# Patient Record
Sex: MALE | Race: WHITE | NOT HISPANIC OR LATINO
[De-identification: names, ages, dates, MRNs, and addresses within clinical notes are randomized per-mention and may not be internally consistent; named-entity substitution may affect disease eponyms.]

---

## 2021-01-01 ENCOUNTER — RESULT REVIEW (OUTPATIENT)
Age: 81
End: 2021-01-01

## 2021-01-01 ENCOUNTER — APPOINTMENT (OUTPATIENT)
Dept: SURGERY | Facility: HOSPITAL | Age: 81
End: 2021-01-01

## 2021-01-01 ENCOUNTER — INPATIENT (INPATIENT)
Facility: HOSPITAL | Age: 81
LOS: 71 days | Discharge: NURSING, REHAB OR LONG-TERM CARE | DRG: 329 | End: 2021-09-23
Attending: STUDENT IN AN ORGANIZED HEALTH CARE EDUCATION/TRAINING PROGRAM | Admitting: STUDENT IN AN ORGANIZED HEALTH CARE EDUCATION/TRAINING PROGRAM
Payer: MEDICARE

## 2021-01-01 ENCOUNTER — APPOINTMENT (OUTPATIENT)
Dept: COLORECTAL SURGERY | Facility: HOSPITAL | Age: 81
End: 2021-01-01

## 2021-01-01 ENCOUNTER — APPOINTMENT (OUTPATIENT)
Dept: SURGERY | Facility: HOSPITAL | Age: 81
End: 2021-01-01
Payer: MEDICARE

## 2021-01-01 ENCOUNTER — TRANSCRIPTION ENCOUNTER (OUTPATIENT)
Age: 81
End: 2021-01-01

## 2021-01-01 ENCOUNTER — APPOINTMENT (OUTPATIENT)
Dept: SURGERY | Facility: CLINIC | Age: 81
End: 2021-01-01
Payer: MEDICARE

## 2021-01-01 VITALS
DIASTOLIC BLOOD PRESSURE: 59 MMHG | TEMPERATURE: 98 F | SYSTOLIC BLOOD PRESSURE: 102 MMHG | WEIGHT: 130.07 LBS | HEART RATE: 70 BPM | HEIGHT: 67 IN | OXYGEN SATURATION: 98 % | RESPIRATION RATE: 16 BRPM

## 2021-01-01 VITALS — DIASTOLIC BLOOD PRESSURE: 57 MMHG | HEART RATE: 73 BPM | SYSTOLIC BLOOD PRESSURE: 86 MMHG

## 2021-01-01 VITALS
OXYGEN SATURATION: 95 % | HEART RATE: 88 BPM | DIASTOLIC BLOOD PRESSURE: 60 MMHG | RESPIRATION RATE: 18 BRPM | TEMPERATURE: 99 F | SYSTOLIC BLOOD PRESSURE: 97 MMHG

## 2021-01-01 VITALS — TEMPERATURE: 97 F

## 2021-01-01 DIAGNOSIS — K50.90 CROHN'S DISEASE, UNSPECIFIED, WITHOUT COMPLICATIONS: ICD-10-CM

## 2021-01-01 DIAGNOSIS — R49.8 OTHER VOICE AND RESONANCE DISORDERS: ICD-10-CM

## 2021-01-01 DIAGNOSIS — K50.10 CROHN'S DISEASE OF LARGE INTESTINE W/OUT COMPLICATIONS: ICD-10-CM

## 2021-01-01 DIAGNOSIS — K52.9 NONINFECTIVE GASTROENTERITIS AND COLITIS, UNSPECIFIED: ICD-10-CM

## 2021-01-01 DIAGNOSIS — R53.81 OTHER MALAISE: ICD-10-CM

## 2021-01-01 DIAGNOSIS — R19.7 DIARRHEA, UNSPECIFIED: ICD-10-CM

## 2021-01-01 DIAGNOSIS — Z51.5 ENCOUNTER FOR PALLIATIVE CARE: ICD-10-CM

## 2021-01-01 DIAGNOSIS — I25.10 ATHEROSCLEROTIC HEART DISEASE OF NATIVE CORONARY ARTERY WITHOUT ANGINA PECTORIS: ICD-10-CM

## 2021-01-01 DIAGNOSIS — K92.2 GASTROINTESTINAL HEMORRHAGE, UNSPECIFIED: ICD-10-CM

## 2021-01-01 LAB
24R-OH-CALCIDIOL SERPL-MCNC: 25.4 NG/ML — LOW (ref 30–80)
24R-OH-CALCIDIOL SERPL-MCNC: 25.8 NG/ML — LOW (ref 30–80)
A1C WITH ESTIMATED AVERAGE GLUCOSE RESULT: 5.2 % — SIGNIFICANT CHANGE UP (ref 4–5.6)
ALBUMIN SERPL ELPH-MCNC: 1.6 G/DL — LOW (ref 3.3–5)
ALBUMIN SERPL ELPH-MCNC: 1.7 G/DL — LOW (ref 3.3–5)
ALBUMIN SERPL ELPH-MCNC: 1.8 G/DL — LOW (ref 3.3–5)
ALBUMIN SERPL ELPH-MCNC: 1.9 G/DL — LOW (ref 3.3–5)
ALBUMIN SERPL ELPH-MCNC: 2 G/DL — LOW (ref 3.3–5)
ALBUMIN SERPL ELPH-MCNC: 2.1 G/DL — LOW (ref 3.3–5)
ALBUMIN SERPL ELPH-MCNC: 2.1 G/DL — LOW (ref 3.3–5)
ALBUMIN SERPL ELPH-MCNC: 2.2 G/DL — LOW (ref 3.3–5)
ALBUMIN SERPL ELPH-MCNC: 2.2 G/DL — LOW (ref 3.3–5)
ALBUMIN SERPL ELPH-MCNC: 2.6 G/DL — LOW (ref 3.3–5)
ALBUMIN SERPL ELPH-MCNC: 2.7 G/DL — LOW (ref 3.3–5)
ALP SERPL-CCNC: 101 U/L — SIGNIFICANT CHANGE UP (ref 40–120)
ALP SERPL-CCNC: 105 U/L — SIGNIFICANT CHANGE UP (ref 40–120)
ALP SERPL-CCNC: 105 U/L — SIGNIFICANT CHANGE UP (ref 40–120)
ALP SERPL-CCNC: 120 U/L — SIGNIFICANT CHANGE UP (ref 40–120)
ALP SERPL-CCNC: 131 U/L — HIGH (ref 40–120)
ALP SERPL-CCNC: 135 U/L — HIGH (ref 40–120)
ALP SERPL-CCNC: 159 U/L — HIGH (ref 40–120)
ALP SERPL-CCNC: 179 U/L — HIGH (ref 40–120)
ALP SERPL-CCNC: 191 U/L — HIGH (ref 40–120)
ALP SERPL-CCNC: 237 U/L — HIGH (ref 40–120)
ALP SERPL-CCNC: 271 U/L — HIGH (ref 40–120)
ALP SERPL-CCNC: 382 U/L — HIGH (ref 40–120)
ALP SERPL-CCNC: 49 U/L — SIGNIFICANT CHANGE UP (ref 40–120)
ALP SERPL-CCNC: 51 U/L — SIGNIFICANT CHANGE UP (ref 40–120)
ALP SERPL-CCNC: 70 U/L — SIGNIFICANT CHANGE UP (ref 40–120)
ALP SERPL-CCNC: 71 U/L — SIGNIFICANT CHANGE UP (ref 40–120)
ALP SERPL-CCNC: 71 U/L — SIGNIFICANT CHANGE UP (ref 40–120)
ALP SERPL-CCNC: 73 U/L — SIGNIFICANT CHANGE UP (ref 40–120)
ALP SERPL-CCNC: 77 U/L — SIGNIFICANT CHANGE UP (ref 40–120)
ALP SERPL-CCNC: 77 U/L — SIGNIFICANT CHANGE UP (ref 40–120)
ALP SERPL-CCNC: 80 U/L — SIGNIFICANT CHANGE UP (ref 40–120)
ALP SERPL-CCNC: 82 U/L — SIGNIFICANT CHANGE UP (ref 40–120)
ALP SERPL-CCNC: 84 U/L — SIGNIFICANT CHANGE UP (ref 40–120)
ALP SERPL-CCNC: 85 U/L — SIGNIFICANT CHANGE UP (ref 40–120)
ALP SERPL-CCNC: 86 U/L — SIGNIFICANT CHANGE UP (ref 40–120)
ALP SERPL-CCNC: 87 U/L — SIGNIFICANT CHANGE UP (ref 40–120)
ALP SERPL-CCNC: 88 U/L — SIGNIFICANT CHANGE UP (ref 40–120)
ALP SERPL-CCNC: 89 U/L — SIGNIFICANT CHANGE UP (ref 40–120)
ALP SERPL-CCNC: 91 U/L — SIGNIFICANT CHANGE UP (ref 40–120)
ALP SERPL-CCNC: 92 U/L — SIGNIFICANT CHANGE UP (ref 40–120)
ALP SERPL-CCNC: 94 U/L — SIGNIFICANT CHANGE UP (ref 40–120)
ALP SERPL-CCNC: 94 U/L — SIGNIFICANT CHANGE UP (ref 40–120)
ALP SERPL-CCNC: 96 U/L — SIGNIFICANT CHANGE UP (ref 40–120)
ALP SERPL-CCNC: 97 U/L — SIGNIFICANT CHANGE UP (ref 40–120)
ALT FLD-CCNC: 12 U/L — SIGNIFICANT CHANGE UP (ref 10–45)
ALT FLD-CCNC: 14 U/L — SIGNIFICANT CHANGE UP (ref 10–45)
ALT FLD-CCNC: 142 U/L — HIGH (ref 10–45)
ALT FLD-CCNC: 16 U/L — SIGNIFICANT CHANGE UP (ref 10–45)
ALT FLD-CCNC: 16 U/L — SIGNIFICANT CHANGE UP (ref 10–45)
ALT FLD-CCNC: 188 U/L — HIGH (ref 10–45)
ALT FLD-CCNC: 19 U/L — SIGNIFICANT CHANGE UP (ref 10–45)
ALT FLD-CCNC: 20 U/L — SIGNIFICANT CHANGE UP (ref 10–45)
ALT FLD-CCNC: 20 U/L — SIGNIFICANT CHANGE UP (ref 10–45)
ALT FLD-CCNC: 220 U/L — HIGH (ref 10–45)
ALT FLD-CCNC: 24 U/L — SIGNIFICANT CHANGE UP (ref 10–45)
ALT FLD-CCNC: 26 U/L — SIGNIFICANT CHANGE UP (ref 10–45)
ALT FLD-CCNC: 28 U/L — SIGNIFICANT CHANGE UP (ref 10–45)
ALT FLD-CCNC: 28 U/L — SIGNIFICANT CHANGE UP (ref 10–45)
ALT FLD-CCNC: 30 U/L — SIGNIFICANT CHANGE UP (ref 10–45)
ALT FLD-CCNC: 30 U/L — SIGNIFICANT CHANGE UP (ref 10–45)
ALT FLD-CCNC: 31 U/L — SIGNIFICANT CHANGE UP (ref 10–45)
ALT FLD-CCNC: 32 U/L — SIGNIFICANT CHANGE UP (ref 10–45)
ALT FLD-CCNC: 33 U/L — SIGNIFICANT CHANGE UP (ref 10–45)
ALT FLD-CCNC: 33 U/L — SIGNIFICANT CHANGE UP (ref 10–45)
ALT FLD-CCNC: 34 U/L — SIGNIFICANT CHANGE UP (ref 10–45)
ALT FLD-CCNC: 34 U/L — SIGNIFICANT CHANGE UP (ref 10–45)
ALT FLD-CCNC: 35 U/L — SIGNIFICANT CHANGE UP (ref 10–45)
ALT FLD-CCNC: 35 U/L — SIGNIFICANT CHANGE UP (ref 10–45)
ALT FLD-CCNC: 36 U/L — SIGNIFICANT CHANGE UP (ref 10–45)
ALT FLD-CCNC: 36 U/L — SIGNIFICANT CHANGE UP (ref 10–45)
ALT FLD-CCNC: 367 U/L — HIGH (ref 10–45)
ALT FLD-CCNC: 37 U/L — SIGNIFICANT CHANGE UP (ref 10–45)
ALT FLD-CCNC: 46 U/L — HIGH (ref 10–45)
ALT FLD-CCNC: 48 U/L — HIGH (ref 10–45)
ALT FLD-CCNC: 54 U/L — HIGH (ref 10–45)
ALT FLD-CCNC: 54 U/L — HIGH (ref 10–45)
ALT FLD-CCNC: 60 U/L — HIGH (ref 10–45)
ALT FLD-CCNC: 63 U/L — HIGH (ref 10–45)
ALT FLD-CCNC: 77 U/L — HIGH (ref 10–45)
ALT FLD-CCNC: 9 U/L — LOW (ref 10–45)
ALT FLD-CCNC: <5 U/L — LOW (ref 10–45)
ANION GAP SERPL CALC-SCNC: 10 MMOL/L — SIGNIFICANT CHANGE UP (ref 5–17)
ANION GAP SERPL CALC-SCNC: 11 MMOL/L — SIGNIFICANT CHANGE UP (ref 5–17)
ANION GAP SERPL CALC-SCNC: 12 MMOL/L — SIGNIFICANT CHANGE UP (ref 5–17)
ANION GAP SERPL CALC-SCNC: 13 MMOL/L — SIGNIFICANT CHANGE UP (ref 5–17)
ANION GAP SERPL CALC-SCNC: 14 MMOL/L — SIGNIFICANT CHANGE UP (ref 5–17)
ANION GAP SERPL CALC-SCNC: 6 MMOL/L — SIGNIFICANT CHANGE UP (ref 5–17)
ANION GAP SERPL CALC-SCNC: 6 MMOL/L — SIGNIFICANT CHANGE UP (ref 5–17)
ANION GAP SERPL CALC-SCNC: 7 MMOL/L — SIGNIFICANT CHANGE UP (ref 5–17)
ANION GAP SERPL CALC-SCNC: 8 MMOL/L — SIGNIFICANT CHANGE UP (ref 5–17)
ANION GAP SERPL CALC-SCNC: 9 MMOL/L — SIGNIFICANT CHANGE UP (ref 5–17)
ANISOCYTOSIS BLD QL: SIGNIFICANT CHANGE UP
APPEARANCE UR: CLEAR — SIGNIFICANT CHANGE UP
APTT BLD: 26.5 SEC — LOW (ref 27.5–35.5)
APTT BLD: 28.6 SEC — SIGNIFICANT CHANGE UP (ref 27.5–35.5)
APTT BLD: 28.6 SEC — SIGNIFICANT CHANGE UP (ref 27.5–35.5)
APTT BLD: 29.3 SEC — SIGNIFICANT CHANGE UP (ref 27.5–35.5)
APTT BLD: 30.3 SEC — SIGNIFICANT CHANGE UP (ref 27.5–35.5)
APTT BLD: 30.6 SEC — SIGNIFICANT CHANGE UP (ref 27.5–35.5)
APTT BLD: 30.8 SEC — SIGNIFICANT CHANGE UP (ref 27.5–35.5)
APTT BLD: 32 SEC — SIGNIFICANT CHANGE UP (ref 27.5–35.5)
APTT BLD: 32.4 SEC — SIGNIFICANT CHANGE UP (ref 27.5–35.5)
APTT BLD: 32.5 SEC — SIGNIFICANT CHANGE UP (ref 27.5–35.5)
APTT BLD: 32.5 SEC — SIGNIFICANT CHANGE UP (ref 27.5–35.5)
AST SERPL-CCNC: 11 U/L — SIGNIFICANT CHANGE UP (ref 10–40)
AST SERPL-CCNC: 12 U/L — SIGNIFICANT CHANGE UP (ref 10–40)
AST SERPL-CCNC: 124 U/L — HIGH (ref 10–40)
AST SERPL-CCNC: 13 U/L — SIGNIFICANT CHANGE UP (ref 10–40)
AST SERPL-CCNC: 14 U/L — SIGNIFICANT CHANGE UP (ref 10–40)
AST SERPL-CCNC: 14 U/L — SIGNIFICANT CHANGE UP (ref 10–40)
AST SERPL-CCNC: 15 U/L — SIGNIFICANT CHANGE UP (ref 10–40)
AST SERPL-CCNC: 16 U/L — SIGNIFICANT CHANGE UP (ref 10–40)
AST SERPL-CCNC: 17 U/L — SIGNIFICANT CHANGE UP (ref 10–40)
AST SERPL-CCNC: 19 U/L — SIGNIFICANT CHANGE UP (ref 10–40)
AST SERPL-CCNC: 21 U/L — SIGNIFICANT CHANGE UP (ref 10–40)
AST SERPL-CCNC: 22 U/L — SIGNIFICANT CHANGE UP (ref 10–40)
AST SERPL-CCNC: 24 U/L — SIGNIFICANT CHANGE UP (ref 10–40)
AST SERPL-CCNC: 26 U/L — SIGNIFICANT CHANGE UP (ref 10–40)
AST SERPL-CCNC: 27 U/L — SIGNIFICANT CHANGE UP (ref 10–40)
AST SERPL-CCNC: 28 U/L — SIGNIFICANT CHANGE UP (ref 10–40)
AST SERPL-CCNC: 28 U/L — SIGNIFICANT CHANGE UP (ref 10–40)
AST SERPL-CCNC: 31 U/L — SIGNIFICANT CHANGE UP (ref 10–40)
AST SERPL-CCNC: 32 U/L — SIGNIFICANT CHANGE UP (ref 10–40)
AST SERPL-CCNC: 34 U/L — SIGNIFICANT CHANGE UP (ref 10–40)
AST SERPL-CCNC: 36 U/L — SIGNIFICANT CHANGE UP (ref 10–40)
AST SERPL-CCNC: 40 U/L — SIGNIFICANT CHANGE UP (ref 10–40)
AST SERPL-CCNC: 60 U/L — HIGH (ref 10–40)
AST SERPL-CCNC: 7 U/L — LOW (ref 10–40)
AST SERPL-CCNC: 97 U/L — HIGH (ref 10–40)
AST SERPL-CCNC: <5 U/L — LOW (ref 10–40)
BACTERIA # UR AUTO: NEGATIVE — SIGNIFICANT CHANGE UP
BASE EXCESS BLDV CALC-SCNC: 1.9 MMOL/L — SIGNIFICANT CHANGE UP (ref -2–2)
BASE EXCESS BLDV CALC-SCNC: 2.9 MMOL/L — HIGH (ref -2–2)
BASE EXCESS BLDV CALC-SCNC: 4.1 MMOL/L — HIGH (ref -2–2)
BASE EXCESS BLDV CALC-SCNC: 6 MMOL/L — HIGH (ref -2–2)
BASOPHILS # BLD AUTO: 0 K/UL — SIGNIFICANT CHANGE UP (ref 0–0.2)
BASOPHILS # BLD AUTO: 0.01 K/UL — SIGNIFICANT CHANGE UP (ref 0–0.2)
BASOPHILS # BLD AUTO: 0.02 K/UL — SIGNIFICANT CHANGE UP (ref 0–0.2)
BASOPHILS # BLD AUTO: 0.02 K/UL — SIGNIFICANT CHANGE UP (ref 0–0.2)
BASOPHILS # BLD AUTO: 0.03 K/UL — SIGNIFICANT CHANGE UP (ref 0–0.2)
BASOPHILS NFR BLD AUTO: 0 % — SIGNIFICANT CHANGE UP (ref 0–2)
BASOPHILS NFR BLD AUTO: 0.1 % — SIGNIFICANT CHANGE UP (ref 0–2)
BASOPHILS NFR BLD AUTO: 0.2 % — SIGNIFICANT CHANGE UP (ref 0–2)
BASOPHILS NFR BLD AUTO: 0.2 % — SIGNIFICANT CHANGE UP (ref 0–2)
BILIRUB DIRECT SERPL-MCNC: 0.2 MG/DL — SIGNIFICANT CHANGE UP (ref 0–0.2)
BILIRUB INDIRECT FLD-MCNC: 0.4 MG/DL — SIGNIFICANT CHANGE UP (ref 0.2–1)
BILIRUB SERPL-MCNC: 0.2 MG/DL — SIGNIFICANT CHANGE UP (ref 0.2–1.2)
BILIRUB SERPL-MCNC: 0.3 MG/DL — SIGNIFICANT CHANGE UP (ref 0.2–1.2)
BILIRUB SERPL-MCNC: 0.4 MG/DL — SIGNIFICANT CHANGE UP (ref 0.2–1.2)
BILIRUB SERPL-MCNC: 0.5 MG/DL — SIGNIFICANT CHANGE UP (ref 0.2–1.2)
BILIRUB SERPL-MCNC: 0.5 MG/DL — SIGNIFICANT CHANGE UP (ref 0.2–1.2)
BILIRUB SERPL-MCNC: 0.6 MG/DL — SIGNIFICANT CHANGE UP (ref 0.2–1.2)
BILIRUB SERPL-MCNC: 0.7 MG/DL — SIGNIFICANT CHANGE UP (ref 0.2–1.2)
BILIRUB SERPL-MCNC: 0.7 MG/DL — SIGNIFICANT CHANGE UP (ref 0.2–1.2)
BILIRUB SERPL-MCNC: 0.8 MG/DL — SIGNIFICANT CHANGE UP (ref 0.2–1.2)
BILIRUB UR-MCNC: NEGATIVE — SIGNIFICANT CHANGE UP
BLD GP AB SCN SERPL QL: NEGATIVE — SIGNIFICANT CHANGE UP
BUN SERPL-MCNC: 10 MG/DL — SIGNIFICANT CHANGE UP (ref 7–23)
BUN SERPL-MCNC: 11 MG/DL — SIGNIFICANT CHANGE UP (ref 7–23)
BUN SERPL-MCNC: 11 MG/DL — SIGNIFICANT CHANGE UP (ref 7–23)
BUN SERPL-MCNC: 12 MG/DL — SIGNIFICANT CHANGE UP (ref 7–23)
BUN SERPL-MCNC: 13 MG/DL — SIGNIFICANT CHANGE UP (ref 7–23)
BUN SERPL-MCNC: 14 MG/DL — SIGNIFICANT CHANGE UP (ref 7–23)
BUN SERPL-MCNC: 15 MG/DL — SIGNIFICANT CHANGE UP (ref 7–23)
BUN SERPL-MCNC: 15 MG/DL — SIGNIFICANT CHANGE UP (ref 7–23)
BUN SERPL-MCNC: 16 MG/DL — SIGNIFICANT CHANGE UP (ref 7–23)
BUN SERPL-MCNC: 16 MG/DL — SIGNIFICANT CHANGE UP (ref 7–23)
BUN SERPL-MCNC: 17 MG/DL — SIGNIFICANT CHANGE UP (ref 7–23)
BUN SERPL-MCNC: 18 MG/DL — SIGNIFICANT CHANGE UP (ref 7–23)
BUN SERPL-MCNC: 19 MG/DL — SIGNIFICANT CHANGE UP (ref 7–23)
BUN SERPL-MCNC: 21 MG/DL — SIGNIFICANT CHANGE UP (ref 7–23)
BUN SERPL-MCNC: 22 MG/DL — SIGNIFICANT CHANGE UP (ref 7–23)
BUN SERPL-MCNC: 23 MG/DL — SIGNIFICANT CHANGE UP (ref 7–23)
BUN SERPL-MCNC: 24 MG/DL — HIGH (ref 7–23)
BUN SERPL-MCNC: 24 MG/DL — HIGH (ref 7–23)
BUN SERPL-MCNC: 25 MG/DL — HIGH (ref 7–23)
BUN SERPL-MCNC: 29 MG/DL — HIGH (ref 7–23)
BUN SERPL-MCNC: 30 MG/DL — HIGH (ref 7–23)
BUN SERPL-MCNC: 31 MG/DL — HIGH (ref 7–23)
BUN SERPL-MCNC: 32 MG/DL — HIGH (ref 7–23)
BUN SERPL-MCNC: 33 MG/DL — HIGH (ref 7–23)
BUN SERPL-MCNC: 34 MG/DL — HIGH (ref 7–23)
BUN SERPL-MCNC: 35 MG/DL — HIGH (ref 7–23)
BUN SERPL-MCNC: 36 MG/DL — HIGH (ref 7–23)
BUN SERPL-MCNC: 36 MG/DL — HIGH (ref 7–23)
BUN SERPL-MCNC: 38 MG/DL — HIGH (ref 7–23)
BUN SERPL-MCNC: 38 MG/DL — HIGH (ref 7–23)
BUN SERPL-MCNC: 39 MG/DL — HIGH (ref 7–23)
BUN SERPL-MCNC: 4 MG/DL — LOW (ref 7–23)
BUN SERPL-MCNC: 40 MG/DL — HIGH (ref 7–23)
BUN SERPL-MCNC: 44 MG/DL — HIGH (ref 7–23)
BUN SERPL-MCNC: 44 MG/DL — HIGH (ref 7–23)
BUN SERPL-MCNC: 45 MG/DL — HIGH (ref 7–23)
BUN SERPL-MCNC: 47 MG/DL — HIGH (ref 7–23)
BUN SERPL-MCNC: 5 MG/DL — LOW (ref 7–23)
BUN SERPL-MCNC: 6 MG/DL — LOW (ref 7–23)
BUN SERPL-MCNC: 7 MG/DL — SIGNIFICANT CHANGE UP (ref 7–23)
BUN SERPL-MCNC: 8 MG/DL — SIGNIFICANT CHANGE UP (ref 7–23)
BUN SERPL-MCNC: 9 MG/DL — SIGNIFICANT CHANGE UP (ref 7–23)
BUN SERPL-MCNC: 9 MG/DL — SIGNIFICANT CHANGE UP (ref 7–23)
C DIFF BY PCR RESULT: DETECTED
C DIFF GDH STL QL: SIGNIFICANT CHANGE UP
C DIFF GDH STL QL: SIGNIFICANT CHANGE UP
C DIFF TOX GENS STL QL NAA+PROBE: SIGNIFICANT CHANGE UP
CA-I BLD-SCNC: 1.05 MMOL/L — LOW (ref 1.15–1.33)
CA-I BLD-SCNC: 1.07 MMOL/L — LOW (ref 1.15–1.33)
CA-I BLD-SCNC: 1.07 MMOL/L — LOW (ref 1.15–1.33)
CA-I BLD-SCNC: 1.08 MMOL/L — LOW (ref 1.15–1.33)
CA-I BLD-SCNC: 1.09 MMOL/L — LOW (ref 1.15–1.33)
CA-I BLD-SCNC: 1.1 MMOL/L — LOW (ref 1.15–1.33)
CA-I BLD-SCNC: 1.12 MMOL/L — LOW (ref 1.15–1.33)
CA-I BLD-SCNC: 1.13 MMOL/L — LOW (ref 1.15–1.33)
CA-I BLD-SCNC: 1.14 MMOL/L — LOW (ref 1.15–1.33)
CA-I BLD-SCNC: 1.15 MMOL/L — SIGNIFICANT CHANGE UP (ref 1.15–1.33)
CA-I BLD-SCNC: 1.16 MMOL/L — SIGNIFICANT CHANGE UP (ref 1.15–1.33)
CA-I BLD-SCNC: 1.17 MMOL/L — SIGNIFICANT CHANGE UP (ref 1.15–1.33)
CA-I BLD-SCNC: 1.17 MMOL/L — SIGNIFICANT CHANGE UP (ref 1.15–1.33)
CA-I BLD-SCNC: 1.18 MMOL/L — SIGNIFICANT CHANGE UP (ref 1.15–1.33)
CA-I SERPL-SCNC: 1.05 MMOL/L — LOW (ref 1.12–1.3)
CA-I SERPL-SCNC: 1.21 MMOL/L — SIGNIFICANT CHANGE UP (ref 1.15–1.33)
CA-I SERPL-SCNC: 1.22 MMOL/L — SIGNIFICANT CHANGE UP (ref 1.15–1.33)
CA-I SERPL-SCNC: 1.24 MMOL/L — SIGNIFICANT CHANGE UP (ref 1.15–1.33)
CALCIUM SERPL-MCNC: 7.3 MG/DL — LOW (ref 8.4–10.5)
CALCIUM SERPL-MCNC: 7.4 MG/DL — LOW (ref 8.4–10.5)
CALCIUM SERPL-MCNC: 7.5 MG/DL — LOW (ref 8.4–10.5)
CALCIUM SERPL-MCNC: 7.6 MG/DL — LOW (ref 8.4–10.5)
CALCIUM SERPL-MCNC: 7.7 MG/DL — LOW (ref 8.4–10.5)
CALCIUM SERPL-MCNC: 7.8 MG/DL — LOW (ref 8.4–10.5)
CALCIUM SERPL-MCNC: 7.9 MG/DL — LOW (ref 8.4–10.5)
CALCIUM SERPL-MCNC: 8 MG/DL — LOW (ref 8.4–10.5)
CALCIUM SERPL-MCNC: 8.1 MG/DL — LOW (ref 8.4–10.5)
CALCIUM SERPL-MCNC: 8.2 MG/DL — LOW (ref 8.4–10.5)
CALCIUM SERPL-MCNC: 8.2 MG/DL — LOW (ref 8.4–10.5)
CALCIUM SERPL-MCNC: 8.3 MG/DL — LOW (ref 8.4–10.5)
CALCIUM SERPL-MCNC: 8.4 MG/DL — SIGNIFICANT CHANGE UP (ref 8.4–10.5)
CALCIUM SERPL-MCNC: 8.4 MG/DL — SIGNIFICANT CHANGE UP (ref 8.4–10.5)
CALCIUM SERPL-MCNC: 8.5 MG/DL — SIGNIFICANT CHANGE UP (ref 8.4–10.5)
CALCIUM SERPL-MCNC: 8.6 MG/DL — SIGNIFICANT CHANGE UP (ref 8.4–10.5)
CALCIUM SERPL-MCNC: 8.8 MG/DL — SIGNIFICANT CHANGE UP (ref 8.4–10.5)
CHLORIDE BLDV-SCNC: 102 MMOL/L — SIGNIFICANT CHANGE UP (ref 96–108)
CHLORIDE BLDV-SCNC: 106 MMOL/L — SIGNIFICANT CHANGE UP (ref 96–108)
CHLORIDE BLDV-SCNC: 106 MMOL/L — SIGNIFICANT CHANGE UP (ref 96–108)
CHLORIDE BLDV-SCNC: 107 MMOL/L — SIGNIFICANT CHANGE UP (ref 96–108)
CHLORIDE SERPL-SCNC: 100 MMOL/L — SIGNIFICANT CHANGE UP (ref 96–108)
CHLORIDE SERPL-SCNC: 101 MMOL/L — SIGNIFICANT CHANGE UP (ref 96–108)
CHLORIDE SERPL-SCNC: 102 MMOL/L — SIGNIFICANT CHANGE UP (ref 96–108)
CHLORIDE SERPL-SCNC: 103 MMOL/L — SIGNIFICANT CHANGE UP (ref 96–108)
CHLORIDE SERPL-SCNC: 104 MMOL/L — SIGNIFICANT CHANGE UP (ref 96–108)
CHLORIDE SERPL-SCNC: 105 MMOL/L — SIGNIFICANT CHANGE UP (ref 96–108)
CHLORIDE SERPL-SCNC: 106 MMOL/L — SIGNIFICANT CHANGE UP (ref 96–108)
CHLORIDE SERPL-SCNC: 107 MMOL/L — SIGNIFICANT CHANGE UP (ref 96–108)
CHLORIDE SERPL-SCNC: 108 MMOL/L — SIGNIFICANT CHANGE UP (ref 96–108)
CHLORIDE SERPL-SCNC: 110 MMOL/L — HIGH (ref 96–108)
CHLORIDE SERPL-SCNC: 111 MMOL/L — HIGH (ref 96–108)
CHLORIDE SERPL-SCNC: 112 MMOL/L — HIGH (ref 96–108)
CHLORIDE SERPL-SCNC: 114 MMOL/L — HIGH (ref 96–108)
CHLORIDE SERPL-SCNC: 115 MMOL/L — HIGH (ref 96–108)
CHLORIDE SERPL-SCNC: 116 MMOL/L — HIGH (ref 96–108)
CHLORIDE SERPL-SCNC: 119 MMOL/L — HIGH (ref 96–108)
CHLORIDE SERPL-SCNC: 94 MMOL/L — LOW (ref 96–108)
CHLORIDE SERPL-SCNC: 96 MMOL/L — SIGNIFICANT CHANGE UP (ref 96–108)
CHLORIDE SERPL-SCNC: 97 MMOL/L — SIGNIFICANT CHANGE UP (ref 96–108)
CHLORIDE SERPL-SCNC: 98 MMOL/L — SIGNIFICANT CHANGE UP (ref 96–108)
CHLORIDE SERPL-SCNC: 99 MMOL/L — SIGNIFICANT CHANGE UP (ref 96–108)
CK MB CFR SERPL CALC: 1.1 NG/ML — SIGNIFICANT CHANGE UP (ref 0–6.7)
CK SERPL-CCNC: 16 U/L — LOW (ref 30–200)
CO2 BLDV-SCNC: 29 MMOL/L — HIGH (ref 22–26)
CO2 BLDV-SCNC: 29 MMOL/L — HIGH (ref 22–26)
CO2 BLDV-SCNC: 30 MMOL/L — SIGNIFICANT CHANGE UP (ref 22–30)
CO2 BLDV-SCNC: 31 MMOL/L — HIGH (ref 22–26)
CO2 SERPL-SCNC: 20 MMOL/L — LOW (ref 22–31)
CO2 SERPL-SCNC: 21 MMOL/L — LOW (ref 22–31)
CO2 SERPL-SCNC: 22 MMOL/L — SIGNIFICANT CHANGE UP (ref 22–31)
CO2 SERPL-SCNC: 23 MMOL/L — SIGNIFICANT CHANGE UP (ref 22–31)
CO2 SERPL-SCNC: 24 MMOL/L — SIGNIFICANT CHANGE UP (ref 22–31)
CO2 SERPL-SCNC: 25 MMOL/L — SIGNIFICANT CHANGE UP (ref 22–31)
CO2 SERPL-SCNC: 26 MMOL/L — SIGNIFICANT CHANGE UP (ref 22–31)
CO2 SERPL-SCNC: 27 MMOL/L — SIGNIFICANT CHANGE UP (ref 22–31)
CO2 SERPL-SCNC: 27 MMOL/L — SIGNIFICANT CHANGE UP (ref 22–31)
COLOR SPEC: COLORLESS — SIGNIFICANT CHANGE UP
COLOR SPEC: SIGNIFICANT CHANGE UP
COLOR SPEC: SIGNIFICANT CHANGE UP
COLOR SPEC: YELLOW — SIGNIFICANT CHANGE UP
COVID-19 SPIKE DOMAIN AB INTERP: POSITIVE
COVID-19 SPIKE DOMAIN ANTIBODY RESULT: 15.2 U/ML — HIGH
CREAT SERPL-MCNC: 0.39 MG/DL — LOW (ref 0.5–1.3)
CREAT SERPL-MCNC: 0.41 MG/DL — LOW (ref 0.5–1.3)
CREAT SERPL-MCNC: 0.42 MG/DL — LOW (ref 0.5–1.3)
CREAT SERPL-MCNC: 0.43 MG/DL — LOW (ref 0.5–1.3)
CREAT SERPL-MCNC: 0.44 MG/DL — LOW (ref 0.5–1.3)
CREAT SERPL-MCNC: 0.44 MG/DL — LOW (ref 0.5–1.3)
CREAT SERPL-MCNC: 0.45 MG/DL — LOW (ref 0.5–1.3)
CREAT SERPL-MCNC: 0.45 MG/DL — LOW (ref 0.5–1.3)
CREAT SERPL-MCNC: 0.46 MG/DL — LOW (ref 0.5–1.3)
CREAT SERPL-MCNC: 0.47 MG/DL — LOW (ref 0.5–1.3)
CREAT SERPL-MCNC: 0.48 MG/DL — LOW (ref 0.5–1.3)
CREAT SERPL-MCNC: 0.49 MG/DL — LOW (ref 0.5–1.3)
CREAT SERPL-MCNC: 0.51 MG/DL — SIGNIFICANT CHANGE UP (ref 0.5–1.3)
CREAT SERPL-MCNC: 0.51 MG/DL — SIGNIFICANT CHANGE UP (ref 0.5–1.3)
CREAT SERPL-MCNC: 0.52 MG/DL — SIGNIFICANT CHANGE UP (ref 0.5–1.3)
CREAT SERPL-MCNC: 0.52 MG/DL — SIGNIFICANT CHANGE UP (ref 0.5–1.3)
CREAT SERPL-MCNC: 0.53 MG/DL — SIGNIFICANT CHANGE UP (ref 0.5–1.3)
CREAT SERPL-MCNC: 0.54 MG/DL — SIGNIFICANT CHANGE UP (ref 0.5–1.3)
CREAT SERPL-MCNC: 0.55 MG/DL — SIGNIFICANT CHANGE UP (ref 0.5–1.3)
CREAT SERPL-MCNC: 0.56 MG/DL — SIGNIFICANT CHANGE UP (ref 0.5–1.3)
CREAT SERPL-MCNC: 0.57 MG/DL — SIGNIFICANT CHANGE UP (ref 0.5–1.3)
CREAT SERPL-MCNC: 0.57 MG/DL — SIGNIFICANT CHANGE UP (ref 0.5–1.3)
CREAT SERPL-MCNC: 0.58 MG/DL — SIGNIFICANT CHANGE UP (ref 0.5–1.3)
CREAT SERPL-MCNC: 0.58 MG/DL — SIGNIFICANT CHANGE UP (ref 0.5–1.3)
CREAT SERPL-MCNC: 0.59 MG/DL — SIGNIFICANT CHANGE UP (ref 0.5–1.3)
CREAT SERPL-MCNC: 0.59 MG/DL — SIGNIFICANT CHANGE UP (ref 0.5–1.3)
CREAT SERPL-MCNC: 0.61 MG/DL — SIGNIFICANT CHANGE UP (ref 0.5–1.3)
CREAT SERPL-MCNC: 0.62 MG/DL — SIGNIFICANT CHANGE UP (ref 0.5–1.3)
CREAT SERPL-MCNC: 0.63 MG/DL — SIGNIFICANT CHANGE UP (ref 0.5–1.3)
CREAT SERPL-MCNC: 0.64 MG/DL — SIGNIFICANT CHANGE UP (ref 0.5–1.3)
CREAT SERPL-MCNC: 0.72 MG/DL — SIGNIFICANT CHANGE UP (ref 0.5–1.3)
CREAT SERPL-MCNC: 0.75 MG/DL — SIGNIFICANT CHANGE UP (ref 0.5–1.3)
CREAT SERPL-MCNC: 0.77 MG/DL — SIGNIFICANT CHANGE UP (ref 0.5–1.3)
CREAT SERPL-MCNC: 0.79 MG/DL — SIGNIFICANT CHANGE UP (ref 0.5–1.3)
CREAT SERPL-MCNC: 0.8 MG/DL — SIGNIFICANT CHANGE UP (ref 0.5–1.3)
CREAT SERPL-MCNC: 0.81 MG/DL — SIGNIFICANT CHANGE UP (ref 0.5–1.3)
CREAT SERPL-MCNC: 0.81 MG/DL — SIGNIFICANT CHANGE UP (ref 0.5–1.3)
CREAT SERPL-MCNC: 0.82 MG/DL — SIGNIFICANT CHANGE UP (ref 0.5–1.3)
CREAT SERPL-MCNC: 0.82 MG/DL — SIGNIFICANT CHANGE UP (ref 0.5–1.3)
CREAT SERPL-MCNC: 0.88 MG/DL — SIGNIFICANT CHANGE UP (ref 0.5–1.3)
CREAT SERPL-MCNC: 0.89 MG/DL — SIGNIFICANT CHANGE UP (ref 0.5–1.3)
CREAT SERPL-MCNC: 0.9 MG/DL — SIGNIFICANT CHANGE UP (ref 0.5–1.3)
CRP SERPL-MCNC: 130 MG/L — HIGH (ref 0–4)
CRP SERPL-MCNC: 141 MG/L — HIGH (ref 0–4)
CULTURE RESULTS: SIGNIFICANT CHANGE UP
DACRYOCYTES BLD QL SMEAR: SLIGHT — SIGNIFICANT CHANGE UP
DIFF PNL FLD: NEGATIVE — SIGNIFICANT CHANGE UP
ELLIPTOCYTES BLD QL SMEAR: SLIGHT — SIGNIFICANT CHANGE UP
EOSINOPHIL # BLD AUTO: 0 K/UL — SIGNIFICANT CHANGE UP (ref 0–0.5)
EOSINOPHIL # BLD AUTO: 0.02 K/UL — SIGNIFICANT CHANGE UP (ref 0–0.5)
EOSINOPHIL # BLD AUTO: 0.03 K/UL — SIGNIFICANT CHANGE UP (ref 0–0.5)
EOSINOPHIL # BLD AUTO: 0.03 K/UL — SIGNIFICANT CHANGE UP (ref 0–0.5)
EOSINOPHIL # BLD AUTO: 0.04 K/UL — SIGNIFICANT CHANGE UP (ref 0–0.5)
EOSINOPHIL # BLD AUTO: 0.05 K/UL — SIGNIFICANT CHANGE UP (ref 0–0.5)
EOSINOPHIL # BLD AUTO: 0.05 K/UL — SIGNIFICANT CHANGE UP (ref 0–0.5)
EOSINOPHIL # BLD AUTO: 0.09 K/UL — SIGNIFICANT CHANGE UP (ref 0–0.5)
EOSINOPHIL # BLD AUTO: 0.11 K/UL — SIGNIFICANT CHANGE UP (ref 0–0.5)
EOSINOPHIL # BLD AUTO: 0.12 K/UL — SIGNIFICANT CHANGE UP (ref 0–0.5)
EOSINOPHIL NFR BLD AUTO: 0 % — SIGNIFICANT CHANGE UP (ref 0–6)
EOSINOPHIL NFR BLD AUTO: 0.1 % — SIGNIFICANT CHANGE UP (ref 0–6)
EOSINOPHIL NFR BLD AUTO: 0.2 % — SIGNIFICANT CHANGE UP (ref 0–6)
EOSINOPHIL NFR BLD AUTO: 0.2 % — SIGNIFICANT CHANGE UP (ref 0–6)
EOSINOPHIL NFR BLD AUTO: 0.3 % — SIGNIFICANT CHANGE UP (ref 0–6)
EOSINOPHIL NFR BLD AUTO: 0.4 % — SIGNIFICANT CHANGE UP (ref 0–6)
EOSINOPHIL NFR BLD AUTO: 0.5 % — SIGNIFICANT CHANGE UP (ref 0–6)
EOSINOPHIL NFR BLD AUTO: 0.8 % — SIGNIFICANT CHANGE UP (ref 0–6)
EOSINOPHIL NFR BLD AUTO: 0.9 % — SIGNIFICANT CHANGE UP (ref 0–6)
EPI CELLS # UR: 2 /HPF — SIGNIFICANT CHANGE UP
ERYTHROCYTE [SEDIMENTATION RATE] IN BLOOD: 73 MM/HR — HIGH (ref 0–20)
ERYTHROCYTE [SEDIMENTATION RATE] IN BLOOD: 79 MM/HR — HIGH (ref 0–20)
ESTIMATED AVERAGE GLUCOSE: 103 MG/DL — SIGNIFICANT CHANGE UP (ref 68–114)
FERRITIN SERPL-MCNC: 419 NG/ML — HIGH (ref 30–400)
FOLATE SERPL-MCNC: 19.9 NG/ML — SIGNIFICANT CHANGE UP
GAMMA INTERFERON BACKGROUND BLD IA-ACNC: 0.14 IU/ML — SIGNIFICANT CHANGE UP
GAS PNL BLDA: SIGNIFICANT CHANGE UP
GAS PNL BLDV: 126 MMOL/L — LOW (ref 135–145)
GAS PNL BLDV: 135 MMOL/L — LOW (ref 136–145)
GAS PNL BLDV: 137 MMOL/L — SIGNIFICANT CHANGE UP (ref 136–145)
GAS PNL BLDV: 137 MMOL/L — SIGNIFICANT CHANGE UP (ref 136–145)
GAS PNL BLDV: SIGNIFICANT CHANGE UP
GLUCOSE BLDC GLUCOMTR-MCNC: 100 MG/DL — HIGH (ref 70–99)
GLUCOSE BLDC GLUCOMTR-MCNC: 100 MG/DL — HIGH (ref 70–99)
GLUCOSE BLDC GLUCOMTR-MCNC: 107 MG/DL — HIGH (ref 70–99)
GLUCOSE BLDC GLUCOMTR-MCNC: 108 MG/DL — HIGH (ref 70–99)
GLUCOSE BLDC GLUCOMTR-MCNC: 109 MG/DL — HIGH (ref 70–99)
GLUCOSE BLDC GLUCOMTR-MCNC: 109 MG/DL — HIGH (ref 70–99)
GLUCOSE BLDC GLUCOMTR-MCNC: 111 MG/DL — HIGH (ref 70–99)
GLUCOSE BLDC GLUCOMTR-MCNC: 111 MG/DL — HIGH (ref 70–99)
GLUCOSE BLDC GLUCOMTR-MCNC: 114 MG/DL — HIGH (ref 70–99)
GLUCOSE BLDC GLUCOMTR-MCNC: 114 MG/DL — HIGH (ref 70–99)
GLUCOSE BLDC GLUCOMTR-MCNC: 115 MG/DL — HIGH (ref 70–99)
GLUCOSE BLDC GLUCOMTR-MCNC: 115 MG/DL — HIGH (ref 70–99)
GLUCOSE BLDC GLUCOMTR-MCNC: 118 MG/DL — HIGH (ref 70–99)
GLUCOSE BLDC GLUCOMTR-MCNC: 118 MG/DL — HIGH (ref 70–99)
GLUCOSE BLDC GLUCOMTR-MCNC: 119 MG/DL — HIGH (ref 70–99)
GLUCOSE BLDC GLUCOMTR-MCNC: 120 MG/DL — HIGH (ref 70–99)
GLUCOSE BLDC GLUCOMTR-MCNC: 120 MG/DL — HIGH (ref 70–99)
GLUCOSE BLDC GLUCOMTR-MCNC: 121 MG/DL — HIGH (ref 70–99)
GLUCOSE BLDC GLUCOMTR-MCNC: 122 MG/DL — HIGH (ref 70–99)
GLUCOSE BLDC GLUCOMTR-MCNC: 123 MG/DL — HIGH (ref 70–99)
GLUCOSE BLDC GLUCOMTR-MCNC: 124 MG/DL — HIGH (ref 70–99)
GLUCOSE BLDC GLUCOMTR-MCNC: 125 MG/DL — HIGH (ref 70–99)
GLUCOSE BLDC GLUCOMTR-MCNC: 126 MG/DL — HIGH (ref 70–99)
GLUCOSE BLDC GLUCOMTR-MCNC: 128 MG/DL — HIGH (ref 70–99)
GLUCOSE BLDC GLUCOMTR-MCNC: 128 MG/DL — HIGH (ref 70–99)
GLUCOSE BLDC GLUCOMTR-MCNC: 129 MG/DL — HIGH (ref 70–99)
GLUCOSE BLDC GLUCOMTR-MCNC: 129 MG/DL — HIGH (ref 70–99)
GLUCOSE BLDC GLUCOMTR-MCNC: 130 MG/DL — HIGH (ref 70–99)
GLUCOSE BLDC GLUCOMTR-MCNC: 131 MG/DL — HIGH (ref 70–99)
GLUCOSE BLDC GLUCOMTR-MCNC: 132 MG/DL — HIGH (ref 70–99)
GLUCOSE BLDC GLUCOMTR-MCNC: 133 MG/DL — HIGH (ref 70–99)
GLUCOSE BLDC GLUCOMTR-MCNC: 134 MG/DL — HIGH (ref 70–99)
GLUCOSE BLDC GLUCOMTR-MCNC: 138 MG/DL — HIGH (ref 70–99)
GLUCOSE BLDC GLUCOMTR-MCNC: 138 MG/DL — HIGH (ref 70–99)
GLUCOSE BLDC GLUCOMTR-MCNC: 140 MG/DL — HIGH (ref 70–99)
GLUCOSE BLDC GLUCOMTR-MCNC: 140 MG/DL — HIGH (ref 70–99)
GLUCOSE BLDC GLUCOMTR-MCNC: 141 MG/DL — HIGH (ref 70–99)
GLUCOSE BLDC GLUCOMTR-MCNC: 148 MG/DL — HIGH (ref 70–99)
GLUCOSE BLDC GLUCOMTR-MCNC: 150 MG/DL — HIGH (ref 70–99)
GLUCOSE BLDC GLUCOMTR-MCNC: 153 MG/DL — HIGH (ref 70–99)
GLUCOSE BLDC GLUCOMTR-MCNC: 155 MG/DL — HIGH (ref 70–99)
GLUCOSE BLDC GLUCOMTR-MCNC: 156 MG/DL — HIGH (ref 70–99)
GLUCOSE BLDC GLUCOMTR-MCNC: 158 MG/DL — HIGH (ref 70–99)
GLUCOSE BLDC GLUCOMTR-MCNC: 175 MG/DL — HIGH (ref 70–99)
GLUCOSE BLDC GLUCOMTR-MCNC: 175 MG/DL — HIGH (ref 70–99)
GLUCOSE BLDC GLUCOMTR-MCNC: 178 MG/DL — HIGH (ref 70–99)
GLUCOSE BLDC GLUCOMTR-MCNC: 181 MG/DL — HIGH (ref 70–99)
GLUCOSE BLDC GLUCOMTR-MCNC: 181 MG/DL — HIGH (ref 70–99)
GLUCOSE BLDC GLUCOMTR-MCNC: 182 MG/DL — HIGH (ref 70–99)
GLUCOSE BLDC GLUCOMTR-MCNC: 191 MG/DL — HIGH (ref 70–99)
GLUCOSE BLDC GLUCOMTR-MCNC: 192 MG/DL — HIGH (ref 70–99)
GLUCOSE BLDC GLUCOMTR-MCNC: 195 MG/DL — HIGH (ref 70–99)
GLUCOSE BLDC GLUCOMTR-MCNC: 199 MG/DL — HIGH (ref 70–99)
GLUCOSE BLDC GLUCOMTR-MCNC: 200 MG/DL — HIGH (ref 70–99)
GLUCOSE BLDC GLUCOMTR-MCNC: 201 MG/DL — HIGH (ref 70–99)
GLUCOSE BLDC GLUCOMTR-MCNC: 203 MG/DL — HIGH (ref 70–99)
GLUCOSE BLDC GLUCOMTR-MCNC: 214 MG/DL — HIGH (ref 70–99)
GLUCOSE BLDC GLUCOMTR-MCNC: 224 MG/DL — HIGH (ref 70–99)
GLUCOSE BLDC GLUCOMTR-MCNC: 370 MG/DL — HIGH (ref 70–99)
GLUCOSE BLDC GLUCOMTR-MCNC: 573 MG/DL — CRITICAL HIGH (ref 70–99)
GLUCOSE BLDC GLUCOMTR-MCNC: 98 MG/DL — SIGNIFICANT CHANGE UP (ref 70–99)
GLUCOSE BLDV-MCNC: 140 MG/DL — HIGH (ref 70–99)
GLUCOSE BLDV-MCNC: 149 MG/DL — HIGH (ref 70–99)
GLUCOSE BLDV-MCNC: 88 MG/DL — SIGNIFICANT CHANGE UP (ref 70–99)
GLUCOSE BLDV-MCNC: 89 MG/DL — SIGNIFICANT CHANGE UP (ref 70–99)
GLUCOSE SERPL-MCNC: 100 MG/DL — HIGH (ref 70–99)
GLUCOSE SERPL-MCNC: 100 MG/DL — HIGH (ref 70–99)
GLUCOSE SERPL-MCNC: 101 MG/DL — HIGH (ref 70–99)
GLUCOSE SERPL-MCNC: 101 MG/DL — HIGH (ref 70–99)
GLUCOSE SERPL-MCNC: 102 MG/DL — HIGH (ref 70–99)
GLUCOSE SERPL-MCNC: 102 MG/DL — HIGH (ref 70–99)
GLUCOSE SERPL-MCNC: 103 MG/DL — HIGH (ref 70–99)
GLUCOSE SERPL-MCNC: 105 MG/DL — HIGH (ref 70–99)
GLUCOSE SERPL-MCNC: 108 MG/DL — HIGH (ref 70–99)
GLUCOSE SERPL-MCNC: 109 MG/DL — HIGH (ref 70–99)
GLUCOSE SERPL-MCNC: 110 MG/DL — HIGH (ref 70–99)
GLUCOSE SERPL-MCNC: 111 MG/DL — HIGH (ref 70–99)
GLUCOSE SERPL-MCNC: 114 MG/DL — HIGH (ref 70–99)
GLUCOSE SERPL-MCNC: 115 MG/DL — HIGH (ref 70–99)
GLUCOSE SERPL-MCNC: 116 MG/DL — HIGH (ref 70–99)
GLUCOSE SERPL-MCNC: 117 MG/DL — HIGH (ref 70–99)
GLUCOSE SERPL-MCNC: 117 MG/DL — HIGH (ref 70–99)
GLUCOSE SERPL-MCNC: 118 MG/DL — HIGH (ref 70–99)
GLUCOSE SERPL-MCNC: 119 MG/DL — HIGH (ref 70–99)
GLUCOSE SERPL-MCNC: 120 MG/DL — HIGH (ref 70–99)
GLUCOSE SERPL-MCNC: 122 MG/DL — HIGH (ref 70–99)
GLUCOSE SERPL-MCNC: 124 MG/DL — HIGH (ref 70–99)
GLUCOSE SERPL-MCNC: 126 MG/DL — HIGH (ref 70–99)
GLUCOSE SERPL-MCNC: 129 MG/DL — HIGH (ref 70–99)
GLUCOSE SERPL-MCNC: 134 MG/DL — HIGH (ref 70–99)
GLUCOSE SERPL-MCNC: 136 MG/DL — HIGH (ref 70–99)
GLUCOSE SERPL-MCNC: 137 MG/DL — HIGH (ref 70–99)
GLUCOSE SERPL-MCNC: 140 MG/DL — HIGH (ref 70–99)
GLUCOSE SERPL-MCNC: 145 MG/DL — HIGH (ref 70–99)
GLUCOSE SERPL-MCNC: 145 MG/DL — HIGH (ref 70–99)
GLUCOSE SERPL-MCNC: 146 MG/DL — HIGH (ref 70–99)
GLUCOSE SERPL-MCNC: 157 MG/DL — HIGH (ref 70–99)
GLUCOSE SERPL-MCNC: 168 MG/DL — HIGH (ref 70–99)
GLUCOSE SERPL-MCNC: 168 MG/DL — HIGH (ref 70–99)
GLUCOSE SERPL-MCNC: 213 MG/DL — HIGH (ref 70–99)
GLUCOSE SERPL-MCNC: 457 MG/DL — CRITICAL HIGH (ref 70–99)
GLUCOSE SERPL-MCNC: 69 MG/DL — LOW (ref 70–99)
GLUCOSE SERPL-MCNC: 71 MG/DL — SIGNIFICANT CHANGE UP (ref 70–99)
GLUCOSE SERPL-MCNC: 79 MG/DL — SIGNIFICANT CHANGE UP (ref 70–99)
GLUCOSE SERPL-MCNC: 84 MG/DL — SIGNIFICANT CHANGE UP (ref 70–99)
GLUCOSE SERPL-MCNC: 85 MG/DL — SIGNIFICANT CHANGE UP (ref 70–99)
GLUCOSE SERPL-MCNC: 86 MG/DL — SIGNIFICANT CHANGE UP (ref 70–99)
GLUCOSE SERPL-MCNC: 86 MG/DL — SIGNIFICANT CHANGE UP (ref 70–99)
GLUCOSE SERPL-MCNC: 87 MG/DL — SIGNIFICANT CHANGE UP (ref 70–99)
GLUCOSE SERPL-MCNC: 87 MG/DL — SIGNIFICANT CHANGE UP (ref 70–99)
GLUCOSE SERPL-MCNC: 88 MG/DL — SIGNIFICANT CHANGE UP (ref 70–99)
GLUCOSE SERPL-MCNC: 89 MG/DL — SIGNIFICANT CHANGE UP (ref 70–99)
GLUCOSE SERPL-MCNC: 90 MG/DL — SIGNIFICANT CHANGE UP (ref 70–99)
GLUCOSE SERPL-MCNC: 90 MG/DL — SIGNIFICANT CHANGE UP (ref 70–99)
GLUCOSE SERPL-MCNC: 91 MG/DL — SIGNIFICANT CHANGE UP (ref 70–99)
GLUCOSE SERPL-MCNC: 93 MG/DL — SIGNIFICANT CHANGE UP (ref 70–99)
GLUCOSE SERPL-MCNC: 94 MG/DL — SIGNIFICANT CHANGE UP (ref 70–99)
GLUCOSE SERPL-MCNC: 95 MG/DL — SIGNIFICANT CHANGE UP (ref 70–99)
GLUCOSE SERPL-MCNC: 96 MG/DL — SIGNIFICANT CHANGE UP (ref 70–99)
GLUCOSE SERPL-MCNC: 96 MG/DL — SIGNIFICANT CHANGE UP (ref 70–99)
GLUCOSE SERPL-MCNC: 97 MG/DL — SIGNIFICANT CHANGE UP (ref 70–99)
GLUCOSE SERPL-MCNC: 98 MG/DL — SIGNIFICANT CHANGE UP (ref 70–99)
GLUCOSE SERPL-MCNC: 99 MG/DL — SIGNIFICANT CHANGE UP (ref 70–99)
GLUCOSE UR QL: NEGATIVE — SIGNIFICANT CHANGE UP
HBV CORE AB SER-ACNC: SIGNIFICANT CHANGE UP
HBV SURFACE AB SER-ACNC: <3 MIU/ML — LOW
HBV SURFACE AG SER-ACNC: SIGNIFICANT CHANGE UP
HCO3 BLDV-SCNC: 27 MMOL/L — SIGNIFICANT CHANGE UP (ref 22–29)
HCO3 BLDV-SCNC: 28 MMOL/L — SIGNIFICANT CHANGE UP (ref 22–29)
HCO3 BLDV-SCNC: 29 MMOL/L — SIGNIFICANT CHANGE UP (ref 21–29)
HCO3 BLDV-SCNC: 29 MMOL/L — SIGNIFICANT CHANGE UP (ref 22–29)
HCT VFR BLD CALC: 20.6 % — CRITICAL LOW (ref 39–50)
HCT VFR BLD CALC: 21.9 % — LOW (ref 39–50)
HCT VFR BLD CALC: 22.2 % — LOW (ref 39–50)
HCT VFR BLD CALC: 22.5 % — LOW (ref 39–50)
HCT VFR BLD CALC: 23.1 % — LOW (ref 39–50)
HCT VFR BLD CALC: 23.2 % — LOW (ref 39–50)
HCT VFR BLD CALC: 23.2 % — LOW (ref 39–50)
HCT VFR BLD CALC: 23.3 % — LOW (ref 39–50)
HCT VFR BLD CALC: 23.3 % — LOW (ref 39–50)
HCT VFR BLD CALC: 23.4 % — LOW (ref 39–50)
HCT VFR BLD CALC: 23.5 % — LOW (ref 39–50)
HCT VFR BLD CALC: 23.6 % — LOW (ref 39–50)
HCT VFR BLD CALC: 23.7 % — LOW (ref 39–50)
HCT VFR BLD CALC: 23.7 % — LOW (ref 39–50)
HCT VFR BLD CALC: 23.8 % — LOW (ref 39–50)
HCT VFR BLD CALC: 24.1 % — LOW (ref 39–50)
HCT VFR BLD CALC: 24.2 % — LOW (ref 39–50)
HCT VFR BLD CALC: 24.3 % — LOW (ref 39–50)
HCT VFR BLD CALC: 24.4 % — LOW (ref 39–50)
HCT VFR BLD CALC: 24.4 % — LOW (ref 39–50)
HCT VFR BLD CALC: 24.5 % — LOW (ref 39–50)
HCT VFR BLD CALC: 24.6 % — LOW (ref 39–50)
HCT VFR BLD CALC: 24.6 % — LOW (ref 39–50)
HCT VFR BLD CALC: 24.7 % — LOW (ref 39–50)
HCT VFR BLD CALC: 24.7 % — LOW (ref 39–50)
HCT VFR BLD CALC: 24.8 % — LOW (ref 39–50)
HCT VFR BLD CALC: 25 % — LOW (ref 39–50)
HCT VFR BLD CALC: 25.1 % — LOW (ref 39–50)
HCT VFR BLD CALC: 25.2 % — LOW (ref 39–50)
HCT VFR BLD CALC: 25.3 % — LOW (ref 39–50)
HCT VFR BLD CALC: 25.4 % — LOW (ref 39–50)
HCT VFR BLD CALC: 25.4 % — LOW (ref 39–50)
HCT VFR BLD CALC: 25.5 % — LOW (ref 39–50)
HCT VFR BLD CALC: 25.6 % — LOW (ref 39–50)
HCT VFR BLD CALC: 25.7 % — LOW (ref 39–50)
HCT VFR BLD CALC: 25.7 % — LOW (ref 39–50)
HCT VFR BLD CALC: 25.8 % — LOW (ref 39–50)
HCT VFR BLD CALC: 25.9 % — LOW (ref 39–50)
HCT VFR BLD CALC: 26 % — LOW (ref 39–50)
HCT VFR BLD CALC: 26.1 % — LOW (ref 39–50)
HCT VFR BLD CALC: 26.1 % — LOW (ref 39–50)
HCT VFR BLD CALC: 26.2 % — LOW (ref 39–50)
HCT VFR BLD CALC: 26.3 % — LOW (ref 39–50)
HCT VFR BLD CALC: 26.3 % — LOW (ref 39–50)
HCT VFR BLD CALC: 26.4 % — LOW (ref 39–50)
HCT VFR BLD CALC: 26.5 % — LOW (ref 39–50)
HCT VFR BLD CALC: 26.5 % — LOW (ref 39–50)
HCT VFR BLD CALC: 26.6 % — LOW (ref 39–50)
HCT VFR BLD CALC: 26.6 % — LOW (ref 39–50)
HCT VFR BLD CALC: 26.7 % — LOW (ref 39–50)
HCT VFR BLD CALC: 26.7 % — LOW (ref 39–50)
HCT VFR BLD CALC: 26.8 % — LOW (ref 39–50)
HCT VFR BLD CALC: 26.9 % — LOW (ref 39–50)
HCT VFR BLD CALC: 27 % — LOW (ref 39–50)
HCT VFR BLD CALC: 27.2 % — LOW (ref 39–50)
HCT VFR BLD CALC: 27.3 % — LOW (ref 39–50)
HCT VFR BLD CALC: 27.3 % — LOW (ref 39–50)
HCT VFR BLD CALC: 27.5 % — LOW (ref 39–50)
HCT VFR BLD CALC: 27.5 % — LOW (ref 39–50)
HCT VFR BLD CALC: 27.6 % — LOW (ref 39–50)
HCT VFR BLD CALC: 27.7 % — LOW (ref 39–50)
HCT VFR BLD CALC: 27.7 % — LOW (ref 39–50)
HCT VFR BLD CALC: 27.8 % — LOW (ref 39–50)
HCT VFR BLD CALC: 28.1 % — LOW (ref 39–50)
HCT VFR BLD CALC: 28.2 % — LOW (ref 39–50)
HCT VFR BLD CALC: 28.2 % — LOW (ref 39–50)
HCT VFR BLD CALC: 28.4 % — LOW (ref 39–50)
HCT VFR BLD CALC: 28.7 % — LOW (ref 39–50)
HCT VFR BLD CALC: 28.8 % — LOW (ref 39–50)
HCT VFR BLD CALC: 28.9 % — LOW (ref 39–50)
HCT VFR BLD CALC: 29.7 % — LOW (ref 39–50)
HCT VFR BLD CALC: 31.6 % — LOW (ref 39–50)
HCT VFR BLDA CALC: 24 % — LOW (ref 39–51)
HCT VFR BLDA CALC: 25 % — LOW (ref 39–51)
HCT VFR BLDA CALC: 26 % — LOW (ref 39–51)
HCT VFR BLDA CALC: 28 % — LOW (ref 39–50)
HGB BLD CALC-MCNC: 7.9 G/DL — LOW (ref 12.6–17.4)
HGB BLD CALC-MCNC: 8.3 G/DL — LOW (ref 12.6–17.4)
HGB BLD CALC-MCNC: 8.5 G/DL — LOW (ref 12.6–17.4)
HGB BLD CALC-MCNC: 9 G/DL — LOW (ref 13–17)
HGB BLD-MCNC: 6.5 G/DL — CRITICAL LOW (ref 13–17)
HGB BLD-MCNC: 6.7 G/DL — CRITICAL LOW (ref 13–17)
HGB BLD-MCNC: 6.8 G/DL — CRITICAL LOW (ref 13–17)
HGB BLD-MCNC: 6.8 G/DL — CRITICAL LOW (ref 13–17)
HGB BLD-MCNC: 7 G/DL — CRITICAL LOW (ref 13–17)
HGB BLD-MCNC: 7 G/DL — CRITICAL LOW (ref 13–17)
HGB BLD-MCNC: 7.1 G/DL — LOW (ref 13–17)
HGB BLD-MCNC: 7.2 G/DL — LOW (ref 13–17)
HGB BLD-MCNC: 7.3 G/DL — LOW (ref 13–17)
HGB BLD-MCNC: 7.4 G/DL — LOW (ref 13–17)
HGB BLD-MCNC: 7.5 G/DL — LOW (ref 13–17)
HGB BLD-MCNC: 7.6 G/DL — LOW (ref 13–17)
HGB BLD-MCNC: 7.7 G/DL — LOW (ref 13–17)
HGB BLD-MCNC: 7.8 G/DL — LOW (ref 13–17)
HGB BLD-MCNC: 7.9 G/DL — LOW (ref 13–17)
HGB BLD-MCNC: 8 G/DL — LOW (ref 13–17)
HGB BLD-MCNC: 8.1 G/DL — LOW (ref 13–17)
HGB BLD-MCNC: 8.2 G/DL — LOW (ref 13–17)
HGB BLD-MCNC: 8.3 G/DL — LOW (ref 13–17)
HGB BLD-MCNC: 8.4 G/DL — LOW (ref 13–17)
HGB BLD-MCNC: 8.4 G/DL — LOW (ref 13–17)
HGB BLD-MCNC: 8.5 G/DL — LOW (ref 13–17)
HGB BLD-MCNC: 8.6 G/DL — LOW (ref 13–17)
HGB BLD-MCNC: 8.7 G/DL — LOW (ref 13–17)
HGB BLD-MCNC: 8.8 G/DL — LOW (ref 13–17)
HGB BLD-MCNC: 8.8 G/DL — LOW (ref 13–17)
HGB BLD-MCNC: 8.9 G/DL — LOW (ref 13–17)
HGB BLD-MCNC: 9 G/DL — LOW (ref 13–17)
HGB BLD-MCNC: 9 G/DL — LOW (ref 13–17)
HGB BLD-MCNC: 9.2 G/DL — LOW (ref 13–17)
HGB BLD-MCNC: 9.2 G/DL — LOW (ref 13–17)
HGB BLD-MCNC: 9.9 G/DL — LOW (ref 13–17)
HIV 1+2 AB+HIV1 P24 AG SERPL QL IA: SIGNIFICANT CHANGE UP
HOROWITZ INDEX BLDV+IHG-RTO: 21 — SIGNIFICANT CHANGE UP
HPIV3 RNA SPEC QL NAA+PROBE: DETECTED
HYALINE CASTS # UR AUTO: 14 /LPF — HIGH (ref 0–2)
HYPOCHROMIA BLD QL: SLIGHT — SIGNIFICANT CHANGE UP
IMM GRANULOCYTES NFR BLD AUTO: 0.6 % — SIGNIFICANT CHANGE UP (ref 0–1.5)
IMM GRANULOCYTES NFR BLD AUTO: 0.6 % — SIGNIFICANT CHANGE UP (ref 0–1.5)
IMM GRANULOCYTES NFR BLD AUTO: 0.7 % — SIGNIFICANT CHANGE UP (ref 0–1.5)
IMM GRANULOCYTES NFR BLD AUTO: 0.8 % — SIGNIFICANT CHANGE UP (ref 0–1.5)
IMM GRANULOCYTES NFR BLD AUTO: 1 % — SIGNIFICANT CHANGE UP (ref 0–1.5)
IMM GRANULOCYTES NFR BLD AUTO: 1.1 % — SIGNIFICANT CHANGE UP (ref 0–1.5)
IMM GRANULOCYTES NFR BLD AUTO: 1.2 % — SIGNIFICANT CHANGE UP (ref 0–1.5)
IMM GRANULOCYTES NFR BLD AUTO: 1.3 % — SIGNIFICANT CHANGE UP (ref 0–1.5)
IMM GRANULOCYTES NFR BLD AUTO: 1.3 % — SIGNIFICANT CHANGE UP (ref 0–1.5)
IMM GRANULOCYTES NFR BLD AUTO: 1.4 % — SIGNIFICANT CHANGE UP (ref 0–1.5)
INFLIXIMAB AB SERPL-MCNC: 266 NG/ML — SIGNIFICANT CHANGE UP
INFLIXIMAB SERPL-MCNC: <0.4 UG/ML — SIGNIFICANT CHANGE UP
INR BLD: 1.04 RATIO — SIGNIFICANT CHANGE UP (ref 0.88–1.16)
INR BLD: 1.12 RATIO — SIGNIFICANT CHANGE UP (ref 0.88–1.16)
INR BLD: 1.14 RATIO — SIGNIFICANT CHANGE UP (ref 0.88–1.16)
INR BLD: 1.14 RATIO — SIGNIFICANT CHANGE UP (ref 0.88–1.16)
INR BLD: 1.16 RATIO — SIGNIFICANT CHANGE UP (ref 0.88–1.16)
INR BLD: 1.16 RATIO — SIGNIFICANT CHANGE UP (ref 0.88–1.16)
INR BLD: 1.17 RATIO — HIGH (ref 0.88–1.16)
INR BLD: 1.18 RATIO — HIGH (ref 0.88–1.16)
INR BLD: 1.19 RATIO — HIGH (ref 0.88–1.16)
INR BLD: 1.19 RATIO — HIGH (ref 0.88–1.16)
INR BLD: 1.2 RATIO — HIGH (ref 0.88–1.16)
IRON SATN MFR SERPL: 11 % — LOW (ref 16–55)
IRON SATN MFR SERPL: 16 UG/DL — LOW (ref 45–165)
KETONES UR-MCNC: NEGATIVE — SIGNIFICANT CHANGE UP
LACTATE BLDV-MCNC: 0.9 MMOL/L — SIGNIFICANT CHANGE UP (ref 0.7–2)
LACTATE BLDV-MCNC: 1.1 MMOL/L — SIGNIFICANT CHANGE UP (ref 0.7–2)
LACTATE BLDV-MCNC: 1.2 MMOL/L — SIGNIFICANT CHANGE UP (ref 0.7–2)
LACTATE BLDV-MCNC: 1.2 MMOL/L — SIGNIFICANT CHANGE UP (ref 0.7–2)
LACTATE BLDV-MCNC: 1.7 MMOL/L — SIGNIFICANT CHANGE UP (ref 0.7–2)
LACTATE BLDV-MCNC: 1.7 MMOL/L — SIGNIFICANT CHANGE UP (ref 0.7–2)
LACTATE SERPL-SCNC: 1.1 MMOL/L — SIGNIFICANT CHANGE UP (ref 0.7–2)
LDH SERPL L TO P-CCNC: 190 U/L — SIGNIFICANT CHANGE UP (ref 50–242)
LEUKOCYTE ESTERASE UR-ACNC: NEGATIVE — SIGNIFICANT CHANGE UP
LYMPHOCYTES # BLD AUTO: 0.11 K/UL — LOW (ref 1–3.3)
LYMPHOCYTES # BLD AUTO: 0.27 K/UL — LOW (ref 1–3.3)
LYMPHOCYTES # BLD AUTO: 0.29 K/UL — LOW (ref 1–3.3)
LYMPHOCYTES # BLD AUTO: 0.37 K/UL — LOW (ref 1–3.3)
LYMPHOCYTES # BLD AUTO: 0.39 K/UL — LOW (ref 1–3.3)
LYMPHOCYTES # BLD AUTO: 0.43 K/UL — LOW (ref 1–3.3)
LYMPHOCYTES # BLD AUTO: 0.45 K/UL — LOW (ref 1–3.3)
LYMPHOCYTES # BLD AUTO: 0.45 K/UL — LOW (ref 1–3.3)
LYMPHOCYTES # BLD AUTO: 0.46 K/UL — LOW (ref 1–3.3)
LYMPHOCYTES # BLD AUTO: 0.9 % — LOW (ref 13–44)
LYMPHOCYTES # BLD AUTO: 0.92 K/UL — LOW (ref 1–3.3)
LYMPHOCYTES # BLD AUTO: 1.01 K/UL — SIGNIFICANT CHANGE UP (ref 1–3.3)
LYMPHOCYTES # BLD AUTO: 1.11 K/UL — SIGNIFICANT CHANGE UP (ref 1–3.3)
LYMPHOCYTES # BLD AUTO: 1.7 % — LOW (ref 13–44)
LYMPHOCYTES # BLD AUTO: 10.5 % — LOW (ref 13–44)
LYMPHOCYTES # BLD AUTO: 2.5 % — LOW (ref 13–44)
LYMPHOCYTES # BLD AUTO: 2.6 % — LOW (ref 13–44)
LYMPHOCYTES # BLD AUTO: 2.8 % — LOW (ref 13–44)
LYMPHOCYTES # BLD AUTO: 3.1 % — LOW (ref 13–44)
LYMPHOCYTES # BLD AUTO: 3.2 % — LOW (ref 13–44)
LYMPHOCYTES # BLD AUTO: 3.5 % — LOW (ref 13–44)
LYMPHOCYTES # BLD AUTO: 4 % — LOW (ref 13–44)
LYMPHOCYTES # BLD AUTO: 6.6 % — LOW (ref 13–44)
LYMPHOCYTES # BLD AUTO: 7.3 % — LOW (ref 13–44)
M TB IFN-G BLD-IMP: ABNORMAL
M TB IFN-G CD4+ BCKGRND COR BLD-ACNC: 0.01 IU/ML — SIGNIFICANT CHANGE UP
M TB IFN-G CD4+CD8+ BCKGRND COR BLD-ACNC: 0 IU/ML — SIGNIFICANT CHANGE UP
MACROCYTES BLD QL: SLIGHT — SIGNIFICANT CHANGE UP
MAGNESIUM SERPL-MCNC: 1.5 MG/DL — LOW (ref 1.6–2.6)
MAGNESIUM SERPL-MCNC: 1.5 MG/DL — LOW (ref 1.6–2.6)
MAGNESIUM SERPL-MCNC: 1.6 MG/DL — SIGNIFICANT CHANGE UP (ref 1.6–2.6)
MAGNESIUM SERPL-MCNC: 1.6 MG/DL — SIGNIFICANT CHANGE UP (ref 1.6–2.6)
MAGNESIUM SERPL-MCNC: 1.7 MG/DL — SIGNIFICANT CHANGE UP (ref 1.6–2.6)
MAGNESIUM SERPL-MCNC: 1.7 MG/DL — SIGNIFICANT CHANGE UP (ref 1.6–2.6)
MAGNESIUM SERPL-MCNC: 1.8 MG/DL — SIGNIFICANT CHANGE UP (ref 1.6–2.6)
MAGNESIUM SERPL-MCNC: 1.9 MG/DL — SIGNIFICANT CHANGE UP (ref 1.6–2.6)
MAGNESIUM SERPL-MCNC: 2 MG/DL — SIGNIFICANT CHANGE UP (ref 1.6–2.6)
MAGNESIUM SERPL-MCNC: 2.1 MG/DL — SIGNIFICANT CHANGE UP (ref 1.6–2.6)
MAGNESIUM SERPL-MCNC: 2.2 MG/DL — SIGNIFICANT CHANGE UP (ref 1.6–2.6)
MAGNESIUM SERPL-MCNC: 2.3 MG/DL — SIGNIFICANT CHANGE UP (ref 1.6–2.6)
MAGNESIUM SERPL-MCNC: 2.4 MG/DL — SIGNIFICANT CHANGE UP (ref 1.6–2.6)
MANUAL SMEAR VERIFICATION: SIGNIFICANT CHANGE UP
MANUAL SMEAR VERIFICATION: SIGNIFICANT CHANGE UP
MCHC RBC-ENTMCNC: 23.1 PG — LOW (ref 27–34)
MCHC RBC-ENTMCNC: 23.4 PG — LOW (ref 27–34)
MCHC RBC-ENTMCNC: 23.5 PG — LOW (ref 27–34)
MCHC RBC-ENTMCNC: 23.6 PG — LOW (ref 27–34)
MCHC RBC-ENTMCNC: 23.7 PG — LOW (ref 27–34)
MCHC RBC-ENTMCNC: 23.8 PG — LOW (ref 27–34)
MCHC RBC-ENTMCNC: 23.9 PG — LOW (ref 27–34)
MCHC RBC-ENTMCNC: 24.1 PG — LOW (ref 27–34)
MCHC RBC-ENTMCNC: 24.2 PG — LOW (ref 27–34)
MCHC RBC-ENTMCNC: 24.2 PG — LOW (ref 27–34)
MCHC RBC-ENTMCNC: 24.3 PG — LOW (ref 27–34)
MCHC RBC-ENTMCNC: 24.4 PG — LOW (ref 27–34)
MCHC RBC-ENTMCNC: 24.4 PG — LOW (ref 27–34)
MCHC RBC-ENTMCNC: 24.5 PG — LOW (ref 27–34)
MCHC RBC-ENTMCNC: 24.6 PG — LOW (ref 27–34)
MCHC RBC-ENTMCNC: 24.8 PG — LOW (ref 27–34)
MCHC RBC-ENTMCNC: 25.3 PG — LOW (ref 27–34)
MCHC RBC-ENTMCNC: 25.3 PG — LOW (ref 27–34)
MCHC RBC-ENTMCNC: 25.6 PG — LOW (ref 27–34)
MCHC RBC-ENTMCNC: 25.7 PG — LOW (ref 27–34)
MCHC RBC-ENTMCNC: 26 PG — LOW (ref 27–34)
MCHC RBC-ENTMCNC: 26.1 PG — LOW (ref 27–34)
MCHC RBC-ENTMCNC: 26.4 PG — LOW (ref 27–34)
MCHC RBC-ENTMCNC: 26.5 PG — LOW (ref 27–34)
MCHC RBC-ENTMCNC: 26.5 PG — LOW (ref 27–34)
MCHC RBC-ENTMCNC: 26.6 PG — LOW (ref 27–34)
MCHC RBC-ENTMCNC: 26.7 PG — LOW (ref 27–34)
MCHC RBC-ENTMCNC: 26.9 PG — LOW (ref 27–34)
MCHC RBC-ENTMCNC: 27 PG — SIGNIFICANT CHANGE UP (ref 27–34)
MCHC RBC-ENTMCNC: 27 PG — SIGNIFICANT CHANGE UP (ref 27–34)
MCHC RBC-ENTMCNC: 27.1 PG — SIGNIFICANT CHANGE UP (ref 27–34)
MCHC RBC-ENTMCNC: 27.5 PG — SIGNIFICANT CHANGE UP (ref 27–34)
MCHC RBC-ENTMCNC: 27.6 PG — SIGNIFICANT CHANGE UP (ref 27–34)
MCHC RBC-ENTMCNC: 27.8 PG — SIGNIFICANT CHANGE UP (ref 27–34)
MCHC RBC-ENTMCNC: 28.1 PG — SIGNIFICANT CHANGE UP (ref 27–34)
MCHC RBC-ENTMCNC: 28.1 PG — SIGNIFICANT CHANGE UP (ref 27–34)
MCHC RBC-ENTMCNC: 28.2 PG — SIGNIFICANT CHANGE UP (ref 27–34)
MCHC RBC-ENTMCNC: 28.3 PG — SIGNIFICANT CHANGE UP (ref 27–34)
MCHC RBC-ENTMCNC: 28.3 PG — SIGNIFICANT CHANGE UP (ref 27–34)
MCHC RBC-ENTMCNC: 28.4 PG — SIGNIFICANT CHANGE UP (ref 27–34)
MCHC RBC-ENTMCNC: 28.5 PG — SIGNIFICANT CHANGE UP (ref 27–34)
MCHC RBC-ENTMCNC: 28.5 PG — SIGNIFICANT CHANGE UP (ref 27–34)
MCHC RBC-ENTMCNC: 28.6 PG — SIGNIFICANT CHANGE UP (ref 27–34)
MCHC RBC-ENTMCNC: 28.7 PG — SIGNIFICANT CHANGE UP (ref 27–34)
MCHC RBC-ENTMCNC: 28.8 PG — SIGNIFICANT CHANGE UP (ref 27–34)
MCHC RBC-ENTMCNC: 28.9 PG — SIGNIFICANT CHANGE UP (ref 27–34)
MCHC RBC-ENTMCNC: 29 PG — SIGNIFICANT CHANGE UP (ref 27–34)
MCHC RBC-ENTMCNC: 29.1 PG — SIGNIFICANT CHANGE UP (ref 27–34)
MCHC RBC-ENTMCNC: 29.2 PG — SIGNIFICANT CHANGE UP (ref 27–34)
MCHC RBC-ENTMCNC: 29.2 PG — SIGNIFICANT CHANGE UP (ref 27–34)
MCHC RBC-ENTMCNC: 29.3 GM/DL — LOW (ref 32–36)
MCHC RBC-ENTMCNC: 29.5 PG — SIGNIFICANT CHANGE UP (ref 27–34)
MCHC RBC-ENTMCNC: 29.6 GM/DL — LOW (ref 32–36)
MCHC RBC-ENTMCNC: 29.6 GM/DL — LOW (ref 32–36)
MCHC RBC-ENTMCNC: 29.7 GM/DL — LOW (ref 32–36)
MCHC RBC-ENTMCNC: 29.9 GM/DL — LOW (ref 32–36)
MCHC RBC-ENTMCNC: 29.9 GM/DL — LOW (ref 32–36)
MCHC RBC-ENTMCNC: 30 GM/DL — LOW (ref 32–36)
MCHC RBC-ENTMCNC: 30 GM/DL — LOW (ref 32–36)
MCHC RBC-ENTMCNC: 30.1 GM/DL — LOW (ref 32–36)
MCHC RBC-ENTMCNC: 30.2 GM/DL — LOW (ref 32–36)
MCHC RBC-ENTMCNC: 30.3 GM/DL — LOW (ref 32–36)
MCHC RBC-ENTMCNC: 30.4 GM/DL — LOW (ref 32–36)
MCHC RBC-ENTMCNC: 30.4 GM/DL — LOW (ref 32–36)
MCHC RBC-ENTMCNC: 30.5 GM/DL — LOW (ref 32–36)
MCHC RBC-ENTMCNC: 30.6 GM/DL — LOW (ref 32–36)
MCHC RBC-ENTMCNC: 30.7 GM/DL — LOW (ref 32–36)
MCHC RBC-ENTMCNC: 30.7 PG — SIGNIFICANT CHANGE UP (ref 27–34)
MCHC RBC-ENTMCNC: 30.8 GM/DL — LOW (ref 32–36)
MCHC RBC-ENTMCNC: 30.9 GM/DL — LOW (ref 32–36)
MCHC RBC-ENTMCNC: 30.9 PG — SIGNIFICANT CHANGE UP (ref 27–34)
MCHC RBC-ENTMCNC: 31 GM/DL — LOW (ref 32–36)
MCHC RBC-ENTMCNC: 31.1 GM/DL — LOW (ref 32–36)
MCHC RBC-ENTMCNC: 31.2 GM/DL — LOW (ref 32–36)
MCHC RBC-ENTMCNC: 31.3 GM/DL — LOW (ref 32–36)
MCHC RBC-ENTMCNC: 31.4 GM/DL — LOW (ref 32–36)
MCHC RBC-ENTMCNC: 31.5 GM/DL — LOW (ref 32–36)
MCHC RBC-ENTMCNC: 31.6 GM/DL — LOW (ref 32–36)
MCHC RBC-ENTMCNC: 31.7 GM/DL — LOW (ref 32–36)
MCHC RBC-ENTMCNC: 31.8 GM/DL — LOW (ref 32–36)
MCHC RBC-ENTMCNC: 31.9 GM/DL — LOW (ref 32–36)
MCHC RBC-ENTMCNC: 31.9 GM/DL — LOW (ref 32–36)
MCHC RBC-ENTMCNC: 32 GM/DL — SIGNIFICANT CHANGE UP (ref 32–36)
MCHC RBC-ENTMCNC: 32.1 GM/DL — SIGNIFICANT CHANGE UP (ref 32–36)
MCHC RBC-ENTMCNC: 32.1 GM/DL — SIGNIFICANT CHANGE UP (ref 32–36)
MCHC RBC-ENTMCNC: 32.5 GM/DL — SIGNIFICANT CHANGE UP (ref 32–36)
MCHC RBC-ENTMCNC: 32.6 GM/DL — SIGNIFICANT CHANGE UP (ref 32–36)
MCHC RBC-ENTMCNC: 32.6 GM/DL — SIGNIFICANT CHANGE UP (ref 32–36)
MCHC RBC-ENTMCNC: 33 GM/DL — SIGNIFICANT CHANGE UP (ref 32–36)
MCHC RBC-ENTMCNC: 33.2 GM/DL — SIGNIFICANT CHANGE UP (ref 32–36)
MCHC RBC-ENTMCNC: 33.3 GM/DL — SIGNIFICANT CHANGE UP (ref 32–36)
MCV RBC AUTO: 75.4 FL — LOW (ref 80–100)
MCV RBC AUTO: 75.4 FL — LOW (ref 80–100)
MCV RBC AUTO: 76 FL — LOW (ref 80–100)
MCV RBC AUTO: 76.2 FL — LOW (ref 80–100)
MCV RBC AUTO: 76.3 FL — LOW (ref 80–100)
MCV RBC AUTO: 76.3 FL — LOW (ref 80–100)
MCV RBC AUTO: 76.4 FL — LOW (ref 80–100)
MCV RBC AUTO: 76.6 FL — LOW (ref 80–100)
MCV RBC AUTO: 76.8 FL — LOW (ref 80–100)
MCV RBC AUTO: 77 FL — LOW (ref 80–100)
MCV RBC AUTO: 77.2 FL — LOW (ref 80–100)
MCV RBC AUTO: 77.3 FL — LOW (ref 80–100)
MCV RBC AUTO: 77.3 FL — LOW (ref 80–100)
MCV RBC AUTO: 77.5 FL — LOW (ref 80–100)
MCV RBC AUTO: 77.7 FL — LOW (ref 80–100)
MCV RBC AUTO: 77.7 FL — LOW (ref 80–100)
MCV RBC AUTO: 78.2 FL — LOW (ref 80–100)
MCV RBC AUTO: 78.3 FL — LOW (ref 80–100)
MCV RBC AUTO: 78.7 FL — LOW (ref 80–100)
MCV RBC AUTO: 79 FL — LOW (ref 80–100)
MCV RBC AUTO: 79 FL — LOW (ref 80–100)
MCV RBC AUTO: 79.2 FL — LOW (ref 80–100)
MCV RBC AUTO: 79.3 FL — LOW (ref 80–100)
MCV RBC AUTO: 79.4 FL — LOW (ref 80–100)
MCV RBC AUTO: 79.5 FL — LOW (ref 80–100)
MCV RBC AUTO: 79.5 FL — LOW (ref 80–100)
MCV RBC AUTO: 79.6 FL — LOW (ref 80–100)
MCV RBC AUTO: 79.7 FL — LOW (ref 80–100)
MCV RBC AUTO: 79.9 FL — LOW (ref 80–100)
MCV RBC AUTO: 80.1 FL — SIGNIFICANT CHANGE UP (ref 80–100)
MCV RBC AUTO: 80.1 FL — SIGNIFICANT CHANGE UP (ref 80–100)
MCV RBC AUTO: 80.3 FL — SIGNIFICANT CHANGE UP (ref 80–100)
MCV RBC AUTO: 80.7 FL — SIGNIFICANT CHANGE UP (ref 80–100)
MCV RBC AUTO: 80.8 FL — SIGNIFICANT CHANGE UP (ref 80–100)
MCV RBC AUTO: 80.8 FL — SIGNIFICANT CHANGE UP (ref 80–100)
MCV RBC AUTO: 81.1 FL — SIGNIFICANT CHANGE UP (ref 80–100)
MCV RBC AUTO: 81.1 FL — SIGNIFICANT CHANGE UP (ref 80–100)
MCV RBC AUTO: 81.2 FL — SIGNIFICANT CHANGE UP (ref 80–100)
MCV RBC AUTO: 81.3 FL — SIGNIFICANT CHANGE UP (ref 80–100)
MCV RBC AUTO: 81.4 FL — SIGNIFICANT CHANGE UP (ref 80–100)
MCV RBC AUTO: 81.7 FL — SIGNIFICANT CHANGE UP (ref 80–100)
MCV RBC AUTO: 81.9 FL — SIGNIFICANT CHANGE UP (ref 80–100)
MCV RBC AUTO: 82 FL — SIGNIFICANT CHANGE UP (ref 80–100)
MCV RBC AUTO: 82.6 FL — SIGNIFICANT CHANGE UP (ref 80–100)
MCV RBC AUTO: 82.9 FL — SIGNIFICANT CHANGE UP (ref 80–100)
MCV RBC AUTO: 85.7 FL — SIGNIFICANT CHANGE UP (ref 80–100)
MCV RBC AUTO: 86.7 FL — SIGNIFICANT CHANGE UP (ref 80–100)
MCV RBC AUTO: 88.5 FL — SIGNIFICANT CHANGE UP (ref 80–100)
MCV RBC AUTO: 88.7 FL — SIGNIFICANT CHANGE UP (ref 80–100)
MCV RBC AUTO: 89.1 FL — SIGNIFICANT CHANGE UP (ref 80–100)
MCV RBC AUTO: 89.1 FL — SIGNIFICANT CHANGE UP (ref 80–100)
MCV RBC AUTO: 89.5 FL — SIGNIFICANT CHANGE UP (ref 80–100)
MCV RBC AUTO: 89.7 FL — SIGNIFICANT CHANGE UP (ref 80–100)
MCV RBC AUTO: 90 FL — SIGNIFICANT CHANGE UP (ref 80–100)
MCV RBC AUTO: 90 FL — SIGNIFICANT CHANGE UP (ref 80–100)
MCV RBC AUTO: 90.1 FL — SIGNIFICANT CHANGE UP (ref 80–100)
MCV RBC AUTO: 90.3 FL — SIGNIFICANT CHANGE UP (ref 80–100)
MCV RBC AUTO: 90.5 FL — SIGNIFICANT CHANGE UP (ref 80–100)
MCV RBC AUTO: 90.7 FL — SIGNIFICANT CHANGE UP (ref 80–100)
MCV RBC AUTO: 90.9 FL — SIGNIFICANT CHANGE UP (ref 80–100)
MCV RBC AUTO: 91 FL — SIGNIFICANT CHANGE UP (ref 80–100)
MCV RBC AUTO: 91 FL — SIGNIFICANT CHANGE UP (ref 80–100)
MCV RBC AUTO: 91.1 FL — SIGNIFICANT CHANGE UP (ref 80–100)
MCV RBC AUTO: 91.3 FL — SIGNIFICANT CHANGE UP (ref 80–100)
MCV RBC AUTO: 91.4 FL — SIGNIFICANT CHANGE UP (ref 80–100)
MCV RBC AUTO: 91.6 FL — SIGNIFICANT CHANGE UP (ref 80–100)
MCV RBC AUTO: 92 FL — SIGNIFICANT CHANGE UP (ref 80–100)
MCV RBC AUTO: 92.2 FL — SIGNIFICANT CHANGE UP (ref 80–100)
MCV RBC AUTO: 92.3 FL — SIGNIFICANT CHANGE UP (ref 80–100)
MCV RBC AUTO: 92.5 FL — SIGNIFICANT CHANGE UP (ref 80–100)
MCV RBC AUTO: 92.5 FL — SIGNIFICANT CHANGE UP (ref 80–100)
MCV RBC AUTO: 92.6 FL — SIGNIFICANT CHANGE UP (ref 80–100)
MCV RBC AUTO: 92.7 FL — SIGNIFICANT CHANGE UP (ref 80–100)
MCV RBC AUTO: 93 FL — SIGNIFICANT CHANGE UP (ref 80–100)
MCV RBC AUTO: 93.1 FL — SIGNIFICANT CHANGE UP (ref 80–100)
MCV RBC AUTO: 93.5 FL — SIGNIFICANT CHANGE UP (ref 80–100)
MCV RBC AUTO: 93.7 FL — SIGNIFICANT CHANGE UP (ref 80–100)
MCV RBC AUTO: 93.7 FL — SIGNIFICANT CHANGE UP (ref 80–100)
MCV RBC AUTO: 93.9 FL — SIGNIFICANT CHANGE UP (ref 80–100)
MCV RBC AUTO: 94 FL — SIGNIFICANT CHANGE UP (ref 80–100)
MCV RBC AUTO: 94.4 FL — SIGNIFICANT CHANGE UP (ref 80–100)
MCV RBC AUTO: 94.6 FL — SIGNIFICANT CHANGE UP (ref 80–100)
MCV RBC AUTO: 94.7 FL — SIGNIFICANT CHANGE UP (ref 80–100)
MCV RBC AUTO: 94.8 FL — SIGNIFICANT CHANGE UP (ref 80–100)
MCV RBC AUTO: 94.8 FL — SIGNIFICANT CHANGE UP (ref 80–100)
MCV RBC AUTO: 95 FL — SIGNIFICANT CHANGE UP (ref 80–100)
MCV RBC AUTO: 95.1 FL — SIGNIFICANT CHANGE UP (ref 80–100)
MICROCYTES BLD QL: SLIGHT — SIGNIFICANT CHANGE UP
MONOCYTES # BLD AUTO: 0.3 K/UL — SIGNIFICANT CHANGE UP (ref 0–0.9)
MONOCYTES # BLD AUTO: 0.31 K/UL — SIGNIFICANT CHANGE UP (ref 0–0.9)
MONOCYTES # BLD AUTO: 0.33 K/UL — SIGNIFICANT CHANGE UP (ref 0–0.9)
MONOCYTES # BLD AUTO: 0.35 K/UL — SIGNIFICANT CHANGE UP (ref 0–0.9)
MONOCYTES # BLD AUTO: 0.35 K/UL — SIGNIFICANT CHANGE UP (ref 0–0.9)
MONOCYTES # BLD AUTO: 0.36 K/UL — SIGNIFICANT CHANGE UP (ref 0–0.9)
MONOCYTES # BLD AUTO: 0.42 K/UL — SIGNIFICANT CHANGE UP (ref 0–0.9)
MONOCYTES # BLD AUTO: 0.43 K/UL — SIGNIFICANT CHANGE UP (ref 0–0.9)
MONOCYTES # BLD AUTO: 0.81 K/UL — SIGNIFICANT CHANGE UP (ref 0–0.9)
MONOCYTES # BLD AUTO: 0.97 K/UL — HIGH (ref 0–0.9)
MONOCYTES # BLD AUTO: 1.22 K/UL — HIGH (ref 0–0.9)
MONOCYTES # BLD AUTO: 1.24 K/UL — HIGH (ref 0–0.9)
MONOCYTES NFR BLD AUTO: 2.2 % — SIGNIFICANT CHANGE UP (ref 2–14)
MONOCYTES NFR BLD AUTO: 2.4 % — SIGNIFICANT CHANGE UP (ref 2–14)
MONOCYTES NFR BLD AUTO: 2.6 % — SIGNIFICANT CHANGE UP (ref 2–14)
MONOCYTES NFR BLD AUTO: 2.8 % — SIGNIFICANT CHANGE UP (ref 2–14)
MONOCYTES NFR BLD AUTO: 2.8 % — SIGNIFICANT CHANGE UP (ref 2–14)
MONOCYTES NFR BLD AUTO: 2.9 % — SIGNIFICANT CHANGE UP (ref 2–14)
MONOCYTES NFR BLD AUTO: 3 % — SIGNIFICANT CHANGE UP (ref 2–14)
MONOCYTES NFR BLD AUTO: 3.2 % — SIGNIFICANT CHANGE UP (ref 2–14)
MONOCYTES NFR BLD AUTO: 7 % — SIGNIFICANT CHANGE UP (ref 2–14)
MONOCYTES NFR BLD AUTO: 7.8 % — SIGNIFICANT CHANGE UP (ref 2–14)
MONOCYTES NFR BLD AUTO: 8.1 % — SIGNIFICANT CHANGE UP (ref 2–14)
MONOCYTES NFR BLD AUTO: 8.4 % — SIGNIFICANT CHANGE UP (ref 2–14)
MRSA PCR RESULT.: SIGNIFICANT CHANGE UP
NEUTROPHILS # BLD AUTO: 10.33 K/UL — HIGH (ref 1.8–7.4)
NEUTROPHILS # BLD AUTO: 10.64 K/UL — HIGH (ref 1.8–7.4)
NEUTROPHILS # BLD AUTO: 11.46 K/UL — HIGH (ref 1.8–7.4)
NEUTROPHILS # BLD AUTO: 11.68 K/UL — HIGH (ref 1.8–7.4)
NEUTROPHILS # BLD AUTO: 11.86 K/UL — HIGH (ref 1.8–7.4)
NEUTROPHILS # BLD AUTO: 12.45 K/UL — HIGH (ref 1.8–7.4)
NEUTROPHILS # BLD AUTO: 12.68 K/UL — HIGH (ref 1.8–7.4)
NEUTROPHILS # BLD AUTO: 12.73 K/UL — HIGH (ref 1.8–7.4)
NEUTROPHILS # BLD AUTO: 13.35 K/UL — HIGH (ref 1.8–7.4)
NEUTROPHILS # BLD AUTO: 14.11 K/UL — HIGH (ref 1.8–7.4)
NEUTROPHILS # BLD AUTO: 14.4 K/UL — HIGH (ref 1.8–7.4)
NEUTROPHILS # BLD AUTO: 7.64 K/UL — HIGH (ref 1.8–7.4)
NEUTROPHILS NFR BLD AUTO: 79.8 % — HIGH (ref 43–77)
NEUTROPHILS NFR BLD AUTO: 83.8 % — HIGH (ref 43–77)
NEUTROPHILS NFR BLD AUTO: 85.3 % — HIGH (ref 43–77)
NEUTROPHILS NFR BLD AUTO: 86.1 % — HIGH (ref 43–77)
NEUTROPHILS NFR BLD AUTO: 91 % — HIGH (ref 43–77)
NEUTROPHILS NFR BLD AUTO: 92 % — HIGH (ref 43–77)
NEUTROPHILS NFR BLD AUTO: 92.4 % — HIGH (ref 43–77)
NEUTROPHILS NFR BLD AUTO: 92.6 % — HIGH (ref 43–77)
NEUTROPHILS NFR BLD AUTO: 92.7 % — HIGH (ref 43–77)
NEUTROPHILS NFR BLD AUTO: 92.7 % — HIGH (ref 43–77)
NEUTROPHILS NFR BLD AUTO: 92.8 % — HIGH (ref 43–77)
NEUTROPHILS NFR BLD AUTO: 95.6 % — HIGH (ref 43–77)
NEUTS BAND # BLD: 4.4 % — SIGNIFICANT CHANGE UP (ref 0–8)
NITRITE UR-MCNC: NEGATIVE — SIGNIFICANT CHANGE UP
NRBC # BLD: 0 /100 WBCS — SIGNIFICANT CHANGE UP (ref 0–0)
OB PNL STL: POSITIVE
OTHER CELLS CSF MANUAL: 11 ML/DL — LOW (ref 18–22)
OVALOCYTES BLD QL SMEAR: SLIGHT — SIGNIFICANT CHANGE UP
PCO2 BLDV: 34 MMHG — LOW (ref 35–50)
PCO2 BLDV: 44 MMHG — SIGNIFICANT CHANGE UP (ref 42–55)
PCO2 BLDV: 45 MMHG — SIGNIFICANT CHANGE UP (ref 42–55)
PCO2 BLDV: 46 MMHG — SIGNIFICANT CHANGE UP (ref 42–55)
PH BLDV: 7.39 — SIGNIFICANT CHANGE UP (ref 7.32–7.43)
PH BLDV: 7.41 — SIGNIFICANT CHANGE UP (ref 7.32–7.43)
PH BLDV: 7.41 — SIGNIFICANT CHANGE UP (ref 7.32–7.43)
PH BLDV: 7.53 — HIGH (ref 7.35–7.45)
PH UR: 6 — SIGNIFICANT CHANGE UP (ref 5–8)
PH UR: 6.5 — SIGNIFICANT CHANGE UP (ref 5–8)
PH UR: 7 — SIGNIFICANT CHANGE UP (ref 5–8)
PH UR: 7 — SIGNIFICANT CHANGE UP (ref 5–8)
PHOSPHATE SERPL-MCNC: 1.4 MG/DL — LOW (ref 2.5–4.5)
PHOSPHATE SERPL-MCNC: 1.7 MG/DL — LOW (ref 2.5–4.5)
PHOSPHATE SERPL-MCNC: 1.8 MG/DL — LOW (ref 2.5–4.5)
PHOSPHATE SERPL-MCNC: 1.8 MG/DL — LOW (ref 2.5–4.5)
PHOSPHATE SERPL-MCNC: 1.9 MG/DL — LOW (ref 2.5–4.5)
PHOSPHATE SERPL-MCNC: 2 MG/DL — LOW (ref 2.5–4.5)
PHOSPHATE SERPL-MCNC: 2.1 MG/DL — LOW (ref 2.5–4.5)
PHOSPHATE SERPL-MCNC: 2.2 MG/DL — LOW (ref 2.5–4.5)
PHOSPHATE SERPL-MCNC: 2.3 MG/DL — LOW (ref 2.5–4.5)
PHOSPHATE SERPL-MCNC: 2.4 MG/DL — LOW (ref 2.5–4.5)
PHOSPHATE SERPL-MCNC: 2.4 MG/DL — LOW (ref 2.5–4.5)
PHOSPHATE SERPL-MCNC: 2.5 MG/DL — SIGNIFICANT CHANGE UP (ref 2.5–4.5)
PHOSPHATE SERPL-MCNC: 2.6 MG/DL — SIGNIFICANT CHANGE UP (ref 2.5–4.5)
PHOSPHATE SERPL-MCNC: 2.7 MG/DL — SIGNIFICANT CHANGE UP (ref 2.5–4.5)
PHOSPHATE SERPL-MCNC: 2.7 MG/DL — SIGNIFICANT CHANGE UP (ref 2.5–4.5)
PHOSPHATE SERPL-MCNC: 2.8 MG/DL — SIGNIFICANT CHANGE UP (ref 2.5–4.5)
PHOSPHATE SERPL-MCNC: 2.9 MG/DL — SIGNIFICANT CHANGE UP (ref 2.5–4.5)
PHOSPHATE SERPL-MCNC: 3 MG/DL — SIGNIFICANT CHANGE UP (ref 2.5–4.5)
PHOSPHATE SERPL-MCNC: 3.1 MG/DL — SIGNIFICANT CHANGE UP (ref 2.5–4.5)
PHOSPHATE SERPL-MCNC: 3.2 MG/DL — SIGNIFICANT CHANGE UP (ref 2.5–4.5)
PHOSPHATE SERPL-MCNC: 3.3 MG/DL — SIGNIFICANT CHANGE UP (ref 2.5–4.5)
PHOSPHATE SERPL-MCNC: 3.4 MG/DL — SIGNIFICANT CHANGE UP (ref 2.5–4.5)
PHOSPHATE SERPL-MCNC: 3.5 MG/DL — SIGNIFICANT CHANGE UP (ref 2.5–4.5)
PHOSPHATE SERPL-MCNC: 3.6 MG/DL — SIGNIFICANT CHANGE UP (ref 2.5–4.5)
PHOSPHATE SERPL-MCNC: 3.6 MG/DL — SIGNIFICANT CHANGE UP (ref 2.5–4.5)
PHOSPHATE SERPL-MCNC: 3.7 MG/DL — SIGNIFICANT CHANGE UP (ref 2.5–4.5)
PHOSPHATE SERPL-MCNC: 3.8 MG/DL — SIGNIFICANT CHANGE UP (ref 2.5–4.5)
PHOSPHATE SERPL-MCNC: 3.8 MG/DL — SIGNIFICANT CHANGE UP (ref 2.5–4.5)
PHOSPHATE SERPL-MCNC: 3.9 MG/DL — SIGNIFICANT CHANGE UP (ref 2.5–4.5)
PHOSPHATE SERPL-MCNC: 4 MG/DL — SIGNIFICANT CHANGE UP (ref 2.5–4.5)
PHOSPHATE SERPL-MCNC: 4.2 MG/DL — SIGNIFICANT CHANGE UP (ref 2.5–4.5)
PHOSPHATE SERPL-MCNC: 4.2 MG/DL — SIGNIFICANT CHANGE UP (ref 2.5–4.5)
PHOSPHATE SERPL-MCNC: 4.3 MG/DL — SIGNIFICANT CHANGE UP (ref 2.5–4.5)
PHOSPHATE SERPL-MCNC: 4.5 MG/DL — SIGNIFICANT CHANGE UP (ref 2.5–4.5)
PHOSPHATE SERPL-MCNC: 5.4 MG/DL — HIGH (ref 2.5–4.5)
PLAT MORPH BLD: NORMAL — SIGNIFICANT CHANGE UP
PLAT MORPH BLD: NORMAL — SIGNIFICANT CHANGE UP
PLATELET # BLD AUTO: 103 K/UL — LOW (ref 150–400)
PLATELET # BLD AUTO: 103 K/UL — LOW (ref 150–400)
PLATELET # BLD AUTO: 114 K/UL — LOW (ref 150–400)
PLATELET # BLD AUTO: 123 K/UL — LOW (ref 150–400)
PLATELET # BLD AUTO: 129 K/UL — LOW (ref 150–400)
PLATELET # BLD AUTO: 130 K/UL — LOW (ref 150–400)
PLATELET # BLD AUTO: 131 K/UL — LOW (ref 150–400)
PLATELET # BLD AUTO: 138 K/UL — LOW (ref 150–400)
PLATELET # BLD AUTO: 140 K/UL — LOW (ref 150–400)
PLATELET # BLD AUTO: 140 K/UL — LOW (ref 150–400)
PLATELET # BLD AUTO: 144 K/UL — LOW (ref 150–400)
PLATELET # BLD AUTO: 150 K/UL — SIGNIFICANT CHANGE UP (ref 150–400)
PLATELET # BLD AUTO: 153 K/UL — SIGNIFICANT CHANGE UP (ref 150–400)
PLATELET # BLD AUTO: 154 K/UL — SIGNIFICANT CHANGE UP (ref 150–400)
PLATELET # BLD AUTO: 158 K/UL — SIGNIFICANT CHANGE UP (ref 150–400)
PLATELET # BLD AUTO: 161 K/UL — SIGNIFICANT CHANGE UP (ref 150–400)
PLATELET # BLD AUTO: 165 K/UL — SIGNIFICANT CHANGE UP (ref 150–400)
PLATELET # BLD AUTO: 170 K/UL — SIGNIFICANT CHANGE UP (ref 150–400)
PLATELET # BLD AUTO: 175 K/UL — SIGNIFICANT CHANGE UP (ref 150–400)
PLATELET # BLD AUTO: 178 K/UL — SIGNIFICANT CHANGE UP (ref 150–400)
PLATELET # BLD AUTO: 180 K/UL — SIGNIFICANT CHANGE UP (ref 150–400)
PLATELET # BLD AUTO: 186 K/UL — SIGNIFICANT CHANGE UP (ref 150–400)
PLATELET # BLD AUTO: 193 K/UL — SIGNIFICANT CHANGE UP (ref 150–400)
PLATELET # BLD AUTO: 200 K/UL — SIGNIFICANT CHANGE UP (ref 150–400)
PLATELET # BLD AUTO: 214 K/UL — SIGNIFICANT CHANGE UP (ref 150–400)
PLATELET # BLD AUTO: 220 K/UL — SIGNIFICANT CHANGE UP (ref 150–400)
PLATELET # BLD AUTO: 233 K/UL — SIGNIFICANT CHANGE UP (ref 150–400)
PLATELET # BLD AUTO: 233 K/UL — SIGNIFICANT CHANGE UP (ref 150–400)
PLATELET # BLD AUTO: 234 K/UL — SIGNIFICANT CHANGE UP (ref 150–400)
PLATELET # BLD AUTO: 234 K/UL — SIGNIFICANT CHANGE UP (ref 150–400)
PLATELET # BLD AUTO: 235 K/UL — SIGNIFICANT CHANGE UP (ref 150–400)
PLATELET # BLD AUTO: 236 K/UL — SIGNIFICANT CHANGE UP (ref 150–400)
PLATELET # BLD AUTO: 237 K/UL — SIGNIFICANT CHANGE UP (ref 150–400)
PLATELET # BLD AUTO: 239 K/UL — SIGNIFICANT CHANGE UP (ref 150–400)
PLATELET # BLD AUTO: 239 K/UL — SIGNIFICANT CHANGE UP (ref 150–400)
PLATELET # BLD AUTO: 241 K/UL — SIGNIFICANT CHANGE UP (ref 150–400)
PLATELET # BLD AUTO: 242 K/UL — SIGNIFICANT CHANGE UP (ref 150–400)
PLATELET # BLD AUTO: 242 K/UL — SIGNIFICANT CHANGE UP (ref 150–400)
PLATELET # BLD AUTO: 243 K/UL — SIGNIFICANT CHANGE UP (ref 150–400)
PLATELET # BLD AUTO: 248 K/UL — SIGNIFICANT CHANGE UP (ref 150–400)
PLATELET # BLD AUTO: 250 K/UL — SIGNIFICANT CHANGE UP (ref 150–400)
PLATELET # BLD AUTO: 251 K/UL — SIGNIFICANT CHANGE UP (ref 150–400)
PLATELET # BLD AUTO: 251 K/UL — SIGNIFICANT CHANGE UP (ref 150–400)
PLATELET # BLD AUTO: 252 K/UL — SIGNIFICANT CHANGE UP (ref 150–400)
PLATELET # BLD AUTO: 252 K/UL — SIGNIFICANT CHANGE UP (ref 150–400)
PLATELET # BLD AUTO: 257 K/UL — SIGNIFICANT CHANGE UP (ref 150–400)
PLATELET # BLD AUTO: 259 K/UL — SIGNIFICANT CHANGE UP (ref 150–400)
PLATELET # BLD AUTO: 260 K/UL — SIGNIFICANT CHANGE UP (ref 150–400)
PLATELET # BLD AUTO: 262 K/UL — SIGNIFICANT CHANGE UP (ref 150–400)
PLATELET # BLD AUTO: 264 K/UL — SIGNIFICANT CHANGE UP (ref 150–400)
PLATELET # BLD AUTO: 266 K/UL — SIGNIFICANT CHANGE UP (ref 150–400)
PLATELET # BLD AUTO: 266 K/UL — SIGNIFICANT CHANGE UP (ref 150–400)
PLATELET # BLD AUTO: 268 K/UL — SIGNIFICANT CHANGE UP (ref 150–400)
PLATELET # BLD AUTO: 268 K/UL — SIGNIFICANT CHANGE UP (ref 150–400)
PLATELET # BLD AUTO: 269 K/UL — SIGNIFICANT CHANGE UP (ref 150–400)
PLATELET # BLD AUTO: 272 K/UL — SIGNIFICANT CHANGE UP (ref 150–400)
PLATELET # BLD AUTO: 275 K/UL — SIGNIFICANT CHANGE UP (ref 150–400)
PLATELET # BLD AUTO: 278 K/UL — SIGNIFICANT CHANGE UP (ref 150–400)
PLATELET # BLD AUTO: 284 K/UL — SIGNIFICANT CHANGE UP (ref 150–400)
PLATELET # BLD AUTO: 286 K/UL — SIGNIFICANT CHANGE UP (ref 150–400)
PLATELET # BLD AUTO: 309 K/UL — SIGNIFICANT CHANGE UP (ref 150–400)
PLATELET # BLD AUTO: 342 K/UL — SIGNIFICANT CHANGE UP (ref 150–400)
PLATELET # BLD AUTO: 347 K/UL — SIGNIFICANT CHANGE UP (ref 150–400)
PLATELET # BLD AUTO: 355 K/UL — SIGNIFICANT CHANGE UP (ref 150–400)
PLATELET # BLD AUTO: 358 K/UL — SIGNIFICANT CHANGE UP (ref 150–400)
PLATELET # BLD AUTO: 360 K/UL — SIGNIFICANT CHANGE UP (ref 150–400)
PLATELET # BLD AUTO: 366 K/UL — SIGNIFICANT CHANGE UP (ref 150–400)
PLATELET # BLD AUTO: 367 K/UL — SIGNIFICANT CHANGE UP (ref 150–400)
PLATELET # BLD AUTO: 372 K/UL — SIGNIFICANT CHANGE UP (ref 150–400)
PLATELET # BLD AUTO: 382 K/UL — SIGNIFICANT CHANGE UP (ref 150–400)
PLATELET # BLD AUTO: 382 K/UL — SIGNIFICANT CHANGE UP (ref 150–400)
PLATELET # BLD AUTO: 384 K/UL — SIGNIFICANT CHANGE UP (ref 150–400)
PLATELET # BLD AUTO: 386 K/UL — SIGNIFICANT CHANGE UP (ref 150–400)
PLATELET # BLD AUTO: 389 K/UL — SIGNIFICANT CHANGE UP (ref 150–400)
PLATELET # BLD AUTO: 390 K/UL — SIGNIFICANT CHANGE UP (ref 150–400)
PLATELET # BLD AUTO: 394 K/UL — SIGNIFICANT CHANGE UP (ref 150–400)
PLATELET # BLD AUTO: 394 K/UL — SIGNIFICANT CHANGE UP (ref 150–400)
PLATELET # BLD AUTO: 404 K/UL — HIGH (ref 150–400)
PLATELET # BLD AUTO: 407 K/UL — HIGH (ref 150–400)
PLATELET # BLD AUTO: 407 K/UL — HIGH (ref 150–400)
PLATELET # BLD AUTO: 413 K/UL — HIGH (ref 150–400)
PLATELET # BLD AUTO: 422 K/UL — HIGH (ref 150–400)
PLATELET # BLD AUTO: 422 K/UL — HIGH (ref 150–400)
PLATELET # BLD AUTO: 431 K/UL — HIGH (ref 150–400)
PLATELET # BLD AUTO: 432 K/UL — HIGH (ref 150–400)
PLATELET # BLD AUTO: 435 K/UL — HIGH (ref 150–400)
PLATELET # BLD AUTO: 440 K/UL — HIGH (ref 150–400)
PLATELET # BLD AUTO: 444 K/UL — HIGH (ref 150–400)
PLATELET # BLD AUTO: 450 K/UL — HIGH (ref 150–400)
PLATELET # BLD AUTO: 464 K/UL — HIGH (ref 150–400)
PLATELET # BLD AUTO: 477 K/UL — HIGH (ref 150–400)
PLATELET # BLD AUTO: 483 K/UL — HIGH (ref 150–400)
PLATELET # BLD AUTO: 488 K/UL — HIGH (ref 150–400)
PLATELET # BLD AUTO: 513 K/UL — HIGH (ref 150–400)
PLATELET # BLD AUTO: 522 K/UL — HIGH (ref 150–400)
PLATELET # BLD AUTO: 97 K/UL — LOW (ref 150–400)
PLATELET # BLD AUTO: 97 K/UL — LOW (ref 150–400)
PLATELET # BLD AUTO: SIGNIFICANT CHANGE UP (ref 150–400)
PO2 BLDV: 34 MMHG — SIGNIFICANT CHANGE UP (ref 25–45)
PO2 BLDV: 36 MMHG — SIGNIFICANT CHANGE UP (ref 25–45)
PO2 BLDV: 38 MMHG — SIGNIFICANT CHANGE UP (ref 25–45)
PO2 BLDV: 53 MMHG — HIGH (ref 25–45)
POLYCHROMASIA BLD QL SMEAR: SLIGHT — SIGNIFICANT CHANGE UP
POTASSIUM BLDV-SCNC: 3.2 MMOL/L — LOW (ref 3.5–5.3)
POTASSIUM BLDV-SCNC: 3.5 MMOL/L — SIGNIFICANT CHANGE UP (ref 3.5–5.1)
POTASSIUM BLDV-SCNC: 3.6 MMOL/L — SIGNIFICANT CHANGE UP (ref 3.5–5.1)
POTASSIUM BLDV-SCNC: 4.4 MMOL/L — SIGNIFICANT CHANGE UP (ref 3.5–5.1)
POTASSIUM SERPL-MCNC: 2.8 MMOL/L — CRITICAL LOW (ref 3.5–5.3)
POTASSIUM SERPL-MCNC: 3 MMOL/L — LOW (ref 3.5–5.3)
POTASSIUM SERPL-MCNC: 3.1 MMOL/L — LOW (ref 3.5–5.3)
POTASSIUM SERPL-MCNC: 3.2 MMOL/L — LOW (ref 3.5–5.3)
POTASSIUM SERPL-MCNC: 3.3 MMOL/L — LOW (ref 3.5–5.3)
POTASSIUM SERPL-MCNC: 3.4 MMOL/L — LOW (ref 3.5–5.3)
POTASSIUM SERPL-MCNC: 3.5 MMOL/L — SIGNIFICANT CHANGE UP (ref 3.5–5.3)
POTASSIUM SERPL-MCNC: 3.6 MMOL/L — SIGNIFICANT CHANGE UP (ref 3.5–5.3)
POTASSIUM SERPL-MCNC: 3.7 MMOL/L — SIGNIFICANT CHANGE UP (ref 3.5–5.3)
POTASSIUM SERPL-MCNC: 3.8 MMOL/L — SIGNIFICANT CHANGE UP (ref 3.5–5.3)
POTASSIUM SERPL-MCNC: 3.9 MMOL/L — SIGNIFICANT CHANGE UP (ref 3.5–5.3)
POTASSIUM SERPL-MCNC: 4 MMOL/L — SIGNIFICANT CHANGE UP (ref 3.5–5.3)
POTASSIUM SERPL-MCNC: 4.1 MMOL/L — SIGNIFICANT CHANGE UP (ref 3.5–5.3)
POTASSIUM SERPL-MCNC: 4.2 MMOL/L — SIGNIFICANT CHANGE UP (ref 3.5–5.3)
POTASSIUM SERPL-MCNC: 4.3 MMOL/L — SIGNIFICANT CHANGE UP (ref 3.5–5.3)
POTASSIUM SERPL-MCNC: 4.4 MMOL/L — SIGNIFICANT CHANGE UP (ref 3.5–5.3)
POTASSIUM SERPL-MCNC: 4.5 MMOL/L — SIGNIFICANT CHANGE UP (ref 3.5–5.3)
POTASSIUM SERPL-MCNC: 6.1 MMOL/L — HIGH (ref 3.5–5.3)
POTASSIUM SERPL-SCNC: 2.8 MMOL/L — CRITICAL LOW (ref 3.5–5.3)
POTASSIUM SERPL-SCNC: 3 MMOL/L — LOW (ref 3.5–5.3)
POTASSIUM SERPL-SCNC: 3.1 MMOL/L — LOW (ref 3.5–5.3)
POTASSIUM SERPL-SCNC: 3.2 MMOL/L — LOW (ref 3.5–5.3)
POTASSIUM SERPL-SCNC: 3.3 MMOL/L — LOW (ref 3.5–5.3)
POTASSIUM SERPL-SCNC: 3.4 MMOL/L — LOW (ref 3.5–5.3)
POTASSIUM SERPL-SCNC: 3.5 MMOL/L — SIGNIFICANT CHANGE UP (ref 3.5–5.3)
POTASSIUM SERPL-SCNC: 3.6 MMOL/L — SIGNIFICANT CHANGE UP (ref 3.5–5.3)
POTASSIUM SERPL-SCNC: 3.7 MMOL/L — SIGNIFICANT CHANGE UP (ref 3.5–5.3)
POTASSIUM SERPL-SCNC: 3.8 MMOL/L — SIGNIFICANT CHANGE UP (ref 3.5–5.3)
POTASSIUM SERPL-SCNC: 3.9 MMOL/L — SIGNIFICANT CHANGE UP (ref 3.5–5.3)
POTASSIUM SERPL-SCNC: 4 MMOL/L — SIGNIFICANT CHANGE UP (ref 3.5–5.3)
POTASSIUM SERPL-SCNC: 4.1 MMOL/L — SIGNIFICANT CHANGE UP (ref 3.5–5.3)
POTASSIUM SERPL-SCNC: 4.2 MMOL/L — SIGNIFICANT CHANGE UP (ref 3.5–5.3)
POTASSIUM SERPL-SCNC: 4.3 MMOL/L — SIGNIFICANT CHANGE UP (ref 3.5–5.3)
POTASSIUM SERPL-SCNC: 4.4 MMOL/L — SIGNIFICANT CHANGE UP (ref 3.5–5.3)
POTASSIUM SERPL-SCNC: 4.5 MMOL/L — SIGNIFICANT CHANGE UP (ref 3.5–5.3)
POTASSIUM SERPL-SCNC: 6.1 MMOL/L — HIGH (ref 3.5–5.3)
PREALB SERPL-MCNC: 15 MG/DL — LOW (ref 20–40)
PREALB SERPL-MCNC: 16 MG/DL — LOW (ref 20–40)
PREALB SERPL-MCNC: 22 MG/DL — SIGNIFICANT CHANGE UP (ref 20–40)
PREALB SERPL-MCNC: 8 MG/DL — LOW (ref 20–40)
PROCALCITONIN SERPL-MCNC: 0.09 NG/ML — SIGNIFICANT CHANGE UP (ref 0.02–0.1)
PROCALCITONIN SERPL-MCNC: 0.1 NG/ML — SIGNIFICANT CHANGE UP (ref 0.02–0.1)
PROCALCITONIN SERPL-MCNC: 0.11 NG/ML — HIGH (ref 0.02–0.1)
PROCALCITONIN SERPL-MCNC: 0.13 NG/ML — HIGH (ref 0.02–0.1)
PROCALCITONIN SERPL-MCNC: 0.14 NG/ML — HIGH (ref 0.02–0.1)
PROCALCITONIN SERPL-MCNC: 0.15 NG/ML — HIGH (ref 0.02–0.1)
PROCALCITONIN SERPL-MCNC: 0.79 NG/ML — HIGH (ref 0.02–0.1)
PROT SERPL-MCNC: 4.4 G/DL — LOW (ref 6–8.3)
PROT SERPL-MCNC: 4.4 G/DL — LOW (ref 6–8.3)
PROT SERPL-MCNC: 4.5 G/DL — LOW (ref 6–8.3)
PROT SERPL-MCNC: 4.5 G/DL — LOW (ref 6–8.3)
PROT SERPL-MCNC: 4.6 G/DL — LOW (ref 6–8.3)
PROT SERPL-MCNC: 4.7 G/DL — LOW (ref 6–8.3)
PROT SERPL-MCNC: 4.8 G/DL — LOW (ref 6–8.3)
PROT SERPL-MCNC: 4.9 G/DL — LOW (ref 6–8.3)
PROT SERPL-MCNC: 5 G/DL — LOW (ref 6–8.3)
PROT SERPL-MCNC: 5 G/DL — LOW (ref 6–8.3)
PROT SERPL-MCNC: 5.1 G/DL — LOW (ref 6–8.3)
PROT SERPL-MCNC: 5.1 G/DL — LOW (ref 6–8.3)
PROT SERPL-MCNC: 5.2 G/DL — LOW (ref 6–8.3)
PROT SERPL-MCNC: 5.3 G/DL — LOW (ref 6–8.3)
PROT SERPL-MCNC: 5.4 G/DL — LOW (ref 6–8.3)
PROT SERPL-MCNC: 5.4 G/DL — LOW (ref 6–8.3)
PROT SERPL-MCNC: 5.5 G/DL — LOW (ref 6–8.3)
PROT SERPL-MCNC: 5.5 G/DL — LOW (ref 6–8.3)
PROT SERPL-MCNC: 5.6 G/DL — LOW (ref 6–8.3)
PROT SERPL-MCNC: 5.6 G/DL — LOW (ref 6–8.3)
PROT SERPL-MCNC: 5.8 G/DL — LOW (ref 6–8.3)
PROT SERPL-MCNC: 5.9 G/DL — LOW (ref 6–8.3)
PROT SERPL-MCNC: 6.9 G/DL — SIGNIFICANT CHANGE UP (ref 6–8.3)
PROT SERPL-MCNC: 7.2 G/DL — SIGNIFICANT CHANGE UP (ref 6–8.3)
PROT UR-MCNC: ABNORMAL
PROT UR-MCNC: NEGATIVE — SIGNIFICANT CHANGE UP
PROT UR-MCNC: NEGATIVE — SIGNIFICANT CHANGE UP
PROT UR-MCNC: SIGNIFICANT CHANGE UP
PROTHROM AB SERPL-ACNC: 12.4 SEC — SIGNIFICANT CHANGE UP (ref 10.6–13.6)
PROTHROM AB SERPL-ACNC: 13.4 SEC — SIGNIFICANT CHANGE UP (ref 10.6–13.6)
PROTHROM AB SERPL-ACNC: 13.6 SEC — SIGNIFICANT CHANGE UP (ref 10.6–13.6)
PROTHROM AB SERPL-ACNC: 13.6 SEC — SIGNIFICANT CHANGE UP (ref 10.6–13.6)
PROTHROM AB SERPL-ACNC: 13.8 SEC — HIGH (ref 10.6–13.6)
PROTHROM AB SERPL-ACNC: 13.8 SEC — HIGH (ref 10.6–13.6)
PROTHROM AB SERPL-ACNC: 13.9 SEC — HIGH (ref 10.6–13.6)
PROTHROM AB SERPL-ACNC: 14.1 SEC — HIGH (ref 10.6–13.6)
PROTHROM AB SERPL-ACNC: 14.2 SEC — HIGH (ref 10.6–13.6)
PROTHROM AB SERPL-ACNC: 14.2 SEC — HIGH (ref 10.6–13.6)
PROTHROM AB SERPL-ACNC: 14.3 SEC — HIGH (ref 10.6–13.6)
PTH-INTACT FLD-MCNC: 68 PG/ML — HIGH (ref 15–65)
QUANT TB PLUS MITOGEN MINUS NIL: 0.17 IU/ML — SIGNIFICANT CHANGE UP
RAPID RVP RESULT: DETECTED
RBC # BLD: 2.33 M/UL — LOW (ref 4.2–5.8)
RBC # BLD: 2.46 M/UL — LOW (ref 4.2–5.8)
RBC # BLD: 2.47 M/UL — LOW (ref 4.2–5.8)
RBC # BLD: 2.5 M/UL — LOW (ref 4.2–5.8)
RBC # BLD: 2.5 M/UL — LOW (ref 4.2–5.8)
RBC # BLD: 2.51 M/UL — LOW (ref 4.2–5.8)
RBC # BLD: 2.53 M/UL — LOW (ref 4.2–5.8)
RBC # BLD: 2.54 M/UL — LOW (ref 4.2–5.8)
RBC # BLD: 2.58 M/UL — LOW (ref 4.2–5.8)
RBC # BLD: 2.58 M/UL — LOW (ref 4.2–5.8)
RBC # BLD: 2.61 M/UL — LOW (ref 4.2–5.8)
RBC # BLD: 2.61 M/UL — LOW (ref 4.2–5.8)
RBC # BLD: 2.62 M/UL — LOW (ref 4.2–5.8)
RBC # BLD: 2.62 M/UL — LOW (ref 4.2–5.8)
RBC # BLD: 2.63 M/UL — LOW (ref 4.2–5.8)
RBC # BLD: 2.66 M/UL — LOW (ref 4.2–5.8)
RBC # BLD: 2.66 M/UL — LOW (ref 4.2–5.8)
RBC # BLD: 2.68 M/UL — LOW (ref 4.2–5.8)
RBC # BLD: 2.69 M/UL — LOW (ref 4.2–5.8)
RBC # BLD: 2.71 M/UL — LOW (ref 4.2–5.8)
RBC # BLD: 2.73 M/UL — LOW (ref 4.2–5.8)
RBC # BLD: 2.73 M/UL — LOW (ref 4.2–5.8)
RBC # BLD: 2.74 M/UL — LOW (ref 4.2–5.8)
RBC # BLD: 2.75 M/UL — LOW (ref 4.2–5.8)
RBC # BLD: 2.77 M/UL — LOW (ref 4.2–5.8)
RBC # BLD: 2.78 M/UL — LOW (ref 4.2–5.8)
RBC # BLD: 2.79 M/UL — LOW (ref 4.2–5.8)
RBC # BLD: 2.8 M/UL — LOW (ref 4.2–5.8)
RBC # BLD: 2.8 M/UL — LOW (ref 4.2–5.8)
RBC # BLD: 2.82 M/UL — LOW (ref 4.2–5.8)
RBC # BLD: 2.84 M/UL — LOW (ref 4.2–5.8)
RBC # BLD: 2.85 M/UL — LOW (ref 4.2–5.8)
RBC # BLD: 2.87 M/UL — LOW (ref 4.2–5.8)
RBC # BLD: 2.88 M/UL — LOW (ref 4.2–5.8)
RBC # BLD: 2.9 M/UL — LOW (ref 4.2–5.8)
RBC # BLD: 2.91 M/UL — LOW (ref 4.2–5.8)
RBC # BLD: 2.93 M/UL — LOW (ref 4.2–5.8)
RBC # BLD: 2.94 M/UL — LOW (ref 4.2–5.8)
RBC # BLD: 2.95 M/UL — LOW (ref 4.2–5.8)
RBC # BLD: 2.96 M/UL — LOW (ref 4.2–5.8)
RBC # BLD: 3 M/UL — LOW (ref 4.2–5.8)
RBC # BLD: 3 M/UL — LOW (ref 4.2–5.8)
RBC # BLD: 3.03 M/UL — LOW (ref 4.2–5.8)
RBC # BLD: 3.03 M/UL — LOW (ref 4.2–5.8)
RBC # BLD: 3.04 M/UL — LOW (ref 4.2–5.8)
RBC # BLD: 3.05 M/UL — LOW (ref 4.2–5.8)
RBC # BLD: 3.05 M/UL — LOW (ref 4.2–5.8)
RBC # BLD: 3.1 M/UL — LOW (ref 4.2–5.8)
RBC # BLD: 3.12 M/UL — LOW (ref 4.2–5.8)
RBC # BLD: 3.19 M/UL — LOW (ref 4.2–5.8)
RBC # BLD: 3.2 M/UL — LOW (ref 4.2–5.8)
RBC # BLD: 3.21 M/UL — LOW (ref 4.2–5.8)
RBC # BLD: 3.21 M/UL — LOW (ref 4.2–5.8)
RBC # BLD: 3.26 M/UL — LOW (ref 4.2–5.8)
RBC # BLD: 3.27 M/UL — LOW (ref 4.2–5.8)
RBC # BLD: 3.27 M/UL — LOW (ref 4.2–5.8)
RBC # BLD: 3.34 M/UL — LOW (ref 4.2–5.8)
RBC # BLD: 3.37 M/UL — LOW (ref 4.2–5.8)
RBC # BLD: 3.37 M/UL — LOW (ref 4.2–5.8)
RBC # BLD: 3.38 M/UL — LOW (ref 4.2–5.8)
RBC # BLD: 3.39 M/UL — LOW (ref 4.2–5.8)
RBC # BLD: 3.43 M/UL — LOW (ref 4.2–5.8)
RBC # BLD: 3.45 M/UL — LOW (ref 4.2–5.8)
RBC # BLD: 3.48 M/UL — LOW (ref 4.2–5.8)
RBC # BLD: 3.49 M/UL — LOW (ref 4.2–5.8)
RBC # BLD: 3.49 M/UL — LOW (ref 4.2–5.8)
RBC # BLD: 3.5 M/UL — LOW (ref 4.2–5.8)
RBC # BLD: 3.51 M/UL — LOW (ref 4.2–5.8)
RBC # BLD: 3.51 M/UL — LOW (ref 4.2–5.8)
RBC # BLD: 3.54 M/UL — LOW (ref 4.2–5.8)
RBC # BLD: 3.54 M/UL — LOW (ref 4.2–5.8)
RBC # BLD: 3.56 M/UL — LOW (ref 4.2–5.8)
RBC # BLD: 3.57 M/UL — LOW (ref 4.2–5.8)
RBC # BLD: 3.58 M/UL — LOW (ref 4.2–5.8)
RBC # BLD: 3.62 M/UL — LOW (ref 4.2–5.8)
RBC # BLD: 3.62 M/UL — LOW (ref 4.2–5.8)
RBC # BLD: 3.63 M/UL — LOW (ref 4.2–5.8)
RBC # BLD: 3.63 M/UL — LOW (ref 4.2–5.8)
RBC # BLD: 4 M/UL — LOW (ref 4.2–5.8)
RBC # FLD: 16.7 % — HIGH (ref 10.3–14.5)
RBC # FLD: 16.8 % — HIGH (ref 10.3–14.5)
RBC # FLD: 16.9 % — HIGH (ref 10.3–14.5)
RBC # FLD: 16.9 % — HIGH (ref 10.3–14.5)
RBC # FLD: 17 % — HIGH (ref 10.3–14.5)
RBC # FLD: 17.2 % — HIGH (ref 10.3–14.5)
RBC # FLD: 17.3 % — HIGH (ref 10.3–14.5)
RBC # FLD: 17.4 % — HIGH (ref 10.3–14.5)
RBC # FLD: 17.4 % — HIGH (ref 10.3–14.5)
RBC # FLD: 17.5 % — HIGH (ref 10.3–14.5)
RBC # FLD: 17.5 % — HIGH (ref 10.3–14.5)
RBC # FLD: 17.7 % — HIGH (ref 10.3–14.5)
RBC # FLD: 17.9 % — HIGH (ref 10.3–14.5)
RBC # FLD: 18.1 % — HIGH (ref 10.3–14.5)
RBC # FLD: 18.1 % — HIGH (ref 10.3–14.5)
RBC # FLD: 18.2 % — HIGH (ref 10.3–14.5)
RBC # FLD: 18.2 % — HIGH (ref 10.3–14.5)
RBC # FLD: 18.3 % — HIGH (ref 10.3–14.5)
RBC # FLD: 18.4 % — HIGH (ref 10.3–14.5)
RBC # FLD: 18.4 % — HIGH (ref 10.3–14.5)
RBC # FLD: 18.5 % — HIGH (ref 10.3–14.5)
RBC # FLD: 18.6 % — HIGH (ref 10.3–14.5)
RBC # FLD: 18.6 % — HIGH (ref 10.3–14.5)
RBC # FLD: 18.7 % — HIGH (ref 10.3–14.5)
RBC # FLD: 18.7 % — HIGH (ref 10.3–14.5)
RBC # FLD: 18.8 % — HIGH (ref 10.3–14.5)
RBC # FLD: 18.9 % — HIGH (ref 10.3–14.5)
RBC # FLD: 19 % — HIGH (ref 10.3–14.5)
RBC # FLD: 19.1 % — HIGH (ref 10.3–14.5)
RBC # FLD: 19.1 % — HIGH (ref 10.3–14.5)
RBC # FLD: 19.2 % — HIGH (ref 10.3–14.5)
RBC # FLD: 19.3 % — HIGH (ref 10.3–14.5)
RBC # FLD: 19.4 % — HIGH (ref 10.3–14.5)
RBC # FLD: 19.5 % — HIGH (ref 10.3–14.5)
RBC # FLD: 19.6 % — HIGH (ref 10.3–14.5)
RBC # FLD: 19.7 % — HIGH (ref 10.3–14.5)
RBC # FLD: 19.8 % — HIGH (ref 10.3–14.5)
RBC # FLD: 19.8 % — HIGH (ref 10.3–14.5)
RBC # FLD: 19.9 % — HIGH (ref 10.3–14.5)
RBC # FLD: 20 % — HIGH (ref 10.3–14.5)
RBC # FLD: 20.1 % — HIGH (ref 10.3–14.5)
RBC # FLD: 20.1 % — HIGH (ref 10.3–14.5)
RBC # FLD: 20.2 % — HIGH (ref 10.3–14.5)
RBC # FLD: 20.2 % — HIGH (ref 10.3–14.5)
RBC # FLD: 20.3 % — HIGH (ref 10.3–14.5)
RBC # FLD: 20.5 % — HIGH (ref 10.3–14.5)
RBC # FLD: 20.6 % — HIGH (ref 10.3–14.5)
RBC # FLD: 20.7 % — HIGH (ref 10.3–14.5)
RBC # FLD: 20.7 % — HIGH (ref 10.3–14.5)
RBC # FLD: 20.8 % — HIGH (ref 10.3–14.5)
RBC # FLD: 21.1 % — HIGH (ref 10.3–14.5)
RBC # FLD: 21.2 % — HIGH (ref 10.3–14.5)
RBC # FLD: 21.5 % — HIGH (ref 10.3–14.5)
RBC # FLD: 21.5 % — HIGH (ref 10.3–14.5)
RBC # FLD: 21.6 % — HIGH (ref 10.3–14.5)
RBC # FLD: 21.7 % — HIGH (ref 10.3–14.5)
RBC # FLD: 21.8 % — HIGH (ref 10.3–14.5)
RBC BLD AUTO: ABNORMAL
RBC BLD AUTO: SIGNIFICANT CHANGE UP
RBC CASTS # UR COMP ASSIST: 2 /HPF — SIGNIFICANT CHANGE UP (ref 0–4)
RH IG SCN BLD-IMP: POSITIVE — SIGNIFICANT CHANGE UP
S AUREUS DNA NOSE QL NAA+PROBE: SIGNIFICANT CHANGE UP
SAO2 % BLDV: 56 % — LOW (ref 67–88)
SAO2 % BLDV: 66 % — LOW (ref 67–88)
SAO2 % BLDV: 66.6 % — LOW (ref 67–88)
SAO2 % BLDV: 87 % — SIGNIFICANT CHANGE UP (ref 67–88)
SARS-COV-2 IGG+IGM SERPL QL IA: 15.2 U/ML — HIGH
SARS-COV-2 IGG+IGM SERPL QL IA: POSITIVE
SARS-COV-2 RNA SPEC QL NAA+PROBE: SIGNIFICANT CHANGE UP
SODIUM SERPL-SCNC: 128 MMOL/L — LOW (ref 135–145)
SODIUM SERPL-SCNC: 129 MMOL/L — LOW (ref 135–145)
SODIUM SERPL-SCNC: 129 MMOL/L — LOW (ref 135–145)
SODIUM SERPL-SCNC: 130 MMOL/L — LOW (ref 135–145)
SODIUM SERPL-SCNC: 131 MMOL/L — LOW (ref 135–145)
SODIUM SERPL-SCNC: 132 MMOL/L — LOW (ref 135–145)
SODIUM SERPL-SCNC: 133 MMOL/L — LOW (ref 135–145)
SODIUM SERPL-SCNC: 134 MMOL/L — LOW (ref 135–145)
SODIUM SERPL-SCNC: 135 MMOL/L — SIGNIFICANT CHANGE UP (ref 135–145)
SODIUM SERPL-SCNC: 136 MMOL/L — SIGNIFICANT CHANGE UP (ref 135–145)
SODIUM SERPL-SCNC: 137 MMOL/L — SIGNIFICANT CHANGE UP (ref 135–145)
SODIUM SERPL-SCNC: 138 MMOL/L — SIGNIFICANT CHANGE UP (ref 135–145)
SODIUM SERPL-SCNC: 139 MMOL/L — SIGNIFICANT CHANGE UP (ref 135–145)
SODIUM SERPL-SCNC: 140 MMOL/L — SIGNIFICANT CHANGE UP (ref 135–145)
SODIUM SERPL-SCNC: 140 MMOL/L — SIGNIFICANT CHANGE UP (ref 135–145)
SODIUM SERPL-SCNC: 141 MMOL/L — SIGNIFICANT CHANGE UP (ref 135–145)
SODIUM SERPL-SCNC: 142 MMOL/L — SIGNIFICANT CHANGE UP (ref 135–145)
SODIUM SERPL-SCNC: 144 MMOL/L — SIGNIFICANT CHANGE UP (ref 135–145)
SODIUM SERPL-SCNC: 145 MMOL/L — SIGNIFICANT CHANGE UP (ref 135–145)
SODIUM SERPL-SCNC: 146 MMOL/L — HIGH (ref 135–145)
SODIUM SERPL-SCNC: 148 MMOL/L — HIGH (ref 135–145)
SODIUM SERPL-SCNC: 148 MMOL/L — HIGH (ref 135–145)
SODIUM SERPL-SCNC: 150 MMOL/L — HIGH (ref 135–145)
SODIUM SERPL-SCNC: 150 MMOL/L — HIGH (ref 135–145)
SP GR SPEC: 1 — LOW (ref 1.01–1.02)
SP GR SPEC: 1.01 — LOW (ref 1.01–1.02)
SP GR SPEC: 1.01 — SIGNIFICANT CHANGE UP (ref 1.01–1.02)
SP GR SPEC: 1.02 — SIGNIFICANT CHANGE UP (ref 1.01–1.02)
SPECIMEN SOURCE: SIGNIFICANT CHANGE UP
SURGICAL PATHOLOGY STUDY: SIGNIFICANT CHANGE UP
SURGICAL PATHOLOGY STUDY: SIGNIFICANT CHANGE UP
THIOPURINE METHYLTRANSFERASE (TPMT) ENZY: 21.3 — SIGNIFICANT CHANGE UP
TIBC SERPL-MCNC: 146 UG/DL — LOW (ref 220–430)
TPMT ENZYME METHODOLOGY: SIGNIFICANT CHANGE UP
TPMT GENE PROD MET ACT IMP BLD/T-IMP: SIGNIFICANT CHANGE UP
TRIGL SERPL-MCNC: 106 MG/DL — SIGNIFICANT CHANGE UP
TRIGL SERPL-MCNC: 107 MG/DL — SIGNIFICANT CHANGE UP
TRIGL SERPL-MCNC: 116 MG/DL — SIGNIFICANT CHANGE UP
TRIGL SERPL-MCNC: 125 MG/DL — SIGNIFICANT CHANGE UP
TRIGL SERPL-MCNC: 140 MG/DL — SIGNIFICANT CHANGE UP
TRIGL SERPL-MCNC: 61 MG/DL — SIGNIFICANT CHANGE UP
TRIGL SERPL-MCNC: 649 MG/DL — HIGH
TRIGL SERPL-MCNC: 89 MG/DL — SIGNIFICANT CHANGE UP
TRIGL SERPL-MCNC: 99 MG/DL — SIGNIFICANT CHANGE UP
TROPONIN T, HIGH SENSITIVITY RESULT: 36 NG/L — SIGNIFICANT CHANGE UP (ref 0–51)
TROPONIN T, HIGH SENSITIVITY RESULT: 37 NG/L — SIGNIFICANT CHANGE UP (ref 0–51)
TROPONIN T, HIGH SENSITIVITY RESULT: 40 NG/L — SIGNIFICANT CHANGE UP (ref 0–51)
TROPONIN T, HIGH SENSITIVITY RESULT: 72 NG/L — HIGH (ref 0–51)
TROPONIN T, HIGH SENSITIVITY RESULT: 83 NG/L — HIGH (ref 0–51)
UIBC SERPL-MCNC: 130 UG/DL — SIGNIFICANT CHANGE UP (ref 110–370)
UROBILINOGEN FLD QL: NEGATIVE — SIGNIFICANT CHANGE UP
VIT A SERPL-MCNC: 11.6 UG/DL — LOW (ref 22–69.5)
VIT B1 SERPL-MCNC: 100.8 NMOL/L — SIGNIFICANT CHANGE UP (ref 66.5–200)
VIT B12 SERPL-MCNC: 1043 PG/ML — SIGNIFICANT CHANGE UP (ref 232–1245)
WBC # BLD: 10.02 K/UL — SIGNIFICANT CHANGE UP (ref 3.8–10.5)
WBC # BLD: 10.08 K/UL — SIGNIFICANT CHANGE UP (ref 3.8–10.5)
WBC # BLD: 10.19 K/UL — SIGNIFICANT CHANGE UP (ref 3.8–10.5)
WBC # BLD: 10.36 K/UL — SIGNIFICANT CHANGE UP (ref 3.8–10.5)
WBC # BLD: 10.41 K/UL — SIGNIFICANT CHANGE UP (ref 3.8–10.5)
WBC # BLD: 10.59 K/UL — HIGH (ref 3.8–10.5)
WBC # BLD: 10.68 K/UL — HIGH (ref 3.8–10.5)
WBC # BLD: 10.69 K/UL — HIGH (ref 3.8–10.5)
WBC # BLD: 10.8 K/UL — HIGH (ref 3.8–10.5)
WBC # BLD: 10.98 K/UL — HIGH (ref 3.8–10.5)
WBC # BLD: 11.08 K/UL — HIGH (ref 3.8–10.5)
WBC # BLD: 11.1 K/UL — HIGH (ref 3.8–10.5)
WBC # BLD: 11.15 K/UL — HIGH (ref 3.8–10.5)
WBC # BLD: 11.26 K/UL — HIGH (ref 3.8–10.5)
WBC # BLD: 11.33 K/UL — HIGH (ref 3.8–10.5)
WBC # BLD: 11.34 K/UL — HIGH (ref 3.8–10.5)
WBC # BLD: 11.41 K/UL — HIGH (ref 3.8–10.5)
WBC # BLD: 11.49 K/UL — HIGH (ref 3.8–10.5)
WBC # BLD: 11.61 K/UL — HIGH (ref 3.8–10.5)
WBC # BLD: 11.74 K/UL — HIGH (ref 3.8–10.5)
WBC # BLD: 11.82 K/UL — HIGH (ref 3.8–10.5)
WBC # BLD: 11.99 K/UL — HIGH (ref 3.8–10.5)
WBC # BLD: 12.5 K/UL — HIGH (ref 3.8–10.5)
WBC # BLD: 12.7 K/UL — HIGH (ref 3.8–10.5)
WBC # BLD: 13.26 K/UL — HIGH (ref 3.8–10.5)
WBC # BLD: 13.41 K/UL — HIGH (ref 3.8–10.5)
WBC # BLD: 13.53 K/UL — HIGH (ref 3.8–10.5)
WBC # BLD: 13.56 K/UL — HIGH (ref 3.8–10.5)
WBC # BLD: 13.59 K/UL — HIGH (ref 3.8–10.5)
WBC # BLD: 13.64 K/UL — HIGH (ref 3.8–10.5)
WBC # BLD: 13.71 K/UL — HIGH (ref 3.8–10.5)
WBC # BLD: 13.77 K/UL — HIGH (ref 3.8–10.5)
WBC # BLD: 13.91 K/UL — HIGH (ref 3.8–10.5)
WBC # BLD: 14.12 K/UL — HIGH (ref 3.8–10.5)
WBC # BLD: 14.4 K/UL — HIGH (ref 3.8–10.5)
WBC # BLD: 14.6 K/UL — HIGH (ref 3.8–10.5)
WBC # BLD: 14.71 K/UL — HIGH (ref 3.8–10.5)
WBC # BLD: 15.14 K/UL — HIGH (ref 3.8–10.5)
WBC # BLD: 15.23 K/UL — HIGH (ref 3.8–10.5)
WBC # BLD: 15.68 K/UL — HIGH (ref 3.8–10.5)
WBC # BLD: 15.91 K/UL — HIGH (ref 3.8–10.5)
WBC # BLD: 15.91 K/UL — HIGH (ref 3.8–10.5)
WBC # BLD: 15.97 K/UL — HIGH (ref 3.8–10.5)
WBC # BLD: 16.03 K/UL — HIGH (ref 3.8–10.5)
WBC # BLD: 17.14 K/UL — HIGH (ref 3.8–10.5)
WBC # BLD: 18.97 K/UL — HIGH (ref 3.8–10.5)
WBC # BLD: 20 K/UL — HIGH (ref 3.8–10.5)
WBC # BLD: 20.99 K/UL — HIGH (ref 3.8–10.5)
WBC # BLD: 22.07 K/UL — HIGH (ref 3.8–10.5)
WBC # BLD: 22.21 K/UL — HIGH (ref 3.8–10.5)
WBC # BLD: 23.12 K/UL — HIGH (ref 3.8–10.5)
WBC # BLD: 23.31 K/UL — HIGH (ref 3.8–10.5)
WBC # BLD: 23.38 K/UL — HIGH (ref 3.8–10.5)
WBC # BLD: 26.33 K/UL — HIGH (ref 3.8–10.5)
WBC # BLD: 28.81 K/UL — HIGH (ref 3.8–10.5)
WBC # BLD: 28.83 K/UL — HIGH (ref 3.8–10.5)
WBC # BLD: 37.99 K/UL — HIGH (ref 3.8–10.5)
WBC # BLD: 5.56 K/UL — SIGNIFICANT CHANGE UP (ref 3.8–10.5)
WBC # BLD: 5.59 K/UL — SIGNIFICANT CHANGE UP (ref 3.8–10.5)
WBC # BLD: 5.72 K/UL — SIGNIFICANT CHANGE UP (ref 3.8–10.5)
WBC # BLD: 5.86 K/UL — SIGNIFICANT CHANGE UP (ref 3.8–10.5)
WBC # BLD: 5.88 K/UL — SIGNIFICANT CHANGE UP (ref 3.8–10.5)
WBC # BLD: 6.01 K/UL — SIGNIFICANT CHANGE UP (ref 3.8–10.5)
WBC # BLD: 6.1 K/UL — SIGNIFICANT CHANGE UP (ref 3.8–10.5)
WBC # BLD: 6.14 K/UL — SIGNIFICANT CHANGE UP (ref 3.8–10.5)
WBC # BLD: 6.15 K/UL — SIGNIFICANT CHANGE UP (ref 3.8–10.5)
WBC # BLD: 6.29 K/UL — SIGNIFICANT CHANGE UP (ref 3.8–10.5)
WBC # BLD: 6.46 K/UL — SIGNIFICANT CHANGE UP (ref 3.8–10.5)
WBC # BLD: 6.61 K/UL — SIGNIFICANT CHANGE UP (ref 3.8–10.5)
WBC # BLD: 6.71 K/UL — SIGNIFICANT CHANGE UP (ref 3.8–10.5)
WBC # BLD: 6.8 K/UL — SIGNIFICANT CHANGE UP (ref 3.8–10.5)
WBC # BLD: 6.9 K/UL — SIGNIFICANT CHANGE UP (ref 3.8–10.5)
WBC # BLD: 7.15 K/UL — SIGNIFICANT CHANGE UP (ref 3.8–10.5)
WBC # BLD: 7.21 K/UL — SIGNIFICANT CHANGE UP (ref 3.8–10.5)
WBC # BLD: 7.31 K/UL — SIGNIFICANT CHANGE UP (ref 3.8–10.5)
WBC # BLD: 7.31 K/UL — SIGNIFICANT CHANGE UP (ref 3.8–10.5)
WBC # BLD: 7.34 K/UL — SIGNIFICANT CHANGE UP (ref 3.8–10.5)
WBC # BLD: 7.41 K/UL — SIGNIFICANT CHANGE UP (ref 3.8–10.5)
WBC # BLD: 7.42 K/UL — SIGNIFICANT CHANGE UP (ref 3.8–10.5)
WBC # BLD: 7.47 K/UL — SIGNIFICANT CHANGE UP (ref 3.8–10.5)
WBC # BLD: 7.82 K/UL — SIGNIFICANT CHANGE UP (ref 3.8–10.5)
WBC # BLD: 8.01 K/UL — SIGNIFICANT CHANGE UP (ref 3.8–10.5)
WBC # BLD: 8.11 K/UL — SIGNIFICANT CHANGE UP (ref 3.8–10.5)
WBC # BLD: 8.12 K/UL — SIGNIFICANT CHANGE UP (ref 3.8–10.5)
WBC # BLD: 8.32 K/UL — SIGNIFICANT CHANGE UP (ref 3.8–10.5)
WBC # BLD: 8.4 K/UL — SIGNIFICANT CHANGE UP (ref 3.8–10.5)
WBC # BLD: 8.41 K/UL — SIGNIFICANT CHANGE UP (ref 3.8–10.5)
WBC # BLD: 8.41 K/UL — SIGNIFICANT CHANGE UP (ref 3.8–10.5)
WBC # BLD: 8.42 K/UL — SIGNIFICANT CHANGE UP (ref 3.8–10.5)
WBC # BLD: 8.42 K/UL — SIGNIFICANT CHANGE UP (ref 3.8–10.5)
WBC # BLD: 8.51 K/UL — SIGNIFICANT CHANGE UP (ref 3.8–10.5)
WBC # BLD: 8.8 K/UL — SIGNIFICANT CHANGE UP (ref 3.8–10.5)
WBC # BLD: 9.02 K/UL — SIGNIFICANT CHANGE UP (ref 3.8–10.5)
WBC # BLD: 9.05 K/UL — SIGNIFICANT CHANGE UP (ref 3.8–10.5)
WBC # BLD: 9.15 K/UL — SIGNIFICANT CHANGE UP (ref 3.8–10.5)
WBC # BLD: 9.19 K/UL — SIGNIFICANT CHANGE UP (ref 3.8–10.5)
WBC # BLD: 9.26 K/UL — SIGNIFICANT CHANGE UP (ref 3.8–10.5)
WBC # BLD: 9.3 K/UL — SIGNIFICANT CHANGE UP (ref 3.8–10.5)
WBC # BLD: 9.45 K/UL — SIGNIFICANT CHANGE UP (ref 3.8–10.5)
WBC # BLD: 9.59 K/UL — SIGNIFICANT CHANGE UP (ref 3.8–10.5)
WBC # BLD: 9.67 K/UL — SIGNIFICANT CHANGE UP (ref 3.8–10.5)
WBC # BLD: 9.83 K/UL — SIGNIFICANT CHANGE UP (ref 3.8–10.5)
WBC # BLD: 9.93 K/UL — SIGNIFICANT CHANGE UP (ref 3.8–10.5)
WBC # BLD: 9.98 K/UL — SIGNIFICANT CHANGE UP (ref 3.8–10.5)
WBC # FLD AUTO: 10.02 K/UL — SIGNIFICANT CHANGE UP (ref 3.8–10.5)
WBC # FLD AUTO: 10.08 K/UL — SIGNIFICANT CHANGE UP (ref 3.8–10.5)
WBC # FLD AUTO: 10.19 K/UL — SIGNIFICANT CHANGE UP (ref 3.8–10.5)
WBC # FLD AUTO: 10.36 K/UL — SIGNIFICANT CHANGE UP (ref 3.8–10.5)
WBC # FLD AUTO: 10.41 K/UL — SIGNIFICANT CHANGE UP (ref 3.8–10.5)
WBC # FLD AUTO: 10.59 K/UL — HIGH (ref 3.8–10.5)
WBC # FLD AUTO: 10.68 K/UL — HIGH (ref 3.8–10.5)
WBC # FLD AUTO: 10.69 K/UL — HIGH (ref 3.8–10.5)
WBC # FLD AUTO: 10.8 K/UL — HIGH (ref 3.8–10.5)
WBC # FLD AUTO: 10.98 K/UL — HIGH (ref 3.8–10.5)
WBC # FLD AUTO: 11.08 K/UL — HIGH (ref 3.8–10.5)
WBC # FLD AUTO: 11.1 K/UL — HIGH (ref 3.8–10.5)
WBC # FLD AUTO: 11.15 K/UL — HIGH (ref 3.8–10.5)
WBC # FLD AUTO: 11.26 K/UL — HIGH (ref 3.8–10.5)
WBC # FLD AUTO: 11.33 K/UL — HIGH (ref 3.8–10.5)
WBC # FLD AUTO: 11.34 K/UL — HIGH (ref 3.8–10.5)
WBC # FLD AUTO: 11.41 K/UL — HIGH (ref 3.8–10.5)
WBC # FLD AUTO: 11.49 K/UL — HIGH (ref 3.8–10.5)
WBC # FLD AUTO: 11.61 K/UL — HIGH (ref 3.8–10.5)
WBC # FLD AUTO: 11.74 K/UL — HIGH (ref 3.8–10.5)
WBC # FLD AUTO: 11.82 K/UL — HIGH (ref 3.8–10.5)
WBC # FLD AUTO: 11.99 K/UL — HIGH (ref 3.8–10.5)
WBC # FLD AUTO: 12.5 K/UL — HIGH (ref 3.8–10.5)
WBC # FLD AUTO: 12.7 K/UL — HIGH (ref 3.8–10.5)
WBC # FLD AUTO: 13.26 K/UL — HIGH (ref 3.8–10.5)
WBC # FLD AUTO: 13.41 K/UL — HIGH (ref 3.8–10.5)
WBC # FLD AUTO: 13.53 K/UL — HIGH (ref 3.8–10.5)
WBC # FLD AUTO: 13.56 K/UL — HIGH (ref 3.8–10.5)
WBC # FLD AUTO: 13.59 K/UL — HIGH (ref 3.8–10.5)
WBC # FLD AUTO: 13.64 K/UL — HIGH (ref 3.8–10.5)
WBC # FLD AUTO: 13.71 K/UL — HIGH (ref 3.8–10.5)
WBC # FLD AUTO: 13.77 K/UL — HIGH (ref 3.8–10.5)
WBC # FLD AUTO: 13.91 K/UL — HIGH (ref 3.8–10.5)
WBC # FLD AUTO: 14.12 K/UL — HIGH (ref 3.8–10.5)
WBC # FLD AUTO: 14.4 K/UL — HIGH (ref 3.8–10.5)
WBC # FLD AUTO: 14.6 K/UL — HIGH (ref 3.8–10.5)
WBC # FLD AUTO: 14.71 K/UL — HIGH (ref 3.8–10.5)
WBC # FLD AUTO: 15.14 K/UL — HIGH (ref 3.8–10.5)
WBC # FLD AUTO: 15.23 K/UL — HIGH (ref 3.8–10.5)
WBC # FLD AUTO: 15.68 K/UL — HIGH (ref 3.8–10.5)
WBC # FLD AUTO: 15.91 K/UL — HIGH (ref 3.8–10.5)
WBC # FLD AUTO: 15.91 K/UL — HIGH (ref 3.8–10.5)
WBC # FLD AUTO: 15.97 K/UL — HIGH (ref 3.8–10.5)
WBC # FLD AUTO: 16.03 K/UL — HIGH (ref 3.8–10.5)
WBC # FLD AUTO: 17.14 K/UL — HIGH (ref 3.8–10.5)
WBC # FLD AUTO: 18.97 K/UL — HIGH (ref 3.8–10.5)
WBC # FLD AUTO: 20 K/UL — HIGH (ref 3.8–10.5)
WBC # FLD AUTO: 20.99 K/UL — HIGH (ref 3.8–10.5)
WBC # FLD AUTO: 22.07 K/UL — HIGH (ref 3.8–10.5)
WBC # FLD AUTO: 22.21 K/UL — HIGH (ref 3.8–10.5)
WBC # FLD AUTO: 23.12 K/UL — HIGH (ref 3.8–10.5)
WBC # FLD AUTO: 23.31 K/UL — HIGH (ref 3.8–10.5)
WBC # FLD AUTO: 23.38 K/UL — HIGH (ref 3.8–10.5)
WBC # FLD AUTO: 26.33 K/UL — HIGH (ref 3.8–10.5)
WBC # FLD AUTO: 28.81 K/UL — HIGH (ref 3.8–10.5)
WBC # FLD AUTO: 28.83 K/UL — HIGH (ref 3.8–10.5)
WBC # FLD AUTO: 37.99 K/UL — HIGH (ref 3.8–10.5)
WBC # FLD AUTO: 5.56 K/UL — SIGNIFICANT CHANGE UP (ref 3.8–10.5)
WBC # FLD AUTO: 5.59 K/UL — SIGNIFICANT CHANGE UP (ref 3.8–10.5)
WBC # FLD AUTO: 5.72 K/UL — SIGNIFICANT CHANGE UP (ref 3.8–10.5)
WBC # FLD AUTO: 5.86 K/UL — SIGNIFICANT CHANGE UP (ref 3.8–10.5)
WBC # FLD AUTO: 5.88 K/UL — SIGNIFICANT CHANGE UP (ref 3.8–10.5)
WBC # FLD AUTO: 6.01 K/UL — SIGNIFICANT CHANGE UP (ref 3.8–10.5)
WBC # FLD AUTO: 6.1 K/UL — SIGNIFICANT CHANGE UP (ref 3.8–10.5)
WBC # FLD AUTO: 6.14 K/UL — SIGNIFICANT CHANGE UP (ref 3.8–10.5)
WBC # FLD AUTO: 6.15 K/UL — SIGNIFICANT CHANGE UP (ref 3.8–10.5)
WBC # FLD AUTO: 6.29 K/UL — SIGNIFICANT CHANGE UP (ref 3.8–10.5)
WBC # FLD AUTO: 6.46 K/UL — SIGNIFICANT CHANGE UP (ref 3.8–10.5)
WBC # FLD AUTO: 6.61 K/UL — SIGNIFICANT CHANGE UP (ref 3.8–10.5)
WBC # FLD AUTO: 6.71 K/UL — SIGNIFICANT CHANGE UP (ref 3.8–10.5)
WBC # FLD AUTO: 6.8 K/UL — SIGNIFICANT CHANGE UP (ref 3.8–10.5)
WBC # FLD AUTO: 6.9 K/UL — SIGNIFICANT CHANGE UP (ref 3.8–10.5)
WBC # FLD AUTO: 7.15 K/UL — SIGNIFICANT CHANGE UP (ref 3.8–10.5)
WBC # FLD AUTO: 7.21 K/UL — SIGNIFICANT CHANGE UP (ref 3.8–10.5)
WBC # FLD AUTO: 7.31 K/UL — SIGNIFICANT CHANGE UP (ref 3.8–10.5)
WBC # FLD AUTO: 7.31 K/UL — SIGNIFICANT CHANGE UP (ref 3.8–10.5)
WBC # FLD AUTO: 7.34 K/UL — SIGNIFICANT CHANGE UP (ref 3.8–10.5)
WBC # FLD AUTO: 7.41 K/UL — SIGNIFICANT CHANGE UP (ref 3.8–10.5)
WBC # FLD AUTO: 7.42 K/UL — SIGNIFICANT CHANGE UP (ref 3.8–10.5)
WBC # FLD AUTO: 7.47 K/UL — SIGNIFICANT CHANGE UP (ref 3.8–10.5)
WBC # FLD AUTO: 7.82 K/UL — SIGNIFICANT CHANGE UP (ref 3.8–10.5)
WBC # FLD AUTO: 8.01 K/UL — SIGNIFICANT CHANGE UP (ref 3.8–10.5)
WBC # FLD AUTO: 8.11 K/UL — SIGNIFICANT CHANGE UP (ref 3.8–10.5)
WBC # FLD AUTO: 8.12 K/UL — SIGNIFICANT CHANGE UP (ref 3.8–10.5)
WBC # FLD AUTO: 8.32 K/UL — SIGNIFICANT CHANGE UP (ref 3.8–10.5)
WBC # FLD AUTO: 8.4 K/UL — SIGNIFICANT CHANGE UP (ref 3.8–10.5)
WBC # FLD AUTO: 8.41 K/UL — SIGNIFICANT CHANGE UP (ref 3.8–10.5)
WBC # FLD AUTO: 8.41 K/UL — SIGNIFICANT CHANGE UP (ref 3.8–10.5)
WBC # FLD AUTO: 8.42 K/UL — SIGNIFICANT CHANGE UP (ref 3.8–10.5)
WBC # FLD AUTO: 8.42 K/UL — SIGNIFICANT CHANGE UP (ref 3.8–10.5)
WBC # FLD AUTO: 8.51 K/UL — SIGNIFICANT CHANGE UP (ref 3.8–10.5)
WBC # FLD AUTO: 8.8 K/UL — SIGNIFICANT CHANGE UP (ref 3.8–10.5)
WBC # FLD AUTO: 9.02 K/UL — SIGNIFICANT CHANGE UP (ref 3.8–10.5)
WBC # FLD AUTO: 9.05 K/UL — SIGNIFICANT CHANGE UP (ref 3.8–10.5)
WBC # FLD AUTO: 9.15 K/UL — SIGNIFICANT CHANGE UP (ref 3.8–10.5)
WBC # FLD AUTO: 9.19 K/UL — SIGNIFICANT CHANGE UP (ref 3.8–10.5)
WBC # FLD AUTO: 9.26 K/UL — SIGNIFICANT CHANGE UP (ref 3.8–10.5)
WBC # FLD AUTO: 9.3 K/UL — SIGNIFICANT CHANGE UP (ref 3.8–10.5)
WBC # FLD AUTO: 9.45 K/UL — SIGNIFICANT CHANGE UP (ref 3.8–10.5)
WBC # FLD AUTO: 9.59 K/UL — SIGNIFICANT CHANGE UP (ref 3.8–10.5)
WBC # FLD AUTO: 9.67 K/UL — SIGNIFICANT CHANGE UP (ref 3.8–10.5)
WBC # FLD AUTO: 9.83 K/UL — SIGNIFICANT CHANGE UP (ref 3.8–10.5)
WBC # FLD AUTO: 9.93 K/UL — SIGNIFICANT CHANGE UP (ref 3.8–10.5)
WBC # FLD AUTO: 9.98 K/UL — SIGNIFICANT CHANGE UP (ref 3.8–10.5)
WBC UR QL: 3 /HPF — SIGNIFICANT CHANGE UP (ref 0–5)
ZINC SERPL-MCNC: 46 UG/DL — SIGNIFICANT CHANGE UP (ref 44–115)

## 2021-01-01 PROCEDURE — 86900 BLOOD TYPING SEROLOGIC ABO: CPT

## 2021-01-01 PROCEDURE — 87324 CLOSTRIDIUM AG IA: CPT

## 2021-01-01 PROCEDURE — 99233 SBSQ HOSP IP/OBS HIGH 50: CPT | Mod: GC

## 2021-01-01 PROCEDURE — 82553 CREATINE MB FRACTION: CPT

## 2021-01-01 PROCEDURE — 99232 SBSQ HOSP IP/OBS MODERATE 35: CPT | Mod: GC

## 2021-01-01 PROCEDURE — C1769: CPT

## 2021-01-01 PROCEDURE — 99232 SBSQ HOSP IP/OBS MODERATE 35: CPT

## 2021-01-01 PROCEDURE — 84134 ASSAY OF PREALBUMIN: CPT

## 2021-01-01 PROCEDURE — 96374 THER/PROPH/DIAG INJ IV PUSH: CPT | Mod: XU

## 2021-01-01 PROCEDURE — 84433 ASY THIOPURIN S-MTHYLTRNSFRS: CPT

## 2021-01-01 PROCEDURE — 80076 HEPATIC FUNCTION PANEL: CPT

## 2021-01-01 PROCEDURE — U0005: CPT

## 2021-01-01 PROCEDURE — 85730 THROMBOPLASTIN TIME PARTIAL: CPT

## 2021-01-01 PROCEDURE — 84484 ASSAY OF TROPONIN QUANT: CPT

## 2021-01-01 PROCEDURE — 82310 ASSAY OF CALCIUM: CPT

## 2021-01-01 PROCEDURE — 87507 IADNA-DNA/RNA PROBE TQ 12-25: CPT

## 2021-01-01 PROCEDURE — 45020 DRAINAGE OF RECTAL ABSCESS: CPT

## 2021-01-01 PROCEDURE — 88309 TISSUE EXAM BY PATHOLOGIST: CPT

## 2021-01-01 PROCEDURE — 88309 TISSUE EXAM BY PATHOLOGIST: CPT | Mod: 26

## 2021-01-01 PROCEDURE — 99231 SBSQ HOSP IP/OBS SF/LOW 25: CPT

## 2021-01-01 PROCEDURE — 99291 CRITICAL CARE FIRST HOUR: CPT

## 2021-01-01 PROCEDURE — 99285 EMERGENCY DEPT VISIT HI MDM: CPT | Mod: GC

## 2021-01-01 PROCEDURE — 84478 ASSAY OF TRIGLYCERIDES: CPT

## 2021-01-01 PROCEDURE — 99233 SBSQ HOSP IP/OBS HIGH 50: CPT

## 2021-01-01 PROCEDURE — 99156 MOD SED OTH PHYS/QHP 5/>YRS: CPT | Mod: GC

## 2021-01-01 PROCEDURE — 88341 IMHCHEM/IMCYTCHM EA ADD ANTB: CPT

## 2021-01-01 PROCEDURE — 87641 MR-STAPH DNA AMP PROBE: CPT

## 2021-01-01 PROCEDURE — 85025 COMPLETE CBC W/AUTO DIFF WBC: CPT

## 2021-01-01 PROCEDURE — 74018 RADEX ABDOMEN 1 VIEW: CPT | Mod: 26

## 2021-01-01 PROCEDURE — 71045 X-RAY EXAM CHEST 1 VIEW: CPT | Mod: 26

## 2021-01-01 PROCEDURE — 87046 STOOL CULTR AEROBIC BACT EA: CPT

## 2021-01-01 PROCEDURE — 87493 C DIFF AMPLIFIED PROBE: CPT

## 2021-01-01 PROCEDURE — 44210 LAPARO TOTAL PROCTOCOLECTOMY: CPT

## 2021-01-01 PROCEDURE — 93010 ELECTROCARDIOGRAM REPORT: CPT

## 2021-01-01 PROCEDURE — 93306 TTE W/DOPPLER COMPLETE: CPT

## 2021-01-01 PROCEDURE — 97166 OT EVAL MOD COMPLEX 45 MIN: CPT

## 2021-01-01 PROCEDURE — 93970 EXTREMITY STUDY: CPT

## 2021-01-01 PROCEDURE — 88305 TISSUE EXAM BY PATHOLOGIST: CPT | Mod: 26

## 2021-01-01 PROCEDURE — C1751: CPT

## 2021-01-01 PROCEDURE — 84100 ASSAY OF PHOSPHORUS: CPT

## 2021-01-01 PROCEDURE — 82565 ASSAY OF CREATININE: CPT

## 2021-01-01 PROCEDURE — 99223 1ST HOSP IP/OBS HIGH 75: CPT

## 2021-01-01 PROCEDURE — 44380 SMALL BOWEL ENDOSCOPY BR/WA: CPT

## 2021-01-01 PROCEDURE — 80053 COMPREHEN METABOLIC PANEL: CPT

## 2021-01-01 PROCEDURE — 45331 SIGMOIDOSCOPY AND BIOPSY: CPT | Mod: GC

## 2021-01-01 PROCEDURE — 99285 EMERGENCY DEPT VISIT HI MDM: CPT | Mod: 25

## 2021-01-01 PROCEDURE — 84295 ASSAY OF SERUM SODIUM: CPT

## 2021-01-01 PROCEDURE — 99221 1ST HOSP IP/OBS SF/LOW 40: CPT | Mod: GC

## 2021-01-01 PROCEDURE — C1889: CPT

## 2021-01-01 PROCEDURE — 11043 DBRDMT MUSC&/FSCA 1ST 20/<: CPT

## 2021-01-01 PROCEDURE — 86901 BLOOD TYPING SEROLOGIC RH(D): CPT

## 2021-01-01 PROCEDURE — 88342 IMHCHEM/IMCYTCHM 1ST ANTB: CPT | Mod: 26

## 2021-01-01 PROCEDURE — 45378 DIAGNOSTIC COLONOSCOPY: CPT | Mod: GC

## 2021-01-01 PROCEDURE — 43235 EGD DIAGNOSTIC BRUSH WASH: CPT

## 2021-01-01 PROCEDURE — P9016: CPT

## 2021-01-01 PROCEDURE — 82728 ASSAY OF FERRITIN: CPT

## 2021-01-01 PROCEDURE — 74177 CT ABD & PELVIS W/CONTRAST: CPT | Mod: 26

## 2021-01-01 PROCEDURE — 87040 BLOOD CULTURE FOR BACTERIA: CPT

## 2021-01-01 PROCEDURE — 82330 ASSAY OF CALCIUM: CPT

## 2021-01-01 PROCEDURE — 84132 ASSAY OF SERUM POTASSIUM: CPT

## 2021-01-01 PROCEDURE — 82435 ASSAY OF BLOOD CHLORIDE: CPT

## 2021-01-01 PROCEDURE — 84425 ASSAY OF VITAMIN B-1: CPT

## 2021-01-01 PROCEDURE — 86480 TB TEST CELL IMMUN MEASURE: CPT

## 2021-01-01 PROCEDURE — U0003: CPT

## 2021-01-01 PROCEDURE — 86706 HEP B SURFACE ANTIBODY: CPT

## 2021-01-01 PROCEDURE — 87070 CULTURE OTHR SPECIMN AEROBIC: CPT

## 2021-01-01 PROCEDURE — 97116 GAIT TRAINING THERAPY: CPT

## 2021-01-01 PROCEDURE — 87045 FECES CULTURE AEROBIC BACT: CPT

## 2021-01-01 PROCEDURE — 86850 RBC ANTIBODY SCREEN: CPT

## 2021-01-01 PROCEDURE — 86922 COMPATIBILITY TEST ANTIGLOB: CPT

## 2021-01-01 PROCEDURE — 71260 CT THORAX DX C+: CPT | Mod: 26

## 2021-01-01 PROCEDURE — 82607 VITAMIN B-12: CPT

## 2021-01-01 PROCEDURE — 93005 ELECTROCARDIOGRAM TRACING: CPT

## 2021-01-01 PROCEDURE — 97535 SELF CARE MNGMENT TRAINING: CPT

## 2021-01-01 PROCEDURE — 81001 URINALYSIS AUTO W/SCOPE: CPT

## 2021-01-01 PROCEDURE — 97530 THERAPEUTIC ACTIVITIES: CPT

## 2021-01-01 PROCEDURE — 86769 SARS-COV-2 COVID-19 ANTIBODY: CPT

## 2021-01-01 PROCEDURE — 83615 LACTATE (LD) (LDH) ENZYME: CPT

## 2021-01-01 PROCEDURE — 84590 ASSAY OF VITAMIN A: CPT

## 2021-01-01 PROCEDURE — 93970 EXTREMITY STUDY: CPT | Mod: 26

## 2021-01-01 PROCEDURE — 83540 ASSAY OF IRON: CPT

## 2021-01-01 PROCEDURE — 99222 1ST HOSP IP/OBS MODERATE 55: CPT

## 2021-01-01 PROCEDURE — 99233 SBSQ HOSP IP/OBS HIGH 50: CPT | Mod: 25

## 2021-01-01 PROCEDURE — 99024 POSTOP FOLLOW-UP VISIT: CPT

## 2021-01-01 PROCEDURE — 85018 HEMOGLOBIN: CPT

## 2021-01-01 PROCEDURE — 85014 HEMATOCRIT: CPT

## 2021-01-01 PROCEDURE — 87177 OVA AND PARASITES SMEARS: CPT

## 2021-01-01 PROCEDURE — 97112 NEUROMUSCULAR REEDUCATION: CPT

## 2021-01-01 PROCEDURE — 74177 CT ABD & PELVIS W/CONTRAST: CPT | Mod: 26,MA

## 2021-01-01 PROCEDURE — 71045 X-RAY EXAM CHEST 1 VIEW: CPT | Mod: 26,77

## 2021-01-01 PROCEDURE — 71045 X-RAY EXAM CHEST 1 VIEW: CPT | Mod: 26,76

## 2021-01-01 PROCEDURE — 82962 GLUCOSE BLOOD TEST: CPT

## 2021-01-01 PROCEDURE — 83735 ASSAY OF MAGNESIUM: CPT

## 2021-01-01 PROCEDURE — 82306 VITAMIN D 25 HYDROXY: CPT

## 2021-01-01 PROCEDURE — 36569 INSJ PICC 5 YR+ W/O IMAGING: CPT

## 2021-01-01 PROCEDURE — 87640 STAPH A DNA AMP PROBE: CPT

## 2021-01-01 PROCEDURE — 99221 1ST HOSP IP/OBS SF/LOW 40: CPT

## 2021-01-01 PROCEDURE — 86906 BLD TYPING SEROLOGIC RH PHNT: CPT

## 2021-01-01 PROCEDURE — 87077 CULTURE AEROBIC IDENTIFY: CPT

## 2021-01-01 PROCEDURE — 71260 CT THORAX DX C+: CPT

## 2021-01-01 PROCEDURE — 97161 PT EVAL LOW COMPLEX 20 MIN: CPT

## 2021-01-01 PROCEDURE — P9045: CPT

## 2021-01-01 PROCEDURE — 84145 PROCALCITONIN (PCT): CPT

## 2021-01-01 PROCEDURE — 97110 THERAPEUTIC EXERCISES: CPT

## 2021-01-01 PROCEDURE — 82803 BLOOD GASES ANY COMBINATION: CPT

## 2021-01-01 PROCEDURE — 99223 1ST HOSP IP/OBS HIGH 75: CPT | Mod: GC

## 2021-01-01 PROCEDURE — 85610 PROTHROMBIN TIME: CPT

## 2021-01-01 PROCEDURE — 80230 DRUG ASSAY INFLIXIMAB: CPT

## 2021-01-01 PROCEDURE — 0225U NFCT DS DNA&RNA 21 SARSCOV2: CPT

## 2021-01-01 PROCEDURE — 94002 VENT MGMT INPAT INIT DAY: CPT

## 2021-01-01 PROCEDURE — 83970 ASSAY OF PARATHORMONE: CPT

## 2021-01-01 PROCEDURE — 86140 C-REACTIVE PROTEIN: CPT

## 2021-01-01 PROCEDURE — 85652 RBC SED RATE AUTOMATED: CPT

## 2021-01-01 PROCEDURE — 94003 VENT MGMT INPAT SUBQ DAY: CPT

## 2021-01-01 PROCEDURE — 87340 HEPATITIS B SURFACE AG IA: CPT

## 2021-01-01 PROCEDURE — 36430 TRANSFUSION BLD/BLD COMPNT: CPT

## 2021-01-01 PROCEDURE — 92610 EVALUATE SWALLOWING FUNCTION: CPT

## 2021-01-01 PROCEDURE — 82746 ASSAY OF FOLIC ACID SERUM: CPT

## 2021-01-01 PROCEDURE — 70450 CT HEAD/BRAIN W/O DYE: CPT

## 2021-01-01 PROCEDURE — 31575 DIAGNOSTIC LARYNGOSCOPY: CPT

## 2021-01-01 PROCEDURE — 85027 COMPLETE CBC AUTOMATED: CPT

## 2021-01-01 PROCEDURE — 82397 CHEMILUMINESCENT ASSAY: CPT

## 2021-01-01 PROCEDURE — C9399: CPT

## 2021-01-01 PROCEDURE — 83605 ASSAY OF LACTIC ACID: CPT

## 2021-01-01 PROCEDURE — 99222 1ST HOSP IP/OBS MODERATE 55: CPT | Mod: 25

## 2021-01-01 PROCEDURE — 82272 OCCULT BLD FECES 1-3 TESTS: CPT

## 2021-01-01 PROCEDURE — 87389 HIV-1 AG W/HIV-1&-2 AB AG IA: CPT

## 2021-01-01 PROCEDURE — 93306 TTE W/DOPPLER COMPLETE: CPT | Mod: 26

## 2021-01-01 PROCEDURE — 80048 BASIC METABOLIC PNL TOTAL CA: CPT

## 2021-01-01 PROCEDURE — 86920 COMPATIBILITY TEST SPIN: CPT

## 2021-01-01 PROCEDURE — 92526 ORAL FUNCTION THERAPY: CPT

## 2021-01-01 PROCEDURE — 88305 TISSUE EXAM BY PATHOLOGIST: CPT

## 2021-01-01 PROCEDURE — 74018 RADEX ABDOMEN 1 VIEW: CPT

## 2021-01-01 PROCEDURE — 86704 HEP B CORE ANTIBODY TOTAL: CPT

## 2021-01-01 PROCEDURE — 87449 NOS EACH ORGANISM AG IA: CPT

## 2021-01-01 PROCEDURE — 70450 CT HEAD/BRAIN W/O DYE: CPT | Mod: 26

## 2021-01-01 PROCEDURE — 81003 URINALYSIS AUTO W/O SCOPE: CPT

## 2021-01-01 PROCEDURE — 87086 URINE CULTURE/COLONY COUNT: CPT

## 2021-01-01 PROCEDURE — 74177 CT ABD & PELVIS W/CONTRAST: CPT

## 2021-01-01 PROCEDURE — 82550 ASSAY OF CK (CPK): CPT

## 2021-01-01 PROCEDURE — 45380 COLONOSCOPY AND BIOPSY: CPT | Mod: GC

## 2021-01-01 PROCEDURE — 84630 ASSAY OF ZINC: CPT

## 2021-01-01 PROCEDURE — 96376 TX/PRO/DX INJ SAME DRUG ADON: CPT

## 2021-01-01 PROCEDURE — 83550 IRON BINDING TEST: CPT

## 2021-01-01 PROCEDURE — 99232 SBSQ HOSP IP/OBS MODERATE 35: CPT | Mod: 25

## 2021-01-01 PROCEDURE — 82947 ASSAY GLUCOSE BLOOD QUANT: CPT

## 2021-01-01 PROCEDURE — 83036 HEMOGLOBIN GLYCOSYLATED A1C: CPT

## 2021-01-01 PROCEDURE — 71045 X-RAY EXAM CHEST 1 VIEW: CPT

## 2021-01-01 RX ORDER — PANTOPRAZOLE SODIUM 20 MG/1
40 TABLET, DELAYED RELEASE ORAL EVERY 24 HOURS
Refills: 0 | Status: DISCONTINUED | OUTPATIENT
Start: 2021-01-01 | End: 2021-01-01

## 2021-01-01 RX ORDER — POTASSIUM CHLORIDE 20 MEQ
20 PACKET (EA) ORAL
Refills: 0 | Status: COMPLETED | OUTPATIENT
Start: 2021-01-01 | End: 2021-01-01

## 2021-01-01 RX ORDER — FUROSEMIDE 40 MG
20 TABLET ORAL ONCE
Refills: 0 | Status: COMPLETED | OUTPATIENT
Start: 2021-01-01 | End: 2021-01-01

## 2021-01-01 RX ORDER — METOPROLOL TARTRATE 50 MG
5 TABLET ORAL EVERY 6 HOURS
Refills: 0 | Status: DISCONTINUED | OUTPATIENT
Start: 2021-01-01 | End: 2021-01-01

## 2021-01-01 RX ORDER — HYDROMORPHONE HYDROCHLORIDE 2 MG/ML
0.25 INJECTION INTRAMUSCULAR; INTRAVENOUS; SUBCUTANEOUS EVERY 6 HOURS
Refills: 0 | Status: DISCONTINUED | OUTPATIENT
Start: 2021-01-01 | End: 2021-01-01

## 2021-01-01 RX ORDER — MAGNESIUM SULFATE 500 MG/ML
2 VIAL (ML) INJECTION ONCE
Refills: 0 | Status: COMPLETED | OUTPATIENT
Start: 2021-01-01 | End: 2021-01-01

## 2021-01-01 RX ORDER — SOD SULF/SODIUM/NAHCO3/KCL/PEG
1000 SOLUTION, RECONSTITUTED, ORAL ORAL EVERY 4 HOURS
Refills: 0 | Status: COMPLETED | OUTPATIENT
Start: 2021-01-01 | End: 2021-01-01

## 2021-01-01 RX ORDER — POTASSIUM CHLORIDE 20 MEQ
40 PACKET (EA) ORAL ONCE
Refills: 0 | Status: COMPLETED | OUTPATIENT
Start: 2021-01-01 | End: 2021-01-01

## 2021-01-01 RX ORDER — VANCOMYCIN HCL 1 G
125 VIAL (EA) INTRAVENOUS EVERY 6 HOURS
Refills: 0 | Status: DISCONTINUED | OUTPATIENT
Start: 2021-01-01 | End: 2021-01-01

## 2021-01-01 RX ORDER — ELECTROLYTE SOLUTION,INJ
1 VIAL (ML) INTRAVENOUS
Refills: 0 | Status: DISCONTINUED | OUTPATIENT
Start: 2021-01-01 | End: 2021-01-01

## 2021-01-01 RX ORDER — CALCIUM GLUCONATE 100 MG/ML
2 VIAL (ML) INTRAVENOUS ONCE
Refills: 0 | Status: COMPLETED | OUTPATIENT
Start: 2021-01-01 | End: 2021-01-01

## 2021-01-01 RX ORDER — ENOXAPARIN SODIUM 100 MG/ML
40 INJECTION SUBCUTANEOUS EVERY 24 HOURS
Refills: 0 | Status: DISCONTINUED | OUTPATIENT
Start: 2021-01-01 | End: 2021-01-01

## 2021-01-01 RX ORDER — ACETAMINOPHEN 500 MG
1000 TABLET ORAL EVERY 6 HOURS
Refills: 0 | Status: COMPLETED | OUTPATIENT
Start: 2021-01-01 | End: 2021-01-01

## 2021-01-01 RX ORDER — I.V. FAT EMULSION 20 G/100ML
25 EMULSION INTRAVENOUS
Qty: 60 | Refills: 0 | Status: DISCONTINUED | OUTPATIENT
Start: 2021-01-01 | End: 2021-01-01

## 2021-01-01 RX ORDER — DILTIAZEM HCL 120 MG
5 CAPSULE, EXT RELEASE 24 HR ORAL ONCE
Refills: 0 | Status: COMPLETED | OUTPATIENT
Start: 2021-01-01 | End: 2021-01-01

## 2021-01-01 RX ORDER — VANCOMYCIN HCL 1 G
125 VIAL (EA) INTRAVENOUS EVERY 12 HOURS
Refills: 0 | Status: DISCONTINUED | OUTPATIENT
Start: 2021-01-01 | End: 2021-01-01

## 2021-01-01 RX ORDER — PANTOPRAZOLE SODIUM 20 MG/1
8 TABLET, DELAYED RELEASE ORAL
Qty: 80 | Refills: 0 | Status: DISCONTINUED | OUTPATIENT
Start: 2021-01-01 | End: 2021-01-01

## 2021-01-01 RX ORDER — SODIUM CHLORIDE 9 MG/ML
500 INJECTION, SOLUTION INTRAVENOUS ONCE
Refills: 0 | Status: COMPLETED | OUTPATIENT
Start: 2021-01-01 | End: 2021-01-01

## 2021-01-01 RX ORDER — METOPROLOL TARTRATE 50 MG
12.5 TABLET ORAL EVERY 6 HOURS
Refills: 0 | Status: DISCONTINUED | OUTPATIENT
Start: 2021-01-01 | End: 2021-01-01

## 2021-01-01 RX ORDER — ACETAMINOPHEN 500 MG
500 TABLET ORAL EVERY 6 HOURS
Refills: 0 | Status: DISCONTINUED | OUTPATIENT
Start: 2021-01-01 | End: 2021-01-01

## 2021-01-01 RX ORDER — TRAMADOL HYDROCHLORIDE 50 MG/1
25 TABLET ORAL ONCE
Refills: 0 | Status: DISCONTINUED | OUTPATIENT
Start: 2021-01-01 | End: 2021-01-01

## 2021-01-01 RX ORDER — MORPHINE SULFATE 50 MG/1
2 CAPSULE, EXTENDED RELEASE ORAL ONCE
Refills: 0 | Status: DISCONTINUED | OUTPATIENT
Start: 2021-01-01 | End: 2021-01-01

## 2021-01-01 RX ORDER — CHLORHEXIDINE GLUCONATE 213 G/1000ML
1 SOLUTION TOPICAL
Refills: 0 | Status: DISCONTINUED | OUTPATIENT
Start: 2021-01-01 | End: 2021-01-01

## 2021-01-01 RX ORDER — METOPROLOL TARTRATE 50 MG
12.5 TABLET ORAL EVERY 12 HOURS
Refills: 0 | Status: DISCONTINUED | OUTPATIENT
Start: 2021-01-01 | End: 2021-01-01

## 2021-01-01 RX ORDER — POTASSIUM CHLORIDE 20 MEQ
40 PACKET (EA) ORAL EVERY 4 HOURS
Refills: 0 | Status: COMPLETED | OUTPATIENT
Start: 2021-01-01 | End: 2021-01-01

## 2021-01-01 RX ORDER — POTASSIUM CHLORIDE 20 MEQ
10 PACKET (EA) ORAL
Refills: 0 | Status: DISCONTINUED | OUTPATIENT
Start: 2021-01-01 | End: 2021-01-01

## 2021-01-01 RX ORDER — OXYCODONE HYDROCHLORIDE 5 MG/1
2.5 TABLET ORAL ONCE
Refills: 0 | Status: DISCONTINUED | OUTPATIENT
Start: 2021-01-01 | End: 2021-01-01

## 2021-01-01 RX ORDER — LOPERAMIDE HCL 2 MG
1 TABLET ORAL THREE TIMES A DAY
Refills: 0 | Status: DISCONTINUED | OUTPATIENT
Start: 2021-01-01 | End: 2021-01-01

## 2021-01-01 RX ORDER — ACETAMINOPHEN 500 MG
650 TABLET ORAL EVERY 6 HOURS
Refills: 0 | Status: DISCONTINUED | OUTPATIENT
Start: 2021-01-01 | End: 2021-01-01

## 2021-01-01 RX ORDER — METHOTREXATE 2.5 MG/1
10 TABLET ORAL
Qty: 0 | Refills: 0 | DISCHARGE

## 2021-01-01 RX ORDER — PANTOPRAZOLE SODIUM 20 MG/1
40 TABLET, DELAYED RELEASE ORAL EVERY 12 HOURS
Refills: 0 | Status: DISCONTINUED | OUTPATIENT
Start: 2021-01-01 | End: 2021-01-01

## 2021-01-01 RX ORDER — MESALAMINE 400 MG
4 TABLET, DELAYED RELEASE (ENTERIC COATED) ORAL
Qty: 0 | Refills: 0 | DISCHARGE

## 2021-01-01 RX ORDER — DEXMEDETOMIDINE HYDROCHLORIDE IN 0.9% SODIUM CHLORIDE 4 UG/ML
0.2 INJECTION INTRAVENOUS
Qty: 200 | Refills: 0 | Status: DISCONTINUED | OUTPATIENT
Start: 2021-01-01 | End: 2021-01-01

## 2021-01-01 RX ORDER — METRONIDAZOLE 500 MG
500 TABLET ORAL ONCE
Refills: 0 | Status: COMPLETED | OUTPATIENT
Start: 2021-01-01 | End: 2021-01-01

## 2021-01-01 RX ORDER — DIPHENHYDRAMINE HYDROCHLORIDE AND LIDOCAINE HYDROCHLORIDE AND ALUMINUM HYDROXIDE AND MAGNESIUM HYDRO
10 KIT EVERY 8 HOURS
Refills: 0 | Status: COMPLETED | OUTPATIENT
Start: 2021-01-01 | End: 2021-01-01

## 2021-01-01 RX ORDER — CIPROFLOXACIN LACTATE 400MG/40ML
400 VIAL (ML) INTRAVENOUS EVERY 12 HOURS
Refills: 0 | Status: DISCONTINUED | OUTPATIENT
Start: 2021-01-01 | End: 2021-01-01

## 2021-01-01 RX ORDER — METOPROLOL TARTRATE 50 MG
5 TABLET ORAL ONCE
Refills: 0 | Status: DISCONTINUED | OUTPATIENT
Start: 2021-01-01 | End: 2021-01-01

## 2021-01-01 RX ORDER — LIDOCAINE HCL 20 MG/ML
4 VIAL (ML) INJECTION ONCE
Refills: 0 | Status: DISCONTINUED | OUTPATIENT
Start: 2021-01-01 | End: 2021-01-01

## 2021-01-01 RX ORDER — METRONIDAZOLE 500 MG
500 TABLET ORAL EVERY 8 HOURS
Refills: 0 | Status: DISCONTINUED | OUTPATIENT
Start: 2021-01-01 | End: 2021-01-01

## 2021-01-01 RX ORDER — ACETAMINOPHEN 500 MG
650 TABLET ORAL ONCE
Refills: 0 | Status: COMPLETED | OUTPATIENT
Start: 2021-01-01 | End: 2021-01-01

## 2021-01-01 RX ORDER — SODIUM CHLORIDE 9 MG/ML
1000 INJECTION INTRAMUSCULAR; INTRAVENOUS; SUBCUTANEOUS ONCE
Refills: 0 | Status: COMPLETED | OUTPATIENT
Start: 2021-01-01 | End: 2021-01-01

## 2021-01-01 RX ORDER — METOPROLOL TARTRATE 50 MG
25 TABLET ORAL EVERY 8 HOURS
Refills: 0 | Status: DISCONTINUED | OUTPATIENT
Start: 2021-01-01 | End: 2021-01-01

## 2021-01-01 RX ORDER — POLYETHYLENE GLYCOL 3350 17 G/17G
17 POWDER, FOR SOLUTION ORAL
Refills: 0 | Status: COMPLETED | OUTPATIENT
Start: 2021-01-01 | End: 2021-01-01

## 2021-01-01 RX ORDER — DILTIAZEM HCL 120 MG
10 CAPSULE, EXT RELEASE 24 HR ORAL ONCE
Refills: 0 | Status: COMPLETED | OUTPATIENT
Start: 2021-01-01 | End: 2021-01-01

## 2021-01-01 RX ORDER — INSULIN LISPRO 100/ML
VIAL (ML) SUBCUTANEOUS EVERY 6 HOURS
Refills: 0 | Status: DISCONTINUED | OUTPATIENT
Start: 2021-01-01 | End: 2021-01-01

## 2021-01-01 RX ORDER — PIPERACILLIN AND TAZOBACTAM 4; .5 G/20ML; G/20ML
3.38 INJECTION, POWDER, LYOPHILIZED, FOR SOLUTION INTRAVENOUS EVERY 8 HOURS
Refills: 0 | Status: COMPLETED | OUTPATIENT
Start: 2021-01-01 | End: 2021-01-01

## 2021-01-01 RX ORDER — PANTOPRAZOLE SODIUM 20 MG/1
40 TABLET, DELAYED RELEASE ORAL
Refills: 0 | Status: DISCONTINUED | OUTPATIENT
Start: 2021-01-01 | End: 2021-01-01

## 2021-01-01 RX ORDER — DILTIAZEM HCL 120 MG
5 CAPSULE, EXT RELEASE 24 HR ORAL ONCE
Refills: 0 | Status: DISCONTINUED | OUTPATIENT
Start: 2021-01-01 | End: 2021-01-01

## 2021-01-01 RX ORDER — PANTOPRAZOLE SODIUM 20 MG/1
40 TABLET, DELAYED RELEASE ORAL ONCE
Refills: 0 | Status: COMPLETED | OUTPATIENT
Start: 2021-01-01 | End: 2021-01-01

## 2021-01-01 RX ORDER — HALOPERIDOL DECANOATE 100 MG/ML
1 INJECTION INTRAMUSCULAR EVERY 12 HOURS
Refills: 0 | Status: DISCONTINUED | OUTPATIENT
Start: 2021-01-01 | End: 2021-01-01

## 2021-01-01 RX ORDER — CHLORHEXIDINE GLUCONATE 213 G/1000ML
15 SOLUTION TOPICAL EVERY 12 HOURS
Refills: 0 | Status: DISCONTINUED | OUTPATIENT
Start: 2021-01-01 | End: 2021-01-01

## 2021-01-01 RX ORDER — MORPHINE SULFATE 50 MG/1
4 CAPSULE, EXTENDED RELEASE ORAL ONCE
Refills: 0 | Status: DISCONTINUED | OUTPATIENT
Start: 2021-01-01 | End: 2021-01-01

## 2021-01-01 RX ORDER — FIDAXOMICIN 200 MG/5ML
200 GRANULE, FOR SUSPENSION ORAL
Refills: 0 | Status: COMPLETED | OUTPATIENT
Start: 2021-01-01 | End: 2021-01-01

## 2021-01-01 RX ORDER — PIPERACILLIN AND TAZOBACTAM 4; .5 G/20ML; G/20ML
3.38 INJECTION, POWDER, LYOPHILIZED, FOR SOLUTION INTRAVENOUS EVERY 8 HOURS
Refills: 0 | Status: DISCONTINUED | OUTPATIENT
Start: 2021-01-01 | End: 2021-01-01

## 2021-01-01 RX ORDER — TRAMADOL HYDROCHLORIDE 50 MG/1
100 TABLET ORAL EVERY 6 HOURS
Refills: 0 | Status: DISCONTINUED | OUTPATIENT
Start: 2021-01-01 | End: 2021-01-01

## 2021-01-01 RX ORDER — CIPROFLOXACIN LACTATE 400MG/40ML
400 VIAL (ML) INTRAVENOUS ONCE
Refills: 0 | Status: COMPLETED | OUTPATIENT
Start: 2021-01-01 | End: 2021-01-01

## 2021-01-01 RX ORDER — SODIUM HYPOCHLORITE 0.125 %
1 SOLUTION, NON-ORAL MISCELLANEOUS EVERY 12 HOURS
Refills: 0 | Status: DISCONTINUED | OUTPATIENT
Start: 2021-01-01 | End: 2021-01-01

## 2021-01-01 RX ORDER — PANTOPRAZOLE SODIUM 20 MG/1
40 TABLET, DELAYED RELEASE ORAL DAILY
Refills: 0 | Status: DISCONTINUED | OUTPATIENT
Start: 2021-01-01 | End: 2021-01-01

## 2021-01-01 RX ORDER — POTASSIUM CHLORIDE 20 MEQ
20 PACKET (EA) ORAL
Refills: 0 | Status: DISCONTINUED | OUTPATIENT
Start: 2021-01-01 | End: 2021-01-01

## 2021-01-01 RX ORDER — IRON SUCROSE 20 MG/ML
100 INJECTION, SOLUTION INTRAVENOUS EVERY 24 HOURS
Refills: 0 | Status: COMPLETED | OUTPATIENT
Start: 2021-01-01 | End: 2021-01-01

## 2021-01-01 RX ORDER — CLONAZEPAM 1 MG
0.5 TABLET ORAL AT BEDTIME
Refills: 0 | Status: DISCONTINUED | OUTPATIENT
Start: 2021-01-01 | End: 2021-01-01

## 2021-01-01 RX ORDER — ONDANSETRON 8 MG/1
4 TABLET, FILM COATED ORAL EVERY 6 HOURS
Refills: 0 | Status: DISCONTINUED | OUTPATIENT
Start: 2021-01-01 | End: 2021-01-01

## 2021-01-01 RX ORDER — CLONAZEPAM 1 MG
0.25 TABLET ORAL AT BEDTIME
Refills: 0 | Status: DISCONTINUED | OUTPATIENT
Start: 2021-01-01 | End: 2021-01-01

## 2021-01-01 RX ORDER — GLUCAGON INJECTION, SOLUTION 0.5 MG/.1ML
1 INJECTION, SOLUTION SUBCUTANEOUS ONCE
Refills: 0 | Status: DISCONTINUED | OUTPATIENT
Start: 2021-01-01 | End: 2021-01-01

## 2021-01-01 RX ORDER — CHLORHEXIDINE GLUCONATE 213 G/1000ML
1 SOLUTION TOPICAL DAILY
Refills: 0 | Status: DISCONTINUED | OUTPATIENT
Start: 2021-01-01 | End: 2021-01-01

## 2021-01-01 RX ORDER — METOPROLOL TARTRATE 50 MG
5 TABLET ORAL ONCE
Refills: 0 | Status: COMPLETED | OUTPATIENT
Start: 2021-01-01 | End: 2021-01-01

## 2021-01-01 RX ORDER — FIDAXOMICIN 200 MG/5ML
200 GRANULE, FOR SUSPENSION ORAL
Refills: 0 | Status: DISCONTINUED | OUTPATIENT
Start: 2021-01-01 | End: 2021-01-01

## 2021-01-01 RX ORDER — DEXTROSE 50 % IN WATER 50 %
25 SYRINGE (ML) INTRAVENOUS ONCE
Refills: 0 | Status: DISCONTINUED | OUTPATIENT
Start: 2021-01-01 | End: 2021-01-01

## 2021-01-01 RX ORDER — SOTALOL HCL 120 MG
1 TABLET ORAL
Qty: 0 | Refills: 0 | DISCHARGE

## 2021-01-01 RX ORDER — SODIUM CHLORIDE 9 MG/ML
1000 INJECTION, SOLUTION INTRAVENOUS
Refills: 0 | Status: DISCONTINUED | OUTPATIENT
Start: 2021-01-01 | End: 2021-01-01

## 2021-01-01 RX ORDER — TRAMADOL HYDROCHLORIDE 50 MG/1
25 TABLET ORAL EVERY 8 HOURS
Refills: 0 | Status: DISCONTINUED | OUTPATIENT
Start: 2021-01-01 | End: 2021-01-01

## 2021-01-01 RX ORDER — SODIUM CHLORIDE 9 MG/ML
500 INJECTION INTRAMUSCULAR; INTRAVENOUS; SUBCUTANEOUS
Refills: 0 | Status: DISCONTINUED | OUTPATIENT
Start: 2021-01-01 | End: 2021-01-01

## 2021-01-01 RX ORDER — MULTIVIT-MIN/FERROUS GLUCONATE 9 MG/15 ML
30 LIQUID (ML) ORAL DAILY
Refills: 0 | Status: DISCONTINUED | OUTPATIENT
Start: 2021-01-01 | End: 2021-01-01

## 2021-01-01 RX ORDER — SODIUM,POTASSIUM PHOSPHATES 278-250MG
2 POWDER IN PACKET (EA) ORAL EVERY 4 HOURS
Refills: 0 | Status: COMPLETED | OUTPATIENT
Start: 2021-01-01 | End: 2021-01-01

## 2021-01-01 RX ORDER — SALIVA SUBSTITUTE COMB NO.11 351 MG
5 POWDER IN PACKET (EA) MUCOUS MEMBRANE
Refills: 0 | Status: DISCONTINUED | OUTPATIENT
Start: 2021-01-01 | End: 2021-01-01

## 2021-01-01 RX ORDER — PHENYLEPHRINE HYDROCHLORIDE 10 MG/ML
1 INJECTION INTRAVENOUS
Qty: 40 | Refills: 0 | Status: DISCONTINUED | OUTPATIENT
Start: 2021-01-01 | End: 2021-01-01

## 2021-01-01 RX ORDER — BUDESONIDE, MICRONIZED 100 %
3 POWDER (GRAM) MISCELLANEOUS
Qty: 0 | Refills: 0 | DISCHARGE

## 2021-01-01 RX ORDER — CIPROFLOXACIN LACTATE 400MG/40ML
VIAL (ML) INTRAVENOUS
Refills: 0 | Status: DISCONTINUED | OUTPATIENT
Start: 2021-01-01 | End: 2021-01-01

## 2021-01-01 RX ORDER — LIDOCAINE 4 G/100G
1 CREAM TOPICAL
Refills: 0 | Status: DISCONTINUED | OUTPATIENT
Start: 2021-01-01 | End: 2021-01-01

## 2021-01-01 RX ORDER — POTASSIUM CHLORIDE 20 MEQ
40 PACKET (EA) ORAL EVERY 4 HOURS
Refills: 0 | Status: DISCONTINUED | OUTPATIENT
Start: 2021-01-01 | End: 2021-01-01

## 2021-01-01 RX ORDER — AMIODARONE HYDROCHLORIDE 400 MG/1
150 TABLET ORAL ONCE
Refills: 0 | Status: COMPLETED | OUTPATIENT
Start: 2021-01-01 | End: 2021-01-01

## 2021-01-01 RX ORDER — METOPROLOL TARTRATE 50 MG
37.5 TABLET ORAL EVERY 12 HOURS
Refills: 0 | Status: DISCONTINUED | OUTPATIENT
Start: 2021-01-01 | End: 2021-01-01

## 2021-01-01 RX ORDER — QUETIAPINE FUMARATE 200 MG/1
25 TABLET, FILM COATED ORAL ONCE
Refills: 0 | Status: COMPLETED | OUTPATIENT
Start: 2021-01-01 | End: 2021-01-01

## 2021-01-01 RX ORDER — DEXTROSE MONOHYDRATE, SODIUM CHLORIDE, AND POTASSIUM CHLORIDE 50; .745; 4.5 G/1000ML; G/1000ML; G/1000ML
1000 INJECTION, SOLUTION INTRAVENOUS
Refills: 0 | Status: DISCONTINUED | OUTPATIENT
Start: 2021-01-01 | End: 2021-01-01

## 2021-01-01 RX ORDER — AMIODARONE HYDROCHLORIDE 400 MG/1
1 TABLET ORAL
Qty: 900 | Refills: 0 | Status: DISCONTINUED | OUTPATIENT
Start: 2021-01-01 | End: 2021-01-01

## 2021-01-01 RX ORDER — POTASSIUM CHLORIDE 20 MEQ
10 PACKET (EA) ORAL
Refills: 0 | Status: COMPLETED | OUTPATIENT
Start: 2021-01-01 | End: 2021-01-01

## 2021-01-01 RX ORDER — KETOROLAC TROMETHAMINE 30 MG/ML
15 SYRINGE (ML) INJECTION ONCE
Refills: 0 | Status: DISCONTINUED | OUTPATIENT
Start: 2021-01-01 | End: 2021-01-01

## 2021-01-01 RX ORDER — I.V. FAT EMULSION 20 G/100ML
12.5 EMULSION INTRAVENOUS
Qty: 30 | Refills: 0 | Status: DISCONTINUED | OUTPATIENT
Start: 2021-01-01 | End: 2021-01-01

## 2021-01-01 RX ORDER — MORPHINE SULFATE 50 MG/1
2 CAPSULE, EXTENDED RELEASE ORAL EVERY 8 HOURS
Refills: 0 | Status: DISCONTINUED | OUTPATIENT
Start: 2021-01-01 | End: 2021-01-01

## 2021-01-01 RX ORDER — ALBUMIN HUMAN 25 %
250 VIAL (ML) INTRAVENOUS ONCE
Refills: 0 | Status: COMPLETED | OUTPATIENT
Start: 2021-01-01 | End: 2021-01-01

## 2021-01-01 RX ORDER — METOPROLOL TARTRATE 50 MG
25 TABLET ORAL EVERY 6 HOURS
Refills: 0 | Status: DISCONTINUED | OUTPATIENT
Start: 2021-01-01 | End: 2021-01-01

## 2021-01-01 RX ORDER — OXYCODONE HYDROCHLORIDE 5 MG/1
2.5 TABLET ORAL EVERY 4 HOURS
Refills: 0 | Status: DISCONTINUED | OUTPATIENT
Start: 2021-01-01 | End: 2021-01-01

## 2021-01-01 RX ORDER — OMEPRAZOLE 10 MG/1
1 CAPSULE, DELAYED RELEASE ORAL
Qty: 0 | Refills: 0 | DISCHARGE

## 2021-01-01 RX ORDER — HALOPERIDOL DECANOATE 100 MG/ML
1 INJECTION INTRAMUSCULAR ONCE
Refills: 0 | Status: COMPLETED | OUTPATIENT
Start: 2021-01-01 | End: 2021-01-01

## 2021-01-01 RX ORDER — LIDOCAINE 4 G/100G
1 CREAM TOPICAL ONCE
Refills: 0 | Status: COMPLETED | OUTPATIENT
Start: 2021-01-01 | End: 2021-01-01

## 2021-01-01 RX ORDER — POTASSIUM PHOSPHATE, MONOBASIC POTASSIUM PHOSPHATE, DIBASIC 236; 224 MG/ML; MG/ML
30 INJECTION, SOLUTION INTRAVENOUS ONCE
Refills: 0 | Status: COMPLETED | OUTPATIENT
Start: 2021-01-01 | End: 2021-01-01

## 2021-01-01 RX ORDER — CHOLECALCIFEROL (VITAMIN D3) 125 MCG
1000 CAPSULE ORAL DAILY
Refills: 0 | Status: DISCONTINUED | OUTPATIENT
Start: 2021-01-01 | End: 2021-01-01

## 2021-01-01 RX ORDER — PIPERACILLIN AND TAZOBACTAM 4; .5 G/20ML; G/20ML
3.38 INJECTION, POWDER, LYOPHILIZED, FOR SOLUTION INTRAVENOUS ONCE
Refills: 0 | Status: COMPLETED | OUTPATIENT
Start: 2021-01-01 | End: 2021-01-01

## 2021-01-01 RX ORDER — CALCIUM GLUCONATE 100 MG/ML
1 VIAL (ML) INTRAVENOUS ONCE
Refills: 0 | Status: COMPLETED | OUTPATIENT
Start: 2021-01-01 | End: 2021-01-01

## 2021-01-01 RX ORDER — MORPHINE SULFATE 50 MG/1
1 CAPSULE, EXTENDED RELEASE ORAL ONCE
Refills: 0 | Status: DISCONTINUED | OUTPATIENT
Start: 2021-01-01 | End: 2021-01-01

## 2021-01-01 RX ORDER — METOPROLOL TARTRATE 50 MG
25 TABLET ORAL EVERY 12 HOURS
Refills: 0 | Status: DISCONTINUED | OUTPATIENT
Start: 2021-01-01 | End: 2021-01-01

## 2021-01-01 RX ORDER — POTASSIUM CHLORIDE 20 MEQ
40 PACKET (EA) ORAL
Refills: 0 | Status: COMPLETED | OUTPATIENT
Start: 2021-01-01 | End: 2021-01-01

## 2021-01-01 RX ORDER — POTASSIUM PHOSPHATE, MONOBASIC POTASSIUM PHOSPHATE, DIBASIC 236; 224 MG/ML; MG/ML
15 INJECTION, SOLUTION INTRAVENOUS ONCE
Refills: 0 | Status: COMPLETED | OUTPATIENT
Start: 2021-01-01 | End: 2021-01-01

## 2021-01-01 RX ORDER — POTASSIUM CHLORIDE 20 MEQ
10 PACKET (EA) ORAL ONCE
Refills: 0 | Status: COMPLETED | OUTPATIENT
Start: 2021-01-01 | End: 2021-01-01

## 2021-01-01 RX ORDER — BENAZEPRIL HYDROCHLORIDE 40 MG/1
1 TABLET ORAL
Qty: 0 | Refills: 0 | DISCHARGE

## 2021-01-01 RX ORDER — MULTIVIT-MIN/FERROUS GLUCONATE 9 MG/15 ML
15 LIQUID (ML) ORAL DAILY
Refills: 0 | Status: DISCONTINUED | OUTPATIENT
Start: 2021-01-01 | End: 2021-01-01

## 2021-01-01 RX ORDER — FOLIC ACID 0.8 MG
1 TABLET ORAL
Qty: 0 | Refills: 0 | DISCHARGE

## 2021-01-01 RX ORDER — SODIUM CHLORIDE 9 MG/ML
1000 INJECTION INTRAMUSCULAR; INTRAVENOUS; SUBCUTANEOUS
Refills: 0 | Status: DISCONTINUED | OUTPATIENT
Start: 2021-01-01 | End: 2021-01-01

## 2021-01-01 RX ORDER — ACETAMINOPHEN 500 MG
1000 TABLET ORAL ONCE
Refills: 0 | Status: COMPLETED | OUTPATIENT
Start: 2021-01-01 | End: 2021-01-01

## 2021-01-01 RX ORDER — SUCRALFATE 1 G
1 TABLET ORAL
Qty: 0 | Refills: 0 | DISCHARGE
Start: 2021-01-01

## 2021-01-01 RX ORDER — VANCOMYCIN HCL 1 G
500 VIAL (EA) INTRAVENOUS EVERY 6 HOURS
Refills: 0 | Status: DISCONTINUED | OUTPATIENT
Start: 2021-01-01 | End: 2021-01-01

## 2021-01-01 RX ORDER — DIGOXIN 250 MCG
500 TABLET ORAL ONCE
Refills: 0 | Status: COMPLETED | OUTPATIENT
Start: 2021-01-01 | End: 2021-01-01

## 2021-01-01 RX ORDER — METOPROLOL TARTRATE 50 MG
5 TABLET ORAL EVERY 4 HOURS
Refills: 0 | Status: DISCONTINUED | OUTPATIENT
Start: 2021-01-01 | End: 2021-01-01

## 2021-01-01 RX ORDER — GUAIFENESIN/DEXTROMETHORPHAN 600MG-30MG
10 TABLET, EXTENDED RELEASE 12 HR ORAL EVERY 8 HOURS
Refills: 0 | Status: DISCONTINUED | OUTPATIENT
Start: 2021-01-01 | End: 2021-01-01

## 2021-01-01 RX ORDER — SOD SULF/SODIUM/NAHCO3/KCL/PEG
1000 SOLUTION, RECONSTITUTED, ORAL ORAL ONCE
Refills: 0 | Status: COMPLETED | OUTPATIENT
Start: 2021-01-01 | End: 2021-01-01

## 2021-01-01 RX ORDER — POTASSIUM CHLORIDE 20 MEQ
20 PACKET (EA) ORAL ONCE
Refills: 0 | Status: COMPLETED | OUTPATIENT
Start: 2021-01-01 | End: 2021-01-01

## 2021-01-01 RX ORDER — FOLIC ACID 0.8 MG
1 TABLET ORAL DAILY
Refills: 0 | Status: DISCONTINUED | OUTPATIENT
Start: 2021-01-01 | End: 2021-01-01

## 2021-01-01 RX ORDER — SODIUM CHLORIDE 9 MG/ML
250 INJECTION INTRAMUSCULAR; INTRAVENOUS; SUBCUTANEOUS ONCE
Refills: 0 | Status: COMPLETED | OUTPATIENT
Start: 2021-01-01 | End: 2021-01-01

## 2021-01-01 RX ORDER — MESALAMINE 400 MG
1250 TABLET, DELAYED RELEASE (ENTERIC COATED) ORAL DAILY
Refills: 0 | Status: DISCONTINUED | OUTPATIENT
Start: 2021-01-01 | End: 2021-01-01

## 2021-01-01 RX ORDER — SUCRALFATE 1 G
1 TABLET ORAL EVERY 12 HOURS
Refills: 0 | Status: DISCONTINUED | OUTPATIENT
Start: 2021-01-01 | End: 2021-01-01

## 2021-01-01 RX ORDER — HYDROMORPHONE HYDROCHLORIDE 2 MG/ML
0.25 INJECTION INTRAMUSCULAR; INTRAVENOUS; SUBCUTANEOUS ONCE
Refills: 0 | Status: DISCONTINUED | OUTPATIENT
Start: 2021-01-01 | End: 2021-01-01

## 2021-01-01 RX ORDER — DILTIAZEM HCL 120 MG
10 CAPSULE, EXT RELEASE 24 HR ORAL ONCE
Refills: 0 | Status: DISCONTINUED | OUTPATIENT
Start: 2021-01-01 | End: 2021-01-01

## 2021-01-01 RX ORDER — DIPHENHYDRAMINE HYDROCHLORIDE AND LIDOCAINE HYDROCHLORIDE AND ALUMINUM HYDROXIDE AND MAGNESIUM HYDRO
10 KIT EVERY 8 HOURS
Refills: 0 | Status: DISCONTINUED | OUTPATIENT
Start: 2021-01-01 | End: 2021-01-01

## 2021-01-01 RX ORDER — OLANZAPINE 15 MG/1
10 TABLET, FILM COATED ORAL ONCE
Refills: 0 | Status: COMPLETED | OUTPATIENT
Start: 2021-01-01 | End: 2021-01-01

## 2021-01-01 RX ORDER — OXYCODONE HYDROCHLORIDE 5 MG/1
2.5 TABLET ORAL EVERY 6 HOURS
Refills: 0 | Status: DISCONTINUED | OUTPATIENT
Start: 2021-01-01 | End: 2021-01-01

## 2021-01-01 RX ORDER — CLONAZEPAM 1 MG
0.5 TABLET ORAL
Qty: 0 | Refills: 0 | DISCHARGE

## 2021-01-01 RX ORDER — SOTALOL HCL 120 MG
120 TABLET ORAL DAILY
Refills: 0 | Status: DISCONTINUED | OUTPATIENT
Start: 2021-01-01 | End: 2021-01-01

## 2021-01-01 RX ORDER — AMLODIPINE BESYLATE 2.5 MG/1
1 TABLET ORAL
Qty: 0 | Refills: 0 | DISCHARGE

## 2021-01-01 RX ORDER — SOD SULF/SODIUM/NAHCO3/KCL/PEG
SOLUTION, RECONSTITUTED, ORAL ORAL
Refills: 0 | Status: COMPLETED | OUTPATIENT
Start: 2021-01-01 | End: 2021-01-01

## 2021-01-01 RX ORDER — TRAMADOL HYDROCHLORIDE 50 MG/1
25 TABLET ORAL EVERY 4 HOURS
Refills: 0 | Status: DISCONTINUED | OUTPATIENT
Start: 2021-01-01 | End: 2021-01-01

## 2021-01-01 RX ORDER — DEXTROSE 50 % IN WATER 50 %
12.5 SYRINGE (ML) INTRAVENOUS ONCE
Refills: 0 | Status: DISCONTINUED | OUTPATIENT
Start: 2021-01-01 | End: 2021-01-01

## 2021-01-01 RX ORDER — ASCORBIC ACID 60 MG
500 TABLET,CHEWABLE ORAL DAILY
Refills: 0 | Status: DISCONTINUED | OUTPATIENT
Start: 2021-01-01 | End: 2021-01-01

## 2021-01-01 RX ORDER — PANTOPRAZOLE SODIUM 20 MG/1
1 TABLET, DELAYED RELEASE ORAL
Qty: 0 | Refills: 0 | DISCHARGE
Start: 2021-01-01

## 2021-01-01 RX ORDER — CHOLECALCIFEROL (VITAMIN D3) 125 MCG
0 CAPSULE ORAL
Qty: 0 | Refills: 0 | DISCHARGE

## 2021-01-01 RX ORDER — METRONIDAZOLE 500 MG
TABLET ORAL
Refills: 0 | Status: DISCONTINUED | OUTPATIENT
Start: 2021-01-01 | End: 2021-01-01

## 2021-01-01 RX ORDER — FUROSEMIDE 40 MG
40 TABLET ORAL ONCE
Refills: 0 | Status: COMPLETED | OUTPATIENT
Start: 2021-01-01 | End: 2021-01-01

## 2021-01-01 RX ORDER — THIAMINE MONONITRATE (VIT B1) 100 MG
100 TABLET ORAL DAILY
Refills: 0 | Status: DISCONTINUED | OUTPATIENT
Start: 2021-01-01 | End: 2021-01-01

## 2021-01-01 RX ORDER — LIDOCAINE 4 G/100G
1 CREAM TOPICAL DAILY
Refills: 0 | Status: DISCONTINUED | OUTPATIENT
Start: 2021-01-01 | End: 2021-01-01

## 2021-01-01 RX ORDER — METOPROLOL TARTRATE 50 MG
1 TABLET ORAL
Qty: 0 | Refills: 0 | DISCHARGE
Start: 2021-01-01

## 2021-01-01 RX ORDER — METOPROLOL TARTRATE 50 MG
25 TABLET ORAL ONCE
Refills: 0 | Status: COMPLETED | OUTPATIENT
Start: 2021-01-01 | End: 2021-01-01

## 2021-01-01 RX ORDER — SODIUM,POTASSIUM PHOSPHATES 278-250MG
1 POWDER IN PACKET (EA) ORAL ONCE
Refills: 0 | Status: COMPLETED | OUTPATIENT
Start: 2021-01-01 | End: 2021-01-01

## 2021-01-01 RX ORDER — PANTOPRAZOLE SODIUM 20 MG/1
40 TABLET, DELAYED RELEASE ORAL EVERY 12 HOURS
Refills: 0 | Status: COMPLETED | OUTPATIENT
Start: 2021-01-01 | End: 2021-01-01

## 2021-01-01 RX ORDER — SODIUM CHLORIDE 9 MG/ML
500 INJECTION INTRAMUSCULAR; INTRAVENOUS; SUBCUTANEOUS ONCE
Refills: 0 | Status: COMPLETED | OUTPATIENT
Start: 2021-01-01 | End: 2021-01-01

## 2021-01-01 RX ORDER — HYDROMORPHONE HYDROCHLORIDE 2 MG/ML
0.5 INJECTION INTRAMUSCULAR; INTRAVENOUS; SUBCUTANEOUS EVERY 4 HOURS
Refills: 0 | Status: DISCONTINUED | OUTPATIENT
Start: 2021-01-01 | End: 2021-01-01

## 2021-01-01 RX ORDER — CHOLECALCIFEROL (VITAMIN D3) 125 MCG
1000 CAPSULE ORAL
Qty: 0 | Refills: 0 | DISCHARGE
Start: 2021-01-01

## 2021-01-01 RX ORDER — AMIODARONE HYDROCHLORIDE 400 MG/1
0.5 TABLET ORAL
Qty: 900 | Refills: 0 | Status: DISCONTINUED | OUTPATIENT
Start: 2021-01-01 | End: 2021-01-01

## 2021-01-01 RX ORDER — BUDESONIDE, MICRONIZED 100 %
9 POWDER (GRAM) MISCELLANEOUS DAILY
Refills: 0 | Status: DISCONTINUED | OUTPATIENT
Start: 2021-01-01 | End: 2021-01-01

## 2021-01-01 RX ORDER — DEXTROSE 50 % IN WATER 50 %
15 SYRINGE (ML) INTRAVENOUS ONCE
Refills: 0 | Status: DISCONTINUED | OUTPATIENT
Start: 2021-01-01 | End: 2021-01-01

## 2021-01-01 RX ORDER — PANTOPRAZOLE SODIUM 20 MG/1
1 TABLET, DELAYED RELEASE ORAL
Qty: 60 | Refills: 0
Start: 2021-01-01 | End: 2021-10-22

## 2021-01-01 RX ORDER — DIGOXIN 250 MCG
250 TABLET ORAL EVERY 6 HOURS
Refills: 0 | Status: COMPLETED | OUTPATIENT
Start: 2021-01-01 | End: 2021-01-01

## 2021-01-01 RX ORDER — METHOTREXATE 2.5 MG/1
6 TABLET ORAL
Qty: 0 | Refills: 0 | DISCHARGE

## 2021-01-01 RX ADMIN — Medication 5 MILLIGRAM(S): at 02:28

## 2021-01-01 RX ADMIN — Medication 500 MILLIGRAM(S): at 11:11

## 2021-01-01 RX ADMIN — CHLORHEXIDINE GLUCONATE 1 APPLICATION(S): 213 SOLUTION TOPICAL at 05:03

## 2021-01-01 RX ADMIN — CHLORHEXIDINE GLUCONATE 1 APPLICATION(S): 213 SOLUTION TOPICAL at 05:18

## 2021-01-01 RX ADMIN — FIDAXOMICIN 200 MILLIGRAM(S): 200 GRANULE, FOR SUSPENSION ORAL at 17:25

## 2021-01-01 RX ADMIN — SODIUM CHLORIDE 1000 MILLILITER(S): 9 INJECTION, SOLUTION INTRAVENOUS at 07:18

## 2021-01-01 RX ADMIN — Medication 0.25 MILLIGRAM(S): at 22:27

## 2021-01-01 RX ADMIN — Medication 50 GRAM(S): at 06:03

## 2021-01-01 RX ADMIN — Medication 1000 UNIT(S): at 11:04

## 2021-01-01 RX ADMIN — OXYCODONE HYDROCHLORIDE 2.5 MILLIGRAM(S): 5 TABLET ORAL at 21:36

## 2021-01-01 RX ADMIN — HALOPERIDOL DECANOATE 1 MILLIGRAM(S): 100 INJECTION INTRAMUSCULAR at 17:36

## 2021-01-01 RX ADMIN — MORPHINE SULFATE 1 MILLIGRAM(S): 50 CAPSULE, EXTENDED RELEASE ORAL at 01:05

## 2021-01-01 RX ADMIN — DIPHENHYDRAMINE HYDROCHLORIDE AND LIDOCAINE HYDROCHLORIDE AND ALUMINUM HYDROXIDE AND MAGNESIUM HYDRO 10 MILLILITER(S): KIT at 06:44

## 2021-01-01 RX ADMIN — Medication 25 MILLIGRAM(S): at 23:11

## 2021-01-01 RX ADMIN — Medication 1 EACH: at 19:18

## 2021-01-01 RX ADMIN — Medication 12.5 MILLIGRAM(S): at 11:23

## 2021-01-01 RX ADMIN — Medication 400 MILLIGRAM(S): at 20:30

## 2021-01-01 RX ADMIN — SODIUM CHLORIDE 1000 MILLILITER(S): 9 INJECTION INTRAMUSCULAR; INTRAVENOUS; SUBCUTANEOUS at 11:41

## 2021-01-01 RX ADMIN — Medication 1000 UNIT(S): at 12:24

## 2021-01-01 RX ADMIN — Medication 120 MILLIGRAM(S): at 05:39

## 2021-01-01 RX ADMIN — Medication 650 MILLIGRAM(S): at 08:47

## 2021-01-01 RX ADMIN — Medication 0.25 MILLIGRAM(S): at 22:50

## 2021-01-01 RX ADMIN — Medication 5 MILLILITER(S): at 06:12

## 2021-01-01 RX ADMIN — Medication 1 TABLET(S): at 12:29

## 2021-01-01 RX ADMIN — Medication 1 APPLICATION(S): at 22:28

## 2021-01-01 RX ADMIN — Medication 20 MILLIGRAM(S): at 05:11

## 2021-01-01 RX ADMIN — DIPHENHYDRAMINE HYDROCHLORIDE AND LIDOCAINE HYDROCHLORIDE AND ALUMINUM HYDROXIDE AND MAGNESIUM HYDRO 10 MILLILITER(S): KIT at 06:28

## 2021-01-01 RX ADMIN — Medication 650 MILLIGRAM(S): at 09:17

## 2021-01-01 RX ADMIN — PIPERACILLIN AND TAZOBACTAM 25 GRAM(S): 4; .5 INJECTION, POWDER, LYOPHILIZED, FOR SOLUTION INTRAVENOUS at 18:06

## 2021-01-01 RX ADMIN — Medication 0.25 MILLIGRAM(S): at 21:44

## 2021-01-01 RX ADMIN — Medication 100 MILLIGRAM(S): at 12:13

## 2021-01-01 RX ADMIN — Medication 650 MILLIGRAM(S): at 22:50

## 2021-01-01 RX ADMIN — Medication 5 MILLIGRAM(S): at 17:38

## 2021-01-01 RX ADMIN — Medication 1 MILLIGRAM(S): at 11:56

## 2021-01-01 RX ADMIN — Medication 1 GRAM(S): at 18:52

## 2021-01-01 RX ADMIN — CHLORHEXIDINE GLUCONATE 1 APPLICATION(S): 213 SOLUTION TOPICAL at 05:27

## 2021-01-01 RX ADMIN — OXYCODONE HYDROCHLORIDE 2.5 MILLIGRAM(S): 5 TABLET ORAL at 21:21

## 2021-01-01 RX ADMIN — Medication 1000 UNIT(S): at 11:11

## 2021-01-01 RX ADMIN — Medication 100 MILLIGRAM(S): at 06:13

## 2021-01-01 RX ADMIN — Medication 5 MILLILITER(S): at 17:40

## 2021-01-01 RX ADMIN — PANTOPRAZOLE SODIUM 40 MILLIGRAM(S): 20 TABLET, DELAYED RELEASE ORAL at 05:43

## 2021-01-01 RX ADMIN — SODIUM CHLORIDE 100 MILLILITER(S): 9 INJECTION INTRAMUSCULAR; INTRAVENOUS; SUBCUTANEOUS at 21:56

## 2021-01-01 RX ADMIN — Medication 5 MILLILITER(S): at 17:23

## 2021-01-01 RX ADMIN — Medication 100 MILLIGRAM(S): at 12:59

## 2021-01-01 RX ADMIN — Medication 25 MILLIGRAM(S): at 16:48

## 2021-01-01 RX ADMIN — Medication 1 MILLIGRAM(S): at 12:32

## 2021-01-01 RX ADMIN — Medication 25 MILLIGRAM(S): at 05:35

## 2021-01-01 RX ADMIN — Medication 5 MILLIGRAM(S): at 15:16

## 2021-01-01 RX ADMIN — Medication 100 MILLIGRAM(S): at 06:49

## 2021-01-01 RX ADMIN — Medication 25 MILLIGRAM(S): at 21:53

## 2021-01-01 RX ADMIN — Medication 650 MILLIGRAM(S): at 15:50

## 2021-01-01 RX ADMIN — Medication 1 MILLIGRAM(S): at 11:15

## 2021-01-01 RX ADMIN — Medication 200 MILLIGRAM(S): at 06:05

## 2021-01-01 RX ADMIN — SODIUM CHLORIDE 1000 MILLILITER(S): 9 INJECTION INTRAMUSCULAR; INTRAVENOUS; SUBCUTANEOUS at 13:54

## 2021-01-01 RX ADMIN — Medication 1 EACH: at 09:11

## 2021-01-01 RX ADMIN — DEXTROSE MONOHYDRATE, SODIUM CHLORIDE, AND POTASSIUM CHLORIDE 60 MILLILITER(S): 50; .745; 4.5 INJECTION, SOLUTION INTRAVENOUS at 20:58

## 2021-01-01 RX ADMIN — HYDROMORPHONE HYDROCHLORIDE 0.25 MILLIGRAM(S): 2 INJECTION INTRAMUSCULAR; INTRAVENOUS; SUBCUTANEOUS at 06:30

## 2021-01-01 RX ADMIN — MORPHINE SULFATE 1 MILLIGRAM(S): 50 CAPSULE, EXTENDED RELEASE ORAL at 00:35

## 2021-01-01 RX ADMIN — Medication 25 MILLIGRAM(S): at 22:11

## 2021-01-01 RX ADMIN — Medication 1 GRAM(S): at 17:26

## 2021-01-01 RX ADMIN — Medication 100 MILLIGRAM(S): at 13:06

## 2021-01-01 RX ADMIN — Medication 120 MILLIGRAM(S): at 05:29

## 2021-01-01 RX ADMIN — Medication 0.25 MILLIGRAM(S): at 21:06

## 2021-01-01 RX ADMIN — Medication 650 MILLIGRAM(S): at 13:50

## 2021-01-01 RX ADMIN — Medication 5 MILLILITER(S): at 17:10

## 2021-01-01 RX ADMIN — Medication 0.25 MILLIGRAM(S): at 21:10

## 2021-01-01 RX ADMIN — Medication 1 EACH: at 17:53

## 2021-01-01 RX ADMIN — Medication 5 MILLILITER(S): at 06:26

## 2021-01-01 RX ADMIN — Medication 200 GRAM(S): at 03:28

## 2021-01-01 RX ADMIN — Medication 25 MILLIGRAM(S): at 05:11

## 2021-01-01 RX ADMIN — LIDOCAINE 1 PATCH: 4 CREAM TOPICAL at 19:00

## 2021-01-01 RX ADMIN — Medication 1 TABLET(S): at 11:55

## 2021-01-01 RX ADMIN — Medication 1 MILLIGRAM(S): at 14:16

## 2021-01-01 RX ADMIN — CHLORHEXIDINE GLUCONATE 1 APPLICATION(S): 213 SOLUTION TOPICAL at 05:20

## 2021-01-01 RX ADMIN — PANTOPRAZOLE SODIUM 10 MG/HR: 20 TABLET, DELAYED RELEASE ORAL at 12:17

## 2021-01-01 RX ADMIN — Medication 50 GRAM(S): at 13:11

## 2021-01-01 RX ADMIN — DIPHENHYDRAMINE HYDROCHLORIDE AND LIDOCAINE HYDROCHLORIDE AND ALUMINUM HYDROXIDE AND MAGNESIUM HYDRO 10 MILLILITER(S): KIT at 12:14

## 2021-01-01 RX ADMIN — Medication 1 MILLIGRAM(S): at 13:12

## 2021-01-01 RX ADMIN — PIPERACILLIN AND TAZOBACTAM 25 GRAM(S): 4; .5 INJECTION, POWDER, LYOPHILIZED, FOR SOLUTION INTRAVENOUS at 18:07

## 2021-01-01 RX ADMIN — MORPHINE SULFATE 1 MILLIGRAM(S): 50 CAPSULE, EXTENDED RELEASE ORAL at 01:27

## 2021-01-01 RX ADMIN — Medication 5 MILLIGRAM(S): at 21:11

## 2021-01-01 RX ADMIN — Medication 125 MILLIGRAM(S): at 12:12

## 2021-01-01 RX ADMIN — Medication 125 MILLIGRAM(S): at 05:40

## 2021-01-01 RX ADMIN — Medication 5 MILLILITER(S): at 05:45

## 2021-01-01 RX ADMIN — Medication 20 MILLIGRAM(S): at 05:09

## 2021-01-01 RX ADMIN — Medication 120 MILLIGRAM(S): at 05:42

## 2021-01-01 RX ADMIN — Medication 1 EACH: at 07:08

## 2021-01-01 RX ADMIN — Medication 650 MILLIGRAM(S): at 19:44

## 2021-01-01 RX ADMIN — PANTOPRAZOLE SODIUM 10 MG/HR: 20 TABLET, DELAYED RELEASE ORAL at 19:14

## 2021-01-01 RX ADMIN — Medication 250 MILLIMOLE(S): at 11:37

## 2021-01-01 RX ADMIN — MORPHINE SULFATE 1 MILLIGRAM(S): 50 CAPSULE, EXTENDED RELEASE ORAL at 00:40

## 2021-01-01 RX ADMIN — SODIUM CHLORIDE 500 MILLILITER(S): 9 INJECTION, SOLUTION INTRAVENOUS at 18:42

## 2021-01-01 RX ADMIN — Medication 400 MILLIGRAM(S): at 20:11

## 2021-01-01 RX ADMIN — DIPHENHYDRAMINE HYDROCHLORIDE AND LIDOCAINE HYDROCHLORIDE AND ALUMINUM HYDROXIDE AND MAGNESIUM HYDRO 10 MILLILITER(S): KIT at 21:01

## 2021-01-01 RX ADMIN — Medication 25 MILLIGRAM(S): at 05:36

## 2021-01-01 RX ADMIN — Medication 120 MILLIGRAM(S): at 05:05

## 2021-01-01 RX ADMIN — Medication 30 MILLILITER(S): at 20:46

## 2021-01-01 RX ADMIN — Medication 40 MILLIEQUIVALENT(S): at 17:00

## 2021-01-01 RX ADMIN — Medication 500 MILLIGRAM(S): at 12:20

## 2021-01-01 RX ADMIN — Medication 1 TABLET(S): at 12:54

## 2021-01-01 RX ADMIN — Medication 125 MILLIGRAM(S): at 12:50

## 2021-01-01 RX ADMIN — MORPHINE SULFATE 2 MILLIGRAM(S): 50 CAPSULE, EXTENDED RELEASE ORAL at 12:29

## 2021-01-01 RX ADMIN — Medication 125 MILLIGRAM(S): at 23:26

## 2021-01-01 RX ADMIN — I.V. FAT EMULSION 25 ML/HR: 20 EMULSION INTRAVENOUS at 17:47

## 2021-01-01 RX ADMIN — Medication 5 MILLIGRAM(S): at 05:22

## 2021-01-01 RX ADMIN — CHLORHEXIDINE GLUCONATE 1 APPLICATION(S): 213 SOLUTION TOPICAL at 13:22

## 2021-01-01 RX ADMIN — Medication 100 MILLIGRAM(S): at 05:16

## 2021-01-01 RX ADMIN — PANTOPRAZOLE SODIUM 40 MILLIGRAM(S): 20 TABLET, DELAYED RELEASE ORAL at 17:10

## 2021-01-01 RX ADMIN — Medication 2 PACKET(S): at 22:04

## 2021-01-01 RX ADMIN — Medication 1 TABLET(S): at 14:20

## 2021-01-01 RX ADMIN — DIPHENHYDRAMINE HYDROCHLORIDE AND LIDOCAINE HYDROCHLORIDE AND ALUMINUM HYDROXIDE AND MAGNESIUM HYDRO 10 MILLILITER(S): KIT at 21:30

## 2021-01-01 RX ADMIN — HYDROMORPHONE HYDROCHLORIDE 0.25 MILLIGRAM(S): 2 INJECTION INTRAMUSCULAR; INTRAVENOUS; SUBCUTANEOUS at 02:16

## 2021-01-01 RX ADMIN — Medication 1 MILLIGRAM(S): at 12:54

## 2021-01-01 RX ADMIN — Medication 20 MILLIEQUIVALENT(S): at 19:47

## 2021-01-01 RX ADMIN — Medication 100 MILLIGRAM(S): at 16:28

## 2021-01-01 RX ADMIN — Medication 1 TABLET(S): at 13:22

## 2021-01-01 RX ADMIN — Medication 400 MILLIGRAM(S): at 19:49

## 2021-01-01 RX ADMIN — SODIUM CHLORIDE 250 MILLILITER(S): 9 INJECTION INTRAMUSCULAR; INTRAVENOUS; SUBCUTANEOUS at 10:22

## 2021-01-01 RX ADMIN — Medication 10 MILLILITER(S): at 14:56

## 2021-01-01 RX ADMIN — IRON SUCROSE 100 MILLIGRAM(S): 20 INJECTION, SOLUTION INTRAVENOUS at 16:55

## 2021-01-01 RX ADMIN — Medication 20 MILLIGRAM(S): at 05:12

## 2021-01-01 RX ADMIN — Medication 125 MILLIGRAM(S): at 13:31

## 2021-01-01 RX ADMIN — Medication 20 MILLIGRAM(S): at 17:38

## 2021-01-01 RX ADMIN — ONDANSETRON 4 MILLIGRAM(S): 8 TABLET, FILM COATED ORAL at 08:24

## 2021-01-01 RX ADMIN — CHLORHEXIDINE GLUCONATE 1 APPLICATION(S): 213 SOLUTION TOPICAL at 05:04

## 2021-01-01 RX ADMIN — LIDOCAINE 1 PATCH: 4 CREAM TOPICAL at 13:39

## 2021-01-01 RX ADMIN — Medication 25 MILLIGRAM(S): at 13:40

## 2021-01-01 RX ADMIN — ENOXAPARIN SODIUM 40 MILLIGRAM(S): 100 INJECTION SUBCUTANEOUS at 11:38

## 2021-01-01 RX ADMIN — Medication 200 GRAM(S): at 01:14

## 2021-01-01 RX ADMIN — MORPHINE SULFATE 2 MILLIGRAM(S): 50 CAPSULE, EXTENDED RELEASE ORAL at 16:40

## 2021-01-01 RX ADMIN — Medication 650 MILLIGRAM(S): at 16:28

## 2021-01-01 RX ADMIN — Medication 650 MILLIGRAM(S): at 06:45

## 2021-01-01 RX ADMIN — Medication 500 MILLIGRAM(S): at 23:08

## 2021-01-01 RX ADMIN — OXYCODONE HYDROCHLORIDE 2.5 MILLIGRAM(S): 5 TABLET ORAL at 05:23

## 2021-01-01 RX ADMIN — MORPHINE SULFATE 1 MILLIGRAM(S): 50 CAPSULE, EXTENDED RELEASE ORAL at 23:30

## 2021-01-01 RX ADMIN — Medication 5 MILLILITER(S): at 05:20

## 2021-01-01 RX ADMIN — Medication 50 GRAM(S): at 02:49

## 2021-01-01 RX ADMIN — DIPHENHYDRAMINE HYDROCHLORIDE AND LIDOCAINE HYDROCHLORIDE AND ALUMINUM HYDROXIDE AND MAGNESIUM HYDRO 10 MILLILITER(S): KIT at 22:12

## 2021-01-01 RX ADMIN — DIPHENHYDRAMINE HYDROCHLORIDE AND LIDOCAINE HYDROCHLORIDE AND ALUMINUM HYDROXIDE AND MAGNESIUM HYDRO 10 MILLILITER(S): KIT at 12:45

## 2021-01-01 RX ADMIN — ENOXAPARIN SODIUM 40 MILLIGRAM(S): 100 INJECTION SUBCUTANEOUS at 05:20

## 2021-01-01 RX ADMIN — Medication 1 TABLET(S): at 13:14

## 2021-01-01 RX ADMIN — Medication 125 MILLIGRAM(S): at 22:58

## 2021-01-01 RX ADMIN — I.V. FAT EMULSION 12.5 ML/HR: 20 EMULSION INTRAVENOUS at 17:01

## 2021-01-01 RX ADMIN — Medication 30 MILLILITER(S): at 00:26

## 2021-01-01 RX ADMIN — SODIUM CHLORIDE 50 MILLILITER(S): 9 INJECTION, SOLUTION INTRAVENOUS at 23:14

## 2021-01-01 RX ADMIN — POTASSIUM PHOSPHATE, MONOBASIC POTASSIUM PHOSPHATE, DIBASIC 83.33 MILLIMOLE(S): 236; 224 INJECTION, SOLUTION INTRAVENOUS at 05:11

## 2021-01-01 RX ADMIN — Medication 100 MILLIEQUIVALENT(S): at 06:59

## 2021-01-01 RX ADMIN — Medication 62.5 MILLIMOLE(S): at 11:32

## 2021-01-01 RX ADMIN — OXYCODONE HYDROCHLORIDE 2.5 MILLIGRAM(S): 5 TABLET ORAL at 01:50

## 2021-01-01 RX ADMIN — MORPHINE SULFATE 2 MILLIGRAM(S): 50 CAPSULE, EXTENDED RELEASE ORAL at 00:57

## 2021-01-01 RX ADMIN — SODIUM CHLORIDE 3000 MILLILITER(S): 9 INJECTION, SOLUTION INTRAVENOUS at 10:46

## 2021-01-01 RX ADMIN — Medication 30 MILLILITER(S): at 11:07

## 2021-01-01 RX ADMIN — Medication 120 MILLIGRAM(S): at 05:08

## 2021-01-01 RX ADMIN — Medication 1000 UNIT(S): at 12:33

## 2021-01-01 RX ADMIN — Medication 40 MILLIEQUIVALENT(S): at 12:22

## 2021-01-01 RX ADMIN — Medication 25 MILLIGRAM(S): at 06:26

## 2021-01-01 RX ADMIN — Medication 100 MILLIGRAM(S): at 05:39

## 2021-01-01 RX ADMIN — Medication 5 MILLIGRAM(S): at 17:40

## 2021-01-01 RX ADMIN — Medication 100 MILLIEQUIVALENT(S): at 16:34

## 2021-01-01 RX ADMIN — Medication 500 MILLIGRAM(S): at 06:14

## 2021-01-01 RX ADMIN — Medication 0.25 MILLIGRAM(S): at 21:29

## 2021-01-01 RX ADMIN — Medication 20 MILLIGRAM(S): at 05:43

## 2021-01-01 RX ADMIN — SODIUM CHLORIDE 75 MILLILITER(S): 9 INJECTION INTRAMUSCULAR; INTRAVENOUS; SUBCUTANEOUS at 05:06

## 2021-01-01 RX ADMIN — Medication 5 MILLIGRAM(S): at 15:10

## 2021-01-01 RX ADMIN — Medication 120 MILLIGRAM(S): at 06:43

## 2021-01-01 RX ADMIN — Medication 100 MILLIGRAM(S): at 13:11

## 2021-01-01 RX ADMIN — Medication 125 MILLIGRAM(S): at 17:39

## 2021-01-01 RX ADMIN — SODIUM CHLORIDE 100 MILLILITER(S): 9 INJECTION INTRAMUSCULAR; INTRAVENOUS; SUBCUTANEOUS at 06:38

## 2021-01-01 RX ADMIN — Medication 1 EACH: at 18:01

## 2021-01-01 RX ADMIN — Medication 30 MILLILITER(S): at 22:27

## 2021-01-01 RX ADMIN — DIPHENHYDRAMINE HYDROCHLORIDE AND LIDOCAINE HYDROCHLORIDE AND ALUMINUM HYDROXIDE AND MAGNESIUM HYDRO 10 MILLILITER(S): KIT at 05:02

## 2021-01-01 RX ADMIN — Medication 125 MILLIMOLE(S): at 02:49

## 2021-01-01 RX ADMIN — Medication 0.25 MILLIGRAM(S): at 21:47

## 2021-01-01 RX ADMIN — Medication 12.5 MILLIGRAM(S): at 17:22

## 2021-01-01 RX ADMIN — Medication 40 MILLIEQUIVALENT(S): at 09:28

## 2021-01-01 RX ADMIN — Medication 30 MILLILITER(S): at 11:04

## 2021-01-01 RX ADMIN — DIPHENHYDRAMINE HYDROCHLORIDE AND LIDOCAINE HYDROCHLORIDE AND ALUMINUM HYDROXIDE AND MAGNESIUM HYDRO 10 MILLILITER(S): KIT at 22:23

## 2021-01-01 RX ADMIN — DIPHENHYDRAMINE HYDROCHLORIDE AND LIDOCAINE HYDROCHLORIDE AND ALUMINUM HYDROXIDE AND MAGNESIUM HYDRO 10 MILLILITER(S): KIT at 13:32

## 2021-01-01 RX ADMIN — Medication 650 MILLIGRAM(S): at 18:39

## 2021-01-01 RX ADMIN — PIPERACILLIN AND TAZOBACTAM 25 GRAM(S): 4; .5 INJECTION, POWDER, LYOPHILIZED, FOR SOLUTION INTRAVENOUS at 17:36

## 2021-01-01 RX ADMIN — Medication 100 MILLIGRAM(S): at 12:49

## 2021-01-01 RX ADMIN — DIPHENHYDRAMINE HYDROCHLORIDE AND LIDOCAINE HYDROCHLORIDE AND ALUMINUM HYDROXIDE AND MAGNESIUM HYDRO 10 MILLILITER(S): KIT at 07:04

## 2021-01-01 RX ADMIN — PANTOPRAZOLE SODIUM 40 MILLIGRAM(S): 20 TABLET, DELAYED RELEASE ORAL at 17:48

## 2021-01-01 RX ADMIN — Medication 400 MILLIGRAM(S): at 13:20

## 2021-01-01 RX ADMIN — OXYCODONE HYDROCHLORIDE 2.5 MILLIGRAM(S): 5 TABLET ORAL at 00:14

## 2021-01-01 RX ADMIN — Medication 125 MILLIGRAM(S): at 11:49

## 2021-01-01 RX ADMIN — Medication 5 MILLIGRAM(S): at 10:57

## 2021-01-01 RX ADMIN — Medication 0.25 MILLIGRAM(S): at 21:59

## 2021-01-01 RX ADMIN — Medication 100 MILLIGRAM(S): at 06:50

## 2021-01-01 RX ADMIN — Medication 1 EACH: at 19:59

## 2021-01-01 RX ADMIN — Medication 500 MILLIGRAM(S): at 06:10

## 2021-01-01 RX ADMIN — Medication 10 MILLIGRAM(S): at 06:11

## 2021-01-01 RX ADMIN — Medication 200 GRAM(S): at 02:30

## 2021-01-01 RX ADMIN — PANTOPRAZOLE SODIUM 40 MILLIGRAM(S): 20 TABLET, DELAYED RELEASE ORAL at 05:07

## 2021-01-01 RX ADMIN — Medication 650 MILLIGRAM(S): at 18:12

## 2021-01-01 RX ADMIN — Medication 5 MILLILITER(S): at 17:12

## 2021-01-01 RX ADMIN — Medication 500 MILLIGRAM(S): at 23:20

## 2021-01-01 RX ADMIN — Medication 1 APPLICATION(S): at 22:12

## 2021-01-01 RX ADMIN — MORPHINE SULFATE 2 MILLIGRAM(S): 50 CAPSULE, EXTENDED RELEASE ORAL at 21:42

## 2021-01-01 RX ADMIN — SODIUM CHLORIDE 50 MILLILITER(S): 9 INJECTION, SOLUTION INTRAVENOUS at 11:05

## 2021-01-01 RX ADMIN — OXYCODONE HYDROCHLORIDE 2.5 MILLIGRAM(S): 5 TABLET ORAL at 07:02

## 2021-01-01 RX ADMIN — FIDAXOMICIN 200 MILLIGRAM(S): 200 GRANULE, FOR SUSPENSION ORAL at 18:18

## 2021-01-01 RX ADMIN — Medication 25 MILLIGRAM(S): at 22:28

## 2021-01-01 RX ADMIN — Medication 650 MILLIGRAM(S): at 17:42

## 2021-01-01 RX ADMIN — DIPHENHYDRAMINE HYDROCHLORIDE AND LIDOCAINE HYDROCHLORIDE AND ALUMINUM HYDROXIDE AND MAGNESIUM HYDRO 10 MILLILITER(S): KIT at 21:50

## 2021-01-01 RX ADMIN — Medication 50 GRAM(S): at 16:52

## 2021-01-01 RX ADMIN — LIDOCAINE 1 PATCH: 4 CREAM TOPICAL at 22:24

## 2021-01-01 RX ADMIN — Medication 1 MILLIGRAM(S): at 11:52

## 2021-01-01 RX ADMIN — Medication 1 TABLET(S): at 11:52

## 2021-01-01 RX ADMIN — Medication 5 MILLILITER(S): at 23:06

## 2021-01-01 RX ADMIN — Medication 1 TABLET(S): at 11:26

## 2021-01-01 RX ADMIN — Medication 1 MILLIGRAM(S): at 12:19

## 2021-01-01 RX ADMIN — Medication 125 MILLIGRAM(S): at 17:02

## 2021-01-01 RX ADMIN — Medication 5 MILLILITER(S): at 05:03

## 2021-01-01 RX ADMIN — Medication 5 MILLILITER(S): at 12:07

## 2021-01-01 RX ADMIN — Medication 5 MILLILITER(S): at 17:51

## 2021-01-01 RX ADMIN — SODIUM CHLORIDE 30 MILLILITER(S): 9 INJECTION, SOLUTION INTRAVENOUS at 20:17

## 2021-01-01 RX ADMIN — OXYCODONE HYDROCHLORIDE 2.5 MILLIGRAM(S): 5 TABLET ORAL at 00:44

## 2021-01-01 RX ADMIN — Medication 500 MILLIGRAM(S): at 14:16

## 2021-01-01 RX ADMIN — Medication 1 EACH: at 17:22

## 2021-01-01 RX ADMIN — Medication 1 MILLIGRAM(S): at 11:29

## 2021-01-01 RX ADMIN — Medication 100 MILLIEQUIVALENT(S): at 18:30

## 2021-01-01 RX ADMIN — Medication 100 MILLIEQUIVALENT(S): at 05:04

## 2021-01-01 RX ADMIN — Medication 20 MILLIGRAM(S): at 17:01

## 2021-01-01 RX ADMIN — Medication 650 MILLIGRAM(S): at 22:45

## 2021-01-01 RX ADMIN — Medication 1 MILLIGRAM(S): at 13:31

## 2021-01-01 RX ADMIN — Medication 1 TABLET(S): at 12:27

## 2021-01-01 RX ADMIN — LIDOCAINE 1 PATCH: 4 CREAM TOPICAL at 23:20

## 2021-01-01 RX ADMIN — HYDROMORPHONE HYDROCHLORIDE 0.5 MILLIGRAM(S): 2 INJECTION INTRAMUSCULAR; INTRAVENOUS; SUBCUTANEOUS at 00:05

## 2021-01-01 RX ADMIN — SODIUM CHLORIDE 125 MILLILITER(S): 9 INJECTION, SOLUTION INTRAVENOUS at 14:17

## 2021-01-01 RX ADMIN — Medication 2: at 05:37

## 2021-01-01 RX ADMIN — DEXTROSE MONOHYDRATE, SODIUM CHLORIDE, AND POTASSIUM CHLORIDE 60 MILLILITER(S): 50; .745; 4.5 INJECTION, SOLUTION INTRAVENOUS at 08:24

## 2021-01-01 RX ADMIN — Medication 100 MILLIEQUIVALENT(S): at 23:05

## 2021-01-01 RX ADMIN — Medication 5 MILLILITER(S): at 05:11

## 2021-01-01 RX ADMIN — Medication 50 MILLIEQUIVALENT(S): at 04:39

## 2021-01-01 RX ADMIN — SODIUM CHLORIDE 75 MILLILITER(S): 9 INJECTION INTRAMUSCULAR; INTRAVENOUS; SUBCUTANEOUS at 09:06

## 2021-01-01 RX ADMIN — Medication 40 MILLIEQUIVALENT(S): at 11:37

## 2021-01-01 RX ADMIN — Medication 1 MILLIGRAM(S): at 11:12

## 2021-01-01 RX ADMIN — Medication 650 MILLIGRAM(S): at 07:02

## 2021-01-01 RX ADMIN — Medication 25 MILLIGRAM(S): at 05:06

## 2021-01-01 RX ADMIN — Medication 1000 MILLIGRAM(S): at 13:53

## 2021-01-01 RX ADMIN — Medication 0.25 MILLIGRAM(S): at 21:28

## 2021-01-01 RX ADMIN — Medication 650 MILLIGRAM(S): at 12:33

## 2021-01-01 RX ADMIN — Medication 9 MILLIGRAM(S): at 06:43

## 2021-01-01 RX ADMIN — Medication 30 MILLILITER(S): at 11:35

## 2021-01-01 RX ADMIN — Medication 62.5 MILLIMOLE(S): at 08:42

## 2021-01-01 RX ADMIN — LIDOCAINE 1 PATCH: 4 CREAM TOPICAL at 01:00

## 2021-01-01 RX ADMIN — OXYCODONE HYDROCHLORIDE 2.5 MILLIGRAM(S): 5 TABLET ORAL at 23:00

## 2021-01-01 RX ADMIN — Medication 50 GRAM(S): at 15:17

## 2021-01-01 RX ADMIN — Medication 120 MILLIGRAM(S): at 05:22

## 2021-01-01 RX ADMIN — Medication 40 MILLIEQUIVALENT(S): at 08:59

## 2021-01-01 RX ADMIN — MORPHINE SULFATE 2 MILLIGRAM(S): 50 CAPSULE, EXTENDED RELEASE ORAL at 06:27

## 2021-01-01 RX ADMIN — Medication 650 MILLIGRAM(S): at 19:27

## 2021-01-01 RX ADMIN — Medication 2: at 13:31

## 2021-01-01 RX ADMIN — ENOXAPARIN SODIUM 40 MILLIGRAM(S): 100 INJECTION SUBCUTANEOUS at 11:21

## 2021-01-01 RX ADMIN — Medication 20 MILLIEQUIVALENT(S): at 23:15

## 2021-01-01 RX ADMIN — PANTOPRAZOLE SODIUM 40 MILLIGRAM(S): 20 TABLET, DELAYED RELEASE ORAL at 05:39

## 2021-01-01 RX ADMIN — Medication 125 MILLIGRAM(S): at 23:03

## 2021-01-01 RX ADMIN — SODIUM CHLORIDE 100 MILLILITER(S): 9 INJECTION INTRAMUSCULAR; INTRAVENOUS; SUBCUTANEOUS at 14:38

## 2021-01-01 RX ADMIN — DIPHENHYDRAMINE HYDROCHLORIDE AND LIDOCAINE HYDROCHLORIDE AND ALUMINUM HYDROXIDE AND MAGNESIUM HYDRO 10 MILLILITER(S): KIT at 22:28

## 2021-01-01 RX ADMIN — Medication 25 MILLIGRAM(S): at 06:06

## 2021-01-01 RX ADMIN — Medication 650 MILLIGRAM(S): at 12:22

## 2021-01-01 RX ADMIN — Medication 0.25 MILLIGRAM(S): at 21:48

## 2021-01-01 RX ADMIN — Medication 5 MILLILITER(S): at 17:30

## 2021-01-01 RX ADMIN — Medication 1000 MILLIGRAM(S): at 11:50

## 2021-01-01 RX ADMIN — Medication 37.5 MILLIGRAM(S): at 05:44

## 2021-01-01 RX ADMIN — Medication 500 MILLIGRAM(S): at 05:32

## 2021-01-01 RX ADMIN — I.V. FAT EMULSION 25 ML/HR: 20 EMULSION INTRAVENOUS at 17:14

## 2021-01-01 RX ADMIN — Medication 200 GRAM(S): at 00:42

## 2021-01-01 RX ADMIN — Medication 250 MICROGRAM(S): at 23:04

## 2021-01-01 RX ADMIN — Medication 125 MILLIGRAM(S): at 23:21

## 2021-01-01 RX ADMIN — MORPHINE SULFATE 2 MILLIGRAM(S): 50 CAPSULE, EXTENDED RELEASE ORAL at 02:01

## 2021-01-01 RX ADMIN — I.V. FAT EMULSION 25 ML/HR: 20 EMULSION INTRAVENOUS at 09:11

## 2021-01-01 RX ADMIN — Medication 9 MILLIGRAM(S): at 05:27

## 2021-01-01 RX ADMIN — AMIODARONE HYDROCHLORIDE 600 MILLIGRAM(S): 400 TABLET ORAL at 06:33

## 2021-01-01 RX ADMIN — HALOPERIDOL DECANOATE 1 MILLIGRAM(S): 100 INJECTION INTRAMUSCULAR at 05:11

## 2021-01-01 RX ADMIN — Medication 1 EACH: at 19:24

## 2021-01-01 RX ADMIN — CHLORHEXIDINE GLUCONATE 1 APPLICATION(S): 213 SOLUTION TOPICAL at 05:37

## 2021-01-01 RX ADMIN — Medication 1 TABLET(S): at 11:11

## 2021-01-01 RX ADMIN — FIDAXOMICIN 200 MILLIGRAM(S): 200 GRANULE, FOR SUSPENSION ORAL at 05:15

## 2021-01-01 RX ADMIN — Medication 120 MILLIGRAM(S): at 07:02

## 2021-01-01 RX ADMIN — Medication 1 EACH: at 19:10

## 2021-01-01 RX ADMIN — Medication 120 MILLIGRAM(S): at 05:01

## 2021-01-01 RX ADMIN — Medication 20 MILLIGRAM(S): at 22:20

## 2021-01-01 RX ADMIN — Medication 5 MILLILITER(S): at 17:05

## 2021-01-01 RX ADMIN — Medication 500 MILLIGRAM(S): at 15:02

## 2021-01-01 RX ADMIN — Medication 650 MILLIGRAM(S): at 05:15

## 2021-01-01 RX ADMIN — Medication 125 MILLIGRAM(S): at 05:33

## 2021-01-01 RX ADMIN — Medication 25 MILLIGRAM(S): at 05:23

## 2021-01-01 RX ADMIN — Medication 100 MILLIGRAM(S): at 21:32

## 2021-01-01 RX ADMIN — Medication 25 MILLIGRAM(S): at 05:05

## 2021-01-01 RX ADMIN — HALOPERIDOL DECANOATE 1 MILLIGRAM(S): 100 INJECTION INTRAMUSCULAR at 05:27

## 2021-01-01 RX ADMIN — Medication 25 MILLIGRAM(S): at 00:00

## 2021-01-01 RX ADMIN — Medication 5 MILLILITER(S): at 18:25

## 2021-01-01 RX ADMIN — Medication 500 MILLIGRAM(S): at 23:24

## 2021-01-01 RX ADMIN — LIDOCAINE 1 PATCH: 4 CREAM TOPICAL at 22:25

## 2021-01-01 RX ADMIN — Medication 1 MILLIGRAM(S): at 12:12

## 2021-01-01 RX ADMIN — Medication 1 TABLET(S): at 11:50

## 2021-01-01 RX ADMIN — Medication 20 MILLIGRAM(S): at 13:01

## 2021-01-01 RX ADMIN — Medication 5 MILLIGRAM(S): at 13:25

## 2021-01-01 RX ADMIN — Medication 25 MILLIGRAM(S): at 17:10

## 2021-01-01 RX ADMIN — Medication 1 APPLICATION(S): at 15:03

## 2021-01-01 RX ADMIN — HYDROMORPHONE HYDROCHLORIDE 0.25 MILLIGRAM(S): 2 INJECTION INTRAMUSCULAR; INTRAVENOUS; SUBCUTANEOUS at 06:42

## 2021-01-01 RX ADMIN — Medication 400 MILLIGRAM(S): at 13:51

## 2021-01-01 RX ADMIN — Medication 1 TABLET(S): at 12:12

## 2021-01-01 RX ADMIN — Medication 250 MILLIMOLE(S): at 03:11

## 2021-01-01 RX ADMIN — ENOXAPARIN SODIUM 40 MILLIGRAM(S): 100 INJECTION SUBCUTANEOUS at 05:02

## 2021-01-01 RX ADMIN — Medication 650 MILLIGRAM(S): at 16:00

## 2021-01-01 RX ADMIN — Medication 37.5 MILLIGRAM(S): at 05:02

## 2021-01-01 RX ADMIN — LIDOCAINE 1 PATCH: 4 CREAM TOPICAL at 11:59

## 2021-01-01 RX ADMIN — OXYCODONE HYDROCHLORIDE 2.5 MILLIGRAM(S): 5 TABLET ORAL at 05:45

## 2021-01-01 RX ADMIN — Medication 125 MILLIGRAM(S): at 05:54

## 2021-01-01 RX ADMIN — PANTOPRAZOLE SODIUM 40 MILLIGRAM(S): 20 TABLET, DELAYED RELEASE ORAL at 11:14

## 2021-01-01 RX ADMIN — Medication 20 MILLIGRAM(S): at 18:09

## 2021-01-01 RX ADMIN — PANTOPRAZOLE SODIUM 40 MILLIGRAM(S): 20 TABLET, DELAYED RELEASE ORAL at 17:04

## 2021-01-01 RX ADMIN — Medication 120 MILLIGRAM(S): at 05:16

## 2021-01-01 RX ADMIN — Medication 400 MILLIGRAM(S): at 03:29

## 2021-01-01 RX ADMIN — Medication 1 EACH: at 17:52

## 2021-01-01 RX ADMIN — Medication 100 MILLIGRAM(S): at 23:11

## 2021-01-01 RX ADMIN — Medication 250 MILLIMOLE(S): at 06:42

## 2021-01-01 RX ADMIN — Medication 5 MILLILITER(S): at 05:09

## 2021-01-01 RX ADMIN — Medication 5 MILLIGRAM(S): at 15:20

## 2021-01-01 RX ADMIN — Medication 20 MILLIGRAM(S): at 05:02

## 2021-01-01 RX ADMIN — DIPHENHYDRAMINE HYDROCHLORIDE AND LIDOCAINE HYDROCHLORIDE AND ALUMINUM HYDROXIDE AND MAGNESIUM HYDRO 10 MILLILITER(S): KIT at 05:11

## 2021-01-01 RX ADMIN — Medication 1 MILLIGRAM(S): at 12:09

## 2021-01-01 RX ADMIN — Medication 20 MILLIGRAM(S): at 21:10

## 2021-01-01 RX ADMIN — DIPHENHYDRAMINE HYDROCHLORIDE AND LIDOCAINE HYDROCHLORIDE AND ALUMINUM HYDROXIDE AND MAGNESIUM HYDRO 10 MILLILITER(S): KIT at 13:41

## 2021-01-01 RX ADMIN — OXYCODONE HYDROCHLORIDE 2.5 MILLIGRAM(S): 5 TABLET ORAL at 02:30

## 2021-01-01 RX ADMIN — Medication 1 APPLICATION(S): at 17:58

## 2021-01-01 RX ADMIN — PANTOPRAZOLE SODIUM 40 MILLIGRAM(S): 20 TABLET, DELAYED RELEASE ORAL at 17:38

## 2021-01-01 RX ADMIN — PIPERACILLIN AND TAZOBACTAM 25 GRAM(S): 4; .5 INJECTION, POWDER, LYOPHILIZED, FOR SOLUTION INTRAVENOUS at 17:00

## 2021-01-01 RX ADMIN — PANTOPRAZOLE SODIUM 40 MILLIGRAM(S): 20 TABLET, DELAYED RELEASE ORAL at 05:34

## 2021-01-01 RX ADMIN — Medication 100 MILLIGRAM(S): at 21:53

## 2021-01-01 RX ADMIN — Medication 120 MILLIGRAM(S): at 05:15

## 2021-01-01 RX ADMIN — Medication 650 MILLIGRAM(S): at 15:30

## 2021-01-01 RX ADMIN — MORPHINE SULFATE 2 MILLIGRAM(S): 50 CAPSULE, EXTENDED RELEASE ORAL at 00:49

## 2021-01-01 RX ADMIN — Medication 25 MILLIGRAM(S): at 21:37

## 2021-01-01 RX ADMIN — DIPHENHYDRAMINE HYDROCHLORIDE AND LIDOCAINE HYDROCHLORIDE AND ALUMINUM HYDROXIDE AND MAGNESIUM HYDRO 10 MILLILITER(S): KIT at 16:37

## 2021-01-01 RX ADMIN — POTASSIUM PHOSPHATE, MONOBASIC POTASSIUM PHOSPHATE, DIBASIC 62.5 MILLIMOLE(S): 236; 224 INJECTION, SOLUTION INTRAVENOUS at 02:28

## 2021-01-01 RX ADMIN — Medication 1000 UNIT(S): at 11:15

## 2021-01-01 RX ADMIN — Medication 5 MILLIGRAM(S): at 10:42

## 2021-01-01 RX ADMIN — Medication 1 MILLIGRAM(S): at 12:28

## 2021-01-01 RX ADMIN — ENOXAPARIN SODIUM 40 MILLIGRAM(S): 100 INJECTION SUBCUTANEOUS at 11:16

## 2021-01-01 RX ADMIN — SODIUM CHLORIDE 50 MILLILITER(S): 9 INJECTION, SOLUTION INTRAVENOUS at 01:34

## 2021-01-01 RX ADMIN — Medication 125 MILLIGRAM(S): at 12:10

## 2021-01-01 RX ADMIN — CHLORHEXIDINE GLUCONATE 1 APPLICATION(S): 213 SOLUTION TOPICAL at 05:35

## 2021-01-01 RX ADMIN — DIPHENHYDRAMINE HYDROCHLORIDE AND LIDOCAINE HYDROCHLORIDE AND ALUMINUM HYDROXIDE AND MAGNESIUM HYDRO 10 MILLILITER(S): KIT at 22:08

## 2021-01-01 RX ADMIN — Medication 650 MILLIGRAM(S): at 20:00

## 2021-01-01 RX ADMIN — SODIUM CHLORIDE 100 MILLILITER(S): 9 INJECTION INTRAMUSCULAR; INTRAVENOUS; SUBCUTANEOUS at 07:05

## 2021-01-01 RX ADMIN — CHLORHEXIDINE GLUCONATE 1 APPLICATION(S): 213 SOLUTION TOPICAL at 05:02

## 2021-01-01 RX ADMIN — MORPHINE SULFATE 2 MILLIGRAM(S): 50 CAPSULE, EXTENDED RELEASE ORAL at 06:20

## 2021-01-01 RX ADMIN — Medication 100 MILLIEQUIVALENT(S): at 05:22

## 2021-01-01 RX ADMIN — PANTOPRAZOLE SODIUM 40 MILLIGRAM(S): 20 TABLET, DELAYED RELEASE ORAL at 05:04

## 2021-01-01 RX ADMIN — LIDOCAINE 1 PATCH: 4 CREAM TOPICAL at 04:20

## 2021-01-01 RX ADMIN — MORPHINE SULFATE 2 MILLIGRAM(S): 50 CAPSULE, EXTENDED RELEASE ORAL at 22:29

## 2021-01-01 RX ADMIN — Medication 120 MILLIGRAM(S): at 05:10

## 2021-01-01 RX ADMIN — Medication 1 GRAM(S): at 18:40

## 2021-01-01 RX ADMIN — PANTOPRAZOLE SODIUM 40 MILLIGRAM(S): 20 TABLET, DELAYED RELEASE ORAL at 06:37

## 2021-01-01 RX ADMIN — Medication 250 MILLIMOLE(S): at 04:53

## 2021-01-01 RX ADMIN — Medication 100 MILLIGRAM(S): at 21:25

## 2021-01-01 RX ADMIN — SODIUM CHLORIDE 100 MILLILITER(S): 9 INJECTION INTRAMUSCULAR; INTRAVENOUS; SUBCUTANEOUS at 05:31

## 2021-01-01 RX ADMIN — Medication 25 MILLIGRAM(S): at 05:32

## 2021-01-01 RX ADMIN — Medication 5 MILLIGRAM(S): at 17:39

## 2021-01-01 RX ADMIN — PANTOPRAZOLE SODIUM 40 MILLIGRAM(S): 20 TABLET, DELAYED RELEASE ORAL at 08:12

## 2021-01-01 RX ADMIN — LIDOCAINE 1 APPLICATION(S): 4 CREAM TOPICAL at 20:18

## 2021-01-01 RX ADMIN — Medication 500 MILLIGRAM(S): at 00:30

## 2021-01-01 RX ADMIN — Medication 15 MILLIGRAM(S): at 03:35

## 2021-01-01 RX ADMIN — DIPHENHYDRAMINE HYDROCHLORIDE AND LIDOCAINE HYDROCHLORIDE AND ALUMINUM HYDROXIDE AND MAGNESIUM HYDRO 10 MILLILITER(S): KIT at 05:16

## 2021-01-01 RX ADMIN — Medication 5 MILLIGRAM(S): at 15:11

## 2021-01-01 RX ADMIN — Medication 1 MILLIGRAM(S): at 11:55

## 2021-01-01 RX ADMIN — Medication 25 MILLIGRAM(S): at 14:14

## 2021-01-01 RX ADMIN — MORPHINE SULFATE 2 MILLIGRAM(S): 50 CAPSULE, EXTENDED RELEASE ORAL at 17:05

## 2021-01-01 RX ADMIN — DIPHENHYDRAMINE HYDROCHLORIDE AND LIDOCAINE HYDROCHLORIDE AND ALUMINUM HYDROXIDE AND MAGNESIUM HYDRO 10 MILLILITER(S): KIT at 23:44

## 2021-01-01 RX ADMIN — Medication 1 TABLET(S): at 12:58

## 2021-01-01 RX ADMIN — Medication 1 MILLIGRAM(S): at 12:35

## 2021-01-01 RX ADMIN — Medication 250 MILLIMOLE(S): at 09:27

## 2021-01-01 RX ADMIN — Medication 5 MILLILITER(S): at 05:07

## 2021-01-01 RX ADMIN — Medication 40 MILLIEQUIVALENT(S): at 12:09

## 2021-01-01 RX ADMIN — OXYCODONE HYDROCHLORIDE 2.5 MILLIGRAM(S): 5 TABLET ORAL at 13:28

## 2021-01-01 RX ADMIN — Medication 10 MILLIGRAM(S): at 15:42

## 2021-01-01 RX ADMIN — Medication 500 MICROGRAM(S): at 17:56

## 2021-01-01 RX ADMIN — CHLORHEXIDINE GLUCONATE 1 APPLICATION(S): 213 SOLUTION TOPICAL at 13:27

## 2021-01-01 RX ADMIN — Medication 10 MILLIGRAM(S): at 17:14

## 2021-01-01 RX ADMIN — Medication 650 MILLIGRAM(S): at 06:16

## 2021-01-01 RX ADMIN — Medication 120 MILLIGRAM(S): at 05:11

## 2021-01-01 RX ADMIN — Medication 125 MILLIGRAM(S): at 21:32

## 2021-01-01 RX ADMIN — ENOXAPARIN SODIUM 40 MILLIGRAM(S): 100 INJECTION SUBCUTANEOUS at 11:22

## 2021-01-01 RX ADMIN — DIPHENHYDRAMINE HYDROCHLORIDE AND LIDOCAINE HYDROCHLORIDE AND ALUMINUM HYDROXIDE AND MAGNESIUM HYDRO 10 MILLILITER(S): KIT at 05:10

## 2021-01-01 RX ADMIN — Medication 1 MILLIGRAM(S): at 12:13

## 2021-01-01 RX ADMIN — SODIUM CHLORIDE 50 MILLILITER(S): 9 INJECTION, SOLUTION INTRAVENOUS at 22:30

## 2021-01-01 RX ADMIN — CHLORHEXIDINE GLUCONATE 1 APPLICATION(S): 213 SOLUTION TOPICAL at 06:12

## 2021-01-01 RX ADMIN — Medication 650 MILLIGRAM(S): at 06:05

## 2021-01-01 RX ADMIN — Medication 1 GRAM(S): at 06:26

## 2021-01-01 RX ADMIN — PANTOPRAZOLE SODIUM 40 MILLIGRAM(S): 20 TABLET, DELAYED RELEASE ORAL at 06:04

## 2021-01-01 RX ADMIN — TRAMADOL HYDROCHLORIDE 25 MILLIGRAM(S): 50 TABLET ORAL at 12:36

## 2021-01-01 RX ADMIN — OXYCODONE HYDROCHLORIDE 2.5 MILLIGRAM(S): 5 TABLET ORAL at 16:00

## 2021-01-01 RX ADMIN — Medication 120 MILLIGRAM(S): at 05:57

## 2021-01-01 RX ADMIN — MORPHINE SULFATE 2 MILLIGRAM(S): 50 CAPSULE, EXTENDED RELEASE ORAL at 11:48

## 2021-01-01 RX ADMIN — CHLORHEXIDINE GLUCONATE 1 APPLICATION(S): 213 SOLUTION TOPICAL at 05:45

## 2021-01-01 RX ADMIN — IRON SUCROSE 100 MILLIGRAM(S): 20 INJECTION, SOLUTION INTRAVENOUS at 19:22

## 2021-01-01 RX ADMIN — Medication 1: at 13:03

## 2021-01-01 RX ADMIN — Medication 500 MILLIGRAM(S): at 13:38

## 2021-01-01 RX ADMIN — Medication 5 MILLILITER(S): at 01:05

## 2021-01-01 RX ADMIN — Medication 25 MILLIGRAM(S): at 15:24

## 2021-01-01 RX ADMIN — CHLORHEXIDINE GLUCONATE 1 APPLICATION(S): 213 SOLUTION TOPICAL at 05:15

## 2021-01-01 RX ADMIN — Medication 25 MILLIGRAM(S): at 21:17

## 2021-01-01 RX ADMIN — Medication 5 MILLILITER(S): at 05:05

## 2021-01-01 RX ADMIN — MORPHINE SULFATE 2 MILLIGRAM(S): 50 CAPSULE, EXTENDED RELEASE ORAL at 09:44

## 2021-01-01 RX ADMIN — LIDOCAINE 1 PATCH: 4 CREAM TOPICAL at 11:36

## 2021-01-01 RX ADMIN — Medication 25 MILLIGRAM(S): at 05:12

## 2021-01-01 RX ADMIN — Medication 30 MILLILITER(S): at 11:15

## 2021-01-01 RX ADMIN — Medication 20 MILLIGRAM(S): at 06:04

## 2021-01-01 RX ADMIN — DIPHENHYDRAMINE HYDROCHLORIDE AND LIDOCAINE HYDROCHLORIDE AND ALUMINUM HYDROXIDE AND MAGNESIUM HYDRO 10 MILLILITER(S): KIT at 22:32

## 2021-01-01 RX ADMIN — FIDAXOMICIN 200 MILLIGRAM(S): 200 GRANULE, FOR SUSPENSION ORAL at 05:57

## 2021-01-01 RX ADMIN — Medication 5 MILLIGRAM(S): at 09:28

## 2021-01-01 RX ADMIN — DIPHENHYDRAMINE HYDROCHLORIDE AND LIDOCAINE HYDROCHLORIDE AND ALUMINUM HYDROXIDE AND MAGNESIUM HYDRO 10 MILLILITER(S): KIT at 05:32

## 2021-01-01 RX ADMIN — Medication 1 GRAM(S): at 05:13

## 2021-01-01 RX ADMIN — Medication 1000 UNIT(S): at 11:37

## 2021-01-01 RX ADMIN — Medication 20 MILLIGRAM(S): at 13:37

## 2021-01-01 RX ADMIN — Medication 5 MILLILITER(S): at 17:36

## 2021-01-01 RX ADMIN — MORPHINE SULFATE 2 MILLIGRAM(S): 50 CAPSULE, EXTENDED RELEASE ORAL at 20:48

## 2021-01-01 RX ADMIN — POTASSIUM PHOSPHATE, MONOBASIC POTASSIUM PHOSPHATE, DIBASIC 62.5 MILLIMOLE(S): 236; 224 INJECTION, SOLUTION INTRAVENOUS at 07:40

## 2021-01-01 RX ADMIN — Medication 500 MILLIGRAM(S): at 06:51

## 2021-01-01 RX ADMIN — Medication 1 MILLIGRAM(S): at 13:00

## 2021-01-01 RX ADMIN — DIPHENHYDRAMINE HYDROCHLORIDE AND LIDOCAINE HYDROCHLORIDE AND ALUMINUM HYDROXIDE AND MAGNESIUM HYDRO 10 MILLILITER(S): KIT at 05:08

## 2021-01-01 RX ADMIN — MORPHINE SULFATE 2 MILLIGRAM(S): 50 CAPSULE, EXTENDED RELEASE ORAL at 01:32

## 2021-01-01 RX ADMIN — FIDAXOMICIN 200 MILLIGRAM(S): 200 GRANULE, FOR SUSPENSION ORAL at 17:37

## 2021-01-01 RX ADMIN — PANTOPRAZOLE SODIUM 10 MG/HR: 20 TABLET, DELAYED RELEASE ORAL at 23:55

## 2021-01-01 RX ADMIN — Medication 2: at 17:22

## 2021-01-01 RX ADMIN — Medication 500 MILLIGRAM(S): at 22:38

## 2021-01-01 RX ADMIN — MORPHINE SULFATE 2 MILLIGRAM(S): 50 CAPSULE, EXTENDED RELEASE ORAL at 05:58

## 2021-01-01 RX ADMIN — Medication 20 MILLIGRAM(S): at 13:28

## 2021-01-01 RX ADMIN — PANTOPRAZOLE SODIUM 40 MILLIGRAM(S): 20 TABLET, DELAYED RELEASE ORAL at 05:20

## 2021-01-01 RX ADMIN — OXYCODONE HYDROCHLORIDE 2.5 MILLIGRAM(S): 5 TABLET ORAL at 10:44

## 2021-01-01 RX ADMIN — CHLORHEXIDINE GLUCONATE 1 APPLICATION(S): 213 SOLUTION TOPICAL at 08:15

## 2021-01-01 RX ADMIN — DIPHENHYDRAMINE HYDROCHLORIDE AND LIDOCAINE HYDROCHLORIDE AND ALUMINUM HYDROXIDE AND MAGNESIUM HYDRO 10 MILLILITER(S): KIT at 16:05

## 2021-01-01 RX ADMIN — Medication 20 MILLIGRAM(S): at 13:03

## 2021-01-01 RX ADMIN — SODIUM CHLORIDE 100 MILLILITER(S): 9 INJECTION INTRAMUSCULAR; INTRAVENOUS; SUBCUTANEOUS at 18:32

## 2021-01-01 RX ADMIN — PANTOPRAZOLE SODIUM 40 MILLIGRAM(S): 20 TABLET, DELAYED RELEASE ORAL at 05:54

## 2021-01-01 RX ADMIN — PANTOPRAZOLE SODIUM 40 MILLIGRAM(S): 20 TABLET, DELAYED RELEASE ORAL at 11:08

## 2021-01-01 RX ADMIN — Medication 500 MILLIGRAM(S): at 02:53

## 2021-01-01 RX ADMIN — Medication 1 TABLET(S): at 13:31

## 2021-01-01 RX ADMIN — SODIUM CHLORIDE 100 MILLILITER(S): 9 INJECTION INTRAMUSCULAR; INTRAVENOUS; SUBCUTANEOUS at 12:22

## 2021-01-01 RX ADMIN — Medication 400 MILLIGRAM(S): at 13:25

## 2021-01-01 RX ADMIN — Medication 1000 MILLILITER(S): at 21:48

## 2021-01-01 RX ADMIN — Medication 1000 UNIT(S): at 11:39

## 2021-01-01 RX ADMIN — SODIUM CHLORIDE 3000 MILLILITER(S): 9 INJECTION, SOLUTION INTRAVENOUS at 18:32

## 2021-01-01 RX ADMIN — Medication 500 MILLIGRAM(S): at 17:34

## 2021-01-01 RX ADMIN — DIPHENHYDRAMINE HYDROCHLORIDE AND LIDOCAINE HYDROCHLORIDE AND ALUMINUM HYDROXIDE AND MAGNESIUM HYDRO 10 MILLILITER(S): KIT at 13:31

## 2021-01-01 RX ADMIN — PANTOPRAZOLE SODIUM 40 MILLIGRAM(S): 20 TABLET, DELAYED RELEASE ORAL at 17:23

## 2021-01-01 RX ADMIN — Medication 650 MILLIGRAM(S): at 13:15

## 2021-01-01 RX ADMIN — Medication 25 MILLIGRAM(S): at 22:13

## 2021-01-01 RX ADMIN — Medication 400 MILLIGRAM(S): at 10:11

## 2021-01-01 RX ADMIN — OXYCODONE HYDROCHLORIDE 2.5 MILLIGRAM(S): 5 TABLET ORAL at 04:11

## 2021-01-01 RX ADMIN — Medication 1000 MILLIGRAM(S): at 10:41

## 2021-01-01 RX ADMIN — Medication 125 MILLIGRAM(S): at 13:13

## 2021-01-01 RX ADMIN — Medication 40 MILLIEQUIVALENT(S): at 18:27

## 2021-01-01 RX ADMIN — Medication 100 MILLIGRAM(S): at 13:32

## 2021-01-01 RX ADMIN — SODIUM CHLORIDE 50 MILLILITER(S): 9 INJECTION, SOLUTION INTRAVENOUS at 20:00

## 2021-01-01 RX ADMIN — I.V. FAT EMULSION 25 ML/HR: 20 EMULSION INTRAVENOUS at 19:24

## 2021-01-01 RX ADMIN — Medication 125 MILLIGRAM(S): at 05:28

## 2021-01-01 RX ADMIN — OXYCODONE HYDROCHLORIDE 2.5 MILLIGRAM(S): 5 TABLET ORAL at 15:31

## 2021-01-01 RX ADMIN — PANTOPRAZOLE SODIUM 40 MILLIGRAM(S): 20 TABLET, DELAYED RELEASE ORAL at 05:11

## 2021-01-01 RX ADMIN — Medication 650 MILLIGRAM(S): at 14:25

## 2021-01-01 RX ADMIN — OXYCODONE HYDROCHLORIDE 2.5 MILLIGRAM(S): 5 TABLET ORAL at 16:59

## 2021-01-01 RX ADMIN — Medication 500 MILLIGRAM(S): at 13:21

## 2021-01-01 RX ADMIN — Medication 5 MILLIGRAM(S): at 17:14

## 2021-01-01 RX ADMIN — PANTOPRAZOLE SODIUM 40 MILLIGRAM(S): 20 TABLET, DELAYED RELEASE ORAL at 17:22

## 2021-01-01 RX ADMIN — Medication 100 MILLIEQUIVALENT(S): at 02:59

## 2021-01-01 RX ADMIN — MORPHINE SULFATE 4 MILLIGRAM(S): 50 CAPSULE, EXTENDED RELEASE ORAL at 12:11

## 2021-01-01 RX ADMIN — DIPHENHYDRAMINE HYDROCHLORIDE AND LIDOCAINE HYDROCHLORIDE AND ALUMINUM HYDROXIDE AND MAGNESIUM HYDRO 10 MILLILITER(S): KIT at 22:05

## 2021-01-01 RX ADMIN — Medication 1500 MILLILITER(S): at 04:59

## 2021-01-01 RX ADMIN — Medication 20 MILLIGRAM(S): at 18:32

## 2021-01-01 RX ADMIN — Medication 650 MILLIGRAM(S): at 01:34

## 2021-01-01 RX ADMIN — Medication 20 MILLIGRAM(S): at 18:37

## 2021-01-01 RX ADMIN — Medication 100 MILLIEQUIVALENT(S): at 14:06

## 2021-01-01 RX ADMIN — PIPERACILLIN AND TAZOBACTAM 25 GRAM(S): 4; .5 INJECTION, POWDER, LYOPHILIZED, FOR SOLUTION INTRAVENOUS at 03:56

## 2021-01-01 RX ADMIN — Medication 5 MILLILITER(S): at 05:36

## 2021-01-01 RX ADMIN — Medication 20 MILLIGRAM(S): at 07:04

## 2021-01-01 RX ADMIN — OXYCODONE HYDROCHLORIDE 2.5 MILLIGRAM(S): 5 TABLET ORAL at 05:53

## 2021-01-01 RX ADMIN — PIPERACILLIN AND TAZOBACTAM 25 GRAM(S): 4; .5 INJECTION, POWDER, LYOPHILIZED, FOR SOLUTION INTRAVENOUS at 03:07

## 2021-01-01 RX ADMIN — Medication 100 MILLIGRAM(S): at 13:53

## 2021-01-01 RX ADMIN — OXYCODONE HYDROCHLORIDE 2.5 MILLIGRAM(S): 5 TABLET ORAL at 03:02

## 2021-01-01 RX ADMIN — Medication 37.5 MILLIGRAM(S): at 17:35

## 2021-01-01 RX ADMIN — ENOXAPARIN SODIUM 40 MILLIGRAM(S): 100 INJECTION SUBCUTANEOUS at 12:40

## 2021-01-01 RX ADMIN — Medication 12.5 MILLIGRAM(S): at 17:11

## 2021-01-01 RX ADMIN — Medication 1000 UNIT(S): at 11:36

## 2021-01-01 RX ADMIN — Medication 1000 MILLILITER(S): at 18:41

## 2021-01-01 RX ADMIN — FIDAXOMICIN 200 MILLIGRAM(S): 200 GRANULE, FOR SUSPENSION ORAL at 18:06

## 2021-01-01 RX ADMIN — Medication 5 MILLIGRAM(S): at 17:22

## 2021-01-01 RX ADMIN — OXYCODONE HYDROCHLORIDE 2.5 MILLIGRAM(S): 5 TABLET ORAL at 10:14

## 2021-01-01 RX ADMIN — Medication 5 MILLIGRAM(S): at 22:26

## 2021-01-01 RX ADMIN — Medication 20 MILLIGRAM(S): at 17:02

## 2021-01-01 RX ADMIN — Medication 1 TABLET(S): at 12:19

## 2021-01-01 RX ADMIN — Medication 100 MILLIGRAM(S): at 05:31

## 2021-01-01 RX ADMIN — MORPHINE SULFATE 2 MILLIGRAM(S): 50 CAPSULE, EXTENDED RELEASE ORAL at 02:18

## 2021-01-01 RX ADMIN — HALOPERIDOL DECANOATE 1 MILLIGRAM(S): 100 INJECTION INTRAMUSCULAR at 17:10

## 2021-01-01 RX ADMIN — OXYCODONE HYDROCHLORIDE 2.5 MILLIGRAM(S): 5 TABLET ORAL at 11:06

## 2021-01-01 RX ADMIN — Medication 1 EACH: at 17:20

## 2021-01-01 RX ADMIN — Medication 500 MILLIGRAM(S): at 11:37

## 2021-01-01 RX ADMIN — PANTOPRAZOLE SODIUM 40 MILLIGRAM(S): 20 TABLET, DELAYED RELEASE ORAL at 05:16

## 2021-01-01 RX ADMIN — SODIUM CHLORIDE 100 MILLILITER(S): 9 INJECTION INTRAMUSCULAR; INTRAVENOUS; SUBCUTANEOUS at 21:10

## 2021-01-01 RX ADMIN — Medication 500 MILLIGRAM(S): at 11:52

## 2021-01-01 RX ADMIN — Medication 500 MILLIGRAM(S): at 18:00

## 2021-01-01 RX ADMIN — LIDOCAINE 1 PATCH: 4 CREAM TOPICAL at 18:59

## 2021-01-01 RX ADMIN — Medication 40 MILLIEQUIVALENT(S): at 15:35

## 2021-01-01 RX ADMIN — I.V. FAT EMULSION 25 ML/HR: 20 EMULSION INTRAVENOUS at 19:59

## 2021-01-01 RX ADMIN — Medication 1: at 12:12

## 2021-01-01 RX ADMIN — Medication 1 EACH: at 17:01

## 2021-01-01 RX ADMIN — MORPHINE SULFATE 4 MILLIGRAM(S): 50 CAPSULE, EXTENDED RELEASE ORAL at 16:39

## 2021-01-01 RX ADMIN — Medication 125 MILLIGRAM(S): at 18:04

## 2021-01-01 RX ADMIN — PIPERACILLIN AND TAZOBACTAM 25 GRAM(S): 4; .5 INJECTION, POWDER, LYOPHILIZED, FOR SOLUTION INTRAVENOUS at 11:33

## 2021-01-01 RX ADMIN — Medication 30 MILLILITER(S): at 11:39

## 2021-01-01 RX ADMIN — OXYCODONE HYDROCHLORIDE 2.5 MILLIGRAM(S): 5 TABLET ORAL at 15:25

## 2021-01-01 RX ADMIN — DIPHENHYDRAMINE HYDROCHLORIDE AND LIDOCAINE HYDROCHLORIDE AND ALUMINUM HYDROXIDE AND MAGNESIUM HYDRO 10 MILLILITER(S): KIT at 06:56

## 2021-01-01 RX ADMIN — Medication 1 EACH: at 17:02

## 2021-01-01 RX ADMIN — Medication 650 MILLIGRAM(S): at 13:22

## 2021-01-01 RX ADMIN — Medication 125 MILLIGRAM(S): at 06:28

## 2021-01-01 RX ADMIN — DIPHENHYDRAMINE HYDROCHLORIDE AND LIDOCAINE HYDROCHLORIDE AND ALUMINUM HYDROXIDE AND MAGNESIUM HYDRO 10 MILLILITER(S): KIT at 13:03

## 2021-01-01 RX ADMIN — DIPHENHYDRAMINE HYDROCHLORIDE AND LIDOCAINE HYDROCHLORIDE AND ALUMINUM HYDROXIDE AND MAGNESIUM HYDRO 10 MILLILITER(S): KIT at 06:14

## 2021-01-01 RX ADMIN — Medication 0.25 MILLIGRAM(S): at 20:57

## 2021-01-01 RX ADMIN — DIPHENHYDRAMINE HYDROCHLORIDE AND LIDOCAINE HYDROCHLORIDE AND ALUMINUM HYDROXIDE AND MAGNESIUM HYDRO 10 MILLILITER(S): KIT at 06:05

## 2021-01-01 RX ADMIN — POTASSIUM PHOSPHATE, MONOBASIC POTASSIUM PHOSPHATE, DIBASIC 83.33 MILLIMOLE(S): 236; 224 INJECTION, SOLUTION INTRAVENOUS at 02:16

## 2021-01-01 RX ADMIN — Medication 400 MILLIGRAM(S): at 23:18

## 2021-01-01 RX ADMIN — Medication 40 MILLIEQUIVALENT(S): at 17:36

## 2021-01-01 RX ADMIN — Medication 20 MILLIGRAM(S): at 14:55

## 2021-01-01 RX ADMIN — Medication 0.25 MILLIGRAM(S): at 21:50

## 2021-01-01 RX ADMIN — Medication 50 GRAM(S): at 05:12

## 2021-01-01 RX ADMIN — Medication 650 MILLIGRAM(S): at 09:31

## 2021-01-01 RX ADMIN — Medication 9 MILLIGRAM(S): at 06:00

## 2021-01-01 RX ADMIN — Medication 120 MILLIGRAM(S): at 05:59

## 2021-01-01 RX ADMIN — Medication 0.25 MILLIGRAM(S): at 21:04

## 2021-01-01 RX ADMIN — MORPHINE SULFATE 2 MILLIGRAM(S): 50 CAPSULE, EXTENDED RELEASE ORAL at 05:00

## 2021-01-01 RX ADMIN — Medication 5 MILLIGRAM(S): at 13:22

## 2021-01-01 RX ADMIN — Medication 650 MILLIGRAM(S): at 22:38

## 2021-01-01 RX ADMIN — PIPERACILLIN AND TAZOBACTAM 25 GRAM(S): 4; .5 INJECTION, POWDER, LYOPHILIZED, FOR SOLUTION INTRAVENOUS at 03:22

## 2021-01-01 RX ADMIN — Medication 30 MILLILITER(S): at 11:22

## 2021-01-01 RX ADMIN — Medication 650 MILLIGRAM(S): at 15:40

## 2021-01-01 RX ADMIN — FIDAXOMICIN 200 MILLIGRAM(S): 200 GRANULE, FOR SUSPENSION ORAL at 17:03

## 2021-01-01 RX ADMIN — MORPHINE SULFATE 2 MILLIGRAM(S): 50 CAPSULE, EXTENDED RELEASE ORAL at 22:59

## 2021-01-01 RX ADMIN — CHLORHEXIDINE GLUCONATE 1 APPLICATION(S): 213 SOLUTION TOPICAL at 05:11

## 2021-01-01 RX ADMIN — DIPHENHYDRAMINE HYDROCHLORIDE AND LIDOCAINE HYDROCHLORIDE AND ALUMINUM HYDROXIDE AND MAGNESIUM HYDRO 10 MILLILITER(S): KIT at 13:38

## 2021-01-01 RX ADMIN — Medication 2: at 11:40

## 2021-01-01 RX ADMIN — CHLORHEXIDINE GLUCONATE 1 APPLICATION(S): 213 SOLUTION TOPICAL at 12:09

## 2021-01-01 RX ADMIN — DIPHENHYDRAMINE HYDROCHLORIDE AND LIDOCAINE HYDROCHLORIDE AND ALUMINUM HYDROXIDE AND MAGNESIUM HYDRO 10 MILLILITER(S): KIT at 05:30

## 2021-01-01 RX ADMIN — PANTOPRAZOLE SODIUM 40 MILLIGRAM(S): 20 TABLET, DELAYED RELEASE ORAL at 06:43

## 2021-01-01 RX ADMIN — CHLORHEXIDINE GLUCONATE 1 APPLICATION(S): 213 SOLUTION TOPICAL at 05:10

## 2021-01-01 RX ADMIN — Medication 500 MILLIGRAM(S): at 06:40

## 2021-01-01 RX ADMIN — Medication 1 APPLICATION(S): at 05:29

## 2021-01-01 RX ADMIN — LIDOCAINE 1 PATCH: 4 CREAM TOPICAL at 13:23

## 2021-01-01 RX ADMIN — Medication 400 MILLIGRAM(S): at 05:01

## 2021-01-01 RX ADMIN — FIDAXOMICIN 200 MILLIGRAM(S): 200 GRANULE, FOR SUSPENSION ORAL at 17:16

## 2021-01-01 RX ADMIN — Medication 650 MILLIGRAM(S): at 07:30

## 2021-01-01 RX ADMIN — CHLORHEXIDINE GLUCONATE 1 APPLICATION(S): 213 SOLUTION TOPICAL at 05:09

## 2021-01-01 RX ADMIN — Medication 650 MILLIGRAM(S): at 17:01

## 2021-01-01 RX ADMIN — SODIUM CHLORIDE 75 MILLILITER(S): 9 INJECTION INTRAMUSCULAR; INTRAVENOUS; SUBCUTANEOUS at 04:24

## 2021-01-01 RX ADMIN — CHLORHEXIDINE GLUCONATE 1 APPLICATION(S): 213 SOLUTION TOPICAL at 05:56

## 2021-01-01 RX ADMIN — Medication 500 MILLIGRAM(S): at 03:23

## 2021-01-01 RX ADMIN — PIPERACILLIN AND TAZOBACTAM 25 GRAM(S): 4; .5 INJECTION, POWDER, LYOPHILIZED, FOR SOLUTION INTRAVENOUS at 02:28

## 2021-01-01 RX ADMIN — Medication 5 MILLIGRAM(S): at 23:40

## 2021-01-01 RX ADMIN — I.V. FAT EMULSION 25 ML/HR: 20 EMULSION INTRAVENOUS at 16:54

## 2021-01-01 RX ADMIN — Medication 9 MILLIGRAM(S): at 17:44

## 2021-01-01 RX ADMIN — PIPERACILLIN AND TAZOBACTAM 25 GRAM(S): 4; .5 INJECTION, POWDER, LYOPHILIZED, FOR SOLUTION INTRAVENOUS at 10:57

## 2021-01-01 RX ADMIN — Medication 1 MILLIGRAM(S): at 14:19

## 2021-01-01 RX ADMIN — Medication 100 GRAM(S): at 07:36

## 2021-01-01 RX ADMIN — LIDOCAINE 1 PATCH: 4 CREAM TOPICAL at 19:26

## 2021-01-01 RX ADMIN — PANTOPRAZOLE SODIUM 40 MILLIGRAM(S): 20 TABLET, DELAYED RELEASE ORAL at 05:29

## 2021-01-01 RX ADMIN — Medication 1 APPLICATION(S): at 08:46

## 2021-01-01 RX ADMIN — Medication 5 MILLIGRAM(S): at 17:03

## 2021-01-01 RX ADMIN — Medication 0.25 MILLIGRAM(S): at 21:53

## 2021-01-01 RX ADMIN — Medication 120 MILLIGRAM(S): at 05:49

## 2021-01-01 RX ADMIN — Medication 1 MILLIGRAM(S): at 14:55

## 2021-01-01 RX ADMIN — Medication 5 MILLIGRAM(S): at 02:29

## 2021-01-01 RX ADMIN — Medication 1000 MILLIGRAM(S): at 14:15

## 2021-01-01 RX ADMIN — HYDROMORPHONE HYDROCHLORIDE 0.25 MILLIGRAM(S): 2 INJECTION INTRAMUSCULAR; INTRAVENOUS; SUBCUTANEOUS at 10:20

## 2021-01-01 RX ADMIN — ONDANSETRON 4 MILLIGRAM(S): 8 TABLET, FILM COATED ORAL at 16:00

## 2021-01-01 RX ADMIN — Medication 100 MILLIEQUIVALENT(S): at 17:55

## 2021-01-01 RX ADMIN — DIPHENHYDRAMINE HYDROCHLORIDE AND LIDOCAINE HYDROCHLORIDE AND ALUMINUM HYDROXIDE AND MAGNESIUM HYDRO 10 MILLILITER(S): KIT at 21:45

## 2021-01-01 RX ADMIN — DIPHENHYDRAMINE HYDROCHLORIDE AND LIDOCAINE HYDROCHLORIDE AND ALUMINUM HYDROXIDE AND MAGNESIUM HYDRO 10 MILLILITER(S): KIT at 14:50

## 2021-01-01 RX ADMIN — Medication 5 MILLILITER(S): at 17:25

## 2021-01-01 RX ADMIN — Medication 1 TABLET(S): at 11:29

## 2021-01-01 RX ADMIN — I.V. FAT EMULSION 25 ML/HR: 20 EMULSION INTRAVENOUS at 17:38

## 2021-01-01 RX ADMIN — Medication 1 TABLET(S): at 13:13

## 2021-01-01 RX ADMIN — Medication 40 MILLIEQUIVALENT(S): at 06:42

## 2021-01-01 RX ADMIN — Medication 250 MILLIMOLE(S): at 03:58

## 2021-01-01 RX ADMIN — Medication 0.25 MILLIGRAM(S): at 22:21

## 2021-01-01 RX ADMIN — Medication 500 MILLIGRAM(S): at 13:00

## 2021-01-01 RX ADMIN — Medication 5 MILLIGRAM(S): at 13:32

## 2021-01-01 RX ADMIN — Medication 1 EACH: at 16:49

## 2021-01-01 RX ADMIN — Medication 2: at 18:41

## 2021-01-01 RX ADMIN — Medication 25 MILLIGRAM(S): at 13:01

## 2021-01-01 RX ADMIN — Medication 5 MILLIGRAM(S): at 01:59

## 2021-01-01 RX ADMIN — OXYCODONE HYDROCHLORIDE 2.5 MILLIGRAM(S): 5 TABLET ORAL at 22:26

## 2021-01-01 RX ADMIN — Medication 125 MILLIGRAM(S): at 21:04

## 2021-01-01 RX ADMIN — Medication 250 MILLIMOLE(S): at 02:51

## 2021-01-01 RX ADMIN — Medication 15 MILLIGRAM(S): at 02:35

## 2021-01-01 RX ADMIN — SODIUM CHLORIDE 100 MILLILITER(S): 9 INJECTION INTRAMUSCULAR; INTRAVENOUS; SUBCUTANEOUS at 06:29

## 2021-01-01 RX ADMIN — Medication 2 PACKET(S): at 17:28

## 2021-01-01 RX ADMIN — Medication 2 PACKET(S): at 22:05

## 2021-01-01 RX ADMIN — Medication 1000 UNIT(S): at 12:40

## 2021-01-01 RX ADMIN — Medication 250 MILLIMOLE(S): at 03:31

## 2021-01-01 RX ADMIN — Medication 650 MILLIGRAM(S): at 09:59

## 2021-01-01 RX ADMIN — Medication 5 MILLIGRAM(S): at 02:48

## 2021-01-01 RX ADMIN — Medication 100 MILLIEQUIVALENT(S): at 16:59

## 2021-01-01 RX ADMIN — Medication 5 MILLILITER(S): at 06:11

## 2021-01-01 RX ADMIN — Medication 0.25 MILLIGRAM(S): at 22:26

## 2021-01-01 RX ADMIN — ENOXAPARIN SODIUM 40 MILLIGRAM(S): 100 INJECTION SUBCUTANEOUS at 05:18

## 2021-01-01 RX ADMIN — PANTOPRAZOLE SODIUM 40 MILLIGRAM(S): 20 TABLET, DELAYED RELEASE ORAL at 05:05

## 2021-01-01 RX ADMIN — Medication 1 MILLIGRAM(S): at 13:21

## 2021-01-01 RX ADMIN — LIDOCAINE 1 PATCH: 4 CREAM TOPICAL at 23:28

## 2021-01-01 RX ADMIN — Medication 0.25 MILLIGRAM(S): at 22:23

## 2021-01-01 RX ADMIN — AMIODARONE HYDROCHLORIDE 16.7 MG/MIN: 400 TABLET ORAL at 01:00

## 2021-01-01 RX ADMIN — Medication 25 MILLIGRAM(S): at 05:28

## 2021-01-01 RX ADMIN — ONDANSETRON 4 MILLIGRAM(S): 8 TABLET, FILM COATED ORAL at 13:33

## 2021-01-01 RX ADMIN — Medication 500 MILLIGRAM(S): at 11:38

## 2021-01-01 RX ADMIN — Medication 120 MILLIGRAM(S): at 06:29

## 2021-01-01 RX ADMIN — DIPHENHYDRAMINE HYDROCHLORIDE AND LIDOCAINE HYDROCHLORIDE AND ALUMINUM HYDROXIDE AND MAGNESIUM HYDRO 10 MILLILITER(S): KIT at 12:20

## 2021-01-01 RX ADMIN — Medication 1 EACH: at 16:55

## 2021-01-01 RX ADMIN — Medication 500 MILLIGRAM(S): at 05:35

## 2021-01-01 RX ADMIN — Medication 650 MILLIGRAM(S): at 05:16

## 2021-01-01 RX ADMIN — Medication 200 GRAM(S): at 01:05

## 2021-01-01 RX ADMIN — PANTOPRAZOLE SODIUM 40 MILLIGRAM(S): 20 TABLET, DELAYED RELEASE ORAL at 17:55

## 2021-01-01 RX ADMIN — Medication 5 MILLIGRAM(S): at 05:17

## 2021-01-01 RX ADMIN — PANTOPRAZOLE SODIUM 40 MILLIGRAM(S): 20 TABLET, DELAYED RELEASE ORAL at 05:14

## 2021-01-01 RX ADMIN — Medication 120 MILLIGRAM(S): at 05:35

## 2021-01-01 RX ADMIN — Medication 1 EACH: at 18:03

## 2021-01-01 RX ADMIN — Medication 650 MILLIGRAM(S): at 15:57

## 2021-01-01 RX ADMIN — Medication 5 MILLIGRAM(S): at 15:53

## 2021-01-01 RX ADMIN — Medication 1 TABLET(S): at 11:38

## 2021-01-01 RX ADMIN — MORPHINE SULFATE 2 MILLIGRAM(S): 50 CAPSULE, EXTENDED RELEASE ORAL at 02:48

## 2021-01-01 RX ADMIN — Medication 1 TABLET(S): at 13:32

## 2021-01-01 RX ADMIN — PIPERACILLIN AND TAZOBACTAM 25 GRAM(S): 4; .5 INJECTION, POWDER, LYOPHILIZED, FOR SOLUTION INTRAVENOUS at 18:52

## 2021-01-01 RX ADMIN — Medication 0.25 MILLIGRAM(S): at 22:58

## 2021-01-01 RX ADMIN — OXYCODONE HYDROCHLORIDE 2.5 MILLIGRAM(S): 5 TABLET ORAL at 20:25

## 2021-01-01 RX ADMIN — MORPHINE SULFATE 2 MILLIGRAM(S): 50 CAPSULE, EXTENDED RELEASE ORAL at 11:05

## 2021-01-01 RX ADMIN — PANTOPRAZOLE SODIUM 40 MILLIGRAM(S): 20 TABLET, DELAYED RELEASE ORAL at 18:20

## 2021-01-01 RX ADMIN — Medication 5 MILLIGRAM(S): at 23:18

## 2021-01-01 RX ADMIN — Medication 37.5 MILLIGRAM(S): at 05:04

## 2021-01-01 RX ADMIN — PANTOPRAZOLE SODIUM 10 MG/HR: 20 TABLET, DELAYED RELEASE ORAL at 03:27

## 2021-01-01 RX ADMIN — Medication 25 MILLIGRAM(S): at 13:07

## 2021-01-01 RX ADMIN — OXYCODONE HYDROCHLORIDE 2.5 MILLIGRAM(S): 5 TABLET ORAL at 09:16

## 2021-01-01 RX ADMIN — Medication 100 MILLIGRAM(S): at 12:15

## 2021-01-01 RX ADMIN — Medication 50 GRAM(S): at 03:45

## 2021-01-01 RX ADMIN — Medication 25 MILLIGRAM(S): at 21:47

## 2021-01-01 RX ADMIN — Medication 1000 MILLIGRAM(S): at 21:00

## 2021-01-01 RX ADMIN — Medication 1000 UNIT(S): at 13:23

## 2021-01-01 RX ADMIN — MORPHINE SULFATE 1 MILLIGRAM(S): 50 CAPSULE, EXTENDED RELEASE ORAL at 22:34

## 2021-01-01 RX ADMIN — Medication 1000 UNIT(S): at 12:58

## 2021-01-01 RX ADMIN — Medication 40 MILLIEQUIVALENT(S): at 08:51

## 2021-01-01 RX ADMIN — Medication 5 MILLILITER(S): at 05:18

## 2021-01-01 RX ADMIN — I.V. FAT EMULSION 25 ML/HR: 20 EMULSION INTRAVENOUS at 20:39

## 2021-01-01 RX ADMIN — Medication 5 MILLIGRAM(S): at 10:10

## 2021-01-01 RX ADMIN — PIPERACILLIN AND TAZOBACTAM 25 GRAM(S): 4; .5 INJECTION, POWDER, LYOPHILIZED, FOR SOLUTION INTRAVENOUS at 11:05

## 2021-01-01 RX ADMIN — Medication 5 MILLILITER(S): at 05:35

## 2021-01-01 RX ADMIN — Medication 20 MILLIGRAM(S): at 23:13

## 2021-01-01 RX ADMIN — Medication 650 MILLIGRAM(S): at 02:20

## 2021-01-01 RX ADMIN — CHLORHEXIDINE GLUCONATE 1 APPLICATION(S): 213 SOLUTION TOPICAL at 08:47

## 2021-01-01 RX ADMIN — Medication 37.5 MILLIGRAM(S): at 05:14

## 2021-01-01 RX ADMIN — Medication 5 MILLILITER(S): at 17:22

## 2021-01-01 RX ADMIN — Medication 650 MILLIGRAM(S): at 09:16

## 2021-01-01 RX ADMIN — Medication 100 MILLIGRAM(S): at 13:12

## 2021-01-01 RX ADMIN — SODIUM CHLORIDE 75 MILLILITER(S): 9 INJECTION INTRAMUSCULAR; INTRAVENOUS; SUBCUTANEOUS at 18:04

## 2021-01-01 RX ADMIN — Medication 100 MILLIGRAM(S): at 05:07

## 2021-01-01 RX ADMIN — MORPHINE SULFATE 2 MILLIGRAM(S): 50 CAPSULE, EXTENDED RELEASE ORAL at 02:25

## 2021-01-01 RX ADMIN — MORPHINE SULFATE 2 MILLIGRAM(S): 50 CAPSULE, EXTENDED RELEASE ORAL at 11:13

## 2021-01-01 RX ADMIN — Medication 20 MILLIGRAM(S): at 16:05

## 2021-01-01 RX ADMIN — PANTOPRAZOLE SODIUM 40 MILLIGRAM(S): 20 TABLET, DELAYED RELEASE ORAL at 05:18

## 2021-01-01 RX ADMIN — Medication 100 MILLIGRAM(S): at 20:47

## 2021-01-01 RX ADMIN — Medication 2: at 13:26

## 2021-01-01 RX ADMIN — MORPHINE SULFATE 2 MILLIGRAM(S): 50 CAPSULE, EXTENDED RELEASE ORAL at 07:40

## 2021-01-01 RX ADMIN — Medication 500 MILLIGRAM(S): at 06:00

## 2021-01-01 RX ADMIN — PANTOPRAZOLE SODIUM 40 MILLIGRAM(S): 20 TABLET, DELAYED RELEASE ORAL at 17:43

## 2021-01-01 RX ADMIN — Medication 20 MILLIGRAM(S): at 10:41

## 2021-01-01 RX ADMIN — Medication 650 MILLIGRAM(S): at 05:52

## 2021-01-01 RX ADMIN — Medication 1000 MILLIGRAM(S): at 08:35

## 2021-01-01 RX ADMIN — HYDROMORPHONE HYDROCHLORIDE 0.5 MILLIGRAM(S): 2 INJECTION INTRAMUSCULAR; INTRAVENOUS; SUBCUTANEOUS at 23:50

## 2021-01-01 RX ADMIN — I.V. FAT EMULSION 12.5 ML/HR: 20 EMULSION INTRAVENOUS at 05:41

## 2021-01-01 RX ADMIN — MORPHINE SULFATE 2 MILLIGRAM(S): 50 CAPSULE, EXTENDED RELEASE ORAL at 23:00

## 2021-01-01 RX ADMIN — Medication 125 MILLIGRAM(S): at 23:44

## 2021-01-01 RX ADMIN — Medication 40 MILLIEQUIVALENT(S): at 17:29

## 2021-01-01 RX ADMIN — SODIUM CHLORIDE 75 MILLILITER(S): 9 INJECTION INTRAMUSCULAR; INTRAVENOUS; SUBCUTANEOUS at 18:30

## 2021-01-01 RX ADMIN — Medication 20 MILLIGRAM(S): at 14:51

## 2021-01-01 RX ADMIN — Medication 25 MILLIGRAM(S): at 17:04

## 2021-01-01 RX ADMIN — MORPHINE SULFATE 2 MILLIGRAM(S): 50 CAPSULE, EXTENDED RELEASE ORAL at 23:12

## 2021-01-01 RX ADMIN — Medication 1: at 13:22

## 2021-01-01 RX ADMIN — Medication 25 MILLIGRAM(S): at 21:00

## 2021-01-01 RX ADMIN — Medication 1000 MILLIGRAM(S): at 12:32

## 2021-01-01 RX ADMIN — Medication 1 MILLIGRAM(S): at 12:23

## 2021-01-01 RX ADMIN — DIPHENHYDRAMINE HYDROCHLORIDE AND LIDOCAINE HYDROCHLORIDE AND ALUMINUM HYDROXIDE AND MAGNESIUM HYDRO 10 MILLILITER(S): KIT at 14:18

## 2021-01-01 RX ADMIN — Medication 100 MILLIEQUIVALENT(S): at 14:14

## 2021-01-01 RX ADMIN — Medication 5 MILLIGRAM(S): at 05:07

## 2021-01-01 RX ADMIN — Medication 125 MILLIGRAM(S): at 12:54

## 2021-01-01 RX ADMIN — Medication 20 MILLIGRAM(S): at 21:47

## 2021-01-01 RX ADMIN — PANTOPRAZOLE SODIUM 40 MILLIGRAM(S): 20 TABLET, DELAYED RELEASE ORAL at 05:44

## 2021-01-01 RX ADMIN — Medication 500 MILLIGRAM(S): at 00:20

## 2021-01-01 RX ADMIN — Medication 40 MILLIEQUIVALENT(S): at 05:35

## 2021-01-01 RX ADMIN — IRON SUCROSE 100 MILLIGRAM(S): 20 INJECTION, SOLUTION INTRAVENOUS at 18:25

## 2021-01-01 RX ADMIN — OXYCODONE HYDROCHLORIDE 2.5 MILLIGRAM(S): 5 TABLET ORAL at 23:30

## 2021-01-01 RX ADMIN — Medication 5 MILLILITER(S): at 05:42

## 2021-01-01 RX ADMIN — Medication 5 MILLILITER(S): at 17:28

## 2021-01-01 RX ADMIN — Medication 125 MILLIGRAM(S): at 23:04

## 2021-01-01 RX ADMIN — Medication 100 MILLIEQUIVALENT(S): at 11:33

## 2021-01-01 RX ADMIN — CHLORHEXIDINE GLUCONATE 1 APPLICATION(S): 213 SOLUTION TOPICAL at 15:03

## 2021-01-01 RX ADMIN — Medication 120 MILLIGRAM(S): at 05:34

## 2021-01-01 RX ADMIN — CHLORHEXIDINE GLUCONATE 1 APPLICATION(S): 213 SOLUTION TOPICAL at 05:22

## 2021-01-01 RX ADMIN — Medication 1 EACH: at 07:14

## 2021-01-01 RX ADMIN — Medication 650 MILLIGRAM(S): at 05:32

## 2021-01-01 RX ADMIN — MORPHINE SULFATE 2 MILLIGRAM(S): 50 CAPSULE, EXTENDED RELEASE ORAL at 11:15

## 2021-01-01 RX ADMIN — PANTOPRAZOLE SODIUM 40 MILLIGRAM(S): 20 TABLET, DELAYED RELEASE ORAL at 06:56

## 2021-01-01 RX ADMIN — PIPERACILLIN AND TAZOBACTAM 25 GRAM(S): 4; .5 INJECTION, POWDER, LYOPHILIZED, FOR SOLUTION INTRAVENOUS at 02:29

## 2021-01-01 RX ADMIN — Medication 125 MILLIGRAM(S): at 12:19

## 2021-01-01 RX ADMIN — DIPHENHYDRAMINE HYDROCHLORIDE AND LIDOCAINE HYDROCHLORIDE AND ALUMINUM HYDROXIDE AND MAGNESIUM HYDRO 10 MILLILITER(S): KIT at 05:36

## 2021-01-01 RX ADMIN — Medication 1 MILLIGRAM(S): at 21:27

## 2021-01-01 RX ADMIN — Medication 5 MILLILITER(S): at 17:42

## 2021-01-01 RX ADMIN — LIDOCAINE 1 PATCH: 4 CREAM TOPICAL at 01:22

## 2021-01-01 RX ADMIN — DEXTROSE MONOHYDRATE, SODIUM CHLORIDE, AND POTASSIUM CHLORIDE 60 MILLILITER(S): 50; .745; 4.5 INJECTION, SOLUTION INTRAVENOUS at 04:50

## 2021-01-01 RX ADMIN — CHLORHEXIDINE GLUCONATE 1 APPLICATION(S): 213 SOLUTION TOPICAL at 10:57

## 2021-01-01 RX ADMIN — AMIODARONE HYDROCHLORIDE 33.3 MG/MIN: 400 TABLET ORAL at 18:38

## 2021-01-01 RX ADMIN — I.V. FAT EMULSION 25 ML/HR: 20 EMULSION INTRAVENOUS at 17:03

## 2021-01-01 RX ADMIN — Medication 1000 UNIT(S): at 11:38

## 2021-01-01 RX ADMIN — DEXTROSE MONOHYDRATE, SODIUM CHLORIDE, AND POTASSIUM CHLORIDE 60 MILLILITER(S): 50; .745; 4.5 INJECTION, SOLUTION INTRAVENOUS at 13:34

## 2021-01-01 RX ADMIN — POTASSIUM PHOSPHATE, MONOBASIC POTASSIUM PHOSPHATE, DIBASIC 62.5 MILLIMOLE(S): 236; 224 INJECTION, SOLUTION INTRAVENOUS at 04:18

## 2021-01-01 RX ADMIN — Medication 200 GRAM(S): at 23:40

## 2021-01-01 RX ADMIN — Medication 20 MILLIGRAM(S): at 14:16

## 2021-01-01 RX ADMIN — Medication 25 MILLIGRAM(S): at 05:18

## 2021-01-01 RX ADMIN — CHLORHEXIDINE GLUCONATE 15 MILLILITER(S): 213 SOLUTION TOPICAL at 17:01

## 2021-01-01 RX ADMIN — PIPERACILLIN AND TAZOBACTAM 25 GRAM(S): 4; .5 INJECTION, POWDER, LYOPHILIZED, FOR SOLUTION INTRAVENOUS at 02:11

## 2021-01-01 RX ADMIN — ONDANSETRON 4 MILLIGRAM(S): 8 TABLET, FILM COATED ORAL at 20:45

## 2021-01-01 RX ADMIN — Medication 5 MILLIGRAM(S): at 11:18

## 2021-01-01 RX ADMIN — Medication 125 MILLIGRAM(S): at 17:31

## 2021-01-01 RX ADMIN — PANTOPRAZOLE SODIUM 40 MILLIGRAM(S): 20 TABLET, DELAYED RELEASE ORAL at 17:14

## 2021-01-01 RX ADMIN — DIPHENHYDRAMINE HYDROCHLORIDE AND LIDOCAINE HYDROCHLORIDE AND ALUMINUM HYDROXIDE AND MAGNESIUM HYDRO 10 MILLILITER(S): KIT at 16:07

## 2021-01-01 RX ADMIN — Medication 200 GRAM(S): at 03:38

## 2021-01-01 RX ADMIN — FIDAXOMICIN 200 MILLIGRAM(S): 200 GRANULE, FOR SUSPENSION ORAL at 17:54

## 2021-01-01 RX ADMIN — Medication 650 MILLIGRAM(S): at 05:46

## 2021-01-01 RX ADMIN — Medication 1 TABLET(S): at 14:56

## 2021-01-01 RX ADMIN — MORPHINE SULFATE 2 MILLIGRAM(S): 50 CAPSULE, EXTENDED RELEASE ORAL at 07:25

## 2021-01-01 RX ADMIN — Medication 1 EACH: at 20:39

## 2021-01-01 RX ADMIN — Medication 400 MILLIGRAM(S): at 11:20

## 2021-01-01 RX ADMIN — Medication 5 MILLIGRAM(S): at 22:13

## 2021-01-01 RX ADMIN — PANTOPRAZOLE SODIUM 40 MILLIGRAM(S): 20 TABLET, DELAYED RELEASE ORAL at 17:33

## 2021-01-01 RX ADMIN — PANTOPRAZOLE SODIUM 40 MILLIGRAM(S): 20 TABLET, DELAYED RELEASE ORAL at 10:20

## 2021-01-01 RX ADMIN — PANTOPRAZOLE SODIUM 40 MILLIGRAM(S): 20 TABLET, DELAYED RELEASE ORAL at 11:18

## 2021-01-01 RX ADMIN — SODIUM CHLORIDE 100 MILLILITER(S): 9 INJECTION INTRAMUSCULAR; INTRAVENOUS; SUBCUTANEOUS at 06:13

## 2021-01-01 RX ADMIN — Medication 5 MILLIGRAM(S): at 17:10

## 2021-01-01 RX ADMIN — Medication 1 TABLET(S): at 11:56

## 2021-01-01 RX ADMIN — OXYCODONE HYDROCHLORIDE 2.5 MILLIGRAM(S): 5 TABLET ORAL at 19:42

## 2021-01-01 RX ADMIN — Medication 25 MILLIGRAM(S): at 05:13

## 2021-01-01 RX ADMIN — Medication 5 MILLIGRAM(S): at 17:13

## 2021-01-01 RX ADMIN — SODIUM CHLORIDE 100 MILLILITER(S): 9 INJECTION INTRAMUSCULAR; INTRAVENOUS; SUBCUTANEOUS at 17:39

## 2021-01-01 RX ADMIN — Medication 125 MILLIGRAM(S): at 12:29

## 2021-01-01 RX ADMIN — ENOXAPARIN SODIUM 40 MILLIGRAM(S): 100 INJECTION SUBCUTANEOUS at 11:59

## 2021-01-01 RX ADMIN — Medication 500 MILLIGRAM(S): at 13:41

## 2021-01-01 RX ADMIN — Medication 650 MILLIGRAM(S): at 19:14

## 2021-01-01 RX ADMIN — Medication 100 MILLIEQUIVALENT(S): at 00:08

## 2021-01-01 RX ADMIN — Medication 10 MILLIGRAM(S): at 05:02

## 2021-01-01 RX ADMIN — Medication 1 MILLIGRAM(S): at 11:04

## 2021-01-01 RX ADMIN — PANTOPRAZOLE SODIUM 40 MILLIGRAM(S): 20 TABLET, DELAYED RELEASE ORAL at 18:39

## 2021-01-01 RX ADMIN — PIPERACILLIN AND TAZOBACTAM 25 GRAM(S): 4; .5 INJECTION, POWDER, LYOPHILIZED, FOR SOLUTION INTRAVENOUS at 18:00

## 2021-01-01 RX ADMIN — Medication 650 MILLIGRAM(S): at 13:16

## 2021-01-01 RX ADMIN — SODIUM CHLORIDE 50 MILLILITER(S): 9 INJECTION, SOLUTION INTRAVENOUS at 06:06

## 2021-01-01 RX ADMIN — Medication 400 MILLIGRAM(S): at 15:58

## 2021-01-01 RX ADMIN — Medication 500 MILLIGRAM(S): at 00:00

## 2021-01-01 RX ADMIN — DIPHENHYDRAMINE HYDROCHLORIDE AND LIDOCAINE HYDROCHLORIDE AND ALUMINUM HYDROXIDE AND MAGNESIUM HYDRO 10 MILLILITER(S): KIT at 13:02

## 2021-01-01 RX ADMIN — Medication 20 MILLIGRAM(S): at 06:07

## 2021-01-01 RX ADMIN — Medication 0.25 MILLIGRAM(S): at 20:47

## 2021-01-01 RX ADMIN — Medication 20 MILLIGRAM(S): at 10:04

## 2021-01-01 RX ADMIN — DEXTROSE MONOHYDRATE, SODIUM CHLORIDE, AND POTASSIUM CHLORIDE 75 MILLILITER(S): 50; .745; 4.5 INJECTION, SOLUTION INTRAVENOUS at 23:08

## 2021-01-01 RX ADMIN — MORPHINE SULFATE 2 MILLIGRAM(S): 50 CAPSULE, EXTENDED RELEASE ORAL at 11:36

## 2021-01-01 RX ADMIN — Medication 10 MILLILITER(S): at 13:50

## 2021-01-01 RX ADMIN — Medication 125 MILLIGRAM(S): at 23:56

## 2021-01-01 RX ADMIN — DIPHENHYDRAMINE HYDROCHLORIDE AND LIDOCAINE HYDROCHLORIDE AND ALUMINUM HYDROXIDE AND MAGNESIUM HYDRO 10 MILLILITER(S): KIT at 06:07

## 2021-01-01 RX ADMIN — Medication 100 MILLIGRAM(S): at 21:10

## 2021-01-01 RX ADMIN — Medication 250 MILLIMOLE(S): at 01:54

## 2021-01-01 RX ADMIN — SODIUM CHLORIDE 75 MILLILITER(S): 9 INJECTION INTRAMUSCULAR; INTRAVENOUS; SUBCUTANEOUS at 13:40

## 2021-01-01 RX ADMIN — POTASSIUM PHOSPHATE, MONOBASIC POTASSIUM PHOSPHATE, DIBASIC 83.33 MILLIMOLE(S): 236; 224 INJECTION, SOLUTION INTRAVENOUS at 05:24

## 2021-01-01 RX ADMIN — PANTOPRAZOLE SODIUM 40 MILLIGRAM(S): 20 TABLET, DELAYED RELEASE ORAL at 11:19

## 2021-01-01 RX ADMIN — Medication 1 GRAM(S): at 18:20

## 2021-01-01 RX ADMIN — Medication 120 MILLIGRAM(S): at 06:45

## 2021-01-01 RX ADMIN — Medication 0.25 MILLIGRAM(S): at 23:03

## 2021-01-01 RX ADMIN — Medication 5 MILLILITER(S): at 17:54

## 2021-01-01 RX ADMIN — Medication 0.25 MILLIGRAM(S): at 22:04

## 2021-01-01 RX ADMIN — Medication 20 MILLIGRAM(S): at 21:29

## 2021-01-01 RX ADMIN — Medication 30 MILLILITER(S): at 22:25

## 2021-01-01 RX ADMIN — HALOPERIDOL DECANOATE 1 MILLIGRAM(S): 100 INJECTION INTRAMUSCULAR at 17:22

## 2021-01-01 RX ADMIN — MORPHINE SULFATE 1 MILLIGRAM(S): 50 CAPSULE, EXTENDED RELEASE ORAL at 23:44

## 2021-01-01 RX ADMIN — Medication 5 MILLIGRAM(S): at 11:38

## 2021-01-01 RX ADMIN — Medication 5 MILLILITER(S): at 17:18

## 2021-01-01 RX ADMIN — DIPHENHYDRAMINE HYDROCHLORIDE AND LIDOCAINE HYDROCHLORIDE AND ALUMINUM HYDROXIDE AND MAGNESIUM HYDRO 10 MILLILITER(S): KIT at 20:47

## 2021-01-01 RX ADMIN — Medication 25 MILLIGRAM(S): at 13:52

## 2021-01-01 RX ADMIN — Medication 1000 MILLIGRAM(S): at 00:10

## 2021-01-01 RX ADMIN — DIPHENHYDRAMINE HYDROCHLORIDE AND LIDOCAINE HYDROCHLORIDE AND ALUMINUM HYDROXIDE AND MAGNESIUM HYDRO 5 MILLILITER(S): KIT at 05:41

## 2021-01-01 RX ADMIN — Medication 4: at 06:09

## 2021-01-01 RX ADMIN — MORPHINE SULFATE 2 MILLIGRAM(S): 50 CAPSULE, EXTENDED RELEASE ORAL at 10:21

## 2021-01-01 RX ADMIN — Medication 1500 MILLILITER(S): at 23:40

## 2021-01-01 RX ADMIN — Medication 100 MILLIGRAM(S): at 22:32

## 2021-01-01 RX ADMIN — MORPHINE SULFATE 2 MILLIGRAM(S): 50 CAPSULE, EXTENDED RELEASE ORAL at 23:42

## 2021-01-01 RX ADMIN — PIPERACILLIN AND TAZOBACTAM 25 GRAM(S): 4; .5 INJECTION, POWDER, LYOPHILIZED, FOR SOLUTION INTRAVENOUS at 10:22

## 2021-01-01 RX ADMIN — Medication 1 MILLIGRAM(S): at 13:24

## 2021-01-01 RX ADMIN — Medication 50 GRAM(S): at 11:52

## 2021-01-01 RX ADMIN — Medication 5 MILLILITER(S): at 05:26

## 2021-01-01 RX ADMIN — Medication 1 MILLIGRAM(S): at 12:45

## 2021-01-01 RX ADMIN — Medication 5 MILLILITER(S): at 05:19

## 2021-01-01 RX ADMIN — OXYCODONE HYDROCHLORIDE 2.5 MILLIGRAM(S): 5 TABLET ORAL at 03:40

## 2021-01-01 RX ADMIN — SODIUM CHLORIDE 50 MILLILITER(S): 9 INJECTION, SOLUTION INTRAVENOUS at 12:56

## 2021-01-01 RX ADMIN — MORPHINE SULFATE 2 MILLIGRAM(S): 50 CAPSULE, EXTENDED RELEASE ORAL at 01:46

## 2021-01-01 RX ADMIN — CHLORHEXIDINE GLUCONATE 1 APPLICATION(S): 213 SOLUTION TOPICAL at 05:16

## 2021-01-01 RX ADMIN — Medication 1 TABLET(S): at 11:12

## 2021-01-01 RX ADMIN — DIPHENHYDRAMINE HYDROCHLORIDE AND LIDOCAINE HYDROCHLORIDE AND ALUMINUM HYDROXIDE AND MAGNESIUM HYDRO 10 MILLILITER(S): KIT at 14:57

## 2021-01-01 RX ADMIN — Medication 125 MILLIGRAM(S): at 18:41

## 2021-01-01 RX ADMIN — CHLORHEXIDINE GLUCONATE 1 APPLICATION(S): 213 SOLUTION TOPICAL at 05:07

## 2021-01-01 RX ADMIN — Medication 1 MILLIGRAM(S): at 13:14

## 2021-01-01 RX ADMIN — Medication 5 MILLILITER(S): at 05:23

## 2021-01-01 RX ADMIN — Medication 1 MILLIGRAM(S): at 12:51

## 2021-01-01 RX ADMIN — Medication 1 MILLIGRAM(S): at 05:37

## 2021-01-01 RX ADMIN — ENOXAPARIN SODIUM 40 MILLIGRAM(S): 100 INJECTION SUBCUTANEOUS at 06:11

## 2021-01-01 RX ADMIN — LIDOCAINE 1 PATCH: 4 CREAM TOPICAL at 16:22

## 2021-01-01 RX ADMIN — MORPHINE SULFATE 2 MILLIGRAM(S): 50 CAPSULE, EXTENDED RELEASE ORAL at 17:00

## 2021-01-01 RX ADMIN — I.V. FAT EMULSION 25 ML/HR: 20 EMULSION INTRAVENOUS at 19:10

## 2021-01-01 RX ADMIN — Medication 1 TABLET(S): at 13:00

## 2021-01-01 RX ADMIN — Medication 37.5 MILLIGRAM(S): at 17:56

## 2021-01-01 RX ADMIN — Medication 50 GRAM(S): at 14:41

## 2021-01-01 RX ADMIN — ENOXAPARIN SODIUM 40 MILLIGRAM(S): 100 INJECTION SUBCUTANEOUS at 11:05

## 2021-01-01 RX ADMIN — Medication 20 MILLIGRAM(S): at 05:00

## 2021-01-01 RX ADMIN — Medication 100 MILLIGRAM(S): at 11:52

## 2021-01-01 RX ADMIN — DEXTROSE MONOHYDRATE, SODIUM CHLORIDE, AND POTASSIUM CHLORIDE 75 MILLILITER(S): 50; .745; 4.5 INJECTION, SOLUTION INTRAVENOUS at 05:09

## 2021-01-01 RX ADMIN — PANTOPRAZOLE SODIUM 40 MILLIGRAM(S): 20 TABLET, DELAYED RELEASE ORAL at 05:35

## 2021-01-01 RX ADMIN — Medication 20 MILLIGRAM(S): at 22:28

## 2021-01-01 RX ADMIN — LIDOCAINE 1 PATCH: 4 CREAM TOPICAL at 03:00

## 2021-01-01 RX ADMIN — Medication 5 MILLILITER(S): at 17:58

## 2021-01-01 RX ADMIN — PANTOPRAZOLE SODIUM 40 MILLIGRAM(S): 20 TABLET, DELAYED RELEASE ORAL at 05:33

## 2021-01-01 RX ADMIN — Medication 650 MILLIGRAM(S): at 05:05

## 2021-01-01 RX ADMIN — LIDOCAINE 1 PATCH: 4 CREAM TOPICAL at 19:20

## 2021-01-01 RX ADMIN — DIPHENHYDRAMINE HYDROCHLORIDE AND LIDOCAINE HYDROCHLORIDE AND ALUMINUM HYDROXIDE AND MAGNESIUM HYDRO 10 MILLILITER(S): KIT at 23:04

## 2021-01-01 RX ADMIN — Medication 5 MILLILITER(S): at 18:45

## 2021-01-01 RX ADMIN — LIDOCAINE 1 PATCH: 4 CREAM TOPICAL at 23:14

## 2021-01-01 RX ADMIN — Medication 20 MILLIEQUIVALENT(S): at 19:52

## 2021-01-01 RX ADMIN — Medication 100 MILLIGRAM(S): at 05:05

## 2021-01-01 RX ADMIN — Medication 25 MILLIGRAM(S): at 11:04

## 2021-01-01 RX ADMIN — Medication 1 APPLICATION(S): at 05:11

## 2021-01-01 RX ADMIN — Medication 5 MILLILITER(S): at 12:36

## 2021-01-01 RX ADMIN — Medication 1000 UNIT(S): at 14:20

## 2021-01-01 RX ADMIN — Medication 5 MILLIGRAM(S): at 01:37

## 2021-01-01 RX ADMIN — OLANZAPINE 10 MILLIGRAM(S): 15 TABLET, FILM COATED ORAL at 00:12

## 2021-01-01 RX ADMIN — HALOPERIDOL DECANOATE 1 MILLIGRAM(S): 100 INJECTION INTRAMUSCULAR at 12:50

## 2021-01-01 RX ADMIN — Medication 5 MILLIGRAM(S): at 22:25

## 2021-01-01 RX ADMIN — DIPHENHYDRAMINE HYDROCHLORIDE AND LIDOCAINE HYDROCHLORIDE AND ALUMINUM HYDROXIDE AND MAGNESIUM HYDRO 10 MILLILITER(S): KIT at 17:59

## 2021-01-01 RX ADMIN — Medication 2: at 01:30

## 2021-01-01 RX ADMIN — DIPHENHYDRAMINE HYDROCHLORIDE AND LIDOCAINE HYDROCHLORIDE AND ALUMINUM HYDROXIDE AND MAGNESIUM HYDRO 10 MILLILITER(S): KIT at 13:01

## 2021-01-01 RX ADMIN — OXYCODONE HYDROCHLORIDE 2.5 MILLIGRAM(S): 5 TABLET ORAL at 06:04

## 2021-01-01 RX ADMIN — Medication 0.25 MILLIGRAM(S): at 23:12

## 2021-01-01 RX ADMIN — Medication 125 MILLIGRAM(S): at 18:30

## 2021-01-01 RX ADMIN — PIPERACILLIN AND TAZOBACTAM 25 GRAM(S): 4; .5 INJECTION, POWDER, LYOPHILIZED, FOR SOLUTION INTRAVENOUS at 10:41

## 2021-01-01 RX ADMIN — Medication 250 MILLIMOLE(S): at 09:00

## 2021-01-01 RX ADMIN — Medication 650 MILLIGRAM(S): at 13:23

## 2021-01-01 RX ADMIN — SODIUM CHLORIDE 50 MILLILITER(S): 9 INJECTION, SOLUTION INTRAVENOUS at 19:14

## 2021-01-01 RX ADMIN — Medication 1: at 17:52

## 2021-01-01 RX ADMIN — PHENYLEPHRINE HYDROCHLORIDE 22.1 MICROGRAM(S)/KG/MIN: 10 INJECTION INTRAVENOUS at 05:03

## 2021-01-01 RX ADMIN — OXYCODONE HYDROCHLORIDE 2.5 MILLIGRAM(S): 5 TABLET ORAL at 17:29

## 2021-01-01 RX ADMIN — Medication 650 MILLIGRAM(S): at 16:05

## 2021-01-01 RX ADMIN — Medication 200 GRAM(S): at 01:18

## 2021-01-01 RX ADMIN — Medication 20 MILLIGRAM(S): at 18:08

## 2021-01-01 RX ADMIN — Medication 1: at 00:14

## 2021-01-01 RX ADMIN — HALOPERIDOL DECANOATE 1 MILLIGRAM(S): 100 INJECTION INTRAMUSCULAR at 17:51

## 2021-01-01 RX ADMIN — Medication 120 MILLIGRAM(S): at 07:04

## 2021-01-01 RX ADMIN — Medication 40 MILLIEQUIVALENT(S): at 09:06

## 2021-01-01 RX ADMIN — QUETIAPINE FUMARATE 25 MILLIGRAM(S): 200 TABLET, FILM COATED ORAL at 21:17

## 2021-01-01 RX ADMIN — OXYCODONE HYDROCHLORIDE 2.5 MILLIGRAM(S): 5 TABLET ORAL at 07:30

## 2021-01-01 RX ADMIN — Medication 5 MILLIGRAM(S): at 05:05

## 2021-01-01 RX ADMIN — Medication 50 MILLIEQUIVALENT(S): at 01:07

## 2021-01-01 RX ADMIN — Medication 20 MILLIGRAM(S): at 10:22

## 2021-01-01 RX ADMIN — Medication 40 MILLIEQUIVALENT(S): at 22:20

## 2021-01-01 RX ADMIN — SODIUM CHLORIDE 75 MILLILITER(S): 9 INJECTION INTRAMUSCULAR; INTRAVENOUS; SUBCUTANEOUS at 15:54

## 2021-01-01 RX ADMIN — OXYCODONE HYDROCHLORIDE 2.5 MILLIGRAM(S): 5 TABLET ORAL at 22:56

## 2021-01-01 RX ADMIN — Medication 500 MILLIGRAM(S): at 12:17

## 2021-01-01 RX ADMIN — Medication 650 MILLIGRAM(S): at 16:35

## 2021-01-01 RX ADMIN — I.V. FAT EMULSION 25 ML/HR: 20 EMULSION INTRAVENOUS at 17:20

## 2021-01-01 RX ADMIN — Medication 1 MILLIGRAM(S): at 13:42

## 2021-01-01 RX ADMIN — Medication 40 MILLIEQUIVALENT(S): at 02:51

## 2021-01-01 RX ADMIN — Medication 1 EACH: at 07:55

## 2021-01-01 RX ADMIN — LIDOCAINE 1 PATCH: 4 CREAM TOPICAL at 15:24

## 2021-01-01 RX ADMIN — ENOXAPARIN SODIUM 40 MILLIGRAM(S): 100 INJECTION SUBCUTANEOUS at 11:12

## 2021-01-01 RX ADMIN — LIDOCAINE 1 PATCH: 4 CREAM TOPICAL at 18:37

## 2021-01-01 RX ADMIN — Medication 100 MILLIGRAM(S): at 20:00

## 2021-01-01 RX ADMIN — Medication 1 EACH: at 17:14

## 2021-01-01 RX ADMIN — CHLORHEXIDINE GLUCONATE 1 APPLICATION(S): 213 SOLUTION TOPICAL at 05:23

## 2021-01-01 RX ADMIN — Medication 25 MILLIGRAM(S): at 22:27

## 2021-01-01 RX ADMIN — PIPERACILLIN AND TAZOBACTAM 200 GRAM(S): 4; .5 INJECTION, POWDER, LYOPHILIZED, FOR SOLUTION INTRAVENOUS at 13:32

## 2021-01-01 RX ADMIN — Medication 50 MILLIEQUIVALENT(S): at 03:22

## 2021-01-01 RX ADMIN — Medication 100 GRAM(S): at 02:11

## 2021-01-01 RX ADMIN — PANTOPRAZOLE SODIUM 10 MG/HR: 20 TABLET, DELAYED RELEASE ORAL at 23:11

## 2021-01-01 RX ADMIN — Medication 0.25 MILLIGRAM(S): at 22:31

## 2021-01-01 RX ADMIN — Medication 650 MILLIGRAM(S): at 12:44

## 2021-01-01 RX ADMIN — Medication 50 GRAM(S): at 10:04

## 2021-01-01 RX ADMIN — Medication 0.25 MILLIGRAM(S): at 22:12

## 2021-01-01 RX ADMIN — Medication 5 MILLIGRAM(S): at 02:22

## 2021-01-01 RX ADMIN — I.V. FAT EMULSION 25 ML/HR: 20 EMULSION INTRAVENOUS at 16:48

## 2021-01-01 RX ADMIN — Medication 25 MILLIGRAM(S): at 13:23

## 2021-01-01 RX ADMIN — PANTOPRAZOLE SODIUM 40 MILLIGRAM(S): 20 TABLET, DELAYED RELEASE ORAL at 06:14

## 2021-01-01 RX ADMIN — PANTOPRAZOLE SODIUM 40 MILLIGRAM(S): 20 TABLET, DELAYED RELEASE ORAL at 17:29

## 2021-01-01 RX ADMIN — Medication 10 MILLILITER(S): at 06:52

## 2021-01-01 RX ADMIN — Medication 1 EACH: at 17:03

## 2021-01-01 RX ADMIN — SODIUM CHLORIDE 50 MILLILITER(S): 9 INJECTION, SOLUTION INTRAVENOUS at 10:05

## 2021-01-01 RX ADMIN — Medication 40 MILLIEQUIVALENT(S): at 20:43

## 2021-01-01 RX ADMIN — HALOPERIDOL DECANOATE 1 MILLIGRAM(S): 100 INJECTION INTRAMUSCULAR at 05:19

## 2021-01-01 RX ADMIN — CHLORHEXIDINE GLUCONATE 15 MILLILITER(S): 213 SOLUTION TOPICAL at 05:01

## 2021-01-01 RX ADMIN — Medication 5 MILLIGRAM(S): at 05:18

## 2021-01-01 RX ADMIN — PIPERACILLIN AND TAZOBACTAM 25 GRAM(S): 4; .5 INJECTION, POWDER, LYOPHILIZED, FOR SOLUTION INTRAVENOUS at 10:49

## 2021-01-01 RX ADMIN — PANTOPRAZOLE SODIUM 40 MILLIGRAM(S): 20 TABLET, DELAYED RELEASE ORAL at 05:03

## 2021-01-01 RX ADMIN — Medication 25 MILLIGRAM(S): at 06:04

## 2021-01-01 RX ADMIN — Medication 40 MILLIGRAM(S): at 11:37

## 2021-01-01 RX ADMIN — Medication 1 MILLIGRAM(S): at 11:07

## 2021-01-01 RX ADMIN — Medication 5 MILLIGRAM(S): at 10:04

## 2021-01-01 RX ADMIN — FIDAXOMICIN 200 MILLIGRAM(S): 200 GRANULE, FOR SUSPENSION ORAL at 18:38

## 2021-01-01 RX ADMIN — DIPHENHYDRAMINE HYDROCHLORIDE AND LIDOCAINE HYDROCHLORIDE AND ALUMINUM HYDROXIDE AND MAGNESIUM HYDRO 10 MILLILITER(S): KIT at 23:01

## 2021-01-01 RX ADMIN — Medication 1 TABLET(S): at 11:37

## 2021-01-01 RX ADMIN — DIPHENHYDRAMINE HYDROCHLORIDE AND LIDOCAINE HYDROCHLORIDE AND ALUMINUM HYDROXIDE AND MAGNESIUM HYDRO 10 MILLILITER(S): KIT at 05:54

## 2021-01-01 RX ADMIN — Medication 25 MILLIGRAM(S): at 22:39

## 2021-01-01 RX ADMIN — PANTOPRAZOLE SODIUM 40 MILLIGRAM(S): 20 TABLET, DELAYED RELEASE ORAL at 18:00

## 2021-01-01 RX ADMIN — Medication 5 MILLIGRAM(S): at 05:01

## 2021-01-01 RX ADMIN — Medication 1 MILLIGRAM(S): at 13:38

## 2021-01-01 RX ADMIN — Medication 1000 UNIT(S): at 11:55

## 2021-01-01 RX ADMIN — PANTOPRAZOLE SODIUM 10 MG/HR: 20 TABLET, DELAYED RELEASE ORAL at 08:42

## 2021-01-01 RX ADMIN — PIPERACILLIN AND TAZOBACTAM 25 GRAM(S): 4; .5 INJECTION, POWDER, LYOPHILIZED, FOR SOLUTION INTRAVENOUS at 02:06

## 2021-01-01 RX ADMIN — SODIUM CHLORIDE 125 MILLILITER(S): 9 INJECTION INTRAMUSCULAR; INTRAVENOUS; SUBCUTANEOUS at 19:18

## 2021-01-01 RX ADMIN — Medication 650 MILLIGRAM(S): at 00:17

## 2021-01-01 RX ADMIN — Medication 1 TABLET(S): at 11:17

## 2021-01-01 RX ADMIN — Medication 120 MILLIGRAM(S): at 06:05

## 2021-01-01 RX ADMIN — ENOXAPARIN SODIUM 40 MILLIGRAM(S): 100 INJECTION SUBCUTANEOUS at 11:24

## 2021-01-01 RX ADMIN — I.V. FAT EMULSION 25 ML/HR: 20 EMULSION INTRAVENOUS at 17:53

## 2021-01-01 RX ADMIN — SODIUM CHLORIDE 100 MILLILITER(S): 9 INJECTION INTRAMUSCULAR; INTRAVENOUS; SUBCUTANEOUS at 05:27

## 2021-01-01 RX ADMIN — MORPHINE SULFATE 1 MILLIGRAM(S): 50 CAPSULE, EXTENDED RELEASE ORAL at 23:04

## 2021-01-01 RX ADMIN — Medication 0.25 MILLIGRAM(S): at 21:36

## 2021-01-01 RX ADMIN — Medication 125 MILLIGRAM(S): at 19:26

## 2021-01-01 RX ADMIN — Medication 120 MILLIGRAM(S): at 06:07

## 2021-01-01 RX ADMIN — Medication 0.25 MILLIGRAM(S): at 22:20

## 2021-01-01 RX ADMIN — PIPERACILLIN AND TAZOBACTAM 25 GRAM(S): 4; .5 INJECTION, POWDER, LYOPHILIZED, FOR SOLUTION INTRAVENOUS at 17:37

## 2021-01-01 RX ADMIN — Medication 125 MILLIGRAM(S): at 05:08

## 2021-01-01 RX ADMIN — LIDOCAINE 1 PATCH: 4 CREAM TOPICAL at 10:11

## 2021-01-01 RX ADMIN — DIPHENHYDRAMINE HYDROCHLORIDE AND LIDOCAINE HYDROCHLORIDE AND ALUMINUM HYDROXIDE AND MAGNESIUM HYDRO 10 MILLILITER(S): KIT at 06:38

## 2021-01-01 RX ADMIN — Medication 5 MILLILITER(S): at 18:19

## 2021-01-01 RX ADMIN — MORPHINE SULFATE 1 MILLIGRAM(S): 50 CAPSULE, EXTENDED RELEASE ORAL at 22:04

## 2021-01-01 RX ADMIN — Medication 10 MILLIGRAM(S): at 05:21

## 2021-01-01 RX ADMIN — PIPERACILLIN AND TAZOBACTAM 200 GRAM(S): 4; .5 INJECTION, POWDER, LYOPHILIZED, FOR SOLUTION INTRAVENOUS at 22:38

## 2021-01-01 RX ADMIN — Medication 40 MILLIEQUIVALENT(S): at 17:22

## 2021-01-01 RX ADMIN — PANTOPRAZOLE SODIUM 40 MILLIGRAM(S): 20 TABLET, DELAYED RELEASE ORAL at 11:06

## 2021-01-01 RX ADMIN — Medication 120 MILLIGRAM(S): at 05:32

## 2021-01-01 RX ADMIN — OXYCODONE HYDROCHLORIDE 2.5 MILLIGRAM(S): 5 TABLET ORAL at 06:45

## 2021-01-01 RX ADMIN — I.V. FAT EMULSION 25 ML/HR: 20 EMULSION INTRAVENOUS at 17:02

## 2021-01-01 RX ADMIN — SODIUM CHLORIDE 75 MILLILITER(S): 9 INJECTION INTRAMUSCULAR; INTRAVENOUS; SUBCUTANEOUS at 06:53

## 2021-01-01 RX ADMIN — Medication 100 MILLIGRAM(S): at 13:31

## 2021-01-01 RX ADMIN — OXYCODONE HYDROCHLORIDE 2.5 MILLIGRAM(S): 5 TABLET ORAL at 05:12

## 2021-01-01 RX ADMIN — Medication 5 MILLIGRAM(S): at 10:41

## 2021-01-01 RX ADMIN — Medication 100 MILLIGRAM(S): at 05:57

## 2021-01-01 RX ADMIN — Medication 5 MILLILITER(S): at 11:23

## 2021-01-01 RX ADMIN — Medication 100 MILLIGRAM(S): at 04:50

## 2021-01-01 RX ADMIN — Medication 1 MILLIGRAM(S): at 11:26

## 2021-01-01 RX ADMIN — Medication 650 MILLIGRAM(S): at 01:22

## 2021-01-01 RX ADMIN — Medication 100 MILLIGRAM(S): at 18:01

## 2021-01-01 RX ADMIN — Medication 1000 MILLIGRAM(S): at 13:40

## 2021-01-01 RX ADMIN — DIPHENHYDRAMINE HYDROCHLORIDE AND LIDOCAINE HYDROCHLORIDE AND ALUMINUM HYDROXIDE AND MAGNESIUM HYDRO 10 MILLILITER(S): KIT at 22:03

## 2021-01-01 RX ADMIN — Medication 120 MILLIGRAM(S): at 06:04

## 2021-01-01 RX ADMIN — Medication 1000 MILLIGRAM(S): at 16:15

## 2021-01-01 RX ADMIN — Medication 100 MILLIGRAM(S): at 05:01

## 2021-01-01 RX ADMIN — Medication 5 MILLIGRAM(S): at 01:04

## 2021-01-01 RX ADMIN — Medication 40 MILLIEQUIVALENT(S): at 09:15

## 2021-01-01 RX ADMIN — I.V. FAT EMULSION 25 ML/HR: 20 EMULSION INTRAVENOUS at 19:51

## 2021-01-01 RX ADMIN — OLANZAPINE 10 MILLIGRAM(S): 15 TABLET, FILM COATED ORAL at 23:08

## 2021-01-01 RX ADMIN — Medication 1000 UNIT(S): at 11:29

## 2021-01-01 RX ADMIN — MORPHINE SULFATE 2 MILLIGRAM(S): 50 CAPSULE, EXTENDED RELEASE ORAL at 20:18

## 2021-01-01 RX ADMIN — I.V. FAT EMULSION 25 ML/HR: 20 EMULSION INTRAVENOUS at 18:02

## 2021-01-01 RX ADMIN — POTASSIUM PHOSPHATE, MONOBASIC POTASSIUM PHOSPHATE, DIBASIC 62.5 MILLIMOLE(S): 236; 224 INJECTION, SOLUTION INTRAVENOUS at 11:35

## 2021-01-01 RX ADMIN — LIDOCAINE 1 PATCH: 4 CREAM TOPICAL at 19:07

## 2021-01-01 RX ADMIN — I.V. FAT EMULSION 25 ML/HR: 20 EMULSION INTRAVENOUS at 17:52

## 2021-01-01 RX ADMIN — PANTOPRAZOLE SODIUM 40 MILLIGRAM(S): 20 TABLET, DELAYED RELEASE ORAL at 17:02

## 2021-01-01 RX ADMIN — LIDOCAINE 1 PATCH: 4 CREAM TOPICAL at 12:59

## 2021-01-01 RX ADMIN — Medication 200 GRAM(S): at 01:59

## 2021-01-01 RX ADMIN — Medication 1: at 00:41

## 2021-01-01 RX ADMIN — Medication 5 MILLILITER(S): at 05:01

## 2021-01-01 RX ADMIN — Medication 1 TABLET(S): at 13:42

## 2021-01-01 RX ADMIN — Medication 1 TABLET(S): at 14:16

## 2021-01-01 RX ADMIN — Medication 5 MILLILITER(S): at 05:06

## 2021-01-01 RX ADMIN — Medication 5 MILLIGRAM(S): at 05:19

## 2021-01-01 RX ADMIN — Medication 200 MILLIGRAM(S): at 05:19

## 2021-01-01 RX ADMIN — Medication 125 MILLIGRAM(S): at 12:34

## 2021-01-01 RX ADMIN — Medication 5 MILLIGRAM(S): at 13:18

## 2021-01-01 RX ADMIN — Medication 20 MILLIGRAM(S): at 21:49

## 2021-01-01 RX ADMIN — Medication 100 MILLIGRAM(S): at 06:46

## 2021-01-01 RX ADMIN — Medication 100 MILLIEQUIVALENT(S): at 01:45

## 2021-01-01 RX ADMIN — Medication 10 MILLIGRAM(S): at 06:13

## 2021-01-01 RX ADMIN — DIPHENHYDRAMINE HYDROCHLORIDE AND LIDOCAINE HYDROCHLORIDE AND ALUMINUM HYDROXIDE AND MAGNESIUM HYDRO 10 MILLILITER(S): KIT at 13:13

## 2021-01-01 RX ADMIN — Medication 5 MILLILITER(S): at 05:29

## 2021-01-01 RX ADMIN — I.V. FAT EMULSION 25 ML/HR: 20 EMULSION INTRAVENOUS at 05:48

## 2021-01-01 RX ADMIN — DIPHENHYDRAMINE HYDROCHLORIDE AND LIDOCAINE HYDROCHLORIDE AND ALUMINUM HYDROXIDE AND MAGNESIUM HYDRO 10 MILLILITER(S): KIT at 21:49

## 2021-01-01 RX ADMIN — Medication 20 MILLIGRAM(S): at 05:19

## 2021-01-01 RX ADMIN — CHLORHEXIDINE GLUCONATE 1 APPLICATION(S): 213 SOLUTION TOPICAL at 05:05

## 2021-01-01 RX ADMIN — PANTOPRAZOLE SODIUM 40 MILLIGRAM(S): 20 TABLET, DELAYED RELEASE ORAL at 22:47

## 2021-01-01 RX ADMIN — PIPERACILLIN AND TAZOBACTAM 25 GRAM(S): 4; .5 INJECTION, POWDER, LYOPHILIZED, FOR SOLUTION INTRAVENOUS at 19:22

## 2021-01-01 RX ADMIN — Medication 100 MILLIEQUIVALENT(S): at 13:05

## 2021-01-01 RX ADMIN — PANTOPRAZOLE SODIUM 40 MILLIGRAM(S): 20 TABLET, DELAYED RELEASE ORAL at 06:26

## 2021-01-01 RX ADMIN — SODIUM CHLORIDE 75 MILLILITER(S): 9 INJECTION INTRAMUSCULAR; INTRAVENOUS; SUBCUTANEOUS at 15:53

## 2021-01-01 RX ADMIN — Medication 1 EACH: at 17:31

## 2021-01-01 RX ADMIN — Medication 1 APPLICATION(S): at 17:12

## 2021-01-01 RX ADMIN — Medication 1 TABLET(S): at 13:38

## 2021-01-01 RX ADMIN — DEXTROSE MONOHYDRATE, SODIUM CHLORIDE, AND POTASSIUM CHLORIDE 60 MILLILITER(S): 50; .745; 4.5 INJECTION, SOLUTION INTRAVENOUS at 11:26

## 2021-01-01 RX ADMIN — Medication 5 MILLIGRAM(S): at 13:43

## 2021-01-01 RX ADMIN — Medication 5 MILLIGRAM(S): at 02:25

## 2021-01-01 RX ADMIN — DEXTROSE MONOHYDRATE, SODIUM CHLORIDE, AND POTASSIUM CHLORIDE 75 MILLILITER(S): 50; .745; 4.5 INJECTION, SOLUTION INTRAVENOUS at 12:16

## 2021-01-01 RX ADMIN — HYDROMORPHONE HYDROCHLORIDE 0.25 MILLIGRAM(S): 2 INJECTION INTRAMUSCULAR; INTRAVENOUS; SUBCUTANEOUS at 02:31

## 2021-01-01 RX ADMIN — MORPHINE SULFATE 2 MILLIGRAM(S): 50 CAPSULE, EXTENDED RELEASE ORAL at 19:46

## 2021-01-01 RX ADMIN — Medication 100 MILLIEQUIVALENT(S): at 04:20

## 2021-01-01 RX ADMIN — TRAMADOL HYDROCHLORIDE 25 MILLIGRAM(S): 50 TABLET ORAL at 20:29

## 2021-01-01 RX ADMIN — Medication 650 MILLIGRAM(S): at 15:25

## 2021-01-01 RX ADMIN — Medication 100 MILLIEQUIVALENT(S): at 14:47

## 2021-01-01 RX ADMIN — DIPHENHYDRAMINE HYDROCHLORIDE AND LIDOCAINE HYDROCHLORIDE AND ALUMINUM HYDROXIDE AND MAGNESIUM HYDRO 10 MILLILITER(S): KIT at 05:58

## 2021-01-01 RX ADMIN — Medication 1 MILLIGRAM(S): at 11:37

## 2021-01-01 RX ADMIN — Medication 40 MILLIEQUIVALENT(S): at 12:13

## 2021-01-01 RX ADMIN — HYDROMORPHONE HYDROCHLORIDE 0.25 MILLIGRAM(S): 2 INJECTION INTRAMUSCULAR; INTRAVENOUS; SUBCUTANEOUS at 06:12

## 2021-01-01 RX ADMIN — DIPHENHYDRAMINE HYDROCHLORIDE AND LIDOCAINE HYDROCHLORIDE AND ALUMINUM HYDROXIDE AND MAGNESIUM HYDRO 10 MILLILITER(S): KIT at 06:04

## 2021-01-01 RX ADMIN — Medication 25 MILLIGRAM(S): at 11:06

## 2021-01-01 RX ADMIN — Medication 5 MILLILITER(S): at 12:39

## 2021-01-01 RX ADMIN — PANTOPRAZOLE SODIUM 40 MILLIGRAM(S): 20 TABLET, DELAYED RELEASE ORAL at 11:33

## 2021-01-01 RX ADMIN — DIPHENHYDRAMINE HYDROCHLORIDE AND LIDOCAINE HYDROCHLORIDE AND ALUMINUM HYDROXIDE AND MAGNESIUM HYDRO 10 MILLILITER(S): KIT at 20:56

## 2021-01-01 RX ADMIN — Medication 25 MILLIGRAM(S): at 13:45

## 2021-01-01 RX ADMIN — ENOXAPARIN SODIUM 40 MILLIGRAM(S): 100 INJECTION SUBCUTANEOUS at 12:15

## 2021-01-01 RX ADMIN — Medication 0.25 MILLIGRAM(S): at 22:08

## 2021-01-01 RX ADMIN — Medication 1 TABLET(S): at 12:10

## 2021-01-01 RX ADMIN — Medication 650 MILLIGRAM(S): at 06:49

## 2021-01-01 RX ADMIN — Medication 50 GRAM(S): at 23:15

## 2021-01-01 RX ADMIN — Medication 125 MILLIGRAM(S): at 06:29

## 2021-01-01 RX ADMIN — POTASSIUM PHOSPHATE, MONOBASIC POTASSIUM PHOSPHATE, DIBASIC 83.33 MILLIMOLE(S): 236; 224 INJECTION, SOLUTION INTRAVENOUS at 19:30

## 2021-01-01 RX ADMIN — Medication 25 MILLIGRAM(S): at 17:36

## 2021-01-01 RX ADMIN — Medication 25 MILLIGRAM(S): at 22:30

## 2021-01-01 RX ADMIN — DIPHENHYDRAMINE HYDROCHLORIDE AND LIDOCAINE HYDROCHLORIDE AND ALUMINUM HYDROXIDE AND MAGNESIUM HYDRO 10 MILLILITER(S): KIT at 21:29

## 2021-01-01 RX ADMIN — Medication 1 PACKET(S): at 18:38

## 2021-01-01 RX ADMIN — Medication 1 APPLICATION(S): at 06:12

## 2021-01-01 RX ADMIN — Medication 1 TABLET(S): at 12:13

## 2021-01-01 RX ADMIN — DIPHENHYDRAMINE HYDROCHLORIDE AND LIDOCAINE HYDROCHLORIDE AND ALUMINUM HYDROXIDE AND MAGNESIUM HYDRO 10 MILLILITER(S): KIT at 21:54

## 2021-01-01 RX ADMIN — Medication 650 MILLIGRAM(S): at 05:49

## 2021-01-01 RX ADMIN — OXYCODONE HYDROCHLORIDE 2.5 MILLIGRAM(S): 5 TABLET ORAL at 20:47

## 2021-01-01 RX ADMIN — OXYCODONE HYDROCHLORIDE 2.5 MILLIGRAM(S): 5 TABLET ORAL at 12:00

## 2021-01-01 RX ADMIN — MORPHINE SULFATE 2 MILLIGRAM(S): 50 CAPSULE, EXTENDED RELEASE ORAL at 04:29

## 2021-01-01 RX ADMIN — Medication 650 MILLIGRAM(S): at 14:26

## 2021-01-01 RX ADMIN — Medication 5 MILLIGRAM(S): at 01:14

## 2021-01-01 RX ADMIN — HYDROMORPHONE HYDROCHLORIDE 0.25 MILLIGRAM(S): 2 INJECTION INTRAMUSCULAR; INTRAVENOUS; SUBCUTANEOUS at 06:27

## 2021-01-01 RX ADMIN — Medication 1 MILLIGRAM(S): at 11:38

## 2021-01-01 RX ADMIN — MORPHINE SULFATE 2 MILLIGRAM(S): 50 CAPSULE, EXTENDED RELEASE ORAL at 21:00

## 2021-01-01 RX ADMIN — Medication 1 MILLIGRAM(S): at 11:35

## 2021-01-01 RX ADMIN — Medication 25 MILLIGRAM(S): at 21:21

## 2021-01-01 RX ADMIN — FIDAXOMICIN 200 MILLIGRAM(S): 200 GRANULE, FOR SUSPENSION ORAL at 17:05

## 2021-01-01 RX ADMIN — Medication 650 MILLIGRAM(S): at 17:10

## 2021-01-01 RX ADMIN — SODIUM CHLORIDE 75 MILLILITER(S): 9 INJECTION, SOLUTION INTRAVENOUS at 23:10

## 2021-01-01 RX ADMIN — PIPERACILLIN AND TAZOBACTAM 25 GRAM(S): 4; .5 INJECTION, POWDER, LYOPHILIZED, FOR SOLUTION INTRAVENOUS at 03:48

## 2021-01-01 RX ADMIN — Medication 0.25 MILLIGRAM(S): at 21:01

## 2021-01-01 RX ADMIN — Medication 5 MILLILITER(S): at 06:08

## 2021-01-01 RX ADMIN — DIPHENHYDRAMINE HYDROCHLORIDE AND LIDOCAINE HYDROCHLORIDE AND ALUMINUM HYDROXIDE AND MAGNESIUM HYDRO 10 MILLILITER(S): KIT at 06:45

## 2021-01-01 RX ADMIN — POTASSIUM PHOSPHATE, MONOBASIC POTASSIUM PHOSPHATE, DIBASIC 62.5 MILLIMOLE(S): 236; 224 INJECTION, SOLUTION INTRAVENOUS at 08:06

## 2021-01-01 RX ADMIN — Medication 10 MILLILITER(S): at 05:22

## 2021-01-01 RX ADMIN — LIDOCAINE 1 PATCH: 4 CREAM TOPICAL at 19:18

## 2021-01-01 RX ADMIN — Medication 1 TABLET(S): at 15:23

## 2021-01-01 RX ADMIN — PHENYLEPHRINE HYDROCHLORIDE 22.1 MICROGRAM(S)/KG/MIN: 10 INJECTION INTRAVENOUS at 04:43

## 2021-01-01 RX ADMIN — PIPERACILLIN AND TAZOBACTAM 25 GRAM(S): 4; .5 INJECTION, POWDER, LYOPHILIZED, FOR SOLUTION INTRAVENOUS at 12:24

## 2021-01-01 RX ADMIN — Medication 20 MILLIGRAM(S): at 05:40

## 2021-01-01 RX ADMIN — Medication 200 GRAM(S): at 01:51

## 2021-01-01 RX ADMIN — ENOXAPARIN SODIUM 40 MILLIGRAM(S): 100 INJECTION SUBCUTANEOUS at 12:33

## 2021-01-01 RX ADMIN — Medication 1 TABLET(S): at 14:53

## 2021-01-01 RX ADMIN — Medication 9 MILLIGRAM(S): at 06:29

## 2021-01-01 RX ADMIN — Medication 120 MILLIGRAM(S): at 05:28

## 2021-01-01 RX ADMIN — Medication 20 MILLIGRAM(S): at 05:29

## 2021-01-01 RX ADMIN — ENOXAPARIN SODIUM 40 MILLIGRAM(S): 100 INJECTION SUBCUTANEOUS at 05:19

## 2021-01-01 RX ADMIN — Medication 200 GRAM(S): at 01:27

## 2021-01-01 RX ADMIN — Medication 120 MILLIGRAM(S): at 06:56

## 2021-01-01 RX ADMIN — AMIODARONE HYDROCHLORIDE 600 MILLIGRAM(S): 400 TABLET ORAL at 18:23

## 2021-01-01 RX ADMIN — Medication 1: at 06:02

## 2021-01-01 RX ADMIN — Medication 5 MILLILITER(S): at 17:13

## 2021-01-01 RX ADMIN — PANTOPRAZOLE SODIUM 40 MILLIGRAM(S): 20 TABLET, DELAYED RELEASE ORAL at 05:12

## 2021-01-01 RX ADMIN — Medication 500 MILLIGRAM(S): at 14:32

## 2021-01-01 RX ADMIN — Medication 1: at 00:11

## 2021-01-01 RX ADMIN — Medication 0.25 MILLIGRAM(S): at 21:46

## 2021-01-01 RX ADMIN — Medication 25 MILLIGRAM(S): at 14:41

## 2021-01-01 RX ADMIN — Medication 1000 MILLIGRAM(S): at 17:31

## 2021-01-01 RX ADMIN — Medication 200 GRAM(S): at 22:27

## 2021-01-01 RX ADMIN — TRAMADOL HYDROCHLORIDE 25 MILLIGRAM(S): 50 TABLET ORAL at 13:00

## 2021-01-01 RX ADMIN — I.V. FAT EMULSION 25 ML/HR: 20 EMULSION INTRAVENOUS at 19:18

## 2021-01-01 RX ADMIN — MORPHINE SULFATE 1 MILLIGRAM(S): 50 CAPSULE, EXTENDED RELEASE ORAL at 02:08

## 2021-01-01 RX ADMIN — DIPHENHYDRAMINE HYDROCHLORIDE AND LIDOCAINE HYDROCHLORIDE AND ALUMINUM HYDROXIDE AND MAGNESIUM HYDRO 10 MILLILITER(S): KIT at 05:41

## 2021-01-01 RX ADMIN — PANTOPRAZOLE SODIUM 40 MILLIGRAM(S): 20 TABLET, DELAYED RELEASE ORAL at 11:20

## 2021-01-01 RX ADMIN — TRAMADOL HYDROCHLORIDE 100 MILLIGRAM(S): 50 TABLET ORAL at 17:01

## 2021-01-01 RX ADMIN — Medication 1 EACH: at 16:54

## 2021-01-01 RX ADMIN — Medication 120 MILLIGRAM(S): at 05:17

## 2021-01-01 RX ADMIN — PANTOPRAZOLE SODIUM 40 MILLIGRAM(S): 20 TABLET, DELAYED RELEASE ORAL at 06:29

## 2021-01-01 RX ADMIN — Medication 400 MILLIGRAM(S): at 12:02

## 2021-01-01 RX ADMIN — Medication 200 GRAM(S): at 01:35

## 2021-01-01 RX ADMIN — Medication 1000 UNIT(S): at 14:03

## 2021-01-01 RX ADMIN — Medication 125 MILLIGRAM(S): at 23:06

## 2021-01-01 RX ADMIN — Medication 400 MILLIGRAM(S): at 23:40

## 2021-01-01 RX ADMIN — Medication 5 MILLIGRAM(S): at 23:14

## 2021-01-01 RX ADMIN — OXYCODONE HYDROCHLORIDE 2.5 MILLIGRAM(S): 5 TABLET ORAL at 20:17

## 2021-01-01 RX ADMIN — Medication 50 GRAM(S): at 00:52

## 2021-01-01 RX ADMIN — Medication 5 MILLIGRAM(S): at 21:35

## 2021-01-01 RX ADMIN — Medication 25 MILLIGRAM(S): at 05:27

## 2021-01-01 RX ADMIN — LIDOCAINE 1 PATCH: 4 CREAM TOPICAL at 11:38

## 2021-01-01 RX ADMIN — Medication 125 MILLIGRAM(S): at 21:45

## 2021-01-01 RX ADMIN — DEXTROSE MONOHYDRATE, SODIUM CHLORIDE, AND POTASSIUM CHLORIDE 75 MILLILITER(S): 50; .745; 4.5 INJECTION, SOLUTION INTRAVENOUS at 12:12

## 2021-01-01 RX ADMIN — Medication 20 MILLIEQUIVALENT(S): at 17:18

## 2021-01-01 RX ADMIN — Medication 1 MILLIGRAM(S): at 12:58

## 2021-01-01 RX ADMIN — Medication 9 MILLIGRAM(S): at 06:37

## 2021-01-01 RX ADMIN — HALOPERIDOL DECANOATE 1 MILLIGRAM(S): 100 INJECTION INTRAMUSCULAR at 05:06

## 2021-01-01 RX ADMIN — Medication 1 GRAM(S): at 05:28

## 2021-01-01 RX ADMIN — DEXTROSE MONOHYDRATE, SODIUM CHLORIDE, AND POTASSIUM CHLORIDE 60 MILLILITER(S): 50; .745; 4.5 INJECTION, SOLUTION INTRAVENOUS at 05:49

## 2021-01-01 RX ADMIN — PANTOPRAZOLE SODIUM 40 MILLIGRAM(S): 20 TABLET, DELAYED RELEASE ORAL at 17:34

## 2021-01-01 RX ADMIN — Medication 10 MILLIGRAM(S): at 17:17

## 2021-01-01 RX ADMIN — Medication 100 MILLIGRAM(S): at 12:47

## 2021-01-01 RX ADMIN — Medication 5 MILLIGRAM(S): at 17:15

## 2021-01-01 RX ADMIN — Medication 1 MILLIGRAM(S): at 11:50

## 2021-01-01 RX ADMIN — Medication 1000 MILLIGRAM(S): at 03:59

## 2021-01-01 RX ADMIN — Medication 250 MILLIMOLE(S): at 02:20

## 2021-01-01 RX ADMIN — DIPHENHYDRAMINE HYDROCHLORIDE AND LIDOCAINE HYDROCHLORIDE AND ALUMINUM HYDROXIDE AND MAGNESIUM HYDRO 10 MILLILITER(S): KIT at 21:46

## 2021-01-01 RX ADMIN — Medication 650 MILLIGRAM(S): at 14:04

## 2021-01-01 RX ADMIN — DIPHENHYDRAMINE HYDROCHLORIDE AND LIDOCAINE HYDROCHLORIDE AND ALUMINUM HYDROXIDE AND MAGNESIUM HYDRO 10 MILLILITER(S): KIT at 21:11

## 2021-01-01 RX ADMIN — FIDAXOMICIN 200 MILLIGRAM(S): 200 GRANULE, FOR SUSPENSION ORAL at 05:50

## 2021-01-01 RX ADMIN — DEXTROSE MONOHYDRATE, SODIUM CHLORIDE, AND POTASSIUM CHLORIDE 75 MILLILITER(S): 50; .745; 4.5 INJECTION, SOLUTION INTRAVENOUS at 02:11

## 2021-01-01 RX ADMIN — Medication 1 TABLET(S): at 12:09

## 2021-01-01 RX ADMIN — LIDOCAINE 1 PATCH: 4 CREAM TOPICAL at 18:22

## 2021-01-01 RX ADMIN — Medication 5 MILLIGRAM(S): at 09:40

## 2021-01-01 RX ADMIN — Medication 100 MILLIGRAM(S): at 13:21

## 2021-01-01 RX ADMIN — SODIUM CHLORIDE 100 MILLILITER(S): 9 INJECTION INTRAMUSCULAR; INTRAVENOUS; SUBCUTANEOUS at 17:22

## 2021-01-01 RX ADMIN — Medication 1 EACH: at 16:41

## 2021-01-01 RX ADMIN — OXYCODONE HYDROCHLORIDE 2.5 MILLIGRAM(S): 5 TABLET ORAL at 05:10

## 2021-01-01 RX ADMIN — MORPHINE SULFATE 2 MILLIGRAM(S): 50 CAPSULE, EXTENDED RELEASE ORAL at 16:34

## 2021-01-01 RX ADMIN — Medication 1 MILLIGRAM(S): at 11:22

## 2021-01-01 RX ADMIN — Medication 1 MILLIGRAM(S): at 11:25

## 2021-01-01 RX ADMIN — Medication 125 MILLIGRAM(S): at 05:05

## 2021-01-01 RX ADMIN — Medication 25 MILLIGRAM(S): at 11:34

## 2021-01-01 RX ADMIN — Medication 100 MILLIGRAM(S): at 20:57

## 2021-01-01 RX ADMIN — Medication 5 MILLIGRAM(S): at 10:22

## 2021-01-01 RX ADMIN — PANTOPRAZOLE SODIUM 40 MILLIGRAM(S): 20 TABLET, DELAYED RELEASE ORAL at 05:28

## 2021-01-01 RX ADMIN — PIPERACILLIN AND TAZOBACTAM 25 GRAM(S): 4; .5 INJECTION, POWDER, LYOPHILIZED, FOR SOLUTION INTRAVENOUS at 09:11

## 2021-01-01 RX ADMIN — Medication 5 MILLILITER(S): at 17:57

## 2021-01-01 RX ADMIN — LIDOCAINE 1 PATCH: 4 CREAM TOPICAL at 11:12

## 2021-01-01 RX ADMIN — Medication 50 GRAM(S): at 18:13

## 2021-01-01 RX ADMIN — Medication 120 MILLIGRAM(S): at 07:23

## 2021-01-01 RX ADMIN — Medication 50 MILLIEQUIVALENT(S): at 01:16

## 2021-01-01 RX ADMIN — Medication 5 MILLIGRAM(S): at 13:35

## 2021-01-01 RX ADMIN — CHLORHEXIDINE GLUCONATE 1 APPLICATION(S): 213 SOLUTION TOPICAL at 06:08

## 2021-01-01 RX ADMIN — Medication 0.25 MILLIGRAM(S): at 21:25

## 2021-01-01 RX ADMIN — Medication 100 MILLIGRAM(S): at 13:02

## 2021-01-01 RX ADMIN — Medication 20 MILLIGRAM(S): at 09:46

## 2021-01-01 RX ADMIN — Medication 500 MILLIGRAM(S): at 17:26

## 2021-01-01 RX ADMIN — Medication 650 MILLIGRAM(S): at 15:19

## 2021-01-01 RX ADMIN — DIPHENHYDRAMINE HYDROCHLORIDE AND LIDOCAINE HYDROCHLORIDE AND ALUMINUM HYDROXIDE AND MAGNESIUM HYDRO 10 MILLILITER(S): KIT at 13:12

## 2021-01-01 RX ADMIN — Medication 37.5 MILLIGRAM(S): at 17:49

## 2021-01-01 RX ADMIN — PIPERACILLIN AND TAZOBACTAM 25 GRAM(S): 4; .5 INJECTION, POWDER, LYOPHILIZED, FOR SOLUTION INTRAVENOUS at 18:14

## 2021-01-01 RX ADMIN — PANTOPRAZOLE SODIUM 10 MG/HR: 20 TABLET, DELAYED RELEASE ORAL at 08:36

## 2021-01-01 RX ADMIN — Medication 500 MILLIGRAM(S): at 17:22

## 2021-01-01 RX ADMIN — Medication 1 TABLET(S): at 13:21

## 2021-01-01 RX ADMIN — DEXTROSE MONOHYDRATE, SODIUM CHLORIDE, AND POTASSIUM CHLORIDE 75 MILLILITER(S): 50; .745; 4.5 INJECTION, SOLUTION INTRAVENOUS at 08:35

## 2021-01-01 RX ADMIN — Medication 120 MILLIGRAM(S): at 05:54

## 2021-01-01 RX ADMIN — Medication 5 MILLIGRAM(S): at 17:17

## 2021-01-01 RX ADMIN — LIDOCAINE 1 PATCH: 4 CREAM TOPICAL at 19:45

## 2021-01-01 RX ADMIN — Medication 500 MILLIGRAM(S): at 05:05

## 2021-01-01 RX ADMIN — LIDOCAINE 1 PATCH: 4 CREAM TOPICAL at 11:16

## 2021-01-01 RX ADMIN — Medication 120 MILLIGRAM(S): at 06:28

## 2021-01-01 RX ADMIN — Medication 400 MILLIGRAM(S): at 17:01

## 2021-01-01 RX ADMIN — Medication 200 MILLIGRAM(S): at 17:36

## 2021-01-01 RX ADMIN — TRAMADOL HYDROCHLORIDE 25 MILLIGRAM(S): 50 TABLET ORAL at 19:59

## 2021-01-01 RX ADMIN — MORPHINE SULFATE 2 MILLIGRAM(S): 50 CAPSULE, EXTENDED RELEASE ORAL at 06:50

## 2021-01-01 RX ADMIN — Medication 100 MILLIEQUIVALENT(S): at 03:56

## 2021-01-01 RX ADMIN — Medication 1000 UNIT(S): at 11:21

## 2021-01-01 RX ADMIN — Medication 650 MILLIGRAM(S): at 12:57

## 2021-01-01 RX ADMIN — Medication 1 EACH: at 17:39

## 2021-01-01 RX ADMIN — Medication 500 MILLIGRAM(S): at 23:11

## 2021-01-01 RX ADMIN — Medication 20 MILLIGRAM(S): at 04:34

## 2021-01-01 RX ADMIN — Medication 5 MILLILITER(S): at 17:33

## 2021-01-01 RX ADMIN — Medication 20 MILLIGRAM(S): at 05:39

## 2021-01-01 RX ADMIN — Medication 650 MILLIGRAM(S): at 12:50

## 2021-01-01 RX ADMIN — Medication 1 APPLICATION(S): at 05:19

## 2021-01-01 RX ADMIN — LIDOCAINE 1 PATCH: 4 CREAM TOPICAL at 19:57

## 2021-01-01 RX ADMIN — Medication 125 MILLILITER(S): at 20:59

## 2021-01-01 RX ADMIN — Medication 120 MILLIGRAM(S): at 05:40

## 2021-01-01 RX ADMIN — Medication 250 MILLIMOLE(S): at 01:47

## 2021-01-01 RX ADMIN — Medication 650 MILLIGRAM(S): at 06:35

## 2021-01-01 RX ADMIN — MORPHINE SULFATE 2 MILLIGRAM(S): 50 CAPSULE, EXTENDED RELEASE ORAL at 12:59

## 2021-01-01 RX ADMIN — Medication 1 EACH: at 18:10

## 2021-01-01 RX ADMIN — POTASSIUM PHOSPHATE, MONOBASIC POTASSIUM PHOSPHATE, DIBASIC 83.33 MILLIMOLE(S): 236; 224 INJECTION, SOLUTION INTRAVENOUS at 00:42

## 2021-01-01 RX ADMIN — I.V. FAT EMULSION 25 ML/HR: 20 EMULSION INTRAVENOUS at 17:31

## 2021-01-01 RX ADMIN — Medication 650 MILLIGRAM(S): at 05:19

## 2021-01-01 RX ADMIN — DEXTROSE MONOHYDRATE, SODIUM CHLORIDE, AND POTASSIUM CHLORIDE 60 MILLILITER(S): 50; .745; 4.5 INJECTION, SOLUTION INTRAVENOUS at 01:45

## 2021-01-01 RX ADMIN — Medication 2 PACKET(S): at 13:12

## 2021-01-01 RX ADMIN — Medication 50 GRAM(S): at 16:47

## 2021-01-01 RX ADMIN — Medication 650 MILLIGRAM(S): at 01:20

## 2021-01-01 RX ADMIN — CHLORHEXIDINE GLUCONATE 1 APPLICATION(S): 213 SOLUTION TOPICAL at 05:43

## 2021-01-01 RX ADMIN — Medication 650 MILLIGRAM(S): at 02:44

## 2021-01-01 RX ADMIN — PANTOPRAZOLE SODIUM 40 MILLIGRAM(S): 20 TABLET, DELAYED RELEASE ORAL at 05:32

## 2021-01-01 RX ADMIN — Medication 5 MILLIGRAM(S): at 06:11

## 2021-01-01 RX ADMIN — Medication 1 MILLIGRAM(S): at 12:57

## 2021-01-01 RX ADMIN — LIDOCAINE 1 PATCH: 4 CREAM TOPICAL at 00:00

## 2021-01-01 RX ADMIN — MORPHINE SULFATE 2 MILLIGRAM(S): 50 CAPSULE, EXTENDED RELEASE ORAL at 20:42

## 2021-01-01 RX ADMIN — PANTOPRAZOLE SODIUM 40 MILLIGRAM(S): 20 TABLET, DELAYED RELEASE ORAL at 05:24

## 2021-01-01 RX ADMIN — DIPHENHYDRAMINE HYDROCHLORIDE AND LIDOCAINE HYDROCHLORIDE AND ALUMINUM HYDROXIDE AND MAGNESIUM HYDRO 10 MILLILITER(S): KIT at 07:02

## 2021-01-01 RX ADMIN — Medication 100 MILLIGRAM(S): at 21:45

## 2021-01-01 RX ADMIN — PANTOPRAZOLE SODIUM 40 MILLIGRAM(S): 20 TABLET, DELAYED RELEASE ORAL at 00:26

## 2021-01-01 RX ADMIN — Medication 9 MILLIGRAM(S): at 06:56

## 2021-01-01 RX ADMIN — Medication 5 MILLIGRAM(S): at 09:06

## 2021-01-01 RX ADMIN — I.V. FAT EMULSION 25 ML/HR: 20 EMULSION INTRAVENOUS at 19:22

## 2021-01-01 RX ADMIN — I.V. FAT EMULSION 25 ML/HR: 20 EMULSION INTRAVENOUS at 17:00

## 2021-01-01 RX ADMIN — Medication 1 TABLET(S): at 12:50

## 2021-01-01 RX ADMIN — PANTOPRAZOLE SODIUM 40 MILLIGRAM(S): 20 TABLET, DELAYED RELEASE ORAL at 17:12

## 2021-01-01 RX ADMIN — Medication 120 MILLIGRAM(S): at 06:49

## 2021-01-01 RX ADMIN — MORPHINE SULFATE 2 MILLIGRAM(S): 50 CAPSULE, EXTENDED RELEASE ORAL at 11:18

## 2021-01-01 RX ADMIN — FIDAXOMICIN 200 MILLIGRAM(S): 200 GRANULE, FOR SUSPENSION ORAL at 06:49

## 2021-01-01 RX ADMIN — Medication 1000 MILLIGRAM(S): at 01:11

## 2021-01-01 RX ADMIN — Medication 0.25 MILLIGRAM(S): at 22:59

## 2021-01-01 RX ADMIN — HALOPERIDOL DECANOATE 1 MILLIGRAM(S): 100 INJECTION INTRAMUSCULAR at 05:35

## 2021-01-01 RX ADMIN — Medication 125 MILLIGRAM(S): at 07:23

## 2021-01-01 RX ADMIN — PIPERACILLIN AND TAZOBACTAM 25 GRAM(S): 4; .5 INJECTION, POWDER, LYOPHILIZED, FOR SOLUTION INTRAVENOUS at 17:14

## 2021-01-01 RX ADMIN — Medication 0.25 MILLIGRAM(S): at 22:54

## 2021-01-01 RX ADMIN — I.V. FAT EMULSION 25 ML/HR: 20 EMULSION INTRAVENOUS at 16:55

## 2021-01-01 RX ADMIN — Medication 650 MILLIGRAM(S): at 00:52

## 2021-01-01 RX ADMIN — ENOXAPARIN SODIUM 40 MILLIGRAM(S): 100 INJECTION SUBCUTANEOUS at 05:00

## 2021-01-01 RX ADMIN — Medication 5 MILLIGRAM(S): at 14:42

## 2021-01-01 RX ADMIN — FIDAXOMICIN 200 MILLIGRAM(S): 200 GRANULE, FOR SUSPENSION ORAL at 17:35

## 2021-01-01 RX ADMIN — PANTOPRAZOLE SODIUM 40 MILLIGRAM(S): 20 TABLET, DELAYED RELEASE ORAL at 12:40

## 2021-01-01 RX ADMIN — SODIUM CHLORIDE 100 MILLILITER(S): 9 INJECTION INTRAMUSCULAR; INTRAVENOUS; SUBCUTANEOUS at 11:21

## 2021-01-01 RX ADMIN — PIPERACILLIN AND TAZOBACTAM 25 GRAM(S): 4; .5 INJECTION, POWDER, LYOPHILIZED, FOR SOLUTION INTRAVENOUS at 02:13

## 2021-01-01 RX ADMIN — FIDAXOMICIN 200 MILLIGRAM(S): 200 GRANULE, FOR SUSPENSION ORAL at 05:07

## 2021-01-01 RX ADMIN — PIPERACILLIN AND TAZOBACTAM 25 GRAM(S): 4; .5 INJECTION, POWDER, LYOPHILIZED, FOR SOLUTION INTRAVENOUS at 10:59

## 2021-01-01 RX ADMIN — Medication 250 MILLIMOLE(S): at 02:23

## 2021-01-01 RX ADMIN — Medication 1 TABLET(S): at 12:35

## 2021-01-01 RX ADMIN — Medication 1000 MILLIGRAM(S): at 20:15

## 2021-01-01 RX ADMIN — Medication 1 EACH: at 07:32

## 2021-01-01 RX ADMIN — Medication 0.25 MILLIGRAM(S): at 21:32

## 2021-01-01 RX ADMIN — Medication 20 MILLIGRAM(S): at 21:51

## 2021-01-01 RX ADMIN — Medication 20 MILLIEQUIVALENT(S): at 18:32

## 2021-01-01 RX ADMIN — Medication 125 MILLIGRAM(S): at 10:23

## 2021-01-01 RX ADMIN — DIPHENHYDRAMINE HYDROCHLORIDE AND LIDOCAINE HYDROCHLORIDE AND ALUMINUM HYDROXIDE AND MAGNESIUM HYDRO 10 MILLILITER(S): KIT at 05:33

## 2021-01-01 RX ADMIN — DIPHENHYDRAMINE HYDROCHLORIDE AND LIDOCAINE HYDROCHLORIDE AND ALUMINUM HYDROXIDE AND MAGNESIUM HYDRO 10 MILLILITER(S): KIT at 13:07

## 2021-01-01 RX ADMIN — ENOXAPARIN SODIUM 40 MILLIGRAM(S): 100 INJECTION SUBCUTANEOUS at 11:34

## 2021-01-01 RX ADMIN — Medication 125 MILLIGRAM(S): at 06:56

## 2021-01-01 RX ADMIN — SODIUM CHLORIDE 100 MILLILITER(S): 9 INJECTION INTRAMUSCULAR; INTRAVENOUS; SUBCUTANEOUS at 22:10

## 2021-01-01 RX ADMIN — Medication 20 MILLIGRAM(S): at 22:09

## 2021-01-01 RX ADMIN — TRAMADOL HYDROCHLORIDE 100 MILLIGRAM(S): 50 TABLET ORAL at 17:30

## 2021-01-01 RX ADMIN — Medication 200 GRAM(S): at 00:10

## 2021-01-01 RX ADMIN — Medication 1 MILLIGRAM(S): at 12:10

## 2021-01-01 RX ADMIN — Medication 400 MILLIGRAM(S): at 00:50

## 2021-01-01 RX ADMIN — CHLORHEXIDINE GLUCONATE 15 MILLILITER(S): 213 SOLUTION TOPICAL at 05:03

## 2021-01-01 RX ADMIN — Medication 1 APPLICATION(S): at 08:14

## 2021-01-01 RX ADMIN — HALOPERIDOL DECANOATE 1 MILLIGRAM(S): 100 INJECTION INTRAMUSCULAR at 10:35

## 2021-01-01 RX ADMIN — PANTOPRAZOLE SODIUM 40 MILLIGRAM(S): 20 TABLET, DELAYED RELEASE ORAL at 06:50

## 2021-01-01 RX ADMIN — Medication 40 MILLIGRAM(S): at 10:37

## 2021-01-01 RX ADMIN — Medication 500 MILLIGRAM(S): at 05:02

## 2021-01-01 RX ADMIN — Medication 40 MILLIEQUIVALENT(S): at 22:04

## 2021-01-01 RX ADMIN — Medication 120 MILLIGRAM(S): at 06:14

## 2021-01-01 RX ADMIN — Medication 1 TABLET(S): at 11:51

## 2021-01-01 RX ADMIN — Medication 1 TABLET(S): at 12:46

## 2021-01-01 RX ADMIN — Medication 25 MILLIGRAM(S): at 00:12

## 2021-01-01 RX ADMIN — Medication 650 MILLIGRAM(S): at 05:22

## 2021-01-01 RX ADMIN — Medication 37.5 MILLIGRAM(S): at 05:43

## 2021-01-01 RX ADMIN — DIPHENHYDRAMINE HYDROCHLORIDE AND LIDOCAINE HYDROCHLORIDE AND ALUMINUM HYDROXIDE AND MAGNESIUM HYDRO 10 MILLILITER(S): KIT at 06:49

## 2021-01-01 RX ADMIN — Medication 1000 UNIT(S): at 15:24

## 2021-01-01 RX ADMIN — SODIUM CHLORIDE 3000 MILLILITER(S): 9 INJECTION, SOLUTION INTRAVENOUS at 01:00

## 2021-01-01 RX ADMIN — Medication 20 MILLIEQUIVALENT(S): at 14:37

## 2021-01-01 RX ADMIN — LIDOCAINE 1 PATCH: 4 CREAM TOPICAL at 11:28

## 2021-01-01 RX ADMIN — Medication 50 GRAM(S): at 23:40

## 2021-01-01 RX ADMIN — Medication 1 EACH: at 19:52

## 2021-01-01 RX ADMIN — Medication 5 MILLILITER(S): at 17:16

## 2021-01-01 RX ADMIN — AMIODARONE HYDROCHLORIDE 16.7 MG/MIN: 400 TABLET ORAL at 07:45

## 2021-01-01 RX ADMIN — Medication 5 MILLILITER(S): at 17:17

## 2021-01-01 RX ADMIN — Medication 1 MILLIGRAM(S): at 13:23

## 2021-01-01 RX ADMIN — Medication 200 GRAM(S): at 01:13

## 2021-01-01 RX ADMIN — Medication 20 MILLIEQUIVALENT(S): at 16:34

## 2021-01-01 RX ADMIN — Medication 5 MILLILITER(S): at 11:05

## 2021-01-01 RX ADMIN — CHLORHEXIDINE GLUCONATE 1 APPLICATION(S): 213 SOLUTION TOPICAL at 05:32

## 2021-01-01 RX ADMIN — Medication 1 MILLIGRAM(S): at 15:23

## 2021-01-01 RX ADMIN — DEXMEDETOMIDINE HYDROCHLORIDE IN 0.9% SODIUM CHLORIDE 2.95 MICROGRAM(S)/KG/HR: 4 INJECTION INTRAVENOUS at 23:00

## 2021-01-01 RX ADMIN — Medication 650 MILLIGRAM(S): at 14:55

## 2021-01-01 RX ADMIN — Medication 250 MICROGRAM(S): at 05:31

## 2021-01-01 RX ADMIN — DIPHENHYDRAMINE HYDROCHLORIDE AND LIDOCAINE HYDROCHLORIDE AND ALUMINUM HYDROXIDE AND MAGNESIUM HYDRO 10 MILLILITER(S): KIT at 21:37

## 2021-01-01 RX ADMIN — Medication 2: at 00:29

## 2021-01-01 RX ADMIN — Medication 1 MILLIGRAM(S): at 11:11

## 2021-01-01 RX ADMIN — Medication 125 MILLIGRAM(S): at 18:27

## 2021-01-01 RX ADMIN — Medication 1 TABLET(S): at 12:24

## 2021-01-01 RX ADMIN — Medication 2 PACKET(S): at 17:18

## 2021-01-01 RX ADMIN — Medication 100 MILLIGRAM(S): at 18:26

## 2021-01-01 RX ADMIN — FIDAXOMICIN 200 MILLIGRAM(S): 200 GRANULE, FOR SUSPENSION ORAL at 17:06

## 2021-01-01 RX ADMIN — DEXTROSE MONOHYDRATE, SODIUM CHLORIDE, AND POTASSIUM CHLORIDE 60 MILLILITER(S): 50; .745; 4.5 INJECTION, SOLUTION INTRAVENOUS at 06:50

## 2021-01-01 RX ADMIN — Medication 5 MILLIGRAM(S): at 05:02

## 2021-01-01 RX ADMIN — Medication 500 MILLIGRAM(S): at 13:28

## 2021-01-01 RX ADMIN — Medication 1500 MILLILITER(S): at 01:23

## 2021-01-01 RX ADMIN — Medication 5 MILLILITER(S): at 05:13

## 2021-01-01 RX ADMIN — SODIUM CHLORIDE 1000 MILLILITER(S): 9 INJECTION INTRAMUSCULAR; INTRAVENOUS; SUBCUTANEOUS at 18:44

## 2021-01-01 RX ADMIN — Medication 1 GRAM(S): at 05:18

## 2021-01-01 RX ADMIN — PANTOPRAZOLE SODIUM 10 MG/HR: 20 TABLET, DELAYED RELEASE ORAL at 22:29

## 2021-01-01 RX ADMIN — Medication 25 MILLIGRAM(S): at 11:21

## 2021-01-01 RX ADMIN — Medication 500 MILLIGRAM(S): at 05:34

## 2021-01-01 RX ADMIN — Medication 20 MILLIGRAM(S): at 14:52

## 2021-01-01 RX ADMIN — OXYCODONE HYDROCHLORIDE 2.5 MILLIGRAM(S): 5 TABLET ORAL at 03:41

## 2021-01-01 RX ADMIN — Medication 250 MILLIMOLE(S): at 23:41

## 2021-01-01 RX ADMIN — DIPHENHYDRAMINE HYDROCHLORIDE AND LIDOCAINE HYDROCHLORIDE AND ALUMINUM HYDROXIDE AND MAGNESIUM HYDRO 10 MILLILITER(S): KIT at 21:24

## 2021-01-01 RX ADMIN — Medication 650 MILLIGRAM(S): at 15:41

## 2021-01-01 RX ADMIN — DIPHENHYDRAMINE HYDROCHLORIDE AND LIDOCAINE HYDROCHLORIDE AND ALUMINUM HYDROXIDE AND MAGNESIUM HYDRO 10 MILLILITER(S): KIT at 05:12

## 2021-01-01 RX ADMIN — Medication 100 MILLIGRAM(S): at 06:04

## 2021-01-01 RX ADMIN — MORPHINE SULFATE 2 MILLIGRAM(S): 50 CAPSULE, EXTENDED RELEASE ORAL at 11:30

## 2021-01-01 RX ADMIN — PANTOPRAZOLE SODIUM 40 MILLIGRAM(S): 20 TABLET, DELAYED RELEASE ORAL at 05:59

## 2021-01-01 RX ADMIN — TRAMADOL HYDROCHLORIDE 25 MILLIGRAM(S): 50 TABLET ORAL at 08:50

## 2021-01-01 RX ADMIN — HYDROMORPHONE HYDROCHLORIDE 0.25 MILLIGRAM(S): 2 INJECTION INTRAMUSCULAR; INTRAVENOUS; SUBCUTANEOUS at 10:05

## 2021-01-01 RX ADMIN — PANTOPRAZOLE SODIUM 40 MILLIGRAM(S): 20 TABLET, DELAYED RELEASE ORAL at 17:25

## 2021-01-01 RX ADMIN — Medication 20 MILLIGRAM(S): at 05:03

## 2021-01-01 RX ADMIN — Medication 400 MILLIGRAM(S): at 08:05

## 2021-01-01 RX ADMIN — Medication 5 MILLIGRAM(S): at 21:27

## 2021-01-01 RX ADMIN — Medication 1000 UNIT(S): at 11:07

## 2021-01-01 RX ADMIN — ENOXAPARIN SODIUM 40 MILLIGRAM(S): 100 INJECTION SUBCUTANEOUS at 05:01

## 2021-01-01 RX ADMIN — I.V. FAT EMULSION 12.5 ML/HR: 20 EMULSION INTRAVENOUS at 16:41

## 2021-01-01 RX ADMIN — Medication 40 MILLIGRAM(S): at 13:26

## 2021-01-01 RX ADMIN — Medication 650 MILLIGRAM(S): at 23:45

## 2021-01-01 RX ADMIN — Medication 100 MILLIGRAM(S): at 22:23

## 2021-01-01 RX ADMIN — CHLORHEXIDINE GLUCONATE 1 APPLICATION(S): 213 SOLUTION TOPICAL at 11:53

## 2021-01-01 RX ADMIN — Medication 1 EACH: at 17:56

## 2021-01-01 RX ADMIN — LIDOCAINE 1 PATCH: 4 CREAM TOPICAL at 09:17

## 2021-01-01 RX ADMIN — Medication 5 MILLIGRAM(S): at 17:35

## 2021-01-01 RX ADMIN — TRAMADOL HYDROCHLORIDE 25 MILLIGRAM(S): 50 TABLET ORAL at 09:20

## 2021-01-01 RX ADMIN — Medication 125 MILLIGRAM(S): at 12:45

## 2021-01-01 RX ADMIN — Medication 1 MILLIGRAM(S): at 13:28

## 2021-01-01 RX ADMIN — MORPHINE SULFATE 2 MILLIGRAM(S): 50 CAPSULE, EXTENDED RELEASE ORAL at 10:14

## 2021-01-01 RX ADMIN — Medication 125 MILLIGRAM(S): at 17:29

## 2021-01-01 RX ADMIN — Medication 50 GRAM(S): at 03:03

## 2021-01-01 RX ADMIN — PANTOPRAZOLE SODIUM 40 MILLIGRAM(S): 20 TABLET, DELAYED RELEASE ORAL at 05:13

## 2021-01-01 RX ADMIN — Medication 100 MILLIGRAM(S): at 13:45

## 2021-01-01 RX ADMIN — I.V. FAT EMULSION 25 ML/HR: 20 EMULSION INTRAVENOUS at 18:10

## 2021-01-01 RX ADMIN — Medication 500 MILLIGRAM(S): at 23:21

## 2021-01-01 RX ADMIN — DIPHENHYDRAMINE HYDROCHLORIDE AND LIDOCAINE HYDROCHLORIDE AND ALUMINUM HYDROXIDE AND MAGNESIUM HYDRO 10 MILLILITER(S): KIT at 14:55

## 2021-07-13 NOTE — H&P ADULT - NSICDXPASTMEDICALHX_GEN_ALL_CORE_FT
PAST MEDICAL HISTORY:  No pertinent past medical history      PAST MEDICAL HISTORY:  CAD (coronary artery disease) ?stents? Off ASA/Plavix due to intolerance - unknown    Crohn disease     GI bleed     Paroxysmal atrial fibrillation      PAST MEDICAL HISTORY:  CAD (coronary artery disease) not on ASA or plavix, no stents as per daughter    Crohn disease     GI bleed     Paroxysmal atrial fibrillation

## 2021-07-13 NOTE — ED ADULT NURSE NOTE - OBJECTIVE STATEMENT
81 y.o M A&Ox3 with PMH of HTN and Crohn's disease presents to the ED c.o abd discomfort, diarrhea, and intermittent fevers. Pt. reports he has been having diarrhea for the last 8 months and was diagnosed with Crohn's disease. As per family, pt. was being treated at [facility]. Started on methotrexate three weeks ago, and had total of 3 doses - as per family, pt. now has mouth sores due to methotrexate. Pt. is supposed to start Humira this week however had blood work done on Friday and GI advised him to come to ED for further evaluation due to elevated WBC and intermittent fevers. Pt. had temp of 102.7 last night, relieved after tylenol. Afebrile at this time. Tenderness noted to LLQ. Also endorses decrease PO intake due to mouth sores and persistent bloody diarrhea after PO intake. IV access obtained. Safety and comfort provided. Family at bedside.

## 2021-07-13 NOTE — H&P ADULT - NSHPLABSRESULTS_GEN_ALL_CORE
8.4    11.99 )-----------( 422      ( 13 Jul 2021 12:15 )             26.9     07-13    128<L>  |  94<L>  |  9   ----------------------------<  105<H>  3.5   |  23  |  0.88    Ca    8.3<L>      13 Jul 2021 12:15    TPro  7.2  /  Alb  2.7<L>  /  TBili  0.8  /  DBili  x   /  AST  14  /  ALT  16  /  AlkPhos  49  07-13

## 2021-07-13 NOTE — ED ADULT NURSE REASSESSMENT NOTE - NS ED NURSE REASSESS COMMENT FT1
Report received from AYO Ovalle. Upon assessment, pt is AO x 4, breathing spontaneous and unlabored. Pt awaiting inpatient bed. Fall and safety precautions in place, call bell within reach.

## 2021-07-13 NOTE — H&P ADULT - HISTORY OF PRESENT ILLNESS
80 yo M with Crohn disease, over the last 9 mo, he was started on several medication and was recently told that he may need to go on IV infusion for chrons, he has several hospitalization at [facility] , and follows with GI near [facility]. but his diarrhea is not improving , having abd cramps , denies any N/V . , discharged 1 mo ago from [facility], now having trouble walking, on going diarrhea. + subjective fever but denies cough or nasal congestion or uri or urinary symptoms.  80 yo M with Crohn disease, afib on sotalol, over the last 9 mo, he was started on several medication and was recently told that he may need to go on IV infusion for chrons, he has several hospitalization at [facility] , and follows with GI near [facility]. but his diarrhea is not improving , having abd cramps , denies any N/V discharged 1 mo ago from [facility], now having trouble walking, on going diarrhea. + subjective fever but denies cough or nasal congestion or uri or urinary symptoms.  82 yo M with Crohn disease, afib on sotalol (not on AC), over the last 9 mo, he was started on several medication and was recently told that he may need to go on IV infusion for chrons, he has several hospitalization at [facility] , and follows with GI near [facility]. but his diarrhea is not improving , having abd cramps , denies any N/V discharged 1 mo ago from [facility], now having trouble walking, on going diarrhea. + subjective fever but denies cough or nasal congestion or uri or urinary symptoms.

## 2021-07-13 NOTE — ED PROVIDER NOTE - PROGRESS NOTE DETAILS
Kori Villaseñor M.D. Resident  Pt and family aware of findings, agreeable to staying. Dr. Oliver accepted patient. RAVEN Luna, Attending: sign out from Morris. Admitted for Crohns. No surgical path on CT. Stable. Spoke to daughter about plan.

## 2021-07-13 NOTE — ED ADULT TRIAGE NOTE - CHIEF COMPLAINT QUOTE
bloody diarrhea x 8 months, 60 lb weight loss, cant eat due to sores in mouth, intermittent fevers, ,last night 102.7   elevated WBC

## 2021-07-13 NOTE — ED PROVIDER NOTE - ATTENDING CONTRIBUTION TO CARE
I have personally seen and examined this patient.  I have fully participated in the care of this patient. I have reviewed all pertinent clinical information, including history, physical exam, plan and the Resident’s note and agree except as noted. - MD Morris.    82 yo M, recently diagnosed Crohn, over the last 9 mo, no improvement after lemikade, mesalamin, discharged recently with weakness, admitting subjective fever, can have Crohn flare    lab, ct abd, stool sample, likely admission and GI consult
sudden onset

## 2021-07-13 NOTE — H&P ADULT - NSHPPHYSICALEXAM_GEN_ALL_CORE
pt. seen and examined, NAD    Vital Signs Last 24 Hrs  T(C): 36.5 (13 Jul 2021 19:57), Max: 36.8 (13 Jul 2021 10:32)  T(F): 97.7 (13 Jul 2021 19:57), Max: 98.3 (13 Jul 2021 10:32)  HR: 76 (13 Jul 2021 19:57) (61 - 76)  BP: 151/69 (13 Jul 2021 19:57) (102/59 - 151/69)  BP(mean): 79 (13 Jul 2021 11:23) (79 - 79)  RR: 19 (13 Jul 2021 19:57) (16 - 20)  SpO2: 96% (13 Jul 2021 19:57) (96% - 100%)  heent: nc/at, no pallor   neck: supple, no JVD  lungs: B/L clear, no w/r/r  heart: s1s2 nml  abd: soft, mild diffuse tenderness , no rebound   ext: no e/c/c, pulses 2+  neuro: aaox3 , no focal deficit

## 2021-07-13 NOTE — H&P ADULT - NSHPADDITIONALINFOADULT_GEN_ALL_CORE
adv care planning : d/w patient regarding advance directive , d/w him regarding intubation , CPR, chest compression . he agrees for everything if the need arise. remain full code . time spend 15 min

## 2021-07-13 NOTE — ED ADULT NURSE REASSESSMENT NOTE - NS ED NURSE REASSESS COMMENT FT1
Report received from AYO Etienne. Report being called on inpatient bed. Fall and safety precautions in place, call bell within reach.

## 2021-07-13 NOTE — ED PROVIDER NOTE - OBJECTIVE STATEMENT
Attending/MD Morris. 82 yo M with Crohn disease, over the last 9 mo, none of the drugs, mtrx, mesalamine, lemikade, did not improve his diarrhea, discharged 1 mo ago from [facility], now having trouble walking, on going diarrhea. + subjective fever but denies cough or nasal congestion or uri or urinary symptoms.     PCP: Jose Philippe  GI: [facility] JuanL uis Kinney

## 2021-07-14 NOTE — CHART NOTE - NSCHARTNOTEFT_GEN_A_CORE
C-diff GDH +, PCR pending so results indeterminate, D/W GI Dr Zhong, plan to  d/c messalamine and mtx for now, change iv solumedrol back to po budesonide/ Additionally d/c cipro and flagyl and hold off on any c-diff treatment for now pending PCR results which will likely not return till tomorrow C-diff GDH +, PCR pending so results indeterminate, D/W GI Dr Zhong, plan to  d/c messalamine and mtx for now, change iv solumedrol back to home po dose of budesonide/ Additionally d/c cipro and flagyl and hold off on any c-diff treatment for now pending PCR results which will likely not return till tomorrow

## 2021-07-14 NOTE — PROGRESS NOTE ADULT - SUBJECTIVE AND OBJECTIVE BOX
Patient is a 81y old  Male who presents with a chief complaint of diarrhea with blood in it , abd pain and weakness (2021 12:14)      INTERVAL HPI/OVERNIGHT EVENTS: seen and examined, daughter bedside   abd pain little better , still diarrhea ++  T(C): 37.6 (21 @ 19:57), Max: 37.6 (21 @ 19:57)  HR: 82 (21 @ 19:57) (82 - 89)  BP: 157/76 (21 @ 19:57) (110/61 - 157/76)  RR: 18 (21 @ 19:57) (18 - 19)  SpO2: 98% (21 @ 19:57) (98% - 99%)  Wt(kg): --  I&O's Summary    2021 07:  -  2021 07:00  --------------------------------------------------------  IN: 920 mL / OUT: 350 mL / NET: 570 mL    2021 07:  -  2021 20:49  --------------------------------------------------------  IN: 2040 mL / OUT: 200 mL / NET: 1840 mL        PAST MEDICAL & SURGICAL HISTORY:  No pertinent past medical history    No significant past surgical history        SOCIAL HISTORY  Alcohol:  Tobacco:  Illicit substance use:    FAMILY HISTORY:    REVIEW OF SYSTEMS:  CONSTITUTIONAL: No fever, weight loss, or fatigue  EYES: No eye pain, visual disturbances, or discharge  ENMT:  No difficulty hearing, tinnitus, vertigo; No sinus or throat pain  NECK: No pain or stiffness  RESPIRATORY: No cough, wheezing, chills or hemoptysis; No shortness of breath  CARDIOVASCULAR: No chest pain, palpitations, dizziness, or leg swelling  GASTROINTESTINAL: No abdominal or epigastric pain. No nausea, vomiting, or hematemesis; No diarrhea or constipation. No melena or hematochezia.  GENITOURINARY: No dysuria, frequency, hematuria, or incontinence  NEUROLOGICAL: No headaches, memory loss, loss of strength, numbness, or tremors  SKIN: No itching, burning, rashes, or lesions   LYMPH NODES: No enlarged glands  ENDOCRINE: No heat or cold intolerance; No hair loss  MUSCULOSKELETAL: No joint pain or swelling; No muscle, back, or extremity pain  PSYCHIATRIC: No depression, anxiety, mood swings, or difficulty sleeping  HEME/LYMPH: No easy bruising, or bleeding gums  ALLERY AND IMMUNOLOGIC: No hives or eczema    RADIOLOGY & ADDITIONAL TESTS:    Imaging Personally Reviewed:  [ ] YES  [ ] NO    Consultant(s) Notes Reviewed:  [ ] YES  [ ] NO    PHYSICAL EXAM:  GENERAL: NAD, well-groomed, well-developed  HEAD:  Atraumatic, Normocephalic  EYES: EOMI, PERRLA, conjunctiva and sclera clear  ENMT: No tonsillar erythema, exudates, or enlargement; Moist mucous membranes, Good dentition, No lesions  NECK: Supple, No JVD, Normal thyroid  NERVOUS SYSTEM:  Alert & Oriented X3, Good concentration; Motor Strength 5/5 B/L upper and lower extremities; DTRs 2+ intact and symmetric  CHEST/LUNG: Clear to percussion bilaterally; No rales, rhonchi, wheezing, or rubs  HEART: Regular rate and rhythm; No murmurs, rubs, or gallops  ABDOMEN: Soft, Nontender, Nondistended; Bowel sounds present  EXTREMITIES:  2+ Peripheral Pulses, No clubbing, cyanosis, or edema  LYMPH: No lymphadenopathy noted  SKIN: No rashes or lesions    LABS:                        8.3    10.69 )-----------( 432      ( 2021 07:04 )             26.8     07-14    134<L>  |  100  |  10  ----------------------------<  93  3.6   |  20<L>  |  0.80    Ca    8.4      2021 07:03    TPro  6.9  /  Alb  2.6<L>  /  TBili  0.6  /  DBili  x   /  AST  14  /  ALT  12  /  AlkPhos  51  07-14      Urinalysis Basic - ( 2021 15:58 )    Color: Colorless / Appearance: Clear / S.004 / pH: x  Gluc: x / Ketone: Negative  / Bili: Negative / Urobili: Negative   Blood: x / Protein: Negative / Nitrite: Negative   Leuk Esterase: Negative / RBC: x / WBC x   Sq Epi: x / Non Sq Epi: x / Bacteria: x      CAPILLARY BLOOD GLUCOSE            Urinalysis Basic - ( 2021 15:58 )    Color: Colorless / Appearance: Clear / S.004 / pH: x  Gluc: x / Ketone: Negative  / Bili: Negative / Urobili: Negative   Blood: x / Protein: Negative / Nitrite: Negative   Leuk Esterase: Negative / RBC: x / WBC x   Sq Epi: x / Non Sq Epi: x / Bacteria: x        MEDICATIONS  (STANDING):  buDESOnide    EC Capsule 9 milliGRAM(s) Oral daily  clonazePAM  Tablet 0.5 milliGRAM(s) Oral at bedtime  folic acid 1 milliGRAM(s) Oral daily  Nephro-matt 1 Tablet(s) Oral daily  pantoprazole    Tablet 40 milliGRAM(s) Oral before breakfast  sodium chloride 0.9%. 1000 milliLiter(s) (100 mL/Hr) IV Continuous <Continuous>  sotalol 120 milliGRAM(s) Oral daily    MEDICATIONS  (PRN):      Care Discussed with Consultants/Other Providers [ ] YES  [ ] NO

## 2021-07-14 NOTE — CONSULT NOTE ADULT - ASSESSMENT
GIDEON HAYES is a 81y Male with GERD on omeprazole daily and Crohn's disease diagnosed about 9 mos prior to admission at outside hospital and presents today as a new consult to GI for diarrhea/BRBPR.    #. Diarrhea/BRBPR - likely 2/2 CD flare with leukocytosis with cdiff pending although it is appears as though his CD has not been well controlled since diagnosis.  He has been managed with budesonide/mesalamine, immunomodulator (MTX; has never tried AZA/6MP), and reportedly failed 6 mos of Remicade (biologic) c/b development of antibodies with plans to initiate Humira with outside GI at the end of this month now that insurance has approved it, which would be an appropriate course of action considering he was refractory to Remicade/developed antibodies. Mr. Hayes's clinical presentation supported by elevated inflammatory markers is consistent with an active flare and he will benefit from IV steroids for remission induction while on mesalamine and MTX. The budesonide can be held while on the IV steroids. We will discuss whether initiating Humira inpatient will be possible as he will likely require it for maintenance. If his sx progress while in-house, consider abdominal CT to evaluate for complications.  #. Weight loss - likely 2/2 CD c/b oral aphthous ulcers as he has not been able to tolerate po and will need to ensure adequate hydration  #. GERD     Recommendations:  - Continue Mesalamine 1.2 mg, 4 tabs daily po  - Continue methotrexate 2.5 mg, 6 tabs, po weekly  - Hold budesonide 3 mg po daily  - Start Solumedrol 20 mg IV q8hrs  - dc cipro/flagyl  - f/u cdiff, if positive start vancomycin 125 mg 4x daily, for 10 days  - f/u stool studies  - Trend Hgb, transfuse if Hgb < 7  - Consider repeat imaging if sx progress or do not improve  - Adequate pain control  - Ensure adequate hydration  - Continue omeprazole 20 mg po daily    Plan to be discussed with Attending, Dr. Concepcion.  GIDEON HAYES is a 81y Male with GERD on omeprazole daily and Crohn's disease diagnosed about 9 mos prior to admission at outside hospital c/b h/o abscess and left posterior distal anal intersphincteric fistula and presents today as a new consult to GI for diarrhea/BRBPR.    #. Diarrhea/BRBPR - likely 2/2 CD flare with leukocytosis vs infectious etiology, cdiff pending, although it is appears as though his CD has not been well controlled since diagnosis.  He has been managed with budesonide/mesalamine, immunomodulator (MTX; has never tried AZA/6MP), and reportedly failed 6 mos of Remicade (biologic) c/b development of antibodies with plans to initiate Humira with outside GI at the end of this month now that insurance has approved it, which would be an appropriate course of action considering he was refractory to Remicade/developed antibodies. Mr. Hayes's clinical presentation supported by elevated inflammatory markers is consistent with an active flare and he will benefit from IV steroids for remission induction while on mesalamine and MTX. The budesonide can be held while on the IV steroids. We will discuss whether initiating Humira inpatient will be possible as he will likely require it for maintenance. If his sx progress while in-house, consider abdominal CT to evaluate for complications.  #. Weight loss - likely 2/2 CD c/b oral aphthous ulcers as he has not been able to tolerate po and will need to ensure adequate hydration  #. GERD     Recommendations:  - Continue Mesalamine 1.2 mg, 4 tabs daily po  - Continue methotrexate 2.5 mg, 6 tabs, po weekly  - Hold budesonide 3 mg po daily  - Start Solumedrol 20 mg IV q8hrs  - dc cipro/flagyl  - f/u cdiff, if positive start vancomycin 125 mg 4x daily, for 10 days  - f/u stool studies  - Trend Hgb, transfuse if Hgb < 7  - Consider repeat imaging if sx progress or do not improve  - Adequate pain control  - Ensure adequate hydration  - Continue omeprazole 20 mg po daily    Plan to be discussed with Attending, Dr. Concepcion.  GIDEON HAYES is a 81y Male with GERD on omeprazole daily and Crohn's disease diagnosed about 9 mos prior to admission at outside hospital c/b h/o abscess and left posterior distal anal intersphincteric fistula and presents today as a new consult to GI for diarrhea/BRBPR.    #. Diarrhea/BRBPR - likely 2/2 CD flare with leukocytosis vs infectious etiology with cdiff vs CMV colitis, although it is appears as though his CD has not been well controlled over the past couple of months. He has been managed with budesonide/mesalamine,MTX (unclear why and if it was started as dual therapy with Remicade, but is still on it) and has never tried AZA/6MP, unclear why not as well, and failed 6 mos of Remicade (biologic) c/b development of antibodies with plans to initiate Humira with outside GI at the end of this month now that insurance has approved it, which would be an appropriate course of action considering he was refractory to Remicade/developed antibodies. Mr. Hayes's clinical presentation supported by elevated inflammatory markers is consistent with an active flare and he will benefit from IV steroids for remission induction. However, will need to first r/o cdiff colitis. If it's negative, anticipate flex sig with bx to evaluate for CMV and start IV steroids. The mesalamine/MTX have not been working for him so they are no longer needed. If his sx progress/persist while in-house, consider abdominal CT to evaluate for complications.    #. Weight loss - likely 2/2 CD c/b oral aphthous ulcers and diarrhea as he has not been able to tolerate po and will need to ensure adequate hydration  #. GERD     Recommendations:  - dc Mesalamine 1.2 mg, 4 tabs daily po  - dc methotrexate 2.5 mg, 6 tabs, po weekly  - Continue budesonide 3 mg po daily  - dc cipro/flagyl  - Check QuantiFeron and HBV surface Ag for possible Biologic use  - f/u cdiff, if positive start vancomycin 125 mg 4x daily, for 10 days  - f/u diff, if negative, plan for flex sig with bx for CMV and colon evaluation, then start IV solumedrol 20 mg q8h  - f/u stool studies  - Trend Hgb, transfuse if Hgb < 7  - Consider repeat imaging if sx progress or do not improve  - Adequate pain control  - Ensure adequate hydration  - Continue omeprazole 20 mg po daily    Thank you for involving us in this patient's care.    Patient seen and discussed with Attending, Dr. Concepcion.    Aminah Zhong MD  Gastroenterology/Hepatology Fellow, PGY-V  Available via [employer] Teams    NON-URGENT CONSULTS:  Please email [email or web address]  [email or web address]  AT NIGHT AND ON WEEKENDS:  Contact on-call GI fellow via answering service ([phone]) from 5pm-8am and on weekends/holidays  MONDAY-FRIDAY 8AM-5PM:  Pager# [phone] ([facility])  GI Phone# [phone] ([facility]) GIDEON HAYES is a 81y Male with GERD on omeprazole daily and Crohn's disease diagnosed about 9 mos prior to admission at outside hospital c/b h/o abscess and left posterior distal anal intersphincteric fistula and presents today as a new consult to GI for diarrhea/BRBPR.    #. Diarrhea/BRBPR - likely 2/2 CD flare with leukocytosis vs infectious etiology with cdiff vs CMV colitis, although it is appears as though his CD has not been well controlled over the past couple of months. He has been managed with budesonide/mesalamine,MTX (unclear why and if it was started as dual therapy with Remicade, but is still on it) and has never tried AZA/6MP, unclear why not as well, and failed 6 mos of Remicade (biologic) c/b development of antibodies with plans to initiate Humira with outside GI at the end of this month now that insurance has approved it, which would be an appropriate course of action considering he was refractory to Remicade/developed antibodies. Mr. Hayes's clinical presentation supported by elevated inflammatory markers is consistent with an active flare and he will benefit from IV steroids for remission induction. However, will need to first r/o cdiff colitis. If it's negative, anticipate flex sig with bx to evaluate for CMV and start IV steroids. The mesalamine/MTX have not been working for him so they are no longer needed. If his sx progress/persist while in-house, consider abdominal CT to evaluate for complications.    #. Weight loss - likely 2/2 CD c/b oral aphthous ulcers and diarrhea as he has not been able to tolerate po and will need to ensure adequate hydration  #. GERD     Recommendations:  - Hold mesalamine and methotrexate for now  - Continue budesonide 3 mg po daily  - dc cipro/flagyl  - Check QuantiFeron and HBV surface Ag for possible Biologic use  - f/u cdiff, if positive start vancomycin 125 mg 4x daily, for 10 days  - f/u diff, if negative, plan for flex sig with bx for CMV and colon evaluation, and start IV solumedrol 20 mg q8h  - f/u stool studies  - Trend Hgb, transfuse if Hgb < 7  - Consider repeat imaging if sx progress or do not improve  - Adequate pain control  - Ensure adequate hydration  - Continue omeprazole 20 mg po daily    Thank you for involving us in this patient's care.    Patient seen and discussed with Attending, Dr. Concepcion.    Aminah Zhong MD  Gastroenterology/Hepatology Fellow, PGY-V  Available via [employer] Teams    NON-URGENT CONSULTS:  Please email [email or web address]  [email or web address]  AT NIGHT AND ON WEEKENDS:  Contact on-call GI fellow via answering service ([phone]) from 5pm-8am and on weekends/holidays  MONDAY-FRIDAY 8AM-5PM:  Pager# [phone] ([facility])  GI Phone# [phone] ([facility])

## 2021-07-14 NOTE — CONSULT NOTE ADULT - SUBJECTIVE AND OBJECTIVE BOX
DATE OF SERVICE: 07-14-21    CHIEF COMPLAINT:Patient is a 81y old  Male who presents with a chief complaint of diarrhea with blood in it , abd pain and weakness (14 Jul 2021 20:49)      HISTORY OF PRESENT ILLNESS:HPI:  82 yo M with Crohn disease, over the last 9 mo, he was started on several medication and was recently told that he may need to go on IV infusion for chrons, he has several hospitalization at [facility] , and follows with GI near [facility]. but his diarrhea is not improving , having abd cramps , denies any N/V . , discharged 1 mo ago from [facility], now having trouble walking, on going diarrhea. + subjective fever but denies cough or nasal congestion or uri or urinary symptoms.  (13 Jul 2021 20:48)      PAST MEDICAL & SURGICAL HISTORY:  No pertinent past medical history    No significant past surgical history            MEDICATIONS:  sotalol 120 milliGRAM(s) Oral daily        clonazePAM  Tablet 0.25 milliGRAM(s) Oral at bedtime    pantoprazole    Tablet 40 milliGRAM(s) Oral before breakfast    buDESOnide    EC Capsule 9 milliGRAM(s) Oral daily    folic acid 1 milliGRAM(s) Oral daily  Nephro-matt 1 Tablet(s) Oral daily  sodium chloride 0.9%. 1000 milliLiter(s) IV Continuous <Continuous>      FAMILY HISTORY:      Non-contributory    SOCIAL HISTORY:    not a smoker    Allergies    No Known Allergies    Intolerances    	    REVIEW OF SYSTEMS:  CONSTITUTIONAL: No fever  EYES: No eye pain, visual disturbances, or discharge  ENMT:  No difficulty hearing, tinnitus  NECK: No pain or stiffness  RESPIRATORY: No cough, wheezing,  CARDIOVASCULAR: No chest pain, palpitations, passing out, dizziness, or leg swelling  GASTROINTESTINAL:  No nausea, vomiting, diarrhea or constipation. No melena.  GENITOURINARY: No dysuria, hematuria  NEUROLOGICAL: No stroke like symptoms  SKIN: No burning or lesions   ENDOCRINE: No heat or cold intolerance  MUSCULOSKELETAL: No joint pain or swelling  PSYCHIATRIC: No  anxiety, mood swings  HEME/LYMPH: No bleeding gums  ALLERGY AND IMMUNOLOGIC: No hives or eczema	    All other ROS negative    PHYSICAL EXAM:  T(C): 37.6 (07-14-21 @ 19:57), Max: 37.6 (07-14-21 @ 19:57)  HR: 82 (07-14-21 @ 19:57) (82 - 89)  BP: 157/76 (07-14-21 @ 19:57) (110/61 - 157/76)  RR: 18 (07-14-21 @ 19:57) (18 - 19)  SpO2: 98% (07-14-21 @ 19:57) (98% - 99%)  Wt(kg): --  I&O's Summary    13 Jul 2021 07:01  -  14 Jul 2021 07:00  --------------------------------------------------------  IN: 920 mL / OUT: 350 mL / NET: 570 mL    14 Jul 2021 07:01  -  14 Jul 2021 23:46  --------------------------------------------------------  IN: 2040 mL / OUT: 200 mL / NET: 1840 mL        Appearance: Normal	  HEENT:   Normal oral mucosa, EOMI	  Cardiovascular:  S1 S2, No JVD,    Respiratory: Lungs clear to auscultation	  Psychiatry: Alert  Gastrointestinal:  Soft, Non-tender, + BS	  Skin: No rashes   Neurologic: Non-focal  Extremities:  No edema  Vascular: Peripheral pulses palpable    	    	  	  CARDIAC MARKERS:  Labs personally reviewed by me                                  8.3    10.69 )-----------( 432      ( 14 Jul 2021 07:04 )             26.8     07-14    134<L>  |  100  |  10  ----------------------------<  93  3.6   |  20<L>  |  0.80    Ca    8.4      14 Jul 2021 07:03    TPro  6.9  /  Alb  2.6<L>  /  TBili  0.6  /  DBili  x   /  AST  14  /  ALT  12  /  AlkPhos  51  07-14            Assessment /Plan:   Assessment and Plan:   Problem/Plan - 1:  ·  Problem: CAD (coronary artery disease).  Plan: c/w home meds  Pt states he has been off ASA/Plavix as he can not tolerate either  We discussed the importance of SAPT with low dose ASA 81mg given CAD/stent    Problem/Plan - 2:  ·  Problem: Diarrhea.  Plan: check stools for O&P  cdiff.     Problem/Plan - 3:  ·  Problem: Colitis.  Plan: Ct shows chron's dis exacerbation   GI consult   as per GI : d/c abx   started on budesonamide.     Problem/Plan - 4:  ·  Problem: GIB (gastrointestinal bleeding).  Plan: bloody diarrhea 2/2 chron's dis exacerbation   IV fluids   awaiting cdiff PCR   seen by GI : recommend check c diff and if neg will give trial of IV steroids   d/c abx   will also schedule for flex sig if c diff neg   if cdiff pos then : start vanco  mg q 6 x 10 days.         Differential diagnosis and plan of care discussed with patient after the evaluation. Counseling on diet, nutritional counseling, weight management, exercise and medication compliance was done.   Advanced care planning/advanced directives discussed with patient/family. DNR status including forceful chest compressions to attempt to restart the heart, ventilator support/artificial breathing, electric shock, artificial nutrition, health care proxy, Molst form all discussed with pt. Pt wishes to consider.          Rufus Onofre DO [facility]  Cardiovascular Medicine  [address], [address]  Office [phone]  Cell [phone]

## 2021-07-14 NOTE — CONSULT NOTE ADULT - SUBJECTIVE AND OBJECTIVE BOX
Chief Complaint:  Diarrhea/BRBPR      HPI:GIDEON HAYES is a 81y Male with GERD on omeprazole daily and Crohn's disease diagnosed about 9 mos prior to admission at outside hospital and presents today as a new consult to GI for diarrhea/BRBPR.    Patient accompanied at bedside by his daughter.    Patient states he has been having 6+ BMs per day; they were initially bloody (bright red), but denies bloody BMs for about 1 week. However continues to have nausea and crampy diffuse abdominal pain that is more tender on the lower/left-lower abdomen, but also mildly tender on the right. His sx are c/b oral ulcers which make it difficulty for him to eat because of the oral pain (no pain upon swallowing in the esophagus). States he has lost about 60 pounds in a month due to the inability to eat properly and finds it difficult to stay hydrated. Denies chills, but reports intermittent subjective fever, no joint pain or skin lesions. He is currently on mesalamine 1.2 mg - 4 tabs daily, budesonide 3 mg daily, and methotrexate 2.5 mg- 6 tabs every week. 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 They were just approved for humira and are supposed to receive it by the end of the month.     Patient states he had a flex sig in May with his Gastroenterologist, Dr. Juan Luis Tran ([phone]) at [facility], and recalls being told that he has inflammation. Says his last colonoscopy/EGD was done with Dr. Tran at the time of diagnosis. Denies h/o cdiff.    Of note, Mr. Hayes says he has had all of these sx ongoing since being diagnosed about 9 mos ago: nausea, abdominal pain, bloody diarrhea, and oral ulcers and that is how he presented. Says he was initially started on mesalamine and budesonide without relief, so then methotrexate was added which did not provide any relief either so he was started on Remicade. Patient denies any improvement of his sx on Remicade for 6 mos, prior to developing antibodies mentioned above with plans to start Humira at the end of this month. However, he came to [facility] for care because they feel his current management of the CD is not working and wanted to see if someone else would be able to help manage it.    On admission to [facility] he was HDS with elevated white count and Hgb 8.4. CTAP revealed pericolonic inflammation, wall thickening concerning for inflammation of the rectosigmoid, descending colon, proximal ascending colon/cecum; the TI was underdistended. Stool studies including cdiff were sent and he was started on IV cipro/flagyl and GI was consult.     PMHX/PSHX:  No pertinent past medical history    No significant past surgical history      Allergies:  No Known Allergies      Home Medications: reviewed  Hospital Medications:  ciprofloxacin   IVPB      ciprofloxacin   IVPB 400 milliGRAM(s) IV Intermittent every 12 hours  clonazePAM  Tablet 0.5 milliGRAM(s) Oral at bedtime  folic acid 1 milliGRAM(s) Oral daily  mesalamine ER Capsule 1250 milliGRAM(s) Oral daily  metroNIDAZOLE  IVPB 500 milliGRAM(s) IV Intermittent every 8 hours  Nephro-matt 1 Tablet(s) Oral daily  pantoprazole  Injectable 40 milliGRAM(s) IV Push every 12 hours  sodium chloride 0.9%. 1000 milliLiter(s) IV Continuous <Continuous>  sotalol 120 milliGRAM(s) Oral daily      Social History:   Tob: quit smoking cigarettes about 50 years ago  EtOH: used to drink occasionally nearly 20 years ago  Illicit Drugs: Denies    Family history:    Denies family history of colon cancer/polyps, stomach cancer/polyps, pancreatic cancer/masses, liver cancer/disease, ovarian cancer and endometrial cancer.    ROS: Complete and normal except as mentioned above.    PHYSICAL EXAM:   Vital Signs:  Vital Signs Last 24 Hrs  T(C): 37.1 (14 Jul 2021 04:40), Max: 37.1 (14 Jul 2021 04:40)  T(F): 98.7 (14 Jul 2021 04:40), Max: 98.7 (14 Jul 2021 04:40)  HR: 82 (14 Jul 2021 04:40) (70 - 82)  BP: 114/67 (14 Jul 2021 04:40) (114/67 - 151/69)  BP(mean): --  RR: 19 (14 Jul 2021 04:40) (19 - 20)  SpO2: 99% (14 Jul 2021 04:40) (96% - 99%)  Daily     Daily     GENERAL: no acute distress  NEURO: alert  HEENT: anicteric sclera, no conjunctival pallor appreciated, +oral aphthous ulceration of the lateral tongue and buccal mucosa  CHEST: no respiratory distress, no accessory muscle use  CARDIAC: regular rate, rhythm  ABDOMEN: soft, non-distended, no rebound or guarding; mild RUQ tenderness, moderated LLQ and lower abdominal tenderness  EXTREMITIES: warm, well perfused, no edema  SKIN: no lesions noted    LABS: reviewed                        8.3    10.69 )-----------( 432      ( 14 Jul 2021 07:04 )             26.8     07-14    134<L>  |  100  |  10  ----------------------------<  93  3.6   |  20<L>  |  0.80    Ca    8.4      14 Jul 2021 07:03    TPro  6.9  /  Alb  2.6<L>  /  TBili  0.6  /  DBili  x   /  AST  14  /  ALT  12  /  AlkPhos  51  07-14    LIVER FUNCTIONS - ( 14 Jul 2021 07:03 )  Alb: 2.6 g/dL / Pro: 6.9 g/dL / ALK PHOS: 51 U/L / ALT: 12 U/L / AST: 14 U/L / GGT: x               Diagnostic Studies: see sunrise for full report         Chief Complaint:  Diarrhea/BRBPR      HPI:GIDEON HAYES is a 81y Male with GERD on omeprazole daily and Crohn's disease diagnosed about 9 mos prior to admission at outside hospital c/b h/o abscess and left posterior distal anal intersphincteric fistula and presents today as a new consult to GI for diarrhea/BRBPR.    Patient accompanied at bedside by his daughter.    Patient states he has been having 6+ BMs per day; they were initially bloody (bright red), but denies bloody BMs for about 1 week. However continues to have nausea and crampy diffuse abdominal pain that is more tender on the lower/left-lower abdomen, but also mildly tender on the right. His sx are c/b oral ulcers which make it difficulty for him to eat because of the oral pain (no pain upon swallowing in the esophagus). States he has lost about 60 pounds in a month due to the inability to eat properly and finds it difficult to stay hydrated. Denies chills, but reports intermittent subjective fever, no joint pain or skin lesions. He is currently on mesalamine 1.2 mg - 4 tabs daily, budesonide 3 mg daily, and methotrexate 2.5 mg- 6 tabs every week. 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 They were just approved for humira and are supposed to receive it by the end of the month.     Patient states he had a flex sig in May with his Gastroenterologist, Dr. Juan Luis Tran ([phone]) at [facility], and recalls being told that he has inflammation. Says his last colonoscopy/EGD was done with Dr. Tran at the time of diagnosis. However, I spoke with Dr. Nixon's PA who mentioned that his last colonoscopy was 12/2020 and they will be faxing over that report as well as a prior EGD 2019 and capsule study 11/2020 for anemia. Patient denies h/o cdiff.    Of note, Mr. Hayes says he has had all of these sx ongoing since being diagnosed about 9 mos ago: nausea, abdominal pain, bloody diarrhea, and oral ulcers and that is how he presented. Says he was initially started on mesalamine and budesonide without relief, so then methotrexate was added which did not provide any relief either so he was started on Remicade. Patient denies any improvement of his sx on Remicade for 6 mos (his outside GI PA states otherwise), prior to developing antibodies mentioned above with plans to start Humira at the end of this month. However, he came to [facility] for care because they feel his current management of the CD is not working and wanted to see if someone else would be able to help manage it.    On admission to [facility] he was HDS with elevated white count and Hgb 8.4. CTAP revealed pericolonic inflammation, wall thickening concerning for inflammation of the rectosigmoid, descending colon, proximal ascending colon/cecum; the TI was underdistended. Stool studies including cdiff were sent and he was started on IV cipro/flagyl and GI was consult.     PMHX/PSHX:  No pertinent past medical history    No significant past surgical history      Allergies:  No Known Allergies      Home Medications: reviewed  Hospital Medications:  ciprofloxacin   IVPB      ciprofloxacin   IVPB 400 milliGRAM(s) IV Intermittent every 12 hours  clonazePAM  Tablet 0.5 milliGRAM(s) Oral at bedtime  folic acid 1 milliGRAM(s) Oral daily  mesalamine ER Capsule 1250 milliGRAM(s) Oral daily  metroNIDAZOLE  IVPB 500 milliGRAM(s) IV Intermittent every 8 hours  Nephro-matt 1 Tablet(s) Oral daily  pantoprazole  Injectable 40 milliGRAM(s) IV Push every 12 hours  sodium chloride 0.9%. 1000 milliLiter(s) IV Continuous <Continuous>  sotalol 120 milliGRAM(s) Oral daily      Social History:   Tob: quit smoking cigarettes about 50 years ago  EtOH: used to drink occasionally nearly 20 years ago  Illicit Drugs: Denies    Family history:    Denies family history of colon cancer/polyps, stomach cancer/polyps, pancreatic cancer/masses, liver cancer/disease, ovarian cancer and endometrial cancer.    ROS: Complete and normal except as mentioned above.    PHYSICAL EXAM:   Vital Signs:  Vital Signs Last 24 Hrs  T(C): 37.1 (14 Jul 2021 04:40), Max: 37.1 (14 Jul 2021 04:40)  T(F): 98.7 (14 Jul 2021 04:40), Max: 98.7 (14 Jul 2021 04:40)  HR: 82 (14 Jul 2021 04:40) (70 - 82)  BP: 114/67 (14 Jul 2021 04:40) (114/67 - 151/69)  BP(mean): --  RR: 19 (14 Jul 2021 04:40) (19 - 20)  SpO2: 99% (14 Jul 2021 04:40) (96% - 99%)  Daily     Daily     GENERAL: no acute distress  NEURO: alert  HEENT: anicteric sclera, no conjunctival pallor appreciated, +oral aphthous ulceration of the lateral tongue and buccal mucosa  CHEST: no respiratory distress, no accessory muscle use  CARDIAC: regular rate, rhythm  ABDOMEN: soft, non-distended, no rebound or guarding; mild RUQ tenderness, moderated LLQ and lower abdominal tenderness  EXTREMITIES: warm, well perfused, no edema  SKIN: no lesions noted    LABS: reviewed                        8.3    10.69 )-----------( 432      ( 14 Jul 2021 07:04 )             26.8     07-14    134<L>  |  100  |  10  ----------------------------<  93  3.6   |  20<L>  |  0.80    Ca    8.4      14 Jul 2021 07:03    TPro  6.9  /  Alb  2.6<L>  /  TBili  0.6  /  DBili  x   /  AST  14  /  ALT  12  /  AlkPhos  51  07-14    LIVER FUNCTIONS - ( 14 Jul 2021 07:03 )  Alb: 2.6 g/dL / Pro: 6.9 g/dL / ALK PHOS: 51 U/L / ALT: 12 U/L / AST: 14 U/L / GGT: x               Diagnostic Studies: see sunrise for full report         Chief Complaint:  Diarrhea/BRBPR      HPI:GIDEON HAYES is a 81y Male with GERD on omeprazole daily and Crohn's disease diagnosed about 9 mos prior to admission at outside hospital c/b h/o abscess and left posterior distal anal intersphincteric fistula and presents today as a new consult to GI for diarrhea/BRBPR.    Patient accompanied at bedside by his daughter.    Patient states he has been having 6+ BMs per day; they were initially bloody (bright red), but denies bloody BMs for about 1 week. However continues to have nausea and crampy diffuse abdominal pain that is more tender on the lower/left-lower abdomen, but also mildly tender on the right. His sx are c/b oral ulcers which make it difficulty for him to eat because of the oral pain (no pain upon swallowing in the esophagus). States he has lost about 60 pounds in a month due to the inability to eat properly and finds it difficult to stay hydrated. Denies chills, but reports intermittent subjective fever, no joint pain or skin lesions. He is currently on mesalamine 1.2 mg - 4 tabs daily, budesonide 3 mg daily, and methotrexate 2.5 mg- 6 tabs every week. 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 They were just approved for humira and are supposed to receive it by the end of the month.     Patient states he had a flex sig in May with his Gastroenterologist, Dr. Juan Luis Tran ([phone]) at [facility], and recalls being told that he has inflammation. Says his last colonoscopy/EGD was done with Dr. Tran at the time of diagnosis. However, I spoke with Dr. Nixon's PA who mentioned that his last colonoscopy was 12/2020 and they will be faxing over that report as well as a prior EGD 2019 and capsule study 11/2020 for anemia. Patient denies h/o cdiff.    Of note, Mr. Hayes says he has had all of these sx ongoing since being diagnosed about 9 mos ago: nausea, abdominal pain, bloody diarrhea, and oral ulcers and that is how he presented. Says he was initially started on mesalamine and budesonide without relief, so then methotrexate was added which did not provide any relief either so he was started on Remicade. Patient reporting mild improvement of his sx on Remicade only initially after which he says it did not help. His outside GI PA states it helped with healing his fistula/abscess. He was on it for 6 mos, prior to developing antibodies mentioned above and currently has plans to start Humira at the end of this month. However, he came to [facility] for care because they feel his current management of the CD is not working and wanted to see if someone else would be able to help manage it.    On admission to [facility] he was HDS with elevated white count and Hgb 8.4. CTAP revealed pericolonic inflammation, wall thickening concerning for inflammation of the rectosigmoid, descending colon, proximal ascending colon/cecum; the TI was underdistended. Stool studies including cdiff were sent and he was started on IV cipro/flagyl and GI was consult.     PMHX/PSHX:  No pertinent past medical history    No significant past surgical history      Allergies:  No Known Allergies      Home Medications: reviewed  Hospital Medications:  ciprofloxacin   IVPB      ciprofloxacin   IVPB 400 milliGRAM(s) IV Intermittent every 12 hours  clonazePAM  Tablet 0.5 milliGRAM(s) Oral at bedtime  folic acid 1 milliGRAM(s) Oral daily  mesalamine ER Capsule 1250 milliGRAM(s) Oral daily  metroNIDAZOLE  IVPB 500 milliGRAM(s) IV Intermittent every 8 hours  Nephro-matt 1 Tablet(s) Oral daily  pantoprazole  Injectable 40 milliGRAM(s) IV Push every 12 hours  sodium chloride 0.9%. 1000 milliLiter(s) IV Continuous <Continuous>  sotalol 120 milliGRAM(s) Oral daily      Social History:   Tob: quit smoking cigarettes about 50 years ago  EtOH: used to drink occasionally nearly 20 years ago  Illicit Drugs: Denies    Family history:    Denies family history of colon cancer/polyps, stomach cancer/polyps, pancreatic cancer/masses, liver cancer/disease, ovarian cancer and endometrial cancer.    ROS: Complete and normal except as mentioned above.    PHYSICAL EXAM:   Vital Signs:  Vital Signs Last 24 Hrs  T(C): 37.1 (14 Jul 2021 04:40), Max: 37.1 (14 Jul 2021 04:40)  T(F): 98.7 (14 Jul 2021 04:40), Max: 98.7 (14 Jul 2021 04:40)  HR: 82 (14 Jul 2021 04:40) (70 - 82)  BP: 114/67 (14 Jul 2021 04:40) (114/67 - 151/69)  BP(mean): --  RR: 19 (14 Jul 2021 04:40) (19 - 20)  SpO2: 99% (14 Jul 2021 04:40) (96% - 99%)  Daily     Daily     GENERAL: no acute distress  NEURO: alert  HEENT: anicteric sclera, no conjunctival pallor appreciated, +oral aphthous ulceration of the lateral tongue and buccal mucosa  CHEST: no respiratory distress, no accessory muscle use  CARDIAC: regular rate, rhythm  ABDOMEN: soft, non-distended, no rebound or guarding; mild RUQ tenderness, moderated LLQ and lower abdominal tenderness  EXTREMITIES: warm, well perfused, no edema  SKIN: no lesions noted    LABS: reviewed                        8.3    10.69 )-----------( 432      ( 14 Jul 2021 07:04 )             26.8     07-14    134<L>  |  100  |  10  ----------------------------<  93  3.6   |  20<L>  |  0.80    Ca    8.4      14 Jul 2021 07:03    TPro  6.9  /  Alb  2.6<L>  /  TBili  0.6  /  DBili  x   /  AST  14  /  ALT  12  /  AlkPhos  51  07-14    LIVER FUNCTIONS - ( 14 Jul 2021 07:03 )  Alb: 2.6 g/dL / Pro: 6.9 g/dL / ALK PHOS: 51 U/L / ALT: 12 U/L / AST: 14 U/L / GGT: x               Diagnostic Studies: see sunrise for full report         Chief Complaint:  Diarrhea/BRBPR      HPI:GIDEON HAYES is a 81y Male with GERD on omeprazole daily and Crohn's disease diagnosed about 9 mos prior to admission at outside hospital c/b h/o abscess and left posterior distal anal intersphincteric fistula and presents today as a new consult to GI for diarrhea/BRBPR.    Patient accompanied at bedside by his daughter.    Patient states he has been having 6+ BMs per day; they were initially bloody (bright red), but denies bloody BMs for about 1 week. However continues to have nausea and crampy diffuse abdominal pain that is more tender on the lower/left-lower abdomen, but also mildly tender on the right. His sx are c/b oral ulcers which make it difficulty for him to eat because of the oral pain (no pain upon swallowing in the esophagus). States he has lost about 60 pounds in a month due to the inability to eat properly and finds it difficult to stay hydrated. Denies chills, but reports intermittent subjective fever, no joint pain or skin lesions. He is currently on mesalamine 1.2 mg - 4 tabs daily, budesonide 3 mg daily, and methotrexate 2.5 mg- 6 tabs every week. 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 They were just approved for humira and are supposed to receive it by the end of the month.     Patient states he had a flex sig in May with his Gastroenterologist, Dr. Juan Luis Tran ([phone]) at [facility], and recalls being told that he has inflammation. Says his last colonoscopy/EGD was done with Dr. Tran at the time of diagnosis. However, I spoke with Dr. Nixon's PA who mentioned that his last colonoscopy was 12/2020 and they will be faxing over that report as well as a prior EGD 2019 and capsule study 11/2020 for anemia. Patient denies h/o cdiff.    Of note, Mr. Hayes says he has had all of these sx ongoing since being diagnosed about 9 mos ago: nausea, abdominal pain, bloody diarrhea, and oral ulcers and that is how he presented. Says he was initially started on mesalamine and budesonide without relief, so then methotrexate was added which did not provide any relief either so he was started on Remicade. Patient reporting mild improvement of his sx on Remicade only initially after which he says it did not help. His outside GI PA states it helped with healing his fistula/abscess. He was on it for 6 mos, prior to developing antibodies mentioned above and currently has plans to start Humira at the end of this month. However, he came to [facility] for care because they feel his current management of the CD is not working and wanted to see if someone else would be able to help manage it.    On admission to [facility] he was HDS with elevated white count and Hgb 8.4. CTAP revealed pericolonic inflammation, wall thickening concerning for inflammation of the rectosigmoid, descending colon, proximal ascending colon/cecum; the TI was underdistended. Stool studies including cdiff were sent and he was started on IV cipro/flagyl and GI was consult.     PMHX/PSHX:  No pertinent past medical history    No significant past surgical history      Allergies:  No Known Allergies      Home Medications: reviewed  Hospital Medications:  ciprofloxacin   IVPB      ciprofloxacin   IVPB 400 milliGRAM(s) IV Intermittent every 12 hours  clonazePAM  Tablet 0.5 milliGRAM(s) Oral at bedtime  folic acid 1 milliGRAM(s) Oral daily  mesalamine ER Capsule 1250 milliGRAM(s) Oral daily  metroNIDAZOLE  IVPB 500 milliGRAM(s) IV Intermittent every 8 hours  Nephro-matt 1 Tablet(s) Oral daily  pantoprazole  Injectable 40 milliGRAM(s) IV Push every 12 hours  sodium chloride 0.9%. 1000 milliLiter(s) IV Continuous <Continuous>  sotalol 120 milliGRAM(s) Oral daily      Social History:   Tob: quit smoking cigarettes about 50 years ago  EtOH: used to drink occasionally nearly 20 years ago  Illicit Drugs: Denies    Family history:    Denies family history of colon cancer/polyps, stomach cancer/polyps, pancreatic cancer/masses, liver cancer/disease, ovarian cancer and endometrial cancer.    ROS: Complete and normal except as mentioned above.    PHYSICAL EXAM:   Vital Signs:  Vital Signs Last 24 Hrs  T(C): 37.1 (14 Jul 2021 04:40), Max: 37.1 (14 Jul 2021 04:40)  T(F): 98.7 (14 Jul 2021 04:40), Max: 98.7 (14 Jul 2021 04:40)  HR: 82 (14 Jul 2021 04:40) (70 - 82)  BP: 114/67 (14 Jul 2021 04:40) (114/67 - 151/69)  BP(mean): --  RR: 19 (14 Jul 2021 04:40) (19 - 20)  SpO2: 99% (14 Jul 2021 04:40) (96% - 99%)  Daily     Daily     GENERAL: no acute distress  NEURO: alert  HEENT: anicteric sclera, no conjunctival pallor appreciated, +oral aphthous ulceration of the lateral tongue and buccal mucosa  CHEST: no respiratory distress, no accessory muscle use  CARDIAC: regular rate, rhythm  ABDOMEN: soft, non-distended, no rebound or guarding; mild RUQ tenderness, moderated LLQ and lower abdominal tenderness  EXTREMITIES: warm, well perfused, no edema  SKIN: no lesions noted  PSYCH: Normal affect    LABS: reviewed                        8.3    10.69 )-----------( 432      ( 14 Jul 2021 07:04 )             26.8     07-14    134<L>  |  100  |  10  ----------------------------<  93  3.6   |  20<L>  |  0.80    Ca    8.4      14 Jul 2021 07:03    TPro  6.9  /  Alb  2.6<L>  /  TBili  0.6  /  DBili  x   /  AST  14  /  ALT  12  /  AlkPhos  51  07-14    LIVER FUNCTIONS - ( 14 Jul 2021 07:03 )  Alb: 2.6 g/dL / Pro: 6.9 g/dL / ALK PHOS: 51 U/L / ALT: 12 U/L / AST: 14 U/L / GGT: x               Diagnostic Studies: see sunrise for full report

## 2021-07-15 NOTE — PROGRESS NOTE ADULT - ATTENDING COMMENTS
Patient seen and examined, agree with above. C diff PCR is positive, start PO vancomycin. Will observe for improvement on vancomycin - discussed with patient that if his symptoms do not improve with antibiotics over the next few days, we will likely start IV steroids for flare of his Crohn's colitis on top of C diff. Would advance diet to low residual as tolerated, obtain dietician evaluation given hypoalbuminemia.

## 2021-07-15 NOTE — PHYSICAL THERAPY INITIAL EVALUATION ADULT - GAIT DEVIATIONS NOTED, PT EVAL
decreased trixie/increased time in double stance/decreased step length/decreased stride length/decreased weight-shifting ability

## 2021-07-15 NOTE — PHYSICAL THERAPY INITIAL EVALUATION ADULT - ADDITIONAL COMMENTS
as per pt: PTA pt was living in a PH + Stairs (Bedroom on 2nd floor) and was independent in all functional mobility and ADL's. no AD for gait. pt states has RW at home and uses it intermittently.

## 2021-07-15 NOTE — DIETITIAN INITIAL EVALUATION ADULT. - OTHER INFO
... However, reports that on the week days when she is not around, Pt "barely eats anything at all." Daughter reports Pt had not been taking any vitamins or minerals PTA. States Pt with mouth sores at this time, however no difficulty chewing or swallowing. Confirms NKFA.    Pt currently ordered for clear liquids; states that he has not been consuming >50% of trays provided. RD provided education on nutritional oral supplementation (Ensure Clear), Pt is amenable to receive while in-house. RD discussed with Pt and Pt's daughter providing Ensure Clear and advancing to Ensure Enlive as tolerated.  Pt has no current complaints of nausea, vomiting, diarrhea, or constipation.    Current dosing weight: 59 Kg (7/13)  Pt reports UBW 86.4 Kg x6 months ago  Per Pt's daughter report, Pt with 27.4 Kg (32%) unintentional weight loss x6 months; "clinically significant" weight loss per ASPEN guidelines.

## 2021-07-15 NOTE — DIETITIAN INITIAL EVALUATION ADULT. - CHIEF COMPLAINT
Per chart, Pt is "81y Male with GERD on omeprazole daily and Crohn's disease diagnosed about 9 mos prior to admission at outside hospital c/b h/o abscess and left posterior distal anal intersphincteric fistula and presents as a new consult to GI for diarrhea/BRBPR likely 2/2 to CD flare c/b c diff colitis."

## 2021-07-15 NOTE — DIETITIAN INITIAL EVALUATION ADULT. - REASON INDICATOR FOR ASSESSMENT
Consult received for Pt with significant weight loss PTA  Source: electronic medical record, Pt, Pt's daughter at bedside

## 2021-07-15 NOTE — PROGRESS NOTE ADULT - SUBJECTIVE AND OBJECTIVE BOX
Chief Complaint:  Patient is a 81y old  Male who presents with a chief complaint of diarrhea with blood in it , abd pain and weakness (2021 20:49)      Reason for consult: Diarrhea/BRBPR    Interval Events:   No acute events overnight. AM labs pending. Per chart, cardiology recommending ASA for h/o CAD/stent, however, patient stating unable to tolerate.    Hospital Medications:  buDESOnide    EC Capsule 9 milliGRAM(s) Oral daily  clonazePAM  Tablet 0.25 milliGRAM(s) Oral at bedtime  folic acid 1 milliGRAM(s) Oral daily  Nephro-matt 1 Tablet(s) Oral daily  pantoprazole    Tablet 40 milliGRAM(s) Oral before breakfast  sodium chloride 0.9%. 1000 milliLiter(s) IV Continuous <Continuous>  sotalol 120 milliGRAM(s) Oral daily      ROS: complete and normal except as mentioned above    PHYSICAL EXAM:   Vital Signs:  Vital Signs Last 24 Hrs  T(C): 37.7 (15 Jul 2021 06:00), Max: 37.7 (15 Jul 2021 06:00)  T(F): 99.8 (15 Jul 2021 06:00), Max: 99.8 (15 Jul 2021 06:00)  HR: 67 (15 Jul 2021 06:00) (67 - 89)  BP: 146/80 (15 Jul 2021 06:00) (110/61 - 157/76)  BP(mean): --  RR: 16 (15 Jul 2021 06:00) (16 - 18)  SpO2: 95% (15 Jul 2021 06:00) (95% - 98%)  Daily     Daily Weight in k.7 (2021 09:31)    GENERAL: no acute distress  NEURO: alert  HEENT: anicteric sclera, no conjunctival pallor appreciated, +oral aphthous ulceration of the lateral tongue and buccal mucosa  CHEST: no respiratory distress, no accessory muscle use  CARDIAC: regular rate, rhythm  ABDOMEN: soft, non-distended, no rebound or guarding; mild RUQ tenderness, moderated LLQ and lower abdominal tenderness  EXTREMITIES: warm, well perfused, no edema  SKIN: no lesions noted  PSYCH: Normal affect    LABS: reviewed                        7.4    9.45  )-----------( 342      ( 15 Jul 2021 07:00 )             23.7     07-14    134<L>  |  100  |  10  ----------------------------<  93  3.6   |  20<L>  |  0.80    Ca    8.4      2021 07:03    TPro  6.9  /  Alb  2.6<L>  /  TBili  0.6  /  DBili  x   /  AST  14  /  ALT  12  /  AlkPhos  51  07-14    LIVER FUNCTIONS - ( 2021 07:03 )  Alb: 2.6 g/dL / Pro: 6.9 g/dL / ALK PHOS: 51 U/L / ALT: 12 U/L / AST: 14 U/L / GGT: x             Interval Diagnostic Studies: see sunrise for full report   Chief Complaint:  Patient is a 81y old  Male who presents with a chief complaint of diarrhea with blood in it , abd pain and weakness (2021 20:49)      Reason for consult: Diarrhea/BRBPR    Interval Events:   Reporting about 6 mildly bloody BMs since yesterday afternoon, Hgb 7.3 from 8.3 this morning, and continues to have abdominal pain. +cdiff. Per chart, cardiology recommending ASA for h/o CAD/stent, however, patient stating unable to tolerate.    Hospital Medications:  buDESOnide    EC Capsule 9 milliGRAM(s) Oral daily  clonazePAM  Tablet 0.25 milliGRAM(s) Oral at bedtime  folic acid 1 milliGRAM(s) Oral daily  Nephro-matt 1 Tablet(s) Oral daily  pantoprazole    Tablet 40 milliGRAM(s) Oral before breakfast  sodium chloride 0.9%. 1000 milliLiter(s) IV Continuous <Continuous>  sotalol 120 milliGRAM(s) Oral daily      ROS: complete and normal except as mentioned above    PHYSICAL EXAM:   Vital Signs:  Vital Signs Last 24 Hrs  T(C): 37.7 (15 Jul 2021 06:00), Max: 37.7 (15 Jul 2021 06:00)  T(F): 99.8 (15 Jul 2021 06:00), Max: 99.8 (15 Jul 2021 06:00)  HR: 67 (15 Jul 2021 06:00) (67 - 89)  BP: 146/80 (15 Jul 2021 06:00) (110/61 - 157/76)  BP(mean): --  RR: 16 (15 Jul 2021 06:00) (16 - 18)  SpO2: 95% (15 Jul 2021 06:00) (95% - 98%)  Daily     Daily Weight in k.7 (2021 09:31)    GENERAL: no acute distress  NEURO: alert  HEENT: anicteric sclera, no conjunctival pallor appreciated, +oral aphthous ulceration of the lateral tongue and buccal mucosa  CHEST: no respiratory distress, no accessory muscle use  CARDIAC: regular rate, rhythm  ABDOMEN: soft, non-distended, no rebound or guarding; mild RUQ tenderness, moderated LLQ and lower abdominal tenderness  EXTREMITIES: warm, well perfused, no edema  SKIN: no lesions noted  PSYCH: Normal affect    LABS: reviewed                        7.    9.45  )-----------( 342      ( 15 Jul 2021 07:00 )             23.7     07-14    134<L>  |  100  |  10  ----------------------------<  93  3.6   |  20<L>  |  0.80    Ca    8.4      2021 07:03    TPro  6.9  /  Alb  2.6<L>  /  TBili  0.6  /  DBili  x   /  AST  14  /  ALT  12  /  AlkPhos  51  07-14    LIVER FUNCTIONS - ( 2021 07:03 )  Alb: 2.6 g/dL / Pro: 6.9 g/dL / ALK PHOS: 51 U/L / ALT: 12 U/L / AST: 14 U/L / GGT: x             Interval Diagnostic Studies: see sunrise for full report

## 2021-07-15 NOTE — PHYSICAL THERAPY INITIAL EVALUATION ADULT - RANGE OF MOTION EXAMINATION, REHAB EVAL
Addended by: JOSE MANUEL RODRIGEZ on: 12/4/2019 08:55 AM     Modules accepted: Orders    
bilateral upper extremity ROM was WFL (within functional limits)/bilateral lower extremity ROM was WFL (within functional limits)

## 2021-07-15 NOTE — DIETITIAN INITIAL EVALUATION ADULT. - ADD RECOMMEND
1. Continue diet as ordered; advance to Low Fat Diet as medically feasible 2. Recommend Ensure Clear x3/day (provides 240kcal, 8g protein per 8 oz. serving) 3. Continue micronutrient supplementation/repletion per team 4. Consider adding multivitamin if no contraindications 5. Continue to provide nutritionally relevant education as appropriate 6. Obtain food preferences, honor as able 7. New malnutrition alert placed; will follow-up according to protocol 8. Monitor weight trends, PO intake, GI function, electrolytes, and skin integrity 1. Continue diet as ordered; advance to Low Fiber Diet as medically feasible 2. Recommend Ensure Clear x3/day (provides 240kcal, 8g protein per 8 oz. serving) 3. Continue micronutrient supplementation/repletion per team 4. Consider adding multivitamin if no contraindications 5. Continue to provide nutritionally relevant education as appropriate 6. Obtain food preferences, honor as able 7. New malnutrition alert placed; will follow-up according to protocol 8. Monitor weight trends, PO intake, GI function, electrolytes, and skin integrity

## 2021-07-15 NOTE — PROGRESS NOTE ADULT - ASSESSMENT
GIDEON HAYES is a 81y Male with GERD on omeprazole daily and Crohn's disease diagnosed about 9 mos prior to admission at outside hospital c/b h/o abscess and left posterior distal anal intersphincteric fistula and presents as a new consult to GI for diarrhea/BRBPR likely 2/2 to CD flare c/b c diff colitis.    #. Diarrhea/BRBPR - likely 2/2 CD flare with leukocytosis c/b cdiff colitis, although it is appears as though his CD has not been well controlled over the past couple of months. He has been managed with budesonide/mesalamine,MTX (unclear why and if it was started as dual therapy with Remicade, but is still on it) and has never tried AZA/6MP, unclear why not as well, and failed 6 mos of Remicade (biologic) c/b development of antibodies with plans to initiate Humira with outside GI at the end of this month now that insurance has approved it, which would be an appropriate course of action considering he was refractory to Remicade/developed antibodies. Mr. Hayes's clinical presentation supported by elevated inflammatory markers is consistent with an active flare and he will benefit from IV steroids for remission induction. The mesalamine/MTX have not been working for him so they are no longer needed. -- c diff returned positive and is likely source of CD flare vs complicating uncontrolled CD. Cdiff will need to be treated prior to starting IV steroids. If his sx progress/persist while in-house undergoing tx, consider abdominal CT to evaluate for complications and possible flex sig to r/o CMV colitis.     #. Weight loss - likely 2/2 CD c/b oral aphthous ulcers and diarrhea as he has not been able to tolerate po and will need to ensure adequate hydration  #. GERD     Recommendations:  - Hold mesalamine and methotrexate for now  - Continue budesonide 3 mg po daily  - dc cipro/flagyl  - Check QuantiFeron and HBV surface Ag for possible Biologic use  - Cdiff positive:  start vancomycin 125 mg 4x daily, for 10 days  - Hold off on IV steroids until completes cdiff tx  - GI PCR negative  - O&P pending collection  - Trend Hgb, transfuse if Hgb < 7  - Consider repeat imaging if sx progress or do not improve  - Adequate pain control  - Ensure adequate hydration  - Continue omeprazole 20 mg po daily    Thank you for involving us in this patient's care.    Pending discussion with Attending.    Aminah Zhong MD  Gastroenterology/Hepatology Fellow, PGY-V  Available via [employer] Teams    NON-URGENT CONSULTS:  Please email [email or web address]  AT NIGHT AND ON WEEKENDS:  Contact on-call GI fellow via answering service ([phone]) from 5pm-8am and on weekends/holidays  MONDAY-FRIDAY 8AM-5PM:  Pager# [phone] ([facility])  GI Phone# [phone] ([facility]) GIDEON HAYES is a 81y Male with GERD on omeprazole daily and Crohn's disease diagnosed about 9 mos prior to admission at outside hospital c/b h/o abscess and left posterior distal anal intersphincteric fistula and presents as a new consult to GI for diarrhea/BRBPR likely 2/2 to CD flare c/b c diff colitis.    #. Diarrhea/BRBPR - likely 2/2 CD flare with leukocytosis c/b cdiff colitis, although it is appears as though his CD has not been well controlled over the past couple of months. He has been managed with budesonide/mesalamine,MTX (unclear why and if it was started as dual therapy with Remicade, but is still on it) and has never tried AZA/6MP, unclear why not as well, and failed 6 mos of Remicade (biologic) c/b development of antibodies with plans to initiate Humira with outside GI at the end of this month now that insurance has approved it, which would be an appropriate course of action considering he was refractory to Remicade/developed antibodies. Mr. Hayes's clinical presentation supported by elevated inflammatory markers is consistent with an active flare and he will benefit from IV steroids for remission induction. The mesalamine/MTX have not been working for him so they are no longer needed. -- c diff returned positive and is likely source of CD flare vs complicating uncontrolled CD. Cdiff will need to be treated prior to starting IV steroids. If his sx progress/persist while in-house undergoing tx, consider abdominal CT to evaluate for complications and possible flex sig to r/o CMV colitis. Cardiology recommending ASA for h/o CAD/stents but per chart patient, unable to tolerate ASA (or plavix); will f/u with patient.    #. Weight loss - likely 2/2 CD c/b oral aphthous ulcers and diarrhea as he has not been able to tolerate po and will need to ensure adequate hydration  #. GERD     Recommendations:  - Hold mesalamine and methotrexate for now  - Continue budesonide 3 mg po daily  - dc cipro/flagyl  - Check QuantiFeron and HBV surface Ag for possible Biologic use  - Cdiff positive:  start vancomycin 125 mg 4x daily, for 10 days  - Hold off on IV steroids until completes cdiff tx  - GI PCR negative  - O&P pending collection  - Trend Hgb, transfuse if Hgb < 7  - Consider repeat imaging if sx progress or do not improve  - Adequate pain control  - Ensure adequate hydration  - Continue omeprazole 20 mg po daily    Thank you for involving us in this patient's care.    Pending discussion with Attending.    Aminah Zhong MD  Gastroenterology/Hepatology Fellow, PGY-V  Available via Microsoft Teams    NON-URGENT CONSULTS:  Please email [email or web address]  [email or web address]  AT NIGHT AND ON WEEKENDS:  Contact on-call GI fellow via answering service ([phone]) from 5pm-8am and on weekends/holidays  MONDAY-FRIDAY 8AM-5PM:  Pager# [phone] ([facility])  GI Phone# [phone] ([facility]) GIDEON HAYES is a 81y Male with GERD on omeprazole daily and Crohn's disease diagnosed about 9 mos prior to admission at outside hospital c/b h/o abscess and left posterior distal anal intersphincteric fistula and presents as a new consult to GI for diarrhea/BRBPR likely 2/2 to CD flare c/b c diff colitis.    #. Diarrhea/BRBPR - likely 2/2 CD flare with leukocytosis c/b cdiff colitis, although it is appears as though his CD has not been well controlled over the past couple of months. He has been managed with budesonide/mesalamine,MTX (unclear why and if it was started as dual therapy with Remicade, but is still on it) and has never tried AZA/6MP, unclear why not as well, and failed 6 mos of Remicade (biologic) c/b development of antibodies with plans to initiate Humira with outside GI at the end of this month now that insurance has approved it, which would be an appropriate course of action considering he was refractory to Remicade/developed antibodies. Mr. Hayes's clinical presentation supported by elevated inflammatory markers is consistent with an active flare and he will benefit from IV steroids for remission induction. The mesalamine/MTX have not been working for him so they are no longer needed. -- c diff returned positive and is likely source of CD flare vs complicating uncontrolled CD. Cdiff will need to be treated prior to starting IV steroids. If his sx progress/persist while in-house undergoing tx, consider abdominal CT to evaluate for complications and possible flex sig to r/o CMV colitis. Cardiology recommending ASA for h/o CAD/stents but per chart patient, unable to tolerate ASA (or plavix); will f/u with patient.    #. Weight loss - likely 2/2 CD c/b oral aphthous ulcers and diarrhea as he has not been able to tolerate po and will need to ensure adequate hydration  #. GERD     Recommendations:  - f/u repeat Hgb  - Hold mesalamine and methotrexate for now  - Continue budesonide 3 mg po daily  - dc cipro/flagyl  - Check QuantiFeron and HBV surface Ag for possible Biologic use  - Cdiff positive:  start vancomycin 125 mg 4x daily, for 10 days  - Hold off on IV steroids until completes cdiff tx  - GI PCR negative  - O&P pending collection  - Trend Hgb, transfuse if Hgb < 7  - Consider repeat imaging if sx progress or do not improve  - Adequate pain control  - Ensure adequate hydration  - Continue omeprazole 20 mg po daily    Thank you for involving us in this patient's care.    Pending discussion with Attending.    Aminah Zhong MD  Gastroenterology/Hepatology Fellow, PGY-V  Available via Microsoft Teams    NON-URGENT CONSULTS:  Please email [email or web address]  [email or web address]  AT NIGHT AND ON WEEKENDS:  Contact on-call GI fellow via answering service ([phone]) from 5pm-8am and on weekends/holidays  MONDAY-FRIDAY 8AM-5PM:  Pager# [phone] ([facility])  GI Phone# [phone] ([facility]) GIDEON HAYES is a 81y Male with GERD on omeprazole daily and Crohn's disease diagnosed about 9 mos prior to admission at outside hospital c/b h/o abscess and left posterior distal anal intersphincteric fistula and presents as a new consult to GI for diarrhea/BRBPR likely 2/2 to CD flare c/b c diff colitis.    #. Diarrhea/BRBPR - likely 2/2 CD flare with leukocytosis c/b cdiff colitis, although it is appears as though his CD has not been well controlled over the past couple of months. He has been managed with budesonide/mesalamine,MTX (unclear why and if it was started as dual therapy with Remicade, but is still on it) and has never tried AZA/6MP, unclear why not as well, and failed 6 mos of Remicade (biologic) c/b development of antibodies with plans to initiate Humira with outside GI at the end of this month now that insurance has approved it, which would be an appropriate course of action considering he was refractory to Remicade/developed antibodies. Mr. Hayes's clinical presentation supported by elevated inflammatory markers is consistent with an active flare and he will benefit from IV steroids for remission induction. The mesalamine/MTX have not been working for him so they are no longer needed. -- c diff returned positive and is likely source of CD flare vs complicating uncontrolled CD. Cdiff will need to be treated prior to starting IV steroids. If his sx progress/persist while in-house undergoing tx, consider abdominal CT to evaluate for complications and possible flex sig to r/o CMV colitis. Cardiology recommending ASA for h/o CAD/stents but per chart patient, unable to tolerate ASA (or plavix); ASA not started.    #. Weight loss - likely 2/2 CD c/b oral aphthous ulcers and diarrhea as he has not been able to tolerate po and will need to ensure adequate hydration  #. GERD     Recommendations:  - Hold mesalamine and methotrexate for now  - Continue budesonide 3 mg po daily  - dc cipro/flagyl  - Check QuantiFeron and HBV surface Ag for possible Biologic use  - Cdiff positive:  start vancomycin 125 mg 4x daily, for 10 days  - Hold off on IV steroids until completes cdiff tx  - GI PCR negative  - O&P pending collection  - Trend Hgb, transfuse if Hgb < 7  - Consider repeat imaging if sx progress or do not improve  - Adequate pain control  - Ensure adequate hydration  - Continue omeprazole 20 mg po daily    Thank you for involving us in this patient's care.    Pending discussion with Attending.    Aminah Zhong MD  Gastroenterology/Hepatology Fellow, PGY-V  Available via [employer] Teams    NON-URGENT CONSULTS:  Please email [email or web address]  AT NIGHT AND ON WEEKENDS:  Contact on-call GI fellow via answering service ([phone]) from 5pm-8am and on weekends/holidays  MONDAY-FRIDAY 8AM-5PM:  Pager# [phone] ([facility])  GI Phone# [phone] ([facility]) GIDEON HAYES is a 81y Male with GERD on omeprazole daily and Crohn's disease diagnosed about 9 mos prior to admission at outside hospital c/b h/o abscess and left posterior distal anal intersphincteric fistula and presents as a new consult to GI for diarrhea/BRBPR likely 2/2 to CD flare c/b c diff colitis.    #. Diarrhea/BRBPR - likely 2/2 CD flare with leukocytosis most likely 2/2 cdiff colitis, less likely 2/2 CMV colitis. Patient's cdiff was positive and is the likely cause of his CD flare, but if his sx persist/progress over the next couple of days while on Abx w/o IV steroids, then his CD flare may be too active. At that time we will likely recommend starting IV steroids to help control the C diff flare, but will also consider whether he will need a flex sig with bx for further evaluation and to r/o CMV colitis.    #. Weight loss - likely 2/2 CD c/b oral aphthous ulcers and diarrhea as he has not been able to tolerate po and will need to ensure adequate hydration  #. GERD     Recommendations:  - Continue budesonide 3 mg po daily  - f/u QuantiFeron and HBV surface Ag for possible Biologic use  - Cdiff positive:  start vancomycin 125 mg 4x daily, for 10 days  - Hold off on IV steroids for now  - O&P pending collection  - Trend Hgb, transfuse if Hgb < 7 (denies h/o blood transfusion)  - Continue omeprazole 20 mg po daily    Thank you for involving us in this patient's care.    Patient seen and discussed with Attending, Dr. Conecpcion.    Aminah Zhong MD  Gastroenterology/Hepatology Fellow, PGY-V  Available via [employer] Teams    NON-URGENT CONSULTS:  Please email [email or web address]  AT NIGHT AND ON WEEKENDS:  Contact on-call GI fellow via answering service ([phone]) from 5pm-8am and on weekends/holidays  MONDAY-FRIDAY 8AM-5PM:  Pager# [phone] ([facility])  GI Phone# [phone] ([facility]) GIDEON HAYES is a 81y Male with GERD on omeprazole daily and Crohn's disease diagnosed about 9 mos prior to admission at outside hospital c/b h/o abscess and left posterior distal anal intersphincteric fistula and presents as a new consult to GI for diarrhea/BRBPR likely 2/2 to CD flare c/b c diff colitis.    #. Diarrhea/BRBPR - likely 2/2 CD flare with leukocytosis most likely 2/2 cdiff colitis, less likely 2/2 CMV colitis. Patient's cdiff was positive and is the likely cause of his CD flare, but if his sx persist/progress over the next couple of days while on Abx w/o IV steroids, then his CD flare may be too active. At that time we will likely recommend starting IV steroids to help control the C diff flare, but will also consider whether he will need a flex sig with bx for further evaluation and to r/o CMV colitis.    #. Weight loss - likely 2/2 CD c/b oral aphthous ulcers and diarrhea as he has not been able to tolerate po and will need to ensure adequate hydration  #. GERD     Recommendations:  - Advance to low residual diet as tolerated  - Add protein supplements, obtain dietician evaluation  - Continue budesonide 3 mg po daily  - f/u QuantiFeron and HBV surface Ag for possible Biologic use  - Cdiff positive:  start vancomycin 125 mg 4x daily, for 10 days  - Hold off on IV steroids for now  - O&P pending collection  - Trend Hgb, transfuse if Hgb < 7 (denies h/o blood transfusion)  - Continue omeprazole 20 mg po daily    Thank you for involving us in this patient's care.    Patient seen and discussed with Attending, Dr. Concepcion.    Aminah Zhong MD  Gastroenterology/Hepatology Fellow, PGY-V  Available via [employer] Teams    NON-URGENT CONSULTS:  Please email [email or web address]  [email or web address]  AT NIGHT AND ON WEEKENDS:  Contact on-call GI fellow via answering service ([phone]) from 5pm-8am and on weekends/holidays  MONDAY-FRIDAY 8AM-5PM:  Pager# [phone] ([facility])  GI Phone# [phone] ([facility])

## 2021-07-15 NOTE — DIETITIAN INITIAL EVALUATION ADULT. - PERTINENT MEDS FT
MEDICATIONS  (STANDING):  buDESOnide    EC Capsule 9 milliGRAM(s) Oral daily  clonazePAM  Tablet 0.25 milliGRAM(s) Oral at bedtime  folic acid 1 milliGRAM(s) Oral daily  Nephro-matt 1 Tablet(s) Oral daily  pantoprazole    Tablet 40 milliGRAM(s) Oral before breakfast  potassium chloride    Tablet ER 40 milliEquivalent(s) Oral every 4 hours  sodium chloride 0.9%. 1000 milliLiter(s) (100 mL/Hr) IV Continuous <Continuous>  sotalol 120 milliGRAM(s) Oral daily  vancomycin    Solution 125 milliGRAM(s) Oral every 6 hours
DISPLAY PLAN FREE TEXT

## 2021-07-15 NOTE — DIETITIAN INITIAL EVALUATION ADULT. - OTHER CALCULATIONS
estimated nutrient needs based on admission/dosing weight of 59 Kg with considerations for malnutrition and age

## 2021-07-15 NOTE — PROGRESS NOTE ADULT - SUBJECTIVE AND OBJECTIVE BOX
Patient is a 81y old  Male who presents with a chief complaint of diarrhea with blood in it , abd pain and weakness (15 Jul 2021 11:15)      INTERVAL HISTORY: pt reports feeling ok       REVIEW OF SYSTEMS:   CONSTITUTIONAL: No weakness  EYES/ENT: No visual changes; No throat pain  Neck: No pain or stiffness  Respiratory: No cough, wheezing, No shortness of breath  CARDIOVASCULAR: no chest pain or palpitations  GASTROINTESTINAL: frequent loose stools  GENITOURINARY: No dysuria, frequency or hematuria  NEUROLOGICAL: No stroke like symptoms  SKIN: No rashes    	  MEDICATIONS:  sotalol 120 milliGRAM(s) Oral daily        PHYSICAL EXAM:  T(C): 36.4 (07-15-21 @ 12:25), Max: 37.9 (07-15-21 @ 08:40)  HR: 61 (07-15-21 @ 12:25) (61 - 88)  BP: 116/71 (07-15-21 @ 12:25) (116/71 - 157/76)  RR: 18 (07-15-21 @ 12:25) (16 - 18)  SpO2: 98% (07-15-21 @ 12:25) (95% - 98%)  Wt(kg): --  I&O's Summary    14 Jul 2021 07:01  -  15 Jul 2021 07:00  --------------------------------------------------------  IN: 3740 mL / OUT: 200 mL / NET: 3540 mL    15 Jul 2021 07:01  -  15 Jul 2021 15:30  --------------------------------------------------------  IN: 200 mL / OUT: 250 mL / NET: -50 mL          Appearance: In no distress	  HEENT:    PERRL, EOMI	  Cardiovascular:  S1 S2, No JVD  Respiratory: Lungs clear to auscultation	  Gastrointestinal:  Soft, Non-tender, + BS	  Vascularature:  No edema of LE  Psychiatric: Appropriate affect   Neuro: no acute focal deficits                               7.4    9.45  )-----------( 342      ( 15 Jul 2021 07:00 )             23.7     07-15    131<L>  |  99  |  5<L>  ----------------------------<  87  2.8<LL>   |  20<L>  |  0.75    Ca    7.6<L>      15 Jul 2021 07:04    TPro  6.9  /  Alb  2.6<L>  /  TBili  0.6  /  DBili  x   /  AST  14  /  ALT  12  /  AlkPhos  51  07-14        Labs personally reviewed      ASSESSMENT/PLAN: 	    Problem/Plan - 1:  ·  Problem: CAD (coronary artery disease).  Plan: c/w home meds  We discussed the importance of SAPT with low dose ASA 81mg given CAD  pt denies any current chest pain, SOB or chest pressure.         Problem/Plan - 2:  ·  Problem: Diarrhea.  Plan: cdiff. +   Started on Vanco     Problem/Plan - 3:  ·  Problem: Colitis.  Plan: Ct shows chron's dis exacerbation   GI consult   as per GI : d/c abx   started on budesonamide.     Problem/Plan - 4:  ·  Problem: GIB (gastrointestinal bleeding).  Plan: bloody diarrhea 2/2 chron's dis exacerbation   IV fluids   -c-diff positive: started vanco  mg q 6 x 10 days.     Problem/Plan - 5:  ·	Problem: Arrythmia plan continue home med Sotalol       Kar Powell [facility]   Rufus Onofre DO [facility]  Cardiovascular Medicine  [address] [place] [address], [address]  Office: [phone]  Cell: [phone] Patient is a 81y old  Male who presents with a chief complaint of diarrhea with blood in it , abd pain and weakness (15 Jul 2021 11:15)      INTERVAL HISTORY: pt reports feeling ok       REVIEW OF SYSTEMS:   CONSTITUTIONAL: No weakness  EYES/ENT: No visual changes; No throat pain  Neck: No pain or stiffness  Respiratory: No cough, wheezing, No shortness of breath  CARDIOVASCULAR: no chest pain or palpitations  GASTROINTESTINAL: frequent loose stools  GENITOURINARY: No dysuria, frequency or hematuria  NEUROLOGICAL: No stroke like symptoms  SKIN: No rashes    	  MEDICATIONS:  sotalol 120 milliGRAM(s) Oral daily        PHYSICAL EXAM:  T(C): 36.4 (07-15-21 @ 12:25), Max: 37.9 (07-15-21 @ 08:40)  HR: 61 (07-15-21 @ 12:25) (61 - 88)  BP: 116/71 (07-15-21 @ 12:25) (116/71 - 157/76)  RR: 18 (07-15-21 @ 12:25) (16 - 18)  SpO2: 98% (07-15-21 @ 12:25) (95% - 98%)  Wt(kg): --  I&O's Summary    14 Jul 2021 07:01  -  15 Jul 2021 07:00  --------------------------------------------------------  IN: 3740 mL / OUT: 200 mL / NET: 3540 mL    15 Jul 2021 07:01  -  15 Jul 2021 15:30  --------------------------------------------------------  IN: 200 mL / OUT: 250 mL / NET: -50 mL          Appearance: In no distress	  HEENT:    PERRL, EOMI	  Cardiovascular:  S1 S2, No JVD  Respiratory: Lungs clear to auscultation	  Gastrointestinal:  Soft, Non-tender, + BS	  Vascularature:  No edema of LE  Psychiatric: Appropriate affect   Neuro: no acute focal deficits                               7.4    9.45  )-----------( 342      ( 15 Jul 2021 07:00 )             23.7     07-15    131<L>  |  99  |  5<L>  ----------------------------<  87  2.8<LL>   |  20<L>  |  0.75    Ca    7.6<L>      15 Jul 2021 07:04    TPro  6.9  /  Alb  2.6<L>  /  TBili  0.6  /  DBili  x   /  AST  14  /  ALT  12  /  AlkPhos  51  07-14        Labs personally reviewed      ASSESSMENT/PLAN: 	    Problem/Plan - 1:  ·  Problem: CAD (coronary artery disease).  Plan: c/w home meds  We discussed the importance of SAPT with low dose ASA 81mg given CAD  pt denies any current chest pain, SOB or chest pressure.         Problem/Plan - 2:  ·  Problem: Diarrhea.  Plan: cdiff. +   Started on Vanco     Problem/Plan - 3:  ·  Problem: Colitis.  Plan: Ct shows chron's dis exacerbation   GI consult   as per GI : d/c abx   started on budesonamide.     Problem/Plan - 4:  ·  Problem: GIB (gastrointestinal bleeding).  Plan: bloody diarrhea 2/2 chron's dis exacerbation   IV fluids   -c-diff positive: started vanco  mg q 6 x 10 days.       Kar Powell [facility]   Rufus Onofre DO [facility]  Cardiovascular Medicine  [address] [place] [address], [address]  Office: [phone]  Cell: [phone]

## 2021-07-15 NOTE — PROGRESS NOTE ADULT - SUBJECTIVE AND OBJECTIVE BOX
Patient is a 81y old  Male who presents with a chief complaint of diarrhea with blood in it , abd pain and weakness (15 Jul 2021 15:30)      INTERVAL HPI/OVERNIGHT EVENTS: still having diarrhea , cdiff pos   T(C): 37.5 (07-15-21 @ 21:06), Max: 37.9 (07-15-21 @ 08:40)  HR: 81 (07-15-21 @ 21:06) (61 - 88)  BP: 126/71 (07-15-21 @ 21:06) (116/71 - 149/71)  RR: 18 (07-15-21 @ 21:06) (16 - 18)  SpO2: 98% (07-15-21 @ 21:06) (95% - 98%)  Wt(kg): --  I&O's Summary    14 Jul 2021 07:01  -  15 Jul 2021 07:00  --------------------------------------------------------  IN: 3740 mL / OUT: 200 mL / NET: 3540 mL    15 Jul 2021 07:01  -  15 Jul 2021 21:17  --------------------------------------------------------  IN: 200 mL / OUT: 250 mL / NET: -50 mL        PAST MEDICAL & SURGICAL HISTORY:  No pertinent past medical history    No significant past surgical history        SOCIAL HISTORY  Alcohol:  Tobacco:  Illicit substance use:    FAMILY HISTORY:    REVIEW OF SYSTEMS:  CONSTITUTIONAL: No fever, weight loss, or fatigue  EYES: No eye pain, visual disturbances, or discharge  ENMT:  No difficulty hearing, tinnitus, vertigo; No sinus or throat pain  NECK: No pain or stiffness  RESPIRATORY: No cough, wheezing, chills or hemoptysis; No shortness of breath  CARDIOVASCULAR: No chest pain, palpitations, dizziness, or leg swelling  GASTROINTESTINAL: No abdominal or epigastric pain. No nausea, vomiting, or hematemesis; No diarrhea or constipation. No melena or hematochezia.  GENITOURINARY: No dysuria, frequency, hematuria, or incontinence  NEUROLOGICAL: No headaches, memory loss, loss of strength, numbness, or tremors  SKIN: No itching, burning, rashes, or lesions   LYMPH NODES: No enlarged glands  ENDOCRINE: No heat or cold intolerance; No hair loss  MUSCULOSKELETAL: No joint pain or swelling; No muscle, back, or extremity pain  PSYCHIATRIC: No depression, anxiety, mood swings, or difficulty sleeping  HEME/LYMPH: No easy bruising, or bleeding gums  ALLERY AND IMMUNOLOGIC: No hives or eczema    RADIOLOGY & ADDITIONAL TESTS:    Imaging Personally Reviewed:  [ ] YES  [ ] NO    Consultant(s) Notes Reviewed:  [ ] YES  [ ] NO    PHYSICAL EXAM:  GENERAL: NAD, well-groomed, well-developed  HEAD:  Atraumatic, Normocephalic  EYES: EOMI, PERRLA, conjunctiva and sclera clear  ENMT: No tonsillar erythema, exudates, or enlargement; Moist mucous membranes, Good dentition, No lesions  NECK: Supple, No JVD, Normal thyroid  NERVOUS SYSTEM:  Alert & Oriented X3, Good concentration; Motor Strength 5/5 B/L upper and lower extremities; DTRs 2+ intact and symmetric  CHEST/LUNG: Clear to percussion bilaterally; No rales, rhonchi, wheezing, or rubs  HEART: Regular rate and rhythm; No murmurs, rubs, or gallops  ABDOMEN: Soft, Nontender, Nondistended; Bowel sounds present  EXTREMITIES:  2+ Peripheral Pulses, No clubbing, cyanosis, or edema  LYMPH: No lymphadenopathy noted  SKIN: No rashes or lesions    LABS:                        7.4    9.45  )-----------( 342      ( 15 Jul 2021 07:00 )             23.7     07-15    131<L>  |  99  |  5<L>  ----------------------------<  87  2.8<LL>   |  20<L>  |  0.75    Ca    7.6<L>      15 Jul 2021 07:04    TPro  6.9  /  Alb  2.6<L>  /  TBili  0.6  /  DBili  x   /  AST  14  /  ALT  12  /  AlkPhos  51  07-14        CAPILLARY BLOOD GLUCOSE                MEDICATIONS  (STANDING):  buDESOnide    EC Capsule 9 milliGRAM(s) Oral daily  clonazePAM  Tablet 0.25 milliGRAM(s) Oral at bedtime  folic acid 1 milliGRAM(s) Oral daily  multivitamin 1 Tablet(s) Oral daily  Nephro-matt 1 Tablet(s) Oral daily  pantoprazole    Tablet 40 milliGRAM(s) Oral before breakfast  sodium chloride 0.9%. 1000 milliLiter(s) (100 mL/Hr) IV Continuous <Continuous>  sotalol 120 milliGRAM(s) Oral daily  vancomycin    Solution 125 milliGRAM(s) Oral every 6 hours    MEDICATIONS  (PRN):      Care Discussed with Consultants/Other Providers [ ] YES  [ ] NO

## 2021-07-16 NOTE — PROGRESS NOTE ADULT - ATTENDING COMMENTS
Patient seen and examined, agree with above. on day 2 of PO vancomycin now, still with multiple diarrhea episodes last night. We will continue to follow - if no improvement in next 24-48 hours with antibiotics, will add IV steroids for likely concurrent IBD +/- flex sig.    Would give magic mouthwash for apthous ulcers on tongue.

## 2021-07-16 NOTE — PROGRESS NOTE ADULT - SUBJECTIVE AND OBJECTIVE BOX
Patient is a 81y old  Male who presents with a chief complaint of diarrhea with blood in it , abd pain and weakness (16 Jul 2021 07:24)      INTERVAL HISTORY: pt reports feeling ok      REVIEW OF SYSTEMS:   CONSTITUTIONAL: No weakness  EYES/ENT: No visual changes; No throat pain  Neck: No pain or stiffness  Respiratory: No cough, wheezing, No shortness of breath  CARDIOVASCULAR: no chest pain or palpitations  GASTROINTESTINAL: No abdominal pain, no nausea, vomiting or hematemesis  GENITOURINARY: No dysuria, frequency or hematuria  NEUROLOGICAL: No stroke like symptoms  SKIN: No rashes    	  MEDICATIONS:  sotalol 120 milliGRAM(s) Oral daily        PHYSICAL EXAM:  T(C): 36.5 (07-16-21 @ 12:44), Max: 37.5 (07-15-21 @ 21:06)  HR: 67 (07-16-21 @ 12:44) (65 - 81)  BP: 120/72 (07-16-21 @ 12:44) (120/72 - 153/78)  RR: 18 (07-16-21 @ 12:44) (17 - 19)  SpO2: 98% (07-16-21 @ 12:44) (94% - 99%)  Wt(kg): --  I&O's Summary    15 Jul 2021 07:01  -  16 Jul 2021 07:00  --------------------------------------------------------  IN: 1640 mL / OUT: 850 mL / NET: 790 mL    16 Jul 2021 07:01  -  16 Jul 2021 15:21  --------------------------------------------------------  IN: 480 mL / OUT: 0 mL / NET: 480 mL          Appearance: In no distress	  HEENT:    PERRL, EOMI	  Cardiovascular:  S1 S2, No JVD  Respiratory: Lungs clear to auscultation	  Gastrointestinal:  Soft, Non-tender, + BS	  Vascularature:  No edema of LE  Psychiatric: Appropriate affect   Neuro: no acute focal deficits                               8.5    10.98 )-----------( 390      ( 16 Jul 2021 07:09 )             26.8     07-16    134<L>  |  102  |  6<L>  ----------------------------<  89  3.2<L>   |  21<L>  |  0.82    Ca    8.1<L>      16 Jul 2021 07:07          Labs personally reviewed      ASSESSMENT/PLAN: 	    Problem/Plan - 1:  ·  Problem: CAD (coronary artery disease).  Plan: c/w home meds  We discussed the importance of SAPT with low dose ASA 81mg given CAD  pt denies any current chest pain, SOB or chest pressure.   -EKG     Problem/Plan - 2:  ·  Problem: Diarrhea.  Plan: cdiff. +   Started on Vanco     Problem/Plan - 3:  ·  Problem: Colitis.  Plan: Ct shows chron's  exacerbation   GI recs appreciated      Problem/Plan - 4:  ·  Problem: GIB (gastrointestinal bleeding).  Plan: bloody diarrhea 2/2 chron's dis exacerbation   IV fluids   -c-diff positive: started vanco  mg q 6 x 10 days.         Kar Powell [facility]   Rufus Onofre DO [facility]  Cardiovascular Medicine  [address] [facility] [address] [address] [facility], [address]  Office: [phone]  Cell: [phone]

## 2021-07-16 NOTE — CHART NOTE - NSCHARTNOTEFT_GEN_A_CORE
Nutrition Follow Up Note  Patient seen for:    Chart reviewed, events noted.  Chief Complaint:  Patient is a 81y old  Male who presents with a chief complaint of diarrhea with blood in it , abd pain and weakness (15 Jul 2021 21:17)    Source: [ X] Patient       [x] EMR          [X ] Family at bedside    daughter         Diet Order:   Diet, Regular:   Fiber/Residue Restricted (LOWFIBER) (07-15-21)    - Is current order appropriate/adequate? [X ] Yes  [ ]  No:     - PO intake :       [ X] >50  Fair       patient stated he tried to eat well. pt c/o not sleeping well due to diarrhea overnight (+c.diff)  - Nutrition-Related Concerns:  Patient expressed dislike for Ensure clears supplement. Patient was offered Promote for its low osmolality low residue formula however patient declined and chooses to receive ensure. Verified by his daughter (at bedside), requesting x3 daily  Patient c/o "acid stomach" , RN made aware by pt daughter   - Micronutrient Supplementation: folic acid 1 milliGRAM(s) Oral daily  multivitamin 1 Tablet(s) Oral daily  Nephro-matt 1 Tablet(s) Oral daily  sodium chloride 0.9%. 1000 milliLiter(s) IV Continuous <Continuous>    GI:  Last BM _7/16  x3 (loose stools related to +c.diff ( and Crohn's flare as stated by patient)    Bowel Regimen? [ ] Yes   [ X] No    Weights:   Daily Weight in k.7 (-14)  MEDICATIONS  (STANDING):  buDESOnide    EC Capsule  clonazePAM  Tablet  folic acid  multivitamin  Nephro-matt  pantoprazole    Tablet  sodium chloride 0.9%.  sotalol  vancomycin    Solution    Pertinent Labs:  @ 07:07: Na 134<L>, BUN 6<L>, Cr 0.82, BG 89, K+ 3.2<L>, Phos --, Mg --, Alk Phos --, ALT/SGPT --, AST/SGOT --, HbA1c --      Skin per nursing documentation: no pressure breakdown  Edema: none    Estimated Needs:   [X ] no change since previous assessment  [ ] recalculated:     Previous Nutrition Diagnosis: Malnutrition Severe , related to weight loss  Nutrition Diagnosis is: [X ] ongoing      New Nutrition Diagnosis: [ X] Not applicable    Nutrition Care Plan:  [X ] In Progress      Nutrition Interventions / Recommendations:    1. Add ensure enlive x3 daily  2. discontinue ensure clears  3. discontinue neprovit vitamin (patient receiving MVI)  4. Education Provided:     [X ] Yes:       5) assist with menus                                                 6) Continue Current Diet: [X ] Yes  Low Fiber  Discussed with team                                   Monitoring and Evaluation:   Continue to monitor Nutritional intake, Tolerance to diet prescription, weights, labs, skin integrity  LATA Whitaker, RD, CDN #[id number]   RD remains available upon request and will follow up per protocol

## 2021-07-16 NOTE — PROGRESS NOTE ADULT - SUBJECTIVE AND OBJECTIVE BOX
Chief Complaint:  Patient is a 81y old  Male who presents with a chief complaint of diarrhea with blood in it , abd pain and weakness (15 Jul 2021 21:17)      Reason for consult: Diarrhea/BRBPR    Interval Events:   Remains HDS, afebrile with Tmax 100.2, AM labs pending ---    Hospital Medications:  buDESOnide    EC Capsule 9 milliGRAM(s) Oral daily  clonazePAM  Tablet 0.25 milliGRAM(s) Oral at bedtime  folic acid 1 milliGRAM(s) Oral daily  multivitamin 1 Tablet(s) Oral daily  Nephro-matt 1 Tablet(s) Oral daily  pantoprazole    Tablet 40 milliGRAM(s) Oral before breakfast  sodium chloride 0.9%. 1000 milliLiter(s) IV Continuous <Continuous>  sotalol 120 milliGRAM(s) Oral daily  vancomycin    Solution 125 milliGRAM(s) Oral every 6 hours      ROS: Complete and normal except as mentioned above    PHYSICAL EXAM:   Vital Signs:  Vital Signs Last 24 Hrs  T(C): 37 (16 Jul 2021 04:39), Max: 37.9 (15 Jul 2021 08:40)  T(F): 98.6 (16 Jul 2021 04:39), Max: 100.2 (15 Jul 2021 08:40)  HR: 79 (16 Jul 2021 04:39) (61 - 81)  BP: 153/78 (16 Jul 2021 04:39) (116/71 - 153/78)  BP(mean): --  RR: 18 (16 Jul 2021 04:39) (17 - 18)  SpO2: 99% (16 Jul 2021 04:39) (96% - 99%)  Daily     Daily     GENERAL: no acute distress  NEURO: alert  HEENT: anicteric sclera, no conjunctival pallor appreciated, +oral aphthous ulceration of the lateral tongue and buccal mucosa  CHEST: no respiratory distress, no accessory muscle use  CARDIAC: regular rate, rhythm  ABDOMEN: soft, non-distended, no rebound or guarding; mild RUQ tenderness, moderated LLQ and lower abdominal tenderness  EXTREMITIES: warm, well perfused, no edema  SKIN: no lesions noted  PSYCH: Normal affect    LABS: reviewed                        7.4    9.45  )-----------( 342      ( 15 Jul 2021 07:00 )             23.7     07-15    131<L>  |  99  |  5<L>  ----------------------------<  87  2.8<LL>   |  20<L>  |  0.75    Ca    7.6<L>      15 Jul 2021 07:04          Interval Diagnostic Studies: see sunrise for full report   Chief Complaint:  Patient is a 81y old  Male who presents with a chief complaint of diarrhea with blood in it , abd pain and weakness (15 Jul 2021 21:17)      Reason for consult: Diarrhea/BRBPR    Interval Events:   Remains HDS, afebrile with Tmax 100.2, Hgb 8.5 from 7.4, no blood given overnight. Patient reporting 7 BMs overnight, says minimal-no blood, but continues to have some mild abdominal pain. Difficulty eating due to pain in mouth from ulcers.    Hospital Medications:  buDESOnide    EC Capsule 9 milliGRAM(s) Oral daily  clonazePAM  Tablet 0.25 milliGRAM(s) Oral at bedtime  folic acid 1 milliGRAM(s) Oral daily  multivitamin 1 Tablet(s) Oral daily  Nephro-matt 1 Tablet(s) Oral daily  pantoprazole    Tablet 40 milliGRAM(s) Oral before breakfast  sodium chloride 0.9%. 1000 milliLiter(s) IV Continuous <Continuous>  sotalol 120 milliGRAM(s) Oral daily  vancomycin    Solution 125 milliGRAM(s) Oral every 6 hours      ROS: Complete and normal except as mentioned above    PHYSICAL EXAM:   Vital Signs:  Vital Signs Last 24 Hrs  T(C): 37 (16 Jul 2021 04:39), Max: 37.9 (15 Jul 2021 08:40)  T(F): 98.6 (16 Jul 2021 04:39), Max: 100.2 (15 Jul 2021 08:40)  HR: 79 (16 Jul 2021 04:39) (61 - 81)  BP: 153/78 (16 Jul 2021 04:39) (116/71 - 153/78)  BP(mean): --  RR: 18 (16 Jul 2021 04:39) (17 - 18)  SpO2: 99% (16 Jul 2021 04:39) (96% - 99%)  Daily     Daily     GENERAL: no acute distress  NEURO: alert  HEENT: anicteric sclera, no conjunctival pallor appreciated, +oral aphthous ulceration of the lateral tongue and buccal mucosa  CHEST: no respiratory distress, no accessory muscle use  CARDIAC: regular rate, rhythm  ABDOMEN: soft, non-distended, no rebound or guarding; mild RUQ tenderness, mild LLQ and lower abdominal tenderness  EXTREMITIES: warm, well perfused, no edema  SKIN: no lesions noted  PSYCH: Normal affect    LABS: reviewed                        7.4    9.45  )-----------( 342      ( 15 Jul 2021 07:00 )             23.7     07-15    131<L>  |  99  |  5<L>  ----------------------------<  87  2.8<LL>   |  20<L>  |  0.75    Ca    7.6<L>      15 Jul 2021 07:04          Interval Diagnostic Studies: see sunrise for full report   Chief Complaint:  Patient is a 81y old  Male who presents with a chief complaint of diarrhea with blood in it , abd pain and weakness (15 Jul 2021 21:17)      Reason for consult: Diarrhea/BRBPR    Interval Events:   Remains HDS, afebrile with Tmax 100.2, Hgb 8.5 from 7.4, no blood given overnight. Patient reporting 7 BMs overnight, says minimal-no blood, but continues to have some mild abdominal pain. Difficulty eating due to pain in mouth from ulcers.    Hospital Medications:  buDESOnide    EC Capsule 9 milliGRAM(s) Oral daily  clonazePAM  Tablet 0.25 milliGRAM(s) Oral at bedtime  folic acid 1 milliGRAM(s) Oral daily  multivitamin 1 Tablet(s) Oral daily  Nephro-matt 1 Tablet(s) Oral daily  pantoprazole    Tablet 40 milliGRAM(s) Oral before breakfast  sodium chloride 0.9%. 1000 milliLiter(s) IV Continuous <Continuous>  sotalol 120 milliGRAM(s) Oral daily  vancomycin    Solution 125 milliGRAM(s) Oral every 6 hours      ROS: Complete and normal except as mentioned above    PHYSICAL EXAM:   Vital Signs:  Vital Signs Last 24 Hrs  T(C): 37 (16 Jul 2021 04:39), Max: 37.9 (15 Jul 2021 08:40)  T(F): 98.6 (16 Jul 2021 04:39), Max: 100.2 (15 Jul 2021 08:40)  HR: 79 (16 Jul 2021 04:39) (61 - 81)  BP: 153/78 (16 Jul 2021 04:39) (116/71 - 153/78)  BP(mean): --  RR: 18 (16 Jul 2021 04:39) (17 - 18)  SpO2: 99% (16 Jul 2021 04:39) (96% - 99%)  Daily     Daily     GENERAL: no acute distress  NEURO: alert  HEENT: anicteric sclera, no conjunctival pallor appreciated, +oral aphthous ulceration of the lateral tongue and buccal mucosa, small  CHEST: no respiratory distress, no accessory muscle use  CARDIAC: regular rate, rhythm  ABDOMEN: soft, non-distended, no rebound or guarding; mild RUQ tenderness, mild LLQ and lower abdominal tenderness  EXTREMITIES: warm, well perfused, no edema  SKIN: no lesions noted  PSYCH: Normal affect    LABS: reviewed                        7.4    9.45  )-----------( 342      ( 15 Jul 2021 07:00 )             23.7     07-15    131<L>  |  99  |  5<L>  ----------------------------<  87  2.8<LL>   |  20<L>  |  0.75    Ca    7.6<L>      15 Jul 2021 07:04          Interval Diagnostic Studies: see sunrise for full report

## 2021-07-16 NOTE — PROGRESS NOTE ADULT - ASSESSMENT
GIDEON HAYES is a 81y Male with GERD on omeprazole daily and Crohn's disease diagnosed about 9 mos prior to admission at outside hospital c/b h/o abscess and left posterior distal anal intersphincteric fistula and presents as a new consult to GI for diarrhea/BRBPR likely 2/2 to CD flare c/b c diff colitis.    #. Diarrhea/BRBPR - likely 2/2 CD flare with leukocytosis most likely 2/2 cdiff colitis, less likely 2/2 CMV colitis. Patient's cdiff was positive and is the likely cause of his CD flare, but if his sx persist/progress over the next couple of days while on Abx w/o IV steroids, then his CD flare may be too active. At that time we will likely recommend starting IV steroids to help control the C diff flare, but will also consider whether he will need a flex sig with bx for further evaluation and to r/o CMV colitis.    #. Weight loss - likely 2/2 CD c/b oral aphthous ulcers and diarrhea as he has not been able to tolerate po and will need to ensure adequate hydration  #. GERD     Recommendations:  - Advance to low residual diet as tolerated  - Add protein supplements, obtain dietician evaluation  - Continue budesonide 3 mg po daily  - Non reactive HBVsAg: no latent HBV infection  - f/u HBVsAg antibody to asses for immunity  - f/u QuantiFeron for possible Biologic use  - Continue vancomycin 125 mg 4x daily, for 10 days  - Continue to hold off IV steroids for now  - f/u ova and parasites studies  - Trend Hgb, transfuse if Hgb < 7 (denies h/o blood transfusion)  - Continue omeprazole 20 mg po daily    Thank you for involving us in this patient's care.    Patient discussed with Attending, ----    Aminah Zhong MD  Gastroenterology/Hepatology Fellow, PGY-V  Available via [employer] Teams    NON-URGENT CONSULTS:  Please email [email or web address]  AT NIGHT AND ON WEEKENDS:  Contact on-call GI fellow via answering service ([phone]) from 5pm-8am and on weekends/holidays  MONDAY-FRIDAY 8AM-5PM:  Pager# [phone] ([facility])  GI Phone# [phone] ([facility]) GIDEON HAYES is a 81y Male with GERD on omeprazole daily and Crohn's disease diagnosed about 9 mos prior to admission at outside hospital c/b h/o abscess and left posterior distal anal intersphincteric fistula and presents as a new consult to GI for diarrhea/BRBPR likely 2/2 to CD flare c/b c diff colitis.    #. Diarrhea/BRBPR - likely 2/2 CD flare with leukocytosis most likely 2/2 cdiff colitis, less likely 2/2 CMV colitis. Patient's cdiff was positive and is the likely cause of his CD flare. Still with significant sx but has only had a little over a day of Abx. Should his sx not improve over the next couple of days he will benefit from IV steroids to help control the underlying CD and will warrant a flex sig with bx for further evaluation and to r/o CMV colitis.    #. Weight loss - likely 2/2 CD c/b oral aphthous ulcers and diarrhea as he has not been able to tolerate po and will need to ensure adequate hydration.  #. GERD     Recommendations:  - Add magic mouth wash  - Continue to low residual diet as tolerated  - Add protein supplements, obtain dietician evaluation  - Continue budesonide 3 mg po daily  - Non reactive HBVsAg: no latent HBV infection  - f/u QuantiFeron for possible Biologic use  - Continue vancomycin 125 mg 4x daily, for 10 days  - Continue to hold off IV steroids for now  - f/u ova and parasites studies  - Trend Hgb, transfuse if Hgb < 7 (denies h/o blood transfusion)  - Continue omeprazole 20 mg po daily    Thank you for involving us in this patient's care.    Patient seen and discussed with Attending, Dr. Concepcion.    Aminah Zhong MD  Gastroenterology/Hepatology Fellow, PGY-V  Available via [employer] Teams    NON-URGENT CONSULTS:  Please email [email or web address]  [email or web address]  AT NIGHT AND ON WEEKENDS:  Contact on-call GI fellow via answering service ([phone]) from 5pm-8am and on weekends/holidays  MONDAY-FRIDAY 8AM-5PM:  Pager# [phone] ([facility])  GI Phone# [phone] ([facility]) GIDEON HAYES is a 81y Male with GERD on omeprazole daily and Crohn's disease diagnosed about 9 mos prior to admission at outside hospital c/b h/o abscess and left posterior distal anal intersphincteric fistula and presents as a new consult to GI for diarrhea/BRBPR likely 2/2 to CD flare c/b c diff colitis.    #. Diarrhea/BRBPR - likely 2/2 CD flare with leukocytosis most likely 2/2 cdiff colitis, less likely 2/2 CMV colitis. Patient's cdiff was positive and is the likely cause of his CD flare. Still with significant sx but has only had a little over a day of Abx. Should his sx not improve over the next couple of days he will benefit from IV steroids to help control the underlying CD and will warrant a flex sig with bx for further evaluation and to r/o CMV colitis.    #. Weight loss - likely 2/2 CD c/b oral aphthous ulcers and diarrhea as he has not been able to tolerate po and will need to ensure adequate hydration.  #. GERD     Recommendations:  - Add magic mouth wash q6 hours PRN  - Continue to low residual diet as tolerated  - Add protein supplements, obtain dietician evaluation  - Continue budesonide 3 mg po daily  - Non reactive HBVsAg: no latent HBV infection  - f/u QuantiFeron for possible Biologic use  - Continue vancomycin 125 mg 4x daily, for 10 days  - If no improvement with vancomycin, will consider adding IV steroids for concurrent IBD flare    Thank you for involving us in this patient's care.    Patient seen and discussed with Attending, Dr. Concepcion.    Aminah Zhong MD  Gastroenterology/Hepatology Fellow, PGY-V  Available via [employer] Teams    NON-URGENT CONSULTS:  Please email [email or web address]  [email or web address]  AT NIGHT AND ON WEEKENDS:  Contact on-call GI fellow via answering service ([phone]) from 5pm-8am and on weekends/holidays  MONDAY-FRIDAY 8AM-5PM:  Pager# [phone] ([facility])  GI Phone# [phone] ([facility])

## 2021-07-16 NOTE — PROGRESS NOTE ADULT - SUBJECTIVE AND OBJECTIVE BOX
Patient is a 81y old  Male who presents with a chief complaint of diarrhea with blood in it , abd pain and weakness (16 Jul 2021 15:20)      INTERVAL HPI/OVERNIGHT EVENTS: still c/o diarrhea and oral ulcers , unable to eat much   T(C): 36.8 (07-16-21 @ 23:50), Max: 37 (07-16-21 @ 04:39)  HR: 71 (07-16-21 @ 23:50) (67 - 79)  BP: 148/74 (07-16-21 @ 23:50) (120/72 - 153/79)  RR: 18 (07-16-21 @ 23:50) (18 - 19)  SpO2: 95% (07-16-21 @ 23:50) (94% - 100%)  Wt(kg): --  I&O's Summary    15 Jul 2021 07:01  -  16 Jul 2021 07:00  --------------------------------------------------------  IN: 1640 mL / OUT: 850 mL / NET: 790 mL    16 Jul 2021 07:01  -  17 Jul 2021 01:42  --------------------------------------------------------  IN: 1700 mL / OUT: 0 mL / NET: 1700 mL        PAST MEDICAL & SURGICAL HISTORY:  No pertinent past medical history    No significant past surgical history        SOCIAL HISTORY  Alcohol:  Tobacco:  Illicit substance use:    FAMILY HISTORY:    REVIEW OF SYSTEMS:  CONSTITUTIONAL: No fever, weight loss, or fatigue  EYES: No eye pain, visual disturbances, or discharge  ENMT:  No difficulty hearing, tinnitus, vertigo; No sinus or throat pain  NECK: No pain or stiffness  RESPIRATORY: No cough, wheezing, chills or hemoptysis; No shortness of breath  CARDIOVASCULAR: No chest pain, palpitations, dizziness, or leg swelling  GASTROINTESTINAL: No abdominal or epigastric pain. No nausea, vomiting, or hematemesis; No diarrhea or constipation. No melena or hematochezia.  GENITOURINARY: No dysuria, frequency, hematuria, or incontinence  NEUROLOGICAL: No headaches, memory loss, loss of strength, numbness, or tremors  SKIN: No itching, burning, rashes, or lesions   LYMPH NODES: No enlarged glands  ENDOCRINE: No heat or cold intolerance; No hair loss  MUSCULOSKELETAL: No joint pain or swelling; No muscle, back, or extremity pain  PSYCHIATRIC: No depression, anxiety, mood swings, or difficulty sleeping  HEME/LYMPH: No easy bruising, or bleeding gums  ALLERY AND IMMUNOLOGIC: No hives or eczema    RADIOLOGY & ADDITIONAL TESTS:    Imaging Personally Reviewed:  [ ] YES  [ ] NO    Consultant(s) Notes Reviewed:  [ ] YES  [ ] NO    PHYSICAL EXAM:  GENERAL: NAD, well-groomed, well-developed  HEAD:  Atraumatic, Normocephalic  EYES: EOMI, PERRLA, conjunctiva and sclera clear  ENMT: No tonsillar erythema, exudates, or enlargement; Moist mucous membranes, Good dentition, No lesions  NECK: Supple, No JVD, Normal thyroid  NERVOUS SYSTEM:  Alert & Oriented X3, Good concentration; Motor Strength 5/5 B/L upper and lower extremities; DTRs 2+ intact and symmetric  CHEST/LUNG: Clear to percussion bilaterally; No rales, rhonchi, wheezing, or rubs  HEART: Regular rate and rhythm; No murmurs, rubs, or gallops  ABDOMEN: Soft, Nontender, Nondistended; Bowel sounds present  EXTREMITIES:  2+ Peripheral Pulses, No clubbing, cyanosis, or edema  LYMPH: No lymphadenopathy noted  SKIN: No rashes or lesions    LABS:                        8.5    10.98 )-----------( 390      ( 16 Jul 2021 07:09 )             26.8     07-16    134<L>  |  102  |  6<L>  ----------------------------<  89  3.2<L>   |  21<L>  |  0.82    Ca    8.1<L>      16 Jul 2021 07:07          CAPILLARY BLOOD GLUCOSE                MEDICATIONS  (STANDING):  buDESOnide    EC Capsule 9 milliGRAM(s) Oral daily  clonazePAM  Tablet 0.25 milliGRAM(s) Oral at bedtime  folic acid 1 milliGRAM(s) Oral daily  multivitamin 1 Tablet(s) Oral daily  pantoprazole    Tablet 40 milliGRAM(s) Oral before breakfast  sodium chloride 0.9%. 1000 milliLiter(s) (100 mL/Hr) IV Continuous <Continuous>  sotalol 120 milliGRAM(s) Oral daily  vancomycin    Solution 125 milliGRAM(s) Oral every 6 hours    MEDICATIONS  (PRN):      Care Discussed with Consultants/Other Providers [ ] YES  [ ] NO

## 2021-07-17 NOTE — PROGRESS NOTE ADULT - ASSESSMENT
GIDEON HAYES is a 81y Male with GERD on omeprazole daily and Crohn's disease diagnosed about 9 mos prior to admission at outside hospital c/b h/o abscess and left posterior distal anal intersphincteric fistula and presents as a new consult to GI for diarrhea/BRBPR likely 2/2 to CD flare c/b c diff colitis.    #. Diarrhea/BRBPR - likely 2/2 CD flare with leukocytosis most likely 2/2 cdiff colitis, less likely 2/2 CMV colitis. Patient's cdiff was positive and is the likely cause of his CD flare. Still with significant sx but has only had a little over a day of Abx. Should his sx not improve over the next couple of days he will benefit from IV steroids to help control the underlying CD and will warrant a flex sig with bx for further evaluation and to r/o CMV colitis. - Non reactive HBVsAg: no latent HBV infection    #. Weight loss - likely 2/2 CD c/b oral aphthous ulcers and diarrhea as he has not been able to tolerate po and will need to ensure adequate hydration.  #. GERD     Recommendations:  - will plan for flex sig Monday with bx to assess Crohns, c diff and r/o CMV  - NPO at midnight tomorrow  - will need 2 tap water enemas Monday AM prior to procedure  - Add magic mouth wash q6 hours PRN  - Continue to low residual diet as tolerated  - Add protein supplements, obtain dietician evaluation  - Continue budesonide 3 mg po daily  - f/u QuantiFeron for possible Biologic use  - Continue vancomycin 125 mg 4x daily, for 10 days  - plan above discussed with patient's daughter, Tiffanie Stephens Luciana PGY-5  Gastroenterology Fellow  Pager #[phone] ([facility]) or [phone] ([facility])  Available on [employer] Teams.  Please contact on-call GI fellow via answering service ([phone]) after 5pm and before 8am, and on weekends.

## 2021-07-17 NOTE — PROGRESS NOTE ADULT - SUBJECTIVE AND OBJECTIVE BOX
Chief Complaint:  Patient is a 81y old  Male who presents with a chief complaint of diarrhea with blood in it , abd pain and weakness (16 Jul 2021 20:42)      Interval Events: continues to have abd pains in lower ab  - 6 BM overnight, does not feel improved      Hospital Medications:  buDESOnide    EC Capsule 9 milliGRAM(s) Oral daily  clonazePAM  Tablet 0.25 milliGRAM(s) Oral at bedtime  folic acid 1 milliGRAM(s) Oral daily  multivitamin 1 Tablet(s) Oral daily  pantoprazole    Tablet 40 milliGRAM(s) Oral before breakfast  sodium chloride 0.9%. 1000 milliLiter(s) IV Continuous <Continuous>  sotalol 120 milliGRAM(s) Oral daily  vancomycin    Solution 125 milliGRAM(s) Oral every 6 hours      PMHX/PSHX:  No pertinent past medical history    No significant past surgical history            ROS:     General:  No weight loss, fevers, chills, night sweats, fatigue   Eyes:  No vision changes  ENT:  No sore throat, pain, runny nose  CV:  No chest pain, palpitations, dizziness   Resp:  No SOB, cough, wheezing  GI:  See HPI  :  No burning with urination, hematuria  Muscle:  No pain, weakness  Neuro:  No weakness/tingling, memory problems  Psych:  No fatigue, insomnia, mood problems, depression  Heme:  No easy bruisability  Skin:  No rash, edema      PHYSICAL EXAM:     GENERAL:  Well developed, no distress  HEENT:  NC/AT,  conjunctivae clear, sclera anicteric  CHEST:  Full & symmetric excursion, no increased effort w/ respirations  HEART:  Regular rhythm & rate  ABDOMEN:  Soft, tender to palpation in b/l lower quadrants, non-distended  EXTREMITIES:  no LE  edema  SKIN:  No rash/erythema/ecchymoses/petechiae/wounds/jaundice  NEURO:  Alert, oriented    Vital Signs:  Vital Signs Last 24 Hrs  T(C): 36.6 (17 Jul 2021 06:31), Max: 36.8 (16 Jul 2021 18:39)  T(F): 97.9 (17 Jul 2021 06:31), Max: 98.3 (16 Jul 2021 18:39)  HR: 83 (17 Jul 2021 06:31) (67 - 83)  BP: 151/82 (17 Jul 2021 06:31) (120/72 - 153/79)  BP(mean): --  RR: 18 (17 Jul 2021 06:31) (18 - 18)  SpO2: 100% (17 Jul 2021 06:31) (95% - 100%)  Daily     Daily     LABS:                        8.1    12.50 )-----------( 407      ( 17 Jul 2021 06:04 )             26.2     07-17    137  |  104  |  6<L>  ----------------------------<  89  3.6   |  21<L>  |  0.80    Ca    8.2<L>      17 Jul 2021 06:04  Phos  1.4     07-17  Mg     1.5     07-17                Imaging:

## 2021-07-17 NOTE — PROGRESS NOTE ADULT - SUBJECTIVE AND OBJECTIVE BOX
Patient is a 81y old  Male who presents with a chief complaint of diarrhea with blood in it , abd pain and weakness (17 Jul 2021 10:28)      INTERVAL HPI/OVERNIGHT EVENTS: still having diarrhea , decrease appetite   T(C): 37.1 (07-17-21 @ 20:01), Max: 37.1 (07-17-21 @ 20:01)  HR: 82 (07-17-21 @ 20:01) (71 - 83)  BP: 145/74 (07-17-21 @ 20:01) (126/67 - 156/79)  RR: 18 (07-17-21 @ 20:01) (18 - 18)  SpO2: 97% (07-17-21 @ 20:01) (95% - 100%)  Wt(kg): --  I&O's Summary    16 Jul 2021 07:01  -  17 Jul 2021 07:00  --------------------------------------------------------  IN: 3380 mL / OUT: 0 mL / NET: 3380 mL    17 Jul 2021 07:01  -  17 Jul 2021 21:46  --------------------------------------------------------  IN: 640 mL / OUT: 100 mL / NET: 540 mL        PAST MEDICAL & SURGICAL HISTORY:  No pertinent past medical history    No significant past surgical history        SOCIAL HISTORY  Alcohol:  Tobacco:  Illicit substance use:    FAMILY HISTORY:    REVIEW OF SYSTEMS:  CONSTITUTIONAL: No fever, weight loss, or fatigue  EYES: No eye pain, visual disturbances, or discharge  ENMT:  No difficulty hearing, tinnitus, vertigo; No sinus or throat pain  NECK: No pain or stiffness  RESPIRATORY: No cough, wheezing, chills or hemoptysis; No shortness of breath  CARDIOVASCULAR: No chest pain, palpitations, dizziness, or leg swelling  GASTROINTESTINAL: No abdominal or epigastric pain. No nausea, vomiting, or hematemesis; No diarrhea or constipation. No melena or hematochezia.  GENITOURINARY: No dysuria, frequency, hematuria, or incontinence  NEUROLOGICAL: No headaches, memory loss, loss of strength, numbness, or tremors  SKIN: No itching, burning, rashes, or lesions   LYMPH NODES: No enlarged glands  ENDOCRINE: No heat or cold intolerance; No hair loss  MUSCULOSKELETAL: No joint pain or swelling; No muscle, back, or extremity pain  PSYCHIATRIC: No depression, anxiety, mood swings, or difficulty sleeping  HEME/LYMPH: No easy bruising, or bleeding gums  ALLERY AND IMMUNOLOGIC: No hives or eczema    RADIOLOGY & ADDITIONAL TESTS:    Imaging Personally Reviewed:  [ ] YES  [ ] NO    Consultant(s) Notes Reviewed:  [ ] YES  [ ] NO    PHYSICAL EXAM:  GENERAL: NAD, well-groomed, well-developed  HEAD:  Atraumatic, Normocephalic  EYES: EOMI, PERRLA, conjunctiva and sclera clear  ENMT: No tonsillar erythema, exudates, or enlargement; Moist mucous membranes, Good dentition, No lesions  NECK: Supple, No JVD, Normal thyroid  NERVOUS SYSTEM:  Alert & Oriented X3, Good concentration; Motor Strength 5/5 B/L upper and lower extremities; DTRs 2+ intact and symmetric  CHEST/LUNG: Clear to percussion bilaterally; No rales, rhonchi, wheezing, or rubs  HEART: Regular rate and rhythm; No murmurs, rubs, or gallops  ABDOMEN: Soft, Nontender, Nondistended; Bowel sounds present  EXTREMITIES:  2+ Peripheral Pulses, No clubbing, cyanosis, or edema  LYMPH: No lymphadenopathy noted  SKIN: No rashes or lesions    LABS:                        8.1    12.50 )-----------( 407      ( 17 Jul 2021 06:04 )             26.2     07-17    137  |  104  |  6<L>  ----------------------------<  89  3.6   |  21<L>  |  0.80    Ca    8.2<L>      17 Jul 2021 06:04  Phos  1.4     07-17  Mg     1.5     07-17          CAPILLARY BLOOD GLUCOSE                MEDICATIONS  (STANDING):  buDESOnide    EC Capsule 9 milliGRAM(s) Oral daily  clonazePAM  Tablet 0.25 milliGRAM(s) Oral at bedtime  FIRST- Mouthwash  BLM 10 milliLiter(s) Swish and Spit every 8 hours  folic acid 1 milliGRAM(s) Oral daily  multivitamin 1 Tablet(s) Oral daily  pantoprazole    Tablet 40 milliGRAM(s) Oral before breakfast  potassium phosphate / sodium phosphate Powder (PHOS-NaK) 2 Packet(s) Oral every 4 hours  sodium chloride 0.9%. 1000 milliLiter(s) (100 mL/Hr) IV Continuous <Continuous>  sotalol 120 milliGRAM(s) Oral daily  vancomycin    Solution 125 milliGRAM(s) Oral every 6 hours    MEDICATIONS  (PRN):  morphine  - Injectable 1 milliGRAM(s) IV Push once PRN Severe Pain (7 - 10)      Care Discussed with Consultants/Other Providers [ ] YES  [ ] NO

## 2021-07-17 NOTE — PROGRESS NOTE ADULT - ATTENDING COMMENTS
Agree with above. 80 yo w Crohns disease and C difficile colitis on po Vanco. Agree with flex sig if no significant improvement in the next 48 hours.

## 2021-07-18 NOTE — PROGRESS NOTE ADULT - SUBJECTIVE AND OBJECTIVE BOX
Patient is a 81y old  Male who presents with a chief complaint of diarrhea with blood in it , abd pain and weakness (17 Jul 2021 21:46)      INTERVAL HPI/OVERNIGHT EVENTS: scheduled for flex sig in am   T(C): 36.7 (07-18-21 @ 20:27), Max: 36.9 (07-18-21 @ 06:36)  HR: 81 (07-18-21 @ 20:27) (81 - 99)  BP: 130/76 (07-18-21 @ 20:27) (106/63 - 155/79)  RR: 18 (07-18-21 @ 20:27) (16 - 19)  SpO2: 98% (07-18-21 @ 20:27) (95% - 98%)  Wt(kg): --  I&O's Summary    17 Jul 2021 07:01  -  18 Jul 2021 07:00  --------------------------------------------------------  IN: 2200 mL / OUT: 100 mL / NET: 2100 mL    18 Jul 2021 07:01  -  18 Jul 2021 22:42  --------------------------------------------------------  IN: 420 mL / OUT: 250 mL / NET: 170 mL        PAST MEDICAL & SURGICAL HISTORY:  No pertinent past medical history    No significant past surgical history        SOCIAL HISTORY  Alcohol:  Tobacco:  Illicit substance use:    FAMILY HISTORY:    REVIEW OF SYSTEMS:  CONSTITUTIONAL: No fever, weight loss, or fatigue  EYES: No eye pain, visual disturbances, or discharge  ENMT:  No difficulty hearing, tinnitus, vertigo; No sinus or throat pain  NECK: No pain or stiffness  RESPIRATORY: No cough, wheezing, chills or hemoptysis; No shortness of breath  CARDIOVASCULAR: No chest pain, palpitations, dizziness, or leg swelling  GASTROINTESTINAL: No abdominal or epigastric pain. No nausea, vomiting, or hematemesis; No diarrhea or constipation. No melena or hematochezia.  GENITOURINARY: No dysuria, frequency, hematuria, or incontinence  NEUROLOGICAL: No headaches, memory loss, loss of strength, numbness, or tremors  SKIN: No itching, burning, rashes, or lesions   LYMPH NODES: No enlarged glands  ENDOCRINE: No heat or cold intolerance; No hair loss  MUSCULOSKELETAL: No joint pain or swelling; No muscle, back, or extremity pain  PSYCHIATRIC: No depression, anxiety, mood swings, or difficulty sleeping  HEME/LYMPH: No easy bruising, or bleeding gums  ALLERY AND IMMUNOLOGIC: No hives or eczema    RADIOLOGY & ADDITIONAL TESTS:    Imaging Personally Reviewed:  [ ] YES  [ ] NO    Consultant(s) Notes Reviewed:  [ ] YES  [ ] NO    PHYSICAL EXAM:  GENERAL: NAD, well-groomed, well-developed  HEAD:  Atraumatic, Normocephalic  EYES: EOMI, PERRLA, conjunctiva and sclera clear  ENMT: No tonsillar erythema, exudates, or enlargement; Moist mucous membranes, Good dentition, No lesions  NECK: Supple, No JVD, Normal thyroid  NERVOUS SYSTEM:  Alert & Oriented X3, Good concentration; Motor Strength 5/5 B/L upper and lower extremities; DTRs 2+ intact and symmetric  CHEST/LUNG: Clear to percussion bilaterally; No rales, rhonchi, wheezing, or rubs  HEART: Regular rate and rhythm; No murmurs, rubs, or gallops  ABDOMEN: Soft, Nontender, Nondistended; Bowel sounds present  EXTREMITIES:  2+ Peripheral Pulses, No clubbing, cyanosis, or edema  LYMPH: No lymphadenopathy noted  SKIN: No rashes or lesions    LABS:                        8.4    13.71 )-----------( 389      ( 18 Jul 2021 06:50 )             27.5     07-18    134<L>  |  101  |  4<L>  ----------------------------<  88  3.3<L>   |  22  |  0.82    Ca    8.1<L>      18 Jul 2021 06:50  Phos  1.8     07-18  Mg     1.8     07-18          CAPILLARY BLOOD GLUCOSE                MEDICATIONS  (STANDING):  buDESOnide    EC Capsule 9 milliGRAM(s) Oral daily  clonazePAM  Tablet 0.25 milliGRAM(s) Oral at bedtime  FIRST- Mouthwash  BLM 10 milliLiter(s) Swish and Spit every 8 hours  folic acid 1 milliGRAM(s) Oral daily  multivitamin 1 Tablet(s) Oral daily  pantoprazole    Tablet 40 milliGRAM(s) Oral before breakfast  sodium chloride 0.9%. 1000 milliLiter(s) (100 mL/Hr) IV Continuous <Continuous>  sotalol 120 milliGRAM(s) Oral daily  vancomycin    Solution 125 milliGRAM(s) Oral every 6 hours    MEDICATIONS  (PRN):  morphine  - Injectable 1 milliGRAM(s) IV Push once PRN Severe Pain (7 - 10)      Care Discussed with Consultants/Other Providers [ ] YES  [ ] NO

## 2021-07-18 NOTE — CHART NOTE - NSCHARTNOTEFT_GEN_A_CORE
Discussed with patient and daughter, Tiffanie - plan for flex sig tomorrow with biopsies.  - NPO after midnight  - 2 tap water enemas in AM prior to procedure  - please ensure early AM labs drawn to replete any electrolytes IV prior to procedure      Kat Chowdhury PGY-5  Gastroenterology Fellow  Pager #[phone] ([facility]) or [phone] ([facility])  Available on [employer] Teams.  Please contact on-call GI fellow via answering service ([phone]) after 5pm and before 8am, and on weekends.

## 2021-07-19 NOTE — PRE PROCEDURE NOTE - PRE PROCEDURE EVALUATION
Attending Physician:  Dr. Lemus                          Procedure:   Flexible Sigmoidoscopy    Indication for Procedure:   Diarrhea  ________________________________________________________  PAST MEDICAL & SURGICAL HISTORY:    Irritable Bowel Disease    No significant past surgical history      ALLERGIES:  No Known Allergies    HOME MEDICATIONS:  budesonide 3 mg oral delayed release capsule: 3 cap(s) orally once a day (in the morning)  folic acid 1 mg oral tablet: 1 tab(s) orally once a day  KlonoPIN 0.5 mg oral tablet: 0.5 tab(s) orally once a day (at bedtime)  note: last dispensed Nov 2020  mesalamine 1.2 g oral delayed release tablet: 4 tab(s) orally once a day  methotrexate 2.5 mg oral tablet: 6 tab(s) orally once a week on sundays  multivitamin:   omeprazole 20 mg oral delayed release capsule: 1 cap(s) orally once a day  sotalol  mg oral tablet: 1 tab(s) orally 2 times a day  Vitamin D3:     AICD/PPM: [ ] yes   [X ] no    PERTINENT LAB DATA:                        8.3    13.91 )-----------( 450      ( 19 Jul 2021 06:35 )             27.2     07-19    134<L>  |  101  |  6<L>  ----------------------------<  95  3.9   |  24  |  0.81  Ca    8.6      19 Jul 2021 06:35  Phos  2.3     07-19  Mg     1.8     07-18    PHYSICAL EXAMINATION:    T(C): 36.9  HR: 85  BP: 140/80  RR: 16  SpO2: 99%    Constitutional: NAD    Neck:  No JVD  Respiratory: CTAB/L  Cardiovascular: S1 and S2  Gastrointestinal: BS+, soft, NT/ND  Extremities: No peripheral edema  Neurological: A/O x 4      COMMENTS:    The patient is a suitable candidate for the planned procedure unless box checked [ ]  No, explain:

## 2021-07-19 NOTE — PROGRESS NOTE ADULT - SUBJECTIVE AND OBJECTIVE BOX
Patient is a 81y old  Male who presents with a chief complaint of diarrhea with blood in it , abd pain and weakness (18 Jul 2021 22:42)      INTERVAL HISTORY:     TELEMETRY Personally reviewed:    REVIEW OF SYSTEMS:   CONSTITUTIONAL: No weakness  EYES/ENT: No visual changes; No throat pain  Neck: No pain or stiffness  Respiratory: No cough, wheezing, No shortness of breath  CARDIOVASCULAR: no chest pain or palpitations  GASTROINTESTINAL: No abdominal pain, no nausea, vomiting or hematemesis  GENITOURINARY: No dysuria, frequency or hematuria  NEUROLOGICAL: No stroke like symptoms  SKIN: No rashes    	  MEDICATIONS:  sotalol 120 milliGRAM(s) Oral daily        PHYSICAL EXAM:  T(C): 36.5 (07-19-21 @ 14:15), Max: 36.9 (07-19-21 @ 06:30)  HR: 79 (07-19-21 @ 14:15) (79 - 85)  BP: 135/67 (07-19-21 @ 14:15) (130/76 - 140/80)  RR: 19 (07-19-21 @ 14:15) (16 - 19)  SpO2: 98% (07-19-21 @ 14:15) (98% - 99%)  Wt(kg): --  I&O's Summary    18 Jul 2021 07:01  -  19 Jul 2021 07:00  --------------------------------------------------------  IN: 1860 mL / OUT: 250 mL / NET: 1610 mL    19 Jul 2021 07:01  -  19 Jul 2021 15:07  --------------------------------------------------------  IN: 0 mL / OUT: 200 mL / NET: -200 mL      Height (cm): 170.2 (07-19 @ 14:03)  Weight (kg): 59 (07-19 @ 14:03)  BMI (kg/m2): 20.4 (07-19 @ 14:03)  BSA (m2): 1.68 (07-19 @ 14:03)    Appearance: In no distress	  HEENT:    PERRL, EOMI	  Cardiovascular:  S1 S2, No JVD  Respiratory: Lungs clear to auscultation	  Gastrointestinal:  Soft, Non-tender, + BS	  Vascularature:  No edema of LE  Psychiatric: Appropriate affect   Neuro: no acute focal deficits                               8.3    13.91 )-----------( 450      ( 19 Jul 2021 06:35 )             27.2     07-19    134<L>  |  101  |  6<L>  ----------------------------<  95  3.9   |  24  |  0.81    Ca    8.6      19 Jul 2021 06:35  Phos  2.3     07-19  Mg     1.8     07-18          Labs personally reviewed      ASSESSMENT/PLAN: 	  Problem/Plan - 1:  ·  Problem: CAD (coronary artery disease).  Plan: c/w home meds  We discussed the importance of SAPT with low dose ASA 81mg given CAD  pt denies any current chest pain, SOB or chest pressure.   -EKG     Problem/Plan - 2:  ·  Problem: Diarrhea.  Plan: cdiff. +   - Vanco     Problem/Plan - 3:  ·  Problem: Colitis.  Plan: Ct shows chron's  exacerbation   GI recs appreciated  due for flex sig with biopsy today       Problem/Plan - 4:  ·  Problem: GIB (gastrointestinal bleeding).  Plan: bloody diarrhea 2/2 chron's dis exacerbation   IV fluids   -c-diff positive: started vanco  mg q 6 x 10 days.               Kar Powell [facility]   Rufus Onofre DO [facility]  Cardiovascular Medicine  [address] [place] [address], [address]  Office: [phone]  Cell: [phone]

## 2021-07-19 NOTE — PROGRESS NOTE ADULT - SUBJECTIVE AND OBJECTIVE BOX
Patient is a 81y old  Male who presents with a chief complaint of diarrhea with blood in it , abd pain and weakness (19 Jul 2021 15:06)      INTERVAL HPI/OVERNIGHT EVENTS:  T(C): 36.6 (07-19-21 @ 16:51), Max: 36.9 (07-19-21 @ 06:30)  HR: 67 (07-19-21 @ 16:51) (66 - 85)  BP: 160/81 (07-19-21 @ 16:51) (117/55 - 160/81)  RR: 19 (07-19-21 @ 16:51) (16 - 22)  SpO2: 97% (07-19-21 @ 16:51) (97% - 100%)  Wt(kg): --  I&O's Summary    18 Jul 2021 07:01  -  19 Jul 2021 07:00  --------------------------------------------------------  IN: 1860 mL / OUT: 250 mL / NET: 1610 mL    19 Jul 2021 07:01  -  19 Jul 2021 20:53  --------------------------------------------------------  IN: 680 mL / OUT: 900 mL / NET: -220 mL        PAST MEDICAL & SURGICAL HISTORY:  No pertinent past medical history    No significant past surgical history        SOCIAL HISTORY  Alcohol:  Tobacco:  Illicit substance use:    FAMILY HISTORY:    REVIEW OF SYSTEMS:  CONSTITUTIONAL: No fever, weight loss, or fatigue  EYES: No eye pain, visual disturbances, or discharge  ENMT:  No difficulty hearing, tinnitus, vertigo; No sinus or throat pain  NECK: No pain or stiffness  RESPIRATORY: No cough, wheezing, chills or hemoptysis; No shortness of breath  CARDIOVASCULAR: No chest pain, palpitations, dizziness, or leg swelling  GASTROINTESTINAL: No abdominal or epigastric pain. No nausea, vomiting, or hematemesis; No diarrhea or constipation. No melena or hematochezia.  GENITOURINARY: No dysuria, frequency, hematuria, or incontinence  NEUROLOGICAL: No headaches, memory loss, loss of strength, numbness, or tremors  SKIN: No itching, burning, rashes, or lesions   LYMPH NODES: No enlarged glands  ENDOCRINE: No heat or cold intolerance; No hair loss  MUSCULOSKELETAL: No joint pain or swelling; No muscle, back, or extremity pain  PSYCHIATRIC: No depression, anxiety, mood swings, or difficulty sleeping  HEME/LYMPH: No easy bruising, or bleeding gums  ALLERY AND IMMUNOLOGIC: No hives or eczema    RADIOLOGY & ADDITIONAL TESTS:    Imaging Personally Reviewed:  [ ] YES  [ ] NO    Consultant(s) Notes Reviewed:  [ ] YES  [ ] NO    PHYSICAL EXAM:  GENERAL: NAD, well-groomed, well-developed  HEAD:  Atraumatic, Normocephalic  EYES: EOMI, PERRLA, conjunctiva and sclera clear  ENMT: No tonsillar erythema, exudates, or enlargement; Moist mucous membranes, Good dentition, No lesions  NECK: Supple, No JVD, Normal thyroid  NERVOUS SYSTEM:  Alert & Oriented X3, Good concentration; Motor Strength 5/5 B/L upper and lower extremities; DTRs 2+ intact and symmetric  CHEST/LUNG: Clear to percussion bilaterally; No rales, rhonchi, wheezing, or rubs  HEART: Regular rate and rhythm; No murmurs, rubs, or gallops  ABDOMEN: Soft, Nontender, Nondistended; Bowel sounds present  EXTREMITIES:  2+ Peripheral Pulses, No clubbing, cyanosis, or edema  LYMPH: No lymphadenopathy noted  SKIN: No rashes or lesions    LABS:                        8.3    13.91 )-----------( 450      ( 19 Jul 2021 06:35 )             27.2     07-19    134<L>  |  101  |  6<L>  ----------------------------<  95  3.9   |  24  |  0.81    Ca    8.6      19 Jul 2021 06:35  Phos  2.3     07-19  Mg     1.8     07-18          CAPILLARY BLOOD GLUCOSE                MEDICATIONS  (STANDING):  buDESOnide    EC Capsule 9 milliGRAM(s) Oral daily  clonazePAM  Tablet 0.25 milliGRAM(s) Oral at bedtime  FIRST- Mouthwash  BLM 10 milliLiter(s) Swish and Spit every 8 hours  folic acid 1 milliGRAM(s) Oral daily  multivitamin 1 Tablet(s) Oral daily  pantoprazole    Tablet 40 milliGRAM(s) Oral before breakfast  sodium chloride 0.9%. 1000 milliLiter(s) (100 mL/Hr) IV Continuous <Continuous>  sotalol 120 milliGRAM(s) Oral daily  vancomycin    Solution 125 milliGRAM(s) Oral every 6 hours    MEDICATIONS  (PRN):      Care Discussed with Consultants/Other Providers [ ] YES  [ ] NO Patient is a 81y old  Male who presents with a chief complaint of diarrhea with blood in it , abd pain and weakness (19 Jul 2021 15:06)      INTERVAL HPI/OVERNIGHT EVENTS: seen and examined, daughter bedside , still diarrhea , scheduled for flex sig today   T(C): 36.6 (07-19-21 @ 16:51), Max: 36.9 (07-19-21 @ 06:30)  HR: 67 (07-19-21 @ 16:51) (66 - 85)  BP: 160/81 (07-19-21 @ 16:51) (117/55 - 160/81)  RR: 19 (07-19-21 @ 16:51) (16 - 22)  SpO2: 97% (07-19-21 @ 16:51) (97% - 100%)  Wt(kg): --  I&O's Summary    18 Jul 2021 07:01  -  19 Jul 2021 07:00  --------------------------------------------------------  IN: 1860 mL / OUT: 250 mL / NET: 1610 mL    19 Jul 2021 07:01  -  19 Jul 2021 20:53  --------------------------------------------------------  IN: 680 mL / OUT: 900 mL / NET: -220 mL        PAST MEDICAL & SURGICAL HISTORY:  No pertinent past medical history    No significant past surgical history        SOCIAL HISTORY  Alcohol:  Tobacco:  Illicit substance use:    FAMILY HISTORY:    REVIEW OF SYSTEMS:  CONSTITUTIONAL: No fever, weight loss, or fatigue  EYES: No eye pain, visual disturbances, or discharge  ENMT:  No difficulty hearing, tinnitus, vertigo; No sinus or throat pain  NECK: No pain or stiffness  RESPIRATORY: No cough, wheezing, chills or hemoptysis; No shortness of breath  CARDIOVASCULAR: No chest pain, palpitations, dizziness, or leg swelling  GASTROINTESTINAL: No abdominal or epigastric pain. No nausea, vomiting, or hematemesis; No diarrhea or constipation. No melena or hematochezia.  GENITOURINARY: No dysuria, frequency, hematuria, or incontinence  NEUROLOGICAL: No headaches, memory loss, loss of strength, numbness, or tremors  SKIN: No itching, burning, rashes, or lesions   LYMPH NODES: No enlarged glands  ENDOCRINE: No heat or cold intolerance; No hair loss  MUSCULOSKELETAL: No joint pain or swelling; No muscle, back, or extremity pain  PSYCHIATRIC: No depression, anxiety, mood swings, or difficulty sleeping  HEME/LYMPH: No easy bruising, or bleeding gums  ALLERY AND IMMUNOLOGIC: No hives or eczema    RADIOLOGY & ADDITIONAL TESTS:    Imaging Personally Reviewed:  [ ] YES  [ ] NO    Consultant(s) Notes Reviewed:  [ ] YES  [ ] NO    PHYSICAL EXAM:  GENERAL: NAD, well-groomed, well-developed  HEAD:  Atraumatic, Normocephalic  EYES: EOMI, PERRLA, conjunctiva and sclera clear  ENMT: No tonsillar erythema, exudates, or enlargement; Moist mucous membranes, Good dentition, No lesions  NECK: Supple, No JVD, Normal thyroid  NERVOUS SYSTEM:  Alert & Oriented X3, Good concentration; Motor Strength 5/5 B/L upper and lower extremities; DTRs 2+ intact and symmetric  CHEST/LUNG: Clear to percussion bilaterally; No rales, rhonchi, wheezing, or rubs  HEART: Regular rate and rhythm; No murmurs, rubs, or gallops  ABDOMEN: Soft, Nontender, Nondistended; Bowel sounds present  EXTREMITIES:  2+ Peripheral Pulses, No clubbing, cyanosis, or edema  LYMPH: No lymphadenopathy noted  SKIN: No rashes or lesions    LABS:                        8.3    13.91 )-----------( 450      ( 19 Jul 2021 06:35 )             27.2     07-19    134<L>  |  101  |  6<L>  ----------------------------<  95  3.9   |  24  |  0.81    Ca    8.6      19 Jul 2021 06:35  Phos  2.3     07-19  Mg     1.8     07-18          CAPILLARY BLOOD GLUCOSE                MEDICATIONS  (STANDING):  buDESOnide    EC Capsule 9 milliGRAM(s) Oral daily  clonazePAM  Tablet 0.25 milliGRAM(s) Oral at bedtime  FIRST- Mouthwash  BLM 10 milliLiter(s) Swish and Spit every 8 hours  folic acid 1 milliGRAM(s) Oral daily  multivitamin 1 Tablet(s) Oral daily  pantoprazole    Tablet 40 milliGRAM(s) Oral before breakfast  sodium chloride 0.9%. 1000 milliLiter(s) (100 mL/Hr) IV Continuous <Continuous>  sotalol 120 milliGRAM(s) Oral daily  vancomycin    Solution 125 milliGRAM(s) Oral every 6 hours    MEDICATIONS  (PRN):      Care Discussed with Consultants/Other Providers [ ] YES  [ ] NO

## 2021-07-20 PROBLEM — Z00.00 ENCOUNTER FOR PREVENTIVE HEALTH EXAMINATION: Status: ACTIVE | Noted: 2021-01-01

## 2021-07-20 NOTE — PROGRESS NOTE ADULT - ASSESSMENT
GIDEON HAYES is a 81y Male with GERD on omeprazole daily and Crohn's disease diagnosed about 9 mos prior to admission at outside hospital c/b h/o abscess and left posterior distal anal intersphincteric fistula and presents as a new consult to GI for diarrhea/BRBPR likely 2/2 to CD flare c/b c diff colitis.    #. Diarrhea/BRBPR - likely 2/2 CD flare with leukocytosis most likely 2/2 cdiff colitis, less likely 2/2 CMV colitis. Patient's cdiff was positive and is the likely cause of his CD flare. Still with significant sx but has only had a little over a day of Abx. Should his sx not improve over the next couple of days he will benefit from IV steroids to help control the underlying CD and will warrant a flex sig with bx for further evaluation and to r/o CMV colitis. - Non reactive HBVsAg: no latent HBV infection    #. Weight loss - likely 2/2 CD c/b oral aphthous ulcers and diarrhea as he has not been able to tolerate po and will need to ensure adequate hydration.  #. GERD     Recommendations:  - f/u biopsies from flex sig to assess Crohns, c diff and r/o CMV  - Add magic mouth wash q6 hours PRN  - Continue to low residual diet as tolerated  - Add protein supplements, obtain dietician evaluation  - Continue budesonide 9 mg po daily  - f/u QuantiFeron for possible Biologic use  - Continue vancomycin 125 mg 4x daily for 10 days, could transition to vanc enemas however patient and family do not want enemas for now  - plan above discussed with patient's daughter, Tiffanie Blackwood Annalisa, PGY-4  GI/Hepatology Fellow    MONDAY-FRIDAY 8AM-5PM:  Available via [employer] Teams  Pager# [phone] ([facility]) or [phone] ([facility])  GI Phone# [phone] ([facility])    NON-URGENT CONSULTS:  Please email [email or web address]  AT NIGHT AND ON WEEKENDS:  Contact on-call GI fellow via answering service ([phone]) from 5pm-8am and on weekends/holidays       GIDEON HAYES is a 81y Male with GERD on omeprazole daily and Crohn's disease diagnosed about 9 mos prior to admission at outside hospital c/b h/o abscess and left posterior distal anal intersphincteric fistula and presents as a new consult to GI for diarrhea/BRBPR likely 2/2 to CD flare c/b c diff colitis.    #. Diarrhea/BRBPR - likely 2/2 CD flare with leukocytosis most likely 2/2 cdiff colitis, less likely 2/2 CMV colitis. Patient's cdiff was positive and is the likely cause of his CD flare. Still with significant sx despite antibiotics.  Had flex sig with biopsies on 7/19/21.   Non reactive HBVsAg: no latent HBV infection    #. Weight loss - likely 2/2 CD c/b oral aphthous ulcers and diarrhea as he has not been able to tolerate po and will need to ensure adequate hydration.  #. GERD     Recommendations:  - f/u biopsies from flex sig to assess Crohns, c diff and r/o CMV  - Add magic mouth wash q6 hours PRN  - Continue to low residual diet as tolerated  - Add protein supplements, obtain dietician evaluation  - Continue budesonide 9 mg po daily  - f/u QuantiFeron for possible Biologic use  - Continue vancomycin 125 mg 4x daily for 10 days, could transition to vanc enemas however patient and family do not want enemas for now  - plan above discussed with patient's daughter, Tifafnie Fang, PGY-4  GI/Hepatology Fellow    MONDAY-FRIDAY 8AM-5PM:  Available via [employer] Teams  Pager# [phone] ([facility]) or [phone] ([facility])  GI Phone# [phone] ([facility])    NON-URGENT CONSULTS:  Please email [email or web address]  AT NIGHT AND ON WEEKENDS:  Contact on-call GI fellow via answering service ([phone]) from 5pm-8am and on weekends/holidays

## 2021-07-20 NOTE — CHART NOTE - NSCHARTNOTEFT_GEN_A_CORE
Nutrition Follow Up Note  Patient seen for: malnutrtion follow up    Chart reviewed, events noted.  Patient is a 81y old  Male who presents with a chief complaint of diarrhea with blood in it , abd pain and weakness (2021 20:53)  Noted by GI: " Diarrhea/BRBPR - likely 2/2 CD flare with leukocytosis most likely 2/2 cdiff colitis, less likely 2/2 CMV colitis. Patient's cdiff was positive and is the likely cause of his CD flare."  Source: [X ] Patient       [x] EMR        [ ] RN        [ X] Family at bedside, daughter       [ X] Other: spoke to GI MD on phone         patient visited at bedside, c/o diarrhea, worsening weakness. Currently drinking ensure, patient offered lower osmolality, low residue Promote which he dislikes. Patient offered Ronnie Active probiotic drink which he accepts. Plan to increase protein intake, patient does formula  Also Greek yogurt tolerated well. Patient eating small amounts of chicken to day at lunch. Plan to add protein supplement (LPS x3 daily) see recommendations below    Diet Order:   Diet, Low Fiber:   Supplement Feeding Modality:  Oral  Ensure Enlive Cans or Servings Per Day:  3       Frequency:  Daily (21)        - PO intake :          [X ] <50%  Poor  - Nutrition-Related Concerns: patient growing weaker, more frustrated with diarrhea    - Micronutrient Supplementation: folic acid 1 milliGRAM(s) Oral daily  multivitamin 1 Tablet(s) Oral daily  sodium chloride 0.9%. 1000 milliLiter(s) IV Continuous <Continuous>    GI:  Last BM _diarrhea ongoing    Weights:   Daily Weight in k.7 (-)  MEDICATIONS  (STANDING):  buDESOnide    EC Capsule  clonazePAM  Tablet  FIRST- Mouthwash  BLM  folic acid  multivitamin  pantoprazole    Tablet  sodium chloride 0.9%.  sotalol  vancomycin    Solution    Pertinent Labs:  @ 07:17: Na 137, BUN 5<L>, Cr 0.79, BG 95, K+ 3.6, Phos 2.9, Mg 1.8, Alk Phos --, ALT/SGPT --, AST/SGOT --, HbA1c --            Skin per nursing documentation: no pressure breakdown  Edema: none    Estimated Needs:   [X ] no change since previous assessment  [ ] recalculated:     Previous Nutrition Diagnosis: malnutrition  Nutrition Diagnosis is: [ ] ongoing  [ ] resolved [ ] not applicable     [ X] In Progress        Nutrition Interventions / Recommendations:    1) add Ronnie Active probiotic drink x2 daily  2) add LPS x3 daily  3)provide Greek yogurt on Low Fiber diet  4) discontinue ensure for now  5)Education Provided:     [X ] Yes:    with patient and his daughter                                           6) Continue Current Diet: [X ] Yes Low fiber                                                    7)Micronutrient Supplementation: continue MVI, folic acid    Discussed with team    Monitoring and Evaluation:   Continue to monitor Nutritional intake, Tolerance to diet prescription, weights, labs, skin integrity    LATA Whitaker, RD, CDN #[id number]   RD remains available upon request and will follow up per protocol

## 2021-07-20 NOTE — PROGRESS NOTE ADULT - SUBJECTIVE AND OBJECTIVE BOX
Patient is a 81y old  Male who presents with a chief complaint of diarrhea with blood in it , abd pain and weakness (20 Jul 2021 11:58)      INTERVAL HPI/OVERNIGHT EVENTS: still not feeling well , continue to have diarrhea     T(C): 36.4 (07-20-21 @ 13:17), Max: 36.8 (07-19-21 @ 21:27)  HR: 72 (07-20-21 @ 13:17) (70 - 74)  BP: 145/54 (07-20-21 @ 13:17) (128/73 - 145/54)  RR: 18 (07-20-21 @ 13:17) (18 - 18)  SpO2: 100% (07-20-21 @ 13:17) (98% - 100%)  Wt(kg): --  I&O's Summary    19 Jul 2021 07:01  -  20 Jul 2021 07:00  --------------------------------------------------------  IN: 2360 mL / OUT: 1300 mL / NET: 1060 mL    20 Jul 2021 07:01  -  20 Jul 2021 18:59  --------------------------------------------------------  IN: 440 mL / OUT: 0 mL / NET: 440 mL        PAST MEDICAL & SURGICAL HISTORY:  No pertinent past medical history    No significant past surgical history        SOCIAL HISTORY  Alcohol:  Tobacco:  Illicit substance use:    FAMILY HISTORY:    REVIEW OF SYSTEMS:  CONSTITUTIONAL: No fever, weight loss, or fatigue  EYES: No eye pain, visual disturbances, or discharge  ENMT:  No difficulty hearing, tinnitus, vertigo; No sinus or throat pain  NECK: No pain or stiffness  RESPIRATORY: No cough, wheezing, chills or hemoptysis; No shortness of breath  CARDIOVASCULAR: No chest pain, palpitations, dizziness, or leg swelling  GASTROINTESTINAL: No abdominal or epigastric pain. No nausea, vomiting, or hematemesis; No diarrhea or constipation. No melena or hematochezia.  GENITOURINARY: No dysuria, frequency, hematuria, or incontinence  NEUROLOGICAL: No headaches, memory loss, loss of strength, numbness, or tremors  SKIN: No itching, burning, rashes, or lesions   LYMPH NODES: No enlarged glands  ENDOCRINE: No heat or cold intolerance; No hair loss  MUSCULOSKELETAL: No joint pain or swelling; No muscle, back, or extremity pain  PSYCHIATRIC: No depression, anxiety, mood swings, or difficulty sleeping  HEME/LYMPH: No easy bruising, or bleeding gums  ALLERY AND IMMUNOLOGIC: No hives or eczema    RADIOLOGY & ADDITIONAL TESTS:    Imaging Personally Reviewed:  [ ] YES  [ ] NO    Consultant(s) Notes Reviewed:  [ ] YES  [ ] NO    PHYSICAL EXAM:  GENERAL: NAD, well-groomed, well-developed  HEAD:  Atraumatic, Normocephalic  EYES: EOMI, PERRLA, conjunctiva and sclera clear  ENMT: No tonsillar erythema, exudates, or enlargement; Moist mucous membranes, Good dentition, No lesions  NECK: Supple, No JVD, Normal thyroid  NERVOUS SYSTEM:  Alert & Oriented X3, Good concentration; Motor Strength 5/5 B/L upper and lower extremities; DTRs 2+ intact and symmetric  CHEST/LUNG: Clear to percussion bilaterally; No rales, rhonchi, wheezing, or rubs  HEART: Regular rate and rhythm; No murmurs, rubs, or gallops  ABDOMEN: Soft, Nontender, Nondistended; Bowel sounds present  EXTREMITIES:  2+ Peripheral Pulses, No clubbing, cyanosis, or edema  LYMPH: No lymphadenopathy noted  SKIN: No rashes or lesions    LABS:                        8.5    9.59  )-----------( 483      ( 20 Jul 2021 07:22 )             27.8     07-20    137  |  104  |  5<L>  ----------------------------<  95  3.6   |  24  |  0.79    Ca    8.6      20 Jul 2021 07:17  Phos  2.9     07-20  Mg     1.8     07-20          CAPILLARY BLOOD GLUCOSE                MEDICATIONS  (STANDING):  clonazePAM  Tablet 0.25 milliGRAM(s) Oral at bedtime  FIRST- Mouthwash  BLM 10 milliLiter(s) Swish and Spit every 8 hours  folic acid 1 milliGRAM(s) Oral daily  multivitamin 1 Tablet(s) Oral daily  pantoprazole    Tablet 40 milliGRAM(s) Oral before breakfast  sodium chloride 0.9%. 1000 milliLiter(s) (100 mL/Hr) IV Continuous <Continuous>  sotalol 120 milliGRAM(s) Oral daily  vancomycin    Solution 125 milliGRAM(s) Oral every 6 hours    MEDICATIONS  (PRN):      Care Discussed with Consultants/Other Providers [ ] YES  [ ] NO

## 2021-07-20 NOTE — PROGRESS NOTE ADULT - SUBJECTIVE AND OBJECTIVE BOX
Patient is a 81y old  Male who presents with a chief complaint of diarrhea with blood in it , abd pain and weakness (19 Jul 2021 20:53)      Interval Events:   No acute events overnight, however states he feels "lousy".  Daughter at bedside.  Continues to have the diarrhea.    Hospital Medications:  buDESOnide    EC Capsule 9 milliGRAM(s) Oral daily  clonazePAM  Tablet 0.25 milliGRAM(s) Oral at bedtime  FIRST- Mouthwash  BLM 10 milliLiter(s) Swish and Spit every 8 hours  folic acid 1 milliGRAM(s) Oral daily  multivitamin 1 Tablet(s) Oral daily  pantoprazole    Tablet 40 milliGRAM(s) Oral before breakfast  sodium chloride 0.9%. 1000 milliLiter(s) IV Continuous <Continuous>  sotalol 120 milliGRAM(s) Oral daily  vancomycin    Solution 125 milliGRAM(s) Oral every 6 hours    ROS:   A 12-point ROS was performed and negative except as noted in HPI.    PHYSICAL EXAM:   Vital Signs:  Vital Signs Last 24 Hrs  T(C): 36.7 (20 Jul 2021 04:42), Max: 36.8 (19 Jul 2021 21:27)  T(F): 98 (20 Jul 2021 04:42), Max: 98.2 (19 Jul 2021 21:27)  HR: 74 (20 Jul 2021 04:42) (66 - 79)  BP: 128/73 (20 Jul 2021 04:42) (117/55 - 160/81)  BP(mean): --  RR: 18 (20 Jul 2021 04:42) (18 - 22)  SpO2: 98% (20 Jul 2021 04:42) (97% - 100%)  Daily Height in cm: 170.18 (19 Jul 2021 14:03)    Daily     GENERAL: no acute distress  NEURO: alert  HEENT: anicteric sclera, no conjunctival pallor appreciated  CHEST: no respiratory distress, no accessory muscle use  CARDIAC: regular rate, +S1/S2  ABDOMEN: soft, nondistended, nontender, no rebound or guarding  EXTREMITIES: warm, well perfused, no edema  SKIN: no lesions noted    LABS: reviewed                        8.5    9.59  )-----------( 483      ( 20 Jul 2021 07:22 )             27.8     07-20    137  |  104  |  5<L>  ----------------------------<  95  3.6   |  24  |  0.79    Ca    8.6      20 Jul 2021 07:17  Phos  2.9     07-20  Mg     1.8     07-20          Interval Diagnostic Studies: see sunrise for full report

## 2021-07-20 NOTE — PROGRESS NOTE ADULT - ATTENDING COMMENTS
As above.    Impression:    #1. Diarrhea due to combination of Crohn's disease in C. difficile colitis, status post colonoscopy on 7/19/21 with left-sided pseudomembranes consistent with C. difficile.  Biopsies obtained.    Recommendations:    #1. Low-residue diet as tolerated    #2. Await biopsy results.    #3. Continue oral vancomycin. Patient refused vancomycin enemas.    #4. Will taper budesonide to 6 mg as there was not signficant inflammatory changes on colonoscopy As above.    Impression:    #1. Diarrhea due to combination of Crohn's disease in C. difficile colitis, status post colonoscopy on 7/19/21 with left-sided pseudomembranes consistent with C. difficile.  Biopsies obtained.    Recommendations:    #1. Low-residue diet as tolerated    #2. Await biopsy results.    #3. Continue oral vancomycin. Patient refused vancomycin enemas.    #4. Continue budesonide 9 mg As above.    Impression:    #1. Diarrhea due to combination of Crohn's disease in C. difficile colitis, status post colonoscopy on 7/19/21 with left-sided pseudomembranes consistent with C. difficile.  Biopsies obtained.    Recommendations:    #1. Low-residue diet as tolerated    #2. Await biopsy results.    #3. Continue oral vancomycin. Patient refused vancomycin enemas.    #4. Continue budesonide 9 mg (Please reorder, I discontinued and cannot reorder this home med).

## 2021-07-21 NOTE — CONSULT NOTE ADULT - GENERAL
PSYCHIATRIC: Normal mood. Diagnostics   Labs:  No results found for this visit on 10/06/18. Radiographs:  No results found. Procedures/EKG: I and D of abscess  Patient's wound was sterilely prepped and draped anesthetized with 3 mL lidocaine without epinephrine. Using #11 scalpel placed a longitudinal incision and the wound approximately a centimeter and half express a moderate amount of purulent discharge. She tolerated procedure well    ED Course and MDM   In brief, Rika Mcclain is a 34 y.o. male who presented to the emergency department with arm pain has an obvious axillary abscess. I be placed on Keflex as an outpatient. As a sign of fasciitis or any malignant process. ED Medication Orders     None          Final Impression      1.  Hidradenitis axillaris      DISPOSITION Decision To Discharge 10/06/2018 11:23:17 AM     (Please note that portions of this note may have been completed with a voice recognition program. Efforts were made to edit the dictations but occasionally words are mis-transcribed.)    Ernestine Acosta MD  [id number] MD  10/06/18 3131
details…

## 2021-07-21 NOTE — PROGRESS NOTE ADULT - ASSESSMENT
GIDEON HAYES is a 81y Male with GERD on omeprazole daily and Crohn's disease diagnosed about 9 mos prior to admission at outside hospital c/b h/o abscess and left posterior distal anal intersphincteric fistula and presents as a new consult to GI for diarrhea/BRBPR likely 2/2 to CD flare c/b c diff colitis.    # Diarrhea/BRBPR - likely 2/2 CD flare with leukocytosis most likely 2/2 cdiff colitis, less likely 2/2 CMV colitis. Patient's cdiff was positive and is the likely cause of his CD flare. Still with significant sx despite antibiotics.  Had flex sig with biopsies on 7/19/21.   Non reactive HBVsAg: no latent HBV infection    # Weight loss - likely 2/2 CD c/b oral aphthous ulcers and diarrhea as he has not been able to tolerate po and will need to ensure adequate hydration.  # GERD     Recommendations:  - f/u biopsies from flex sig to assess Crohns, c diff and r/o CMV  - Add magic mouth wash q6 hours PRN  - Continue to low residual diet as tolerated  - Add protein supplements, obtain dietician evaluation  - f/u quantiFeron for possible biologic use  - Continue budesonide 9 mg po daily  - Continue vancomycin 125 mg 4x daily for 10 days, could transition to vanc enemas however patient/family still do not want enemas for now        Jaison Fang, PGY-4  GI/Hepatology Fellow    MONDAY-FRIDAY 8AM-5PM:  Available via Microsoft Teams  Pager# [phone] ([facility]) or [phone] ([facility])  GI Phone# [phone] ([facility])    NON-URGENT CONSULTS:  Please email [email or web address]  AT NIGHT AND ON WEEKENDS:  Contact on-call GI fellow via answering service ([phone]) from 5pm-8am and on weekends/holidays GIDEON HAYES is a 81y Male with GERD on omeprazole daily and Crohn's disease diagnosed about 9 mos prior to admission at outside hospital c/b h/o abscess and left posterior distal anal intersphincteric fistula and presents as a new consult to GI for diarrhea/BRBPR likely 2/2 to CD flare c/b c diff colitis.    # Diarrhea/BRBPR - likely 2/2 CD flare with leukocytosis most likely 2/2 cdiff colitis, less likely 2/2 CMV colitis. Patient's cdiff was positive and is the likely cause of his CD flare. Still with significant sx despite antibiotics.  Had flex sig with biopsies on 7/19/21.   Non reactive HBVsAg: no latent HBV infection    # Weight loss - likely 2/2 CD c/b oral aphthous ulcers and diarrhea as he has not been able to tolerate po and will need to ensure adequate hydration.  # GERD     Recommendations:  - f/u biopsies from flex sig to assess Crohns, c diff and r/o CMV  - Add magic mouth wash q6 hours PRN  - Continue to low residual diet as tolerated  - Add protein supplements, obtain dietician evaluation  - f/u quantiFeron for possible biologic use  - DC budesonide  - Will  increase vancomycin to 500mg PO Q6, and add flagyl 500mg IV Q8  - ID consult      Jaison Fang, PGY-4  GI/Hepatology Fellow    MONDAY-FRIDAY 8AM-5PM:  Available via Microsoft Teams  Pager# [phone] ([facility]) or [phone] ([facility])  GI Phone# [phone] ([facility])    NON-URGENT CONSULTS:  Please email [email or web address]  AT NIGHT AND ON WEEKENDS:  Contact on-call GI fellow via answering service ([phone]) from 5pm-8am and on weekends/holidays GIDEON HAYES is a 81y Male with GERD on omeprazole daily and Crohn's disease diagnosed about 9 mos prior to admission at outside hospital c/b h/o abscess and left posterior distal anal intersphincteric fistula and presents as a new consult to GI for diarrhea/BRBPR likely 2/2 to CD flare c/b c diff colitis.    # Diarrhea/BRBPR - likely 2/2 CD flare with leukocytosis most likely 2/2 cdiff colitis, less likely 2/2 CMV colitis. Patient's cdiff was positive and is the likely cause of his CD flare. Still with significant sx despite antibiotics.  Had flex sig with biopsies on 7/19/21.   Non reactive HBVsAg: no latent HBV infection    # Weight loss - likely 2/2 CD c/b oral aphthous ulcers and diarrhea as he has not been able to tolerate po and will need to ensure adequate hydration.  # GERD     Recommendations:  - f/u biopsies from flex sig to assess Crohns, c diff and r/o CMV  - Add magic mouth wash q6 hours PRN  - Continue to low residual diet as tolerated  - Add protein supplements, obtain dietician evaluation  - f/u quantiFeron for possible biologic use  - DC budesonide  - Will  increase vancomycin to 500mg PO Q6, and add flagyl 500mg IV Q8  - recommend ID consult      Jaison Fang, PGY-4  GI/Hepatology Fellow    MONDAY-FRIDAY 8AM-5PM:  Available via Microsoft Teams  Pager# [phone] ([facility]) or [phone] ([facility])  GI Phone# [phone] ([facility])    NON-URGENT CONSULTS:  Please email [email or web address]  AT NIGHT AND ON WEEKENDS:  Contact on-call GI fellow via answering service ([phone]) from 5pm-8am and on weekends/holidays

## 2021-07-21 NOTE — PROGRESS NOTE ADULT - SUBJECTIVE AND OBJECTIVE BOX
Patient is a 81y old  Male who presents with a chief complaint of diarrhea with blood in it , abd pain and weakness (20 Jul 2021 18:59)      Interval Events:   Patient states he has had continued diarrhea overnight.  4 BMs charted this morning.      Hospital Medications:  clonazePAM  Tablet 0.25 milliGRAM(s) Oral at bedtime  FIRST- Mouthwash  BLM 10 milliLiter(s) Swish and Spit every 8 hours  folic acid 1 milliGRAM(s) Oral daily  multivitamin 1 Tablet(s) Oral daily  pantoprazole    Tablet 40 milliGRAM(s) Oral before breakfast  sodium chloride 0.9%. 1000 milliLiter(s) IV Continuous <Continuous>  sotalol 120 milliGRAM(s) Oral daily  vancomycin    Solution 125 milliGRAM(s) Oral every 6 hours    ROS:   A 12-point ROS was performed and negative except as noted in HPI.    PHYSICAL EXAM:   Vital Signs:  Vital Signs Last 24 Hrs  T(C): 37.6 (21 Jul 2021 05:38), Max: 37.7 (20 Jul 2021 20:30)  T(F): 99.6 (21 Jul 2021 05:38), Max: 99.8 (20 Jul 2021 20:30)  HR: 76 (21 Jul 2021 05:38) (72 - 86)  BP: 117/70 (21 Jul 2021 05:38) (117/70 - 145/54)  BP(mean): --  RR: 18 (21 Jul 2021 05:38) (18 - 18)  SpO2: 96% (21 Jul 2021 05:38) (96% - 100%)  Daily     Daily     GENERAL: no acute distress  NEURO: alert  HEENT: anicteric sclera, no conjunctival pallor appreciated  CHEST: no respiratory distress, no accessory muscle use  CARDIAC: regular rate, +S1/S2  ABDOMEN: soft, nondistended, nontender, no rebound or guarding  EXTREMITIES: warm, well perfused, no edema  SKIN: no lesions noted    LABS: reviewed                        8.5    15.14 )-----------( 488      ( 21 Jul 2021 07:06 )             27.7     07-21    129<L>  |  96  |  6<L>  ----------------------------<  87  3.2<L>   |  22  |  0.80    Ca    8.6      21 Jul 2021 07:06  Phos  2.0     07-21  Mg     1.6     07-21          Interval Diagnostic Studies: see sunrise for full report

## 2021-07-21 NOTE — PROGRESS NOTE ADULT - SUBJECTIVE AND OBJECTIVE BOX
Patient is a 81y old  Male who presents with a chief complaint of diarrhea with blood in it , abd pain and weakness (21 Jul 2021 07:56)      INTERVAL HPI/OVERNIGHT EVENTS: yesterday all day diarrhea , since am 2 episodes of diarrhea     T(C): 37.6 (07-21-21 @ 05:38), Max: 37.7 (07-20-21 @ 20:30)  HR: 76 (07-21-21 @ 05:38) (76 - 86)  BP: 117/70 (07-21-21 @ 05:38) (117/70 - 138/70)  RR: 18 (07-21-21 @ 05:38) (18 - 18)  SpO2: 96% (07-21-21 @ 05:38) (96% - 100%)  Wt(kg): --  I&O's Summary    20 Jul 2021 07:01  -  21 Jul 2021 07:00  --------------------------------------------------------  IN: 1880 mL / OUT: 0 mL / NET: 1880 mL    21 Jul 2021 07:01  -  21 Jul 2021 14:51  --------------------------------------------------------  IN: 440 mL / OUT: 0 mL / NET: 440 mL        PAST MEDICAL & SURGICAL HISTORY:  No pertinent past medical history    No significant past surgical history        SOCIAL HISTORY  Alcohol:  Tobacco:  Illicit substance use:    FAMILY HISTORY:    REVIEW OF SYSTEMS:  CONSTITUTIONAL: No fever, weight loss, or fatigue  EYES: No eye pain, visual disturbances, or discharge  ENMT:  No difficulty hearing, tinnitus, vertigo; No sinus or throat pain  NECK: No pain or stiffness  RESPIRATORY: No cough, wheezing, chills or hemoptysis; No shortness of breath  CARDIOVASCULAR: No chest pain, palpitations, dizziness, or leg swelling  GASTROINTESTINAL: No abdominal or epigastric pain. No nausea, vomiting, or hematemesis; No diarrhea or constipation. No melena or hematochezia.  GENITOURINARY: No dysuria, frequency, hematuria, or incontinence  NEUROLOGICAL: No headaches, memory loss, loss of strength, numbness, or tremors  SKIN: No itching, burning, rashes, or lesions   LYMPH NODES: No enlarged glands  ENDOCRINE: No heat or cold intolerance; No hair loss  MUSCULOSKELETAL: No joint pain or swelling; No muscle, back, or extremity pain  PSYCHIATRIC: No depression, anxiety, mood swings, or difficulty sleeping  HEME/LYMPH: No easy bruising, or bleeding gums  ALLERY AND IMMUNOLOGIC: No hives or eczema    RADIOLOGY & ADDITIONAL TESTS:    Imaging Personally Reviewed:  [ ] YES  [ ] NO    Consultant(s) Notes Reviewed:  [ ] YES  [ ] NO    PHYSICAL EXAM:  GENERAL: NAD, well-groomed, well-developed  HEAD:  Atraumatic, Normocephalic  EYES: EOMI, PERRLA, conjunctiva and sclera clear  ENMT: No tonsillar erythema, exudates, or enlargement; Moist mucous membranes, Good dentition, No lesions  NECK: Supple, No JVD, Normal thyroid  NERVOUS SYSTEM:  Alert & Oriented X3, Good concentration; Motor Strength 5/5 B/L upper and lower extremities; DTRs 2+ intact and symmetric  CHEST/LUNG: Clear to percussion bilaterally; No rales, rhonchi, wheezing, or rubs  HEART: Regular rate and rhythm; No murmurs, rubs, or gallops  ABDOMEN: Soft, Nontender, Nondistended; Bowel sounds present  EXTREMITIES:  2+ Peripheral Pulses, No clubbing, cyanosis, or edema  LYMPH: No lymphadenopathy noted  SKIN: No rashes or lesions    LABS:                        8.5    15.14 )-----------( 488      ( 21 Jul 2021 07:06 )             27.7     07-21    129<L>  |  96  |  6<L>  ----------------------------<  87  3.2<L>   |  22  |  0.80    Ca    8.6      21 Jul 2021 07:06  Phos  2.0     07-21  Mg     1.6     07-21          CAPILLARY BLOOD GLUCOSE                MEDICATIONS  (STANDING):  clonazePAM  Tablet 0.25 milliGRAM(s) Oral at bedtime  FIRST- Mouthwash  BLM 10 milliLiter(s) Swish and Spit every 8 hours  folic acid 1 milliGRAM(s) Oral daily  metroNIDAZOLE  IVPB      metroNIDAZOLE  IVPB 500 milliGRAM(s) IV Intermittent every 8 hours  multivitamin 1 Tablet(s) Oral daily  pantoprazole    Tablet 40 milliGRAM(s) Oral before breakfast  sodium chloride 0.9%. 1000 milliLiter(s) (100 mL/Hr) IV Continuous <Continuous>  sotalol 120 milliGRAM(s) Oral daily  vancomycin    Solution 500 milliGRAM(s) Oral every 6 hours    MEDICATIONS  (PRN):      Care Discussed with Consultants/Other Providers [ ] YES  [ ] NO

## 2021-07-21 NOTE — CONSULT NOTE ADULT - ASSESSMENT
82 yo M with Crohn's on treatment, recently cared for at [facility], now presenting with ongoing diarrhea  Low grade temp elevation, leukocytosis  Diarrhea  No abd distension, no evidence megacolon  CT with colonic inflammation  Colonoscopy with visualized pseudomembranes  On PO Vanco starting 7/15 with limited improvement  Overall,  1) C diff colitis  - Severe, persistent, agree with dose elevation  - Vanco 500mg q 6  - Flagyl 500mg q 8  - Vanco by enema if patient agreeable  - If not improved on above regimen, consider stool transplant (would wait to see effect of regimen change first)  - Monitor stool count, monitor abd for any signs distension  2) Leukocytosis  - Trend to normal  3) Crohn's  - Further care per GI    Phoenix Lee MD  Pager [phone]  After 5pm and on weekends call [phone]

## 2021-07-21 NOTE — CONSULT NOTE ADULT - SUBJECTIVE AND OBJECTIVE BOX
"HPI:  80 yo M with Crohn disease, over the last 9 mo, he was started on several medication and was recently told that he may need to go on IV infusion for chrons, he has several hospitalization at [facility] , and follows with GI near [facility]. but his diarrhea is not improving , having abd cramps , denies any N/V . , discharged 1 mo ago from [facility], now having trouble walking, on going diarrhea. + subjective fever but denies cough or nasal congestion or uri or urinary symptoms.  (13 Jul 2021 20:48)"    Above reviewed. 80 yo M with Crohn's on treatment, recently cared for at [facility], now presenting with ongoing diarrhea. Per patient and daughter, has had recent Zosyn and other antibiotics for colonic inflammation. Now here with ongoing C diff. Today, no abd pain, but does have multiple episodes diarrhea over past 24 hours. No fevers, no chills. No cough/sob. No abd pain, no other new complaints. ID called for further eval.    PAST MEDICAL & SURGICAL HISTORY:  No pertinent past medical history    No significant past surgical history    Allergies    No Known Allergies    Intolerances    ANTIMICROBIALS:  metroNIDAZOLE  IVPB    metroNIDAZOLE  IVPB 500 every 8 hours  vancomycin    Solution 500 every 6 hours    OTHER MEDS:  clonazePAM  Tablet 0.25 milliGRAM(s) Oral at bedtime  FIRST- Mouthwash  BLM 10 milliLiter(s) Swish and Spit every 8 hours  folic acid 1 milliGRAM(s) Oral daily  multivitamin 1 Tablet(s) Oral daily  pantoprazole    Tablet 40 milliGRAM(s) Oral before breakfast  sodium chloride 0.9%. 1000 milliLiter(s) IV Continuous <Continuous>  sotalol 120 milliGRAM(s) Oral daily    SOCIAL HISTORY: No tobacco, no alcohol, no illicit drugs    FAMILY HISTORY: No pertinent family history relating to chief complaint    Drug Dosing Weight  Height (cm): 170.2 (19 Jul 2021 14:03)  Weight (kg): 59 (19 Jul 2021 14:03)  BMI (kg/m2): 20.4 (19 Jul 2021 14:03)  BSA (m2): 1.68 (19 Jul 2021 14:03)    PE:    Vital Signs Last 24 Hrs  T(C): 37.6 (21 Jul 2021 05:38), Max: 37.7 (20 Jul 2021 20:30)  T(F): 99.6 (21 Jul 2021 05:38), Max: 99.8 (20 Jul 2021 20:30)  HR: 76 (21 Jul 2021 05:38) (76 - 86)  BP: 117/70 (21 Jul 2021 05:38) (117/70 - 138/70)  RR: 18 (21 Jul 2021 05:38) (18 - 18)  SpO2: 96% (21 Jul 2021 05:38) (96% - 100%)    Gen: AOx3, NAD, non-toxic  CV: S1+S2 normal, nontachycardic  Resp: Clear bilat, no resp distress, no crackles/wheezes  Abd: Soft, nontender, +BS  Ext: No LE edema, no wounds  : No Roberson  IV/Skin: No thrombophlebitis  Msk: No low back pain, no arthralgias, no joint swelling  Neuro: No sensory deficits, no motor deficits    LABS:                        8.5    15.14 )-----------( 488      ( 21 Jul 2021 07:06 )             27.7     07-21    129<L>  |  96  |  6<L>  ----------------------------<  87  3.2<L>   |  22  |  0.80    Ca    8.6      21 Jul 2021 07:06  Phos  2.0     07-21  Mg     1.6     07-21    MICROBIOLOGY:    C diff+    .Stool  07-15-21   No Protozoa seen by trichrome stain  No Helminths or Protozoa seen in formalin concentrate  performed by iodine stain  (routine O+P not evaluated for Microsporidia,  Cryptosporidia, Cyclospora, or Isospora.)  One negative sample does not necessarily rule  out the presence of a parasitic infection.  Numerous White blood cells  --  --    .Stool Feces, namrata abdon  07-14-21   No enteric pathogens isolated.  (Stool culture examined for Salmonella,  Shigella, Campylobacter, Aeromonas, Plesiomonas,  Vibrio, E.coli O157 and Yersinia)      .Urine Clean Catch (Midstream)  07-13-21   <10,000 CFU/mL Normal Urogenital Margo      .Blood Blood-Peripheral  07-13-21   No Growth Final  --  --    (otherwise reviewed)    RADIOLOGY:    7/13 CT:    IMPRESSION:  Findings compatible with active inflammatory bowel disease involving the rectosigmoid and descending colon and proximal ascending colon/cecum.    No intra-abdominal abscess.    7/19 EGD:    Findings:       Skin tags were found on perianal exam.       A diffuse area of granular mucosa was found in the entire colon.       A diffuse area of white exudate, consistent with pseudomembrane formation was found at the        anus, in the rectum, in the recto-sigmoid colon and in the distal sigmoid colon. Biopsies of        the cecum/ascending colon, transverse colon, sigmoid and rectosigmoid colon were taken with a        cold forceps for histology.       A diffuse area of mildly erythematous mucosa was found in the rectum.                                                                                                        Impression:          - Perianal skin tags found on perianal exam.                       - Granularity in the entire examined colon.                       - Pseudomembranous enterocolitis consistent with C.diff infection,                        predominantly in the left colon.                       - Erythematous mucosa in the rectum.  Recommendation:      - Await pathology results.                       - Low residue diet today.

## 2021-07-22 NOTE — PROGRESS NOTE ADULT - ASSESSMENT
GIDEON HAYES is a 81y Male with GERD on omeprazole daily and Crohn's disease diagnosed about 9 mos prior to admission at outside hospital c/b h/o abscess and left posterior distal anal intersphincteric fistula and presents as a new consult to GI for diarrhea/BRBPR likely 2/2 to CD flare c/b c diff colitis.    # Diarrhea/BRBPR - likely 2/2 CD flare with leukocytosis most likely 2/2 cdiff colitis, less likely 2/2 CMV colitis. Patient's cdiff was positive and is the likely cause of his CD flare. Still with significant sx despite antibiotics.  Had flex sig with biopsies on 7/19/21.   Non reactive HBVsAg: no latent HBV infection    # Weight loss - likely 2/2 CD c/b oral aphthous ulcers and diarrhea as he has not been able to tolerate po and will need to ensure adequate hydration.  # GERD     Recommendations:    - Magic mouth wash q6 hours PRN  - Continue to low residual diet as tolerated  - Add protein supplements, obtain dietician evaluation  - f/u quantiFeron for possible biologic use  - pathology from biopsies reveals active chronic colitis, CMV pending  - DC budesonide  - Continue increased vancomycin 500mg PO Q6 and flagyl 500mg IV Q8  - appreciate further input from Infectious Disease      Jaison Fang, PGY-4  GI/Hepatology Fellow    MONDAY-FRIDAY 8AM-5PM:  Available via [employer] Teams  Pager# [phone] ([facility]) or [phone] ([facility])  GI Phone# [phone] ([facility])    NON-URGENT CONSULTS:  Please email [email or web address]  AT NIGHT AND ON WEEKENDS:  Contact on-call GI fellow via answering service ([phone]) from 5pm-8am and on weekends/holidays

## 2021-07-22 NOTE — PROGRESS NOTE ADULT - SUBJECTIVE AND OBJECTIVE BOX
Patient is a 81y old  Male who presents with a chief complaint of diarrhea with blood in it , abd pain and weakness (21 Jul 2021 15:23)      Interval Events:   Continued diarrhea with over 8 BMs charted over the past 24 hours.  Patient also had recorded fever of T 101.3 overnight.  Patient endorses diarrhea and overall "feeling worse" than yesterday.    Hospital Medications:  clonazePAM  Tablet 0.25 milliGRAM(s) Oral at bedtime  FIRST- Mouthwash  BLM 10 milliLiter(s) Swish and Spit every 8 hours  folic acid 1 milliGRAM(s) Oral daily  metroNIDAZOLE  IVPB      metroNIDAZOLE  IVPB 500 milliGRAM(s) IV Intermittent every 8 hours  multivitamin 1 Tablet(s) Oral daily  pantoprazole    Tablet 40 milliGRAM(s) Oral before breakfast  sodium chloride 0.9%. 1000 milliLiter(s) IV Continuous <Continuous>  sotalol 120 milliGRAM(s) Oral daily  vancomycin    Solution 500 milliGRAM(s) Oral every 6 hours    ROS:   A 12-point ROS was performed and negative except as noted in HPI.    PHYSICAL EXAM:   Vital Signs:  Vital Signs Last 24 Hrs  T(C): 37.3 (22 Jul 2021 04:35), Max: 38.5 (21 Jul 2021 21:52)  T(F): 99.1 (22 Jul 2021 04:35), Max: 101.3 (21 Jul 2021 21:52)  HR: 79 (22 Jul 2021 04:35) (74 - 79)  BP: 131/74 (22 Jul 2021 04:35) (131/74 - 150/76)  BP(mean): --  RR: 18 (22 Jul 2021 04:35) (18 - 18)  SpO2: 98% (22 Jul 2021 04:35) (93% - 98%)  Daily     Daily     GENERAL: no acute distress  NEURO: alert  HEENT: anicteric sclera, no conjunctival pallor appreciated  CHEST: no respiratory distress, no accessory muscle use  CARDIAC: regular rate, +S1/S2  ABDOMEN: soft, nondistended, nontender, no rebound or guarding  EXTREMITIES: warm, well perfused, no edema  SKIN: no lesions noted    LABS: reviewed                        8.5    15.14 )-----------( 488      ( 21 Jul 2021 07:06 )             27.7     07-22    132<L>  |  98  |  7   ----------------------------<  85  3.4<L>   |  21<L>  |  0.77    Ca    8.3<L>      22 Jul 2021 06:52  Phos  2.0     07-21  Mg     1.6     07-21          Interval Diagnostic Studies: see sunrise for full report

## 2021-07-22 NOTE — PROGRESS NOTE ADULT - SUBJECTIVE AND OBJECTIVE BOX
Patient is a 81y old  Male who presents with a chief complaint of diarrhea with blood in it , abd pain and weakness (2021 12:39)      INTERVAL HPI/OVERNIGHT EVENTS:  T(C): 37.2 (21 @ 20:56), Max: 37.3 (21 @ 00:24)  HR: 74 (21 @ 20:56) (64 - 87)  BP: 157/74 (21 @ 20:56) (111/62 - 157/74)  RR: 20 (21 @ 20:56) (18 - 20)  SpO2: 98% (21 @ 20:56) (93% - 98%)  Wt(kg): --  I&O's Summary    2021 07:  -  2021 07:00  --------------------------------------------------------  IN: 2980 mL / OUT: 600 mL / NET: 2380 mL    2021 07:01  -  2021 23:52  --------------------------------------------------------  IN: 900 mL / OUT: 200 mL / NET: 700 mL        PAST MEDICAL & SURGICAL HISTORY:  No pertinent past medical history    No significant past surgical history        SOCIAL HISTORY  Alcohol:  Tobacco:  Illicit substance use:    FAMILY HISTORY:    REVIEW OF SYSTEMS:  CONSTITUTIONAL: No fever, weight loss, or fatigue  EYES: No eye pain, visual disturbances, or discharge  ENMT:  No difficulty hearing, tinnitus, vertigo; No sinus or throat pain  NECK: No pain or stiffness  RESPIRATORY: No cough, wheezing, chills or hemoptysis; No shortness of breath  CARDIOVASCULAR: No chest pain, palpitations, dizziness, or leg swelling  GASTROINTESTINAL: No abdominal or epigastric pain. No nausea, vomiting, or hematemesis; No diarrhea or constipation. No melena or hematochezia.  GENITOURINARY: No dysuria, frequency, hematuria, or incontinence  NEUROLOGICAL: No headaches, memory loss, loss of strength, numbness, or tremors  SKIN: No itching, burning, rashes, or lesions   LYMPH NODES: No enlarged glands  ENDOCRINE: No heat or cold intolerance; No hair loss  MUSCULOSKELETAL: No joint pain or swelling; No muscle, back, or extremity pain  PSYCHIATRIC: No depression, anxiety, mood swings, or difficulty sleeping  HEME/LYMPH: No easy bruising, or bleeding gums  ALLERY AND IMMUNOLOGIC: No hives or eczema    RADIOLOGY & ADDITIONAL TESTS:    Imaging Personally Reviewed:  [ ] YES  [ ] NO    Consultant(s) Notes Reviewed:  [ ] YES  [ ] NO    PHYSICAL EXAM:  GENERAL: NAD, well-groomed, well-developed  HEAD:  Atraumatic, Normocephalic  EYES: EOMI, PERRLA, conjunctiva and sclera clear  ENMT: No tonsillar erythema, exudates, or enlargement; Moist mucous membranes, Good dentition, No lesions  NECK: Supple, No JVD, Normal thyroid  NERVOUS SYSTEM:  Alert & Oriented X3, Good concentration; Motor Strength 5/5 B/L upper and lower extremities; DTRs 2+ intact and symmetric  CHEST/LUNG: Clear to percussion bilaterally; No rales, rhonchi, wheezing, or rubs  HEART: Regular rate and rhythm; No murmurs, rubs, or gallops  ABDOMEN: Soft, Nontender, Nondistended; Bowel sounds present  EXTREMITIES:  2+ Peripheral Pulses, No clubbing, cyanosis, or edema  LYMPH: No lymphadenopathy noted  SKIN: No rashes or lesions    LABS:                        8.5    15.14 )-----------( 488      ( 2021 07:06 )             27.7     07-    132<L>  |  98  |  7   ----------------------------<  85  3.4<L>   |  21<L>  |  0.77    Ca    8.3<L>      2021 06:52  Phos  2.0     07-  Mg     1.6     07-        Urinalysis Basic - ( 2021 23:10 )    Color: Light Yellow / Appearance: Clear / S.012 / pH: x  Gluc: x / Ketone: Negative  / Bili: Negative / Urobili: Negative   Blood: x / Protein: Trace / Nitrite: Negative   Leuk Esterase: Negative / RBC: x / WBC x   Sq Epi: x / Non Sq Epi: x / Bacteria: x      CAPILLARY BLOOD GLUCOSE            Urinalysis Basic - ( 2021 23:10 )    Color: Light Yellow / Appearance: Clear / S.012 / pH: x  Gluc: x / Ketone: Negative  / Bili: Negative / Urobili: Negative   Blood: x / Protein: Trace / Nitrite: Negative   Leuk Esterase: Negative / RBC: x / WBC x   Sq Epi: x / Non Sq Epi: x / Bacteria: x        MEDICATIONS  (STANDING):  clonazePAM  Tablet 0.25 milliGRAM(s) Oral at bedtime  FIRST- Mouthwash  BLM 10 milliLiter(s) Swish and Spit every 8 hours  folic acid 1 milliGRAM(s) Oral daily  metroNIDAZOLE  IVPB      metroNIDAZOLE  IVPB 500 milliGRAM(s) IV Intermittent every 8 hours  multivitamin 1 Tablet(s) Oral daily  pantoprazole    Tablet 40 milliGRAM(s) Oral before breakfast  sodium chloride 0.9%. 1000 milliLiter(s) (100 mL/Hr) IV Continuous <Continuous>  sotalol 120 milliGRAM(s) Oral daily  vancomycin    Solution 500 milliGRAM(s) Oral every 6 hours    MEDICATIONS  (PRN):      Care Discussed with Consultants/Other Providers [ ] YES  [ ] NO Patient is a 81y old  Male who presents with a chief complaint of diarrhea with blood in it , abd pain and weakness (2021 12:39)      INTERVAL HPI/OVERNIGHT EVENTS: still having diarrhea   T(C): 37.2 (21 @ 20:56), Max: 37.3 (21 @ 00:24)  HR: 74 (21 @ 20:56) (64 - 87)  BP: 157/74 (21 @ 20:56) (111/62 - 157/74)  RR: 20 (21 @ 20:56) (18 - 20)  SpO2: 98% (21 @ 20:56) (93% - 98%)  Wt(kg): --  I&O's Summary    2021 07:01  -  2021 07:00  --------------------------------------------------------  IN: 2980 mL / OUT: 600 mL / NET: 2380 mL    2021 07:01  -  2021 23:52  --------------------------------------------------------  IN: 900 mL / OUT: 200 mL / NET: 700 mL        PAST MEDICAL & SURGICAL HISTORY:  No pertinent past medical history    No significant past surgical history        SOCIAL HISTORY  Alcohol:  Tobacco:  Illicit substance use:    FAMILY HISTORY:    REVIEW OF SYSTEMS:  CONSTITUTIONAL: No fever, weight loss, or fatigue  EYES: No eye pain, visual disturbances, or discharge  ENMT:  No difficulty hearing, tinnitus, vertigo; No sinus or throat pain  NECK: No pain or stiffness  RESPIRATORY: No cough, wheezing, chills or hemoptysis; No shortness of breath  CARDIOVASCULAR: No chest pain, palpitations, dizziness, or leg swelling  GASTROINTESTINAL: No abdominal or epigastric pain. No nausea, vomiting, or hematemesis; No diarrhea or constipation. No melena or hematochezia.  GENITOURINARY: No dysuria, frequency, hematuria, or incontinence  NEUROLOGICAL: No headaches, memory loss, loss of strength, numbness, or tremors  SKIN: No itching, burning, rashes, or lesions   LYMPH NODES: No enlarged glands  ENDOCRINE: No heat or cold intolerance; No hair loss  MUSCULOSKELETAL: No joint pain or swelling; No muscle, back, or extremity pain  PSYCHIATRIC: No depression, anxiety, mood swings, or difficulty sleeping  HEME/LYMPH: No easy bruising, or bleeding gums  ALLERY AND IMMUNOLOGIC: No hives or eczema    RADIOLOGY & ADDITIONAL TESTS:    Imaging Personally Reviewed:  [ ] YES  [ ] NO    Consultant(s) Notes Reviewed:  [ ] YES  [ ] NO    PHYSICAL EXAM:  GENERAL: NAD, well-groomed, well-developed  HEAD:  Atraumatic, Normocephalic  EYES: EOMI, PERRLA, conjunctiva and sclera clear  ENMT: No tonsillar erythema, exudates, or enlargement; Moist mucous membranes, Good dentition, No lesions  NECK: Supple, No JVD, Normal thyroid  NERVOUS SYSTEM:  Alert & Oriented X3, Good concentration; Motor Strength 5/5 B/L upper and lower extremities; DTRs 2+ intact and symmetric  CHEST/LUNG: Clear to percussion bilaterally; No rales, rhonchi, wheezing, or rubs  HEART: Regular rate and rhythm; No murmurs, rubs, or gallops  ABDOMEN: Soft, Nontender, Nondistended; Bowel sounds present  EXTREMITIES:  2+ Peripheral Pulses, No clubbing, cyanosis, or edema  LYMPH: No lymphadenopathy noted  SKIN: No rashes or lesions    LABS:                        8.5    15.14 )-----------( 488      ( 2021 07:06 )             27.7     07-22    132<L>  |  98  |  7   ----------------------------<  85  3.4<L>   |  21<L>  |  0.77    Ca    8.3<L>      2021 06:52  Phos  2.0     07-  Mg     1.6     07-        Urinalysis Basic - ( 2021 23:10 )    Color: Light Yellow / Appearance: Clear / S.012 / pH: x  Gluc: x / Ketone: Negative  / Bili: Negative / Urobili: Negative   Blood: x / Protein: Trace / Nitrite: Negative   Leuk Esterase: Negative / RBC: x / WBC x   Sq Epi: x / Non Sq Epi: x / Bacteria: x      CAPILLARY BLOOD GLUCOSE            Urinalysis Basic - ( 2021 23:10 )    Color: Light Yellow / Appearance: Clear / S.012 / pH: x  Gluc: x / Ketone: Negative  / Bili: Negative / Urobili: Negative   Blood: x / Protein: Trace / Nitrite: Negative   Leuk Esterase: Negative / RBC: x / WBC x   Sq Epi: x / Non Sq Epi: x / Bacteria: x        MEDICATIONS  (STANDING):  clonazePAM  Tablet 0.25 milliGRAM(s) Oral at bedtime  FIRST- Mouthwash  BLM 10 milliLiter(s) Swish and Spit every 8 hours  folic acid 1 milliGRAM(s) Oral daily  metroNIDAZOLE  IVPB      metroNIDAZOLE  IVPB 500 milliGRAM(s) IV Intermittent every 8 hours  multivitamin 1 Tablet(s) Oral daily  pantoprazole    Tablet 40 milliGRAM(s) Oral before breakfast  sodium chloride 0.9%. 1000 milliLiter(s) (100 mL/Hr) IV Continuous <Continuous>  sotalol 120 milliGRAM(s) Oral daily  vancomycin    Solution 500 milliGRAM(s) Oral every 6 hours    MEDICATIONS  (PRN):      Care Discussed with Consultants/Other Providers [ ] YES  [ ] NO

## 2021-07-22 NOTE — PROGRESS NOTE ADULT - SUBJECTIVE AND OBJECTIVE BOX
Patient is a 81y old  Male who presents with a chief complaint of diarrhea with blood in it , abd pain and weakness (22 Jul 2021 09:35)      INTERVAL HISTORY:  + diarrhea     REVIEW OF SYSTEMS:   CONSTITUTIONAL: No weakness  EYES/ENT: No visual changes; No throat pain  Neck: No pain or stiffness  Respiratory: No cough, wheezing, No shortness of breath  CARDIOVASCULAR: no chest pain or palpitations  GASTROINTESTINAL: No abdominal pain, no nausea, vomiting or hematemesis + diarrhea   GENITOURINARY: No dysuria, frequency or hematuria  NEUROLOGICAL: No stroke like symptoms  SKIN: No rashes    	  MEDICATIONS:  sotalol 120 milliGRAM(s) Oral daily        PHYSICAL EXAM:  T(C): 37.3 (07-22-21 @ 04:35), Max: 38.5 (07-21-21 @ 21:52)  HR: 79 (07-22-21 @ 04:35) (74 - 79)  BP: 131/74 (07-22-21 @ 04:35) (131/74 - 150/76)  RR: 18 (07-22-21 @ 04:35) (18 - 18)  SpO2: 98% (07-22-21 @ 04:35) (93% - 98%)  Wt(kg): --  I&O's Summary    21 Jul 2021 07:01  -  22 Jul 2021 07:00  --------------------------------------------------------  IN: 2980 mL / OUT: 600 mL / NET: 2380 mL    22 Jul 2021 07:01  -  22 Jul 2021 12:39  --------------------------------------------------------  IN: 240 mL / OUT: 0 mL / NET: 240 mL          Appearance: In no distress	  HEENT:    PERRL, EOMI	  Cardiovascular:  S1 S2, No JVD  Respiratory: Lungs clear to auscultation	  Gastrointestinal:  Soft, Non-tender, + BS	  Vascularature:  No edema of LE  Psychiatric: Appropriate affect   Neuro: no acute focal deficits                           8.5    15.14 )-----------( 488      ( 21 Jul 2021 07:06 )             27.7     07-22    132<L>  |  98  |  7   ----------------------------<  85  3.4<L>   |  21<L>  |  0.77    Ca    8.3<L>      22 Jul 2021 06:52  Phos  2.0     07-21  Mg     1.6     07-21  Labs personally reviewed    ASSESSMENT/PLAN: 	    Problem/Plan - 1:  ·  Problem: CAD (coronary artery disease).  Plan: c/w home meds  We discussed the importance of SAPT with low dose ASA 81mg given CAD  pt denies any current chest pain, SOB or chest pressure.     Problem/Plan -2:  ·  Problem: Colitis.  Plan: Ct shows chron's  exacerbation   f/u flex sig with biopsies   on Vanco and flagyl   GI and ID following    if no improvement per ID may recc fecal tranplant     Problem/Plan - 3:  ·  Problem: GIB (gastrointestinal bleeding).  Plan: bloody diarrhea 2/2 chron's exacerbation   IV fluids   -c-diff positive: started vanco  mg q 6 x 10 days.       Problem/Plan - 4:  ·  Problem: SCDs         Sanam Onofre DO [facility]  Cardiovascular Medicine  [address] [place] [address], [address]  Office: [phone]  Cell: [phone]

## 2021-07-22 NOTE — PROGRESS NOTE ADULT - SUBJECTIVE AND OBJECTIVE BOX
CC: F/U for C diff    Saw/spoke to patient. Still fevers. Having diarrhea. Not improved overnight. Multiple episodes watery diarrhea through the night.    Allergies  No Known Allergies    ANTIMICROBIALS:  metroNIDAZOLE  IVPB    metroNIDAZOLE  IVPB 500 every 8 hours  vancomycin    Solution 500 every 6 hours    PE:    Vital Signs Last 24 Hrs  T(C): 36.2 (2021 13:24), Max: 38.5 (2021 21:52)  T(F): 97.2 (2021 13:24), Max: 101.3 (2021 21:52)  HR: 66 (2021 13:24) (66 - 79)  BP: 111/62 (2021 13:24) (111/62 - 150/76)  RR: 18 (2021 13:24) (18 - 18)  SpO2: 97% (2021 13:24) (93% - 98%)    Gen: AOx3, NAD, non-toxic  CV: S1+S2 normal, nontachycardic  Resp: Clear bilat, no resp distress, no crackles/wheezes  Abd: Soft, nontender, +BS  Ext: No LE edema, no wounds    LABS:                        8.5    15.14 )-----------( 488      ( 2021 07:06 )             27.7     07-22    132<L>  |  98  |  7   ----------------------------<  85  3.4<L>   |  21<L>  |  0.77    Ca    8.3<L>      2021 06:52  Phos  2.0       Mg     1.6         Urinalysis Basic - ( 2021 23:10 )    Color: Light Yellow / Appearance: Clear / S.012 / pH: x  Gluc: x / Ketone: Negative  / Bili: Negative / Urobili: Negative   Blood: x / Protein: Trace / Nitrite: Negative   Leuk Esterase: Negative / RBC: x / WBC x   Sq Epi: x / Non Sq Epi: x / Bacteria: x    MICROBIOLOGY:    .Stool  07-15-21   No Protozoa seen by trichrome stain  No Helminths or Protozoa seen in formalin concentrate  performed by iodine stain  (routine O+P not evaluated for Microsporidia,  Cryptosporidia, Cyclospora, or Isospora.)  One negative sample does not necessarily rule  out the presence of a parasitic infection.  Numerous White blood cells  --  --    .Stool Feces, namrata abdon  21   No enteric pathogens isolated.  (Stool culture examined for Salmonella,  Shigella, Campylobacter, Aeromonas, Plesiomonas,  Vibrio, E.coli O157 and Yersinia)  --  --    .Urine Clean Catch (Midstream)  21   <10,000 CFU/mL Normal Urogenital Margo  --  --    .Blood Blood-Peripheral  21   No Growth Final  --  --    (otherwise reviewed)    RADIOLOGY:     CT:    IMPRESSION:  Findings compatible with active inflammatory bowel disease involving the rectosigmoid and descending colon and proximal ascending colon/cecum.    No intra-abdominal abscess.

## 2021-07-22 NOTE — PROGRESS NOTE ADULT - ASSESSMENT
82 yo M with Crohn's on treatment, recently cared for at [facility], now presenting with ongoing diarrhea  Low grade temp elevation, leukocytosis  Diarrhea  No abd distension, no evidence megacolon  CT with colonic inflammation  Colonoscopy with visualized pseudomembranes  On PO Vanco starting 7/15 with limited improvement  Still ongoing diarrhea  Overall,  1) C diff colitis  - Severe, persistent  - Vanco 500mg q 6  - Flagyl 500mg q 8  - Vanco by enema if patient agreeable  - If not improved on above regimen, consider stool transplant (would wait to see effect of regimen change first)  - Monitor stool count, monitor abd for any signs distension  2) Leukocytosis  - Trend to normal  3) Crohn's  - Further care per GI  - Uncertain if any active Crohn's (or if current symptoms all related to C diff)    Phoenix Lee MD  Pager [phone]  After 5pm and on weekends call [phone]

## 2021-07-22 NOTE — PROGRESS NOTE ADULT - ATTENDING COMMENTS
As above.    Impression:    #1. Diarrhea due to combination of Crohn's disease in C. difficile colitis, status post colonoscopy on 7/19/21 with left-sided pseudomembranes consistent with C. difficile.  Biopsies obtained.    Recommendations:    #1. Low-residue diet as tolerated    #2. Await biopsy results.    #3. Continue oral vancomycin, 500mg PO Q6 increase dose, and add metronidazole 500 IV Q8h. Patient refused vancomycin enemas.    #4. ID consult As above.    Impression:    #1. Diarrhea due to combination of Crohn's disease in C. difficile colitis, status post colonoscopy on 7/19/21 with left-sided pseudomembranes consistent with C. difficile.  Biopsies obtained, chronic active colitis, CMV pending.    Recommendations:    #1. Low-residue diet as tolerated    #2. Await biopsy results.    #3. Continue oral vancomycin, 500mg PO Q6 and metronidazole 500 IV Q8h. Patient refused vancomycin enemas.    #4. Appreciate ID consult    #5.  Budesonide discontinued.    #6.  Abdominal X ray, rule out megacolon As above.    Impression:    #1. Diarrhea due to combination of Crohn's disease and C. difficile colitis, status post colonoscopy on 7/19/21 with left-sided pseudomembranes consistent with C. difficile.  Biopsies obtained, chronic active colitis, CMV pending.    Recommendations:    #1. Low-residue diet as tolerated    #2. Await biopsy results.    #3. Continue oral vancomycin, 500mg PO Q6 and metronidazole 500 IV Q8h. Patient refused vancomycin enemas.    #4. Appreciate ID consult    #5.  Budesonide discontinued.    #6.  Abdominal X ray, rule out megacolon

## 2021-07-23 NOTE — PROGRESS NOTE ADULT - SUBJECTIVE AND OBJECTIVE BOX
Patient is a 81y old  Male who presents with a chief complaint of diarrhea with blood in it , abd pain and weakness (23 Jul 2021 08:47)      INTERVAL HISTORY: c/p diarrhea all night     REVIEW OF SYSTEMS:   CONSTITUTIONAL: No weakness  EYES/ENT: No visual changes; No throat pain  Neck: No pain or stiffness  Respiratory: No cough, wheezing, No shortness of breath  CARDIOVASCULAR: no chest pain or palpitations  GASTROINTESTINAL: No abdominal pain, no nausea, vomiting or hematemesis + Diarrhea   GENITOURINARY: No dysuria, frequency or hematuria  NEUROLOGICAL: No stroke like symptoms  SKIN: No rashes  	  MEDICATIONS:  sotalol 120 milliGRAM(s) Oral daily    PHYSICAL EXAM:  T(C): 37.1 (07-23-21 @ 08:35), Max: 37.2 (07-22-21 @ 20:56)  HR: 68 (07-23-21 @ 08:35) (64 - 87)  BP: 113/68 (07-23-21 @ 08:35) (111/62 - 157/74)  RR: 18 (07-23-21 @ 08:35) (18 - 20)  SpO2: 96% (07-23-21 @ 08:35) (93% - 98%)  Wt(kg): --  I&O's Summary    22 Jul 2021 07:01  -  23 Jul 2021 07:00  --------------------------------------------------------  IN: 2220 mL / OUT: 200 mL / NET: 2020 mL    23 Jul 2021 07:01  -  23 Jul 2021 11:19  --------------------------------------------------------  IN: 20 mL / OUT: 480 mL / NET: -460 mL    Appearance: I no distress	  HEENT:    PERRL, EOMI	  Cardiovascular:  S1 S2, No JVD  Respiratory: Lungs clear to auscultation	  Gastrointestinal:  Soft, Non-tender, + BS	  Vascularature:  No edema of LE  Psychiatric: Appropriate affect   Neuro: no acute focal deficits       07-22    132<L>  |  98  |  7   ----------------------------<  85  3.4<L>   |  21<L>  |  0.77    Ca    8.3<L>      22 Jul 2021 06:52    Labs personally reviewed      ASSESSMENT/PLAN: 	  Problem/Plan - 1:  ·  Problem: CAD (coronary artery disease).  Plan: c/w home meds  We discussed the importance of SAPT with low dose ASA 81mg given CAD  pt denies any current chest pain, SOB or chest pressure.     Problem/Plan -2:  ·  Problem: Colitis.  Plan: Ct shows chron's  exacerbation   f/u flex sig with biopsies   on Vanco and flagyl   GI and ID following    if no improvement per ID may recc fecal tranplant     Problem/Plan - 3:  ·  Problem: GIB (gastrointestinal bleeding).  Plan: bloody diarrhea 2/2 chron's exacerbation   IV fluids   -c-diff positive: started vanco  mg q 6 x 10 days.       Problem/Plan - 4:  ·  Problem: SCDs         Sanam Heath ANP-C  Rufus Onofre DO [facility]  Cardiovascular Medicine  [address] [place] [address], [address]  Office: [phone]  Cell: [phone]

## 2021-07-23 NOTE — PROGRESS NOTE ADULT - SUBJECTIVE AND OBJECTIVE BOX
Patient is a 81y old  Male who presents with a chief complaint of diarrhea with blood in it , abd pain and weakness (23 Jul 2021 11:19)      INTERVAL HPI/OVERNIGHT EVENTS:  T(C): 38.1 (07-24-21 @ 00:38), Max: 38.1 (07-24-21 @ 00:38)  HR: 85 (07-24-21 @ 00:38) (68 - 85)  BP: 137/76 (07-24-21 @ 00:38) (113/68 - 149/74)  RR: 20 (07-24-21 @ 00:38) (17 - 20)  SpO2: 95% (07-24-21 @ 00:38) (95% - 98%)  Wt(kg): --  I&O's Summary    22 Jul 2021 07:01  -  23 Jul 2021 07:00  --------------------------------------------------------  IN: 2220 mL / OUT: 200 mL / NET: 2020 mL    23 Jul 2021 07:01  -  24 Jul 2021 02:37  --------------------------------------------------------  IN: 620 mL / OUT: 680 mL / NET: -60 mL        PAST MEDICAL & SURGICAL HISTORY:  No pertinent past medical history    No significant past surgical history        SOCIAL HISTORY  Alcohol:  Tobacco:  Illicit substance use:    FAMILY HISTORY:    REVIEW OF SYSTEMS:  CONSTITUTIONAL: No fever, weight loss, or fatigue  EYES: No eye pain, visual disturbances, or discharge  ENMT:  No difficulty hearing, tinnitus, vertigo; No sinus or throat pain  NECK: No pain or stiffness  RESPIRATORY: No cough, wheezing, chills or hemoptysis; No shortness of breath  CARDIOVASCULAR: No chest pain, palpitations, dizziness, or leg swelling  GASTROINTESTINAL: No abdominal or epigastric pain. No nausea, vomiting, or hematemesis; No diarrhea or constipation. No melena or hematochezia.  GENITOURINARY: No dysuria, frequency, hematuria, or incontinence  NEUROLOGICAL: No headaches, memory loss, loss of strength, numbness, or tremors  SKIN: No itching, burning, rashes, or lesions   LYMPH NODES: No enlarged glands  ENDOCRINE: No heat or cold intolerance; No hair loss  MUSCULOSKELETAL: No joint pain or swelling; No muscle, back, or extremity pain  PSYCHIATRIC: No depression, anxiety, mood swings, or difficulty sleeping  HEME/LYMPH: No easy bruising, or bleeding gums  ALLERY AND IMMUNOLOGIC: No hives or eczema    RADIOLOGY & ADDITIONAL TESTS:    Imaging Personally Reviewed:  [ ] YES  [ ] NO    Consultant(s) Notes Reviewed:  [ ] YES  [ ] NO    PHYSICAL EXAM:  GENERAL: NAD, well-groomed, well-developed  HEAD:  Atraumatic, Normocephalic  EYES: EOMI, PERRLA, conjunctiva and sclera clear  ENMT: No tonsillar erythema, exudates, or enlargement; Moist mucous membranes, Good dentition, No lesions  NECK: Supple, No JVD, Normal thyroid  NERVOUS SYSTEM:  Alert & Oriented X3, Good concentration; Motor Strength 5/5 B/L upper and lower extremities; DTRs 2+ intact and symmetric  CHEST/LUNG: Clear to percussion bilaterally; No rales, rhonchi, wheezing, or rubs  HEART: Regular rate and rhythm; No murmurs, rubs, or gallops  ABDOMEN: Soft, Nontender, Nondistended; Bowel sounds present  EXTREMITIES:  2+ Peripheral Pulses, No clubbing, cyanosis, or edema  LYMPH: No lymphadenopathy noted  SKIN: No rashes or lesions    LABS:                        7.4    10.59 )-----------( 444      ( 24 Jul 2021 01:48 )             24.2     07-22    132<L>  |  98  |  7   ----------------------------<  85  3.4<L>   |  21<L>  |  0.77    Ca    8.3<L>      22 Jul 2021 06:52          CAPILLARY BLOOD GLUCOSE                MEDICATIONS  (STANDING):  clonazePAM  Tablet 0.25 milliGRAM(s) Oral at bedtime  FIRST- Mouthwash  BLM 10 milliLiter(s) Swish and Spit every 8 hours  folic acid 1 milliGRAM(s) Oral daily  metroNIDAZOLE  IVPB      metroNIDAZOLE  IVPB 500 milliGRAM(s) IV Intermittent every 8 hours  multivitamin 1 Tablet(s) Oral daily  pantoprazole    Tablet 40 milliGRAM(s) Oral before breakfast  sotalol 120 milliGRAM(s) Oral daily  vancomycin    Solution 500 milliGRAM(s) Oral every 6 hours    MEDICATIONS  (PRN):      Care Discussed with Consultants/Other Providers [ ] YES  [ ] NO

## 2021-07-23 NOTE — PROGRESS NOTE ADULT - SUBJECTIVE AND OBJECTIVE BOX
CC: F/U for C diff    Saw/spoke to patient. Still with ongoing diarrhea, slightly improved this AM.    Allergies  No Known Allergies    ANTIMICROBIALS:  metroNIDAZOLE  IVPB    metroNIDAZOLE  IVPB 500 every 8 hours  vancomycin    Solution 500 every 6 hours    PE:    Vital Signs Last 24 Hrs  T(C): 36.6 (2021 13:16), Max: 37.2 (2021 20:56)  T(F): 97.8 (2021 13:16), Max: 98.9 (2021 20:56)  HR: 73 (2021 13:16) (64 - 87)  BP: 131/73 (2021 13:16) (113/68 - 157/74)  RR: 17 (2021 13:16) (17 - 20)  SpO2: 96% (2021 13:16) (93% - 98%)    Gen: AOx3, NAD, non-toxic, pleasant  CV: S1+S2 normal, nontachycardic  Resp: Clear bilat, no resp distress, no crackles/wheezes  Abd: Soft, nontender, +BS  Ext: No LE edema, no wounds    LABS:        132<L>  |  98  |  7   ----------------------------<  85  3.4<L>   |  21<L>  |  0.77    Ca    8.3<L>      2021 06:52    Urinalysis Basic - ( 2021 23:10 )    Color: Light Yellow / Appearance: Clear / S.012 / pH: x  Gluc: x / Ketone: Negative  / Bili: Negative / Urobili: Negative   Blood: x / Protein: Trace / Nitrite: Negative   Leuk Esterase: Negative / RBC: x / WBC x   Sq Epi: x / Non Sq Epi: x / Bacteria: x    MICROBIOLOGY:    .Blood Blood  21   No growth to date.    .Stool  07-15-21   No Protozoa seen by trichrome stain  No Helminths or Protozoa seen in formalin concentrate  performed by iodine stain  (routine O+P not evaluated for Microsporidia,  Cryptosporidia, Cyclospora, or Isospora.)  One negative sample does not necessarily rule  out the presence of a parasitic infection.  Numerous White blood cells  --  --    .Stool Feces, namrata coppola  21   No enteric pathogens isolated.  (Stool culture examined for Salmonella,  Shigella, Campylobacter, Aeromonas, Plesiomonas,  Vibrio, E.coli O157 and Yersinia)  --  --    (otherwise reviewed)    RADIOLOGY:     CT:    IMPRESSION:  Findings compatible with active inflammatory bowel disease involving the rectosigmoid and descending colon and proximal ascending colon/cecum.    No intra-abdominal abscess.

## 2021-07-23 NOTE — PROGRESS NOTE ADULT - ASSESSMENT
82 yo M with Crohn's on treatment, recently cared for at [facility], now presenting with ongoing diarrhea  Low grade temp elevation, leukocytosis  Diarrhea  No abd distension, no evidence megacolon  CT with colonic inflammation  Colonoscopy with visualized pseudomembranes  On PO Vanco starting 7/15 with limited improvement  Still ongoing diarrhea  Overall,  1) C diff colitis  - Severe, persistent  - Vanco 500mg q 6  - Flagyl 500mg q 8  - Vanco by enema if patient agreeable  - If not improved on above regimen, consider stool transplant (would wait to see effect of regimen change first); advised family of plan to stay on this regimen through weekend and reassess effect on Monday (if not improved at that time, consider colorectal surgery eval, stool transplant)  - Monitor stool count, monitor abd for any signs distension  2) Leukocytosis  - Trend to normal  3) Crohn's  - Further care per GI  - Uncertain if any active Crohn's (or if current symptoms all related to C diff)    Phoenix Lee MD  Pager [phone]  After 5pm and on weekends call [phone]

## 2021-07-23 NOTE — PROGRESS NOTE ADULT - ASSESSMENT
GIDEON HAYES is a 81y Male with GERD on omeprazole daily and Crohn's disease diagnosed about 9 mos prior to admission at outside hospital c/b h/o abscess and left posterior distal anal intersphincteric fistula and presents as a new consult to GI for diarrhea/BRBPR likely 2/2 to CD flare c/b c diff colitis.    # Diarrhea/BRBPR - likely 2/2 CD flare with leukocytosis most likely 2/2 cdiff colitis, less likely 2/2 CMV colitis. Patient's cdiff was positive and is the likely cause of his CD flare. Still with significant sx despite antibiotics.  Had flex sig with biopsies on 7/19/21.   Non reactive HBVsAg: no latent HBV infection    # Weight loss - likely 2/2 CD c/b oral aphthous ulcers and diarrhea as he has not been able to tolerate po and will need to ensure adequate hydration.  # GERD     Recommendations:  - Magic mouth wash q6 hours PRN  - Continue to low residual diet as tolerated  - Add protein supplements, obtain dietician evaluation  - pathology from biopsies reveals active chronic colitis, CMV pending  - budesonide discontinued  - Continue increased vancomycin 500mg PO Q6 and flagyl 500mg IV Q8  - please obtain abdominal xray in order to assess for toxic megacolon  - appreciate further input from Infectious Disease.  Patient may be heading towards FMT if no clinical improvement      Jaison Fang, PGY-4  GI/Hepatology Fellow    MONDAY-FRIDAY 8AM-5PM:  Available via [employer] Teams  Pager# [phone] ([facility]) or [phone] ([facility])  GI Phone# [phone] ([facility])    NON-URGENT CONSULTS:  Please email [email or web address]  AT NIGHT AND ON WEEKENDS:  Contact on-call GI fellow via answering service ([phone]) from 5pm-8am and on weekends/holidays

## 2021-07-23 NOTE — PROGRESS NOTE ADULT - ATTENDING COMMENTS
As above.    Impression:    #1. Diarrhea due to combination of Crohn's disease and C. difficile colitis, status post colonoscopy on 7/19/21 with left-sided pseudomembranes consistent with C. difficile.  Biopsies obtained, chronic active colitis, CMV pending.    Recommendations:    #1. Low-residue diet as tolerated    #2. Await biopsy results.    #3. Continue oral vancomycin, 500mg PO Q6 and metronidazole 500 IV Q8h. Patient refused vancomycin enemas.    #4. Appreciate ID consult    #5.  Budesonide discontinued.    #6.  Abdominal X ray, rule out megacolon As above.    Impression:    #1. Diarrhea due to combination of Crohn's disease and C. difficile colitis, status post colonoscopy on 7/19/21 with left-sided pseudomembranes consistent with C. difficile.  Biopsies obtained, chronic active colitis, CMV IHC negative    Recommendations:    #1. Low-residue diet as tolerated    #2. Continue oral vancomycin, 500mg PO Q6 and metronidazole 500 IV Q8h. Patient refused vancomycin enemas.    #3. Budesonide discontinued.    #4.  Follow CBC and abd exams and abdominal X ray    #5.  If not significantly improved by next week, will consider fecal microbiota transplant.

## 2021-07-23 NOTE — PROGRESS NOTE ADULT - SUBJECTIVE AND OBJECTIVE BOX
Patient is a 81y old  Male who presents with a chief complaint of diarrhea with blood in it , abd pain and weakness (22 Jul 2021 19:51)      Interval Events:   Continued diarrhea overnight. 10 BMs charted.  Patient still states he "feels lousy."    Hospital Medications:  clonazePAM  Tablet 0.25 milliGRAM(s) Oral at bedtime  FIRST- Mouthwash  BLM 10 milliLiter(s) Swish and Spit every 8 hours  folic acid 1 milliGRAM(s) Oral daily  metroNIDAZOLE  IVPB      metroNIDAZOLE  IVPB 500 milliGRAM(s) IV Intermittent every 8 hours  multivitamin 1 Tablet(s) Oral daily  pantoprazole    Tablet 40 milliGRAM(s) Oral before breakfast  potassium chloride    Tablet ER 40 milliEquivalent(s) Oral once  sodium chloride 0.9%. 1000 milliLiter(s) IV Continuous <Continuous>  sotalol 120 milliGRAM(s) Oral daily  vancomycin    Solution 500 milliGRAM(s) Oral every 6 hours    ROS:   A 12-point ROS was performed and negative except as noted in HPI.    PHYSICAL EXAM:   Vital Signs:  Vital Signs Last 24 Hrs  T(C): 37 (23 Jul 2021 06:15), Max: 37.2 (22 Jul 2021 20:56)  T(F): 98.6 (23 Jul 2021 06:15), Max: 98.9 (22 Jul 2021 20:56)  HR: 82 (23 Jul 2021 06:15) (64 - 87)  BP: 146/78 (23 Jul 2021 06:15) (111/62 - 157/74)  BP(mean): --  RR: 20 (23 Jul 2021 06:15) (18 - 20)  SpO2: 98% (23 Jul 2021 06:15) (93% - 98%)  Daily     Daily     GENERAL: no acute distress  NEURO: alert  HEENT: anicteric sclera, no conjunctival pallor appreciated  CHEST: no respiratory distress, no accessory muscle use  CARDIAC: regular rate, +S1/S2  ABDOMEN: soft, nondistended, nontender, no rebound or guarding  EXTREMITIES: warm, well perfused, no edema  SKIN: no lesions noted    LABS: reviewed    07-22    132<L>  |  98  |  7   ----------------------------<  85  3.4<L>   |  21<L>  |  0.77    Ca    8.3<L>      22 Jul 2021 06:52          Interval Diagnostic Studies: see sunrise for full report

## 2021-07-24 NOTE — PROGRESS NOTE ADULT - SUBJECTIVE AND OBJECTIVE BOX
Patient is a 81y old  Male who presents with a chief complaint of diarrhea with blood in it , abd pain and weakness (2021 12:43)      INTERVAL HPI/OVERNIGHT EVENTS:  T(C): 37.2 (21 @ 16:30), Max: 38.1 (21 @ 00:38)  HR: 75 (21 @ 16:30) (69 - 85)  BP: 127/72 (21 @ 16:30) (116/69 - 137/76)  RR: 18 (21 @ 16:30) (16 - 20)  SpO2: 98% (21 @ 16:30) (95% - 98%)  Wt(kg): --  I&O's Summary    2021 07:  -  2021 07:00  --------------------------------------------------------  IN: 620 mL / OUT: 680 mL / NET: -60 mL    2021 07:01  -  2021 20:42  --------------------------------------------------------  IN: 660 mL / OUT: 400 mL / NET: 260 mL        PAST MEDICAL & SURGICAL HISTORY:  No pertinent past medical history    No significant past surgical history        SOCIAL HISTORY  Alcohol:  Tobacco:  Illicit substance use:    FAMILY HISTORY:    REVIEW OF SYSTEMS:  CONSTITUTIONAL: No fever, weight loss, or fatigue  EYES: No eye pain, visual disturbances, or discharge  ENMT:  No difficulty hearing, tinnitus, vertigo; No sinus or throat pain  NECK: No pain or stiffness  RESPIRATORY: No cough, wheezing, chills or hemoptysis; No shortness of breath  CARDIOVASCULAR: No chest pain, palpitations, dizziness, or leg swelling  GASTROINTESTINAL: No abdominal or epigastric pain. No nausea, vomiting, or hematemesis; No diarrhea or constipation. No melena or hematochezia.  GENITOURINARY: No dysuria, frequency, hematuria, or incontinence  NEUROLOGICAL: No headaches, memory loss, loss of strength, numbness, or tremors  SKIN: No itching, burning, rashes, or lesions   LYMPH NODES: No enlarged glands  ENDOCRINE: No heat or cold intolerance; No hair loss  MUSCULOSKELETAL: No joint pain or swelling; No muscle, back, or extremity pain  PSYCHIATRIC: No depression, anxiety, mood swings, or difficulty sleeping  HEME/LYMPH: No easy bruising, or bleeding gums  ALLERY AND IMMUNOLOGIC: No hives or eczema    RADIOLOGY & ADDITIONAL TESTS:    Imaging Personally Reviewed:  [ ] YES  [ ] NO    Consultant(s) Notes Reviewed:  [ ] YES  [ ] NO    PHYSICAL EXAM:  GENERAL: NAD, well-groomed, well-developed  HEAD:  Atraumatic, Normocephalic  EYES: EOMI, PERRLA, conjunctiva and sclera clear  ENMT: No tonsillar erythema, exudates, or enlargement; Moist mucous membranes, Good dentition, No lesions  NECK: Supple, No JVD, Normal thyroid  NERVOUS SYSTEM:  Alert & Oriented X3, Good concentration; Motor Strength 5/5 B/L upper and lower extremities; DTRs 2+ intact and symmetric  CHEST/LUNG: Clear to percussion bilaterally; No rales, rhonchi, wheezing, or rubs  HEART: Regular rate and rhythm; No murmurs, rubs, or gallops  ABDOMEN: Soft, Nontender, Nondistended; Bowel sounds present  EXTREMITIES:  2+ Peripheral Pulses, No clubbing, cyanosis, or edema  LYMPH: No lymphadenopathy noted  SKIN: No rashes or lesions    LABS:                        7.4    10.59 )-----------( 444      ( 2021 01:48 )             24.2             Urinalysis Basic - ( 2021 08:07 )    Color: Light Yellow / Appearance: Clear / S.006 / pH: x  Gluc: x / Ketone: Negative  / Bili: Negative / Urobili: Negative   Blood: x / Protein: Negative / Nitrite: Negative   Leuk Esterase: Negative / RBC: x / WBC x   Sq Epi: x / Non Sq Epi: x / Bacteria: x      CAPILLARY BLOOD GLUCOSE            Urinalysis Basic - ( 2021 08:07 )    Color: Light Yellow / Appearance: Clear / S.006 / pH: x  Gluc: x / Ketone: Negative  / Bili: Negative / Urobili: Negative   Blood: x / Protein: Negative / Nitrite: Negative   Leuk Esterase: Negative / RBC: x / WBC x   Sq Epi: x / Non Sq Epi: x / Bacteria: x        MEDICATIONS  (STANDING):  clonazePAM  Tablet 0.25 milliGRAM(s) Oral at bedtime  FIRST- Mouthwash  BLM 10 milliLiter(s) Swish and Spit every 8 hours  folic acid 1 milliGRAM(s) Oral daily  metroNIDAZOLE  IVPB      metroNIDAZOLE  IVPB 500 milliGRAM(s) IV Intermittent every 8 hours  multivitamin 1 Tablet(s) Oral daily  pantoprazole    Tablet 40 milliGRAM(s) Oral before breakfast  sotalol 120 milliGRAM(s) Oral daily  vancomycin    Solution 500 milliGRAM(s) Oral every 6 hours    MEDICATIONS  (PRN):      Care Discussed with Consultants/Other Providers [ ] YES  [ ] NO Patient is a 81y old  Male who presents with a chief complaint of diarrhea with blood in it , abd pain and weakness (2021 12:43)      INTERVAL HPI/OVERNIGHT EVENTS: seen and examined , still no better, having diarrhea and getting weaker   poor appetite     T(C): 37.2 (21 @ 16:30), Max: 38.1 (21 @ 00:38)  HR: 75 (21 @ 16:30) (69 - 85)  BP: 127/72 (21 @ 16:30) (116/69 - 137/76)  RR: 18 (21 @ 16:30) (16 - 20)  SpO2: 98% (21 @ 16:30) (95% - 98%)  Wt(kg): --  I&O's Summary    2021 07:  -  2021 07:00  --------------------------------------------------------  IN: 620 mL / OUT: 680 mL / NET: -60 mL    2021 07:  -  2021 20:42  --------------------------------------------------------  IN: 660 mL / OUT: 400 mL / NET: 260 mL        PAST MEDICAL & SURGICAL HISTORY:  No pertinent past medical history    No significant past surgical history        SOCIAL HISTORY  Alcohol:  Tobacco:  Illicit substance use:    FAMILY HISTORY:    REVIEW OF SYSTEMS:  CONSTITUTIONAL: No fever, weight loss, or fatigue  EYES: No eye pain, visual disturbances, or discharge  ENMT:  No difficulty hearing, tinnitus, vertigo; No sinus or throat pain  NECK: No pain or stiffness  RESPIRATORY: No cough, wheezing, chills or hemoptysis; No shortness of breath  CARDIOVASCULAR: No chest pain, palpitations, dizziness, or leg swelling  GASTROINTESTINAL: No abdominal or epigastric pain. No nausea, vomiting, or hematemesis; No diarrhea or constipation. No melena or hematochezia.  GENITOURINARY: No dysuria, frequency, hematuria, or incontinence  NEUROLOGICAL: No headaches, memory loss, loss of strength, numbness, or tremors  SKIN: No itching, burning, rashes, or lesions   LYMPH NODES: No enlarged glands  ENDOCRINE: No heat or cold intolerance; No hair loss  MUSCULOSKELETAL: No joint pain or swelling; No muscle, back, or extremity pain  PSYCHIATRIC: No depression, anxiety, mood swings, or difficulty sleeping  HEME/LYMPH: No easy bruising, or bleeding gums  ALLERY AND IMMUNOLOGIC: No hives or eczema    RADIOLOGY & ADDITIONAL TESTS:    Imaging Personally Reviewed:  [ ] YES  [ ] NO    Consultant(s) Notes Reviewed:  [ ] YES  [ ] NO    PHYSICAL EXAM:  GENERAL: NAD, well-groomed, well-developed  HEAD:  Atraumatic, Normocephalic  EYES: EOMI, PERRLA, conjunctiva and sclera clear  ENMT: No tonsillar erythema, exudates, or enlargement; Moist mucous membranes, Good dentition, No lesions  NECK: Supple, No JVD, Normal thyroid  NERVOUS SYSTEM:  Alert & Oriented X3, Good concentration; Motor Strength 5/5 B/L upper and lower extremities; DTRs 2+ intact and symmetric  CHEST/LUNG: Clear to percussion bilaterally; No rales, rhonchi, wheezing, or rubs  HEART: Regular rate and rhythm; No murmurs, rubs, or gallops  ABDOMEN: Soft, Nontender, Nondistended; Bowel sounds present  EXTREMITIES:  2+ Peripheral Pulses, No clubbing, cyanosis, or edema  LYMPH: No lymphadenopathy noted  SKIN: No rashes or lesions    LABS:                        7.4    10.59 )-----------( 444      ( 2021 01:48 )             24.2             Urinalysis Basic - ( 2021 08:07 )    Color: Light Yellow / Appearance: Clear / S.006 / pH: x  Gluc: x / Ketone: Negative  / Bili: Negative / Urobili: Negative   Blood: x / Protein: Negative / Nitrite: Negative   Leuk Esterase: Negative / RBC: x / WBC x   Sq Epi: x / Non Sq Epi: x / Bacteria: x      CAPILLARY BLOOD GLUCOSE            Urinalysis Basic - ( 2021 08:07 )    Color: Light Yellow / Appearance: Clear / S.006 / pH: x  Gluc: x / Ketone: Negative  / Bili: Negative / Urobili: Negative   Blood: x / Protein: Negative / Nitrite: Negative   Leuk Esterase: Negative / RBC: x / WBC x   Sq Epi: x / Non Sq Epi: x / Bacteria: x        MEDICATIONS  (STANDING):  clonazePAM  Tablet 0.25 milliGRAM(s) Oral at bedtime  FIRST- Mouthwash  BLM 10 milliLiter(s) Swish and Spit every 8 hours  folic acid 1 milliGRAM(s) Oral daily  metroNIDAZOLE  IVPB      metroNIDAZOLE  IVPB 500 milliGRAM(s) IV Intermittent every 8 hours  multivitamin 1 Tablet(s) Oral daily  pantoprazole    Tablet 40 milliGRAM(s) Oral before breakfast  sotalol 120 milliGRAM(s) Oral daily  vancomycin    Solution 500 milliGRAM(s) Oral every 6 hours    MEDICATIONS  (PRN):      Care Discussed with Consultants/Other Providers [ ] YES  [ ] NO

## 2021-07-24 NOTE — PROGRESS NOTE ADULT - SUBJECTIVE AND OBJECTIVE BOX
Patient is a 81y old  Male who presents with a chief complaint of diarrhea with blood in it , abd pain and weakness (23 Jul 2021 20:37)      INTERVAL HISTORY:  pt and daughter concerned about progression of disease     TELEMETRY Personally reviewed:    REVIEW OF SYSTEMS:   CONSTITUTIONAL: generalized weakness  EYES/ENT: No visual changes; No throat pain  Neck: No pain or stiffness  Respiratory: No cough, wheezing, No shortness of breath  CARDIOVASCULAR: no chest pain or palpitations  GASTROINTESTINAL: No abdominal pain, no nausea, vomiting or hematemesis  GENITOURINARY: No dysuria, frequency or hematuria  NEUROLOGICAL: No stroke like symptoms  SKIN: No rashes    	  MEDICATIONS:  sotalol 120 milliGRAM(s) Oral daily        PHYSICAL EXAM:  T(C): 37 (07-24-21 @ 07:50), Max: 38.1 (07-24-21 @ 00:38)  HR: 69 (07-24-21 @ 07:50) (69 - 85)  BP: 116/69 (07-24-21 @ 07:50) (116/69 - 149/74)  RR: 17 (07-24-21 @ 07:50) (16 - 20)  SpO2: 98% (07-24-21 @ 07:50) (95% - 98%)  Wt(kg): --  I&O's Summary    23 Jul 2021 07:01  -  24 Jul 2021 07:00  --------------------------------------------------------  IN: 620 mL / OUT: 680 mL / NET: -60 mL    24 Jul 2021 07:01  -  24 Jul 2021 12:44  --------------------------------------------------------  IN: 240 mL / OUT: 400 mL / NET: -160 mL          Appearance: In no distress	  HEENT:    PERRL, EOMI	  Cardiovascular:  S1 S2, No JVD  Respiratory: Lungs clear to auscultation	  Gastrointestinal:  Soft, Non-tender, + BS	  Vascularature:  No edema of LE  Psychiatric: Appropriate affect   Neuro: no acute focal deficits                               7.4    10.59 )-----------( 444      ( 24 Jul 2021 01:48 )             24.2               Labs personally reviewed	        ASSESSMENT/PLAN: 	      Problem/Plan - 1:  ·  Problem: CAD (coronary artery disease).  Plan: c/w home meds  We discussed the importance of SAPT with low dose ASA 81mg given CAD  pt denies any current chest pain, SOB or chest pressure.     Problem/Plan -2:  ·  Problem: Colitis.  Plan: Ct shows chron's  exacerbation   f/u flex sig with biopsies   on Vanco and flagyl   GI and ID following    if no improvement per ID may recc fecal transplant     Problem/Plan - 3:  ·  Problem: Afib pt pt he was placed on sotalol for afib   -c/w sotalol   -EKG noted. SR     Problem/Plan - 4:   ·  Problem: GIB (gastrointestinal bleeding).  Plan: bloody diarrhea 2/2 chron's exacerbation   IV fluids   -c-diff positive:   -abx per ID    Problem/Plan - 5:  ·  Problem: SCDs  pt ambulatory         Kar Powell FN-BC   Rufus Onofre DO [facility]  Cardiovascular Medicine  [address] [place] [address], [address]  Office: [phone]  Cell: [phone] Patient is a 81y old  Male who presents with a chief complaint of diarrhea with blood in it , abd pain and weakness (23 Jul 2021 20:37)      INTERVAL HISTORY:  pt and daughter concerned about progression of disease     TELEMETRY Personally reviewed:    REVIEW OF SYSTEMS:   CONSTITUTIONAL: generalized weakness  EYES/ENT: No visual changes; No throat pain  Neck: No pain or stiffness  Respiratory: No cough, wheezing, No shortness of breath  CARDIOVASCULAR: no chest pain or palpitations + edema   GASTROINTESTINAL: No abdominal pain, no nausea, vomiting or hematemesis  GENITOURINARY: No dysuria, frequency or hematuria  NEUROLOGICAL: No stroke like symptoms  SKIN: No rashes    	  MEDICATIONS:  sotalol 120 milliGRAM(s) Oral daily        PHYSICAL EXAM:  T(C): 37 (07-24-21 @ 07:50), Max: 38.1 (07-24-21 @ 00:38)  HR: 69 (07-24-21 @ 07:50) (69 - 85)  BP: 116/69 (07-24-21 @ 07:50) (116/69 - 149/74)  RR: 17 (07-24-21 @ 07:50) (16 - 20)  SpO2: 98% (07-24-21 @ 07:50) (95% - 98%)  Wt(kg): --  I&O's Summary    23 Jul 2021 07:01  -  24 Jul 2021 07:00  --------------------------------------------------------  IN: 620 mL / OUT: 680 mL / NET: -60 mL    24 Jul 2021 07:01  -  24 Jul 2021 12:44  --------------------------------------------------------  IN: 240 mL / OUT: 400 mL / NET: -160 mL          Appearance: In no distress	  HEENT:    PERRL, EOMI	  Cardiovascular:  S1 S2, No JVD  Respiratory: Lungs clear to auscultation	  Gastrointestinal:  Soft, Non-tender, + BS	  Vascularature:  + edema of LE  Psychiatric: Appropriate affect   Neuro: no acute focal deficits                               7.4    10.59 )-----------( 444      ( 24 Jul 2021 01:48 )             24.2               Labs personally reviewed	        ASSESSMENT/PLAN: 	      Problem/Plan - 1:  ·  Problem: CAD (coronary artery disease).  Plan: c/w home meds  We discussed the importance of SAPT with low dose ASA 81mg given CAD  pt denies any current chest pain, SOB or chest pressure.     Problem/Plan -2:  ·  Problem: Colitis.  Plan: Ct shows chron's  exacerbation   f/u flex sig with biopsies   on Vanco and flagyl   GI and ID following    if no improvement per ID may recc fecal transplant     Problem/Plan - 3:  ·  Problem: Afib pt said he was placed on sotalol for afib   -c/w sotalol   -EKG noted. SR     Problem/Plan - 4:   ·  Problem: GIB (gastrointestinal bleeding).  Plan: bloody diarrhea 2/2 chron's exacerbation   IV fluids   -c-diff positive:   -abx per ID    Problem/Plan - 5:  ·  Problem: SCDs  pt ambulatory         Kar Powell FN-BC   Rufus Onofre DO [facility]  Cardiovascular Medicine  [address] [place] [address], [address]  Office: [phone]  Cell: [phone]

## 2021-07-24 NOTE — CHART NOTE - NSCHARTNOTEFT_GEN_A_CORE
7/24/21 @ 0123a  Informed by RN, T38.1C  Admitted 7/19/21 with intermittent fever, diarrhoea.  H/O Crohns disease, currently on Vancomycin PO & Flagyl IV.  7/22/21 Blood c/s -ve. WBC 15.14 <- 9.59  No acute distress  A/P  Fever, unclear source  Continue current ABX  Repeat Blood c/s x 2  Urinalysis  CXR  ICU consult if condition deteriorates.  F/U primary team/attending  Tona Maria, ANP-C  [id number]

## 2021-07-25 NOTE — PROGRESS NOTE ADULT - SUBJECTIVE AND OBJECTIVE BOX
Patient is a 81y old  Male who presents with a chief complaint of diarrhea with blood in it , abd pain and weakness (25 Jul 2021 10:19)      INTERVAL HISTORY:     TELEMETRY Personally reviewed:    REVIEW OF SYSTEMS:   CONSTITUTIONAL: No weakness  EYES/ENT: No visual changes; No throat pain  Neck: No pain or stiffness  Respiratory: No cough, wheezing, No shortness of breath  CARDIOVASCULAR: no chest pain or palpitations  GASTROINTESTINAL: No abdominal pain, no nausea, vomiting or hematemesis  GENITOURINARY: No dysuria, frequency or hematuria  NEUROLOGICAL: No stroke like symptoms  SKIN: No rashes    	  MEDICATIONS:  sotalol 120 milliGRAM(s) Oral daily        PHYSICAL EXAM:  T(C): 36.9 (07-25-21 @ 09:00), Max: 37.2 (07-24-21 @ 16:30)  HR: 82 (07-25-21 @ 09:00) (72 - 88)  BP: 128/77 (07-25-21 @ 09:00) (120/72 - 155/76)  RR: 17 (07-25-21 @ 09:00) (17 - 18)  SpO2: 96% (07-25-21 @ 09:00) (96% - 98%)  Wt(kg): --  I&O's Summary    24 Jul 2021 07:01  -  25 Jul 2021 07:00  --------------------------------------------------------  IN: 1060 mL / OUT: 400 mL / NET: 660 mL    25 Jul 2021 07:01  -  25 Jul 2021 10:26  --------------------------------------------------------  IN: 180 mL / OUT: 0 mL / NET: 180 mL          Appearance: In no distress	  HEENT:    PERRL, EOMI	  Cardiovascular:  S1 S2, No JVD  Respiratory: Lungs clear to auscultation	  Gastrointestinal:  Soft, Non-tender, + BS	  Vascularature:  No edema of LE  Psychiatric: Appropriate affect   Neuro: no acute focal deficits                               7.4    10.59 )-----------( 444      ( 24 Jul 2021 01:48 )             24.2               Labs personally reviewed      ASSESSMENT/PLAN: 	    Problem/Plan - 1:  ·  Problem: CAD (coronary artery disease).  Plan: c/w home meds  We discussed the importance of SAPT with low dose ASA 81mg given CAD  pt denies any current chest pain, SOB or chest pressure.     Problem/Plan -2:  ·  Problem: Colitis.  Plan: Ct shows chron's  exacerbation   f/u flex sig with biopsies   on Vanco and flagyl   GI and ID following    if no improvement per ID may recc fecal transplant     Problem/Plan - 3:  ·  Problem: Afib pt said he was placed on sotalol for afib   -c/w sotalol   -EKG noted. SR     Problem/Plan - 4:   ·  Problem: GIB (gastrointestinal bleeding).  Plan: bloody diarrhea 2/2 chron's exacerbation   IV fluids   -c-diff positive:   -abx per ID    Problem/Plan - 5:  ·  Problem: SCDs  pt ambulatory     Problem/Plan - 6:  ·  Problem: B/L LE edema   - CXR shows mildly enlarged heart   -TTE ordered         Kar SAUCEDA-ANTHONY Onofre DO [facility]  Cardiovascular Medicine  [address] [facility] [address], [address]  Office: [phone]  Cell: [phone] Patient is a 81y old  Male who presents with a chief complaint of diarrhea with blood in it , abd pain and weakness (25 Jul 2021 10:19)      INTERVAL HISTORY: pt reports feeling tired from frequent bathroom trips last night    REVIEW OF SYSTEMS:   CONSTITUTIONAL: No weakness  EYES/ENT: No visual changes; No throat pain  Neck: No pain or stiffness  Respiratory: No cough, wheezing, No shortness of breath  CARDIOVASCULAR: no chest pain or palpitations  GASTROINTESTINAL: diarrhea   GENITOURINARY: No dysuria, frequency or hematuria  NEUROLOGICAL: No stroke like symptoms  SKIN: No rashes    	  MEDICATIONS:  sotalol 120 milliGRAM(s) Oral daily        PHYSICAL EXAM:  T(C): 36.9 (07-25-21 @ 09:00), Max: 37.2 (07-24-21 @ 16:30)  HR: 82 (07-25-21 @ 09:00) (72 - 88)  BP: 128/77 (07-25-21 @ 09:00) (120/72 - 155/76)  RR: 17 (07-25-21 @ 09:00) (17 - 18)  SpO2: 96% (07-25-21 @ 09:00) (96% - 98%)  Wt(kg): --  I&O's Summary    24 Jul 2021 07:01  -  25 Jul 2021 07:00  --------------------------------------------------------  IN: 1060 mL / OUT: 400 mL / NET: 660 mL    25 Jul 2021 07:01  -  25 Jul 2021 10:26  --------------------------------------------------------  IN: 180 mL / OUT: 0 mL / NET: 180 mL          Appearance: In no distress	  HEENT:    PERRL, EOMI	  Cardiovascular:  S1 S2, No JVD  Respiratory: Lungs clear to auscultation	  Gastrointestinal:  Soft, Non-tender, + BS	  Vascularature:  + trace edema of LE  Psychiatric: Appropriate affect   Neuro: no acute focal deficits                               7.4    10.59 )-----------( 444      ( 24 Jul 2021 01:48 )             24.2               Labs personally reviewed      ASSESSMENT/PLAN: 	    Problem/Plan - 1:  ·  Problem: CAD (coronary artery disease).  Plan: c/w home meds  We discussed the importance of SAPT with low dose ASA 81mg given CAD  pt denies any current chest pain, SOB or chest pressure.     Problem/Plan -2:  ·  Problem: Colitis.  Plan: Ct shows chron's  exacerbation   f/u flex sig with biopsies   on Vanco and flagyl   GI and ID following    if no improvement per ID may recc fecal transplant     Problem/Plan - 3:  ·  Problem: Afib pt said he was placed on sotalol for afib   -c/w sotalol   -EKG noted. SR     Problem/Plan - 4:   ·  Problem: GIB (gastrointestinal bleeding).  Plan: bloody diarrhea 2/2 chron's exacerbation   IV fluids   -c-diff positive:   -abx per ID    Problem/Plan - 5:  ·  Problem: SCDs  pt ambulatory     Problem/Plan - 6:  ·  Problem: B/L LE edema   - CXR shows mildly enlarged heart   -TTE ordered         Kar SAUCEDA-ANTHONY Onofre DO [facility]  Cardiovascular Medicine  [address] [facility], [address]  Office: [phone]  Cell: [phone]

## 2021-07-25 NOTE — PROGRESS NOTE ADULT - SUBJECTIVE AND OBJECTIVE BOX
ID Follow Up For CDI    Patient is a 81y old  Male who presents with a chief complaint of diarrhea with blood in it , abd pain and weakness (2021 20:42)    Interval History/ROS: pt refused PO vanc last night and this morning.   Reports no change in stool frequency or volume over the past few days, still with watery diarrhea.   Was febrile on midnight of 24th to100.6. BC neg so far.         Allergies    No Known Allergies    Intolerances        ANTIMICROBIALS:  metroNIDAZOLE  IVPB    metroNIDAZOLE  IVPB 500 every 8 hours  vancomycin    Solution 500 every 6 hours      OTHER MEDS:  clonazePAM  Tablet 0.25 milliGRAM(s) Oral at bedtime  FIRST- Mouthwash  BLM 10 milliLiter(s) Swish and Spit every 8 hours  folic acid 1 milliGRAM(s) Oral daily  multivitamin 1 Tablet(s) Oral daily  pantoprazole    Tablet 40 milliGRAM(s) Oral before breakfast  sodium chloride 0.9%. 1000 milliLiter(s) IV Continuous <Continuous>  sotalol 120 milliGRAM(s) Oral daily      Vital Signs Last 24 Hrs  T(C): 36.9 (2021 09:00), Max: 37.2 (2021 16:30)  T(F): 98.4 (2021 09:00), Max: 98.9 (2021 16:30)  HR: 82 (2021 09:00) (72 - 88)  BP: 128/77 (2021 09:00) (120/72 - 155/76)  BP(mean): --  RR: 17 (2021 09:00) (17 - 18)  SpO2: 96% (2021 09:00) (96% - 98%)    EXAM:  Constitutional: Not in acute distress  Eyes: No icterus. Pupils b/l reactive to light.  Oral cavity: Clear, no lesions  Neck: No neck vein distension noted  RS: Chest clear to auscultation bilaterally. No wheeze/rhonchi/crepitations.  CVS: S1, S2 heard. Regular rate and rhythm. No murmurs/rubs/gallops.  Abdomen: Soft. No guarding/rigidity/tenderness.  : No acute abnormalities  Extremities: Warm. No pedal edema  Skin: No lesions noted  Vascular: No evidence of phlebitis  Neuro: Alert, oriented to time/place/person    Labs:                        7.4    10.59 )-----------( 444      ( 2021 01:48 )             24.2       Urinalysis Basic - ( 2021 08:07 )    Color: Light Yellow / Appearance: Clear / S.006 / pH: x  Gluc: x / Ketone: Negative  / Bili: Negative / Urobili: Negative   Blood: x / Protein: Negative / Nitrite: Negative   Leuk Esterase: Negative / RBC: x / WBC x   Sq Epi: x / Non Sq Epi: x / Bacteria: x        MICROBIOLOGY:Culture Results:   No growth to date. ( @ 03:33)  Culture Results:   No growth to date. ( @ 08:31)      RADIOLOGY:  < from: Xray Chest 1 View- PORTABLE-Urgent (Xray Chest 1 View- PORTABLE-Urgent .) (21 @ 01:18) >  IMPRESSION:  The heart is slightly enlarged. The lungs appear to be clear. No pleural effusion. No pneumothorax. Calcified aortic knob. Degenerative changes of the thoracic spine.      OTHER:  < from: Colonoscopy (21 @ 13:56) >  Impression:          - Perianal skin tags found on perianal exam.                       - Granularity in the entire examined colon.                       - Pseudomembranous enterocolitis consistent with C.diff infection,                        predominantly in the left colon.                       - Erythematous mucosa in the rectum.    < end of copied text >

## 2021-07-25 NOTE — PROGRESS NOTE ADULT - ATTENDING COMMENTS
discussed with patient and daughter  Daughter is tearful about her father's debility  continued diarrhea  -- on po vanco since 7/14  has received cipro prior to +Cdiff  no leukocytosis/toxic megacolon    prolonged (though not achieving criteria for "severe" Cdiff)  complicated by crohns, previous antibiotics, malnourished state    Prolonged po Vanco - dose should be 125 q 6h   daughter asked to provide Kefir (1 cup 3 times daily) for probiotic benefit (Peter Mack, Staggered and Tapered Antibiotic Withdrawal With Administration of Kefir for Recurrent Clostridium difficile Infection, Clinical Infectious Diseases, Volume 59, Issue 6, 15 September 2014, Pages 858–861, https://[email or web address]/robb/[email or web address])  can consider Fecal Microbial Transplant by NGT instillation    Select format

## 2021-07-25 NOTE — PROGRESS NOTE ADULT - ASSESSMENT
82 yo M with Crohn's on treatment, recently cared for at [facility], now presenting with ongoing diarrhea  Low grade temp elevation, leukocytosis  Diarrhea  No abd distension, no evidence megacolon  CT with colonic inflammation  Colonoscopy with visualized pseudomembranes  On PO Vanco starting 7/15 with limited improvement  Still ongoing diarrhea      1) C diff colitis  - Severe, persistent- pt refused doses overnight and this morning  - discussed at length with daughter, course of cdiff might be prolonged and strongly urged to continue PO vanc  - pt is afebrile, BCx neg  - cont Vanco 500mg q 6, increased on 7/21  - cont Flagyl 500mg q 8, added on 7/21  - If not improved on above regimen, consider stool transplant  - Monitor stool count, monitor abd for any signs distension  2) Leukocytosis  - Trend to normal  3) Crohn's  - Further care per GI        Delonte Juarez MD  Fellow, Infectious Diseases, PGY-4  Pager: [phone]  Before 9am or after 5pm/Weekends: Call [phone]

## 2021-07-25 NOTE — PROGRESS NOTE ADULT - SUBJECTIVE AND OBJECTIVE BOX
Patient is a 81y old  Male who presents with a chief complaint of diarrhea with blood in it , abd pain and weakness (2021 10:25)      INTERVAL HPI/OVERNIGHT EVENTS: seen and examined, poor appetite   still diarrhea     T(C): 37.4 (21 @ 21:35), Max: 37.4 (21 @ 21:35)  HR: 80 (21 @ 21:35) (70 - 82)  BP: 146/81 (21 @ 21:35) (122/76 - 155/76)  RR: 18 (21 @ 21:35) (17 - 18)  SpO2: 96% (21 @ 21:35) (96% - 98%)  Wt(kg): --  I&O's Summary    2021 07:01  -  2021 07:00  --------------------------------------------------------  IN: 1060 mL / OUT: 400 mL / NET: 660 mL    2021 07:01  -  2021 02:23  --------------------------------------------------------  IN: 1665 mL / OUT: 0 mL / NET: 1665 mL        PAST MEDICAL & SURGICAL HISTORY:  No pertinent past medical history    No significant past surgical history        SOCIAL HISTORY  Alcohol:  Tobacco:  Illicit substance use:    FAMILY HISTORY:    REVIEW OF SYSTEMS:  CONSTITUTIONAL: No fever, weight loss, or fatigue  EYES: No eye pain, visual disturbances, or discharge  ENMT:  No difficulty hearing, tinnitus, vertigo; No sinus or throat pain  NECK: No pain or stiffness  RESPIRATORY: No cough, wheezing, chills or hemoptysis; No shortness of breath  CARDIOVASCULAR: No chest pain, palpitations, dizziness, or leg swelling  GASTROINTESTINAL: No abdominal or epigastric pain. No nausea, vomiting, or hematemesis; No diarrhea or constipation. No melena or hematochezia.  GENITOURINARY: No dysuria, frequency, hematuria, or incontinence  NEUROLOGICAL: No headaches, memory loss, loss of strength, numbness, or tremors  SKIN: No itching, burning, rashes, or lesions   LYMPH NODES: No enlarged glands  ENDOCRINE: No heat or cold intolerance; No hair loss  MUSCULOSKELETAL: No joint pain or swelling; No muscle, back, or extremity pain  PSYCHIATRIC: No depression, anxiety, mood swings, or difficulty sleeping  HEME/LYMPH: No easy bruising, or bleeding gums  ALLERY AND IMMUNOLOGIC: No hives or eczema    RADIOLOGY & ADDITIONAL TESTS:    Imaging Personally Reviewed:  [ ] YES  [ ] NO    Consultant(s) Notes Reviewed:  [ ] YES  [ ] NO    PHYSICAL EXAM:  GENERAL: NAD, well-groomed, well-developed  HEAD:  Atraumatic, Normocephalic  EYES: EOMI, PERRLA, conjunctiva and sclera clear  ENMT: No tonsillar erythema, exudates, or enlargement; Moist mucous membranes, Good dentition, No lesions  NECK: Supple, No JVD, Normal thyroid  NERVOUS SYSTEM:  Alert & Oriented X3, Good concentration; Motor Strength 5/5 B/L upper and lower extremities; DTRs 2+ intact and symmetric  CHEST/LUNG: Clear to percussion bilaterally; No rales, rhonchi, wheezing, or rubs  HEART: Regular rate and rhythm; No murmurs, rubs, or gallops  ABDOMEN: Soft, Nontender, Nondistended; Bowel sounds present  EXTREMITIES:  2+ Peripheral Pulses, No clubbing, cyanosis, or edema  LYMPH: No lymphadenopathy noted  SKIN: No rashes or lesions    LABS:                        8.0    9.93  )-----------( 431      ( 2021 10:51 )             25.9     07-25    132<L>  |  97  |  5<L>  ----------------------------<  91  3.0<L>   |  25  |  0.90    Ca    7.8<L>      2021 10:36        Urinalysis Basic - ( 2021 08:07 )    Color: Light Yellow / Appearance: Clear / S.006 / pH: x  Gluc: x / Ketone: Negative  / Bili: Negative / Urobili: Negative   Blood: x / Protein: Negative / Nitrite: Negative   Leuk Esterase: Negative / RBC: x / WBC x   Sq Epi: x / Non Sq Epi: x / Bacteria: x      CAPILLARY BLOOD GLUCOSE            Urinalysis Basic - ( 2021 08:07 )    Color: Light Yellow / Appearance: Clear / S.006 / pH: x  Gluc: x / Ketone: Negative  / Bili: Negative / Urobili: Negative   Blood: x / Protein: Negative / Nitrite: Negative   Leuk Esterase: Negative / RBC: x / WBC x   Sq Epi: x / Non Sq Epi: x / Bacteria: x        MEDICATIONS  (STANDING):  clonazePAM  Tablet 0.25 milliGRAM(s) Oral at bedtime  FIRST- Mouthwash  BLM 10 milliLiter(s) Swish and Spit every 8 hours  folic acid 1 milliGRAM(s) Oral daily  multivitamin 1 Tablet(s) Oral daily  pantoprazole    Tablet 40 milliGRAM(s) Oral before breakfast  sodium chloride 0.9%. 1000 milliLiter(s) (75 mL/Hr) IV Continuous <Continuous>  sotalol 120 milliGRAM(s) Oral daily  vancomycin    Solution 125 milliGRAM(s) Oral every 6 hours    MEDICATIONS  (PRN):  benzonatate 100 milliGRAM(s) Oral every 8 hours PRN Cough      Care Discussed with Consultants/Other Providers [ ] YES  [ ] NO

## 2021-07-26 NOTE — CHART NOTE - NSCHARTNOTEFT_GEN_A_CORE
Nutrition Follow Up Note  Patient seen for: Malnutrition follow up    Chart reviewed, events noted.    Source: [ X] Patient       [x] EMR        [ ] RN        [X ] Family at bedside       [ X] Other: ID MD at bedside discussing     Patient is a 81y old  Male who presents with a chief complaint of diarrhea with blood in it , abd pain and weakness.  PMH  Crohn's disease diagnosed about 9 mos prior to admission c/b h/o abscess and left posterior distal anal intersphincteric fistula. Followed by GI and ID for diarrhea/BRBPR likely 2/2 to CD flare c/b c diff colitis.  Noted FMT planned    Diet Order:   Diet, Low Fiber:   Liquid Protein Supplement     Qty per Day:  3  Supplement Feeding Modality:  Oral  Probiotic Yogurt/Smoothie bottles , Dan Active probiotic drink,  Per Day:  2       Frequency:  Daily (07-20-21)    - Is current order appropriate/adequate? [X ] Yes  [ ]  No:     - PO intake :  [X ] <50%  Poor  - Nutrition-Related Concerns: patient has severe diarrhea with limited intake, team aware. Patient disliked ensure clears, refused Promote supplement, and refused to try Vital AF. Patient family at bedside (son and daughter) assisting with encouraging PO intake. RD will continue to encourage low residue high protein supplements. RD will continue to follow course    - Micronutrient Supplementation: folic acid 1 milliGRAM(s) Oral daily  multivitamin 1 Tablet(s) Oral daily  sodium chloride 0.9%. 1000 milliLiter(s) IV Continuous <Continuous>    GI:  Last BM _x17 in past 24 hrs as noted__.     Weights:  last weight 7/19  59kg, patient with noticeable weight loss related to perfuse diarrhea with limited PO intake.       Daily   MEDICATIONS  (STANDING):  clonazePAM  Tablet  FIRST- Mouthwash  BLM  folic acid  multivitamin  pantoprazole    Tablet  sodium chloride 0.9%.  sotalol  vancomycin    Solution    Pertinent Labs: 07-26 @ 11:50: Na 131<L>, BUN 6<L>, Cr 0.89, BG 99, K+ 3.5, Phos 2.1<L>, Mg 1.5<L>, Alk Phos --, ALT/SGPT --, AST/SGOT --, HbA1c --        Skin per nursing documentation: Sacrum st II, IAD  Edema:     Estimated Needs:   [X ] no change since previous assessment  [ ] recalculated:     Previous Nutrition Diagnosis: Malnutrition, severe  Nutrition Diagnosis is: [ X] ongoing      Nutrition Care Plan:  [X ] In Progress      Nutrition Interventions / Recommendations:    1) Oral Nutrition Supplementation:  continue to encourage Ronnie Active intake, LPS liquid protein supplements (x3) added to Gatorade which he likes                                                         2) Continue Current Diet Low Residue: per GI                                                     3) request current weight  4) continue MVI, folic acid  5) add healthshakes with meals  Discussed with team   Monitoring and Evaluation:   Continue to monitor Nutritional intake, Tolerance to diet prescription, weights, labs, skin integrity      RD remains available upon request and will follow up per protocol Nutrition Follow Up Note  Patient seen for: Malnutrition follow up    Chart reviewed, events noted.    Source: [ X] Patient       [x] EMR        [ ] RN        [X ] Family at bedside       [ X] Other: ID MD at bedside discussing     Patient is a 81y old  Male who presents with a chief complaint of diarrhea with blood in it , abd pain and weakness.  PMH  Crohn's disease diagnosed about 9 mos prior to admission c/b h/o abscess and left posterior distal anal intersphincteric fistula. Followed by GI and ID for diarrhea/BRBPR likely 2/2 to CD flare c/b c diff colitis.  Noted FMT planned    Diet Order:   Diet, Low Fiber:   Liquid Protein Supplement     Qty per Day:  3  Supplement Feeding Modality:  Oral  Probiotic Yogurt/Smoothie bottles , Dan Active probiotic drink,  Per Day:  2       Frequency:  Daily (07-20-21)    - Is current order appropriate/adequate? [X ] Yes  [ ]  No:     - PO intake :  [X ] <50%  Poor  - Nutrition-Related Concerns: patient has severe diarrhea with limited intake, team aware. Patient disliked ensure clears, refused Promote supplement, and refused to try Vital AF. Patient family at bedside (son and daughter) assisting with encouraging PO intake. RD will continue to encourage low residue high protein supplements. RD will continue to follow course    - Micronutrient Supplementation: folic acid 1 milliGRAM(s) Oral daily  multivitamin 1 Tablet(s) Oral daily  sodium chloride 0.9%. 1000 milliLiter(s) IV Continuous <Continuous>    GI:  Last BM _x17 in past 24 hrs as noted__.     Weights:  last weight 7/19  59kg, patient with noticeable weight loss related to perfuse diarrhea with limited PO intake.       Daily   MEDICATIONS  (STANDING):  clonazePAM  Tablet  FIRST- Mouthwash  BLM  folic acid  multivitamin  pantoprazole    Tablet  sodium chloride 0.9%.  sotalol  vancomycin    Solution    Pertinent Labs: 07-26 @ 11:50: Na 131<L>, BUN 6<L>, Cr 0.89, BG 99, K+ 3.5, Phos 2.1<L>, Mg 1.5<L>  electrolyte imbalances noted, discussed with NP      Skin per nursing documentation: Sacrum st II, IAD  Edema: none    Estimated Needs:   [X ] no change since previous assessment  [ ] recalculated:     Previous Nutrition Diagnosis: Malnutrition, severe  Nutrition Diagnosis is: [ X] ongoing      Nutrition Care Plan:  [X ] In Progress      Nutrition Interventions / Recommendations:    1) Oral Nutrition Supplementation:  continue to encourage Ronnie Active intake, LPS liquid protein supplements (x3) added to Gatorade which he likes         2) monitor electrolytes daily, consider lactated ringers discussed with NP                                                3) Continue Current Diet Low Residue: per GI                                                     4) request current weight  5) continue MVI, folic acid    Discussed with team   Monitoring and Evaluation:   Continue to monitor Nutritional intake, Tolerance to diet prescription, weights, labs, skin integrity      RD remains available upon request and will follow up per protocol Nutrition Follow Up Note  Patient seen for: Malnutrition follow up    Chart reviewed, events noted.    Source: [ X] Patient       [x] EMR        [ ] RN        [X ] Family at bedside       [ X] Other: ID MD at bedside discussing     Patient is a 81y old  Male who presents with a chief complaint of diarrhea with blood in it , abd pain and weakness.  PMH  Crohn's disease diagnosed about 9 mos prior to admission c/b h/o abscess and left posterior distal anal intersphincteric fistula. Followed by GI and ID for diarrhea/BRBPR likely 2/2 to CD flare c/b c diff colitis.  Noted FMT planned    Diet Order:   Diet, Low Fiber:   Liquid Protein Supplement     Qty per Day:  3  Supplement Feeding Modality:  Oral  Probiotic Yogurt/Smoothie bottles , Dan Active probiotic drink,  Per Day:  2       Frequency:  Daily (07-20-21)    - Is current order appropriate/adequate? [X ] Yes  [ ]  No:     - PO intake :  [X ] <50%  Poor  - Nutrition-Related Concerns: patient has severe diarrhea with limited intake, team aware. Patient disliked ensure clears, refused Promote supplement, and refused to try Vital AF. Patient family at bedside (son and daughter) assisting with encouraging PO intake. RD will continue to encourage low residue high protein supplements. RD will continue to follow course    - Micronutrient Supplementation: folic acid 1 milliGRAM(s) Oral daily  multivitamin 1 Tablet(s) Oral daily  sodium chloride 0.9%. 1000 milliLiter(s) IV Continuous <Continuous>    GI:  Last BM _x17 in past 24 hrs as noted__.     Weights:  last weight 7/19  59kg, patient with noticeable weight loss related to perfuse diarrhea with limited PO intake.       Daily   MEDICATIONS  (STANDING):  clonazePAM  Tablet  FIRST- Mouthwash  BLM  folic acid  multivitamin  pantoprazole    Tablet  sodium chloride 0.9%.  sotalol  vancomycin    Solution    Pertinent Labs: 07-26 @ 11:50: Na 131<L>, BUN 6<L>, Cr 0.89, BG 99, K+ 3.5, Phos 2.1<L>, Mg 1.5<L>  electrolyte imbalances noted, discussed with NP      Skin per nursing documentation: Sacrum st II, IAD  Edema: none    Estimated Needs:   [X ] no change since previous assessment  [ ] recalculated:     Previous Nutrition Diagnosis: Malnutrition, severe  Nutrition Diagnosis is: [ X] ongoing      Nutrition Care Plan:  [X ] In Progress      Nutrition Interventions / Recommendations:    1) Oral Nutrition Supplementation:  continue to encourage Ronnie Active intake, LPS liquid protein supplements (x3) added to Gatorade which he likes         2) discuss with GI MD adding Banatrol banana flakes x3 daily: ?after fecal implant?      3)monitor electrolytes daily, consider lactated ringers discussed with NP                                                4) Continue Current Diet Low Residue: per GI                                                  5) request current weight  6) continue MVI, folic acid    Discussed with team   Monitoring and Evaluation:   Continue to monitor Nutritional intake, Tolerance to diet prescription, weights, labs, skin integrity      RD remains available upon request and will follow up per protocol

## 2021-07-26 NOTE — PROGRESS NOTE ADULT - SUBJECTIVE AND OBJECTIVE BOX
CC: F/u for C diff    Saw/spoke to patient. Over weekend team de-escalated abx. Patient still with high volume diarrhea.    Allergies  No Known Allergies    ANTIMICROBIALS:  vancomycin    Solution 125 every 6 hours    PE:    Vital Signs Last 24 Hrs  T(C): 36.7 (26 Jul 2021 08:53), Max: 37.4 (25 Jul 2021 21:35)  T(F): 98 (26 Jul 2021 08:53), Max: 99.4 (25 Jul 2021 21:35)  HR: 74 (26 Jul 2021 08:53) (74 - 89)  BP: 128/78 (26 Jul 2021 08:53) (128/78 - 146/81)  RR: 18 (26 Jul 2021 08:53) (18 - 18)  SpO2: 97% (26 Jul 2021 08:53) (96% - 97%)    Gen: AOx3, NAD  CV: S1+S2 normal, nontachycardic  Resp: Clear bilat, no resp distress, no crackles/wheezes  Abd: Soft, nontender, +BS  Ext: No LE edema, no wounds    LABS:                        7.7    9.26  )-----------( 404      ( 26 Jul 2021 11:50 )             25.1     07-26    131<L>  |  97  |  6<L>  ----------------------------<  99  3.5   |  25  |  0.89    Ca    7.9<L>      26 Jul 2021 11:50  Phos  2.1     07-26  Mg     1.5     07-26    MICROBIOLOGY:    .Blood Blood-Venous  07-24-21   No growth to date.    .Blood Blood  07-22-21   No growth to date.     .Stool  07-15-21   No Protozoa seen by trichrome stain  No Helminths or Protozoa seen in formalin concentrate  performed by iodine stain  (routine O+P not evaluated for Microsporidia,  Cryptosporidia, Cyclospora, or Isospora.)  One negative sample does not necessarily rule  out the presence of a parasitic infection.  Numerous White blood cells  --  --    (otherwise reviewed)    RADIOLOGY:    7/24 XR:    IMPRESSION:    The heart is slightly enlarged. The lungs appear to be clear. No pleural effusion. No pneumothorax. Calcified aortic knob. Degenerative changes of the thoracic spine.

## 2021-07-26 NOTE — PROGRESS NOTE ADULT - ASSESSMENT
GIDEON HAYES is a 81y Male with GERD on omeprazole daily and Crohn's disease diagnosed about 9 mos prior to admission at outside hospital c/b h/o abscess and left posterior distal anal intersphincteric fistula and presents as a new consult to GI for diarrhea/BRBPR likely 2/2 to CD flare c/b c diff colitis.    # Diarrhea/BRBPR - likely 2/2 CD flare with leukocytosis most likely 2/2 cdiff colitis, less likely 2/2 CMV colitis. Patient's cdiff was positive and is the likely cause of his CD flare. Still with significant sx despite antibiotics.  Had flex sig with biopsies on 7/19/21.   Non reactive HBVsAg: no latent HBV infection  # Weight loss - likely 2/2 CD c/b oral aphthous ulcers and diarrhea as he has not been able to tolerate po and will need to ensure adequate hydration.  # GERD     Recommendations:  - Magic mouth wash q6h prn  - Continue to low residual diet as tolerated  - Add protein supplements, obtain dietician evaluation  - pathology from biopsies reveals active chronic colitis  - budesonide discontinued  - Continue vancomycin 500mg PO q6h and flagyl 500mg IV q8h [however decreased back to vanco 125mg q6h monotherapy over the weekend]  - appreciate further input from Infectious Disease.  Will likely need FMT      Jaison Fang, PGY-4  GI/Hepatology Fellow    MONDAY-FRIDAY 8AM-5PM:  Available via [employer] Teams  Pager# [phone] ([facility]) or [phone] ([facility])  GI Phone# [phone] ([facility])    NON-URGENT CONSULTS:  Please email [email or web address]  AT NIGHT AND ON WEEKENDS:  Contact on-call GI fellow via answering service ([phone]) from 5pm-8am and on weekends/holidays GIDEON HAYES is a 81y Male with GERD on omeprazole daily and Crohn's disease diagnosed about 9 mos prior to admission at outside hospital c/b h/o abscess and left posterior distal anal intersphincteric fistula and presents as a new consult to GI for diarrhea/BRBPR likely 2/2 to CD flare c/b c diff colitis.    # Diarrhea/BRBPR - likely 2/2 CD flare with leukocytosis most likely 2/2 cdiff colitis, less likely 2/2 CMV colitis. Patient's cdiff was positive and is the likely cause of his CD flare. Still with significant sx despite antibiotics.  Had flex sig with biopsies on 7/19/21.   Non reactive HBVsAg: no latent HBV infection  # Weight loss - likely 2/2 CD c/b oral aphthous ulcers and diarrhea as he has not been able to tolerate po and will need to ensure adequate hydration.  # GERD   # Mucositis on tongue    Recommendations:  - Magic mouth wash q6h prn  - Continue to low residual diet as tolerated  - Add protein supplements, obtain dietician evaluation  - Kefir as per infectious disease  - pathology from biopsies reveals active chronic colitis  - budesonide discontinued  - Continue vancomycin 500mg PO q6h and flagyl 500mg IV q8h [however decreased back to vanco 125mg q6h monotherapy over the weekend]  - appreciate further input from Infectious Disease.  Will likely need FMT      Jaison Fang, PGY-4  GI/Hepatology Fellow    MONDAY-FRIDAY 8AM-5PM:  Available via [employer] Teams  Pager# [phone] ([facility]) or [phone] ([facility])  GI Phone# [phone] ([facility])    NON-URGENT CONSULTS:  Please email [email or web address]  AT NIGHT AND ON WEEKENDS:  Contact on-call GI fellow via answering service ([phone]) from 5pm-8am and on weekends/holidays GIDEON HAYES is a 81y Male with GERD on omeprazole daily and Crohn's disease diagnosed about 9 mos prior to admission at outside hospital c/b h/o abscess and left posterior distal anal intersphincteric fistula and presents as a new consult to GI for diarrhea/BRBPR likely 2/2 to CD flare c/b c diff colitis.    # Diarrhea/BRBPR - likely 2/2 CD flare with leukocytosis most likely 2/2 cdiff colitis, less likely 2/2 CMV colitis. Patient's cdiff was positive and is the likely cause of his CD flare. Still with significant sx despite antibiotics.  Had flex sig with biopsies on 7/19/21.   Non reactive HBVsAg: no latent HBV infection  # Weight loss - likely 2/2 CD c/b oral aphthous ulcers and diarrhea as he has not been able to tolerate po and will need to ensure adequate hydration.  # GERD     Recommendations:  - Continue to low residual diet as tolerated  - Add protein supplements, obtain dietician evaluation  - Kefir as per infectious disease  - pathology from biopsies reveals active chronic colitis  - budesonide discontinued  - Continue vancomycin 500mg PO q6h and flagyl 500mg IV q8h [however decreased back to vanco 125mg q6h monotherapy over the weekend]  - appreciate further input from Infectious Disease.  Will likely need FMT      Jaison Fang, PGY-4  GI/Hepatology Fellow    MONDAY-FRIDAY 8AM-5PM:  Available via [employer] Teams  Pager# [phone] ([facility]) or [phone] ([facility])  GI Phone# [phone] ([facility])    NON-URGENT CONSULTS:  Please email [email or web address]  AT NIGHT AND ON WEEKENDS:  Contact on-call GI fellow via answering service ([phone]) from 5pm-8am and on weekends/holidays

## 2021-07-26 NOTE — PROGRESS NOTE ADULT - SUBJECTIVE AND OBJECTIVE BOX
Patient is a 81y old  Male who presents with a chief complaint of diarrhea with blood in it , abd pain and weakness (25 Jul 2021 21:22)      Interval Events:   17 BMs charted over the past 24 hours.  Continues to endorse his diarrhea is worsening.    Hospital Medications:  benzonatate 100 milliGRAM(s) Oral every 8 hours PRN  clonazePAM  Tablet 0.25 milliGRAM(s) Oral at bedtime  FIRST- Mouthwash  BLM 10 milliLiter(s) Swish and Spit every 8 hours  folic acid 1 milliGRAM(s) Oral daily  multivitamin 1 Tablet(s) Oral daily  pantoprazole    Tablet 40 milliGRAM(s) Oral before breakfast  sodium chloride 0.9%. 1000 milliLiter(s) IV Continuous <Continuous>  sotalol 120 milliGRAM(s) Oral daily  vancomycin    Solution 125 milliGRAM(s) Oral every 6 hours    ROS:   A 12-point ROS was performed and negative except as noted in HPI.    PHYSICAL EXAM:   Vital Signs:  Vital Signs Last 24 Hrs  T(C): 37.1 (26 Jul 2021 05:10), Max: 37.4 (25 Jul 2021 21:35)  T(F): 98.8 (26 Jul 2021 05:10), Max: 99.4 (25 Jul 2021 21:35)  HR: 89 (26 Jul 2021 05:10) (70 - 89)  BP: 141/79 (26 Jul 2021 05:10) (122/76 - 146/81)  BP(mean): --  RR: 18 (26 Jul 2021 05:10) (17 - 18)  SpO2: 97% (26 Jul 2021 05:10) (96% - 97%)  Daily     Daily     GENERAL: ill appearing  NEURO: alert  HEENT: anicteric sclera, no conjunctival pallor appreciated  CHEST: no respiratory distress, no accessory muscle use  CARDIAC: regular rate, +S1/S2  ABDOMEN: soft, nondistended, nontender, no rebound or guarding  EXTREMITIES: warm, well perfused, no edema  SKIN: no lesions noted    LABS: reviewed                        8.0    9.93  )-----------( 431      ( 25 Jul 2021 10:51 )             25.9     07-25    132<L>  |  97  |  5<L>  ----------------------------<  91  3.0<L>   |  25  |  0.90    Ca    7.8<L>      25 Jul 2021 10:36          Interval Diagnostic Studies: see sunrise for full report

## 2021-07-26 NOTE — PROGRESS NOTE ADULT - ASSESSMENT
80 yo M with Crohn's on treatment, recently cared for at [facility], now presenting with ongoing diarrhea  Low grade temp elevation, leukocytosis  Diarrhea  No abd distension, no evidence megacolon  CT with colonic inflammation  Colonoscopy with visualized pseudomembranes  On PO Vanco starting 7/15 with limited improvement  Still ongoing diarrhea--team deescalated abx over weekend  Overall,  1) C diff colitis  - Severe, persistent  - Vanco 125mg po q 6  - Given lack of improvement, plan for FMT. I discussed case with GI, they will attempt an FMT via scope if family in agreement on 7/28. Outcomes from scope as opposed to NGT are generally preferable. Conversely, if family were to refuse procedure, then ID would proceed with plan for NGT FMT. Discussed with GI and ID pharmacist.  - Frequent abd exam/stool count  2) Leukocytosis  - Trend to normal  3) Crohn's  - Further care per GI  - Uncertain if any active Crohn's (or if current symptoms all related to C diff)    Phoenix Lee MD  Pager [phone]  After 5pm and on weekends call [phone]

## 2021-07-26 NOTE — PROGRESS NOTE ADULT - SUBJECTIVE AND OBJECTIVE BOX
Patient is a 81y old  Male who presents with a chief complaint of diarrhea with blood in it , abd pain and weakness (26 Jul 2021 08:01)      INTERVAL HISTORY: pt feeling ok     TELEMETRY Personally reviewed:    REVIEW OF SYSTEMS:   CONSTITUTIONAL: No weakness  EYES/ENT: No visual changes; No throat pain  Neck: No pain or stiffness  Respiratory: No cough, wheezing, No shortness of breath  CARDIOVASCULAR: no chest pain or palpitations  GASTROINTESTINAL: +diarrhea cdiff   GENITOURINARY: No dysuria, frequency or hematuria  NEUROLOGICAL: No stroke like symptoms  SKIN: No rashes    	  MEDICATIONS:  sotalol 120 milliGRAM(s) Oral daily        PHYSICAL EXAM:  T(C): 36.7 (07-26-21 @ 08:53), Max: 37.4 (07-25-21 @ 21:35)  HR: 74 (07-26-21 @ 08:53) (74 - 89)  BP: 128/78 (07-26-21 @ 08:53) (128/78 - 146/81)  RR: 18 (07-26-21 @ 08:53) (18 - 18)  SpO2: 97% (07-26-21 @ 08:53) (96% - 97%)  Wt(kg): --  I&O's Summary    25 Jul 2021 07:01  -  26 Jul 2021 07:00  --------------------------------------------------------  IN: 1865 mL / OUT: 300 mL / NET: 1565 mL    26 Jul 2021 07:01  -  26 Jul 2021 15:00  --------------------------------------------------------  IN: 300 mL / OUT: 0 mL / NET: 300 mL          Appearance: In no distress	  HEENT:    PERRL, EOMI	  Cardiovascular:  S1 S2, No JVD  Respiratory: Lungs clear to auscultation	  Gastrointestinal:  Soft, Non-tender, + BS	  Vascularature: + edema of LE  Psychiatric: Appropriate affect   Neuro: no acute focal deficits                               7.7    9.26  )-----------( 404      ( 26 Jul 2021 11:50 )             25.1     07-26    131<L>  |  97  |  6<L>  ----------------------------<  99  3.5   |  25  |  0.89    Ca    7.9<L>      26 Jul 2021 11:50  Phos  2.1     07-26  Mg     1.5     07-26          Labs personally reviewed      ASSESSMENT/PLAN: 	      Problem/Plan - 1:  ·  Problem: CAD (coronary artery disease).  Plan: c/w home meds  We discussed the importance of SAPT with low dose ASA 81mg given CAD  pt denies any current chest pain, SOB or chest pressure.     Problem/Plan -2:  ·  Problem: Colitis.  Plan: Ct shows chron's  exacerbation   f/u flex sig with biopsies   on Vanco and flagyl   GI and ID following    if no improvement per ID may recc fecal transplant     Problem/Plan - 3:  ·  Problem: Afib pt said he was placed on sotalol for afib   -c/w sotalol   -EKG noted. SR     Problem/Plan - 4:   ·  Problem: GIB (gastrointestinal bleeding).  Plan: bloody diarrhea 2/2 chron's exacerbation   IV fluids   -c-diff positive:   -abx per ID    Problem/Plan - 5:  ·  Problem: SCDs  pt ambulatory     Problem/Plan - 6:  ·  Problem: B/L LE edema   - CXR shows mildly enlarged heart   -TTE ordered   -monitor IVF with I&O      Kar Powell [facility]-BC   Rufus Onofre DO [facility]  Cardiovascular Medicine  [address] [place] [address], [address]  Office: [phone]  Cell: [phone]

## 2021-07-26 NOTE — PROGRESS NOTE ADULT - SUBJECTIVE AND OBJECTIVE BOX
Patient is a 81y old  Male who presents with a chief complaint of diarrhea with blood in it , abd pain and weakness (26 Jul 2021 15:00)      INTERVAL HPI/OVERNIGHT EVENTS:  T(C): 37.3 (07-26-21 @ 19:50), Max: 37.3 (07-26-21 @ 19:50)  HR: 76 (07-26-21 @ 19:50) (74 - 89)  BP: 111/65 (07-26-21 @ 19:50) (111/65 - 141/79)  RR: 18 (07-26-21 @ 19:50) (18 - 18)  SpO2: 93% (07-26-21 @ 19:50) (93% - 97%)  Wt(kg): --  I&O's Summary    25 Jul 2021 07:01  -  26 Jul 2021 07:00  --------------------------------------------------------  IN: 1865 mL / OUT: 300 mL / NET: 1565 mL    26 Jul 2021 07:01  -  27 Jul 2021 01:25  --------------------------------------------------------  IN: 1365 mL / OUT: 650 mL / NET: 715 mL        PAST MEDICAL & SURGICAL HISTORY:  No pertinent past medical history    No significant past surgical history        SOCIAL HISTORY  Alcohol:  Tobacco:  Illicit substance use:    FAMILY HISTORY:    REVIEW OF SYSTEMS:  CONSTITUTIONAL: No fever, weight loss, or fatigue  EYES: No eye pain, visual disturbances, or discharge  ENMT:  No difficulty hearing, tinnitus, vertigo; No sinus or throat pain  NECK: No pain or stiffness  RESPIRATORY: No cough, wheezing, chills or hemoptysis; No shortness of breath  CARDIOVASCULAR: No chest pain, palpitations, dizziness, or leg swelling  GASTROINTESTINAL: No abdominal or epigastric pain. No nausea, vomiting, or hematemesis; No diarrhea or constipation. No melena or hematochezia.  GENITOURINARY: No dysuria, frequency, hematuria, or incontinence  NEUROLOGICAL: No headaches, memory loss, loss of strength, numbness, or tremors  SKIN: No itching, burning, rashes, or lesions   LYMPH NODES: No enlarged glands  ENDOCRINE: No heat or cold intolerance; No hair loss  MUSCULOSKELETAL: No joint pain or swelling; No muscle, back, or extremity pain  PSYCHIATRIC: No depression, anxiety, mood swings, or difficulty sleeping  HEME/LYMPH: No easy bruising, or bleeding gums  ALLERY AND IMMUNOLOGIC: No hives or eczema    RADIOLOGY & ADDITIONAL TESTS:    Imaging Personally Reviewed:  [ ] YES  [ ] NO    Consultant(s) Notes Reviewed:  [ ] YES  [ ] NO    PHYSICAL EXAM:  GENERAL: NAD, well-groomed, well-developed  HEAD:  Atraumatic, Normocephalic  EYES: EOMI, PERRLA, conjunctiva and sclera clear  ENMT: No tonsillar erythema, exudates, or enlargement; Moist mucous membranes, Good dentition, No lesions  NECK: Supple, No JVD, Normal thyroid  NERVOUS SYSTEM:  Alert & Oriented X3, Good concentration; Motor Strength 5/5 B/L upper and lower extremities; DTRs 2+ intact and symmetric  CHEST/LUNG: Clear to percussion bilaterally; No rales, rhonchi, wheezing, or rubs  HEART: Regular rate and rhythm; No murmurs, rubs, or gallops  ABDOMEN: Soft, Nontender, Nondistended; Bowel sounds present  EXTREMITIES:  2+ Peripheral Pulses, No clubbing, cyanosis, or edema  LYMPH: No lymphadenopathy noted  SKIN: No rashes or lesions    LABS:                        7.7    9.26  )-----------( 404      ( 26 Jul 2021 11:50 )             25.1     07-26    131<L>  |  97  |  6<L>  ----------------------------<  99  3.5   |  25  |  0.89    Ca    7.9<L>      26 Jul 2021 11:50  Phos  2.1     07-26  Mg     1.5     07-26          CAPILLARY BLOOD GLUCOSE                MEDICATIONS  (STANDING):  clonazePAM  Tablet 0.25 milliGRAM(s) Oral at bedtime  FIRST- Mouthwash  BLM 10 milliLiter(s) Swish and Spit every 8 hours  folic acid 1 milliGRAM(s) Oral daily  multivitamin 1 Tablet(s) Oral daily  pantoprazole    Tablet 40 milliGRAM(s) Oral before breakfast  sodium chloride 0.9%. 1000 milliLiter(s) (75 mL/Hr) IV Continuous <Continuous>  sotalol 120 milliGRAM(s) Oral daily  vancomycin    Solution 125 milliGRAM(s) Oral every 6 hours    MEDICATIONS  (PRN):  benzonatate 100 milliGRAM(s) Oral every 8 hours PRN Cough      Care Discussed with Consultants/Other Providers [ ] YES  [ ] NO Patient is a 81y old  Male who presents with a chief complaint of diarrhea with blood in it , abd pain and weakness (26 Jul 2021 15:00)      INTERVAL HPI/OVERNIGHT EVENTS: not doing well , still large vol diarrhea , very depressed and frustrated , not improving diarrhea, poor appetite     T(C): 37.3 (07-26-21 @ 19:50), Max: 37.3 (07-26-21 @ 19:50)  HR: 76 (07-26-21 @ 19:50) (74 - 89)  BP: 111/65 (07-26-21 @ 19:50) (111/65 - 141/79)  RR: 18 (07-26-21 @ 19:50) (18 - 18)  SpO2: 93% (07-26-21 @ 19:50) (93% - 97%)  Wt(kg): --  I&O's Summary    25 Jul 2021 07:01  -  26 Jul 2021 07:00  --------------------------------------------------------  IN: 1865 mL / OUT: 300 mL / NET: 1565 mL    26 Jul 2021 07:01  -  27 Jul 2021 01:25  --------------------------------------------------------  IN: 1365 mL / OUT: 650 mL / NET: 715 mL        PAST MEDICAL & SURGICAL HISTORY:  No pertinent past medical history    No significant past surgical history        SOCIAL HISTORY  Alcohol:  Tobacco:  Illicit substance use:    FAMILY HISTORY:    REVIEW OF SYSTEMS:  CONSTITUTIONAL: No fever, weight loss, or fatigue  EYES: No eye pain, visual disturbances, or discharge  ENMT:  No difficulty hearing, tinnitus, vertigo; No sinus or throat pain  NECK: No pain or stiffness  RESPIRATORY: No cough, wheezing, chills or hemoptysis; No shortness of breath  CARDIOVASCULAR: No chest pain, palpitations, dizziness, or leg swelling  GASTROINTESTINAL: No abdominal or epigastric pain. No nausea, vomiting, or hematemesis; No diarrhea or constipation. No melena or hematochezia.  GENITOURINARY: No dysuria, frequency, hematuria, or incontinence  NEUROLOGICAL: No headaches, memory loss, loss of strength, numbness, or tremors  SKIN: No itching, burning, rashes, or lesions   LYMPH NODES: No enlarged glands  ENDOCRINE: No heat or cold intolerance; No hair loss  MUSCULOSKELETAL: No joint pain or swelling; No muscle, back, or extremity pain  PSYCHIATRIC: No depression, anxiety, mood swings, or difficulty sleeping  HEME/LYMPH: No easy bruising, or bleeding gums  ALLERY AND IMMUNOLOGIC: No hives or eczema    RADIOLOGY & ADDITIONAL TESTS:    Imaging Personally Reviewed:  [ ] YES  [ ] NO    Consultant(s) Notes Reviewed:  [ ] YES  [ ] NO    PHYSICAL EXAM:  GENERAL: NAD, well-groomed, well-developed  HEAD:  Atraumatic, Normocephalic  EYES: EOMI, PERRLA, conjunctiva and sclera clear  ENMT: No tonsillar erythema, exudates, or enlargement; Moist mucous membranes, Good dentition, No lesions  NECK: Supple, No JVD, Normal thyroid  NERVOUS SYSTEM:  Alert & Oriented X3, Good concentration; Motor Strength 5/5 B/L upper and lower extremities; DTRs 2+ intact and symmetric  CHEST/LUNG: Clear to percussion bilaterally; No rales, rhonchi, wheezing, or rubs  HEART: Regular rate and rhythm; No murmurs, rubs, or gallops  ABDOMEN: Soft, Nontender, Nondistended; Bowel sounds present  EXTREMITIES:  2+ Peripheral Pulses, No clubbing, cyanosis, or edema  LYMPH: No lymphadenopathy noted  SKIN: No rashes or lesions    LABS:                        7.7    9.26  )-----------( 404      ( 26 Jul 2021 11:50 )             25.1     07-26    131<L>  |  97  |  6<L>  ----------------------------<  99  3.5   |  25  |  0.89    Ca    7.9<L>      26 Jul 2021 11:50  Phos  2.1     07-26  Mg     1.5     07-26          CAPILLARY BLOOD GLUCOSE                MEDICATIONS  (STANDING):  clonazePAM  Tablet 0.25 milliGRAM(s) Oral at bedtime  FIRST- Mouthwash  BLM 10 milliLiter(s) Swish and Spit every 8 hours  folic acid 1 milliGRAM(s) Oral daily  multivitamin 1 Tablet(s) Oral daily  pantoprazole    Tablet 40 milliGRAM(s) Oral before breakfast  sodium chloride 0.9%. 1000 milliLiter(s) (75 mL/Hr) IV Continuous <Continuous>  sotalol 120 milliGRAM(s) Oral daily  vancomycin    Solution 125 milliGRAM(s) Oral every 6 hours    MEDICATIONS  (PRN):  benzonatate 100 milliGRAM(s) Oral every 8 hours PRN Cough      Care Discussed with Consultants/Other Providers [ ] YES  [ ] NO

## 2021-07-26 NOTE — PROGRESS NOTE ADULT - ATTENDING COMMENTS
Patient seen and examined, agree with above. Diarrhea continues despite PO vancomycin, endoscopically he has evidence of C diff colitis rather than active IBD - will discuss with ID regarding fecal transplantation.

## 2021-07-27 NOTE — PROGRESS NOTE ADULT - SUBJECTIVE AND OBJECTIVE BOX
Patient is a 81y old  Male who presents with a chief complaint of diarrhea with blood in it , abd pain and weakness (26 Jul 2021 20:25)      Interval Events:   Continued BM x9 overnight.  Patient has several questions regarding tentative colonoscopy and FMT.  Spoke with patient's daughter, Tiffanie as well.    Hospital Medications:  benzonatate 100 milliGRAM(s) Oral every 8 hours PRN  clonazePAM  Tablet 0.25 milliGRAM(s) Oral at bedtime  folic acid 1 milliGRAM(s) Oral daily  multivitamin 1 Tablet(s) Oral daily  pantoprazole    Tablet 40 milliGRAM(s) Oral before breakfast  sodium chloride 0.9%. 1000 milliLiter(s) IV Continuous <Continuous>  sotalol 120 milliGRAM(s) Oral daily  vancomycin    Solution 125 milliGRAM(s) Oral every 6 hours    ROS:   A 12-point ROS was performed and negative except as noted in HPI.    PHYSICAL EXAM:   Vital Signs:  Vital Signs Last 24 Hrs  T(C): 36.8 (27 Jul 2021 07:35), Max: 38.3 (27 Jul 2021 05:46)  T(F): 98.2 (27 Jul 2021 07:35), Max: 101 (27 Jul 2021 05:46)  HR: 78 (27 Jul 2021 07:35) (76 - 95)  BP: 108/69 (27 Jul 2021 07:35) (108/69 - 136/70)  BP(mean): --  RR: 17 (27 Jul 2021 07:35) (17 - 18)  SpO2: 97% (27 Jul 2021 07:35) (93% - 97%)  Daily     Daily     GENERAL: ill appearing  NEURO: alert  HEENT: anicteric sclera, no conjunctival pallor appreciated  CHEST: no respiratory distress, no accessory muscle use  CARDIAC: regular rate, +S1/S2  ABDOMEN: soft, nondistended, mild diffuse tenderness to palpation, no rebound or guarding  EXTREMITIES: warm, well perfused, no edema  SKIN: no lesions noted    LABS: reviewed                        8.2    8.41  )-----------( 407      ( 27 Jul 2021 11:12 )             26.5     07-27    131<L>  |  97  |  7   ----------------------------<  90  3.0<L>   |  25  |  0.81    Ca    7.9<L>      27 Jul 2021 11:12  Phos  2.1     07-26  Mg     1.5     07-26          Interval Diagnostic Studies: see sunrise for full report

## 2021-07-27 NOTE — PROGRESS NOTE ADULT - SUBJECTIVE AND OBJECTIVE BOX
CC: F/U for C diff    Saw/spoke to patient. Episode fever. Having cough. Still diarrhea.    Allergies  No Known Allergies    ANTIMICROBIALS:  vancomycin    Solution 125 every 6 hours    PE:    Vital Signs Last 24 Hrs  T(C): 36.4 (27 Jul 2021 12:39), Max: 38.3 (27 Jul 2021 05:46)  T(F): 97.5 (27 Jul 2021 12:39), Max: 101 (27 Jul 2021 05:46)  HR: 59 (27 Jul 2021 12:39) (59 - 95)  BP: 105/63 (27 Jul 2021 12:39) (105/63 - 136/70)  RR: 18 (27 Jul 2021 12:39) (17 - 18)  SpO2: 94% (27 Jul 2021 12:39) (93% - 97%)    Gen: AOx3, ill appearing  CV: S1+S2 normal, nontachycardic  Resp: Clear bilat, no resp distress, no crackles/wheezes  Abd: Soft, nontender, +BS  Ext: No LE edema, no wounds    LABS:                        8.2    8.41  )-----------( 407      ( 27 Jul 2021 11:12 )             26.5     07-27    131<L>  |  97  |  7   ----------------------------<  90  3.0<L>   |  25  |  0.81    Ca    7.9<L>      27 Jul 2021 11:12  Phos  2.1     07-26  Mg     1.5     07-26    MICROBIOLOGY:    .Blood Blood-Venous  07-24-21   No growth to date.    .Blood Blood  07-22-21   No Growth Final     .Stool  07-15-21   No Protozoa seen by trichrome stain  No Helminths or Protozoa seen in formalin concentrate  performed by iodine stain  (routine O+P not evaluated for Microsporidia,  Cryptosporidia, Cyclospora, or Isospora.)  One negative sample does not necessarily rule  out the presence of a parasitic infection.  Numerous White blood cells  --  --    .Stool Feces, namrata coppola  07-14-21   No enteric pathogens isolated.  (Stool culture examined for Salmonella,  Shigella, Campylobacter, Aeromonas, Plesiomonas,  Vibrio, E.coli O157 and Yersinia)  --  --    (otherwise reviewed)    RADIOLOGY:    7/24 XR:    IMPRESSION:    The heart is slightly enlarged. The lungs appear to be clear. No pleural effusion. No pneumothorax. Calcified aortic knob. Degenerative changes of the thoracic spine.

## 2021-07-27 NOTE — PROGRESS NOTE ADULT - SUBJECTIVE AND OBJECTIVE BOX
Patient is a 81y old  Male who presents with a chief complaint of diarrhea with blood in it , abd pain and weakness (27 Jul 2021 12:57)      INTERVAL HPI/OVERNIGHT EVENTS:  T(C): 37.6 (07-27-21 @ 20:17), Max: 38.3 (07-27-21 @ 05:46)  HR: 80 (07-27-21 @ 20:17) (59 - 95)  BP: 126/75 (07-27-21 @ 20:17) (105/63 - 136/70)  RR: 18 (07-27-21 @ 20:17) (17 - 18)  SpO2: 94% (07-27-21 @ 20:17) (92% - 97%)  Wt(kg): --  I&O's Summary    26 Jul 2021 07:01  -  27 Jul 2021 07:00  --------------------------------------------------------  IN: 1365 mL / OUT: 1050 mL / NET: 315 mL    27 Jul 2021 07:01  -  27 Jul 2021 23:53  --------------------------------------------------------  IN: 1265 mL / OUT: 0 mL / NET: 1265 mL        PAST MEDICAL & SURGICAL HISTORY:  No pertinent past medical history    No significant past surgical history        SOCIAL HISTORY  Alcohol:  Tobacco:  Illicit substance use:    FAMILY HISTORY:    REVIEW OF SYSTEMS:  CONSTITUTIONAL: No fever, weight loss, or fatigue  EYES: No eye pain, visual disturbances, or discharge  ENMT:  No difficulty hearing, tinnitus, vertigo; No sinus or throat pain  NECK: No pain or stiffness  RESPIRATORY: No cough, wheezing, chills or hemoptysis; No shortness of breath  CARDIOVASCULAR: No chest pain, palpitations, dizziness, or leg swelling  GASTROINTESTINAL: No abdominal or epigastric pain. No nausea, vomiting, or hematemesis; No diarrhea or constipation. No melena or hematochezia.  GENITOURINARY: No dysuria, frequency, hematuria, or incontinence  NEUROLOGICAL: No headaches, memory loss, loss of strength, numbness, or tremors  SKIN: No itching, burning, rashes, or lesions   LYMPH NODES: No enlarged glands  ENDOCRINE: No heat or cold intolerance; No hair loss  MUSCULOSKELETAL: No joint pain or swelling; No muscle, back, or extremity pain  PSYCHIATRIC: No depression, anxiety, mood swings, or difficulty sleeping  HEME/LYMPH: No easy bruising, or bleeding gums  ALLERY AND IMMUNOLOGIC: No hives or eczema    RADIOLOGY & ADDITIONAL TESTS:    Imaging Personally Reviewed:  [ ] YES  [ ] NO    Consultant(s) Notes Reviewed:  [ ] YES  [ ] NO    PHYSICAL EXAM:  GENERAL: NAD, well-groomed, well-developed  HEAD:  Atraumatic, Normocephalic  EYES: EOMI, PERRLA, conjunctiva and sclera clear  ENMT: No tonsillar erythema, exudates, or enlargement; Moist mucous membranes, Good dentition, No lesions  NECK: Supple, No JVD, Normal thyroid  NERVOUS SYSTEM:  Alert & Oriented X3, Good concentration; Motor Strength 5/5 B/L upper and lower extremities; DTRs 2+ intact and symmetric  CHEST/LUNG: Clear to percussion bilaterally; No rales, rhonchi, wheezing, or rubs  HEART: Regular rate and rhythm; No murmurs, rubs, or gallops  ABDOMEN: Soft, Nontender, Nondistended; Bowel sounds present  EXTREMITIES:  2+ Peripheral Pulses, No clubbing, cyanosis, or edema  LYMPH: No lymphadenopathy noted  SKIN: No rashes or lesions    LABS:                        8.2    8.41  )-----------( 407      ( 27 Jul 2021 11:12 )             26.5     07-27    131<L>  |  97  |  7   ----------------------------<  90  3.0<L>   |  25  |  0.81    Ca    7.9<L>      27 Jul 2021 11:12  Phos  2.1     07-26  Mg     1.5     07-26          CAPILLARY BLOOD GLUCOSE                MEDICATIONS  (STANDING):  clonazePAM  Tablet 0.25 milliGRAM(s) Oral at bedtime  folic acid 1 milliGRAM(s) Oral daily  multivitamin 1 Tablet(s) Oral daily  pantoprazole    Tablet 40 milliGRAM(s) Oral before breakfast  sodium chloride 0.9%. 1000 milliLiter(s) (75 mL/Hr) IV Continuous <Continuous>  sotalol 120 milliGRAM(s) Oral daily  vancomycin    Solution 125 milliGRAM(s) Oral every 6 hours    MEDICATIONS  (PRN):  benzonatate 100 milliGRAM(s) Oral every 8 hours PRN Cough      Care Discussed with Consultants/Other Providers [ ] YES  [ ] NO Patient is a 81y old  Male who presents with a chief complaint of diarrhea with blood in it , abd pain and weakness (27 Jul 2021 12:57)      INTERVAL HPI/OVERNIGHT EVENTS: seen and examined, daughter bedside , not doing well , lethargic and feels sick   still having large vol diarrhea    scheduled for FMT via colonoscopy in am     T(C): 37.6 (07-27-21 @ 20:17), Max: 38.3 (07-27-21 @ 05:46)  HR: 80 (07-27-21 @ 20:17) (59 - 95)  BP: 126/75 (07-27-21 @ 20:17) (105/63 - 136/70)  RR: 18 (07-27-21 @ 20:17) (17 - 18)  SpO2: 94% (07-27-21 @ 20:17) (92% - 97%)  Wt(kg): --  I&O's Summary    26 Jul 2021 07:01  -  27 Jul 2021 07:00  --------------------------------------------------------  IN: 1365 mL / OUT: 1050 mL / NET: 315 mL    27 Jul 2021 07:01  -  27 Jul 2021 23:53  --------------------------------------------------------  IN: 1265 mL / OUT: 0 mL / NET: 1265 mL        PAST MEDICAL & SURGICAL HISTORY:  No pertinent past medical history    No significant past surgical history        SOCIAL HISTORY  Alcohol:  Tobacco:  Illicit substance use:    FAMILY HISTORY:    REVIEW OF SYSTEMS:  CONSTITUTIONAL: No fever, weight loss, or fatigue  EYES: No eye pain, visual disturbances, or discharge  ENMT:  No difficulty hearing, tinnitus, vertigo; No sinus or throat pain  NECK: No pain or stiffness  RESPIRATORY: No cough, wheezing, chills or hemoptysis; No shortness of breath  CARDIOVASCULAR: No chest pain, palpitations, dizziness, or leg swelling  GASTROINTESTINAL: No abdominal or epigastric pain. No nausea, vomiting, or hematemesis; No diarrhea or constipation. No melena or hematochezia.  GENITOURINARY: No dysuria, frequency, hematuria, or incontinence  NEUROLOGICAL: No headaches, memory loss, loss of strength, numbness, or tremors  SKIN: No itching, burning, rashes, or lesions   LYMPH NODES: No enlarged glands  ENDOCRINE: No heat or cold intolerance; No hair loss  MUSCULOSKELETAL: No joint pain or swelling; No muscle, back, or extremity pain  PSYCHIATRIC: No depression, anxiety, mood swings, or difficulty sleeping  HEME/LYMPH: No easy bruising, or bleeding gums  ALLERY AND IMMUNOLOGIC: No hives or eczema    RADIOLOGY & ADDITIONAL TESTS:    Imaging Personally Reviewed:  [ ] YES  [ ] NO    Consultant(s) Notes Reviewed:  [ ] YES  [ ] NO    PHYSICAL EXAM:  GENERAL: NAD, well-groomed, well-developed  HEAD:  Atraumatic, Normocephalic  EYES: EOMI, PERRLA, conjunctiva and sclera clear  ENMT: No tonsillar erythema, exudates, or enlargement; Moist mucous membranes, Good dentition, No lesions  NECK: Supple, No JVD, Normal thyroid  NERVOUS SYSTEM:  Alert & Oriented X3, Good concentration; Motor Strength 5/5 B/L upper and lower extremities; DTRs 2+ intact and symmetric  CHEST/LUNG: Clear to percussion bilaterally; No rales, rhonchi, wheezing, or rubs  HEART: Regular rate and rhythm; No murmurs, rubs, or gallops  ABDOMEN: Soft, Nontender, Nondistended; Bowel sounds present  EXTREMITIES:  2+ Peripheral Pulses, No clubbing, cyanosis, or edema  LYMPH: No lymphadenopathy noted  SKIN: No rashes or lesions    LABS:                        8.2    8.41  )-----------( 407      ( 27 Jul 2021 11:12 )             26.5     07-27    131<L>  |  97  |  7   ----------------------------<  90  3.0<L>   |  25  |  0.81    Ca    7.9<L>      27 Jul 2021 11:12  Phos  2.1     07-26  Mg     1.5     07-26          CAPILLARY BLOOD GLUCOSE                MEDICATIONS  (STANDING):  clonazePAM  Tablet 0.25 milliGRAM(s) Oral at bedtime  folic acid 1 milliGRAM(s) Oral daily  multivitamin 1 Tablet(s) Oral daily  pantoprazole    Tablet 40 milliGRAM(s) Oral before breakfast  sodium chloride 0.9%. 1000 milliLiter(s) (75 mL/Hr) IV Continuous <Continuous>  sotalol 120 milliGRAM(s) Oral daily  vancomycin    Solution 125 milliGRAM(s) Oral every 6 hours    MEDICATIONS  (PRN):  benzonatate 100 milliGRAM(s) Oral every 8 hours PRN Cough      Care Discussed with Consultants/Other Providers [ ] YES  [ ] NO

## 2021-07-27 NOTE — PROGRESS NOTE ADULT - ATTENDING COMMENTS
Agree with above. Diarrhea continuing despite more than a week of vancomycin - discussed with Dr. Lee from ID, plan for colonoscopy tomorrow with fecal transplantation. Keep NPO after midnight.

## 2021-07-27 NOTE — PROGRESS NOTE ADULT - ASSESSMENT
82 yo M with Crohn's on treatment, recently cared for at [facility], now presenting with ongoing diarrhea  Low grade temp elevation, leukocytosis  Diarrhea  No abd distension, no evidence megacolon  CT with colonic inflammation  Colonoscopy with visualized pseudomembranes  On PO Vanco starting 7/15 with limited improvement  Still ongoing diarrhea, episode fever  Patient having cough  Overall,  1) C diff colitis  - Severe, persistent  - Vanco 125mg po q 6  - Plan for lower FMT tomorrow with GI--appreciate input  - Frequent abd exam/stool count  2) Leukocytosis  - Trend to normal  3) Crohn's  - Further care per GI  - Uncertain if any active Crohn's (or if current symptoms all related to C diff)  4) Fevers  - F/U CXR  - Monitor for alternate sources infection, hold on abx unless signs sepsis/focal source infection  - F/U BCXs    Phoenix Lee MD  Pager [phone]  After 5pm and on weekends call [phone]

## 2021-07-27 NOTE — PROGRESS NOTE ADULT - SUBJECTIVE AND OBJECTIVE BOX
Patient is a 81y old  Male who presents with a chief complaint of diarrhea with blood in it , abd pain and weakness (27 Jul 2021 12:22)      INTERVAL HISTORY: feels ok + still with diarrhea     REVIEW OF SYSTEMS:   CONSTITUTIONAL: No weakness  EYES/ENT: No visual changes; No throat pain  Neck: No pain or stiffness  Respiratory: No cough, wheezing, No shortness of breath  CARDIOVASCULAR: no chest pain or palpitations  GASTROINTESTINAL: No abdominal pain, no nausea, vomiting or hematemesis  GENITOURINARY: No dysuria, frequency or hematuria  NEUROLOGICAL: No stroke like symptoms  SKIN: No rashes      MEDICATIONS:  sotalol 120 milliGRAM(s) Oral daily    PHYSICAL EXAM:  T(C): 36.4 (07-27-21 @ 12:39), Max: 38.3 (07-27-21 @ 05:46)  HR: 59 (07-27-21 @ 12:39) (59 - 95)  BP: 105/63 (07-27-21 @ 12:39) (105/63 - 136/70)  RR: 18 (07-27-21 @ 12:39) (17 - 18)  SpO2: 94% (07-27-21 @ 12:39) (93% - 97%)  Wt(kg): --  I&O's Summary    26 Jul 2021 07:01  -  27 Jul 2021 07:00  --------------------------------------------------------  IN: 1365 mL / OUT: 1050 mL / NET: 315 mL      Appearance: In no distress	  HEENT:    PERRL, EOMI	  Cardiovascular:  S1 S2, No JVD  Respiratory: Lungs clear to auscultation	  Gastrointestinal:  Soft, Non-tender, + BS	  Vascularature:  No edema of LE  Psychiatric: Appropriate affect   Neuro: no acute focal deficits                          8.2    8.41  )-----------( 407      ( 27 Jul 2021 11:12 )             26.5     07-27    131<L>  |  97  |  7   ----------------------------<  90  3.0<L>   |  25  |  0.81    Ca    7.9<L>      27 Jul 2021 11:12  Phos  2.1     07-26  Mg     1.5     07-26    Labs personally reviewed    ASSESSMENT/PLAN: 	  Problem/Plan - 1:  ·  Problem: CAD (coronary artery disease).  Plan: c/w home meds  We discussed the importance of SAPT with low dose ASA 81mg given CAD  pt denies any current chest pain, SOB or chest pressure.     Problem/Plan -2:  ·  Problem: Colitis.  Plan: Ct shows chron's  exacerbation   f/u flex sig with biopsies   on Vanco and flagyl   GI and ID following    Gi/ ID recc fecal matter transplant via scope vs NGT  tentatively scheduled for tomorrow at 12pm    Problem/Plan - 3:  ·  Problem: Afib pt said he was placed on sotalol for afib   -c/w sotalol   -EKG noted. SR     Problem/Plan - 4:   ·  Problem: GIB (gastrointestinal bleeding).  Plan: bloody diarrhea 2/2 chron's exacerbation   IV fluids   -c-diff positive:   -abx per ID    Problem/Plan - 5:  ·  Problem: SCDs  pt ambulatory     Problem/Plan - 6:  ·  Problem: B/L LE edema   - CXR shows mildly enlarged heart   -TTE ordered   -monitor IVF with I&O      Sanam Heath ANP-C  Rufus Onofre DO [facility]  Cardiovascular Medicine  [address] [place] [address], [address]  Office: [phone]  Cell: [phone]

## 2021-07-27 NOTE — PROGRESS NOTE ADULT - ASSESSMENT
GIDEON HAYES is a 81y Male with GERD on omeprazole daily and Crohn's disease diagnosed about 9 mos prior to admission at outside hospital c/b h/o abscess and left posterior distal anal intersphincteric fistula and presents as a new consult to GI for diarrhea/BRBPR likely 2/2 to CD flare c/b c diff colitis.    # Diarrhea/BRBPR - likely 2/2 CD flare with leukocytosis most likely 2/2 cdiff colitis, less likely 2/2 CMV colitis. Patient's cdiff was positive and is the likely cause of his CD flare. Still with significant sx despite antibiotics.  Had flex sig with biopsies on 7/19/21.   Non reactive HBVsAg: no latent HBV infection  # Weight loss - likely 2/2 CD c/b oral aphthous ulcers and diarrhea as he has not been able to tolerate po and will need to ensure adequate hydration.  # GERD     Recommendations:  - Continue to low residual diet as tolerated  - Add protein supplements, obtain dietician evaluation  - pathology from biopsies reveals active chronic colitis  - budesonide discontinued  - Continue vancomycin 500mg PO q6h and flagyl 500mg IV q8h [however decreased back to vanco 125mg q6h monotherapy over the weekend]  - appreciate further input from Infectious Disease  - coordinating colonoscopy with FMT with Infectious Disease and Pharmacy, tentatively scheduled for tomorrow at 12pm      Jaison Fang, PGY-4  GI/Hepatology Fellow    MONDAY-FRIDAY 8AM-5PM:  Available via [employer] Teams  Pager# [phone] ([facility]) or [phone] ([facility])  GI Phone# [phone] ([facility])    NON-URGENT CONSULTS:  Please email [email or web address]  AT NIGHT AND ON WEEKENDS:  Contact on-call GI fellow via answering service ([phone]) from 5pm-8am and on weekends/holidays

## 2021-07-28 NOTE — PROGRESS NOTE ADULT - SUBJECTIVE AND OBJECTIVE BOX
DATE OF SERVICE: 07-28-21 @ 21:28    Patient is a 81y old  Male who presents with a chief complaint of diarrhea with blood in it , abd pain and weakness (27 Jul 2021 19:53)      INTERVAL HISTORY:     TELEMETRY Personally reviewed:  	  MEDICATIONS:  sotalol 120 milliGRAM(s) Oral daily        PHYSICAL EXAM:  T(C): 36.9 (07-28-21 @ 20:24), Max: 38.3 (07-28-21 @ 04:48)  HR: 70 (07-28-21 @ 20:24) (61 - 94)  BP: 146/73 (07-28-21 @ 20:24) (115/60 - 158/71)  RR: 18 (07-28-21 @ 20:24) (13 - 24)  SpO2: 94% (07-28-21 @ 20:24) (93% - 100%)  Wt(kg): --  I&O's Summary    27 Jul 2021 07:01  -  28 Jul 2021 07:00  --------------------------------------------------------  IN: 2565 mL / OUT: 0 mL / NET: 2565 mL    28 Jul 2021 07:01  -  28 Jul 2021 21:28  --------------------------------------------------------  IN: 960 mL / OUT: 0 mL / NET: 960 mL      Height (cm): 170.2 (07-28 @ 15:32)  Weight (kg): 59 (07-28 @ 15:32)  BMI (kg/m2): 20.4 (07-28 @ 15:32)  BSA (m2): 1.68 (07-28 @ 15:32)    Appearance: In no distress	  HEENT:    PERRL, EOMI	  Cardiovascular:  S1 S2, No JVD  Respiratory: Lungs clear to auscultation	  Gastrointestinal:  Soft, Non-tender, + BS	  Vascularature:  No edema of LE  Psychiatric: Appropriate affect   Neuro: no acute focal deficits                               8.3    9.30  )-----------( 394      ( 28 Jul 2021 05:44 )             26.6     07-28    131<L>  |  98  |  7   ----------------------------<  91  3.1<L>   |  21<L>  |  0.72    Ca    7.8<L>      28 Jul 2021 05:44          Labs personally reviewed      ASSESSMENT/PLAN: 	  Problem/Plan - 1:  ·  Problem: CAD (coronary artery disease).  Plan: c/w home meds  We discussed the importance of SAPT with low dose ASA 81mg given CAD  pt denies any current chest pain, SOB or chest pressure.     Problem/Plan -2:  ·  Problem: Colitis.  Plan: Ct shows chron's  exacerbation   f/u flex sig with biopsies   on Vanco and flagyl   GI and ID following    Gi/ ID recc fecal matter transplant via scope vs NGT  tentatively scheduled for tomorrow at 12pm    Problem/Plan - 3:  ·  Problem: Afib pt said he was placed on sotalol for afib   -c/w sotalol   -EKG noted. SR     Problem/Plan - 4:   ·  Problem: GIB (gastrointestinal bleeding).  Plan: bloody diarrhea 2/2 chron's exacerbation   IV fluids   -c-diff positive:   -abx per ID    Problem/Plan - 5:  ·  Problem: SCDs  pt ambulatory     Problem/Plan - 6:  ·  Problem: B/L LE edema   - CXR shows mildly enlarged heart   -TTE ordered   -monitor IVF with I&O          Rufus Onofre DO [facility]  Cardiovascular Medicine  [address] [place] [address], [address]  Office: [phone]  Cell: [phone]

## 2021-07-28 NOTE — PROGRESS NOTE ADULT - ASSESSMENT
80 yo M with Crohn's on treatment, recently cared for at [facility], now presenting with ongoing diarrhea  Low grade temp elevation, leukocytosis  Diarrhea  No abd distension, no evidence megacolon  CT with colonic inflammation  Colonoscopy with visualized pseudomembranes  On PO Vanco starting 7/15 with limited improvement  Still ongoing diarrhea, episode fever  Patient having cough, CXR clear  Overall,  1) C diff colitis  - Severe, persistent  - Vanco 125mg po q 6, intermittently refusing doses  - Plan for lower FMT today--appreciate GI  - Frequent abd exam/stool count  2) Leukocytosis  - Trend to normal  3) Crohn's  - Further care per GI  - Uncertain if any active Crohn's (or if current symptoms all related to C diff)  4) Fevers  - Monitor for alternate sources infection, hold on abx unless signs sepsis/focal source infection  - F/U BCXs    Phoenix Lee MD  Pager [phone]  After 5pm and on weekends call [phone]

## 2021-07-28 NOTE — PRE PROCEDURE NOTE - PRE PROCEDURE EVALUATION
Attending Physician:   Dr. Concepcion                         Procedure:   Colonoscopy    Indication for Procedure:   Recurrent diarrhea  ________________________________________________________  PAST MEDICAL & SURGICAL HISTORY:    Colitis  CDiff  GIB    No significant past surgical history      ALLERGIES:  No Known Allergies    HOME MEDICATIONS:  budesonide 3 mg oral delayed release capsule: 3 cap(s) orally once a day (in the morning)  folic acid 1 mg oral tablet: 1 tab(s) orally once a day  KlonoPIN 0.5 mg oral tablet: 0.5 tab(s) orally once a day (at bedtime)  note: last dispensed Nov 2020  mesalamine 1.2 g oral delayed release tablet: 4 tab(s) orally once a day  methotrexate 2.5 mg oral tablet: 6 tab(s) orally once a week on sundays  multivitamin:   omeprazole 20 mg oral delayed release capsule: 1 cap(s) orally once a day  sotalol  mg oral tablet: 1 tab(s) orally 2 times a day  Vitamin D3:     AICD/PPM: [ ] yes   [X ] no    PERTINENT LAB DATA:                        8.3    9.30  )-----------( 394      ( 28 Jul 2021 05:44 )             26.6     07-28    131<L>  |  98  |  7   ----------------------------<  91  3.1<L>   |  21<L>  |  0.72    Ca    7.8<L>      28 Jul 2021 05:44    PHYSICAL EXAMINATION:    T(C): 36.8  HR: 83  BP: 115/60  RR: 18  SpO2: 93%    Constitutional: NAD    Neck:  No JVD  Respiratory: CTAB/L  Cardiovascular: S1 and S2  Gastrointestinal: BS+, soft, NT/ND  Extremities: No peripheral edema  Neurological: A/O x 4        COMMENTS:    The patient is a suitable candidate for the planned procedure unless box checked [ ]  No, explain:     Attending Physician:   Dr. Concepcion                         Procedure:   Colonoscopy and fecal microbiota transplant [LOT #[id number]], expiration date 1/14/2022    Indication for Procedure:   Recurrent diarrhea  ________________________________________________________  PAST MEDICAL & SURGICAL HISTORY:    Colitis  CDiff  GIB    No significant past surgical history      ALLERGIES:  No Known Allergies    HOME MEDICATIONS:  budesonide 3 mg oral delayed release capsule: 3 cap(s) orally once a day (in the morning)  folic acid 1 mg oral tablet: 1 tab(s) orally once a day  KlonoPIN 0.5 mg oral tablet: 0.5 tab(s) orally once a day (at bedtime)  note: last dispensed Nov 2020  mesalamine 1.2 g oral delayed release tablet: 4 tab(s) orally once a day  methotrexate 2.5 mg oral tablet: 6 tab(s) orally once a week on sundays  multivitamin:   omeprazole 20 mg oral delayed release capsule: 1 cap(s) orally once a day  sotalol  mg oral tablet: 1 tab(s) orally 2 times a day  Vitamin D3:     AICD/PPM: [ ] yes   [X ] no    PERTINENT LAB DATA:                        8.3    9.30  )-----------( 394      ( 28 Jul 2021 05:44 )             26.6     07-28    131<L>  |  98  |  7   ----------------------------<  91  3.1<L>   |  21<L>  |  0.72    Ca    7.8<L>      28 Jul 2021 05:44    PHYSICAL EXAMINATION:    T(C): 36.8  HR: 83  BP: 115/60  RR: 18  SpO2: 93%    Constitutional: NAD    Neck:  No JVD  Respiratory: CTAB/L  Cardiovascular: S1 and S2  Gastrointestinal: BS+, soft, NT/ND  Extremities: No peripheral edema  Neurological: A/O x 4        COMMENTS:    The patient is a suitable candidate for the planned procedure unless box checked [ ]  No, explain:

## 2021-07-28 NOTE — PROGRESS NOTE ADULT - SUBJECTIVE AND OBJECTIVE BOX
CC: F/U for C diff    Saw/spoke to patient. No fevers, no chills. No new complaints.    Allergies  No Known Allergies    ANTIMICROBIALS:  vancomycin    Solution 125 every 6 hours    PE:    Vital Signs Last 24 Hrs  T(C): 36.8 (28 Jul 2021 13:23), Max: 38.3 (28 Jul 2021 04:48)  T(F): 98.3 (28 Jul 2021 13:23), Max: 100.9 (28 Jul 2021 04:48)  HR: 83 (28 Jul 2021 13:23) (64 - 95)  BP: 115/60 (28 Jul 2021 13:23) (115/60 - 158/71)  RR: 18 (28 Jul 2021 13:23) (18 - 18)  SpO2: 93% (28 Jul 2021 13:23) (92% - 96%)    Gen: AOx3, fatigued  CV: S1+S2 normal, nontachycardic  Resp: Clear bilat, no resp distress, no crackles/wheezes  Abd: Soft, nontender, +BS  Ext: No LE edema, no wounds    LABS:                        8.3    9.30  )-----------( 394      ( 28 Jul 2021 05:44 )             26.6     07-28    131<L>  |  98  |  7   ----------------------------<  91  3.1<L>   |  21<L>  |  0.72    Ca    7.8<L>      28 Jul 2021 05:44    MICROBIOLOGY:    .Blood Blood-Venous  07-24-21   No growth to date.     .Blood Blood  07-22-21   No Growth Final     .Stool  07-15-21   No Protozoa seen by trichrome stain  No Helminths or Protozoa seen in formalin concentrate  performed by iodine stain  (routine O+P not evaluated for Microsporidia,  Cryptosporidia, Cyclospora, or Isospora.)  One negative sample does not necessarily rule  out the presence of a parasitic infection.  Numerous White blood cells  --  --    .Stool Feces, namrata coppola  07-14-21   No enteric pathogens isolated.  (Stool culture examined for Salmonella,  Shigella, Campylobacter, Aeromonas, Plesiomonas,  Vibrio, E.coli O157 and Yersinia)  --  --    .Urine Clean Catch (Midstream)  07-13-21   <10,000 CFU/mL Normal Urogenital Margo  --  --    .Blood Blood-Peripheral  07-13-21   No Growth Final  --  --    (otherwise reviewed)    RADIOLOGY:    7/27 XR:    IMPRESSION:    The heart is normal in size. The lungs appear to be clear. No pleural effusion. No pneumothorax. Degenerative changes of the thoracic spine.

## 2021-07-28 NOTE — PROGRESS NOTE ADULT - SUBJECTIVE AND OBJECTIVE BOX
Patient is a 81y old  Male who presents with a chief complaint of diarrhea with blood in it , abd pain and weakness (28 Jul 2021 21:27)      INTERVAL HPI/OVERNIGHT EVENTS:  T(C): 36.9 (07-28-21 @ 20:24), Max: 38.3 (07-28-21 @ 04:48)  HR: 70 (07-28-21 @ 20:24) (61 - 94)  BP: 146/73 (07-28-21 @ 20:24) (115/60 - 158/71)  RR: 18 (07-28-21 @ 20:24) (13 - 24)  SpO2: 94% (07-28-21 @ 20:24) (93% - 100%)  Wt(kg): --  I&O's Summary    27 Jul 2021 07:01  -  28 Jul 2021 07:00  --------------------------------------------------------  IN: 2565 mL / OUT: 0 mL / NET: 2565 mL    28 Jul 2021 07:01  -  28 Jul 2021 22:14  --------------------------------------------------------  IN: 960 mL / OUT: 0 mL / NET: 960 mL        PAST MEDICAL & SURGICAL HISTORY:  No pertinent past medical history    No significant past surgical history        SOCIAL HISTORY  Alcohol:  Tobacco:  Illicit substance use:    FAMILY HISTORY:    REVIEW OF SYSTEMS:  CONSTITUTIONAL: No fever, weight loss, or fatigue  EYES: No eye pain, visual disturbances, or discharge  ENMT:  No difficulty hearing, tinnitus, vertigo; No sinus or throat pain  NECK: No pain or stiffness  RESPIRATORY: No cough, wheezing, chills or hemoptysis; No shortness of breath  CARDIOVASCULAR: No chest pain, palpitations, dizziness, or leg swelling  GASTROINTESTINAL: No abdominal or epigastric pain. No nausea, vomiting, or hematemesis; No diarrhea or constipation. No melena or hematochezia.  GENITOURINARY: No dysuria, frequency, hematuria, or incontinence  NEUROLOGICAL: No headaches, memory loss, loss of strength, numbness, or tremors  SKIN: No itching, burning, rashes, or lesions   LYMPH NODES: No enlarged glands  ENDOCRINE: No heat or cold intolerance; No hair loss  MUSCULOSKELETAL: No joint pain or swelling; No muscle, back, or extremity pain  PSYCHIATRIC: No depression, anxiety, mood swings, or difficulty sleeping  HEME/LYMPH: No easy bruising, or bleeding gums  ALLERY AND IMMUNOLOGIC: No hives or eczema    RADIOLOGY & ADDITIONAL TESTS:    Imaging Personally Reviewed:  [ ] YES  [ ] NO    Consultant(s) Notes Reviewed:  [ ] YES  [ ] NO    PHYSICAL EXAM:  GENERAL: NAD, well-groomed, well-developed  HEAD:  Atraumatic, Normocephalic  EYES: EOMI, PERRLA, conjunctiva and sclera clear  ENMT: No tonsillar erythema, exudates, or enlargement; Moist mucous membranes, Good dentition, No lesions  NECK: Supple, No JVD, Normal thyroid  NERVOUS SYSTEM:  Alert & Oriented X3, Good concentration; Motor Strength 5/5 B/L upper and lower extremities; DTRs 2+ intact and symmetric  CHEST/LUNG: Clear to percussion bilaterally; No rales, rhonchi, wheezing, or rubs  HEART: Regular rate and rhythm; No murmurs, rubs, or gallops  ABDOMEN: Soft, Nontender, Nondistended; Bowel sounds present  EXTREMITIES:  2+ Peripheral Pulses, No clubbing, cyanosis, or edema  LYMPH: No lymphadenopathy noted  SKIN: No rashes or lesions    LABS:                        8.3    9.30  )-----------( 394      ( 28 Jul 2021 05:44 )             26.6     07-28    131<L>  |  98  |  7   ----------------------------<  91  3.1<L>   |  21<L>  |  0.72    Ca    7.8<L>      28 Jul 2021 05:44          CAPILLARY BLOOD GLUCOSE                MEDICATIONS  (STANDING):  clonazePAM  Tablet 0.25 milliGRAM(s) Oral at bedtime  Fecal microbiota transplant ([id number]) 1 Unit(s) 1 Unit(s) Rectal once  folic acid 1 milliGRAM(s) Oral daily  multivitamin 1 Tablet(s) Oral daily  pantoprazole    Tablet 40 milliGRAM(s) Oral before breakfast  sodium chloride 0.9%. 1000 milliLiter(s) (75 mL/Hr) IV Continuous <Continuous>  sotalol 120 milliGRAM(s) Oral daily  vancomycin    Solution 125 milliGRAM(s) Oral every 6 hours    MEDICATIONS  (PRN):  benzonatate 100 milliGRAM(s) Oral every 8 hours PRN Cough      Care Discussed with Consultants/Other Providers [ ] YES  [ ] NO Patient is a 81y old  Male who presents with a chief complaint of diarrhea with blood in it , abd pain and weakness (28 Jul 2021 21:27)      INTERVAL HPI/OVERNIGHT EVENTS: s/p FMT via colonoscopy  T(C): 36.9 (07-28-21 @ 20:24), Max: 38.3 (07-28-21 @ 04:48)  HR: 70 (07-28-21 @ 20:24) (61 - 94)  BP: 146/73 (07-28-21 @ 20:24) (115/60 - 158/71)  RR: 18 (07-28-21 @ 20:24) (13 - 24)  SpO2: 94% (07-28-21 @ 20:24) (93% - 100%)  Wt(kg): --  I&O's Summary    27 Jul 2021 07:01  -  28 Jul 2021 07:00  --------------------------------------------------------  IN: 2565 mL / OUT: 0 mL / NET: 2565 mL    28 Jul 2021 07:01  -  28 Jul 2021 22:14  --------------------------------------------------------  IN: 960 mL / OUT: 0 mL / NET: 960 mL        PAST MEDICAL & SURGICAL HISTORY:  No pertinent past medical history    No significant past surgical history        SOCIAL HISTORY  Alcohol:  Tobacco:  Illicit substance use:    FAMILY HISTORY:    REVIEW OF SYSTEMS:  CONSTITUTIONAL: No fever, weight loss, or fatigue  EYES: No eye pain, visual disturbances, or discharge  ENMT:  No difficulty hearing, tinnitus, vertigo; No sinus or throat pain  NECK: No pain or stiffness  RESPIRATORY: No cough, wheezing, chills or hemoptysis; No shortness of breath  CARDIOVASCULAR: No chest pain, palpitations, dizziness, or leg swelling  GASTROINTESTINAL: No abdominal or epigastric pain. No nausea, vomiting, or hematemesis; No diarrhea or constipation. No melena or hematochezia.  GENITOURINARY: No dysuria, frequency, hematuria, or incontinence  NEUROLOGICAL: No headaches, memory loss, loss of strength, numbness, or tremors  SKIN: No itching, burning, rashes, or lesions   LYMPH NODES: No enlarged glands  ENDOCRINE: No heat or cold intolerance; No hair loss  MUSCULOSKELETAL: No joint pain or swelling; No muscle, back, or extremity pain  PSYCHIATRIC: No depression, anxiety, mood swings, or difficulty sleeping  HEME/LYMPH: No easy bruising, or bleeding gums  ALLERY AND IMMUNOLOGIC: No hives or eczema    RADIOLOGY & ADDITIONAL TESTS:    Imaging Personally Reviewed:  [ ] YES  [ ] NO    Consultant(s) Notes Reviewed:  [ ] YES  [ ] NO    PHYSICAL EXAM:  GENERAL: NAD, well-groomed, well-developed  HEAD:  Atraumatic, Normocephalic  EYES: EOMI, PERRLA, conjunctiva and sclera clear  ENMT: No tonsillar erythema, exudates, or enlargement; Moist mucous membranes, Good dentition, No lesions  NECK: Supple, No JVD, Normal thyroid  NERVOUS SYSTEM:  Alert & Oriented X3, Good concentration; Motor Strength 5/5 B/L upper and lower extremities; DTRs 2+ intact and symmetric  CHEST/LUNG: Clear to percussion bilaterally; No rales, rhonchi, wheezing, or rubs  HEART: Regular rate and rhythm; No murmurs, rubs, or gallops  ABDOMEN: Soft, Nontender, Nondistended; Bowel sounds present  EXTREMITIES:  2+ Peripheral Pulses, No clubbing, cyanosis, or edema  LYMPH: No lymphadenopathy noted  SKIN: No rashes or lesions    LABS:                        8.3    9.30  )-----------( 394      ( 28 Jul 2021 05:44 )             26.6     07-28    131<L>  |  98  |  7   ----------------------------<  91  3.1<L>   |  21<L>  |  0.72    Ca    7.8<L>      28 Jul 2021 05:44          CAPILLARY BLOOD GLUCOSE                MEDICATIONS  (STANDING):  clonazePAM  Tablet 0.25 milliGRAM(s) Oral at bedtime  Fecal microbiota transplant ([id number]) 1 Unit(s) 1 Unit(s) Rectal once  folic acid 1 milliGRAM(s) Oral daily  multivitamin 1 Tablet(s) Oral daily  pantoprazole    Tablet 40 milliGRAM(s) Oral before breakfast  sodium chloride 0.9%. 1000 milliLiter(s) (75 mL/Hr) IV Continuous <Continuous>  sotalol 120 milliGRAM(s) Oral daily  vancomycin    Solution 125 milliGRAM(s) Oral every 6 hours    MEDICATIONS  (PRN):  benzonatate 100 milliGRAM(s) Oral every 8 hours PRN Cough      Care Discussed with Consultants/Other Providers [ ] YES  [ ] NO

## 2021-07-29 NOTE — PROGRESS NOTE ADULT - ATTENDING COMMENTS
Patient seen and examined, agree with above. Number of diarrhea is decreasing - continue monitor s/p fecal transplantation. Eventually will need to be back on biologics, likely as outpatient. Discussed with daughter at bedside.

## 2021-07-29 NOTE — PROGRESS NOTE ADULT - ASSESSMENT
80 yo M with Crohn's on treatment, recently cared for at [facility], now presenting with ongoing diarrhea  Low grade temp elevation, leukocytosis  Diarrhea  No abd distension, no evidence megacolon  CT with colonic inflammation  Colonoscopy with visualized pseudomembranes  On PO Vanco starting 7/15 with limited improvement  Still ongoing diarrhea, episode fever  Patient having cough, CXR clear  Overall,  1) C diff colitis  - Severe, persistent  - Vanco 125mg po q 6  - S/p colonoscopy FMT 7/28  - Frequent abd exam/stool count  - Check RVP  2) Leukocytosis  - Trend to normal  3) Crohn's  - Further care per GI  - Uncertain if any active Crohn's (or if current symptoms all related to C diff)  4) Fevers  - Monitor for alternate sources infection, hold on abx unless signs sepsis/focal source infection  - F/U BCXs    Phoenix Lee MD  Pager [phone]  After 5pm and on weekends call [phone]

## 2021-07-29 NOTE — CHART NOTE - NSCHARTNOTEFT_GEN_A_CORE
Hold PO Vanco for now. Thought is that PO vanco may invalidate/"kill good bacteria" from FMT. Patient does not have fever, leukocytosis, and is hemodynamically stable presently. Plan to resume PO vanco in 48 hours.    If signs sepsis, then would resume PO vanco acutely.    Phoenix Lee MD  Pager [phone]  From 5pm-9am, and on weekends call [phone]

## 2021-07-29 NOTE — PROGRESS NOTE ADULT - SUBJECTIVE AND OBJECTIVE BOX
CC: F/U C diff    Saw/spoke to patient. S/p colonoscopy. Still cough. Still diarrhea.    Allergies  No Known Allergies    ANTIMICROBIALS:  vancomycin    Solution 125 every 6 hours    PE:    Vital Signs Last 24 Hrs  T(C): 36.3 (29 Jul 2021 09:14), Max: 36.9 (28 Jul 2021 20:24)  T(F): 97.4 (29 Jul 2021 09:14), Max: 98.4 (28 Jul 2021 20:24)  HR: 71 (29 Jul 2021 09:14) (61 - 83)  BP: 127/63 (29 Jul 2021 09:14) (115/60 - 152/80)  RR: 18 (29 Jul 2021 09:14) (13 - 20)  SpO2: 94% (29 Jul 2021 09:14) (92% - 100%)    Gen: AOx3  CV: S1+S2 normal, nontachycardic  Resp: Clear bilat, no resp distress, no crackles/wheezes  Abd: Soft, nontender, +BS  Ext: No LE edema, no wounds    LABS:                        8.2    9.19  )-----------( 382      ( 29 Jul 2021 06:56 )             26.8     07-29    133<L>  |  101  |  6<L>  ----------------------------<  69<L>  3.3<L>   |  23  |  0.58    Ca    7.9<L>      29 Jul 2021 06:52    MICROBIOLOGY:    .Blood Blood-Peripheral  07-27-21   No growth to date.      .Blood Blood-Venous  07-24-21   No Growth Final     .Blood Blood  07-22-21   No Growth Final     .Stool  07-15-21   No Protozoa seen by trichrome stain  No Helminths or Protozoa seen in formalin concentrate  performed by iodine stain  (routine O+P not evaluated for Microsporidia,  Cryptosporidia, Cyclospora, or Isospora.)  One negative sample does not necessarily rule  out the presence of a parasitic infection.  Numerous White blood cells  --  --    .Stool Feces, namrata abdon  07-14-21   No enteric pathogens isolated.  (Stool culture examined for Salmonella,  Shigella, Campylobacter, Aeromonas, Plesiomonas,  Vibrio, E.coli O157 and Yersinia)  --  --    .Urine Clean Catch (Midstream)  07-13-21   <10,000 CFU/mL Normal Urogenital Margo  --  --    .Blood Blood-Peripheral  07-13-21   No Growth Final  --  --    (otherwise reviewed)    RADIOLOGY:    6/27 XR:    IMPRESSION:    The heart is normal in size. The lungs appear to be clear. No pleural effusion. No pneumothorax. Degenerative changes of the thoracic spine.

## 2021-07-29 NOTE — PROGRESS NOTE ADULT - ASSESSMENT
GIDEON HAYES is a 81y Male with GERD on omeprazole daily and Crohn's disease diagnosed about 9 mos prior to admission at outside hospital c/b h/o abscess and left posterior distal anal intersphincteric fistula and presents as a new consult to GI for diarrhea/BRBPR likely 2/2 to CD flare c/b c diff colitis.    # Diarrhea/BRBPR - likely 2/2 CD flare with leukocytosis most likely 2/2 cdiff colitis, less likely 2/2 CMV colitis. Patient's cdiff was positive and is the likely cause of his CD flare. Still with significant sx despite antibiotics.  Had flex sig with biopsies on 7/19/21.   Non reactive HBVsAg: no latent HBV infection  # Weight loss - likely 2/2 CD c/b oral aphthous ulcers and diarrhea as he has not been able to tolerate po and will need to ensure adequate hydration.  # GERD     Recommendations:  - Continue to low residual diet as tolerated  - Add protein supplements, obtain dietician evaluation  - pathology from biopsies reveals active chronic colitis  - budesonide discontinued  - s/p PO vanc and ultimatley performed FMT on 7/28/2021 in coordination with Infectious Disease and Pharmacy  - trend clinical response to FMT      Jaison Fang, PGY-4  GI/Hepatology Fellow    MONDAY-FRIDAY 8AM-5PM:  Available via Microsoft Teams  Pager# [phone] ([facility]) or [phone] ([facility])  GI Phone# [phone] ([facility])    NON-URGENT CONSULTS:  Please email [email or web address]  AT NIGHT AND ON WEEKENDS:  Contact on-call GI fellow via answering service ([phone]) from 5pm-8am and on weekends/holidays GIDEON HAYES is a 81y Male with GERD on omeprazole daily and Crohn's disease diagnosed about 9 mos prior to admission at outside hospital c/b h/o abscess and left posterior distal anal intersphincteric fistula and presents as a new consult to GI for diarrhea/BRBPR likely 2/2 to CD flare c/b c diff colitis.    # Diarrhea/BRBPR - likely 2/2 CD flare with leukocytosis most likely 2/2 cdiff colitis, less likely 2/2 CMV colitis. Patient's cdiff was positive and is the likely cause of his CD flare. Still with significant sx despite antibiotics.  Had flex sig with biopsies on 7/19/21.   Non reactive HBVsAg: no latent HBV infection  # Weight loss - likely 2/2 CD c/b oral aphthous ulcers and diarrhea as he has not been able to tolerate po and will need to ensure adequate hydration.  # GERD     Recommendations:  - Continue to low residual diet as tolerated  - Add protein supplements, obtain dietician evaluation  - s/p PO oliver and rico performed FMT on 7/28/2021 in coordination with Infectious Disease and Pharmacy  - trend clinical response to FMT      Jaison Fang, PGY-4  GI/Hepatology Fellow    MONDAY-FRIDAY 8AM-5PM:  Available via Microsoft Teams  Pager# [phone] ([facility]) or [phone] ([facility])  GI Phone# [phone] ([facility])    NON-URGENT CONSULTS:  Please email [email or web address]  AT NIGHT AND ON WEEKENDS:  Contact on-call GI fellow via answering service ([phone]) from 5pm-8am and on weekends/holidays

## 2021-07-29 NOTE — PROGRESS NOTE ADULT - SUBJECTIVE AND OBJECTIVE BOX
Patient is a 81y old  Male who presents with a chief complaint of diarrhea with blood in it , abd pain and weakness (29 Jul 2021 15:25)      INTERVAL HPI/OVERNIGHT EVENTS:  T(C): 36.9 (07-29-21 @ 20:12), Max: 36.9 (07-29-21 @ 20:12)  HR: 77 (07-29-21 @ 20:12) (71 - 78)  BP: 139/82 (07-29-21 @ 20:12) (108/64 - 150/70)  RR: 18 (07-29-21 @ 20:12) (18 - 18)  SpO2: 94% (07-29-21 @ 20:12) (92% - 95%)  Wt(kg): --  I&O's Summary    28 Jul 2021 07:01  -  29 Jul 2021 07:00  --------------------------------------------------------  IN: 2265 mL / OUT: 0 mL / NET: 2265 mL    29 Jul 2021 07:01  -  29 Jul 2021 23:46  --------------------------------------------------------  IN: 1380 mL / OUT: 0 mL / NET: 1380 mL        PAST MEDICAL & SURGICAL HISTORY:  No pertinent past medical history    No significant past surgical history        SOCIAL HISTORY  Alcohol:  Tobacco:  Illicit substance use:    FAMILY HISTORY:    REVIEW OF SYSTEMS:  CONSTITUTIONAL: No fever, weight loss, or fatigue  EYES: No eye pain, visual disturbances, or discharge  ENMT:  No difficulty hearing, tinnitus, vertigo; No sinus or throat pain  NECK: No pain or stiffness  RESPIRATORY: No cough, wheezing, chills or hemoptysis; No shortness of breath  CARDIOVASCULAR: No chest pain, palpitations, dizziness, or leg swelling  GASTROINTESTINAL: No abdominal or epigastric pain. No nausea, vomiting, or hematemesis; No diarrhea or constipation. No melena or hematochezia.  GENITOURINARY: No dysuria, frequency, hematuria, or incontinence  NEUROLOGICAL: No headaches, memory loss, loss of strength, numbness, or tremors  SKIN: No itching, burning, rashes, or lesions   LYMPH NODES: No enlarged glands  ENDOCRINE: No heat or cold intolerance; No hair loss  MUSCULOSKELETAL: No joint pain or swelling; No muscle, back, or extremity pain  PSYCHIATRIC: No depression, anxiety, mood swings, or difficulty sleeping  HEME/LYMPH: No easy bruising, or bleeding gums  ALLERY AND IMMUNOLOGIC: No hives or eczema    RADIOLOGY & ADDITIONAL TESTS:    Imaging Personally Reviewed:  [ ] YES  [ ] NO    Consultant(s) Notes Reviewed:  [ ] YES  [ ] NO    PHYSICAL EXAM:  GENERAL: NAD, well-groomed, well-developed  HEAD:  Atraumatic, Normocephalic  EYES: EOMI, PERRLA, conjunctiva and sclera clear  ENMT: No tonsillar erythema, exudates, or enlargement; Moist mucous membranes, Good dentition, No lesions  NECK: Supple, No JVD, Normal thyroid  NERVOUS SYSTEM:  Alert & Oriented X3, Good concentration; Motor Strength 5/5 B/L upper and lower extremities; DTRs 2+ intact and symmetric  CHEST/LUNG: Clear to percussion bilaterally; No rales, rhonchi, wheezing, or rubs  HEART: Regular rate and rhythm; No murmurs, rubs, or gallops  ABDOMEN: Soft, Nontender, Nondistended; Bowel sounds present  EXTREMITIES:  2+ Peripheral Pulses, No clubbing, cyanosis, or edema  LYMPH: No lymphadenopathy noted  SKIN: No rashes or lesions    LABS:                        8.2    9.19  )-----------( 382      ( 29 Jul 2021 06:56 )             26.8     07-29    133<L>  |  101  |  6<L>  ----------------------------<  69<L>  3.3<L>   |  23  |  0.58    Ca    7.9<L>      29 Jul 2021 06:52          CAPILLARY BLOOD GLUCOSE                MEDICATIONS  (STANDING):  clonazePAM  Tablet 0.25 milliGRAM(s) Oral at bedtime  folic acid 1 milliGRAM(s) Oral daily  multivitamin 1 Tablet(s) Oral daily  pantoprazole    Tablet 40 milliGRAM(s) Oral before breakfast  sodium chloride 0.9%. 1000 milliLiter(s) (75 mL/Hr) IV Continuous <Continuous>  sotalol 120 milliGRAM(s) Oral daily    MEDICATIONS  (PRN):  benzonatate 100 milliGRAM(s) Oral every 8 hours PRN Cough      Care Discussed with Consultants/Other Providers [ ] YES  [ ] NO Patient is a 81y old  Male who presents with a chief complaint of diarrhea with blood in it , abd pain and weakness (2021 15:25)      INTERVAL HPI/OVERNIGHT EVENTS: s/p FMT yesterday , today still diarrhea , but  in frequency and stools more formed then liquid   T(C): 36.9 (21 @ 20:12), Max: 36.9 (21 @ 20:12)  HR: 77 (21 @ 20:12) (71 - 78)  BP: 139/82 (21 @ 20:12) (108/64 - 150/70)  RR: 18 (21 @ 20:12) (18 - 18)  SpO2: 94% (21 @ 20:12) (92% - 95%)  Wt(kg): --  I&O's Summary    2021 07:  -  2021 07:00  --------------------------------------------------------  IN: 2265 mL / OUT: 0 mL / NET: 2265 mL    2021 07:  -  2021 23:46  --------------------------------------------------------  IN: 1380 mL / OUT: 0 mL / NET: 1380 mL        PAST MEDICAL & SURGICAL HISTORY:  No pertinent past medical history    No significant past surgical history        SOCIAL HISTORY  Alcohol:  Tobacco:  Illicit substance use:    FAMILY HISTORY:    REVIEW OF SYSTEMS:  CONSTITUTIONAL: No fever, weight loss, or fatigue  EYES: No eye pain, visual disturbances, or discharge  ENMT:  No difficulty hearing, tinnitus, vertigo; No sinus or throat pain  NECK: No pain or stiffness  RESPIRATORY: No cough, wheezing, chills or hemoptysis; No shortness of breath  CARDIOVASCULAR: No chest pain, palpitations, dizziness, or leg swelling  GASTROINTESTINAL: No abdominal or epigastric pain. No nausea, vomiting, or hematemesis; No diarrhea or constipation. No melena or hematochezia.  GENITOURINARY: No dysuria, frequency, hematuria, or incontinence  NEUROLOGICAL: No headaches, memory loss, loss of strength, numbness, or tremors  SKIN: No itching, burning, rashes, or lesions   LYMPH NODES: No enlarged glands  ENDOCRINE: No heat or cold intolerance; No hair loss  MUSCULOSKELETAL: No joint pain or swelling; No muscle, back, or extremity pain  PSYCHIATRIC: No depression, anxiety, mood swings, or difficulty sleeping  HEME/LYMPH: No easy bruising, or bleeding gums  ALLERY AND IMMUNOLOGIC: No hives or eczema    RADIOLOGY & ADDITIONAL TESTS:    Imaging Personally Reviewed:  [ ] YES  [ ] NO    Consultant(s) Notes Reviewed:  [ ] YES  [ ] NO    PHYSICAL EXAM:  GENERAL: NAD, well-groomed, well-developed  HEAD:  Atraumatic, Normocephalic  EYES: EOMI, PERRLA, conjunctiva and sclera clear  ENMT: No tonsillar erythema, exudates, or enlargement; Moist mucous membranes, Good dentition, No lesions  NECK: Supple, No JVD, Normal thyroid  NERVOUS SYSTEM:  Alert & Oriented X3, Good concentration; Motor Strength 5/5 B/L upper and lower extremities; DTRs 2+ intact and symmetric  CHEST/LUNG: Clear to percussion bilaterally; No rales, rhonchi, wheezing, or rubs  HEART: Regular rate and rhythm; No murmurs, rubs, or gallops  ABDOMEN: Soft, Nontender, Nondistended; Bowel sounds present  EXTREMITIES:  2+ Peripheral Pulses, No clubbing, cyanosis, or edema  LYMPH: No lymphadenopathy noted  SKIN: No rashes or lesions    LABS:                        8.2    9.19  )-----------( 382      ( 2021 06:56 )             26.8     07-    133<L>  |  101  |  6<L>  ----------------------------<  69<L>  3.3<L>   |  23  |  0.58    Ca    7.9<L>      2021 06:52          CAPILLARY BLOOD GLUCOSE                MEDICATIONS  (STANDING):  clonazePAM  Tablet 0.25 milliGRAM(s) Oral at bedtime  folic acid 1 milliGRAM(s) Oral daily  multivitamin 1 Tablet(s) Oral daily  pantoprazole    Tablet 40 milliGRAM(s) Oral before breakfast  sodium chloride 0.9%. 1000 milliLiter(s) (75 mL/Hr) IV Continuous <Continuous>  sotalol 120 milliGRAM(s) Oral daily    MEDICATIONS  (PRN):  benzonatate 100 milliGRAM(s) Oral every 8 hours PRN Cough      Care Discussed with Consultants/Other Providers [ ] YES  [ ] NO

## 2021-07-29 NOTE — PROGRESS NOTE ADULT - SUBJECTIVE AND OBJECTIVE BOX
Patient is a 81y old  Male who presents with a chief complaint of diarrhea with blood in it , abd pain and weakness (29 Jul 2021 13:58)      INTERVAL HISTORY:     TELEMETRY Personally reviewed:    REVIEW OF SYSTEMS:   CONSTITUTIONAL: No weakness  EYES/ENT: No visual changes; No throat pain  Neck: No pain or stiffness  Respiratory: No cough, wheezing, No shortness of breath  CARDIOVASCULAR: no chest pain or palpitations  GASTROINTESTINAL: No abdominal pain, no nausea, vomiting or hematemesis  GENITOURINARY: No dysuria, frequency or hematuria  NEUROLOGICAL: No stroke like symptoms  SKIN: No rashes    	  MEDICATIONS:  sotalol 120 milliGRAM(s) Oral daily        PHYSICAL EXAM:  T(C): 36.6 (07-29-21 @ 13:23), Max: 36.9 (07-28-21 @ 20:24)  HR: 72 (07-29-21 @ 13:23) (61 - 83)  BP: 108/64 (07-29-21 @ 13:23) (108/64 - 152/80)  RR: 18 (07-29-21 @ 13:23) (13 - 20)  SpO2: 95% (07-29-21 @ 13:23) (92% - 100%)  Wt(kg): --  I&O's Summary    28 Jul 2021 07:01  -  29 Jul 2021 07:00  --------------------------------------------------------  IN: 2265 mL / OUT: 0 mL / NET: 2265 mL    29 Jul 2021 07:01  -  29 Jul 2021 15:26  --------------------------------------------------------  IN: 420 mL / OUT: 0 mL / NET: 420 mL      Height (cm): 170.2 (07-28 @ 15:32)  Weight (kg): 59 (07-28 @ 15:32)  BMI (kg/m2): 20.4 (07-28 @ 15:32)  BSA (m2): 1.68 (07-28 @ 15:32)    Appearance: In no distress	  HEENT:    PERRL, EOMI	  Cardiovascular:  S1 S2, No JVD  Respiratory: Lungs clear to auscultation	  Gastrointestinal:  Soft, Non-tender, + BS	  Vascularature:  No edema of LE  Psychiatric: Appropriate affect   Neuro: no acute focal deficits                               8.2    9.19  )-----------( 382      ( 29 Jul 2021 06:56 )             26.8     07-29    133<L>  |  101  |  6<L>  ----------------------------<  69<L>  3.3<L>   |  23  |  0.58    Ca    7.9<L>      29 Jul 2021 06:52          Labs personally reviewed      ASSESSMENT/PLAN:   	  Problem/Plan - 1:  ·  Problem: CAD (coronary artery disease).  Plan: c/w home meds  We discussed the importance of SAPT with low dose ASA 81mg given CAD  pt denies any current chest pain, SOB or chest pressure.     Problem/Plan -2:  ·  Problem: Colitis.  Plan: Ct shows chron's  exacerbation   f/u flex sig with biopsies   on Vanco and flagyl   GI and ID following    Gi/ ID recc fecal matter transplant via scope vs NGT    s/p FMT 7/28  diarrhea improving     Problem/Plan - 3:  ·  Problem: Afib pt said he was placed on sotalol for afib   -c/w sotalol   -EKG noted. SR     Problem/Plan - 4:   ·  Problem: GIB (gastrointestinal bleeding).  Plan: bloody diarrhea 2/2 chron's exacerbation   IV fluids   -c-diff positive:   -abx per ID    Problem/Plan - 5:  ·  Problem: SCDs  pt ambulatory     Problem/Plan - 6:  ·  Problem: B/L LE edema   - CXR shows mildly enlarged heart   -monitor IVF with I&O  -B/L L E edema improved   -TTE when pt more stable to assess heart function         Kar oPwell [facility]   Rufus Onofre DO [facility]  Cardiovascular Medicine  [address], [address]  Office: [phone]  Cell: [phone]

## 2021-07-30 NOTE — PROGRESS NOTE ADULT - SUBJECTIVE AND OBJECTIVE BOX
CC: C diff    Saw/spoke to patient. Low grade fever. Still diarrhea.    Allergies  No Known Allergies    ANTIMICROBIALS:  Off    PE:    Vital Signs Last 24 Hrs  T(C): 36.7 (30 Jul 2021 12:10), Max: 38.2 (30 Jul 2021 04:25)  T(F): 98 (30 Jul 2021 12:10), Max: 100.8 (30 Jul 2021 04:25)  HR: 73 (30 Jul 2021 12:10) (71 - 98)  BP: 104/68 (30 Jul 2021 12:10) (104/68 - 149/76)  RR: 18 (30 Jul 2021 12:10) (18 - 18)  SpO2: 94% (30 Jul 2021 12:10) (92% - 94%)    Gen: AOx3, NAD, fatigued  CV: S1+S2 normal, nontachycardic  Resp: Clear bilat, no resp distress, no crackles/wheezes  Abd: Soft, nontender, +BS  Ext: No LE edema, no wounds    LABS:                        8.0    11.74 )-----------( 394      ( 30 Jul 2021 05:51 )             25.8     07-30    134<L>  |  103  |  5<L>  ----------------------------<  95  3.5   |  23  |  0.64    Ca    7.6<L>      30 Jul 2021 05:51    MICROBIOLOGY:    .Blood Blood-Peripheral  07-27-21   No growth to date.     .Blood Blood-Venous  07-24-21   No Growth Final      .Blood Blood  07-22-21   No Growth Final      .Stool  07-15-21   No Protozoa seen by trichrome stain  No Helminths or Protozoa seen in formalin concentrate  performed by iodine stain  (routine O+P not evaluated for Microsporidia,  Cryptosporidia, Cyclospora, or Isospora.)  One negative sample does not necessarily rule  out the presence of a parasitic infection.  Numerous White blood cells  --  --    .Stool Feces, namrata abdon  07-14-21   No enteric pathogens isolated.  (Stool culture examined for Salmonella,  Shigella, Campylobacter, Aeromonas, Plesiomonas,  Vibrio, E.coli O157 and Yersinia)  --  --    Rapid RVP Result: Detected (07-29 @ 13:49) Parainfluenza    RADIOLOGY:    7/30 XR:    FINDINGS:    The heart is not well assessed on an AP film.  The lungs are clear.  There are no pleural effusions.  There is no pneumothorax.    IMPRESSION:    No acute pulmonary disease

## 2021-07-30 NOTE — PROGRESS NOTE ADULT - ASSESSMENT
GIDEON HAYES is a 81y Male with GERD on omeprazole daily and Crohn's disease diagnosed about 9 mos prior to admission at outside hospital c/b h/o abscess and left posterior distal anal intersphincteric fistula and presents as a new consult to GI for diarrhea/BRBPR likely 2/2 to CD flare c/b c diff colitis.    # Diarrhea/BRBPR - likely 2/2 CD flare with leukocytosis most likely 2/2 cdiff colitis, less likely 2/2 CMV colitis. Patient's cdiff was positive and is the likely cause of his CD flare. Still with significant sx despite antibiotics.  Had flex sig with biopsies on 7/19/21.   Non reactive HBVsAg: no latent HBV infection  # Weight loss - likely 2/2 CD c/b oral aphthous ulcers and diarrhea as he has not been able to tolerate po and will need to ensure adequate hydration.  # GERD     Recommendations:  - Continue to low residual diet as tolerated  - Add protein supplements, obtain dietician evaluation  - s/p PO oliver and rico performed FMT on 7/28/2021 in coordination with Infectious Disease and Pharmacy  - trend clinical response to FMT      Jaison Fang, PGY-4  GI/Hepatology Fellow    MONDAY-FRIDAY 8AM-5PM:  Available via [employer] Teams  Pager# [phone] ([facility]) or [phone] ([facility])  GI Phone# [phone] ([facility])    NON-URGENT CONSULTS:  Please email [email or web address] OR [email or web address]  AT NIGHT AND ON WEEKENDS:  Contact on-call GI fellow via answering service ([phone]) from 5pm-8am and on weekends/holidays GIDEON HAYES is a 81y Male with GERD on omeprazole daily and Crohn's disease diagnosed about 9 mos prior to admission at outside hospital c/b h/o abscess and left posterior distal anal intersphincteric fistula and presents as a new consult to GI for diarrhea/BRBPR likely 2/2 to CD flare c/b c diff colitis.    # Diarrhea/BRBPR - likely 2/2 CD flare with leukocytosis most likely 2/2 cdiff colitis, less likely 2/2 CMV colitis. Patient's cdiff was positive and is the likely cause of his CD flare. Still with significant sx despite antibiotics.  Had flex sig with biopsies on 7/19/21.   Non reactive HBVsAg: no latent HBV infection  # Weight loss - likely 2/2 CD c/b oral aphthous ulcers and diarrhea as he has not been able to tolerate po and will need to ensure adequate hydration.  # GERD   # Parainfluzena infection    Recommendations:  - Continue to low residual diet as tolerated  - Add protein supplements, obtain dietician evaluation  - s/p PO oliver and rico performed FMT on 7/28/2021 in coordination with Infectious Disease and Pharmacy  - trend clinical response to FMT  - Supportive care for parainfluenza infection      Jiason Fang, PGY-4  GI/Hepatology Fellow    MONDAY-FRIDAY 8AM-5PM:  Available via [employer] Teams  Pager# [phone] ([facility]) or [phone] ([facility])  GI Phone# [phone] ([facility])    NON-URGENT CONSULTS:  Please email [email or web address]  AT NIGHT AND ON WEEKENDS:  Contact on-call GI fellow via answering service ([phone]) from 5pm-8am and on weekends/holidays

## 2021-07-30 NOTE — PROGRESS NOTE ADULT - ATTENDING COMMENTS
Patient seen and examined, agree with above. Down to 3 BM last night, from 15 before FMT. Discussed with ID attending - there were still pseudomembranes in most of left and transverse colon on last colonoscopy, but there were also some ulcerations that are more likely due to Crohn's. As stools are better after FMT, would continue to treat C diff first. If no improvement next week, then we will need to re-evaluate if we need restart immunosuppresion.

## 2021-07-30 NOTE — PROGRESS NOTE ADULT - SUBJECTIVE AND OBJECTIVE BOX
Patient is a 81y old  Male who presents with a chief complaint of diarrhea with blood in it , abd pain and weakness (2021 16:36)      INTERVAL HPI/OVERNIGHT EVENTS: low grade fever 100.8 , still diarrhea   little better then before   T(C): 36.5 (21 @ 20:18), Max: 38.2 (21 @ 04:25)  HR: 77 (21 @ 20:18) (71 - 98)  BP: 127/68 (21 @ 20:18) (104/68 - 149/76)  RR: 18 (21 @ 20:18) (18 - 18)  SpO2: 97% (21 @ 20:18) (92% - 97%)  Wt(kg): --  I&O's Summary    2021 07:01  -  2021 07:00  --------------------------------------------------------  IN: 1380 mL / OUT: 0 mL / NET: 1380 mL    2021 07:01  -  2021 02:13  --------------------------------------------------------  IN: 1500 mL / OUT: 125 mL / NET: 1375 mL        PAST MEDICAL & SURGICAL HISTORY:  No pertinent past medical history    No significant past surgical history        SOCIAL HISTORY  Alcohol:  Tobacco:  Illicit substance use:    FAMILY HISTORY:    REVIEW OF SYSTEMS:  CONSTITUTIONAL: No fever, weight loss, or fatigue  EYES: No eye pain, visual disturbances, or discharge  ENMT:  No difficulty hearing, tinnitus, vertigo; No sinus or throat pain  NECK: No pain or stiffness  RESPIRATORY: No cough, wheezing, chills or hemoptysis; No shortness of breath  CARDIOVASCULAR: No chest pain, palpitations, dizziness, or leg swelling  GASTROINTESTINAL: No abdominal or epigastric pain. No nausea, vomiting, or hematemesis; No diarrhea or constipation. No melena or hematochezia.  GENITOURINARY: No dysuria, frequency, hematuria, or incontinence  NEUROLOGICAL: No headaches, memory loss, loss of strength, numbness, or tremors  SKIN: No itching, burning, rashes, or lesions   LYMPH NODES: No enlarged glands  ENDOCRINE: No heat or cold intolerance; No hair loss  MUSCULOSKELETAL: No joint pain or swelling; No muscle, back, or extremity pain  PSYCHIATRIC: No depression, anxiety, mood swings, or difficulty sleeping  HEME/LYMPH: No easy bruising, or bleeding gums  ALLERY AND IMMUNOLOGIC: No hives or eczema    RADIOLOGY & ADDITIONAL TESTS:    Imaging Personally Reviewed:  [ ] YES  [ ] NO    Consultant(s) Notes Reviewed:  [ ] YES  [ ] NO    PHYSICAL EXAM:  GENERAL: NAD, well-groomed, well-developed  HEAD:  Atraumatic, Normocephalic  EYES: EOMI, PERRLA, conjunctiva and sclera clear  ENMT: No tonsillar erythema, exudates, or enlargement; Moist mucous membranes, Good dentition, No lesions  NECK: Supple, No JVD, Normal thyroid  NERVOUS SYSTEM:  Alert & Oriented X3, Good concentration; Motor Strength 5/5 B/L upper and lower extremities; DTRs 2+ intact and symmetric  CHEST/LUNG: Clear to percussion bilaterally; No rales, rhonchi, wheezing, or rubs  HEART: Regular rate and rhythm; No murmurs, rubs, or gallops  ABDOMEN: Soft, Nontender, Nondistended; Bowel sounds present  EXTREMITIES:  2+ Peripheral Pulses, No clubbing, cyanosis, or edema  LYMPH: No lymphadenopathy noted  SKIN: No rashes or lesions    LABS:                        8.0    11.74 )-----------( 394      ( 2021 05:51 )             25.8     0730    134<L>  |  103  |  5<L>  ----------------------------<  95  3.5   |  23  |  0.64    Ca    7.6<L>      2021 05:51        Urinalysis Basic - ( 2021 19:16 )    Color: Yellow / Appearance: Clear / S.020 / pH: x  Gluc: x / Ketone: Negative  / Bili: Negative / Urobili: Negative   Blood: x / Protein: 30 mg/dL / Nitrite: Negative   Leuk Esterase: Negative / RBC: 2 /hpf / WBC 3 /HPF   Sq Epi: x / Non Sq Epi: 2 /hpf / Bacteria: Negative      CAPILLARY BLOOD GLUCOSE            Urinalysis Basic - ( 2021 19:16 )    Color: Yellow / Appearance: Clear / S.020 / pH: x  Gluc: x / Ketone: Negative  / Bili: Negative / Urobili: Negative   Blood: x / Protein: 30 mg/dL / Nitrite: Negative   Leuk Esterase: Negative / RBC: 2 /hpf / WBC 3 /HPF   Sq Epi: x / Non Sq Epi: 2 /hpf / Bacteria: Negative        MEDICATIONS  (STANDING):  clonazePAM  Tablet 0.25 milliGRAM(s) Oral at bedtime  folic acid 1 milliGRAM(s) Oral daily  multivitamin 1 Tablet(s) Oral daily  sodium chloride 0.9%. 1000 milliLiter(s) (75 mL/Hr) IV Continuous <Continuous>  sotalol 120 milliGRAM(s) Oral daily    MEDICATIONS  (PRN):  acetaminophen   Tablet .. 650 milliGRAM(s) Oral every 6 hours PRN Mild Pain (1 - 3), Moderate Pain (4 - 6)  benzonatate 100 milliGRAM(s) Oral every 8 hours PRN Cough      Care Discussed with Consultants/Other Providers [ ] YES  [ ] NO

## 2021-07-30 NOTE — CHART NOTE - NSCHARTNOTEFT_GEN_A_CORE
Nutrition Follow Up Note  Patient seen for: Malnutrition, severe    Chart reviewed, events noted.    Patient is a 81y old  Male who presents with a chief complaint of diarrhea with blood in it , abd pain and weakness.  PMH  Crohn's disease diagnosed about 9 mos prior to admission c/b h/o abscess and left posterior distal anal intersphincteric fistula. Followed by GI and ID for diarrhea noted likely 2/2 to CD flare c/b c diff colitis.  S/P   FMT     Source: [ X] Patient       [x] EMR        [ X] RN        [ X] Family at bedside          -  Diet Order:   Diet, Low Fiber:   Banatrol TF     Qty per Day:  1 pkt po q8h  Liquid Protein Supplement     Qty per Day:  3  Supplement Feeding Modality:  Oral  Probiotic Yogurt/Smoothie Cans or Servings Per Day:  2       Frequency:  Daily (21)    - Is current order appropriate/adequate? [X ] Yes      - PO intake :   [X ] <50%  Remains poor, patient has weakness. Protein supplements ,banana flakes and probiotic yogurt drinks continued daily.  Stool output noted decreased  GI:  Last BM   = 5x    7/30= x3 today at 12:30pm       Bowel regimen: Banatrol banana flakes Q8hrs daily    - Micronutrient Supplementation: folic acid 1 milliGRAM(s) Oral daily  multivitamin 1 Tablet(s) Oral daily  sodium chloride 0.9%. 1000 milliLiter(s) IV Continuous <Continuous>      Weights: Dosing 59kg  weight loss noted  Daily Weight in k.6 ()  MEDICATIONS  (STANDING):  clonazePAM  Tablet  folic acid  multivitamin  pantoprazole    Tablet  sodium chloride 0.9%.  sotalol    Pertinent Labs:  @ 05:51: Na 134<L>, BUN 5<L>, Cr 0.64, BG 95, K+ 3.5, Phos --, Mg --, Alk Phos --, ALT/SGPT --, AST/SGOT --, HbA1c --            Skin per nursing documentation: sacral decubiti st II  Edema:     Estimated Needs: high protein,   [X ] no change since previous assessment  [ ] recalculated:     Previous Nutrition Diagnosis:   Severe Malnutrition  Nutrition Diagnosis is: [ X] ongoing, addressed     New Nutrition Diagnosis: [ ] Not applicable    Nutrition Care Plan:  [X ] In Progress  [ ] Achieved  [ ] Not applicable          Nutrition Interventions / Recommendations:  1) continue daily intake of  Ronnie Active probiotic yogurt drink x2 daily  2) continue  LPS liquid protein supplements (x3) daily added to Gatorade which he likes      3) Banatrol banana flakes x3 daily: Q8hrs   4)  Continue Current Diet Low Residue: per GI  5) add Ensure enlive x2 daily                                       6) continue MVI, folic acid  Discussed with team   Monitoring and Evaluation:   Continue to monitor Nutritional intake, Tolerance to diet prescription, weights, labs, skin integrity    LATA Whitaker, RD, CDN #[phone]   RD remains available upon request and will follow up per protocol

## 2021-07-30 NOTE — CHART NOTE - NSCHARTNOTEFT_GEN_A_CORE
MEDICINE PA    Notified by RN patient with temperature 100.8 . Seen and examined patient at bedside. Patient is alert, continues to cough. Denies HA, CP, SOB, N/V, or abd pain.    VITAL SIGNS:  T(C): 38.2 (07-30-21 @ 04:25), Max: 38.2 (07-30-21 @ 04:25)  HR: 98 (07-30-21 @ 04:25) (71 - 98)  BP: 149/76 (07-30-21 @ 04:25) (108/64 - 150/70)  RR: 18 (07-30-21 @ 04:25) (18 - 18)  SpO2: 92% (07-30-21 @ 04:25) (92% - 95%)        LABORATORY:                          8.2    9.19  )-----------( 382      ( 29 Jul 2021 06:56 )             26.8       07-29    133<L>  |  101  |  6<L>  ----------------------------<  69<L>  3.3<L>   |  23  |  0.58    Ca    7.9<L>      29 Jul 2021 06:52            MICROBIOLOGY:   Culture - Blood (07.27.21 @ 13:12)    Specimen Source: .Blood Blood-Peripheral    Culture Results:   No growth to date.      Culture - Blood (07.27.21 @ 13:12)    Specimen Source: .Blood Blood-Peripheral    Culture Results:   No growth to date.          RADIOLOGY:  < from: Xray Chest 1 View- PORTABLE-Routine (Xray Chest 1 View- PORTABLE-Routine .) (07.27.21 @ 10:44) >  IMPRESSION:  The heart is normal in size. The lungs appear to be clear. No pleural effusion. No pneumothorax. Degenerative changes of the thoracic spine.  --- End of Report ---      PHYSICAL EXAM:  Constitutional: AOx3. NAD.  Respiratory: clear lungs bilaterally.   Cardiovascular: S1 S2.   Gastrointestinal: BS X4 active. soft. nontender.  Extremities/Vascular: +2 pulses bilaterally. No BLE edema.      ASSESSMENT/PLAN:   HPI:  80 yo M with Crohn disease, over the last 9 mo, he was started on several medication and was recently told that he may need to go on IV infusion for chrons, he has several hospitalization at [facility] , and follows with GI near [facility]. but his diarrhea is not improving , having abd cramps , denies any N/V . , discharged 1 mo ago from [facility], now having trouble walking, on going diarrhea. + subjective fever but denies cough or nasal congestion or uri or urinary symptoms.  (13 Jul 2021 20:48)        1) Fever  -tylenol and cooling measures prn for pyrexia  -BC x2, UA ordered   -CXR ordered for worsening cough - Robitussin ordered x1   -c/w IVF   - Will endorse to AM team      Shai Azul  Dept of Medicine   #[id number] MEDICINE [facility]    Notified by RN patient with temperature 100.8 . Seen and examined patient at bedside. Patient is alert, continues to cough. Denies HA, CP, SOB, N/V, or abd pain.    VITAL SIGNS:  T(C): 38.2 (07-30-21 @ 04:25), Max: 38.2 (07-30-21 @ 04:25)  HR: 98 (07-30-21 @ 04:25) (71 - 98)  BP: 149/76 (07-30-21 @ 04:25) (108/64 - 150/70)  RR: 18 (07-30-21 @ 04:25) (18 - 18)  SpO2: 92% (07-30-21 @ 04:25) (92% - 95%)        LABORATORY:                          8.2    9.19  )-----------( 382      ( 29 Jul 2021 06:56 )             26.8       07-29    133<L>  |  101  |  6<L>  ----------------------------<  69<L>  3.3<L>   |  23  |  0.58    Ca    7.9<L>      29 Jul 2021 06:52            MICROBIOLOGY:   Culture - Blood (07.27.21 @ 13:12)    Specimen Source: .Blood Blood-Peripheral    Culture Results:   No growth to date.      Culture - Blood (07.27.21 @ 13:12)    Specimen Source: .Blood Blood-Peripheral    Culture Results:   No growth to date.          RADIOLOGY:  < from: Xray Chest 1 View- PORTABLE-Routine (Xray Chest 1 View- PORTABLE-Routine .) (07.27.21 @ 10:44) >  IMPRESSION:  The heart is normal in size. The lungs appear to be clear. No pleural effusion. No pneumothorax. Degenerative changes of the thoracic spine.  --- End of Report ---      PHYSICAL EXAM:  Constitutional: AOx3. NAD.  Respiratory: clear lungs bilaterally.   Cardiovascular: S1 S2.   Gastrointestinal: BS X4 active. soft. nontender.  Extremities/Vascular: +2 pulses bilaterally. No BLE edema.      ASSESSMENT/PLAN:   HPI:  80 yo M with Crohn disease, over the last 9 mo, he was started on several medication and was recently told that he may need to go on IV infusion for chrons, he has several hospitalization at [facility] , and follows with GI near [facility]. but his diarrhea is not improving , having abd cramps , denies any N/V . , discharged 1 mo ago from [facility], now having trouble walking, on going diarrhea. + subjective fever but denies cough or nasal congestion or uri or urinary symptoms.  (13 Jul 2021 20:48)        1) Fever  -tylenol and cooling measures prn for pyrexia  -BC x2, UA ordered   - CBC STAT  -CXR ordered for worsening cough - Robitussin ordered x1   -c/w IVF   - Will endorse to AM team      Shai Azul  Dept of Medicine   #[id number] MEDICINE [facility]    Notified by RN patient with temperature 100.8 . Seen and examined patient at bedside. Patient is alert, continues to cough. Denies HA, CP, SOB, N/V, or abd pain.    VITAL SIGNS:  T(C): 38.2 (07-30-21 @ 04:25), Max: 38.2 (07-30-21 @ 04:25)  HR: 98 (07-30-21 @ 04:25) (71 - 98)  BP: 149/76 (07-30-21 @ 04:25) (108/64 - 150/70)  RR: 18 (07-30-21 @ 04:25) (18 - 18)  SpO2: 92% (07-30-21 @ 04:25) (92% - 95%)        LABORATORY:                          8.2    9.19  )-----------( 382      ( 29 Jul 2021 06:56 )             26.8       07-29    133<L>  |  101  |  6<L>  ----------------------------<  69<L>  3.3<L>   |  23  |  0.58    Ca    7.9<L>      29 Jul 2021 06:52            MICROBIOLOGY:   Culture - Blood (07.27.21 @ 13:12)    Specimen Source: .Blood Blood-Peripheral    Culture Results:   No growth to date.      Culture - Blood (07.27.21 @ 13:12)    Specimen Source: .Blood Blood-Peripheral    Culture Results:   No growth to date.          RADIOLOGY:  < from: Xray Chest 1 View- PORTABLE-Routine (Xray Chest 1 View- PORTABLE-Routine .) (07.27.21 @ 10:44) >  IMPRESSION:  The heart is normal in size. The lungs appear to be clear. No pleural effusion. No pneumothorax. Degenerative changes of the thoracic spine.  --- End of Report ---      PHYSICAL EXAM:  Constitutional: AOx3. NAD.  Respiratory: clear lungs bilaterally.   Cardiovascular: S1 S2.   Gastrointestinal: BS X4 active. soft. nontender.  Extremities/Vascular: +2 pulses bilaterally. No BLE edema.      ASSESSMENT/PLAN:   HPI:  82 yo M with Crohn disease, over the last 9 mo, he was started on several medication and was recently told that he may need to go on IV infusion for chrons, he has several hospitalization at [facility] , and follows with GI near [facility]. but his diarrhea is not improving , having abd cramps , denies any N/V . , discharged 1 mo ago from [facility], now having trouble walking, on going diarrhea. + subjective fever but denies cough or nasal congestion or uri or urinary symptoms.  (13 Jul 2021 20:48)        1) Fever  -tylenol and cooling measures prn for pyrexia  -BC x2, UA ordered   - CBC STAT (wbc 11.74 increased from baseline)  -CXR ordered for worsening cough - Robitussin ordered x1   -c/w IVF   - ID is following   - Will endorse to AM team      Shai Azul  Dept of Medicine   #[id number] MEDICINE [facility]    Notified by RN patient with temperature 100.8 . Seen and examined patient at bedside. Patient is alert, continues to cough. Denies HA, CP, SOB, N/V, or abd pain.    VITAL SIGNS:  T(C): 38.2 (07-30-21 @ 04:25), Max: 38.2 (07-30-21 @ 04:25)  HR: 98 (07-30-21 @ 04:25) (71 - 98)  BP: 149/76 (07-30-21 @ 04:25) (108/64 - 150/70)  RR: 18 (07-30-21 @ 04:25) (18 - 18)  SpO2: 92% (07-30-21 @ 04:25) (92% - 95%)        LABORATORY:                          8.2    9.19  )-----------( 382      ( 29 Jul 2021 06:56 )             26.8       07-29    133<L>  |  101  |  6<L>  ----------------------------<  69<L>  3.3<L>   |  23  |  0.58    Ca    7.9<L>      29 Jul 2021 06:52            MICROBIOLOGY:   Culture - Blood (07.27.21 @ 13:12)    Specimen Source: .Blood Blood-Peripheral    Culture Results:   No growth to date.      Culture - Blood (07.27.21 @ 13:12)    Specimen Source: .Blood Blood-Peripheral    Culture Results:   No growth to date.          RADIOLOGY:  < from: Xray Chest 1 View- PORTABLE-Routine (Xray Chest 1 View- PORTABLE-Routine .) (07.27.21 @ 10:44) >  IMPRESSION:  The heart is normal in size. The lungs appear to be clear. No pleural effusion. No pneumothorax. Degenerative changes of the thoracic spine.  --- End of Report ---      PHYSICAL EXAM:  Constitutional: AOx3. NAD.  Respiratory: clear lungs bilaterally.   Cardiovascular: S1 S2.   Gastrointestinal: BS X4 active. soft. nontender.  Extremities/Vascular: +2 pulses bilaterally. No BLE edema.      ASSESSMENT/PLAN:   HPI:  82 yo M with Crohn disease, over the last 9 mo, he was started on several medication and was recently told that he may need to go on IV infusion for chrons, he has several hospitalization at [facility] , and follows with GI near [facility]. but his diarrhea is not improving , having abd cramps , denies any N/V . , discharged 1 mo ago from [facility], now having trouble walking, on going diarrhea. + subjective fever but denies cough or nasal congestion or uri or urinary symptoms.  (13 Jul 2021 20:48)        1) Fever  -Tylenol and cooling measures prn for pyrexia  -BC x2, UA ordered   - CBC STAT (wbc 11.74 increased from baseline)  -CXR ordered for worsening cough - Robitussin ordered x1   -c/w IVF   - ID is following   - Spoke with  in [place] to [facility] and requested for ID to follow whether to resume vanco or not   - Will endorse to AM team      Shai Azul  Dept of Medicine   #[id number] MEDICINE PA    Notified by RN patient with temperature 100.8 . Seen and examined patient at bedside. Patient is alert, continues to cough. Denies HA, CP, SOB, N/V, or abd pain.    VITAL SIGNS:  T(C): 38.2 (07-30-21 @ 04:25), Max: 38.2 (07-30-21 @ 04:25)  HR: 98 (07-30-21 @ 04:25) (71 - 98)  BP: 149/76 (07-30-21 @ 04:25) (108/64 - 150/70)  RR: 18 (07-30-21 @ 04:25) (18 - 18)  SpO2: 92% (07-30-21 @ 04:25) (92% - 95%)        LABORATORY:                          8.2    9.19  )-----------( 382      ( 29 Jul 2021 06:56 )             26.8       07-29    133<L>  |  101  |  6<L>  ----------------------------<  69<L>  3.3<L>   |  23  |  0.58    Ca    7.9<L>      29 Jul 2021 06:52            MICROBIOLOGY:   Culture - Blood (07.27.21 @ 13:12)    Specimen Source: .Blood Blood-Peripheral    Culture Results:   No growth to date.      Culture - Blood (07.27.21 @ 13:12)    Specimen Source: .Blood Blood-Peripheral    Culture Results:   No growth to date.          RADIOLOGY:  < from: Xray Chest 1 View- PORTABLE-Routine (Xray Chest 1 View- PORTABLE-Routine .) (07.27.21 @ 10:44) >  IMPRESSION:  The heart is normal in size. The lungs appear to be clear. No pleural effusion. No pneumothorax. Degenerative changes of the thoracic spine.  --- End of Report ---      PHYSICAL EXAM:  Constitutional: AOx3. NAD.  Respiratory: clear lungs bilaterally.   Cardiovascular: S1 S2.   Gastrointestinal: BS X4 active. soft. nontender.  Extremities/Vascular: +2 pulses bilaterally. No BLE edema.      ASSESSMENT/PLAN:   HPI:  80 yo M with Crohn disease, over the last 9 mo, he was started on several medication and was recently told that he may need to go on IV infusion for chrons, he has several hospitalization at [facility] , and follows with GI near [facility]. but his diarrhea is not improving , having abd cramps , denies any N/V . , discharged 1 mo ago from [facility], now having trouble walking, on going diarrhea. + subjective fever but denies cough or nasal congestion or uri or urinary symptoms.  (13 Jul 2021 20:48)        1) Fever  -Tylenol and cooling measures prn for pyrexia  -BC x2, UA ordered   - CBC STAT (wbc 11.74 increased from baseline)  -CXR ordered for worsening cough - Robitussin ordered x1   -c/w IVF   - ID is following   - Spoke with  in regards to vanco and requested for ID to follow whether to resume vanco or not   - Will endorse to AM team      Shai Azul  Dept of Medicine   #[id number]

## 2021-07-30 NOTE — PROGRESS NOTE ADULT - SUBJECTIVE AND OBJECTIVE BOX
Patient is a 81y old  Male who presents with a chief complaint of diarrhea with blood in it , abd pain and weakness (29 Jul 2021 19:46)      Interval Events:   3 BM overnight.  Febrile to 100.8 overnight, parainfluenza positive.    ROS:   A 12-point ROS was performed and negative except as noted in HPI.    Hospital Medications:  benzonatate 100 milliGRAM(s) Oral every 8 hours PRN  clonazePAM  Tablet 0.25 milliGRAM(s) Oral at bedtime  folic acid 1 milliGRAM(s) Oral daily  multivitamin 1 Tablet(s) Oral daily  pantoprazole    Tablet 40 milliGRAM(s) Oral before breakfast  sodium chloride 0.9%. 1000 milliLiter(s) IV Continuous <Continuous>  sotalol 120 milliGRAM(s) Oral daily      PHYSICAL EXAM:   Vital Signs:  Vital Signs Last 24 Hrs  T(C): 36.7 (30 Jul 2021 09:18), Max: 38.2 (30 Jul 2021 04:25)  T(F): 98 (30 Jul 2021 09:18), Max: 100.8 (30 Jul 2021 04:25)  HR: 71 (30 Jul 2021 09:18) (71 - 98)  BP: 127/82 (30 Jul 2021 09:18) (108/64 - 149/76)  BP(mean): --  RR: 18 (30 Jul 2021 09:18) (18 - 18)  SpO2: 94% (30 Jul 2021 09:18) (92% - 95%)  Daily     Daily     GENERAL: no acute distress  NEURO: alert  HEENT: anicteric sclera, no conjunctival pallor appreciated  CHEST: no respiratory distress, no accessory muscle use  CARDIAC: regular rate, +S1/S2  ABDOMEN: soft, nondistended, nontender, no rebound or guarding  EXTREMITIES: warm, well perfused, no edema  SKIN: no lesions noted    LABS: reviewed                        8.0    11.74 )-----------( 394      ( 30 Jul 2021 05:51 )             25.8     07-30    134<L>  |  103  |  5<L>  ----------------------------<  95  3.5   |  23  |  0.64    Ca    7.6<L>      30 Jul 2021 05:51          Interval Diagnostic Studies: see sunrise for full report

## 2021-07-30 NOTE — PROGRESS NOTE ADULT - ASSESSMENT
80 yo M with Crohn's on treatment, recently cared for at [facility], now presenting with ongoing diarrhea  Low grade temp elevation, leukocytosis  Diarrhea  No abd distension, no evidence megacolon  CT with colonic inflammation  Colonoscopy with visualized pseudomembranes  On PO Vanco starting 7/15 (x2 weeks) with limited improvement  Still ongoing diarrhea, episode fever  Patient having cough, CXR clear  RVP with parainfluenzae--explains URI symptoms and fevers  Overall,  1) C diff colitis  - Persistent  - Holding Vanco for now, plan to restart as tapering dose on 8/1/21--attempting to hold in order to not invalidate FMT  - If signs acute sepsis attributable to C diff, would restart Vanco at full dose  - S/p colonoscopy FMT 7/28  - Frequent abd exam/stool count  2) Leukocytosis  - Trend to normal  3) Crohn's  - Further care per GI  - Uncertain if any active Crohn's (or if current symptoms all related to C diff)  4) Fevers  - Monitor for alternate sources infection, hold on abx unless signs sepsis/focal source infection  - F/U BCXs    Discussed case with GI  My colleagues will be covering this patient starting on 7/31/21.  Please call [phone] or on call fellow with any questions or change in status.     Phoenix Lee MD  Pager [phone]  After 5pm and on weekends call [phone]

## 2021-07-30 NOTE — PROGRESS NOTE ADULT - SUBJECTIVE AND OBJECTIVE BOX
Patient is a 81y old  Male who presents with a chief complaint of diarrhea with blood in it , abd pain and weakness (30 Jul 2021 09:27)      INTERVAL HISTORY: pt feeling tired         REVIEW OF SYSTEMS: fever   CONSTITUTIONAL: No weakness  EYES/ENT: No visual changes; No throat pain  Neck: No pain or stiffness  Respiratory: No cough, wheezing, No shortness of breath  CARDIOVASCULAR: no chest pain or palpitations  GASTROINTESTINAL: No abdominal pain, no nausea, vomiting or hematemesis  GENITOURINARY: No dysuria, frequency or hematuria  NEUROLOGICAL: No stroke like symptoms  SKIN: No rashes    	  MEDICATIONS:  sotalol 120 milliGRAM(s) Oral daily        PHYSICAL EXAM:  T(C): 37.1 (07-30-21 @ 16:19), Max: 38.2 (07-30-21 @ 04:25)  HR: 78 (07-30-21 @ 16:19) (71 - 98)  BP: 118/69 (07-30-21 @ 16:19) (104/68 - 149/76)  RR: 18 (07-30-21 @ 16:19) (18 - 18)  SpO2: 96% (07-30-21 @ 16:19) (92% - 96%)  Wt(kg): --  I&O's Summary    29 Jul 2021 07:01  -  30 Jul 2021 07:00  --------------------------------------------------------  IN: 1380 mL / OUT: 0 mL / NET: 1380 mL    30 Jul 2021 07:01  -  30 Jul 2021 16:37  --------------------------------------------------------  IN: 360 mL / OUT: 0 mL / NET: 360 mL          Appearance: In no distress	  HEENT:    PERRL, EOMI	  Cardiovascular:  S1 S2, No JVD  Respiratory: Lungs clear to auscultation	  Gastrointestinal:  Soft, Non-tender, + BS	  Vascularature:  No edema of LE  Psychiatric: Appropriate affect   Neuro: no acute focal deficits                               8.0    11.74 )-----------( 394      ( 30 Jul 2021 05:51 )             25.8     07-30    134<L>  |  103  |  5<L>  ----------------------------<  95  3.5   |  23  |  0.64    Ca    7.6<L>      30 Jul 2021 05:51          Labs personally reviewed      ASSESSMENT/PLAN: 	      Problem/Plan - 1:  ·  Problem: CAD (coronary artery disease).  Plan: c/w home meds  We discussed the importance of SAPT with low dose ASA 81mg given CAD  pt denies any current chest pain, SOB or chest pressure.     Problem/Plan -2:  ·  Problem: Colitis.  Plan: Ct shows chron's  exacerbation   f/u flex sig with biopsies   s/p FMT 7/28  diarrhea improving   vanco d/c'd but still having fevers     GI and ID following    Problem/Plan - 3:  ·  Problem: Afib pt said he was placed on sotalol for afib   -c/w sotalol   -EKG noted. SR     Problem/Plan - 4:   ·  Problem: GIB (gastrointestinal bleeding).  Plan: bloody diarrhea 2/2 chron's exacerbation   IV fluids   -c-diff positive:   -off abx per ID    Problem/Plan - 5:  ·  Problem: SCDs  pt ambulatory     Problem/Plan - 6:  ·  Problem: B/L LE edema   - CXR showed mildly enlarged heart   -monitor IVF with I&O  -B/L L E edema improved   -TTE when pt more stable to assess heart function     [facility]   Rufus Onofre DO [facility]  Cardiovascular Medicine  [address] [place] [address], [address]  Office: [phone]  Cell: [phone]

## 2021-07-31 NOTE — PROGRESS NOTE ADULT - SUBJECTIVE AND OBJECTIVE BOX
Patient is a 81y old  Male who presents with a chief complaint of diarrhea with blood in it , abd pain and weakness (2021 21:13)      INTERVAL HPI/OVERNIGHT EVENTS: still having diarrhea   T(C): 37 (21 @ 19:48), Max: 37.2 (21 @ 08:58)  HR: 74 (21 @ 19:48) (70 - 89)  BP: 151/71 (21 @ 19:48) (117/79 - 151/71)  RR: 20 (21 @ 19:48) (19 - 20)  SpO2: 95% (21 @ 19:48) (95% - 98%)  Wt(kg): --  I&O's Summary    2021 07:  -  2021 07:00  --------------------------------------------------------  IN: 2640 mL / OUT: 395 mL / NET: 2245 mL    2021 07:  -  2021 21:41  --------------------------------------------------------  IN: 1242 mL / OUT: 150 mL / NET: 1092 mL        PAST MEDICAL & SURGICAL HISTORY:  No pertinent past medical history    No significant past surgical history        SOCIAL HISTORY  Alcohol:  Tobacco:  Illicit substance use:    FAMILY HISTORY:    REVIEW OF SYSTEMS:  CONSTITUTIONAL: No fever, weight loss, or fatigue  EYES: No eye pain, visual disturbances, or discharge  ENMT:  No difficulty hearing, tinnitus, vertigo; No sinus or throat pain  NECK: No pain or stiffness  RESPIRATORY: No cough, wheezing, chills or hemoptysis; No shortness of breath  CARDIOVASCULAR: No chest pain, palpitations, dizziness, or leg swelling  GASTROINTESTINAL: No abdominal or epigastric pain. No nausea, vomiting, or hematemesis; No diarrhea or constipation. No melena or hematochezia.  GENITOURINARY: No dysuria, frequency, hematuria, or incontinence  NEUROLOGICAL: No headaches, memory loss, loss of strength, numbness, or tremors  SKIN: No itching, burning, rashes, or lesions   LYMPH NODES: No enlarged glands  ENDOCRINE: No heat or cold intolerance; No hair loss  MUSCULOSKELETAL: No joint pain or swelling; No muscle, back, or extremity pain  PSYCHIATRIC: No depression, anxiety, mood swings, or difficulty sleeping  HEME/LYMPH: No easy bruising, or bleeding gums  ALLERY AND IMMUNOLOGIC: No hives or eczema    RADIOLOGY & ADDITIONAL TESTS:    Imaging Personally Reviewed:  [ ] YES  [ ] NO    Consultant(s) Notes Reviewed:  [ ] YES  [ ] NO    PHYSICAL EXAM:  GENERAL: NAD, well-groomed, well-developed  HEAD:  Atraumatic, Normocephalic  EYES: EOMI, PERRLA, conjunctiva and sclera clear  ENMT: No tonsillar erythema, exudates, or enlargement; Moist mucous membranes, Good dentition, No lesions  NECK: Supple, No JVD, Normal thyroid  NERVOUS SYSTEM:  Alert & Oriented X3, Good concentration; Motor Strength 5/5 B/L upper and lower extremities; DTRs 2+ intact and symmetric  CHEST/LUNG: Clear to percussion bilaterally; No rales, rhonchi, wheezing, or rubs  HEART: Regular rate and rhythm; No murmurs, rubs, or gallops  ABDOMEN: Soft, Nontender, Nondistended; Bowel sounds present  EXTREMITIES:  2+ Peripheral Pulses, No clubbing, cyanosis, or edema  LYMPH: No lymphadenopathy noted  SKIN: No rashes or lesions    LABS:                        7.9    11.10 )-----------( 382      ( 2021 07:18 )             25.6         131<L>  |  100  |  8   ----------------------------<  100<H>  3.6   |  23  |  0.63    Ca    7.7<L>      2021 20:03        Urinalysis Basic - ( 2021 19:16 )    Color: Yellow / Appearance: Clear / S.020 / pH: x  Gluc: x / Ketone: Negative  / Bili: Negative / Urobili: Negative   Blood: x / Protein: 30 mg/dL / Nitrite: Negative   Leuk Esterase: Negative / RBC: 2 /hpf / WBC 3 /HPF   Sq Epi: x / Non Sq Epi: 2 /hpf / Bacteria: Negative      CAPILLARY BLOOD GLUCOSE            Urinalysis Basic - ( 2021 19:16 )    Color: Yellow / Appearance: Clear / S.020 / pH: x  Gluc: x / Ketone: Negative  / Bili: Negative / Urobili: Negative   Blood: x / Protein: 30 mg/dL / Nitrite: Negative   Leuk Esterase: Negative / RBC: 2 /hpf / WBC 3 /HPF   Sq Epi: x / Non Sq Epi: 2 /hpf / Bacteria: Negative        MEDICATIONS  (STANDING):  clonazePAM  Tablet 0.25 milliGRAM(s) Oral at bedtime  folic acid 1 milliGRAM(s) Oral daily  multivitamin 1 Tablet(s) Oral daily  sodium chloride 0.9% with potassium chloride 20 mEq/L 1000 milliLiter(s) (75 mL/Hr) IV Continuous <Continuous>  sotalol 120 milliGRAM(s) Oral daily    MEDICATIONS  (PRN):  acetaminophen   Tablet .. 650 milliGRAM(s) Oral every 6 hours PRN Mild Pain (1 - 3), Moderate Pain (4 - 6)  benzonatate 100 milliGRAM(s) Oral every 8 hours PRN Cough      Care Discussed with Consultants/Other Providers [ ] YES  [ ] NO

## 2021-08-01 NOTE — PROGRESS NOTE ADULT - PROBLEM SELECTOR PLAN 1
bloody diarrhea 2/2 chron's dis exacerbation   c diff pos   started on vanco PO
bloody diarrhea 2/2 chron's dis exacerbation   c diff pos   started on vanco PO  s/p flex sig with biopsy  today flagyl IV added
bloody diarrhea 2/2 chron's dis exacerbation   IV fluids   awaiting cdiff PCR   seen by GI : recommend check c diff and if neg will give trial of IV steroids   d/c abx   will also schedule for flex sig if c diff neg   if cdiff pos then : start vanco  mg q 6 x 10 days
bloody diarrhea 2/2 chron's dis exacerbation   c diff pos   started on vanco PO  scheduled for flex sig today
resolved   H/h stable
resolved   H/h stable
bloody diarrhea 2/2 chron's dis exacerbation   c diff pos   started on vanco PO  seen by Gi : if diarrhea not improving will give trial of Iv steroids 2/2 concurrent flare of IBDplan for flex sig with biopsy on Monday
resolved   H/h stable
bloody diarrhea 2/2 chron's dis exacerbation   c diff pos   started on vanco PO  seen by Gi : if diarrhea not improving will give trial of Iv steroids 2/2 concurrent flare of IBDplan for flex sig with biopsy on Monday
bloody diarrhea 2/2 chron's dis exacerbation   c diff pos    on vanco PO  s/p flex sig with biopsy   flagyl IV added
bloody diarrhea 2/2 chron's dis exacerbation   c diff pos   started on vanco PO  seen by Gi : if diarrhea not improving will give trial of Iv steroids /2 concurrent flare of IBD
bloody diarrhea 2/2 chron's dis exacerbation   c diff pos    on vanco PO  s/p flex sig with biopsy   flagyl IV added
bloody diarrhea 2/2 chron's dis exacerbation   c diff pos   started on vanco PO  s/p flex sig with biopsy
resolved   H/h stable
bloody diarrhea 2/2 chron's dis exacerbation   c diff pos    on vanco PO  s/p flex sig with biopsy   flagyl IV added
resolved   H/h stable
resolved   H/h stable
bloody diarrhea 2/2 chron's dis exacerbation   c diff pos   started on vanco PO  s/p flex sig with biopsy  today flagyl IV added
bloody diarrhea 2/2 chron's dis exacerbation   c diff pos   started on vanco PO  s/p flex sig with biopsy  today flagyl IV added

## 2021-08-01 NOTE — PROGRESS NOTE ADULT - SUBJECTIVE AND OBJECTIVE BOX
Patient is a 81y old  Male who presents with a chief complaint of diarrhea with blood in it , abd pain and weakness (31 Jul 2021 21:40)      INTERVAL HPI/OVERNIGHT EVENTS: daughter bedside , diarrhea no better , had 8 watery BM last night   T(C): 37 (08-01-21 @ 19:52), Max: 37.1 (08-01-21 @ 12:10)  HR: 73 (08-01-21 @ 19:52) (70 - 85)  BP: 152/72 (08-01-21 @ 19:52) (134/77 - 163/76)  RR: 18 (08-01-21 @ 19:52) (18 - 19)  SpO2: 97% (08-01-21 @ 19:52) (95% - 97%)  Wt(kg): --  I&O's Summary    31 Jul 2021 07:01  -  01 Aug 2021 07:00  --------------------------------------------------------  IN: 2342 mL / OUT: 600 mL / NET: 1742 mL    01 Aug 2021 07:01  -  01 Aug 2021 21:03  --------------------------------------------------------  IN: 1180 mL / OUT: 300 mL / NET: 880 mL        PAST MEDICAL & SURGICAL HISTORY:  No pertinent past medical history    No significant past surgical history        SOCIAL HISTORY  Alcohol:  Tobacco:  Illicit substance use:    FAMILY HISTORY:    REVIEW OF SYSTEMS:  CONSTITUTIONAL: No fever, weight loss, or fatigue  EYES: No eye pain, visual disturbances, or discharge  ENMT:  No difficulty hearing, tinnitus, vertigo; No sinus or throat pain  NECK: No pain or stiffness  RESPIRATORY: No cough, wheezing, chills or hemoptysis; No shortness of breath  CARDIOVASCULAR: No chest pain, palpitations, dizziness, or leg swelling  GASTROINTESTINAL: No abdominal or epigastric pain. No nausea, vomiting, or hematemesis; No diarrhea or constipation. No melena or hematochezia.  GENITOURINARY: No dysuria, frequency, hematuria, or incontinence  NEUROLOGICAL: No headaches, memory loss, loss of strength, numbness, or tremors  SKIN: No itching, burning, rashes, or lesions   LYMPH NODES: No enlarged glands  ENDOCRINE: No heat or cold intolerance; No hair loss  MUSCULOSKELETAL: No joint pain or swelling; No muscle, back, or extremity pain  PSYCHIATRIC: No depression, anxiety, mood swings, or difficulty sleeping  HEME/LYMPH: No easy bruising, or bleeding gums  ALLERY AND IMMUNOLOGIC: No hives or eczema    RADIOLOGY & ADDITIONAL TESTS:    Imaging Personally Reviewed:  [ ] YES  [ ] NO    Consultant(s) Notes Reviewed:  [ ] YES  [ ] NO    PHYSICAL EXAM:  GENERAL: NAD, well-groomed, well-developed  HEAD:  Atraumatic, Normocephalic  EYES: EOMI, PERRLA, conjunctiva and sclera clear  ENMT: No tonsillar erythema, exudates, or enlargement; Moist mucous membranes, Good dentition, No lesions  NECK: Supple, No JVD, Normal thyroid  NERVOUS SYSTEM:  Alert & Oriented X3, Good concentration; Motor Strength 5/5 B/L upper and lower extremities; DTRs 2+ intact and symmetric  CHEST/LUNG: Clear to percussion bilaterally; No rales, rhonchi, wheezing, or rubs  HEART: Regular rate and rhythm; No murmurs, rubs, or gallops  ABDOMEN: Soft, Nontender, Nondistended; Bowel sounds present  EXTREMITIES:  2+ Peripheral Pulses, No clubbing, cyanosis, or edema  LYMPH: No lymphadenopathy noted  SKIN: No rashes or lesions    LABS:                        7.9    11.10 )-----------( 382      ( 31 Jul 2021 07:18 )             25.6     07-31    131<L>  |  100  |  8   ----------------------------<  100<H>  3.6   |  23  |  0.63    Ca    7.7<L>      31 Jul 2021 20:03          CAPILLARY BLOOD GLUCOSE                MEDICATIONS  (STANDING):  clonazePAM  Tablet 0.25 milliGRAM(s) Oral at bedtime  folic acid 1 milliGRAM(s) Oral daily  multivitamin 1 Tablet(s) Oral daily  sodium chloride 0.9% with potassium chloride 20 mEq/L 1000 milliLiter(s) (75 mL/Hr) IV Continuous <Continuous>  sotalol 120 milliGRAM(s) Oral daily  vancomycin    Solution 125 milliGRAM(s) Oral every 12 hours    MEDICATIONS  (PRN):  acetaminophen   Tablet .. 650 milliGRAM(s) Oral every 6 hours PRN Mild Pain (1 - 3), Moderate Pain (4 - 6)  benzonatate 100 milliGRAM(s) Oral every 8 hours PRN Cough      Care Discussed with Consultants/Other Providers [ ] YES  [ ] NO

## 2021-08-01 NOTE — PROGRESS NOTE ADULT - PROBLEM SELECTOR PROBLEM 1
GIB (gastrointestinal bleeding)

## 2021-08-01 NOTE — PROGRESS NOTE ADULT - PROBLEM SELECTOR PLAN 2
check stools for O&P  cdiff pos
check stools for O&P  cdiff pos  vanco oral
check stools for O&P  cdiff pos  vanco oral  flagyl IV added
s/p flex sig with biopsy   as per GI : 2/2 c diff colitis   less likely IBD exacerbation   c/w vanco/ flagyl
s/p flex sig with biopsy   as per GI : 2/2 c diff colitis   less likely IBD exacerbation   c/w vanco/ flagyl
check stools for O&P  cdiff pos  vanco oral
s/p flex sig with biopsy   as per GI : 2/2 c diff colitis   less likely IBD exacerbation   c/w vanco/ flagyl
s/p flex sig with biopsy   as per GI : 2/2 c diff colitis   less likely IBD exacerbation   c/w vanco/ flagyl
s/p flex sig with biopsy   as per GI : 2/2 c diff colitis   less likely IBD exacerbation   completed vanco and falgyl  s/p FMT  restarted on vanco oral bid by ID
check stools for O&P  cdiff pos  vanco oral
s/p flex sig with biopsy   as per GI : 2/2 c diff colitis   less likely IBD exacerbation   c/w vanco/ flagyl
s/p flex sig with biopsy   as per GI : 2/2 c diff colitis   less likely IBD exacerbation   completed vanco and falgyl  s/p FMT
check stools for O&P  cdiff
check stools for O&P  cdiff pos  vanco oral
check stools for O&P  cdiff pos  vanco oral
s/p flex sig with biopsy   as per GI : 2/2 c diff colitis   less likely IBD exacerbation   c/w vanco/ flagyl
s/p flex sig with biopsy   as per GI : 2/2 c diff colitis   less likely IBD exacerbation   completed vanco and falgyl  s/p FMT
s/p flex sig with biopsy   as per GI : 2/2 c diff colitis   less likely IBD exacerbation   completed vanco and juan francisco  s/p FMT yesterday
s/p flex sig with biopsy   as per GI : 2/2 c diff colitis   less likely IBD exacerbation   c/w vanco/ flagyl

## 2021-08-01 NOTE — PROGRESS NOTE ADULT - PROBLEM SELECTOR PLAN 3
2/2 c diff : not improving   he is gradually declining 2/2 poor appetite and large vol diarrhea every day despite all the treatment for cdiff   -s/p  FMT via colonoscopy
Ct shows chron's dis exacerbation   GI consult   as per GI : d/c abx   started on budesonamide
Ct shows chron's dis exacerbation   GI consult   as per GI : d/c abx   started on budesonamide
seen by GI   s/p flex sig   seen By ID for intermediate quanteferon
2/2 c diff : not improving   he is gradually declining 2/2 poor appetite and large vol diarrhea every day despite all the treatment for cdiff   -s/p  FMT via colonoscopy
2/2 c diff : not improving   he is gradually declining 2/2 poor appetite and large vol diarrhea every day despite all the treatment for cdiff   -s/p  FMT via colonoscopy
Ct shows chron's dis exacerbation   GI consult   as per GI : d/c abx   started on budesonamide
seen by GI   s/p flex sig   seen By ID for intermediate quanteferon
2/2 c diff : not improving   he is gradually declining 2/2 poor appetite and large vol diarrhea every day despite all the treatment for cdiff   -scheduled for FMT in am via colonoscopy
2/2 c diff : not improving   he is gradually declining 2/2 poor appetite and large vol diarrhea every day despite all the treatment for cdiff   -s/p  FMT via colonoscopy
Ct shows chron's dis exacerbation   GI consult   as per GI : d/c abx   started on budesonamide
Ct shows chron's dis exacerbation   GI consult   as per GI : d/c abx   started on budesonamide
Ct shows chron's dis exacerbation   GI consult   as per GI : d/c abx   d/c budesonamide  quanteferon test : intermediate   plan onm starting biologic agent for inflammatory bowel dis   ID ( house ) consulted  as per GI : no plans to add steroids
Ct shows chron's dis exacerbation   GI consult   as per GI : d/c abx   started on budesonamide
likely 2/2 cdiff   hx of chron's dis also   as per GI : if not improving then probably will need FMT by next week   -abd xray to r/o toxic megacolon
likely 2/2 cdiff   hx of chron's dis also   as per GI : if not improving then probably will need FMT by next week   -abd xray to r/o toxic megacolon
2/2 c diff : not improving   he is gradually declining 2/2 poor appetite and large vol diarrhea every day despite all the treatment for cdiff   -s/p  FMT via colonoscopy
Ct shows chron's dis exacerbation   GI consult   as per GI : d/c abx   started on budesonamide
likely 2/2 cdiff   hx of chron's dis also   as per GI : if not improving then probably will need FMT by next week   -abd xray to r/o toxic megacolon

## 2021-08-01 NOTE — PROGRESS NOTE ADULT - PROBLEM SELECTOR PROBLEM 4
CAD (coronary artery disease)

## 2021-08-01 NOTE — PROGRESS NOTE ADULT - PROBLEM SELECTOR PLAN 4
c/w home meds  on sotolol
c/w home meds  seen by cardio     oral ulcer : magic mouth wash
c/w home meds  on sotolol
c/w home meds  seen by cardio     oral ulcer : magic mouth wash

## 2021-08-01 NOTE — PROGRESS NOTE ADULT - PROBLEM SELECTOR PROBLEM 3
Colitis

## 2021-08-02 NOTE — PROGRESS NOTE ADULT - ASSESSMENT
80 yo M with Crohn's on treatment, recently cared for at [facility], now presenting with ongoing diarrhea  Low grade temp elevation, leukocytosis  Diarrhea  No abd distension, no evidence megacolon  CT with colonic inflammation  Colonoscopy with visualized pseudomembranes  On PO Vanco starting 7/15 (x2 weeks) with limited improvement  Still ongoing diarrhea, episode fever  Patient having cough, CXR clear  RVP with parainfluenzae--explains URI symptoms and fevers  Overall,  1) C diff colitis  - Persistent  discontinue Vanco for now, plan to restart as tapering dose on 8/1/21--attempting to hold in order to not invalidate FMT  Fidaxomcin 200 mg twice daily x 5 days, then every other day for additional 20 days  po intake encouraged  - S/p colonoscopy FMT 7/28  - Frequent abd exam/stool count  2) Leukocytosis  - Trend to normal  3) Crohn's  - Further care per GI  - Uncertain if any active Crohn's (or if current symptoms all related to C diff)  4) Fevers  - Monitor for alternate sources infection, hold on abx unless signs sepsis/focal source infection  - F/U BCXs    Discussed case with GI attending

## 2021-08-02 NOTE — PROGRESS NOTE ADULT - ASSESSMENT
GIDEON HAYES is a 81y Male with GERD on omeprazole daily and Crohn's disease diagnosed about 9 mos prior to admission at outside hospital c/b h/o abscess and left posterior distal anal intersphincteric fistula and presents as a new consult to GI for diarrhea/BRBPR likely 2/2 to CD flare c/b c diff colitis.    # Diarrhea/BRBPR - likely 2/2 CD flare with leukocytosis most likely 2/2 cdiff colitis, less likely 2/2 CMV colitis. Patient's cdiff was positive and is the likely cause of his CD flare. Still with significant sx despite antibiotics.  Had flex sig with biopsies on 7/19/21.   Non reactive HBVsAg: no latent HBV infection  # Weight loss - likely 2/2 CD c/b oral aphthous ulcers and diarrhea as he has not been able to tolerate po and will need to ensure adequate hydration.  # GERD   # Parainfluzena infection    Recommendations:  - Continue to low residual diet as tolerated  - Add protein supplements, obtain dietician evaluation  -  GIDEON HAYES is a 81y Male with GERD on omeprazole daily and Crohn's disease diagnosed about 9 mos prior to admission at outside hospital c/b h/o abscess and left posterior distal anal intersphincteric fistula and presents as a new consult to GI for diarrhea/BRBPR likely 2/2 to CD flare c/b c diff colitis.    # Diarrhea/BRBPR - likely 2/2 CD flare with leukocytosis most likely 2/2 cdiff colitis, less likely 2/2 CMV colitis. Patient's cdiff was positive and is the likely cause of his CD flare. Still with significant sx despite antibiotics.  Had flex sig with biopsies on 7/19/21.   Non reactive HBVsAg: no latent HBV infection  # Weight loss - likely 2/2 CD c/b oral aphthous ulcers and diarrhea as he has not been able to tolerate po and will need to ensure adequate hydration.  # GERD   # Parainfluzena infection    Recommendations:  - Continue to low residual diet as tolerated  -   -  GIDEON HAYES is a 81y Male with GERD on omeprazole daily and Crohn's disease diagnosed about 9 mos prior to admission at outside hospital c/b h/o abscess and left posterior distal anal intersphincteric fistula and presents as a new consult to GI for diarrhea/BRBPR likely 2/2 to CD flare c/b c diff colitis.    # Diarrhea  #C diff colitis  - In setting of C diff colitis. Given patient sx persisted despite antibiotics patient underwent FMT on 7/28/21. Patient had initial respond to it, with decrease in frequency of bowl movement (had 5, compared to 10 or more) but now reports having 10 again. Patient and family attribute this to restarting PO Vancomycin again. It is unclear if ongoing/worsening of symptoms is from untreated Crohn vs failure of FMT.    # Weight loss - likely 2/2 CD c/b oral aphthous ulcers and diarrhea as he has not been able to tolerate po and will need to ensure adequate hydration.  # GERD   # Parainfluzena infection    #Crohn disease:  -Diagnosed 9 months ago, patient managed by Dr. Juan Luis Tran ([phone]) at [facility]. Since diagnosis, patient initially on Mesalamine and budesonide w/o relief. Patient also failed Methotrexate and ultimately was started on Remicade Sxs improved and helped heal his fistula/abscess but developed antibodies within 6 months.  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    Recommendations:  - Continue to low residual diet as tolerated.   - Continue to monitor and hold steroids for now due to concern for C diff colitis. Will need to reassess and talk to ID role of Fidaxomicin vs repeat FMT if sxs does not improve given possible recurrence of C diff.     Reji Carreno MD  Gastroenterology/Hepatology Fellow  1st option: [phone] (text or call), ONLY available from 7:00 am to 5:00 pm.   **Contact on-call GI fellow via answering service ([phone]) from 5pm-7am AND on weekends/holidays**  2nd option: Available via [employer] Teams  3rd option: Pager: [phone]    NON-URGENT CONSULTS:  Please email [email or web address]  AT NIGHT AND ON WEEKENDS:  Contact on-call GI fellow via answering service ([phone]) from 5pm-8am AND on weekends/holidays

## 2021-08-02 NOTE — PROGRESS NOTE ADULT - SUBJECTIVE AND OBJECTIVE BOX
Patient is a 81y old  Male who presents with a chief complaint of diarrhea with blood in it , abd pain and weakness (02 Aug 2021 08:17)      INTERVAL HISTORY: still with diarrhea     REVIEW OF SYSTEMS:   CONSTITUTIONAL: No weakness  EYES/ENT: No visual changes; No throat pain  Neck: No pain or stiffness  Respiratory: No cough, wheezing, No shortness of breath  CARDIOVASCULAR: no chest pain or palpitations  GASTROINTESTINAL: No abdominal pain, no nausea, vomiting or hematemesis + diarhea   GENITOURINARY: No dysuria, frequency or hematuria  NEUROLOGICAL: No stroke like symptoms  SKIN: No rashes    	  MEDICATIONS:  sotalol 120 milliGRAM(s) Oral gosia    PHYSICAL EXAM:  T(C): 36.9 (08-02-21 @ 13:02), Max: 37.6 (08-02-21 @ 05:02)  HR: 74 (08-02-21 @ 13:02) (72 - 83)  BP: 111/69 (08-02-21 @ 13:02) (111/69 - 154/80)  RR: 18 (08-02-21 @ 13:02) (18 - 18)  SpO2: 97% (08-02-21 @ 13:02) (94% - 97%)  Wt(kg): --  I&O's Summary    01 Aug 2021 07:01  -  02 Aug 2021 07:00  --------------------------------------------------------  IN: 1280 mL / OUT: 1300 mL / NET: -20 mL    02 Aug 2021 07:01  -  02 Aug 2021 13:26  --------------------------------------------------------  IN: 120 mL / OUT: 0 mL / NET: 120 mL    Appearance: In no distress	  HEENT:    PERRL, EOMI	  Cardiovascular:  S1 S2, No JVD  Respiratory: Lungs clear to auscultation	  Gastrointestinal:  Soft, Non-tender, + BS	  Vascularature:  No edema of LE  Psychiatric: Appropriate affect   Neuro: no acute focal deficits                         9.2    9.02  )-----------( 440      ( 02 Aug 2021 05:50 )             28.7     08-02    131<L>  |  98  |  6<L>  ----------------------------<  84  3.8   |  25  |  0.61    Ca    8.3<L>      02 Aug 2021 05:50  Phos  2.1     08-02  Mg     1.7     08-02    Labs personally reviewed    ASSESSMENT/PLAN: 	  Problem/Plan - 1:  ·  Problem: CAD (coronary artery disease).  Plan: c/w home meds  We discussed the importance of SAPT with low dose ASA 81mg given CAD  pt denies any current chest pain, SOB or chest pressure.     Problem/Plan -2:  ·  Problem: Colitis.  Plan: Ct shows chron's  exacerbation   f/u flex sig with biopsies   s/p FMT 7/28  c/w PO vanco   steroids on hold for for now due to concern for C diff colitis.  plan for repeat FMT vs Fidoxomaicin if sx dont improve    GI and ID following      Problem/Plan - 3:  ·  Problem: Afib pt said he was placed on sotalol for afib   -c/w sotalol   -EKG noted. SR     Problem/Plan - 4:   ·  Problem: GIB (gastrointestinal bleeding).  Plan: bloody diarrhea 2/2 chron's exacerbation   IV fluids   -c-diff positive:   -off abx per ID    Problem/Plan - 5:  ·  Problem: SCDs  pt ambulatory     Problem/Plan - 6:  ·  Problem: B/L LE edema   - CXR showed mildly enlarged heart   -monitor IVF with I&O  -B/L L E edema improved   -TTE when pt more stable to assess heart function         Sanam Heath ANP-C  Rufus Onofre DO [facility]  Cardiovascular Medicine  [address], [address]  Office: [phone]  Cell: [phone]

## 2021-08-02 NOTE — PROGRESS NOTE ADULT - SUBJECTIVE AND OBJECTIVE BOX
Patient is a 81y old  Male who presents with a chief complaint of diarrhea with blood in it , abd pain and weakness (02 Aug 2021 20:29)      INTERVAL HPI/OVERNIGHT EVENTS: no improvment , still having 10 watery BM lat night   patient and family very frustrated   T(C): 36.8 (08-03-21 @ 00:05), Max: 37.6 (08-02-21 @ 05:02)  HR: 78 (08-03-21 @ 00:05) (73 - 83)  BP: 138/73 (08-03-21 @ 00:05) (111/69 - 154/80)  RR: 18 (08-03-21 @ 00:05) (18 - 18)  SpO2: 95% (08-03-21 @ 00:05) (95% - 97%)  Wt(kg): --  I&O's Summary    01 Aug 2021 07:01  -  02 Aug 2021 07:00  --------------------------------------------------------  IN: 1280 mL / OUT: 1300 mL / NET: -20 mL    02 Aug 2021 07:01  -  03 Aug 2021 02:49  --------------------------------------------------------  IN: 1020 mL / OUT: 0 mL / NET: 1020 mL        PAST MEDICAL & SURGICAL HISTORY:  No pertinent past medical history    No significant past surgical history        SOCIAL HISTORY  Alcohol:  Tobacco:  Illicit substance use:    FAMILY HISTORY:    REVIEW OF SYSTEMS:  CONSTITUTIONAL: No fever, weight loss, or fatigue  EYES: No eye pain, visual disturbances, or discharge  ENMT:  No difficulty hearing, tinnitus, vertigo; No sinus or throat pain  NECK: No pain or stiffness  RESPIRATORY: No cough, wheezing, chills or hemoptysis; No shortness of breath  CARDIOVASCULAR: No chest pain, palpitations, dizziness, or leg swelling  GASTROINTESTINAL: No abdominal or epigastric pain. No nausea, vomiting, or hematemesis; No diarrhea or constipation. No melena or hematochezia.  GENITOURINARY: No dysuria, frequency, hematuria, or incontinence  NEUROLOGICAL: No headaches, memory loss, loss of strength, numbness, or tremors  SKIN: No itching, burning, rashes, or lesions   LYMPH NODES: No enlarged glands  ENDOCRINE: No heat or cold intolerance; No hair loss  MUSCULOSKELETAL: No joint pain or swelling; No muscle, back, or extremity pain  PSYCHIATRIC: No depression, anxiety, mood swings, or difficulty sleeping  HEME/LYMPH: No easy bruising, or bleeding gums  ALLERY AND IMMUNOLOGIC: No hives or eczema    RADIOLOGY & ADDITIONAL TESTS:    Imaging Personally Reviewed:  [ ] YES  [ ] NO    Consultant(s) Notes Reviewed:  [ ] YES  [ ] NO    PHYSICAL EXAM:  GENERAL: NAD, well-groomed, well-developed  HEAD:  Atraumatic, Normocephalic  EYES: EOMI, PERRLA, conjunctiva and sclera clear  ENMT: No tonsillar erythema, exudates, or enlargement; Moist mucous membranes, Good dentition, No lesions  NECK: Supple, No JVD, Normal thyroid  NERVOUS SYSTEM:  Alert & Oriented X3, Good concentration; Motor Strength 5/5 B/L upper and lower extremities; DTRs 2+ intact and symmetric  CHEST/LUNG: Clear to percussion bilaterally; No rales, rhonchi, wheezing, or rubs  HEART: Regular rate and rhythm; No murmurs, rubs, or gallops  ABDOMEN: Soft, Nontender, Nondistended; Bowel sounds present  EXTREMITIES:  2+ Peripheral Pulses, No clubbing, cyanosis, or edema  LYMPH: No lymphadenopathy noted  SKIN: No rashes or lesions    LABS:                        9.2    9.02  )-----------( 440      ( 02 Aug 2021 05:50 )             28.7     08-02    131<L>  |  98  |  6<L>  ----------------------------<  84  3.8   |  25  |  0.61    Ca    8.3<L>      02 Aug 2021 05:50  Phos  2.1     08-02  Mg     1.7     08-02          CAPILLARY BLOOD GLUCOSE                MEDICATIONS  (STANDING):  clonazePAM  Tablet 0.25 milliGRAM(s) Oral at bedtime  fidaxomicin 200 milliGRAM(s) Oral two times a day  folic acid 1 milliGRAM(s) Oral daily  multivitamin 1 Tablet(s) Oral daily  sodium chloride 0.9% with potassium chloride 20 mEq/L 1000 milliLiter(s) (60 mL/Hr) IV Continuous <Continuous>  sotalol 120 milliGRAM(s) Oral daily    MEDICATIONS  (PRN):  acetaminophen   Tablet .. 650 milliGRAM(s) Oral every 6 hours PRN Mild Pain (1 - 3), Moderate Pain (4 - 6)  benzonatate 100 milliGRAM(s) Oral every 8 hours PRN Cough  lidocaine 2% Gel 1 Application(s) Topical four times a day PRN pain due to skin excoriation  morphine  - Injectable 2 milliGRAM(s) IV Push every 8 hours PRN Severe Pain (7 - 10)      Care Discussed with Consultants/Other Providers [ ] YES  [ ] NO

## 2021-08-02 NOTE — PROGRESS NOTE ADULT - SUBJECTIVE AND OBJECTIVE BOX
Interval Events:   Patient continues having diarrhea, reported 2 this morning. Clarified with nurse who reported 9 in the last 24 hours. Patient currently on Vancomycin 1265 mg q12H.    Hospital Medications:  acetaminophen   Tablet .. 650 milliGRAM(s) Oral every 6 hours PRN  benzonatate 100 milliGRAM(s) Oral every 8 hours PRN  clonazePAM  Tablet 0.25 milliGRAM(s) Oral at bedtime  folic acid 1 milliGRAM(s) Oral daily  morphine  - Injectable 2 milliGRAM(s) IV Push once  multivitamin 1 Tablet(s) Oral daily  sodium chloride 0.9% with potassium chloride 20 mEq/L 1000 milliLiter(s) IV Continuous <Continuous>  sotalol 120 milliGRAM(s) Oral daily  vancomycin    Solution 125 milliGRAM(s) Oral every 12 hours      PHYSICAL EXAM:   Vital Signs:  Vital Signs Last 24 Hrs  T(C): 37.6 (02 Aug 2021 05:02), Max: 37.6 (02 Aug 2021 05:02)  T(F): 99.7 (02 Aug 2021 05:02), Max: 99.7 (02 Aug 2021 05:02)  HR: 83 (02 Aug 2021 05:02) (70 - 83)  BP: 154/80 (02 Aug 2021 05:02) (134/77 - 155/76)  BP(mean): --  RR: 18 (02 Aug 2021 05:02) (18 - 19)  SpO2: 95% (02 Aug 2021 05:02) (94% - 97%)  Daily     Daily     GENERAL:  NAD, Appears stated age  HEENT:  NC/AT,  conjunctivae clear and pink, sclera -anicteric  CHEST:  Normal Effort, Breath sounds clear  HEART:  RRR, S1 + S2, no murmurs  ABDOMEN:  Soft, non-distended, normoactive bowel sounds,  no masses, tender to palpation diffuse  EXTREMITIES:  no cyanosis or edema  SKIN:  Warm & Dry. No rash or erythema  NEURO:  Alert, oriented, no focal deficit    LABS:                        9.2    9.02  )-----------( 440      ( 02 Aug 2021 05:50 )             28.7     Mean Cell Volume: 81.1 fl (08-02-21 @ 05:50)    08-02    131<L>  |  98  |  6<L>  ----------------------------<  84  3.8   |  25  |  0.61    Ca    8.3<L>      02 Aug 2021 05:50  Phos  2.1     08-02  Mg     1.7     08-02                                    9.2    9.02  )-----------( 440      ( 02 Aug 2021 05:50 )             28.7                         7.9    11.10 )-----------( 382      ( 31 Jul 2021 07:18 )             25.6       Imaging: Images reviewed no new images appreciated.         Interval Events:   Patient continues having diarrhea, reported 2 this morning. Clarified with nurse who reported 9 in the last 24 hours. Patient currently on Vancomycin 1265 mg q12H.    Hospital Medications:  acetaminophen   Tablet .. 650 milliGRAM(s) Oral every 6 hours PRN  benzonatate 100 milliGRAM(s) Oral every 8 hours PRN  clonazePAM  Tablet 0.25 milliGRAM(s) Oral at bedtime  folic acid 1 milliGRAM(s) Oral daily  morphine  - Injectable 2 milliGRAM(s) IV Push once  multivitamin 1 Tablet(s) Oral daily  sodium chloride 0.9% with potassium chloride 20 mEq/L 1000 milliLiter(s) IV Continuous <Continuous>  sotalol 120 milliGRAM(s) Oral daily  vancomycin    Solution 125 milliGRAM(s) Oral every 12 hours      PHYSICAL EXAM:   Vital Signs:  Vital Signs Last 24 Hrs  T(C): 37.6 (02 Aug 2021 05:02), Max: 37.6 (02 Aug 2021 05:02)  T(F): 99.7 (02 Aug 2021 05:02), Max: 99.7 (02 Aug 2021 05:02)  HR: 83 (02 Aug 2021 05:02) (70 - 83)  BP: 154/80 (02 Aug 2021 05:02) (134/77 - 155/76)  BP(mean): --  RR: 18 (02 Aug 2021 05:02) (18 - 19)  SpO2: 95% (02 Aug 2021 05:02) (94% - 97%)  Daily     Daily     GENERAL:  NAD, Appears stated age, cachectic  HEENT:  NC/AT,  conjunctivae clear and pink, sclera -anicteric  CHEST:  Normal Effort, Breath sounds clear  HEART:  RRR, S1 + S2, no murmurs  ABDOMEN:  Soft, non-distended, normoactive bowel sounds,  no masses, tender to palpation diffuse  EXTREMITIES:  no cyanosis or edema  SKIN:  Warm & Dry. No rash or erythema  NEURO:  Alert, oriented, no focal deficit    LABS:                        9.2    9.02  )-----------( 440      ( 02 Aug 2021 05:50 )             28.7     Mean Cell Volume: 81.1 fl (08-02-21 @ 05:50)    08-02    131<L>  |  98  |  6<L>  ----------------------------<  84  3.8   |  25  |  0.61    Ca    8.3<L>      02 Aug 2021 05:50  Phos  2.1     08-02  Mg     1.7     08-02                                    9.2    9.02  )-----------( 440      ( 02 Aug 2021 05:50 )             28.7                         7.9    11.10 )-----------( 382      ( 31 Jul 2021 07:18 )             25.6       Imaging: Images reviewed no new images appreciated.

## 2021-08-02 NOTE — PROGRESS NOTE ADULT - SUBJECTIVE AND OBJECTIVE BOX
Follow Up:  cdiff    Interval History/ROS: patient has 9 loose stools - he and his daughter and son express anger and frustration     Allergies  No Known Allergies    ANTIMICROBIALS:  fidaxomicin 200 two times a day      OTHER MEDS:  MEDICATIONS  (STANDING):  acetaminophen   Tablet .. 650 every 6 hours PRN  benzonatate 100 every 8 hours PRN  clonazePAM  Tablet 0.25 at bedtime  morphine  - Injectable 2 every 8 hours PRN  sotalol 120 daily      Vital Signs Last 24 Hrs  T(C): 37.2 (02 Aug 2021 20:17), Max: 37.6 (02 Aug 2021 05:02)  T(F): 98.9 (02 Aug 2021 20:17), Max: 99.7 (02 Aug 2021 05:02)  HR: 81 (02 Aug 2021 20:17) (73 - 83)  BP: 139/70 (02 Aug 2021 20:17) (111/69 - 154/80)  BP(mean): --  RR: 18 (02 Aug 2021 20:17) (18 - 18)  SpO2: 96% (02 Aug 2021 20:17) (94% - 97%)    PHYSICAL EXAM:  General: thin NAD, Non-toxic  Neurology: A&Ox3, nonfocal  Respiratory: Clear to auscultation bilaterally  CV: RRR, S1S2, no murmurs, rubs or gallops  Abdominal: Soft, Non-tender, non-distended,  Extremities: No edema,   Line Sites: Clear  Skin: No rash                          9.2    9.02  )-----------( 440      ( 02 Aug 2021 05:50 )             28.7       08-02    131<L>  |  98  |  6<L>  ----------------------------<  84  3.8   |  25  |  0.61    Ca    8.3<L>      02 Aug 2021 05:50  Phos  2.1     08-02  Mg     1.7     08-02            MICROBIOLOGY:  .Blood Blood-Peripheral  07-30-21   No growth to date.  --  --      .Blood Blood-Peripheral  07-27-21   No Growth Final  --  --      .Blood Blood-Venous  07-24-21   No Growth Final  --  --      .Blood Blood  07-22-21   No Growth Final  --  --      .Stool  07-15-21   No Protozoa seen by trichrome stain  No Helminths or Protozoa seen in formalin concentrate  performed by iodine stain  (routine O+P not evaluated for Microsporidia,  Cryptosporidia, Cyclospora, or Isospora.)  One negative sample does not necessarily rule  out the presence of a parasitic infection.  Numerous White blood cells  --  --      .Stool Feces, [facility]  07-14-21   No enteric pathogens isolated.  (Stool culture examined for Salmonella,  Shigella, Campylobacter, Aeromonas, Plesiomonas,  Vibrio, E.coli O157 and Yersinia)  --  --      .Urine Clean Catch (Midstream)  07-13-21   <10,000 CFU/mL Normal Urogenital Margo  --  --      .Blood Blood-Peripheral  07-13-21   No Growth Final  --  --    Rapid RVP Result: Detected (07-29 @ 13:49)  Paraflu 3        RADIOLOGY:    Benjamin Phillips MD; Division of Infectious Disease; Pager: [phone]; nights and weekends: [phone]

## 2021-08-02 NOTE — PROGRESS NOTE ADULT - ASSESSMENT
A/p    sever C diff colitis :  -completed vanco/ flagyl   s/p FMT : 5 days post FMT   -no improvement   -started on fidaxomicin 200 mg bid today : as per ID and GI   -supportive care     Hx of Chron's dis :  -likely colitis is all 2/2 c diff   hold off on steroids for now     GI/Id following   explained family : very complicated and we are doing possibly everything we can

## 2021-08-02 NOTE — PROGRESS NOTE ADULT - ATTENDING COMMENTS
Extensive conversation with patient, dtr, son.  Patient now 5 days post FMT, restarted on vanco BID yesterday  Reports getting worse over past 24 hours  now with 10+ BM/day - loose, mushy stools.  No blood in stool  Sig pain at anus  Benign abdominal exam.  Afebrile x 24+ hours now with WBC normalizing    Impression:  1. Severe C Diff now s/p FMT  2) IBD    Plan:  - Will discuss with ID if role for Fidoximicin or alternative treatments  - Reaffirmed with family that typically takes up to 2 weeks to see full response from FMT  - Would continue with dietary and nutrition support  - Hold on steroids, but pending response to FMT and any changes above - will continually reasses    Answered numerous question for patient and family  GI to follow

## 2021-08-03 NOTE — PROGRESS NOTE ADULT - ASSESSMENT
GIDEON HAYES is a 81y Male with GERD on omeprazole daily and Crohn's disease diagnosed about 9 mos prior to admission at outside hospital c/b h/o abscess and left posterior distal anal intersphincteric fistula and presents as a new consult to GI for diarrhea/BRBPR likely 2/2 to CD flare c/b c diff colitis.    # Diarrhea  #C diff colitis  - In setting of C diff colitis. Given patient sx persisted despite antibiotics patient underwent FMT on 7/28/21. Patient had initial respond to it, with decrease in frequency of bowl movement (had 5, compared to 10 or more) but now reports having 10 again. Patient and family attribute this to restarting PO Vancomycin again. It is unclear if ongoing/worsening of symptoms is from untreated Crohn vs failure of FMT. Given worsening of sxs, Vancomycin was stopped in favor of Fidaxomicin  (8/2/21).    # Weight loss - likely 2/2 CD c/b oral aphthous ulcers and diarrhea as he has not been able to tolerate po and will need to ensure adequate hydration.  # GERD   # Parainfluzena infection    #Crohn disease:  -Diagnosed 9 months ago, patient managed by Dr. Juan Luis Tran ([phone]) at [facility]. Since diagnosis, patient initially on Mesalamine and budesonide w/o relief. Patient also failed Methotrexate and ultimately was started on Remicade Sxs improved and helped heal his fistula/abscess but developed antibodies within 6 months.  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    Recommendations:  - Continue to monitor and hold steroids; pending reposne to Fidaxomicin  - if sxs does not improve could discuss with ID role of repeat FMT    Reji Carreno MD  Gastroenterology/Hepatology Fellow  1st option: [phone] (text or call), ONLY available from 7:00 am to 5:00 pm.   **Contact on-call GI fellow via answering service ([phone]) from 5pm-7am AND on weekends/holidays**  2nd option: Available via [employer] Teams  3rd option: Pager: [phone]    NON-URGENT CONSULTS:  Please email [email or web address]  [email or web address]  AT NIGHT AND ON WEEKENDS:  Contact on-call GI fellow via answering service ([phone]) from 5pm-8am AND on weekends/holidays   GIDEON HAYES is a 81y Male with GERD on omeprazole daily and Crohn's disease diagnosed about 9 mos prior to admission at outside hospital c/b h/o abscess and left posterior distal anal intersphincteric fistula and presents as a new consult to GI for diarrhea/BRBPR likely 2/2 to CD flare c/b c diff colitis.    # Diarrhea  #C diff colitis  - In setting of C diff colitis. Given patient sx persisted despite antibiotics patient underwent FMT on 7/28/21. Patient had initial respond to it, with decrease in frequency of bowl movement (had 5, compared to 10 or more) but now reports having 10 again. Patient and family attribute this to restarting PO Vancomycin again. It is unclear if ongoing/worsening of symptoms is from untreated Crohn vs failure of FMT. Given worsening of sxs, Vancomycin was stopped in favor of Fidaxomicin  (8/2/21).    # Weight loss - likely 2/2 CD c/b oral aphthous ulcers and diarrhea as he has not been able to tolerate po and will need to ensure adequate hydration.  # GERD   # Parainfluzena infection    #Crohn disease:  -Diagnosed 9 months ago, patient managed by Dr. Juan Luis Tran ([phone]) at [facility]. Since diagnosis, patient initially on Mesalamine and budesonide w/o relief. Patient also failed Methotrexate and ultimately was started on Remicade Sxs improved and helped heal his fistula/abscess but developed antibodies within 6 months.  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    Recommendations:  - Continue to monitor and hold steroids; seems to be responding to Fidaxomicin.  - if sxs does not improve or worsen could discuss with ID role of repeat FMT vs repeating colonoscopy    Reji Carreno MD  Gastroenterology/Hepatology Fellow  1st option: [phone] (text or call), ONLY available from 7:00 am to 5:00 pm.   **Contact on-call GI fellow via answering service ([phone]) from 5pm-7am AND on weekends/holidays**  2nd option: Available via Microsoft Teams  3rd option: Pager: [phone]    NON-URGENT CONSULTS:  Please email [email or web address]  [email or web address]  AT NIGHT AND ON WEEKENDS:  Contact on-call GI fellow via answering service ([phone]) from 5pm-8am AND on weekends/holidays

## 2021-08-03 NOTE — PROGRESS NOTE ADULT - SUBJECTIVE AND OBJECTIVE BOX
Patient is a 81y old  Male who presents with a chief complaint of diarrhea with blood in it , abd pain and weakness (03 Aug 2021 18:32)      INTERVAL HPI/OVERNIGHT EVENTS: only 2 loose BM last night and one time during day   T(C): 37.2 (08-03-21 @ 19:42), Max: 37.2 (08-03-21 @ 19:42)  HR: 79 (08-03-21 @ 19:42) (62 - 84)  BP: 119/68 (08-03-21 @ 19:42) (119/68 - 155/79)  RR: 20 (08-03-21 @ 19:42) (17 - 20)  SpO2: 95% (08-03-21 @ 19:42) (93% - 97%)  Wt(kg): --  I&O's Summary    02 Aug 2021 07:01  -  03 Aug 2021 07:00  --------------------------------------------------------  IN: 1140 mL / OUT: 0 mL / NET: 1140 mL    03 Aug 2021 07:01  -  04 Aug 2021 01:57  --------------------------------------------------------  IN: 720 mL / OUT: 200 mL / NET: 520 mL        PAST MEDICAL & SURGICAL HISTORY:  No pertinent past medical history    No significant past surgical history        SOCIAL HISTORY  Alcohol:  Tobacco:  Illicit substance use:    FAMILY HISTORY:    REVIEW OF SYSTEMS:  CONSTITUTIONAL: No fever, weight loss, or fatigue  EYES: No eye pain, visual disturbances, or discharge  ENMT:  No difficulty hearing, tinnitus, vertigo; No sinus or throat pain  NECK: No pain or stiffness  RESPIRATORY: No cough, wheezing, chills or hemoptysis; No shortness of breath  CARDIOVASCULAR: No chest pain, palpitations, dizziness, or leg swelling  GASTROINTESTINAL: No abdominal or epigastric pain. No nausea, vomiting, or hematemesis; No diarrhea or constipation. No melena or hematochezia.  GENITOURINARY: No dysuria, frequency, hematuria, or incontinence  NEUROLOGICAL: No headaches, memory loss, loss of strength, numbness, or tremors  SKIN: No itching, burning, rashes, or lesions   LYMPH NODES: No enlarged glands  ENDOCRINE: No heat or cold intolerance; No hair loss  MUSCULOSKELETAL: No joint pain or swelling; No muscle, back, or extremity pain  PSYCHIATRIC: No depression, anxiety, mood swings, or difficulty sleeping  HEME/LYMPH: No easy bruising, or bleeding gums  ALLERY AND IMMUNOLOGIC: No hives or eczema    RADIOLOGY & ADDITIONAL TESTS:    Imaging Personally Reviewed:  [ ] YES  [ ] NO    Consultant(s) Notes Reviewed:  [ ] YES  [ ] NO    PHYSICAL EXAM:  GENERAL: NAD, well-groomed, well-developed  HEAD:  Atraumatic, Normocephalic  EYES: EOMI, PERRLA, conjunctiva and sclera clear  ENMT: No tonsillar erythema, exudates, or enlargement; Moist mucous membranes, Good dentition, No lesions  NECK: Supple, No JVD, Normal thyroid  NERVOUS SYSTEM:  Alert & Oriented X3, Good concentration; Motor Strength 5/5 B/L upper and lower extremities; DTRs 2+ intact and symmetric  CHEST/LUNG: Clear to percussion bilaterally; No rales, rhonchi, wheezing, or rubs  HEART: Regular rate and rhythm; No murmurs, rubs, or gallops  ABDOMEN: Soft, Nontender, Nondistended; Bowel sounds present  EXTREMITIES:  2+ Peripheral Pulses, No clubbing, cyanosis, or edema  LYMPH: No lymphadenopathy noted  SKIN: No rashes or lesions    LABS:                        7.7    8.32  )-----------( 366      ( 03 Aug 2021 07:07 )             24.1     08-03    136  |  104  |  6<L>  ----------------------------<  79  3.6   |  24  |  0.59    Ca    8.2<L>      03 Aug 2021 07:07  Phos  2.1     08-02  Mg     1.7     08-02          CAPILLARY BLOOD GLUCOSE                MEDICATIONS  (STANDING):  clonazePAM  Tablet 0.25 milliGRAM(s) Oral at bedtime  fidaxomicin 200 milliGRAM(s) Oral two times a day  FIRST- Mouthwash  BLM 10 milliLiter(s) Swish and Spit every 8 hours  folic acid 1 milliGRAM(s) Oral daily  multivitamin 1 Tablet(s) Oral daily  sodium chloride 0.9% with potassium chloride 20 mEq/L 1000 milliLiter(s) (60 mL/Hr) IV Continuous <Continuous>  sotalol 120 milliGRAM(s) Oral daily    MEDICATIONS  (PRN):  acetaminophen   Tablet .. 650 milliGRAM(s) Oral every 6 hours PRN Mild Pain (1 - 3), Moderate Pain (4 - 6)  benzonatate 100 milliGRAM(s) Oral every 8 hours PRN Cough  lidocaine 2% Gel 1 Application(s) Topical four times a day PRN pain due to skin excoriation  morphine  - Injectable 2 milliGRAM(s) IV Push every 8 hours PRN Severe Pain (7 - 10)      Care Discussed with Consultants/Other Providers [ ] YES  [ ] NO

## 2021-08-03 NOTE — PROGRESS NOTE ADULT - SUBJECTIVE AND OBJECTIVE BOX
Follow Up:  cdiff    Interval History/ROS: decreased diarrhea, continued malaise, anorexia    Allergies  No Known Allergies    ANTIMICROBIALS:  fidaxomicin 200 two times a day      OTHER MEDS:  MEDICATIONS  (STANDING):  acetaminophen   Tablet .. 650 every 6 hours PRN  benzonatate 100 every 8 hours PRN  clonazePAM  Tablet 0.25 at bedtime  morphine  - Injectable 2 every 8 hours PRN  sotalol 120 daily      Vital Signs Last 24 Hrs  T(C): 36.9 (03 Aug 2021 16:53), Max: 37.2 (02 Aug 2021 20:17)  T(F): 98.4 (03 Aug 2021 16:53), Max: 98.9 (02 Aug 2021 20:17)  HR: 84 (03 Aug 2021 16:53) (62 - 84)  BP: 127/70 (03 Aug 2021 16:53) (127/70 - 155/79)  BP(mean): --  RR: 20 (03 Aug 2021 16:53) (17 - 20)  SpO2: 95% (03 Aug 2021 16:53) (93% - 97%)    PHYSICAL EXAM:  General: thin, NAD, Non-toxic  Neurology: A&Ox3, nonfocal  Respiratory: Clear to auscultation bilaterally  CV: RRR, S1S2, no murmurs, rubs or gallops  Abdominal: Soft, Non-tender, non-distended, normal bowel sounds  Extremities: No edema,   Line Sites: Clear  Skin: No rash                        7.7    8.32  )-----------( 366      ( 03 Aug 2021 07:07 )             24.1       08-03    136  |  104  |  6<L>  ----------------------------<  79  3.6   |  24  |  0.59    Ca    8.2<L>      03 Aug 2021 07:07  Phos  2.1     08-02  Mg     1.7     08-02      MICROBIOLOGY:  .Blood Blood-Peripheral  07-30-21   No growth to date.  --  --      .Blood Blood-Peripheral  07-27-21   No Growth Final  --  --      .Blood Blood-Venous  07-24-21   No Growth Final  --  --      .Blood Blood  07-22-21   No Growth Final  --  --      .Stool  07-15-21   No Protozoa seen by trichrome stain  No Helminths or Protozoa seen in formalin concentrate  performed by iodine stain  (routine O+P not evaluated for Microsporidia,  Cryptosporidia, Cyclospora, or Isospora.)  One negative sample does not necessarily rule  out the presence of a parasitic infection.  Numerous White blood cells  --  --      .Stool Feces, [facility]  07-14-21   No enteric pathogens isolated.  (Stool culture examined for Salmonella,  Shigella, Campylobacter, Aeromonas, Plesiomonas,  Vibrio, E.coli O157 and Yersinia)  --  --      .Urine Clean Catch (Midstream)  07-13-21   <10,000 CFU/mL Normal Urogenital Margo  --  --      .Blood Blood-Peripheral  07-13-21   No Growth Final  --  --    Rapid RVP Result: Detected (07-29 @ 13:49)        RADIOLOGY:    Benjamin Phillips MD; Division of Infectious Disease; Pager: [phone]; nights and weekends: [phone]

## 2021-08-03 NOTE — PROGRESS NOTE ADULT - ASSESSMENT
A/p    sever C diff colitis :  -completed vanco/ flagyl   s/p FMT : 5 days post FMT   -improvement today  -started on fidaxomicin 200 mg bid today : as per ID and GI   -supportive care     Hx of Chron's dis :  -likely colitis is all 2/2 c diff   hold off on steroids for now     GI/Id following

## 2021-08-03 NOTE — PROGRESS NOTE ADULT - SUBJECTIVE AND OBJECTIVE BOX
Interval Events:       Hospital Medications:  acetaminophen   Tablet .. 650 milliGRAM(s) Oral every 6 hours PRN  benzonatate 100 milliGRAM(s) Oral every 8 hours PRN  clonazePAM  Tablet 0.25 milliGRAM(s) Oral at bedtime  fidaxomicin 200 milliGRAM(s) Oral two times a day  folic acid 1 milliGRAM(s) Oral daily  lidocaine 2% Gel 1 Application(s) Topical four times a day PRN  morphine  - Injectable 2 milliGRAM(s) IV Push every 8 hours PRN  multivitamin 1 Tablet(s) Oral daily  sodium chloride 0.9% with potassium chloride 20 mEq/L 1000 milliLiter(s) IV Continuous <Continuous>  sotalol 120 milliGRAM(s) Oral daily        ROS:   General:  No fevers, chills or night sweats  ENT:  No sore throat or dysphagia  CV:  No pain or palpitations  Resp:  No dyspnea, cough or  wheezing  GI:  as above  Skin:  No rash or edema  Neuro: no weakness   Hematologic: no bleeding  Musculoskeletal: no muscle pain or join pain  Psych: no agitation      PHYSICAL EXAM:   Vital Signs:  Vital Signs Last 24 Hrs  T(C): 36.7 (03 Aug 2021 05:44), Max: 37.2 (02 Aug 2021 20:17)  T(F): 98 (03 Aug 2021 05:44), Max: 98.9 (02 Aug 2021 20:17)  HR: 82 (03 Aug 2021 05:44) (73 - 82)  BP: 135/71 (03 Aug 2021 05:44) (111/69 - 139/70)  BP(mean): --  RR: 17 (03 Aug 2021 05:44) (17 - 18)  SpO2: 93% (03 Aug 2021 05:44) (93% - 97%)  Daily     Daily     GENERAL:  NAD, Appears stated age, cachectic  HEENT:  NC/AT,  conjunctivae clear and pink, sclera -anicteric  CHEST:  Normal Effort, Breath sounds clear  HEART:  RRR, S1 + S2, no murmurs  ABDOMEN:  Soft, non-distended, normoactive bowel sounds,  no masses, tender to palpation diffuse  EXTREMITIES:  no cyanosis or edema  SKIN:  Warm & Dry. No rash or erythema  NEURO:  Alert, oriented, no focal deficit    LABS:                        7.7    8.32  )-----------( 366      ( 03 Aug 2021 07:07 )             24.1     Mean Cell Volume: 81.7 fl (08-03-21 @ 07:07)    08-03    136  |  104  |  6<L>  ----------------------------<  79  3.6   |  24  |  0.59    Ca    8.2<L>      03 Aug 2021 07:07  Phos  2.1     08-02  Mg     1.7     08-02                                    7.7    8.32  )-----------( 366      ( 03 Aug 2021 07:07 )             24.1                         9.2    9.02  )-----------( 440      ( 02 Aug 2021 05:50 )             28.7       Imaging: Images reviewed no new images appreciated.         Interval Events:   Patient sleeping at bedside, daughter reported 2 BM overnight and only 1 this morning.     Hospital Medications:  acetaminophen   Tablet .. 650 milliGRAM(s) Oral every 6 hours PRN  benzonatate 100 milliGRAM(s) Oral every 8 hours PRN  clonazePAM  Tablet 0.25 milliGRAM(s) Oral at bedtime  fidaxomicin 200 milliGRAM(s) Oral two times a day  folic acid 1 milliGRAM(s) Oral daily  lidocaine 2% Gel 1 Application(s) Topical four times a day PRN  morphine  - Injectable 2 milliGRAM(s) IV Push every 8 hours PRN  multivitamin 1 Tablet(s) Oral daily  sodium chloride 0.9% with potassium chloride 20 mEq/L 1000 milliLiter(s) IV Continuous <Continuous>  sotalol 120 milliGRAM(s) Oral daily        PHYSICAL EXAM:   Vital Signs:  Vital Signs Last 24 Hrs  T(C): 36.7 (03 Aug 2021 05:44), Max: 37.2 (02 Aug 2021 20:17)  T(F): 98 (03 Aug 2021 05:44), Max: 98.9 (02 Aug 2021 20:17)  HR: 82 (03 Aug 2021 05:44) (73 - 82)  BP: 135/71 (03 Aug 2021 05:44) (111/69 - 139/70)  BP(mean): --  RR: 17 (03 Aug 2021 05:44) (17 - 18)  SpO2: 93% (03 Aug 2021 05:44) (93% - 97%)  Daily     Daily     GENERAL:  NAD, Appears stated age, cachectic  HEENT:  NC/AT,  conjunctivae clear and pink, sclera -anicteric  CHEST:  Normal Effort, Breath sounds clear  HEART:  RRR, S1 + S2, no murmurs  ABDOMEN:  Soft, non-distended, normoactive bowel sounds,  no masses, tender to palpation diffuse  EXTREMITIES:  no cyanosis or edema  SKIN:  Warm & Dry. No rash or erythema  NEURO:  Alert, oriented, no focal deficit    LABS:                        7.7    8.32  )-----------( 366      ( 03 Aug 2021 07:07 )             24.1     Mean Cell Volume: 81.7 fl (08-03-21 @ 07:07)    08-03    136  |  104  |  6<L>  ----------------------------<  79  3.6   |  24  |  0.59    Ca    8.2<L>      03 Aug 2021 07:07  Phos  2.1     08-02  Mg     1.7     08-02                                    7.7    8.32  )-----------( 366      ( 03 Aug 2021 07:07 )             24.1                         9.2    9.02  )-----------( 440      ( 02 Aug 2021 05:50 )             28.7       Imaging: Images reviewed no new images appreciated.

## 2021-08-03 NOTE — PROGRESS NOTE ADULT - ASSESSMENT
82 yo M with Crohn's on treatment, recently cared for at [facility], now presenting with ongoing diarrhea  Low grade temp elevation, leukocytosis  Diarrhea  No abd distension, no evidence megacolon  CT with colonic inflammation  Colonoscopy with visualized pseudomembranes  On PO Vanco starting 7/15 (x2 weeks) with limited improvement  Still ongoing diarrhea, episode fever  Patient having cough, CXR clear  RVP with parainfluenzae--explains URI symptoms and fevers  Overall,  1) C diff colitis  - decreased number of stools  discontinue Vanco   Fidaxomcin 200 mg twice daily x 5 days, then every other day for additional 20 days  po intake encouraged  - S/p colonoscopy FMT 7/28  - Frequent abd exam/stool count  2) Leukocytosis  - Trend to normal  3) Crohn's  - Further care per GI  - Uncertain if any active Crohn's (or if current symptoms all related to C diff)  4) Fevers  - Monitor for alternate sources infection, hold on abx unless signs sepsis/focal source infection  - F/U BCXs

## 2021-08-03 NOTE — CHART NOTE - NSCHARTNOTEFT_GEN_A_CORE
Nutrition Follow Up Note  Patient seen for:  malnutrition, severe    Severe weight loss noted secondary to profuse diarrhea, now improving. S/P Fecal trans[plant, patient accepting banatrol x2 daily, yogurt, Ronnie Active probiotic drink    Chart reviewed, events noted.    Source:       [x] EMR        [X ] RN        [X ] Family at bedside   daughter       -  Diet Order:   Diet, Low Fiber:   Banatrol TF     Qty per Day:  1 pkt po q8h  Liquid Protein Supplement     Qty per Day:  3  Supplement Feeding Modality:  Oral  Ensure Enlive Cans or Servings Per Day:  2       Frequency:  Daily  Probiotic Yogurt/Smoothie Cans or Servings Per Day:  2       Frequency:  Daily (21)    - Is current order appropriate/adequate? [X ] Yes  [ ]  No:     - PO intake :       [X ]25- 50%  - Nutrition-Related Concerns: poor appetite, patient hesistant to eat following severe prolonged diarrhea. Encouragement provided daily, his daughter at bedside in support    Micronutrient Supplementation: folic acid 1 milliGRAM(s) Oral daily  multivitamin 1 Tablet(s) Oral daily  sodium chloride 0.9% with potassium chloride 20 mEq/L 1000 milliLiter(s) IV Continuous <Continuous>    GI:  Last BM ___.   Bowel Regimen? [ ] Yes   [ ] No  patient diarrhea improving,  8/2 x5 yesterday, some semi formed per RN, x2 today      Weights:  weight loss 38  lbs  Daily Weight in k.6 ()  MEDICATIONS  (STANDING):  clonazePAM  Tablet  fidaxomicin  folic acid  multivitamin  sodium chloride 0.9% with potassium chloride 20 mEq/L  sotalol    Pertinent Labs:  @ 07:07: Na 136, BUN 6<L>, Cr 0.59, BG 79, K+ 3.6, Phos --, Mg --, Alk Phos --, ALT/SGPT --, AST/SGOT --, HbA1c --      Finger Sticks:      Skin per nursing documentation: sacrum st II  Edema: L R foor 1+    Estimated Needs:   [X ] no change since previous assessment  [ ] recalculated:     Previous Nutrition Diagnosis:   Nutrition Diagnosis is: [ ] ongoing  [ ] resolved [ ] not applicable     New Nutrition Diagnosis: [ ] Not applicable    Nutrition Care Plan:  [ ] In Progress  [ ] Achieved  [ ] Not applicable    Nutrition Interventions / Recommendations:    1) Education Provided:     [ ] Yes:                                                      [ ] Not applicable    2) Continue Current Diet: [ ] Yes                                                  [ ] No, change to:     3) Oral Nutrition Supplementation:     4) Micronutrient Supplementation:    5) Other Considerations:    Monitoring and Evaluation:   Continue to monitor Nutritional intake, Tolerance to diet prescription, weights, labs, skin integrity    Minerva Lee RD, CDN  Pager [phone]  RD remains available upon request and will follow up per protocol Nutrition Follow Up Note  Patient seen for:  malnutrition, severe      Chart reviewed, events noted.     Patient is a 81y old  Male who presents with a chief complaint of diarrhea with blood in it , abd pain and weakness.  PMH  Crohn's disease diagnosed about 9 mos prior to admission c/b h/o abscess and left posterior distal anal intersphincteric fistula. Followed by GI and ID for diarrhea noted likely 2/2 to CD flare c/b c diff colitis.  S/P   FMT     Severe weight loss, (38 lbs) noted secondary to profuse diarrhea, now improving. S/P Fecal trans[plant, patient accepting banatrol x2 daily, yogurt, Ronnie Active probiotic drink      Source:       [x] EMR        [X ] RN        [X ] Family at bedside   daughter       -  Diet Order:   Diet, Low Fiber:   Banatrol TF     Qty per Day:  1 pkt po q8h  Liquid Protein Supplement     Qty per Day:  3  Supplement Feeding Modality:  Oral  Ensure Enlive Cans or Servings Per Day:  2       Frequency:  Daily  Probiotic Yogurt/Smoothie Cans or Servings Per Day:  2       Frequency:  Daily (21)    - Is current order appropriate/adequate? [X ] Yes  [ ]  No:     - PO intake :       [X ]25- 50% recommend calorie count for 3 days  - Nutrition-Related Concerns: poor appetite, patient hesistant to eat following severe prolonged diarrhea. Encouragement provided daily, his daughter at bedside in support. Per daughter patient is sleeping more due to morphine    Micronutrient Supplementation: folic acid 1 milliGRAM(s) Oral daily  multivitamin 1 Tablet(s) Oral daily  sodium chloride 0.9% with potassium chloride 20 mEq/L 1000 milliLiter(s) IV Continuous <Continuous>    GI:  Last BM ___.   patient diarrhea improving,  8/2 x5 yesterday, some semi formed per RN, x2 today      Weights:  weight loss 38  lbs  Daily Weight in k.6 ()  MEDICATIONS  (STANDING):  clonazePAM  Tablet  fidaxomicin  folic acid  multivitamin  sodium chloride 0.9% with potassium chloride 20 mEq/L  sotalol    Pertinent Labs:  @ 07:07: Na 136, BUN 6<L>, Cr 0.59, BG 79, K+ 3.6, Phos --, Mg --, Alk Phos --, ALT/SGPT --, AST/SGOT --, HbA1c --        Skin per nursing documentation: sacrum st II  Edema: L R foot 1+    Estimated Needs:   [X ] no change since previous assessment  [ ] recalculated:     Previous Nutrition Diagnosis:   Nutrition Diagnosis is: [X ] ongoing    New Nutrition Diagnosis: [ ] Not applicable    Nutrition Care Plan:  [ X] In Progress    Nutrition Interventions / Recommendations:    1)initiate  calorie count for 3 days    2) continue ensure x3 daily  3) continue  LPS x3 daily added to gatorade  4) banatrol x3 daily    5)  continue MVI, folic acid                                        6) obtain daily weight  Discuss with team      Monitoring and Evaluation:   Continue to monitor Nutritional intake, Tolerance to diet prescription, weights, labs, skin integrity      RD remains available upon request and will follow up per protocol

## 2021-08-04 NOTE — PROGRESS NOTE ADULT - SUBJECTIVE AND OBJECTIVE BOX
Patient is a 81y old  Male who presents with a chief complaint of diarrhea with blood in it , abd pain and weakness (04 Aug 2021 19:16)      INTERVAL HPI/OVERNIGHT EVENTS: still having diarrhea , spiked fever     T(C): 36.7 (08-04-21 @ 20:06), Max: 38.3 (08-04-21 @ 16:21)  HR: 86 (08-04-21 @ 20:06) (56 - 92)  BP: 115/67 (08-04-21 @ 20:06) (115/67 - 145/79)  RR: 20 (08-04-21 @ 20:06) (18 - 20)  SpO2: 95% (08-04-21 @ 20:06) (93% - 99%)  Wt(kg): --  I&O's Summary    03 Aug 2021 07:01  -  04 Aug 2021 07:00  --------------------------------------------------------  IN: 1798 mL / OUT: 400 mL / NET: 1398 mL    04 Aug 2021 07:01  -  04 Aug 2021 23:52  --------------------------------------------------------  IN: 1200 mL / OUT: 250 mL / NET: 950 mL        PAST MEDICAL & SURGICAL HISTORY:  No pertinent past medical history    No significant past surgical history        SOCIAL HISTORY  Alcohol:  Tobacco:  Illicit substance use:    FAMILY HISTORY:    REVIEW OF SYSTEMS:  CONSTITUTIONAL: No fever, weight loss, or fatigue  EYES: No eye pain, visual disturbances, or discharge  ENMT:  No difficulty hearing, tinnitus, vertigo; No sinus or throat pain  NECK: No pain or stiffness  RESPIRATORY: No cough, wheezing, chills or hemoptysis; No shortness of breath  CARDIOVASCULAR: No chest pain, palpitations, dizziness, or leg swelling  GASTROINTESTINAL: No abdominal or epigastric pain. No nausea, vomiting, or hematemesis; No diarrhea or constipation. No melena or hematochezia.  GENITOURINARY: No dysuria, frequency, hematuria, or incontinence  NEUROLOGICAL: No headaches, memory loss, loss of strength, numbness, or tremors  SKIN: No itching, burning, rashes, or lesions   LYMPH NODES: No enlarged glands  ENDOCRINE: No heat or cold intolerance; No hair loss  MUSCULOSKELETAL: No joint pain or swelling; No muscle, back, or extremity pain  PSYCHIATRIC: No depression, anxiety, mood swings, or difficulty sleeping  HEME/LYMPH: No easy bruising, or bleeding gums  ALLERY AND IMMUNOLOGIC: No hives or eczema    RADIOLOGY & ADDITIONAL TESTS:    Imaging Personally Reviewed:  [ ] YES  [ ] NO    Consultant(s) Notes Reviewed:  [ ] YES  [ ] NO    PHYSICAL EXAM:  GENERAL: NAD, well-groomed, well-developed  HEAD:  Atraumatic, Normocephalic  EYES: EOMI, PERRLA, conjunctiva and sclera clear  ENMT: No tonsillar erythema, exudates, or enlargement; Moist mucous membranes, Good dentition, No lesions  NECK: Supple, No JVD, Normal thyroid  NERVOUS SYSTEM:  Alert & Oriented X3, Good concentration; Motor Strength 5/5 B/L upper and lower extremities; DTRs 2+ intact and symmetric  CHEST/LUNG: Clear to percussion bilaterally; No rales, rhonchi, wheezing, or rubs  HEART: Regular rate and rhythm; No murmurs, rubs, or gallops  ABDOMEN: Soft, Nontender, Nondistended; Bowel sounds present  EXTREMITIES:  2+ Peripheral Pulses, No clubbing, cyanosis, or edema  LYMPH: No lymphadenopathy noted  SKIN: No rashes or lesions    LABS:                        7.7    9.98  )-----------( 355      ( 04 Aug 2021 07:05 )             24.1     08-04    131<L>  |  100  |  8   ----------------------------<  97  3.4<L>   |  24  |  0.55    Ca    7.8<L>      04 Aug 2021 07:05          CAPILLARY BLOOD GLUCOSE                MEDICATIONS  (STANDING):  clonazePAM  Tablet 0.25 milliGRAM(s) Oral at bedtime  fidaxomicin 200 milliGRAM(s) Oral two times a day  FIRST- Mouthwash  BLM 10 milliLiter(s) Swish and Spit every 8 hours  folic acid 1 milliGRAM(s) Oral daily  guaiFENesin Oral Liquid (Sugar-Free) 100 milliGRAM(s) Oral once  multivitamin 1 Tablet(s) Oral daily  sodium chloride 0.9% with potassium chloride 20 mEq/L 1000 milliLiter(s) (60 mL/Hr) IV Continuous <Continuous>  sotalol 120 milliGRAM(s) Oral daily    MEDICATIONS  (PRN):  acetaminophen   Tablet .. 650 milliGRAM(s) Oral every 6 hours PRN Mild Pain (1 - 3), Moderate Pain (4 - 6)  benzonatate 100 milliGRAM(s) Oral every 8 hours PRN Cough  lidocaine 2% Gel 1 Application(s) Topical four times a day PRN pain due to skin excoriation  morphine  - Injectable 2 milliGRAM(s) IV Push every 8 hours PRN Severe Pain (7 - 10)      Care Discussed with Consultants/Other Providers [ ] YES  [ ] NO

## 2021-08-04 NOTE — PROVIDER CONTACT NOTE (MEDICATION) - ACTION/TREATMENT ORDERED:
Provider to check chart and orders to follow
Tylenol 650mg was given, and will continue to  monitor.

## 2021-08-04 NOTE — PROGRESS NOTE ADULT - ASSESSMENT
A/p    spiked fever :  -diarrhea no better   repeat c/s and precalcitonin   ID following   hold off on IV abx for now     sever C diff colitis :  -completed vanco/ flagyl   s/p FMT : 5 days post FMT   -improvement today  -started on fidaxomicin 200 mg bid today : as per ID and GI   -supportive care     Hx of Chron's dis :  -likely colitis is all 2/2 c diff   hold off on steroids for now     GI/Id following

## 2021-08-04 NOTE — PROGRESS NOTE ADULT - ASSESSMENT
GIDEON HAYES is a 81y Male with GERD on omeprazole daily and Crohn's disease diagnosed about 9 mos prior to admission at outside hospital c/b h/o abscess and left posterior distal anal intersphincteric fistula and presents as a new consult to GI for diarrhea/BRBPR likely 2/2 to CD flare c/b c diff colitis.    # Diarrhea  #C diff colitis  - In setting of C diff colitis. Given patient sx persisted despite antibiotics patient underwent FMT on 7/28/21. Patient had initial respond to it, with decrease in frequency of bowl movement (had 5, compared to 10 or more) but now reports having 10 again. Patient and family attribute this to restarting PO Vancomycin again. It is unclear if ongoing/worsening of symptoms is from untreated Crohn vs failure of FMT. Given worsening of sxs, Vancomycin was stopped in favor of Fidaxomicin  (8/2/21).    If sxs does not continue to improve, will need to consider repeating EGD and starting Crohn tx.     # Weight loss - likely 2/2 CD c/b oral aphthous ulcers and diarrhea as he has not been able to tolerate po and will need to ensure adequate hydration.  # GERD   # Parainfluzena infection    #Crohn disease:  -Diagnosed 9 months ago, patient managed by Dr. Juan Luis Tran ([phone]) at [facility]. Since diagnosis, patient initially on Mesalamine and budesonide w/o relief. Patient also failed Methotrexate and ultimately was started on Remicade Sxs improved and helped heal his fistula/abscess but developed antibodies within 6 months.  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    Recommendations:  - Continue to monitor and hold steroids while wating Fidaxomicin treatment response.    Reji Carreno MD  Gastroenterology/Hepatology Fellow  1st option: [phone] (text or call), ONLY available from 7:00 am to 5:00 pm.   **Contact on-call GI fellow via answering service ([phone]) from 5pm-7am AND on weekends/holidays**  2nd option: Available via [employer] Teams  3rd option: Pager: [phone]    NON-URGENT CONSULTS:  Please email [email or web address]  [email or web address]  AT NIGHT AND ON WEEKENDS:  Contact on-call GI fellow via answering service ([phone]) from 5pm-8am AND on weekends/holidays

## 2021-08-04 NOTE — PROGRESS NOTE ADULT - SUBJECTIVE AND OBJECTIVE BOX
DATE OF SERVICE: 08-04-21   Patient is a 81y old  Male who presents with a chief complaint of diarrhea with blood in it , abd pain and weakness (04 Aug 2021 19:16)      INTERVAL HISTORY: feels ok        	  MEDICATIONS:  sotalol 120 milliGRAM(s) Oral daily        PHYSICAL EXAM:  T(C): 36.7 (08-04-21 @ 20:06), Max: 38.3 (08-04-21 @ 16:21)  HR: 86 (08-04-21 @ 20:06) (56 - 92)  BP: 115/67 (08-04-21 @ 20:06) (115/67 - 145/79)  RR: 20 (08-04-21 @ 20:06) (18 - 20)  SpO2: 95% (08-04-21 @ 20:06) (93% - 99%)  Wt(kg): --  I&O's Summary    03 Aug 2021 07:01  -  04 Aug 2021 07:00  --------------------------------------------------------  IN: 1798 mL / OUT: 400 mL / NET: 1398 mL    04 Aug 2021 07:01  -  04 Aug 2021 21:07  --------------------------------------------------------  IN: 1200 mL / OUT: 250 mL / NET: 950 mL          Appearance: In no distress	  HEENT:    PERRL, EOMI	  Cardiovascular:  S1 S2, No JVD  Respiratory: Lungs clear to auscultation	  Gastrointestinal:  Soft, Non-tender, + BS	  Vascularature:  No edema of LE  Psychiatric: Appropriate affect   Neuro: no acute focal deficits                               7.7    9.98  )-----------( 355      ( 04 Aug 2021 07:05 )             24.1     08-04    131<L>  |  100  |  8   ----------------------------<  97  3.4<L>   |  24  |  0.55    Ca    7.8<L>      04 Aug 2021 07:05          Labs personally reviewed    < from: Transthoracic Echocardiogram (08.02.21 @ 05:30) >  Conclusions:  Technically difficult study.  1. Mild mitral annular calcification, otherwise normal  mitral valve. Mild mitral regurgitation.  2. Aortic valve not well visualized; appears to be a  calcified trileaflet valve with mildly decreased opening.  Peak transaortic valve gradient equals 25 mm Hg, mean  transaortic valve gradient equals 13 mm Hg, estimated  aortic valve area equals 1.7 sqcm (by continuity equation),  aortic valve velocity time integral equals 48 cm,  consistent with mild aortic stenosis. Moderate-severe  aortic regurgitation.  3. Endocardium not well visualized; grossly preserved left  ventricular systolic function. Unable to exclude segmental  wall motion abnormalities. Recommend limited repeat imaging  with intravenous ultrasonic enhancing agent to better  visualize the LV if clinically indicated.  4. Normal right ventricular size and function.  *** No previous Echo exam.       ASSESSMENT/PLAN: 	  Problem/Plan - 1:  ·  Problem: CAD (coronary artery disease).  Plan: c/w home meds  We discussed the importance of SAPT with low dose ASA 81mg given CAD  pt denies any current chest pain, SOB or chest pressure.     Problem/Plan -2:  ·  Problem: Colitis.  Plan: Ct shows chron's  exacerbation   f/u flex sig with biopsies   s/p FMT 7/28  c/w PO vanco   steroids on hold for for now due to concern for C diff colitis.  plan for repeat FMT vs Fidoxomaicin if sx dont improve  GI and ID following      Problem/Plan - 3:  ·  Problem: Afib pt said he was placed on sotalol for afib   -c/w sotalol   -EKG noted. SR     Problem/Plan - 4:   ·  Problem: GIB (gastrointestinal bleeding).  Plan: bloody diarrhea 2/2 chron's exacerbation   IV fluids   -c-diff positive:   -off abx per ID    Problem/Plan - 5:  ·  Problem: SCDs  pt ambulatory     Problem/Plan - 6:  ·  Problem: B/L LE edema   - CXR showed mildly enlarged heart   -monitor IVF with I&O  -B/L L E edema improved   -TTE with mod-severe AI, mild AS, preserved EF        Rufus Onofre DO [facility]  Cardiovascular Medicine  [address] [place] [address], [address]  Office: [phone]  Cell: [phone]

## 2021-08-04 NOTE — PROGRESS NOTE ADULT - SUBJECTIVE AND OBJECTIVE BOX
Interval Events:   continues having abdominal pain, thinks diarrhea is getting worst.    Hospital Medications:  acetaminophen   Tablet .. 650 milliGRAM(s) Oral every 6 hours PRN  benzonatate 100 milliGRAM(s) Oral every 8 hours PRN  clonazePAM  Tablet 0.25 milliGRAM(s) Oral at bedtime  fidaxomicin 200 milliGRAM(s) Oral two times a day  FIRST- Mouthwash  BLM 10 milliLiter(s) Swish and Spit every 8 hours  folic acid 1 milliGRAM(s) Oral daily  lidocaine 2% Gel 1 Application(s) Topical four times a day PRN  morphine  - Injectable 2 milliGRAM(s) IV Push every 8 hours PRN  multivitamin 1 Tablet(s) Oral daily  sodium chloride 0.9% with potassium chloride 20 mEq/L 1000 milliLiter(s) IV Continuous <Continuous>  sotalol 120 milliGRAM(s) Oral daily        ROS:   General:  No fevers, chills or night sweats  ENT:  No sore throat or dysphagia  CV:  No pain or palpitations  Resp:  No dyspnea, cough or  wheezing  GI:  as above  Skin:  No rash or edema  Neuro: no weakness   Hematologic: no bleeding  Musculoskeletal: no muscle pain or join pain  Psych: no agitation      PHYSICAL EXAM:   Vital Signs:  Vital Signs Last 24 Hrs  T(C): 36.9 (04 Aug 2021 09:22), Max: 37.2 (03 Aug 2021 19:42)  T(F): 98.4 (04 Aug 2021 09:22), Max: 98.9 (03 Aug 2021 19:42)  HR: 72 (04 Aug 2021 09:22) (62 - 87)  BP: 124/68 (04 Aug 2021 09:22) (119/68 - 155/79)  BP(mean): --  RR: 18 (04 Aug 2021 09:22) (18 - 20)  SpO2: 93% (04 Aug 2021 09:22) (93% - 99%)  Daily     Daily Weight in k.5 (04 Aug 2021 09:22)    GENERAL:  NAD, Appears stated age, cachectic  HEENT:  NC/AT,  conjunctivae clear and pink, sclera -anicteric  CHEST:  Normal Effort, Breath sounds clear  HEART:  RRR, S1 + S2, no murmurs  ABDOMEN:  Soft, non-distended, normoactive bowel sounds,  no masses, tender to palpation diffusely  EXTREMITIES:  no cyanosis or edema  SKIN:  Warm & Dry. No rash or erythema  NEURO:  Alert, oriented, no focal deficit    LABS:                        7.7    9.98  )-----------( 355      ( 04 Aug 2021 07:05 )             24.1     Mean Cell Volume: 80.3 fl (08-21 @ 07:05)    08    131<L>  |  100  |  8   ----------------------------<  97  3.4<L>   |  24  |  0.55    Ca    7.8<L>      04 Aug 2021 07:05                                    7.7    9.98  )-----------( 355      ( 04 Aug 2021 07:05 )             24.1                         7.7    8.32  )-----------( 366      ( 03 Aug 2021 07:07 )             24.1                         9.2    9.02  )-----------( 440      ( 02 Aug 2021 05:50 )             28.7       Imaging: Images reviewed no new images appreciated.

## 2021-08-04 NOTE — PROGRESS NOTE ADULT - ASSESSMENT
82 yo M with Crohn's on treatment, recently cared for at [facility], now presenting with ongoing diarrhea  Low grade temp elevation, leukocytosis  Diarrhea  No abd distension, no evidence megacolon  CT with colonic inflammation  Colonoscopy with visualized pseudomembranes  On PO Vanco starting 7/15 (x2 weeks) with limited improvement  Still ongoing diarrhea, episode fever  Patient having cough, CXR clear  RVP with parainfluenzae--explains URI symptoms and fevers  Overall,  1) C diff colitis  - decreased number of stools  discontinue Vanco   Fidaxomcin 200 mg twice daily x 5 days, then every other day for additional 20 days  po intake encouraged  - S/p colonoscopy FMT 7/28  - Frequent abd exam/stool count  2) Leukocytosis  - Trend to normal  3) Crohn's  - Further care per GI  - Uncertain if any active Crohn's (or if current symptoms all related to C diff)  4) Fevers  - Monitor for alternate sources infection, hold on abx unless signs sepsis/focal source infection  - F/U BCXs  5) Para flu 3 viral uri with cough and fevers    Son at bedside asks about discharge  -son had covid and has not had vaccine; daughter has not had covid vaccine and will be inconvenienced by testing requirement to visit in the hospital to be instituted next week.  Patient and wife have had COVID vaccinations according to son  New fever - discussed with NP: repeat cultures, check ProCalcitonin, repeat RVP

## 2021-08-04 NOTE — CHART NOTE - NSCHARTNOTEFT_GEN_A_CORE
Chief complaint: fever   Called by RN for fever of 101 orally. Pt seen laying in bed, endorsing that he doesn't feel good, and still has a cough ( productive at times - with white to yellowish sputum)  as well as continues episodes of diarrhea. Pt denies any sob, chest pain , and  n/v.   Patient is a 81y old  Male who presents with a chief complaint of diarrhea with blood in it , abd pain and weakness (04 Aug 2021 12:51)      Vital Signs Last 24 Hrs  T(C): 38.3 (04 Aug 2021 16:21), Max: 38.3 (04 Aug 2021 16:21)  T(F): 101 (04 Aug 2021 16:21), Max: 101 (04 Aug 2021 16:21)  HR: 92 (04 Aug 2021 16:21) (56 - 92)  BP: 124/73 (04 Aug 2021 16:21) (119/68 - 145/79)  BP(mean): --  RR: 20 (04 Aug 2021 16:21) (18 - 20)  SpO2: 93% (04 Aug 2021 16:21) (93% - 99%)      Labs:                          7.7    9.98  )-----------( 355      ( 04 Aug 2021 07:05 )             24.1     08-04    131<L>  |  100  |  8   ----------------------------<  97  3.4<L>   |  24  |  0.55    Ca    7.8<L>      04 Aug 2021 07:05              Radiology:  < from: Xray Chest 1 View- PORTABLE-Urgent (Xray Chest 1 View- PORTABLE-Urgent .) (07.30.21 @ 04:57) >    IMPRESSION:    No acute pulmonary disease    --- End of Report ---    < end of copied text >        Physical Exam:  General: WN/WD NAD  Neurology: A&Ox3, nonfocal, MYLES x 4  Head:  Normocephalic, atraumatic  Respiratory: CTA B/L  CV: RRR, S1S2   Abdominal: Soft, NT, ND no palpable mass  MSK: No edema, + peripheral pulses, FROM all 4 extremity    Assessment & Plan:  HPI:  82 yo M with Crohn disease, over the last 9 mo, he was started on several medication and was recently told that he may need to go on IV infusion for chron's, he has several hospitalization at [facility] , and follows with GI near [facility]. but his diarrhea is not improving , having abd cramps , denies any N/V . , discharged 1 mo ago from [facility], now having trouble walking, on going diarrhea. + subjective fever but denies cough or nasal congestion or uri or urinary symptoms.    Pt now with fever.     Fever   >Tylenol for fever   > Discuss with Dr. Phillips, ID, will repeat blood cx, order chest xray  and, covid pcr , as well as a procalcitonin. Will continue with Dificid; no additional abx for now.   >Discussed above with Dr. Oliver and agrees with plan as well as updated pt daughter Tiffanie of clinical course as requested by patient.   Janice TOVAR   [id number]

## 2021-08-04 NOTE — PROGRESS NOTE ADULT - SUBJECTIVE AND OBJECTIVE BOX
Follow Up:  cdiff    Interval History/ROS:  diarrhea "too numerous to count"    Allergies  No Known Allergies    ANTIMICROBIALS:  fidaxomicin 200 two times a day      OTHER MEDS:  MEDICATIONS  (STANDING):  acetaminophen   Tablet .. 650 every 6 hours PRN  benzonatate 100 every 8 hours PRN  clonazePAM  Tablet 0.25 at bedtime  guaiFENesin Oral Liquid (Sugar-Free) 100 once  morphine  - Injectable 2 every 8 hours PRN  sotalol 120 daily      Vital Signs Last 24 Hrs  T(C): 38.3 (04 Aug 2021 16:21), Max: 38.3 (04 Aug 2021 16:21)  T(F): 101 (04 Aug 2021 16:21), Max: 101 (04 Aug 2021 16:21)  HR: 92 (04 Aug 2021 16:21) (56 - 92)  BP: 124/73 (04 Aug 2021 16:21) (119/68 - 145/79)  BP(mean): --  RR: 20 (04 Aug 2021 16:21) (18 - 20)  SpO2: 93% (04 Aug 2021 16:21) (93% - 99%)    PHYSICAL EXAM:  General: thin NAD, Non-toxic  Neurology: dysphoric  Respiratory: Clear to auscultation bilaterally  CV: RRR, S1S2, no murmurs, rubs or gallops  Abdominal: Soft, Non-tender, non-distended, normal bowel sounds  Extremities: No edema,  Line Sites: Clear  Skin: No rash                        7.7    9.98  )-----------( 355      ( 04 Aug 2021 07:05 )             24.1     08-04    131<L>  |  100  |  8   ----------------------------<  97  3.4<L>   |  24  |  0.55    Ca    7.8<L>      04 Aug 2021 07:05      MICROBIOLOGY:  .Blood Blood-Peripheral  07-30-21   No Growth Final  --  --      .Blood Blood-Peripheral  07-27-21   No Growth Final  --  --      .Blood Blood-Venous  07-24-21   No Growth Final  --  --      .Blood Blood  07-22-21   No Growth Final  --  --      .Stool  07-15-21   No Protozoa seen by trichrome stain  No Helminths or Protozoa seen in formalin concentrate  performed by iodine stain  (routine O+P not evaluated for Microsporidia,  Cryptosporidia, Cyclospora, or Isospora.)  One negative sample does not necessarily rule  out the presence of a parasitic infection.  Numerous White blood cells  --  --      .Stool Feces, namrata coppola  07-14-21   No enteric pathogens isolated.  (Stool culture examined for Salmonella,  Shigella, Campylobacter, Aeromonas, Plesiomonas,  Vibrio, E.coli O157 and Yersinia)  --  --      .Urine Clean Catch (Midstream)  07-13-21   <10,000 CFU/mL Normal Urogenital Margo  --  --      .Blood Blood-Peripheral  07-13-21   No Growth Final  --  --      Rapid RVP Result: Detected (07-29 @ 13:49)    07.14.21 @ 09:08)  COVID-19 Adam Domain Antibody Result: 15.20    RADIOLOGY:  < from: Xray Chest 1 View- PORTABLE-Urgent (Xray Chest 1 View- PORTABLE-Urgent .) (07.30.21 @ 04:57) >  IMPRESSION:    No acute pulmonary disease    < end of copied text >      Benjamin Phillips MD; Division of Infectious Disease; Pager: [phone]; nights and weekends: [phone]

## 2021-08-05 NOTE — PROGRESS NOTE ADULT - NSPROGADDITIONALINFOA_GEN_ALL_CORE
no better , still continue to have diarrhea , restarted on vanco oral bid, GI and ID f/u in am
clinically better then yesterday
dispo: d/w son and patient in length regarding plan of care. son wishes to take his father home on Monday even his diarrhea is not improved, as he want his father to be comfortable at home
dispo: will ask GI regarding adding steroid IV ? in am
dispo : GI and ID after discussion decided on FMT tomorrow
d/w daughter in length regarding tomorrow plan and supportive care

## 2021-08-05 NOTE — PROGRESS NOTE ADULT - SUBJECTIVE AND OBJECTIVE BOX
Follow Up:  Cdiff    Interval History/ROS:  malaise, noted decreased number of stools    Allergies  No Known Allergies    ANTIMICROBIALS:  fidaxomicin 200 two times a day    OTHER MEDS:  MEDICATIONS  (STANDING):  acetaminophen   Tablet .. 650 every 6 hours PRN  benzonatate 100 every 8 hours  clonazePAM  Tablet 0.25 at bedtime  guaifenesin/dextromethorphan Oral Liquid 10 every 8 hours PRN  morphine  - Injectable 2 every 8 hours PRN  sotalol 120 daily      Vital Signs Last 24 Hrs  T(C): 37.2 (05 Aug 2021 13:49), Max: 37.2 (05 Aug 2021 13:49)  T(F): 98.9 (05 Aug 2021 13:49), Max: 98.9 (05 Aug 2021 13:49)  HR: 81 (05 Aug 2021 13:49) (81 - 86)  BP: 127/84 (05 Aug 2021 13:49) (115/67 - 130/80)  BP(mean): --  RR: 18 (05 Aug 2021 13:49) (18 - 20)  SpO2: 93% (05 Aug 2021 13:49) (93% - 96%)    PHYSICAL EXAM:  General: thin, , Non-toxic  Neurology: A&Ox3, nonfocal, dysphoric  Respiratory: Clear to auscultation bilaterally  CV: RRR, S1S2, no murmurs, rubs or gallops  Abdominal: Soft, Non-tender, non-distended,   Extremities: No edema,  Line Sites: Clear  Skin: No rash                          7.7    11.15 )-----------( 372      ( 05 Aug 2021 07:16 )             24.3   WBC Count: 11.15 (08-05 @ 07:16)  WBC Count: 9.98 (08-04 @ 07:05)  WBC Count: 8.32 (08-03 @ 07:07)  WBC Count: 9.02 (08-02 @ 05:50)    08-05    130<L>  |  98  |  10  ----------------------------<  89  3.8   |  22  |  0.55    Ca    8.0<L>      05 Aug 2021 07:14        MICROBIOLOGY:  .Blood Blood-Peripheral  07-30-21   No Growth Final  --  --      .Blood Blood-Peripheral  07-27-21   No Growth Final  --  --      .Blood Blood-Venous  07-24-21   No Growth Final  --  --      .Blood Blood  07-22-21   No Growth Final  --  --      .Stool  07-15-21   No Protozoa seen by trichrome stain  No Helminths or Protozoa seen in formalin concentrate  performed by iodine stain    .Stool Feces, [facility]  07-14-21   No enteric pathogens isolated.  (Stool culture examined for Salmonella,  Shigella, Campylobacter, Aeromonas, Plesiomonas,  Vibrio, E.coli O157 and Yersinia)  --  --      .Urine Clean Catch (Midstream)  07-13-21   <10,000 CFU/mL Normal Urogenital Margo  --  --      .Blood Blood-Peripheral  07-13-21   No Growth Final  --  --      RADIOLOGY:    Benjamin Phillips MD; Division of Infectious Disease; Pager: [phone]; nights and weekends: [phone]

## 2021-08-05 NOTE — PROGRESS NOTE ADULT - SUBJECTIVE AND OBJECTIVE BOX
Interval Events:   Patient continues having diarrhea, but reports improvement with decrease to 3-4 in the last 24 hrs. Did have fever yesterday.    Hospital Medications:  acetaminophen   Tablet .. 650 milliGRAM(s) Oral every 6 hours PRN  benzonatate 100 milliGRAM(s) Oral every 8 hours  clonazePAM  Tablet 0.25 milliGRAM(s) Oral at bedtime  fidaxomicin 200 milliGRAM(s) Oral two times a day  FIRST- Mouthwash  BLM 10 milliLiter(s) Swish and Spit every 8 hours  folic acid 1 milliGRAM(s) Oral daily  guaifenesin/dextromethorphan Oral Liquid 10 milliLiter(s) Oral every 8 hours PRN  lidocaine 2% Gel 1 Application(s) Topical four times a day PRN  morphine  - Injectable 2 milliGRAM(s) IV Push every 8 hours PRN  multivitamin 1 Tablet(s) Oral daily  sodium chloride 0.9% with potassium chloride 20 mEq/L 1000 milliLiter(s) IV Continuous <Continuous>  sotalol 120 milliGRAM(s) Oral daily        ROS:   General:  No fevers, chills or night sweats  ENT:  No sore throat or dysphagia  CV:  No pain or palpitations  Resp:  No dyspnea, cough or  wheezing  GI:  as above  Skin:  No rash or edema  Neuro: no weakness   Hematologic: no bleeding  Musculoskeletal: no muscle pain or join pain  Psych: no agitation      PHYSICAL EXAM:   Vital Signs:  Vital Signs Last 24 Hrs  T(C): 36.8 (05 Aug 2021 05:50), Max: 38.3 (04 Aug 2021 16:21)  T(F): 98.3 (05 Aug 2021 05:50), Max: 101 (04 Aug 2021 16:21)  HR: 84 (05 Aug 2021 05:50) (56 - 92)  BP: 130/80 (05 Aug 2021 05:50) (115/67 - 139/71)  BP(mean): --  RR: 18 (05 Aug 2021 05:50) (18 - 20)  SpO2: 96% (05 Aug 2021 05:50) (93% - 97%)  Daily     Daily     GENERAL:  NAD, Appears stated age, cachectic  HEENT:  NC/AT,  conjunctivae clear and pink, sclera -anicteric  CHEST:  Normal Effort, Breath sounds clear  HEART:  RRR, S1 + S2, no murmurs  ABDOMEN:  Soft, non-distended, normoactive bowel sounds,  no masses, tender to palpation diffusely  EXTREMITIES:  no cyanosis or edema  SKIN:  Warm & Dry. No rash or erythema  NEURO:  Alert, oriented, no focal deficit    LABS:                        7.7    11.15 )-----------( 372      ( 05 Aug 2021 07:16 )             24.3     Mean Cell Volume: 79.9 fl (08-05-21 @ 07:16)    08-05    130<L>  |  98  |  10  ----------------------------<  89  3.8   |  22  |  0.55    Ca    8.0<L>      05 Aug 2021 07:14                                    7.7    11.15 )-----------( 372      ( 05 Aug 2021 07:16 )             24.3                         7.7    9.98  )-----------( 355      ( 04 Aug 2021 07:05 )             24.1                         7.7    8.32  )-----------( 366      ( 03 Aug 2021 07:07 )             24.1       Imaging: Images reviewed no new images appreciated.

## 2021-08-05 NOTE — PROGRESS NOTE ADULT - ASSESSMENT
GIDEON HAYES is a 81y Male with GERD on omeprazole daily and Crohn's disease diagnosed about 9 mos prior to admission at outside hospital c/b h/o abscess and left posterior distal anal intersphincteric fistula and presents as a new consult to GI for diarrhea/BRBPR likely 2/2 to CD flare c/b c diff colitis.    # Diarrhea  #C diff colitis  - In setting of C diff colitis. Given patient sx persisted despite antibiotics patient underwent FMT on 7/28/21. Patient had initial respond to it, with decrease in frequency of bowl movement (had 5, compared to 10 or more) but now reports having 10 again. Patient and family attribute this to restarting PO Vancomycin again. It is unclear if ongoing/worsening of symptoms is from untreated Crohn vs failure of FMT. Given worsening of sxs, Vancomycin was stopped in favor of Fidaxomicin  (8/2/21).    If sxs does not continue to improve, will need to consider repeating EGD and starting Crohn tx.     # Weight loss - likely 2/2 CD c/b oral aphthous ulcers and diarrhea as he has not been able to tolerate po and will need to ensure adequate hydration.  # GERD   # Parainfluzena infection    #Crohn disease:  -Diagnosed 9 months ago, patient managed by Dr. Juan Luis Tran ([phone]) at [facility]. Since diagnosis, patient initially on Mesalamine and budesonide w/o relief. Patient also failed Methotrexate and ultimately was started on Remicade Sxs improved and helped heal his fistula/abscess but developed antibodies within 6 months.  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    Recommendations:  - GIDEON HAYES is a 81y Male with GERD on omeprazole daily and Crohn's disease diagnosed about 9 mos prior to admission at outside hospital c/b h/o abscess and left posterior distal anal intersphincteric fistula and presents as a new consult to GI for diarrhea/BRBPR likely 2/2 to CD flare c/b c diff colitis.    # Diarrhea  #C diff colitis  - In setting of C diff colitis. Given patient sx persisted despite antibiotics patient underwent FMT on 7/28/21. Patient had initial respond to it, with decrease in frequency of bowl movement (had 5, compared to 10 or more) but now reports having 10 again. Patient and family attribute this to restarting PO Vancomycin again. It is unclear if ongoing/worsening of symptoms is from untreated Crohn vs failure of FMT. Given worsening of sxs, Vancomycin was stopped in favor of Fidaxomicin  (8/2/21).    If sxs does not continue to improve, will need to consider repeating EGD and starting Crohn tx.     # Weight loss - likely 2/2 CD c/b oral aphthous ulcers and diarrhea as he has not been able to tolerate po and will need to ensure adequate hydration.  # GERD   # Parainfluzena infection    #Crohn disease:  -Diagnosed 9 months ago, patient managed by Dr. Juan Luis Tran ([phone]) at [facility]. Since diagnosis, patient initially on Mesalamine and budesonide w/o relief. Patient also failed Methotrexate and ultimately was started on Remicade Sxs improved and helped heal his fistula/abscess but developed antibodies within 6 months.  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    Recommendations:  -In setting of fever and increase in WBC while in appropriate therapy, would recommend obtaining CT of the abdomen/pelvis with IV contrast.   -Continue to monitor BM.    Reji Carreno MD  Gastroenterology/Hepatology Fellow  1st option: [phone] (text or call), ONLY available from 7:00 am to 5:00 pm.   **Contact on-call GI fellow via answering service ([phone]) from 5pm-7am AND on weekends/holidays**  2nd option: Available via Microsoft Teams  3rd option: Pager: [phone]    NON-URGENT CONSULTS:  Please email [email or web address]  AT NIGHT AND ON WEEKENDS:  Contact on-call GI fellow via answering service ([phone]) from 5pm-8am AND on weekends/holidays

## 2021-08-05 NOTE — PHARMACOTHERAPY INTERVENTION NOTE - COMMENTS
GIDEON HAYES, 81y Male with C. diff infection, s/p FMT via colonoscopy on 7/28. Has been on pantoprazole since admission.    Recommendation(s):  1) Discussed with GI, would consider D/C pantoprazole at this time given the patient has active C. diff infection that is slow to resolve and PPIs have been shown to cause/worsen C. diff infections.    With kind regards,  Vj Oliver, PaulaD  Infectious Diseases Clinical Pharmacist  .
pt is on klonopin 0.25 mg qhs
pt is on klonopin 0.25mg qhs
recommend to renew klonopin - order will auto 
recommend to renew klonopin - order will auto  today

## 2021-08-05 NOTE — PROGRESS NOTE ADULT - SUBJECTIVE AND OBJECTIVE BOX
Patient is a 81y old  Male who presents with a chief complaint of diarrhea with blood in it , abd pain and weakness (05 Aug 2021 16:51)      INTERVAL HPI/OVERNIGHT EVENTS:  T(C): 39.1 (08-05-21 @ 20:00), Max: 39.1 (08-05-21 @ 20:00)  HR: 92 (08-05-21 @ 20:00) (81 - 92)  BP: 126/68 (08-05-21 @ 20:00) (126/68 - 130/80)  RR: 18 (08-05-21 @ 20:00) (18 - 18)  SpO2: 93% (08-05-21 @ 20:00) (93% - 96%)  Wt(kg): --  I&O's Summary    04 Aug 2021 07:01  -  05 Aug 2021 07:00  --------------------------------------------------------  IN: 2160 mL / OUT: 300 mL / NET: 1860 mL    05 Aug 2021 07:01  -  05 Aug 2021 21:35  --------------------------------------------------------  IN: 840 mL / OUT: 0 mL / NET: 840 mL        PAST MEDICAL & SURGICAL HISTORY:  No pertinent past medical history    No significant past surgical history        SOCIAL HISTORY  Alcohol:  Tobacco:  Illicit substance use:    FAMILY HISTORY:    REVIEW OF SYSTEMS:  CONSTITUTIONAL: No fever, weight loss, or fatigue  EYES: No eye pain, visual disturbances, or discharge  ENMT:  No difficulty hearing, tinnitus, vertigo; No sinus or throat pain  NECK: No pain or stiffness  RESPIRATORY: No cough, wheezing, chills or hemoptysis; No shortness of breath  CARDIOVASCULAR: No chest pain, palpitations, dizziness, or leg swelling  GASTROINTESTINAL: No abdominal or epigastric pain. No nausea, vomiting, or hematemesis; No diarrhea or constipation. No melena or hematochezia.  GENITOURINARY: No dysuria, frequency, hematuria, or incontinence  NEUROLOGICAL: No headaches, memory loss, loss of strength, numbness, or tremors  SKIN: No itching, burning, rashes, or lesions   LYMPH NODES: No enlarged glands  ENDOCRINE: No heat or cold intolerance; No hair loss  MUSCULOSKELETAL: No joint pain or swelling; No muscle, back, or extremity pain  PSYCHIATRIC: No depression, anxiety, mood swings, or difficulty sleeping  HEME/LYMPH: No easy bruising, or bleeding gums  ALLERY AND IMMUNOLOGIC: No hives or eczema    RADIOLOGY & ADDITIONAL TESTS:    Imaging Personally Reviewed:  [ ] YES  [ ] NO    Consultant(s) Notes Reviewed:  [ ] YES  [ ] NO    PHYSICAL EXAM:  GENERAL: NAD, well-groomed, well-developed  HEAD:  Atraumatic, Normocephalic  EYES: EOMI, PERRLA, conjunctiva and sclera clear  ENMT: No tonsillar erythema, exudates, or enlargement; Moist mucous membranes, Good dentition, No lesions  NECK: Supple, No JVD, Normal thyroid  NERVOUS SYSTEM:  Alert & Oriented X3, Good concentration; Motor Strength 5/5 B/L upper and lower extremities; DTRs 2+ intact and symmetric  CHEST/LUNG: Clear to percussion bilaterally; No rales, rhonchi, wheezing, or rubs  HEART: Regular rate and rhythm; No murmurs, rubs, or gallops  ABDOMEN: Soft, Nontender, Nondistended; Bowel sounds present  EXTREMITIES:  2+ Peripheral Pulses, No clubbing, cyanosis, or edema  LYMPH: No lymphadenopathy noted  SKIN: No rashes or lesions    LABS:                        7.7    11.15 )-----------( 372      ( 05 Aug 2021 07:16 )             24.3     08-05    130<L>  |  98  |  10  ----------------------------<  89  3.8   |  22  |  0.55    Ca    8.0<L>      05 Aug 2021 07:14          CAPILLARY BLOOD GLUCOSE                MEDICATIONS  (STANDING):  benzonatate 100 milliGRAM(s) Oral every 8 hours  fidaxomicin 200 milliGRAM(s) Oral two times a day  FIRST- Mouthwash  BLM 10 milliLiter(s) Swish and Spit every 8 hours  folic acid 1 milliGRAM(s) Oral daily  multivitamin 1 Tablet(s) Oral daily  sodium chloride 0.9% with potassium chloride 20 mEq/L 1000 milliLiter(s) (60 mL/Hr) IV Continuous <Continuous>  sotalol 120 milliGRAM(s) Oral daily    MEDICATIONS  (PRN):  acetaminophen   Tablet .. 650 milliGRAM(s) Oral every 6 hours PRN Mild Pain (1 - 3), Moderate Pain (4 - 6)  aluminum hydroxide/magnesium hydroxide/simethicone Suspension 30 milliLiter(s) Oral every 4 hours PRN Dyspepsia  guaifenesin/dextromethorphan Oral Liquid 10 milliLiter(s) Oral every 8 hours PRN Cough  lidocaine 2% Gel 1 Application(s) Topical four times a day PRN pain due to skin excoriation  morphine  - Injectable 2 milliGRAM(s) IV Push every 8 hours PRN Severe Pain (7 - 10)      Care Discussed with Consultants/Other Providers [ ] YES  [ ] NO Patient is a 81y old  Male who presents with a chief complaint of diarrhea with blood in it , abd pain and weakness (05 Aug 2021 16:51)      INTERVAL HPI/OVERNIGHT EVENTS: no better , still multiple episodes of diarrhea, son bedside   T(C): 39.1 (08-05-21 @ 20:00), Max: 39.1 (08-05-21 @ 20:00)  HR: 92 (08-05-21 @ 20:00) (81 - 92)  BP: 126/68 (08-05-21 @ 20:00) (126/68 - 130/80)  RR: 18 (08-05-21 @ 20:00) (18 - 18)  SpO2: 93% (08-05-21 @ 20:00) (93% - 96%)  Wt(kg): --  I&O's Summary    04 Aug 2021 07:01  -  05 Aug 2021 07:00  --------------------------------------------------------  IN: 2160 mL / OUT: 300 mL / NET: 1860 mL    05 Aug 2021 07:01  -  05 Aug 2021 21:35  --------------------------------------------------------  IN: 840 mL / OUT: 0 mL / NET: 840 mL        PAST MEDICAL & SURGICAL HISTORY:  No pertinent past medical history    No significant past surgical history        SOCIAL HISTORY  Alcohol:  Tobacco:  Illicit substance use:    FAMILY HISTORY:    REVIEW OF SYSTEMS:  CONSTITUTIONAL: No fever, weight loss, or fatigue  EYES: No eye pain, visual disturbances, or discharge  ENMT:  No difficulty hearing, tinnitus, vertigo; No sinus or throat pain  NECK: No pain or stiffness  RESPIRATORY: No cough, wheezing, chills or hemoptysis; No shortness of breath  CARDIOVASCULAR: No chest pain, palpitations, dizziness, or leg swelling  GASTROINTESTINAL: No abdominal or epigastric pain. No nausea, vomiting, or hematemesis; No diarrhea or constipation. No melena or hematochezia.  GENITOURINARY: No dysuria, frequency, hematuria, or incontinence  NEUROLOGICAL: No headaches, memory loss, loss of strength, numbness, or tremors  SKIN: No itching, burning, rashes, or lesions   LYMPH NODES: No enlarged glands  ENDOCRINE: No heat or cold intolerance; No hair loss  MUSCULOSKELETAL: No joint pain or swelling; No muscle, back, or extremity pain  PSYCHIATRIC: No depression, anxiety, mood swings, or difficulty sleeping  HEME/LYMPH: No easy bruising, or bleeding gums  ALLERY AND IMMUNOLOGIC: No hives or eczema    RADIOLOGY & ADDITIONAL TESTS:    Imaging Personally Reviewed:  [ ] YES  [ ] NO    Consultant(s) Notes Reviewed:  [ ] YES  [ ] NO    PHYSICAL EXAM:  GENERAL: NAD, well-groomed, well-developed  HEAD:  Atraumatic, Normocephalic  EYES: EOMI, PERRLA, conjunctiva and sclera clear  ENMT: No tonsillar erythema, exudates, or enlargement; Moist mucous membranes, Good dentition, No lesions  NECK: Supple, No JVD, Normal thyroid  NERVOUS SYSTEM:  Alert & Oriented X3, Good concentration; Motor Strength 5/5 B/L upper and lower extremities; DTRs 2+ intact and symmetric  CHEST/LUNG: Clear to percussion bilaterally; No rales, rhonchi, wheezing, or rubs  HEART: Regular rate and rhythm; No murmurs, rubs, or gallops  ABDOMEN: Soft, Nontender, Nondistended; Bowel sounds present  EXTREMITIES:  2+ Peripheral Pulses, No clubbing, cyanosis, or edema  LYMPH: No lymphadenopathy noted  SKIN: No rashes or lesions    LABS:                        7.7    11.15 )-----------( 372      ( 05 Aug 2021 07:16 )             24.3     08-05    130<L>  |  98  |  10  ----------------------------<  89  3.8   |  22  |  0.55    Ca    8.0<L>      05 Aug 2021 07:14          CAPILLARY BLOOD GLUCOSE                MEDICATIONS  (STANDING):  benzonatate 100 milliGRAM(s) Oral every 8 hours  fidaxomicin 200 milliGRAM(s) Oral two times a day  FIRST- Mouthwash  BLM 10 milliLiter(s) Swish and Spit every 8 hours  folic acid 1 milliGRAM(s) Oral daily  multivitamin 1 Tablet(s) Oral daily  sodium chloride 0.9% with potassium chloride 20 mEq/L 1000 milliLiter(s) (60 mL/Hr) IV Continuous <Continuous>  sotalol 120 milliGRAM(s) Oral daily    MEDICATIONS  (PRN):  acetaminophen   Tablet .. 650 milliGRAM(s) Oral every 6 hours PRN Mild Pain (1 - 3), Moderate Pain (4 - 6)  aluminum hydroxide/magnesium hydroxide/simethicone Suspension 30 milliLiter(s) Oral every 4 hours PRN Dyspepsia  guaifenesin/dextromethorphan Oral Liquid 10 milliLiter(s) Oral every 8 hours PRN Cough  lidocaine 2% Gel 1 Application(s) Topical four times a day PRN pain due to skin excoriation  morphine  - Injectable 2 milliGRAM(s) IV Push every 8 hours PRN Severe Pain (7 - 10)      Care Discussed with Consultants/Other Providers [ ] YES  [ ] NO

## 2021-08-05 NOTE — PROGRESS NOTE ADULT - ATTENDING COMMENTS
Had fever today with increase in wbc  Appears more lethargic  Some abdominal discomfort, but it is soft with +BS  Decreasing BM, but still with diarrhea    Given abdominal discomfort in patient with severe cdiff -> low threshhold to get abdominal CT scan if symptoms worsen or don't improve  Agree with other infectious workup as per ID team

## 2021-08-05 NOTE — PROGRESS NOTE ADULT - ASSESSMENT
80 yo M with Crohn's on treatment, recently cared for at [facility], now presenting with ongoing diarrhea  Low grade temp elevation, leukocytosis  Diarrhea  No abd distension, no evidence megacolon  CT with colonic inflammation  Colonoscopy with visualized pseudomembranes  On PO Vanco starting 7/15 (x2 weeks) with limited improvement  Still ongoing diarrhea, episode fever  Patient having cough, CXR clear  RVP with parainfluenzae--explains URI symptoms and fevers      Antibiotics  Flagyl 7/13 7/21-->25  Cipro 7/13-->14  po Vanco 7/14 -->-->7/29; 8/1-->8/2  (7/28: colonoscopic FMT)  Fidaxomicin 8/2-->      1) C diff colitis  - decreased number of stools  Fidaxomcin 200 mg twice daily x 5 days, then every other day for additional 20 days  po intake encouraged  - S/p colonoscopy FMT 7/28  - Frequent abd exam/stool count  2) Leukocytosis  - Trend to normal  3) Crohn's  - Further care per GI  - Uncertain if any active Crohn's (or if current symptoms all related to C diff)  4) Fevers  - Monitor for alternate sources infection, hold on abx unless signs sepsis/focal source infection  -  fever 8/4: repeat cultures pending, check ProCalcitonin =0.09, repeat RVP: neg for skip cov2  5) Para flu 3 viral uri with cough and fevers      -son had covid and has not had vaccine; daughter has not had covid vaccine and will be inconvenienced by testing requirement to visit in the hospital to be instituted next week.  Patient and wife have had COVID vaccinations according to son    fever likely related to Cdiff - hopefully he will continue to improve  I will be out until 8/10: ID service will follow

## 2021-08-06 NOTE — PROGRESS NOTE ADULT - ASSESSMENT
82 yo M with Crohn's on treatment, recently cared for at [facility], now presenting with ongoing diarrhea  Low grade temp elevation, leukocytosis  Diarrhea  No abd distension, no evidence megacolon  CT with colonic inflammation  Colonoscopy with visualized pseudomembranes  On PO Vanco starting 7/15 (x2 weeks) with limited improvement  Still ongoing diarrhea, episode fever  Patient having cough, CXR clear  RVP with parainfluenzae--explains URI symptoms and fevers    Antibiotics  Flagyl 7/13 7/21-->25  Cipro 7/13-->14  po Vanco 7/14 -->-->7/29; 8/1-->8/2  (7/28: colonoscopic FMT)  Fidaxomicin 8/2-->    1) C diff colitis (persistent diarrhea)  - Fidaxomcin 200 mg twice daily x 5 days, then every other day for additional 20 days  - po intake encouraged  - S/p colonoscopy FMT 7/28  - Frequent abd exam/stool count  - Follow up on CT A/P  2) Leukocytosis  - Trend to normal  3) Crohn's  - Further care per GI  - Uncertain if any active Crohn's (or if current symptoms all related to C diff)  4) Fevers (persistent)  - Add CT Chest to CT A/P with IV contrast (which is already ordered)  - Check Procalcitonin with AM labs (ordered)  5) Para flu 3 viral uri with cough and fevers    I will continue to follow. Please feel free to contact me with any further questions.    Tomás Mora M.D.  [facility] Division of Infectious Disease  8AM-5PM: Pager Number [phone]  After Hours (or if no response): Please contact the Infectious Diseases Office at [phone]     The above assessment and plan were discussed with medicine NP

## 2021-08-06 NOTE — PROGRESS NOTE ADULT - ASSESSMENT
GIDEON HAYES is a 81y Male with GERD on omeprazole daily and Crohn's disease diagnosed about 9 mos prior to admission at outside hospital c/b h/o abscess and left posterior distal anal intersphincteric fistula and presents as a new consult to GI for diarrhea/BRBPR likely 2/2 to CD flare c/b c diff colitis.    # Diarrhea  #C diff colitis  - In setting of C diff colitis. Given patient sx persisted despite antibiotics patient underwent FMT on 7/28/21. Patient had initial respond to it, with decrease in frequency of bowl movement (had 5, compared to 10 or more) but now reports having 10 again. Patient and family attribute this to restarting PO Vancomycin again. It is unclear if ongoing/worsening of symptoms is from untreated Crohn vs failure of FMT. Given worsening of sxs, Vancomycin was stopped in favor of Fidaxomicin  (8/2/21).    If sxs does not continue to improve, will need to consider repeating EGD and starting Crohn tx.     # Weight loss - likely 2/2 CD c/b oral aphthous ulcers and diarrhea as he has not been able to tolerate po and will need to ensure adequate hydration.  # GERD   # Parainfluzena infection    #Crohn disease:  -Diagnosed 9 months ago, patient managed by Dr. Juan Luis Tran ([phone]) at [facility]. Since diagnosis, patient initially on Mesalamine and budesonide w/o relief. Patient also failed Methotrexate and ultimately was started on Remicade Sxs improved and helped heal his fistula/abscess but developed antibodies within 6 months.  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    Recommendations:  -Patient still having fever, pedning CT of the abdomen/pelvis with IV contrast. Pending results to determine next step in management   -Continue to monitor BM.    Reji Carreno MD  Gastroenterology/Hepatology Fellow  1st option: [phone] (text or call), ONLY available from 7:00 am to 5:00 pm.   **Contact on-call GI fellow via answering service ([phone]) from 5pm-7am AND on weekends/holidays**  2nd option: Available via Microsoft Teams  3rd option: Pager: [phone]    NON-URGENT CONSULTS:  Please email [email or web address]  [email or web address]  AT NIGHT AND ON WEEKENDS:  Contact on-call GI fellow via answering service ([phone]) from 5pm-8am AND on weekends/holidays     GIDEON HAYES is a 81y Male with GERD on omeprazole daily and Crohn's disease diagnosed about 9 mos prior to admission at outside hospital c/b h/o abscess and left posterior distal anal intersphincteric fistula and presents as a new consult to GI for diarrhea/BRBPR likely 2/2 to CD flare c/b c diff colitis.    # Diarrhea  #C diff colitis  - In setting of C diff colitis. Given patient sx persisted despite antibiotics patient underwent FMT on 7/28/21. Patient had initial respond to it, with decrease in frequency of bowl movement (had 5, compared to 10 or more) but now reports having 10 again. Patient and family attribute this to restarting PO Vancomycin again. It is unclear if ongoing/worsening of symptoms is from untreated Crohn vs failure of FMT. Given worsening of sxs, Vancomycin was stopped in favor of Fidaxomicin  (8/2/21).    If sxs does not continue to improve, will need to consider repeating EGD and starting Crohn tx.     # Weight loss - likely 2/2 CD c/b oral aphthous ulcers and diarrhea as he has not been able to tolerate po and will need to ensure adequate hydration.  # GERD   # Parainfluzena infection    #Crohn disease:  -Diagnosed 9 months ago, patient managed by Dr. Juan Luis Tran ([phone]) at [facility]. Since diagnosis, patient initially on Mesalamine and budesonide w/o relief. Patient also failed Methotrexate and ultimately was started on Remicade Sxs improved and helped heal his fistula/abscess but developed antibodies within 6 months.  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    Recommendations:  -Patient still having fever, pending CT of the abdomen/pelvis with IV contrast. Pending results to determine next step in management   -Continue to monitor BM.  -Continue antibiotics as per ID  -If still no improvement, will need to consider active concurrent Crohn's colitis as cause of symptoms and starting steroids    Reji Carreno MD  Gastroenterology/Hepatology Fellow  1st option: [phone] (text or call), ONLY available from 7:00 am to 5:00 pm.   **Contact on-call GI fellow via answering service ([phone]) from 5pm-7am AND on weekends/holidays**  2nd option: Available via Microsoft Teams  3rd option: Pager: [phone]    NON-URGENT CONSULTS:  Please email [email or web address]  [email or web address]  AT NIGHT AND ON WEEKENDS:  Contact on-call GI fellow via answering service ([phone]) from 5pm-8am AND on weekends/holidays

## 2021-08-06 NOTE — PROGRESS NOTE ADULT - ASSESSMENT
A/p    spiked fever :  -diarrhea no better   repeat c/s and precalcitonin   ID following   hold off on IV abx for now   CT scan Chest/ abd/pelvis : done result pending     sever C diff colitis :  -completed vanco/ flagyl   s/p FMT : 5 days post FMT   -improvement today  -started on fidaxomicin 200 mg bid today : as per ID and GI   -supportive care     Hx of Chron's dis :  -likely colitis is all 2/2 c diff   hold off on steroids for now     GI/Id following

## 2021-08-06 NOTE — CHART NOTE - NSCHARTNOTEFT_GEN_A_CORE
Nutrition Follow Up Note  Patient seen for: calorie count f/u    Chart reviewed, events noted.    Source: [ X] Patient       [x] EMR        [X ] NP, RN        [X ] Family at bedside     daughter        Diet Order:   Diet, Low Fiber:   Banatrol TF     Qty per Day:  1 pkt 3xdaily  Liquid Protein Supplement     Qty per Day:  3  Supplement Feeding Modality:  Oral  Ensure Enlive Cans or Servings Per Day:  2       Frequency:  Daily  Probiotic Yogurt/Smoothie Cans or Servings Per Day:  2       Frequency:  Daily (21)    - Is current order appropriate/adequate? [X] Yes  [ ]  No:              - Nutrition-Related Concerns: PO intake consistently inadequate  [X ] <50%  Poor  Calorie count   Day #1 protein 35gm              calories 490  Day #2 protein 23gm             calories 470  Day #3 protein 9 gm             calories 115    - Micronutrient Supplementation: folic acid 1 milliGRAM(s) Oral daily  multivitamin 1 Tablet(s) Oral daily  sodium chloride 0.9% with potassium chloride 20 mEq/L 1000 milliLiter(s) IV Continuous <Continuous>    GI:  Last BM diarrhea 7-8x daily    Weights:   Daily Weight in k.8 (), Weight in k.5 ()  MEDICATIONS  (STANDING):  benzonatate  clonazePAM  Tablet  fidaxomicin  FIRST- Mouthwash  BLM  folic acid  multivitamin  sodium chloride 0.9% with potassium chloride 20 mEq/L  sotalol    Pertinent Labs:  @ 06:59: Na 131<L>, BUN 10, Cr 0.53, <H>, K+ 3.4<L>, Phos --, Mg --, Alk Phos --, ALT/SGPT --, AST/SGOT --, HbA1c --            Skin per nursing documentation:   Edema:     Estimated Needs:   [ ] no change since previous assessment  [ ] recalculated:     Previous Nutrition Diagnosis:   Nutrition Diagnosis is: [ ] ongoing  [ ] resolved [ ] not applicable     New Nutrition Diagnosis: [ ] Not applicable    Nutrition Care Plan:  [ ] In Progress  [ ] Achieved  [ ] Not applicable    Nutrition Interventions / Recommendations:    1) Education Provided:     [ ] Yes:                                                      [ ] Not applicable    2) Continue Current Diet: [ ] Yes                                                  [ ] No, change to:     3) Oral Nutrition Supplementation:     4) Micronutrient Supplementation:    5) Other Considerations:    Monitoring and Evaluation:   Continue to monitor Nutritional intake, Tolerance to diet prescription, weights, labs, skin integrity    Minerva Lee RD, CDN  Pager [phone]  RD remains available upon request and will follow up per protocol Nutrition Follow Up Note  Patient seen for: calorie count f/u    Chart reviewed, events noted.    Source: [ X] Patient       [x] EMR        [X ] NP, RN        [X ] Family at bedside     daughter        Diet Order:   Diet, Low Fiber:   Banatrol TF     Qty per Day:  1 pkt 3xdaily  Liquid Protein Supplement     Qty per Day:  3  Supplement Feeding Modality:  Oral  Ensure Enlive Cans or Servings Per Day:  2       Frequency:  Daily  Probiotic Yogurt/Smoothie Cans or Servings Per Day:  2       Frequency:  Daily (21)    - Is current order appropriate/adequate? [X] Yes  [ ]  No:               Nutrition-Related Concerns: PO intake consistently inadequate  <50%  Poor; Diarrhea continue 7-8 stools daily. Per patient daughter morphine causing patient nausea, detering PO intake.    Estimated Daily Needs based on dosing weight  59kcalotries/kg   calories 1770  protein 76.7gm      Calorie count   Day #1 protein 35gm    49% needs met              calories 490     27% needs met  Day #2 protein 23gm    38% needs met             calories 470       26% of needs met  Day #3 protein 9 gm     12% of needs met             calories 115       6% of needs met    Patient is unable to meet nutrient needs orally- Results discussed with NP, patient,  and his daughter.   Recommend NGT/ PEG feed for provision of adequate protein energy. patient may continue pleasure feeds.   Patient daughter to consider feeding tube if within patient GOC. NP present for discussion.    Micronutrient Supplementation: folic acid 1 milliGRAM(s) Oral daily  multivitamin 1 Tablet(s) Oral daily  sodium chloride 0.9% with potassium chloride 20 mEq/L 1000 milliLiter(s) IV Continuous <Continuous>    GI:  Last BM diarrhea 7-8x daily    Weights:   Daily Weight in k.8 (), Weight in k.5 ()  MEDICATIONS  (STANDING):  benzonatate  clonazePAM  Tablet  fidaxomicin  FIRST- Mouthwash  BLM  folic acid  multivitamin  sodium chloride 0.9% with potassium chloride 20 mEq/L  sotalol    Pertinent Labs:  @ 06:59: Na 131<L>, BUN 10, Cr 0.53, <H>, K+ 3.4<L>, Phos --, Mg --, Alk Phos --, ALT/SGPT --, AST/SGOT --, HbA1c --        Skin per nursing documentation: sacrum st II  Edema:     Estimated Needs:   [X ] no change since previous assessment  [ ] recalculated:     Previous Nutrition Diagnosis: malnutrition, severe  Nutrition Diagnosis is: [X ] ongoing      Nutrition Care Plan:  [X ] In Progress      Nutrition Interventions / Recommendations:    1) recommend patient and family to consider feeding tube with pleasure feeds if within GOC    2)Continue Low Fiber Diet:     3)Banatrol banana flakesTF     Qty per Day:  1 pkt 3xdaily     4)Liquid Protein Supplement     Qty per Day:  3    5)Ensure Enlive Cans or Servings Per Day:  2       Frequency:  Daily    6)Probiotic Yogurt/Smoothie Cans or Servings Per Day:  2       Frequency:  Daily (21)    Discussed with team     Monitoring and Evaluation:   Continue to monitor Nutritional intake, Tolerance to diet prescription, weights, labs, skin integrity      RD remains available upon request and will follow up per protocol

## 2021-08-06 NOTE — PROGRESS NOTE ADULT - SUBJECTIVE AND OBJECTIVE BOX
Patient is a 81y old  Male who presents with a chief complaint of diarrhea with blood in it , abd pain and weakness (06 Aug 2021 10:42)      INTERVAL HPI/OVERNIGHT EVENTS:  T(C): 36.6 (08-06-21 @ 16:43), Max: 39.1 (08-05-21 @ 20:00)  HR: 89 (08-06-21 @ 15:40) (82 - 92)  BP: 123/70 (08-06-21 @ 15:40) (123/70 - 135/80)  RR: 18 (08-06-21 @ 15:40) (16 - 18)  SpO2: 96% (08-06-21 @ 15:40) (93% - 96%)  Wt(kg): --  I&O's Summary    05 Aug 2021 07:01  -  06 Aug 2021 07:00  --------------------------------------------------------  IN: 2040 mL / OUT: 0 mL / NET: 2040 mL    06 Aug 2021 07:01  -  06 Aug 2021 19:25  --------------------------------------------------------  IN: 1020 mL / OUT: 0 mL / NET: 1020 mL        PAST MEDICAL & SURGICAL HISTORY:  No pertinent past medical history    No significant past surgical history        SOCIAL HISTORY  Alcohol:  Tobacco:  Illicit substance use:    FAMILY HISTORY:    REVIEW OF SYSTEMS:  CONSTITUTIONAL: No fever, weight loss, or fatigue  EYES: No eye pain, visual disturbances, or discharge  ENMT:  No difficulty hearing, tinnitus, vertigo; No sinus or throat pain  NECK: No pain or stiffness  RESPIRATORY: No cough, wheezing, chills or hemoptysis; No shortness of breath  CARDIOVASCULAR: No chest pain, palpitations, dizziness, or leg swelling  GASTROINTESTINAL: No abdominal or epigastric pain. No nausea, vomiting, or hematemesis; No diarrhea or constipation. No melena or hematochezia.  GENITOURINARY: No dysuria, frequency, hematuria, or incontinence  NEUROLOGICAL: No headaches, memory loss, loss of strength, numbness, or tremors  SKIN: No itching, burning, rashes, or lesions   LYMPH NODES: No enlarged glands  ENDOCRINE: No heat or cold intolerance; No hair loss  MUSCULOSKELETAL: No joint pain or swelling; No muscle, back, or extremity pain  PSYCHIATRIC: No depression, anxiety, mood swings, or difficulty sleeping  HEME/LYMPH: No easy bruising, or bleeding gums  ALLERY AND IMMUNOLOGIC: No hives or eczema    RADIOLOGY & ADDITIONAL TESTS:    Imaging Personally Reviewed:  [ ] YES  [ ] NO    Consultant(s) Notes Reviewed:  [ ] YES  [ ] NO    PHYSICAL EXAM:  GENERAL: NAD, well-groomed, well-developed  HEAD:  Atraumatic, Normocephalic  EYES: EOMI, PERRLA, conjunctiva and sclera clear  ENMT: No tonsillar erythema, exudates, or enlargement; Moist mucous membranes, Good dentition, No lesions  NECK: Supple, No JVD, Normal thyroid  NERVOUS SYSTEM:  Alert & Oriented X3, Good concentration; Motor Strength 5/5 B/L upper and lower extremities; DTRs 2+ intact and symmetric  CHEST/LUNG: Clear to percussion bilaterally; No rales, rhonchi, wheezing, or rubs  HEART: Regular rate and rhythm; No murmurs, rubs, or gallops  ABDOMEN: Soft, Nontender, Nondistended; Bowel sounds present  EXTREMITIES:  2+ Peripheral Pulses, No clubbing, cyanosis, or edema  LYMPH: No lymphadenopathy noted  SKIN: No rashes or lesions    LABS:                        8.8    10.02 )-----------( 422      ( 06 Aug 2021 06:59 )             29.7     08-06    131<L>  |  99  |  10  ----------------------------<  101<H>  3.4<L>   |  23  |  0.53    Ca    8.4      06 Aug 2021 06:59          CAPILLARY BLOOD GLUCOSE                MEDICATIONS  (STANDING):  benzonatate 100 milliGRAM(s) Oral every 8 hours  clonazePAM  Tablet 0.25 milliGRAM(s) Oral at bedtime  fidaxomicin 200 milliGRAM(s) Oral two times a day  FIRST- Mouthwash  BLM 10 milliLiter(s) Swish and Spit every 8 hours  folic acid 1 milliGRAM(s) Oral daily  multivitamin 1 Tablet(s) Oral daily  sodium chloride 0.9% with potassium chloride 20 mEq/L 1000 milliLiter(s) (60 mL/Hr) IV Continuous <Continuous>  sotalol 120 milliGRAM(s) Oral daily    MEDICATIONS  (PRN):  acetaminophen   Tablet .. 650 milliGRAM(s) Oral every 6 hours PRN Temp greater or equal to 38.5C (101.3F), Mild Pain (1 - 3), Moderate Pain (4 - 6)  aluminum hydroxide/magnesium hydroxide/simethicone Suspension 30 milliLiter(s) Oral every 4 hours PRN Dyspepsia  guaifenesin/dextromethorphan Oral Liquid 10 milliLiter(s) Oral every 8 hours PRN Cough  lidocaine 2% Gel 1 Application(s) Topical four times a day PRN pain due to skin excoriation  morphine  - Injectable 2 milliGRAM(s) IV Push every 8 hours PRN Severe Pain (7 - 10)      Care Discussed with Consultants/Other Providers [ ] YES  [ ] NO Patient is a 81y old  Male who presents with a chief complaint of diarrhea with blood in it , abd pain and weakness (06 Aug 2021 10:42)      INTERVAL HPI/OVERNIGHT EVENTS: still having watery diarrhea   again spiked fever   CT chest / abd /pelvis pending     T(C): 36.6 (08-06-21 @ 16:43), Max: 39.1 (08-05-21 @ 20:00)  HR: 89 (08-06-21 @ 15:40) (82 - 92)  BP: 123/70 (08-06-21 @ 15:40) (123/70 - 135/80)  RR: 18 (08-06-21 @ 15:40) (16 - 18)  SpO2: 96% (08-06-21 @ 15:40) (93% - 96%)  Wt(kg): --  I&O's Summary    05 Aug 2021 07:01  -  06 Aug 2021 07:00  --------------------------------------------------------  IN: 2040 mL / OUT: 0 mL / NET: 2040 mL    06 Aug 2021 07:01  -  06 Aug 2021 19:25  --------------------------------------------------------  IN: 1020 mL / OUT: 0 mL / NET: 1020 mL        PAST MEDICAL & SURGICAL HISTORY:  No pertinent past medical history    No significant past surgical history        SOCIAL HISTORY  Alcohol:  Tobacco:  Illicit substance use:    FAMILY HISTORY:    REVIEW OF SYSTEMS:  CONSTITUTIONAL: No fever, weight loss, or fatigue  EYES: No eye pain, visual disturbances, or discharge  ENMT:  No difficulty hearing, tinnitus, vertigo; No sinus or throat pain  NECK: No pain or stiffness  RESPIRATORY: No cough, wheezing, chills or hemoptysis; No shortness of breath  CARDIOVASCULAR: No chest pain, palpitations, dizziness, or leg swelling  GASTROINTESTINAL: No abdominal or epigastric pain. No nausea, vomiting, or hematemesis; No diarrhea or constipation. No melena or hematochezia.  GENITOURINARY: No dysuria, frequency, hematuria, or incontinence  NEUROLOGICAL: No headaches, memory loss, loss of strength, numbness, or tremors  SKIN: No itching, burning, rashes, or lesions   LYMPH NODES: No enlarged glands  ENDOCRINE: No heat or cold intolerance; No hair loss  MUSCULOSKELETAL: No joint pain or swelling; No muscle, back, or extremity pain  PSYCHIATRIC: No depression, anxiety, mood swings, or difficulty sleeping  HEME/LYMPH: No easy bruising, or bleeding gums  ALLERY AND IMMUNOLOGIC: No hives or eczema    RADIOLOGY & ADDITIONAL TESTS:    Imaging Personally Reviewed:  [ ] YES  [ ] NO    Consultant(s) Notes Reviewed:  [ ] YES  [ ] NO    PHYSICAL EXAM:  GENERAL: NAD, well-groomed, well-developed  HEAD:  Atraumatic, Normocephalic  EYES: EOMI, PERRLA, conjunctiva and sclera clear  ENMT: No tonsillar erythema, exudates, or enlargement; Moist mucous membranes, Good dentition, No lesions  NECK: Supple, No JVD, Normal thyroid  NERVOUS SYSTEM:  Alert & Oriented X3, Good concentration; Motor Strength 5/5 B/L upper and lower extremities; DTRs 2+ intact and symmetric  CHEST/LUNG: Clear to percussion bilaterally; No rales, rhonchi, wheezing, or rubs  HEART: Regular rate and rhythm; No murmurs, rubs, or gallops  ABDOMEN: Soft, Nontender, Nondistended; Bowel sounds present  EXTREMITIES:  2+ Peripheral Pulses, No clubbing, cyanosis, or edema  LYMPH: No lymphadenopathy noted  SKIN: No rashes or lesions    LABS:                        8.8    10.02 )-----------( 422      ( 06 Aug 2021 06:59 )             29.7     08-06    131<L>  |  99  |  10  ----------------------------<  101<H>  3.4<L>   |  23  |  0.53    Ca    8.4      06 Aug 2021 06:59          CAPILLARY BLOOD GLUCOSE                MEDICATIONS  (STANDING):  benzonatate 100 milliGRAM(s) Oral every 8 hours  clonazePAM  Tablet 0.25 milliGRAM(s) Oral at bedtime  fidaxomicin 200 milliGRAM(s) Oral two times a day  FIRST- Mouthwash  BLM 10 milliLiter(s) Swish and Spit every 8 hours  folic acid 1 milliGRAM(s) Oral daily  multivitamin 1 Tablet(s) Oral daily  sodium chloride 0.9% with potassium chloride 20 mEq/L 1000 milliLiter(s) (60 mL/Hr) IV Continuous <Continuous>  sotalol 120 milliGRAM(s) Oral daily    MEDICATIONS  (PRN):  acetaminophen   Tablet .. 650 milliGRAM(s) Oral every 6 hours PRN Temp greater or equal to 38.5C (101.3F), Mild Pain (1 - 3), Moderate Pain (4 - 6)  aluminum hydroxide/magnesium hydroxide/simethicone Suspension 30 milliLiter(s) Oral every 4 hours PRN Dyspepsia  guaifenesin/dextromethorphan Oral Liquid 10 milliLiter(s) Oral every 8 hours PRN Cough  lidocaine 2% Gel 1 Application(s) Topical four times a day PRN pain due to skin excoriation  morphine  - Injectable 2 milliGRAM(s) IV Push every 8 hours PRN Severe Pain (7 - 10)      Care Discussed with Consultants/Other Providers [ ] YES  [ ] NO

## 2021-08-06 NOTE — CHART NOTE - NSCHARTNOTEFT_GEN_A_CORE
Informed by RN of temp 102.4F    Pt admitted with UC flare and found to have C-dff; Oral vancomycin & IV flagyl ineffective; s/p FMT on 7/28 with minimal decrease in BM's; recently started on Fidaxomicin and being followed by ID & GI.  Pt had fever 2 days ago; prelim bld cx's negative so far; RVP neg covid, positive parainfluenza.    A/P:  Fever  C-diff  UC   Parainfluenza  CT C/A/P pending  Tylenol prn    GUY Sheppard [id number]

## 2021-08-06 NOTE — PROGRESS NOTE ADULT - SUBJECTIVE AND OBJECTIVE BOX
Follow Up:  C Diff    Interval History: febrile overnight. still with diarrhea.     REVIEW OF SYSTEMS  [  ] ROS unobtainable because:    [  x] All other systems negative except as noted below    Constitutional:  [ x] fever [ ] chills  [ ] weight loss  [ ] weakness  Skin:  [ ] rash [ ] phlebitis	  Eyes: [ ] icterus [ ] pain  [ ] discharge	  ENMT: [ ] sore throat  [ ] thrush [ ] ulcers [ ] exudates  Respiratory: [ ] dyspnea [ ] hemoptysis [ ] cough [ ] sputum	  Cardiovascular:  [ ] chest pain [ ] palpitations [ ] edema	  Gastrointestinal:  [ ] nausea [ ] vomiting [x ] diarrhea [ ] constipation [ ] pain	  Genitourinary:  [ ] dysuria [ ] frequency [ ] hematuria [ ] discharge [ ] flank pain  [ ] incontinence  Musculoskeletal:  [ ] myalgias [ ] arthralgias [ ] arthritis  [ ] back pain  Neurological:  [ ] headache [ ] seizures  [ ] confusion/altered mental status    Allergies  No Known Allergies        ANTIMICROBIALS:  fidaxomicin 200 two times a day      OTHER MEDS:  MEDICATIONS  (STANDING):  acetaminophen   Tablet .. 650 every 6 hours PRN  aluminum hydroxide/magnesium hydroxide/simethicone Suspension 30 every 4 hours PRN  benzonatate 100 every 8 hours  clonazePAM  Tablet 0.25 at bedtime  guaifenesin/dextromethorphan Oral Liquid 10 every 8 hours PRN  morphine  - Injectable 2 every 8 hours PRN  sotalol 120 daily      Vital Signs Last 24 Hrs  T(C): 37 (06 Aug 2021 05:43), Max: 39.1 (05 Aug 2021 20:00)  T(F): 98.6 (06 Aug 2021 05:43), Max: 102.4 (05 Aug 2021 20:00)  HR: 85 (06 Aug 2021 05:43) (81 - 92)  BP: 135/80 (06 Aug 2021 05:43) (126/68 - 135/80)  BP(mean): --  RR: 16 (06 Aug 2021 05:43) (16 - 18)  SpO2: 95% (06 Aug 2021 05:43) (93% - 95%)    PHYSICAL EXAMINATION:  General: Alert and Awake, NAD  HEENT: PERRL, EOMI  Neck: Supple  Cardiac: RRR, No M/R/G  Resp: CTAB, No Wh/Rh/Ra  Abdomen: NBS, NT/ND, No HSM, No rigidity or guarding  MSK: No LE edema. No Calf tenderness  : No sánchez  Skin: No rashes or lesions. Skin is warm and dry to the touch.   Neuro: Alert and Awake. CN 2-12 Grossly intact. Moves all four extremities spontaneously.  Psych: Calm, Pleasant, Cooperative                          8.8    10.02 )-----------( 422      ( 06 Aug 2021 06:59 )             29.7       08-06    131<L>  |  99  |  10  ----------------------------<  101<H>  3.4<L>   |  23  |  0.53    Ca    8.4      06 Aug 2021 06:59            MICROBIOLOGY:  v  .Blood Blood-Peripheral  08-05-21   No growth to date.  --  --      .Blood Blood-Peripheral  08-05-21   No growth to date.  --  --      .Blood Blood-Peripheral  07-30-21   No Growth Final  --  --      .Blood Blood-Peripheral  07-27-21   No Growth Final  --  --      .Blood Blood-Venous  07-24-21   No Growth Final  --  --      .Blood Blood  07-22-21   No Growth Final  --  --      .Stool  07-15-21   No Protozoa seen by trichrome stain  No Helminths or Protozoa seen in formalin concentrate  performed by iodine stain  (routine O+P not evaluated for Microsporidia,  Cryptosporidia, Cyclospora, or Isospora.)  One negative sample does not necessarily rule  out the presence of a parasitic infection.  Numerous White blood cells  --  --      .Stool Feces, [facility]  07-14-21   No enteric pathogens isolated.  (Stool culture examined for Salmonella,  Shigella, Campylobacter, Aeromonas, Plesiomonas,  Vibrio, E.coli O157 and Yersinia)  --  --      .Urine Clean Catch (Midstream)  07-13-21   <10,000 CFU/mL Normal Urogenital Margo  --  --      .Blood Blood-Peripheral  07-13-21   No Growth Final  --  --    RADIOLOGY:    <The imaging below has been reviewed and visualized by me independently. Findings as detailed in report below>    EXAM:  XR CHEST PORTABLE URGENT 1V                        PROCEDURE DATE:  08/04/2021    Visualized lungs are clear. Follow Up:  C Diff    Interval History: febrile overnight. still with multiple bouts of liquid diarrhea.     REVIEW OF SYSTEMS  [  ] ROS unobtainable because:    [  x] All other systems negative except as noted below    Constitutional:  [ x] fever [ ] chills  [ ] weight loss  [ ] weakness  Skin:  [ ] rash [ ] phlebitis	  Eyes: [ ] icterus [ ] pain  [ ] discharge	  ENMT: [ ] sore throat  [ ] thrush [ ] ulcers [ ] exudates  Respiratory: [ ] dyspnea [ ] hemoptysis [ ] cough [ ] sputum	  Cardiovascular:  [ ] chest pain [ ] palpitations [ ] edema	  Gastrointestinal:  [ ] nausea [ ] vomiting [x ] diarrhea [ ] constipation [ ] pain	  Genitourinary:  [ ] dysuria [ ] frequency [ ] hematuria [ ] discharge [ ] flank pain  [ ] incontinence  Musculoskeletal:  [ ] myalgias [ ] arthralgias [ ] arthritis  [ ] back pain  Neurological:  [ ] headache [ ] seizures  [ ] confusion/altered mental status    Allergies  No Known Allergies        ANTIMICROBIALS:  fidaxomicin 200 two times a day      OTHER MEDS:  MEDICATIONS  (STANDING):  acetaminophen   Tablet .. 650 every 6 hours PRN  aluminum hydroxide/magnesium hydroxide/simethicone Suspension 30 every 4 hours PRN  benzonatate 100 every 8 hours  clonazePAM  Tablet 0.25 at bedtime  guaifenesin/dextromethorphan Oral Liquid 10 every 8 hours PRN  morphine  - Injectable 2 every 8 hours PRN  sotalol 120 daily      Vital Signs Last 24 Hrs  T(C): 37 (06 Aug 2021 05:43), Max: 39.1 (05 Aug 2021 20:00)  T(F): 98.6 (06 Aug 2021 05:43), Max: 102.4 (05 Aug 2021 20:00)  HR: 85 (06 Aug 2021 05:43) (81 - 92)  BP: 135/80 (06 Aug 2021 05:43) (126/68 - 135/80)  BP(mean): --  RR: 16 (06 Aug 2021 05:43) (16 - 18)  SpO2: 95% (06 Aug 2021 05:43) (93% - 95%)    PHYSICAL EXAMINATION:  General: Alert and Awake, NAD  Cardiac: RRR, No M/R/G  Resp: CTAB, No Wh/Rh/Ra  Abdomen: NBS, diffuse abdominal tenderness to palpation, No HSM, No rigidity or guarding  MSK: No LE edema. No Calf tenderness  : No sánchez  Skin: No rashes or lesions. Skin is warm and dry to the touch.   Neuro: Alert and Awake. CN 2-12 Grossly intact. Moves all four extremities spontaneously.  Psych: Calm, Pleasant, Cooperative                          8.8    10.02 )-----------( 422      ( 06 Aug 2021 06:59 )             29.7       08-06    131<L>  |  99  |  10  ----------------------------<  101<H>  3.4<L>   |  23  |  0.53    Ca    8.4      06 Aug 2021 06:59            MICROBIOLOGY:  v  .Blood Blood-Peripheral  08-05-21   No growth to date.  --  --      .Blood Blood-Peripheral  08-05-21   No growth to date.  --  --      .Blood Blood-Peripheral  07-30-21   No Growth Final  --  --      .Blood Blood-Peripheral  07-27-21   No Growth Final  --  --      .Blood Blood-Venous  07-24-21   No Growth Final  --  --      .Blood Blood  07-22-21   No Growth Final  --  --      .Stool  07-15-21   No Protozoa seen by trichrome stain  No Helminths or Protozoa seen in formalin concentrate  performed by iodine stain  (routine O+P not evaluated for Microsporidia,  Cryptosporidia, Cyclospora, or Isospora.)  One negative sample does not necessarily rule  out the presence of a parasitic infection.  Numerous White blood cells  --  --      .[facility]  07[facility]   No enteric pathogens isolated.  (Stool culture examined for Salmonella,  Shigella, Campylobacter, Aeromonas, Plesiomonas,  Vibrio, E.coli O157 and Yersinia)  --  --      .Urine Clean Catch (Midstream)  07-13-21   <10,000 CFU/mL Normal Urogenital Margo  --  --      .Blood Blood-Peripheral  07-13-21   No Growth Final  --  --    RADIOLOGY:    <The imaging below has been reviewed and visualized by me independently. Findings as detailed in report below>    EXAM:  XR CHEST PORTABLE URGENT 1V                        PROCEDURE DATE:  08/04/2021    Visualized lungs are clear.

## 2021-08-06 NOTE — PROGRESS NOTE ADULT - ATTENDING COMMENTS
Patient seen and examined, agree with above. Still with multiple BMs and diarrhea last night with fevers - most likely due to C diff. 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

## 2021-08-06 NOTE — PROGRESS NOTE ADULT - SUBJECTIVE AND OBJECTIVE BOX
Interval Events:   Patient continues to have abdominal discomfort, still having diarrhea but decreased. Had fever yesterday    Hospital Medications:  acetaminophen   Tablet .. 650 milliGRAM(s) Oral every 6 hours PRN  aluminum hydroxide/magnesium hydroxide/simethicone Suspension 30 milliLiter(s) Oral every 4 hours PRN  benzonatate 100 milliGRAM(s) Oral every 8 hours  clonazePAM  Tablet 0.25 milliGRAM(s) Oral at bedtime  fidaxomicin 200 milliGRAM(s) Oral two times a day  FIRST- Mouthwash  BLM 10 milliLiter(s) Swish and Spit every 8 hours  folic acid 1 milliGRAM(s) Oral daily  guaifenesin/dextromethorphan Oral Liquid 10 milliLiter(s) Oral every 8 hours PRN  lidocaine 2% Gel 1 Application(s) Topical four times a day PRN  morphine  - Injectable 2 milliGRAM(s) IV Push every 8 hours PRN  multivitamin 1 Tablet(s) Oral daily  potassium chloride    Tablet ER 40 milliEquivalent(s) Oral once  sodium chloride 0.9% with potassium chloride 20 mEq/L 1000 milliLiter(s) IV Continuous <Continuous>  sotalol 120 milliGRAM(s) Oral daily        ROS:   General:  No fevers, chills or night sweats  ENT:  No sore throat or dysphagia  CV:  No pain or palpitations  Resp:  No dyspnea, cough or  wheezing  GI:  as above  Skin:  No rash or edema  Neuro: no weakness   Hematologic: no bleeding  Musculoskeletal: no muscle pain or join pain  Psych: no agitation      PHYSICAL EXAM:   Vital Signs:  Vital Signs Last 24 Hrs  T(C): 37 (06 Aug 2021 05:43), Max: 39.1 (05 Aug 2021 20:00)  T(F): 98.6 (06 Aug 2021 05:43), Max: 102.4 (05 Aug 2021 20:00)  HR: 85 (06 Aug 2021 05:43) (81 - 92)  BP: 135/80 (06 Aug 2021 05:43) (126/68 - 135/80)  BP(mean): --  RR: 16 (06 Aug 2021 05:43) (16 - 18)  SpO2: 95% (06 Aug 2021 05:43) (93% - 95%)  Daily     Daily     GENERAL:  NAD, frail, cachectic  HEENT:  NC/AT,  conjunctivae clear and pink, sclera -anicteric  CHEST:  Normal Effort, Breath sounds clear  HEART:  RRR, S1 + S2, no murmurs  ABDOMEN:  Soft, tender to palpation diffuse, non-distended, normoactive bowel sounds,  no masses,  EXTREMITIES:  no cyanosis or edema  SKIN:  Warm & Dry. No rash or erythema  NEURO:  Alert, oriented, no focal deficit    LABS:                        8.8    10.02 )-----------( 422      ( 06 Aug 2021 06:59 )             29.7     Mean Cell Volume: 82.0 fl (08-06-21 @ 06:59)    08-06    131<L>  |  99  |  10  ----------------------------<  101<H>  3.4<L>   |  23  |  0.53    Ca    8.4      06 Aug 2021 06:59                                    8.8    10.02 )-----------( 422      ( 06 Aug 2021 06:59 )             29.7                         7.7    11.15 )-----------( 372      ( 05 Aug 2021 07:16 )             24.3                         7.7    9.98  )-----------( 355      ( 04 Aug 2021 07:05 )             24.1       Imaging: Images reviewed no new images appreciated.         Interval Events:   Patient continues to have abdominal discomfort, still having diarrhea but decreased. Had fever yesterday    Hospital Medications:  acetaminophen   Tablet .. 650 milliGRAM(s) Oral every 6 hours PRN  aluminum hydroxide/magnesium hydroxide/simethicone Suspension 30 milliLiter(s) Oral every 4 hours PRN  benzonatate 100 milliGRAM(s) Oral every 8 hours  clonazePAM  Tablet 0.25 milliGRAM(s) Oral at bedtime  fidaxomicin 200 milliGRAM(s) Oral two times a day  FIRST- Mouthwash  BLM 10 milliLiter(s) Swish and Spit every 8 hours  folic acid 1 milliGRAM(s) Oral daily  guaifenesin/dextromethorphan Oral Liquid 10 milliLiter(s) Oral every 8 hours PRN  lidocaine 2% Gel 1 Application(s) Topical four times a day PRN  morphine  - Injectable 2 milliGRAM(s) IV Push every 8 hours PRN  multivitamin 1 Tablet(s) Oral daily  potassium chloride    Tablet ER 40 milliEquivalent(s) Oral once  sodium chloride 0.9% with potassium chloride 20 mEq/L 1000 milliLiter(s) IV Continuous <Continuous>  sotalol 120 milliGRAM(s) Oral daily        ROS:   General:  No fevers, chills or night sweats  ENT:  No sore throat or dysphagia  CV:  No pain or palpitations  Resp:  No dyspnea, cough or  wheezing  GI:  as above  Skin:  No rash or edema  Neuro: no weakness   Hematologic: no bleeding  Musculoskeletal: no muscle pain or join pain  Psych: no agitation      PHYSICAL EXAM:   Vital Signs:  Vital Signs Last 24 Hrs  T(C): 37 (06 Aug 2021 05:43), Max: 39.1 (05 Aug 2021 20:00)  T(F): 98.6 (06 Aug 2021 05:43), Max: 102.4 (05 Aug 2021 20:00)  HR: 85 (06 Aug 2021 05:43) (81 - 92)  BP: 135/80 (06 Aug 2021 05:43) (126/68 - 135/80)  BP(mean): --  RR: 16 (06 Aug 2021 05:43) (16 - 18)  SpO2: 95% (06 Aug 2021 05:43) (93% - 95%)  Daily     Daily     GENERAL:  NAD, frail, cachectic  HEENT:  NC/AT,  conjunctivae clear and pink, sclera -anicteric  CHEST:  Normal Effort, Breath sounds clear  HEART:  RRR, S1 + S2, no murmurs  ABDOMEN:  Soft, mildly tender to palpation diffuse, non-distended, normoactive bowel sounds,  no masses,  EXTREMITIES:  no cyanosis or edema  SKIN:  Warm & Dry. No rash or erythema  NEURO:  Alert, oriented, no focal deficit    LABS:                        8.8    10.02 )-----------( 422      ( 06 Aug 2021 06:59 )             29.7     Mean Cell Volume: 82.0 fl (08-06-21 @ 06:59)    08-06    131<L>  |  99  |  10  ----------------------------<  101<H>  3.4<L>   |  23  |  0.53    Ca    8.4      06 Aug 2021 06:59                                    8.8    10.02 )-----------( 422      ( 06 Aug 2021 06:59 )             29.7                         7.7    11.15 )-----------( 372      ( 05 Aug 2021 07:16 )             24.3                         7.7    9.98  )-----------( 355      ( 04 Aug 2021 07:05 )             24.1       Imaging: Images reviewed no new images appreciated.

## 2021-08-07 NOTE — PROGRESS NOTE ADULT - ASSESSMENT
A/p    spiked fever :  -diarrhea no better   CT scan Chest/ abd/pelvis : CT chest shows opacities ( tree in bud apperance )   calcitonin marinally high   seen by ID : hold off on IV abx as risk of worsening c diff   repeat calcitonin in am   likely fing may be 2/2 parainfluenza virus inf       sever C diff colitis :  -completed vanco/ flagyl   s/p FMT : 5 days post FMT   -improvement today  -started on fidaxomicin 200 mg bid today : as per ID and GI   -supportive care     Hx of Chron's dis :  -likely colitis is all 2/2 c diff   hold off on steroids for now     GI/Id following

## 2021-08-07 NOTE — PROGRESS NOTE ADULT - ASSESSMENT
82 yo M with Crohn's on treatment, recently cared for at [facility], now presenting with ongoing diarrhea  Low grade temp elevation, leukocytosis  Diarrhea  No abd distension, no evidence megacolon  CT with colonic inflammation  Colonoscopy with visualized pseudomembranes  On PO Vanco starting 7/15 (x2 weeks) with limited improvement  Still ongoing diarrhea, episode fever  Patient having cough, CXR clear  RVP with parainfluenzae--explains URI symptoms and fevers    Antibiotics  Flagyl 7/13 7/21-->25  Cipro 7/13-->14  po Vanco 7/14 -->-->7/29; 8/1-->8/2  (7/28: colonoscopic FMT)  Fidaxomicin 8/2-->    CT Chest findings noted; tree in bud opacities may be secondary to Parainfluenza URI as opposed to superimposed bacterial infection. Procalcitonin is only minimally elevated. I would favor repeating another procalcitonin and checking a sputum culture. Risk of treating for bacterial pneumonia would be exacerbating C diff    GI note appreciated - questioning if IBD is contributing to diarrhea at present as opposed to C diff. Will discuss best approach with GI service tomorrow. Unable to clinically differentiate IBD flare versus C diff.     1) C diff colitis (persistent diarrhea)  - Fidaxomcin 200 mg twice daily x 5 days, then every other day for additional 20 days  - po intake encouraged  - S/p colonoscopy FMT 7/28  - Frequent abd exam/stool count  2) Abnormal CT Chest  - Recommend repeat procalcitonin tomorrow AM  - Recommend sputum culture  3) Leukocytosis  - Trend to normal  4) Crohn's  - Further care per GI  - Uncertain if any active Crohn's (or if current symptoms all related to C diff)  5) Fevers (persistent)  - Add CT Chest to CT A/P with IV contrast (which is already ordered)  - Check Procalcitonin with AM labs (ordered)  6) Para flu 3 viral uri with cough and fevers    I will continue to follow. Please feel free to contact me with any further questions.    Tomás Mora M.D.  [facility] Division of Infectious Disease  8AM-5PM: Pager Number [phone]  After Hours (or if no response): Please contact the Infectious Diseases Office at [phone]     The above assessment and plan were discussed with medicine NPYvonne

## 2021-08-07 NOTE — PROGRESS NOTE ADULT - SUBJECTIVE AND OBJECTIVE BOX
Follow Up:  C Diff    Interval History:    REVIEW OF SYSTEMS  [  ] ROS unobtainable because:    [  ] All other systems negative except as noted below    Constitutional:  [ ] fever [ ] chills  [ ] weight loss  [ ] weakness  Skin:  [ ] rash [ ] phlebitis	  Eyes: [ ] icterus [ ] pain  [ ] discharge	  ENMT: [ ] sore throat  [ ] thrush [ ] ulcers [ ] exudates  Respiratory: [ ] dyspnea [ ] hemoptysis [ ] cough [ ] sputum	  Cardiovascular:  [ ] chest pain [ ] palpitations [ ] edema	  Gastrointestinal:  [ ] nausea [ ] vomiting [ ] diarrhea [ ] constipation [ ] pain	  Genitourinary:  [ ] dysuria [ ] frequency [ ] hematuria [ ] discharge [ ] flank pain  [ ] incontinence  Musculoskeletal:  [ ] myalgias [ ] arthralgias [ ] arthritis  [ ] back pain  Neurological:  [ ] headache [ ] seizures  [ ] confusion/altered mental status    Allergies  No Known Allergies        ANTIMICROBIALS:      OTHER MEDS:  MEDICATIONS  (STANDING):  acetaminophen   Tablet .. 650 every 6 hours PRN  aluminum hydroxide/magnesium hydroxide/simethicone Suspension 30 every 4 hours PRN  benzonatate 100 every 8 hours  clonazePAM  Tablet 0.25 at bedtime  guaifenesin/dextromethorphan Oral Liquid 10 every 8 hours PRN  morphine  - Injectable 2 every 8 hours PRN  ondansetron Injectable 4 every 6 hours PRN  sotalol 120 daily      Vital Signs Last 24 Hrs  T(C): 36.9 (07 Aug 2021 16:25), Max: 37.4 (07 Aug 2021 01:00)  T(F): 98.4 (07 Aug 2021 16:25), Max: 99.4 (07 Aug 2021 01:00)  HR: 70 (07 Aug 2021 16:25) (70 - 110)  BP: 144/66 (07 Aug 2021 16:25) (98/69 - 154/76)  BP(mean): --  RR: 18 (07 Aug 2021 16:25) (18 - 18)  SpO2: 97% (07 Aug 2021 16:25) (96% - 98%)    PHYSICAL EXAMINATION:  General: Alert and Awake, NAD  HEENT: PERRL, EOMI  Neck: Supple  Cardiac: RRR, No M/R/G  Resp: CTAB, No Wh/Rh/Ra  Abdomen: NBS, NT/ND, No HSM, No rigidity or guarding  MSK: No LE edema. No Calf tenderness  : No sánchez  Skin: No rashes or lesions. Skin is warm and dry to the touch.   Neuro: Alert and Awake. CN 2-12 Grossly intact. Moves all four extremities spontaneously.  Psych: Calm, Pleasant, Cooperative                          7.6    10.08 )-----------( 435      ( 07 Aug 2021 06:53 )             25.1       08-07    134<L>  |  101  |  13  ----------------------------<  90  3.4<L>   |  22  |  0.51    Ca    7.7<L>      07 Aug 2021 06:53            MICROBIOLOGY:  v  .Blood Blood-Peripheral  08-05-21   No growth to date.  --  --      .Blood Blood-Peripheral  08-05-21   No growth to date.  --  --      .Blood Blood-Peripheral  07-30-21   No Growth Final  --  --      .Blood Blood-Peripheral  07-27-21   No Growth Final  --  --      .Blood Blood-Venous  07-24-21   No Growth Final  --  --      .Blood Blood  07-22-21   No Growth Final  --  --      .Stool  07-15-21   No Protozoa seen by trichrome stain  No Helminths or Protozoa seen in formalin concentrate  performed by iodine stain  (routine O+P not evaluated for Microsporidia,  Cryptosporidia, Cyclospora, or Isospora.)  One negative sample does not necessarily rule  out the presence of a parasitic infection.  Numerous White blood cells  --  --      .Stool Feces, namrata abdon  07-14-21   No enteric pathogens isolated.  (Stool culture examined for Salmonella,  Shigella, Campylobacter, Aeromonas, Plesiomonas,  Vibrio, E.coli O157 and Yersinia)  --  --      .Urine Clean Catch (Midstream)  07-13-21   <10,000 CFU/mL Normal Urogenital Margo  --  --      .Blood Blood-Peripheral  07-13-21   No Growth Final  --  --                RADIOLOGY:    <The imaging below has been reviewed and visualized by me independently. Findings as detailed in report below>    EXAM:  CT CHEST IC                        EXAM:  CT ABDOMEN AND PELVIS IC                        PROCEDURE DATE:  08/06/2021    Diffuse colorectal wall thickening with surrounding inflammatory changes, which is progressed along the ascending colon and hepatic flexure since 7/13/2021, may reflect known C. difficile colitis or active inflammatory bowel disease.    Apparent bladder wall thickening may be on the basis of underdistention and/or reactive to the inflammatory process involving the adjacent bowel. Consider correlation with urinalysis to assess for cystitis.    Small bilateral pleural effusions.    Tree-in-bud opacities in the left lower lobe may reflect the sequela of aspiration.    Scattered pulmonary nodules measuring up to 3 mm, for which a person of low risk requires no additional follow-up. Follow-up chest CT in 12 months may performed to monitor for stability in a patient considered at high risk*.   Follow Up:  C Diff    Interval History: afebrile overnight. continued cough. continued diarrhea. minimally productive.     REVIEW OF SYSTEMS  [  ] ROS unobtainable because:    [ c ] All other systems negative except as noted below    Constitutional:  [ ] fever [ ] chills  [ ] weight loss  [ ] weakness  Skin:  [ ] rash [ ] phlebitis	  Eyes: [ ] icterus [ ] pain  [ ] discharge	  ENMT: [ ] sore throat  [ ] thrush [ ] ulcers [ ] exudates  Respiratory: [ ] dyspnea [ ] hemoptysis [ x ] cough [ ] sputum	  Cardiovascular:  [ ] chest pain [ ] palpitations [ ] edema	  Gastrointestinal:  [ ] nausea [ ] vomiting [ x ] diarrhea [ ] constipation [ ] pain	  Genitourinary:  [ ] dysuria [ ] frequency [ ] hematuria [ ] discharge [ ] flank pain  [ ] incontinence  Musculoskeletal:  [ ] myalgias [ ] arthralgias [ ] arthritis  [ ] back pain  Neurological:  [ ] headache [ ] seizures  [ ] confusion/altered mental status    Allergies  No Known Allergies        ANTIMICROBIALS:      OTHER MEDS:  MEDICATIONS  (STANDING):  acetaminophen   Tablet .. 650 every 6 hours PRN  aluminum hydroxide/magnesium hydroxide/simethicone Suspension 30 every 4 hours PRN  benzonatate 100 every 8 hours  clonazePAM  Tablet 0.25 at bedtime  guaifenesin/dextromethorphan Oral Liquid 10 every 8 hours PRN  morphine  - Injectable 2 every 8 hours PRN  ondansetron Injectable 4 every 6 hours PRN  sotalol 120 daily      Vital Signs Last 24 Hrs  T(C): 36.9 (07 Aug 2021 16:25), Max: 37.4 (07 Aug 2021 01:00)  T(F): 98.4 (07 Aug 2021 16:25), Max: 99.4 (07 Aug 2021 01:00)  HR: 70 (07 Aug 2021 16:25) (70 - 110)  BP: 144/66 (07 Aug 2021 16:25) (98/69 - 154/76)  BP(mean): --  RR: 18 (07 Aug 2021 16:25) (18 - 18)  SpO2: 97% (07 Aug 2021 16:25) (96% - 98%)    PHYSICAL EXAMINATION:  General: Alert and Awake, NAD  Cardiac: RRR, No M/R/G  Resp: CTAB, No Wh/Rh/Ra  Abdomen: NBS, diffuse abdominal tenderness to palpation, No HSM, No rigidity or guarding  MSK: No LE edema. No Calf tenderness  : No sánchez  Skin: No rashes or lesions. Skin is warm and dry to the touch.   Neuro: Alert and Awake. CN 2-12 Grossly intact. Moves all four extremities spontaneously.  Psych: Calm, Pleasant, Cooperative                            7.6    10.08 )-----------( 435      ( 07 Aug 2021 06:53 )             25.1       08-07    134<L>  |  101  |  13  ----------------------------<  90  3.4<L>   |  22  |  0.51    Ca    7.7<L>      07 Aug 2021 06:53            MICROBIOLOGY:  v  .Blood Blood-Peripheral  08-05-21   No growth to date.  --  --      .Blood Blood-Peripheral  08-05-21   No growth to date.  --  --      .Blood Blood-Peripheral  07-30-21   No Growth Final  --  --      .Blood Blood-Peripheral  07-27-21   No Growth Final  --  --      .Blood Blood-Venous  07-24-21   No Growth Final  --  --      .Blood Blood  07-22-21   No Growth Final  --  --      .Stool  07-15-21   No Protozoa seen by trichrome stain  No Helminths or Protozoa seen in formalin concentrate  performed by iodine stain  (routine O+P not evaluated for Microsporidia,  Cryptosporidia, Cyclospora, or Isospora.)  One negative sample does not necessarily rule  out the presence of a parasitic infection.  Numerous White blood cells  --  --      .Stool Feces, namrata abdon  07-14-21   No enteric pathogens isolated.  (Stool culture examined for Salmonella,  Shigella, Campylobacter, Aeromonas, Plesiomonas,  Vibrio, E.coli O157 and Yersinia)  --  --      .Urine Clean Catch (Midstream)  07-13-21   <10,000 CFU/mL Normal Urogenital Margo  --  --      .Blood Blood-Peripheral  07-13-21   No Growth Final  --  --                RADIOLOGY:    <The imaging below has been reviewed and visualized by me independently. Findings as detailed in report below>    EXAM:  CT CHEST IC                        EXAM:  CT ABDOMEN AND PELVIS IC                        PROCEDURE DATE:  08/06/2021    Diffuse colorectal wall thickening with surrounding inflammatory changes, which is progressed along the ascending colon and hepatic flexure since 7/13/2021, may reflect known C. difficile colitis or active inflammatory bowel disease.    Apparent bladder wall thickening may be on the basis of underdistention and/or reactive to the inflammatory process involving the adjacent bowel. Consider correlation with urinalysis to assess for cystitis.    Small bilateral pleural effusions.    Tree-in-bud opacities in the left lower lobe may reflect the sequela of aspiration.    Scattered pulmonary nodules measuring up to 3 mm, for which a person of low risk requires no additional follow-up. Follow-up chest CT in 12 months may performed to monitor for stability in a patient considered at high risk*.

## 2021-08-07 NOTE — PROGRESS NOTE ADULT - SUBJECTIVE AND OBJECTIVE BOX
Chief Complaint:  Patient is a 81y old  Male who presents with a chief complaint of diarrhea with blood in it , abd pain and weakness (06 Aug 2021 19:25)      Interval Events: continues to have frequent diarrhea, reports 6 overnight  - reports perianal pain/tenderness  - continued fatigue and weakness      Hospital Medications:  acetaminophen   Tablet .. 650 milliGRAM(s) Oral every 6 hours PRN  aluminum hydroxide/magnesium hydroxide/simethicone Suspension 30 milliLiter(s) Oral every 4 hours PRN  benzonatate 100 milliGRAM(s) Oral every 8 hours  clonazePAM  Tablet 0.25 milliGRAM(s) Oral at bedtime  FIRST- Mouthwash  BLM 10 milliLiter(s) Swish and Spit every 8 hours  folic acid 1 milliGRAM(s) Oral daily  guaifenesin/dextromethorphan Oral Liquid 10 milliLiter(s) Oral every 8 hours PRN  lidocaine 2% Gel 1 Application(s) Topical four times a day PRN  morphine  - Injectable 2 milliGRAM(s) IV Push every 8 hours PRN  multivitamin 1 Tablet(s) Oral daily  ondansetron Injectable 4 milliGRAM(s) IV Push every 6 hours PRN  sodium chloride 0.9% with potassium chloride 20 mEq/L 1000 milliLiter(s) IV Continuous <Continuous>  sotalol 120 milliGRAM(s) Oral daily      PMHX/PSHX:  No pertinent past medical history    No significant past surgical history            ROS:     General:  No weight loss, fevers, chills, night sweats, fatigue   Eyes:  No vision changes  ENT:  No sore throat, pain, runny nose  CV:  No chest pain, palpitations, dizziness   Resp:  No SOB, cough, wheezing  GI:  See HPI  :  No burning with urination, hematuria  Muscle:  +weakness  Neuro:  No weakness/tingling, memory problems  Psych: +fatigue  Heme:  No easy bruisability  Skin:  No rash, edema      PHYSICAL EXAM:   GENERAL:  NAD, frail, cachectic  HEENT:  NC/AT,  conjunctivae clear and pink, sclera -anicteric  CHEST:  Normal Effort, Breath sounds clear  HEART:  RRR, S1 + S2, no murmurs  ABDOMEN:  Soft, mildly tender to palpation diffuse, non-distended, normoactive bowel sounds,  no masses,  EXTREMITIES:  no cyanosis or edema  SKIN:  Warm & Dry. No rash or erythema  NEURO:  Alert, oriented, no focal deficit    Vital Signs:  Vital Signs Last 24 Hrs  T(C): 36.9 (07 Aug 2021 16:25), Max: 37.4 (07 Aug 2021 01:00)  T(F): 98.4 (07 Aug 2021 16:25), Max: 99.4 (07 Aug 2021 01:00)  HR: 70 (07 Aug 2021 16:25) (70 - 110)  BP: 144/66 (07 Aug 2021 16:25) (98/69 - 154/76)  BP(mean): --  RR: 18 (07 Aug 2021 16:25) (18 - 18)  SpO2: 97% (07 Aug 2021 16:25) (96% - 98%)  Daily     Daily Weight in k.7 (07 Aug 2021 06:47)    LABS:                        7.6    10.08 )-----------( 435      ( 07 Aug 2021 06:53 )             25.1     08-07    134<L>  |  101  |  13  ----------------------------<  90  3.4<L>   |  22  |  0.51    Ca    7.7<L>      07 Aug 2021 06:53                Imaging:  < from: CT Chest w/ IV Cont (21 @ 16:14) >  FINDINGS:  CHEST:  LUNGS AND LARGE AIRWAYS: Patent central airways. Biapical pleuroparenchymal scarring. Mild centrilobular emphysema. Scattered pulmonary nodules bilaterally measuring up to 3 mm, with example seen in the left upper lobe on series 4 image 64 and image 201, and in the right upper lobe on series 4 image 114. Tree-in-bud opacities in the left lower lobe may reflect sequela of aspiration.  PLEURA: New small left greater than right pleural effusions with adjacent compressive atelectasis. No pneumothorax.  VESSELS: Atherosclerotic changes.  HEART: Heart size is normal. No pericardial effusion. Coronary artery and aortic valve calcifications.  MEDIASTINUMAND RAJANI: No lymphadenopathy.  CHEST WALL AND LOWER NECK: Within normal limits.    ABDOMEN AND PELVIS:  LIVER: Within normal limits.  BILE DUCTS: Normal caliber.  GALLBLADDER: Within normal limits.  SPLEEN: Within normal limits.  PANCREAS: Within normal limits.  ADRENALS: Within normal limits.  KIDNEYS/URETERS: A 3 mm nonobstructing calculus in the lower pole the left kidney, and a punctate nonobstructing calculus in the upper pole of the right kidney. No hydronephrosis. Exophytic left lower polecyst measuring 2.7 cm. Subcentimeter hypodensities in left kidney are too small to characterize.    BLADDER: Apparent bladder wall thickening may be on the basis of underdistention and/or reactive to the adjacent inflammatory process involving the large bowel.  REPRODUCTIVE ORGANS: The prostate is enlarged. Symmetric seminal vesicles.    BOWEL: Colonic wall thickening diffusely involving the entire colon and rectum with associated pericolonic infiltration, most pronounced along the sigmoid colon,which appears progressed along the ascending colon and hepatic flexure in comparison to the prior exam of 2021. Liquid stool within the rectum likely reflects diarrheal state. Small hiatal hernia. No bowel obstruction. Colonic diverticulosis. Normal appendix.  PERITONEUM: No ascites.  VESSELS: Atherosclerotic changes.  RETROPERITONEUM/LYMPH NODES: No lymphadenopathy.  ABDOMINAL WALL: Within normal limits.  BONES: Degenerative changes of the spine.    IMPRESSION:  Diffuse colorectal wall thickening with surrounding inflammatory changes, which is progressed along the ascending colon and hepatic flexure since 2021, may reflect known C. difficile colitis or active inflammatory bowel disease.    Apparent bladder wall thickening may be onthe basis of underdistention and/or reactive to the inflammatory process involving the adjacent bowel. Consider correlation with urinalysis to assess for cystitis.    Small bilateral pleural effusions.    Tree-in-bud opacities in the left lower lobe may reflect the sequela of aspiration.    Scattered pulmonary nodules measuring up to 3 mm, for which a person of low risk requires no additional follow-up. Follow-up chest CT in 12 months may performed to monitor for stability in a patient consideredat high risk*.    < end of copied text >

## 2021-08-07 NOTE — PROGRESS NOTE ADULT - ASSESSMENT
GIDEON HAYES is a 81y Male with GERD on omeprazole daily and Crohn's disease diagnosed about 9 mos prior to admission at outside hospital c/b h/o abscess and left posterior distal anal intersphincteric fistula and presents as a new consult to GI for diarrhea/BRBPR likely 2/2 to CD flare c/b c diff colitis.    # Diarrhea  #C diff colitis  - In setting of C diff colitis. Given patient sx persisted despite antibiotics patient underwent FMT on 7/28/21. Patient had initial respond to it, with decrease in frequency of bowl movement (had 5, compared to 10 or more) but now reports having 10 again. Patient and family attribute this to restarting PO Vancomycin again. It is unclear if ongoing/worsening of symptoms is from untreated Crohn vs failure of FMT. Given worsening of sxs, Vancomycin was stopped in favor of Fidaxomicin  (8/2/21).    If sxs does not continue to improve, will need to consider repeating EGD and starting Crohn tx.     # Weight loss - likely 2/2 CD c/b oral aphthous ulcers and diarrhea as he has not been able to tolerate po and will need to ensure adequate hydration.  # GERD   # Parainfluzena infection    #Crohn disease:  -Diagnosed 9 months ago, patient managed by Dr. Juan Luis Tran ([phone]) at [facility]. Since diagnosis, patient initially on Mesalamine and budesonide w/o relief. Patient also failed Methotrexate and ultimately was started on Remicade Sxs improved and helped heal his fistula/abscess but developed antibodies within 6 months and symptoms relapsed. Was planning for Humira outpatient.  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    Recommendations:  -CT as above with continued colitis, ?pna, also with +parainfluenza virus - will need to consider treatment with steroids for Crohns activity given no improvement despite treatment of cdiff however concern for other infection at this time  - if unable to treat underlying Crohns and cdiff unresponsive to treatment will also need to consider colectomy as last resort  -Continue to monitor BM.  -Continue antibiotics as per ID  - rest of care per primary team    Kat Chowdhury PGY-5  Gastroenterology Fellow  Pager #[phone] (GEMINI) or [phone] (NS)  Available on [employer] Teams.  Please contact on-call GI fellow via answering service ([phone]) after 5pm and before 8am, and on weekends.

## 2021-08-07 NOTE — PROGRESS NOTE ADULT - SUBJECTIVE AND OBJECTIVE BOX
Patient is a 81y old  Male who presents with a chief complaint of diarrhea with blood in it , abd pain and weakness (07 Aug 2021 17:24)      INTERVAL HPI/OVERNIGHT EVENTS: no improvement in diarrhea , still having 5-6 watery stools at night   coughing +    T(C): 36.9 (08-07-21 @ 20:30), Max: 37.4 (08-07-21 @ 01:00)  HR: 70 (08-07-21 @ 16:25) (70 - 110)  BP: 125/79 (08-07-21 @ 20:30) (98/69 - 154/76)  RR: 18 (08-07-21 @ 20:30) (18 - 18)  SpO2: 95% (08-07-21 @ 20:30) (95% - 98%)  Wt(kg): --  I&O's Summary    06 Aug 2021 07:01  -  07 Aug 2021 07:00  --------------------------------------------------------  IN: 1980 mL / OUT: 0 mL / NET: 1980 mL    07 Aug 2021 07:01  -  07 Aug 2021 22:01  --------------------------------------------------------  IN: 1160 mL / OUT: 0 mL / NET: 1160 mL        PAST MEDICAL & SURGICAL HISTORY:  No pertinent past medical history    No significant past surgical history        SOCIAL HISTORY  Alcohol:  Tobacco:  Illicit substance use:    FAMILY HISTORY:    REVIEW OF SYSTEMS:  CONSTITUTIONAL: No fever, weight loss, or fatigue  EYES: No eye pain, visual disturbances, or discharge  ENMT:  No difficulty hearing, tinnitus, vertigo; No sinus or throat pain  NECK: No pain or stiffness  RESPIRATORY: No cough, wheezing, chills or hemoptysis; No shortness of breath  CARDIOVASCULAR: No chest pain, palpitations, dizziness, or leg swelling  GASTROINTESTINAL: No abdominal or epigastric pain. No nausea, vomiting, or hematemesis; No diarrhea or constipation. No melena or hematochezia.  GENITOURINARY: No dysuria, frequency, hematuria, or incontinence  NEUROLOGICAL: No headaches, memory loss, loss of strength, numbness, or tremors  SKIN: No itching, burning, rashes, or lesions   LYMPH NODES: No enlarged glands  ENDOCRINE: No heat or cold intolerance; No hair loss  MUSCULOSKELETAL: No joint pain or swelling; No muscle, back, or extremity pain  PSYCHIATRIC: No depression, anxiety, mood swings, or difficulty sleeping  HEME/LYMPH: No easy bruising, or bleeding gums  ALLERY AND IMMUNOLOGIC: No hives or eczema    RADIOLOGY & ADDITIONAL TESTS:    Imaging Personally Reviewed:  [ ] YES  [ ] NO    Consultant(s) Notes Reviewed:  [ ] YES  [ ] NO    PHYSICAL EXAM:  GENERAL: NAD, well-groomed, well-developed  HEAD:  Atraumatic, Normocephalic  EYES: EOMI, PERRLA, conjunctiva and sclera clear  ENMT: No tonsillar erythema, exudates, or enlargement; Moist mucous membranes, Good dentition, No lesions  NECK: Supple, No JVD, Normal thyroid  NERVOUS SYSTEM:  Alert & Oriented X3, Good concentration; Motor Strength 5/5 B/L upper and lower extremities; DTRs 2+ intact and symmetric  CHEST/LUNG: Clear to percussion bilaterally; No rales, rhonchi, wheezing, or rubs  HEART: Regular rate and rhythm; No murmurs, rubs, or gallops  ABDOMEN: Soft, Nontender, Nondistended; Bowel sounds present  EXTREMITIES:  2+ Peripheral Pulses, No clubbing, cyanosis, or edema  LYMPH: No lymphadenopathy noted  SKIN: No rashes or lesions    LABS:                        7.6    10.08 )-----------( 435      ( 07 Aug 2021 06:53 )             25.1     08-07    134<L>  |  101  |  13  ----------------------------<  90  3.4<L>   |  22  |  0.51    Ca    7.7<L>      07 Aug 2021 06:53          CAPILLARY BLOOD GLUCOSE                MEDICATIONS  (STANDING):  benzonatate 100 milliGRAM(s) Oral every 8 hours  clonazePAM  Tablet 0.25 milliGRAM(s) Oral at bedtime  FIRST- Mouthwash  BLM 10 milliLiter(s) Swish and Spit every 8 hours  folic acid 1 milliGRAM(s) Oral daily  multivitamin 1 Tablet(s) Oral daily  sodium chloride 0.9% with potassium chloride 20 mEq/L 1000 milliLiter(s) (60 mL/Hr) IV Continuous <Continuous>  sotalol 120 milliGRAM(s) Oral daily    MEDICATIONS  (PRN):  acetaminophen   Tablet .. 650 milliGRAM(s) Oral every 6 hours PRN Temp greater or equal to 38.5C (101.3F), Mild Pain (1 - 3), Moderate Pain (4 - 6)  aluminum hydroxide/magnesium hydroxide/simethicone Suspension 30 milliLiter(s) Oral every 4 hours PRN Dyspepsia  guaifenesin/dextromethorphan Oral Liquid 10 milliLiter(s) Oral every 8 hours PRN Cough  lidocaine 2% Gel 1 Application(s) Topical four times a day PRN pain due to skin excoriation  morphine  - Injectable 2 milliGRAM(s) IV Push every 8 hours PRN Severe Pain (7 - 10)  ondansetron Injectable 4 milliGRAM(s) IV Push every 6 hours PRN Nausea and/or Vomiting      Care Discussed with Consultants/Other Providers [ ] YES  [ ] NO

## 2021-08-07 NOTE — PROGRESS NOTE ADULT - ATTENDING COMMENTS
Patient seen/examined. Daughter at bedside. Patient reports doing poorly-had temperature 101.5 on 8/6. Diarrhea persists with report 6 nonbloody diarrheal evacuations throughout the night. Experiencing some abdominal discomfort. Perianal pain reported. PE/labs as noted. Chronically ill-appearing and debilitated. Complex history as noted-apparently recent onset Crohn's disease somewhat medically refractory. Per daughter-on Remicade for approximately 3 months but stopped because of antibody formation (not clear to me from her description that he ever exhibited significant treatment response). Outpatient plan was to start Humira. Now with prolonged hospitalization with diarrhea and features of colitis on colonoscopy and CT with uncertainty as to what degree Clostridium difficile colitis versus Crohn's colitis is the dominant cause of the symptoms. Colonic ulcers noted a colonoscopy more likely from Crohn's disease. As noted, significant symptoms of diarrhea and abdominal discomfort persists despite multiple therapies for Clostridium difficile disease including vancomycin, FMT and currently regimen of fidaxomycin. Raises question as to whether Crohn's disease maybe more the dominant pathologic process at this time. Await followup by ID regarding CT chest findings as to whether broad-spectrum antibiotics will be warranted. Continue current regimen of the fidaxomycin and monitor for any improvement following FMT although so far does not seem to have significantly improved. Pending discussion with the ID question as to role of corticosteroids for treatment of Crohn's disease is considered although concerns of infectious complications exist. In view of clinical deterioration suggest colorectal surgical consultation although options may be limited. Continue to monitor serial CBC, BMP, liver enzymes and CRP.

## 2021-08-08 NOTE — PROGRESS NOTE ADULT - ASSESSMENT
A/p    spiked fever :  -diarrhea no better   CT scan Chest/ abd/pelvis : CT chest shows opacities ( tree in bud apperance )   calcitonin marinally high   seen by ID : hold off on IV abx as risk of worsening c diff   repeat calcitonin in am   likely fing may be 2/2 parainfluenza virus inf       sever C diff colitis :  -completed vanco/ flagyl   s/p FMT : 5 days post FMT   -improvement today  -started on fidaxomicin 200 mg bid today : as per ID and GI   -supportive care   s/p rectal tube placement    Hx of Chron's dis :  -likely colitis is all 2/2 c diff   hold off on steroids for now     as per GI : scheduled for flex sig again in am : to differenciate bet c diff Vs chron's

## 2021-08-08 NOTE — PROGRESS NOTE ADULT - SUBJECTIVE AND OBJECTIVE BOX
Follow Up:  CDI    Interval History:    REVIEW OF SYSTEMS  [  ] ROS unobtainable because:    [  ] All other systems negative except as noted below    Constitutional:  [ ] fever [ ] chills  [ ] weight loss  [ ] weakness  Skin:  [ ] rash [ ] phlebitis	  Eyes: [ ] icterus [ ] pain  [ ] discharge	  ENMT: [ ] sore throat  [ ] thrush [ ] ulcers [ ] exudates  Respiratory: [ ] dyspnea [ ] hemoptysis [ ] cough [ ] sputum	  Cardiovascular:  [ ] chest pain [ ] palpitations [ ] edema	  Gastrointestinal:  [ ] nausea [ ] vomiting [ ] diarrhea [ ] constipation [ ] pain	  Genitourinary:  [ ] dysuria [ ] frequency [ ] hematuria [ ] discharge [ ] flank pain  [ ] incontinence  Musculoskeletal:  [ ] myalgias [ ] arthralgias [ ] arthritis  [ ] back pain  Neurological:  [ ] headache [ ] seizures  [ ] confusion/altered mental status    Allergies  No Known Allergies        ANTIMICROBIALS:      OTHER MEDS:  MEDICATIONS  (STANDING):  acetaminophen   Tablet .. 650 every 6 hours PRN  aluminum hydroxide/magnesium hydroxide/simethicone Suspension 30 every 4 hours PRN  benzonatate 100 every 8 hours  clonazePAM  Tablet 0.25 at bedtime  guaifenesin/dextromethorphan Oral Liquid 10 every 8 hours PRN  morphine  - Injectable 2 every 8 hours PRN  ondansetron Injectable 4 every 6 hours PRN  sotalol 120 daily      Vital Signs Last 24 Hrs  T(C): 37.1 (08 Aug 2021 15:39), Max: 37.1 (08 Aug 2021 15:39)  T(F): 98.8 (08 Aug 2021 15:39), Max: 98.8 (08 Aug 2021 15:39)  HR: 75 (08 Aug 2021 15:39) (70 - 80)  BP: 132/65 (08 Aug 2021 15:39) (117/68 - 132/65)  BP(mean): --  RR: 18 (08 Aug 2021 15:39) (18 - 18)  SpO2: 94% (08 Aug 2021 15:39) (93% - 96%)    PHYSICAL EXAMINATION:  General: Alert and Awake, NAD  HEENT: PERRL, EOMI  Neck: Supple  Cardiac: RRR, No M/R/G  Resp: CTAB, No Wh/Rh/Ra  Abdomen: NBS, NT/ND, No HSM, No rigidity or guarding  MSK: No LE edema. No Calf tenderness  : No sánchez  Skin: No rashes or lesions. Skin is warm and dry to the touch.   Neuro: Alert and Awake. CN 2-12 Grossly intact. Moves all four extremities spontaneously.  Psych: Calm, Pleasant, Cooperative                          7.1    7.82  )-----------( 384      ( 08 Aug 2021 09:05 )             23.5       08-08    133<L>  |  102  |  11  ----------------------------<  71  4.0   |  22  |  0.53    Ca    7.7<L>      08 Aug 2021 07:23            MICROBIOLOGY:  v  .Blood Blood-Peripheral  08-05-21   No growth to date.  --  --      .Blood Blood-Peripheral  08-05-21   No growth to date.  --  --      .Blood Blood-Peripheral  07-30-21   No Growth Final  --  --      .Blood Blood-Peripheral  07-27-21   No Growth Final  --  --      .Blood Blood-Venous  07-24-21   No Growth Final  --  --      .Blood Blood  07-22-21   No Growth Final  --  --      .Stool  07-15-21   No Protozoa seen by trichrome stain  No Helminths or Protozoa seen in formalin concentrate  performed by iodine stain  (routine O+P not evaluated for Microsporidia,  Cryptosporidia, Cyclospora, or Isospora.)  One negative sample does not necessarily rule  out the presence of a parasitic infection.  Numerous White blood cells  --  --      .Stool Feces, namrata abdon  07-14-21   No enteric pathogens isolated.  (Stool culture examined for Salmonella,  Shigella, Campylobacter, Aeromonas, Plesiomonas,  Vibrio, E.coli O157 and Yersinia)  --  --      .Urine Clean Catch (Midstream)  07-13-21   <10,000 CFU/mL Normal Urogenital Margo  --  --      .Blood Blood-Peripheral  07-13-21   No Growth Final  --  --    RADIOLOGY:    <The imaging below has been reviewed and visualized by me independently. Findings as detailed in report below>    EXAM:  CT CHEST IC                        EXAM:  CT ABDOMEN AND PELVIS IC                        PROCEDURE DATE:  08/06/2021    Diffuse colorectal wall thickening with surrounding inflammatory changes, which is progressed along the ascending colon and hepatic flexure since 7/13/2021, may reflect known C. difficile colitis or active inflammatory bowel disease.    Apparent bladder wall thickening may be on the basis of underdistention and/or reactive to the inflammatory process involving the adjacent bowel. Consider correlation with urinalysis to assess for cystitis.    Small bilateral pleural effusions.    Tree-in-bud opacities in the left lower lobe may reflect the sequela of aspiration.    Scattered pulmonary nodules measuring up to 3 mm, for which a person of low risk requires no additional follow-up. Follow-up chest CT in 12 months may performed to monitor for stability in a patient considered at high risk*.   Follow Up:  CDI    Interval History: afebrile overnight. continued diarrhea. continued cough.     REVIEW OF SYSTEMS  [  ] ROS unobtainable because:    [ x ] All other systems negative except as noted below    Constitutional:  [ ] fever [ ] chills  [ ] weight loss  [ ] weakness  Skin:  [ ] rash [ ] phlebitis	  Eyes: [ ] icterus [ ] pain  [ ] discharge	  ENMT: [ ] sore throat  [ ] thrush [ ] ulcers [ ] exudates  Respiratory: [ ] dyspnea [ ] hemoptysis [ ] cough [ ] sputum	  Cardiovascular:  [ ] chest pain [ ] palpitations [ ] edema	  Gastrointestinal:  [ ] nausea [ ] vomiting [ ] diarrhea [ ] constipation [ ] pain	  Genitourinary:  [ ] dysuria [ ] frequency [ ] hematuria [ ] discharge [ ] flank pain  [ ] incontinence  Musculoskeletal:  [ ] myalgias [ ] arthralgias [ ] arthritis  [ ] back pain  Neurological:  [ ] headache [ ] seizures  [ ] confusion/altered mental status    Allergies  No Known Allergies        ANTIMICROBIALS:      OTHER MEDS:  MEDICATIONS  (STANDING):  acetaminophen   Tablet .. 650 every 6 hours PRN  aluminum hydroxide/magnesium hydroxide/simethicone Suspension 30 every 4 hours PRN  benzonatate 100 every 8 hours  clonazePAM  Tablet 0.25 at bedtime  guaifenesin/dextromethorphan Oral Liquid 10 every 8 hours PRN  morphine  - Injectable 2 every 8 hours PRN  ondansetron Injectable 4 every 6 hours PRN  sotalol 120 daily      Vital Signs Last 24 Hrs  T(C): 37.1 (08 Aug 2021 15:39), Max: 37.1 (08 Aug 2021 15:39)  T(F): 98.8 (08 Aug 2021 15:39), Max: 98.8 (08 Aug 2021 15:39)  HR: 75 (08 Aug 2021 15:39) (70 - 80)  BP: 132/65 (08 Aug 2021 15:39) (117/68 - 132/65)  BP(mean): --  RR: 18 (08 Aug 2021 15:39) (18 - 18)  SpO2: 94% (08 Aug 2021 15:39) (93% - 96%)    PHYSICAL EXAMINATION:  General: Alert and Awake, NAD  Cardiac: RRR, No M/R/G  Resp: CTAB, No Wh/Rh/Ra  Abdomen: NBS, diffuse abdominal tenderness to palpation, No HSM, No rigidity or guarding  MSK: No LE edema. No Calf tenderness  : No sánchez  Skin: No rashes or lesions. Skin is warm and dry to the touch.   Neuro: Alert and Awake. CN 2-12 Grossly intact. Moves all four extremities spontaneously.  Psych: Calm, Pleasant, Cooperative                            7.1    7.82  )-----------( 384      ( 08 Aug 2021 09:05 )             23.5       08-08    133<L>  |  102  |  11  ----------------------------<  71  4.0   |  22  |  0.53    Ca    7.7<L>      08 Aug 2021 07:23            MICROBIOLOGY:  v  .Blood Blood-Peripheral  08-05-21   No growth to date.  --  --      .Blood Blood-Peripheral  08-05-21   No growth to date.  --  --      .Blood Blood-Peripheral  07-30-21   No Growth Final  --  --      .Blood Blood-Peripheral  07-27-21   No Growth Final  --  --      .Blood Blood-Venous  07-24-21   No Growth Final  --  --      .Blood Blood  07-22-21   No Growth Final  --  --      .Stool  07-15-21   No Protozoa seen by trichrome stain  No Helminths or Protozoa seen in formalin concentrate  performed by iodine stain  (routine O+P not evaluated for Microsporidia,  Cryptosporidia, Cyclospora, or Isospora.)  One negative sample does not necessarily rule  out the presence of a parasitic infection.  Numerous White blood cells  --  --      .Stool Feces, namrata abdon  07-14-21   No enteric pathogens isolated.  (Stool culture examined for Salmonella,  Shigella, Campylobacter, Aeromonas, Plesiomonas,  Vibrio, E.coli O157 and Yersinia)  --  --      .Urine Clean Catch (Midstream)  07-13-21   <10,000 CFU/mL Normal Urogenital Margo  --  --      .Blood Blood-Peripheral  07-13-21   No Growth Final  --  --    RADIOLOGY:    <The imaging below has been reviewed and visualized by me independently. Findings as detailed in report below>    EXAM:  CT CHEST IC                        EXAM:  CT ABDOMEN AND PELVIS IC                        PROCEDURE DATE:  08/06/2021    Diffuse colorectal wall thickening with surrounding inflammatory changes, which is progressed along the ascending colon and hepatic flexure since 7/13/2021, may reflect known C. difficile colitis or active inflammatory bowel disease.    Apparent bladder wall thickening may be on the basis of underdistention and/or reactive to the inflammatory process involving the adjacent bowel. Consider correlation with urinalysis to assess for cystitis.    Small bilateral pleural effusions.    Tree-in-bud opacities in the left lower lobe may reflect the sequela of aspiration.    Scattered pulmonary nodules measuring up to 3 mm, for which a person of low risk requires no additional follow-up. Follow-up chest CT in 12 months may performed to monitor for stability in a patient considered at high risk*.

## 2021-08-08 NOTE — CHART NOTE - NSCHARTNOTEFT_GEN_A_CORE
Notified by RN for patient with hemoglobin on 7.0.  CBC repeated with hgb 7.1.  Of note, patient has PMH of Crohn's disease with ongoing diarrhea, has a rectal tube.  Spoke with patient/daughter at bedside, risks and benefits of blood transfusion explained.  Patient undecided if he would agree to a blood transfusion if required.  FOBT ordered.  Will repeat CBC in am.  d/w Dr. Oliver.    Aliza Schilling NP  [phone]

## 2021-08-08 NOTE — PROGRESS NOTE ADULT - SUBJECTIVE AND OBJECTIVE BOX
Chief Complaint:  Patient is a 81y old  Male who presents with a chief complaint of diarrhea with blood in it , abd pain and weakness (07 Aug 2021 22:00)      Interval Events: continues to have watery diarrhea, rectal tube placed overnight given significant perianal irritation and pain  - discussed with ID, patient's daugther at bedside and patient today regarding plan for flex sig tomorrow to try to differentiate cdiff activity vs crohns      Hospital Medications:  acetaminophen   Tablet .. 650 milliGRAM(s) Oral every 6 hours PRN  aluminum hydroxide/magnesium hydroxide/simethicone Suspension 30 milliLiter(s) Oral every 4 hours PRN  benzonatate 100 milliGRAM(s) Oral every 8 hours  clonazePAM  Tablet 0.25 milliGRAM(s) Oral at bedtime  FIRST- Mouthwash  BLM 10 milliLiter(s) Swish and Spit every 8 hours  folic acid 1 milliGRAM(s) Oral daily  guaifenesin/dextromethorphan Oral Liquid 10 milliLiter(s) Oral every 8 hours PRN  lidocaine 2% Gel 1 Application(s) Topical four times a day PRN  morphine  - Injectable 2 milliGRAM(s) IV Push every 8 hours PRN  multivitamin 1 Tablet(s) Oral daily  ondansetron Injectable 4 milliGRAM(s) IV Push every 6 hours PRN  sodium chloride 0.9% with potassium chloride 20 mEq/L 1000 milliLiter(s) IV Continuous <Continuous>  sotalol 120 milliGRAM(s) Oral daily      PMHX/PSHX:  No pertinent past medical history    No significant past surgical history            ROS:     General:  No weight loss, fevers, chills, night sweats, fatigue   Eyes:  No vision changes  ENT:  No sore throat, pain, runny nose  CV:  No chest pain, palpitations, dizziness   Resp:  No SOB, cough, wheezing  GI:  See HPI  :  No burning with urination, hematuria  Muscle:  +weakness  Neuro:  No weakness/tingling, memory problems  Psych: +fatigue  Heme:  No easy bruisability  Skin:  No rash, edema      PHYSICAL EXAM:   GENERAL:  NAD, frail, cachectic  HEENT:  NC/AT,  conjunctivae clear and pink, sclera -anicteric  CHEST:  Normal Effort, Breath sounds clear  HEART:  RRR, S1 + S2, no murmurs  ABDOMEN:  Soft, mildly tender to palpation diffuse, non-distended, normoactive bowel sounds,  no masses,  EXTREMITIES:  no cyanosis or edema  SKIN:  Warm & Dry. No rash or erythema  NEURO:  Alert, oriented, no focal deficit    Vital Signs:  Vital Signs Last 24 Hrs  T(C): 36.8 (08 Aug 2021 06:46), Max: 36.9 (07 Aug 2021 13:14)  T(F): 98.3 (08 Aug 2021 06:46), Max: 98.4 (07 Aug 2021 13:14)  HR: 80 (08 Aug 2021 06:46) (70 - 80)  BP: 128/75 (08 Aug 2021 06:46) (118/74 - 144/66)  BP(mean): --  RR: 18 (08 Aug 2021 06:46) (18 - 18)  SpO2: 94% (08 Aug 2021 06:46) (94% - 97%)  Daily     Daily Weight in k.8 (08 Aug 2021 06:00)    LABS:                        7.1    7.82  )-----------( 384      ( 08 Aug 2021 09:05 )             23.5     08-08    133<L>  |  102  |  11  ----------------------------<  71  4.0   |  22  |  0.53    Ca    7.7<L>      08 Aug 2021 07:23                Imaging:

## 2021-08-08 NOTE — PROGRESS NOTE ADULT - ASSESSMENT
GIDEON HAYES is a 81y Male with GERD on omeprazole daily and Crohn's disease diagnosed about 9 mos prior to admission at outside hospital c/b h/o abscess and left posterior distal anal intersphincteric fistula and presents as a new consult to GI for diarrhea/BRBPR likely 2/2 to CD flare c/b c diff colitis.    # Diarrhea  #C diff colitis  - In setting of C diff colitis. Given patient sx persisted despite antibiotics patient underwent FMT on 7/28/21. Patient had initial respond to it, with decrease in frequency of bowl movement (had 5, compared to 10 or more) but now reports having 10 again. Patient and family attribute this to restarting PO Vancomycin again. It is unclear if ongoing/worsening of symptoms is from untreated Crohn vs failure of FMT. Given worsening of sxs, Vancomycin was stopped in favor of Fidaxomicin  (8/2/21).    If sxs does not continue to improve, will need to consider repeating EGD and starting Crohn tx.     # Weight loss - likely 2/2 CD c/b oral aphthous ulcers and diarrhea as he has not been able to tolerate po and will need to ensure adequate hydration.  # GERD   # Parainfluzena infection    #Crohn disease:  -Diagnosed 9 months ago, patient managed by Dr. Juan Luis Tran ([phone]) at [facility]. Since diagnosis, patient initially on Mesalamine and budesonide w/o relief. Patient also failed Methotrexate and ultimately was started on Remicade Sxs improved and helped heal his fistula/abscess but developed antibodies within 6 months and symptoms relapsed. Was planning for Humira outpatient.  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    Recommendations:  -discussed with ID - given risk of steroids in uncontrolled cdiff infection will plan for flex sig tomorrow for repeat evaluation to potentially assess if more crohns contributing vs cdiff (had ulcerations on previous colonoscopy); patient and daughter at bedside agreeable  - please change to clear liquid diet for rest of the day  - NPO at midnight  - if unable to treat underlying Crohns and cdiff unresponsive to treatment will also need to consider colectomy as last resort  -Continue to monitor BM.  -Continue antibiotics as per ID  - rest of care per primary team    Kat Chowdhury PGY-5  Gastroenterology Fellow  Pager #[phone] ([facility]) or [phone] ([facility])  Available on [employer] Teams.  Please contact on-call GI fellow via answering service ([phone]) after 5pm and before 8am, and on weekends.

## 2021-08-08 NOTE — PROGRESS NOTE ADULT - ATTENDING COMMENTS
Patient seen/examined. Daughter at bedside. No improvement-now has flexiseal in place draining nonbloody watery diarrheal stool. Reports mild abdominal discomfort. At present, no fever, nausea or vomiting. Appears chronically ill appearing and debilitated. Abdomen-minimal tenderness without rebound or guarding. No distention. Labs as noted. Reviewed clinical course with ID. Complex clinical course with both issues of Clostridium difficile disease as well as Crohn's colitis noted. Remains symptomatic but at present difficult to know which is dominant pathologic process. Decision was made to pursue followup sigmoidoscopy tomorrow in effort to better discern features of colitis as to whether more consistent with Clostridium difficile versus Crohn's colitis. Patient and daughter aware that there may not necessarily be distinctive features colonoscopically to make this determination. For now, continue current antibiotic regimen as per ID in conjunction with supportive care. Pending followup flexible sigmoidoscopy and assessment of colonic mucosa potential consideration for empiric trial of corticosteroids for treatment of Crohn's colitis will be considered in conjunction with infectious disease followup.

## 2021-08-08 NOTE — PROGRESS NOTE ADULT - ASSESSMENT
80 yo M with Crohn's on treatment, recently cared for at [facility], now presenting with ongoing diarrhea  Low grade temp elevation, leukocytosis  Diarrhea  No abd distension, no evidence megacolon  CT with colonic inflammation  Colonoscopy with visualized pseudomembranes  On PO Vanco starting 7/15 (x2 weeks) with limited improvement  Still ongoing diarrhea, episode fever  Patient having cough, CXR clear  RVP with parainfluenzae--explains URI symptoms and fevers    Antibiotics  Flagyl 7/13 7/21-->25  Cipro 7/13-->14  po Vanco 7/14 -->-->7/29; 8/1-->8/2  (7/28: colonoscopic FMT)  Fidaxomicin 8/2-->    CT Chest findings noted; tree in bud opacities may be secondary to Parainfluenza URI as opposed to superimposed bacterial infection. Procalcitonin slightly elevated yesterday but normal today. sputum culture sent and pending    Discussed case with GI attending and fellow; unclear if IBD or CDI is currently is contributing to diarrhea. Treatment for IBD will entail steroids which may exacerbate CDI. After discussion to be coordinated for Flexible sigmoidoscopy to see if there are findings characteristic of CDI vs Crohns    1) C diff colitis (persistent diarrhea)  - Planned for flex sig to reevaluate colonic findings  - Fidaxomcin 200 mg QOD  - po intake encouraged  - S/p colonoscopy FMT 7/28  - Frequent abd exam/stool count  2) Abnormal CT Chest  - Follow sputum culture   3) Leukocytosis  - Trend to normal  4) Crohn's  - Further care per GI  - Uncertain if any active Crohn's (or if current symptoms all related to C diff)  5) Fevers (persistent)  - likely 2/2 parainfluenzae vs CDI vs Crohns (management as above)  6) Para flu 3 viral uri with cough and fevers    I will continue to follow. Please feel free to contact me with any further questions.    Tomás Mora M.D.  [facility] Division of Infectious Disease  8AM-5PM: Pager Number [phone]  After Hours (or if no response): Please contact the Infectious Diseases Office at [phone]     The above assessment and plan were discussed with Dr Sandoval (GI) and Medicine NP     45 minutes spent on coordinating patients care and overall on patients case today

## 2021-08-08 NOTE — PROGRESS NOTE ADULT - SUBJECTIVE AND OBJECTIVE BOX
Patient is a 81y old  Male who presents with a chief complaint of diarrhea with blood in it , abd pain and weakness (08 Aug 2021 13:00)      INTERVAL HPI/OVERNIGHT EVENTS: no better , s/p recta tube placed 2/2 significant perianal irritation   scheduled for repeat flex sig in am as per GI   T(C): 37.1 (08-08-21 @ 15:39), Max: 37.1 (08-08-21 @ 15:39)  HR: 75 (08-08-21 @ 15:39) (70 - 80)  BP: 132/65 (08-08-21 @ 15:39) (117/68 - 132/65)  RR: 18 (08-08-21 @ 15:39) (18 - 18)  SpO2: 94% (08-08-21 @ 15:39) (93% - 96%)  Wt(kg): --  I&O's Summary    07 Aug 2021 07:01  -  08 Aug 2021 07:00  --------------------------------------------------------  IN: 2240 mL / OUT: 20 mL / NET: 2220 mL    08 Aug 2021 07:01  -  08 Aug 2021 21:21  --------------------------------------------------------  IN: 1220 mL / OUT: 200 mL / NET: 1020 mL        PAST MEDICAL & SURGICAL HISTORY:  No pertinent past medical history    No significant past surgical history        SOCIAL HISTORY  Alcohol:  Tobacco:  Illicit substance use:    FAMILY HISTORY:    REVIEW OF SYSTEMS:  CONSTITUTIONAL: No fever, weight loss, or fatigue  EYES: No eye pain, visual disturbances, or discharge  ENMT:  No difficulty hearing, tinnitus, vertigo; No sinus or throat pain  NECK: No pain or stiffness  RESPIRATORY: No cough, wheezing, chills or hemoptysis; No shortness of breath  CARDIOVASCULAR: No chest pain, palpitations, dizziness, or leg swelling  GASTROINTESTINAL: No abdominal or epigastric pain. No nausea, vomiting, or hematemesis; No diarrhea or constipation. No melena or hematochezia.  GENITOURINARY: No dysuria, frequency, hematuria, or incontinence  NEUROLOGICAL: No headaches, memory loss, loss of strength, numbness, or tremors  SKIN: No itching, burning, rashes, or lesions   LYMPH NODES: No enlarged glands  ENDOCRINE: No heat or cold intolerance; No hair loss  MUSCULOSKELETAL: No joint pain or swelling; No muscle, back, or extremity pain  PSYCHIATRIC: No depression, anxiety, mood swings, or difficulty sleeping  HEME/LYMPH: No easy bruising, or bleeding gums  ALLERY AND IMMUNOLOGIC: No hives or eczema    RADIOLOGY & ADDITIONAL TESTS:    Imaging Personally Reviewed:  [ ] YES  [ ] NO    Consultant(s) Notes Reviewed:  [ ] YES  [ ] NO    PHYSICAL EXAM:  GENERAL: NAD, well-groomed, well-developed  HEAD:  Atraumatic, Normocephalic  EYES: EOMI, PERRLA, conjunctiva and sclera clear  ENMT: No tonsillar erythema, exudates, or enlargement; Moist mucous membranes, Good dentition, No lesions  NECK: Supple, No JVD, Normal thyroid  NERVOUS SYSTEM:  Alert & Oriented X3, Good concentration; Motor Strength 5/5 B/L upper and lower extremities; DTRs 2+ intact and symmetric  CHEST/LUNG: Clear to percussion bilaterally; No rales, rhonchi, wheezing, or rubs  HEART: Regular rate and rhythm; No murmurs, rubs, or gallops  ABDOMEN: Soft, Nontender, Nondistended; Bowel sounds present  EXTREMITIES:  2+ Peripheral Pulses, No clubbing, cyanosis, or edema  LYMPH: No lymphadenopathy noted  SKIN: No rashes or lesions    LABS:                        7.1    7.82  )-----------( 384      ( 08 Aug 2021 09:05 )             23.5     08-08    133<L>  |  102  |  11  ----------------------------<  71  4.0   |  22  |  0.53    Ca    7.7<L>      08 Aug 2021 07:23          CAPILLARY BLOOD GLUCOSE                MEDICATIONS  (STANDING):  benzonatate 100 milliGRAM(s) Oral every 8 hours  clonazePAM  Tablet 0.25 milliGRAM(s) Oral at bedtime  FIRST- Mouthwash  BLM 10 milliLiter(s) Swish and Spit every 8 hours  folic acid 1 milliGRAM(s) Oral daily  multivitamin 1 Tablet(s) Oral daily  sodium chloride 0.9% with potassium chloride 20 mEq/L 1000 milliLiter(s) (60 mL/Hr) IV Continuous <Continuous>  sotalol 120 milliGRAM(s) Oral daily    MEDICATIONS  (PRN):  acetaminophen   Tablet .. 650 milliGRAM(s) Oral every 6 hours PRN Temp greater or equal to 38.5C (101.3F), Mild Pain (1 - 3), Moderate Pain (4 - 6)  aluminum hydroxide/magnesium hydroxide/simethicone Suspension 30 milliLiter(s) Oral every 4 hours PRN Dyspepsia  guaifenesin/dextromethorphan Oral Liquid 10 milliLiter(s) Oral every 8 hours PRN Cough  lidocaine 2% Gel 1 Application(s) Topical four times a day PRN pain due to skin excoriation  morphine  - Injectable 2 milliGRAM(s) IV Push every 8 hours PRN Severe Pain (7 - 10)  ondansetron Injectable 4 milliGRAM(s) IV Push every 6 hours PRN Nausea and/or Vomiting      Care Discussed with Consultants/Other Providers [ ] YES  [ ] NO

## 2021-08-09 NOTE — PRE PROCEDURE NOTE - PRE PROCEDURE EVALUATION
Attending Physician:    Dr. Concepcion                        Procedure:   Flexible Sigmoidoscopy    Indication for Procedure:   Recurrent Diarrhea  ________________________________________________________  PAST MEDICAL & SURGICAL HISTORY:    Clostridium difficile  Recurrent diarrhea  Colitis    No significant past surgical history      ALLERGIES:  No Known Allergies    HOME MEDICATIONS:  budesonide 3 mg oral delayed release capsule: 3 cap(s) orally once a day (in the morning)  folic acid 1 mg oral tablet: 1 tab(s) orally once a day  KlonoPIN 0.5 mg oral tablet: 0.5 tab(s) orally once a day (at bedtime)  note: last dispensed Nov 2020  mesalamine 1.2 g oral delayed release tablet: 4 tab(s) orally once a day  methotrexate 2.5 mg oral tablet: 6 tab(s) orally once a week on sundays  multivitamin:   omeprazole 20 mg oral delayed release capsule: 1 cap(s) orally once a day  sotalol  mg oral tablet: 1 tab(s) orally 2 times a day  Vitamin D3:     AICD/PPM: [ ] yes   [X ] no    PERTINENT LAB DATA:                        8.0    10.36 )-----------( 413      ( 09 Aug 2021 09:11 )             26.8     08-09    134<L>  |  103  |  8   ----------------------------<  86  3.6   |  23  |  0.55    Ca    7.3<L>      09 Aug 2021 09:11    PHYSICAL EXAMINATION:    T(C): 36.9  HR: 88  BP: 132/70  RR: 18  SpO2: 95%    Constitutional: NAD    Neck:  No JVD  Respiratory: CTAB/L  Cardiovascular: S1 and S2  Gastrointestinal: BS+, soft, NT/ND  Extremities: No peripheral edema  Neurological: A/O x 4      COMMENTS:    The patient is a suitable candidate for the planned procedure unless box checked [ ]  No, explain:

## 2021-08-09 NOTE — PROGRESS NOTE ADULT - ASSESSMENT
A/p    spiked fever :  -diarrhea no better   CT scan Chest/ abd/pelvis : CT chest shows opacities ( tree in bud apperance )   calcitonin marinally high   seen by ID : hold off on IV abx as risk of worsening c diff   repeat calcitonin in am   likely fing may be 2/2 parainfluenza virus inf       sever C diff colitis :  -completed vanco/ flagyl   s/p FMT : 5 days post FMT   -improvement today  -started on fidaxomicin 200 mg bid today : as per ID and GI   -supportive care   s/p rectal tube placement    Hx of Chron's dis :  -now today think it is more of IBD exacerbation and his cdiff is improved on flex sig       today s/p flex sig , shows deep ulceration consistent with IBD , started on steroids .

## 2021-08-09 NOTE — PROGRESS NOTE ADULT - NSICDXPILOT_GEN_ALL_CORE
[place]

## 2021-08-09 NOTE — CHART NOTE - NSCHARTNOTEFT_GEN_A_CORE
Patient was off of the floor at time of my evaluation for sigmoidoscopy.  Discussed case with GI - findings of deep ulcerations throughout the sigmoid and descending colon more consistent with his IBD  No/limited evidence of Pseudomembranes during this scope  I will continue Dificid but believe benefits of corticosteroids would outweigh risks at the moment    Tomás Mora M.D.  [facility] Division of Infectious Disease  8AM-5PM: Pager Number [phone]  After Hours (or if no response): Please contact the Infectious Diseases Office at [phone]     The above assessment and plan were discussed with GI Team

## 2021-08-10 NOTE — PROGRESS NOTE ADULT - ASSESSMENT
82 yo M with Crohn's on treatment, recently cared for at [facility], now presenting with ongoing diarrhea  Low grade temp elevation, leukocytosis  Diarrhea  No abd distension, no evidence megacolon  CT with colonic inflammation  Colonoscopy with visualized pseudomembranes  On PO Vanco starting 7/15 (x2 weeks) with limited improvement  Still ongoing diarrhea, episode fever  Patient having cough, CXR clear  RVP with parainfluenzae--explains URI symptoms and fevers      Antibiotics  Flagyl 7/13 7/21-->25  Cipro 7/13-->14  po Vanco 7/14 -->-->7/29; 8/1-->8/2  (7/28: colonoscopic FMT)  Fidaxomicin 8/2-->      1) Crohns colitis  8/9 Flex sig without pseudomembrances, crohns colitis appears major factor for continued diarrhea,   solumedrol 20 q 8hours 8/9-->    2) C diff colitis  - decreased number of stools  Fidaxomcin 200 mg twice daily x 5 days, currently every other day for additional 20 days days 7 - 25  po intake encouraged  - S/p colonoscopy FMT 7/28  - Frequent abd exam/stool count  2) Leukocytosis  - resolved  3) Fevers  afeb since 8/6  - Monitor for alternate sources infection, hold on abx unless signs sepsis/focal source infection  -  fever 8/4: repeat cultures pending, ProCalcitonin =0.09, repeat RVP: neg for skip cov2  4) Para flu 3 viral uri with cough and fevers

## 2021-08-10 NOTE — PROGRESS NOTE ADULT - SUBJECTIVE AND OBJECTIVE BOX
Patient is a 81y old  Male who presents with a chief complaint of diarrhea with blood in it , abd pain and weakness (10 Aug 2021 13:57)      INTERVAL HPI/OVERNIGHT EVENTS:  T(C): 36.5 (08-10-21 @ 19:29), Max: 36.6 (08-10-21 @ 09:48)  HR: 66 (08-10-21 @ 19:29) (64 - 77)  BP: 146/77 (08-10-21 @ 19:29) (116/71 - 146/77)  RR: 19 (08-10-21 @ 19:29) (19 - 20)  SpO2: 95% (08-10-21 @ 19:29) (95% - 99%)  Wt(kg): --  I&O's Summary    09 Aug 2021 07:01  -  10 Aug 2021 07:00  --------------------------------------------------------  IN: 1990 mL / OUT: 1450 mL / NET: 540 mL    10 Aug 2021 07:01  -  10 Aug 2021 23:52  --------------------------------------------------------  IN: 1080 mL / OUT: 750 mL / NET: 330 mL        PAST MEDICAL & SURGICAL HISTORY:  No pertinent past medical history    No significant past surgical history        SOCIAL HISTORY  Alcohol:  Tobacco:  Illicit substance use:    FAMILY HISTORY:    REVIEW OF SYSTEMS:  CONSTITUTIONAL: No fever, weight loss, or fatigue  EYES: No eye pain, visual disturbances, or discharge  ENMT:  No difficulty hearing, tinnitus, vertigo; No sinus or throat pain  NECK: No pain or stiffness  RESPIRATORY: No cough, wheezing, chills or hemoptysis; No shortness of breath  CARDIOVASCULAR: No chest pain, palpitations, dizziness, or leg swelling  GASTROINTESTINAL: No abdominal or epigastric pain. No nausea, vomiting, or hematemesis; No diarrhea or constipation. No melena or hematochezia.  GENITOURINARY: No dysuria, frequency, hematuria, or incontinence  NEUROLOGICAL: No headaches, memory loss, loss of strength, numbness, or tremors  SKIN: No itching, burning, rashes, or lesions   LYMPH NODES: No enlarged glands  ENDOCRINE: No heat or cold intolerance; No hair loss  MUSCULOSKELETAL: No joint pain or swelling; No muscle, back, or extremity pain  PSYCHIATRIC: No depression, anxiety, mood swings, or difficulty sleeping  HEME/LYMPH: No easy bruising, or bleeding gums  ALLERY AND IMMUNOLOGIC: No hives or eczema    RADIOLOGY & ADDITIONAL TESTS:    Imaging Personally Reviewed:  [ ] YES  [ ] NO    Consultant(s) Notes Reviewed:  [ ] YES  [ ] NO    PHYSICAL EXAM:  GENERAL: NAD, well-groomed, well-developed  HEAD:  Atraumatic, Normocephalic  EYES: EOMI, PERRLA, conjunctiva and sclera clear  ENMT: No tonsillar erythema, exudates, or enlargement; Moist mucous membranes, Good dentition, No lesions  NECK: Supple, No JVD, Normal thyroid  NERVOUS SYSTEM:  Alert & Oriented X3, Good concentration; Motor Strength 5/5 B/L upper and lower extremities; DTRs 2+ intact and symmetric  CHEST/LUNG: Clear to percussion bilaterally; No rales, rhonchi, wheezing, or rubs  HEART: Regular rate and rhythm; No murmurs, rubs, or gallops  ABDOMEN: Soft, Nontender, Nondistended; Bowel sounds present  EXTREMITIES:  2+ Peripheral Pulses, No clubbing, cyanosis, or edema  LYMPH: No lymphadenopathy noted  SKIN: No rashes or lesions    LABS:                        8.6    6.90  )-----------( 477      ( 10 Aug 2021 06:20 )             28.2     08-10    133<L>  |  101  |  10  ----------------------------<  110<H>  3.5   |  21<L>  |  0.52    Ca    7.8<L>      10 Aug 2021 06:18          CAPILLARY BLOOD GLUCOSE                MEDICATIONS  (STANDING):  clonazePAM  Tablet 0.25 milliGRAM(s) Oral at bedtime  fidaxomicin 200 milliGRAM(s) Oral <User Schedule>  FIRST- Mouthwash  BLM 10 milliLiter(s) Swish and Spit every 8 hours  folic acid 1 milliGRAM(s) Oral daily  methylPREDNISolone sodium succinate Injectable 20 milliGRAM(s) IV Push every 8 hours  multivitamin 1 Tablet(s) Oral daily  sotalol 120 milliGRAM(s) Oral daily    MEDICATIONS  (PRN):  acetaminophen   Tablet .. 650 milliGRAM(s) Oral every 6 hours PRN Temp greater or equal to 38.5C (101.3F), Mild Pain (1 - 3), Moderate Pain (4 - 6)  aluminum hydroxide/magnesium hydroxide/simethicone Suspension 30 milliLiter(s) Oral every 4 hours PRN Dyspepsia  guaifenesin/dextromethorphan Oral Liquid 10 milliLiter(s) Oral every 8 hours PRN Cough  lidocaine 2% Gel 1 Application(s) Topical four times a day PRN pain due to skin excoriation  morphine  - Injectable 2 milliGRAM(s) IV Push every 8 hours PRN Severe Pain (7 - 10)  ondansetron Injectable 4 milliGRAM(s) IV Push every 6 hours PRN Nausea and/or Vomiting      Care Discussed with Consultants/Other Providers [ ] YES  [ ] NO Patient is a 81y old  Male who presents with a chief complaint of diarrhea with blood in it , abd pain and weakness (10 Aug 2021 13:57)      INTERVAL HPI/OVERNIGHT EVENTS: started on steroids last night , still ongoing diarrhea   T(C): 36.5 (08-10-21 @ 19:29), Max: 36.6 (08-10-21 @ 09:48)  HR: 66 (08-10-21 @ 19:29) (64 - 77)  BP: 146/77 (08-10-21 @ 19:29) (116/71 - 146/77)  RR: 19 (08-10-21 @ 19:29) (19 - 20)  SpO2: 95% (08-10-21 @ 19:29) (95% - 99%)  Wt(kg): --  I&O's Summary    09 Aug 2021 07:01  -  10 Aug 2021 07:00  --------------------------------------------------------  IN: 1990 mL / OUT: 1450 mL / NET: 540 mL    10 Aug 2021 07:01  -  10 Aug 2021 23:52  --------------------------------------------------------  IN: 1080 mL / OUT: 750 mL / NET: 330 mL        PAST MEDICAL & SURGICAL HISTORY:  No pertinent past medical history    No significant past surgical history        SOCIAL HISTORY  Alcohol:  Tobacco:  Illicit substance use:    FAMILY HISTORY:    REVIEW OF SYSTEMS:  CONSTITUTIONAL: No fever, weight loss, or fatigue  EYES: No eye pain, visual disturbances, or discharge  ENMT:  No difficulty hearing, tinnitus, vertigo; No sinus or throat pain  NECK: No pain or stiffness  RESPIRATORY: No cough, wheezing, chills or hemoptysis; No shortness of breath  CARDIOVASCULAR: No chest pain, palpitations, dizziness, or leg swelling  GASTROINTESTINAL: No abdominal or epigastric pain. No nausea, vomiting, or hematemesis; No diarrhea or constipation. No melena or hematochezia.  GENITOURINARY: No dysuria, frequency, hematuria, or incontinence  NEUROLOGICAL: No headaches, memory loss, loss of strength, numbness, or tremors  SKIN: No itching, burning, rashes, or lesions   LYMPH NODES: No enlarged glands  ENDOCRINE: No heat or cold intolerance; No hair loss  MUSCULOSKELETAL: No joint pain or swelling; No muscle, back, or extremity pain  PSYCHIATRIC: No depression, anxiety, mood swings, or difficulty sleeping  HEME/LYMPH: No easy bruising, or bleeding gums  ALLERY AND IMMUNOLOGIC: No hives or eczema    RADIOLOGY & ADDITIONAL TESTS:    Imaging Personally Reviewed:  [ ] YES  [ ] NO    Consultant(s) Notes Reviewed:  [ ] YES  [ ] NO    PHYSICAL EXAM:  GENERAL: NAD, well-groomed, well-developed  HEAD:  Atraumatic, Normocephalic  EYES: EOMI, PERRLA, conjunctiva and sclera clear  ENMT: No tonsillar erythema, exudates, or enlargement; Moist mucous membranes, Good dentition, No lesions  NECK: Supple, No JVD, Normal thyroid  NERVOUS SYSTEM:  Alert & Oriented X3, Good concentration; Motor Strength 5/5 B/L upper and lower extremities; DTRs 2+ intact and symmetric  CHEST/LUNG: Clear to percussion bilaterally; No rales, rhonchi, wheezing, or rubs  HEART: Regular rate and rhythm; No murmurs, rubs, or gallops  ABDOMEN: Soft, Nontender, Nondistended; Bowel sounds present  EXTREMITIES:  2+ Peripheral Pulses, No clubbing, cyanosis, or edema  LYMPH: No lymphadenopathy noted  SKIN: No rashes or lesions    LABS:                        8.6    6.90  )-----------( 477      ( 10 Aug 2021 06:20 )             28.2     08-10    133<L>  |  101  |  10  ----------------------------<  110<H>  3.5   |  21<L>  |  0.52    Ca    7.8<L>      10 Aug 2021 06:18          CAPILLARY BLOOD GLUCOSE                MEDICATIONS  (STANDING):  clonazePAM  Tablet 0.25 milliGRAM(s) Oral at bedtime  fidaxomicin 200 milliGRAM(s) Oral <User Schedule>  FIRST- Mouthwash  BLM 10 milliLiter(s) Swish and Spit every 8 hours  folic acid 1 milliGRAM(s) Oral daily  methylPREDNISolone sodium succinate Injectable 20 milliGRAM(s) IV Push every 8 hours  multivitamin 1 Tablet(s) Oral daily  sotalol 120 milliGRAM(s) Oral daily    MEDICATIONS  (PRN):  acetaminophen   Tablet .. 650 milliGRAM(s) Oral every 6 hours PRN Temp greater or equal to 38.5C (101.3F), Mild Pain (1 - 3), Moderate Pain (4 - 6)  aluminum hydroxide/magnesium hydroxide/simethicone Suspension 30 milliLiter(s) Oral every 4 hours PRN Dyspepsia  guaifenesin/dextromethorphan Oral Liquid 10 milliLiter(s) Oral every 8 hours PRN Cough  lidocaine 2% Gel 1 Application(s) Topical four times a day PRN pain due to skin excoriation  morphine  - Injectable 2 milliGRAM(s) IV Push every 8 hours PRN Severe Pain (7 - 10)  ondansetron Injectable 4 milliGRAM(s) IV Push every 6 hours PRN Nausea and/or Vomiting      Care Discussed with Consultants/Other Providers [ ] YES  [ ] NO

## 2021-08-10 NOTE — PROGRESS NOTE ADULT - SUBJECTIVE AND OBJECTIVE BOX
Interval Events:   Ongoing abdominal pain and diarrhea.     Hospital Medications:  acetaminophen   Tablet .. 650 milliGRAM(s) Oral every 6 hours PRN  aluminum hydroxide/magnesium hydroxide/simethicone Suspension 30 milliLiter(s) Oral every 4 hours PRN  clonazePAM  Tablet 0.25 milliGRAM(s) Oral at bedtime  fidaxomicin 200 milliGRAM(s) Oral <User Schedule>  FIRST- Mouthwash  BLM 10 milliLiter(s) Swish and Spit every 8 hours  folic acid 1 milliGRAM(s) Oral daily  guaifenesin/dextromethorphan Oral Liquid 10 milliLiter(s) Oral every 8 hours PRN  lidocaine 2% Gel 1 Application(s) Topical four times a day PRN  methylPREDNISolone sodium succinate Injectable 20 milliGRAM(s) IV Push every 8 hours  morphine  - Injectable 2 milliGRAM(s) IV Push every 8 hours PRN  multivitamin 1 Tablet(s) Oral daily  ondansetron Injectable 4 milliGRAM(s) IV Push every 6 hours PRN  sodium chloride 0.9% with potassium chloride 20 mEq/L 1000 milliLiter(s) IV Continuous <Continuous>  sotalol 120 milliGRAM(s) Oral daily        ROS:   General:  No fevers, chills or night sweats  ENT:  No sore throat or dysphagia  CV:  No pain or palpitations  Resp:  No dyspnea, cough or  wheezing  GI:  as above  Skin:  No rash or edema  Neuro: no weakness   Hematologic: no bleeding  Musculoskeletal: no muscle pain or join pain  Psych: no agitation      PHYSICAL EXAM:   Vital Signs:  Vital Signs Last 24 Hrs  T(C): 36.3 (10 Aug 2021 04:54), Max: 37.1 (09 Aug 2021 15:40)  T(F): 97.3 (10 Aug 2021 04:54), Max: 98.8 (09 Aug 2021 15:40)  HR: 77 (10 Aug 2021 04:54) (71 - 90)  BP: 116/72 (10 Aug 2021 04:54) (103/65 - 150/77)  BP(mean): --  RR: 20 (10 Aug 2021 04:54) (18 - 23)  SpO2: 99% (10 Aug 2021 04:54) (95% - 99%)  Daily Height in cm: 170.18 (09 Aug 2021 12:23)    Daily     GENERAL:  NAD, Appears stated age  HEENT:  NC/AT,  conjunctivae clear and pink, sclera -anicteric  CHEST:  Normal Effort, Breath sounds clear  HEART:  RRR, S1 + S2, no murmurs  ABDOMEN:  Soft, non-distended, normoactive bowel sounds,  no masses, diffuse abdominal discomfort  EXTREMITIES:  no cyanosis or edema  SKIN:  Warm & Dry. No rash or erythema  NEURO:  Alert, oriented, no focal deficit    LABS:                        8.6    6.90  )-----------( 477      ( 10 Aug 2021 06:20 )             28.2     Mean Cell Volume: 79.0 fl (08-10-21 @ 06:20)    08-10    133<L>  |  101  |  10  ----------------------------<  110<H>  3.5   |  21<L>  |  0.52    Ca    7.8<L>      10 Aug 2021 06:18                                    8.6    6.90  )-----------( 477      ( 10 Aug 2021 06:20 )             28.2                         8.0    10.36 )-----------( 413      ( 09 Aug 2021 09:11 )             26.8                         7.1    7.82  )-----------( 384      ( 08 Aug 2021 09:05 )             23.5                         7.0    8.11  )-----------( 386      ( 08 Aug 2021 07:34 )             23.6       Imaging: Images reviewed no new images appreciated.         Interval Events:   Ongoing abdominal pain and diarrhea, has not improved.     Hospital Medications:  acetaminophen   Tablet .. 650 milliGRAM(s) Oral every 6 hours PRN  aluminum hydroxide/magnesium hydroxide/simethicone Suspension 30 milliLiter(s) Oral every 4 hours PRN  clonazePAM  Tablet 0.25 milliGRAM(s) Oral at bedtime  fidaxomicin 200 milliGRAM(s) Oral <User Schedule>  FIRST- Mouthwash  BLM 10 milliLiter(s) Swish and Spit every 8 hours  folic acid 1 milliGRAM(s) Oral daily  guaifenesin/dextromethorphan Oral Liquid 10 milliLiter(s) Oral every 8 hours PRN  lidocaine 2% Gel 1 Application(s) Topical four times a day PRN  methylPREDNISolone sodium succinate Injectable 20 milliGRAM(s) IV Push every 8 hours  morphine  - Injectable 2 milliGRAM(s) IV Push every 8 hours PRN  multivitamin 1 Tablet(s) Oral daily  ondansetron Injectable 4 milliGRAM(s) IV Push every 6 hours PRN  sodium chloride 0.9% with potassium chloride 20 mEq/L 1000 milliLiter(s) IV Continuous <Continuous>  sotalol 120 milliGRAM(s) Oral daily        ROS:   General:  No fevers, chills or night sweats  ENT:  No sore throat or dysphagia  CV:  No pain or palpitations  Resp:  No dyspnea, cough or  wheezing  GI:  as above  Skin:  No rash or edema  Neuro: no weakness   Hematologic: no bleeding  Musculoskeletal: no muscle pain or join pain  Psych: no agitation      PHYSICAL EXAM:   Vital Signs:  Vital Signs Last 24 Hrs  T(C): 36.3 (10 Aug 2021 04:54), Max: 37.1 (09 Aug 2021 15:40)  T(F): 97.3 (10 Aug 2021 04:54), Max: 98.8 (09 Aug 2021 15:40)  HR: 77 (10 Aug 2021 04:54) (71 - 90)  BP: 116/72 (10 Aug 2021 04:54) (103/65 - 150/77)  BP(mean): --  RR: 20 (10 Aug 2021 04:54) (18 - 23)  SpO2: 99% (10 Aug 2021 04:54) (95% - 99%)  Daily Height in cm: 170.18 (09 Aug 2021 12:23)    Daily     GENERAL:  NAD, Appears stated age  HEENT:  NC/AT,  conjunctivae clear and pink, sclera -anicteric  CHEST:  Normal Effort, Breath sounds clear  HEART:  RRR, S1 + S2, no murmurs  ABDOMEN:  Soft, non-distended, normoactive bowel sounds,  no masses, diffuse abdominal discomfort  EXTREMITIES:  no cyanosis or edema  SKIN:  Warm & Dry. No rash or erythema  NEURO:  Alert, oriented, no focal deficit    LABS:                        8.6    6.90  )-----------( 477      ( 10 Aug 2021 06:20 )             28.2     Mean Cell Volume: 79.0 fl (08-10-21 @ 06:20)    08-10    133<L>  |  101  |  10  ----------------------------<  110<H>  3.5   |  21<L>  |  0.52    Ca    7.8<L>      10 Aug 2021 06:18                                    8.6    6.90  )-----------( 477      ( 10 Aug 2021 06:20 )             28.2                         8.0    10.36 )-----------( 413      ( 09 Aug 2021 09:11 )             26.8                         7.1    7.82  )-----------( 384      ( 08 Aug 2021 09:05 )             23.5                         7.0    8.11  )-----------( 386      ( 08 Aug 2021 07:34 )             23.6       Imaging: Images reviewed no new images appreciated.     Interval Events:   Ongoing abdominal pain and diarrhea, has not improved. Started on steroids last night.    Hospital Medications:  acetaminophen   Tablet .. 650 milliGRAM(s) Oral every 6 hours PRN  aluminum hydroxide/magnesium hydroxide/simethicone Suspension 30 milliLiter(s) Oral every 4 hours PRN  clonazePAM  Tablet 0.25 milliGRAM(s) Oral at bedtime  fidaxomicin 200 milliGRAM(s) Oral <User Schedule>  FIRST- Mouthwash  BLM 10 milliLiter(s) Swish and Spit every 8 hours  folic acid 1 milliGRAM(s) Oral daily  guaifenesin/dextromethorphan Oral Liquid 10 milliLiter(s) Oral every 8 hours PRN  lidocaine 2% Gel 1 Application(s) Topical four times a day PRN  methylPREDNISolone sodium succinate Injectable 20 milliGRAM(s) IV Push every 8 hours  morphine  - Injectable 2 milliGRAM(s) IV Push every 8 hours PRN  multivitamin 1 Tablet(s) Oral daily  ondansetron Injectable 4 milliGRAM(s) IV Push every 6 hours PRN  sodium chloride 0.9% with potassium chloride 20 mEq/L 1000 milliLiter(s) IV Continuous <Continuous>  sotalol 120 milliGRAM(s) Oral daily        ROS:   General:  No fevers, chills or night sweats  ENT:  No sore throat or dysphagia  CV:  No pain or palpitations  Resp:  No dyspnea, cough or  wheezing  GI:  as above  Skin:  No rash or edema  Neuro: no weakness   Hematologic: no bleeding  Musculoskeletal: no muscle pain or join pain  Psych: no agitation      PHYSICAL EXAM:   Vital Signs:  Vital Signs Last 24 Hrs  T(C): 36.3 (10 Aug 2021 04:54), Max: 37.1 (09 Aug 2021 15:40)  T(F): 97.3 (10 Aug 2021 04:54), Max: 98.8 (09 Aug 2021 15:40)  HR: 77 (10 Aug 2021 04:54) (71 - 90)  BP: 116/72 (10 Aug 2021 04:54) (103/65 - 150/77)  BP(mean): --  RR: 20 (10 Aug 2021 04:54) (18 - 23)  SpO2: 99% (10 Aug 2021 04:54) (95% - 99%)  Daily Height in cm: 170.18 (09 Aug 2021 12:23)    Daily     GENERAL:  NAD, Appears stated age  HEENT:  NC/AT,  conjunctivae clear and pink, sclera -anicteric  CHEST:  Normal Effort, Breath sounds clear  HEART:  RRR, S1 + S2, no murmurs  ABDOMEN:  Soft, non-distended, normoactive bowel sounds,  no masses, diffuse abdominal discomfort  EXTREMITIES:  no cyanosis or edema  SKIN:  Warm & Dry. No rash or erythema  NEURO:  Alert, oriented, no focal deficit    LABS:                        8.6    6.90  )-----------( 477      ( 10 Aug 2021 06:20 )             28.2     Mean Cell Volume: 79.0 fl (08-10-21 @ 06:20)    08-10    133<L>  |  101  |  10  ----------------------------<  110<H>  3.5   |  21<L>  |  0.52    Ca    7.8<L>      10 Aug 2021 06:18                                    8.6    6.90  )-----------( 477      ( 10 Aug 2021 06:20 )             28.2                         8.0    10.36 )-----------( 413      ( 09 Aug 2021 09:11 )             26.8                         7.1    7.82  )-----------( 384      ( 08 Aug 2021 09:05 )             23.5                         7.0    8.11  )-----------( 386      ( 08 Aug 2021 07:34 )             23.6       Imaging: Images reviewed no new images appreciated.

## 2021-08-10 NOTE — PROGRESS NOTE ADULT - SUBJECTIVE AND OBJECTIVE BOX
Follow Up:  crohns/Cdiff    Interval History/ROS: discouraged, continued diarrhea    Allergies  No Known Allergies    ANTIMICROBIALS:  fidaxomicin 200 <User Schedule>      OTHER MEDS:  MEDICATIONS  (STANDING):  acetaminophen   Tablet .. 650 every 6 hours PRN  aluminum hydroxide/magnesium hydroxide/simethicone Suspension 30 every 4 hours PRN  clonazePAM  Tablet 0.25 at bedtime  guaifenesin/dextromethorphan Oral Liquid 10 every 8 hours PRN  methylPREDNISolone sodium succinate Injectable 20 every 8 hours  morphine  - Injectable 2 every 8 hours PRN  ondansetron Injectable 4 every 6 hours PRN  sotalol 120 daily      Vital Signs Last 24 Hrs  T(C): 36.4 (10 Aug 2021 13:33), Max: 37.1 (09 Aug 2021 15:40)  T(F): 97.5 (10 Aug 2021 13:33), Max: 98.8 (09 Aug 2021 15:40)  HR: 72 (10 Aug 2021 13:33) (64 - 90)  BP: 116/71 (10 Aug 2021 13:33) (116/71 - 150/77)  BP(mean): --  RR: 20 (10 Aug 2021 13:33) (18 - 20)  SpO2: 95% (10 Aug 2021 13:33) (95% - 99%)    PHYSICAL EXAM:  General: NAD, Non-toxic  Neurology: A&Ox3, nonfocal  Respiratory: Clear to auscultation bilaterally  CV: RRR, S1S2, no murmurs, rubs or gallops  Abdominal: Soft, Non-tender, non-distended,  Extremities: No edema  Line Sites: Clear  Skin: No rash                        8.6    6.90  )-----------( 477      ( 10 Aug 2021 06:20 )             28.2       08-10    133<L>  |  101  |  10  ----------------------------<  110<H>  3.5   |  21<L>  |  0.52    Ca    7.8<L>      10 Aug 2021 06:18        MICROBIOLOGY:  .Blood Blood-Peripheral  08-05-21   No Growth Final  --  --      .Blood Blood-Peripheral  08-05-21   No Growth Final  --  --      .Blood Blood-Peripheral  07-30-21   No Growth Final  --  --      .Blood Blood-Peripheral  07-27-21   No Growth Final  --  --      .Blood Blood-Venous  07-24-21   No Growth Final  --  --      .Blood Blood  07-22-21   No Growth Final  --  --      .Stool  07-15-21   No Protozoa seen by trichrome stain  No Helminths or Protozoa seen in formalin concentrate  performed by iodine stain  (routine O+P not evaluated for Microsporidia,  Cryptosporidia, Cyclospora, or Isospora.)  One negative sample does not necessarily rule  out the presence of a parasitic infection.  Numerous White blood cells  --  --      .Stool Feces, namrata coppola  07-14-21   No enteric pathogens isolated.  (Stool culture examined for Salmonella,  Shigella, Campylobacter, Aeromonas, Plesiomonas,  Vibrio, E.coli O157 and Yersinia)  --  --      .Urine Clean Catch (Midstream)  07-13-21   <10,000 CFU/mL Normal Urogenital Margo  --  --      .Blood Blood-Peripheral  07-13-21   No Growth Final  --  --      RADIOLOGY:  < from: CT Chest w/ IV Cont (08.06.21 @ 16:14) >  IMPRESSION:  Diffuse colorectal wall thickening with surrounding inflammatory changes, which is progressed along the ascending colon and hepatic flexure since 7/13/2021, may reflect known C. difficile colitis or active inflammatory bowel disease.    Apparent bladder wall thickening may be onthe basis of underdistention and/or reactive to the inflammatory process involving the adjacent bowel. Consider correlation with urinalysis to assess for cystitis.    Small bilateral pleural effusions.    Tree-in-bud opacities in the left lower lobe may reflect the sequela of aspiration.    Scattered pulmonary nodules measuring up to 3 mm, for which a person of low risk requires no additional follow-up. Follow-up chest CT in 12 months may performed to monitor for stability in a patient consideredat high risk*.    < end of copied text >      Benjamin Phillips MD; Division of Infectious Disease; Pager: [phone]; nights and weekends: [phone]

## 2021-08-10 NOTE — PROGRESS NOTE ADULT - ASSESSMENT
A/p    spiked fever :  -diarrhea no better   CT scan Chest/ abd/pelvis : CT chest shows opacities ( tree in bud apperance )   calcitonin marinally high   seen by ID : hold off on IV abx as risk of worsening c diff   repeat calcitonin in am   likely fing may be 2/2 parainfluenza virus inf       sever C diff colitis :  -completed vanco/ flagyl   s/p FMT   -started on fidaxomicin 200 mg bid today : as per ID and GI   -supportive care     Hx of Chron's dis :  -now today think it is more of IBD exacerbation and his cdiff is improved on flex sig   -started on Iv steroids   ongoing discussion bet family and GI regarding if no improvement then may need surgical consultation

## 2021-08-10 NOTE — PROGRESS NOTE ADULT - ASSESSMENT
GIDEON HAYES is a 81y Male with GERD on omeprazole daily and Crohn's disease diagnosed about 9 mos prior to admission at outside hospital c/b h/o abscess and left posterior distal anal intersphincteric fistula and presents as a new consult to GI for diarrhea/BRBPR likely 2/2 to CD flare c/b c diff colitis.    # Diarrhea  #C diff colitis  - In setting of C diff colitis. Given patient sx persisted despite antibiotics patient underwent FMT on 7/28/21. Patient had initial respond to it, with decrease in frequency of bowl movement (had 5, compared to 10 or more) but now reports having 10 again. Patient and family attribute this to restarting PO Vancomycin again. It is unclear if ongoing/worsening of symptoms is from untreated Crohn vs failure of FMT. Given worsening of sxs, Vancomycin was stopped in favor of Fidaxomicin  (8/2/21). Given no improvement after 1 week of therapy, repeat Flex sig was done on 8/9/21 which showed deep ulceration. After further discussion with ID of benefits vs risk, decided to start steroids given endoscopic findings are mostly concerning for uncontrolled Crohn.    # Weight loss - likely 2/2 CD c/b oral aphthous ulcers and diarrhea as he has not been able to tolerate po and will need to ensure adequate hydration.  # GERD   # Parainfluzena infection    #Crohn disease:  -Diagnosed 9 months ago, patient managed by Dr. Juan Luis Tran ([phone]) at [facility]. Since diagnosis, patient initially on Mesalamine and budesonide w/o relief. Patient also failed Methotrexate and ultimately was started on Remicade Sxs improved and helped heal his fistula/abscess but developed antibodies within 6 months and symptoms relapsed. Was planning for Humira outpatient.  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    Recommendations:  -Continue Methylprednisolone 20 mg q8H  -Continue C diff abx per ID.     Reji Carreno MD  Gastroenterology/Hepatology Fellow  1st option: [phone] (text or call), ONLY available from 7:00 am to 5:00 pm.   **Contact on-call GI fellow via [employer] service ([phone]) from 5pm-7am AND on weekends/holidays**  2nd option: Available via Microsoft Teams  3rd option: Pager: [phone]    NON-URGENT CONSULTS:  Please email [email or web address]  AT NIGHT AND ON WEEKENDS:  Contact on-call GI fellow via answering service ([phone]) from 5pm-8am AND on weekends/holidays          GIDEON HAYES is a 81y Male with GERD on omeprazole daily and Crohn's disease diagnosed about 9 mos prior to admission at outside hospital c/b h/o abscess and left posterior distal anal intersphincteric fistula and presents as a new consult to GI for diarrhea/BRBPR likely 2/2 to CD flare c/b c diff colitis.    # Diarrhea  #C diff colitis  - In setting of C diff colitis. Given patient sx persisted despite antibiotics patient underwent FMT on 7/28/21. Patient had initial respond to it, with decrease in frequency of bowl movement (had 5, compared to 10 or more) but now reports having 10 again. Patient and family attribute this to restarting PO Vancomycin again. It is unclear if ongoing/worsening of symptoms is from untreated Crohn vs failure of FMT. Given worsening of sxs, Vancomycin was stopped in favor of Fidaxomicin  (8/2/21). Given no improvement after 1 week of therapy, repeat Flex sig was done on 8/9/21 which showed deep ulceration. After further discussion with ID of benefits vs risk, decided to start steroids given endoscopic findings are mostly concerning for uncontrolled Crohn.    # Weight loss - likely 2/2 CD c/b oral aphthous ulcers and diarrhea as he has not been able to tolerate po and will need to ensure adequate hydration.  # GERD   # Parainfluzena infection    #Crohn disease:  -Diagnosed 9 months ago, patient managed by Dr. Juan Luis Tran ([phone]) at [facility]. Since diagnosis, patient initially on Mesalamine and budesonide w/o relief. Patient also failed Methotrexate and ultimately was started on Remicade Sxs improved and helped heal his fistula/abscess but developed antibodies within 6 months and symptoms relapsed. Was planning for Humira outpatient.  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    Recommendations:  -Continue Methylprednisolone 20 mg q8H  -Continue C diff abx per ID.   -Please check Infliximab antibody, discuss role of rescue therapy if does not respond to steroids.     Reji Carreno MD  Gastroenterology/Hepatology Fellow  1st option: [phone] (text or call), ONLY available from 7:00 am to 5:00 pm.   **Contact on-call GI fellow via answering service ([phone]) from 5pm-7am AND on weekends/holidays**  2nd option: Available via Microsoft Teams  3rd option: Pager: [phone]    NON-URGENT CONSULTS:  Please email [email or web address]  [email or web address]  AT NIGHT AND ON WEEKENDS:  Contact on-call GI fellow via answering service ([phone]) from 5pm-8am AND on weekends/holidays          GIDEON HAYES is a 81y Male with GERD on omeprazole daily and Crohn's disease diagnosed about 9 mos prior to admission at outside hospital c/b h/o abscess and left posterior distal anal intersphincteric fistula and presents as a new consult to GI for diarrhea/BRBPR likely 2/2 to CD flare c/b c diff colitis.    # Diarrhea  #C diff colitis  - In setting of C diff colitis. Given patient sx persisted despite antibiotics patient underwent FMT on 7/28/21. Patient had initial respond to it, with decrease in frequency of bowl movement (had 5, compared to 10 or more) but now reports having 10 again. Patient and family attribute this to restarting PO Vancomycin again. It is unclear if ongoing/worsening of symptoms is from untreated Crohn vs failure of FMT. Given worsening of sxs, Vancomycin was stopped in favor of Fidaxomicin  (8/2/21). Given no improvement after 1 week of therapy, repeat Flex sig was done on 8/9/21 which showed deep ulceration. After further discussion with ID of benefits vs risk, decided to start steroids given endoscopic findings are mostly concerning for uncontrolled Crohn.    # Weight loss - likely 2/2 CD c/b oral aphthous ulcers and diarrhea as he has not been able to tolerate po and will need to ensure adequate hydration.  # GERD   # Parainfluzena infection    #Crohn disease:  -Diagnosed 9 months ago, patient managed by Dr. Juan Luis Tran ([phone]) at [facility]. Since diagnosis, patient initially on Mesalamine and budesonide w/o relief. Patient also failed Methotrexate and ultimately was started on Remicade Sxs improved and helped heal his fistula/abscess but developed antibodies within 6 months and symptoms relapsed. Was planning for Humira outpatient.  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    Recommendations:  -Continue Methylprednisolone 20 mg q8H  -Continue C diff abx per ID.   -Please check Infliximab antibody, discuss role of rescue therapy if does not respond to steroids, but he reports previous antibody formation and ineffectiveness of infliximab  -Discussed with daughter that if he fails medical therapy, then surgical evaluation would be indicated    Reji Carreno MD  Gastroenterology/Hepatology Fellow  1st option: [phone] (text or call), ONLY available from 7:00 am to 5:00 pm.   **Contact on-call GI fellow via answering service ([phone]) from 5pm-7am AND on weekends/holidays**  2nd option: Available via Microsoft Teams  3rd option: Pager: [phone]    NON-URGENT CONSULTS:  Please email [email or web address]  [email or web address]  AT NIGHT AND ON WEEKENDS:  Contact on-call GI fellow via answering service ([phone]) from 5pm-8am AND on weekends/holidays

## 2021-08-10 NOTE — PROGRESS NOTE ADULT - ATTENDING COMMENTS
Patient seen and examined, agree with above. Please see [place] for flex sig report - on exam yesterday, there were no pseudomembranes, likely his underlying Crohn's disease is driving his diarrhea and symptoms. Discussed with ID - continue IV solumedrol 20mg q8. He reports previous antibiotics to infliximab, would check levels as that is the only other rescue medication we have as inpatient. Discussed with him and his daughter at bedside that if he fails medical therapy, then we would have to consider surgical treatment (i.e. colectomy), though it is too early to tell now and we have to give the steroids more time.

## 2021-08-11 NOTE — PROGRESS NOTE ADULT - SUBJECTIVE AND OBJECTIVE BOX
Interval Events:       Hospital Medications:  acetaminophen   Tablet .. 650 milliGRAM(s) Oral every 6 hours PRN  aluminum hydroxide/magnesium hydroxide/simethicone Suspension 30 milliLiter(s) Oral every 4 hours PRN  clonazePAM  Tablet 0.25 milliGRAM(s) Oral at bedtime  fidaxomicin 200 milliGRAM(s) Oral <User Schedule>  FIRST- Mouthwash  BLM 10 milliLiter(s) Swish and Spit every 8 hours  folic acid 1 milliGRAM(s) Oral daily  guaifenesin/dextromethorphan Oral Liquid 10 milliLiter(s) Oral every 8 hours PRN  lidocaine 2% Gel 1 Application(s) Topical four times a day PRN  methylPREDNISolone sodium succinate Injectable 20 milliGRAM(s) IV Push every 8 hours  morphine  - Injectable 2 milliGRAM(s) IV Push every 8 hours PRN  multivitamin 1 Tablet(s) Oral daily  ondansetron Injectable 4 milliGRAM(s) IV Push every 6 hours PRN  sotalol 120 milliGRAM(s) Oral daily        ROS:   General:  No fevers, chills or night sweats  ENT:  No sore throat or dysphagia  CV:  No pain or palpitations  Resp:  No dyspnea, cough or  wheezing  GI:  as above  Skin:  No rash or edema  Neuro: no weakness   Hematologic: no bleeding  Musculoskeletal: no muscle pain or join pain  Psych: no agitation      PHYSICAL EXAM:   Vital Signs:  Vital Signs Last 24 Hrs  T(C): 36.4 (11 Aug 2021 05:08), Max: 36.6 (10 Aug 2021 09:48)  T(F): 97.5 (11 Aug 2021 05:08), Max: 97.9 (10 Aug 2021 09:48)  HR: 76 (11 Aug 2021 05:08) (64 - 76)  BP: 168/78 (11 Aug 2021 05:08) (116/71 - 168/78)  BP(mean): --  RR: 18 (11 Aug 2021 05:08) (18 - 20)  SpO2: 94% (11 Aug 2021 05:08) (94% - 97%)  Daily     Daily Weight in k.2 (11 Aug 2021 05:08)    GENERAL:  NAD, Appears stated age  HEENT:  NC/AT,  conjunctivae clear and pink, sclera -anicteric  CHEST:  Normal Effort, Breath sounds clear  HEART:  RRR, S1 + S2, no murmurs  ABDOMEN:  Soft, non-distended, normoactive bowel sounds,  no masses, mild discomfort in abd.  EXTREMITIES:  no cyanosis or edema  SKIN:  Warm & Dry. No rash or erythema  NEURO:  Alert, oriented, no focal deficit    LABS:                        8.7    9.15  )-----------( 522      ( 11 Aug 2021 07:13 )             28.4     Mean Cell Volume: 79.6 fl (21 @ 07:13)    08-10    133<L>  |  101  |  10  ----------------------------<  110<H>  3.5   |  21<L>  |  0.52    Ca    7.8<L>      10 Aug 2021 06:18                                    8.7    9.15  )-----------( 522      ( 11 Aug 2021 07:13 )             28.4                         8.6    6.90  )-----------( 477      ( 10 Aug 2021 06:20 )             28.2                         8.0    10.36 )-----------( 413      ( 09 Aug 2021 09:11 )             26.8                         7.1    7.82  )-----------( 384      ( 08 Aug 2021 09:05 )             23.5       Imaging: Images reviewed no new images appreciated.         Interval Events: Still feels unwell, >700cc of stool recorded in flexiseal x 24 hours.      Hospital Medications:  acetaminophen   Tablet .. 650 milliGRAM(s) Oral every 6 hours PRN  aluminum hydroxide/magnesium hydroxide/simethicone Suspension 30 milliLiter(s) Oral every 4 hours PRN  clonazePAM  Tablet 0.25 milliGRAM(s) Oral at bedtime  fidaxomicin 200 milliGRAM(s) Oral <User Schedule>  FIRST- Mouthwash  BLM 10 milliLiter(s) Swish and Spit every 8 hours  folic acid 1 milliGRAM(s) Oral daily  guaifenesin/dextromethorphan Oral Liquid 10 milliLiter(s) Oral every 8 hours PRN  lidocaine 2% Gel 1 Application(s) Topical four times a day PRN  methylPREDNISolone sodium succinate Injectable 20 milliGRAM(s) IV Push every 8 hours  morphine  - Injectable 2 milliGRAM(s) IV Push every 8 hours PRN  multivitamin 1 Tablet(s) Oral daily  ondansetron Injectable 4 milliGRAM(s) IV Push every 6 hours PRN  sotalol 120 milliGRAM(s) Oral daily        ROS:   General:  No fevers, chills or night sweats  ENT:  No sore throat or dysphagia  CV:  No pain or palpitations  Resp:  No dyspnea, cough or  wheezing  GI:  as above  Skin:  No rash or edema  Neuro: no weakness   Hematologic: no bleeding  Musculoskeletal: no muscle pain or join pain  Psych: no agitation      PHYSICAL EXAM:   Vital Signs:  Vital Signs Last 24 Hrs  T(C): 36.4 (11 Aug 2021 05:08), Max: 36.6 (10 Aug 2021 09:48)  T(F): 97.5 (11 Aug 2021 05:08), Max: 97.9 (10 Aug 2021 09:48)  HR: 76 (11 Aug 2021 05:08) (64 - 76)  BP: 168/78 (11 Aug 2021 05:08) (116/71 - 168/78)  BP(mean): --  RR: 18 (11 Aug 2021 05:08) (18 - 20)  SpO2: 94% (11 Aug 2021 05:08) (94% - 97%)  Daily     Daily Weight in k.2 (11 Aug 2021 05:08)    GENERAL:  NAD, Appears stated age  HEENT:  NC/AT,  conjunctivae clear and pink, sclera -anicteric  CHEST:  Normal Effort, Breath sounds clear  HEART:  RRR, S1 + S2, no murmurs  ABDOMEN:  Soft, non-distended, normoactive bowel sounds,  no masses, mild discomfort in abd.  EXTREMITIES:  no cyanosis or edema  SKIN:  Warm & Dry. No rash or erythema  NEURO:  Alert, oriented, no focal deficit    LABS:                        8.7    9.15  )-----------( 522      ( 11 Aug 2021 07:13 )             28.4     Mean Cell Volume: 79.6 fl (21 @ 07:13)    08-10    133<L>  |  101  |  10  ----------------------------<  110<H>  3.5   |  21<L>  |  0.52    Ca    7.8<L>      10 Aug 2021 06:18                                    8.7    9.15  )-----------( 522      ( 11 Aug 2021 07:13 )             28.4                         8.6    6.90  )-----------( 477      ( 10 Aug 2021 06:20 )             28.2                         8.0    10.36 )-----------( 413      ( 09 Aug 2021 09:11 )             26.8                         7.1    7.82  )-----------( 384      ( 08 Aug 2021 09:05 )             23.5       Imaging: Images reviewed no new images appreciated.

## 2021-08-11 NOTE — PROGRESS NOTE ADULT - ASSESSMENT
A/p    sever C diff colitis :  -completed vanco/ flagyl   s/p FMT   -started on fidaxomicin 200 mg bid today : as per ID and GI   -supportive care     Hx of Chron's dis :  -now today think it is more of IBD exacerbation and his cdiff is improved on flex sig   -started on Iv steroids   -subjective improvement  ongoing discussion bet family and GI regarding if no improvement then may need surgical consultation

## 2021-08-11 NOTE — PROGRESS NOTE ADULT - ASSESSMENT
82 yo M with Crohn's on treatment, recently cared for at [facility], now presenting with ongoing diarrhea    Antibiotics  Flagyl 7/13 7/21-->25  Cipro 7/13-->14  po Vanco 7/14 -->-->7/29; 8/1-->8/2  (7/28: colonoscopic FMT)  Fidaxomicin 8/2-->      1) Crohns colitis  8/9 Flex sig without pseudomembrances, crohns colitis appears major factor for continued diarrhea,   some decreased diarrhea - subjective improvement  rectal pain related to diarrhea - ?related to diarrhea  solumedrol 20 q 8hours 8/9-->    2) C diff colitis  Fidaxomcin 200 mg twice daily x 5 days 8/2 --> 8/7, currently every other day for additional 20 days days 7 - 25 =  8/9 --> 8/27  po intake encouraged  - S/p colonoscopic FMT 7/28    2) Leukocytosis  - resolved  3) Fevers  afeb since 8/6  4) Para flu 3 viral uri with cough and fevers

## 2021-08-11 NOTE — PROGRESS NOTE ADULT - ATTENDING COMMENTS
Agree with above. Would continue IV steroids - today is day 2 while continuing C diff treatment. If no improvement, we will need to either consider infliximab as the rescue medication (assuming no antibodies), vs surgical evaluation for refractory Crohn's disease.

## 2021-08-11 NOTE — PROGRESS NOTE ADULT - ASSESSMENT
GIDEON HAYES is a 81y Male with GERD on omeprazole daily and Crohn's disease diagnosed about 9 mos prior to admission at outside hospital c/b h/o abscess and left posterior distal anal intersphincteric fistula and presents as a new consult to GI for diarrhea/BRBPR likely 2/2 to CD flare c/b c diff colitis.    # Diarrhea  #C diff colitis  - In setting of C diff colitis. Given patient sx persisted despite antibiotics patient underwent FMT on 7/28/21. Patient had initial respond to it, with decrease in frequency of bowl movement (had 5, compared to 10 or more) but now reports having 10 again. Patient and family attribute this to restarting PO Vancomycin again. It is unclear if ongoing/worsening of symptoms is from untreated Crohn vs failure of FMT. Given worsening of sxs, Vancomycin was stopped in favor of Fidaxomicin  (8/2/21). Given no improvement after 1 week of therapy, repeat Flex sig was done on 8/9/21 which showed deep ulceration. After further discussion with ID of benefits vs risk, decided to start steroids given endoscopic findings are mostly concerning for uncontrolled Crohn.    - previous antibody formation and ineffectiveness of infliximab    # Weight loss - likely 2/2 CD c/b oral aphthous ulcers and diarrhea as he has not been able to tolerate po and will need to ensure adequate hydration.  # GERD   # Parainfluenza infection    #Crohn disease:  -Diagnosed 9 months ago, patient managed by Dr. Juan Luis Tran ([phone]) at [facility]. Since diagnosis, patient initially on Mesalamine and budesonide w/o relief. Patient also failed Methotrexate and ultimately was started on Remicade Sxs improved and helped heal his fistula/abscess but developed antibodies within 6 months and symptoms relapsed. Was planning for Humira outpatient.  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    Recommendations:  -Continue Methylprednisolone 20 mg q8H  -Continue C diff abx per ID.   -Discussed with daughter that if he fails medical therapy, then surgical evaluation would be indicated    Reji Carreno MD  Gastroenterology/Hepatology Fellow  1st option: [phone] (text or call), ONLY available from 7:00 am to 5:00 pm.   **Contact on-call GI fellow via answering service ([phone]) from 5pm-7am AND on weekends/holidays**  2nd option: Available via Microsoft Teams  3rd option: Pager: [phone]    NON-URGENT CONSULTS:  Please email [email or web address]  AT NIGHT AND ON WEEKENDS:  Contact on-call GI fellow via answering service ([phone]) from 5pm-8am AND on weekends/holidays GIDEON HAYES is a 81y Male with GERD on omeprazole daily and Crohn's disease diagnosed about 9 mos prior to admission at outside hospital c/b h/o abscess and left posterior distal anal intersphincteric fistula and presents as a new consult to GI for diarrhea/BRBPR likely 2/2 to CD flare c/b c diff colitis.    # Diarrhea  #C diff colitis  - In setting of C diff colitis. Given patient sx persisted despite antibiotics patient underwent FMT on 7/28/21. Patient had initial respond to it, with decrease in frequency of bowl movement (had 5, compared to 10 or more) but now reports having 10 again. Patient and family attribute this to restarting PO Vancomycin again. It is unclear if ongoing/worsening of symptoms is from untreated Crohn vs failure of FMT. Given worsening of sxs, Vancomycin was stopped in favor of Fidaxomicin  (8/2/21). Given no improvement after 1 week of therapy, repeat Flex sig was done on 8/9/21 which showed deep ulceration. After further discussion with ID of benefits vs risk, decided to start steroids given endoscopic findings are mostly concerning for uncontrolled Crohn.    - previous antibody formation and ineffectiveness of infliximab    # Weight loss - likely 2/2 CD c/b oral aphthous ulcers and diarrhea as he has not been able to tolerate po and will need to ensure adequate hydration.  # GERD   # Parainfluenza infection    #Crohn disease:  -Diagnosed 9 months ago, patient managed by Dr. Juan Luis Tran ([phone]) at [facility]. Since diagnosis, patient initially on Mesalamine and budesonide w/o relief. Patient also failed Methotrexate and ultimately was started on Remicade Sxs improved and helped heal his fistula/abscess but developed antibodies within 6 months and symptoms relapsed. Was planning for Humira outpatient.  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    Recommendations:  -Continue Methylprednisolone 20 mg q8H  -Continue C diff abx per ID.   -Discussed with daughter that if he fails medical therapy, then surgical evaluation would be indicated  -Follow-up infliximab antibody levels    Reji Carreno MD  Gastroenterology/Hepatology Fellow  1st option: [phone] (text or call), ONLY available from 7:00 am to 5:00 pm.   **Contact on-call GI fellow via answering service ([phone]) from 5pm-7am AND on weekends/holidays**  2nd option: Available via Microsoft Teams  3rd option: Pager: [phone]    NON-URGENT CONSULTS:  Please email [email or web address]  AT NIGHT AND ON WEEKENDS:  Contact on-call GI fellow via answering service ([phone]) from 5pm-8am AND on weekends/holidays

## 2021-08-11 NOTE — PROGRESS NOTE ADULT - SUBJECTIVE AND OBJECTIVE BOX
Follow Up:  crohns and Cdiff colitis    Interval History/ROS: better, some decrease in diarrhea - complains of rectal pain    Allergies  No Known Allergies    ANTIMICROBIALS:  fidaxomicin 200 <User Schedule>      OTHER MEDS:  MEDICATIONS  (STANDING):  acetaminophen   Tablet .. 650 every 6 hours PRN  aluminum hydroxide/magnesium hydroxide/simethicone Suspension 30 every 4 hours PRN  clonazePAM  Tablet 0.25 at bedtime  guaifenesin/dextromethorphan Oral Liquid 10 every 8 hours PRN  methylPREDNISolone sodium succinate Injectable 20 every 8 hours  morphine  - Injectable 2 every 8 hours PRN  ondansetron Injectable 4 every 6 hours PRN  sotalol 120 daily    Vital Signs Last 24 Hrs  T(C): 36.4 (11 Aug 2021 16:23), Max: 36.5 (10 Aug 2021 19:29)  T(F): 97.6 (11 Aug 2021 16:23), Max: 97.7 (10 Aug 2021 19:29)  HR: 75 (11 Aug 2021 16:23) (65 - 76)  BP: 153/75 (11 Aug 2021 16:23) (144/79 - 168/78)  BP(mean): --  RR: 18 (11 Aug 2021 16:23) (18 - 19)  SpO2: 96% (11 Aug 2021 16:23) (94% - 97%)    PHYSICAL EXAM:  General: thin, fatigued, chronically ill appearing  Neurology: A&Ox3, nonfocal  Respiratory: Clear to auscultation bilaterally  CV: RRR, S1S2, no murmurs, rubs or gallops  Abdominal: Soft, Non-tender, non-distended  Extremities: No edema  Line Sites: Clear  Skin: No rash                        8.7    9.15  )-----------( 522      ( 11 Aug 2021 07:13 )             28.4     08-11    134<L>  |  103  |  17  ----------------------------<  126<H>  4.2   |  22  |  0.56    Ca    8.0<L>      11 Aug 2021 07:17      MICROBIOLOGY:  .Blood Blood-Peripheral  08-05-21   No Growth Final  --  --      .Blood Blood-Peripheral  08-05-21   No Growth Final  --  --      .Blood Blood-Peripheral  07-30-21   No Growth Final  --  --      .Blood Blood-Peripheral  07-27-21   No Growth Final  --  --      .Blood Blood-Venous  07-24-21   No Growth Final  --  --      .Blood Blood  07-22-21   No Growth Final  --  --      .Stool  07-15-21   No Protozoa seen by trichrome stain  No Helminths or Protozoa seen in formalin concentrate      .Stool Feces, [facility]  07-14-21   No enteric pathogens isolated.  (Stool culture examined for Salmonella,  Shigella, Campylobacter, Aeromonas, Plesiomonas,  Vibrio, E.coli O157 and Yersinia)  --  --      .Urine Clean Catch (Midstream)  07-13-21   <10,000 CFU/mL Normal Urogenital Margo  --  --      .Blood Blood-Peripheral  07-13-21   No Growth Final  --  --          v          RADIOLOGY:    Benjamin Phillips MD; Division of Infectious Disease; Pager: [phone]; nights and weekends: [phone]

## 2021-08-11 NOTE — PROGRESS NOTE ADULT - SUBJECTIVE AND OBJECTIVE BOX
Patient is a 81y old  Male who presents with a chief complaint of diarrhea with blood in it , abd pain and weakness (11 Aug 2021 17:51)      INTERVAL HPI/OVERNIGHT EVENTS: mild subjective improvement , still flexy seal had watery stools   T(C): 36.4 (08-11-21 @ 16:23), Max: 36.5 (08-10-21 @ 19:29)  HR: 75 (08-11-21 @ 16:23) (65 - 76)  BP: 153/75 (08-11-21 @ 16:23) (144/79 - 168/78)  RR: 18 (08-11-21 @ 16:23) (18 - 19)  SpO2: 96% (08-11-21 @ 16:23) (94% - 97%)  Wt(kg): --  I&O's Summary    10 Aug 2021 07:01  -  11 Aug 2021 07:00  --------------------------------------------------------  IN: 1230 mL / OUT: 850 mL / NET: 380 mL        PAST MEDICAL & SURGICAL HISTORY:  No pertinent past medical history    No significant past surgical history        SOCIAL HISTORY  Alcohol:  Tobacco:  Illicit substance use:    FAMILY HISTORY:    REVIEW OF SYSTEMS:  CONSTITUTIONAL: No fever, weight loss, or fatigue  EYES: No eye pain, visual disturbances, or discharge  ENMT:  No difficulty hearing, tinnitus, vertigo; No sinus or throat pain  NECK: No pain or stiffness  RESPIRATORY: No cough, wheezing, chills or hemoptysis; No shortness of breath  CARDIOVASCULAR: No chest pain, palpitations, dizziness, or leg swelling  GASTROINTESTINAL: No abdominal or epigastric pain. No nausea, vomiting, or hematemesis; No diarrhea or constipation. No melena or hematochezia.  GENITOURINARY: No dysuria, frequency, hematuria, or incontinence  NEUROLOGICAL: No headaches, memory loss, loss of strength, numbness, or tremors  SKIN: No itching, burning, rashes, or lesions   LYMPH NODES: No enlarged glands  ENDOCRINE: No heat or cold intolerance; No hair loss  MUSCULOSKELETAL: No joint pain or swelling; No muscle, back, or extremity pain  PSYCHIATRIC: No depression, anxiety, mood swings, or difficulty sleeping  HEME/LYMPH: No easy bruising, or bleeding gums  ALLERY AND IMMUNOLOGIC: No hives or eczema    RADIOLOGY & ADDITIONAL TESTS:    Imaging Personally Reviewed:  [ ] YES  [ ] NO    Consultant(s) Notes Reviewed:  [ ] YES  [ ] NO    PHYSICAL EXAM:  GENERAL: NAD, well-groomed, well-developed  HEAD:  Atraumatic, Normocephalic  EYES: EOMI, PERRLA, conjunctiva and sclera clear  ENMT: No tonsillar erythema, exudates, or enlargement; Moist mucous membranes, Good dentition, No lesions  NECK: Supple, No JVD, Normal thyroid  NERVOUS SYSTEM:  Alert & Oriented X3, Good concentration; Motor Strength 5/5 B/L upper and lower extremities; DTRs 2+ intact and symmetric  CHEST/LUNG: Clear to percussion bilaterally; No rales, rhonchi, wheezing, or rubs  HEART: Regular rate and rhythm; No murmurs, rubs, or gallops  ABDOMEN: Soft, Nontender, Nondistended; Bowel sounds present  EXTREMITIES:  2+ Peripheral Pulses, No clubbing, cyanosis, or edema  LYMPH: No lymphadenopathy noted  SKIN: No rashes or lesions    LABS:                        8.7    9.15  )-----------( 522      ( 11 Aug 2021 07:13 )             28.4     08-11    134<L>  |  103  |  17  ----------------------------<  126<H>  4.2   |  22  |  0.56    Ca    8.0<L>      11 Aug 2021 07:17          CAPILLARY BLOOD GLUCOSE                MEDICATIONS  (STANDING):  clonazePAM  Tablet 0.25 milliGRAM(s) Oral at bedtime  fidaxomicin 200 milliGRAM(s) Oral <User Schedule>  FIRST- Mouthwash  BLM 10 milliLiter(s) Swish and Spit every 8 hours  folic acid 1 milliGRAM(s) Oral daily  methylPREDNISolone sodium succinate Injectable 20 milliGRAM(s) IV Push every 8 hours  multivitamin 1 Tablet(s) Oral daily  sotalol 120 milliGRAM(s) Oral daily    MEDICATIONS  (PRN):  acetaminophen   Tablet .. 650 milliGRAM(s) Oral every 6 hours PRN Temp greater or equal to 38.5C (101.3F), Mild Pain (1 - 3), Moderate Pain (4 - 6)  aluminum hydroxide/magnesium hydroxide/simethicone Suspension 30 milliLiter(s) Oral every 4 hours PRN Dyspepsia  guaifenesin/dextromethorphan Oral Liquid 10 milliLiter(s) Oral every 8 hours PRN Cough  lidocaine 2% Gel 1 Application(s) Topical four times a day PRN pain due to skin excoriation  morphine  - Injectable 2 milliGRAM(s) IV Push every 8 hours PRN Severe Pain (7 - 10)  ondansetron Injectable 4 milliGRAM(s) IV Push every 6 hours PRN Nausea and/or Vomiting      Care Discussed with Consultants/Other Providers [ ] YES  [ ] NO

## 2021-08-11 NOTE — PROGRESS NOTE ADULT - SUBJECTIVE AND OBJECTIVE BOX
Patient is a 81y old  Male who presents with a chief complaint of diarrhea with blood in it , abd pain and weakness (11 Aug 2021 07:55)      INTERVAL HISTORY: pt reports feeling tired     REVIEW OF SYSTEMS:   CONSTITUTIONAL: No weakness  EYES/ENT: No visual changes; No throat pain  Neck: No pain or stiffness  Respiratory: No cough, wheezing, No shortness of breath  CARDIOVASCULAR: no chest pain or palpitations  GASTROINTESTINAL: loose stools   GENITOURINARY: No dysuria, frequency or hematuria  NEUROLOGICAL: No stroke like symptoms  SKIN: No rashes    	  MEDICATIONS:  sotalol 120 milliGRAM(s) Oral daily        PHYSICAL EXAM:  T(C): 36.4 (08-11-21 @ 05:08), Max: 36.5 (08-10-21 @ 19:29)  HR: 76 (08-11-21 @ 05:08) (66 - 76)  BP: 168/78 (08-11-21 @ 05:08) (116/71 - 168/78)  RR: 18 (08-11-21 @ 05:08) (18 - 20)  SpO2: 94% (08-11-21 @ 05:08) (94% - 97%)  Wt(kg): --  I&O's Summary    10 Aug 2021 07:01  -  11 Aug 2021 07:00  --------------------------------------------------------  IN: 1230 mL / OUT: 850 mL / NET: 380 mL          Appearance: In no distress	  HEENT:    PERRL, EOMI	  Cardiovascular:  S1 S2, No JVD  Respiratory: Lungs clear to auscultation	  Gastrointestinal:  Soft, Non-tender, + BS	  Vascularature:  No edema of LE  Psychiatric: Appropriate affect   Neuro: no acute focal deficits                               8.7    9.15  )-----------( 522      ( 11 Aug 2021 07:13 )             28.4     08-11    134<L>  |  103  |  17  ----------------------------<  126<H>  4.2   |  22  |  0.56    Ca    8.0<L>      11 Aug 2021 07:17          Labs personally reviewed      ASSESSMENT/PLAN: 	      ASSESSMENT/PLAN: 	  Problem/Plan - 1:  ·  Problem: CAD (coronary artery disease).  Plan: c/w home meds  We discussed the importance of SAPT with low dose ASA 81mg given CAD  pt denies any current chest pain, SOB or chest pressure.     Problem/Plan -2:  ·  Problem: Colitis.  Plan: Ct shows crohn's  exacerbation   f/u flex sig with biopsies   s/p FMT 7/28  c/w PO vanco   steroids on hold for for now due to concern for C diff colitis.  plan for repeat FMT vs Fidoxomaicin if sx don't improve  GI and ID following    started on Fidoxomaicin and steroids   per GI if fails medical therapy, then surgical evaluation would be indicated      Problem/Plan - 3:  ·  Problem: Afib pt said he was placed on sotalol for afib   -c/w sotalol   -EKG noted. SR   - rate controlled and regular     Problem/Plan - 4:   ·  Problem: GIB (gastrointestinal bleeding).  Plan: bloody diarrhea 2/2 chron's exacerbation   IV fluids   -c-diff positive:   started on Fidoxomaicin    Problem/Plan - 5:  ·  Problem: SCDs  pt ambulatory     Problem/Plan - 6:  ·  Problem: B/L LE edema   - CXR showed mildly enlarged heart   -monitor IVF with I&O  -B/L L E edema improved   -TTE with mod-severe AI, mild AS, preserved EF        Kar WHALEY-BC   Rufus Onofre DO [facility]  Cardiovascular Medicine  [address] [place] [address], [address]  Office: [phone]  Cell: [phone] Patient is a 81y old  Male who presents with a chief complaint of diarrhea with blood in it , abd pain and weakness (11 Aug 2021 07:55)      INTERVAL HISTORY: pt reports feeling tired     REVIEW OF SYSTEMS:   CONSTITUTIONAL: No weakness  EYES/ENT: No visual changes; No throat pain  Neck: No pain or stiffness  Respiratory: No cough, wheezing, No shortness of breath  CARDIOVASCULAR: no chest pain or palpitations  GASTROINTESTINAL: loose stools   GENITOURINARY: No dysuria, frequency or hematuria  NEUROLOGICAL: No stroke like symptoms  SKIN: No rashes    	  MEDICATIONS:  sotalol 120 milliGRAM(s) Oral daily        PHYSICAL EXAM:  T(C): 36.4 (08-11-21 @ 05:08), Max: 36.5 (08-10-21 @ 19:29)  HR: 76 (08-11-21 @ 05:08) (66 - 76)  BP: 168/78 (08-11-21 @ 05:08) (116/71 - 168/78)  RR: 18 (08-11-21 @ 05:08) (18 - 20)  SpO2: 94% (08-11-21 @ 05:08) (94% - 97%)  Wt(kg): --  I&O's Summary    10 Aug 2021 07:01  -  11 Aug 2021 07:00  --------------------------------------------------------  IN: 1230 mL / OUT: 850 mL / NET: 380 mL          Appearance: In no distress	  HEENT:    PERRL, EOMI	  Cardiovascular:  S1 S2, No JVD  Respiratory: Lungs clear to auscultation	  Gastrointestinal:  Soft, Non-tender, + BS	  Vascularature:  No edema of LE  Psychiatric: Appropriate affect   Neuro: no acute focal deficits                               8.7    9.15  )-----------( 522      ( 11 Aug 2021 07:13 )             28.4     08-11    134<L>  |  103  |  17  ----------------------------<  126<H>  4.2   |  22  |  0.56    Ca    8.0<L>      11 Aug 2021 07:17          Labs personally reviewed      ASSESSMENT/PLAN: 	      ASSESSMENT/PLAN: 	  Problem/Plan - 1:  ·  Problem: CAD (coronary artery disease).  Plan: c/w home meds  We discussed the importance of SAPT with low dose ASA 81mg given CAD  pt denies any current chest pain, SOB or chest pressure.       Problem/Plan -2:  ·  Problem: Colitis.  Plan: Ct shows crohn's  exacerbation   f/u flex sig with biopsies   s/p FMT 7/28  c/w PO vanco   steroids on hold for for now due to concern for C diff colitis.  plan for repeat FMT vs Fidoxomaicin if sx don't improve  GI and ID following    started on Fidoxomaicin and steroids   monitor BP slowly increasing   per GI if fails medical therapy, then surgical evaluation would be indicated      Problem/Plan - 3:  ·  Problem: Afib pt said he was placed on sotalol for afib   -c/w sotalol   -EKG noted. SR   - rate controlled and regular     Problem/Plan - 4:   ·  Problem: GIB (gastrointestinal bleeding).  Plan: bloody diarrhea 2/2 chron's exacerbation   IV fluids   -c-diff positive:   started on Fidoxomaicin    Problem/Plan - 5:  ·  Problem: SCDs  pt ambulatory     Problem/Plan - 6:  ·  Problem: B/L LE edema   - CXR showed mildly enlarged heart   -monitor IVF with I&O  -B/L L E edema improved   -TTE with mod-severe AI, mild AS, preserved EF        Kar WHALEY-BC   Rufus Onofre DO [facility]  Cardiovascular Medicine  [address] [place] [address], [address]  Office: [phone]  Cell: [phone]

## 2021-08-12 NOTE — PROGRESS NOTE ADULT - SUBJECTIVE AND OBJECTIVE BOX
Patient is a 81y old  Male who presents with a chief complaint of diarrhea with blood in it , abd pain and weakness (12 Aug 2021 17:52)      INTERVAL HPI/OVERNIGHT EVENTS:  T(C): 36.7 (08-12-21 @ 20:33), Max: 36.7 (08-12-21 @ 04:59)  HR: 90 (08-12-21 @ 20:33) (69 - 93)  BP: 139/82 (08-12-21 @ 20:33) (132/78 - 155/80)  RR: 18 (08-12-21 @ 20:33) (18 - 20)  SpO2: 95% (08-12-21 @ 20:33) (95% - 97%)  Wt(kg): --  I&O's Summary    11 Aug 2021 07:01  -  12 Aug 2021 07:00  --------------------------------------------------------  IN: 100 mL / OUT: 1000 mL / NET: -900 mL    12 Aug 2021 07:01  -  12 Aug 2021 20:35  --------------------------------------------------------  IN: 120 mL / OUT: 725 mL / NET: -605 mL        PAST MEDICAL & SURGICAL HISTORY:  No pertinent past medical history    No significant past surgical history        SOCIAL HISTORY  Alcohol:  Tobacco:  Illicit substance use:    FAMILY HISTORY:    REVIEW OF SYSTEMS:  CONSTITUTIONAL: No fever, weight loss, or fatigue  EYES: No eye pain, visual disturbances, or discharge  ENMT:  No difficulty hearing, tinnitus, vertigo; No sinus or throat pain  NECK: No pain or stiffness  RESPIRATORY: No cough, wheezing, chills or hemoptysis; No shortness of breath  CARDIOVASCULAR: No chest pain, palpitations, dizziness, or leg swelling  GASTROINTESTINAL: No abdominal or epigastric pain. No nausea, vomiting, or hematemesis; No diarrhea or constipation. No melena or hematochezia.  GENITOURINARY: No dysuria, frequency, hematuria, or incontinence  NEUROLOGICAL: No headaches, memory loss, loss of strength, numbness, or tremors  SKIN: No itching, burning, rashes, or lesions   LYMPH NODES: No enlarged glands  ENDOCRINE: No heat or cold intolerance; No hair loss  MUSCULOSKELETAL: No joint pain or swelling; No muscle, back, or extremity pain  PSYCHIATRIC: No depression, anxiety, mood swings, or difficulty sleeping  HEME/LYMPH: No easy bruising, or bleeding gums  ALLERY AND IMMUNOLOGIC: No hives or eczema    RADIOLOGY & ADDITIONAL TESTS:    Imaging Personally Reviewed:  [ ] YES  [ ] NO    Consultant(s) Notes Reviewed:  [ ] YES  [ ] NO    PHYSICAL EXAM:  GENERAL: NAD, well-groomed, well-developed  HEAD:  Atraumatic, Normocephalic  EYES: EOMI, PERRLA, conjunctiva and sclera clear  ENMT: No tonsillar erythema, exudates, or enlargement; Moist mucous membranes, Good dentition, No lesions  NECK: Supple, No JVD, Normal thyroid  NERVOUS SYSTEM:  Alert & Oriented X3, Good concentration; Motor Strength 5/5 B/L upper and lower extremities; DTRs 2+ intact and symmetric  CHEST/LUNG: Clear to percussion bilaterally; No rales, rhonchi, wheezing, or rubs  HEART: Regular rate and rhythm; No murmurs, rubs, or gallops  ABDOMEN: Soft, Nontender, Nondistended; Bowel sounds present  EXTREMITIES:  2+ Peripheral Pulses, No clubbing, cyanosis, or edema  LYMPH: No lymphadenopathy noted  SKIN: No rashes or lesions    LABS:                        8.5    14.12 )-----------( 513      ( 12 Aug 2021 06:07 )             28.1     08-12    131<L>  |  99  |  14  ----------------------------<  124<H>  3.6   |  24  |  0.46<L>    Ca    8.1<L>      12 Aug 2021 06:06          CAPILLARY BLOOD GLUCOSE                MEDICATIONS  (STANDING):  ascorbic acid 500 milliGRAM(s) Oral daily  clonazePAM  Tablet 0.25 milliGRAM(s) Oral at bedtime  fidaxomicin 200 milliGRAM(s) Oral <User Schedule>  FIRST- Mouthwash  BLM 10 milliLiter(s) Swish and Spit every 8 hours  folic acid 1 milliGRAM(s) Oral daily  methylPREDNISolone sodium succinate Injectable 20 milliGRAM(s) IV Push every 8 hours  multivitamin 1 Tablet(s) Oral daily  multivitamin 1 Tablet(s) Oral daily  sotalol 120 milliGRAM(s) Oral daily    MEDICATIONS  (PRN):  acetaminophen   Tablet .. 650 milliGRAM(s) Oral every 6 hours PRN Temp greater or equal to 38.5C (101.3F), Mild Pain (1 - 3), Moderate Pain (4 - 6)  aluminum hydroxide/magnesium hydroxide/simethicone Suspension 30 milliLiter(s) Oral every 4 hours PRN Dyspepsia  guaifenesin/dextromethorphan Oral Liquid 10 milliLiter(s) Oral every 8 hours PRN Cough  lidocaine 2% Gel 1 Application(s) Topical four times a day PRN pain due to skin excoriation  morphine  - Injectable 2 milliGRAM(s) IV Push every 8 hours PRN Severe Pain (7 - 10)  ondansetron Injectable 4 milliGRAM(s) IV Push every 6 hours PRN Nausea and/or Vomiting      Care Discussed with Consultants/Other Providers [ ] YES  [ ] NO Patient is a 81y old  Male who presents with a chief complaint of diarrhea with blood in it , abd pain and weakness (12 Aug 2021 17:52)      INTERVAL HPI/OVERNIGHT EVENTS: still feeling "lousy ", rectal tube still have watery stools   T(C): 36.7 (08-12-21 @ 20:33), Max: 36.7 (08-12-21 @ 04:59)  HR: 90 (08-12-21 @ 20:33) (69 - 93)  BP: 139/82 (08-12-21 @ 20:33) (132/78 - 155/80)  RR: 18 (08-12-21 @ 20:33) (18 - 20)  SpO2: 95% (08-12-21 @ 20:33) (95% - 97%)  Wt(kg): --  I&O's Summary    11 Aug 2021 07:01  -  12 Aug 2021 07:00  --------------------------------------------------------  IN: 100 mL / OUT: 1000 mL / NET: -900 mL    12 Aug 2021 07:01  -  12 Aug 2021 20:35  --------------------------------------------------------  IN: 120 mL / OUT: 725 mL / NET: -605 mL        PAST MEDICAL & SURGICAL HISTORY:  No pertinent past medical history    No significant past surgical history        SOCIAL HISTORY  Alcohol:  Tobacco:  Illicit substance use:    FAMILY HISTORY:    REVIEW OF SYSTEMS:  CONSTITUTIONAL: No fever, weight loss, or fatigue  EYES: No eye pain, visual disturbances, or discharge  ENMT:  No difficulty hearing, tinnitus, vertigo; No sinus or throat pain  NECK: No pain or stiffness  RESPIRATORY: No cough, wheezing, chills or hemoptysis; No shortness of breath  CARDIOVASCULAR: No chest pain, palpitations, dizziness, or leg swelling  GASTROINTESTINAL: No abdominal or epigastric pain. No nausea, vomiting, or hematemesis; No diarrhea or constipation. No melena or hematochezia.  GENITOURINARY: No dysuria, frequency, hematuria, or incontinence  NEUROLOGICAL: No headaches, memory loss, loss of strength, numbness, or tremors  SKIN: No itching, burning, rashes, or lesions   LYMPH NODES: No enlarged glands  ENDOCRINE: No heat or cold intolerance; No hair loss  MUSCULOSKELETAL: No joint pain or swelling; No muscle, back, or extremity pain  PSYCHIATRIC: No depression, anxiety, mood swings, or difficulty sleeping  HEME/LYMPH: No easy bruising, or bleeding gums  ALLERY AND IMMUNOLOGIC: No hives or eczema    RADIOLOGY & ADDITIONAL TESTS:    Imaging Personally Reviewed:  [ ] YES  [ ] NO    Consultant(s) Notes Reviewed:  [ ] YES  [ ] NO    PHYSICAL EXAM:  GENERAL: NAD, well-groomed, well-developed  HEAD:  Atraumatic, Normocephalic  EYES: EOMI, PERRLA, conjunctiva and sclera clear  ENMT: No tonsillar erythema, exudates, or enlargement; Moist mucous membranes, Good dentition, No lesions  NECK: Supple, No JVD, Normal thyroid  NERVOUS SYSTEM:  Alert & Oriented X3, Good concentration; Motor Strength 5/5 B/L upper and lower extremities; DTRs 2+ intact and symmetric  CHEST/LUNG: Clear to percussion bilaterally; No rales, rhonchi, wheezing, or rubs  HEART: Regular rate and rhythm; No murmurs, rubs, or gallops  ABDOMEN: Soft, Nontender, Nondistended; Bowel sounds present  EXTREMITIES:  2+ Peripheral Pulses, No clubbing, cyanosis, or edema  LYMPH: No lymphadenopathy noted  SKIN: No rashes or lesions    LABS:                        8.5    14.12 )-----------( 513      ( 12 Aug 2021 06:07 )             28.1     08-12    131<L>  |  99  |  14  ----------------------------<  124<H>  3.6   |  24  |  0.46<L>    Ca    8.1<L>      12 Aug 2021 06:06          CAPILLARY BLOOD GLUCOSE                MEDICATIONS  (STANDING):  ascorbic acid 500 milliGRAM(s) Oral daily  clonazePAM  Tablet 0.25 milliGRAM(s) Oral at bedtime  fidaxomicin 200 milliGRAM(s) Oral <User Schedule>  FIRST- Mouthwash  BLM 10 milliLiter(s) Swish and Spit every 8 hours  folic acid 1 milliGRAM(s) Oral daily  methylPREDNISolone sodium succinate Injectable 20 milliGRAM(s) IV Push every 8 hours  multivitamin 1 Tablet(s) Oral daily  multivitamin 1 Tablet(s) Oral daily  sotalol 120 milliGRAM(s) Oral daily    MEDICATIONS  (PRN):  acetaminophen   Tablet .. 650 milliGRAM(s) Oral every 6 hours PRN Temp greater or equal to 38.5C (101.3F), Mild Pain (1 - 3), Moderate Pain (4 - 6)  aluminum hydroxide/magnesium hydroxide/simethicone Suspension 30 milliLiter(s) Oral every 4 hours PRN Dyspepsia  guaifenesin/dextromethorphan Oral Liquid 10 milliLiter(s) Oral every 8 hours PRN Cough  lidocaine 2% Gel 1 Application(s) Topical four times a day PRN pain due to skin excoriation  morphine  - Injectable 2 milliGRAM(s) IV Push every 8 hours PRN Severe Pain (7 - 10)  ondansetron Injectable 4 milliGRAM(s) IV Push every 6 hours PRN Nausea and/or Vomiting      Care Discussed with Consultants/Other Providers [ ] YES  [ ] NO

## 2021-08-12 NOTE — CHART NOTE - NSCHARTNOTEFT_GEN_A_CORE
Nutrition Follow Up Note  Patient seen for: malnutrition follow up on 3 [facility]    Chart reviewed, events noted.  IMM: fecal trasplant , Diarrhea ; C.diff pos; now on PO fdaxamic,  IBS On IV solumedrol     Source: [x] Patient-limited-pt lethargic       [x] EMR        [] RN        [x] Family at bedside -daughter      [] Other:    -If unable to interview patient: [] Trach/Vent/BiPAP  [] Disoriented/confused/inappropriate to interview    Diet Order:   Diet, Low Fiber:   Banatrol TF     Qty per Day:  1 pkt po q8h  Liquid Protein Supplement     Qty per Day:  3  Supplement Feeding Modality:  Oral  Ensure Enlive Cans or Servings Per Day:  2       Frequency:  Daily  Probiotic Yogurt/Smoothie Cans or Servings Per Day:  2       Frequency:  Daily (21)    - Is current order appropriate/adequate? [] Yes  [x]  No:   pt not consuming all supplements provided    - PO intake meals :   [] >75%  Adequate    [] 50-75%  Fair       [x] <50%  Poor  - PO intake of supplements if pt receiving: []>75% [x]50% []25%   as per   []flow sheet  []patient  [x]family/aide  []PCA  []Nurse  []RD observation    - Nutrition-related concerns: pt has not been able to eat solid food due to pain/ Crohn's  as per daughter at bedside. RD discussed appetite stimulant and well as feeding tube. Daughter would consider both.  Daughter is concerned due to pt and brother not being on board with tube feed recommendation.  RD discussed with PA. RD suggested that daughter speak with providers with regard to appetite stimulant/tube feeds.     pt seen by SLP []Yes [x]No    GI:  Last BM _-fecal incontinence -rectal tube __.   Bowel Regimen? [] Yes   [x] No      Weights:   Daily Weight in k.4 (), Weight in k.2 (), Weight in k.9 (), Weight in k.8 (), Weight in k.7 (), Weight in k.8 ()    Nutritionally Pertinent MEDICATIONS  (STANDING):  fidaxomicin  folic acid  methylPREDNISolone sodium succinate Injectable  multivitamin  sotalol    Pertinent Labs:  @ 06:06: Na 131<L>, BUN 14, Cr 0.46<L>, <H>, K+ 3.6, Phos --, Mg --, Alk Phos --, ALT/SGPT --, AST/SGOT --, HbA1c --            Pressure Injuries as per nursing documentation: sacrum stage 2  Edema: +1 right foot, left foot, right ankle, left ankle, right hand, left hand    Estimated Needs:   [x] no change since previous assessment  [] recalculated:     Previous Nutrition Diagnosis: severe malnutrition  Nutrition Diagnosis is: [x] ongoing  [] resolved [] not applicable     New Nutrition Diagnosis: [] Not applicable  Increased nutrient needs (calories/protein)  related to increased demand  as evidenced by stage 2 sacrum    Nutrition Care Plan:  [x] In Progress  [] Achieved  [] Not applicable    Nutrition Interventions:     Education Provided:       [x] Yes:  [] No:   daughter was educated on the importance of supplements to increase calorie and protein intake in light of RD's nutritional findings [x]Yes []N/A         Recommendations:         [x] Continue current diet order     [] Change diet to:      [] change oral nutrition supplement:  continue ensure enlive x 2 daily, discontinue banatrol since pt is not consuming, decrease Danactive to 1 x daily at lunch, decrease liquid protein supplement (watermelon) to 1 x daily. add David 2 x daily.         [x] Add micronutrient supplementation: Vit C daily     [x] Continue current micronutrient supplementation: multivitamin       [x]Discussed recommendations with provider     [x] Needed to escalate to provider     [x]Placed pending verification with NP/PA      []Placed pending verification with Team      []Placed sticker (malnutrition/BMI/underweight)     []Recommend swallow evaluation     [] monitor need for diet ed reinforcement     [] Other:     Monitoring and Evaluation:   Continue to monitor nutritional intake, tolerance to diet prescription, weights, labs, skin integrity      RD remains available upon request and will follow up per protocol  Almita Mena MA, RD, CDN #[phone]

## 2021-08-12 NOTE — PROGRESS NOTE ADULT - ASSESSMENT
80 yo M with Crohn's on treatment, recently cared for at [facility], now presenting with ongoing diarrhea    Antibiotics  Flagyl 7/13 7/21-->25  Cipro 7/13-->14  po Vanco 7/14 -->-->7/29; 8/1-->8/2  (7/28: colonoscopic FMT)  Fidaxomicin 8/2-->      1) Crohns colitis  8/9 Flex sig without pseudomembrances, crohns colitis appears major factor for continued diarrhea,   some decreased diarrhea - subjective improvement  rectal pain related to diarrhea - ?related to diarrhea  surgery being discussed if patient does not respond to steroids  solumedrol 20 q 8hours 8/9-->    2) C diff colitis  Fidaxomcin 200 mg twice daily x 5 days 8/2 --> 8/7, currently every other day for additional 20 days days 7 - 25 =  8/9 --> 8/27  po intake encouraged  - S/p colonoscopic FMT 7/28    3) Leukocytosis  ? releated to steroids    4) Para flu 3 viral uri with cough and fevers    5) malnourished state

## 2021-08-12 NOTE — PROGRESS NOTE ADULT - ATTENDING COMMENTS
DOS 8/12/21 Pt care and plan discussed and reviewed with NP. Plan as outlined above edited by me to reflect our discussion.

## 2021-08-12 NOTE — PROGRESS NOTE ADULT - ATTENDING COMMENTS
Patient seen and examined, agree with above. No change subjectively, today is day 3 of steroids. Explained again to him and his family at bedside that we are awaiting infliximab antibody levels. If no change in status by the weekend, can still consider dose of infliximab despite previous antibodies as that's the only other rescue medication we have for IBD, and otherwise, he will need to consider surgical evaluation for colectomy. All questions answered. Continue IV Solumedrol, follow-up infliximab antibody levels. Biopsies from most recent flex sig is negative for CMV.

## 2021-08-12 NOTE — PROGRESS NOTE ADULT - SUBJECTIVE AND OBJECTIVE BOX
Interval Events:   No improvement, had 850 ml of stool in last 24 hrs and 450 in the am. Feels lousy and weak.     Hospital Medications:  acetaminophen   Tablet .. 650 milliGRAM(s) Oral every 6 hours PRN  aluminum hydroxide/magnesium hydroxide/simethicone Suspension 30 milliLiter(s) Oral every 4 hours PRN  ascorbic acid 500 milliGRAM(s) Oral daily  clonazePAM  Tablet 0.25 milliGRAM(s) Oral at bedtime  fidaxomicin 200 milliGRAM(s) Oral <User Schedule>  FIRST- Mouthwash  BLM 10 milliLiter(s) Swish and Spit every 8 hours  folic acid 1 milliGRAM(s) Oral daily  guaifenesin/dextromethorphan Oral Liquid 10 milliLiter(s) Oral every 8 hours PRN  lidocaine 2% Gel 1 Application(s) Topical four times a day PRN  methylPREDNISolone sodium succinate Injectable 20 milliGRAM(s) IV Push every 8 hours  morphine  - Injectable 2 milliGRAM(s) IV Push every 8 hours PRN  multivitamin 1 Tablet(s) Oral daily  multivitamin 1 Tablet(s) Oral daily  ondansetron Injectable 4 milliGRAM(s) IV Push every 6 hours PRN  sotalol 120 milliGRAM(s) Oral daily        ROS:   as noted above.    PHYSICAL EXAM:   Vital Signs:  Vital Signs Last 24 Hrs  T(C): 36.7 (12 Aug 2021 09:32), Max: 36.7 (12 Aug 2021 04:59)  T(F): 98 (12 Aug 2021 09:32), Max: 98.1 (12 Aug 2021 04:59)  HR: 77 (12 Aug 2021 09:32) (69 - 88)  BP: 132/78 (12 Aug 2021 09:32) (132/78 - 155/80)  BP(mean): --  RR: 19 (12 Aug 2021 09:32) (18 - 20)  SpO2: 96% (12 Aug 2021 09:32) (94% - 96%)  Daily     Daily Weight in k.4 (12 Aug 2021 04:59)    GENERAL:  NAD, Appears older than stated age, chacectic  HEENT:  NC/AT,  conjunctivae clear and pink, sclera -anicteric  CHEST:  Normal Effort, Breath sounds clear  HEART:  RRR, S1 + S2, no murmurs  ABDOMEN:  mild tenderness to palpation in abd.  EXTREMITIES:  no cyanosis or edema  SKIN:  Warm & Dry. No rash or erythema  NEURO:  Alert, oriented, no focal deficit    LABS:                        8.5    14.12 )-----------( 513      ( 12 Aug 2021 06:07 )             28.1     Mean Cell Volume: 80.1 fl (-21 @ 06:07)    08    131<L>  |  99  |  14  ----------------------------<  124<H>  3.6   |  24  |  0.46<L>    Ca    8.1<L>      12 Aug 2021 06:06                                    8.5    14.12 )-----------( 513      ( 12 Aug 2021 06:07 )             28.1                         8.7    9.15  )-----------( 522      ( 11 Aug 2021 07:13 )             28.4                         8.6    6.90  )-----------( 477      ( 10 Aug 2021 06:20 )             28.2       Imaging: Images reviewed no new images appreciated.

## 2021-08-12 NOTE — PROGRESS NOTE ADULT - SUBJECTIVE AND OBJECTIVE BOX
Patient is a 81y old  Male who presents with a chief complaint of diarrhea with blood in it , abd pain and weakness (11 Aug 2021 18:45)      INTERVAL HISTORY: feels weak. daughter at bedside     REVIEW OF SYSTEMS:   CONSTITUTIONAL: No weakness  EYES/ENT: No visual changes; No throat pain  Neck: No pain or stiffness  Respiratory: No cough, wheezing, No shortness of breath  CARDIOVASCULAR: no chest pain or palpitations  GASTROINTESTINAL: No abdominal pain, no nausea, vomiting or hematemesis + diarrhea   GENITOURINARY: No dysuria, frequency or hematuria  NEUROLOGICAL: No stroke like symptoms  SKIN: No rashes    	  MEDICATIONS:  sotalol 120 milliGRAM(s) Oral daily        PHYSICAL EXAM:  T(C): 36.7 (08-12-21 @ 09:32), Max: 36.7 (08-12-21 @ 04:59)  HR: 77 (08-12-21 @ 09:32) (69 - 88)  BP: 132/78 (08-12-21 @ 09:32) (132/78 - 161/85)  RR: 19 (08-12-21 @ 09:32) (18 - 20)  SpO2: 96% (08-12-21 @ 09:32) (94% - 96%)  Wt(kg): --  I&O's Summary    11 Aug 2021 07:01  -  12 Aug 2021 07:00  --------------------------------------------------------  IN: 100 mL / OUT: 1000 mL / NET: -900 mL    12 Aug 2021 07:01  -  12 Aug 2021 12:52  --------------------------------------------------------  IN: 120 mL / OUT: 725 mL / NET: -605 mL    Appearance: In no distress	  HEENT:    PERRL, EOMI	  Cardiovascular:  S1 S2, No JVD  Respiratory: Lungs clear to auscultation	  Gastrointestinal:  Soft, Non-tender, + BS	  Vascularature:  No edema of LE  Psychiatric: Appropriate affect   Neuro: no acute focal deficits                         8.5    14.12 )-----------( 513      ( 12 Aug 2021 06:07 )             28.1     08-12    131<L>  |  99  |  14  ----------------------------<  124<H>  3.6   |  24  |  0.46<L>    Ca    8.1<L>      12 Aug 2021 06:06    Labs personally reviewed    ASSESSMENT/PLAN: 	  Problem/Plan - 1:  ·  Problem: CAD (coronary artery disease).  Plan: c/w home meds  We discussed the importance of SAPT with low dose ASA 81mg given CAD  pt denies any current chest pain, SOB or chest pressure.       Problem/Plan -2:  ·  Problem: Colitis.  Plan: Ct shows crohn's  exacerbation   f/u flex sig with biopsies   s/p FMT 7/28  plan for repeat FMT vs Fidoxomaicin if sx don't improve  started on Fidoxomaicin and steroids   per GI if fails medical therapy, then surgical evaluation would be indicated    Problem/Plan - 3:  ·  Problem: Afib pt said he was placed on sotalol for afib   -c/w sotalol   -EKG noted. SR   - rate controlled and regular     Problem/Plan - 4:   ·  Problem: GIB (gastrointestinal bleeding).  Plan: bloody diarrhea 2/2 chron's exacerbation   IV fluids   -c-diff positive:   started on Fidoxomaicin    Problem/Plan - 5:  ·  Problem: SCDs  pt ambulatory     Problem/Plan - 6:  ·  Problem: B/L LE edema   - CXR showed mildly enlarged heart   -monitor IVF with I&O  -B/L L E edema improved   -TTE with mod-severe AI, mild AS, preserved EF      Sanam Heath ANP-C  Rufus Onofre DO [facility]  Cardiovascular Medicine  [address], [address]  Office: [phone]  Cell: [phone] Patient is a 81y old  Male who presents with a chief complaint of diarrhea with blood in it , abd pain and weakness (11 Aug 2021 18:45)      INTERVAL HISTORY: feels weak. daughter at bedside     REVIEW OF SYSTEMS:   CONSTITUTIONAL: No weakness  EYES/ENT: No visual changes; No throat pain  Neck: No pain or stiffness  Respiratory: No cough, wheezing, No shortness of breath  CARDIOVASCULAR: no chest pain or palpitations  GASTROINTESTINAL: No abdominal pain, no nausea, vomiting or hematemesis + diarrhea   GENITOURINARY: No dysuria, frequency or hematuria  NEUROLOGICAL: No stroke like symptoms  SKIN: No rashes    	  MEDICATIONS:  sotalol 120 milliGRAM(s) Oral daily        PHYSICAL EXAM:  T(C): 36.7 (08-12-21 @ 09:32), Max: 36.7 (08-12-21 @ 04:59)  HR: 77 (08-12-21 @ 09:32) (69 - 88)  BP: 132/78 (08-12-21 @ 09:32) (132/78 - 161/85)  RR: 19 (08-12-21 @ 09:32) (18 - 20)  SpO2: 96% (08-12-21 @ 09:32) (94% - 96%)  Wt(kg): --  I&O's Summary    11 Aug 2021 07:01  -  12 Aug 2021 07:00  --------------------------------------------------------  IN: 100 mL / OUT: 1000 mL / NET: -900 mL    12 Aug 2021 07:01  -  12 Aug 2021 12:52  --------------------------------------------------------  IN: 120 mL / OUT: 725 mL / NET: -605 mL    Appearance: In no distress	  HEENT:    PERRL, EOMI	  Cardiovascular:  S1 S2, No JVD  Respiratory: Lungs clear to auscultation	  Gastrointestinal:  Soft, Non-tender, + BS	  Vascularature:  No edema of LE  Psychiatric: Appropriate affect   Neuro: no acute focal deficits                         8.5    14.12 )-----------( 513      ( 12 Aug 2021 06:07 )             28.1     08-12    131<L>  |  99  |  14  ----------------------------<  124<H>  3.6   |  24  |  0.46<L>    Ca    8.1<L>      12 Aug 2021 06:06    Labs personally reviewed    ASSESSMENT/PLAN: 	  Problem/Plan - 1:  ·  Problem: CAD (coronary artery disease).  Plan: c/w home meds  We discussed the importance of SAPT with low dose ASA 81mg given CAD  pt denies any current chest pain, SOB or chest pressure.       Problem/Plan -2:  ·  Problem: Colitis.  Plan: Ct shows crohn's  exacerbation   f/u flex sig with biopsies   s/p FMT 7/28  plan for repeat FMT vs Fidoxomaicin if sx don't improve  f/p flex sig on 8/9- no evidence of pseudomembranous   started on Fidoxomaicin and steroids   per GI if fails medical therapy, then surgical evaluation would be indicated  f/u ID/ GI reccs     Problem/Plan - 3:  ·  Problem: Afib pt said he was placed on sotalol for afib   -c/w sotalol   -EKG noted. SR   - rate controlled and regular     Problem/Plan - 4:   ·  Problem: GIB (gastrointestinal bleeding).  Plan: bloody diarrhea 2/2 chron's exacerbation   IV fluids   -c-diff positive:   started on Fidoxomaicin    Problem/Plan - 5:  ·  Problem: SCDs  pt ambulatory     Problem/Plan - 6:  ·  Problem: B/L LE edema   - CXR showed mildly enlarged heart   -monitor IVF with I&O  -B/L L E edema improved   -TTE with mod-severe AI, mild AS, preserved EF      Sanam Heath ANP-C  Rufus Onofre DO [facility]  Cardiovascular Medicine  [address] [place] [address], [address]  Office: [phone]  Cell: [phone]

## 2021-08-12 NOTE — PROGRESS NOTE ADULT - ASSESSMENT
A/p    sever C diff colitis :  -completed vanco/ flagyl   s/p FMT   -started on fidaxomicin 200 mg bid today : as per ID and GI   -supportive care     Hx of Chron's dis :  -now today think it is more of IBD exacerbation and his cdiff is improved on flex sig   -started on Iv steroids   ongoing discussion bet family and GI regarding if no improvement then may need surgical consultation

## 2021-08-12 NOTE — PROGRESS NOTE ADULT - ASSESSMENT
GIDEON HAYES is a 81y Male with GERD on omeprazole daily and Crohn's disease diagnosed about 9 mos prior to admission at outside hospital c/b h/o abscess and left posterior distal anal intersphincteric fistula and presents as a new consult to GI for diarrhea/BRBPR likely 2/2 to CD flare c/b c diff colitis.    # Diarrhea  #C diff colitis  - In setting of C diff colitis. Given patient sx persisted despite antibiotics patient underwent FMT on 7/28/21. Patient had initial respond to it, with decrease in frequency of bowl movement (had 5, compared to 10 or more) but now reports having 10 again. Patient and family attribute this to restarting PO Vancomycin again. It is unclear if ongoing/worsening of symptoms is from untreated Crohn vs failure of FMT. Given worsening of sxs, Vancomycin was stopped in favor of Fidaxomicin  (8/2/21). Given no improvement after 1 week of therapy, repeat Flex sig was done on 8/9/21 which showed deep ulceration. After further discussion with ID of benefits vs risk, decided to start steroids given endoscopic findings are mostly concerning for uncontrolled Crohn.    - previous antibody formation and ineffectiveness of infliximab    # Weight loss - likely 2/2 CD c/b oral aphthous ulcers and diarrhea as he has not been able to tolerate po and will need to ensure adequate hydration.  # GERD   # Parainfluenza infection    #Crohn disease:  -Diagnosed 9 months ago, patient managed by Dr. Juan Luis Tran ([phone]) at [facility]. Since diagnosis, patient initially on Mesalamine and budesonide w/o relief. Patient also failed Methotrexate and ultimately was started on Remicade Sxs improved and helped heal his fistula/abscess but developed antibodies within 6 months and symptoms relapsed. Was planning for Humira outpatient.  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    Recommendations:  -Continue to monitor  -Continue Methylprednisolone 20 mg q8H  -Continue C diff abx per ID.   -Discussed with daughter that if he fails medical therapy, then surgical evaluation would be indicated.   -Follow-up infliximab antibody levels    Reji Carreno MD  Gastroenterology/Hepatology Fellow  1st option: [phone] (text or call), ONLY available from 7:00 am to 5:00 pm.   **Contact on-call GI fellow via answering service ([phone]) from 5pm-7am AND on weekends/holidays**  2nd option: Available via Microsoft Teams  3rd option: Pager: [phone]    NON-URGENT CONSULTS:  Please email [email or web address]  AT NIGHT AND ON WEEKENDS:  Contact on-call GI fellow via answering service ([phone]) from 5pm-8am AND on weekends/holidays   GIDEON HAYES is a 81y Male with GERD on omeprazole daily and Crohn's disease diagnosed about 9 mos prior to admission at outside hospital c/b h/o abscess and left posterior distal anal intersphincteric fistula and presents as a new consult to GI for diarrhea/BRBPR likely 2/2 to CD flare c/b c diff colitis.    # Diarrhea  #C diff colitis  - In setting of C diff colitis. Given patient sx persisted despite antibiotics patient underwent FMT on 7/28/21. Patient had initial respond to it, with decrease in frequency of bowl movement (had 5, compared to 10 or more) but now reports having 10 again. Patient and family attribute this to restarting PO Vancomycin again. It is unclear if ongoing/worsening of symptoms is from untreated Crohn vs failure of FMT. Given worsening of sxs, Vancomycin was stopped in favor of Fidaxomicin  (8/2/21). Given no improvement after 1 week of therapy, repeat Flex sig was done on 8/9/21 which showed deep ulceration. After further discussion with ID of benefits vs risk, decided to start steroids given endoscopic findings are mostly concerning for uncontrolled Crohn.    - previous antibody formation and ineffectiveness of infliximab    # Weight loss - likely 2/2 CD c/b oral aphthous ulcers and diarrhea as he has not been able to tolerate po and will need to ensure adequate hydration.  # GERD   # Parainfluenza infection    #Crohn disease:  -Diagnosed 9 months ago, patient managed by Dr. Juan Luis Tran ([phone]) at [facility]. Since diagnosis, patient initially on Mesalamine and budesonide w/o relief. Patient also failed Methotrexate and ultimately was started on Remicade Sxs improved and helped heal his fistula/abscess but developed antibodies within 6 months and symptoms relapsed. Was planning for Humira outpatient.  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    Recommendations:  -Continue to monitor  -Continue Methylprednisolone 20 mg q8H  -Continue C diff abx per ID.   -Discussed with daughter that if he fails medical therapy, then surgical evaluation would be indicated.   -Follow-up infliximab antibody levels; will consider dose of infliximab at higher dose, if he fails steroids despite previous history of antibodies    Reji Carreno MD  Gastroenterology/Hepatology Fellow  1st option: [phone] (text or call), ONLY available from 7:00 am to 5:00 pm.   **Contact on-call GI fellow via answering service ([phone]) from 5pm-7am AND on weekends/holidays**  2nd option: Available via Microsoft Teams  3rd option: Pager: [phone]    NON-URGENT CONSULTS:  Please email [email or web address]  AT NIGHT AND ON WEEKENDS:  Contact on-call GI fellow via answering service ([phone]) from 5pm-8am AND on weekends/holidays

## 2021-08-12 NOTE — PROGRESS NOTE ADULT - SUBJECTIVE AND OBJECTIVE BOX
Follow Up:  diarrhea    Interval History/ROS: sleeping after morphine, son and daughter at bedside, daughter tearful about patients continued diarrhea    Allergies  No Known Allergies    ANTIMICROBIALS:  fidaxomicin 200 <User Schedule>    OTHER MEDS:  MEDICATIONS  (STANDING):  acetaminophen   Tablet .. 650 every 6 hours PRN  aluminum hydroxide/magnesium hydroxide/simethicone Suspension 30 every 4 hours PRN  clonazePAM  Tablet 0.25 at bedtime  guaifenesin/dextromethorphan Oral Liquid 10 every 8 hours PRN  methylPREDNISolone sodium succinate Injectable 20 every 8 hours  morphine  - Injectable 2 every 8 hours PRN  ondansetron Injectable 4 every 6 hours PRN  sotalol 120 daily    Vital Signs Last 24 Hrs  T(C): 36.7 (12 Aug 2021 16:32), Max: 36.7 (12 Aug 2021 04:59)  T(F): 98 (12 Aug 2021 16:32), Max: 98.1 (12 Aug 2021 04:59)  HR: 93 (12 Aug 2021 16:32) (69 - 93)  BP: 143/76 (12 Aug 2021 16:32) (132/78 - 155/80)  BP(mean): --  RR: 18 (12 Aug 2021 16:32) (18 - 20)  SpO2: 97% (12 Aug 2021 16:32) (94% - 97%)    PHYSICAL EXAM:  General: thin NAD, Non-toxic  Neurology: Asleep  Respiratory: Clear to auscultation bilaterally  Abdominal:  non-distended  Extremities: No edema,  Line Sites: Clear  Skin: No rash                        8.5    14.12 )-----------( 513      ( 12 Aug 2021 06:07 )             28.1   WBC Count: 14.12 (08-12 @ 06:07)  WBC Count: 9.15 (08-11 @ 07:13)  WBC Count: 6.90 (08-10 @ 06:20)  WBC Count: 10.36 (08-09 @ 09:11)  WBC Count: 7.82 (08-08 @ 09:05)  WBC Count: 8.11 (08-08 @ 07:34)        08-12    131<L>  |  99  |  14  ----------------------------<  124<H>  3.6   |  24  |  0.46<L>    Ca    8.1<L>      12 Aug 2021 06:06      MICROBIOLOGY:  .Blood Blood-Peripheral  08-05-21   No Growth Final  --  --      .Blood Blood-Peripheral  08-05-21   No Growth Final  --  --      .Blood Blood-Peripheral  07-30-21   No Growth Final  --  --      .Blood Blood-Peripheral  07-27-21   No Growth Final  --  --      .Blood Blood-Venous  07-24-21   No Growth Final  --  --      .Blood Blood  07-22-21   No Growth Final  --  --      .Stool  07-15-21   No Protozoa seen by trichrome stain  No Helminths or Protozoa seen in formalin concentrate  performed by iodine stain    .Stool Feces, [facility]  07-14-21   No enteric pathogens isolated.  (Stool culture examined for Salmonella,  Shigella, Campylobacter, Aeromonas, Plesiomonas,  Vibrio, E.coli O157 and Yersinia)  --  --      .Urine Clean Catch (Midstream)  07-13-21   <10,000 CFU/mL Normal Urogenital Margo  --  --      RADIOLOGY:    Benjamin Phillips MD; Division of Infectious Disease; Pager: [phone]; nights and weekends: [phone]

## 2021-08-13 NOTE — PROGRESS NOTE ADULT - SUBJECTIVE AND OBJECTIVE BOX
Patient is a 81y old  Male who presents with a chief complaint of diarrhea with blood in it , abd pain and weakness (13 Aug 2021 07:59)      INTERVAL HISTORY: tired-daughter at bedside    REVIEW OF SYSTEMS:   CONSTITUTIONAL: No weakness  EYES/ENT: No visual changes; No throat pain  Neck: No pain or stiffness  Respiratory: No cough, wheezing, No shortness of breath  CARDIOVASCULAR: no chest pain or palpitations  GASTROINTESTINAL: No abdominal pain, no nausea, vomiting or hematemesis  GENITOURINARY: No dysuria, frequency or hematuria  NEUROLOGICAL: No stroke like symptoms  SKIN: No rashes    	  MEDICATIONS:  sotalol 120 milliGRAM(s) Oral daily        PHYSICAL EXAM:  T(C): 36.8 (08-13-21 @ 04:58), Max: 36.8 (08-13-21 @ 04:58)  HR: 68 (08-13-21 @ 04:58) (62 - 93)  BP: 145/95 (08-13-21 @ 04:58) (116/64 - 145/95)  RR: 15 (08-13-21 @ 04:58) (15 - 18)  SpO2: 96% (08-13-21 @ 04:58) (95% - 97%)  Wt(kg): --  I&O's Summary    12 Aug 2021 07:01  -  13 Aug 2021 07:00  --------------------------------------------------------  IN: 670 mL / OUT: 1575 mL / NET: -905 mL    Appearance: In no distress	  HEENT:    PERRL, EOMI	  Cardiovascular:  S1 S2, No JVD  Respiratory: Lungs clear to auscultation	  Gastrointestinal:  Soft, Non-tender, + BS	  Vascularature:  No edema of LE  Psychiatric: Appropriate affect   Neuro: no acute focal deficits                         8.6    13.64 )-----------( 464      ( 13 Aug 2021 07:11 )             27.7     08-13    129<L>  |  97  |  10  ----------------------------<  114<H>  3.8   |  25  |  0.43<L>    Ca    8.1<L>      13 Aug 2021 07:10  Labs personally reviewed    ASSESSMENT/PLAN: 	  Problem/Plan - 1:  ·  Problem: CAD (coronary artery disease).  Plan: c/w home meds  We discussed the importance of SAPT with low dose ASA 81mg given CAD  pt denies any current chest pain, SOB or chest pressure.       Problem/Plan -2:  ·  Problem: Colitis.  Plan: Ct shows crohn's  exacerbation   f/u flex sig with biopsies   s/p FMT 7/28  plan for repeat FMT vs Fidoxomaicin if sx don't improve  f/p flex sig on 8/9- no evidence of pseudomembranous   started on Fidoxomaicin and steroids   per GI if fails medical therapy, then surgical evaluation would be indicated  f/u ID/ GI reccs     Problem/Plan - 3:  ·  Problem: Afib pt said he was placed on sotalol for afib   -c/w sotalol   -EKG noted. SR   - rate controlled and regular     Problem/Plan - 4:   ·  Problem: GIB (gastrointestinal bleeding).  Plan: bloody diarrhea 2/2 chron's exacerbation   IV fluids   -c-diff positive:   started on Fidoxomaicin    Problem/Plan - 5:  ·  Problem: SCDs  pt ambulatory     Problem/Plan - 6:  ·  Problem: B/L LE edema   - CXR showed mildly enlarged heart   -monitor IVF with I&O  -B/L L E edema improved   -TTE with mod-severe AI, mild AS, preserved EF          Sanam Heath ANP-C  Rufus Onofre DO [facility]  Cardiovascular Medicine  [address] [place] [address], [address]  Office: [phone]  Cell: [phone]

## 2021-08-13 NOTE — PROGRESS NOTE ADULT - SUBJECTIVE AND OBJECTIVE BOX
Follow Up:  crohns    Interval History/ROS:  asleep    Allergies  No Known Allergies    ANTIMICROBIALS:  fidaxomicin 200 <User Schedule>      OTHER MEDS:  MEDICATIONS  (STANDING):  acetaminophen   Tablet .. 650 every 6 hours PRN  aluminum hydroxide/magnesium hydroxide/simethicone Suspension 30 every 4 hours PRN  clonazePAM  Tablet 0.25 at bedtime  guaifenesin/dextromethorphan Oral Liquid 10 every 8 hours PRN  methylPREDNISolone sodium succinate Injectable 20 every 8 hours  morphine  - Injectable 2 every 8 hours PRN  ondansetron Injectable 4 every 6 hours PRN  sotalol 120 daily      Vital Signs Last 24 Hrs  T(C): 36.7 (13 Aug 2021 12:48), Max: 36.8 (13 Aug 2021 04:58)  T(F): 98 (13 Aug 2021 12:48), Max: 98.2 (13 Aug 2021 04:58)  HR: 72 (13 Aug 2021 12:48) (62 - 90)  BP: 139/87 (13 Aug 2021 12:48) (116/64 - 145/95)  BP(mean): --  RR: 18 (13 Aug 2021 12:48) (15 - 18)  SpO2: 96% (13 Aug 2021 12:48) (95% - 96%)    PHYSICAL EXAM:  General: thin NAD, Non-toxic  Neurology: sleeping  Respiratory: Clear to auscultation bilaterally  CV: RRR, S1S2, no murmurs, rubs or gallops  Abdominal: , non-distended,   Extremities: No edema  Line Sites: Clear  Skin: No rash                        8.6    13.64 )-----------( 464      ( 13 Aug 2021 07:11 )             27.7   WBC Count: 13.64 (08-13 @ 07:11)  WBC Count: 14.12 (08-12 @ 06:07)  WBC Count: 9.15 (08-11 @ 07:13)  WBC Count: 6.90 (08-10 @ 06:20)  WBC Count: 10.36 (08-09 @ 09:11)    08-13    129<L>  |  97  |  10  ----------------------------<  114<H>  3.8   |  25  |  0.43<L>    Ca    8.1<L>      13 Aug 2021 07:10    Quantiferon Plus TB (07.15.21 @ 01:42)   Quantiferon TB Plus: INDETERMINATE  MICROBIOLOGY:  .Blood Blood-Peripheral  08-05-21   No Growth Final  --  --      .Blood Blood-Peripheral  08-05-21   No Growth Final  --  --      .Blood Blood-Peripheral  07-30-21   No Growth Final  --  --      .Blood Blood-Peripheral  07-27-21   No Growth Final  --  --      .Blood Blood-Venous  07-24-21   No Growth Final  --  --      .Blood Blood  07-22-21   No Growth Final  --  --      .Stool  07-15-21   No Protozoa seen by trichrome stain  No Helminths or Protozoa seen in formalin concentrate  performed by iodine stain      RADIOLOGY:  < from: CT Chest w/ IV Cont (08.06.21 @ 16:14) >  FINDINGS:  CHEST:  LUNGS AND LARGE AIRWAYS: Patent central airways. Biapical pleuroparenchymal scarring. Mild centrilobular emphysema. Scattered pulmonary nodules bilaterally measuring up to 3 mm, with example seen in the left upper lobe on series 4 image 64 and image 201, and in the right upper lobe on series 4 image 114. Tree-in-bud opacities in the left lower lobe may reflect sequela of aspiration.  PLEURA: New small left greater than right pleural effusions with adjacent compressive atelectasis. No pneumothorax.  VESSELS: Atherosclerotic changes.  HEART: Heart size is normal. No pericardial effusion. Coronary artery and aortic valve calcifications.  MEDIASTINUMAND RAJANI: No lymphadenopathy.  CHEST WALL AND LOWER NECK: Within normal limits.    ABDOMEN AND PELVIS:  LIVER: Within normal limits.  BILE DUCTS: Normal caliber.  GALLBLADDER: Within normal limits.  SPLEEN: Within normal limits.  PANCREAS: Within normal limits.  ADRENALS: Within normal limits.  KIDNEYS/URETERS: A 3 mm nonobstructing calculus in the lower pole the left kidney, and a punctate nonobstructing calculus in the upper pole of the right kidney. No hydronephrosis. Exophytic left lower polecyst measuring 2.7 cm. Subcentimeter hypodensities in left kidney are too small to characterize.    BLADDER: Apparent bladder wall thickening may be on the basis of underdistention and/or reactive to the adjacent inflammatory process involving the large bowel.  REPRODUCTIVE ORGANS: The prostate is enlarged. Symmetric seminal vesicles.    BOWEL: Colonic wall thickening diffusely involving the entire colon and rectum with associated pericolonic infiltration, most pronounced along the sigmoid colon,which appears progressed along the ascending colon and hepatic flexure in comparison to the prior exam of 7/13/2021. Liquid stool within the rectum likely reflects diarrheal state. Small hiatal hernia. No bowel obstruction. Colonic diverticulosis. Normal appendix.  PERITONEUM: No ascites.  VESSELS: Atherosclerotic changes.  RETROPERITONEUM/LYMPH NODES: No lymphadenopathy.  ABDOMINAL WALL: Within normal limits.  BONES: Degenerative changes of the spine.    IMPRESSION:  Diffuse colorectal wall thickening with surrounding inflammatory changes, which is progressed along the ascending colon and hepatic flexure since 7/13/2021, may reflect known C. difficile colitis or active inflammatory bowel disease.    Apparent bladder wall thickening may be onthe basis of underdistention and/or reactive to the inflammatory process involving the adjacent bowel. Consider correlation with urinalysis to assess for cystitis.    Small bilateral pleural effusions.    Tree-in-bud opacities in the left lower lobe may reflect the sequela of aspiration.    Scattered pulmonary nodules measuring up to 3 mm, for which a person of low risk requires no additional follow-up. Follow-up chest CT in 12 months may performed to monitor for stability in a patient consideredat high risk*.    < end of copied text >      Benjamin Phillips MD; Division of Infectious Disease; Pager: [phone]; nights and weekends: [phone] Yes

## 2021-08-13 NOTE — PROVIDER CONTACT NOTE (CHANGE IN STATUS NOTIFICATION) - ASSESSMENT
pt a&Ox1 (person) speech clear but illogical stating he is in "residential and need to go to the office" pt a&Ox1 (person) speech clear but illogical stating he is in "longterm and need to go to the office"  pt attempting to climb out of bed.  at statrt of shift pt a&ox person place. and easily redirectable. pt a&Ox1 (person) speech clear but illogical stating he is in "[facility] and need to go to the office"  pt attempting to climb out of bed. no focal defects   at start of shift pt a&ox person place. and easily redirectable.

## 2021-08-13 NOTE — PROGRESS NOTE ADULT - ASSESSMENT
A/p    sever C diff colitis :  -completed vanco/ flagyl   s/p FMT   -started on fidaxomicin 200 mg bid today : as per ID and GI   -supportive care     Hx of Chron's dis :  - IBD exacerbation and his cdiff is improved on flex sig   -started on Iv steroids   -Gi recommend inflixamab IV for chron's , if ID clears 2/2 intermediate gold quanteferone   ID recommend to see old document of interferone testing when he was given inflixamab as out pt.     hyponatremia / sever malnutrition  -started on IV fluids

## 2021-08-13 NOTE — PROGRESS NOTE ADULT - ASSESSMENT
GIDEON HAYES is a 81y Male with GERD on omeprazole daily and Crohn's disease diagnosed about 9 mos prior to admission at outside hospital c/b h/o abscess and left posterior distal anal intersphincteric fistula and presents as a new consult to GI for diarrhea/BRBPR likely 2/2 to CD flare c/b c diff colitis.    # Diarrhea  #C diff colitis  - In setting of C diff colitis. Given patient sx persisted despite antibiotics patient underwent FMT on 7/28/21. Patient had initial respond to it, with decrease in frequency of bowl movement (had 5, compared to 10 or more) but now reports having 10 again. Patient and family attribute this to restarting PO Vancomycin again. It is unclear if ongoing/worsening of symptoms is from untreated Crohn vs failure of FMT. Given worsening of sxs, Vancomycin was stopped in favor of Fidaxomicin  (8/2/21). Given no improvement after 1 week of therapy, repeat Flex sig was done on 8/9/21 which showed deep ulceration. After further discussion with ID of benefits vs risk, decided to start steroids given endoscopic findings are mostly concerning for uncontrolled Crohn.    - previous antibody formation and ineffectiveness of infliximab    # Weight loss - likely 2/2 CD c/b oral aphthous ulcers and diarrhea as he has not been able to tolerate po and will need to ensure adequate hydration.  # GERD   # Parainfluenza infection    #Crohn disease:  -Diagnosed 9 months ago, patient managed by Dr. Juan Luis Tran ([phone]) at [facility]. Since diagnosis, patient initially on Mesalamine and budesonide w/o relief. Patient also failed Methotrexate and ultimately was started on Remicade Sxs improved and helped heal his fistula/abscess but developed antibodies within 6 months and symptoms relapsed. Was planning for Humira outpatient.  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    Recommendations:  -Continue to monitor, stool output seems to be improving while on Methylprednisolone 20 mg q8H.   -Continue C diff abx per ID.   -Discussed with daughter that if he fails medical therapy, then surgical evaluation would be indicated.   -Follow-up infliximab antibody levels; will consider dose of infliximab at higher dose, if he fails steroids despite previous history of antibodies    Reji Carreno MD  Gastroenterology/Hepatology Fellow  1st option: [phone] (text or call), ONLY available from 7:00 am to 5:00 pm.   **Contact on-call GI fellow via answering service ([phone]) from 5pm-7am AND on weekends/holidays**  2nd option: Available via Microsoft Teams  3rd option: Pager: [phone]    NON-URGENT CONSULTS:  Please email [email or web address]  AT NIGHT AND ON WEEKENDS:  Contact on-call GI fellow via answering service ([phone]) from 5pm-8am AND on weekends/holidays   GIDEON HAYES is a 81y Male with GERD on omeprazole daily and Crohn's disease diagnosed about 9 mos prior to admission at outside hospital c/b h/o abscess and left posterior distal anal intersphincteric fistula and presents as a new consult to GI for diarrhea/BRBPR likely 2/2 to CD flare c/b c diff colitis.    # Diarrhea  #C diff colitis  -Colonoscopy on 7/19/21: Pseudomembranous enterocolitis consistent with C diff infection, predominantly in left colon.   -Colonoscopy 07.28.21: few ulcers in the sigmoid and in the descending colon, no biopsy. Still with ongoing pseudomembranous enterocolitis. Underwent FMT in the cecum.  -Flex sig on 8/9/21: colonic Crohn disease, worsening ulceration all through the rectum and sigmoid, biopsy negative for CMV. No pseudomembranous enterocolitis observed.     Given patient sx persisted despite antibiotics patient underwent FMT on 7/28/21. Patient had initial respond to it, with decrease in frequency of bowl movement (had 5, compared to 10 or more) but now reports having 10 again. Patient and family attribute this to restarting PO Vancomycin again. It is unclear if ongoing/worsening of symptoms is from untreated Crohn vs failure of FMT. Given worsening of sxs, Vancomycin was stopped in favor of Fidaxomicin  (8/2/21). Given no improvement after 1 week of therapy, repeat Flex sig was done on 8/9/21 which showed deep ulceration. After further discussion with ID of benefits vs risk, decided to start steroids given endoscopic findings are mostly concerning for uncontrolled Crohn.    - previous antibody formation and ineffectiveness of infliximab    # Weight loss - likely 2/2 CD c/b oral aphthous ulcers and diarrhea as he has not been able to tolerate po and will need to ensure adequate hydration.  # GERD   # Parainfluenza infection    #Crohn disease:  -Diagnosed 9 months ago, patient managed by Dr. Juan Luis Tran ([phone]) at [facility]. Since diagnosis, patient initially on Mesalamine and budesonide w/o relief. Patient also failed Methotrexate and ultimately was started on Remicade Sxs improved and helped heal his fistula/abscess but developed antibodies within 6 months and symptoms relapsed. Was planning for Humira outpatient.  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    Recommendations:  -Continue to monitor, stool output seems to be improving while on Methylprednisolone 20 mg q8H.   -Continue C diff abx per ID.   -Discussed with daughter that if he fails medical therapy, then surgical evaluation would be indicated.   -Follow-up infliximab antibody levels; will consider dose of infliximab at higher dose, if he fails steroids despite previous history of antibodies    Reji Carreno MD  Gastroenterology/Hepatology Fellow  1st option: [phone] (text or call), ONLY available from 7:00 am to 5:00 pm.   **Contact on-call GI fellow via answering service ([phone]) from 5pm-7am AND on weekends/holidays**  2nd option: Available via Microsoft Teams  3rd option: Pager: [phone]    NON-URGENT CONSULTS:  Please email [email or web address]  [email or web address]  AT NIGHT AND ON WEEKENDS:  Contact on-call GI fellow via answering service ([phone]) from 5pm-8am AND on weekends/holidays   GIDEON HAYES is a 81y Male with GERD on omeprazole daily and Crohn's disease diagnosed about 9 mos prior to admission at outside hospital c/b h/o abscess and left posterior distal anal intersphincteric fistula and presents as a new consult to GI for diarrhea/BRBPR likely 2/2 to CD flare c/b c diff colitis.    # Diarrhea  #C diff colitis  -Colonoscopy on 7/19/21: Pseudomembranous enterocolitis consistent with C diff infection, predominantly in left colon.   -Colonoscopy 07.28.21: few ulcers in the sigmoid and in the descending colon, no biopsy. Still with ongoing pseudomembranous enterocolitis. Underwent FMT in the cecum.  -Flex sig on 8/9/21: colonic Crohn disease, worsening ulceration all through the rectum and sigmoid, biopsy negative for CMV. No pseudomembranous enterocolitis observed.     Given patient sx persisted despite antibiotics patient underwent FMT on 7/28/21. Patient had initial respond to it, with decrease in frequency of bowl movement (had 5, compared to 10 or more) but now reports having 10 again. Patient and family attribute this to restarting PO Vancomycin again. It is unclear if ongoing/worsening of symptoms is from untreated Crohn vs failure of FMT. Given worsening of sxs, Vancomycin was stopped in favor of Fidaxomicin  (8/2/21). Given no improvement after 1 week of therapy, repeat Flex sig was done on 8/9/21 which showed deep ulceration. After further discussion with ID of benefits vs risk, decided to start steroids given endoscopic findings are mostly concerning for uncontrolled Crohn.    - previous antibody formation and ineffectiveness of infliximab    # Weight loss - likely 2/2 CD c/b oral aphthous ulcers and diarrhea as he has not been able to tolerate po and will need to ensure adequate hydration.  # GERD   # Parainfluenza infection    #Crohn disease:  -Diagnosed 9 months ago, patient managed by Dr. Juan Luis Tran ([phone]) at [facility]. Since diagnosis, patient initially on Mesalamine and budesonide w/o relief. Patient also failed Methotrexate and ultimately was started on Remicade Sxs improved and helped heal his fistula/abscess but developed antibodies within 6 months and symptoms relapsed. Was planning for Humira outpatient.  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    Recommendations:  -Continue to monitor, stool output seems to be improving while on Methylprednisolone 20 mg q8H.   -Continue C diff abx per ID.   -Discussed with daughter that if he fails medical therapy, then surgical evaluation would be indicated.   -Follow-up infliximab antibody levels; will consider dose of infliximab at 10mg/kg today or tomorrow, will discuss with ID regarding indeterminate quantiferon gold and obtain outpatient quant gold results first    Reji Carreno MD  Gastroenterology/Hepatology Fellow  1st option: [phone] (text or call), ONLY available from 7:00 am to 5:00 pm.   **Contact on-call GI fellow via answering service ([phone]) from 5pm-7am AND on weekends/holidays**  2nd option: Available via Microsoft Teams  3rd option: Pager: [phone]    NON-URGENT CONSULTS:  Please email [email or web address]  [email or web address]  AT NIGHT AND ON WEEKENDS:  Contact on-call GI fellow via answering service ([phone]) from 5pm-8am AND on weekends/holidays

## 2021-08-13 NOTE — PROGRESS NOTE ADULT - ATTENDING COMMENTS
Patient seen and examined, agree with above. Obtained partial records from patient's previous gastroenterologist Dr. Tran - previous infliximab antibody levels were only intermediate (279, where 1001 or greater is high). Discussed with daughter and patient at bedside - he's not responding to steroids and thus the options are either colectomy or trial of biologics. Even though he has intermediate levels of antibodies, would try to give higher dose of infliximab to see if we can medically put him into remission. Will obtain previous qunatiferon gold and discuss with ID if ok to give infliximab at 10mg/kg. Would also consult colorectal surgery, as he would have to consider colectomy if the infliximab does not work.    Would consider d/c'ing the flexiseal, as it seems to be causing patient significant pain/distress. Patient seen and examined, agree with above. Obtained partial records from patient's previous gastroenterologist Dr. Tran - previous infliximab antibody levels were only intermediate (279, where 1001 or greater is high). Discussed with daughter and patient at bedside - he's not responding to steroids and thus the options are either colectomy or trial of biologics. Even though he has intermediate levels of antibodies, would try to give higher dose of infliximab to see if we can medically put him into remission. Will obtain previous qunatiferon gold and discuss with ID if ok to give infliximab at 10mg/kg. Explained to daughter and patient the risks of higher dose of infliximab including heart failure, along with the usual immunosuppression side effects of infection, allergic reactions, small risk of malignancies over time. Would also consult colorectal surgery, as he would have to consider colectomy if the infliximab does not work.    Would consider d/c'ing the flexiseal, as it seems to be causing patient significant pain/distress. Patient seen and examined, agree with above. Obtained partial records from patient's previous gastroenterologist Dr. Tran - previous infliximab antibody levels were only intermediate (279, where 1001 or greater is high). Discussed with daughter and patient at bedside - he's not responding to steroids and thus the options are either colectomy or trial of biologics. Even though he has intermediate levels of antibodies, would try to give higher dose of infliximab to see if we can medically put him into remission. Will obtain previous qunatiferon gold and discuss with ID if ok to give infliximab at 10mg/kg. Explained to daughter and patient the risks of higher dose of infliximab including heart failure, along with the usual immunosuppression side effects of infection, allergic reactions, small risk of malignancies over time. Would also consult colorectal surgery, as he would have to consider colectomy if the infliximab does not work. Check TPMT level, in case infliximab does work and we need dual therapy to decrease antibody formation.    Would consider d/c'ing the flexiseal, as it seems to be causing patient significant pain/distress.

## 2021-08-13 NOTE — CHART NOTE - NSCHARTNOTEFT_GEN_A_CORE
Medicine PA Episodic Note    Patient is a 81y old  Male who presents with a chief complaint of diarrhea with blood in it , abd pain and weakness (12 Aug 2021 20:35)    Notified by RN that patient c/o of midsternal chest pain, with no pain scale/radiation/dyspnea.   duration for about 10 -15 minutes. No numbness/tingling to the arm.       Vital Signs Last 24 Hrs  T(C): 36.5 (12 Aug 2021 23:27), Max: 36.7 (12 Aug 2021 04:59)  T(F): 97.7 (12 Aug 2021 23:27), Max: 98.1 (12 Aug 2021 04:59)  HR: 62 (12 Aug 2021 23:27) (62 - 93)  BP: 116/64 (12 Aug 2021 23:27) (116/64 - 155/80)  BP(mean): --  RR: 16 (12 Aug 2021 23:27) (16 - 20)  SpO2: 95% (12 Aug 2021 23:27) (95% - 97%)      Labs:                          8.5    14.12 )-----------( 513      ( 12 Aug 2021 06:07 )             28.1     08-12    131<L>  |  99  |  14  ----------------------------<  124<H>  3.6   |  24  |  0.46<L>    Ca    8.1<L>      12 Aug 2021 06:06    Radiology:  < from: CT Chest w/ IV Cont (08.06.21 @ 16:14) >    IMPRESSION:  Diffuse colorectal wall thickening with surrounding inflammatory changes, which is progressed along the ascending colon and hepatic flexure since 7/13/2021, may reflect known C. difficile colitis or active inflammatory bowel disease.    Apparent bladder wall thickening may be onthe basis of underdistention and/or reactive to the inflammatory process involving the adjacent bowel. Consider correlation with urinalysis to assess for cystitis.    Small bilateral pleural effusions.    Tree-in-bud opacities in the left lower lobe may reflect the sequela of aspiration.    Scattered pulmonary nodules measuring up to 3 mm, for which a person of low risk requires no additional follow-up. Follow-up chest CT in 12 months may performed to monitor for stability in a patient consideredat high risk*.    *Reference: Guidelines for Management of Incidental Pulmonary Nodules Detected on CT Images: From the Fleischner Society 2017    Physical Exam:  General: WN/WD NAD  Neurology: A&Ox3, nonfocal, MYLES x 4  Head:  Normocephalic, atraumatic  Respiratory: CTA B/L  CV: RRR, S1S2, no murmur  Abdominal: Soft, NT, ND no palpable mass  MSK: No edema, + peripheral pulses, FROM all 4 extremity    Assessment & Plan:  HPI:  82 yo M with Crohn disease, over the last 9 mo, he was started on several medication and was recently told that he may need to go on IV infusion for chrons, he has several hospitalization at [facility] , and follows with GI near [facility]. but his diarrhea is not improving , having abd cramps , denies any N/V . , discharged 1 mo ago from [facility], now having trouble walking, on going diarrhea. + subjective fever but denies cough or nasal congestion or uri or urinary symptoms.  (13 Jul 2021 20:48)        Plan:    #Chest pain  r/o ACS   - EKG; wnl NSR with no acute changes from previous EKG  - Patient endorses little to no pain during examination. Pt just received Morphine IVP prior to c/o of midsternal CP.   - Cardiac Enzymes  - Will follow closely for any signs of hemodynamic stability  - Cardiology following.  - Plan d/w RN    Endorse to AM team.   Follow up with Attending in AM.  Yaa GAYTAN  Dept of Medicine Medicine PA Episodic Note    Patient is a 81y old  Male who presents with a chief complaint of diarrhea with blood in it , abd pain and weakness (12 Aug 2021 20:35)    Notified by RN that patient c/o of midsternal chest pain, with no pain scale/radiation/dyspnea.   duration for about 10 -15 minutes. No numbness/tingling to the arm.       Vital Signs Last 24 Hrs  T(C): 36.5 (12 Aug 2021 23:27), Max: 36.7 (12 Aug 2021 04:59)  T(F): 97.7 (12 Aug 2021 23:27), Max: 98.1 (12 Aug 2021 04:59)  HR: 62 (12 Aug 2021 23:27) (62 - 93)  BP: 116/64 (12 Aug 2021 23:27) (116/64 - 155/80)  BP(mean): --  RR: 16 (12 Aug 2021 23:27) (16 - 20)  SpO2: 95% (12 Aug 2021 23:27) (95% - 97%)      Labs:                          8.5    14.12 )-----------( 513      ( 12 Aug 2021 06:07 )             28.1     08-12    131<L>  |  99  |  14  ----------------------------<  124<H>  3.6   |  24  |  0.46<L>    Ca    8.1<L>      12 Aug 2021 06:06    Radiology:  < from: CT Chest w/ IV Cont (08.06.21 @ 16:14) >    IMPRESSION:  Diffuse colorectal wall thickening with surrounding inflammatory changes, which is progressed along the ascending colon and hepatic flexure since 7/13/2021, may reflect known C. difficile colitis or active inflammatory bowel disease.    Apparent bladder wall thickening may be onthe basis of underdistention and/or reactive to the inflammatory process involving the adjacent bowel. Consider correlation with urinalysis to assess for cystitis.    Small bilateral pleural effusions.    Tree-in-bud opacities in the left lower lobe may reflect the sequela of aspiration.    Scattered pulmonary nodules measuring up to 3 mm, for which a person of low risk requires no additional follow-up. Follow-up chest CT in 12 months may performed to monitor for stability in a patient consideredat high risk*.    *Reference: Guidelines for Management of Incidental Pulmonary Nodules Detected on CT Images: From the Fleischner Society 2017    Physical Exam:  General: WN/WD NAD  Neurology: A&Ox3, nonfocal, MYLES x 4  Head:  Normocephalic, atraumatic  Respiratory: CTA B/L  CV: RRR, S1S2, no murmur  Abdominal: Soft, NT, ND no palpable mass  MSK: No edema, + peripheral pulses, FROM all 4 extremity    Assessment & Plan:  HPI:  82 yo M with Crohn disease, over the last 9 mo, he was started on several medication and was recently told that he may need to go on IV infusion for chrons, he has several hospitalization at [facility] , and follows with GI near [facility]. but his diarrhea is not improving , having abd cramps , denies any N/V . , discharged 1 mo ago from [facility], now having trouble walking, on going diarrhea. + subjective fever but denies cough or nasal congestion or uri or urinary symptoms.  (13 Jul 2021 20:48)        Plan:    #Chest pain  r/o ACS   - EKG; wnl NSR with no acute changes from previous EKG  - Patient endorses little to no pain during examination. Pt just received Morphine IVP prior to c/o of midsternal CP.   - Cardiac Enzymes  - Will follow closely for any signs of hemodynamic stability  - Cardiology following.  - Plan d/w RN    Endorse to AM team.   Follow up with Attending in AM.  Yaa GAYTAN  Dept of Medicine    ------    CARDIAC MARKERS ( 13 Aug 2021 00:28 )  x     / x     / 16 U/L / x     / 1.1 ng/mL    Troponin T, High Sensitivity (08.13.21 @ 00:28)    Troponin T, High Sensitivity Result: 37: Specimen not hemolyzed Medicine PA Episodic Note    Patient is a 81y old  Male who presents with a chief complaint of diarrhea with blood in it , abd pain and weakness (12 Aug 2021 20:35)    Notified by RN that patient c/o of midsternal chest pain with associated heartburn. Chest pain lasted about 10 - 15 minutes. Patient with no complaints of dyspnea/sob/palpitations. States that he has abdominal pain, which he came with but has not changed.  Denies numbness/tingling to the arm, radiation to the neck. Denies fever/chills/headaches.   Patient seen and assessed by me at bedside.       Vital Signs Last 24 Hrs  T(C): 36.5 (12 Aug 2021 23:27), Max: 36.7 (12 Aug 2021 04:59)  T(F): 97.7 (12 Aug 2021 23:27), Max: 98.1 (12 Aug 2021 04:59)  HR: 62 (12 Aug 2021 23:27) (62 - 93)  BP: 116/64 (12 Aug 2021 23:27) (116/64 - 155/80)  BP(mean): --  RR: 16 (12 Aug 2021 23:27) (16 - 20)  SpO2: 95% (12 Aug 2021 23:27) (95% - 97%)      Labs:                          8.5    14.12 )-----------( 513      ( 12 Aug 2021 06:07 )             28.1     08-12    131<L>  |  99  |  14  ----------------------------<  124<H>  3.6   |  24  |  0.46<L>    Ca    8.1<L>      12 Aug 2021 06:06    Radiology:  < from: CT Chest w/ IV Cont (08.06.21 @ 16:14) >    IMPRESSION:  Diffuse colorectal wall thickening with surrounding inflammatory changes, which is progressed along the ascending colon and hepatic flexure since 7/13/2021, may reflect known C. difficile colitis or active inflammatory bowel disease.    Apparent bladder wall thickening may be onthe basis of underdistention and/or reactive to the inflammatory process involving the adjacent bowel. Consider correlation with urinalysis to assess for cystitis.    Small bilateral pleural effusions.    Tree-in-bud opacities in the left lower lobe may reflect the sequela of aspiration.    Scattered pulmonary nodules measuring up to 3 mm, for which a person of low risk requires no additional follow-up. Follow-up chest CT in 12 months may performed to monitor for stability in a patient consideredat high risk*.    *Reference: Guidelines for Management of Incidental Pulmonary Nodules Detected on CT Images: From the Fleischner Society 2017    Physical Exam:  General: WN/WD NAD  Neurology: A&Ox3, nonfocal, MYLES x 4  Head:  Normocephalic, atraumatic  Respiratory: CTA B/L  CV: RRR, S1S2, no murmur  Abdominal: Soft, NT, ND no palpable mass  MSK: No edema, + peripheral pulses, FROM all 4 extremity    Assessment & Plan:  HPI:  82 yo M with Crohn disease, over the last 9 mo, he was started on several medication and was recently told that he may need to go on IV infusion for chrons, he has several hospitalization at [facility] , and follows with GI near [facility]. but his diarrhea is not improving , having abd cramps , denies any N/V . , discharged 1 mo ago from [facility], now having trouble walking, on going diarrhea. + subjective fever but denies cough or nasal congestion or uri or urinary symptoms.  (13 Jul 2021 20:48)    C/o of chest pain midsternal with associated heartburn.     Plan:    #Chest pain  r/o ACS   - EKG; wnl NSR with no acute changes from previous EKG  - Patient endorses little to no pain during examination. Pt just received Morphine IVP prior to c/o of midsternal CP.   - Cardiac Enzymes  - Protonix 40 mg IVP x 1 dose.   - Maalox as ordered.  - Will follow closely for any signs of hemodynamic stability  - Cardiology following.  - Plan d/w RN    Endorse to AM team.   Follow up with Attending in AM.  Yaa GAYTAN  Dept of Medicine    ------    CARDIAC MARKERS ( 13 Aug 2021 00:28 )  x     / x     / 16 U/L / x     / 1.1 ng/mL    Troponin T, High Sensitivity (08.13.21 @ 00:28)    Troponin T, High Sensitivity Result: 37: Specimen not hemolyzed      Patient with no acute complaints of chest pain/heartburn; no signs of acute distress. Cardiac Enzymes noted.   Will trend tropes. Troponin AM ordered.   Cardiology following.   Hemodynamically stable. Will continue to monitor.   Plan d/w RN. Will endorse to AM team.     Yaa GAYTAN   Dept of Medicine   [id number].

## 2021-08-13 NOTE — PROVIDER CONTACT NOTE (CHANGE IN STATUS NOTIFICATION) - SITUATION
patient with change in menta status. pt a&ox1 (person), disoriented to time and situation, illogical speech., at statrt of shift pt a&ox person place.

## 2021-08-13 NOTE — PROGRESS NOTE ADULT - SUBJECTIVE AND OBJECTIVE BOX
Interval Events:   Stool ouptut 500 in the last 24 hrs, improving. Still feels lousy.     Hospital Medications:  acetaminophen   Tablet .. 650 milliGRAM(s) Oral every 6 hours PRN  aluminum hydroxide/magnesium hydroxide/simethicone Suspension 30 milliLiter(s) Oral every 4 hours PRN  ascorbic acid 500 milliGRAM(s) Oral daily  clonazePAM  Tablet 0.25 milliGRAM(s) Oral at bedtime  fidaxomicin 200 milliGRAM(s) Oral <User Schedule>  FIRST- Mouthwash  BLM 10 milliLiter(s) Swish and Spit every 8 hours  folic acid 1 milliGRAM(s) Oral daily  guaifenesin/dextromethorphan Oral Liquid 10 milliLiter(s) Oral every 8 hours PRN  lidocaine 2% Gel 1 Application(s) Topical four times a day PRN  methylPREDNISolone sodium succinate Injectable 20 milliGRAM(s) IV Push every 8 hours  morphine  - Injectable 2 milliGRAM(s) IV Push every 8 hours PRN  multivitamin 1 Tablet(s) Oral daily  multivitamin 1 Tablet(s) Oral daily  ondansetron Injectable 4 milliGRAM(s) IV Push every 6 hours PRN  sotalol 120 milliGRAM(s) Oral daily        ROS:   As noted above.     PHYSICAL EXAM:   Vital Signs:  Vital Signs Last 24 Hrs  T(C): 36.8 (13 Aug 2021 04:58), Max: 36.8 (13 Aug 2021 04:58)  T(F): 98.2 (13 Aug 2021 04:58), Max: 98.2 (13 Aug 2021 04:58)  HR: 68 (13 Aug 2021 04:58) (62 - 93)  BP: 145/95 (13 Aug 2021 04:58) (116/64 - 145/95)  BP(mean): --  RR: 15 (13 Aug 2021 04:58) (15 - 19)  SpO2: 96% (13 Aug 2021 04:58) (95% - 97%)  Daily     Daily Weight in k.8 (13 Aug 2021 06:00)    GENERAL:  NAD, Appears stated age  HEENT:  NC/AT,  conjunctivae clear and pink, sclera -anicteric  CHEST:  Normal Effort, Breath sounds clear  HEART:  RRR, S1 + S2, no murmurs  ABDOMEN:  Soft, non-tender, non-distended, normoactive bowel sounds,  no masses  EXTREMITIES:  no cyanosis or edema  SKIN:  Warm & Dry. No rash or erythema  NEURO:  Alert, oriented, no focal deficit    LABS:                        8.6    13.64 )-----------( 464      ( 13 Aug 2021 07:11 )             27.7     Mean Cell Volume: 78.2 fl (-21 @ 07:11)        129<L>  |  97  |  10  ----------------------------<  114<H>  3.8   |  25  |  0.43<L>    Ca    8.1<L>      13 Aug 2021 07:10                                    8.6    13.64 )-----------( 464      ( 13 Aug 2021 07:11 )             27.7                         8.5    14.12 )-----------( 513      ( 12 Aug 2021 06:07 )             28.1                         8.7    9.15  )-----------( 522      ( 11 Aug 2021 07:13 )             28.4       Imaging: Images reviewed no new images appreciated.

## 2021-08-13 NOTE — PROGRESS NOTE ADULT - SUBJECTIVE AND OBJECTIVE BOX
Patient is a 81y old  Male who presents with a chief complaint of diarrhea with blood in it , abd pain and weakness (13 Aug 2021 18:21)      INTERVAL HPI/OVERNIGHT EVENTS: minimal improvement in vol of diarrhea , after starting on Iv steroid   still no feeling well , poor appetite     T(C): 36.9 (08-14-21 @ 01:08), Max: 37.1 (08-13-21 @ 20:07)  HR: 77 (08-14-21 @ 01:08) (68 - 86)  BP: 149/70 (08-14-21 @ 01:08) (124/72 - 149/70)  RR: 20 (08-14-21 @ 01:08) (15 - 20)  SpO2: 95% (08-14-21 @ 01:08) (93% - 96%)  Wt(kg): --  I&O's Summary    12 Aug 2021 07:01  -  13 Aug 2021 07:00  --------------------------------------------------------  IN: 670 mL / OUT: 1575 mL / NET: -905 mL    13 Aug 2021 07:01  -  14 Aug 2021 01:50  --------------------------------------------------------  IN: 450 mL / OUT: 1050 mL / NET: -600 mL        PAST MEDICAL & SURGICAL HISTORY:  No pertinent past medical history    No significant past surgical history        SOCIAL HISTORY  Alcohol:  Tobacco:  Illicit substance use:    FAMILY HISTORY:    REVIEW OF SYSTEMS:  CONSTITUTIONAL: No fever, weight loss, or fatigue  EYES: No eye pain, visual disturbances, or discharge  ENMT:  No difficulty hearing, tinnitus, vertigo; No sinus or throat pain  NECK: No pain or stiffness  RESPIRATORY: No cough, wheezing, chills or hemoptysis; No shortness of breath  CARDIOVASCULAR: No chest pain, palpitations, dizziness, or leg swelling  GASTROINTESTINAL: No abdominal or epigastric pain. No nausea, vomiting, or hematemesis; No diarrhea or constipation. No melena or hematochezia.  GENITOURINARY: No dysuria, frequency, hematuria, or incontinence  NEUROLOGICAL: No headaches, memory loss, loss of strength, numbness, or tremors  SKIN: No itching, burning, rashes, or lesions   LYMPH NODES: No enlarged glands  ENDOCRINE: No heat or cold intolerance; No hair loss  MUSCULOSKELETAL: No joint pain or swelling; No muscle, back, or extremity pain  PSYCHIATRIC: No depression, anxiety, mood swings, or difficulty sleeping  HEME/LYMPH: No easy bruising, or bleeding gums  ALLERY AND IMMUNOLOGIC: No hives or eczema    RADIOLOGY & ADDITIONAL TESTS:    Imaging Personally Reviewed:  [ ] YES  [ ] NO    Consultant(s) Notes Reviewed:  [ ] YES  [ ] NO    PHYSICAL EXAM:  GENERAL: NAD, well-groomed, well-developed  HEAD:  Atraumatic, Normocephalic  EYES: EOMI, PERRLA, conjunctiva and sclera clear  ENMT: No tonsillar erythema, exudates, or enlargement; Moist mucous membranes, Good dentition, No lesions  NECK: Supple, No JVD, Normal thyroid  NERVOUS SYSTEM:  Alert & Oriented X3, Good concentration; Motor Strength 5/5 B/L upper and lower extremities; DTRs 2+ intact and symmetric  CHEST/LUNG: Clear to percussion bilaterally; No rales, rhonchi, wheezing, or rubs  HEART: Regular rate and rhythm; No murmurs, rubs, or gallops  ABDOMEN: Soft, Nontender, Nondistended; Bowel sounds present  EXTREMITIES:  2+ Peripheral Pulses, No clubbing, cyanosis, or edema  LYMPH: No lymphadenopathy noted  SKIN: No rashes or lesions    LABS:                        8.6    13.64 )-----------( 464      ( 13 Aug 2021 07:11 )             27.7     08-13    129<L>  |  97  |  10  ----------------------------<  114<H>  3.8   |  25  |  0.43<L>    Ca    8.1<L>      13 Aug 2021 07:10  Phos  1.7     08-13  Mg     2.0     08-13          CAPILLARY BLOOD GLUCOSE                MEDICATIONS  (STANDING):  ascorbic acid 500 milliGRAM(s) Oral daily  clonazePAM  Tablet 0.25 milliGRAM(s) Oral at bedtime  fidaxomicin 200 milliGRAM(s) Oral <User Schedule>  FIRST- Mouthwash  BLM 10 milliLiter(s) Swish and Spit every 8 hours  folic acid 1 milliGRAM(s) Oral daily  methylPREDNISolone sodium succinate Injectable 20 milliGRAM(s) IV Push every 8 hours  multivitamin 1 Tablet(s) Oral daily  multivitamin 1 Tablet(s) Oral daily  sodium chloride 0.9%. 1000 milliLiter(s) (75 mL/Hr) IV Continuous <Continuous>  sotalol 120 milliGRAM(s) Oral daily    MEDICATIONS  (PRN):  acetaminophen   Tablet .. 650 milliGRAM(s) Oral every 6 hours PRN Temp greater or equal to 38.5C (101.3F), Mild Pain (1 - 3), Moderate Pain (4 - 6)  aluminum hydroxide/magnesium hydroxide/simethicone Suspension 30 milliLiter(s) Oral every 4 hours PRN Dyspepsia  guaifenesin/dextromethorphan Oral Liquid 10 milliLiter(s) Oral every 8 hours PRN Cough  lidocaine 2% Gel 1 Application(s) Topical four times a day PRN pain due to skin excoriation  morphine  - Injectable 2 milliGRAM(s) IV Push every 8 hours PRN Severe Pain (7 - 10)  ondansetron Injectable 4 milliGRAM(s) IV Push every 6 hours PRN Nausea and/or Vomiting      Care Discussed with Consultants/Other Providers [ ] YES  [ ] NO

## 2021-08-13 NOTE — PROGRESS NOTE ADULT - ASSESSMENT
80 yo M with Crohn's on treatment, recently cared for at [facility], now presenting with ongoing diarrhea    Antibiotics  Flagyl 7/13 7/21-->25  Cipro 7/13-->14  po Vanco 7/14 -->-->7/29; 8/1-->8/2  (7/28: colonoscopic FMT)  Fidaxomicin 8/2-->      1) Crohns colitis  8/9 Flex sig without pseudomembrances, crohns colitis appears major factor for continued diarrhea,   some decreased diarrhea - subjective improvement  rectal pain related to diarrhea - ?related to diarrhea  surgery being discussed if patient does not respond to steroids  solumedrol 20 q 8hours 8/9-->  There is risk of high dose infliximab - biapical scarring noted on CXR - I would like to document previous neg Quant Gold TB  check  Hep B core Ab, HIV  2) C diff colitis  Fidaxomcin 200 mg twice daily x 5 days 8/2 --> 8/7, currently every other day for additional 20 days days 7 - 25 =  8/9 --> 8/27  po intake encouraged  - S/p colonoscopic FMT 7/28    3) Leukocytosis  ? releated to steroids    4) Para flu 3 viral uri with cough and fevers    5) malnourished state    6) debility    please call ID if needed over weekend

## 2021-08-14 NOTE — PROGRESS NOTE ADULT - SUBJECTIVE AND OBJECTIVE BOX
Gastroenterology/Hepatology Progress Note      Interval Events:   - overnight patient w/ change in mental status, AAOx1, illogical speech, labs w/ worsening hyponatremia and hypophosphatemia  - 6 bowel movements recorded overnight    Allergies:  No Known Allergies      Hospital Medications:  acetaminophen   Tablet .. 650 milliGRAM(s) Oral every 6 hours PRN  aluminum hydroxide/magnesium hydroxide/simethicone Suspension 30 milliLiter(s) Oral every 4 hours PRN  ascorbic acid 500 milliGRAM(s) Oral daily  clonazePAM  Tablet 0.25 milliGRAM(s) Oral at bedtime  fidaxomicin 200 milliGRAM(s) Oral <User Schedule>  FIRST- Mouthwash  BLM 10 milliLiter(s) Swish and Spit every 8 hours  folic acid 1 milliGRAM(s) Oral daily  guaifenesin/dextromethorphan Oral Liquid 10 milliLiter(s) Oral every 8 hours PRN  lidocaine 2% Gel 1 Application(s) Topical four times a day PRN  methylPREDNISolone sodium succinate Injectable 20 milliGRAM(s) IV Push every 8 hours  morphine  - Injectable 2 milliGRAM(s) IV Push every 8 hours PRN  multivitamin 1 Tablet(s) Oral daily  multivitamin 1 Tablet(s) Oral daily  ondansetron Injectable 4 milliGRAM(s) IV Push every 6 hours PRN  sodium chloride 0.9%. 1000 milliLiter(s) IV Continuous <Continuous>  sotalol 120 milliGRAM(s) Oral daily        PHYSICAL EXAM:   Vital Signs:  Vital Signs Last 24 Hrs  T(C): 36.7 (14 Aug 2021 06:01), Max: 37.1 (13 Aug 2021 20:07)  T(F): 98.1 (14 Aug 2021 06:01), Max: 98.8 (13 Aug 2021 20:07)  HR: 84 (14 Aug 2021 06:01) (72 - 86)  BP: 150/67 (14 Aug 2021 06:01) (124/72 - 150/67)  BP(mean): --  RR: 19 (14 Aug 2021 06:01) (18 - 20)  SpO2: 95% (14 Aug 2021 06:01) (93% - 96%)  Daily     Daily     GENERAL:  No acute distress  HEENT:  NCAT, no scleral icterus  CHEST: no resp distress  HEART:  RRR  ABDOMEN:  Soft, non-tender, non-distended, normoactive bowel sounds, no masses, no hepato-splenomegaly, no signs of chronic liver disease  EXTREMITIES:  No cyanosis, clubbing, or edema  SKIN:  No rash/erythema/ecchymoses/petechiae/wounds/abscess/warm/dry  NEURO:  Alert and oriented x 3, no asterixis, no tremor    LABS:                        8.6    13.64 )-----------( 464      ( 13 Aug 2021 07:11 )             27.7     Mean Cell Volume: 78.2 fl (08-13-21 @ 07:11)    08-13    129<L>  |  97  |  10  ----------------------------<  114<H>  3.8   |  25  |  0.43<L>    Ca    8.1<L>      13 Aug 2021 07:10  Phos  1.7     08-13  Mg     2.0     08-13                  Imaging:           Gastroenterology/Hepatology Progress Note      Interval Events:   - overnight patient w/ change in mental status, AAOx1, illogical speech, labs w/ worsening hyponatremia and hypophosphatemia  - 6 bowel movements recorded overnight  - very slow to answer questions this morning  - per patient's daughter, family open to discuss surgical options    Allergies:  No Known Allergies      Hospital Medications:  acetaminophen   Tablet .. 650 milliGRAM(s) Oral every 6 hours PRN  aluminum hydroxide/magnesium hydroxide/simethicone Suspension 30 milliLiter(s) Oral every 4 hours PRN  ascorbic acid 500 milliGRAM(s) Oral daily  clonazePAM  Tablet 0.25 milliGRAM(s) Oral at bedtime  fidaxomicin 200 milliGRAM(s) Oral <User Schedule>  FIRST- Mouthwash  BLM 10 milliLiter(s) Swish and Spit every 8 hours  folic acid 1 milliGRAM(s) Oral daily  guaifenesin/dextromethorphan Oral Liquid 10 milliLiter(s) Oral every 8 hours PRN  lidocaine 2% Gel 1 Application(s) Topical four times a day PRN  methylPREDNISolone sodium succinate Injectable 20 milliGRAM(s) IV Push every 8 hours  morphine  - Injectable 2 milliGRAM(s) IV Push every 8 hours PRN  multivitamin 1 Tablet(s) Oral daily  multivitamin 1 Tablet(s) Oral daily  ondansetron Injectable 4 milliGRAM(s) IV Push every 6 hours PRN  sodium chloride 0.9%. 1000 milliLiter(s) IV Continuous <Continuous>  sotalol 120 milliGRAM(s) Oral daily        PHYSICAL EXAM:   Vital Signs:  Vital Signs Last 24 Hrs  T(C): 36.7 (14 Aug 2021 06:01), Max: 37.1 (13 Aug 2021 20:07)  T(F): 98.1 (14 Aug 2021 06:01), Max: 98.8 (13 Aug 2021 20:07)  HR: 84 (14 Aug 2021 06:01) (72 - 86)  BP: 150/67 (14 Aug 2021 06:01) (124/72 - 150/67)  BP(mean): --  RR: 19 (14 Aug 2021 06:01) (18 - 20)  SpO2: 95% (14 Aug 2021 06:01) (93% - 96%)  Daily     Daily     GENERAL:  thin, ill appearing man, NAD  HEENT:  NCAT, no scleral icterus  CHEST: no resp distress  HEART:  RRR  ABDOMEN:  thin, nontender, no r/g  EXTREMITIES:  No cyanosis, clubbing, or edema  SKIN:  No rash/erythema/ecchymoses/petechiae/wounds/abscess/warm/dry  NEURO:  Alert and oriented x 3, no asterixis, no tremor    LABS:                        8.6    13.64 )-----------( 464      ( 13 Aug 2021 07:11 )             27.7     Mean Cell Volume: 78.2 fl (08-13-21 @ 07:11)    08-13    129<L>  |  97  |  10  ----------------------------<  114<H>  3.8   |  25  |  0.43<L>    Ca    8.1<L>      13 Aug 2021 07:10  Phos  1.7     08-13  Mg     2.0     08-13                  Imaging:

## 2021-08-14 NOTE — CHART NOTE - NSCHARTNOTEFT_GEN_A_CORE
Notified by RN that patient noted to have AMS since yesterday likely in the setting of electrolytes imbalance.  Labs from yesterday noted.   Baseline A&O x 3, but A&O x 1 with lethargy since yesterday as per RN.  Patient continues to have confusion despite mild improvement in electrolytes  Trop noted to be negative      Patient seen and examined at bedside. Aggressive and combative. Not co-operative with neuro-exams  Trying to get out of the bed saying "I need to go to [place]"   Not re-directive at this moment and given his delirium and agitation, patient is at high risk at fall    Also reported by RN that patient has blood clots in stool. FOBT positive since 8/9 with stable Hgb.   Appreciate GI and Sx recs. Continue with PO vanco and close monitoring     Discussed patient clinical status with Dr. Oliver,     - Urgent CTH  - IV Ativan   - Fall precaution  - Enhanced supervision or escalate to constant observation if behavior not improves    HD stable  Will continue to monitor closely    Jennie Maradiaga PA-C Notified by RN that patient noted to have AMS since yesterday likely in the setting of electrolytes imbalance.  Labs from yesterday noted and reviewed with Dr. Oliver   Baseline A&O x 3, but A&O x 1 with lethargy since yesterday as per RN.  Patient continues to have confusion despite mild improvement in electrolytes  Trop noted to be negative      Patient seen and examined at bedside. Aggressive and combative. Not co-operative with neuro-exams  Trying to get out of the bed saying "I need to go to [place]"   Not re-directive at this moment and given his delirium and agitation, patient is at high risk at fall    Also reported by RN that patient has blood clots in stool. FOBT positive since 8/9 with stable Hgb.   Appreciate GI and Sx recs. Continue with PO vanco and close monitoring     Discussed patient clinical status with Dr. Oliver,     - Urgent CTH  - IV Ativan   - Fall precaution  - Enhanced supervision or escalate to constant observation if behavior not improves    HD stable  Will continue to monitor closely    Jennie Maradiaga PA-C

## 2021-08-14 NOTE — PROGRESS NOTE ADULT - ASSESSMENT
Impression:  82 yo M w/ GERD and recently diagnosed CD p/w hematochezia, found to be C diff positive w/ ongoing CD flair    # c diff - pseudomembranous colitis seen on colonoscopy 7/19/21, treated w/ vanc, fidaxomicin, FMT; repeat flex sig w/o further pseudomembranes but ongoing severe CD. 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     Yonatan Silveira, PGY5  Gastroenterology/Hepatology Fellow  Available on Microsoft Teams  [phone] ([employer] Short Range Pager)  [phone] (Long Range Pager)    After 5pm, please contact the on-call GI fellow. [phone]   Impression:  80 yo M w/ GERD and recently diagnosed CD p/w hematochezia, found to be C diff positive w/ ongoing CD flair    # c diff - pseudomembranous colitis seen on colonoscopy 7/19/21, treated w/ vanc, fidaxomicin, FMT; repeat flex sig w/o further pseudomembranes but ongoing severe CD. 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     Yonatan Silveira, PGY5  Gastroenterology/Hepatology Fellow  Available on Microsoft Teams  [phone] ([employer] Short Range Pager)  [phone] (Long Range Pager)    After 5pm, please contact the on-call GI fellow. [phone]

## 2021-08-14 NOTE — PROGRESS NOTE ADULT - SUBJECTIVE AND OBJECTIVE BOX
Patient is a 81y old  Male who presents with a chief complaint of diarrhea with blood in it , abd pain and weakness (14 Aug 2021 06:58)      INTERVAL HISTORY: lethargic, opens eyes to voice    REVIEW OF SYSTEMS:   limited d/t lethargy, not cooperative with questions       MEDICATIONS:  sotalol 120 milliGRAM(s) Oral daily    PHYSICAL EXAM  T(C): 36.7 (08-14-21 @ 06:01), Max: 37.1 (08-13-21 @ 20:07)  HR: 84 (08-14-21 @ 06:01) (72 - 86)  BP: 150/67 (08-14-21 @ 06:01) (124/72 - 150/67)  RR: 19 (08-14-21 @ 06:01) (18 - 20)  SpO2: 95% (08-14-21 @ 06:01) (93% - 96%)  Wt(kg): --  I&O's Summary    13 Aug 2021 07:01  -  14 Aug 2021 07:00  --------------------------------------------------------  IN: 450 mL / OUT: 1050 mL / NET: -600 mL          Appearance: In no distress	  HEENT:    PERRL, EOMI	  Cardiovascular:  S1 S2, No JVD  Respiratory: Lungs clear to auscultation	  Gastrointestinal:  Soft, Non-tender, + BS	  Vascularature:  No edema of LE  Psychiatric: Appropriate affect   Neuro: no acute focal deficits                        8.9    10.68 )-----------( 367      ( 14 Aug 2021 07:38 )             28.8     08-14    132<L>  |  98  |  10  ----------------------------<  116<H>  3.6   |  23  |  0.43<L>    Ca    8.3<L>      14 Aug 2021 07:38  Phos  3.3     08-14  Mg     2.0     08-13  Labs personally reviewed    ASSESSMENT/PLAN: 	  Problem/Plan - 1:  ·  Problem: CAD (coronary artery disease).  Plan: c/w home meds  We discussed the importance of SAPT with low dose ASA 81mg given CAD  pt denies any current chest pain, SOB or chest pressure.       Problem/Plan -2:  ·  Problem: Colitis.  Plan: Ct shows crohn's  exacerbation   f/u flex sig with biopsies   s/p FMT 7/28  plan for repeat FMT vs Fidoxomaicin if sx don't improve  f/p flex sig on 8/9- no evidence of pseudomembranous   started on Fidoxomaicin and steroids   per GI if fails medical therapy, then surgical evaluation would be indicated  f/u ID/ GI reccs     Problem/Plan - 3:  ·  Problem: Afib pt said he was placed on sotalol for afib   -c/w sotalol   -EKG noted. SR   - rate controlled and regular     Problem/Plan - 4:   ·  Problem: GIB (gastrointestinal bleeding).  Plan: bloody diarrhea 2/2 chron's exacerbation   IV fluids   -c-diff positive:   started on Fidoxomaicin    Problem/Plan - 5:  ·  Problem: SCDs  pt ambulatory     Problem/Plan - 6:  ·  Problem: B/L LE edema   - CXR showed mildly enlarged heart   -monitor IVF with I&O  -B/L L E edema improved   -TTE with mod-severe AI, mild AS, preserved EF    Problem/Plan - 7:  ·  Problem: AMS/ lethargic   replete electrolyte, trops flat, CKMB negative   pending CTH       Sanam Heath ANP-C  Rufus Onofre DO [facility]  Cardiovascular Medicine  [address] [facility], [address]  Office: [phone]  Cell: [phone]

## 2021-08-14 NOTE — CHART NOTE - NSCHARTNOTEFT_GEN_A_CORE
GIDOEN HAYES    Notified by RN patient becoming confused. Stat VBG shows low Na and phos      Interventions taken     Blayne and phos supplemented  f/u am Na, K, and phos  may need renal consult if Na still low  cont assess and monitor  f/u with am team.                    Valentino Ndiaye ANP-BC  Spectralink #[id number]

## 2021-08-14 NOTE — PROGRESS NOTE ADULT - ATTENDING COMMENTS
81M with history of Crohns c/b C.diff colitis s/p Fidaxomicin/FMT with diarrhea, cachexia  s/p remicaid x 1   apical scarring concerning in setting of indeterminate quant  neg quant in Dec  has had prior remicaid in past    Rec  - await colorectal surgical eval for crohns colitis, if no surgical intervention, highly favor repeat anti TNF dosing at 10mg/kg   - check vit B12, iron, folate, vit D levels, A, B1 levels- may need aggressive supplementation given longstanding diarrhea     Stephanie Rangel 81M with history of Crohns c/b C.diff colitis s/p Fidaxomicin/FMT with diarrhea, cachexia  s/p remicade x 1   apical scarring concerning in setting of indeterminate quant  neg quant in Dec  has had prior remicade in past    Rec  - await colorectal surgical eval for crohns colitis, if no surgical intervention, highly favor repeat anti TNF dosing at 10mg/kg   - check vit B12, iron, folate, vit D levels, A, B1 levels- may need aggressive supplementation given longstanding diarrhea (ordered for AM)  - discussed with daughter at bedside     Stephanie Rangel MD   of Medicine, Gastroenterology  [address] [place] [address], [address]  [place], [place] [id number] [phone]

## 2021-08-14 NOTE — PROGRESS NOTE ADULT - ASSESSMENT
A/p    # AMS   -sod improved after IV fluid  still remain confused   likely metabolic encephalopathy   no focal deficit    sever C diff colitis :  -completed vanco/ flagyl   s/p FMT   -started on fidaxomicin 200 mg bid today : as per ID and GI   -supportive care     Hx of Chron's dis :  - IBD exacerbation and his cdiff is improved on flex sig   -started on Iv steroids   -Gi recommend inflixamab IV for chron's , if ID clears 2/2 intermediate gold quanteferone   ID recommend to see old document of interferone testing when he was given inflixamab as out pt.     hyponatremia / sever malnutrition  -started on IV fluids     dispo: family now considering surgical option as per advised by GI   will consult house surgery in am for evaluation

## 2021-08-14 NOTE — PROGRESS NOTE ADULT - SUBJECTIVE AND OBJECTIVE BOX
Patient is a 81y old  Male who presents with a chief complaint of diarrhea with blood in it , abd pain and weakness (14 Aug 2021 09:21)      INTERVAL HPI/OVERNIGHT EVENTS:  T(C): 36.8 (08-14-21 @ 19:42), Max: 36.9 (08-14-21 @ 01:08)  HR: 65 (08-14-21 @ 19:42) (65 - 84)  BP: 134/68 (08-14-21 @ 19:42) (119/66 - 150/67)  RR: 18 (08-14-21 @ 19:42) (18 - 20)  SpO2: 96% (08-14-21 @ 19:42) (93% - 97%)  Wt(kg): --  I&O's Summary    13 Aug 2021 07:01  -  14 Aug 2021 07:00  --------------------------------------------------------  IN: 450 mL / OUT: 1050 mL / NET: -600 mL    14 Aug 2021 07:01  -  14 Aug 2021 21:19  --------------------------------------------------------  IN: 525 mL / OUT: 0 mL / NET: 525 mL        PAST MEDICAL & SURGICAL HISTORY:  No pertinent past medical history    No significant past surgical history        SOCIAL HISTORY  Alcohol:  Tobacco:  Illicit substance use:    FAMILY HISTORY:    REVIEW OF SYSTEMS:  CONSTITUTIONAL: No fever, weight loss, or fatigue  EYES: No eye pain, visual disturbances, or discharge  ENMT:  No difficulty hearing, tinnitus, vertigo; No sinus or throat pain  NECK: No pain or stiffness  RESPIRATORY: No cough, wheezing, chills or hemoptysis; No shortness of breath  CARDIOVASCULAR: No chest pain, palpitations, dizziness, or leg swelling  GASTROINTESTINAL: No abdominal or epigastric pain. No nausea, vomiting, or hematemesis; No diarrhea or constipation. No melena or hematochezia.  GENITOURINARY: No dysuria, frequency, hematuria, or incontinence  NEUROLOGICAL: No headaches, memory loss, loss of strength, numbness, or tremors  SKIN: No itching, burning, rashes, or lesions   LYMPH NODES: No enlarged glands  ENDOCRINE: No heat or cold intolerance; No hair loss  MUSCULOSKELETAL: No joint pain or swelling; No muscle, back, or extremity pain  PSYCHIATRIC: No depression, anxiety, mood swings, or difficulty sleeping  HEME/LYMPH: No easy bruising, or bleeding gums  ALLERY AND IMMUNOLOGIC: No hives or eczema    RADIOLOGY & ADDITIONAL TESTS:    Imaging Personally Reviewed:  [ ] YES  [ ] NO    Consultant(s) Notes Reviewed:  [ ] YES  [ ] NO    PHYSICAL EXAM:  GENERAL: NAD, well-groomed, well-developed  HEAD:  Atraumatic, Normocephalic  EYES: EOMI, PERRLA, conjunctiva and sclera clear  ENMT: No tonsillar erythema, exudates, or enlargement; Moist mucous membranes, Good dentition, No lesions  NECK: Supple, No JVD, Normal thyroid  NERVOUS SYSTEM:  Alert & Oriented X3, Good concentration; Motor Strength 5/5 B/L upper and lower extremities; DTRs 2+ intact and symmetric  CHEST/LUNG: Clear to percussion bilaterally; No rales, rhonchi, wheezing, or rubs  HEART: Regular rate and rhythm; No murmurs, rubs, or gallops  ABDOMEN: Soft, Nontender, Nondistended; Bowel sounds present  EXTREMITIES:  2+ Peripheral Pulses, No clubbing, cyanosis, or edema  LYMPH: No lymphadenopathy noted  SKIN: No rashes or lesions    LABS:                        8.9    10.68 )-----------( 367      ( 14 Aug 2021 07:38 )             28.8     08-14    132<L>  |  98  |  10  ----------------------------<  116<H>  3.6   |  23  |  0.43<L>    Ca    8.3<L>      14 Aug 2021 07:38  Phos  3.3     08-14  Mg     2.0     08-13          CAPILLARY BLOOD GLUCOSE                MEDICATIONS  (STANDING):  ascorbic acid 500 milliGRAM(s) Oral daily  clonazePAM  Tablet 0.25 milliGRAM(s) Oral at bedtime  fidaxomicin 200 milliGRAM(s) Oral <User Schedule>  FIRST- Mouthwash  BLM 10 milliLiter(s) Swish and Spit every 8 hours  folic acid 1 milliGRAM(s) Oral daily  methylPREDNISolone sodium succinate Injectable 20 milliGRAM(s) IV Push every 8 hours  multivitamin 1 Tablet(s) Oral daily  sodium chloride 0.9%. 1000 milliLiter(s) (75 mL/Hr) IV Continuous <Continuous>  sotalol 120 milliGRAM(s) Oral daily    MEDICATIONS  (PRN):  acetaminophen   Tablet .. 650 milliGRAM(s) Oral every 6 hours PRN Temp greater or equal to 38.5C (101.3F), Mild Pain (1 - 3), Moderate Pain (4 - 6)  aluminum hydroxide/magnesium hydroxide/simethicone Suspension 30 milliLiter(s) Oral every 4 hours PRN Dyspepsia  guaifenesin/dextromethorphan Oral Liquid 10 milliLiter(s) Oral every 8 hours PRN Cough  lidocaine 2% Gel 1 Application(s) Topical four times a day PRN pain due to skin excoriation  LORazepam   Injectable 0.5 milliGRAM(s) IV Push once PRN Agitation  morphine  - Injectable 2 milliGRAM(s) IV Push every 8 hours PRN Severe Pain (7 - 10)  ondansetron Injectable 4 milliGRAM(s) IV Push every 6 hours PRN Nausea and/or Vomiting      Care Discussed with Consultants/Other Providers [ ] YES  [ ] NO Patient is a 81y old  Male who presents with a chief complaint of diarrhea with blood in it , abd pain and weakness (14 Aug 2021 09:21)      INTERVAL HPI/OVERNIGHT EVENTS: today confused, don't know where he is , sod improving   T(C): 36.8 (08-14-21 @ 19:42), Max: 36.9 (08-14-21 @ 01:08)  HR: 65 (08-14-21 @ 19:42) (65 - 84)  BP: 134/68 (08-14-21 @ 19:42) (119/66 - 150/67)  RR: 18 (08-14-21 @ 19:42) (18 - 20)  SpO2: 96% (08-14-21 @ 19:42) (93% - 97%)  Wt(kg): --  I&O's Summary    13 Aug 2021 07:01  -  14 Aug 2021 07:00  --------------------------------------------------------  IN: 450 mL / OUT: 1050 mL / NET: -600 mL    14 Aug 2021 07:01  -  14 Aug 2021 21:19  --------------------------------------------------------  IN: 525 mL / OUT: 0 mL / NET: 525 mL        PAST MEDICAL & SURGICAL HISTORY:  No pertinent past medical history    No significant past surgical history        SOCIAL HISTORY  Alcohol:  Tobacco:  Illicit substance use:    FAMILY HISTORY:    REVIEW OF SYSTEMS:  CONSTITUTIONAL: No fever, weight loss, or fatigue  EYES: No eye pain, visual disturbances, or discharge  ENMT:  No difficulty hearing, tinnitus, vertigo; No sinus or throat pain  NECK: No pain or stiffness  RESPIRATORY: No cough, wheezing, chills or hemoptysis; No shortness of breath  CARDIOVASCULAR: No chest pain, palpitations, dizziness, or leg swelling  GASTROINTESTINAL: No abdominal or epigastric pain. No nausea, vomiting, or hematemesis; No diarrhea or constipation. No melena or hematochezia.  GENITOURINARY: No dysuria, frequency, hematuria, or incontinence  NEUROLOGICAL: No headaches, memory loss, loss of strength, numbness, or tremors  SKIN: No itching, burning, rashes, or lesions   LYMPH NODES: No enlarged glands  ENDOCRINE: No heat or cold intolerance; No hair loss  MUSCULOSKELETAL: No joint pain or swelling; No muscle, back, or extremity pain  PSYCHIATRIC: No depression, anxiety, mood swings, or difficulty sleeping  HEME/LYMPH: No easy bruising, or bleeding gums  ALLERY AND IMMUNOLOGIC: No hives or eczema    RADIOLOGY & ADDITIONAL TESTS:    Imaging Personally Reviewed:  [ ] YES  [ ] NO    Consultant(s) Notes Reviewed:  [ ] YES  [ ] NO    PHYSICAL EXAM:  GENERAL: NAD, well-groomed, well-developed  HEAD:  Atraumatic, Normocephalic  EYES: EOMI, PERRLA, conjunctiva and sclera clear  ENMT: No tonsillar erythema, exudates, or enlargement; Moist mucous membranes, Good dentition, No lesions  NECK: Supple, No JVD, Normal thyroid  NERVOUS SYSTEM:  Alert & Oriented X3, Good concentration; Motor Strength 5/5 B/L upper and lower extremities; DTRs 2+ intact and symmetric  CHEST/LUNG: Clear to percussion bilaterally; No rales, rhonchi, wheezing, or rubs  HEART: Regular rate and rhythm; No murmurs, rubs, or gallops  ABDOMEN: Soft, Nontender, Nondistended; Bowel sounds present  EXTREMITIES:  2+ Peripheral Pulses, No clubbing, cyanosis, or edema  LYMPH: No lymphadenopathy noted  SKIN: No rashes or lesions    LABS:                        8.9    10.68 )-----------( 367      ( 14 Aug 2021 07:38 )             28.8     08-14    132<L>  |  98  |  10  ----------------------------<  116<H>  3.6   |  23  |  0.43<L>    Ca    8.3<L>      14 Aug 2021 07:38  Phos  3.3     08-14  Mg     2.0     08-13          CAPILLARY BLOOD GLUCOSE                MEDICATIONS  (STANDING):  ascorbic acid 500 milliGRAM(s) Oral daily  clonazePAM  Tablet 0.25 milliGRAM(s) Oral at bedtime  fidaxomicin 200 milliGRAM(s) Oral <User Schedule>  FIRST- Mouthwash  BLM 10 milliLiter(s) Swish and Spit every 8 hours  folic acid 1 milliGRAM(s) Oral daily  methylPREDNISolone sodium succinate Injectable 20 milliGRAM(s) IV Push every 8 hours  multivitamin 1 Tablet(s) Oral daily  sodium chloride 0.9%. 1000 milliLiter(s) (75 mL/Hr) IV Continuous <Continuous>  sotalol 120 milliGRAM(s) Oral daily    MEDICATIONS  (PRN):  acetaminophen   Tablet .. 650 milliGRAM(s) Oral every 6 hours PRN Temp greater or equal to 38.5C (101.3F), Mild Pain (1 - 3), Moderate Pain (4 - 6)  aluminum hydroxide/magnesium hydroxide/simethicone Suspension 30 milliLiter(s) Oral every 4 hours PRN Dyspepsia  guaifenesin/dextromethorphan Oral Liquid 10 milliLiter(s) Oral every 8 hours PRN Cough  lidocaine 2% Gel 1 Application(s) Topical four times a day PRN pain due to skin excoriation  LORazepam   Injectable 0.5 milliGRAM(s) IV Push once PRN Agitation  morphine  - Injectable 2 milliGRAM(s) IV Push every 8 hours PRN Severe Pain (7 - 10)  ondansetron Injectable 4 milliGRAM(s) IV Push every 6 hours PRN Nausea and/or Vomiting      Care Discussed with Consultants/Other Providers [ ] YES  [ ] NO

## 2021-08-14 NOTE — CHART NOTE - NSCHARTNOTEFT_GEN_A_CORE
Interim note    Severe Malnutrition      80 yo M w/ GERD and recently diagnosed CD p/w hematochezia, found to be C diff positive w/ ongoing CD flair    Patient with continued and worsening nutritional status.   Very poor and inadequate oral intake with decreased mental status and continued weight loss.    patient with previous failed calorie count     Brief discussion with GI MD today at patient's room today. RD recommended PEG placement.   Noted family and patient may not in agreement with PEG placement at this time.  RD requests consideration for TPN, briefly discussed with GI MD.    Conversation was in light of consideration for and in  preparation for possible surgery      Weight:  8/14  51.3kg     BMI =17.6    Nutrition Dx:  Severe Protein calorie malnutrition related to patient inability to tolerate adequate amounts of protein and  energy intake orally in setting of    diarrhea with blood in it , abd pain and weakness      Recommendations:  PEG placement vs TPN if medically feasible and  within patient/ family [facility]    LATA Whitaker, RD, CDN #[id number]

## 2021-08-15 NOTE — PROGRESS NOTE ADULT - SUBJECTIVE AND OBJECTIVE BOX
Gastroenterology/Hepatology Progress Note      Interval Events:   - 4 bowel movements overnight  - this morning confused, AAOx0, stating he is in a nightmare, talking nonsensically   - labs w/ Hg drop 8.9->7.7 w/o overt bleeding    Allergies:  No Known Allergies      Hospital Medications:  acetaminophen   Tablet .. 650 milliGRAM(s) Oral every 6 hours PRN  aluminum hydroxide/magnesium hydroxide/simethicone Suspension 30 milliLiter(s) Oral every 4 hours PRN  ascorbic acid 500 milliGRAM(s) Oral daily  clonazePAM  Tablet 0.25 milliGRAM(s) Oral at bedtime  fidaxomicin 200 milliGRAM(s) Oral <User Schedule>  FIRST- Mouthwash  BLM 10 milliLiter(s) Swish and Spit every 8 hours  folic acid 1 milliGRAM(s) Oral daily  guaifenesin/dextromethorphan Oral Liquid 10 milliLiter(s) Oral every 8 hours PRN  lidocaine 2% Gel 1 Application(s) Topical four times a day PRN  LORazepam   Injectable 0.5 milliGRAM(s) IV Push once PRN  methylPREDNISolone sodium succinate Injectable 20 milliGRAM(s) IV Push every 8 hours  morphine  - Injectable 2 milliGRAM(s) IV Push every 8 hours PRN  multivitamin 1 Tablet(s) Oral daily  ondansetron Injectable 4 milliGRAM(s) IV Push every 6 hours PRN  sodium chloride 0.9%. 1000 milliLiter(s) IV Continuous <Continuous>  sotalol 120 milliGRAM(s) Oral daily        PHYSICAL EXAM:   Vital Signs:  Vital Signs Last 24 Hrs  T(C): 36.9 (15 Aug 2021 05:42), Max: 36.9 (15 Aug 2021 05:42)  T(F): 98.4 (15 Aug 2021 05:42), Max: 98.4 (15 Aug 2021 05:42)  HR: 75 (15 Aug 2021 05:42) (65 - 75)  BP: 146/73 (15 Aug 2021 05:42) (119/66 - 152/71)  BP(mean): --  RR: 17 (15 Aug 2021 05:42) (17 - 19)  SpO2: 98% (15 Aug 2021 05:42) (96% - 98%)  Daily     Daily     GENERAL: lying naked in bed, ill appearing  HEENT:  NCAT, no scleral icterus  CHEST: no resp distress  HEART:  RRR  ABDOMEN:  thin, nontender  EXTREMITIES:  No cyanosis, clubbing, or edema  SKIN:  No rash/erythema/ecchymoses/petechiae/wounds/abscess/warm/dry  NEURO:  Alert and confused    LABS:                        7.7    11.08 )-----------( 358      ( 15 Aug 2021 07:12 )             25.4     Mean Cell Volume: 79.4 fl (08-15-21 @ 07:12)    08-15    136  |  103  |  11  ----------------------------<  122<H>  3.2<L>   |  22  |  0.49<L>    Ca    7.7<L>      15 Aug 2021 07:10  Phos  2.2     08-15  Mg     2.0     08-15                  Imaging:

## 2021-08-15 NOTE — PROGRESS NOTE ADULT - ATTENDING COMMENTS
81M with history of Crohns c/b C.diff colitis s/p Fidaxomicin/FMT with diarrhea, cachexia  s/p remicade x 1   apical scarring concerning in setting of indeterminate quant  neg quant in Dec  has had prior remicade in past    Rec  - await colorectal surgical eval for crohns colitis, if no surgical intervention, highly favor repeat anti TNF dosing at 10mg/kg   - check vit B12, iron, folate, vit D levels, A, B1 levels- may need aggressive supplementation given longstanding diarrhea (ordered for AM)  - discussed with daughter at bedside     Stephanie Rangel MD   of Medicine, Gastroenterology  [address] [place] [address], [address]  [place], [place] [id number] [phone] 81M with history of Crohns c/b C.diff colitis s/p Fidaxomicin/FMT with diarrhea, cachexia  s/p remicade x 1   apical scarring concerning in setting of indeterminate quant  neg quant in Dec  has had prior remicade in past    Rec  - await colorectal surgical eval for crohns colitis, if no surgical intervention, highly favor repeat anti TNF dosing at 10mg/kg   - f/u vit B12, iron, folate, vit D levels, A, B1 levels- may need aggressive supplementation given longstanding diarrhea   - discussed with daughter at bedside     Stephanie Rangel MD   of Medicine, Gastroenterology  [address] [place] [address], [address]  [place] [address]  [phone]

## 2021-08-15 NOTE — CONSULT NOTE ADULT - ASSESSMENT
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 Colorectal consulted to evaluate for need of surgical management. On exam patient is soft and non tender, lethargic overall. Overall impression, patient does not appear to have any indication for surgical intervention at this time, lactate of 2.4 likely from dehydration / malnutrition as patient has been having multiple episodes of diarrhea and unable to tolerate PO well 2/2 lethargy.    Plan:  - no acute surgical intervention at this time  - optimize hydration, trend lactate  - optimize nutrition status, per report by patient's daughter, patient has been unable to tolerate PO well, should consider parental nutrition if necessary  - appreciate GI recs  - plan discussed with Dr Parks

## 2021-08-15 NOTE — PROGRESS NOTE ADULT - ASSESSMENT
A/p    # AMS   -sod improved after IV fluid  still remain confused   likely metabolic encephalopathy   no focal deficit    sever C diff colitis :  -completed vanco/ flagyl   s/p FMT   -started on fidaxomicin 200 mg bid today : as per ID and GI   -supportive care     Hx of Chron's dis :  - IBD exacerbation and his cdiff is improved on flex sig   -started on Iv steroids   -Gi recommend inflixamab IV for chron's , if ID clears 2/2 intermediate gold quanteferone   ID recommend to see old document of interferone testing when he was given inflixamab as out pt.     hyponatremia / sever malnutrition  -started on IV fluids     dispo: seen by colorectal surgery team today : advised to increase IV fluids , no acute surgical intervention .   will check CT abd/pelvis repeat , as lactate is increasing

## 2021-08-15 NOTE — PROGRESS NOTE ADULT - ASSESSMENT
Impression:  80 yo M w/ GERD and recently diagnosed CD p/w hematochezia, found to be C diff positive w/ ongoing CD flair    # c diff - pseudomembranous colitis seen on colonoscopy 7/19/21, treated w/ vanc, fidaxomicin, FMT; repeat flex sig w/o further pseudomembranes but ongoing severe CD. 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     Yonatan Silveira, PGY5  Gastroenterology/Hepatology Fellow  Available on [employer] Teams  [phone] ([employer] Short Range Pager)  [phone] (Long Range Pager)    After 5pm, please contact the on-call GI fellow. [phone]     Impression:  80 yo M w/ GERD and recently diagnosed CD p/w hematochezia, found to be C diff positive w/ ongoing CD flair    # c diff - pseudomembranous colitis seen on colonoscopy 7/19/21, treated w/ vanc, fidaxomicin, FMT; repeat flex sig w/o further pseudomembranes but ongoing severe CD. 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     Yonatan Silveira, PGY5  Gastroenterology/Hepatology Fellow  Available on [employer] Teams  [phone] ([employer] Short Range Pager)  [phone] (Long Range Pager)    After 5pm, please contact the on-call GI fellow. [phone]     Impression:  82 yo M w/ GERD and recently diagnosed CD p/w hematochezia, found to be C diff positive w/ ongoing CD flair    # c diff - pseudomembranous colitis seen on colonoscopy 7/19/21, treated w/ vanc, fidaxomicin, FMT; repeat flex sig w/o further pseudomembranes but ongoing severe CD. 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    Recommendations:  - workup for AMS per primary team, correct all electrolytes  - f/u B12, iron, folate, vit D, vit A, vit B1 levels given chronic diarrhea  - consider CT A/P given rising lactate and r/o ischemic bowel, however abd soft on exam so lower clinical suspicion  -please consult colorectal surgical   - f/u ID recs  - pending colorectal surgery recs, will consider high dose infliximab (10mg/kg); however patient w/ lung scarring seen on CT and c/f prior TB infection, especially in light of indeterminate quant  - encourage PO intake, may require TPN short term to improve cachexia until CD can be under control   - monitor stool output  - rest of care per primary team    GI will continue to follow.     Yonatan Silveira, PGY5  Gastroenterology/Hepatology Fellow  Available on [employer] Teams  [phone] ([employer] Short Range Pager)  [phone] (Long Range Pager)    After 5pm, please contact the on-call GI fellow. [phone]

## 2021-08-15 NOTE — CONSULT NOTE ADULT - SUBJECTIVE AND OBJECTIVE BOX
GIDEON HAYES [id number]  81y Male  33d    HPI:  82 yo M with Crohn disease, diagnosed 9 months ago, was started on remicade but developed antibodies, here with persistent episodes of diarrhea despite not responding to medical therapy, increasingly lethargic. Patient was also cdiff positive this admission, treated with abx and fecal transplant, on repeat sigmoidoscopy appeared to have resolved. Patient however has persistent symptoms and still has inflammatory changes suggestive of crohn's colitis. Colorectal surgery was consulted for evaluation.    PAST MEDICAL & SURGICAL HISTORY:  No pertinent past medical history  No significant past surgical history    MEDICATIONS  (STANDING):  ascorbic acid 500 milliGRAM(s) Oral daily  clonazePAM  Tablet 0.25 milliGRAM(s) Oral at bedtime  fidaxomicin 200 milliGRAM(s) Oral <User Schedule>  FIRST- Mouthwash  BLM 10 milliLiter(s) Swish and Spit every 8 hours  folic acid 1 milliGRAM(s) Oral daily  iron sucrose Injectable 100 milliGRAM(s) IV Push every 24 hours  methylPREDNISolone sodium succinate Injectable 20 milliGRAM(s) IV Push every 8 hours  multivitamin 1 Tablet(s) Oral daily  potassium phosphate IVPB 30 milliMole(s) IV Intermittent once  sodium chloride 0.9%. 1000 milliLiter(s) (75 mL/Hr) IV Continuous <Continuous>  sotalol 120 milliGRAM(s) Oral daily    MEDICATIONS  (PRN):  acetaminophen   Tablet .. 650 milliGRAM(s) Oral every 6 hours PRN Temp greater or equal to 38.5C (101.3F), Mild Pain (1 - 3), Moderate Pain (4 - 6)  aluminum hydroxide/magnesium hydroxide/simethicone Suspension 30 milliLiter(s) Oral every 4 hours PRN Dyspepsia  guaifenesin/dextromethorphan Oral Liquid 10 milliLiter(s) Oral every 8 hours PRN Cough  lidocaine 2% Gel 1 Application(s) Topical four times a day PRN pain due to skin excoriation  LORazepam   Injectable 0.5 milliGRAM(s) IV Push once PRN Agitation  morphine  - Injectable 2 milliGRAM(s) IV Push every 8 hours PRN Severe Pain (7 - 10)  ondansetron Injectable 4 milliGRAM(s) IV Push every 6 hours PRN Nausea and/or Vomiting      Allergies    No Known Allergies    Intolerances        REVIEW OF SYSTEMS    [ ] A ten-point review of systems was otherwise negative except as noted.  [ x ] Due to altered mental status/intubation, subjective information were not able to be obtained from the patient. History was obtained, to the extent possible, from review of the chart and collateral sources of information.      Vital Signs Last 24 Hrs  T(C): 36.6 (15 Aug 2021 15:48), Max: 36.9 (15 Aug 2021 05:42)  T(F): 97.8 (15 Aug 2021 15:48), Max: 98.4 (15 Aug 2021 05:42)  HR: 62 (15 Aug 2021 15:48) (62 - 75)  BP: 145/75 (15 Aug 2021 15:48) (134/68 - 155/71)  RR: 18 (15 Aug 2021 15:48) (17 - 18)  SpO2: 97% (15 Aug 2021 15:48) (96% - 98%)    PHYSICAL EXAM:  GENERAL: NAD, lethargic  CHEST/LUNG: Clear to auscultation bilaterally  HEART: Regular rate and rhythm  ABDOMEN: Soft, Nontender, Nondistended;   EXTREMITIES:  No clubbing, cyanosis, or edema      LABS:  Labs:  CAPILLARY BLOOD GLUCOSE                              8.3    15.91 )-----------( 360      ( 15 Aug 2021 15:35 )             27.6       Auto Neutrophil %: 86.1 % (08-15-21 @ 15:35)  Band Neutrophils %: 4.4 % (08-15-21 @ 15:35)    08-15    136  |  103  |  11  ----------------------------<  122<H>  3.2<L>   |  22  |  0.49<L>      Calcium, Total Serum: 7.7 mg/dL (08-15-21 @ 07:10)      LFTs:     Blood Gas Venous - Lactate: 2.4 mmoL/L (08-15-21 @ 07:33)  Blood Gas Venous - Lactate: 1.7 mmoL/L (08-13-21 @ 23:12)    RADIOLOGY & ADDITIONAL STUDIES:  < from: CT Abdomen and Pelvis w/ IV Cont (08.06.21 @ 16:14) >  IMPRESSION:  Diffuse colorectal wall thickening with surrounding inflammatory changes, which is progressed along the ascending colon and hepatic flexure since 7/13/2021, may reflect known C. difficile colitis or active inflammatory bowel disease.    Apparent bladder wall thickening may be onthe basis of underdistention and/or reactive to the inflammatory process involving the adjacent bowel. Consider correlation with urinalysis to assess for cystitis.    Small bilateral pleural effusions.    Tree-in-bud opacities in the left lower lobe may reflect the sequela of aspiration.    Scattered pulmonary nodules measuring up to 3 mm, for which a person of low risk requires no additional follow-up. Follow-up chest CT in 12 months may performed to monitor for stability in a patient consideredat high risk*.    < end of copied text >

## 2021-08-15 NOTE — PROGRESS NOTE ADULT - SUBJECTIVE AND OBJECTIVE BOX
Patient is a 81y old  Male who presents with a chief complaint of diarrhea with blood in it , abd pain and weakness (15 Aug 2021 18:44)      INTERVAL HPI/OVERNIGHT EVENTS:still having diarrhea , remain confused . seen by surgery     T(C): 36.6 (08-15-21 @ 15:48), Max: 36.9 (08-15-21 @ 05:42)  HR: 62 (08-15-21 @ 15:48) (62 - 75)  BP: 145/75 (08-15-21 @ 15:48) (134/68 - 155/71)  RR: 18 (08-15-21 @ 15:48) (17 - 18)  SpO2: 97% (08-15-21 @ 15:48) (96% - 98%)  Wt(kg): --  I&O's Summary    14 Aug 2021 07:01  -  15 Aug 2021 07:00  --------------------------------------------------------  IN: 825 mL / OUT: 0 mL / NET: 825 mL    15 Aug 2021 07:01  -  15 Aug 2021 19:21  --------------------------------------------------------  IN: 600 mL / OUT: 0 mL / NET: 600 mL        PAST MEDICAL & SURGICAL HISTORY:  No pertinent past medical history    No significant past surgical history        SOCIAL HISTORY  Alcohol:  Tobacco:  Illicit substance use:    FAMILY HISTORY:    REVIEW OF SYSTEMS:  CONSTITUTIONAL: No fever, weight loss, or fatigue  EYES: No eye pain, visual disturbances, or discharge  ENMT:  No difficulty hearing, tinnitus, vertigo; No sinus or throat pain  NECK: No pain or stiffness  RESPIRATORY: No cough, wheezing, chills or hemoptysis; No shortness of breath  CARDIOVASCULAR: No chest pain, palpitations, dizziness, or leg swelling  GASTROINTESTINAL: No abdominal or epigastric pain. No nausea, vomiting, or hematemesis; No diarrhea or constipation. No melena or hematochezia.  GENITOURINARY: No dysuria, frequency, hematuria, or incontinence  NEUROLOGICAL: No headaches, memory loss, loss of strength, numbness, or tremors  SKIN: No itching, burning, rashes, or lesions   LYMPH NODES: No enlarged glands  ENDOCRINE: No heat or cold intolerance; No hair loss  MUSCULOSKELETAL: No joint pain or swelling; No muscle, back, or extremity pain  PSYCHIATRIC: No depression, anxiety, mood swings, or difficulty sleeping  HEME/LYMPH: No easy bruising, or bleeding gums  ALLERY AND IMMUNOLOGIC: No hives or eczema    RADIOLOGY & ADDITIONAL TESTS:    Imaging Personally Reviewed:  [ ] YES  [ ] NO    Consultant(s) Notes Reviewed:  [ ] YES  [ ] NO    PHYSICAL EXAM:  GENERAL: NAD, well-groomed, well-developed  HEAD:  Atraumatic, Normocephalic  EYES: EOMI, PERRLA, conjunctiva and sclera clear  ENMT: No tonsillar erythema, exudates, or enlargement; Moist mucous membranes, Good dentition, No lesions  NECK: Supple, No JVD, Normal thyroid  NERVOUS SYSTEM:  Alert & Oriented X3, Good concentration; Motor Strength 5/5 B/L upper and lower extremities; DTRs 2+ intact and symmetric  CHEST/LUNG: Clear to percussion bilaterally; No rales, rhonchi, wheezing, or rubs  HEART: Regular rate and rhythm; No murmurs, rubs, or gallops  ABDOMEN: Soft, Nontender, Nondistended; Bowel sounds present  EXTREMITIES:  2+ Peripheral Pulses, No clubbing, cyanosis, or edema  LYMPH: No lymphadenopathy noted  SKIN: No rashes or lesions    LABS:                        8.3    15.91 )-----------( 360      ( 15 Aug 2021 15:35 )             27.6     08-15    136  |  103  |  11  ----------------------------<  122<H>  3.2<L>   |  22  |  0.49<L>    Ca    7.7<L>      15 Aug 2021 07:10  Phos  2.2     08-15  Mg     2.0     08-15          CAPILLARY BLOOD GLUCOSE                MEDICATIONS  (STANDING):  ascorbic acid 500 milliGRAM(s) Oral daily  clonazePAM  Tablet 0.25 milliGRAM(s) Oral at bedtime  fidaxomicin 200 milliGRAM(s) Oral <User Schedule>  FIRST- Mouthwash  BLM 10 milliLiter(s) Swish and Spit every 8 hours  folic acid 1 milliGRAM(s) Oral daily  iron sucrose Injectable 100 milliGRAM(s) IV Push every 24 hours  methylPREDNISolone sodium succinate Injectable 20 milliGRAM(s) IV Push every 8 hours  multivitamin 1 Tablet(s) Oral daily  potassium phosphate IVPB 30 milliMole(s) IV Intermittent once  sodium chloride 0.9%. 1000 milliLiter(s) (125 mL/Hr) IV Continuous <Continuous>  sotalol 120 milliGRAM(s) Oral daily    MEDICATIONS  (PRN):  acetaminophen   Tablet .. 650 milliGRAM(s) Oral every 6 hours PRN Temp greater or equal to 38.5C (101.3F), Mild Pain (1 - 3), Moderate Pain (4 - 6)  aluminum hydroxide/magnesium hydroxide/simethicone Suspension 30 milliLiter(s) Oral every 4 hours PRN Dyspepsia  guaifenesin/dextromethorphan Oral Liquid 10 milliLiter(s) Oral every 8 hours PRN Cough  lidocaine 2% Gel 1 Application(s) Topical four times a day PRN pain due to skin excoriation  LORazepam   Injectable 0.5 milliGRAM(s) IV Push once PRN Agitation  morphine  - Injectable 2 milliGRAM(s) IV Push every 8 hours PRN Severe Pain (7 - 10)  ondansetron Injectable 4 milliGRAM(s) IV Push every 6 hours PRN Nausea and/or Vomiting      Care Discussed with Consultants/Other Providers [ ] YES  [ ] NO

## 2021-08-15 NOTE — PROGRESS NOTE ADULT - SUBJECTIVE AND OBJECTIVE BOX
DATE OF SERVICE: 08-15-21 @ 23:25    Patient is a 81y old  Male who presents with a chief complaint of diarrhea with blood in it , abd pain and weakness (15 Aug 2021 19:21)      INTERVAL HISTORY:     TELEMETRY Personally reviewed:  	  MEDICATIONS:  sotalol 120 milliGRAM(s) Oral daily        PHYSICAL EXAM:  T(C): 37.4 (08-15-21 @ 20:07), Max: 37.4 (08-15-21 @ 20:07)  HR: 82 (08-15-21 @ 20:07) (62 - 82)  BP: 126/61 (08-15-21 @ 20:07) (126/61 - 155/71)  RR: 18 (08-15-21 @ 20:07) (17 - 18)  SpO2: 97% (08-15-21 @ 20:07) (96% - 98%)  Wt(kg): --  I&O's Summary    14 Aug 2021 07:01  -  15 Aug 2021 07:00  --------------------------------------------------------  IN: 825 mL / OUT: 0 mL / NET: 825 mL    15 Aug 2021 07:01  -  15 Aug 2021 23:25  --------------------------------------------------------  IN: 1150 mL / OUT: 0 mL / NET: 1150 mL          Appearance: In no distress	  HEENT:    PERRL, EOMI	  Cardiovascular:  S1 S2, No JVD  Respiratory: Lungs clear to auscultation	  Gastrointestinal:  Soft, Non-tender, + BS	  Vascularature:  No edema of LE  Psychiatric: Appropriate affect   Neuro: no acute focal deficits                               8.3    15.91 )-----------( 360      ( 15 Aug 2021 15:35 )             27.6     08-15    136  |  103  |  11  ----------------------------<  122<H>  3.2<L>   |  22  |  0.49<L>    Ca    7.7<L>      15 Aug 2021 07:10  Phos  2.2     08-15  Mg     2.0     08-15          Labs personally reviewed      ASSESSMENT/PLAN: 	  Problem/Plan - 1:  ·  Problem: CAD (coronary artery disease).  Plan: c/w home meds  We discussed the importance of SAPT with low dose ASA 81mg given CAD  pt denies any current chest pain, SOB or chest pressure.       Problem/Plan -2:  ·  Problem: Colitis.  Plan: Ct shows crohn's  exacerbation   f/u flex sig with biopsies   s/p FMT 7/28  plan for repeat FMT vs Fidoxomaicin if sx don't improve  f/p flex sig on 8/9- no evidence of pseudomembranous   started on Fidoxomaicin and steroids   per GI if fails medical therapy, then surgical evaluation would be indicated  f/u ID/ GI reccs     Problem/Plan - 3:  ·  Problem: Afib pt said he was placed on sotalol for afib   -c/w sotalol   -EKG noted. SR   - rate controlled and regular     Problem/Plan - 4:   ·  Problem: GIB (gastrointestinal bleeding).  Plan: bloody diarrhea 2/2 chron's exacerbation   IV fluids   -c-diff positive:   started on Fidoxomaicin    Problem/Plan - 5:  ·  Problem: SCDs  pt ambulatory     Problem/Plan - 6:  ·  Problem: B/L LE edema   - CXR showed mildly enlarged heart   -monitor IVF with I&O  -B/L L E edema improved   -TTE with mod-severe AI, mild AS, preserved EF    Problem/Plan - 7:  ·  Problem: AMS/ lethargic   replete electrolyte, trops flat, CKMB negative   pending [facility]              Rufus Onofre DO [facility]  Cardiovascular Medicine  [address], [address]  Office: [phone]  Cell: [phone]

## 2021-08-16 NOTE — PROGRESS NOTE ADULT - ATTENDING COMMENTS
Patient seen and examined. Family at bedside. 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 Recent flex sig without pseudomembranes and with worsening colitis. Biopsies negative for CMV. Discussed options with family including surgery and re-trial of a biologic. Family does not want trial of a biologic and prefers surgical management. I agree with this. Most likely will be unresponsive to Remicade. Entyvio slow to act. Given declining clinical status, would opt for surgical management.

## 2021-08-16 NOTE — PROGRESS NOTE ADULT - SUBJECTIVE AND OBJECTIVE BOX
Chief Complaint:  Patient is a 81y old  Male who presents with a chief complaint of diarrhea with blood in it , abd pain and weakness (16 Aug 2021 00:34)      Interval Events:       Hospital Medications:  acetaminophen   Tablet .. 650 milliGRAM(s) Oral every 6 hours PRN  aluminum hydroxide/magnesium hydroxide/simethicone Suspension 30 milliLiter(s) Oral every 4 hours PRN  ascorbic acid 500 milliGRAM(s) Oral daily  clonazePAM  Tablet 0.25 milliGRAM(s) Oral at bedtime  dextrose 5% + sodium chloride 0.9%. 1000 milliLiter(s) IV Continuous <Continuous>  fidaxomicin 200 milliGRAM(s) Oral <User Schedule>  FIRST- Mouthwash  BLM 10 milliLiter(s) Swish and Spit every 8 hours  folic acid 1 milliGRAM(s) Oral daily  guaifenesin/dextromethorphan Oral Liquid 10 milliLiter(s) Oral every 8 hours PRN  iron sucrose Injectable 100 milliGRAM(s) IV Push every 24 hours  lidocaine 2% Gel 1 Application(s) Topical four times a day PRN  LORazepam   Injectable 0.5 milliGRAM(s) IV Push once PRN  methylPREDNISolone sodium succinate Injectable 20 milliGRAM(s) IV Push every 8 hours  morphine  - Injectable 2 milliGRAM(s) IV Push every 8 hours PRN  multivitamin 1 Tablet(s) Oral daily  ondansetron Injectable 4 milliGRAM(s) IV Push every 6 hours PRN  sotalol 120 milliGRAM(s) Oral daily      PMHX/PSHX:  No pertinent past medical history    No significant past surgical history            ROS:     General:  No weight loss, fevers, chills, night sweats, fatigue   Eyes:  No vision changes  ENT:  No sore throat, pain, runny nose  CV:  No chest pain, palpitations, dizziness   Resp:  No SOB, cough, wheezing  GI:  See HPI  :  No burning with urination, hematuria  Muscle:  No pain, weakness  Neuro:  No weakness/tingling, memory problems  Psych:  No fatigue, insomnia, mood problems, depression  Heme:  No easy bruisability  Skin:  No rash, edema      PHYSICAL EXAM:     GENERAL:  Well developed, no distress  HEENT:  NC/AT,  conjunctivae clear, sclera anicteric  CHEST:  Full & symmetric excursion, no increased effort w/ respirations  HEART:  Regular rhythm & rate  ABDOMEN:  Soft, non-tender, non-distended  EXTREMITIES:  no LE  edema  SKIN:  No rash/erythema/ecchymoses/petechiae/wounds/jaundice  NEURO:  Alert, oriented    Vital Signs:  Vital Signs Last 24 Hrs  T(C): 36.8 (16 Aug 2021 06:04), Max: 37.4 (15 Aug 2021 20:07)  T(F): 98.2 (16 Aug 2021 06:04), Max: 99.3 (15 Aug 2021 20:07)  HR: 85 (16 Aug 2021 06:04) (62 - 85)  BP: 146/70 (16 Aug 2021 06:04) (122/75 - 155/71)  BP(mean): --  RR: 18 (16 Aug 2021 06:04) (18 - 18)  SpO2: 96% (16 Aug 2021 06:04) (95% - 97%)  Daily     Daily Weight in k.3 (16 Aug 2021 06:30)    LABS:                        8.3    15.91 )-----------( 360      ( 15 Aug 2021 15:35 )             27.6     08-15    136  |  103  |  11  ----------------------------<  122<H>  3.2<L>   |  22  |  0.49<L>    Ca    7.7<L>      15 Aug 2021 07:10  Phos  2.2     08-15  Mg     2.0     08-15                Imaging:             Chief Complaint:  Patient is a 81y old  Male who presents with a chief complaint of diarrhea with blood in it , abd pain and weakness (16 Aug 2021 00:34)      Interval Events: Lethargic this AM. Still reports diarrhea and abdominal pain but no N/V. Minimally responsive due to lethargy.      Hospital Medications:  acetaminophen   Tablet .. 650 milliGRAM(s) Oral every 6 hours PRN  aluminum hydroxide/magnesium hydroxide/simethicone Suspension 30 milliLiter(s) Oral every 4 hours PRN  ascorbic acid 500 milliGRAM(s) Oral daily  clonazePAM  Tablet 0.25 milliGRAM(s) Oral at bedtime  dextrose 5% + sodium chloride 0.9%. 1000 milliLiter(s) IV Continuous <Continuous>  fidaxomicin 200 milliGRAM(s) Oral <User Schedule>  FIRST- Mouthwash  BLM 10 milliLiter(s) Swish and Spit every 8 hours  folic acid 1 milliGRAM(s) Oral daily  guaifenesin/dextromethorphan Oral Liquid 10 milliLiter(s) Oral every 8 hours PRN  iron sucrose Injectable 100 milliGRAM(s) IV Push every 24 hours  lidocaine 2% Gel 1 Application(s) Topical four times a day PRN  LORazepam   Injectable 0.5 milliGRAM(s) IV Push once PRN  methylPREDNISolone sodium succinate Injectable 20 milliGRAM(s) IV Push every 8 hours  morphine  - Injectable 2 milliGRAM(s) IV Push every 8 hours PRN  multivitamin 1 Tablet(s) Oral daily  ondansetron Injectable 4 milliGRAM(s) IV Push every 6 hours PRN  sotalol 120 milliGRAM(s) Oral daily      PMHX/PSHX:  No pertinent past medical history    No significant past surgical history            ROS: see subjective      PHYSICAL EXAM:     GENERAL: elderly, chroincally-ill appearing  HEENT:  NCAT, eyes closed  CHEST: no resp distress  HEART:  RRR  ABDOMEN:  thin, +scaphoid abdomen; +diffusely tender  EXTREMITIES:  No cyanosis, clubbing, or edema  SKIN:  +pale  NEURO:  Awake and lethargic    Vital Signs:  Vital Signs Last 24 Hrs  T(C): 36.8 (16 Aug 2021 06:04), Max: 37.4 (15 Aug 2021 20:07)  T(F): 98.2 (16 Aug 2021 06:04), Max: 99.3 (15 Aug 2021 20:07)  HR: 85 (16 Aug 2021 06:04) (62 - 85)  BP: 146/70 (16 Aug 2021 06:04) (122/75 - 155/71)  BP(mean): --  RR: 18 (16 Aug 2021 06:04) (18 - 18)  SpO2: 96% (16 Aug 2021 06:04) (95% - 97%)  Daily     Daily Weight in k.3 (16 Aug 2021 06:30)    LABS:                        8.3    15.91 )-----------( 360      ( 15 Aug 2021 15:35 )             27.6     08-15    136  |  103  |  11  ----------------------------<  122<H>  3.2<L>   |  22  |  0.49<L>    Ca    7.7<L>      15 Aug 2021 07:10  Phos  2.2     08-15  Mg     2.0     08-15                Imaging: No new abdominal imaging             Chief Complaint:  Patient is a 81y old  Male who presents with a chief complaint of diarrhea with blood in it , abd pain and weakness (16 Aug 2021 00:34)      Interval Events: Lethargic this AM. Still reports diarrhea and abdominal pain but no N/V. Minimally responsive due to lethargy.      Hospital Medications:  acetaminophen   Tablet .. 650 milliGRAM(s) Oral every 6 hours PRN  aluminum hydroxide/magnesium hydroxide/simethicone Suspension 30 milliLiter(s) Oral every 4 hours PRN  ascorbic acid 500 milliGRAM(s) Oral daily  clonazePAM  Tablet 0.25 milliGRAM(s) Oral at bedtime  dextrose 5% + sodium chloride 0.9%. 1000 milliLiter(s) IV Continuous <Continuous>  fidaxomicin 200 milliGRAM(s) Oral <User Schedule>  FIRST- Mouthwash  BLM 10 milliLiter(s) Swish and Spit every 8 hours  folic acid 1 milliGRAM(s) Oral daily  guaifenesin/dextromethorphan Oral Liquid 10 milliLiter(s) Oral every 8 hours PRN  iron sucrose Injectable 100 milliGRAM(s) IV Push every 24 hours  lidocaine 2% Gel 1 Application(s) Topical four times a day PRN  LORazepam   Injectable 0.5 milliGRAM(s) IV Push once PRN  methylPREDNISolone sodium succinate Injectable 20 milliGRAM(s) IV Push every 8 hours  morphine  - Injectable 2 milliGRAM(s) IV Push every 8 hours PRN  multivitamin 1 Tablet(s) Oral daily  ondansetron Injectable 4 milliGRAM(s) IV Push every 6 hours PRN  sotalol 120 milliGRAM(s) Oral daily      PMHX/PSHX:  No pertinent past medical history    No significant past surgical history            ROS: see subjective      PHYSICAL EXAM:     GENERAL: elderly, chroincally-ill appearing  HEENT:  NCAT, eyes closed  CHEST: no resp distress  HEART:  RRR  ABDOMEN:  thin, +scaphoid abdomen; +diffusely tender  EXTREMITIES:  No cyanosis, clubbing, or edema  SKIN:  +pale  NEURO:  Awake and lethargic    Vital Signs:  Vital Signs Last 24 Hrs  T(C): 36.8 (16 Aug 2021 06:04), Max: 37.4 (15 Aug 2021 20:07)  T(F): 98.2 (16 Aug 2021 06:04), Max: 99.3 (15 Aug 2021 20:07)  HR: 85 (16 Aug 2021 06:04) (62 - 85)  BP: 146/70 (16 Aug 2021 06:04) (122/75 - 155/71)  BP(mean): --  RR: 18 (16 Aug 2021 06:04) (18 - 18)  SpO2: 96% (16 Aug 2021 06:04) (95% - 97%)  Daily     Daily Weight in k.3 (16 Aug 2021 06:30)    LABS:                        8.3    15.91 )-----------( 360      ( 15 Aug 2021 15:35 )             27.6     08-15    136  |  103  |  11  ----------------------------<  122<H>  3.2<L>   |  22  |  0.49<L>    Ca    7.7<L>      15 Aug 2021 07:10  Phos  2.2     08-15  Mg     2.0     08-15                Imaging:         EXAM:  CT ABDOMEN AND PELVIS IC                            PROCEDURE DATE:  2021            INTERPRETATION:  CLINICAL INFORMATION: Colitis with uptrending lactate.    COMPARISON: CT 2021    CONTRAST/COMPLICATIONS:  IV Contrast: Omnipaque 350  90 cc administered   10 cc discarded  Oral Contrast: None  Complications: None    PROCEDURE:  CT of the Abdomen and Pelvis was performed.  Sagittal and coronal reformats were performed.    FINDINGS:  LOWER CHEST: Cardiomegaly. Coronary calcification. Small bilateral pleural effusions with associated mild compressive atelectasis.    LIVER: Within normal limits.  BILE DUCTS: Normal caliber.  GALLBLADDER: Nonspecific gallbladder wall edema.  SPLEEN: Within normal limits.  PANCREAS: Within normallimits.  ADRENALS: Within normal limits.  KIDNEYS/URETERS: A 3 mm nonobstructing left lower pole renal calculus. Left renal cyst.    BLADDER: Mild dependent bladder debris.  REPRODUCTIVE ORGANS: Prostate is enlarged.    BOWEL: No bowel obstruction. Diffuse colon thickening consistent with colitis which may be infectious or inflammatory. Colonic diverticulosis. Appendix is normal.  PERITONEUM: No ascites.  VESSELS: Atherosclerotic changes including irregularity with mild dilatation of the SMA to 1.0 cm. Celiac axis, SMA and SARAH are patent as are the SMV and portal veins.  RETROPERITONEUM/LYMPH NODES: No lymphadenopathy.  ABDOMINAL WALL: Within normal limits.  BONES: Degenerative changes.    IMPRESSION:  Diffuse colitis which may be infectious or inflammatory, unchanged.  Patent mesenteric vasculature.    Nonspecific gallbladder wall edema. Correlate for right upper quadrant pain.

## 2021-08-16 NOTE — PROGRESS NOTE ADULT - ASSESSMENT
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 Colorectal consulted to evaluate for need of surgical management. On exam patient is soft and non tender, lethargic overall. Overall impression, patient does not appear to have any indication for surgical intervention at this time, lactate of 2.4 likely from dehydration / malnutrition as patient has been having multiple episodes of diarrhea and unable to tolerate PO well 2/2 lethargy.    Plan:  - no acute surgical intervention at this time  - optimize hydration, trend lactate  - optimize nutrition status, per report by patient's daughter, patient has been unable to tolerate PO well, should consider parental nutrition if necessary  - appreciate GI recs      [facility]  #[id number] Assessment:  81y Male with crohn's diagnosed 9 months ago, persistent symptoms unresponsive to medical therapy given so far, complicated by c diff pseudomembranous colitis requiring abx and fecal transplant, now on fidaxomicin, appears to have resolved on repeat sigmoidoscopy by GI. Colorectal consulted to evaluate for need of surgical management. On exam patient is soft and non tender, lethargic overall. Overall impression, patient does not appear to have any indication for surgical intervention at this time, lactate of 2.4 likely from dehydration / malnutrition as patient has been having multiple episodes of diarrhea and unable to tolerate PO well 2/2 lethargy.    Plan:  - no acute surgical intervention at this time  - consider continuing with biologics at this time, other medications than Remicade perhaps. Will d/w GI.   - optimize hydration, trend lactate  - optimize nutrition status, per report by patient's daughter, patient has been unable to tolerate PO well, should consider parental nutrition if necessary  - appreciate GI recs      [facility]  #[id number]

## 2021-08-16 NOTE — CHART NOTE - NSCHARTNOTEFT_GEN_A_CORE
Nutrition Follow Up Note     Patient seen for: malnutrition follow up and TPN Team consult 8/16    Source: patient, daughter at bedside, medical record, TPN Team rounds. Pt A&O x 1.     Chart reviewed, events noted. "81y Male with crohn's diagnosed 9 months ago, persistent symptoms unresponsive to medical therapy given so far, complicated by c diff pseudomembranous colitis requiring abx and fecal transplant, now on fidaxomicin, appears to have resolved on repeat sigmoidoscopy by GI. Colorectal consulted to evaluate for need of surgical management. On exam patient is soft and non tender, lethargic overall. Overall impression, patient does not appear to have any indication for surgical intervention at this time, lactate of 2.4 likely from dehydration / malnutrition as patient has been having multiple episodes of diarrhea and unable to tolerate PO well 2/2 lethargy."    Nutrition Status:  - Severe malnutrition with ~60 pound weight loss in <9 months, inability to tolerate po, chronic diarrhea  - TPN to be initiated 8/16 pending PICC placement  - CT scan pending today  - Anemia addressed with IV Venofer  - Supplementation: folic acid, vit C, multivitamin  - Pt with low K and P; high risk for refeeding syndrome; monitor potassium, magnesium, phosphorus, glucose and fluid balance closely.  --> Replete lytes as appropriate prior to initiating/advancing TPN.  --> Recommend change NS to D5   --> Add 100 mg thiamine x 5-7 days    Diet Order: Diet, Low Fiber:   David(7 Gm Arginine/7 Gm Glut/1.2 Gm HMB     Qty per Day:  2  Liquid Protein Supplement     Qty per Day:  1  Supplement Feeding Modality:  Oral  Ensure Enlive Cans or Servings Per Day:  2       Frequency:  Daily  Probiotic Yogurt/Smoothie Cans or Servings Per Day:  1       Frequency:  Daily (08-12-21 @ 10:59)      PARENTERAL NUTRITION:  Day 1 (8/16): Half-dose amino acids and dextrose; no lipid; 1.75L total volume  Day 2: Full-dose amino acids and dextrose; half-dose lipid     Day 3: TPN GOAL recommendations:  105 grams amino acids (700ml 15% a.a.)  210 grams dextrose (300ml 70% dex)  60 grams SMOFlipid (300ml 20%IVFE @ 25ml/hr x 12 hours)    Provides: 1734 kcal/day (29 kcal/kg and 1.8 gm/kg protein per dosing wt 59kg)    Non-Protein Calories: 1314 kcal/day (22 kcal/kg)  Dextrose Infusion Rate: 2.5 mg/kg/min  Lipid Infusion Rate: 1 gm/kg/day; 0.08 gm/kg/hr    Electrolytes and Insulin: (ordered 8/16/21)  Insulin (units): 0  NaCl (mEq):  40  Na acetate (mEq): 60  Na Phos (mmol): 60  KCL (mEq):  80  Calcium gluconate (mEq): 10  Mg sulfate (mEq): 12  MTE-5 concentrate (ml): 1  MVI (ml): 10    GI:  Last BM: 5/15 (multiple), 8/16      Anthropometric Measurements:   Height (cm): 170.2 (08-09-21 @ 12:23)  DOSING Weight (kg): 59 (07-13-21)  BMI (kg/m2): 20.4 (08-09-21 @ 12:23)  Daily Weight: 50.3 kg (08-16); weight loss noted  IBW: 67.1 kg    Medications: MEDICATIONS  (STANDING):  ascorbic acid 500 milliGRAM(s) Oral daily  clonazePAM  Tablet 0.25 milliGRAM(s) Oral at bedtime  dextrose 5% + sodium chloride 0.9%. 1000 milliLiter(s) (125 mL/Hr) IV Continuous <Continuous>  fidaxomicin 200 milliGRAM(s) Oral <User Schedule>  FIRST- Mouthwash  BLM 10 milliLiter(s) Swish and Spit every 8 hours  folic acid 1 milliGRAM(s) Oral daily  iron sucrose Injectable 100 milliGRAM(s) IV Push every 24 hours  methylPREDNISolone sodium succinate Injectable 20 milliGRAM(s) IV Push every 8 hours  multivitamin 1 Tablet(s) Oral daily  sotalol 120 milliGRAM(s) Oral daily  thiamine Injectable 100 milliGRAM(s) IV Push daily    MEDICATIONS  (PRN):  acetaminophen   Tablet .. 650 milliGRAM(s) Oral every 6 hours PRN Temp greater or equal to 38.5C (101.3F), Mild Pain (1 - 3), Moderate Pain (4 - 6)  aluminum hydroxide/magnesium hydroxide/simethicone Suspension 30 milliLiter(s) Oral every 4 hours PRN Dyspepsia  guaifenesin/dextromethorphan Oral Liquid 10 milliLiter(s) Oral every 8 hours PRN Cough  lidocaine 2% Gel 1 Application(s) Topical four times a day PRN pain due to skin excoriation  LORazepam   Injectable 0.5 milliGRAM(s) IV Push once PRN Agitation  morphine  - Injectable 2 milliGRAM(s) IV Push every 8 hours PRN Severe Pain (7 - 10)  ondansetron Injectable 4 milliGRAM(s) IV Push every 6 hours PRN Nausea and/or Vomiting    Labs: Per nursing flowsheet: "pts daughter refused am labs by phlebotomy. will try again later."    Hemoglobin : 8.3 g/dL  Hematocrit : 27.6 %    Hemoglobin A1C: unavailable    Skin: Stage II sacrum  Edema: 1+ left/right ankle    Estimated Needs: based on dosing wt 59 kg, with consideration for TPN and malnutrition  Energy: 1280-6198 calories (30-35 kcal/kg)  Protein:  grams (1.6-1.8 gm/kg)    Previous Nutrition Diagnosis: 1) Severe Malnutrition 2) Increased Nutrient Needs  Nutrition Diagnosis is: ongoing, addressed with TPN    New Nutrition Diagnosis:  none    Recommended Interventions:   1. TPN per TPN Team/Nutrition Assessment; TPN initiated 8/16  2. Continue Low Fiber diet and Ensure Enlive as tolerated; recommend discontinue David and modular protein.  3. Continue 1 serving Probiotic Yogurt/Smoothie  4. Obtain HbA1c, triglycerides, current labs       Monitoring and Evaluation:   Continue to monitor nutrition provision and tolerance, weights, labs, skin integrity.   RD remains available upon request and will follow up per protocol.    Miriam Manning, MS RD CDN [facility], #[phone]

## 2021-08-16 NOTE — CONSULT NOTE ADULT - SUBJECTIVE AND OBJECTIVE BOX
NUTRITION SUPPORT / TPN CONSULT NOTE:    HPI: 80 yo M with Crohns disease, over the last 9 mo, he was started on several medication and was recently told that he may need to go on IV infusion for chrons, he has several hospitalization at [facility] , and follows with GI near [facility]. but his diarrhea is not improving , having abd cramps , denies any N/V . , discharged 1 mo ago from [facility], now having trouble walking, on going diarrhea. + subjective fever but denies cough or nasal congestion or uri or urinary symptoms.  (13 Jul 2021 20:48)    Nutrition Support team consulted for TPN; per dw medicine, team would like tpn in interim until crohns dz better controlled, being followed by gi with plan to possibly start biologic if cleared by ID given indeterminate quantiferon; evaluated by surgery and no intervention planned    in brief, admitted 7/13 w bloody diarrhea, abd pain, weakness  pmhx of crohns dx ~9 mos ago, sp trial of remicaide/multiple other meds, h/o cdiff (also + this admission, sp vanc, fmt and now on dificid), afib (no ac per dtr) and cad    upon eval dtr at bedside and provided most of hx, pt lying in bed confused, a&o x1; per dtr since crohns dx pt with decreased appetite and intake 2/2 persistent diarrhea; and has had significant wt loss (ubw ~190# 6 mos ago, cbw ~130#). also with nausea, intermittent vomiting and abd pain; no prior abd sx. nkfa/nkda. home meds reviewed  avss  8/15 labs noted and significant for: wbc 15k on steroids, k 3.2, phos 2.2, prealb 8, +brianda on venofer, lactate 2.4, low vit d  8/5 bld cxs neg  7/15 quantiferon indeterminate  prior echo noted, preserved ef   7/19 and 8/12 endoscopic reports reviewed, path cw chronic active colitis, no cmv or dysplasia, on most recent scope colitis worsened and suspected etiology is CD            MEDICATIONS  (STANDING):  ascorbic acid 500 milliGRAM(s) Oral daily  clonazePAM  Tablet 0.25 milliGRAM(s) Oral at bedtime  dextrose 40% Gel 15 Gram(s) Oral once  dextrose 5% + sodium chloride 0.9%. 1000 milliLiter(s) (125 mL/Hr) IV Continuous <Continuous>  dextrose 5%. 1000 milliLiter(s) (50 mL/Hr) IV Continuous <Continuous>  dextrose 5%. 1000 milliLiter(s) (100 mL/Hr) IV Continuous <Continuous>  dextrose 50% Injectable 25 Gram(s) IV Push once  dextrose 50% Injectable 12.5 Gram(s) IV Push once  dextrose 50% Injectable 25 Gram(s) IV Push once  fidaxomicin 200 milliGRAM(s) Oral <User Schedule>  FIRST- Mouthwash  BLM 10 milliLiter(s) Swish and Spit every 8 hours  folic acid 1 milliGRAM(s) Oral daily  glucagon  Injectable 1 milliGRAM(s) IntraMuscular once  insulin lispro (ADMELOG) corrective regimen sliding scale   SubCutaneous every 6 hours  iron sucrose Injectable 100 milliGRAM(s) IV Push every 24 hours  methylPREDNISolone sodium succinate Injectable 20 milliGRAM(s) IV Push every 8 hours  multivitamin 1 Tablet(s) Oral daily  Parenteral Nutrition - Adult 1 Each (73 mL/Hr) TPN Continuous <Continuous>  sotalol 120 milliGRAM(s) Oral daily  thiamine Injectable 100 milliGRAM(s) IV Push daily    MEDICATIONS  (PRN):  acetaminophen   Tablet .. 650 milliGRAM(s) Oral every 6 hours PRN Temp greater or equal to 38.5C (101.3F), Mild Pain (1 - 3), Moderate Pain (4 - 6)  aluminum hydroxide/magnesium hydroxide/simethicone Suspension 30 milliLiter(s) Oral every 4 hours PRN Dyspepsia  guaifenesin/dextromethorphan Oral Liquid 10 milliLiter(s) Oral every 8 hours PRN Cough  lidocaine 2% Gel 1 Application(s) Topical four times a day PRN pain due to skin excoriation  LORazepam   Injectable 0.5 milliGRAM(s) IV Push once PRN Agitation  morphine  - Injectable 2 milliGRAM(s) IV Push every 8 hours PRN Severe Pain (7 - 10)  ondansetron Injectable 4 milliGRAM(s) IV Push every 6 hours PRN Nausea and/or Vomiting      PAST MEDICAL & SURGICAL HISTORY:  No pertinent past medical history    No significant past surgical history        FAMILY HISTORY:      ALLERGIES  No Known Allergies      REVIEW OF SYSTEMS  --------------------------------------------------------------------------------    see hpi, unable to obtain       VITAL SIGNS:  T(C): 36.8 (08-16-21 @ 06:04), Max: 37.4 (08-15-21 @ 20:07)  HR: 85 (08-16-21 @ 06:04) (62 - 85)  BP: 146/70 (08-16-21 @ 06:04) (122/75 - 146/70)  RR: 18 (08-16-21 @ 06:04) (18 - 18)  SpO2: 96% (08-16-21 @ 06:04) (95% - 97%)  I&O's Detail    15 Aug 2021 07:01  -  16 Aug 2021 07:00  --------------------------------------------------------  IN:    IV PiggyBack: 500 mL    Oral Fluid: 100 mL    sodium chloride 0.9%: 1500 mL    sodium chloride 0.9%: 600 mL  Total IN: 2700 mL    OUT:  Total OUT: 0 mL    Total NET: 2700 mL            LABS:                        8.3    15.91 )-----------( 360      ( 15 Aug 2021 15:35 )             27.6     08-15    136  |  103  |  11  ----------------------------<  122<H>  3.2<L>   |  22  |  0.49<L>    Ca    7.7<L>      15 Aug 2021 07:10  Phos  2.2     08-15  Mg     2.0     08-15      Ionized Calcium Levels:     CAPILLARY BLOOD GLUCOSE      CAPILLARY BLOOD GLUCOS          Physical Exam:  Gen: lying in bed, frail, cachectic   HEENT: ncat, trachea midline  Chest: respirations even and non labored  Abd: soft, nontender, nondistended  LE:  no edema  Neuro: lethargic but arousable, confused

## 2021-08-16 NOTE — PROGRESS NOTE ADULT - SUBJECTIVE AND OBJECTIVE BOX
Patient is a 81y old  Male who presents with a chief complaint of diarrhea with blood in it , abd pain and weakness (16 Aug 2021 12:46)      INTERVAL HISTORY: pt resting, lethargic       REVIEW OF SYSTEMS:   CONSTITUTIONAL: No weakness  EYES/ENT: No visual changes; No throat pain  Neck: No pain or stiffness  Respiratory: No cough, wheezing, No shortness of breath  CARDIOVASCULAR: no chest pain or palpitations  GASTROINTESTINAL: No abdominal pain, no nausea, vomiting or hematemesis  GENITOURINARY: No dysuria, frequency or hematuria  NEUROLOGICAL: No stroke like symptoms  SKIN: No rashes    	  MEDICATIONS:  sotalol 120 milliGRAM(s) Oral daily        PHYSICAL EXAM:  T(C): 36.3 (08-16-21 @ 12:51), Max: 37.4 (08-15-21 @ 20:07)  HR: 84 (08-16-21 @ 13:09) (75 - 85)  BP: 108/91 (08-16-21 @ 13:09) (95/60 - 146/70)  RR: 18 (08-16-21 @ 12:51) (18 - 18)  SpO2: 97% (08-16-21 @ 12:51) (95% - 97%)  Wt(kg): --  I&O's Summary    15 Aug 2021 07:01  -  16 Aug 2021 07:00  --------------------------------------------------------  IN: 2700 mL / OUT: 0 mL / NET: 2700 mL    16 Aug 2021 07:01  -  16 Aug 2021 16:43  --------------------------------------------------------  IN: 550 mL / OUT: 0 mL / NET: 550 mL          Appearance: In no distress	  HEENT:    PERRL, EOMI	  Cardiovascular:  S1 S2, No JVD  Respiratory: Lungs clear to auscultation	  Gastrointestinal:  Soft, Non-tender, + BS	  Vascularature:  No edema of LE  Psychiatric: Appropriate affect   Neuro: no acute focal deficits                               8.3    15.91 )-----------( 360      ( 15 Aug 2021 15:35 )             27.6     08-15    136  |  103  |  11  ----------------------------<  122<H>  3.2<L>   |  22  |  0.49<L>    Ca    7.7<L>      15 Aug 2021 07:10  Phos  2.2     08-15  Mg     2.0     08-15          Labs personally reviewed      ASSESSMENT/PLAN: 	      Problem/Plan - 1:  ·  Problem: CAD (coronary artery disease).  Plan: c/w home meds  We discussed the importance of SAPT with low dose ASA 81mg given CAD  pt denies any current chest pain, SOB or chest pressure.       Problem/Plan -2:  ·  Problem: Colitis.  Plan: Ct shows crohn's  exacerbation   f/u flex sig with biopsies   s/p FMT 7/28  plan for repeat FMT vs Fidoxomaicin if sx don't improve  f/p flex sig on 8/9- no evidence of pseudomembranous   started on Fidoxomaicin and steroids   per GI if fails medical therapy, then surgical evaluation would be indicated  f/u ID/ GI reccs   planning PICC insertion for TPN     Problem/Plan - 3:  ·  Problem: Afib pt said he was placed on sotalol for afib   -c/w sotalol   -EKG noted. SR   - rate controlled and regular     Problem/Plan - 4:   ·  Problem: GIB (gastrointestinal bleeding).  Plan: bloody diarrhea 2/2 chron's exacerbation   IV fluids   -c-diff positive:   on Fidoxomaicin    Problem/Plan - 5:  ·  Problem: SCDs     Problem/Plan - 6:  ·  Problem: B/L LE edema   - CXR showed mildly enlarged heart   -monitor IVF with I&O  -B/L L E edema improved   -TTE with mod-severe AI, mild AS, preserved EF    Problem/Plan - 7:  ·  Problem: AMS/ lethargic   replete electrolyte, trops flat, CKMB negative   pending CTH results               Kar Powell [facility]   Rufus Onofre DO [facility]  Cardiovascular Medicine  [address], [address]  Office: [phone]  Cell: [phone]

## 2021-08-16 NOTE — PROGRESS NOTE ADULT - ASSESSMENT
81y Male with GERD on omeprazole daily and Crohn's disease diagnosed about 9 mos prior to admission at outside hospital c/b h/o abscess and left posterior distal anal intersphincteric fistula and presents diarrhea/BRBPR  2/2 to CD flare c/b c diff colitis. Initially treat for C diff, given that it did not get better got FMT. Switched PO vanc in favor of Fidaxomicin without any improvement. Therefore, patient got Flex sig to reassess which showed no pseudomembranous colitis anymore but severe Crohn colitis. We started him on steroids on 8/9. Has had minimal improvement.    # c diff - pseudomembranous colitis seen on colonoscopy 7/19/21, treated w/ vanc, fidaxomicin, FMT; repeat flex sig w/o further pseudomembranes but ongoing severe CD. 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    Recommendations:  - workup for AMS per primary team, correct all electrolytes  - f/u B12, iron, folate, vit D, vit A, vit B1 levels given chronic diarrhea  - consider CT A/P given rising lactate and r/o ischemic bowel, however abd soft on exam so lower clinical suspicion  -please consult colorectal surgical   - f/u ID recs  - pending colorectal surgery recs, will consider high dose infliximab (10mg/kg); however patient w/ lung scarring seen on CT and c/f prior TB infection, especially in light of indeterminate quant  - encourage PO intake, may require TPN short term to improve cachexia until CD can be under control   - monitor stool output  - rest of care per primary team    Teri Hess MD  GI Fellow, PGY-4  Available via [employer] Teams    NON-URGENT CONSULTS:  Please email [email or web address]  AT NIGHT AND ON WEEKENDS:  Contact on-call GI fellow via answering service ([phone]) from 5pm-8am and on weekends/holidays  MONDAY-FRIDAY 8AM-5PM:  Pager# [phone] ([facility]) or [phone] ([facility])  GI Phone# [phone] ([facility])   81y Male with GERD on omeprazole daily and Crohn's disease diagnosed about 9 mos prior to admission at outside hospital c/b h/o abscess and left posterior distal anal intersphincteric fistula and presents diarrhea/BRBPR  2/2 to CD flare c/b c diff colitis. Initially treat for C diff, given that it did not get better got FMT. Switched PO vanc in favor of Fidaxomicin without any improvement. Therefore, patient got Flex sig to reassess which showed no pseudomembranous colitis anymore but severe Crohn colitis. We started him on steroids on 8/9. Has had minimal improvement.    # c diff - pseudomembranous colitis seen on colonoscopy 7/19/21, treated w/ vanc, fidaxomicin, FMT; repeat flex sig w/o further pseudomembranes but ongoing severe CD. 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    Recommendations:  - f/u ID recs  - appreciate colorectal surgery recs  - continue fidaxomicin 200 mg qoD  - continue methylprednisolone IV 20 mg q8h  - pending discussion of pt's case at IBD conference 8/18  - spoke with pt's family at bedside today who declined further treatment with infliximab as they believe this is not improving pt's Crohn's colitis; family pending decision about colectomy based on discussion at upcoming IBD conference 8/18  - encourage PO intake, can start TPN short term to improve cachexia until CD can be under control (per family preference)  - monitor stool output  - rest of care per primary team    We will continue to follow.    Teri Hess MD  GI Fellow, PGY-4  Available via [employer] Teams    NON-URGENT CONSULTS:  Please email [email or web address]  AT NIGHT AND ON WEEKENDS:  Contact on-call GI fellow via answering service ([phone]) from 5pm-8am and on weekends/holidays  MONDAY-FRIDAY 8AM-5PM:  Pager# [phone] ([facility]) or [phone] ([facility])  GI Phone# [phone] ([facility])

## 2021-08-16 NOTE — PROGRESS NOTE ADULT - ASSESSMENT
A/p    # AMS   -sod improved after IV fluid  still remain confused   likely metabolic encephalopathy   no focal deficit    sever C diff colitis :  -completed vanco/ flagyl   s/p FMT   -started on fidaxomicin 200 mg bid today : as per ID and GI   -supportive care     Hx of Chron's dis :  - IBD exacerbation and his cdiff is improved on flex sig   -started on Iv steroids   -family refusal for using inflaximab as they says it didn't work in past   family now considering total colectomy : however surgery team is recommending no surgical intervention   ongoing discussion with family/ surgeon and GI     hyponatremia / sever malnutrition  -started on IV fluids   improved sod     sever malnutrition :  -started on TPN

## 2021-08-16 NOTE — PROGRESS NOTE ADULT - SUBJECTIVE AND OBJECTIVE BOX
SURGERY  Pager: #[phone]    INTERVAL EVENTS/SUBJECTIVE: No acute events overnight.     ______________________________________________  OBJECTIVE:   T(C): 37.4 (08-15-21 @ 20:07), Max: 37.4 (08-15-21 @ 20:07)  HR: 82 (08-15-21 @ 20:07) (62 - 82)  BP: 126/61 (08-15-21 @ 20:07) (126/61 - 155/71)  RR: 18 (08-15-21 @ 20:07) (17 - 18)  SpO2: 97% (08-15-21 @ 20:07) (96% - 98%)  Wt(kg): --  CAPILLARY BLOOD GLUCOSE        I&O's Detail    14 Aug 2021 07:01  -  15 Aug 2021 07:00  --------------------------------------------------------  IN:    Oral Fluid: 300 mL    sodium chloride 0.9%: 525 mL  Total IN: 825 mL    OUT:  Total OUT: 0 mL    Total NET: 825 mL      15 Aug 2021 07:01  -  16 Aug 2021 00:35  --------------------------------------------------------  IN:    IV PiggyBack: 500 mL    Oral Fluid: 50 mL    sodium chloride 0.9%: 600 mL  Total IN: 1150 mL    OUT:  Total OUT: 0 mL    Total NET: 1150 mL          Physical exam:  Gen: resting in bed comfortably in NAD  Chest: no increased WOB, regular inspiratory effort   Abdomen: Soft, nontender, nondistended with no rebound tenderness or guarding. Incisions clean, dry, intact, with no erythema.   Vascular: WWP, MYLES x4  NEURO: awake, alert  ______________________________________________  LABS:  CBC Full  -  ( 15 Aug 2021 15:35 )  WBC Count : 15.91 K/uL  RBC Count : 3.48 M/uL  Hemoglobin : 8.3 g/dL  Hematocrit : 27.6 %  Platelet Count - Automated : 360 K/uL  Mean Cell Volume : 79.3 fl  Mean Cell Hemoglobin : 23.9 pg  Mean Cell Hemoglobin Concentration : 30.1 gm/dL  Auto Neutrophil # : 14.40 K/uL  Auto Lymphocyte # : 0.27 K/uL  Auto Monocyte # : 1.24 K/uL  Auto Eosinophil # : 0.00 K/uL  Auto Basophil # : 0.00 K/uL  Auto Neutrophil % : 86.1 %  Auto Lymphocyte % : 1.7 %  Auto Monocyte % : 7.8 %  Auto Eosinophil % : 0.0 %  Auto Basophil % : 0.0 %    08-15    136  |  103  |  11  ----------------------------<  122<H>  3.2<L>   |  22  |  0.49<L>    Ca    7.7<L>      15 Aug 2021 07:10  Phos  2.2     08-15  Mg     2.0     08-15      _____________________________________________  RADIOLOGY:     SURGERY  Pager: #[phone]    INTERVAL EVENTS/SUBJECTIVE: No acute events overnight. On exam this morning, patient was awaking and was not communicative.     ______________________________________________  OBJECTIVE:   T(C): 37.4 (08-15-21 @ 20:07), Max: 37.4 (08-15-21 @ 20:07)  HR: 82 (08-15-21 @ 20:07) (62 - 82)  BP: 126/61 (08-15-21 @ 20:07) (126/61 - 155/71)  RR: 18 (08-15-21 @ 20:07) (17 - 18)  SpO2: 97% (08-15-21 @ 20:07) (96% - 98%)  Wt(kg): --  CAPILLARY BLOOD GLUCOSE        I&O's Detail    14 Aug 2021 07:01  -  15 Aug 2021 07:00  --------------------------------------------------------  IN:    Oral Fluid: 300 mL    sodium chloride 0.9%: 525 mL  Total IN: 825 mL    OUT:  Total OUT: 0 mL    Total NET: 825 mL      15 Aug 2021 07:01  -  16 Aug 2021 00:35  --------------------------------------------------------  IN:    IV PiggyBack: 500 mL    Oral Fluid: 50 mL    sodium chloride 0.9%: 600 mL  Total IN: 1150 mL    OUT:  Total OUT: 0 mL    Total NET: 1150 mL          Physical exam:  Gen: resting in bed comfortably in NAD  Chest: no increased WOB, regular inspiratory effort   Abdomen: Soft, nontender, nondistended with no rebound tenderness or guarding.   Vascular: WWP, MYLES x4  NEURO: awake, alert  ______________________________________________  LABS:  CBC Full  -  ( 15 Aug 2021 15:35 )  WBC Count : 15.91 K/uL  RBC Count : 3.48 M/uL  Hemoglobin : 8.3 g/dL  Hematocrit : 27.6 %  Platelet Count - Automated : 360 K/uL  Mean Cell Volume : 79.3 fl  Mean Cell Hemoglobin : 23.9 pg  Mean Cell Hemoglobin Concentration : 30.1 gm/dL  Auto Neutrophil # : 14.40 K/uL  Auto Lymphocyte # : 0.27 K/uL  Auto Monocyte # : 1.24 K/uL  Auto Eosinophil # : 0.00 K/uL  Auto Basophil # : 0.00 K/uL  Auto Neutrophil % : 86.1 %  Auto Lymphocyte % : 1.7 %  Auto Monocyte % : 7.8 %  Auto Eosinophil % : 0.0 %  Auto Basophil % : 0.0 %    08-15    136  |  103  |  11  ----------------------------<  122<H>  3.2<L>   |  22  |  0.49<L>    Ca    7.7<L>      15 Aug 2021 07:10  Phos  2.2     08-15  Mg     2.0     08-15      _____________________________________________  RADIOLOGY:

## 2021-08-16 NOTE — PROGRESS NOTE ADULT - SUBJECTIVE AND OBJECTIVE BOX
Patient is a 81y old  Male who presents with a chief complaint of diarrhea with blood in it , abd pain and weakness (17 Aug 2021 01:03)      INTERVAL HPI/OVERNIGHT EVENTS: condition same , still diarrhea , poor appetitie and significant weight loss   started on TPN   T(C): 37.3 (08-17-21 @ 00:37), Max: 37.3 (08-17-21 @ 00:37)  HR: 80 (08-17-21 @ 00:37) (80 - 87)  BP: 133/87 (08-17-21 @ 00:37) (95/60 - 157/76)  RR: 18 (08-17-21 @ 00:37) (18 - 18)  SpO2: 97% (08-17-21 @ 00:37) (96% - 97%)  Wt(kg): --  I&O's Summary    15 Aug 2021 07:01  -  16 Aug 2021 07:00  --------------------------------------------------------  IN: 2700 mL / OUT: 0 mL / NET: 2700 mL    16 Aug 2021 07:01  -  17 Aug 2021 03:51  --------------------------------------------------------  IN: 600 mL / OUT: 0 mL / NET: 600 mL        PAST MEDICAL & SURGICAL HISTORY:  No pertinent past medical history    No significant past surgical history        SOCIAL HISTORY  Alcohol:  Tobacco:  Illicit substance use:    FAMILY HISTORY:    REVIEW OF SYSTEMS:  CONSTITUTIONAL: No fever, weight loss, or fatigue  EYES: No eye pain, visual disturbances, or discharge  ENMT:  No difficulty hearing, tinnitus, vertigo; No sinus or throat pain  NECK: No pain or stiffness  RESPIRATORY: No cough, wheezing, chills or hemoptysis; No shortness of breath  CARDIOVASCULAR: No chest pain, palpitations, dizziness, or leg swelling  GASTROINTESTINAL: No abdominal or epigastric pain. No nausea, vomiting, or hematemesis; No diarrhea or constipation. No melena or hematochezia.  GENITOURINARY: No dysuria, frequency, hematuria, or incontinence  NEUROLOGICAL: No headaches, memory loss, loss of strength, numbness, or tremors  SKIN: No itching, burning, rashes, or lesions   LYMPH NODES: No enlarged glands  ENDOCRINE: No heat or cold intolerance; No hair loss  MUSCULOSKELETAL: No joint pain or swelling; No muscle, back, or extremity pain  PSYCHIATRIC: No depression, anxiety, mood swings, or difficulty sleeping  HEME/LYMPH: No easy bruising, or bleeding gums  ALLERY AND IMMUNOLOGIC: No hives or eczema    RADIOLOGY & ADDITIONAL TESTS:    Imaging Personally Reviewed:  [ ] YES  [ ] NO    Consultant(s) Notes Reviewed:  [ ] YES  [ ] NO    PHYSICAL EXAM:  GENERAL: NAD, well-groomed, well-developed  HEAD:  Atraumatic, Normocephalic  EYES: EOMI, PERRLA, conjunctiva and sclera clear  ENMT: No tonsillar erythema, exudates, or enlargement; Moist mucous membranes, Good dentition, No lesions  NECK: Supple, No JVD, Normal thyroid  NERVOUS SYSTEM:  Alert & Oriented X3, Good concentration; Motor Strength 5/5 B/L upper and lower extremities; DTRs 2+ intact and symmetric  CHEST/LUNG: Clear to percussion bilaterally; No rales, rhonchi, wheezing, or rubs  HEART: Regular rate and rhythm; No murmurs, rubs, or gallops  ABDOMEN: Soft, Nontender, Nondistended; Bowel sounds present  EXTREMITIES:  2+ Peripheral Pulses, No clubbing, cyanosis, or edema  LYMPH: No lymphadenopathy noted  SKIN: No rashes or lesions    LABS:                        8.3    15.91 )-----------( 360      ( 15 Aug 2021 15:35 )             27.6     08-15    136  |  103  |  11  ----------------------------<  122<H>  3.2<L>   |  22  |  0.49<L>    Ca    7.7<L>      15 Aug 2021 07:10  Phos  2.2     08-15  Mg     2.0     08-15          CAPILLARY BLOOD GLUCOSE      POCT Blood Glucose.: 175 mg/dL (17 Aug 2021 00:10)  POCT Blood Glucose.: 150 mg/dL (16 Aug 2021 17:54)            MEDICATIONS  (STANDING):  ascorbic acid 500 milliGRAM(s) Oral daily  chlorhexidine 2% Cloths 1 Application(s) Topical daily  clonazePAM  Tablet 0.25 milliGRAM(s) Oral at bedtime  dextrose 40% Gel 15 Gram(s) Oral once  dextrose 5%. 1000 milliLiter(s) (50 mL/Hr) IV Continuous <Continuous>  dextrose 5%. 1000 milliLiter(s) (100 mL/Hr) IV Continuous <Continuous>  dextrose 50% Injectable 25 Gram(s) IV Push once  dextrose 50% Injectable 25 Gram(s) IV Push once  dextrose 50% Injectable 12.5 Gram(s) IV Push once  fidaxomicin 200 milliGRAM(s) Oral <User Schedule>  FIRST- Mouthwash  BLM 10 milliLiter(s) Swish and Spit every 8 hours  folic acid 1 milliGRAM(s) Oral daily  glucagon  Injectable 1 milliGRAM(s) IntraMuscular once  insulin lispro (ADMELOG) corrective regimen sliding scale   SubCutaneous every 6 hours  iron sucrose Injectable 100 milliGRAM(s) IV Push every 24 hours  methylPREDNISolone sodium succinate Injectable 20 milliGRAM(s) IV Push every 8 hours  multivitamin 1 Tablet(s) Oral daily  Parenteral Nutrition - Adult 1 Each (73 mL/Hr) TPN Continuous <Continuous>  sotalol 120 milliGRAM(s) Oral daily  thiamine Injectable 100 milliGRAM(s) IV Push daily    MEDICATIONS  (PRN):  acetaminophen   Tablet .. 650 milliGRAM(s) Oral every 6 hours PRN Temp greater or equal to 38.5C (101.3F), Mild Pain (1 - 3), Moderate Pain (4 - 6)  aluminum hydroxide/magnesium hydroxide/simethicone Suspension 30 milliLiter(s) Oral every 4 hours PRN Dyspepsia  guaifenesin/dextromethorphan Oral Liquid 10 milliLiter(s) Oral every 8 hours PRN Cough  lidocaine 2% Gel 1 Application(s) Topical four times a day PRN pain due to skin excoriation  LORazepam   Injectable 0.5 milliGRAM(s) IV Push once PRN Agitation  morphine  - Injectable 2 milliGRAM(s) IV Push every 8 hours PRN Severe Pain (7 - 10)  ondansetron Injectable 4 milliGRAM(s) IV Push every 6 hours PRN Nausea and/or Vomiting      Care Discussed with Consultants/Other Providers [ ] YES  [ ] NO

## 2021-08-16 NOTE — CONSULT NOTE ADULT - ASSESSMENT
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 Admitted for crohns management, course c/b cdiff colitis sp dificid/fmt and findings of indeterminate quantiferon which has delayed possible tx with biologic. Prealb 8. TPN consulted in setting of significant weight loss, severe pcm, and worsening colitis suspected 2/2 CD. Pt at high risk for refeeding syndrome.    Current Diet: Diet, Low Fiber:   David(7 Gm Arginine/7 Gm Glut/1.2 Gm HMB     Qty per Day:  2  Liquid Protein Supplement     Qty per Day:  1  Supplement Feeding Modality:  Oral  Ensure Enlive Cans or Servings Per Day:  2       Frequency:  Daily  Probiotic Yogurt/Smoothie Cans or Servings Per Day:  1       Frequency:  Daily     Day 1: Half-dose amino acids and dextrose; no lipid   Day 2: Full-dose amino acids and dextrose; start half-dose lipid   Day 3: TPN GOAL recommendations as per RD: aa 105, dextrose 210g, smof 60g      -pt approved for tpn; will need consent/picc line placement-  picc and primary teams  -check baseline a1c and triglyceride level  -f/u CTAP results and 8/16 labs- still pending  -plan to start tpn this evening at ~half dose dextrose/aa, no lipids  -rec thiamine supplementation, 100mg IV x 5-7 days  -please stop any additional IV fluids when TPN is initiated this evening; monitor I/O's  -rec fingerstick monitoring q6 hours and coverage with ISS as needed while on TPN; pt also on IV steroids  -anemia- on venofer, trend cbc  -electrolyte imbalance risk: monitor CMP, Mg, Ionized Ca, Phosphorus qd  -when on lipids check TG daily until stable on goal smof, then weekly   -further care per medicine/gi  -above dw pt's dtr at bedside and primary team      plan d/w Dr VIK Fernandez and Dr SARMAD Multani, agreeable with above      TPN Team, spectra [phone] pager [phone]  Weekdays 6am-4pm  Weekends/Holidays 8am-12pm        80 yo M with pmhx of Crohns disease (dxd 9 mos ago, hx of perianal fistula, sp tx w remicaide until developed ab; since managed on budesonide, mtx/5asa), h/o cdiff, CAD, afib (not on ac), p/w bloody diarrhea, abd pain and weakness. Admitted for crohns management, course c/b cdiff colitis sp dificid/fmt and findings of indeterminate quantiferon which has delayed possible tx with biologic. Prealb 8. TPN consulted in setting of significant weight loss, severe pcm, and worsening colitis suspected 2/2 CD. Pt at high risk for refeeding syndrome.    Current Diet: Diet, Low Fiber:   David(7 Gm Arginine/7 Gm Glut/1.2 Gm HMB     Qty per Day:  2  Liquid Protein Supplement     Qty per Day:  1  Supplement Feeding Modality:  Oral  Ensure Enlive Cans or Servings Per Day:  2       Frequency:  Daily  Probiotic Yogurt/Smoothie Cans or Servings Per Day:  1       Frequency:  Daily     Day 1: Half-dose amino acids and dextrose; no lipid   Day 2: Full-dose amino acids and dextrose; start half-dose lipid   Day 3: TPN GOAL recommendations as per RD: aa 105, dextrose 210g, smof 60g      -pt approved for tpn; will need consent/picc line placement- dw picc and primary teams  -check baseline a1c and triglyceride level  -f/u CTAP results and 8/16 labs- still pending  -rec thiamine supplementation, 100mg IV x 5-7 days  -plan to start tpn this evening at ~half dose dextrose/aa, no lipids  -hypokalemia- start kcl at 80meq in TPN bag  -hypophosphatemia- start naphos at 60mmol in TPN bag  -total minimum volume 1750cc per dw pharmacist given ca/phos ratio  -please stop any additional IV fluids when TPN is initiated this evening; monitor I/O's  -rec fingerstick monitoring q6 hours and coverage with ISS as needed while on TPN; pt also on IV steroids  -anemia- on venofer, trend cbc  -electrolyte imbalance risk: monitor CMP, Mg, Ionized Ca, Phosphorus qd  -when on lipids check TG daily until stable on goal smof, then weekly   -further care per medicine/gi  -above dw pt's dtr at bedside and primary team      plan d/w Dr VIK Fernandez and Dr SARMAD Multani, agreeable with above      TPN Team, spectra [phone] pager [phone]  Weekdays 6am-4pm  Weekends/Holidays 8am-12pm        80 yo M with pmhx of Crohns disease (dxd 9 mos ago, hx of perianal fistula, sp tx w remicaide until developed ab; since managed on budesonide, mtx/5asa), h/o cdiff, CAD, afib (not on ac), p/w bloody diarrhea, abd pain and weakness. Admitted for crohns management, course c/b cdiff colitis sp dificid/fmt and findings of indeterminate quantiferon, pending id clearance to start biologic. Prealb 8. TPN consulted in setting of significant weight loss, severe pcm, and worsening colitis suspected 2/2 CD. Pt at high risk for refeeding syndrome.    Current Diet: Diet, Low Fiber:   David(7 Gm Arginine/7 Gm Glut/1.2 Gm HMB     Qty per Day:  2  Liquid Protein Supplement     Qty per Day:  1  Supplement Feeding Modality:  Oral  Ensure Enlive Cans or Servings Per Day:  2       Frequency:  Daily  Probiotic Yogurt/Smoothie Cans or Servings Per Day:  1       Frequency:  Daily     Day 1: Half-dose amino acids and dextrose; no lipid   Day 2: Full-dose amino acids and dextrose; start half-dose lipid   Day 3: TPN GOAL recommendations as per RD: aa 105, dextrose 210g, smof 60g      -pt approved for tpn; will need consent/picc line placement- dw picc and primary teams  -check baseline a1c and triglyceride level  -f/u CTAP results and 8/16 labs- still pending  -rec thiamine supplementation, 100mg IV x 5-7 days  -plan to start tpn this evening at ~half dose dextrose/aa, no lipids  -hypokalemia- start kcl at 80meq in TPN bag  -hypophosphatemia- start naphos at 60mmol in TPN bag  -total minimum volume 1750cc per dw pharmacist given ca/phos ratio  -please stop any additional IV fluids when TPN is initiated this evening; monitor I/O's  -rec fingerstick monitoring q6 hours and coverage with ISS as needed while on TPN; pt also on IV steroids  -anemia- on venofer, trend cbc  -electrolyte imbalance risk: monitor CMP, Mg, Ionized Ca, Phosphorus qd  -when on lipids check TG daily until stable on goal smof, then weekly   -further care per medicine/gi  -above dw pt's dtr at bedside and primary team      plan d/w Dr VIK Fernandez and Dr SARMAD Multani, agreeable with above      TPN Team, spectra [phone] pager [phone]  Weekdays 6am-4pm  Weekends/Holidays 8am-12pm

## 2021-08-16 NOTE — PROGRESS NOTE ADULT - ATTENDING COMMENTS
I would consider most reasonable to continue with biologics at this time - ?? other medications than Remicade perhaps. Will d/w GI.

## 2021-08-16 NOTE — CHART NOTE - NSCHARTNOTEFT_GEN_A_CORE
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

## 2021-08-17 PROBLEM — K50.10 CROHN'S COLITIS: Status: ACTIVE | Noted: 2021-01-01

## 2021-08-17 NOTE — BH CONSULTATION LIAISON ASSESSMENT NOTE - NSBHCONSULTRECOMMENDOTHER_PSY_A_CORE FT
- If pt is lethargic, consider lowering Klonopin QHS from his home dose of 0.5mg to 0.25mg  - May start Remeron 7.5mg QHS for sleep and appetite if family in agreement

## 2021-08-17 NOTE — BH CONSULTATION LIAISON ASSESSMENT NOTE - ADDITIONAL DETAILS ALL
Passive SI confined to hospital setting, both pt and daughter deny SI previously at home. No active SI, intent or plan currently.

## 2021-08-17 NOTE — PROGRESS NOTE ADULT - SUBJECTIVE AND OBJECTIVE BOX
SURGERY  Pager: #[phone]    INTERVAL EVENTS/SUBJECTIVE: No acute events overnight.     ______________________________________________  OBJECTIVE:   T(C): 37.3 (08-17-21 @ 00:37), Max: 37.3 (08-17-21 @ 00:37)  HR: 80 (08-17-21 @ 00:37) (80 - 87)  BP: 133/87 (08-17-21 @ 00:37) (95/60 - 157/76)  RR: 18 (08-17-21 @ 00:37) (18 - 18)  SpO2: 97% (08-17-21 @ 00:37) (96% - 97%)  Wt(kg): --  CAPILLARY BLOOD GLUCOSE      POCT Blood Glucose.: 175 mg/dL (17 Aug 2021 00:10)  POCT Blood Glucose.: 150 mg/dL (16 Aug 2021 17:54)    I&O's Detail    15 Aug 2021 07:01  -  16 Aug 2021 07:00  --------------------------------------------------------  IN:    IV PiggyBack: 500 mL    Oral Fluid: 100 mL    sodium chloride 0.9%: 1500 mL    sodium chloride 0.9%: 600 mL  Total IN: 2700 mL    OUT:  Total OUT: 0 mL    Total NET: 2700 mL      16 Aug 2021 07:01  -  17 Aug 2021 01:04  --------------------------------------------------------  IN:    dextrose 5% + sodium chloride 0.9%: 250 mL    Oral Fluid: 300 mL  Total IN: 550 mL    OUT:  Total OUT: 0 mL    Total NET: 550 mL          Physical exam:  Gen: resting in bed comfortably in NAD  Chest: no increased WOB, regular inspiratory effort   Abdomen: Soft, nontender, nondistended with no rebound tenderness or guarding. Incisions clean, dry, intact, with no erythema.   Vascular: WWP, MYLES x4  NEURO: awake, alert  ______________________________________________  LABS:  CBC Full  -  ( 15 Aug 2021 15:35 )  WBC Count : 15.91 K/uL  RBC Count : 3.48 M/uL  Hemoglobin : 8.3 g/dL  Hematocrit : 27.6 %  Platelet Count - Automated : 360 K/uL  Mean Cell Volume : 79.3 fl  Mean Cell Hemoglobin : 23.9 pg  Mean Cell Hemoglobin Concentration : 30.1 gm/dL  Auto Neutrophil # : 14.40 K/uL  Auto Lymphocyte # : 0.27 K/uL  Auto Monocyte # : 1.24 K/uL  Auto Eosinophil # : 0.00 K/uL  Auto Basophil # : 0.00 K/uL  Auto Neutrophil % : 86.1 %  Auto Lymphocyte % : 1.7 %  Auto Monocyte % : 7.8 %  Auto Eosinophil % : 0.0 %  Auto Basophil % : 0.0 %    08-15    136  |  103  |  11  ----------------------------<  122<H>  3.2<L>   |  22  |  0.49<L>    Ca    7.7<L>      15 Aug 2021 07:10  Phos  2.2     08-15  Mg     2.0     08-15      _____________________________________________  RADIOLOGY:     SURGERY  Pager: #[phone]    INTERVAL EVENTS/SUBJECTIVE: No acute events overnight. 2 episodes of diarrhea overnight, +/+    ______________________________________________  OBJECTIVE:   T(C): 37.3 (08-17-21 @ 00:37), Max: 37.3 (08-17-21 @ 00:37)  HR: 80 (08-17-21 @ 00:37) (80 - 87)  BP: 133/87 (08-17-21 @ 00:37) (95/60 - 157/76)  RR: 18 (08-17-21 @ 00:37) (18 - 18)  SpO2: 97% (08-17-21 @ 00:37) (96% - 97%)  Wt(kg): --  CAPILLARY BLOOD GLUCOSE      POCT Blood Glucose.: 175 mg/dL (17 Aug 2021 00:10)  POCT Blood Glucose.: 150 mg/dL (16 Aug 2021 17:54)    I&O's Detail    15 Aug 2021 07:01  -  16 Aug 2021 07:00  --------------------------------------------------------  IN:    IV PiggyBack: 500 mL    Oral Fluid: 100 mL    sodium chloride 0.9%: 1500 mL    sodium chloride 0.9%: 600 mL  Total IN: 2700 mL    OUT:  Total OUT: 0 mL    Total NET: 2700 mL      16 Aug 2021 07:01  -  17 Aug 2021 01:04  --------------------------------------------------------  IN:    dextrose 5% + sodium chloride 0.9%: 250 mL    Oral Fluid: 300 mL  Total IN: 550 mL    OUT:  Total OUT: 0 mL    Total NET: 550 mL          Physical exam:  Gen: resting in bed comfortably in NAD  Chest: no increased WOB, regular inspiratory effort   Abdomen: Soft, nontender, nondistended with no rebound tenderness   NEURO: awake, alert  ______________________________________________  LABS:  CBC Full  -  ( 15 Aug 2021 15:35 )  WBC Count : 15.91 K/uL  RBC Count : 3.48 M/uL  Hemoglobin : 8.3 g/dL  Hematocrit : 27.6 %  Platelet Count - Automated : 360 K/uL  Mean Cell Volume : 79.3 fl  Mean Cell Hemoglobin : 23.9 pg  Mean Cell Hemoglobin Concentration : 30.1 gm/dL  Auto Neutrophil # : 14.40 K/uL  Auto Lymphocyte # : 0.27 K/uL  Auto Monocyte # : 1.24 K/uL  Auto Eosinophil # : 0.00 K/uL  Auto Basophil # : 0.00 K/uL  Auto Neutrophil % : 86.1 %  Auto Lymphocyte % : 1.7 %  Auto Monocyte % : 7.8 %  Auto Eosinophil % : 0.0 %  Auto Basophil % : 0.0 %    08-15    136  |  103  |  11  ----------------------------<  122<H>  3.2<L>   |  22  |  0.49<L>    Ca    7.7<L>      15 Aug 2021 07:10  Phos  2.2     08-15  Mg     2.0     08-15      _____________________________________________  RADIOLOGY:

## 2021-08-17 NOTE — PROGRESS NOTE ADULT - ASSESSMENT
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 Colorectal consulted to evaluate for need of surgical management. On exam patient is soft and non tender, lethargic overall. Overall impression, patient does not appear to have any indication for surgical intervention at this time, lactate of 2.4 likely from dehydration / malnutrition as patient has been having multiple episodes of diarrhea and unable to tolerate PO well 2/2 lethargy.    Plan:  - no acute surgical intervention at this time  - consider continuing with biologics at this time, other medications than Remicade perhaps. Will d/w GI.   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 Colorectal consulted to evaluate for need of surgical management. On exam patient is soft and non tender, lethargic overall. Overall impression, patient does not appear to have any indication for surgical intervention at this time, lactate of 2.4 likely from dehydration / malnutrition as patient has been having multiple episodes of diarrhea and unable to tolerate PO well 2/2 lethargy.    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 Colorectal consulted to evaluate for need of surgical management. On exam patient is soft and non tender, lethargic overall. Overall impression, patient does not appear to have any indication for surgical intervention at this time, lactate of 2.4 likely from dehydration / malnutrition as patient has been having multiple episodes of diarrhea and unable to tolerate PO well 2/2 lethargy.    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 Colorectal consulted to evaluate for need of surgical management. On exam patient is soft and non tender, lethargic overall. Overall impression, patient does not appear to have any indication for surgical intervention at this time, lactate of 2.4 likely from dehydration / malnutrition as patient has been having multiple episodes of diarrhea and unable to tolerate PO well 2/2 lethargy.    Plan:  - Will plan tentatively for OR Thursday  - Please provide medical and cardiac optimization and risk stratification  - optimize hydration, trend lactate  - optimize nutrition status, per report by patient's daughter, patient has been unable to tolerate PO well, should consider parental nutrition if necessary        [facility]  #[id number]

## 2021-08-17 NOTE — BH CONSULTATION LIAISON ASSESSMENT NOTE - SUMMARY
81y Male, retired , domiciled at home with wife, with no PPHx, PMH GERD and severe Crohn's disease diagnosed about 9 mos prior to admission, refractory to multiple treatments including Remicade and biologics, c/b abscess, left posterior distal anal intersphincteric fistula, who presents with diarrhea and BRBPR 2/2 to CD flare c/b c diff colitis. Psych was consulted after pt reportedly endorsed to daughter that he does not want to live anymore. On interview, pt is depressed, appears tired, and AOx2. Patient currently says that being in the hospital makes him want to die and repeatedly starts that he wants to go home. Denies plan to kill himself, saying that he wants to go home to spend time with this family. No h/o depression, suicide attempts, self harm. Pt endorses poor sleep and appetite. Daughter denies that pt has ever endorsed any suicidality to her. Daughter open to trying Remeron for pt for sleep and appetite, but wants to d/w her brother first. Pt has no active SI, future-oriented, not at acute risk of suicide currently.

## 2021-08-17 NOTE — BH CONSULTATION LIAISON ASSESSMENT NOTE - NSUNABLEASSESSPROTRISKCOMMENT_PSY_ALL_CORE
Pt is future-oriented, looking forward to spending time with his family when he leaves the hospital. No h/o SA, SI, depression at home. Denies active SI currently.

## 2021-08-17 NOTE — BH CONSULTATION LIAISON ASSESSMENT NOTE - NSBHCHARTREVIEWVS_PSY_A_CORE FT
Vital Signs Last 24 Hrs  T(C): 36.4 (17 Aug 2021 08:33), Max: 37.3 (17 Aug 2021 00:37)  T(F): 97.5 (17 Aug 2021 08:33), Max: 99.1 (17 Aug 2021 00:37)  HR: 68 (17 Aug 2021 08:33) (68 - 143)  BP: 131/67 (17 Aug 2021 08:33) (95/60 - 157/76)  BP(mean): --  RR: 17 (17 Aug 2021 08:33) (16 - 18)  SpO2: 95% (17 Aug 2021 08:33) (95% - 98%)

## 2021-08-17 NOTE — CHART NOTE - NSCHARTNOTEFT_GEN_A_CORE
CC:      HPI:  Called by RN to evaluate patient for 's. Patient seen and assessed at bedside, sleeping but easily arousable. AOx4. NAD. 's. Patient denied headache, dizziness, CP, palpitations, SOB, abdominal  pain, N/V/D, numbness/tingling, extremity weakness.               Vital Signs Last 24 Hrs  T(C): 37.3 (17 Aug 2021 00:37), Max: 37.3 (17 Aug 2021 00:37)  T(F): 99.1 (17 Aug 2021 00:37), Max: 99.1 (17 Aug 2021 00:37)  HR: 95 (17 Aug 2021 06:23) (80 - 95)  BP: 100/66 (17 Aug 2021 06:23) (95/60 - 157/76)  BP(mean): --  RR: 18 (17 Aug 2021 00:37) (18 - 18)  SpO2: 97% (17 Aug 2021 00:37) (96% - 97%)      Physical Exam:  General: WN/WD NAD, AOx3, nontoxic appearing  Head:  NC/AT  CV: Irregular 120-150's  Respiratory: CTA B/L, nonlabored  Abdominal: (+) bowel sounds x4. Soft, NT, ND, no guarding or rebound tenderness  MSK: No BLLE edema, + peripheral pulses, FROM all 4 extremity  Skin: (+) warm, dry         Labs:                        8.3    15.91 )-----------( 360      ( 15 Aug 2021 15:35 )             27.6     08-15    136  |  103  |  11  ----------------------------<  122<H>  3.2<L>   |  22  |  0.49<L>    Ca    7.7<L>      15 Aug 2021 07:10  Phos  2.2     08-15  Mg     2.0     08-15        Radiology:      Assessment & Plan:  HPI:  80 yo M with Crohn disease, over the last 9 mo, he was started on several medication and was recently told that he may need to go on IV infusion for chrons, he has several hospitalization at [facility] , and follows with GI near [facility]. but his diarrhea is not improving , having abd cramps , denies any N/V . , discharged 1 mo ago from [facility], now having trouble walking, on going diarrhea. + subjective fever but denies cough or nasal congestion or uri or urinary symptoms.  (13 Jul 2021 20:48)      's (chronic afib? electrolyte imbalance vs dehydration)  -Hx of Afib  -Asymptomatic  -hemodynamically stable  -Baseline rhythm Normal Sinus with HR 70-80's (ECG 8/12)  -Sotalol 120mg PO standing dose given now  -ECG stat -Afib w/RVR BPM- 154, QRS 86ms, QT/QTc- 304/486  -Cardizem 10mg IVP- > now pt AFIB HR 80-90's, /66  -send BMP, Mag, Phos  -Keep K >4, Phos >3, Mag >2  -consider telemetry   -Will continue to closely monitor patient/vitals  -Will endorse to team in AM, attending to follow       Ev HENSLEY  Dept of Medicine CC:      HPI:  Called by RN to evaluate patient for 's. Patient seen and assessed at bedside, sleeping but easily arousable. AOx4. NAD. 's. Patient denied headache, dizziness, CP, palpitations, SOB, abdominal  pain, N/V/D, numbness/tingling, extremity weakness.               Vital Signs Last 24 Hrs  T(C): 37.3 (17 Aug 2021 00:37), Max: 37.3 (17 Aug 2021 00:37)  T(F): 99.1 (17 Aug 2021 00:37), Max: 99.1 (17 Aug 2021 00:37)  HR: 95 (17 Aug 2021 06:23) (80 - 95)  BP: 100/66 (17 Aug 2021 06:23) (95/60 - 157/76)  BP(mean): --  RR: 18 (17 Aug 2021 00:37) (18 - 18)  SpO2: 97% (17 Aug 2021 00:37) (96% - 97%)      Physical Exam:  General: WN/WD NAD, AOx3, nontoxic appearing  Head:  NC/AT  CV: Irregular 120-150's  Respiratory: CTA B/L, nonlabored  Abdominal: (+) bowel sounds x4. Soft, NT, ND, no guarding or rebound tenderness  MSK: No BLLE edema, + peripheral pulses, FROM all 4 extremity  Skin: (+) warm, dry         Labs:                        8.3    15.91 )-----------( 360      ( 15 Aug 2021 15:35 )             27.6     08-15    136  |  103  |  11  ----------------------------<  122<H>  3.2<L>   |  22  |  0.49<L>    Ca    7.7<L>      15 Aug 2021 07:10  Phos  2.2     08-15  Mg     2.0     08-15        Radiology:      Assessment & Plan:  HPI:  80 yo M with Crohn disease, over the last 9 mo, he was started on several medication and was recently told that he may need to go on IV infusion for chrons, he has several hospitalization at [facility] , and follows with GI near [facility]. but his diarrhea is not improving , having abd cramps , denies any N/V . , discharged 1 mo ago from [facility], now having trouble walking, on going diarrhea. + subjective fever but denies cough or nasal congestion or uri or urinary symptoms.  (13 Jul 2021 20:48)      's (chronic afib? electrolyte imbalance vs dehydration)  -Hx of Afib  -Asymptomatic  -hemodynamically stable  -Baseline rhythm Normal Sinus with HR 70-80's (ECG 8/12)  -Sotalol 120mg PO standing dose given now  -ECG stat -Afib w/RVR BPM- 154, QRS 86ms, QT/QTc- 304/486  -Cardizem 10mg IVP- > now pt AFIB HR 80-90's, /66  -send BMP, Mag, Phos  -Keep K >4, Phos >3, Mag >2  -consider telemetry     **Addendum   -Discussed events with attending,  Dr. Oliver. Agreed to above, place patient on telemetry.   -Will continue to closely monitor patient/vitals  -Will endorse to team in AM, attending to follow       Ev HENSLEY  Dept of Medicine [facility]ES  MRN-[id number]    Called by RN to evaluate patient for 's. Patient seen and assessed at bedside, sleeping but easily arousable. AOx4. NAD. 's. Patient denied headache, dizziness, CP, palpitations, SOB, abdominal  pain, N/V/D, numbness/tingling, extremity weakness.     Vital Signs Last 24 Hrs  T(C): 37.3 (17 Aug 2021 00:37), Max: 37.3 (17 Aug 2021 00:37)  T(F): 99.1 (17 Aug 2021 00:37), Max: 99.1 (17 Aug 2021 00:37)  HR: 95 (17 Aug 2021 06:23) (80 - 95)  BP: 100/66 (17 Aug 2021 06:23) (95/60 - 157/76)  BP(mean): --  RR: 18 (17 Aug 2021 00:37) (18 - 18)  SpO2: 97% (17 Aug 2021 00:37) (96% - 97%)      Physical Exam:  General: WN/WD NAD, AOx3, nontoxic appearing  Head:  NC/AT  CV: Irregular 120-150's  Respiratory: CTA B/L, nonlabored  Abdominal: (+) bowel sounds x4. Soft, NT, ND, no guarding or rebound tenderness  MSK: No BLLE edema, + peripheral pulses, FROM all 4 extremity  Skin: (+) warm, dry         Labs:                        8.3    15.91 )-----------( 360      ( 15 Aug 2021 15:35 )             27.6     08-15    136  |  103  |  11  ----------------------------<  122<H>  3.2<L>   |  22  |  0.49<L>    Ca    7.7<L>      15 Aug 2021 07:10  Phos  2.2     08-15  Mg     2.0     08-15        Radiology:      Assessment & Plan:  HPI:  80 yo M with Crohn disease, over the last 9 mo, he was started on several medication and was recently told that he may need to go on IV infusion for chrons, he has several hospitalization at [facility] , and follows with GI near [facility]. but his diarrhea is not improving , having abd cramps , denies any N/V . , discharged 1 mo ago from [facility], now having trouble walking, on going diarrhea. + subjective fever but denies cough or nasal congestion or uri or urinary symptoms.  (13 Jul 2021 20:48)      's (chronic afib? electrolyte imbalance vs dehydration)  -Hx of Afib  -Asymptomatic  -hemodynamically stable  -Baseline rhythm Normal Sinus with HR 70-80's (ECG 8/12)  -Sotalol 120mg PO standing dose given now  -ECG stat -Afib w/RVR BPM- 154, QRS 86ms, QT/QTc- 304/486  -Cardizem 10mg IVP- > now pt AFIB HR 80-90's, /66  -send BMP, Mag, Phos  -Keep K >4, Phos >3, Mag >2  -consider telemetry     **Addendum   -Discussed events with attending,  Dr. Oliver. Agreed to above, place patient on telemetry.   -transferred to tele floor, provider given report  -Cardiology, Dr. Onofre following  -Will continue to closely monitor patient/vitals  -Will endorse to team in AM, attending to follow       [facility]  Dept of Medicine

## 2021-08-17 NOTE — BH CONSULTATION LIAISON ASSESSMENT NOTE - DETAILS
Passive SI confined to hospital setting, both pt and daughter deny SI previously at home. No active SI, intent or plan currently.  Acute setting

## 2021-08-17 NOTE — PROGRESS NOTE ADULT - SUBJECTIVE AND OBJECTIVE BOX
Patient is a 81y old  Male who presents with a chief complaint of diarrhea with blood in it , abd pain and weakness (17 Aug 2021 18:22)      INTERVAL HPI/OVERNIGHT EVENTS: condition same , still diarrhea . was scheduled for surgery in am , however now patient and family decided against it.   so no bowel prep and please cancel OR tomorrow , till family decide on surgery     T(C): 36.7 (08-17-21 @ 21:05), Max: 37.3 (08-17-21 @ 00:37)  HR: 68 (08-17-21 @ 21:05) (68 - 143)  BP: 153/65 (08-17-21 @ 21:05) (100/66 - 153/65)  RR: 19 (08-17-21 @ 21:05) (16 - 19)  SpO2: 95% (08-17-21 @ 21:05) (95% - 98%)  Wt(kg): --  I&O's Summary    16 Aug 2021 07:01  -  17 Aug 2021 07:00  --------------------------------------------------------  IN: 700 mL / OUT: 0 mL / NET: 700 mL        PAST MEDICAL & SURGICAL HISTORY:  No pertinent past medical history    No significant past surgical history        SOCIAL HISTORY  Alcohol:  Tobacco:  Illicit substance use:    FAMILY HISTORY:    REVIEW OF SYSTEMS:  CONSTITUTIONAL: No fever, weight loss, or fatigue  EYES: No eye pain, visual disturbances, or discharge  ENMT:  No difficulty hearing, tinnitus, vertigo; No sinus or throat pain  NECK: No pain or stiffness  RESPIRATORY: No cough, wheezing, chills or hemoptysis; No shortness of breath  CARDIOVASCULAR: No chest pain, palpitations, dizziness, or leg swelling  GASTROINTESTINAL: No abdominal or epigastric pain. No nausea, vomiting, or hematemesis; No diarrhea or constipation. No melena or hematochezia.  GENITOURINARY: No dysuria, frequency, hematuria, or incontinence  NEUROLOGICAL: No headaches, memory loss, loss of strength, numbness, or tremors  SKIN: No itching, burning, rashes, or lesions   LYMPH NODES: No enlarged glands  ENDOCRINE: No heat or cold intolerance; No hair loss  MUSCULOSKELETAL: No joint pain or swelling; No muscle, back, or extremity pain  PSYCHIATRIC: No depression, anxiety, mood swings, or difficulty sleeping  HEME/LYMPH: No easy bruising, or bleeding gums  ALLERY AND IMMUNOLOGIC: No hives or eczema    RADIOLOGY & ADDITIONAL TESTS:    Imaging Personally Reviewed:  [ ] YES  [ ] NO    Consultant(s) Notes Reviewed:  [ ] YES  [ ] NO    PHYSICAL EXAM:  GENERAL: NAD, well-groomed, well-developed  HEAD:  Atraumatic, Normocephalic  EYES: EOMI, PERRLA, conjunctiva and sclera clear  ENMT: No tonsillar erythema, exudates, or enlargement; Moist mucous membranes, Good dentition, No lesions  NECK: Supple, No JVD, Normal thyroid  NERVOUS SYSTEM:  Alert & Oriented X3, Good concentration; Motor Strength 5/5 B/L upper and lower extremities; DTRs 2+ intact and symmetric  CHEST/LUNG: Clear to percussion bilaterally; No rales, rhonchi, wheezing, or rubs  HEART: Regular rate and rhythm; No murmurs, rubs, or gallops  ABDOMEN: Soft, Nontender, Nondistended; Bowel sounds present  EXTREMITIES:  2+ Peripheral Pulses, No clubbing, cyanosis, or edema  LYMPH: No lymphadenopathy noted  SKIN: No rashes or lesions    LABS:    08-17    137  |  104  |  15  ----------------------------<  157<H>  3.5   |  23  |  0.49<L>    Ca    8.1<L>      17 Aug 2021 07:08  Phos  2.8     08-17  Mg     2.1     08-17    TPro  5.3<L>  /  Alb  1.9<L>  /  TBili  0.4  /  DBili  x   /  AST  124<H>  /  ALT  367<H>  /  AlkPhos  382<H>  08-17        CAPILLARY BLOOD GLUCOSE      POCT Blood Glucose.: 181 mg/dL (17 Aug 2021 17:15)  POCT Blood Glucose.: 201 mg/dL (17 Aug 2021 13:07)  POCT Blood Glucose.: 178 mg/dL (17 Aug 2021 05:58)  POCT Blood Glucose.: 175 mg/dL (17 Aug 2021 00:10)            MEDICATIONS  (STANDING):  ascorbic acid 500 milliGRAM(s) Oral daily  chlorhexidine 2% Cloths 1 Application(s) Topical daily  clonazePAM  Tablet 0.25 milliGRAM(s) Oral at bedtime  dextrose 40% Gel 15 Gram(s) Oral once  dextrose 5%. 1000 milliLiter(s) (50 mL/Hr) IV Continuous <Continuous>  dextrose 5%. 1000 milliLiter(s) (100 mL/Hr) IV Continuous <Continuous>  dextrose 50% Injectable 25 Gram(s) IV Push once  dextrose 50% Injectable 12.5 Gram(s) IV Push once  dextrose 50% Injectable 25 Gram(s) IV Push once  fat emulsion (Fish Oil and Plant Based) 20% Infusion 12.5 mL/Hr (12.5 mL/Hr) IV Continuous <Continuous>  fidaxomicin 200 milliGRAM(s) Oral <User Schedule>  FIRST- Mouthwash  BLM 10 milliLiter(s) Swish and Spit every 8 hours  folic acid 1 milliGRAM(s) Oral daily  glucagon  Injectable 1 milliGRAM(s) IntraMuscular once  insulin lispro (ADMELOG) corrective regimen sliding scale   SubCutaneous every 6 hours  methylPREDNISolone sodium succinate Injectable 20 milliGRAM(s) IV Push every 8 hours  multivitamin 1 Tablet(s) Oral daily  Parenteral Nutrition - Adult 1 Each (73 mL/Hr) TPN Continuous <Continuous>  polyethylene glycol 3350 17 Gram(s) Oral two times a day  sotalol 120 milliGRAM(s) Oral daily  thiamine Injectable 100 milliGRAM(s) IV Push daily    MEDICATIONS  (PRN):  acetaminophen   Tablet .. 650 milliGRAM(s) Oral every 6 hours PRN Temp greater or equal to 38.5C (101.3F), Mild Pain (1 - 3), Moderate Pain (4 - 6)  aluminum hydroxide/magnesium hydroxide/simethicone Suspension 30 milliLiter(s) Oral every 4 hours PRN Dyspepsia  guaifenesin/dextromethorphan Oral Liquid 10 milliLiter(s) Oral every 8 hours PRN Cough  lidocaine 2% Gel 1 Application(s) Topical four times a day PRN pain due to skin excoriation  LORazepam   Injectable 0.5 milliGRAM(s) IV Push once PRN Agitation  morphine  - Injectable 2 milliGRAM(s) IV Push every 8 hours PRN Severe Pain (7 - 10)  ondansetron Injectable 4 milliGRAM(s) IV Push every 6 hours PRN Nausea and/or Vomiting      Care Discussed with Consultants/Other Providers [ ] YES  [ ] NO

## 2021-08-17 NOTE — PROGRESS NOTE ADULT - SUBJECTIVE AND OBJECTIVE BOX
Follow Up:  crohns    Interval History/ROS: patient wants to go home, decllines sugery, declines remicaide - he is supported by his children - discussed with patient with daughter at bedside and son on phone   patient willing to stay in hospital until after IBD conference on 8/18-  I expressed my support of high dose Remicaide and my concern for his clinical and nutrition status if he leaves hospital     Allergies  No Known Allergies    ANTIMICROBIALS:  fidaxomicin 200 <User Schedule>      OTHER MEDS:  MEDICATIONS  (STANDING):  acetaminophen   Tablet .. 650 every 6 hours PRN  aluminum hydroxide/magnesium hydroxide/simethicone Suspension 30 every 4 hours PRN  clonazePAM  Tablet 0.25 at bedtime  dextrose 40% Gel 15 once  dextrose 50% Injectable 25 once  dextrose 50% Injectable 12.5 once  dextrose 50% Injectable 25 once  glucagon  Injectable 1 once  guaifenesin/dextromethorphan Oral Liquid 10 every 8 hours PRN  insulin lispro (ADMELOG) corrective regimen sliding scale  every 6 hours  LORazepam   Injectable 0.5 once PRN  methylPREDNISolone sodium succinate Injectable 20 every 8 hours  morphine  - Injectable 2 every 8 hours PRN  ondansetron Injectable 4 every 6 hours PRN  polyethylene glycol 3350 17 two times a day  sotalol 120 daily      Vital Signs Last 24 Hrs  T(C): 36.5 (17 Aug 2021 13:08), Max: 37.3 (17 Aug 2021 00:37)  T(F): 97.7 (17 Aug 2021 13:08), Max: 99.1 (17 Aug 2021 00:37)  HR: 81 (17 Aug 2021 13:08) (68 - 143)  BP: 131/63 (17 Aug 2021 13:08) (100/66 - 157/76)  BP(mean): --  RR: 19 (17 Aug 2021 13:08) (16 - 19)  SpO2: 97% (17 Aug 2021 13:08) (95% - 98%)    PHYSICAL EXAM:  General: chronically ill, Non-toxic, thin  Neurology: A&Ox3, markedly fatigued  Respiratory: Clear to auscultation bilaterally  CV: RRR, S1S2, no murmurs, rubs or gallops  Abdominal: Soft, Non-tender, non-distended  Extremities: No edema  Line Sites: Clear  Skin: No rash    WBC Count: 15.91 (08-15 @ 15:35)  WBC Count: 11.08 (08-15 @ 07:12)  WBC Count: 10.68 (08-14 @ 07:38)  WBC Count: 13.64 (08-13 @ 07:11)      08-17    137  |  104  |  15  ----------------------------<  157<H>  3.5   |  23  |  0.49<L>    Ca    8.1<L>      17 Aug 2021 07:08  Phos  2.8     08-17  Mg     2.1     08-17    TPro  5.3<L>  /  Alb  1.9<L>  /  TBili  0.4  /  DBili  x   /  AST  124<H>  /  ALT  367<H>  /  AlkPhos  382<H>  08-17      MICROBIOLOGY:  .Blood Blood-Peripheral  08-05-21   No Growth Final  --  --      .Blood Blood-Peripheral  08-05-21   No Growth Final  --  --      .Blood Blood-Peripheral  07-30-21   No Growth Final  --  --      .Blood Blood-Peripheral  07-27-21   No Growth Final  --  --      .Blood Blood-Venous  07-24-21   No Growth Final  --  --      .Blood Blood  07-22-21   No Growth Final  --  --      RADIOLOGY:  < from: Xray Chest 1 View- PORTABLE-Urgent (Xray Chest 1 View- PORTABLE-Urgent .) (08.16.21 @ 15:00) >  The heart is slightly enlarged. Platelike atelectasis left lower lobe. The right lung appears to be clear. A PICC line was placed on the right and the tip is in the superior vena cava. No pneumothorax. Degenerative changes of the thoracic spine.    < end of copied text >  < from: CT Abdomen and Pelvis w/ IV Cont (08.16.21 @ 11:51) >  FINDINGS:  LOWER CHEST: Cardiomegaly. Coronary calcification. Small bilateral pleural effusions with associated mild compressive atelectasis.    LIVER: Within normal limits.  BILE DUCTS: Normal caliber.  GALLBLADDER: Nonspecific gallbladder wall edema.  SPLEEN: Within normal limits.  PANCREAS: Within normallimits.  ADRENALS: Within normal limits.  KIDNEYS/URETERS: A 3 mm nonobstructing left lower pole renal calculus. Left renal cyst.    BLADDER: Mild dependent bladder debris.  REPRODUCTIVE ORGANS: Prostate is enlarged.    BOWEL: No bowel obstruction. Diffuse colon thickening consistent with colitis which may be infectious or inflammatory. Colonic diverticulosis. Appendix is normal.  PERITONEUM: No ascites.  VESSELS: Atherosclerotic changes including irregularity with mild dilatation of the SMA to 1.0 cm. Celiac axis, SMA and SARAH are patent as are the SMV and portal veins.  RETROPERITONEUM/LYMPH NODES: No lymphadenopathy.  ABDOMINAL WALL: Within normal limits.  BONES: Degenerative changes.    IMPRESSION:  Diffuse colitis which may be infectious or inflammatory, unchanged.  Patent mesenteric vasculature.    Nonspecific gallbladder wall edema. Correlate for right upper quadrant pain.    < end of copied text >      Benjamin Phillips MD; Division of Infectious Disease; Pager: [phone]; nights and weekends: [phone]

## 2021-08-17 NOTE — CHART NOTE - NSCHARTNOTEFT_GEN_A_CORE
patient and family refused surgery   as per RN Patient refused Miralax   Discussed with Dr. Oliver  As per Cardiology No anticoagulation at this time

## 2021-08-17 NOTE — BH CONSULTATION LIAISON ASSESSMENT NOTE - NSCOMMENTSUICRISKFACT_PSY_ALL_CORE
Pt's current passive SI and current depressed mood 2/2 his decline in medical condition are risk factors for suicide.

## 2021-08-17 NOTE — BH CONSULTATION LIAISON ASSESSMENT NOTE - CURRENT MEDICATION
MEDICATIONS  (STANDING):  ascorbic acid 500 milliGRAM(s) Oral daily  chlorhexidine 2% Cloths 1 Application(s) Topical daily  clonazePAM  Tablet 0.25 milliGRAM(s) Oral at bedtime  dextrose 40% Gel 15 Gram(s) Oral once  dextrose 5%. 1000 milliLiter(s) (100 mL/Hr) IV Continuous <Continuous>  dextrose 5%. 1000 milliLiter(s) (50 mL/Hr) IV Continuous <Continuous>  dextrose 50% Injectable 25 Gram(s) IV Push once  dextrose 50% Injectable 12.5 Gram(s) IV Push once  dextrose 50% Injectable 25 Gram(s) IV Push once  fat emulsion (Fish Oil and Plant Based) 20% Infusion 12.5 mL/Hr (12.5 mL/Hr) IV Continuous <Continuous>  fidaxomicin 200 milliGRAM(s) Oral <User Schedule>  FIRST- Mouthwash  BLM 10 milliLiter(s) Swish and Spit every 8 hours  folic acid 1 milliGRAM(s) Oral daily  glucagon  Injectable 1 milliGRAM(s) IntraMuscular once  insulin lispro (ADMELOG) corrective regimen sliding scale   SubCutaneous every 6 hours  iron sucrose Injectable 100 milliGRAM(s) IV Push every 24 hours  methylPREDNISolone sodium succinate Injectable 20 milliGRAM(s) IV Push every 8 hours  multivitamin 1 Tablet(s) Oral daily  Parenteral Nutrition - Adult 1 Each (83 mL/Hr) TPN Continuous <Continuous>  Parenteral Nutrition - Adult 1 Each (73 mL/Hr) TPN Continuous <Continuous>  sotalol 120 milliGRAM(s) Oral daily  thiamine Injectable 100 milliGRAM(s) IV Push daily    MEDICATIONS  (PRN):  acetaminophen   Tablet .. 650 milliGRAM(s) Oral every 6 hours PRN Temp greater or equal to 38.5C (101.3F), Mild Pain (1 - 3), Moderate Pain (4 - 6)  aluminum hydroxide/magnesium hydroxide/simethicone Suspension 30 milliLiter(s) Oral every 4 hours PRN Dyspepsia  guaifenesin/dextromethorphan Oral Liquid 10 milliLiter(s) Oral every 8 hours PRN Cough  lidocaine 2% Gel 1 Application(s) Topical four times a day PRN pain due to skin excoriation  LORazepam   Injectable 0.5 milliGRAM(s) IV Push once PRN Agitation  morphine  - Injectable 2 milliGRAM(s) IV Push every 8 hours PRN Severe Pain (7 - 10)  ondansetron Injectable 4 milliGRAM(s) IV Push every 6 hours PRN Nausea and/or Vomiting

## 2021-08-17 NOTE — PROGRESS NOTE ADULT - SUBJECTIVE AND OBJECTIVE BOX
[facility] NUTRITION SUPPORT-- FOLLOW UP NOTE  --------------------------------------------------------------------------------    24 hour events/subjective: pt seen and examined. started on tpn yesterday via rue picc. c/o generalized abd pain and diarrhea. denies n/v; minimal po intake. per rn no change in diarrhea, went into af w rvr last night and given diltiazem. CTAP showing unchanged diffuse colitis and non specific gb wall edema. ID/GI/surgical evals reviewed. labs noted    Diet:  Diet, Low Fiber:   David(7 Gm Arginine/7 Gm Glut/1.2 Gm HMB     Qty per Day:  2  Liquid Protein Supplement     Qty per Day:  1  Supplement Feeding Modality:  Oral  Ensure Enlive Cans or Servings Per Day:  2       Frequency:  Daily  Probiotic Yogurt/Smoothie Cans or Servings Per Day:  1       Frequency:  Daily (08-12-21 @ 10:59)      PAST HISTORY  --------------------------------------------------------------------------------  No significant changes to PMH, PSH, FHx, SHx, unless otherwise noted    ALLERGIES & MEDICATIONS  --------------------------------------------------------------------------------  Allergies    No Known Allergies    Intolerances      Standing Inpatient Medications  ascorbic acid 500 milliGRAM(s) Oral daily  chlorhexidine 2% Cloths 1 Application(s) Topical daily  clonazePAM  Tablet 0.25 milliGRAM(s) Oral at bedtime  dextrose 40% Gel 15 Gram(s) Oral once  dextrose 5%. 1000 milliLiter(s) IV Continuous <Continuous>  dextrose 5%. 1000 milliLiter(s) IV Continuous <Continuous>  dextrose 50% Injectable 25 Gram(s) IV Push once  dextrose 50% Injectable 12.5 Gram(s) IV Push once  dextrose 50% Injectable 25 Gram(s) IV Push once  fidaxomicin 200 milliGRAM(s) Oral <User Schedule>  FIRST- Mouthwash  BLM 10 milliLiter(s) Swish and Spit every 8 hours  folic acid 1 milliGRAM(s) Oral daily  glucagon  Injectable 1 milliGRAM(s) IntraMuscular once  insulin lispro (ADMELOG) corrective regimen sliding scale   SubCutaneous every 6 hours  iron sucrose Injectable 100 milliGRAM(s) IV Push every 24 hours  methylPREDNISolone sodium succinate Injectable 20 milliGRAM(s) IV Push every 8 hours  multivitamin 1 Tablet(s) Oral daily  Parenteral Nutrition - Adult 1 Each TPN Continuous <Continuous>  sotalol 120 milliGRAM(s) Oral daily  thiamine Injectable 100 milliGRAM(s) IV Push daily    PRN Inpatient Medications  acetaminophen   Tablet .. 650 milliGRAM(s) Oral every 6 hours PRN  aluminum hydroxide/magnesium hydroxide/simethicone Suspension 30 milliLiter(s) Oral every 4 hours PRN  guaifenesin/dextromethorphan Oral Liquid 10 milliLiter(s) Oral every 8 hours PRN  lidocaine 2% Gel 1 Application(s) Topical four times a day PRN  LORazepam   Injectable 0.5 milliGRAM(s) IV Push once PRN  morphine  - Injectable 2 milliGRAM(s) IV Push every 8 hours PRN  ondansetron Injectable 4 milliGRAM(s) IV Push every 6 hours PRN        REVIEW OF SYSTEMS  --------------------------------------------------------------------------------    see hpi, unable to obtain in entirety        VITALS/PHYSICAL EXAM  --------------------------------------------------------------------------------  T(C): 36.3 (08-17-21 @ 05:45), Max: 37.3 (08-17-21 @ 00:37)  HR: 95 (08-17-21 @ 06:23) (80 - 143)  BP: 100/66 (08-17-21 @ 06:23) (95/60 - 157/76)  RR: 16 (08-17-21 @ 05:45) (16 - 18)  SpO2: 98% (08-17-21 @ 05:45) (96% - 98%)  Wt(kg): --      08-16-21 @ 07:01  -  08-17-21 @ 07:00  --------------------------------------------------------  IN: 700 mL / OUT: 0 mL / NET: 700 mL      I&O's Detail    16 Aug 2021 07:01  -  17 Aug 2021 07:00  --------------------------------------------------------  IN:    dextrose 5% + sodium chloride 0.9%: 250 mL    Oral Fluid: 450 mL  Total IN: 700 mL    OUT:  Total OUT: 0 mL    Total NET: 700 mL        Physical Exam:  Gen: lying in bed, frail, cachectic   HEENT: ncat, trachea midline  Chest: respirations even and non labored  Abd: soft, mild generalized ttp, nondistended  LE:  no edema  Neuro: arousable, responsive with confusion      LABS/STUDIES  --------------------------------------------------------------------------------              8.3    15.91 >-----------<  360      [08-15-21 @ 15:35]              27.6     137  |  104  |  15  ----------------------------<  157      [08-17-21 @ 07:08]  3.5   |  23  |  0.49        Ca     8.1     [08-17-21 @ 07:08]      iCa    1.15     [08-17 @ 07:16]      Mg     2.1     [08-17-21 @ 07:08]      Phos  2.8     [08-17-21 @ 07:08]    TPro  5.3  /  Alb  1.9  /  TBili  0.4  /  DBili  x   /  AST  124  /  ALT  367  /  AlkPhos  382  [08-17-21 @ 07:08]          Ca ionized  Creatinine Trend:  POC glucoseGlucose, Serum: 157 mg/dL (08-17-21 @ 07:08)  CAPILLARY BLOOD GLUCOSE      POCT Blood Glucose.: 178 mg/dL (17 Aug 2021 05:58)  POCT Blood Glucose.: 175 mg/dL (17 Aug 2021 00:10)  POCT Blood Glucose.: 150 mg/dL (16 Aug 2021 17:54)    PrealbuminPrealbumin, Serum: 8 mg/dL (08-15-21 @ 10:17)    Triglycerides     [facility] NUTRITION SUPPORT-- FOLLOW UP NOTE  --------------------------------------------------------------------------------    24 hour events/subjective: pt seen and examined. started on tpn yesterday via rue picc. c/o generalized abd pain and diarrhea. denies n/v; minimal po intake. per rn no change in diarrhea, went into af w rvr last night and given diltiazem. CTAP showing unchanged diffuse colitis and non specific gb wall edema. ID/GI/surgical evals reviewed. labs noted. for possible OR planning later this week    Diet:  Diet, Low Fiber:   David(7 Gm Arginine/7 Gm Glut/1.2 Gm HMB     Qty per Day:  2  Liquid Protein Supplement     Qty per Day:  1  Supplement Feeding Modality:  Oral  Ensure Enlive Cans or Servings Per Day:  2       Frequency:  Daily  Probiotic Yogurt/Smoothie Cans or Servings Per Day:  1       Frequency:  Daily (08-12-21 @ 10:59)      PAST HISTORY  --------------------------------------------------------------------------------  No significant changes to PMH, PSH, FHx, SHx, unless otherwise noted    ALLERGIES & MEDICATIONS  --------------------------------------------------------------------------------  Allergies    No Known Allergies    Intolerances      Standing Inpatient Medications  ascorbic acid 500 milliGRAM(s) Oral daily  chlorhexidine 2% Cloths 1 Application(s) Topical daily  clonazePAM  Tablet 0.25 milliGRAM(s) Oral at bedtime  dextrose 40% Gel 15 Gram(s) Oral once  dextrose 5%. 1000 milliLiter(s) IV Continuous <Continuous>  dextrose 5%. 1000 milliLiter(s) IV Continuous <Continuous>  dextrose 50% Injectable 25 Gram(s) IV Push once  dextrose 50% Injectable 12.5 Gram(s) IV Push once  dextrose 50% Injectable 25 Gram(s) IV Push once  fidaxomicin 200 milliGRAM(s) Oral <User Schedule>  FIRST- Mouthwash  BLM 10 milliLiter(s) Swish and Spit every 8 hours  folic acid 1 milliGRAM(s) Oral daily  glucagon  Injectable 1 milliGRAM(s) IntraMuscular once  insulin lispro (ADMELOG) corrective regimen sliding scale   SubCutaneous every 6 hours  iron sucrose Injectable 100 milliGRAM(s) IV Push every 24 hours  methylPREDNISolone sodium succinate Injectable 20 milliGRAM(s) IV Push every 8 hours  multivitamin 1 Tablet(s) Oral daily  Parenteral Nutrition - Adult 1 Each TPN Continuous <Continuous>  sotalol 120 milliGRAM(s) Oral daily  thiamine Injectable 100 milliGRAM(s) IV Push daily    PRN Inpatient Medications  acetaminophen   Tablet .. 650 milliGRAM(s) Oral every 6 hours PRN  aluminum hydroxide/magnesium hydroxide/simethicone Suspension 30 milliLiter(s) Oral every 4 hours PRN  guaifenesin/dextromethorphan Oral Liquid 10 milliLiter(s) Oral every 8 hours PRN  lidocaine 2% Gel 1 Application(s) Topical four times a day PRN  LORazepam   Injectable 0.5 milliGRAM(s) IV Push once PRN  morphine  - Injectable 2 milliGRAM(s) IV Push every 8 hours PRN  ondansetron Injectable 4 milliGRAM(s) IV Push every 6 hours PRN        REVIEW OF SYSTEMS  --------------------------------------------------------------------------------    see hpi, unable to obtain in entirety        VITALS/PHYSICAL EXAM  --------------------------------------------------------------------------------  T(C): 36.3 (08-17-21 @ 05:45), Max: 37.3 (08-17-21 @ 00:37)  HR: 95 (08-17-21 @ 06:23) (80 - 143)  BP: 100/66 (08-17-21 @ 06:23) (95/60 - 157/76)  RR: 16 (08-17-21 @ 05:45) (16 - 18)  SpO2: 98% (08-17-21 @ 05:45) (96% - 98%)  Wt(kg): --      08-16-21 @ 07:01  -  08-17-21 @ 07:00  --------------------------------------------------------  IN: 700 mL / OUT: 0 mL / NET: 700 mL      I&O's Detail    16 Aug 2021 07:01  -  17 Aug 2021 07:00  --------------------------------------------------------  IN:    dextrose 5% + sodium chloride 0.9%: 250 mL    Oral Fluid: 450 mL  Total IN: 700 mL    OUT:  Total OUT: 0 mL    Total NET: 700 mL        Physical Exam:  Gen: lying in bed, frail, cachectic   HEENT: ncat, trachea midline  Chest: respirations even and non labored  Abd: soft, mild generalized ttp, nondistended  LE:  no edema  Neuro: arousable, responsive with confusion      LABS/STUDIES  --------------------------------------------------------------------------------              8.3    15.91 >-----------<  360      [08-15-21 @ 15:35]              27.6     137  |  104  |  15  ----------------------------<  157      [08-17-21 @ 07:08]  3.5   |  23  |  0.49        Ca     8.1     [08-17-21 @ 07:08]      iCa    1.15     [08-17 @ 07:16]      Mg     2.1     [08-17-21 @ 07:08]      Phos  2.8     [08-17-21 @ 07:08]    TPro  5.3  /  Alb  1.9  /  TBili  0.4  /  DBili  x   /  AST  124  /  ALT  367  /  AlkPhos  382  [08-17-21 @ 07:08]          Ca ionized  Creatinine Trend:  POC glucoseGlucose, Serum: 157 mg/dL (08-17-21 @ 07:08)  CAPILLARY BLOOD GLUCOSE      POCT Blood Glucose.: 178 mg/dL (17 Aug 2021 05:58)  POCT Blood Glucose.: 175 mg/dL (17 Aug 2021 00:10)  POCT Blood Glucose.: 150 mg/dL (16 Aug 2021 17:54)    PrealbuminPrealbumin, Serum: 8 mg/dL (08-15-21 @ 10:17)    Triglycerides

## 2021-08-17 NOTE — PROGRESS NOTE ADULT - ASSESSMENT
80 yo M with pmhx of Crohns disease (dxd 9 mos ago, hx of perianal fistula, sp tx w remicaide until developed ab; since managed on budesonide, mtx/5asa), h/o cdiff, CAD, afib (not on ac), p/w bloody diarrhea, abd pain and weakness. Admitted for crohns management, course c/b cdiff colitis sp dificid/fmt and findings of indeterminate quantiferon, pending id clearance to start biologic. Prealb 8. TPN consulted in setting of significant weight loss, severe pcm, and worsening colitis suspected 2/2 CD. Pt at high risk for refeeding syndrome. TPN started 8/16. Repeat CT showing unchanged diffuse colitis and non specific gb wall edema    Current Diet: Diet, Low Fiber:   David(7 Gm Arginine/7 Gm Glut/1.2 Gm HMB     Qty per Day:  2  Liquid Protein Supplement     Qty per Day:  1  Supplement Feeding Modality:  Oral  Ensure Enlive Cans or Servings Per Day:  2       Frequency:  Daily  Probiotic Yogurt/Smoothie Cans or Servings Per Day:  1       Frequency:  Daily     Day 1: Half-dose amino acids and dextrose; no lipid   Day 2: Full-dose amino acids and dextrose; start half-dose lipid   Day 3: TPN GOAL recommendations as per RD: aa 105, dextrose 210g, smof 60g      -cont tpn, increase to full dose aa/dextrose and start 1/2 dose smof today  -PICC in place, maintenance as per protocol   -hypokalemia- increase kcl to 100meq  -hypophosphatemia- increase naphos to 75mmol  -hyperglycemia- a1c pending, also on IV steroids, FS trend noted (required 2u ssi coverage), will add 8U of insulin to tpn bag; cont FS monitoring q6 hours, coverage with ISS as needed  -elevated lfts- ct noted, consider hida scan if concern for cholecystitis, trend labs, avoid hepatotoxins as able  -total minimum volume 2250cc per dw pharmacist given ca/phos ratio  -cont thiamine supplementation, 100mg IV x 5-7 days  -anemia- on venofer, trend cbc  -electrolyte imbalance risk: monitor CMP, Mg, Ionized Ca, Phosphorus qd  -check TG daily until stable on goal smof, then weekly   -strict I/O's  -further care per medicine/gi/surgery      plan d/w Dr VIK Fernandez and Dr SARMAD Multani, agreeable with above      TPN Team, spectra [phone] pager [phone]  Weekdays 6am-4pm  Weekends/Holidays 8am-12pm

## 2021-08-17 NOTE — CONSULT NOTE ADULT - SUBJECTIVE AND OBJECTIVE BOX
Wound Surgery Consult Note:    HPI:  80 yo M with Crohn disease, over the last 9 mo, he was started on several medication and was recently told that he may need to go on IV infusion for chrons, he has several hospitalization at [facility] , and follows with GI near [facility]. but his diarrhea is not improving , having abd cramps , denies any N/V . , discharged 1 mo ago from [facility], now having trouble walking, on going diarrhea. + subjective fever but denies cough or nasal congestion or uri or urinary symptoms.  (13 Jul 2021 20:48)    Request for wound care consult for sacral/bilateral buttocks skin breakdown received from nursing. Nursing documentation of purple and marilyn discoloration with blistering on 7.14.21 with 24 hours of admission is noted however, this was categorized as incontinence associated dermatitis since he has frequent loose stools. On exam, today there is a superficial erosion over the sacrum with bright red weeping skin on the bilateral buttocks, perineum, perianal and groins. In retrospect the skin condition noted within 72 hours of admission should have been documented as deep tissue injury present on admission. Therefore, the current stage 2 pressure injury was present on admission. His immobility, inactivity, extremely frequent loose stools and poor nutritional status contributes to high risk for pressure injury development and hinders healing even in the setting of optimal pressure injury prevention measures.    PAST MEDICAL & SURGICAL HISTORY:  Crohn's Disease  No significant past surgical history    MEDICATIONS  (STANDING):  ascorbic acid 500 milliGRAM(s) Oral daily  chlorhexidine 2% Cloths 1 Application(s) Topical daily  clonazePAM  Tablet 0.25 milliGRAM(s) Oral at bedtime  dextrose 40% Gel 15 Gram(s) Oral once  dextrose 5%. 1000 milliLiter(s) (100 mL/Hr) IV Continuous <Continuous>  dextrose 5%. 1000 milliLiter(s) (50 mL/Hr) IV Continuous <Continuous>  dextrose 50% Injectable 25 Gram(s) IV Push once  dextrose 50% Injectable 12.5 Gram(s) IV Push once  dextrose 50% Injectable 25 Gram(s) IV Push once  fat emulsion (Fish Oil and Plant Based) 20% Infusion 12.5 mL/Hr (12.5 mL/Hr) IV Continuous <Continuous>  fidaxomicin 200 milliGRAM(s) Oral <User Schedule>  FIRST- Mouthwash  BLM 10 milliLiter(s) Swish and Spit every 8 hours  folic acid 1 milliGRAM(s) Oral daily  glucagon  Injectable 1 milliGRAM(s) IntraMuscular once  insulin lispro (ADMELOG) corrective regimen sliding scale   SubCutaneous every 6 hours  iron sucrose Injectable 100 milliGRAM(s) IV Push every 24 hours  methylPREDNISolone sodium succinate Injectable 20 milliGRAM(s) IV Push every 8 hours  multivitamin 1 Tablet(s) Oral daily  Parenteral Nutrition - Adult 1 Each (94 mL/Hr) TPN Continuous <Continuous>  Parenteral Nutrition - Adult 1 Each (73 mL/Hr) TPN Continuous <Continuous>  sotalol 120 milliGRAM(s) Oral daily  thiamine Injectable 100 milliGRAM(s) IV Push daily    MEDICATIONS  (PRN):  acetaminophen   Tablet .. 650 milliGRAM(s) Oral every 6 hours PRN Temp greater or equal to 38.5C (101.3F), Mild Pain (1 - 3), Moderate Pain (4 - 6)  aluminum hydroxide/magnesium hydroxide/simethicone Suspension 30 milliLiter(s) Oral every 4 hours PRN Dyspepsia  guaifenesin/dextromethorphan Oral Liquid 10 milliLiter(s) Oral every 8 hours PRN Cough  lidocaine 2% Gel 1 Application(s) Topical four times a day PRN pain due to skin excoriation  LORazepam   Injectable 0.5 milliGRAM(s) IV Push once PRN Agitation  morphine  - Injectable 2 milliGRAM(s) IV Push every 8 hours PRN Severe Pain (7 - 10)  ondansetron Injectable 4 milliGRAM(s) IV Push every 6 hours PRN Nausea and/or Vomiting    Allergies    No Known Allergies    Intolerances    Vital Signs Last 24 Hrs  T(C): 36.4 (17 Aug 2021 08:33), Max: 37.3 (17 Aug 2021 00:37)  T(F): 97.5 (17 Aug 2021 08:33), Max: 99.1 (17 Aug 2021 00:37)  HR: 68 (17 Aug 2021 08:33) (68 - 143)  BP: 131/67 (17 Aug 2021 08:33) (95/60 - 157/76)  BP(mean): --  RR: 17 (17 Aug 2021 08:33) (16 - 18)  SpO2: 95% (17 Aug 2021 08:33) (95% - 98%)    Physical Exam:  General: confused, thin, frail   Respiratory: no SOB on room air  Gastrointestinal: soft NT/ND (+)BM   Neurology: verbally limited, not following commands  Musculoskeletal: no contractures  Vascular: BLE edema equal  Skin:  sacral area with superficial erosions L 3cm x W 1cm x D 0.1cm wound bed is pink, no necrotic tissue, scant serosanguinous drainage  Perianal. perineal, bilateral buttocks, gluteal cleft and bilateral groins with bright red weeping skin and tenderness  No odor, erythema, increased warmth, induration, fluctuance    LABS:  08-17    137  |  104  |  15  ----------------------------<  157<H>  3.5   |  23  |  0.49<L>    Ca    8.1<L>      17 Aug 2021 07:08  Phos  2.8     08-17  Mg     2.1     08-17    TPro  5.3<L>  /  Alb  1.9<L>  /  TBili  0.4  /  DBili  x   /  AST  124<H>  /  ALT  367<H>  /  AlkPhos  382<H>  08-17                          8.3    15.91 )-----------( 360      ( 15 Aug 2021 15:35 )             27.6

## 2021-08-17 NOTE — PROGRESS NOTE ADULT - SUBJECTIVE AND OBJECTIVE BOX
Patient is a 81y old  Male who presents with a chief complaint of diarrhea with blood in it , abd pain and weakness (17 Aug 2021 12:06)      INTERVAL HISTORY: pt is ok but fatigued     TELEMETRY Personally reviewed: SR 60'S 90'S     REVIEW OF SYSTEMS:   CONSTITUTIONAL: GENERALIZED  weakness  EYES/ENT: No visual changes; No throat pain  Neck: No pain or stiffness  Respiratory: No cough, wheezing, No shortness of breath  CARDIOVASCULAR: no chest pain or palpitations  GASTROINTESTINAL: No abdominal pain, no nausea, vomiting or hematemesis  GENITOURINARY: No dysuria, frequency or hematuria  NEUROLOGICAL: No stroke like symptoms  SKIN: No rashes    	  MEDICATIONS:  sotalol 120 milliGRAM(s) Oral daily        PHYSICAL EXAM:  T(C): 36.5 (08-17-21 @ 13:08), Max: 37.3 (08-17-21 @ 00:37)  HR: 81 (08-17-21 @ 13:08) (68 - 143)  BP: 131/63 (08-17-21 @ 13:08) (100/66 - 157/76)  RR: 19 (08-17-21 @ 13:08) (16 - 19)  SpO2: 97% (08-17-21 @ 13:08) (95% - 98%)  Wt(kg): --  I&O's Summary    16 Aug 2021 07:01  -  17 Aug 2021 07:00  --------------------------------------------------------  IN: 700 mL / OUT: 0 mL / NET: 700 mL          Appearance: In no distress	  HEENT:    PERRL, EOMI	  Cardiovascular:  S1 S2, No JVD  Respiratory: Lungs clear to auscultation	  Gastrointestinal:  Soft, Non-tender, + BS	  Vascularature:  No edema of LE  Psychiatric: Appropriate affect   Neuro: no acute focal deficits                               8.3    15.91 )-----------( 360      ( 15 Aug 2021 15:35 )             27.6     08-17    137  |  104  |  15  ----------------------------<  157<H>  3.5   |  23  |  0.49<L>    Ca    8.1<L>      17 Aug 2021 07:08  Phos  2.8     08-17  Mg     2.1     08-17    TPro  5.3<L>  /  Alb  1.9<L>  /  TBili  0.4  /  DBili  x   /  AST  124<H>  /  ALT  367<H>  /  AlkPhos  382<H>  08-17        Labs personally reviewed  < from: Xray Chest 1 View- PORTABLE-Urgent (Xray Chest 1 View- PORTABLE-Urgent .) (08.16.21 @ 15:00) >  The heart is slightly enlarged. Platelike atelectasis left lower lobe. The right lung appears to be clear. A PICC line was placed on the right and the tip is in the superior vena cava. No pneumothorax. Degenerative changes of the thoracic spine.    --- End of Report ---    < end of copied text >  < from: CT Head No Cont (08.14.21 @ 14:55) >  IMPRESSION:  No acute intracranial hemorrhage or acute territorial infarct.  If symptoms persist, follow-up MRI exam recommended.    --- End of Report ---            ASSESSMENT/PLAN: 	      Problem/Plan - 1:  ·  Problem: CAD (coronary artery disease).  Plan: c/w home meds  We discussed the importance of SAPT with low dose ASA 81mg given CAD  pt denies any current chest pain, SOB or chest pressure.       Problem/Plan -2:  ·  Problem: Colitis.  Plan: Ct shows crohn's  exacerbation   f/u flex sig with biopsies   s/p FMT 7/28  plan for repeat FMT vs Fidoxomaicin if sx don't improve  f/p flex sig on 8/9- no evidence of pseudomembranous   started on Fidoxomaicin and steroids   per GI if fails medical therapy, then surgical evaluation would be indicated  f/u ID/ GI reccs   PICC inserted for TPN     Problem/Plan - 3:  ·  Problem: Afib pt said he was placed on sotalol for afib   -c/w sotalol   -EKG noted. SR   - rate controlled and regular   - 8/17 am pt went into rapid Afib with RVR rate of 154 cardiezem was given   - pt was transferred to monitored unit and now SR on monitor   - pt is not a candidate for AC seeing recent GIB. FOBT positive as recent as 8/8/21   - rate controlled now continue ot monitor     Problem/Plan - 4:   ·  Problem: GIB (gastrointestinal bleeding).  Plan: bloody diarrhea 2/2 chron's exacerbation   IV fluids   -c-diff positive:   on Fidoxomaicin    Problem/Plan - 5:  ·  Problem: SCDs     Problem/Plan - 6:  ·  Problem: B/L LE edema   - CXR showed mildly enlarged heart   -monitor IVF with I&O  -B/L L E edema improved   -TTE with mod-severe AI, mild AS, preserved EF    Problem/Plan - 7:  ·  Problem: AMS/ lethargic   replete electrolyte, trops flat, CKMB negative   CTH shows no hemorrhage and acute infarct       Kar WHALEY-BC   Rufus Onofre DO [facility]  Cardiovascular Medicine  [address] [place] [address], [address]  Office: [phone]  Cell: [phone] Patient is a 81y old  Male who presents with a chief complaint of diarrhea with blood in it , abd pain and weakness (17 Aug 2021 12:06)      INTERVAL HISTORY: pt is ok but fatigued     TELEMETRY Personally reviewed: SR 60'S 90'S     REVIEW OF SYSTEMS:   CONSTITUTIONAL: GENERALIZED  weakness  EYES/ENT: No visual changes; No throat pain  Neck: No pain or stiffness  Respiratory: No cough, wheezing, No shortness of breath  CARDIOVASCULAR: no chest pain or palpitations  GASTROINTESTINAL: No abdominal pain, no nausea, vomiting or hematemesis  GENITOURINARY: No dysuria, frequency or hematuria  NEUROLOGICAL: No stroke like symptoms  SKIN: No rashes    	  MEDICATIONS:  sotalol 120 milliGRAM(s) Oral daily        PHYSICAL EXAM:  T(C): 36.5 (08-17-21 @ 13:08), Max: 37.3 (08-17-21 @ 00:37)  HR: 81 (08-17-21 @ 13:08) (68 - 143)  BP: 131/63 (08-17-21 @ 13:08) (100/66 - 157/76)  RR: 19 (08-17-21 @ 13:08) (16 - 19)  SpO2: 97% (08-17-21 @ 13:08) (95% - 98%)  Wt(kg): --  I&O's Summary    16 Aug 2021 07:01  -  17 Aug 2021 07:00  --------------------------------------------------------  IN: 700 mL / OUT: 0 mL / NET: 700 mL          Appearance: In no distress	  HEENT:    PERRL, EOMI	  Cardiovascular:  S1 S2, No JVD  Respiratory: Lungs clear to auscultation	  Gastrointestinal:  Soft, Non-tender, + BS	  Vascularature:  No edema of LE  Psychiatric: Appropriate affect   Neuro: no acute focal deficits                               8.3    15.91 )-----------( 360      ( 15 Aug 2021 15:35 )             27.6     08-17    137  |  104  |  15  ----------------------------<  157<H>  3.5   |  23  |  0.49<L>    Ca    8.1<L>      17 Aug 2021 07:08  Phos  2.8     08-17  Mg     2.1     08-17    TPro  5.3<L>  /  Alb  1.9<L>  /  TBili  0.4  /  DBili  x   /  AST  124<H>  /  ALT  367<H>  /  AlkPhos  382<H>  08-17        Labs personally reviewed  < from: Xray Chest 1 View- PORTABLE-Urgent (Xray Chest 1 View- PORTABLE-Urgent .) (08.16.21 @ 15:00) >  The heart is slightly enlarged. Platelike atelectasis left lower lobe. The right lung appears to be clear. A PICC line was placed on the right and the tip is in the superior vena cava. No pneumothorax. Degenerative changes of the thoracic spine.    --- End of Report ---    < end of copied text >  < from: CT Head No Cont (08.14.21 @ 14:55) >  IMPRESSION:  No acute intracranial hemorrhage or acute territorial infarct.  If symptoms persist, follow-up MRI exam recommended.    --- End of Report ---            ASSESSMENT/PLAN: 	      Problem/Plan - 1:  ·  Problem: CAD (coronary artery disease).  Plan: c/w home meds  We discussed the importance of SAPT with low dose ASA 81mg given CAD  pt denies any current chest pain, SOB or chest pressure.       Problem/Plan -2:  ·  Problem: Colitis.  Plan: Ct shows crohn's  exacerbation   f/u flex sig with biopsies   s/p FMT 7/28  plan for repeat FMT vs Fidoxomaicin if sx don't improve  f/p flex sig on 8/9- no evidence of pseudomembranous   started on Fidoxomaicin and steroids   per GI if fails medical therapy, then surgical evaluation would be indicated  f/u ID/ GI reccs   PICC inserted for TPN     Problem/Plan - 3:  ·  Problem: Afib pt said he was placed on sotalol for afib   -c/w sotalol   -EKG noted. SR   - rate controlled and regular   - 8/17 am pt went into rapid Afib with RVR rate of 154 cardiezem was given   - pt was transferred to monitored unit and now SR on monitor   - pt is ? candidate for AC seeing recent GIB. FOBT positive as recent as 8/8/21   - if no contraindication from GI start Eliquis 2.5mg BID  - rate controlled now continue ot monitor     Problem/Plan - 4:   ·  Problem: GIB (gastrointestinal bleeding).  Plan: bloody diarrhea 2/2 chron's exacerbation   IV fluids   -c-diff positive:   on Fidoxomaicin    Problem/Plan - 5:  ·  Problem: SCDs     Problem/Plan - 6:  ·  Problem: B/L LE edema   - CXR showed mildly enlarged heart   -monitor IVF with I&O  -B/L L E edema improved   -TTE with mod-severe AI, mild AS, preserved EF    Problem/Plan - 7:  ·  Problem: AMS/ lethargic   replete electrolyte, trops flat, CKMB negative   CTH shows no hemorrhage and acute infarct       Kar WHALEY-BC   Rufus Onofre DO [facility]  Cardiovascular Medicine  [address] [place] [address], [address]  Office: [phone]  Cell: [phone]

## 2021-08-17 NOTE — PROGRESS NOTE ADULT - ASSESSMENT
81y Male with GERD on omeprazole daily and Crohn's disease diagnosed about 9 mos prior to admission at outside hospital c/b h/o abscess and left posterior distal anal intersphincteric fistula and presents diarrhea/BRBPR  2/2 to CD flare c/b c diff colitis. Initially treat for C diff, given that it did not get better got FMT. Switched PO vanc in favor of Fidaxomicin without any improvement. Therefore, patient got Flex sig to reassess which showed no pseudomembranous colitis anymore but severe Crohn colitis. We started him on steroids on 8/9. Has had minimal improvement.    # c diff - pseudomembranous colitis seen on colonoscopy 7/19/21, treated w/ vanc, fidaxomicin, FMT; repeat flex sig w/o further pseudomembranes but ongoing severe CD. 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    Recommendations:  - f/u ID recs  - appreciate colorectal surgery recs  - continue fidaxomicin 200 mg qoD  - continue methylprednisolone IV 20 mg q8h  - pending discussion of pt's case at IBD conference 8/18  - spoke with pt's family at bedside today who declined further treatment with infliximab as they believe this is not improving pt's Crohn's colitis; family pending decision about colectomy based on discussion at upcoming IBD conference 8/18  - encourage PO intake, can start TPN short term to improve cachexia until CD can be under control (per family preference)  - monitor stool output  - rest of care per primary team    We will continue to follow.    Teri Hess MD  GI Fellow, PGY-4  Available via Microsoft Teams    NON-URGENT CONSULTS:  Please email [email or web address]  [email or web address]  AT NIGHT AND ON WEEKENDS:  Contact on-call GI fellow via answering service ([phone]) from 5pm-8am and on weekends/holidays  MONDAY-FRIDAY 8AM-5PM:  Pager# [phone] ([facility]) or [phone] ([facility])  GI Phone# [phone] ([facility]) 81y Male with GERD on omeprazole daily and Crohn's disease diagnosed about 9 mos prior to admission at outside hospital c/b h/o abscess and left posterior distal anal intersphincteric fistula and presents diarrhea/BRBPR  2/2 to CD flare c/b c diff colitis. Initially treat for C diff, given that it did not get better got FMT. Switched PO vanc in favor of Fidaxomicin without any improvement. Therefore, patient got Flex sig to reassess which showed no pseudomembranous colitis anymore but severe Crohn colitis. Was started on steroids on 8/9. Has had minimal improvement.    # c diff - pseudomembranous colitis seen on colonoscopy 7/19/21, treated w/ vanc, fidaxomicin, FMT; repeat flex sig w/o further pseudomembranes but ongoing severe CD. 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    Recommendations:  - f/u ID recs  - appreciate colorectal surgery recs  - continue fidaxomicin 200 mg qoD  - continue methylprednisolone IV 20 mg q8h  - pending discussion of pt's case at IBD conference 8/18  - spoke with pt's family at bedside today who declined further treatment with infliximab as they believe this is not improving pt's Crohn's colitis; family pending decision about colectomy based on discussion at upcoming IBD conference 8/18 and discussion with CRS  - encourage PO intake, can start TPN short term to improve cachexia until CD can be under control (per family preference)  - monitor stool output  - rest of care per primary team    We will continue to follow.    Teri Hess MD  GI Fellow, PGY-4  Available via Microsoft Teams    NON-URGENT CONSULTS:  Please email [email or web address]  [email or web address]  AT NIGHT AND ON WEEKENDS:  Contact on-call GI fellow via answering service ([phone]) from 5pm-8am and on weekends/holidays  MONDAY-FRIDAY 8AM-5PM:  Pager# [phone] ([facility]) or [phone] ([facility])  GI Phone# [phone] ([facility])

## 2021-08-17 NOTE — BH CONSULTATION LIAISON ASSESSMENT NOTE - HPI (INCLUDE ILLNESS QUALITY, SEVERITY, DURATION, TIMING, CONTEXT, MODIFYING FACTORS, ASSOCIATED SIGNS AND SYMPTOMS)
81y Male, retired , domiciled at home with wife, with no PPHx, PMH GERD and severe Crohn's disease diagnosed about 9 mos prior to admission, refractory to multiple treatments including Remicade and biologics, c/b abscess, left posterior distal anal intersphincteric fistula, who presents with diarrhea/BRBPR 2/2 to CD flare c/b c diff colitis. Psych was consulted after pt reportedly endorsed to daughter that he does not want to live anymore.    On interview, pt is depressed, appears tired, and AOx2. Patient currently says that being in the hospital makes him want to die and repeatedly starts that he wants to go home. Denies plan to kill himself, saying that he wants to go home to spend time with this family. No h/o depression, suicide attempts, self harm. Pt states he has been sleeping "somewhat" at hospital. Also endorses poor appetite 2/2 chronic diarrhea. Spoke to daughter at bedside. Daughter denies that pt has ever endorsed any suicidality to her. Per daughter, pt was independent in all IADLs/ADLs, golfing with grandchildren, until 8 months ago when he was diagnosed with Crohns. Since then, pt has declined in health, losing 70 pounds 2/2 chronic diarrhea. Pt has been hospitalized for many weeks and daughter feels his condition is worsening. Per daughter, decision to be made tomorrow about whether pt will undergo total colectomy. Daughter open to trying Remeron for pt for sleep and appetite, but wants to d/w her brother first. 81y Male, retired , domiciled at home with wife, with no PPHx, PMH GERD and severe Crohn's disease diagnosed about 9 mos prior to admission, refractory to multiple treatments including Remicade and biologics, c/b abscess, left posterior distal anal intersphincteric fistula, who presents with diarrhea/BRBPR 2/2 to CD flare c/b c diff colitis. Psych was consulted after pt reportedly endorsed to daughter that he does not want to live anymore.    On interview, pt is depressed, appears tired, and AOx2. Patient currently says that being in the hospital makes him want to die and repeatedly states that he wants to go home. Denies plan to kill himself, saying that he wants to go home to spend time with this family. No h/o depression, suicide attempts, self harm. Pt states he has been sleeping "somewhat" at hospital. Also endorses poor appetite 2/2 chronic diarrhea. Spoke to daughter at bedside. Daughter denies that pt has ever endorsed any suicidality to her. Per daughter, pt was independent in all IADLs/ADLs, golfing with grandchildren, until 8 months ago when he was diagnosed with Crohns. Since then, pt has declined in health, losing 70 pounds 2/2 chronic diarrhea. Pt has been hospitalized for many weeks and daughter feels his condition is worsening. Per daughter, decision to be made tomorrow about whether pt will undergo total colectomy. Daughter open to trying Remeron for pt for sleep and appetite, but wants to d/w her brother first.

## 2021-08-17 NOTE — PROGRESS NOTE ADULT - ASSESSMENT
82 yo M with Crohn's on treatment, recently cared for at [facility], now presenting with ongoing diarrhea    Antibiotics  Flagyl 7/13 7/21-->25  Cipro 7/13-->14  po Vanco 7/14 -->-->7/29; 8/1-->8/2  (7/28: colonoscopic FMT)  Fidaxomicin 8/2-->      1) Crohns colitis  8/9 Flex sig without pseudomembrances, crohns colitis appears major factor for continued diarrhea,   some decreased diarrhea - subjective improvement  rectal pain related to diarrhea - ?related to diarrhea  surgery being discussed if patient does not respond to steroids  solumedrol 20 q 8hours 8/9-->  There is risk of high dose infliximab - biapical scarring noted on CXR - I would like to document previous neg Quant Gold TB  Hep B core Ab ordered  HIV negative   - I support use of high dose Infliximab in this very ill man    2) C diff colitis  Fidaxomcin 200 mg twice daily x 5 days 8/2 --> 8/7, currently every other day for additional 20 days days 7 - 25 =  8/9 --> 8/28  po intake encouraged  - S/p colonoscopic FMT 7/28    3) Leukocytosis  ? related to steroids    4) Para flu 3 viral uri with cough and fevers    5) malnourished state, hypoalbuminema  TPN/Smoflipid 8/16-->    6) debility    7) transaminitis  is this related to TPN?

## 2021-08-17 NOTE — BH CONSULTATION LIAISON ASSESSMENT NOTE - RISK ASSESSMENT
Pt endorses passive SI, saying that he does not want to live. Also says that he does not want to kill himself, repeatedly states that he wants to go home and spend time with his family. Pt endorses depressed mood 2/2 his medical condition and his long hospital stay. Protective factors include no h/o SA/SI, no psych history, no plan or intent, and supports of children, wife, and grandchildren at home. Pt is future-oriented and identifies a reason for living. Low violence risk due to no h/o violence and pt calm in hospital.

## 2021-08-17 NOTE — PROGRESS NOTE ADULT - ATTENDING COMMENTS
I discussed pt with daughter and son yesterday, and with Dr Saunders, GI.  The pt has been treated with Remicade multiple doses since his dx earlier this year (there was a mention on the chart that he had only gotten one dose). Flex sigm last week and CT yesterday with active colitis. Pt extremely debilitated.  Picture c/w refractory C diff colitis. Agree with GI that the best option at this time is surgery - lap TAC with ileostomy. Tentatively on the schedule for Thursday.   Will need Cardiology clearance.

## 2021-08-17 NOTE — BH CONSULTATION LIAISON ASSESSMENT NOTE - DESCRIPTION
Pt lives at home with wife and is retired . Quit smoking 40 years ago. No etoh use. Has strong social supports of family, two children and grandchildren. His wife has parkinson's disease and per daugther is on "a lot of medications."

## 2021-08-17 NOTE — CONSULT NOTE ADULT - ASSESSMENT
Impression:    Sacral/bilateral buttocks with deep tissue injury in evolution present on admission  incontinence dermatitis  Incontinence of bowel and bladder  suggestive of fungal rash    Recommend:  1.) topical therapy: sacral/bilateral buttocks - cleanse with incontinence cleanser, pat dry, apply Rosina antifungal with 2% Miconozole BID and PRN for incontinent episodes  For discharge, order Clotrimazole 1% cream BID to sacral/buttocks, perianal and perineal skin  2.) Incontinence management - incontinence cleanser, pads, pericare BID, consider external male urinary catheter  3.) Maintain on an alternating air with low air loss surface  4.) Turn and reposition Q 2 hours  5.) Nutrition optimization  6.) Offload heels/feet with complete care air fluidized boots/ensure soles of feet do not rest on foot board of the bed.    Care as per medicine will follow w/ you  Upon [facility] [address] [place] [address] [phone]  Discussed with clinical nurse  Thank you for this consult  Chantelle Tracy, GUY-C, CWOCN [id number]

## 2021-08-17 NOTE — BH CONSULTATION LIAISON ASSESSMENT NOTE - OTHER
- Pt has gradually worsening medical condition and has been hospitalized for many weeks  - Pt's wife has Parkinson's Disease

## 2021-08-17 NOTE — BH CONSULTATION LIAISON ASSESSMENT NOTE - NSBHCHARTREVIEWLAB_PSY_A_CORE FT
08-17    137  |  104  |  15  ----------------------------<  157<H>  3.5   |  23  |  0.49<L>    Ca    8.1<L>      17 Aug 2021 07:08  Phos  2.8     08-17  Mg     2.1     08-17    TPro  5.3<L>  /  Alb  1.9<L>  /  TBili  0.4  /  DBili  x   /  AST  124<H>  /  ALT  367<H>  /  AlkPhos  382<H>  08-17                        8.3    15.91 )-----------( 360      ( 15 Aug 2021 15:35 )             27.6

## 2021-08-17 NOTE — PROGRESS NOTE ADULT - ATTENDING COMMENTS
Patient seen and examined. Agree with above. Daughter at bedside. Patient continues with weakness and lethargy. Continues with abdominal pain and diarrhea. Appreciate surgical evaluation. Noted with elevated liver enzymes in today's labs ?TPN related. Would repeat in am.

## 2021-08-17 NOTE — BH CONSULTATION LIAISON ASSESSMENT NOTE - NSBHCHARTREVIEWINVESTIGATE_PSY_A_CORE FT
< from: 12 Lead ECG (08.12.21 @ 23:47) >      Ventricular Rate 89 BPM    Atrial Rate 89 BPM    P-R Interval 138 ms    QRS Duration 92 ms    Q-T Interval 362 ms    QTC Calculation(Bazett) 440 ms    P Axis 33 degrees    R Axis -32 degrees    T Axis 19 degrees    Diagnosis Line NORMAL SINUS RHYTHM  LEFT AXIS DEVIATION  MINIMAL VOLTAGE CRITERIA FOR LVH, MAY BE NORMAL VARIANT  ABNORMAL ECG  WHEN COMPARED WITH ECG OF 21-JUL-2021 15:34,  NO SIGNIFICANT CHANGE WAS FOUND  Confirmed by MD AUSTIN, LESLIE ([id number]) on 8/14/2021 12:07:12 AM    < end of copied text >

## 2021-08-17 NOTE — PROGRESS NOTE ADULT - SUBJECTIVE AND OBJECTIVE BOX
Chief Complaint:  Patient is a 81y old  Male who presents with a chief complaint of diarrhea with blood in it , abd pain and weakness (17 Aug 2021 01:03)      Interval Events:       Hospital Medications:  acetaminophen   Tablet .. 650 milliGRAM(s) Oral every 6 hours PRN  aluminum hydroxide/magnesium hydroxide/simethicone Suspension 30 milliLiter(s) Oral every 4 hours PRN  ascorbic acid 500 milliGRAM(s) Oral daily  chlorhexidine 2% Cloths 1 Application(s) Topical daily  clonazePAM  Tablet 0.25 milliGRAM(s) Oral at bedtime  dextrose 40% Gel 15 Gram(s) Oral once  dextrose 5%. 1000 milliLiter(s) IV Continuous <Continuous>  dextrose 5%. 1000 milliLiter(s) IV Continuous <Continuous>  dextrose 50% Injectable 25 Gram(s) IV Push once  dextrose 50% Injectable 12.5 Gram(s) IV Push once  dextrose 50% Injectable 25 Gram(s) IV Push once  diltiazem Injectable 10 milliGRAM(s) IV Push once  fidaxomicin 200 milliGRAM(s) Oral <User Schedule>  FIRST- Mouthwash  BLM 10 milliLiter(s) Swish and Spit every 8 hours  folic acid 1 milliGRAM(s) Oral daily  glucagon  Injectable 1 milliGRAM(s) IntraMuscular once  guaifenesin/dextromethorphan Oral Liquid 10 milliLiter(s) Oral every 8 hours PRN  insulin lispro (ADMELOG) corrective regimen sliding scale   SubCutaneous every 6 hours  iron sucrose Injectable 100 milliGRAM(s) IV Push every 24 hours  lidocaine 2% Gel 1 Application(s) Topical four times a day PRN  LORazepam   Injectable 0.5 milliGRAM(s) IV Push once PRN  methylPREDNISolone sodium succinate Injectable 20 milliGRAM(s) IV Push every 8 hours  morphine  - Injectable 2 milliGRAM(s) IV Push every 8 hours PRN  multivitamin 1 Tablet(s) Oral daily  ondansetron Injectable 4 milliGRAM(s) IV Push every 6 hours PRN  Parenteral Nutrition - Adult 1 Each TPN Continuous <Continuous>  sotalol 120 milliGRAM(s) Oral daily  thiamine Injectable 100 milliGRAM(s) IV Push daily      PMHX/PSHX:  No pertinent past medical history    No significant past surgical history        ROS: see subjective      PHYSICAL EXAM:     GENERAL: elderly, chroincally-ill appearing  HEENT:  NCAT, eyes closed  CHEST: no resp distress  HEART:  RRR  ABDOMEN:  thin, +scaphoid abdomen; +diffusely tender  EXTREMITIES:  No cyanosis, clubbing, or edema  SKIN:  +pale  NEURO:  Awake and lethargic      Vital Signs:  Vital Signs Last 24 Hrs  T(C): 37.3 (17 Aug 2021 00:37), Max: 37.3 (17 Aug 2021 00:37)  T(F): 99.1 (17 Aug 2021 00:37), Max: 99.1 (17 Aug 2021 00:37)  HR: 80 (17 Aug 2021 00:37) (80 - 87)  BP: 133/87 (17 Aug 2021 00:37) (95/60 - 157/76)  BP(mean): --  RR: 18 (17 Aug 2021 00:37) (18 - 18)  SpO2: 97% (17 Aug 2021 00:37) (96% - 97%)  Daily     Daily Weight in k.3 (16 Aug 2021 06:30)    LABS:                        8.3    15.91 )-----------( 360      ( 15 Aug 2021 15:35 )             27.6     08-15    136  |  103  |  11  ----------------------------<  122<H>  3.2<L>   |  22  |  0.49<L>    Ca    7.7<L>      15 Aug 2021 07:10  Phos  2.2     08-15  Mg     2.0     08-15                Imaging:             Chief Complaint:  Patient is a 81y old  Male who presents with a chief complaint of diarrhea with blood in it , abd pain and weakness (17 Aug 2021 01:03)      Interval Events: Overnight went into Afib w/ RVR, was transferred to [facility] floor this AM. Still reporting diarrhea and abdominal pain this AM. Denies any N/V.      Hospital Medications:  acetaminophen   Tablet .. 650 milliGRAM(s) Oral every 6 hours PRN  aluminum hydroxide/magnesium hydroxide/simethicone Suspension 30 milliLiter(s) Oral every 4 hours PRN  ascorbic acid 500 milliGRAM(s) Oral daily  chlorhexidine 2% Cloths 1 Application(s) Topical daily  clonazePAM  Tablet 0.25 milliGRAM(s) Oral at bedtime  dextrose 40% Gel 15 Gram(s) Oral once  dextrose 5%. 1000 milliLiter(s) IV Continuous <Continuous>  dextrose 5%. 1000 milliLiter(s) IV Continuous <Continuous>  dextrose 50% Injectable 25 Gram(s) IV Push once  dextrose 50% Injectable 12.5 Gram(s) IV Push once  dextrose 50% Injectable 25 Gram(s) IV Push once  diltiazem Injectable 10 milliGRAM(s) IV Push once  fidaxomicin 200 milliGRAM(s) Oral <User Schedule>  FIRST- Mouthwash  BLM 10 milliLiter(s) Swish and Spit every 8 hours  folic acid 1 milliGRAM(s) Oral daily  glucagon  Injectable 1 milliGRAM(s) IntraMuscular once  guaifenesin/dextromethorphan Oral Liquid 10 milliLiter(s) Oral every 8 hours PRN  insulin lispro (ADMELOG) corrective regimen sliding scale   SubCutaneous every 6 hours  iron sucrose Injectable 100 milliGRAM(s) IV Push every 24 hours  lidocaine 2% Gel 1 Application(s) Topical four times a day PRN  LORazepam   Injectable 0.5 milliGRAM(s) IV Push once PRN  methylPREDNISolone sodium succinate Injectable 20 milliGRAM(s) IV Push every 8 hours  morphine  - Injectable 2 milliGRAM(s) IV Push every 8 hours PRN  multivitamin 1 Tablet(s) Oral daily  ondansetron Injectable 4 milliGRAM(s) IV Push every 6 hours PRN  Parenteral Nutrition - Adult 1 Each TPN Continuous <Continuous>  sotalol 120 milliGRAM(s) Oral daily  thiamine Injectable 100 milliGRAM(s) IV Push daily      PMHX/PSHX:  No pertinent past medical history    No significant past surgical history        ROS: see subjective      PHYSICAL EXAM:     GENERAL: elderly, thin, cachcectic, chronically-ill appearing  HEENT:  NCAT  CHEST: no resp distress  ABDOMEN:  thin, +scaphoid abdomen; +diffusely tender  EXTREMITIES:  No cyanosis, clubbing, or edema  SKIN:  +pale  NEURO:  Awake and oriented      Vital Signs:  Vital Signs Last 24 Hrs  T(C): 37.3 (17 Aug 2021 00:37), Max: 37.3 (17 Aug 2021 00:37)  T(F): 99.1 (17 Aug 2021 00:37), Max: 99.1 (17 Aug 2021 00:37)  HR: 80 (17 Aug 2021 00:37) (80 - 87)  BP: 133/87 (17 Aug 2021 00:37) (95/60 - 157/76)  BP(mean): --  RR: 18 (17 Aug 2021 00:37) (18 - 18)  SpO2: 97% (17 Aug 2021 00:37) (96% - 97%)  Daily     Daily Weight in k.3 (16 Aug 2021 06:30)    LABS:                        8.3    15.91 )-----------( 360      ( 15 Aug 2021 15:35 )             27.6     08-15    136  |  103  |  11  ----------------------------<  122<H>  3.2<L>   |  22  |  0.49<L>    Ca    7.7<L>      15 Aug 2021 07:10  Phos  2.2     08-15  Mg     2.0     08-15                Imaging:

## 2021-08-18 NOTE — PROGRESS NOTE ADULT - SUBJECTIVE AND OBJECTIVE BOX
Patient is a 81y old  Male who presents with a chief complaint of diarrhea with blood in it , abd pain and weakness (18 Aug 2021 08:26)      INTERVAL HISTORY: pt feeling ok     TELEMETRY Personally reviewed: mostly SR 50'-60's with PAF     REVIEW OF SYSTEMS: generalized weakness   CONSTITUTIONAL: No weakness  EYES/ENT: No visual changes; No throat pain  Neck: No pain or stiffness  Respiratory: No cough, wheezing, No shortness of breath  CARDIOVASCULAR: no chest pain or palpitations  GASTROINTESTINAL: No abdominal pain, no nausea, vomiting or hematemesis  GENITOURINARY: No dysuria, frequency or hematuria  NEUROLOGICAL: No stroke like symptoms  SKIN: No rashes    	  MEDICATIONS:  sotalol 120 milliGRAM(s) Oral daily        PHYSICAL EXAM:  T(C): 36.6 (08-18-21 @ 12:48), Max: 36.8 (08-18-21 @ 09:53)  HR: 65 (08-18-21 @ 12:48) (63 - 68)  BP: 135/68 (08-18-21 @ 12:48) (135/68 - 153/65)  RR: 18 (08-18-21 @ 12:48) (17 - 19)  SpO2: 95% (08-18-21 @ 12:48) (94% - 96%)  Wt(kg): --  I&O's Summary    18 Aug 2021 07:01  -  18 Aug 2021 14:21  --------------------------------------------------------  IN: 360 mL / OUT: 0 mL / NET: 360 mL          Appearance: In no distress	  HEENT:    PERRL, EOMI	  Cardiovascular:  S1 S2, No JVD  Respiratory: Lungs clear to auscultation	  Gastrointestinal:  Soft, Non-tender, + BS	  Vascularature:  No edema of LE  Psychiatric: Appropriate affect   Neuro: no acute focal deficits                               6.8    11.33 )-----------( 233      ( 18 Aug 2021 09:27 )             23.2     08-18    136  |  104  |  19  ----------------------------<  168<H>  3.8   |  23  |  0.46<L>    Ca    7.6<L>      18 Aug 2021 06:45  Phos  3.1     08-18  Mg     2.2     08-18    TPro  5.0<L>  /  Alb  1.8<L>  /  TBili  0.3  /  DBili  x   /  AST  34  /  ALT  220<H>  /  AlkPhos  271<H>  08-18        Labs personally reviewed      ASSESSMENT/PLAN: 	  Problem/Plan - 1:  ·  Problem: CAD (coronary artery disease).  Plan: c/w home meds  We discussed the importance of SAPT with low dose ASA 81mg given CAD  pt denies any current chest pain, SOB or chest pressure.       Problem/Plan -2:  ·  Problem: Colitis.  Plan: Ct shows crohn's  exacerbation   f/u flex sig with biopsies   s/p FMT 7/28  plan for repeat FMT vs Fidoxomaicin if sx don't improve  f/p flex sig on 8/9- no evidence of pseudomembranous   started on Fidoxomaicin and steroids   per GI if fails medical therapy, then surgical evaluation would be indicated  f/u ID/ GI reccs   PICC inserted for TPN     Problem/Plan - 3:  ·  Problem: Afib pt said he was placed on sotalol for afib   -c/w sotalol   -EKG noted. SR   - rate controlled and regular   - 8/17 am pt went into rapid Afib with RVR rate of 154 cardiezem was given   - pt was transferred to monitored unit and now SR on monitor   - pt is ? candidate for AC seeing recent GIB. FOBT positive as recent as 8/8/21   - rate controlled now continue ot monitor   - if no contraindication from GI start Eliquis 2.5mg BID   - possible bleed with decreasing H&H ?       Problem/Plan - 4:   ·  Problem: GIB (gastrointestinal bleeding).  Plan: bloody diarrhea 2/2 chron's exacerbation   IV fluids   -c-diff positive:   on Fidoxomaicin  - tentative colon sx thursday     Problem/Plan - 5:  ·  Problem: SCDs     Problem/Plan - 6:  ·  Problem: B/L LE edema   - CXR showed mildly enlarged heart   -monitor IVF with I&O  -B/L L E edema improved   -TTE with mod-severe AI, mild AS, preserved EF    Problem/Plan - 7:  ·  Problem: AMS/ lethargic   replete electrolyte, trops flat, CKMB negative   CTH shows no hemorrhage and acute infarct                 Kar Powell [facility]   Rufus Onofre DO [facility]  Cardiovascular Medicine  [address] [place] [address], [address]  Office: [phone]  Cell: [phone]

## 2021-08-18 NOTE — PROGRESS NOTE ADULT - ATTENDING COMMENTS
I called the daughter yesterday, calls went straight to , left two  messages.   Upon review of the notes, the family is refusing surgery.  Unclear as per notes whether they are waiting for the IBD conference this evening, however if they make a decision tonight, it will be too late to prep and proceed with the scheduled surgery tomorrow.  Pls call us back if the family changes their mind.

## 2021-08-18 NOTE — PROGRESS NOTE ADULT - ASSESSMENT
81y Male with GERD on omeprazole daily and Crohn's disease diagnosed about 9 mos prior to admission at outside hospital c/b h/o abscess and left posterior distal anal intersphincteric fistula and presents diarrhea/BRBPR  2/2 to CD flare c/b c diff colitis. Initially treat for C diff, given that it did not get better got FMT. Switched PO vanc in favor of Fidaxomicin without any improvement. Therefore, patient got Flex sig to reassess which showed no pseudomembranous colitis anymore but severe Crohn colitis. Was started on steroids on 8/9. Has had minimal improvement.    # c diff - pseudomembranous colitis seen on colonoscopy 7/19/21, treated w/ vanc, fidaxomicin, FMT; repeat flex sig w/o further pseudomembranes but ongoing severe CD. 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    Teri Hess MD  GI Fellow, PGY-4  Available via [employer] Teams    NON-URGENT CONSULTS:  Please email [email or web address]  [email or web address]  AT NIGHT AND ON WEEKENDS:  Contact on-call GI fellow via answering service ([phone]) from 5pm-8am and on weekends/holidays  MONDAY-FRIDAY 8AM-5PM:  Pager# [phone] ([facility]) or [phone] ([facility])  GI Phone# [phone] ([facility])   81y Male with GERD on omeprazole daily and Crohn's disease diagnosed about 9 mos prior to admission at outside hospital c/b h/o abscess and left posterior distal anal intersphincteric fistula and presents diarrhea/BRBPR  2/2 to CD flare c/b c diff colitis. Initially treat for C diff, given that it did not get better got FMT. Switched PO vanc in favor of Fidaxomicin without any improvement. Therefore, patient got Flex sig to reassess which showed no pseudomembranous colitis anymore but severe Crohn colitis. Was started on steroids on 8/9. Has had minimal improvement.    # c diff - pseudomembranous colitis seen on colonoscopy 7/19/21, treated w/ vanc, fidaxomicin, FMT; repeat flex sig w/o further pseudomembranes but ongoing severe CD. 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    Teri Hess MD  GI Fellow, PGY-4  Available via [employer] Teams    NON-URGENT CONSULTS:  Please email [email or web address]  AT NIGHT AND ON WEEKENDS:  Contact on-call GI fellow via answering service ([phone]) from 5pm-8am and on weekends/holidays  MONDAY-FRIDAY 8AM-5PM:  Pager# [phone] ([facility]) or [phone] ([facility])  GI Phone# [phone] ([facility])   81y Male with GERD on omeprazole daily and Crohn's disease diagnosed about 9 mos prior to admission at outside hospital c/b h/o abscess and left posterior distal anal intersphincteric fistula and presents diarrhea/BRBPR  2/2 to CD flare c/b c diff colitis. Initially treat for C diff, given that it did not get better got FMT. Switched PO vanc in favor of Fidaxomicin without any improvement. Therefore, patient got Flex sig to reassess which showed no pseudomembranous colitis anymore but severe Crohn colitis. Was started on steroids on 8/9. Has had minimal improvement.    # c diff - pseudomembranous colitis seen on colonoscopy 7/19/21, treated w/ vanc, fidaxomicin, FMT; repeat flex sig w/o further pseudomembranes but ongoing severe CD. 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    Recommendations:  - appreciate CRS recs  - f/u ID recs  - continue fidaxomicin 200 mg qoD  - continue methylprednisolone IV 20 mg q8h  - pending discussion of pt's case at IBD conference 8/18  - spoke with pt's family at bedside who declined further treatment with infliximab and are apprehensive about surgery for colectomy (may seek 2nd opinion about CRS)  - encourage PO intake, can continue TPN short term to improve cachexia until CD can be under control (per family preference) but enteric feeding is preferred over TPN  - monitor stool output  - rest of care per primary team    We will continue to follow.    Teri Hess MD  GI Fellow, PGY-4  Available via [employer] Teams    NON-URGENT CONSULTS:  Please email [email or web address]  [email or web address]  AT NIGHT AND ON WEEKENDS:  Contact on-call GI fellow via answering service ([phone]) from 5pm-8am and on weekends/holidays  MONDAY-FRIDAY 8AM-5PM:  Pager# [phone] ([facility]) or [phone] ([facility])  GI Phone# [phone] ([facility])

## 2021-08-18 NOTE — PROGRESS NOTE ADULT - SUBJECTIVE AND OBJECTIVE BOX
[facility] NUTRITION SUPPORT-- FOLLOW UP NOTE  -------------------------------------------------------------------------------    24 hour events/subjective: pt seen and examined. tpn running at 94cc/hr. c/o nausea, mild abd pain and diarrhea. denies f/c/cp/sob. Surgery recommending OR, pt refused bowel prep and surgery, family requesting 2nd opinion per chart. IBD conference planned for today. [facility] eval pending. FS trend noted, received 2u ssi coverage    Diet:  Diet, Low Fiber:   David(7 Gm Arginine/7 Gm Glut/1.2 Gm HMB     Qty per Day:  2  Liquid Protein Supplement     Qty per Day:  1  Supplement Feeding Modality:  Oral  Ensure Enlive Cans or Servings Per Day:  2       Frequency:  Daily  Probiotic Yogurt/Smoothie Cans or Servings Per Day:  1       Frequency:  Daily (08-12-21 @ 10:59)      PAST HISTORY  --------------------------------------------------------------------------------  No significant changes to PMH, PSH, FHx, SHx, unless otherwise noted    ALLERGIES & MEDICATIONS  --------------------------------------------------------------------------------  Allergies    No Known Allergies    Intolerances      Standing Inpatient Medications  ascorbic acid 500 milliGRAM(s) Oral daily  chlorhexidine 2% Cloths 1 Application(s) Topical daily  clonazePAM  Tablet 0.25 milliGRAM(s) Oral at bedtime  dextrose 40% Gel 15 Gram(s) Oral once  dextrose 5%. 1000 milliLiter(s) IV Continuous <Continuous>  dextrose 5%. 1000 milliLiter(s) IV Continuous <Continuous>  dextrose 50% Injectable 25 Gram(s) IV Push once  dextrose 50% Injectable 12.5 Gram(s) IV Push once  dextrose 50% Injectable 25 Gram(s) IV Push once  fat emulsion (Fish Oil and Plant Based) 20% Infusion 12.5 mL/Hr IV Continuous <Continuous>  fidaxomicin 200 milliGRAM(s) Oral <User Schedule>  FIRST- Mouthwash  BLM 10 milliLiter(s) Swish and Spit every 8 hours  folic acid 1 milliGRAM(s) Oral daily  glucagon  Injectable 1 milliGRAM(s) IntraMuscular once  insulin lispro (ADMELOG) corrective regimen sliding scale   SubCutaneous every 6 hours  methylPREDNISolone sodium succinate Injectable 20 milliGRAM(s) IV Push every 8 hours  multivitamin 1 Tablet(s) Oral daily  polyethylene glycol 3350 17 Gram(s) Oral two times a day  sotalol 120 milliGRAM(s) Oral daily  thiamine Injectable 100 milliGRAM(s) IV Push daily    PRN Inpatient Medications  acetaminophen   Tablet .. 650 milliGRAM(s) Oral every 6 hours PRN  aluminum hydroxide/magnesium hydroxide/simethicone Suspension 30 milliLiter(s) Oral every 4 hours PRN  guaifenesin/dextromethorphan Oral Liquid 10 milliLiter(s) Oral every 8 hours PRN  lidocaine 2% Gel 1 Application(s) Topical four times a day PRN  LORazepam   Injectable 0.5 milliGRAM(s) IV Push once PRN  ondansetron Injectable 4 milliGRAM(s) IV Push every 6 hours PRN        REVIEW OF SYSTEMS  --------------------------------------------------------------------------------    see hpi, unable to obtain in entirety       VITALS/PHYSICAL EXAM  --------------------------------------------------------------------------------  T(C): 36.7 (08-18-21 @ 05:34), Max: 36.7 (08-17-21 @ 21:05)  HR: 65 (08-18-21 @ 05:34) (65 - 81)  BP: 153/63 (08-18-21 @ 05:34) (131/63 - 153/65)  RR: 18 (08-18-21 @ 05:34) (17 - 19)  SpO2: 96% (08-18-21 @ 05:34) (95% - 97%)  Wt(kg): --      I&O's Detail    Physical Exam:  Gen: lying in bed, frail, cachectic   HEENT: ncat, trachea midline  Chest: respirations even and non labored  Abd: soft, nt, nondistended  LE:  no edema  Neuro: a/o x 2 this am      LABS/STUDIES  --------------------------------------------------------------------------------              7.1    11.41 >-----------<  250      [08-18-21 @ 06:45]              23.7     136  |  104  |  19  ----------------------------<  168      [08-18-21 @ 06:45]  3.8   |  23  |  0.46        Ca     7.6     [08-18-21 @ 06:45]      iCa    1.07     [08-18 @ 06:47]      Mg     2.2     [08-18-21 @ 06:45]      Phos  3.1     [08-18-21 @ 06:45]    TPro  5.0  /  Alb  1.8  /  TBili  0.3  /  DBili  x   /  AST  34  /  ALT  220  /  AlkPhos  271  [08-18-21 @ 06:45]          Ca ionized  Creatinine Trend:  POC glucoseGlucose, Serum: 168 mg/dL (08-18-21 @ 06:45)  CAPILLARY BLOOD GLUCOSE      POCT Blood Glucose.: 181 mg/dL (18 Aug 2021 04:55)  POCT Blood Glucose.: 191 mg/dL (18 Aug 2021 00:13)  POCT Blood Glucose.: 181 mg/dL (17 Aug 2021 17:15)  POCT Blood Glucose.: 201 mg/dL (17 Aug 2021 13:07)    PrealbuminPrealbumin, Serum: 8 mg/dL (08-15-21 @ 10:17)    Triglycerides     [facility] NUTRITION SUPPORT-- FOLLOW UP NOTE  -------------------------------------------------------------------------------    24 hour events/subjective: pt seen and examined. tpn running at 94cc/hr (although order auto discontinued, spoke w pharmacy, order fell off because it was initially marked as completed, umang primary and rn, rn to cont tpn as per prior order and to not kennedy as 'completed' when new bag starts tonight). c/o nausea, mild abd pain and diarrhea. denies f/c/cp/sob. Surgery recommending OR, pt refused bowel prep and surgery, family requesting 2nd opinion per chart. IBD conference planned for today. Pall care eval pending. FS trend noted, received 2u ssi coverage    Diet:  Diet, Low Fiber:   David(7 Gm Arginine/7 Gm Glut/1.2 Gm HMB     Qty per Day:  2  Liquid Protein Supplement     Qty per Day:  1  Supplement Feeding Modality:  Oral  Ensure Enlive Cans or Servings Per Day:  2       Frequency:  Daily  Probiotic Yogurt/Smoothie Cans or Servings Per Day:  1       Frequency:  Daily (08-12-21 @ 10:59)      PAST HISTORY  --------------------------------------------------------------------------------  No significant changes to PMH, PSH, FHx, SHx, unless otherwise noted    ALLERGIES & MEDICATIONS  --------------------------------------------------------------------------------  Allergies    No Known Allergies    Intolerances      Standing Inpatient Medications  ascorbic acid 500 milliGRAM(s) Oral daily  chlorhexidine 2% Cloths 1 Application(s) Topical daily  clonazePAM  Tablet 0.25 milliGRAM(s) Oral at bedtime  dextrose 40% Gel 15 Gram(s) Oral once  dextrose 5%. 1000 milliLiter(s) IV Continuous <Continuous>  dextrose 5%. 1000 milliLiter(s) IV Continuous <Continuous>  dextrose 50% Injectable 25 Gram(s) IV Push once  dextrose 50% Injectable 12.5 Gram(s) IV Push once  dextrose 50% Injectable 25 Gram(s) IV Push once  fat emulsion (Fish Oil and Plant Based) 20% Infusion 12.5 mL/Hr IV Continuous <Continuous>  fidaxomicin 200 milliGRAM(s) Oral <User Schedule>  FIRST- Mouthwash  BLM 10 milliLiter(s) Swish and Spit every 8 hours  folic acid 1 milliGRAM(s) Oral daily  glucagon  Injectable 1 milliGRAM(s) IntraMuscular once  insulin lispro (ADMELOG) corrective regimen sliding scale   SubCutaneous every 6 hours  methylPREDNISolone sodium succinate Injectable 20 milliGRAM(s) IV Push every 8 hours  multivitamin 1 Tablet(s) Oral daily  polyethylene glycol 3350 17 Gram(s) Oral two times a day  sotalol 120 milliGRAM(s) Oral daily  thiamine Injectable 100 milliGRAM(s) IV Push daily    PRN Inpatient Medications  acetaminophen   Tablet .. 650 milliGRAM(s) Oral every 6 hours PRN  aluminum hydroxide/magnesium hydroxide/simethicone Suspension 30 milliLiter(s) Oral every 4 hours PRN  guaifenesin/dextromethorphan Oral Liquid 10 milliLiter(s) Oral every 8 hours PRN  lidocaine 2% Gel 1 Application(s) Topical four times a day PRN  LORazepam   Injectable 0.5 milliGRAM(s) IV Push once PRN  ondansetron Injectable 4 milliGRAM(s) IV Push every 6 hours PRN        REVIEW OF SYSTEMS  --------------------------------------------------------------------------------    see hpi, unable to obtain in entirety       VITALS/PHYSICAL EXAM  --------------------------------------------------------------------------------  T(C): 36.7 (08-18-21 @ 05:34), Max: 36.7 (08-17-21 @ 21:05)  HR: 65 (08-18-21 @ 05:34) (65 - 81)  BP: 153/63 (08-18-21 @ 05:34) (131/63 - 153/65)  RR: 18 (08-18-21 @ 05:34) (17 - 19)  SpO2: 96% (08-18-21 @ 05:34) (95% - 97%)  Wt(kg): --      I&O's Detail    Physical Exam:  Gen: lying in bed, frail, cachectic   HEENT: ncat, trachea midline  Chest: respirations even and non labored  Abd: soft, nt, nondistended  LE:  no edema  Neuro: a/o x 2 this am      LABS/STUDIES  --------------------------------------------------------------------------------              7.1    11.41 >-----------<  250      [08-18-21 @ 06:45]              23.7     136  |  104  |  19  ----------------------------<  168      [08-18-21 @ 06:45]  3.8   |  23  |  0.46        Ca     7.6     [08-18-21 @ 06:45]      iCa    1.07     [08-18 @ 06:47]      Mg     2.2     [08-18-21 @ 06:45]      Phos  3.1     [08-18-21 @ 06:45]    TPro  5.0  /  Alb  1.8  /  TBili  0.3  /  DBili  x   /  AST  34  /  ALT  220  /  AlkPhos  271  [08-18-21 @ 06:45]          Ca ionized  Creatinine Trend:  POC glucoseGlucose, Serum: 168 mg/dL (08-18-21 @ 06:45)  CAPILLARY BLOOD GLUCOSE      POCT Blood Glucose.: 181 mg/dL (18 Aug 2021 04:55)  POCT Blood Glucose.: 191 mg/dL (18 Aug 2021 00:13)  POCT Blood Glucose.: 181 mg/dL (17 Aug 2021 17:15)  POCT Blood Glucose.: 201 mg/dL (17 Aug 2021 13:07)    PrealbuminPrealbumin, Serum: 8 mg/dL (08-15-21 @ 10:17)    Triglycerides

## 2021-08-18 NOTE — PROGRESS NOTE ADULT - ASSESSMENT
80 yo M with Crohn's on treatment, recently cared for at [facility], now presenting with ongoing diarrhea    Antibiotics  Flagyl 7/13 7/21-->25  Cipro 7/13-->14  po Vanco 7/14 -->-->7/29; 8/1-->8/2  (7/28: colonoscopic FMT)  Fidaxomicin 8/2-->      1) Crohns colitis  8/9 Flex sig without pseudomembrances, crohns colitis appears major factor for continued diarrhea,   some decreased diarrhea - subjective improvement  rectal pain related to diarrhea - ?related to diarrhea  surgery being discussed if patient does not respond to steroids  solumedrol 20 q 8hours 8/9-->  There is risk of high dose infliximab - biapical scarring noted on CXR -   previous neg Quant Gold TB  Hep B core Ab ordered  HIV negative      2) C diff colitis  Fidaxomcin 200 mg twice daily x 5 days 8/2 --> 8/7, currently every other day for additional 20 days days 7 - 25 =  8/9 --> 8/28  po intake encouraged  - S/p colonoscopic FMT 7/28    3) Leukocytosis  ? related to steroids    4) Para flu 3 viral uri with cough and fevers    5) malnourished state, hypoalbuminema  TPN/Smoflipid 8/16-->    6) debility    7) transaminitis  is this related to TPN?  improved today

## 2021-08-18 NOTE — PROGRESS NOTE ADULT - SUBJECTIVE AND OBJECTIVE BOX
Chief Complaint:  Patient is a 81y old  Male who presents with a chief complaint of diarrhea with blood in it , abd pain and weakness (18 Aug 2021 02:55)      Interval Events: Per chart review, pt's + pt's family are refusing colectomy, and per our discussion they have also declined infliximab. Primary team consulted [facility] to discuss GOC.      Hospital Medications:  acetaminophen   Tablet .. 650 milliGRAM(s) Oral every 6 hours PRN  aluminum hydroxide/magnesium hydroxide/simethicone Suspension 30 milliLiter(s) Oral every 4 hours PRN  ascorbic acid 500 milliGRAM(s) Oral daily  chlorhexidine 2% Cloths 1 Application(s) Topical daily  clonazePAM  Tablet 0.25 milliGRAM(s) Oral at bedtime  dextrose 40% Gel 15 Gram(s) Oral once  dextrose 5%. 1000 milliLiter(s) IV Continuous <Continuous>  dextrose 5%. 1000 milliLiter(s) IV Continuous <Continuous>  dextrose 50% Injectable 25 Gram(s) IV Push once  dextrose 50% Injectable 12.5 Gram(s) IV Push once  dextrose 50% Injectable 25 Gram(s) IV Push once  fat emulsion (Fish Oil and Plant Based) 20% Infusion 12.5 mL/Hr IV Continuous <Continuous>  fidaxomicin 200 milliGRAM(s) Oral <User Schedule>  FIRST- Mouthwash  BLM 10 milliLiter(s) Swish and Spit every 8 hours  folic acid 1 milliGRAM(s) Oral daily  glucagon  Injectable 1 milliGRAM(s) IntraMuscular once  guaifenesin/dextromethorphan Oral Liquid 10 milliLiter(s) Oral every 8 hours PRN  insulin lispro (ADMELOG) corrective regimen sliding scale   SubCutaneous every 6 hours  lidocaine 2% Gel 1 Application(s) Topical four times a day PRN  LORazepam   Injectable 0.5 milliGRAM(s) IV Push once PRN  methylPREDNISolone sodium succinate Injectable 20 milliGRAM(s) IV Push every 8 hours  multivitamin 1 Tablet(s) Oral daily  ondansetron Injectable 4 milliGRAM(s) IV Push every 6 hours PRN  polyethylene glycol 3350 17 Gram(s) Oral two times a day  sotalol 120 milliGRAM(s) Oral daily  thiamine Injectable 100 milliGRAM(s) IV Push daily      PMHX/PSHX:  No pertinent past medical history    No significant past surgical history        ROS: see subjective      PHYSICAL EXAM:     GENERAL: elderly, thin, cachcectic, chronically-ill appearing  HEENT:  NCAT  CHEST: no resp distress  ABDOMEN:  thin, +scaphoid abdomen; +diffusely tender  EXTREMITIES:  No cyanosis, clubbing, or edema  SKIN:  +pale  NEURO:  Awake and oriented    Vital Signs:  Vital Signs Last 24 Hrs  T(C): 36.7 (18 Aug 2021 05:34), Max: 36.7 (17 Aug 2021 21:05)  T(F): 98.1 (18 Aug 2021 05:34), Max: 98.1 (17 Aug 2021 21:05)  HR: 65 (18 Aug 2021 05:34) (65 - 81)  BP: 153/63 (18 Aug 2021 05:34) (131/63 - 153/65)  BP(mean): --  RR: 18 (18 Aug 2021 05:34) (17 - 19)  SpO2: 96% (18 Aug 2021 05:34) (95% - 97%)  Daily     Daily Weight in k.2 (18 Aug 2021 05:34)    LABS:                        7.1    11.41 )-----------( 250      ( 18 Aug 2021 06:45 )             23.7     08-18    136  |  104  |  19  ----------------------------<  168<H>  3.8   |  23  |  0.46<L>    Ca    7.6<L>      18 Aug 2021 06:45  Phos  3.1     18  Mg     2.2     18    TPro  5.0<L>  /  Alb  1.8<L>  /  TBili  0.3  /  DBili  x   /  AST  34  /  ALT  220<H>  /  AlkPhos  271<H>  08-18    LIVER FUNCTIONS - ( 18 Aug 2021 06:45 )  Alb: 1.8 g/dL / Pro: 5.0 g/dL / ALK PHOS: 271 U/L / ALT: 220 U/L / AST: 34 U/L / GGT: x                   Imaging: No new abdominal imaging             Chief Complaint:  Patient is a 81y old  Male who presents with a chief complaint of diarrhea with blood in it , abd pain and weakness (18 Aug 2021 02:55)      Interval Events: Per chart review, pt's + pt's family are apprehensive about colectomy, and per our discussion they have also declined infliximab. Primary team consulted [facility] to discuss GOC.      Hospital Medications:  acetaminophen   Tablet .. 650 milliGRAM(s) Oral every 6 hours PRN  aluminum hydroxide/magnesium hydroxide/simethicone Suspension 30 milliLiter(s) Oral every 4 hours PRN  ascorbic acid 500 milliGRAM(s) Oral daily  chlorhexidine 2% Cloths 1 Application(s) Topical daily  clonazePAM  Tablet 0.25 milliGRAM(s) Oral at bedtime  dextrose 40% Gel 15 Gram(s) Oral once  dextrose 5%. 1000 milliLiter(s) IV Continuous <Continuous>  dextrose 5%. 1000 milliLiter(s) IV Continuous <Continuous>  dextrose 50% Injectable 25 Gram(s) IV Push once  dextrose 50% Injectable 12.5 Gram(s) IV Push once  dextrose 50% Injectable 25 Gram(s) IV Push once  fat emulsion (Fish Oil and Plant Based) 20% Infusion 12.5 mL/Hr IV Continuous <Continuous>  fidaxomicin 200 milliGRAM(s) Oral <User Schedule>  FIRST- Mouthwash  BLM 10 milliLiter(s) Swish and Spit every 8 hours  folic acid 1 milliGRAM(s) Oral daily  glucagon  Injectable 1 milliGRAM(s) IntraMuscular once  guaifenesin/dextromethorphan Oral Liquid 10 milliLiter(s) Oral every 8 hours PRN  insulin lispro (ADMELOG) corrective regimen sliding scale   SubCutaneous every 6 hours  lidocaine 2% Gel 1 Application(s) Topical four times a day PRN  LORazepam   Injectable 0.5 milliGRAM(s) IV Push once PRN  methylPREDNISolone sodium succinate Injectable 20 milliGRAM(s) IV Push every 8 hours  multivitamin 1 Tablet(s) Oral daily  ondansetron Injectable 4 milliGRAM(s) IV Push every 6 hours PRN  polyethylene glycol 3350 17 Gram(s) Oral two times a day  sotalol 120 milliGRAM(s) Oral daily  thiamine Injectable 100 milliGRAM(s) IV Push daily      PMHX/PSHX:  No pertinent past medical history    No significant past surgical history        ROS: see subjective      PHYSICAL EXAM:     GENERAL: elderly, thin, cachcectic, chronically-ill appearing  HEENT:  NCAT  CHEST: no resp distress  ABDOMEN:  thin, +scaphoid abdomen; +diffusely tender  EXTREMITIES:  No cyanosis, clubbing, or edema  SKIN:  +pale  NEURO:  Awake and oriented    Vital Signs:  Vital Signs Last 24 Hrs  T(C): 36.7 (18 Aug 2021 05:34), Max: 36.7 (17 Aug 2021 21:05)  T(F): 98.1 (18 Aug 2021 05:34), Max: 98.1 (17 Aug 2021 21:05)  HR: 65 (18 Aug 2021 05:34) (65 - 81)  BP: 153/63 (18 Aug 2021 05:34) (131/63 - 153/65)  BP(mean): --  RR: 18 (18 Aug 2021 05:34) (17 - 19)  SpO2: 96% (18 Aug 2021 05:34) (95% - 97%)  Daily     Daily Weight in k.2 (18 Aug 2021 05:34)    LABS:                        7.1    11.41 )-----------( 250      ( 18 Aug 2021 06:45 )             23.7     08-18    136  |  104  |  19  ----------------------------<  168<H>  3.8   |  23  |  0.46<L>    Ca    7.6<L>      18 Aug 2021 06:45  Phos  3.1     08-18  Mg     2.2     -18    TPro  5.0<L>  /  Alb  1.8<L>  /  TBili  0.3  /  DBili  x   /  AST  34  /  ALT  220<H>  /  AlkPhos  271<H>  08-18    LIVER FUNCTIONS - ( 18 Aug 2021 06:45 )  Alb: 1.8 g/dL / Pro: 5.0 g/dL / ALK PHOS: 271 U/L / ALT: 220 U/L / AST: 34 U/L / GGT: x                   Imaging: No new abdominal imaging

## 2021-08-18 NOTE — CONSULT NOTE ADULT - SUBJECTIVE AND OBJECTIVE BOX
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 Colorectal consulted to evaluate for need of surgical management. On exam patient is soft and non tender, lethargic overall. Overall impression, patient does not appear to have any indication for surgical intervention at this time, lactate of 2.4 likely from dehydration / malnutrition as patient has been having multiple episodes of diarrhea and unable to tolerate PO well 2/2 lethargy.      PAST MEDICAL & SURGICAL HISTORY:  C. diff   Crohn's  CAD  HTN      No significant past surgical history        MEDICATIONS  (STANDING):  ascorbic acid 500 milliGRAM(s) Oral daily  chlorhexidine 2% Cloths 1 Application(s) Topical daily  clonazePAM  Tablet 0.25 milliGRAM(s) Oral at bedtime  dextrose 40% Gel 15 Gram(s) Oral once  dextrose 5%. 1000 milliLiter(s) (50 mL/Hr) IV Continuous <Continuous>  dextrose 5%. 1000 milliLiter(s) (100 mL/Hr) IV Continuous <Continuous>  dextrose 50% Injectable 25 Gram(s) IV Push once  dextrose 50% Injectable 12.5 Gram(s) IV Push once  dextrose 50% Injectable 25 Gram(s) IV Push once  fat emulsion (Fish Oil and Plant Based) 20% Infusion 25 mL/Hr (25 mL/Hr) IV Continuous <Continuous>  fidaxomicin 200 milliGRAM(s) Oral <User Schedule>  FIRST- Mouthwash  BLM 10 milliLiter(s) Swish and Spit every 8 hours  folic acid 1 milliGRAM(s) Oral daily  glucagon  Injectable 1 milliGRAM(s) IntraMuscular once  insulin lispro (ADMELOG) corrective regimen sliding scale   SubCutaneous every 6 hours  methylPREDNISolone sodium succinate Injectable 20 milliGRAM(s) IV Push every 8 hours  multivitamin 1 Tablet(s) Oral daily  Parenteral Nutrition - Adult 1 Each (94 mL/Hr) TPN Continuous <Continuous>  polyethylene glycol 3350 17 Gram(s) Oral two times a day  sotalol 120 milliGRAM(s) Oral daily  thiamine Injectable 100 milliGRAM(s) IV Push daily    MEDICATIONS  (PRN):  acetaminophen   Tablet .. 650 milliGRAM(s) Oral every 6 hours PRN Temp greater or equal to 38.5C (101.3F), Mild Pain (1 - 3), Moderate Pain (4 - 6)  aluminum hydroxide/magnesium hydroxide/simethicone Suspension 30 milliLiter(s) Oral every 4 hours PRN Dyspepsia  guaifenesin/dextromethorphan Oral Liquid 10 milliLiter(s) Oral every 8 hours PRN Cough  lidocaine 2% Gel 1 Application(s) Topical four times a day PRN pain due to skin excoriation  LORazepam   Injectable 0.5 milliGRAM(s) IV Push once PRN Agitation  ondansetron Injectable 4 milliGRAM(s) IV Push every 6 hours PRN Nausea and/or Vomiting      Allergies    No Known Allergies    Intolerances        SOCIAL HISTORY:    FAMILY HISTORY:      PHYSICAL EXAM:  Constitutional: well developed, well nourished, NAD  Eyes: anicteric  ENMT: normal facies, symmetric  Respiratory: Breathing comfortably    Gastrointestinal: abdomen soft, nontender, nondistended.   Extremities: FROM, warm  Neurological: intact, non-focal  Psychiatric: oriented x 3; appropriate      Vital Signs Last 24 Hrs  T(C): 36.6 (18 Aug 2021 12:48), Max: 36.8 (18 Aug 2021 09:53)  T(F): 97.9 (18 Aug 2021 12:48), Max: 98.2 (18 Aug 2021 09:53)  HR: 65 (18 Aug 2021 12:48) (63 - 68)  BP: 135/68 (18 Aug 2021 12:48) (135/68 - 153/65)  BP(mean): --  RR: 18 (18 Aug 2021 12:48) (17 - 19)  SpO2: 95% (18 Aug 2021 12:48) (94% - 96%)    I&O's Summary    18 Aug 2021 07:01  -  18 Aug 2021 17:05  --------------------------------------------------------  IN: 360 mL / OUT: 0 mL / NET: 360 mL            LABS:                        7.0    10.19 )-----------( 236      ( 18 Aug 2021 16:12 )             23.2     08-18    136  |  104  |  19  ----------------------------<  168<H>  3.8   |  23  |  0.46<L>    Ca    7.6<L>      18 Aug 2021 06:45  Phos  3.1     08-18  Mg     2.2     08-18    TPro  5.0<L>  /  Alb  1.8<L>  /  TBili  0.3  /  DBili  x   /  AST  34  /  ALT  220<H>  /  AlkPhos  271<H>  08-18        CAPILLARY BLOOD GLUCOSE      POCT Blood Glucose.: 200 mg/dL (18 Aug 2021 12:46)  POCT Blood Glucose.: 181 mg/dL (18 Aug 2021 04:55)  POCT Blood Glucose.: 191 mg/dL (18 Aug 2021 00:13)  POCT Blood Glucose.: 181 mg/dL (17 Aug 2021 17:15)    LIVER FUNCTIONS - ( 18 Aug 2021 06:45 )  Alb: 1.8 g/dL / Pro: 5.0 g/dL / ALK PHOS: 271 U/L / ALT: 220 U/L / AST: 34 U/L / GGT: x             Cultures:      RADIOLOGY & ADDITIONAL STUDIES:    [facility]  ____________________________________________________________________________________________________  Patient Name: Shemar Bustamante                        MRN: [id number]  Account Number: [id number]                     YOB: 1940  Room: Endoscopy Room 4                           Gender: Male  Attending MD: Stephanie Rangel MD                Procedure Date No Time: 7/19/2021  ____________________________________________________________________________________________________     Procedure:           Colonoscopy  Indications:         Chronic diarrhea, +C.diff, Crohn's disease of the colon  Providers:           Stephanie Rangel MD, Jaison Fang (Fellow)  Requesting Provider: In patient GI service  Medicines:           Propofol per Anesthesia  Complications:       No immediate complications.  ____________________________________________________________________________________________________  Procedure:           Pre-Anesthesia Assessment:             - Prior to the procedure, a History and Physical was performed, and patient                        medications and allergies were reviewed. The patient is competent. The risks                        and benefits of the procedure and the sedation options and risks were                        discussed with the patient. All questions were answered and informed consent                        was obtained. Patient identification and proposed procedure were verified by    the physician, the nurse and the anesthesiologist in the procedure room.                        Prophylactic Antibiotics: The patient does not require prophylactic                        antibiotics. Prior Anticoagulants: The patient has taken no previous                        anticoagulant or antiplatelet agents. ASA Grade Assessment: III - A patient                        with severe systemic disease. After reviewing the risks and benefits, the                        patient was deemed in satisfactory condition to undergo the procedure. The                        anesthesia plan was to use monitored anesthesia care (MAC). Immediately prior                        to administration of medications, the patient was re-assessed for adequacy to                       receive sedatives. The heart rate, respiratory rate, oxygen saturations,                        blood pressure, adequacy of pulmonary ventilation, and response to care were                        monitored throughout the procedure. The physical status of the patient was                        re-assessed after the procedure.                       After I obtained informed consent, the scope was passed under direct vision.                        Throughout the procedure, thepatient's blood pressure, pulse, and oxygen                        saturations were monitored continuously. The Endoscope was introduced through                        the anus and advanced to the terminal ileum, with identification of the               appendiceal orifice and IC valve. After I obtained informed consent, the                        scope was passed under direct vision. Throughout the procedure, the patient's                        blood pressure, pulse, and oxygen saturations were monitored continuously.The                        colonoscopy was performed without difficulty. The patient tolerated the                        procedure well.                        Findings:       Skin tags were found on perianal exam.       A diffuse area of granular mucosa was found in the entire colon.       A diffuse area of white exudate, consistent with pseudomembrane formation was found at the    anus, in the rectum, in the recto-sigmoid colon and in the distal sigmoid colon. Biopsies of        the cecum/ascending colon, transverse colon, sigmoid and rectosigmoid colon were taken with a        cold forceps for histology.       A diffuse areaof mildly erythematous mucosa was found in the rectum.                                                                                                        Impression:          - Perianal skin tags found on perianal exam.                       - Granularity in the entire examined colon.                       - Pseudomembranous enterocolitis consistent with C.diff infection,                        predominantly in the left colon.                       - Erythematous mucosa in the rectum.  Recommendation:      - Await pathology results.                       - Low residue diet today.      EXAM:  CT ABDOMEN AND PELVIS IC                            PROCEDURE DATE:  08/16/2021            INTERPRETATION:  CLINICAL INFORMATION: Colitis with uptrending lactate.    COMPARISON: CT 8/6/2021    CONTRAST/COMPLICATIONS:  IV Contrast: Omnipaque 350  90 cc administered   10 cc discarded  Oral Contrast: None  Complications: None    PROCEDURE:  CT of the Abdomen and Pelvis was performed.  Sagittal and coronal reformats were performed.    FINDINGS:  LOWER CHEST: Cardiomegaly. Coronary calcification. Small bilateral pleural effusions with associated mild compressive atelectasis.    LIVER: Within normal limits.  BILE DUCTS: Normal caliber.  GALLBLADDER: Nonspecific gallbladder wall edema.  SPLEEN: Within normal limits.  PANCREAS: Within normallimits.  ADRENALS: Within normal limits.  KIDNEYS/URETERS: A 3 mm nonobstructing left lower pole renal calculus. Left renal cyst.    BLADDER: Mild dependent bladder debris.  REPRODUCTIVE ORGANS: Prostate is enlarged.    BOWEL: No bowel obstruction. Diffuse colon thickening consistent with colitis which may be infectious or inflammatory. Colonic diverticulosis. Appendix is normal.  PERITONEUM: No ascites.  VESSELS: Atherosclerotic changes including irregularity with mild dilatation of the SMA to 1.0 cm. Celiac axis, SMA and SARAH are patent as are the SMV and portal veins.  RETROPERITONEUM/LYMPH NODES: No lymphadenopathy.  ABDOMINAL WALL: Within normal limits.  BONES: Degenerative changes.    IMPRESSION:  Diffuse colitis which may be infectious or inflammatory, unchanged.  Patent mesenteric vasculature.    Nonspecific gallbladder wall edema. Correlate for right upper quadrant pain.    --- End of Report ---

## 2021-08-18 NOTE — PROGRESS NOTE ADULT - ASSESSMENT
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 Admitted for crohns management, course c/b cdiff colitis sp dificid/fmt and findings of indeterminate quantiferon, pending id clearance to start biologic. Prealb 8. TPN consulted in setting of significant weight loss, severe pcm, and worsening colitis suspected 2/2 CD. Pt at high risk for refeeding syndrome. TPN started 8/16. Repeat CT showing unchanged diffuse colitis and non specific gb wall edema. Pt/family undecided regarding further tx/management.    Current Diet: Diet, Low Fiber:   David(7 Gm Arginine/7 Gm Glut/1.2 Gm HMB     Qty per Day:  2  Liquid Protein Supplement     Qty per Day:  1  Supplement Feeding Modality:  Oral  Ensure Enlive Cans or Servings Per Day:  2       Frequency:  Daily  Probiotic Yogurt/Smoothie Cans or Servings Per Day:  1       Frequency:  Daily     TPN GOAL recommendations as per RD: aa 105, dextrose 210g, smof 60g    -cont tpn, increase to goal today; PICC in place, maintenance as per protocol   -hypocalcemia- check vit d/pth levels, increase ca gluconate to 12meq in TPN bag  -hyperglycemia- a1c 5.2, also on IV steroids, FS trend noted, increase insulin in TPN bag to 10U total; cont FS monitoring q6 hours, coverage with ISS as needed  -cont thiamine supplementation, 100mg IV x 5-7 days  -elevated lfts- ct noted, now downtrending  -anemia- sp venofer, planned for prbc transfusion, trend cbc  -electrolyte imbalance risk- monitor CMP, Mg, Ionized Ca, Phosphorus qd  -check TG daily until stable on goal smof, then weekly   -strict I/O's  -further care per medicine/gi/surgery; dw nursing    plan d/w Dr VIK Fernandez and Dr SARMAD Multani, agreeable with above      TPN Team, spectra [phone] pager [phone]  Weekdays 6am-4pm  Weekends/Holidays 8am-12pm

## 2021-08-18 NOTE — PROGRESS NOTE ADULT - ASSESSMENT
A/p    # AMS   -sod improved after IV fluid  MS is little beter today   likely metabolic encephalopathy   no focal deficit    sever C diff colitis :  -completed vanco/ flagyl   s/p FMT   -started on fidaxomicin 200 mg bid today : as per ID and GI   -supportive care     Hx of Chron's dis :  - IBD exacerbation and his cdiff is improved on flex sig   -started on Iv steroids   -family refusal for using inflaximab as they says it didn't work in past   - now familyrefusal for surgery     hyponatremia / sever malnutrition  -started on IV fluids   improved sod     sever malnutrition :  -started on TPN     dispo: surgery following , 2nd opinion called today.

## 2021-08-18 NOTE — PROGRESS NOTE ADULT - ATTENDING COMMENTS
Patient seen and examined. Agree with above. Daughter again at bedside. Patient continues with abdominal pain and diarrhea. Receiving TPN. On steroids. Family wishing a 2nd surgical opinion from Dr Gunnar Fischer's group. Case discussed with Dr. Oliver (admitting physician) and Dr Ca Malone (covering for Dr Fischer).

## 2021-08-18 NOTE — CHART NOTE - NSCHARTNOTEFT_GEN_A_CORE
Pt noted to be severely anemic.                         6.8    11.33 )-----------( 233      ( 18 Aug 2021 09:27 )             23.2     Labs repeated to confirm; No obvious source of bleeding; stool brown . PRBC ordered. Pt and Daughter refusing at this time. explained need for transfusion and possible effects of not treating anemia including hypotension, shock , multiorgan failure and even death. Attending notified. Daughter agrees to have CBC repeated later today.

## 2021-08-18 NOTE — CONSULT NOTE ADULT - ASSESSMENT
81y Male with GERD on omeprazole daily and Crohn's disease diagnosed about 9 mos prior to admission at outside hospital c/b h/o abscess and left posterior distal anal intersphincteric fistula and presents diarrhea/BRBPR  2/2 to CD flare c/b c diff colitis. Initially treat for C diff, given that it did not get better got FMT. Switched PO vanc in favor of Fidaxomicin without any improvement. Therefore, patient got Flex sig to reassess which showed no pseudomembranous colitis anymore but severe Crohn colitis. Was started on steroids on 8/9. Has had minimal improvement.      - No acute surgical intervention   - Will discuss surgical options with attending and at IBD board  - TPN and iron infusion   - Care as per GI and primary team  - Will need medicine and cardiology optimization and risk stratification  - discussed with attending     [id number]

## 2021-08-18 NOTE — PROGRESS NOTE ADULT - SUBJECTIVE AND OBJECTIVE BOX
SURGERY  Pager: #[phone]    INTERVAL EVENTS/SUBJECTIVE: No acute events overnight.     ______________________________________________  OBJECTIVE:   T(C): 36.7 (08-17-21 @ 21:05), Max: 36.7 (08-17-21 @ 21:05)  HR: 68 (08-17-21 @ 21:05) (68 - 143)  BP: 153/65 (08-17-21 @ 21:05) (100/66 - 153/65)  RR: 19 (08-17-21 @ 21:05) (16 - 19)  SpO2: 95% (08-17-21 @ 21:05) (95% - 98%)  Wt(kg): --  CAPILLARY BLOOD GLUCOSE      POCT Blood Glucose.: 191 mg/dL (18 Aug 2021 00:13)  POCT Blood Glucose.: 181 mg/dL (17 Aug 2021 17:15)  POCT Blood Glucose.: 201 mg/dL (17 Aug 2021 13:07)  POCT Blood Glucose.: 178 mg/dL (17 Aug 2021 05:58)    I&O's Detail    16 Aug 2021 07:01  -  17 Aug 2021 07:00  --------------------------------------------------------  IN:    dextrose 5% + sodium chloride 0.9%: 250 mL    Oral Fluid: 450 mL  Total IN: 700 mL    OUT:  Total OUT: 0 mL    Total NET: 700 mL          Physical exam:  Gen: resting in bed comfortably in NAD  Chest: no increased WOB, regular inspiratory effort   Abdomen: Soft, nontender, nondistended with no rebound tenderness or guarding. Incisions clean, dry, intact, with no erythema.   Vascular: WWP, MYLES x4  NEURO: awake, alert  ______________________________________________  LABS:    08-17    137  |  104  |  15  ----------------------------<  157<H>  3.5   |  23  |  0.49<L>    Ca    8.1<L>      17 Aug 2021 07:08  Phos  2.8     08-17  Mg     2.1     08-17    TPro  5.3<L>  /  Alb  1.9<L>  /  TBili  0.4  /  DBili  x   /  AST  124<H>  /  ALT  367<H>  /  AlkPhos  382<H>  08-17    _____________________________________________  RADIOLOGY:

## 2021-08-18 NOTE — PROGRESS NOTE ADULT - SUBJECTIVE AND OBJECTIVE BOX
Patient is a 81y old  Male who presents with a chief complaint of diarrhea with blood in it , abd pain and weakness (18 Aug 2021 19:25)      INTERVAL HPI/OVERNIGHT EVENTS:  T(C): 36.6 (08-18-21 @ 20:57), Max: 36.8 (08-18-21 @ 09:53)  HR: 75 (08-18-21 @ 20:57) (63 - 75)  BP: 126/74 (08-18-21 @ 20:57) (126/74 - 153/63)  RR: 18 (08-18-21 @ 20:57) (17 - 18)  SpO2: 94% (08-18-21 @ 20:57) (94% - 96%)  Wt(kg): --  I&O's Summary    18 Aug 2021 07:01  -  18 Aug 2021 23:56  --------------------------------------------------------  IN: 710 mL / OUT: 0 mL / NET: 710 mL        PAST MEDICAL & SURGICAL HISTORY:  No pertinent past medical history    No significant past surgical history        SOCIAL HISTORY  Alcohol:  Tobacco:  Illicit substance use:    FAMILY HISTORY:    REVIEW OF SYSTEMS:  CONSTITUTIONAL: No fever, weight loss, or fatigue  EYES: No eye pain, visual disturbances, or discharge  ENMT:  No difficulty hearing, tinnitus, vertigo; No sinus or throat pain  NECK: No pain or stiffness  RESPIRATORY: No cough, wheezing, chills or hemoptysis; No shortness of breath  CARDIOVASCULAR: No chest pain, palpitations, dizziness, or leg swelling  GASTROINTESTINAL: No abdominal or epigastric pain. No nausea, vomiting, or hematemesis; No diarrhea or constipation. No melena or hematochezia.  GENITOURINARY: No dysuria, frequency, hematuria, or incontinence  NEUROLOGICAL: No headaches, memory loss, loss of strength, numbness, or tremors  SKIN: No itching, burning, rashes, or lesions   LYMPH NODES: No enlarged glands  ENDOCRINE: No heat or cold intolerance; No hair loss  MUSCULOSKELETAL: No joint pain or swelling; No muscle, back, or extremity pain  PSYCHIATRIC: No depression, anxiety, mood swings, or difficulty sleeping  HEME/LYMPH: No easy bruising, or bleeding gums  ALLERY AND IMMUNOLOGIC: No hives or eczema    RADIOLOGY & ADDITIONAL TESTS:    Imaging Personally Reviewed:  [ ] YES  [ ] NO    Consultant(s) Notes Reviewed:  [ ] YES  [ ] NO    PHYSICAL EXAM:  GENERAL: NAD, well-groomed, well-developed  HEAD:  Atraumatic, Normocephalic  EYES: EOMI, PERRLA, conjunctiva and sclera clear  ENMT: No tonsillar erythema, exudates, or enlargement; Moist mucous membranes, Good dentition, No lesions  NECK: Supple, No JVD, Normal thyroid  NERVOUS SYSTEM:  Alert & Oriented X3, Good concentration; Motor Strength 5/5 B/L upper and lower extremities; DTRs 2+ intact and symmetric  CHEST/LUNG: Clear to percussion bilaterally; No rales, rhonchi, wheezing, or rubs  HEART: Regular rate and rhythm; No murmurs, rubs, or gallops  ABDOMEN: Soft, Nontender, Nondistended; Bowel sounds present  EXTREMITIES:  2+ Peripheral Pulses, No clubbing, cyanosis, or edema  LYMPH: No lymphadenopathy noted  SKIN: No rashes or lesions    LABS:                        7.0    10.19 )-----------( 236      ( 18 Aug 2021 16:12 )             23.2     08-18    136  |  104  |  19  ----------------------------<  168<H>  3.8   |  23  |  0.46<L>    Ca    7.6<L>      18 Aug 2021 06:45  Phos  3.1     08-18  Mg     2.2     08-18    TPro  5.0<L>  /  Alb  1.8<L>  /  TBili  0.3  /  DBili  x   /  AST  34  /  ALT  220<H>  /  AlkPhos  271<H>  08-18        CAPILLARY BLOOD GLUCOSE      POCT Blood Glucose.: 156 mg/dL (18 Aug 2021 23:48)  POCT Blood Glucose.: 224 mg/dL (18 Aug 2021 18:34)  POCT Blood Glucose.: 200 mg/dL (18 Aug 2021 12:46)  POCT Blood Glucose.: 181 mg/dL (18 Aug 2021 04:55)  POCT Blood Glucose.: 191 mg/dL (18 Aug 2021 00:13)            MEDICATIONS  (STANDING):  ascorbic acid 500 milliGRAM(s) Oral daily  chlorhexidine 2% Cloths 1 Application(s) Topical daily  clonazePAM  Tablet 0.25 milliGRAM(s) Oral at bedtime  dextrose 40% Gel 15 Gram(s) Oral once  dextrose 5%. 1000 milliLiter(s) (50 mL/Hr) IV Continuous <Continuous>  dextrose 5%. 1000 milliLiter(s) (100 mL/Hr) IV Continuous <Continuous>  dextrose 50% Injectable 25 Gram(s) IV Push once  dextrose 50% Injectable 25 Gram(s) IV Push once  dextrose 50% Injectable 12.5 Gram(s) IV Push once  fat emulsion (Fish Oil and Plant Based) 20% Infusion 25 mL/Hr (25 mL/Hr) IV Continuous <Continuous>  fidaxomicin 200 milliGRAM(s) Oral <User Schedule>  FIRST- Mouthwash  BLM 10 milliLiter(s) Swish and Spit every 8 hours  folic acid 1 milliGRAM(s) Oral daily  glucagon  Injectable 1 milliGRAM(s) IntraMuscular once  insulin lispro (ADMELOG) corrective regimen sliding scale   SubCutaneous every 6 hours  methylPREDNISolone sodium succinate Injectable 20 milliGRAM(s) IV Push every 12 hours  multivitamin 1 Tablet(s) Oral daily  polyethylene glycol 3350 17 Gram(s) Oral two times a day  sotalol 120 milliGRAM(s) Oral daily  thiamine Injectable 100 milliGRAM(s) IV Push daily    MEDICATIONS  (PRN):  acetaminophen   Tablet .. 650 milliGRAM(s) Oral every 6 hours PRN Temp greater or equal to 38.5C (101.3F), Mild Pain (1 - 3), Moderate Pain (4 - 6)  aluminum hydroxide/magnesium hydroxide/simethicone Suspension 30 milliLiter(s) Oral every 4 hours PRN Dyspepsia  guaifenesin/dextromethorphan Oral Liquid 10 milliLiter(s) Oral every 8 hours PRN Cough  lidocaine 2% Gel 1 Application(s) Topical four times a day PRN pain due to skin excoriation  LORazepam   Injectable 0.5 milliGRAM(s) IV Push once PRN Agitation  ondansetron Injectable 4 milliGRAM(s) IV Push every 6 hours PRN Nausea and/or Vomiting      Care Discussed with Consultants/Other Providers [ ] YES  [ ] NO Patient is a 81y old  Male who presents with a chief complaint of diarrhea with blood in it , abd pain and weakness (18 Aug 2021 19:25)      INTERVAL HPI/OVERNIGHT EVENTS: seen and examined , daughter bedside , feeling very week , still having diarrhea   T(C): 36.6 (08-18-21 @ 20:57), Max: 36.8 (08-18-21 @ 09:53)  HR: 75 (08-18-21 @ 20:57) (63 - 75)  BP: 126/74 (08-18-21 @ 20:57) (126/74 - 153/63)  RR: 18 (08-18-21 @ 20:57) (17 - 18)  SpO2: 94% (08-18-21 @ 20:57) (94% - 96%)  Wt(kg): --  I&O's Summary    18 Aug 2021 07:01  -  18 Aug 2021 23:56  --------------------------------------------------------  IN: 710 mL / OUT: 0 mL / NET: 710 mL        PAST MEDICAL & SURGICAL HISTORY:  No pertinent past medical history    No significant past surgical history        SOCIAL HISTORY  Alcohol:  Tobacco:  Illicit substance use:    FAMILY HISTORY:    REVIEW OF SYSTEMS:  CONSTITUTIONAL: No fever, weight loss, or fatigue  EYES: No eye pain, visual disturbances, or discharge  ENMT:  No difficulty hearing, tinnitus, vertigo; No sinus or throat pain  NECK: No pain or stiffness  RESPIRATORY: No cough, wheezing, chills or hemoptysis; No shortness of breath  CARDIOVASCULAR: No chest pain, palpitations, dizziness, or leg swelling  GASTROINTESTINAL: No abdominal or epigastric pain. No nausea, vomiting, or hematemesis; No diarrhea or constipation. No melena or hematochezia.  GENITOURINARY: No dysuria, frequency, hematuria, or incontinence  NEUROLOGICAL: No headaches, memory loss, loss of strength, numbness, or tremors  SKIN: No itching, burning, rashes, or lesions   LYMPH NODES: No enlarged glands  ENDOCRINE: No heat or cold intolerance; No hair loss  MUSCULOSKELETAL: No joint pain or swelling; No muscle, back, or extremity pain  PSYCHIATRIC: No depression, anxiety, mood swings, or difficulty sleeping  HEME/LYMPH: No easy bruising, or bleeding gums  ALLERY AND IMMUNOLOGIC: No hives or eczema    RADIOLOGY & ADDITIONAL TESTS:    Imaging Personally Reviewed:  [ ] YES  [ ] NO    Consultant(s) Notes Reviewed:  [ ] YES  [ ] NO    PHYSICAL EXAM:  GENERAL: NAD, well-groomed, well-developed  HEAD:  Atraumatic, Normocephalic  EYES: EOMI, PERRLA, conjunctiva and sclera clear  ENMT: No tonsillar erythema, exudates, or enlargement; Moist mucous membranes, Good dentition, No lesions  NECK: Supple, No JVD, Normal thyroid  NERVOUS SYSTEM:  Alert & Oriented X3, Good concentration; Motor Strength 5/5 B/L upper and lower extremities; DTRs 2+ intact and symmetric  CHEST/LUNG: Clear to percussion bilaterally; No rales, rhonchi, wheezing, or rubs  HEART: Regular rate and rhythm; No murmurs, rubs, or gallops  ABDOMEN: Soft, Nontender, Nondistended; Bowel sounds present  EXTREMITIES:  2+ Peripheral Pulses, No clubbing, cyanosis, or edema  LYMPH: No lymphadenopathy noted  SKIN: No rashes or lesions    LABS:                        7.0    10.19 )-----------( 236      ( 18 Aug 2021 16:12 )             23.2     08-18    136  |  104  |  19  ----------------------------<  168<H>  3.8   |  23  |  0.46<L>    Ca    7.6<L>      18 Aug 2021 06:45  Phos  3.1     08-18  Mg     2.2     08-18    TPro  5.0<L>  /  Alb  1.8<L>  /  TBili  0.3  /  DBili  x   /  AST  34  /  ALT  220<H>  /  AlkPhos  271<H>  08-18        CAPILLARY BLOOD GLUCOSE      POCT Blood Glucose.: 156 mg/dL (18 Aug 2021 23:48)  POCT Blood Glucose.: 224 mg/dL (18 Aug 2021 18:34)  POCT Blood Glucose.: 200 mg/dL (18 Aug 2021 12:46)  POCT Blood Glucose.: 181 mg/dL (18 Aug 2021 04:55)  POCT Blood Glucose.: 191 mg/dL (18 Aug 2021 00:13)            MEDICATIONS  (STANDING):  ascorbic acid 500 milliGRAM(s) Oral daily  chlorhexidine 2% Cloths 1 Application(s) Topical daily  clonazePAM  Tablet 0.25 milliGRAM(s) Oral at bedtime  dextrose 40% Gel 15 Gram(s) Oral once  dextrose 5%. 1000 milliLiter(s) (50 mL/Hr) IV Continuous <Continuous>  dextrose 5%. 1000 milliLiter(s) (100 mL/Hr) IV Continuous <Continuous>  dextrose 50% Injectable 25 Gram(s) IV Push once  dextrose 50% Injectable 25 Gram(s) IV Push once  dextrose 50% Injectable 12.5 Gram(s) IV Push once  fat emulsion (Fish Oil and Plant Based) 20% Infusion 25 mL/Hr (25 mL/Hr) IV Continuous <Continuous>  fidaxomicin 200 milliGRAM(s) Oral <User Schedule>  FIRST- Mouthwash  BLM 10 milliLiter(s) Swish and Spit every 8 hours  folic acid 1 milliGRAM(s) Oral daily  glucagon  Injectable 1 milliGRAM(s) IntraMuscular once  insulin lispro (ADMELOG) corrective regimen sliding scale   SubCutaneous every 6 hours  methylPREDNISolone sodium succinate Injectable 20 milliGRAM(s) IV Push every 12 hours  multivitamin 1 Tablet(s) Oral daily  polyethylene glycol 3350 17 Gram(s) Oral two times a day  sotalol 120 milliGRAM(s) Oral daily  thiamine Injectable 100 milliGRAM(s) IV Push daily    MEDICATIONS  (PRN):  acetaminophen   Tablet .. 650 milliGRAM(s) Oral every 6 hours PRN Temp greater or equal to 38.5C (101.3F), Mild Pain (1 - 3), Moderate Pain (4 - 6)  aluminum hydroxide/magnesium hydroxide/simethicone Suspension 30 milliLiter(s) Oral every 4 hours PRN Dyspepsia  guaifenesin/dextromethorphan Oral Liquid 10 milliLiter(s) Oral every 8 hours PRN Cough  lidocaine 2% Gel 1 Application(s) Topical four times a day PRN pain due to skin excoriation  LORazepam   Injectable 0.5 milliGRAM(s) IV Push once PRN Agitation  ondansetron Injectable 4 milliGRAM(s) IV Push every 6 hours PRN Nausea and/or Vomiting      Care Discussed with Consultants/Other Providers [ ] YES  [ ] NO

## 2021-08-18 NOTE — PROGRESS NOTE ADULT - ASSESSMENT
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 Colorectal consulted to evaluate for need of surgical management. On exam patient is soft and non tender, lethargic overall. Overall impression, patient does not appear to have any indication for surgical intervention at this time, lactate of 2.4 likely from dehydration / malnutrition as patient has been having multiple episodes of diarrhea and unable to tolerate PO well 2/2 lethargy.    Plan:  - Will plan tentatively for OR Thursday  - Please provide medical and cardiac optimization and risk stratification  - optimize hydration, trend lactate  - optimize nutrition status, per report by patient's daughter, patient has been unable to tolerate PO well, should consider parental nutrition if necessary        [facility]  #[id number]

## 2021-08-18 NOTE — PROGRESS NOTE ADULT - SUBJECTIVE AND OBJECTIVE BOX
Follow Up:  crohns, FTT    Interval History/ROS: sleepy withdrawn, daughter at bedside distraught at his failing - shows picture of him in good health  -patient has continued diarrhea, littley po intake, complaining of nausea    Allergies  No Known Allergies    ANTIMICROBIALS:  fidaxomicin 200 <User Schedule>      OTHER MEDS:  MEDICATIONS  (STANDING):  acetaminophen   Tablet .. 650 every 6 hours PRN  aluminum hydroxide/magnesium hydroxide/simethicone Suspension 30 every 4 hours PRN  clonazePAM  Tablet 0.25 at bedtime  dextrose 40% Gel 15 once  dextrose 50% Injectable 25 once  dextrose 50% Injectable 12.5 once  dextrose 50% Injectable 25 once  glucagon  Injectable 1 once  guaifenesin/dextromethorphan Oral Liquid 10 every 8 hours PRN  insulin lispro (ADMELOG) corrective regimen sliding scale  every 6 hours  LORazepam   Injectable 0.5 once PRN  methylPREDNISolone sodium succinate Injectable 20 every 8 hours  ondansetron Injectable 4 every 6 hours PRN  polyethylene glycol 3350 17 two times a day  sotalol 120 daily      Vital Signs Last 24 Hrs  T(C): 36.6 (18 Aug 2021 12:48), Max: 36.8 (18 Aug 2021 09:53)  T(F): 97.9 (18 Aug 2021 12:48), Max: 98.2 (18 Aug 2021 09:53)  HR: 65 (18 Aug 2021 12:48) (63 - 68)  BP: 135/68 (18 Aug 2021 12:48) (135/68 - 153/65)  BP(mean): --  RR: 18 (18 Aug 2021 12:48) (17 - 19)  SpO2: 95% (18 Aug 2021 12:48) (94% - 96%)    PHYSICAL EXAM:  General: thin NAD, Non-toxic  Neurology: withdrawn, lethargic  Respiratory: Clear to auscultation bilaterally  CV: RRR, S1S2, no murmurs, rubs or gallops  Abdominal: Soft, Non-tender, non-distended, normal bowel sounds  Extremities: No edema,   Line Sites: Clear  Skin: No rash                          7.0    10.19 )-----------( 236      ( 18 Aug 2021 16:12 )             23.2       08-18    136  |  104  |  19  ----------------------------<  168<H>  3.8   |  23  |  0.46<L>    Ca    7.6<L>      18 Aug 2021 06:45  Phos  3.1     08-18  Mg     2.2     08-18    TPro  5.0<L>  /  Alb  1.8<L>  /  TBili  0.3  /  DBili  x   /  AST  34  /  ALT  220<H>  /  AlkPhos  271<H>  08-18    MICROBIOLOGY:  .Blood Blood-Peripheral  08-05-21   No Growth Final  --  --      .Blood Blood-Peripheral  08-05-21   No Growth Final  --  --      .Blood Blood-Peripheral  07-30-21   No Growth Final  --  --      .Blood Blood-Peripheral  07-27-21   No Growth Final  --  --      .Blood Blood-Venous  07-24-21   No Growth Final  --  --      .Blood Blood  07-22-21   No Growth Final  --  --      RADIOLOGY:    Benjamin Phillips MD; Division of Infectious Disease; Pager: [phone]; nights and weekends: [phone]

## 2021-08-19 NOTE — PROGRESS NOTE ADULT - SUBJECTIVE AND OBJECTIVE BOX
Follow Up:  crohns colitis    Interval History/ROS:  patient lethargic withdrawn, currently wants surgery - son and daughter explain their request for surgery second opinion    Allergies  No Known Allergies    ANTIMICROBIALS:  fidaxomicin 200 <User Schedule>      OTHER MEDS:  MEDICATIONS  (STANDING):  acetaminophen   Tablet .. 650 every 6 hours PRN  aluminum hydroxide/magnesium hydroxide/simethicone Suspension 30 every 4 hours PRN  clonazePAM  Tablet 0.25 at bedtime  dextrose 40% Gel 15 once  dextrose 50% Injectable 25 once  dextrose 50% Injectable 12.5 once  dextrose 50% Injectable 25 once  glucagon  Injectable 1 once  guaifenesin/dextromethorphan Oral Liquid 10 every 8 hours PRN  insulin lispro (ADMELOG) corrective regimen sliding scale  every 6 hours  LORazepam   Injectable 0.5 once PRN  methylPREDNISolone sodium succinate Injectable 20 every 12 hours  ondansetron Injectable 4 every 6 hours PRN  pantoprazole Infusion 8 <Continuous>  sotalol 120 daily  traMADol 100 every 6 hours PRN      Vital Signs Last 24 Hrs  T(C): 36.8 (19 Aug 2021 13:06), Max: 36.8 (19 Aug 2021 07:14)  T(F): 98.3 (19 Aug 2021 13:06), Max: 98.3 (19 Aug 2021 07:14)  HR: 65 (19 Aug 2021 13:06) (65 - 77)  BP: 128/68 (19 Aug 2021 13:06) (126/74 - 158/74)  BP(mean): --  RR: 19 (19 Aug 2021 13:06) (18 - 19)  SpO2: 95% (19 Aug 2021 13:06) (93% - 95%)    PHYSICAL EXAM:  General: thin, chronically ill appearing  Neurology: withdrawn and fatigued  Respiratory: no resp distress  Abdominal: Soft, Non-tender, non-distended  Extremities: No edema,  Line Sites: Clear  Skin: No rash                        7.3    15.91 )-----------( 241      ( 19 Aug 2021 08:59 )             24.1   WBC Count: 15.91 (08-19 @ 08:59)  WBC Count: 10.19 (08-18 @ 16:12)  WBC Count: 11.33 (08-18 @ 09:27)  WBC Count: 11.41 (08-18 @ 06:45)  WBC Count: 15.91 (08-15 @ 15:35)  WBC Count: 11.08 (08-15 @ 07:12)      08-19    134<L>  |  101  |  18  ----------------------------<  119<H>  3.9   |  24  |  0.43<L>    Ca    7.4<L>      19 Aug 2021 08:59  Phos  2.6     08-19  Mg     2.1     08-19    TPro  5.1<L>  /  Alb  1.9<L>  /  TBili  0.4  /  DBili  x   /  AST  97<H>  /  ALT  188<H>  /  AlkPhos  237<H>  08-19      MICROBIOLOGY:  .Blood Blood-Peripheral  08-05-21   No Growth Final  --  --      .Blood Blood-Peripheral  08-05-21   No Growth Final  --  --      .Blood Blood-Peripheral  07-30-21   No Growth Final  --  --      .Blood Blood-Peripheral  07-27-21   No Growth Final  --  --      .Blood Blood-Venous  07-24-21   No Growth Final  --  --      .Blood Blood  07-22-21   No Growth Final  --  --      RADIOLOGY:    Benjamin Phillips MD; Division of Infectious Disease; Pager: [phone]; nights and weekends: [phone]

## 2021-08-19 NOTE — PROGRESS NOTE ADULT - SUBJECTIVE AND OBJECTIVE BOX
Patient is a 81y old  Male who presents with a chief complaint of diarrhea with blood in it , abd pain and weakness (19 Aug 2021 19:07)      INTERVAL HPI/OVERNIGHT EVENTS: condition same , still having diarrhea   T(C): 36.8 (08-19-21 @ 13:06), Max: 36.8 (08-19-21 @ 07:14)  HR: 65 (08-19-21 @ 13:06) (65 - 77)  BP: 128/68 (08-19-21 @ 13:06) (128/68 - 158/74)  RR: 19 (08-19-21 @ 13:06) (18 - 19)  SpO2: 95% (08-19-21 @ 13:06) (93% - 95%)  Wt(kg): --  I&O's Summary    18 Aug 2021 07:01  -  19 Aug 2021 07:00  --------------------------------------------------------  IN: 710 mL / OUT: 0 mL / NET: 710 mL    19 Aug 2021 07:01  -  19 Aug 2021 21:29  --------------------------------------------------------  IN: 1703 mL / OUT: 0 mL / NET: 1703 mL        PAST MEDICAL & SURGICAL HISTORY:  No pertinent past medical history    No significant past surgical history        SOCIAL HISTORY  Alcohol:  Tobacco:  Illicit substance use:    FAMILY HISTORY:    REVIEW OF SYSTEMS:  CONSTITUTIONAL: No fever, weight loss, or fatigue  EYES: No eye pain, visual disturbances, or discharge  ENMT:  No difficulty hearing, tinnitus, vertigo; No sinus or throat pain  NECK: No pain or stiffness  RESPIRATORY: No cough, wheezing, chills or hemoptysis; No shortness of breath  CARDIOVASCULAR: No chest pain, palpitations, dizziness, or leg swelling  GASTROINTESTINAL: No abdominal or epigastric pain. No nausea, vomiting, or hematemesis; No diarrhea or constipation. No melena or hematochezia.  GENITOURINARY: No dysuria, frequency, hematuria, or incontinence  NEUROLOGICAL: No headaches, memory loss, loss of strength, numbness, or tremors  SKIN: No itching, burning, rashes, or lesions   LYMPH NODES: No enlarged glands  ENDOCRINE: No heat or cold intolerance; No hair loss  MUSCULOSKELETAL: No joint pain or swelling; No muscle, back, or extremity pain  PSYCHIATRIC: No depression, anxiety, mood swings, or difficulty sleeping  HEME/LYMPH: No easy bruising, or bleeding gums  ALLERY AND IMMUNOLOGIC: No hives or eczema    RADIOLOGY & ADDITIONAL TESTS:    Imaging Personally Reviewed:  [ ] YES  [ ] NO    Consultant(s) Notes Reviewed:  [ ] YES  [ ] NO    PHYSICAL EXAM:  GENERAL: NAD, well-groomed, well-developed  HEAD:  Atraumatic, Normocephalic  EYES: EOMI, PERRLA, conjunctiva and sclera clear  ENMT: No tonsillar erythema, exudates, or enlargement; Moist mucous membranes, Good dentition, No lesions  NECK: Supple, No JVD, Normal thyroid  NERVOUS SYSTEM:  Alert & Oriented X3, Good concentration; Motor Strength 5/5 B/L upper and lower extremities; DTRs 2+ intact and symmetric  CHEST/LUNG: Clear to percussion bilaterally; No rales, rhonchi, wheezing, or rubs  HEART: Regular rate and rhythm; No murmurs, rubs, or gallops  ABDOMEN: Soft, Nontender, Nondistended; Bowel sounds present  EXTREMITIES:  2+ Peripheral Pulses, No clubbing, cyanosis, or edema  LYMPH: No lymphadenopathy noted  SKIN: No rashes or lesions    LABS:                        7.3    15.91 )-----------( 241      ( 19 Aug 2021 08:59 )             24.1     08-19    134<L>  |  101  |  18  ----------------------------<  119<H>  3.9   |  24  |  0.43<L>    Ca    7.4<L>      19 Aug 2021 08:59  Phos  2.6     08-19  Mg     2.1     08-19    TPro  5.1<L>  /  Alb  1.9<L>  /  TBili  0.4  /  DBili  x   /  AST  97<H>  /  ALT  188<H>  /  AlkPhos  237<H>  08-19        CAPILLARY BLOOD GLUCOSE      POCT Blood Glucose.: 123 mg/dL (19 Aug 2021 17:05)  POCT Blood Glucose.: 153 mg/dL (19 Aug 2021 12:39)  POCT Blood Glucose.: 121 mg/dL (19 Aug 2021 07:21)  POCT Blood Glucose.: 156 mg/dL (18 Aug 2021 23:48)            MEDICATIONS  (STANDING):  ascorbic acid 500 milliGRAM(s) Oral daily  chlorhexidine 2% Cloths 1 Application(s) Topical daily  clonazePAM  Tablet 0.25 milliGRAM(s) Oral at bedtime  dextrose 40% Gel 15 Gram(s) Oral once  dextrose 5%. 1000 milliLiter(s) (50 mL/Hr) IV Continuous <Continuous>  dextrose 5%. 1000 milliLiter(s) (100 mL/Hr) IV Continuous <Continuous>  dextrose 50% Injectable 25 Gram(s) IV Push once  dextrose 50% Injectable 12.5 Gram(s) IV Push once  dextrose 50% Injectable 25 Gram(s) IV Push once  fat emulsion (Fish Oil and Plant Based) 20% Infusion 25 mL/Hr (25 mL/Hr) IV Continuous <Continuous>  fidaxomicin 200 milliGRAM(s) Oral <User Schedule>  FIRST- Mouthwash  BLM 10 milliLiter(s) Swish and Spit every 8 hours  folic acid 1 milliGRAM(s) Oral daily  glucagon  Injectable 1 milliGRAM(s) IntraMuscular once  insulin lispro (ADMELOG) corrective regimen sliding scale   SubCutaneous every 6 hours  methylPREDNISolone sodium succinate Injectable 20 milliGRAM(s) IV Push every 12 hours  multivitamin 1 Tablet(s) Oral daily  pantoprazole Infusion 8 mG/Hr (10 mL/Hr) IV Continuous <Continuous>  Parenteral Nutrition - Adult 1 Each (94 mL/Hr) TPN Continuous <Continuous>  sotalol 120 milliGRAM(s) Oral daily  thiamine Injectable 100 milliGRAM(s) IV Push daily    MEDICATIONS  (PRN):  acetaminophen   Tablet .. 650 milliGRAM(s) Oral every 6 hours PRN Temp greater or equal to 38.5C (101.3F), Mild Pain (1 - 3), Moderate Pain (4 - 6)  aluminum hydroxide/magnesium hydroxide/simethicone Suspension 30 milliLiter(s) Oral every 4 hours PRN Dyspepsia  guaifenesin/dextromethorphan Oral Liquid 10 milliLiter(s) Oral every 8 hours PRN Cough  lidocaine 2% Gel 1 Application(s) Topical four times a day PRN pain due to skin excoriation  LORazepam   Injectable 0.5 milliGRAM(s) IV Push once PRN Agitation  ondansetron Injectable 4 milliGRAM(s) IV Push every 6 hours PRN Nausea and/or Vomiting  traMADol 100 milliGRAM(s) Oral every 6 hours PRN Severe Pain (7 - 10)      Care Discussed with Consultants/Other Providers [ ] YES  [ ] NO

## 2021-08-19 NOTE — CONSULT NOTE ADULT - ASSESSMENT
81y Male with GERD on omeprazole daily and Crohn's disease diagnosed about 9 mos prior to admission at outside hospital c/b h/o abscess and left posterior distal anal intersphincteric fistula and presented with diarrhea 2/2 to CD flare c/b c diff colitis. C diff infection Confirmed in two Colonoscopy  s/p Vanc, FMT, and Fidaxomicin without any improvement with Flex sig revealing severe ulceration. Patient  started on Steroids at that time with out improvement. Patient being evaluated for Colectomy. Hospital course further complicated with TPN requirement and elevated liver enzymes for which Hepatology was consulted.    #C diff infection  #Crohn flare    #Elevated liver enzymes  Patient with normal liver enzymes on admission. Possibly explanation for this elevation include TPN vs ongoing SIRS from uncontrolled Crohn flare vs DILI.    Patient liver enzymes are improving, they will not normalize until underlying issue is managed with colectomy.    CT with normal liver parenchyma.    Recommendations:  -Continue to monitor daily INR and CMP  -Avoid Hepatotoxic agents    No further work up from our perspective, please reach out ot us for any question or concern.    Reji Carreno MD  Gastroenterology/Hepatology Fellow  1st option: [phone] (text or call), ONLY available from 7:00 am to 5:00 pm.   **Contact on-call GI fellow via answering service ([phone]) from 5pm-7am AND on weekends/holidays**  2nd option: Available via Microsoft Teams  3rd option: Pager: [phone]    NON-URGENT CONSULTS:  Please email [email or web address]  AT NIGHT AND ON WEEKENDS:  Contact on-call GI fellow via answering service ([phone]) from 5pm-8am AND on weekends/holidays

## 2021-08-19 NOTE — PROGRESS NOTE ADULT - SUBJECTIVE AND OBJECTIVE BOX
COLORECTAL SURGERY DAILY PROGRESS NOTE:    Subjective:  Patient seen and examined. Having BRBPR per RN this AM. Denies abdominal pain. Deferring decision for transfusion to daughter    Vital Signs Last 24 Hrs  T(C): 36.8 (19 Aug 2021 07:14), Max: 36.8 (18 Aug 2021 09:53)  T(F): 98.3 (19 Aug 2021 07:14), Max: 98.3 (19 Aug 2021 07:14)  HR: 77 (19 Aug 2021 07:14) (63 - 77)  BP: 158/74 (19 Aug 2021 07:14) (126/74 - 158/74)  BP(mean): --  RR: 18 (19 Aug 2021 07:14) (17 - 18)  SpO2: 93% (19 Aug 2021 07:14) (93% - 95%)    Exam:  Gen: NAD, resting in bed and responding appropriately  Resp: Airway patent, non-labored respirations  Abd: Soft, ND, mild L mid abdominal TTP, no rebound or guarding  Ext: No edema, WWP  Neuro: AAOx3, no focal deficits    I&O's Detail    18 Aug 2021 07:01  -  19 Aug 2021 07:00  --------------------------------------------------------  IN:    Fat Emulsion (Fish Oil &amp; Plant Based) 20% Infusion: 50 mL    Fat Emulsion (Fish Oil &amp; Plant Based) 20% Infusion: 100 mL    Oral Fluid: 560 mL  Total IN: 710 mL    OUT:  Total OUT: 0 mL    Total NET: 710 mL    Daily     Daily     MEDICATIONS  (STANDING):  ascorbic acid 500 milliGRAM(s) Oral daily  chlorhexidine 2% Cloths 1 Application(s) Topical daily  clonazePAM  Tablet 0.25 milliGRAM(s) Oral at bedtime  dextrose 40% Gel 15 Gram(s) Oral once  dextrose 5%. 1000 milliLiter(s) (50 mL/Hr) IV Continuous <Continuous>  dextrose 5%. 1000 milliLiter(s) (100 mL/Hr) IV Continuous <Continuous>  dextrose 50% Injectable 25 Gram(s) IV Push once  dextrose 50% Injectable 12.5 Gram(s) IV Push once  dextrose 50% Injectable 25 Gram(s) IV Push once  fidaxomicin 200 milliGRAM(s) Oral <User Schedule>  FIRST- Mouthwash  BLM 10 milliLiter(s) Swish and Spit every 8 hours  folic acid 1 milliGRAM(s) Oral daily  glucagon  Injectable 1 milliGRAM(s) IntraMuscular once  insulin lispro (ADMELOG) corrective regimen sliding scale   SubCutaneous every 6 hours  methylPREDNISolone sodium succinate Injectable 20 milliGRAM(s) IV Push every 12 hours  multivitamin 1 Tablet(s) Oral daily  pantoprazole Infusion 8 mG/Hr (10 mL/Hr) IV Continuous <Continuous>  Parenteral Nutrition - Adult 1 Each (94 mL/Hr) TPN Continuous <Continuous>  sotalol 120 milliGRAM(s) Oral daily  thiamine Injectable 100 milliGRAM(s) IV Push daily    MEDICATIONS  (PRN):  acetaminophen   Tablet .. 650 milliGRAM(s) Oral every 6 hours PRN Temp greater or equal to 38.5C (101.3F), Mild Pain (1 - 3), Moderate Pain (4 - 6)  aluminum hydroxide/magnesium hydroxide/simethicone Suspension 30 milliLiter(s) Oral every 4 hours PRN Dyspepsia  guaifenesin/dextromethorphan Oral Liquid 10 milliLiter(s) Oral every 8 hours PRN Cough  lidocaine 2% Gel 1 Application(s) Topical four times a day PRN pain due to skin excoriation  LORazepam   Injectable 0.5 milliGRAM(s) IV Push once PRN Agitation  ondansetron Injectable 4 milliGRAM(s) IV Push every 6 hours PRN Nausea and/or Vomiting      LABS:                        7.0    10.19 )-----------( 236      ( 18 Aug 2021 16:12 )             23.2     08-18    136  |  104  |  19  ----------------------------<  168<H>  3.8   |  23  |  0.46<L>    Ca    7.6<L>      18 Aug 2021 06:45  Phos  3.1     08-18  Mg     2.2     08-18    TPro  5.0<L>  /  Alb  1.8<L>  /  TBili  0.3  /  DBili  x   /  AST  34  /  ALT  220<H>  /  AlkPhos  271<H>  08-18

## 2021-08-19 NOTE — CHART NOTE - NSCHARTNOTEFT_GEN_A_CORE
MEDICINE NP    GIDEON HAYES  81y Male    Patient is a 81y old  Male who presents with a chief complaint of diarrhea with blood in it , abd pain and weakness (18 Aug 2021 19:25)       > Event Summary:  Now being notified by RN, Patient with BRBPR episode @6:15AM. Asymptomatic.  HPI: 81 y.o M A&Ox3 with PMH of HTN and recent dx Crohn's disease presents to the ED c/o abd discomfort, diarrhea, and intermittent fevers. As per family, pt. was being treated at [facility]. Started on methotrexate three weeks ago, and had total of 3 doses - as per family, pt. now has mouth sores due to methotrexate. Pt. is supposed to start Humira this week however had blood work done on Friday and GI advised him to come to ED for further evaluation due to elevated WBC and intermittent fevers. Pt. had temp of 102.7 last night.  Admitted with Crohn's Disease / CDiff s/p PO vanco, 7/28 s/p colonoscopy with fecal transplant, Fidaxomicin started 8/2; and Anemia / SOFIA, s/p Venofer x 3 days. Now with BRBPR.     BRBPR - Likely 2/2 Chron's Flare  -F/u STAT CBC & Transfuse PRN  -Protonix gtt ordered   -C/w Solumedrol  iv   -F/u GI /Colorectal by day provider   -Endorsed to day provider & Attending to follow         -Vital Signs Last 24 Hrs  T(C): 36.8 (19 Aug 2021 07:14), Max: 36.8 (18 Aug 2021 09:53)  T(F): 98.3 (19 Aug 2021 07:14), Max: 98.3 (19 Aug 2021 07:14)  HR: 77 (19 Aug 2021 07:14) (63 - 77)  BP: 158/74 (19 Aug 2021 07:14) (126/74 - 158/74)  RR: 18 (19 Aug 2021 07:14) (17 - 18)  SpO2: 93% (19 Aug 2021 07:14) (93% - 95%)        Muna Marie, KOMAL-BC  Medicine Department MEDICINE NP    GIDEON HAYES  81y Male    Patient is a 81y old  Male who presents with a chief complaint of diarrhea with blood in it , abd pain and weakness (18 Aug 2021 19:25)       > Event Summary:  Now being notified by RN, Patient with BRBPR episode @6:15AM. Asymptomatic. No vomiting  Patient seen at bedside, easily awakened, alert.  Denies abdominal pain.    Exam - Abdomen w/  +BS x4. Soft, NT, ND.        -Vital Signs Last 24 Hrs  T(C): 36.8 (19 Aug 2021 07:14), Max: 36.8 (18 Aug 2021 09:53)  T(F): 98.3 (19 Aug 2021 07:14), Max: 98.3 (19 Aug 2021 07:14)  HR: 77 (19 Aug 2021 07:14) (63 - 77)  BP: 158/74 (19 Aug 2021 07:14) (126/74 - 158/74)  RR: 18 (19 Aug 2021 07:14) (17 - 18)  SpO2: 93% (19 Aug 2021 07:14) (93% - 95%)        HPI: 81 y.o M A&Ox3 with PMH of HTN and recent dx Crohn's disease presents to the ED c/o abd discomfort, diarrhea, and intermittent fevers. As per family, pt. was being treated at [facility]. Started on methotrexate three weeks ago, and had total of 3 doses - as per family, pt. now has mouth sores due to methotrexate. Pt. is supposed to start Humira this week however had blood work done on Friday and GI advised him to come to ED for further evaluation due to elevated WBC and intermittent fevers. Pt. had temp of 102.7 last night.  Admitted with Crohn's Disease / CDiff s/p PO vanco, 7/28 s/p colonoscopy with fecal transplant, Fidaxomicin started 8/2; and Anemia / SOFIA, s/p Venofer x 3 days. Now with BRBPR.     BRBPR - Likely 2/2 Chron's Flare  -F/u STAT CBC & Transfuse PRN  -Protonix gtt ordered   -C/w Solumedrol  iv   -VS Q4hrs   -Endorsed to day provider & voicemessage left to Attending   -F/u GI /Colorectal by day provider         Muna Marie P-BC  Medicine Department

## 2021-08-19 NOTE — PROGRESS NOTE ADULT - SUBJECTIVE AND OBJECTIVE BOX
Patient is a 81y old  Male who presents with a chief complaint of diarrhea with blood in it , abd pain and weakness (19 Aug 2021 08:51)      INTERVAL HISTORY: pt reports feeling ok     TELEMETRY Personally reviewed: SR 60'S-70'S     REVIEW OF SYSTEMS:   CONSTITUTIONAL: No weakness  EYES/ENT: No visual changes; No throat pain  Neck: No pain or stiffness  Respiratory: No cough, wheezing, No shortness of breath  CARDIOVASCULAR: no chest pain or palpitations  GASTROINTESTINAL: No abdominal pain, no nausea, vomiting or hematemesis  GENITOURINARY: No dysuria, frequency or hematuria  NEUROLOGICAL: No stroke like symptoms  SKIN: No rashes    	  MEDICATIONS:  sotalol 120 milliGRAM(s) Oral daily        PHYSICAL EXAM:  T(C): 36.8 (08-19-21 @ 13:06), Max: 36.8 (08-19-21 @ 07:14)  HR: 65 (08-19-21 @ 13:06) (65 - 77)  BP: 128/68 (08-19-21 @ 13:06) (126/74 - 158/74)  RR: 19 (08-19-21 @ 13:06) (18 - 19)  SpO2: 95% (08-19-21 @ 13:06) (93% - 95%)  Wt(kg): --  I&O's Summary    18 Aug 2021 07:01  -  19 Aug 2021 07:00  --------------------------------------------------------  IN: 710 mL / OUT: 0 mL / NET: 710 mL          Appearance: In no distress	  HEENT:    PERRL, EOMI	  Cardiovascular:  S1 S2, No JVD  Respiratory: Lungs clear to auscultation	  Gastrointestinal:  Soft, Non-tender, + BS	  Vascularature:  No edema of LE  Psychiatric: Appropriate affect   Neuro: no acute focal deficits                               7.3    15.91 )-----------( 241      ( 19 Aug 2021 08:59 )             24.1     08-19    134<L>  |  101  |  18  ----------------------------<  119<H>  3.9   |  24  |  0.43<L>    Ca    7.4<L>      19 Aug 2021 08:59  Phos  2.6     08-19  Mg     2.1     08-19    TPro  5.1<L>  /  Alb  1.9<L>  /  TBili  0.4  /  DBili  x   /  AST  97<H>  /  ALT  188<H>  /  AlkPhos  237<H>  08-19        Labs personally reviewed      ASSESSMENT/PLAN: 	    ASSESSMENT/PLAN: 	  Problem/Plan - 1:  ·  Problem: CAD (coronary artery disease).  Plan: c/w home meds  We discussed the importance of SAPT with low dose ASA 81mg given CAD  pt denies any current chest pain, SOB or chest pressure.       Problem/Plan -2:  ·  Problem: Colitis.  Plan: Ct shows crohn's  exacerbation   f/u flex sig with biopsies   s/p FMT 7/28  plan for repeat FMT vs Fidoxomaicin if sx don't improve  f/p flex sig on 8/9- no evidence of pseudomembranous   started on Fidoxomaicin and steroids   per GI if fails medical therapy, then surgical evaluation would be indicated  f/u ID/ GI reccs   PICC inserted for TPN     Problem/Plan - 3:  ·  Problem: Afib pt said he was placed on sotalol for afib   -c/w sotalol   -EKG noted. SR   - rate controlled and regular   - 8/17 am pt went into rapid Afib with RVR rate of 154 cardiezem was given   - pt was transferred to monitored unit and now SR on monitor   - pt is ? candidate for AC seeing recent GIB. FOBT positive as recent as 8/8/21   - rate controlled now continue ot monitor    - possible bleed with decreasing H&H ?  - pt has active GIB no AC indicated        Problem/Plan - 4:   ·  Problem: GIB (gastrointestinal bleeding).  Plan: bloody diarrhea 2/2 chron's exacerbation   IV fluids   -c-diff positive:   on Fidoxomaicin  - active GIB   - pt's daughter educated on transfusion   - per colorectal sx team: Will plan for surgical resection next week, pending discussion with patient and daughter    Problem/Plan - 5:  ·  Problem: SCDs     Problem/Plan - 6:  ·  Problem: B/L LE edema   - CXR showed mildly enlarged heart   -monitor IVF with I&O  -B/L L E edema improved   -TTE with mod-severe AI, mild AS, preserved EF    Problem/Plan - 7:  ·  Problem: AMS/ lethargic   replete electrolyte, trops flat, CKMB negative   CTH shows no hemorrhage and acute infarct       Kar [facility]-BC   Rufus Onofre DO [facility]  Cardiovascular Medicine  [address] [place] [address], [address]  Office: [phone]  Cell: [phone]

## 2021-08-19 NOTE — PROGRESS NOTE ADULT - ASSESSMENT
80 yo M with Crohn's on treatment, recently cared for at [facility], now presenting with ongoing diarrhea    Antibiotics  Flagyl 7/13 7/21-->25  Cipro 7/13-->14  po Vanco 7/14 -->-->7/29; 8/1-->8/2  (7/28: colonoscopic FMT)  Fidaxomicin 8/2-->      1) Crohns colitis  8/9 Flex sig without pseudomembrances, crohns colitis appears major factor for continued diarrhea,   some decreased diarrhea - subjective improvement  rectal pain related to diarrhea - ?related to diarrhea  solumedrol 8/9--> tapered to current dose: 20 q 12hours  previous neg Quant Gold TB  Hep B core Ab neg  HIV negative  surgical resection anticipated next week    2) C diff colitis  Fidaxomcin 200 mg twice daily x 5 days 8/2 --> 8/7, currently every other day for additional 20 days days 7 - 25 =  8/9 --> 8/28  po intake encouraged  - S/p colonoscopic FMT 7/28    3) Leukocytosis  trend wbc    4) Para flu 3 viral uri with cough and fevers    5) malnourished state, hypoalbuminema  TPN/Smoflipid 8/16-->    6) debility    7) transaminitis  Hepatology evaluation: TPN vs ongoing SIRS from uncontrolled Crohn flare vs DILI.    Patient liver enzymes are improving,       i will be out until 8/23: ID service will follow

## 2021-08-19 NOTE — CHART NOTE - NSCHARTNOTEFT_GEN_A_CORE
Nutrition Follow Up Note     Patient seen for: malnutrition/TPN follow up     Source: medical record, TPN Team rounds    Chart reviewed, events noted. "81M with recently dx Crohn's disease c/b perianal abscess and left posterior distal anal intersphincteric fistula and presents diarrhea/BRBPR  2/2 to CD flare c/b c diff colitis. Initially treat for C diff, s/p FMT. Switched PO vanc in favor of Fidaxomicin without any improvement. s/p flex sig which showed no pseudomembranous colitis but severe Crohn colitis. now started on steroids on 8/9 with minimal improvement."    Nutrition Status:  - Per chart: "Will plan for surgical resection next week, pending discussion with patient and daughter". Family was refusing surgery as of 8/18; second opinion called.  - Multivitamin, thiamine, folic acid, vit C ordered in setting of refeeding risk and pressure injury  - Hyperglycemia addressed with SSI q6hrs and 10 units insulin in TPN, in setting of Solu-Medrol Rx  - Potassium, phosphorus and calcium increased in TPN; total volume increased to 2.25L accordingly    Diet Order: Diet, Low Fiber:   David(7 Gm Arginine/7 Gm Glut/1.2 Gm HMB     Qty per Day:  2  Liquid Protein Supplement     Qty per Day:  1  Supplement Feeding Modality:  Oral  Ensure Enlive Cans or Servings Per Day:  2       Frequency:  Daily  Probiotic Yogurt/Smoothie Cans or Servings Per Day:  1       Frequency:  Daily (08-12-21 @ 10:59)    PARENTERAL NUTRITION:    GOAL TPN (ordered 8/18): 2.25L infusing at 94 ml/hr (105 gm amino acids, 210 gm dextrose, 60 gm SMOF lipid) to provide 1734 kcal/day (29 kcal/kg and 1.8 gm/kg protein per dosing wt 59kg)    Non-Protein Calories: 1314 kcal/day (22 kcal/kg)  Dextrose Infusion Rate: 2.5 mg/kg/min  Lipid Infusion Rate: 1 gm/kg/day; 0.08 gm/kg/hr    Electrolytes and Insulin: (ordered 8/18/21)  Insulin (units): 10  NaCl (mEq):  40  Na acetate (mEq): 60  Na Phos (mmol): 75  KCL (mEq):  100  Calcium gluconate (mEq): 12  Mg sulfate (mEq): 12  MTE-5 concentrate (ml): 1  MVI (ml): 10    GI:  Last BM: 8/19, loose, BRBPR    Anthropometric Measurements:   Height (cm): 170.2 (08-09-21 @ 12:23)  DOSING Weight (kg): 59 (07-13-21)  BMI (kg/m2): 20.4 (08-09-21 @ 12:23)  Daily Weight: 50.3 kg (08-16); 53.2 kg (08-18)  IBW: 67.1 kg    Medications: MEDICATIONS  (STANDING):  ascorbic acid 500 milliGRAM(s) Oral daily  chlorhexidine 2% Cloths 1 Application(s) Topical daily  clonazePAM  Tablet 0.25 milliGRAM(s) Oral at bedtime  dextrose 40% Gel 15 Gram(s) Oral once  dextrose 5%. 1000 milliLiter(s) (50 mL/Hr) IV Continuous <Continuous>  dextrose 5%. 1000 milliLiter(s) (100 mL/Hr) IV Continuous <Continuous>  dextrose 50% Injectable 25 Gram(s) IV Push once  dextrose 50% Injectable 12.5 Gram(s) IV Push once  dextrose 50% Injectable 25 Gram(s) IV Push once  fidaxomicin 200 milliGRAM(s) Oral <User Schedule>  FIRST- Mouthwash  BLM 10 milliLiter(s) Swish and Spit every 8 hours  folic acid 1 milliGRAM(s) Oral daily  glucagon  Injectable 1 milliGRAM(s) IntraMuscular once  insulin lispro (ADMELOG) corrective regimen sliding scale   SubCutaneous every 6 hours  methylPREDNISolone sodium succinate Injectable 20 milliGRAM(s) IV Push every 12 hours  multivitamin 1 Tablet(s) Oral daily  pantoprazole Infusion 8 mG/Hr (10 mL/Hr) IV Continuous <Continuous>  Parenteral Nutrition - Adult 1 Each (94 mL/Hr) TPN Continuous <Continuous>  sotalol 120 milliGRAM(s) Oral daily  thiamine Injectable 100 milliGRAM(s) IV Push daily    MEDICATIONS  (PRN):  acetaminophen   Tablet .. 650 milliGRAM(s) Oral every 6 hours PRN Temp greater or equal to 38.5C (101.3F), Mild Pain (1 - 3), Moderate Pain (4 - 6)  aluminum hydroxide/magnesium hydroxide/simethicone Suspension 30 milliLiter(s) Oral every 4 hours PRN Dyspepsia  guaifenesin/dextromethorphan Oral Liquid 10 milliLiter(s) Oral every 8 hours PRN Cough  lidocaine 2% Gel 1 Application(s) Topical four times a day PRN pain due to skin excoriation  LORazepam   Injectable 0.5 milliGRAM(s) IV Push once PRN Agitation  ondansetron Injectable 4 milliGRAM(s) IV Push every 6 hours PRN Nausea and/or Vomiting    Labs:     Hemoglobin : 7.3 g/dL  Hematocrit : 24.1 %    LIVER FUNCTIONS - ( 18 Aug 2021 06:45 )  Alb: 1.8 g/dL / Pro: 5.0 g/dL / ALK PHOS: 271 U/L / ALT: 220 U/L / AST: 34 U/L / GGT: x           Triglycerides, Serum: 116 mg/dL (08-18-21 @ 06:45)  Triglycerides, Serum: 107 mg/dL (08-17-21 @ 07:08)    Hemoglobin A1C 5.2%    POCT Blood Glucose.: 121 mg/dL (08-19-21 @ 07:21)  POCT Blood Glucose.: 156 mg/dL (08-18-21 @ 23:48)  POCT Blood Glucose.: 224 mg/dL (08-18-21 @ 18:34)  POCT Blood Glucose.: 200 mg/dL (08-18-21 @ 12:46)    Skin: Stage II sacrum  Edema: none noted    Estimated Needs: based on dosing wt 59 kg, with consideration for TPN and malnutrition  Energy: 8325-0988 calories (30-35 kcal/kg)  Protein:  grams (1.6-1.8 gm/kg)    Previous Nutrition Diagnosis: 1) Severe Malnutrition 2) Increased Nutrient Needs  Nutrition Diagnosis is: ongoing, addressed with TPN and po diet/supplements as tolerated    New Nutrition Diagnosis:  none    Recommended Interventions:   1. TPN per TPN Team/Nutrition Assessment  2. Continue Low Fiber diet and Ensure Enlive as tolerated; recommend discontinue David and modular protein.  3. Continue 1 serving Probiotic Yogurt/Smoothie  4. Monitor possible plans for surgery       Monitoring and Evaluation:   Continue to monitor nutrition provision and tolerance, weights, labs, skin integrity.   RD remains available upon request and will follow up per protocol.    Miriam Manning, MS VELEZ CDN [facility], #[phone]. Nutrition Follow Up Note     Patient seen for: malnutrition/TPN follow up     Source: daughter at bedside, medical record, TPN Team rounds    Chart reviewed, events noted. "81M with recently dx Crohn's disease c/b perianal abscess and left posterior distal anal intersphincteric fistula and presents diarrhea/BRBPR  2/2 to CD flare c/b c diff colitis. Initially treat for C diff, s/p FMT. Switched PO vanc in favor of Fidaxomicin without any improvement. s/p flex sig which showed no pseudomembranous colitis but severe Crohn colitis. now started on steroids on 8/9 with minimal improvement."    Nutrition Status:  - Per daughter, pt with abdominal pain and nausea when eating. Reinforced that po intake should be for pleasure, and pt does not need to meet nutrition needs via po intake while TPN is provided.  - Per chart: "Will plan for surgical resection next week, pending discussion with patient and daughter". Family was refusing surgery as of 8/18; second opinion called.  - Multivitamin, thiamine, folic acid, vit C ordered in setting of refeeding risk and pressure injury  - Hyperglycemia addressed with SSI q6hrs and 10 units insulin in TPN, in setting of Solu-Medrol Rx  - Potassium, phosphorus and calcium increased in TPN; total volume increased to 2.25L accordingly    Diet Order: Diet, Low Fiber:   David(7 Gm Arginine/7 Gm Glut/1.2 Gm HMB     Qty per Day:  2  Liquid Protein Supplement     Qty per Day:  1  Supplement Feeding Modality:  Oral  Ensure Enlive Cans or Servings Per Day:  2       Frequency:  Daily  Probiotic Yogurt/Smoothie Cans or Servings Per Day:  1       Frequency:  Daily (08-12-21 @ 10:59)    PARENTERAL NUTRITION:    GOAL TPN (ordered 8/18): 2.25L infusing at 94 ml/hr (105 gm amino acids, 210 gm dextrose, 60 gm SMOF lipid) to provide 1734 kcal/day (29 kcal/kg and 1.8 gm/kg protein per dosing wt 59kg)    Non-Protein Calories: 1314 kcal/day (22 kcal/kg)  Dextrose Infusion Rate: 2.5 mg/kg/min  Lipid Infusion Rate: 1 gm/kg/day; 0.08 gm/kg/hr    Electrolytes and Insulin: (ordered 8/18/21)  Insulin (units): 10  NaCl (mEq):  40  Na acetate (mEq): 60  Na Phos (mmol): 75  KCL (mEq):  100  Calcium gluconate (mEq): 12 --> increased to 14 (8/19)  Mg sulfate (mEq): 12  MTE-5 concentrate (ml): 1  MVI (ml): 10    GI:  Last BM: 8/19, loose, BRBPR    Anthropometric Measurements:   Height (cm): 170.2 (08-09-21 @ 12:23)  DOSING Weight (kg): 59 (07-13-21)  BMI (kg/m2): 20.4 (08-09-21 @ 12:23)  Daily Weight: 50.3 kg (08-16); 53.2 kg (08-18)  IBW: 67.1 kg    Medications: MEDICATIONS  (STANDING):  ascorbic acid 500 milliGRAM(s) Oral daily  chlorhexidine 2% Cloths 1 Application(s) Topical daily  clonazePAM  Tablet 0.25 milliGRAM(s) Oral at bedtime  dextrose 40% Gel 15 Gram(s) Oral once  dextrose 5%. 1000 milliLiter(s) (50 mL/Hr) IV Continuous <Continuous>  dextrose 5%. 1000 milliLiter(s) (100 mL/Hr) IV Continuous <Continuous>  dextrose 50% Injectable 25 Gram(s) IV Push once  dextrose 50% Injectable 12.5 Gram(s) IV Push once  dextrose 50% Injectable 25 Gram(s) IV Push once  fidaxomicin 200 milliGRAM(s) Oral <User Schedule>  FIRST- Mouthwash  BLM 10 milliLiter(s) Swish and Spit every 8 hours  folic acid 1 milliGRAM(s) Oral daily  glucagon  Injectable 1 milliGRAM(s) IntraMuscular once  insulin lispro (ADMELOG) corrective regimen sliding scale   SubCutaneous every 6 hours  methylPREDNISolone sodium succinate Injectable 20 milliGRAM(s) IV Push every 12 hours  multivitamin 1 Tablet(s) Oral daily  pantoprazole Infusion 8 mG/Hr (10 mL/Hr) IV Continuous <Continuous>  Parenteral Nutrition - Adult 1 Each (94 mL/Hr) TPN Continuous <Continuous>  sotalol 120 milliGRAM(s) Oral daily  thiamine Injectable 100 milliGRAM(s) IV Push daily    MEDICATIONS  (PRN):  acetaminophen   Tablet .. 650 milliGRAM(s) Oral every 6 hours PRN Temp greater or equal to 38.5C (101.3F), Mild Pain (1 - 3), Moderate Pain (4 - 6)  aluminum hydroxide/magnesium hydroxide/simethicone Suspension 30 milliLiter(s) Oral every 4 hours PRN Dyspepsia  guaifenesin/dextromethorphan Oral Liquid 10 milliLiter(s) Oral every 8 hours PRN Cough  lidocaine 2% Gel 1 Application(s) Topical four times a day PRN pain due to skin excoriation  LORazepam   Injectable 0.5 milliGRAM(s) IV Push once PRN Agitation  ondansetron Injectable 4 milliGRAM(s) IV Push every 6 hours PRN Nausea and/or Vomiting    Labs:   08-19 @ 08:59: Sodium 134<L>, Potassium 3.9, Calcium 7.4<L>, Magnesium 2.1, Phosphorus 2.6, BUN 18, Creatinine 0.43<L>, Glucose 119<H>  Hemoglobin : 7.3 g/dL  Hematocrit : 24.1 %    A1C with Estimated Average Glucose Result: 5.2 % (08-17-21 @ 09:06)    POCT Blood Glucose.: 153 mg/dL (08-19-21 @ 12:39)  POCT Blood Glucose.: 121 mg/dL (08-19-21 @ 07:21)  POCT Blood Glucose.: 156 mg/dL (08-18-21 @ 23:48)  POCT Blood Glucose.: 224 mg/dL (08-18-21 @ 18:34)     LIVER FUNCTIONS - ( 19 Aug 2021 08:59 )  Alb: 1.9 g/dL / Pro: 5.1 g/dL / ALK PHOS: 237 U/L / ALT: 188 U/L / AST: 97 U/L / GGT: x           Triglycerides, Serum: 106 mg/dL (08-19-21 @ 08:59)  Triglycerides, Serum: 116 mg/dL (08-18-21 @ 06:45)  Triglycerides, Serum: 107 mg/dL (08-17-21 @ 07:08)    Skin: Stage II sacrum  Edema: none noted    Estimated Needs: based on dosing wt 59 kg, with consideration for TPN and malnutrition  Energy: 4861-9891 calories (30-35 kcal/kg)  Protein:  grams (1.6-1.8 gm/kg)    Previous Nutrition Diagnosis: 1) Severe Malnutrition 2) Increased Nutrient Needs  Nutrition Diagnosis is: ongoing, addressed with TPN and po diet/supplements as tolerated    New Nutrition Diagnosis:  none    Recommended Interventions:   1. TPN per TPN Team/Nutrition Assessment  2. Continue Low Fiber diet and Ensure Enlive as tolerated; recommend discontinue David and modular protein.  3. Continue 1 serving Probiotic Yogurt/Smoothie  4. Monitor plans for surgery next week       Monitoring and Evaluation:   Continue to monitor nutrition provision and tolerance, weights, labs, skin integrity.   RD remains available upon request and will follow up per protocol.    Miriam Manning MS RD CDN [facility], #[phone].

## 2021-08-19 NOTE — PROGRESS NOTE ADULT - ATTENDING COMMENTS
No acute changes. Still having intermittent abd pain but wants to avoid morphine  Afebrile, HDS    abd soft, non-distended, mild R and mid abdominal TTP    - continue PO diet and TPN for optimization  - please taper off the steroids  - will plan for surgery Thursday to allow for optimization for laparoscopic total abdominal colectomy and ileostomy. Will consult WOCN for marking and education. If the pt decides he can't wait till Thursday they will let our team know and we will proceed w/ surgery sooner w/ the understanding of increased surgical risks    Ca Malone MD  Attending Physician

## 2021-08-19 NOTE — PROGRESS NOTE ADULT - ASSESSMENT
80 yo M with pmhx of Crohns disease (dxd 9 mos ago, hx of perianal fistula, sp tx w remicaide until developed ab; since managed on budesonide, mtx/5asa), h/o cdiff, CAD, afib (not on ac), p/w bloody diarrhea, abd pain and weakness. Admitted for crohns management, course c/b cdiff colitis sp dificid/fmt and findings of indeterminate quantiferon, pending id clearance to start biologic. Prealb 8. TPN consulted in setting of significant weight loss, severe pcm, and worsening colitis suspected 2/2 CD. Pt at high risk for refeeding syndrome. TPN started 8/16. Repeat CT showing unchanged diffuse colitis and non specific gb wall edema. Pt/family undecided regarding further tx/management; sp 2nd surgical opinion recommending surgical intervention, awaiting further discussions with family    Current Diet: Low Fiber  TPN GOAL recs as per RD: aa 105, dextrose 210g, smof 60g    -cont tpn at goal; PICC in place, maintenance as per protocol   -hypocalcemia- increase ca gluconate to 14meq; f/u vit d/pth levels  -hypophosphatemia- cont naphos at 75mmol  -hyperglycemia- a1c 5.2, on IV steroids Q12, FS trend noted, cont with 10U of insulin in TPN; cont FS monitoring q6 hours, coverage with ISS as needed  -cont thiamine supplementation, 100mg IV x 5-7 days  -elevated lfts- ct noted, overall improving  -anemia/crohns- transfuse prn, care per gi/sx  -electrolyte imbalance risk- monitor CMP, Mg, Ionized Ca, Phosphorus qd  -check TG daily until stable on goal smof, then weekly; strict I/O's  -further care per medicine/gi/surgery  -dw night/day rn, pharmacy and nurse manager re: scanning of tpn orders, planning for assistance from nurse educators for unit inservice    plan d/w Dr VIK Fernandez and Dr SARMAD Multani, agreeable with above      TPN Team, spectra [phone] pager [phone]  Weekdays 6am-4pm  Weekends/Holidays 8am-12pm

## 2021-08-19 NOTE — PROGRESS NOTE ADULT - ASSESSMENT
81y Male with GERD on omeprazole daily and Crohn's disease diagnosed about 9 mos prior to admission at outside hospital c/b h/o abscess and left posterior distal anal intersphincteric fistula and presents diarrhea/BRBPR  2/2 to CD flare c/b c diff colitis. Initially treat for C diff, given that it did not get better got FMT. Switched PO vanc in favor of Fidaxomicin without any improvement. Therefore, patient got Flex sig to reassess which showed no pseudomembranous colitis anymore but severe Crohn colitis. Was started on steroids on 8/9. Has had minimal improvement.    # c diff - pseudomembranous colitis seen on colonoscopy 7/19/21, treated w/ vanc, fidaxomicin, FMT; repeat flex sig w/o further pseudomembranes but ongoing severe CD. 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 Family is in agreement with nutritional optimization, tapering steroids, and tentative plan for surgery next week.   - encourage PO intake, can continue TPN short term to improve cachexia until CD can be under control (per family preference) but enteric feeding is preferred over TPN  - monitor stool output  - rest of care per primary team    We will continue to follow.    Teri Hess MD  GI Fellow, PGY-4  Available via [employer] Teams    NON-URGENT CONSULTS:  Please email [email or web address]  AT NIGHT AND ON WEEKENDS:  Contact on-call GI fellow via answering service ([phone]) from 5pm-8am and on weekends/holidays  MONDAY-FRIDAY 8AM-5PM:  Pager# [phone] ([facility]) or [phone] ([facility])  GI Phone# [phone] ([facility])

## 2021-08-19 NOTE — PROGRESS NOTE ADULT - SUBJECTIVE AND OBJECTIVE BOX
Chief Complaint:  Patient is a 81y old  Male who presents with a chief complaint of diarrhea with blood in it , abd pain and weakness (19 Aug 2021 16:45)      Interval Events: Pt continues to have bloody diarrhea, still lethargic and only taking a few bites of PO. Family at bedside this afternoon. Family and patient are agreeable to pursing surgical resection but are deciding between different surgery teams. Family is in agreement with nutritional optimization, tapering steroids, and tentative plan for surgery next week.         Hospital Medications:  acetaminophen   Tablet .. 650 milliGRAM(s) Oral every 6 hours PRN  aluminum hydroxide/magnesium hydroxide/simethicone Suspension 30 milliLiter(s) Oral every 4 hours PRN  ascorbic acid 500 milliGRAM(s) Oral daily  chlorhexidine 2% Cloths 1 Application(s) Topical daily  clonazePAM  Tablet 0.25 milliGRAM(s) Oral at bedtime  dextrose 40% Gel 15 Gram(s) Oral once  dextrose 5%. 1000 milliLiter(s) IV Continuous <Continuous>  dextrose 5%. 1000 milliLiter(s) IV Continuous <Continuous>  dextrose 50% Injectable 25 Gram(s) IV Push once  dextrose 50% Injectable 12.5 Gram(s) IV Push once  dextrose 50% Injectable 25 Gram(s) IV Push once  fat emulsion (Fish Oil and Plant Based) 20% Infusion 25 mL/Hr IV Continuous <Continuous>  fidaxomicin 200 milliGRAM(s) Oral <User Schedule>  FIRST- Mouthwash  BLM 10 milliLiter(s) Swish and Spit every 8 hours  folic acid 1 milliGRAM(s) Oral daily  glucagon  Injectable 1 milliGRAM(s) IntraMuscular once  guaifenesin/dextromethorphan Oral Liquid 10 milliLiter(s) Oral every 8 hours PRN  insulin lispro (ADMELOG) corrective regimen sliding scale   SubCutaneous every 6 hours  lidocaine 2% Gel 1 Application(s) Topical four times a day PRN  LORazepam   Injectable 0.5 milliGRAM(s) IV Push once PRN  methylPREDNISolone sodium succinate Injectable 20 milliGRAM(s) IV Push every 12 hours  multivitamin 1 Tablet(s) Oral daily  ondansetron Injectable 4 milliGRAM(s) IV Push every 6 hours PRN  pantoprazole Infusion 8 mG/Hr IV Continuous <Continuous>  Parenteral Nutrition - Adult 1 Each TPN Continuous <Continuous>  Parenteral Nutrition - Adult 1 Each TPN Continuous <Continuous>  sotalol 120 milliGRAM(s) Oral daily  thiamine Injectable 100 milliGRAM(s) IV Push daily  traMADol 100 milliGRAM(s) Oral every 6 hours PRN      PMHX/PSHX:  No pertinent past medical history    No significant past surgical history          ROS: see subjective      PHYSICAL EXAM:     GENERAL: elderly, thin, cachcectic, chronically-ill appearing  HEENT:  NCAT  CHEST: no resp distress  ABDOMEN:  thin, +scaphoid abdomen; +diffusely tender  EXTREMITIES:  No cyanosis, clubbing, or edema  SKIN:  +pale  NEURO:  Awake but lethargic    Vital Signs:  Vital Signs Last 24 Hrs  T(C): 36.8 (19 Aug 2021 13:06), Max: 36.8 (19 Aug 2021 07:14)  T(F): 98.3 (19 Aug 2021 13:06), Max: 98.3 (19 Aug 2021 07:14)  HR: 65 (19 Aug 2021 13:06) (65 - 77)  BP: 128/68 (19 Aug 2021 13:06) (126/74 - 158/74)  BP(mean): --  RR: 19 (19 Aug 2021 13:06) (18 - 19)  SpO2: 95% (19 Aug 2021 13:06) (93% - 95%)  Daily     Daily     LABS:                        7.3    15.91 )-----------( 241      ( 19 Aug 2021 08:59 )             24.1     08-19    134<L>  |  101  |  18  ----------------------------<  119<H>  3.9   |  24  |  0.43<L>    Ca    7.4<L>      19 Aug 2021 08:59  Phos  2.6     08-19  Mg     2.1     08-19    TPro  5.1<L>  /  Alb  1.9<L>  /  TBili  0.4  /  DBili  x   /  AST  97<H>  /  ALT  188<H>  /  AlkPhos  237<H>  08-19    LIVER FUNCTIONS - ( 19 Aug 2021 08:59 )  Alb: 1.9 g/dL / Pro: 5.1 g/dL / ALK PHOS: 237 U/L / ALT: 188 U/L / AST: 97 U/L / GGT: x                 Imaging: No new abdominal imaging

## 2021-08-19 NOTE — PROGRESS NOTE ADULT - ASSESSMENT
81M with recently dx Crohn's disease c/b perianal abscess and left posterior distal anal intersphincteric fistula and presents diarrhea/BRBPR  2/2 to CD flare c/b c diff colitis. Initially treat for C diff, s/p FMT. Switched PO vanc in favor of Fidaxomicin without any improvement. s/p flex sig which showed no pseudomembranous colitis but severe Crohn colitis. now started on steroids on 8/9 with minimal improvement.    Recommendation:  - continue with TPN  - Will plan for surgical resection next week, pending discussion with patient and daughter  - To be seen by ostomy RN tomorrow for abdominal marking for ileostomy   - Will need medicine and cardiology optimization and risk stratification    Red surgery  [id number]

## 2021-08-19 NOTE — PROGRESS NOTE ADULT - ASSESSMENT
A/p    # AMS   -sod improved after IV fluid  MS is little beter today   likely metabolic encephalopathy   no focal deficit    sever C diff colitis :  -completed vanco/ flagyl   s/p FMT   -started on fidaxomicin 200 mg bid today : as per ID and GI   -supportive care     Hx of Chron's dis :  - IBD exacerbation and his cdiff is improved on flex sig   -started on Iv steroids   -family refusal for using inflaximab as they says it didn't work in past   -seen by colorectal surgeon : 2 nd opinion , family likely deciding on surgical plan early next week     anemia :  -transfuse if Hb<7    hyponatremia / sever malnutrition  -started on IV fluids   improved sod     sever malnutrition :  -started on TPN     dispo: today steroid taper , c/w TPN

## 2021-08-19 NOTE — PROGRESS NOTE ADULT - ATTENDING COMMENTS
Agree with above. Continues with abdominal pain and bloody diarrhea. Plan for colectomy early next week as per CRC surgery. Taper steroids. Continue TPN. Continue fidaxomicin per ID recs.

## 2021-08-20 NOTE — PROGRESS NOTE ADULT - SUBJECTIVE AND OBJECTIVE BOX
Surgery Progress Note     Subjective/24hour Events:   Patient seen and examined.   Minimally responsive this AM.   Arousable to abdominal exam.     Vital Signs:  Vital Signs Last 24 Hrs  T(C): 36.6 (20 Aug 2021 07:36), Max: 37.7 (20 Aug 2021 04:57)  T(F): 97.8 (20 Aug 2021 07:36), Max: 99.8 (20 Aug 2021 04:57)  HR: 95 (20 Aug 2021 07:36) (65 - 99)  BP: 100/66 (20 Aug 2021 07:36) (100/66 - 128/68)  BP(mean): --  RR: 20 (20 Aug 2021 07:36) (18 - 20)  SpO2: 96% (20 Aug 2021 07:36) (93% - 96%)    CAPILLARY BLOOD GLUCOSE      POCT Blood Glucose.: 182 mg/dL (20 Aug 2021 06:09)  POCT Blood Glucose.: 175 mg/dL (20 Aug 2021 00:00)  POCT Blood Glucose.: 123 mg/dL (19 Aug 2021 17:05)  POCT Blood Glucose.: 153 mg/dL (19 Aug 2021 12:39)      I&O's Detail    19 Aug 2021 07:01  -  20 Aug 2021 07:00  --------------------------------------------------------  IN:    Fat Emulsion (Fish Oil &amp; Plant Based) 20% Infusion: 315 mL    IV PiggyBack: 1128 mL    Oral Fluid: 580 mL    Pantoprazole: 240 mL    TPN (Total Parenteral Nutrition): 1128 mL  Total IN: 3391 mL    OUT:  Total OUT: 0 mL    Total NET: 3391 mL          MEDICATIONS  (STANDING):  ascorbic acid 500 milliGRAM(s) Oral daily  chlorhexidine 2% Cloths 1 Application(s) Topical daily  clonazePAM  Tablet 0.25 milliGRAM(s) Oral at bedtime  dextrose 40% Gel 15 Gram(s) Oral once  dextrose 5%. 1000 milliLiter(s) (50 mL/Hr) IV Continuous <Continuous>  dextrose 5%. 1000 milliLiter(s) (100 mL/Hr) IV Continuous <Continuous>  dextrose 50% Injectable 25 Gram(s) IV Push once  dextrose 50% Injectable 12.5 Gram(s) IV Push once  dextrose 50% Injectable 25 Gram(s) IV Push once  fat emulsion (Fish Oil and Plant Based) 20% Infusion 25 mL/Hr (25 mL/Hr) IV Continuous <Continuous>  fidaxomicin 200 milliGRAM(s) Oral <User Schedule>  FIRST- Mouthwash  BLM 10 milliLiter(s) Swish and Spit every 8 hours  folic acid 1 milliGRAM(s) Oral daily  glucagon  Injectable 1 milliGRAM(s) IntraMuscular once  insulin lispro (ADMELOG) corrective regimen sliding scale   SubCutaneous every 6 hours  methylPREDNISolone sodium succinate Injectable 20 milliGRAM(s) IV Push every 12 hours  multivitamin 1 Tablet(s) Oral daily  pantoprazole Infusion 8 mG/Hr (10 mL/Hr) IV Continuous <Continuous>  Parenteral Nutrition - Adult 1 Each (94 mL/Hr) TPN Continuous <Continuous>  sotalol 120 milliGRAM(s) Oral daily  thiamine Injectable 100 milliGRAM(s) IV Push daily    MEDICATIONS  (PRN):  acetaminophen   Tablet .. 650 milliGRAM(s) Oral every 6 hours PRN Temp greater or equal to 38.5C (101.3F), Mild Pain (1 - 3), Moderate Pain (4 - 6)  aluminum hydroxide/magnesium hydroxide/simethicone Suspension 30 milliLiter(s) Oral every 4 hours PRN Dyspepsia  guaifenesin/dextromethorphan Oral Liquid 10 milliLiter(s) Oral every 8 hours PRN Cough  lidocaine 2% Gel 1 Application(s) Topical four times a day PRN pain due to skin excoriation  LORazepam   Injectable 0.5 milliGRAM(s) IV Push once PRN Agitation  ondansetron Injectable 4 milliGRAM(s) IV Push every 6 hours PRN Nausea and/or Vomiting  traMADol 100 milliGRAM(s) Oral every 6 hours PRN Severe Pain (7 - 10)      Physical Exam:  Gen: Minimally responsive  Lungs: Non labored breathing.   Ab: Soft, tender throughout, nondistended.   Ext: Moves all 4 spontaneously.     Labs:    08-20    135  |  100  |  25<H>  ----------------------------<  134<H>  3.7   |  24  |  0.57    Ca    7.6<L>      20 Aug 2021 06:36  Phos  3.6     08-20  Mg     2.0     08-20    TPro  4.9<L>  /  Alb  1.7<L>  /  TBili  0.4  /  DBili  x   /  AST  40  /  ALT  142<H>  /  AlkPhos  179<H>  08-20    LIVER FUNCTIONS - ( 20 Aug 2021 06:36 )  Alb: 1.7 g/dL / Pro: 4.9 g/dL / ALK PHOS: 179 U/L / ALT: 142 U/L / AST: 40 U/L / GGT: x                                 6.8    23.31 )-----------( 180      ( 20 Aug 2021 06:40 )             22.2

## 2021-08-20 NOTE — PROVIDER CONTACT NOTE (CRITICAL VALUE NOTIFICATION) - RECOMMENDATIONS
Blood transfusion discussed to daughter by NP
repeat CBC
Transfuse. Fluids.
make NP aware, continue to monitor.
NP aware, give 1 U PRBC, continue to monitor.

## 2021-08-20 NOTE — PROGRESS NOTE ADULT - ASSESSMENT
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 Colorectal consulted to evaluate for need of surgical management. On exam patient is soft and non tender, lethargic overall. Overall impression, patient does not appear to have any indication for surgical intervention at this time, lactate of 2.4 likely from dehydration / malnutrition as patient has been having multiple episodes of diarrhea and unable to tolerate PO well 2/2 lethargy.    PLAN: (to be discussed with attending in AM)    [facility]  #[id number]

## 2021-08-20 NOTE — PROGRESS NOTE ADULT - ASSESSMENT
80 yo M with pmhx of Crohns disease (dxd 9 mos ago, hx of perianal fistula, sp tx w remicaide until developed ab; since managed on budesonide, mtx/5asa), h/o cdiff, CAD, afib (not on ac), p/w bloody diarrhea, abd pain and weakness. Admitted for crohns management, course c/b cdiff colitis sp dificid/fmt and findings of indeterminate quantiferon, pending id clearance to start biologic. Prealb 8. TPN consulted in setting of significant weight loss, severe pcm, and worsening colitis suspected 2/2 CD. Pt at high risk for refeeding syndrome. TPN started 8/16. Repeat CT showing unchanged diffuse colitis and non specific gb wall edema.    Current Diet: Diet, Low Fiber:   David(7 Gm Arginine/7 Gm Glut/1.2 Gm HMB     Qty per Day:  2  Liquid Protein Supplement     Qty per Day:  1  Supplement Feeding Modality:  Oral  Ensure Enlive Cans or Servings Per Day:  2       Frequency:  Daily  Probiotic Yogurt/Smoothie Cans or Servings Per Day:  1       Frequency:  Daily     TPN GOAL recommendations as per RD: aa 105, dextrose 210g, smof 60g    -cont TPN at goal:  105g AA, 210g dextrose, 60g SMOF in 2250cc total volume  -hypocalcemia- continue CaGluconate 12meq   -hyperglycemia- a1c 5.2, also on IV steroids, -182, required 1U ISS.  Continue 10U insulin in TPN.  Cont FS monitoring q6 hours, coverage with ISS as needed  -cont thiamine supplementation, 100mg IV x 5-7 days  -elevated lfts- ct noted, now downtrending  -anemia- sp venofer, planned for prbc transfusion, trend cbc  -electrolyte imbalance risk- monitor CMP, Mg, Ionized Ca, Phosphorus qd  -check TG daily until stable on goal SMOF, then weekly   -strict I/O's  -care per primary team    TPN pager [phone], spectra [phone]  M-F 6A-4P, Weekends and holidays 8A-12P  discussed with Dr. Fernandez and Dr. SARMAD Multani    80 yo M with pmhx of Crohns disease (dxd 9 mos ago, hx of perianal fistula, sp tx w remicaide until developed ab; since managed on budesonide, mtx/5asa), h/o cdiff, CAD, afib (not on ac), p/w bloody diarrhea, abd pain and weakness. Admitted for crohns management, course c/b cdiff colitis sp dificid/fmt and findings of indeterminate quantiferon, pending id clearance to start biologic. Prealb 8. TPN consulted in setting of significant weight loss, severe pcm, and worsening colitis suspected 2/2 CD. Pt at high risk for refeeding syndrome. TPN started 8/16. Repeat CT showing unchanged diffuse colitis and non specific gb wall edema.    Current Diet: Diet, Low Fiber:   David(7 Gm Arginine/7 Gm Glut/1.2 Gm HMB     Qty per Day:  2  Liquid Protein Supplement     Qty per Day:  1  Supplement Feeding Modality:  Oral  Ensure Enlive Cans or Servings Per Day:  2       Frequency:  Daily  Probiotic Yogurt/Smoothie Cans or Servings Per Day:  1       Frequency:  Daily     TPN GOAL recommendations as per RD: aa 105, dextrose 210g, smof 60g    -cont TPN at goal:  105g AA, 210g dextrose, 60g SMOF in 2250cc total volume  -hypokalemia - -------------------  -hypocalcemia- continue CaGluconate 12meq   -hyperglycemia- a1c 5.2, also on IV steroids, -182, required 1U ISS.  Continue 10U insulin in TPN.  Cont FS monitoring q6 hours, coverage with ISS as needed  -cont thiamine supplementation, 100mg IV x 5-7 days  -elevated lfts- ct noted, now downtrending  -anemia- sp venofer, planned for prbc transfusion, trend cbc  -electrolyte imbalance risk- monitor CMP, Mg, Ionized Ca, Phosphorus qd  -check TG daily until stable on goal SMOF, then weekly   -strict I/O's  -care per primary team    TPN pager [phone], spectra [phone]  M-F 6A-4P, Weekends and holidays 8A-12P  discussed with Dr. Fernandez and Dr. SARMAD Multani    80 yo M with pmhx of Crohns disease (dxd 9 mos ago, hx of perianal fistula, sp tx w remicaide until developed ab; since managed on budesonide, mtx/5asa), h/o cdiff, CAD, afib (not on ac), p/w bloody diarrhea, abd pain and weakness. Admitted for crohns management, course c/b cdiff colitis sp dificid/fmt and findings of indeterminate quantiferon, pending id clearance to start biologic. Prealb 8. TPN consulted in setting of significant weight loss, severe pcm, and worsening colitis suspected 2/2 CD. Pt at high risk for refeeding syndrome. TPN started 8/16. Repeat CT showing unchanged diffuse colitis and non specific gb wall edema.    Current Diet: Diet, Low Fiber:   David(7 Gm Arginine/7 Gm Glut/1.2 Gm HMB     Qty per Day:  2  Liquid Protein Supplement     Qty per Day:  1  Supplement Feeding Modality:  Oral  Ensure Enlive Cans or Servings Per Day:  2       Frequency:  Daily  Probiotic Yogurt/Smoothie Cans or Servings Per Day:  1       Frequency:  Daily     TPN GOAL recommendations as per RD: aa 105, dextrose 210g, smof 60g    -cont TPN at goal:  105g AA, 210g dextrose, 60g SMOF in 2250cc total volume  -hypokalemia - continue KCl 100meq  -hypocalcemia- continue CaGluconate 12meq   -hyperglycemia- a1c 5.2, also on IV steroids, -182, required 1U ISS.  Continue 10U insulin in TPN.  Cont FS monitoring q6 hours, coverage with ISS as needed  -cont thiamine supplementation, 100mg IV x 5-7 days  -elevated lfts- ct noted, now downtrending  -anemia- sp venofer, planned for prbc transfusion, trend cbc  -electrolyte imbalance risk- monitor CMP, Mg, Ionized Ca, Phosphorus qd  -check TG daily until stable on goal SMOF, then weekly   -strict I/O's  -care per primary team    TPN pager [phone], spectra [phone]  M-F 6A-4P, Weekends and holidays 8A-12P  discussed with Dr. Fernandez and Dr. SARMAD Multani

## 2021-08-20 NOTE — PROGRESS NOTE ADULT - SUBJECTIVE AND OBJECTIVE BOX
Chief Complaint:  Patient is a 81y old  Male who presents with a chief complaint of diarrhea with blood in it , abd pain and weakness (20 Aug 2021 12:47)      Interval Events:       Hospital Medications:  acetaminophen   Tablet .. 650 milliGRAM(s) Oral every 6 hours PRN  aluminum hydroxide/magnesium hydroxide/simethicone Suspension 30 milliLiter(s) Oral every 4 hours PRN  ascorbic acid 500 milliGRAM(s) Oral daily  chlorhexidine 2% Cloths 1 Application(s) Topical daily  clonazePAM  Tablet 0.25 milliGRAM(s) Oral at bedtime  dextrose 40% Gel 15 Gram(s) Oral once  dextrose 5%. 1000 milliLiter(s) IV Continuous <Continuous>  dextrose 5%. 1000 milliLiter(s) IV Continuous <Continuous>  dextrose 50% Injectable 25 Gram(s) IV Push once  dextrose 50% Injectable 12.5 Gram(s) IV Push once  dextrose 50% Injectable 25 Gram(s) IV Push once  fat emulsion (Fish Oil and Plant Based) 20% Infusion 25 mL/Hr IV Continuous <Continuous>  fidaxomicin 200 milliGRAM(s) Oral <User Schedule>  FIRST- Mouthwash  BLM 10 milliLiter(s) Swish and Spit every 8 hours  folic acid 1 milliGRAM(s) Oral daily  glucagon  Injectable 1 milliGRAM(s) IntraMuscular once  guaifenesin/dextromethorphan Oral Liquid 10 milliLiter(s) Oral every 8 hours PRN  insulin lispro (ADMELOG) corrective regimen sliding scale   SubCutaneous every 6 hours  lidocaine 2% Gel 1 Application(s) Topical four times a day PRN  LORazepam   Injectable 0.5 milliGRAM(s) IV Push once PRN  methylPREDNISolone sodium succinate Injectable 20 milliGRAM(s) IV Push every 12 hours  multivitamin 1 Tablet(s) Oral daily  ondansetron Injectable 4 milliGRAM(s) IV Push every 6 hours PRN  pantoprazole Infusion 8 mG/Hr IV Continuous <Continuous>  Parenteral Nutrition - Adult 1 Each TPN Continuous <Continuous>  Parenteral Nutrition - Adult 1 Each TPN Continuous <Continuous>  sotalol 120 milliGRAM(s) Oral daily  thiamine Injectable 100 milliGRAM(s) IV Push daily  traMADol 100 milliGRAM(s) Oral every 6 hours PRN      PMHX/PSHX:  No pertinent past medical history    No significant past surgical history      ROS: see subjective      PHYSICAL EXAM:     GENERAL: elderly, thin, cachcectic, chronically-ill appearing  HEENT:  NCAT  CHEST: no resp distress  ABDOMEN:  thin, +scaphoid abdomen; +diffusely tender  EXTREMITIES:  No cyanosis, clubbing, or edema  SKIN:  +pale  NEURO:  Awake but lethargic    Vital Signs:  Vital Signs Last 24 Hrs  T(C): 36.6 (20 Aug 2021 07:36), Max: 37.7 (20 Aug 2021 04:57)  T(F): 97.8 (20 Aug 2021 07:36), Max: 99.8 (20 Aug 2021 04:57)  HR: 95 (20 Aug 2021 07:36) (92 - 99)  BP: 100/66 (20 Aug 2021 07:36) (100/66 - 104/65)  BP(mean): --  RR: 20 (20 Aug 2021 07:36) (18 - 20)  SpO2: 96% (20 Aug 2021 07:36) (93% - 96%)  Daily     Daily Weight in k.1 (20 Aug 2021 04:57)    LABS:                        6.8    23.31 )-----------( 180      ( 20 Aug 2021 06:40 )             22.2     08-20    135  |  100  |  25<H>  ----------------------------<  134<H>  3.7   |  24  |  0.57    Ca    7.6<L>      20 Aug 2021 06:36  Phos  3.6     08-20  Mg     2.0     08-20    TPro  4.9<L>  /  Alb  1.7<L>  /  TBili  0.4  /  DBili  x   /  AST  40  /  ALT  142<H>  /  AlkPhos  179<H>  08-20    LIVER FUNCTIONS - ( 20 Aug 2021 06:36 )  Alb: 1.7 g/dL / Pro: 4.9 g/dL / ALK PHOS: 179 U/L / ALT: 142 U/L / AST: 40 U/L / GGT: x                 Imaging: No new abdominal imaging     Chief Complaint:  Patient is a 81y old  Male who presents with a chief complaint of diarrhea with blood in it , abd pain and weakness (20 Aug 2021 12:47)      Interval Events: Worsening mental status today. Also downtrending Hgb 6.8 today due to continued GI bleeding 2/2 colitis. s/p RBC transfusion today. CRS planning to take him to OR for total colectomy and end ileostomy today. Family is at bedside.      Hospital Medications:  acetaminophen   Tablet .. 650 milliGRAM(s) Oral every 6 hours PRN  aluminum hydroxide/magnesium hydroxide/simethicone Suspension 30 milliLiter(s) Oral every 4 hours PRN  ascorbic acid 500 milliGRAM(s) Oral daily  chlorhexidine 2% Cloths 1 Application(s) Topical daily  clonazePAM  Tablet 0.25 milliGRAM(s) Oral at bedtime  dextrose 40% Gel 15 Gram(s) Oral once  dextrose 5%. 1000 milliLiter(s) IV Continuous <Continuous>  dextrose 5%. 1000 milliLiter(s) IV Continuous <Continuous>  dextrose 50% Injectable 25 Gram(s) IV Push once  dextrose 50% Injectable 12.5 Gram(s) IV Push once  dextrose 50% Injectable 25 Gram(s) IV Push once  fat emulsion (Fish Oil and Plant Based) 20% Infusion 25 mL/Hr IV Continuous <Continuous>  fidaxomicin 200 milliGRAM(s) Oral <User Schedule>  FIRST- Mouthwash  BLM 10 milliLiter(s) Swish and Spit every 8 hours  folic acid 1 milliGRAM(s) Oral daily  glucagon  Injectable 1 milliGRAM(s) IntraMuscular once  guaifenesin/dextromethorphan Oral Liquid 10 milliLiter(s) Oral every 8 hours PRN  insulin lispro (ADMELOG) corrective regimen sliding scale   SubCutaneous every 6 hours  lidocaine 2% Gel 1 Application(s) Topical four times a day PRN  LORazepam   Injectable 0.5 milliGRAM(s) IV Push once PRN  methylPREDNISolone sodium succinate Injectable 20 milliGRAM(s) IV Push every 12 hours  multivitamin 1 Tablet(s) Oral daily  ondansetron Injectable 4 milliGRAM(s) IV Push every 6 hours PRN  pantoprazole Infusion 8 mG/Hr IV Continuous <Continuous>  Parenteral Nutrition - Adult 1 Each TPN Continuous <Continuous>  Parenteral Nutrition - Adult 1 Each TPN Continuous <Continuous>  sotalol 120 milliGRAM(s) Oral daily  thiamine Injectable 100 milliGRAM(s) IV Push daily  traMADol 100 milliGRAM(s) Oral every 6 hours PRN      PMHX/PSHX:  No pertinent past medical history    No significant past surgical history      ROS: see subjective      PHYSICAL EXAM: unable to fully examine today as pt was being taken away for surgery    Vital Signs:  Vital Signs Last 24 Hrs  T(C): 36.6 (20 Aug 2021 07:36), Max: 37.7 (20 Aug 2021 04:57)  T(F): 97.8 (20 Aug 2021 07:36), Max: 99.8 (20 Aug 2021 04:57)  HR: 95 (20 Aug 2021 07:36) (92 - 99)  BP: 100/66 (20 Aug 2021 07:36) (100/66 - 104/65)  BP(mean): --  RR: 20 (20 Aug 2021 07:36) (18 - 20)  SpO2: 96% (20 Aug 2021 07:36) (93% - 96%)  Daily     Daily Weight in k.1 (20 Aug 2021 04:57)    LABS:                        6.8    23.31 )-----------( 180      ( 20 Aug 2021 06:40 )             22.2     08-20    135  |  100  |  25<H>  ----------------------------<  134<H>  3.7   |  24  |  0.57    Ca    7.6<L>      20 Aug 2021 06:36  Phos  3.6     08-20  Mg     2.0     08-20    TPro  4.9<L>  /  Alb  1.7<L>  /  TBili  0.4  /  DBili  x   /  AST  40  /  ALT  142<H>  /  AlkPhos  179<H>  08-20    LIVER FUNCTIONS - ( 20 Aug 2021 06:36 )  Alb: 1.7 g/dL / Pro: 4.9 g/dL / ALK PHOS: 179 U/L / ALT: 142 U/L / AST: 40 U/L / GGT: x                 Imaging: No new abdominal imaging

## 2021-08-20 NOTE — PROGRESS NOTE ADULT - ASSESSMENT
81y Male with GERD on omeprazole daily and Crohn's disease diagnosed about 9 mos prior to admission at outside hospital c/b h/o abscess and left posterior distal anal intersphincteric fistula and presents diarrhea/BRBPR  2/2 to CD flare c/b c diff colitis. Initially treat for C diff, given that it did not get better got FMT. Switched PO vanc in favor of Fidaxomicin without any improvement. Therefore, patient got Flex sig to reassess which showed no pseudomembranous colitis anymore but severe Crohn colitis. Was started on steroids on 8/9. Has had minimal improvement.    # c diff - pseudomembranous colitis seen on colonoscopy 7/19/21, treated w/ vanc, fidaxomicin, FMT; repeat flex sig w/o further pseudomembranes but ongoing severe CD. 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 Family is in agreement with nutritional optimization, tapering steroids, and tentative plan for surgery next week.   - encourage PO intake, can continue TPN short term to improve cachexia until CD can be under control (per family preference) but enteric feeding is preferred over TPN  - monitor stool output  - rest of care per primary team    We will continue to follow.    Teri Hess MD  GI Fellow, PGY-4  Available via Microsoft Teams    NON-URGENT CONSULTS:  Please email [email or web address]  [email or web address]  AT NIGHT AND ON WEEKENDS:  Contact on-call GI fellow via answering service ([phone]) from 5pm-8am and on weekends/holidays  MONDAY-FRIDAY 8AM-5PM:  Pager# [phone] ([facility]) or [phone] ([facility])  GI Phone# [phone] ([facility])   81y Male with GERD on omeprazole daily and Crohn's disease diagnosed about 9 mos prior to admission at outside hospital c/b h/o abscess and left posterior distal anal intersphincteric fistula and presents diarrhea/BRBPR  2/2 to CD flare c/b c diff colitis. Initially treat for C diff, given that it did not get better got FMT. Switched PO vanc in favor of Fidaxomicin without any improvement. Therefore, patient got Flex sig to reassess which showed no pseudomembranous colitis anymore but severe Crohn colitis. Was started on steroids on 8/9. Has had minimal improvement. Started tapering IV steroids and pt is currently on TPN as he has had poor PO intake. Case was discussed at IBD conference 8/18 and CRS + GI team in agreement with surgical resection. Plan for total colectomy and end ileostomy today.     # c diff - pseudomembranous colitis seen on colonoscopy 7/19/21, treated w/ vanc, fidaxomicin, FMT; repeat flex sig w/o further pseudomembranes but ongoing severe CD. 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    Teri Hess MD  GI Fellow, PGY-4  Available via [employer] Teams    NON-URGENT CONSULTS:  Please email [email or web address]  [email or web address]  AT NIGHT AND ON WEEKENDS:  Contact on-call GI fellow via answering service ([phone]) from 5pm-8am and on weekends/holidays  MONDAY-FRIDAY 8AM-5PM:  Pager# [phone] ([facility]) or [phone] ([facility])  GI Phone# [phone] ([facility])

## 2021-08-20 NOTE — PROGRESS NOTE ADULT - ATTENDING COMMENTS
Agree with above. CRC surgery plan for colectomy today. Continue to taper steroids. Continue TPN. Continue fidaxomicin as per ID.

## 2021-08-20 NOTE — CONSULT NOTE ADULT - SUBJECTIVE AND OBJECTIVE BOX
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 Laparoscopic mobilization of the colon. Mesentery divided using ligasure cautery. Terminal ileum divided using a endoGIA stapler and brought up through the right abdomen for an end ileostomy. Specimen delivered via a small low midline incision and transected at rectosigmoid with endoGIA. Abdomen thoroughly washed out and LLQ drain placed in the pelvis and LUQ drain placed in the right gutter. Hemostasis ensured. Colorectal bundle closure tray utilized to close fascia with running Maxon x2 incorporating rectal stump. Midline wound packed and ileostomy matured.    82 yo M with Crohn disease, over the last 9 mo, he was started on several medication and was recently told that he may need to go on IV infusion for chrons, he has several hospitalization at [facility] , and follows with GI near [place] [facility] [facility]. but his diarrhea is not improving , having abd cramps , denies any N/V . , discharged 1 mo ago from [place] [facility] [facility], now having trouble walking, on going diarrhea. + subjective fever but denies cough or nasal congestion or uri or urinary symptoms.  (13 Jul 2021 20:48)    Allergies:   PAST MEDICAL & SURGICAL HISTORY:  No pertinent past medical history    No significant past surgical history      FAMILY HISTORY:    ADVANCE DIRECTIVES: Presumed Full Code     HOME MEDICATIONS:    Home Medications:  budesonide 3 mg oral delayed release capsule: 3 cap(s) orally once a day (in the morning) (13 Jul 2021 18:03)  folic acid 1 mg oral tablet: 1 tab(s) orally once a day (13 Jul 2021 18:03)  KlonoPIN 0.5 mg oral tablet: 0.5 tab(s) orally once a day (at bedtime)  note: last dispensed Nov 2020 (13 Jul 2021 18:03)  mesalamine 1.2 g oral delayed release tablet: 4 tab(s) orally once a day (13 Jul 2021 18:03)  methotrexate 2.5 mg oral tablet: 6 tab(s) orally once a week on sundays (13 Jul 2021 18:03)  multivitamin:  (13 Jul 2021 18:03)  omeprazole 20 mg oral delayed release capsule: 1 cap(s) orally once a day (13 Jul 2021 18:03)  sotalol  mg oral tablet: 1 tab(s) orally 2 times a day (13 Jul 2021 18:03)  Vitamin D3:  (13 Jul 2021 18:03)      CURRENT MEDICATIONS:   --------------------------------------------------------------------------------------  Neurologic Medications  acetaminophen  IVPB .. 1000 milliGRAM(s) IV Intermittent every 6 hours  dexMEDEtomidine Infusion 0.2 MICROgram(s)/kG/Hr IV Continuous <Continuous>  HYDROmorphone  Injectable 0.5 milliGRAM(s) IV Push every 4 hours PRN breakthrough pain, agitation    Respiratory Medications    Cardiovascular Medications  metoprolol tartrate Injectable 5 milliGRAM(s) IV Push every 6 hours  phenylephrine    Infusion 1 MICROgram(s)/kG/Min IV Continuous <Continuous>    Gastrointestinal Medications  fat emulsion (Fish Oil and Plant Based) 20% Infusion 25 mL/Hr IV Continuous <Continuous>  lactated ringers. 1000 milliLiter(s) IV Continuous <Continuous>  pantoprazole  Injectable 40 milliGRAM(s) IV Push daily  Parenteral Nutrition - Adult 1 Each TPN Continuous <Continuous>    Genitourinary Medications    Hematologic/Oncologic Medications  enoxaparin Injectable 40 milliGRAM(s) SubCutaneous every 24 hours    Antimicrobial/Immunologic Medications  piperacillin/tazobactam IVPB.. 3.375 Gram(s) IV Intermittent every 8 hours    Endocrine/Metabolic Medications  methylPREDNISolone sodium succinate Injectable 20 milliGRAM(s) IV Push every 12 hours    Topical/Other Medications  chlorhexidine 0.12% Liquid 15 milliLiter(s) Oral Mucosa every 12 hours    --------------------------------------------------------------------------------------    VITAL SIGNS, INS/OUTS (last 24 hours):  --------------------------------------------------------------------------------------  T(C): 36.8 (08-20-21 @ 23:00), Max: 37.7 (08-20-21 @ 04:57)  HR: 71 (08-21-21 @ 01:45) (71 - 100)  BP: 110/55 (08-21-21 @ 01:30) (75/50 - 156/67)  BP(mean): 76 (08-21-21 @ 01:30) (58 - 105)  ABP: 114/47 (08-21-21 @ 01:45) (72/31 - 164/69)  ABP(mean): 71 (08-21-21 @ 01:45) (44 - 106)  RR: 18 (08-21-21 @ 01:45) (18 - 30)  SpO2: 98% (08-21-21 @ 01:45) (96% - 100%)  Wt(kg): --  CVP(mm Hg): --  CI: --  CAPILLARY BLOOD GLUCOSE      POCT Blood Glucose.: 214 mg/dL (20 Aug 2021 12:50)  POCT Blood Glucose.: 182 mg/dL (20 Aug 2021 06:09)   N/A      08-19 @ 07:01  -  08-20 @ 07:00  --------------------------------------------------------  IN:    Fat Emulsion (Fish Oil &amp; Plant Based) 20% Infusion: 315 mL    IV PiggyBack: 1128 mL    Oral Fluid: 580 mL    Pantoprazole: 240 mL    TPN (Total Parenteral Nutrition): 1128 mL  Total IN: 3391 mL    OUT:  Total OUT: 0 mL    Total NET: 3391 mL      08-20 @ 07:01  -  08-21 @ 02:26  --------------------------------------------------------  IN:    Dexmedetomidine: 10.4 mL    IV PiggyBack: 25 mL    Lactated Ringers: 225 mL    Lactated Ringers Bolus: 500 mL    Phenylephrine: 44.3 mL  Total IN: 804.7 mL    OUT:    Bulb (mL): 300 mL    Bulb (mL): 140 mL    Indwelling Catheter - Urethral (mL): 540 mL  Total OUT: 980 mL    Total NET: -175.3 mL        --------------------------------------------------------------------------------------    EXAM  NEUROLOGY  RASS:  0  Exam: Normal, NAD,  no focal deficits.     HEENT  Exam: Normocephalic, atraumatic.  EOMI    RESPIRATORY  Exam: Lungs clear to auscultation, Normal expansion/effort.   Mechanical Ventilation:   Mode: AC/ CMV (Assist Control/ Continuous Mandatory Ventilation), RR (machine): 18, RR (patient): 22, TV (machine): 420, FiO2: 50, PEEP: 5      CARDIOVASCULAR  Exam: S1, S2.  Regular rate and rhythm.    GI/NUTRITION  Exam: Abdomen soft, Non-tender, Non-distended. Ostomy pink and viable without output.   Wound:  CDI with x2 PAOLO on left abdomen SS  Current Diet:  NPO with NGT    VASCULAR  Exam: Extremities warm, pink, well-perfused.      MUSCULOSKELETAL  Exam: All extremities moving spontaneously without limitations.     SKIN:  Exam: Good skin turgor, no skin breakdown.     METABOLIC/FLUIDS/ELECTROLYTES  fat emulsion (Fish Oil and Plant Based) 20% Infusion 25 mL/Hr IV Continuous <Continuous>  lactated ringers. 1000 milliLiter(s) IV Continuous <Continuous>  Parenteral Nutrition - Adult 1 Each TPN Continuous <Continuous>      HEMATOLOGIC  [x] DVT Prophylaxis: enoxaparin Injectable 40 milliGRAM(s) SubCutaneous every 24 hours    Transfusions:	[2] PRBC	[] Platelets		[] FFP	[] Cryoprecipitate    INFECTIOUS DISEASE  Antimicrobials/Immunologic Medications:  piperacillin/tazobactam IVPB.. 3.375 Gram(s) IV Intermittent every 8 hours        Tubes/Lines/Drains   [x] Peripheral IV  [] Central Venous Line     	[] R	[] L	[] IJ	[] Fem	[] SC	Date Placed:   [x] Arterial Line		[] R	[] L	[] Fem	[] Rad	[] Ax	Date Placed:   [] PICC:         	[] Midline		[] Mediport  [] Urinary Catheter		Date Placed:     LABS  --------------------------------------------------------------------------------------  CBC (08-20 @ 21:00)                          9.9<L>                   26.33<H>  )--------------(  180        --    % Neuts, --    % Lymphs, ANC: --                              31.6<L>  CBC (08-20 @ 06:40)                          6.8<LL>                   23.31<H>  )--------------(  180        --    % Neuts, --    % Lymphs, ANC: --                              22.2<L>    BMP (08-20 @ 21:00)       135     |  104     |  25<H> 			Ca++ --      Ca 7.8<L>       ---------------------------------( 115<H>		Mg 2.1          4.1     |  21<L>   |  0.46<L>			Ph 3.6     BMP (08-20 @ 06:47)       --      |  --      |  --    			Ca++ 1.07<L>  Ca --           ---------------------------------( --    		Mg --           --      |  --      |  --    			Ph --        LFTs (08-20 @ 06:36)      TPro 4.9<L> / Alb 1.7<L> / TBili 0.4 / DBili -- / AST 40 / <H> / AlkPhos 179<H>    Coags (08-20 @ 21:00)  aPTT 32.4 / INR 1.18<H> / PT 14.1<H>      ABG (08-20 @ 20:50)     7.44 / 36 / 120<H> / 24 / 0.5 / 99.7<H>%     Lactate:    ABG (08-20 @ 18:15)     7.40 / 40 / 192<H> / 25 / 0.0 / 99.5<H>%     Lactate:            --------------------------------------------------------------------------------------    OTHER LABS    IMAGING RESULTS  < from: Xray Chest 1 View- PORTABLE-Urgent (Xray Chest 1 View- PORTABLE-Urgent .) (08.21.21 @ 01:06) >  INTERPRETATION:  NGT tip in stomach. side port is just distal to the GE junction.    < end of copied text >

## 2021-08-20 NOTE — CHART NOTE - NSCHARTNOTEFT_GEN_A_CORE
81 y.o. male admitted with Crohn's disease.    Called by RN to evaluate pt. with hypotension and lethargy.     Vital Signs Last 24 Hrs  T(C): 36.6 (20 Aug 2021 07:36), Max: 37.7 (20 Aug 2021 04:57)  T(F): 97.8 (20 Aug 2021 07:36), Max: 99.8 (20 Aug 2021 04:57)  HR: 95 (20 Aug 2021 07:36) (65 - 99)  BP: 100/66 (20 Aug 2021 07:36) (100/66 - 128/68)  BP(mean): --  RR: 20 (20 Aug 2021 07:36) (18 - 20)  SpO2: 96% (20 Aug 2021 07:36) (93% - 96%)                              6.8    23.31 )-----------( 180      ( 20 Aug 2021 06:40 )             22.2     08-20    135  |  100  |  25<H>  ----------------------------<  134<H>  3.7   |  24  |  0.57    Ca    7.6<L>      20 Aug 2021 06:36  Phos  3.6     08-20  Mg     2.0     08-20    TPro  4.9<L>  /  Alb  1.7<L>  /  TBili  0.4  /  DBili  x   /  AST  40  /  ALT  142<H>  /  AlkPhos  179<H>  08-20    LIVER FUNCTIONS - ( 20 Aug 2021 06:36 )  Alb: 1.7 g/dL / Pro: 4.9 g/dL / ALK PHOS: 179 U/L / ALT: 142 U/L / AST: 40 U/L / GGT: x                                   CAPILLARY BLOOD GLUCOSE      POCT Blood Glucose.: 182 mg/dL (20 Aug 2021 06:09)  POCT Blood Glucose.: 175 mg/dL (20 Aug 2021 00:00)  POCT Blood Glucose.: 123 mg/dL (19 Aug 2021 17:05)  POCT Blood Glucose.: 153 mg/dL (19 Aug 2021 12:39)    acetaminophen   Tablet .. 650 milliGRAM(s) Oral every 6 hours PRN  aluminum hydroxide/magnesium hydroxide/simethicone Suspension 30 milliLiter(s) Oral every 4 hours PRN  ascorbic acid 500 milliGRAM(s) Oral daily  chlorhexidine 2% Cloths 1 Application(s) Topical daily  clonazePAM  Tablet 0.25 milliGRAM(s) Oral at bedtime  dextrose 40% Gel 15 Gram(s) Oral once  dextrose 5%. 1000 milliLiter(s) IV Continuous <Continuous>  dextrose 5%. 1000 milliLiter(s) IV Continuous <Continuous>  dextrose 50% Injectable 25 Gram(s) IV Push once  dextrose 50% Injectable 12.5 Gram(s) IV Push once  dextrose 50% Injectable 25 Gram(s) IV Push once  fat emulsion (Fish Oil and Plant Based) 20% Infusion 25 mL/Hr IV Continuous <Continuous>  fidaxomicin 200 milliGRAM(s) Oral <User Schedule>  FIRST- Mouthwash  BLM 10 milliLiter(s) Swish and Spit every 8 hours  folic acid 1 milliGRAM(s) Oral daily  glucagon  Injectable 1 milliGRAM(s) IntraMuscular once  guaifenesin/dextromethorphan Oral Liquid 10 milliLiter(s) Oral every 8 hours PRN  insulin lispro (ADMELOG) corrective regimen sliding scale   SubCutaneous every 6 hours  lidocaine 2% Gel 1 Application(s) Topical four times a day PRN  LORazepam   Injectable 0.5 milliGRAM(s) IV Push once PRN  methylPREDNISolone sodium succinate Injectable 20 milliGRAM(s) IV Push every 12 hours  multivitamin 1 Tablet(s) Oral daily  ondansetron Injectable 4 milliGRAM(s) IV Push every 6 hours PRN  pantoprazole Infusion 8 mG/Hr IV Continuous <Continuous>  Parenteral Nutrition - Adult 1 Each TPN Continuous <Continuous>  sodium chloride 0.9% Bolus 250 milliLiter(s) IV Bolus once  sotalol 120 milliGRAM(s) Oral daily  thiamine Injectable 100 milliGRAM(s) IV Push daily  traMADol 100 milliGRAM(s) Oral every 6 hours PRN      REVIEW OF SYSTEMS:    RESPIRATORY: No cough, wheezing, chills or hemoptysis; No shortness of breath  CARDIOVASCULAR: No chest pain, palpitations, dizziness, or leg swelling  GASTROINTESTINAL: No abdominal or epigastric pain. No nausea, vomiting, or hematemesis; No diarrhea or constipation. No melena or hematochezia.  GENITOURINARY: No dysuria, frequency, hematuria, or incontinence  NEUROLOGICAL: No headaches, memory loss, loss of strength, numbness, or tremors    PHYSICAL EXAM:    GENERAL: Pale, lethargic, not easily arousable.  NERVOUS SYSTEM:  Alert to self and place.  CHEST/LUNG: Clear to percussion bilaterally.  HEART: Regular rate and rhythm on Tele  ABDOMEN: Soft, Nontender, Nondistended; Bowel sounds present  GI: No bloody BM noted today    RADIOLOGY :      Assessment: Patient 81y with Crohns disease pending resection next week.  Patient with BRBPR yesteray with Hgb 7.3.  Today noted with Hgb 6.8 this am.  Also noted with bump in WBC to 23 today.   Patient pale, lethargic and hypotensive with SBP 90s.  Patient assessed at bedside BRIEFLY arousable.  Patient at time of assessment refused blood however unclear is patient understand gravity of situation as unable to stay awake.  Discussed case with emergency contacts (daughter and son) who has since discussed with wife.  All family members agreed for PRBC transfusion.  Patient states that if blood will save his life he agrees.  Consent signed.        Plan:  - Transfuse PRBC to keep Hgb >7  - Repeat CBC later today.  -ID called for for follow up.  - D/w medicine attending Dr. Oliver.  - Will continue to follow up.

## 2021-08-20 NOTE — BRIEF OPERATIVE NOTE - NSICDXBRIEFPROCEDURE_GEN_ALL_CORE_FT
PROCEDURES:  Laparoscopic total abdominal colectomy with end ileostomy 20-Aug-2021 19:43:35  [place]

## 2021-08-20 NOTE — PROVIDER CONTACT NOTE (CRITICAL VALUE NOTIFICATION) - ACTION/TREATMENT ORDERED:
NP aware, give 1 U PRBC, continue to monitor.
N.P aware - Pt is on contact isolation - will continue t monitor.
NP aware, give 1 U PRBC, continue to monitor.
Potassium supplement ordered - will administer and monitor patient.
Blood transfusion on hold till pt and daughters consent.  Pt and daughter educated and aware of need for transfusion and risks.
repeat CBC
1 U PRBCs ordered and administered. /73 post transfusion. Patient sent to OR. Patient to receive PRBCs in OR.

## 2021-08-20 NOTE — PROGRESS NOTE ADULT - SUBJECTIVE AND OBJECTIVE BOX
SURGERY  Pager: #[phone]    =========INCOMPLETE==============    INTERVAL EVENTS/SUBJECTIVE:    ______________________________________________  OBJECTIVE:   T(C): 37 (08-19-21 @ 21:04), Max: 37 (08-19-21 @ 21:04)  HR: 92 (08-19-21 @ 21:04) (65 - 92)  BP: 104/65 (08-19-21 @ 21:04) (104/65 - 158/74)  RR: 19 (08-19-21 @ 21:04) (18 - 19)  SpO2: 93% (08-19-21 @ 21:04) (93% - 95%)  Wt(kg): --  CAPILLARY BLOOD GLUCOSE      POCT Blood Glucose.: 175 mg/dL (20 Aug 2021 00:00)  POCT Blood Glucose.: 123 mg/dL (19 Aug 2021 17:05)  POCT Blood Glucose.: 153 mg/dL (19 Aug 2021 12:39)  POCT Blood Glucose.: 121 mg/dL (19 Aug 2021 07:21)    I&O's Detail    18 Aug 2021 07:01  -  19 Aug 2021 07:00  --------------------------------------------------------  IN:    Fat Emulsion (Fish Oil &amp; Plant Based) 20% Infusion: 50 mL    Fat Emulsion (Fish Oil &amp; Plant Based) 20% Infusion: 100 mL    Oral Fluid: 560 mL  Total IN: 710 mL    OUT:  Total OUT: 0 mL    Total NET: 710 mL      19 Aug 2021 07:01  -  20 Aug 2021 01:01  --------------------------------------------------------  IN:    Fat Emulsion (Fish Oil &amp; Plant Based) 20% Infusion: 75 mL    IV PiggyBack: 1128 mL    Oral Fluid: 580 mL    Pantoprazole: 120 mL  Total IN: 1903 mL    OUT:  Total OUT: 0 mL    Total NET: 1903 mL          Physical exam:    ______________________________________________  LABS:  CBC Full  -  ( 19 Aug 2021 08:59 )  WBC Count : 15.91 K/uL  RBC Count : 3.03 M/uL  Hemoglobin : 7.3 g/dL  Hematocrit : 24.1 %  Platelet Count - Automated : 241 K/uL  Mean Cell Volume : 79.5 fl  Mean Cell Hemoglobin : 24.1 pg  Mean Cell Hemoglobin Concentration : 30.3 gm/dL  Auto Neutrophil # : x  Auto Lymphocyte # : x  Auto Monocyte # : x  Auto Eosinophil # : x  Auto Basophil # : x  Auto Neutrophil % : x  Auto Lymphocyte % : x  Auto Monocyte % : x  Auto Eosinophil % : x  Auto Basophil % : x    08-19    134<L>  |  101  |  18  ----------------------------<  119<H>  3.9   |  24  |  0.43<L>    Ca    7.4<L>      19 Aug 2021 08:59  Phos  2.6     08-19  Mg     2.1     08-19    TPro  5.1<L>  /  Alb  1.9<L>  /  TBili  0.4  /  DBili  x   /  AST  97<H>  /  ALT  188<H>  /  AlkPhos  237<H>  08-19    _____________________________________________  RADIOLOGY:

## 2021-08-20 NOTE — CONSULT NOTE ADULT - ASSESSMENT
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 SICU consulted for hemodynamic monitoring.     Plan:  Neurologic:   - Wean sedation as tolerated  - Pain control as needed IV Tylenol    Respiratory:   - spo2 adequate  - On mechanical ventilation; daily SBT will attempt extubation in AM     Cardiovascular:   - NSR  - Required pressors postoperatively; will wean as tolerated  - History of AFib currently rate controlled with IV metoprolol     Gastrointestinal/Nutrition:   - NPO  - NGT in place; ileostomy pink and viable without output  - Surgical sites CDI   - Will continue TPN     Renal/Genitourinary:   - Monitor and replete electrolytes as needed  - Monitor UO  - Continue with [place]     Hematologic:   - Treng H&H and transfuse Hgb < 7  - Lovenox for DVT ppx     Infectious Disease:   - No issues; monitor WBC    Tubes/Lines/Drains:   - Arterial line  - PIV  - NGT     Endocrine:   - Monitor BMP  - C/W steroid taper   - ISS     Disposition: SICU    --------------------------------------------------------------------------------------  Case Discussed with ICU attending Dr. Cobb  normal...

## 2021-08-20 NOTE — PROVIDER CONTACT NOTE (CRITICAL VALUE NOTIFICATION) - BACKGROUND
Crohns, C diff
patient admitted for diarrhea, +C-DIFF, hx of colitis, GIB.
pos C diff
Crohns. Cdiff.
patient admitted for diarrhea, +C-DIFF, hx of colitis, GIB.

## 2021-08-20 NOTE — PROVIDER CONTACT NOTE (CRITICAL VALUE NOTIFICATION) - ASSESSMENT
hgb 7.0
Patient is lethargic and hypophonic. Arousable to touch/gentle shaking. See 0736 vital signs in flowsheets. SBP 90s. No ectopy on telemetry. No bleeding noted. Patient denies pain and discomfort. Patient states he is tired.
patient is a/ox4, VSS, no signs of bleeding present.
patient is a/ox4, VSS, no signs of bleeding present.
occult blood positive

## 2021-08-20 NOTE — BRIEF OPERATIVE NOTE - OPERATION/FINDINGS
Diagnostic laparoscopy revealing murky ascites with fibrinous exudate throughout the colon. Laparoscopic mobilization of the colon. Mesentery divided using ligasure cautery. Terminal ileum divided using a endoGIA stapler and brought up through the right abdomen for an end ileostomy. Specimen delivered via a small low midline incision and transected at rectosigmoid with endoGIA. Abdomen thoroughly washed out and LLQ drain placed in the pelvis and LUQ drain placed in the right gutter. Hemostasis ensured. Colorectal bundle closure tray utilized to close fascia with running Maxon x2 incorporating rectal stump. Midline wound packed and ileostomy matured.

## 2021-08-20 NOTE — PROGRESS NOTE ADULT - SUBJECTIVE AND OBJECTIVE BOX
[facility] NUTRITION SUPPORT / TPN -- FOLLOW UP NOTE  --------------------------------------------------------------------------------    24 hour events/subjective:  No acute overnight events.  Remains on TPN at goal.  Plan for surgery next Thursday.  Denies SOB/CP.    Diet:  Diet, Low Fiber:   David(7 Gm Arginine/7 Gm Glut/1.2 Gm HMB     Qty per Day:  2  Liquid Protein Supplement     Qty per Day:  1  Supplement Feeding Modality:  Oral  Ensure Enlive Cans or Servings Per Day:  2       Frequency:  Daily  Probiotic Yogurt/Smoothie Cans or Servings Per Day:  1       Frequency:  Daily (08-12-21 @ 10:59)      PAST HISTORY  --------------------------------------------------------------------------------  No significant changes to PMH, PSH, FHx, SHx, unless otherwise noted    ALLERGIES & MEDICATIONS  --------------------------------------------------------------------------------  Allergies    No Known Allergies    Intolerances      Standing Inpatient Medications  ascorbic acid 500 milliGRAM(s) Oral daily  chlorhexidine 2% Cloths 1 Application(s) Topical daily  clonazePAM  Tablet 0.25 milliGRAM(s) Oral at bedtime  dextrose 40% Gel 15 Gram(s) Oral once  dextrose 5%. 1000 milliLiter(s) IV Continuous <Continuous>  dextrose 5%. 1000 milliLiter(s) IV Continuous <Continuous>  dextrose 50% Injectable 25 Gram(s) IV Push once  dextrose 50% Injectable 12.5 Gram(s) IV Push once  dextrose 50% Injectable 25 Gram(s) IV Push once  fat emulsion (Fish Oil and Plant Based) 20% Infusion 25 mL/Hr IV Continuous <Continuous>  fidaxomicin 200 milliGRAM(s) Oral <User Schedule>  FIRST- Mouthwash  BLM 10 milliLiter(s) Swish and Spit every 8 hours  folic acid 1 milliGRAM(s) Oral daily  glucagon  Injectable 1 milliGRAM(s) IntraMuscular once  insulin lispro (ADMELOG) corrective regimen sliding scale   SubCutaneous every 6 hours  methylPREDNISolone sodium succinate Injectable 20 milliGRAM(s) IV Push every 12 hours  multivitamin 1 Tablet(s) Oral daily  pantoprazole Infusion 8 mG/Hr IV Continuous <Continuous>  Parenteral Nutrition - Adult 1 Each TPN Continuous <Continuous>  sodium chloride 0.9% Bolus 250 milliLiter(s) IV Bolus once  sotalol 120 milliGRAM(s) Oral daily  thiamine Injectable 100 milliGRAM(s) IV Push daily    PRN Inpatient Medications  acetaminophen   Tablet .. 650 milliGRAM(s) Oral every 6 hours PRN  aluminum hydroxide/magnesium hydroxide/simethicone Suspension 30 milliLiter(s) Oral every 4 hours PRN  guaifenesin/dextromethorphan Oral Liquid 10 milliLiter(s) Oral every 8 hours PRN  lidocaine 2% Gel 1 Application(s) Topical four times a day PRN  LORazepam   Injectable 0.5 milliGRAM(s) IV Push once PRN  ondansetron Injectable 4 milliGRAM(s) IV Push every 6 hours PRN  traMADol 100 milliGRAM(s) Oral every 6 hours PRN      VITALS/PHYSICAL EXAM  --------------------------------------------------------------------------------  T(C): 36.6 (08-20-21 @ 07:36), Max: 37.7 (08-20-21 @ 04:57)  HR: 95 (08-20-21 @ 07:36) (65 - 99)  BP: 100/66 (08-20-21 @ 07:36) (100/66 - 128/68)  RR: 20 (08-20-21 @ 07:36) (18 - 20)  SpO2: 96% (08-20-21 @ 07:36) (93% - 96%)  Wt(kg): --        08-19-21 @ 07:01  -  08-20-21 @ 07:00  --------------------------------------------------------  IN: 3391 mL / OUT: 0 mL / NET: 3391 mL         Physical Exam:  Gen: lying in bed, frail, cachectic   HEENT: ncat, trachea midline  Chest: respirations even and non labored  Abd: soft, nt, nondistended  LE:  no edema  Neuro: a/o x 2 this am          LABS/STUDIES  --------------------------------------------------------------------------------              6.8    23.31 >-----------<  180      [08-20-21 @ 06:40]              22.2     135  |  100  |  25  ----------------------------<  134      [08-20-21 @ 06:36]  3.7   |  24  |  0.57        Ca     7.6     [08-20-21 @ 06:36]      iCa    1.07     [08-20 @ 06:47]      Mg     2.0     [08-20-21 @ 06:36]      Phos  3.6     [08-20-21 @ 06:36]    TPro  4.9  /  Alb  1.7  /  TBili  0.4  /  DBili  x   /  AST  40  /  ALT  142  /  AlkPhos  179  [08-20-21 @ 06:36]          Ca ionized  Creatinine Trend:  POC glucoseGlucose, Serum: 134 mg/dL (08-20-21 @ 06:36)  CAPILLARY BLOOD GLUCOSE      POCT Blood Glucose.: 182 mg/dL (20 Aug 2021 06:09)  POCT Blood Glucose.: 175 mg/dL (20 Aug 2021 00:00)  POCT Blood Glucose.: 123 mg/dL (19 Aug 2021 17:05)  POCT Blood Glucose.: 153 mg/dL (19 Aug 2021 12:39)    PrealbuminPrealbumin, Serum: 8 mg/dL (08-15-21 @ 10:17)    Triglycerides     [facility] NUTRITION SUPPORT / TPN -- FOLLOW UP NOTE  --------------------------------------------------------------------------------    24 hour events/subjective:  No acute overnight events.  Remains on TPN at goal.  Plan for surgery next Thursday.      Diet:  Diet, Low Fiber:   David(7 Gm Arginine/7 Gm Glut/1.2 Gm HMB     Qty per Day:  2  Liquid Protein Supplement     Qty per Day:  1  Supplement Feeding Modality:  Oral  Ensure Enlive Cans or Servings Per Day:  2       Frequency:  Daily  Probiotic Yogurt/Smoothie Cans or Servings Per Day:  1       Frequency:  Daily (08-12-21 @ 10:59)      PAST HISTORY  --------------------------------------------------------------------------------  No significant changes to PMH, PSH, FHx, SHx, unless otherwise noted    ALLERGIES & MEDICATIONS  --------------------------------------------------------------------------------  Allergies    No Known Allergies    Intolerances      Standing Inpatient Medications  ascorbic acid 500 milliGRAM(s) Oral daily  chlorhexidine 2% Cloths 1 Application(s) Topical daily  clonazePAM  Tablet 0.25 milliGRAM(s) Oral at bedtime  dextrose 40% Gel 15 Gram(s) Oral once  dextrose 5%. 1000 milliLiter(s) IV Continuous <Continuous>  dextrose 5%. 1000 milliLiter(s) IV Continuous <Continuous>  dextrose 50% Injectable 25 Gram(s) IV Push once  dextrose 50% Injectable 12.5 Gram(s) IV Push once  dextrose 50% Injectable 25 Gram(s) IV Push once  fat emulsion (Fish Oil and Plant Based) 20% Infusion 25 mL/Hr IV Continuous <Continuous>  fidaxomicin 200 milliGRAM(s) Oral <User Schedule>  FIRST- Mouthwash  BLM 10 milliLiter(s) Swish and Spit every 8 hours  folic acid 1 milliGRAM(s) Oral daily  glucagon  Injectable 1 milliGRAM(s) IntraMuscular once  insulin lispro (ADMELOG) corrective regimen sliding scale   SubCutaneous every 6 hours  methylPREDNISolone sodium succinate Injectable 20 milliGRAM(s) IV Push every 12 hours  multivitamin 1 Tablet(s) Oral daily  pantoprazole Infusion 8 mG/Hr IV Continuous <Continuous>  Parenteral Nutrition - Adult 1 Each TPN Continuous <Continuous>  sodium chloride 0.9% Bolus 250 milliLiter(s) IV Bolus once  sotalol 120 milliGRAM(s) Oral daily  thiamine Injectable 100 milliGRAM(s) IV Push daily    PRN Inpatient Medications  acetaminophen   Tablet .. 650 milliGRAM(s) Oral every 6 hours PRN  aluminum hydroxide/magnesium hydroxide/simethicone Suspension 30 milliLiter(s) Oral every 4 hours PRN  guaifenesin/dextromethorphan Oral Liquid 10 milliLiter(s) Oral every 8 hours PRN  lidocaine 2% Gel 1 Application(s) Topical four times a day PRN  LORazepam   Injectable 0.5 milliGRAM(s) IV Push once PRN  ondansetron Injectable 4 milliGRAM(s) IV Push every 6 hours PRN  traMADol 100 milliGRAM(s) Oral every 6 hours PRN      VITALS/PHYSICAL EXAM  --------------------------------------------------------------------------------  T(C): 36.6 (08-20-21 @ 07:36), Max: 37.7 (08-20-21 @ 04:57)  HR: 95 (08-20-21 @ 07:36) (65 - 99)  BP: 100/66 (08-20-21 @ 07:36) (100/66 - 128/68)  RR: 20 (08-20-21 @ 07:36) (18 - 20)  SpO2: 96% (08-20-21 @ 07:36) (93% - 96%)  Wt(kg): --        08-19-21 @ 07:01  -  08-20-21 @ 07:00  --------------------------------------------------------  IN: 3391 mL / OUT: 0 mL / NET: 3391 mL         Physical Exam:  Gen: lying in bed, frail, cachectic   HEENT: ncat, trachea midline  Chest: respirations even and non labored  Abd: soft, nt, nondistended  LE:  no edema  Neuro: a/o x 2 this am          LABS/STUDIES  --------------------------------------------------------------------------------              6.8    23.31 >-----------<  180      [08-20-21 @ 06:40]              22.2     135  |  100  |  25  ----------------------------<  134      [08-20-21 @ 06:36]  3.7   |  24  |  0.57        Ca     7.6     [08-20-21 @ 06:36]      iCa    1.07     [08-20 @ 06:47]      Mg     2.0     [08-20-21 @ 06:36]      Phos  3.6     [08-20-21 @ 06:36]    TPro  4.9  /  Alb  1.7  /  TBili  0.4  /  DBili  x   /  AST  40  /  ALT  142  /  AlkPhos  179  [08-20-21 @ 06:36]          Ca ionized  Creatinine Trend:  POC glucoseGlucose, Serum: 134 mg/dL (08-20-21 @ 06:36)  CAPILLARY BLOOD GLUCOSE      POCT Blood Glucose.: 182 mg/dL (20 Aug 2021 06:09)  POCT Blood Glucose.: 175 mg/dL (20 Aug 2021 00:00)  POCT Blood Glucose.: 123 mg/dL (19 Aug 2021 17:05)  POCT Blood Glucose.: 153 mg/dL (19 Aug 2021 12:39)    PrealbuminPrealbumin, Serum: 8 mg/dL (08-15-21 @ 10:17)    Triglycerides

## 2021-08-20 NOTE — PROGRESS NOTE ADULT - SUBJECTIVE AND OBJECTIVE BOX
Follow Up:  C diff    Interval History/ROS: Afebrile. Planned for colectomy today. Family at bedside reports continued diarrhea. He hasn't really been waking up.     Allergies  No Known Allergies        ANTIMICROBIALS:  fidaxomicin 200 <User Schedule>      OTHER MEDS:  acetaminophen   Tablet .. 650 milliGRAM(s) Oral every 6 hours PRN  aluminum hydroxide/magnesium hydroxide/simethicone Suspension 30 milliLiter(s) Oral every 4 hours PRN  ascorbic acid 500 milliGRAM(s) Oral daily  chlorhexidine 2% Cloths 1 Application(s) Topical daily  clonazePAM  Tablet 0.25 milliGRAM(s) Oral at bedtime  dextrose 40% Gel 15 Gram(s) Oral once  dextrose 5%. 1000 milliLiter(s) IV Continuous <Continuous>  dextrose 5%. 1000 milliLiter(s) IV Continuous <Continuous>  dextrose 50% Injectable 25 Gram(s) IV Push once  dextrose 50% Injectable 12.5 Gram(s) IV Push once  dextrose 50% Injectable 25 Gram(s) IV Push once  fat emulsion (Fish Oil and Plant Based) 20% Infusion 25 mL/Hr IV Continuous <Continuous>  FIRST- Mouthwash  BLM 10 milliLiter(s) Swish and Spit every 8 hours  folic acid 1 milliGRAM(s) Oral daily  glucagon  Injectable 1 milliGRAM(s) IntraMuscular once  guaifenesin/dextromethorphan Oral Liquid 10 milliLiter(s) Oral every 8 hours PRN  insulin lispro (ADMELOG) corrective regimen sliding scale   SubCutaneous every 6 hours  lidocaine 2% Gel 1 Application(s) Topical four times a day PRN  LORazepam   Injectable 0.5 milliGRAM(s) IV Push once PRN  methylPREDNISolone sodium succinate Injectable 20 milliGRAM(s) IV Push every 12 hours  multivitamin 1 Tablet(s) Oral daily  ondansetron Injectable 4 milliGRAM(s) IV Push every 6 hours PRN  pantoprazole Infusion 8 mG/Hr IV Continuous <Continuous>  Parenteral Nutrition - Adult 1 Each TPN Continuous <Continuous>  Parenteral Nutrition - Adult 1 Each TPN Continuous <Continuous>  sotalol 120 milliGRAM(s) Oral daily  thiamine Injectable 100 milliGRAM(s) IV Push daily  traMADol 100 milliGRAM(s) Oral every 6 hours PRN      Vital Signs Last 24 Hrs  T(C): 36.6 (20 Aug 2021 07:36), Max: 37.7 (20 Aug 2021 04:57)  T(F): 97.8 (20 Aug 2021 07:36), Max: 99.8 (20 Aug 2021 04:57)  HR: 95 (20 Aug 2021 07:36) (92 - 99)  BP: 100/66 (20 Aug 2021 07:36) (100/66 - 104/65)  BP(mean): --  RR: 20 (20 Aug 2021 07:36) (18 - 20)  SpO2: 96% (20 Aug 2021 07:36) (93% - 96%)    Physical Exam:  General: frail, deconditioned, pale, not awake,   Head: atraumatic, normocephalic  Cardio: regular rate   Respiratory: nonlabored on nasal cannula   abd: nondistended, soft   Musculoskeletal: no leg edema  Skin: no rash                          6.8    23.31 )-----------( 180      ( 20 Aug 2021 06:40 )             22.2       08-20    135  |  100  |  25<H>  ----------------------------<  134<H>  3.7   |  24  |  0.57    Ca    7.6<L>      20 Aug 2021 06:36  Phos  3.6     08-20  Mg     2.0     08-20    TPro  4.9<L>  /  Alb  1.7<L>  /  TBili  0.4  /  DBili  x   /  AST  40  /  ALT  142<H>  /  AlkPhos  179<H>  08-20          MICROBIOLOGY:        RADIOLOGY:  Images below reviewed personally  Xray Chest 1 View- PORTABLE-Urgent (Xray Chest 1 View- PORTABLE-Urgent .) (08.16.21 @ 15:00)   The heart is slightly enlarged. Platelike atelectasis left lower lobe. The right lung appears to be clear. A PICC line was placed on the right and the tip is in the superior vena cava. No pneumothorax. Degenerative changes of the thoracic spine.    CT Abdomen and Pelvis w/ IV Cont (08.16.21 @ 11:51)   Diffuse colitis which may be infectious or inflammatory, unchanged.  Patent mesenteric vasculature.  Nonspecific gallbladder wall edema. Correlate for right upper quadrant pain.

## 2021-08-20 NOTE — PROGRESS NOTE ADULT - SUBJECTIVE AND OBJECTIVE BOX
Patient is a 81y old  Male who presents with a chief complaint of diarrhea with blood in it , abd pain and weakness (21 Aug 2021 02:38)      INTERVAL HPI/OVERNIGHT EVENTS: not doing well , scheduled for total colectomy today , going to OR  T&C for transfusion   T(C): 36.8 (08-20-21 @ 23:00), Max: 37.7 (08-20-21 @ 04:57)  HR: 66 (08-21-21 @ 02:45) (66 - 100)  BP: 123/58 (08-21-21 @ 02:30) (75/50 - 156/67)  RR: 18 (08-21-21 @ 02:45) (18 - 30)  SpO2: 97% (08-21-21 @ 02:45) (96% - 100%)  Wt(kg): --  I&O's Summary    19 Aug 2021 07:01  -  20 Aug 2021 07:00  --------------------------------------------------------  IN: 3391 mL / OUT: 0 mL / NET: 3391 mL    20 Aug 2021 07:01  -  21 Aug 2021 03:14  --------------------------------------------------------  IN: 1245.3 mL / OUT: 1250 mL / NET: -4.7 mL        PAST MEDICAL & SURGICAL HISTORY:  No pertinent past medical history    No significant past surgical history        SOCIAL HISTORY  Alcohol:  Tobacco:  Illicit substance use:    FAMILY HISTORY:    REVIEW OF SYSTEMS:  CONSTITUTIONAL: No fever, weight loss, or fatigue  EYES: No eye pain, visual disturbances, or discharge  ENMT:  No difficulty hearing, tinnitus, vertigo; No sinus or throat pain  NECK: No pain or stiffness  RESPIRATORY: No cough, wheezing, chills or hemoptysis; No shortness of breath  CARDIOVASCULAR: No chest pain, palpitations, dizziness, or leg swelling  GASTROINTESTINAL: No abdominal or epigastric pain. No nausea, vomiting, or hematemesis; No diarrhea or constipation. No melena or hematochezia.  GENITOURINARY: No dysuria, frequency, hematuria, or incontinence  NEUROLOGICAL: No headaches, memory loss, loss of strength, numbness, or tremors  SKIN: No itching, burning, rashes, or lesions   LYMPH NODES: No enlarged glands  ENDOCRINE: No heat or cold intolerance; No hair loss  MUSCULOSKELETAL: No joint pain or swelling; No muscle, back, or extremity pain  PSYCHIATRIC: No depression, anxiety, mood swings, or difficulty sleeping  HEME/LYMPH: No easy bruising, or bleeding gums  ALLERY AND IMMUNOLOGIC: No hives or eczema    RADIOLOGY & ADDITIONAL TESTS:    Imaging Personally Reviewed:  [ ] YES  [ ] NO    Consultant(s) Notes Reviewed:  [ ] YES  [ ] NO    PHYSICAL EXAM:  GENERAL: NAD, well-groomed, well-developed  HEAD:  Atraumatic, Normocephalic  EYES: EOMI, PERRLA, conjunctiva and sclera clear  ENMT: No tonsillar erythema, exudates, or enlargement; Moist mucous membranes, Good dentition, No lesions  NECK: Supple, No JVD, Normal thyroid  NERVOUS SYSTEM:  Alert & Oriented X3, Good concentration; Motor Strength 5/5 B/L upper and lower extremities; DTRs 2+ intact and symmetric  CHEST/LUNG: Clear to percussion bilaterally; No rales, rhonchi, wheezing, or rubs  HEART: Regular rate and rhythm; No murmurs, rubs, or gallops  ABDOMEN: Soft, Nontender, Nondistended; Bowel sounds present  EXTREMITIES:  2+ Peripheral Pulses, No clubbing, cyanosis, or edema  LYMPH: No lymphadenopathy noted  SKIN: No rashes or lesions    LABS:                        9.2    37.99 )-----------( 175      ( 21 Aug 2021 02:35 )             28.9     08-21    136  |  103  |  29<H>  ----------------------------<  103<H>  4.0   |  21<L>  |  0.62    Ca    7.8<L>      21 Aug 2021 02:35  Phos  3.9     08-21  Mg     2.1     08-21    TPro  4.9<L>  /  Alb  1.7<L>  /  TBili  0.4  /  DBili  x   /  AST  40  /  ALT  142<H>  /  AlkPhos  179<H>  08-20    PT/INR - ( 20 Aug 2021 21:00 )   PT: 14.1 sec;   INR: 1.18 ratio         PTT - ( 20 Aug 2021 21:00 )  PTT:32.4 sec    CAPILLARY BLOOD GLUCOSE      POCT Blood Glucose.: 214 mg/dL (20 Aug 2021 12:50)  POCT Blood Glucose.: 182 mg/dL (20 Aug 2021 06:09)    ABG - ( 21 Aug 2021 02:32 )  pH, Arterial: 7.38  pH, Blood: x     /  pCO2: 44    /  pO2: 129   / HCO3: 26    / Base Excess: 0.7   /  SaO2: 98.7                    MEDICATIONS  (STANDING):  acetaminophen  IVPB .. 1000 milliGRAM(s) IV Intermittent every 6 hours  chlorhexidine 0.12% Liquid 15 milliLiter(s) Oral Mucosa every 12 hours  dexMEDEtomidine Infusion 0.2 MICROgram(s)/kG/Hr (2.95 mL/Hr) IV Continuous <Continuous>  enoxaparin Injectable 40 milliGRAM(s) SubCutaneous every 24 hours  fat emulsion (Fish Oil and Plant Based) 20% Infusion 25 mL/Hr (25 mL/Hr) IV Continuous <Continuous>  lactated ringers. 1000 milliLiter(s) (75 mL/Hr) IV Continuous <Continuous>  methylPREDNISolone sodium succinate Injectable 20 milliGRAM(s) IV Push every 12 hours  metoprolol tartrate Injectable 5 milliGRAM(s) IV Push every 6 hours  pantoprazole  Injectable 40 milliGRAM(s) IV Push daily  Parenteral Nutrition - Adult 1 Each (94 mL/Hr) TPN Continuous <Continuous>  phenylephrine    Infusion 1 MICROgram(s)/kG/Min (22.1 mL/Hr) IV Continuous <Continuous>  piperacillin/tazobactam IVPB.. 3.375 Gram(s) IV Intermittent every 8 hours    MEDICATIONS  (PRN):  HYDROmorphone  Injectable 0.5 milliGRAM(s) IV Push every 4 hours PRN breakthrough pain, agitation      Care Discussed with Consultants/Other Providers [ ] YES  [ ] NO

## 2021-08-20 NOTE — PROGRESS NOTE ADULT - ASSESSMENT
A/p    sever C diff colitis :  improved   repeat C diff neg     Hx of Chron's dis :  - IBD exacerbation and his cdiff is improved on flex sig   -started on Iv steroids   not improving , codition deteriorating   seen by surgery   decided for total colectomy today     anemia :  -transfuse during OR    hyponatremia / sever malnutrition  resoved     sever malnutrition :  -started on TPN     dispo: going for surgery today

## 2021-08-20 NOTE — PROGRESS NOTE ADULT - ASSESSMENT
81M with recently dx Crohn's disease c/b perianal abscess and left posterior distal anal intersphincteric fistula and presents diarrhea/BRBPR  2/2 to CD flare c/b c diff colitis. Initially treat for C diff, s/p FMT. Switched PO vanc in favor of Fidaxomicin without any improvement. s/p flex sig which showed no pseudomembranous colitis but severe Crohn colitis. now started on steroids on 8/9 with minimal improvement.    Recommendation:  - To Operating Theatre emergently given acute deterioration.   - Please give 2u pRBC now, 2u pRBC on hold.   - Will consent wife/daughter for operation: Total abdominal colectomy, end ileostomy.  - Ostomy nurse marking.   - Nil Per Os now.     Red surgery  [id number]

## 2021-08-20 NOTE — PROGRESS NOTE ADULT - ASSESSMENT
81M with Crohn disease, admitted 7/13/21 with prolonged diarrhea  C difficile positive with pseudomembranes on colonoscopy.   Received two weeks of PO Vancomycin, colonoscopic FMT 7/28 and now on tapering Fidaxomicin since 8/2.   Flex sigmoidoscopy 8/9 showed resolution of pseudomembranes but worsening Crohn ulceration.   No evidence of CMV colitis on biopsy.   Due to worsening leukocytosis and anemia, the planned colectomy and end ileostomy is being moved up to today.   Prognosis guarded given comorbidities and deconditioned state.     Suggest  -would stop Fidaxomicin after colectomy - some data on using post colectomy antibiotics but not Fidaxomicin and his disease seems to be driven by IBD rather than C diff at this point   -monitor for C diff enteritis afterwards (uncommon)   -steroids per GI     van laila Angelica GM, Curtis MP, Kasi Y, et al. Antibiotic Regimen after a Total Abdominal Colectomy with Ileostomy for Fulminant Clostridium difficile Colitis: A Multi-Institutional Study. Surg Infect ([facility]). 2015;16(4):455-460. doi:[email or web address]    Discussed with colorectal and medicine     Chester Storey MD   Infectious Disease   Pager [phone]   After 5PM and on weekends please page fellow on call or call [phone]

## 2021-08-20 NOTE — CHART NOTE - NSCHARTNOTEFT_GEN_A_CORE
POST-OPERATIVE NOTE    Subjective:  Patient is s/p . Recovering appropriately.     Vital Signs Last 24 Hrs  T(C): 36.8 (20 Aug 2021 23:00), Max: 37.7 (20 Aug 2021 04:57)  T(F): 98.2 (20 Aug 2021 23:00), Max: 99.8 (20 Aug 2021 04:57)  HR: 100 (20 Aug 2021 23:00) (72 - 100)  BP: 156/67 (20 Aug 2021 20:21) (92/60 - 156/67)  BP(mean): 105 (20 Aug 2021 20:21) (105 - 105)  RR: 24 (20 Aug 2021 23:00) (18 - 30)  SpO2: 100% (20 Aug 2021 23:00) (96% - 100%)  I&O's Detail    19 Aug 2021 07:01  -  20 Aug 2021 07:00  --------------------------------------------------------  IN:    Fat Emulsion (Fish Oil &amp; Plant Based) 20% Infusion: 315 mL    IV PiggyBack: 1128 mL    Oral Fluid: 580 mL    Pantoprazole: 240 mL    TPN (Total Parenteral Nutrition): 1128 mL  Total IN: 3391 mL    OUT:  Total OUT: 0 mL    Total NET: 3391 mL      20 Aug 2021 07:01  -  21 Aug 2021 00:02  --------------------------------------------------------  IN:    IV PiggyBack: 25 mL  Total IN: 25 mL    OUT:    Bulb (mL): 300 mL    Bulb (mL): 140 mL    Indwelling Catheter - Urethral (mL): 510 mL  Total OUT: 950 mL    Total NET: -925 mL        piperacillin/tazobactam IVPB.. 3.375  metoprolol tartrate Injectable 5  piperacillin/tazobactam IVPB.. 3.375    PAST MEDICAL & SURGICAL HISTORY:  No pertinent past medical history    No significant past surgical history          Physical Exam:  General: NAD, resting comfortably in bed  Pulmonary: Nonlabored breathing, no respiratory distress  Cardiovascular: NSR  Abdominal: soft, NT/ND  Extremities: WWP      LABS:                        9.9    26.33 )-----------( 180      ( 20 Aug 2021 21:00 )             31.6     08-20    135  |  104  |  25<H>  ----------------------------<  115<H>  4.1   |  21<L>  |  0.46<L>    Ca    7.8<L>      20 Aug 2021 21:00  Phos  3.6     08-20  Mg     2.1     08-20    TPro  4.9<L>  /  Alb  1.7<L>  /  TBili  0.4  /  DBili  x   /  AST  40  /  ALT  142<H>  /  AlkPhos  179<H>  08-20    PT/INR - ( 20 Aug 2021 21:00 )   PT: 14.1 sec;   INR: 1.18 ratio         PTT - ( 20 Aug 2021 21:00 )  PTT:32.4 sec  CAPILLARY BLOOD GLUCOSE      POCT Blood Glucose.: 214 mg/dL (20 Aug 2021 12:50)  POCT Blood Glucose.: 182 mg/dL (20 Aug 2021 06:09)      Radiology and Additional Studies:    Assessment:  The patient is a 81y Male who is now several hours post-op from a     Plan:  - Pain control as needed  - DVT ppx  - OOB and ambulating as tolerated  - F/u AM labs POST-OPERATIVE NOTE    Subjective:  Patient is s/p Laparoscopic total abdominal colectomy with end ileostomy. In SICU intubated and on pressors.       Vital Signs Last 24 Hrs  T(C): 36.8 (20 Aug 2021 23:00), Max: 37.7 (20 Aug 2021 04:57)  T(F): 98.2 (20 Aug 2021 23:00), Max: 99.8 (20 Aug 2021 04:57)  HR: 100 (20 Aug 2021 23:00) (72 - 100)  BP: 156/67 (20 Aug 2021 20:21) (92/60 - 156/67)  BP(mean): 105 (20 Aug 2021 20:21) (105 - 105)  RR: 24 (20 Aug 2021 23:00) (18 - 30)  SpO2: 100% (20 Aug 2021 23:00) (96% - 100%)  I&O's Detail    19 Aug 2021 07:01  -  20 Aug 2021 07:00  --------------------------------------------------------  IN:    Fat Emulsion (Fish Oil &amp; Plant Based) 20% Infusion: 315 mL    IV PiggyBack: 1128 mL    Oral Fluid: 580 mL    Pantoprazole: 240 mL    TPN (Total Parenteral Nutrition): 1128 mL  Total IN: 3391 mL    OUT:  Total OUT: 0 mL    Total NET: 3391 mL      20 Aug 2021 07:01  -  21 Aug 2021 00:02  --------------------------------------------------------  IN:    IV PiggyBack: 25 mL  Total IN: 25 mL    OUT:    Bulb (mL): 300 mL    Bulb (mL): 140 mL    Indwelling Catheter - Urethral (mL): 510 mL  Total OUT: 950 mL    Total NET: -925 mL        piperacillin/tazobactam IVPB.. 3.375  metoprolol tartrate Injectable 5  piperacillin/tazobactam IVPB.. 3.375    PAST MEDICAL & SURGICAL HISTORY:  No pertinent past medical history    No significant past surgical history          Physical Exam:  General: NAD, sedated with precedex   Pulmonary: intubated, vent settings 420/18/5/30  Cardiovascular: NSR, phenylephrine 1 mcg  Abdominal: soft, nondistended, 2 R drains serosanguinous output, ostomy pink and viable, liquid stool in bag       LABS:                        9.9    26.33 )-----------( 180      ( 20 Aug 2021 21:00 )             31.6     08-20    135  |  104  |  25<H>  ----------------------------<  115<H>  4.1   |  21<L>  |  0.46<L>    Ca    7.8<L>      20 Aug 2021 21:00  Phos  3.6     08-20  Mg     2.1     08-20    TPro  4.9<L>  /  Alb  1.7<L>  /  TBili  0.4  /  DBili  x   /  AST  40  /  ALT  142<H>  /  AlkPhos  179<H>  08-20    PT/INR - ( 20 Aug 2021 21:00 )   PT: 14.1 sec;   INR: 1.18 ratio         PTT - ( 20 Aug 2021 21:00 )  PTT:32.4 sec  CAPILLARY BLOOD GLUCOSE      POCT Blood Glucose.: 214 mg/dL (20 Aug 2021 12:50)  POCT Blood Glucose.: 182 mg/dL (20 Aug 2021 06:09)      Radiology and Additional Studies:    Assessment:  The patient is a 81y Male who is now several hours post-op from a Laparoscopic total abdominal colectomy with end ileostomy.    Plan:  - Pain control as needed  - DVT ppx  - NPO/IVF/NGT  - Cont. TPN  - Cont. IV zosyn  - F/u AM labs    [facility]  #[id number]

## 2021-08-20 NOTE — PROGRESS NOTE ADULT - SUBJECTIVE AND OBJECTIVE BOX
Patient is a 81y old  Male who presents with a chief complaint of diarrhea with blood in it , abd pain and weakness (20 Aug 2021 10:36)      INTERVAL HISTORY: sleeping - daughter at bedside    REVIEW OF SYSTEMS:   CONSTITUTIONAL: No weakness  EYES/ENT: No visual changes; No throat pain  Neck: No pain or stiffness  Respiratory: No cough, wheezing, No shortness of breath  CARDIOVASCULAR: no chest pain or palpitations  GASTROINTESTINAL: No abdominal pain, no nausea, vomiting or hematemesis  GENITOURINARY: No dysuria, frequency or hematuria  NEUROLOGICAL: No stroke like symptoms  SKIN: No rashes    	  MEDICATIONS:  sotalol 120 milliGRAM(s) Oral daily      PHYSICAL EXAM:  T(C): 36.6 (08-20-21 @ 07:36), Max: 37.7 (08-20-21 @ 04:57)  HR: 95 (08-20-21 @ 07:36) (65 - 99)  BP: 100/66 (08-20-21 @ 07:36) (100/66 - 128/68)  RR: 20 (08-20-21 @ 07:36) (18 - 20)  SpO2: 96% (08-20-21 @ 07:36) (93% - 96%)  Wt(kg): --  I&O's Summary    19 Aug 2021 07:01  -  20 Aug 2021 07:00  --------------------------------------------------------  IN: 3391 mL / OUT: 0 mL / NET: 3391 mL    Appearance: In no distress	  HEENT:    PERRL, EOMI	  Cardiovascular:  S1 S2, No JVD  Respiratory: Lungs clear to auscultation	  Gastrointestinal:  Soft, Non-tender, + BS	  Vascularature:  No edema of LE  Psychiatric: Appropriate affect   Neuro: no acute focal deficits                             6.8    23.31 )-----------( 180      ( 20 Aug 2021 06:40 )             22.2     08-20    135  |  100  |  25<H>  ----------------------------<  134<H>  3.7   |  24  |  0.57    Ca    7.6<L>      20 Aug 2021 06:36  Phos  3.6     08-20  Mg     2.0     08-20    TPro  4.9<L>  /  Alb  1.7<L>  /  TBili  0.4  /  DBili  x   /  AST  40  /  ALT  142<H>  /  AlkPhos  179<H>  08-20    Labs personally reviewed    ASSESSMENT/PLAN: 	  Problem/Plan - 1:  ·  Problem: CAD (coronary artery disease).  Plan: c/w home meds  We discussed the importance of SAPT with low dose ASA 81mg given CAD  pt denies any current chest pain, SOB or chest pressure.       Problem/Plan -2:  ·  Problem: Colitis.  Plan: Ct shows crohn's  exacerbation   f/u flex sig with biopsies   s/p FMT 7/28  plan for repeat FMT vs Fidoxomaicin if sx don't improve  f/p flex sig on 8/9- no evidence of pseudomembranous   started on Fidoxomaicin and steroids   per GI if fails medical therapy, then surgical evaluation would be indicated  f/u ID/ GI reccs   PICC inserted for TPN     Problem/Plan - 3:  ·  Problem: Afib pt said he was placed on sotalol for afib   -c/w sotalol   -EKG noted. SR   - rate controlled and regular   - 8/17 am pt went into rapid Afib with RVR rate of 154 cardiezem was given   - pt was transferred to monitored unit and now SR on monitor   - pt is ? candidate for AC seeing recent GIB. FOBT positive as recent as 8/8/21   - rate controlled now continue ot monitor    - possible bleed with decreasing H&H ?  - pt has active GIB no AC indicated        Problem/Plan - 4:   ·  Problem: GIB (gastrointestinal bleeding).  Plan: bloody diarrhea 2/2 chron's exacerbation   IV fluids   -c-diff positive:   on Fidoxomaicin  - active GIB   - pt's daughter educated on transfusion   - per colorectal sx team: Will plan for surgical resection next week, pending discussion with patient and daughter    Problem/Plan - 5:  ·  Problem: SCDs     Problem/Plan - 6:  ·  Problem: B/L LE edema   - CXR showed mildly enlarged heart   -monitor IVF with I&O  -B/L L E edema improved   -TTE with mod-severe AI, mild AS, preserved EF    Problem/Plan - 7:  ·  Problem: AMS/ lethargic   replete electrolyte, trops flat, CKMB negative   CTH shows no hemorrhage and acute infarct        plan for surgical resection Monday         Sanam Onofre DO [facility]  Cardiovascular Medicine  [address], [address]  Office: [phone]  Cell: [phone]

## 2021-08-21 NOTE — PROGRESS NOTE ADULT - SUBJECTIVE AND OBJECTIVE BOX
INTERVAL HPI/OVERNIGHT EVENTS:  Patient is a 81yMale  no acute events, still on ry but weaning dose  + ileostomy function    Vital Signs Last 24 Hrs  T(C): 36 (21 Aug 2021 11:00), Max: 37.1 (20 Aug 2021 20:21)  T(F): 96.8 (21 Aug 2021 11:00), Max: 98.8 (20 Aug 2021 20:21)  HR: 57 (21 Aug 2021 11:45) (54 - 127)  BP: 101/53 (21 Aug 2021 04:30) (75/50 - 156/67)  BP(mean): 74 (21 Aug 2021 04:30) (58 - 105)  RR: 18 (21 Aug 2021 11:45) (16 - 30)  SpO2: 98% (21 Aug 2021 11:45) (91% - 100%)  08-20 @ 07:01  -  08-21 @ 07:00  --------------------------------------------------------  IN: 2112.5 mL / OUT: 1465 mL / NET: 647.5 mL    08-21 @ 07:01  -  08-21 @ 11:53  --------------------------------------------------------  IN: 598 mL / OUT: 90 mL / NET: 508 mL        PHYSICAL EXAM:  Constitutional: , NAD, intubated, sedated  Neck: supple  Respiratory: CTA bilat  Cardiovascular: RRR  Gastrointestinal: abdomen soft, nondistended. No obvious masses. Incisions c/d/i, ileostomy pink  Extremities: FROM, warm            LABS:                        9.2    37.99 )-----------( 175      ( 21 Aug 2021 02:35 )             28.9     08-21    136  |  103  |  29<H>  ----------------------------<  103<H>  4.0   |  21<L>  |  0.62    Ca    7.8<L>      21 Aug 2021 02:35  Phos  3.9     08-21  Mg     2.1     08-21    TPro  4.9<L>  /  Alb  1.7<L>  /  TBili  0.4  /  DBili  x   /  AST  40  /  ALT  142<H>  /  AlkPhos  179<H>  08-20    PT/INR - ( 20 Aug 2021 21:00 )   PT: 14.1 sec;   INR: 1.18 ratio         PTT - ( 20 Aug 2021 21:00 )  PTT:32.4 sec    Magnesium, Serum: 2.1 mg/dL (08-21 @ 02:35)  Phosphorus Level, Serum: 3.9 mg/dL (08-21 @ 02:35)  Magnesium, Serum: 2.1 mg/dL (08-20 @ 21:00)  Phosphorus Level, Serum: 3.6 mg/dL (08-20 @ 21:00)       INTERVAL HPI/OVERNIGHT EVENTS:  Patient is a 81yMale  no acute events, still on ry but weaning dose  + ileostomy function    Vital Signs Last 24 Hrs  T(C): 36 (21 Aug 2021 11:00), Max: 37.1 (20 Aug 2021 20:21)  T(F): 96.8 (21 Aug 2021 11:00), Max: 98.8 (20 Aug 2021 20:21)  HR: 57 (21 Aug 2021 11:45) (54 - 127)  BP: 101/53 (21 Aug 2021 04:30) (75/50 - 156/67)  BP(mean): 74 (21 Aug 2021 04:30) (58 - 105)  RR: 18 (21 Aug 2021 11:45) (16 - 30)  SpO2: 98% (21 Aug 2021 11:45) (91% - 100%)  08-20 @ 07:01  -  08-21 @ 07:00  --------------------------------------------------------  IN: 2112.5 mL / OUT: 1465 mL / NET: 647.5 mL    08-21 @ 07:01  -  08-21 @ 11:53  --------------------------------------------------------  IN: 598 mL / OUT: 90 mL / NET: 508 mL        PHYSICAL EXAM:  Constitutional: , NAD, intubated, sedated  Neck: supple  Respiratory: CTA bilat  Cardiovascular: RRR  Gastrointestinal: abdomen soft, nondistended. No obvious masses. Incisions c/d/i, ileostomy pink with thin stool and gas  Extremities: warm    LABS:                        9.2    37.99 )-----------( 175      ( 21 Aug 2021 02:35 )             28.9     08-21    136  |  103  |  29<H>  ----------------------------<  103<H>  4.0   |  21<L>  |  0.62    Ca    7.8<L>      21 Aug 2021 02:35  Phos  3.9     08-21  Mg     2.1     08-21    TPro  4.9<L>  /  Alb  1.7<L>  /  TBili  0.4  /  DBili  x   /  AST  40  /  ALT  142<H>  /  AlkPhos  179<H>  08-20    PT/INR - ( 20 Aug 2021 21:00 )   PT: 14.1 sec;   INR: 1.18 ratio         PTT - ( 20 Aug 2021 21:00 )  PTT:32.4 sec    Magnesium, Serum: 2.1 mg/dL (08-21 @ 02:35)  Phosphorus Level, Serum: 3.9 mg/dL (08-21 @ 02:35)  Magnesium, Serum: 2.1 mg/dL (08-20 @ 21:00)  Phosphorus Level, Serum: 3.6 mg/dL (08-20 @ 21:00)       INTERVAL HPI/OVERNIGHT EVENTS:  Patient is a 81yMale  no acute events, still on ry but weaning dose  + ileostomy function    Vital Signs Last 24 Hrs  T(C): 36 (21 Aug 2021 11:00), Max: 37.1 (20 Aug 2021 20:21)  T(F): 96.8 (21 Aug 2021 11:00), Max: 98.8 (20 Aug 2021 20:21)  HR: 57 (21 Aug 2021 11:45) (54 - 127)  BP: 101/53 (21 Aug 2021 04:30) (75/50 - 156/67)  BP(mean): 74 (21 Aug 2021 04:30) (58 - 105)  RR: 18 (21 Aug 2021 11:45) (16 - 30)  SpO2: 98% (21 Aug 2021 11:45) (91% - 100%)  08-20 @ 07:01  -  08-21 @ 07:00  --------------------------------------------------------  IN: 2112.5 mL / OUT: 1465 mL / NET: 647.5 mL    08-21 @ 07:01  -  08-21 @ 11:53  --------------------------------------------------------  IN: 598 mL / OUT: 90 mL / NET: 508 mL        PHYSICAL EXAM:  Constitutional: , NAD, intubated, sedated  Neck: supple  Respiratory: CTA bilat  Cardiovascular: RRR  Gastrointestinal: abdomen soft, nondistended. No obvious masses. Incisions c/d/i, ileostomy pink with thin stool and gas  Extremities: warm    LABS:                        9.2    37.99 )-----------( 175      ( 21 Aug 2021 02:35 )             28.9     08-21    136  |  103  |  29<H>  ----------------------------<  103<H>  4.0   |  21<L>  |  0.62    Ca    7.8<L>      21 Aug 2021 02:35  Phos  3.9     08-21  Mg     2.1     08-21    TPro  4.9<L>  /  Alb  1.7<L>  /  TBili  0.4  /  DBili  x   /  AST  40  /  ALT  142<H>  /  AlkPhos  179<H>  08-20    PT/INR - ( 20 Aug 2021 21:00 )   PT: 14.1 sec;   INR: 1.18 ratio         PTT - ( 20 Aug 2021 21:00 )  PTT:32.4 sec    Magnesium, Serum: 2.1 mg/dL (08-21 @ 02:35)  Phosphorus Level, Serum: 3.9 mg/dL (08-21 @ 02:35)  Magnesium, Serum: 2.1 mg/dL (08-20 @ 21:00)  Phosphorus Level, Serum: 3.6 mg/dL (08-20 @ 21:00)    L upper drain: 320cc serous  L lower drain: 300cc serous  Ileostomy: 190cc

## 2021-08-21 NOTE — PROGRESS NOTE ADULT - ASSESSMENT
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 SICU consulted for hemodynamic monitoring.     Plan:  Neurologic:   - Wean sedation as tolerated  - Pain control as needed IV Tylenol    Respiratory:   - spo2 adequate  - On mechanical ventilation; daily SBT will attempt extubation in AM     Cardiovascular:   - NSR  - Required pressors postoperatively; will wean as tolerated  - History of AFib currently rate controlled with IV metoprolol     Gastrointestinal/Nutrition:   - NPO  - NGT in place; ileostomy pink and viable without output  - Surgical sites CDI   - Will continue TPN     Renal/Genitourinary:   - Monitor and replete electrolytes as needed  - Monitor UO  - Continue with [place]     Hematologic:   - Treng H&H and transfuse Hgb < 7  - Lovenox for DVT ppx     Infectious Disease:   - No issues; monitor WBC    Tubes/Lines/Drains:   - Arterial line  - PIV  - NGT     Endocrine:   - Monitor BMP  - C/W steroid taper   - ISS     Disposition: SICU

## 2021-08-21 NOTE — PROGRESS NOTE ADULT - ASSESSMENT
s/p lap TAC for fulminant colitis  wean pressors and vent per SICU  NPO, NGT, TPN  IV abx for ? perforation foud in OR   81M with recently dx Crohn's disease c/b perianal abscess and left posterior distal anal intersphincteric fistula and presents diarrhea/BRBPR  2/2 to CD flare c/b c diff colitis. Initially treat for C diff, s/p FMT. Taken emergently to OR on 8/20 for acute deterioration for total colostomy with end ileostomy.    s/p lap TAC for fulminant colitis  wean pressors and vent per SICU  NPO, NGT, TPN  IV abx for ? perforation foud in OR   81M with recently dx Crohn's disease c/b perianal abscess and left posterior distal anal intersphincteric fistula and presents diarrhea/BRBPR  2/2 to CD flare c/b c diff colitis. Initially treat for C diff, s/p FMT. Taken emergently to OR on 8/20 for acute deterioration for total colostomy with end ileostomy.    PLAN:  s/p lap TAC for fulminant colitis  wean pressors and vent per SICU  NPO, NGT, TPN  IV abx for ? perforation found in OR    Monitor drain and ileostomy output

## 2021-08-21 NOTE — PROGRESS NOTE ADULT - ASSESSMENT
A/p    s/p total colectomy with ileostomy   -in SICU   -surgery following     post op resp failure on mech vent   -c/w weaning trial as per SICU team     hypotension :  -on pressors   weaning as tolerated     Chron's dis : s/p total colectomy with ileostomy   -stable

## 2021-08-21 NOTE — PROGRESS NOTE ADULT - SUBJECTIVE AND OBJECTIVE BOX
Patient is a 81y old  Male who presents with a chief complaint of diarrhea with blood in it , abd pain and weakness (21 Aug 2021 11:53)      INTERVAL HPI/OVERNIGHT EVENTS: post op intubated , on pressors and sedation     T(C): 36.3 (08-21-21 @ 19:00), Max: 36.8 (08-20-21 @ 23:00)  HR: 74 (08-21-21 @ 21:30) (54 - 127)  BP: 101/53 (08-21-21 @ 04:30) (75/50 - 128/60)  RR: 20 (08-21-21 @ 21:30) (16 - 32)  SpO2: 100% (08-21-21 @ 21:30) (91% - 100%)  Wt(kg): --  I&O's Summary    20 Aug 2021 07:01  -  21 Aug 2021 07:00  --------------------------------------------------------  IN: 2112.5 mL / OUT: 1465 mL / NET: 647.5 mL    21 Aug 2021 07:01  -  21 Aug 2021 22:15  --------------------------------------------------------  IN: 2079.9 mL / OUT: 630 mL / NET: 1449.9 mL        PAST MEDICAL & SURGICAL HISTORY:  No pertinent past medical history    No significant past surgical history        SOCIAL HISTORY  Alcohol:  Tobacco:  Illicit substance use:    FAMILY HISTORY:    REVIEW OF SYSTEMS:  CONSTITUTIONAL: No fever, weight loss, or fatigue  EYES: No eye pain, visual disturbances, or discharge  ENMT:  No difficulty hearing, tinnitus, vertigo; No sinus or throat pain  NECK: No pain or stiffness  RESPIRATORY: No cough, wheezing, chills or hemoptysis; No shortness of breath  CARDIOVASCULAR: No chest pain, palpitations, dizziness, or leg swelling  GASTROINTESTINAL: No abdominal or epigastric pain. No nausea, vomiting, or hematemesis; No diarrhea or constipation. No melena or hematochezia.  GENITOURINARY: No dysuria, frequency, hematuria, or incontinence  NEUROLOGICAL: No headaches, memory loss, loss of strength, numbness, or tremors  SKIN: No itching, burning, rashes, or lesions   LYMPH NODES: No enlarged glands  ENDOCRINE: No heat or cold intolerance; No hair loss  MUSCULOSKELETAL: No joint pain or swelling; No muscle, back, or extremity pain  PSYCHIATRIC: No depression, anxiety, mood swings, or difficulty sleeping  HEME/LYMPH: No easy bruising, or bleeding gums  ALLERY AND IMMUNOLOGIC: No hives or eczema    RADIOLOGY & ADDITIONAL TESTS:    Imaging Personally Reviewed:  [ ] YES  [ ] NO    Consultant(s) Notes Reviewed:  [ ] YES  [ ] NO    PHYSICAL EXAM:  GENERAL: NAD, well-groomed, well-developed  HEAD:  Atraumatic, Normocephalic  EYES: EOMI, PERRLA, conjunctiva and sclera clear  ENMT: No tonsillar erythema, exudates, or enlargement; Moist mucous membranes, Good dentition, No lesions  NECK: Supple, No JVD, Normal thyroid  NERVOUS SYSTEM:  Alert & Oriented X3, Good concentration; Motor Strength 5/5 B/L upper and lower extremities; DTRs 2+ intact and symmetric  CHEST/LUNG: Clear to percussion bilaterally; No rales, rhonchi, wheezing, or rubs  HEART: Regular rate and rhythm; No murmurs, rubs, or gallops  ABDOMEN: Soft, Nontender, Nondistended; Bowel sounds present  EXTREMITIES:  2+ Peripheral Pulses, No clubbing, cyanosis, or edema  LYMPH: No lymphadenopathy noted  SKIN: No rashes or lesions    LABS:                        9.2    37.99 )-----------( 175      ( 21 Aug 2021 02:35 )             28.9     08-21    136  |  103  |  29<H>  ----------------------------<  103<H>  4.0   |  21<L>  |  0.62    Ca    7.8<L>      21 Aug 2021 02:35  Phos  3.9     08-21  Mg     2.1     08-21    TPro  4.9<L>  /  Alb  1.7<L>  /  TBili  0.4  /  DBili  x   /  AST  40  /  ALT  142<H>  /  AlkPhos  179<H>  08-20    PT/INR - ( 20 Aug 2021 21:00 )   PT: 14.1 sec;   INR: 1.18 ratio         PTT - ( 20 Aug 2021 21:00 )  PTT:32.4 sec    CAPILLARY BLOOD GLUCOSE      POCT Blood Glucose.: 199 mg/dL (21 Aug 2021 17:06)  POCT Blood Glucose.: 195 mg/dL (21 Aug 2021 11:55)    ABG - ( 21 Aug 2021 13:36 )  pH, Arterial: 7.43  pH, Blood: x     /  pCO2: 39    /  pO2: 143   / HCO3: 26    / Base Excess: 1.5   /  SaO2: 99.5                    MEDICATIONS  (STANDING):  chlorhexidine 0.12% Liquid 15 milliLiter(s) Oral Mucosa every 12 hours  chlorhexidine 2% Cloths 1 Application(s) Topical daily  enoxaparin Injectable 40 milliGRAM(s) SubCutaneous every 24 hours  fat emulsion (Fish Oil and Plant Based) 20% Infusion 25 mL/Hr (25 mL/Hr) IV Continuous <Continuous>  insulin lispro (ADMELOG) corrective regimen sliding scale   SubCutaneous every 6 hours  methylPREDNISolone sodium succinate Injectable 20 milliGRAM(s) IV Push every 12 hours  pantoprazole  Injectable 40 milliGRAM(s) IV Push daily  Parenteral Nutrition - Adult 1 Each (94 mL/Hr) TPN Continuous <Continuous>  Parenteral Nutrition - Adult 1 Each (94 mL/Hr) TPN Continuous <Continuous>  phenylephrine    Infusion 1 MICROgram(s)/kG/Min (22.1 mL/Hr) IV Continuous <Continuous>  piperacillin/tazobactam IVPB.. 3.375 Gram(s) IV Intermittent every 8 hours    MEDICATIONS  (PRN):  HYDROmorphone  Injectable 0.5 milliGRAM(s) IV Push every 4 hours PRN breakthrough pain, agitation      Care Discussed with Consultants/Other Providers [ ] YES  [ ] NO

## 2021-08-21 NOTE — PROGRESS NOTE ADULT - SUBJECTIVE AND OBJECTIVE BOX
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 Laparoscopic mobilization of the colon. Mesentery divided using ligasure cautery. Terminal ileum divided using a endoGIA stapler and brought up through the right abdomen for an end ileostomy. Specimen delivered via a small low midline incision and transected at rectosigmoid with endoGIA. Abdomen thoroughly washed out and LLQ drain placed in the pelvis and LUQ drain placed in the right gutter. Hemostasis ensured. Colorectal bundle closure tray utilized to close fascia with running Maxon x2 incorporating rectal stump. Midline wound packed and ileostomy matured.    82 yo M with Crohn disease, over the last 9 mo, he was started on several medication and was recently told that he may need to go on IV infusion for chrons, he has several hospitalization at [facility] , and follows with GI near [facility]. but his diarrhea is not improving , having abd cramps , denies any N/V . , discharged 1 mo ago from [facility], now having trouble walking, on going diarrhea. + subjective fever but denies cough or nasal congestion or uri or urinary symptoms.  (13 Jul 2021 20:48)    Allergies:   PAST MEDICAL & SURGICAL HISTORY:  No pertinent past medical history    No significant past surgical history      FAMILY HISTORY:    ADVANCE DIRECTIVES: Presumed Full Code     HOME MEDICATIONS:    Home Medications:  budesonide 3 mg oral delayed release capsule: 3 cap(s) orally once a day (in the morning) (13 Jul 2021 18:03)  folic acid 1 mg oral tablet: 1 tab(s) orally once a day (13 Jul 2021 18:03)  KlonoPIN 0.5 mg oral tablet: 0.5 tab(s) orally once a day (at bedtime)  note: last dispensed Nov 2020 (13 Jul 2021 18:03)  mesalamine 1.2 g oral delayed release tablet: 4 tab(s) orally once a day (13 Jul 2021 18:03)  methotrexate 2.5 mg oral tablet: 6 tab(s) orally once a week on sundays (13 Jul 2021 18:03)  multivitamin:  (13 Jul 2021 18:03)  omeprazole 20 mg oral delayed release capsule: 1 cap(s) orally once a day (13 Jul 2021 18:03)  sotalol  mg oral tablet: 1 tab(s) orally 2 times a day (13 Jul 2021 18:03)  Vitamin D3:  (13 Jul 2021 18:03)      CURRENT MEDICATIONS:   --------------------------------------------------------------------------------------  Neurologic Medications  acetaminophen  IVPB .. 1000 milliGRAM(s) IV Intermittent every 6 hours  dexMEDEtomidine Infusion 0.2 MICROgram(s)/kG/Hr IV Continuous <Continuous>  HYDROmorphone  Injectable 0.5 milliGRAM(s) IV Push every 4 hours PRN breakthrough pain, agitation    Respiratory Medications    Cardiovascular Medications  metoprolol tartrate Injectable 5 milliGRAM(s) IV Push every 6 hours  phenylephrine    Infusion 1 MICROgram(s)/kG/Min IV Continuous <Continuous>    Gastrointestinal Medications  fat emulsion (Fish Oil and Plant Based) 20% Infusion 25 mL/Hr IV Continuous <Continuous>  lactated ringers. 1000 milliLiter(s) IV Continuous <Continuous>  pantoprazole  Injectable 40 milliGRAM(s) IV Push daily  Parenteral Nutrition - Adult 1 Each TPN Continuous <Continuous>    Genitourinary Medications    Hematologic/Oncologic Medications  enoxaparin Injectable 40 milliGRAM(s) SubCutaneous every 24 hours    Antimicrobial/Immunologic Medications  piperacillin/tazobactam IVPB.. 3.375 Gram(s) IV Intermittent every 8 hours    Endocrine/Metabolic Medications  methylPREDNISolone sodium succinate Injectable 20 milliGRAM(s) IV Push every 12 hours    Topical/Other Medications  chlorhexidine 0.12% Liquid 15 milliLiter(s) Oral Mucosa every 12 hours    --------------------------------------------------------------------------------------    VITAL SIGNS, INS/OUTS (last 24 hours):  --------------------------------------------------------------------------------------  T(C): 36.8 (08-20-21 @ 23:00), Max: 37.7 (08-20-21 @ 04:57)  HR: 71 (08-21-21 @ 01:45) (71 - 100)  BP: 110/55 (08-21-21 @ 01:30) (75/50 - 156/67)  BP(mean): 76 (08-21-21 @ 01:30) (58 - 105)  ABP: 114/47 (08-21-21 @ 01:45) (72/31 - 164/69)  ABP(mean): 71 (08-21-21 @ 01:45) (44 - 106)  RR: 18 (08-21-21 @ 01:45) (18 - 30)  SpO2: 98% (08-21-21 @ 01:45) (96% - 100%)  Wt(kg): --  CVP(mm Hg): --  CI: --  CAPILLARY BLOOD GLUCOSE      POCT Blood Glucose.: 214 mg/dL (20 Aug 2021 12:50)  POCT Blood Glucose.: 182 mg/dL (20 Aug 2021 06:09)   N/A      08-19 @ 07:01  -  08-20 @ 07:00  --------------------------------------------------------  IN:    Fat Emulsion (Fish Oil &amp; Plant Based) 20% Infusion: 315 mL    IV PiggyBack: 1128 mL    Oral Fluid: 580 mL    Pantoprazole: 240 mL    TPN (Total Parenteral Nutrition): 1128 mL  Total IN: 3391 mL    OUT:  Total OUT: 0 mL    Total NET: 3391 mL      08-20 @ 07:01  -  08-21 @ 02:26  --------------------------------------------------------  IN:    Dexmedetomidine: 10.4 mL    IV PiggyBack: 25 mL    Lactated Ringers: 225 mL    Lactated Ringers Bolus: 500 mL    Phenylephrine: 44.3 mL  Total IN: 804.7 mL    OUT:    Bulb (mL): 300 mL    Bulb (mL): 140 mL    Indwelling Catheter - Urethral (mL): 540 mL  Total OUT: 980 mL    Total NET: -175.3 mL        --------------------------------------------------------------------------------------    EXAM  NEUROLOGY  RASS:  0  Exam: Normal, NAD,  no focal deficits.     HEENT  Exam: Normocephalic, atraumatic.  EOMI    RESPIRATORY  Exam: Lungs clear to auscultation, Normal expansion/effort.   Mechanical Ventilation:   Mode: AC/ CMV (Assist Control/ Continuous Mandatory Ventilation), RR (machine): 18, RR (patient): 22, TV (machine): 420, FiO2: 50, PEEP: 5      CARDIOVASCULAR  Exam: S1, S2.  Regular rate and rhythm.    GI/NUTRITION  Exam: Abdomen soft, Non-tender, Non-distended. Ostomy pink and viable without output.   Wound:  CDI with x2 PAOLO on left abdomen SS  Current Diet:  NPO with NGT    VASCULAR  Exam: Extremities warm, pink, well-perfused.      MUSCULOSKELETAL  Exam: All extremities moving spontaneously without limitations.     SKIN:  Exam: Good skin turgor, no skin breakdown.     METABOLIC/FLUIDS/ELECTROLYTES  fat emulsion (Fish Oil and Plant Based) 20% Infusion 25 mL/Hr IV Continuous <Continuous>  lactated ringers. 1000 milliLiter(s) IV Continuous <Continuous>  Parenteral Nutrition - Adult 1 Each TPN Continuous <Continuous>      HEMATOLOGIC  [x] DVT Prophylaxis: enoxaparin Injectable 40 milliGRAM(s) SubCutaneous every 24 hours    Transfusions:	[2] PRBC	[] Platelets		[] FFP	[] Cryoprecipitate    INFECTIOUS DISEASE  Antimicrobials/Immunologic Medications:  piperacillin/tazobactam IVPB.. 3.375 Gram(s) IV Intermittent every 8 hours        Tubes/Lines/Drains   [x] Peripheral IV  [] Central Venous Line     	[] R	[] L	[] IJ	[] Fem	[] SC	Date Placed:   [x] Arterial Line		[] R	[] L	[] Fem	[] Rad	[] Ax	Date Placed:   [] PICC:         	[] Midline		[] Mediport  [] Urinary Catheter		Date Placed:     LABS  --------------------------------------------------------------------------------------  CBC (08-20 @ 21:00)                          9.9<L>                   26.33<H>  )--------------(  180        --    % Neuts, --    % Lymphs, ANC: --                              31.6<L>  CBC (08-20 @ 06:40)                          6.8<LL>                   23.31<H>  )--------------(  180        --    % Neuts, --    % Lymphs, ANC: --                              22.2<L>    BMP (08-20 @ 21:00)       135     |  104     |  25<H> 			Ca++ --      Ca 7.8<L>       ---------------------------------( 115<H>		Mg 2.1          4.1     |  21<L>   |  0.46<L>			Ph 3.6     BMP (08-20 @ 06:47)       --      |  --      |  --    			Ca++ 1.07<L>  Ca --           ---------------------------------( --    		Mg --           --      |  --      |  --    			Ph --        LFTs (08-20 @ 06:36)      TPro 4.9<L> / Alb 1.7<L> / TBili 0.4 / DBili -- / AST 40 / <H> / AlkPhos 179<H>    Coags (08-20 @ 21:00)  aPTT 32.4 / INR 1.18<H> / PT 14.1<H>      ABG (08-20 @ 20:50)     7.44 / 36 / 120<H> / 24 / 0.5 / 99.7<H>%     Lactate:    ABG (08-20 @ 18:15)     7.40 / 40 / 192<H> / 25 / 0.0 / 99.5<H>%     Lactate:            --------------------------------------------------------------------------------------    OTHER LABS    IMAGING RESULTS  < from: Xray Chest 1 View- PORTABLE-Urgent (Xray Chest 1 View- PORTABLE-Urgent .) (08.21.21 @ 01:06) >  INTERPRETATION:  NGT tip in stomach. side port is just distal to the GE junction.    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 Laparoscopic mobilization of the colon. Mesentery divided using ligasure cautery. Terminal ileum divided using a endoGIA stapler and brought up through the right abdomen for an end ileostomy. Specimen delivered via a small low midline incision and transected at rectosigmoid with endoGIA. Abdomen thoroughly washed out and LLQ drain placed in the pelvis and LUQ drain placed in the right gutter. Hemostasis ensured. Colorectal bundle closure tray utilized to close fascia with running Maxon x2 incorporating rectal stump. Midline wound packed and ileostomy matured.    80 yo M with Crohn disease, over the last 9 mo, he was started on several medication and was recently told that he may need to go on IV infusion for chrons, he has several hospitalization at [facility] , and follows with GI near [facility]. but his diarrhea is not improving , having abd cramps , denies any N/V . , discharged 1 mo ago from [facility], now having trouble walking, on going diarrhea. + subjective fever but denies cough or nasal congestion or uri or urinary symptoms.  (13 Jul 2021 20:48)    Allergies:   PAST MEDICAL & SURGICAL HISTORY:  No pertinent past medical history    No significant past surgical history      FAMILY HISTORY:    ADVANCE DIRECTIVES: Presumed Full Code     HOME MEDICATIONS:    Home Medications:  budesonide 3 mg oral delayed release capsule: 3 cap(s) orally once a day (in the morning) (13 Jul 2021 18:03)  folic acid 1 mg oral tablet: 1 tab(s) orally once a day (13 Jul 2021 18:03)  KlonoPIN 0.5 mg oral tablet: 0.5 tab(s) orally once a day (at bedtime)  note: last dispensed Nov 2020 (13 Jul 2021 18:03)  mesalamine 1.2 g oral delayed release tablet: 4 tab(s) orally once a day (13 Jul 2021 18:03)  methotrexate 2.5 mg oral tablet: 6 tab(s) orally once a week on sundays (13 Jul 2021 18:03)  multivitamin:  (13 Jul 2021 18:03)  omeprazole 20 mg oral delayed release capsule: 1 cap(s) orally once a day (13 Jul 2021 18:03)  sotalol  mg oral tablet: 1 tab(s) orally 2 times a day (13 Jul 2021 18:03)  Vitamin D3:  (13 Jul 2021 18:03)      CURRENT MEDICATIONS:   --------------------------------------------------------------------------------------  Neurologic Medications  acetaminophen  IVPB .. 1000 milliGRAM(s) IV Intermittent every 6 hours  dexMEDEtomidine Infusion 0.2 MICROgram(s)/kG/Hr IV Continuous <Continuous>  HYDROmorphone  Injectable 0.5 milliGRAM(s) IV Push every 4 hours PRN breakthrough pain, agitation    Respiratory Medications    Cardiovascular Medications  metoprolol tartrate Injectable 5 milliGRAM(s) IV Push every 6 hours  phenylephrine    Infusion 1 MICROgram(s)/kG/Min IV Continuous <Continuous>    Gastrointestinal Medications  fat emulsion (Fish Oil and Plant Based) 20% Infusion 25 mL/Hr IV Continuous <Continuous>  lactated ringers. 1000 milliLiter(s) IV Continuous <Continuous>  pantoprazole  Injectable 40 milliGRAM(s) IV Push daily  Parenteral Nutrition - Adult 1 Each TPN Continuous <Continuous>    Genitourinary Medications    Hematologic/Oncologic Medications  enoxaparin Injectable 40 milliGRAM(s) SubCutaneous every 24 hours    Antimicrobial/Immunologic Medications  piperacillin/tazobactam IVPB.. 3.375 Gram(s) IV Intermittent every 8 hours    Endocrine/Metabolic Medications  methylPREDNISolone sodium succinate Injectable 20 milliGRAM(s) IV Push every 12 hours    Topical/Other Medications  chlorhexidine 0.12% Liquid 15 milliLiter(s) Oral Mucosa every 12 hours    --------------------------------------------------------------------------------------    VITAL SIGNS, INS/OUTS (last 24 hours):  --------------------------------------------------------------------------------------  T(C): 36.8 (08-20-21 @ 23:00), Max: 37.7 (08-20-21 @ 04:57)  HR: 71 (08-21-21 @ 01:45) (71 - 100)  BP: 110/55 (08-21-21 @ 01:30) (75/50 - 156/67)  BP(mean): 76 (08-21-21 @ 01:30) (58 - 105)  ABP: 114/47 (08-21-21 @ 01:45) (72/31 - 164/69)  ABP(mean): 71 (08-21-21 @ 01:45) (44 - 106)  RR: 18 (08-21-21 @ 01:45) (18 - 30)  SpO2: 98% (08-21-21 @ 01:45) (96% - 100%)  Wt(kg): --  CVP(mm Hg): --  CI: --  CAPILLARY BLOOD GLUCOSE      POCT Blood Glucose.: 214 mg/dL (20 Aug 2021 12:50)  POCT Blood Glucose.: 182 mg/dL (20 Aug 2021 06:09)   N/A      08-19 @ 07:01  -  08-20 @ 07:00  --------------------------------------------------------  IN:    Fat Emulsion (Fish Oil &amp; Plant Based) 20% Infusion: 315 mL    IV PiggyBack: 1128 mL    Oral Fluid: 580 mL    Pantoprazole: 240 mL    TPN (Total Parenteral Nutrition): 1128 mL  Total IN: 3391 mL    OUT:  Total OUT: 0 mL    Total NET: 3391 mL      08-20 @ 07:01  -  08-21 @ 02:26  --------------------------------------------------------  IN:    Dexmedetomidine: 10.4 mL    IV PiggyBack: 25 mL    Lactated Ringers: 225 mL    Lactated Ringers Bolus: 500 mL    Phenylephrine: 44.3 mL  Total IN: 804.7 mL    OUT:    Bulb (mL): 300 mL    Bulb (mL): 140 mL    Indwelling Catheter - Urethral (mL): 540 mL  Total OUT: 980 mL    Total NET: -175.3 mL        --------------------------------------------------------------------------------------    EXAM  NEUROLOGY  RASS:  0  Exam: Normal, NAD,  no focal deficits.     HEENT  Exam: Normocephalic, atraumatic.  EOMI    RESPIRATORY  Exam: Lungs clear to auscultation, Normal expansion/effort.   Mechanical Ventilation:   Mode: AC/ CMV (Assist Control/ Continuous Mandatory Ventilation), RR (machine): 18, RR (patient): 22, TV (machine): 420, FiO2: 50, PEEP: 5      CARDIOVASCULAR  Exam: S1, S2.  Regular rate and rhythm.    GI/NUTRITION  Exam: Abdomen soft, Non-tender, Non-distended. Ostomy pink and viable without output.   Wound:  CDI with x2 PAOLO on left abdomen SS  Current Diet:  NPO with NGT    VASCULAR  Exam: Extremities warm, pink, well-perfused.      MUSCULOSKELETAL  Exam: All extremities moving spontaneously without limitations.     SKIN:  Exam: Good skin turgor, no skin breakdown.     METABOLIC/FLUIDS/ELECTROLYTES  fat emulsion (Fish Oil and Plant Based) 20% Infusion 25 mL/Hr IV Continuous <Continuous>  lactated ringers. 1000 milliLiter(s) IV Continuous <Continuous>  Parenteral Nutrition - Adult 1 Each TPN Continuous <Continuous>      HEMATOLOGIC  [x] DVT Prophylaxis: enoxaparin Injectable 40 milliGRAM(s) SubCutaneous every 24 hours    Transfusions:	[2] PRBC	[] Platelets		[] FFP	[] Cryoprecipitate    INFECTIOUS DISEASE  Antimicrobials/Immunologic Medications:  piperacillin/tazobactam IVPB.. 3.375 Gram(s) IV Intermittent every 8 hours        Tubes/Lines/Drains   [x] Peripheral IV  [] Central Venous Line     	[] R	[] L	[] IJ	[] Fem	[] SC	Date Placed:   [x] Arterial Line		[] R	[] L	[] Fem	[] Rad	[] Ax	Date Placed:   [] PICC:         	[] Midline		[] Mediport  [] Urinary Catheter		Date Placed:     LABS  --------------------------------------------------------------------------------------  CBC (08-20 @ 21:00)                          9.9<L>                   26.33<H>  )--------------(  180        --    % Neuts, --    % Lymphs, ANC: --                              31.6<L>  CBC (08-20 @ 06:40)                          6.8<LL>                   23.31<H>  )--------------(  180        --    % Neuts, --    % Lymphs, ANC: --                              22.2<L>    BMP (08-20 @ 21:00)       135     |  104     |  25<H> 			Ca++ --      Ca 7.8<L>       ---------------------------------( 115<H>		Mg 2.1          4.1     |  21<L>   |  0.46<L>			Ph 3.6     BMP (08-20 @ 06:47)       --      |  --      |  --    			Ca++ 1.07<L>  Ca --           ---------------------------------( --    		Mg --           --      |  --      |  --    			Ph --        LFTs (08-20 @ 06:36)      TPro 4.9<L> / Alb 1.7<L> / TBili 0.4 / DBili -- / AST 40 / <H> / AlkPhos 179<H>    Coags (08-20 @ 21:00)  aPTT 32.4 / INR 1.18<H> / PT 14.1<H>      ABG (08-20 @ 20:50)     7.44 / 36 / 120<H> / 24 / 0.5 / 99.7<H>%     Lactate:    ABG (08-20 @ 18:15)     7.40 / 40 / 192<H> / 25 / 0.0 / 99.5<H>%     Lactate:            --------------------------------------------------------------------------------------    OTHER LABS    IMAGING RESULTS  < from: Xray Chest 1 View- PORTABLE-Urgent (Xray Chest 1 View- PORTABLE-Urgent .) (08.21.21 @ 01:06) >  INTERPRETATION:  NGT tip in stomach. side port is just distal to the GE junction.    < end of copied text >

## 2021-08-21 NOTE — PROGRESS NOTE ADULT - ATTENDING COMMENTS
Patient seen and examined and agree with above.   POD #1 s/p total abdominal colectomy and ileostomy for chrons disease.  Went into a fib with RVR.   Current management includes:  Pain controlled with dilaudid and tylenol.  Acute respiratory insufficiency - on mechanical ventilation.  Will continue to SBT and wean as tolerated.   Hypotension - phenylephrine 0.2 mcg/kg/min  -will POCUs at this time to assess resuscitation. Overnight he required IVF bolus with improvement of perfusion and hemodynamics.   Elevated leukocytosis - as per ID stopped Fidaxomicin after colectomy; will continue zosyn at this time.   Hypocalcemia - to be repleted and will recheck     This patient was critically ill with one or more vital organ systems at a high probability of imminent or life threatening deterioration. Complex decision making was required to assess and treat single or multiple vital organ system failure and/or prevent further life threatening deterioration of the patient's condition.   CC time: 43 min Patient seen and examined and agree with above.   POD #1 s/p total abdominal colectomy and ileostomy for chrons disease.  Went into a fib with RVR.   Current management includes:  Pain controlled with dilaudid and tylenol.  Acute respiratory insufficiency - on mechanical ventilation.  Will continue to SBT and wean as tolerated.   Atrial fibrillation with RVR- responded to metoprolol and will give metoprolol PRN   Hypotension - phenylephrine 0.2 mcg/kg/min  -will POCUs at this time to assess resuscitation. Overnight he required IVF bolus with improvement of perfusion and hemodynamics.   Elevated leukocytosis - as per ID stopped Fidaxomicin after colectomy; will continue zosyn at this time.   Will wean steroids and taper  Hypocalcemia - to be repleted and will recheck     This patient was critically ill with one or more vital organ systems at a high probability of imminent or life threatening deterioration. Complex decision making was required to assess and treat single or multiple vital organ system failure and/or prevent further life threatening deterioration of the patient's condition.   CC time: 43 min

## 2021-08-21 NOTE — PROGRESS NOTE ADULT - SUBJECTIVE AND OBJECTIVE BOX
Interval Events:   s/p Laparoscopic total abdominal colectomy with end ileostomy  Patient is intubated and sedated     Hospital Medications:  acetaminophen  IVPB .. 1000 milliGRAM(s) IV Intermittent every 6 hours  chlorhexidine 0.12% Liquid 15 milliLiter(s) Oral Mucosa every 12 hours  chlorhexidine 2% Cloths 1 Application(s) Topical daily  dexMEDEtomidine Infusion 0.2 MICROgram(s)/kG/Hr IV Continuous <Continuous>  dextrose 5% + lactated ringers. 1000 milliLiter(s) IV Continuous <Continuous>  enoxaparin Injectable 40 milliGRAM(s) SubCutaneous every 24 hours  fat emulsion (Fish Oil and Plant Based) 20% Infusion 25 mL/Hr IV Continuous <Continuous>  HYDROmorphone  Injectable 0.5 milliGRAM(s) IV Push every 4 hours PRN  methylPREDNISolone sodium succinate Injectable 20 milliGRAM(s) IV Push every 12 hours  pantoprazole  Injectable 40 milliGRAM(s) IV Push daily  Parenteral Nutrition - Adult 1 Each TPN Continuous <Continuous>  phenylephrine    Infusion 1 MICROgram(s)/kG/Min IV Continuous <Continuous>  piperacillin/tazobactam IVPB.. 3.375 Gram(s) IV Intermittent every 8 hours    ROS: unable to be obtained. Patient is intubated and sedated     PHYSICAL EXAM:   Vital Signs:  Vital Signs Last 24 Hrs  T(C): 36.5 (21 Aug 2021 07:00), Max: 37.1 (20 Aug 2021 20:21)  T(F): 97.7 (21 Aug 2021 07:00), Max: 98.8 (20 Aug 2021 20:21)  HR: 58 (21 Aug 2021 07:45) (57 - 127)  BP: 101/53 (21 Aug 2021 04:30) (75/50 - 156/67)  BP(mean): 74 (21 Aug 2021 04:30) (58 - 105)  RR: 19 (21 Aug 2021 07:45) (18 - 30)  SpO2: 99% (21 Aug 2021 07:45) (91% - 100%)  Daily Height in cm: 170.18 (20 Aug 2021 14:14)    Daily Weight in k.7 (21 Aug 2021 05:10)    GENERAL:  NAD, Appears stated age  HEENT:  NC/AT,  conjunctivae clear and pink, sclera -anicteric  CHEST:  Normal Effort, Breath sounds clear  HEART:  RRR, S1 + S2, no murmurs  ABDOMEN:   soft, Non-tender, Non-distended. Ostomy pink and viable without output with x2 PAOLO on left abdomen   EXTREMITIES:  no cyanosis or edema  SKIN:  Warm & Dry. No rash or erythema  NEURO:  intubated and sedated     LABS:                        9.2    37.99 )-----------( 175      ( 21 Aug 2021 02:35 )             28.9     Mean Cell Volume: 79.6 fl (- @ 02:35)        136  |  103  |  29<H>  ----------------------------<  103<H>  4.0   |  21<L>  |  0.62    Ca    7.8<L>      21 Aug 2021 02:35  Phos  3.9       Mg     2.1         TPro  4.9<L>  /  Alb  1.7<L>  /  TBili  0.4  /  DBili  x   /  AST  40  /  ALT  142<H>  /  AlkPhos  179<H>      LIVER FUNCTIONS - ( 20 Aug 2021 06:36 )  Alb: 1.7 g/dL / Pro: 4.9 g/dL / ALK PHOS: 179 U/L / ALT: 142 U/L / AST: 40 U/L / GGT: x           PT/INR - ( 20 Aug 2021 21:00 )   PT: 14.1 sec;   INR: 1.18 ratio         PTT - ( 20 Aug 2021 21:00 )  PTT:32.4 sec                            9.2    37.99 )-----------( 175      ( 21 Aug 2021 02:35 )             28.9                         9.9    26.33 )-----------( 180      ( 20 Aug 2021 21:00 )             31.6                         6.8    23.31 )-----------( 180      ( 20 Aug 2021 06:40 )             22.2                         7.3    15.91 )-----------( 241      ( 19 Aug 2021 08:59 )             24.1                         7.0    10.19 )-----------( 236      ( 18 Aug 2021 16:12 )             23.2       Imaging:

## 2021-08-21 NOTE — PROGRESS NOTE ADULT - ASSESSMENT
81y Male with GERD on omeprazole daily and Crohn's disease diagnosed about 9 mos prior to admission at outside hospital c/b h/o abscess and left posterior distal anal intersphincteric fistula and presents diarrhea/ BRBPR  2/2 to CD flare c/b c diff colitis.   Initially treat for C diff, given that it did not get better got FMT. Switched PO vanc in favor of Fidaxomicin without any improvement. Therefore, patient got Flex sig to reassess which showed no pseudomembranous colitis anymore but severe Crohn colitis. Was started on steroids on 8/9. Has had minimal improvement. Started tapering IV steroids.   Case was discussed at IBD conference 8/18 and CRS + GI team in agreement with surgical resection.     Now patient is s/p Laparoscopic total abdominal colectomy with end ileostomy    Impression:  # Refractory  C. diff colitis / severe Crohn's flare failed medical therapy now s/p Laparoscopic total abdominal colectomy with end ileostomy-      Recommendations:  - Feeding as per surgery  - Continue taper methylprednisolone IV; currently on 20 mg q12h    Cristin Newsome MD  Gastroenterology Fellow  Pager: [phone]  Please call answering service [phone] / on-call GI fellow after 5pm and before 8am, and on weekends.

## 2021-08-21 NOTE — PROGRESS NOTE ADULT - SUBJECTIVE AND OBJECTIVE BOX
[facility] NUTRITION SUPPORT / TPN -- FOLLOW UP NOTE  --------------------------------------------------------------------------------    24 hour events/subjective:  Taken to the OR emergently for lap total colectomy, end ileostomy.  Remained intubated postoperatively, on pressors.  TPN was continued throughout case.  Pt was brought to ICU postoperatively, TPN bag remained in pharmacy overnight.    Diet:  Diet, NPO (08-21-21 @ 06:43)      PAST HISTORY  --------------------------------------------------------------------------------  No significant changes to PMH, PSH, FHx, SHx, unless otherwise noted    ALLERGIES & MEDICATIONS  --------------------------------------------------------------------------------  Allergies    No Known Allergies    Intolerances      Standing Inpatient Medications  acetaminophen  IVPB .. 1000 milliGRAM(s) IV Intermittent every 6 hours  chlorhexidine 0.12% Liquid 15 milliLiter(s) Oral Mucosa every 12 hours  chlorhexidine 2% Cloths 1 Application(s) Topical daily  dexMEDEtomidine Infusion 0.2 MICROgram(s)/kG/Hr IV Continuous <Continuous>  dextrose 5% + lactated ringers. 1000 milliLiter(s) IV Continuous <Continuous>  enoxaparin Injectable 40 milliGRAM(s) SubCutaneous every 24 hours  fat emulsion (Fish Oil and Plant Based) 20% Infusion 25 mL/Hr IV Continuous <Continuous>  methylPREDNISolone sodium succinate Injectable 20 milliGRAM(s) IV Push every 12 hours  pantoprazole  Injectable 40 milliGRAM(s) IV Push daily  Parenteral Nutrition - Adult 1 Each TPN Continuous <Continuous>  phenylephrine    Infusion 1 MICROgram(s)/kG/Min IV Continuous <Continuous>  piperacillin/tazobactam IVPB.. 3.375 Gram(s) IV Intermittent every 8 hours    PRN Inpatient Medications  HYDROmorphone  Injectable 0.5 milliGRAM(s) IV Push every 4 hours PRN      VITALS/PHYSICAL EXAM  --------------------------------------------------------------------------------  T(C): 36.5 (08-21-21 @ 07:00), Max: 37.1 (08-20-21 @ 20:21)  HR: 55 (08-21-21 @ 09:00) (55 - 127)  BP: 101/53 (08-21-21 @ 04:30) (75/50 - 156/67)  RR: 18 (08-21-21 @ 09:00) (18 - 30)  SpO2: 98% (08-21-21 @ 09:00) (91% - 100%)  Wt(kg): --  Height (cm): 170.2 (08-20-21 @ 14:14)  Weight (kg): 59 (08-20-21 @ 14:14)  BMI (kg/m2): 20.4 (08-20-21 @ 14:14)  BSA (m2): 1.68 (08-20-21 @ 14:14)      08-20-21 @ 07:01  -  08-21-21 @ 07:00  --------------------------------------------------------  IN: 2112.5 mL / OUT: 1465 mL / NET: 647.5 mL    08-21-21 @ 07:01  -  08-21-21 @ 09:28  --------------------------------------------------------  IN: 187 mL / OUT: 30 mL / NET: 157 mL        	      Physical Exam:  General: NAD,   Pulmonary: intubated  Cardiovascular: NSR, phenylephrine 1 mcg  Abdominal: soft, nondistended, 2 R drains serosanguinous output, ostomy pink and viable, liquid stool in bag         LABS/STUDIES  --------------------------------------------------------------------------------              9.2    37.99 >-----------<  175      [08-21-21 @ 02:35]              28.9     136  |  103  |  29  ----------------------------<  103      [08-21-21 @ 02:35]  4.0   |  21  |  0.62        Ca     7.8     [08-21-21 @ 02:35]      iCa    1.07     [08-20 @ 06:47]      Mg     2.1     [08-21-21 @ 02:35]      Phos  3.9     [08-21-21 @ 02:35]    TPro  4.9  /  Alb  1.7  /  TBili  0.4  /  DBili  x   /  AST  40  /  ALT  142  /  AlkPhos  179  [08-20-21 @ 06:36]    PT/INR: PT 14.1 , INR 1.18       [08-20-21 @ 21:00]  PTT: 32.4       [08-20-21 @ 21:00]      Ca ionizedBlood Gas Arterial - Calcium, Ionized: 1.12 mmol/L (08-21-21 @ 02:32)  Blood Gas Arterial - Calcium, Ionized: 1.16 mmol/L (08-20-21 @ 20:50)    Creatinine Trend:  POC glucoseGlucose, Serum: 103 mg/dL (08-21-21 @ 02:35)  Glucose, Serum: 115 mg/dL (08-20-21 @ 21:00)  CAPILLARY BLOOD GLUCOSE      POCT Blood Glucose.: 214 mg/dL (20 Aug 2021 12:50)    PrealbuminPrealbumin, Serum: 8 mg/dL (08-15-21 @ 10:17)    Triglycerides

## 2021-08-21 NOTE — PROGRESS NOTE ADULT - ATTENDING COMMENTS
S/p total colectomy and end ileostomy yesterday.   Remains intubated and sedated in ICU.   Continue steroid taper.

## 2021-08-21 NOTE — PROGRESS NOTE ADULT - ASSESSMENT
80 yo M with pmhx of Crohns disease (dxd 9 mos ago, hx of perianal fistula, sp tx w remicaide until developed ab; since managed on budesonide, mtx/5asa), h/o cdiff, CAD, afib (not on ac), p/w bloody diarrhea, abd pain and weakness. Admitted for crohns management, course c/b cdiff colitis sp dificid/fmt and findings of indeterminate quantiferon, pending id clearance to start biologic. Prealb 8. TPN consulted in setting of significant weight loss, severe pcm, and worsening colitis suspected 2/2 CD. Pt at high risk for refeeding syndrome. TPN started 8/16. Repeat CT showing unchanged diffuse colitis and non specific gb wall edema.  Taken to OR emergently on 8/20 for lap total colectomy, end ileostomy    TPN GOAL recommendations as per RD: , dextrose 210g, SMOF 60g    -TPN bag sent to SICU from pharmacy @9AM and will be administered  -cont TPN at goal:  105g AA, 210g dextrose, 60g SMOF in 2250cc total volume  -hypokalemia - continue KCl 100meq  -hypocalcemia- continue CaGluconate 12meq   -hyperglycemia Continue 10U insulin in TPN.  Cont FS monitoring q6 hours, coverage with ISS as needed  -cont thiamine supplementation, 100mg IV x 5-7 days  -electrolyte imbalance risk- monitor CMP, Mg, Ionized Ca, Phosphorus qd  -check TG daily until stable on goal SMOF, then weekly   -strict I/O's  -care per primary team    TPN pager [phone], spectra [phone]  M-F 6A-4P, Weekends and holidays 8A-12P  discussed with Dr. Fernandez and Dr. SARMAD Multani    82 yo M with pmhx of Crohns disease (dxd 9 mos ago, hx of perianal fistula, sp tx w remicaide until developed ab; since managed on budesonide, mtx/5asa), h/o cdiff, CAD, afib (not on ac), p/w bloody diarrhea, abd pain and weakness. Admitted for crohns management, course c/b cdiff colitis sp dificid/fmt and findings of indeterminate quantiferon, pending id clearance to start biologic. Prealb 8. TPN consulted in setting of significant weight loss, severe pcm, and worsening colitis suspected 2/2 CD. Pt at high risk for refeeding syndrome. TPN started 8/16. Repeat CT showing unchanged diffuse colitis and non specific gb wall edema.  Taken to OR emergently on 8/20 for lap total colectomy, end ileostomy    TPN GOAL recommendations as per RD: , dextrose 210g, SMOF 60g    -TPN bag sent to SICU from pharmacy @9AM and will be administered  -cont TPN at goal:  105g AA, 210g dextrose, 60g SMOF in 2250cc total volume  - if TPN to stop, please initiate D10  -hypokalemia - continue KCl 100meq  -hypocalcemia- continue CaGluconate 12meq   -hyperglycemia Continue 10U insulin in TPN.  Cont FS monitoring q6 hours, coverage with ISS as needed  -cont thiamine supplementation, 100mg IV x 5-7 days  -electrolyte imbalance risk- monitor CMP, Mg, Ionized Ca, Phosphorus qd  -check TG daily until stable on goal SMOF, then weekly   -strict I/O's  -care per primary team    TPN pager [phone], spectra [phone]  M-F 6A-4P, Weekends and holidays 8A-12P  discussed with Dr. Fernandez and Dr. SARMAD Multani

## 2021-08-22 NOTE — OCCUPATIONAL THERAPY INITIAL EVALUATION ADULT - LIVES WITH, PROFILE
Per chart, pt lives with spouse in [address] and 14 steps inside. Pt was independent with self care and functional mobility PTA without AD.

## 2021-08-22 NOTE — AIRWAY REMOVAL NOTE  ADULT & PEDS - ARTIFICAL AIRWAY REMOVAL COMMENTS
Written order for extubation verified. Patient identified  by full name and date of birth as per identification band. Present at the procedure were ..AYO Alarcon.

## 2021-08-22 NOTE — PROGRESS NOTE ADULT - SUBJECTIVE AND OBJECTIVE BOX
Patient is a 81y old  Male who presents with a chief complaint of diarrhea with blood in it , abd pain and weakness (22 Aug 2021 09:33)      INTERVAL HPI/OVERNIGHT EVENTS: extubated   T(C): 36.6 (08-22-21 @ 11:00), Max: 36.6 (08-22-21 @ 11:00)  HR: 90 (08-22-21 @ 16:00) (68 - 158)  BP: 115/56 (08-22-21 @ 16:00) (94/53 - 155/73)  RR: 33 (08-22-21 @ 16:00) (18 - 41)  SpO2: 99% (08-22-21 @ 16:00) (93% - 100%)  Wt(kg): --  I&O's Summary    21 Aug 2021 07:01  -  22 Aug 2021 07:00  --------------------------------------------------------  IN: 3650.9 mL / OUT: 1430 mL / NET: 2220.9 mL    22 Aug 2021 07:01  -  22 Aug 2021 16:23  --------------------------------------------------------  IN: 846 mL / OUT: 295 mL / NET: 551 mL        PAST MEDICAL & SURGICAL HISTORY:  No pertinent past medical history    No significant past surgical history        SOCIAL HISTORY  Alcohol:  Tobacco:  Illicit substance use:    FAMILY HISTORY:    REVIEW OF SYSTEMS:  CONSTITUTIONAL: No fever, weight loss, or fatigue  EYES: No eye pain, visual disturbances, or discharge  ENMT:  No difficulty hearing, tinnitus, vertigo; No sinus or throat pain  NECK: No pain or stiffness  RESPIRATORY: No cough, wheezing, chills or hemoptysis; No shortness of breath  CARDIOVASCULAR: No chest pain, palpitations, dizziness, or leg swelling  GASTROINTESTINAL: No abdominal or epigastric pain. No nausea, vomiting, or hematemesis; No diarrhea or constipation. No melena or hematochezia.  GENITOURINARY: No dysuria, frequency, hematuria, or incontinence  NEUROLOGICAL: No headaches, memory loss, loss of strength, numbness, or tremors  SKIN: No itching, burning, rashes, or lesions   LYMPH NODES: No enlarged glands  ENDOCRINE: No heat or cold intolerance; No hair loss  MUSCULOSKELETAL: No joint pain or swelling; No muscle, back, or extremity pain  PSYCHIATRIC: No depression, anxiety, mood swings, or difficulty sleeping  HEME/LYMPH: No easy bruising, or bleeding gums  ALLERY AND IMMUNOLOGIC: No hives or eczema    RADIOLOGY & ADDITIONAL TESTS:    Imaging Personally Reviewed:  [ ] YES  [ ] NO    Consultant(s) Notes Reviewed:  [ ] YES  [ ] NO    PHYSICAL EXAM:  GENERAL: NAD, well-groomed, well-developed  HEAD:  Atraumatic, Normocephalic  EYES: EOMI, PERRLA, conjunctiva and sclera clear  ENMT: No tonsillar erythema, exudates, or enlargement; Moist mucous membranes, Good dentition, No lesions  NECK: Supple, No JVD, Normal thyroid  NERVOUS SYSTEM:  Alert & Oriented X3, Good concentration; Motor Strength 5/5 B/L upper and lower extremities; DTRs 2+ intact and symmetric  CHEST/LUNG: Clear to percussion bilaterally; No rales, rhonchi, wheezing, or rubs  HEART: Regular rate and rhythm; No murmurs, rubs, or gallops  ABDOMEN: Soft, Nontender, Nondistended; Bowel sounds present  EXTREMITIES:  2+ Peripheral Pulses, No clubbing, cyanosis, or edema  LYMPH: No lymphadenopathy noted  SKIN: No rashes or lesions    LABS:                        9.0    28.83 )-----------( 103      ( 22 Aug 2021 14:21 )             27.6     08-22    137  |  102  |  33<H>  ----------------------------<  213<H>  3.8   |  23  |  0.59    Ca    7.6<L>      22 Aug 2021 00:08  Phos  3.8     08-22  Mg     2.2     08-22      PT/INR - ( 20 Aug 2021 21:00 )   PT: 14.1 sec;   INR: 1.18 ratio         PTT - ( 20 Aug 2021 21:00 )  PTT:32.4 sec    CAPILLARY BLOOD GLUCOSE      POCT Blood Glucose.: 155 mg/dL (22 Aug 2021 11:31)  POCT Blood Glucose.: 203 mg/dL (22 Aug 2021 06:06)  POCT Blood Glucose.: 192 mg/dL (22 Aug 2021 01:27)  POCT Blood Glucose.: 199 mg/dL (21 Aug 2021 17:06)    ABG - ( 22 Aug 2021 14:09 )  pH, Arterial: 7.48  pH, Blood: x     /  pCO2: 34    /  pO2: 132   / HCO3: 25    / Base Excess: 1.9   /  SaO2: 100.0                   MEDICATIONS  (STANDING):  chlorhexidine 2% Cloths 1 Application(s) Topical daily  enoxaparin Injectable 40 milliGRAM(s) SubCutaneous every 24 hours  fat emulsion (Fish Oil and Plant Based) 20% Infusion 25 mL/Hr (25 mL/Hr) IV Continuous <Continuous>  insulin lispro (ADMELOG) corrective regimen sliding scale   SubCutaneous every 6 hours  methylPREDNISolone sodium succinate Injectable 20 milliGRAM(s) IV Push every 12 hours  metoprolol tartrate Injectable 5 milliGRAM(s) IV Push every 6 hours  pantoprazole  Injectable 40 milliGRAM(s) IV Push daily  Parenteral Nutrition - Adult 1 Each (94 mL/Hr) TPN Continuous <Continuous>  Parenteral Nutrition - Adult 1 Each (94 mL/Hr) TPN Continuous <Continuous>  piperacillin/tazobactam IVPB.. 3.375 Gram(s) IV Intermittent every 8 hours    MEDICATIONS  (PRN):  HYDROmorphone  Injectable 0.5 milliGRAM(s) IV Push every 4 hours PRN breakthrough pain, agitation      Care Discussed with Consultants/Other Providers [ ] YES  [ ] NO

## 2021-08-22 NOTE — OCCUPATIONAL THERAPY INITIAL EVALUATION ADULT - PERTINENT HX OF CURRENT PROBLEM, REHAB EVAL
81M with Crohn disease, over the last 9 mo, he was started on several medication and was recently told that he may need to go on IV infusion for chrons, he has several hospitalization at [facility] , and follows with GI near [facility]. but his diarrhea is not improving , having abd cramps , denies any N/V, discharged 1 mo ago from [facility], now having trouble walking, on going diarrhea. + subjective fever but denies cough or nasal congestion or uri or urinary symptoms.

## 2021-08-22 NOTE — PROGRESS NOTE ADULT - ASSESSMENT
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 SICU consulted for hemodynamic monitoring. AA      Plan:  Neurologic:   - Wean sedation as tolerated  - Pain control as needed IV Tylenol    Respiratory:   - spo2 adequate  - On mechanical ventilation; daily SBT will attempt extubation in AM     Cardiovascular:   - NSR  - Required pressors postoperatively; will wean as tolerated  - History of AFib currently rate controlled with IV metoprolol     Gastrointestinal/Nutrition:   - NPO  - NGT in place; ileostomy pink and viable without output  - Surgical sites CDI   - Will continue TPN     Renal/Genitourinary:   - Monitor and replete electrolytes as needed  - Monitor UO  - Continue with [place]     Hematologic:   - Treng H&H and transfuse Hgb < 7  - Lovenox for DVT ppx     Infectious Disease:   - No issues; monitor WBC    Tubes/Lines/Drains:   - Arterial line  - PIV  - NGT     Endocrine:   - Monitor BMP  - C/W steroid taper   - ISS     Disposition: SICU

## 2021-08-22 NOTE — PROGRESS NOTE ADULT - SUBJECTIVE AND OBJECTIVE BOX
INTERVAL HPI/OVERNIGHT EVENTS:  Patient is a 81yMale, no acute events overnight    Vital Signs Last 24 Hrs  T(C): 36.2 (21 Aug 2021 23:00), Max: 36.5 (21 Aug 2021 07:00)  T(F): 97.2 (21 Aug 2021 23:00), Max: 97.7 (21 Aug 2021 07:00)  HR: 101 (22 Aug 2021 04:30) (54 - 101)  BP: 121/60 (22 Aug 2021 04:15) (94/53 - 121/60)  BP(mean): 87 (22 Aug 2021 04:15) (69 - 87)  RR: 30 (22 Aug 2021 04:30) (16 - 32)  SpO2: 100% (22 Aug 2021 04:30) (96% - 100%)      PHYSICAL EXAM:  Constitutional: , NAD, intubated, sedated  Neck: supple  Respiratory: CTA bilat  Cardiovascular: RRR  Gastrointestinal: abdomen soft, nondistended. No obvious masses. Incisions c/d/i, ileostomy pink with thin stool and gas  Extremities: warm    LABS:                        9.2    37.99 )-----------( 175      ( 21 Aug 2021 02:35 )             28.9     08-21    136  |  103  |  29<H>  ----------------------------<  103<H>  4.0   |  21<L>  |  0.62    Ca    7.8<L>      21 Aug 2021 02:35  Phos  3.9     08-21  Mg     2.1     08-21    TPro  4.9<L>  /  Alb  1.7<L>  /  TBili  0.4  /  DBili  x   /  AST  40  /  ALT  142<H>  /  AlkPhos  179<H>  08-20    PT/INR - ( 20 Aug 2021 21:00 )   PT: 14.1 sec;   INR: 1.18 ratio         PTT - ( 20 Aug 2021 21:00 )  PTT:32.4 sec    Magnesium, Serum: 2.1 mg/dL (08-21 @ 02:35)  Phosphorus Level, Serum: 3.9 mg/dL (08-21 @ 02:35)  Magnesium, Serum: 2.1 mg/dL (08-20 @ 21:00)  Phosphorus Level, Serum: 3.6 mg/dL (08-20 @ 21:00)       INTERVAL HPI/OVERNIGHT EVENTS:  Patient is a 81yMale, no acute events overnight    Vital Signs Last 24 Hrs  T(C): 36.2 (21 Aug 2021 23:00), Max: 36.5 (21 Aug 2021 07:00)  T(F): 97.2 (21 Aug 2021 23:00), Max: 97.7 (21 Aug 2021 07:00)  HR: 101 (22 Aug 2021 04:30) (54 - 101)  BP: 121/60 (22 Aug 2021 04:15) (94/53 - 121/60)  BP(mean): 87 (22 Aug 2021 04:15) (69 - 87)  RR: 30 (22 Aug 2021 04:30) (16 - 32)  SpO2: 100% (22 Aug 2021 04:30) (96% - 100%)      PHYSICAL EXAM:  Constitutional: , NAD, intubated, sedated  Neck: supple  Respiratory: CTA bilat  Cardiovascular: RRR  Gastrointestinal: abdomen soft, nondistended. No obvious masses. Incisions c/d/i, ileostomy pink with thin stool and gas  Extremities: warm    LABS:                        9.2    37.99 )-----------( 175      ( 21 Aug 2021 02:35 )             28.9     08-21    136  |  103  |  29<H>  ----------------------------<  103<H>  4.0   |  21<L>  |  0.62    Ca    7.8<L>      21 Aug 2021 02:35  Phos  3.9     08-21  Mg     2.1     08-21    TPro  4.9<L>  /  Alb  1.7<L>  /  TBili  0.4  /  DBili  x   /  AST  40  /  ALT  142<H>  /  AlkPhos  179<H>  08-20    PT/INR - ( 20 Aug 2021 21:00 )   PT: 14.1 sec;   INR: 1.18 ratio         PTT - ( 20 Aug 2021 21:00 )  PTT:32.4 sec    Magnesium, Serum: 2.1 mg/dL (08-21 @ 02:35)  Phosphorus Level, Serum: 3.9 mg/dL (08-21 @ 02:35)  Magnesium, Serum: 2.1 mg/dL (08-20 @ 21:00)  Phosphorus Level, Serum: 3.6 mg/dL (08-20 @ 21:00)    OUTPUT  L upper drain: 250cc  L lower drain: 170cc  Ileostomy: 45cc INTERVAL HPI/OVERNIGHT EVENTS:  Patient is a 81yMale, no acute events overnight    Vital Signs Last 24 Hrs  T(C): 36.2 (21 Aug 2021 23:00), Max: 36.5 (21 Aug 2021 07:00)  T(F): 97.2 (21 Aug 2021 23:00), Max: 97.7 (21 Aug 2021 07:00)  HR: 101 (22 Aug 2021 04:30) (54 - 101)  BP: 121/60 (22 Aug 2021 04:15) (94/53 - 121/60)  BP(mean): 87 (22 Aug 2021 04:15) (69 - 87)  RR: 30 (22 Aug 2021 04:30) (16 - 32)  SpO2: 100% (22 Aug 2021 04:30) (96% - 100%)      PHYSICAL EXAM:  Constitutional: , NAD, intubated, sedated  Neck: supple  Respiratory: CTA bilat  Cardiovascular: RRR  Gastrointestinal: abdomen soft, nondistended. No obvious masses. Incisions c/d/i, ileostomy pink with thin stool and gas  Extremities: warm    LABS:                        9.2    37.99 )-----------( 175      ( 21 Aug 2021 02:35 )             28.9     08-21    136  |  103  |  29<H>  ----------------------------<  103<H>  4.0   |  21<L>  |  0.62    Ca    7.8<L>      21 Aug 2021 02:35  Phos  3.9     08-21  Mg     2.1     08-21    TPro  4.9<L>  /  Alb  1.7<L>  /  TBili  0.4  /  DBili  x   /  AST  40  /  ALT  142<H>  /  AlkPhos  179<H>  08-20    PT/INR - ( 20 Aug 2021 21:00 )   PT: 14.1 sec;   INR: 1.18 ratio         PTT - ( 20 Aug 2021 21:00 )  PTT:32.4 sec    Magnesium, Serum: 2.1 mg/dL (08-21 @ 02:35)  Phosphorus Level, Serum: 3.9 mg/dL (08-21 @ 02:35)  Magnesium, Serum: 2.1 mg/dL (08-20 @ 21:00)  Phosphorus Level, Serum: 3.6 mg/dL (08-20 @ 21:00)    OUTPUT:  L upper drain: 250cc  L lower drain: 170cc  Ileostomy: 45cc SURGERY PROGRESS NOTE    POD#2 Laparoscopic total abdominal colectomy with end ileostomy    INTERVAL HPI/OVERNIGHT EVENTS:  No acute events overnight. Responsive. Remains intubated on minimal pressors.     Vital Signs Last 24 Hrs  T(C): 36.2 (21 Aug 2021 23:00), Max: 36.5 (21 Aug 2021 07:00)  T(F): 97.2 (21 Aug 2021 23:00), Max: 97.7 (21 Aug 2021 07:00)  HR: 101 (22 Aug 2021 04:30) (54 - 101)  BP: 121/60 (22 Aug 2021 04:15) (94/53 - 121/60)  BP(mean): 87 (22 Aug 2021 04:15) (69 - 87)  RR: 30 (22 Aug 2021 04:30) (16 - 32)  SpO2: 100% (22 Aug 2021 04:30) (96% - 100%)      PHYSICAL EXAM:  Constitutional: , NAD, intubated, sedated  Neck: supple  Respiratory: CTA bilat  Cardiovascular: RRR  Gastrointestinal: abdomen soft, nondistended, tender around ostomy. No obvious masses. Incisions c/d/i with wet-to-dry dressing, ileostomy pink with stool and gas  Extremities: warm    LABS:                        9.2    37.99 )-----------( 175      ( 21 Aug 2021 02:35 )             28.9     08-21    136  |  103  |  29<H>  ----------------------------<  103<H>  4.0   |  21<L>  |  0.62    Ca    7.8<L>      21 Aug 2021 02:35  Phos  3.9     08-21  Mg     2.1     08-21    TPro  4.9<L>  /  Alb  1.7<L>  /  TBili  0.4  /  DBili  x   /  AST  40  /  ALT  142<H>  /  AlkPhos  179<H>  08-20    PT/INR - ( 20 Aug 2021 21:00 )   PT: 14.1 sec;   INR: 1.18 ratio         PTT - ( 20 Aug 2021 21:00 )  PTT:32.4 sec    Magnesium, Serum: 2.1 mg/dL (08-21 @ 02:35)  Phosphorus Level, Serum: 3.9 mg/dL (08-21 @ 02:35)  Magnesium, Serum: 2.1 mg/dL (08-20 @ 21:00)  Phosphorus Level, Serum: 3.6 mg/dL (08-20 @ 21:00)    OUTPUT:  L upper drain: 250cc  L lower drain: 170cc  Ileostomy: 45cc SURGERY PROGRESS NOTE    POD#2 Laparoscopic total abdominal colectomy with end ileostomy    INTERVAL HPI/OVERNIGHT EVENTS:  Large clot with dark blood per rectum ~200cc overnight. 2 units PRBCs given.  Remains intubated with minimal settings on minimal pressors. Responsive.     Vital Signs Last 24 Hrs  T(C): 36.2 (21 Aug 2021 23:00), Max: 36.5 (21 Aug 2021 07:00)  T(F): 97.2 (21 Aug 2021 23:00), Max: 97.7 (21 Aug 2021 07:00)  HR: 101 (22 Aug 2021 04:30) (54 - 101)  BP: 121/60 (22 Aug 2021 04:15) (94/53 - 121/60)  BP(mean): 87 (22 Aug 2021 04:15) (69 - 87)  RR: 30 (22 Aug 2021 04:30) (16 - 32)  SpO2: 100% (22 Aug 2021 04:30) (96% - 100%)      PHYSICAL EXAM:  Constitutional: NAD, intubated, responsive  Neck: supple  Respiratory: CTA bilaterally  Cardiovascular: RRR  Gastrointestinal: abdomen soft, nondistended, tender around ostomy. No obvious masses. Incisions c/d/i with wet-to-dry dressing, ileostomy pink with stool and gas, upper and lower L drains with mildly blood-tinged serous output  Extremities: warm    LABS:                        9.2->7.4->6.5  37.99 )-----------( 175      ( 21 Aug 2021 02:35 )             28.9     08-21    136  |  103  |  29<H>  ----------------------------<  103<H>  4.0   |  21<L>  |  0.62    Ca    7.8<L>      21 Aug 2021 02:35  Phos  3.9     08-21  Mg     2.1     08-21    TPro  4.9<L>  /  Alb  1.7<L>  /  TBili  0.4  /  DBili  x   /  AST  40  /  ALT  142<H>  /  AlkPhos  179<H>  08-20    PT/INR - ( 20 Aug 2021 21:00 )   PT: 14.1 sec;   INR: 1.18 ratio         PTT - ( 20 Aug 2021 21:00 )  PTT:32.4 sec    Magnesium, Serum: 2.1 mg/dL (08-21 @ 02:35)  Phosphorus Level, Serum: 3.9 mg/dL (08-21 @ 02:35)  Magnesium, Serum: 2.1 mg/dL (08-20 @ 21:00)  Phosphorus Level, Serum: 3.6 mg/dL (08-20 @ 21:00)    OUTPUT:  L upper drain: 250cc  L lower drain: 170cc  Ileostomy: 45cc

## 2021-08-22 NOTE — PROGRESS NOTE ADULT - ASSESSMENT
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

## 2021-08-22 NOTE — PROGRESS NOTE ADULT - SUBJECTIVE AND OBJECTIVE BOX
[facility] NUTRITION SUPPORT / TPN -- FOLLOW UP NOTE  --------------------------------------------------------------------------------    24 hour events/subjective:  Given 500cc bolus, restarted on ry.  Remains NPO/NGT on TPN.    Diet:  Diet, NPO (08-21-21 @ 06:43)      PAST HISTORY  --------------------------------------------------------------------------------  No significant changes to PMH, PSH, FHx, SHx, unless otherwise noted    ALLERGIES & MEDICATIONS  --------------------------------------------------------------------------------  Allergies    No Known Allergies    Intolerances      Standing Inpatient Medications  chlorhexidine 0.12% Liquid 15 milliLiter(s) Oral Mucosa every 12 hours  chlorhexidine 2% Cloths 1 Application(s) Topical daily  enoxaparin Injectable 40 milliGRAM(s) SubCutaneous every 24 hours  fat emulsion (Fish Oil and Plant Based) 20% Infusion 25 mL/Hr IV Continuous <Continuous>  insulin lispro (ADMELOG) corrective regimen sliding scale   SubCutaneous every 6 hours  methylPREDNISolone sodium succinate Injectable 20 milliGRAM(s) IV Push every 12 hours  pantoprazole  Injectable 40 milliGRAM(s) IV Push daily  Parenteral Nutrition - Adult 1 Each TPN Continuous <Continuous>  phenylephrine    Infusion 1 MICROgram(s)/kG/Min IV Continuous <Continuous>  piperacillin/tazobactam IVPB.. 3.375 Gram(s) IV Intermittent every 8 hours    PRN Inpatient Medications  HYDROmorphone  Injectable 0.5 milliGRAM(s) IV Push every 4 hours PRN      VITALS/PHYSICAL EXAM  --------------------------------------------------------------------------------  T(C): 36.5 (08-22-21 @ 07:00), Max: 36.5 (08-22-21 @ 07:00)  HR: 91 (08-22-21 @ 08:00) (54 - 101)  BP: 111/63 (08-22-21 @ 07:45) (94/53 - 121/60)  RR: 24 (08-22-21 @ 08:00) (16 - 41)  SpO2: 100% (08-22-21 @ 08:00) (96% - 100%)  Wt(kg): --  Height (cm): 170.2 (08-20-21 @ 14:14)  Weight (kg): 59 (08-20-21 @ 14:14)  BMI (kg/m2): 20.4 (08-20-21 @ 14:14)  BSA (m2): 1.68 (08-20-21 @ 14:14)      08-21-21 @ 07:01  -  08-22-21 @ 07:00  --------------------------------------------------------  IN: 3650.9 mL / OUT: 1430 mL / NET: 2220.9 mL          PHYSICAL EXAM:  Constitutional: , NAD, intubated, sedated  Neck: supple  Respiratory: CTA bilat  Cardiovascular: RRR  Gastrointestinal: abdomen soft, nondistended. No obvious masses. Incisions c/d/i, ileostomy pink with thin stool and gas  Extremities: warm        LABS/STUDIES  --------------------------------------------------------------------------------              6.5    28.81 >-----------<  114      [08-22-21 @ 05:31]              20.6     137  |  102  |  33  ----------------------------<  213      [08-22-21 @ 00:08]  3.8   |  23  |  0.59        Ca     7.6     [08-22-21 @ 00:08]      Mg     2.2     [08-22-21 @ 00:08]      Phos  3.8     [08-22-21 @ 00:08]      PT/INR: PT 14.1 , INR 1.18       [08-20-21 @ 21:00]  PTT: 32.4       [08-20-21 @ 21:00]      Ca ionizedBlood Gas Arterial - Calcium, Ionized: 1.25 mmol/L (08-22-21 @ 05:23)  Blood Gas Arterial - Calcium, Ionized: 1.14 mmol/L (08-22-21 @ 00:04)    Creatinine Trend:  POC glucoseGlucose, Serum: 213 mg/dL (08-22-21 @ 00:08)  CAPILLARY BLOOD GLUCOSE      POCT Blood Glucose.: 203 mg/dL (22 Aug 2021 06:06)  POCT Blood Glucose.: 192 mg/dL (22 Aug 2021 01:27)  POCT Blood Glucose.: 199 mg/dL (21 Aug 2021 17:06)  POCT Blood Glucose.: 195 mg/dL (21 Aug 2021 11:55)    PrealbuminPrealbumin, Serum: 8 mg/dL (08-15-21 @ 10:17)    Triglycerides

## 2021-08-22 NOTE — PROGRESS NOTE ADULT - ASSESSMENT
80 yo M with pmhx of Crohns disease (dxd 9 mos ago, hx of perianal fistula, sp tx w remicaide until developed ab; since managed on budesonide, mtx/5asa), h/o cdiff, CAD, afib (not on ac), p/w bloody diarrhea, abd pain and weakness. Admitted for crohns management, course c/b cdiff colitis sp dificid/fmt and findings of indeterminate quantiferon, pending id clearance to start biologic. Prealb 8. TPN consulted in setting of significant weight loss, severe pcm, and worsening colitis suspected 2/2 CD. Pt at high risk for refeeding syndrome. TPN started 8/16. Repeat CT showing unchanged diffuse colitis and non specific gb wall edema.  Taken to OR emergently on 8/20 for lap total colectomy, end ileostomy    TPN GOAL recommendations as per RD: , dextrose 210g, SMOF 60g    -cont TPN at goal:  105g AA, 210g dextrose, 60g SMOF in 2250cc total volume  -hypokalemia - continue KCl 100meq  -hypocalcemia- continue CaGluconate 12meq   -hyperglycemia- -203. Insulin in TPN increased from 10U to----------------------------------  -electrolyte imbalance risk- monitor CMP, Mg, Ionized Ca, Phosphorus qd  -check TG daily until stable on goal SMOF, then weekly   -strict I/O's  -care per primary team    TPN pager [phone], spectra [phone]  M-F 6A-4P, Weekends and holidays 8A-12P  discussed with Dr. Fernandez and Dr. SARMAD Multani    82 yo M with pmhx of Crohns disease (dxd 9 mos ago, hx of perianal fistula, sp tx w remicaide until developed ab; since managed on budesonide, mtx/5asa), h/o cdiff, CAD, afib (not on ac), p/w bloody diarrhea, abd pain and weakness. Admitted for crohns management, course c/b cdiff colitis sp dificid/fmt and findings of indeterminate quantiferon, pending id clearance to start biologic. Prealb 8. TPN consulted in setting of significant weight loss, severe pcm, and worsening colitis suspected 2/2 CD. Pt at high risk for refeeding syndrome. TPN started 8/16. Repeat CT showing unchanged diffuse colitis and non specific gb wall edema.  Taken to OR emergently on 8/20 for lap total colectomy, end ileostomy    TPN GOAL recommendations as per RD: , dextrose 210g, SMOF 60g    -cont TPN at goal:  105g AA, 210g dextrose, 60g SMOF in 2250cc total volume  -hypokalemia - continue KCl 100meq  -hypocalcemia- continue CaGluconate 12meq   -hyperglycemia- -203. Insulin in TPN increased from 10U to 20U.  Continue FS checks with ISS coverage  -electrolyte imbalance risk- monitor CMP, Mg, Ionized Ca, Phosphorus qd  -check TG daily until stable on goal SMOF, then weekly   -strict I/O's  -care per primary team    TPN pager [phone], spectra [phone]  M-F 6A-4P, Weekends and holidays 8A-12P  discussed with Dr. Fernandez and Dr. SARMAD Multani

## 2021-08-22 NOTE — PROGRESS NOTE ADULT - ASSESSMENT
81M with recently dx Crohn's disease c/b perianal abscess and left posterior distal anal intersphincteric fistula and presents diarrhea/BRBPR  2/2 to CD flare c/b c diff colitis. Initially treat for C diff, s/p FMT. Taken emergently to OR on 8/20 for acute deterioration for total colostomy with end ileostomy.    s/p lap TAC for fulminant colitis  wean pressors and vent per SICU  NPO, NGT, TPN  IV abx for ? perforation foud in OR   81M with recently dx Crohn's disease c/b perianal abscess and left posterior distal anal intersphincteric fistula and presents diarrhea/BRBPR  2/2 to CD flare c/b c diff colitis. Initially treat for C diff, s/p FMT. Taken emergently to OR on 8/20 for acute deterioration for total colostomy with end ileostomy.    PLAN:  - Wean pressors and vent per SICU  - NPO, NGT, TPN  - IV abx: Zosyn for ?perforation found in OR  - Appreciate excellent care per SICU    [facility]  [id number]   81M with recently dx Crohn's disease c/b perianal abscess and left posterior distal anal intersphincteric fistula and presents diarrhea/BRBPR  2/2 to CD flare c/b c diff colitis. Initially treat for C diff, s/p FMT. Taken emergently to OR on 8/20 for acute deterioration for total colostomy with end ileostomy.    PLAN:  - Wean pressors and vent per SICU  - NPO, NGT, TPN  - Monitor drain and ileostomy output  - IV abx: Zosyn for ?perforation found in OR  - Appreciate excellent care per SICU    [facility]  [id number]   81M with recently dx Crohn's disease c/b perianal abscess and left posterior distal anal intersphincteric fistula and presents diarrhea/BRBPR  2/2 to CD flare c/b c diff colitis. Initially treat for C diff, s/p FMT. Taken emergently to OR on 8/20 for acute deterioration for laparoscopic total colostomy with end ileostomy.    PLAN:  - Wean pressors and vent per SICU  - NPO, NGT, TPN  - Monitor drain and ileostomy output  - IV abx: Zosyn for ?perforation found in OR  - Appreciate excellent care per SICU    [facility]  [id number]   81M with recently dx Crohn's disease c/b perianal abscess and left posterior distal anal intersphincteric fistula and presents diarrhea/BRBPR  2/2 to CD flare c/b c diff colitis. Initially treat for C diff, s/p FMT. Taken emergently to OR on 8/20 for acute deterioration for laparoscopic total colostomy with end ileostomy.    PLAN:  - Transfused 2units PBRC for acute H&H decrease - f/u CBC  - Wean pressors and vent per SICU  - NPO, NGT, TPN  - Monitor drain and ileostomy output  - IV abx: Zosyn for ?perforation found in OR  - Appreciate excellent care per SICU    [facility]  [id number]

## 2021-08-22 NOTE — OCCUPATIONAL THERAPY INITIAL EVALUATION ADULT - DIAGNOSIS, OT EVAL
Pt p/w impaired balance, strength, AROM, endurance, coordination, motor control impacting ind with ADLs and fxnl mobility.

## 2021-08-22 NOTE — PROGRESS NOTE ADULT - ATTENDING COMMENTS
pt on/off pressors, no significant ostomy function  unable to be extubated yesterday due to not awake enough  + clot per rectum s/p 1u PRBCs    intubated/sedated  abd soft, ileostomy pink, incisions c/d/i    s/p TAC  wean vent  NPO, NGT, TPN  care per sicu  monitor rectal blood

## 2021-08-22 NOTE — PROGRESS NOTE ADULT - SUBJECTIVE AND OBJECTIVE BOX
HISTORY  81y Male M with Crohn disease, over the last 9 mo, he was started on several medication and was recently told that he may need to go on IV infusion for chrons, he has several hospitalization at [facility] , and follows with GI near [facility]. but his diarrhea is not improving , having abd cramps , denies any N/V . , discharged 1 mo ago from [facility], now having trouble walking, on going diarrhea. + subjective fever but denies cough or nasal congestion or uri or urinary symptoms.  (13 Jul 2021 20:48)      24 HOUR EVENTS:  - Given 500cc crystalloid, 500cc 5% albumin  - Restarted on Rui for MAP in 50s   SUBJECTIVE/ROS:  [ ] A ten-point review of systems was otherwise negative except as noted.  [ ] Due to altered mental status/intubation, subjective information were not able to be obtained from the patient. History was obtained, to the extent possible, from review of the chart and collateral sources of information.      NEURO  RASS: -1   Meds: HYDROmorphone  Injectable 0.5 milliGRAM(s) IV Push every 4 hours PRN breakthrough pain, agitation    [x] Adequacy of sedation and pain control has been assessed and adjusted      RESPIRATORY  RR: 26 (08-22-21 @ 06:30) (16 - 41)  SpO2: 100% (08-22-21 @ 06:30) (96% - 100%)  Exam: unlabored, clear to auscultation bilaterally  Mechanical Ventilation: Mode: AC/ CMV (Assist Control/ Continuous Mandatory Ventilation), RR (machine): 18, RR (patient): 23, TV (machine): 420, FiO2: 30, PEEP: 5, PS: 5, ITime: 0.9, MAP: 8, PIP: 14  ABG - ( 22 Aug 2021 05:23 )  pH: 7.49  /  pCO2: 34    /  pO2: 127   / HCO3: 26    / Base Excess: 2.4   /  SaO2: 99.2    Lactate: x      [N/A] Extubation Readiness Assessed  Meds: none       CARDIOVASCULAR  HR: 85 (08-22-21 @ 06:30) (54 - 101)  BP: 105/55 (08-22-21 @ 06:15) (94/53 - 121/60)  BP(mean): 76 (08-22-21 @ 06:15) (69 - 87)  ABP: 108/45 (08-22-21 @ 06:30) (66/60 - 150/65)  ABP(mean): 67 (08-22-21 @ 06:30) (61 - 98)  Exam: regular rate and rhythm  Cardiac Rhythm: sinus  Perfusion     [x]Adequate   [ ]Inadequate  Mentation   [x]Normal       [ ]Reduced  Extremities  [x]Warm         [ ]Cool  Volume Status [ ]Hypervolemic [x]Euvolemic [ ]Hypovolemic  Meds: phenylephrine    Infusion 1 MICROgram(s)/kG/Min IV Continuous <Continuous>        GI/NUTRITION  Exam: soft, nontender, nondistended  Diet: NPO   Meds: pantoprazole  Injectable 40 milliGRAM(s) IV Push daily      GENITOURINARY  I&O's Detail    08-21 @ 07:01  -  08-22 @ 07:00  --------------------------------------------------------  IN:    Dexmedetomidine: 9 mL    dextrose 5% + lactated ringers: 150 mL    Fat Emulsion (Fish Oil &amp; Plant Based) 20% Infusion: 50 mL    Fat Emulsion (Fish Oil &amp; Plant Based) 20% Infusion: 450 mL    IV PiggyBack: 500 mL    IV PiggyBack: 300 mL    Phenylephrine: 123.9 mL    TPN (Total Parenteral Nutrition): 2068 mL  Total IN: 3650.9 mL    OUT:    Bulb (mL): 250 mL    Bulb (mL): 170 mL    Ileostomy (mL): 45 mL    Indwelling Catheter - Urethral (mL): 895 mL    Nasogastric/Oral tube (mL): 70 mL  Total OUT: 1430 mL    Total NET: 2220.9 mL          08-22    137  |  102  |  33<H>  ----------------------------<  213<H>  3.8   |  23  |  0.59    Ca    7.6<L>      22 Aug 2021 00:08  Phos  3.8     08-22  Mg     2.2     08-22      [ ] Roberson catheter, indication: N/A  Meds: fat emulsion (Fish Oil and Plant Based) 20% Infusion 25 mL/Hr IV Continuous <Continuous>  Parenteral Nutrition - Adult 1 Each TPN Continuous <Continuous>        HEMATOLOGIC  Meds: enoxaparin Injectable 40 milliGRAM(s) SubCutaneous every 24 hours    [x] VTE Prophylaxis                        6.5    28.81 )-----------( 114      ( 22 Aug 2021 05:31 )             20.6     PT/INR - ( 20 Aug 2021 21:00 )   PT: 14.1 sec;   INR: 1.18 ratio         PTT - ( 20 Aug 2021 21:00 )  PTT:32.4 sec  Transfusion     [ ] PRBC   [ ] Platelets   [ ] FFP   [ ] Cryoprecipitate      INFECTIOUS DISEASES  WBC Count: 28.81 K/uL (08-22 @ 05:31)  WBC Count: 23.12 K/uL (08-22 @ 00:08)    RECENT CULTURES:    Meds: piperacillin/tazobactam IVPB.. 3.375 Gram(s) IV Intermittent every 8 hours        ENDOCRINE  CAPILLARY BLOOD GLUCOSE      POCT Blood Glucose.: 203 mg/dL (22 Aug 2021 06:06)  POCT Blood Glucose.: 192 mg/dL (22 Aug 2021 01:27)  POCT Blood Glucose.: 199 mg/dL (21 Aug 2021 17:06)  POCT Blood Glucose.: 195 mg/dL (21 Aug 2021 11:55)    Meds: insulin lispro (ADMELOG) corrective regimen sliding scale   SubCutaneous every 6 hours  methylPREDNISolone sodium succinate Injectable 20 milliGRAM(s) IV Push every 12 hours        ACCESS DEVICES:  [x] Peripheral IV  [ ] Central Venous Line	[ ] R	[ ] L	[ ] IJ	[ ] Fem	[ ] SC	Placed:   [ ] Arterial Line		[ ] R	[ ] L	[ ] Fem	[ ] Rad	[ ] Ax	Placed:   [ ] PICC:					[ ] Mediport  [ ] Urinary Catheter, Date Placed:   [x] Necessity of urinary, arterial, and venous catheters discussed    OTHER MEDICATIONS:  chlorhexidine 0.12% Liquid 15 milliLiter(s) Oral Mucosa every 12 hours  chlorhexidine 2% Cloths 1 Application(s) Topical daily      CODE STATUS: full code       IMAGING: < from: CT Abdomen and Pelvis w/ IV Cont (08.16.21 @ 11:51) >  IMPRESSION:  Diffuse colitis which may be infectious or inflammatory, unchanged.  Patent mesenteric vasculature.    Nonspecific gallbladder wall edema. Correlate for right upper quadrant pain.      < end of copied text >   HISTORY  81y Male M with Crohn disease, over the last 9 mo, he was started on several medication and was recently told that he may need to go on IV infusion for chrons, he has several hospitalization at [facility] , and follows with GI near [facility]. but his diarrhea is not improving , having abd cramps , denies any N/V . , discharged 1 mo ago from [facility], now having trouble walking, on going diarrhea. + subjective fever but denies cough or nasal congestion or uri or urinary symptoms.  (13 Jul 2021 20:48)      24 HOUR EVENTS:  - Passed blood clot per rectum with hematocrit drop  - Transfused 2u PRBC  - A.fib with RVR, given metoprolol 5mg IV      SUBJECTIVE/ROS:  [ ] A ten-point review of systems was otherwise negative except as noted.  [ ] Due to altered mental status/intubation, subjective information were not able to be obtained from the patient. History was obtained, to the extent possible, from review of the chart and collateral sources of information.      NEURO  RASS: -1   Meds: HYDROmorphone  Injectable 0.5 milliGRAM(s) IV Push every 4 hours PRN breakthrough pain, agitation    [x] Adequacy of sedation and pain control has been assessed and adjusted      RESPIRATORY  RR: 26 (08-22-21 @ 06:30) (16 - 41)  SpO2: 100% (08-22-21 @ 06:30) (96% - 100%)  Exam: unlabored, clear to auscultation bilaterally  Mechanical Ventilation: Mode: AC/ CMV (Assist Control/ Continuous Mandatory Ventilation), RR (machine): 18, RR (patient): 23, TV (machine): 420, FiO2: 30, PEEP: 5, PS: 5, ITime: 0.9, MAP: 8, PIP: 14  ABG - ( 22 Aug 2021 05:23 )  pH: 7.49  /  pCO2: 34    /  pO2: 127   / HCO3: 26    / Base Excess: 2.4   /  SaO2: 99.2    Lactate: x      [N/A] Extubation Readiness Assessed  Meds: none       CARDIOVASCULAR  HR: 85 (08-22-21 @ 06:30) (54 - 101)  BP: 105/55 (08-22-21 @ 06:15) (94/53 - 121/60)  BP(mean): 76 (08-22-21 @ 06:15) (69 - 87)  ABP: 108/45 (08-22-21 @ 06:30) (66/60 - 150/65)  ABP(mean): 67 (08-22-21 @ 06:30) (61 - 98)  Exam: regular rate and rhythm  Cardiac Rhythm: sinus  Perfusion     [x]Adequate   [ ]Inadequate  Mentation   [x]Normal       [ ]Reduced  Extremities  [x]Warm         [ ]Cool  Volume Status [ ]Hypervolemic [x]Euvolemic [ ]Hypovolemic  Meds: phenylephrine    Infusion 1 MICROgram(s)/kG/Min IV Continuous <Continuous>        GI/NUTRITION  Exam: soft, nontender, nondistended  Diet: NPO   Meds: pantoprazole  Injectable 40 milliGRAM(s) IV Push daily      GENITOURINARY  I&O's Detail    08-21 @ 07:01  -  08-22 @ 07:00  --------------------------------------------------------  IN:    Dexmedetomidine: 9 mL    dextrose 5% + lactated ringers: 150 mL    Fat Emulsion (Fish Oil &amp; Plant Based) 20% Infusion: 50 mL    Fat Emulsion (Fish Oil &amp; Plant Based) 20% Infusion: 450 mL    IV PiggyBack: 500 mL    IV PiggyBack: 300 mL    Phenylephrine: 123.9 mL    TPN (Total Parenteral Nutrition): 2068 mL  Total IN: 3650.9 mL    OUT:    Bulb (mL): 250 mL    Bulb (mL): 170 mL    Ileostomy (mL): 45 mL    Indwelling Catheter - Urethral (mL): 895 mL    Nasogastric/Oral tube (mL): 70 mL  Total OUT: 1430 mL    Total NET: 2220.9 mL          08-22    137  |  102  |  33<H>  ----------------------------<  213<H>  3.8   |  23  |  0.59    Ca    7.6<L>      22 Aug 2021 00:08  Phos  3.8     08-22  Mg     2.2     08-22      [ ] Roberson catheter, indication: N/A  Meds: fat emulsion (Fish Oil and Plant Based) 20% Infusion 25 mL/Hr IV Continuous <Continuous>  Parenteral Nutrition - Adult 1 Each TPN Continuous <Continuous>        HEMATOLOGIC  Meds: enoxaparin Injectable 40 milliGRAM(s) SubCutaneous every 24 hours    [x] VTE Prophylaxis                        6.5    28.81 )-----------( 114      ( 22 Aug 2021 05:31 )             20.6     PT/INR - ( 20 Aug 2021 21:00 )   PT: 14.1 sec;   INR: 1.18 ratio         PTT - ( 20 Aug 2021 21:00 )  PTT:32.4 sec  Transfusion     [ ] PRBC   [ ] Platelets   [ ] FFP   [ ] Cryoprecipitate      INFECTIOUS DISEASES  WBC Count: 28.81 K/uL (08-22 @ 05:31)  WBC Count: 23.12 K/uL (08-22 @ 00:08)    RECENT CULTURES:    Meds: piperacillin/tazobactam IVPB.. 3.375 Gram(s) IV Intermittent every 8 hours        ENDOCRINE  CAPILLARY BLOOD GLUCOSE      POCT Blood Glucose.: 203 mg/dL (22 Aug 2021 06:06)  POCT Blood Glucose.: 192 mg/dL (22 Aug 2021 01:27)  POCT Blood Glucose.: 199 mg/dL (21 Aug 2021 17:06)  POCT Blood Glucose.: 195 mg/dL (21 Aug 2021 11:55)    Meds: insulin lispro (ADMELOG) corrective regimen sliding scale   SubCutaneous every 6 hours  methylPREDNISolone sodium succinate Injectable 20 milliGRAM(s) IV Push every 12 hours        ACCESS DEVICES:  [x] Peripheral IV  [ ] Central Venous Line	[ ] R	[ ] L	[ ] IJ	[ ] Fem	[ ] SC	Placed:   [ ] Arterial Line		[ ] R	[ ] L	[ ] Fem	[ ] Rad	[ ] Ax	Placed:   [ ] PICC:					[ ] Mediport  [ ] Urinary Catheter, Date Placed:   [x] Necessity of urinary, arterial, and venous catheters discussed    OTHER MEDICATIONS:  chlorhexidine 0.12% Liquid 15 milliLiter(s) Oral Mucosa every 12 hours  chlorhexidine 2% Cloths 1 Application(s) Topical daily      CODE STATUS: full code       IMAGING: < from: CT Abdomen and Pelvis w/ IV Cont (08.16.21 @ 11:51) >  IMPRESSION:  Diffuse colitis which may be infectious or inflammatory, unchanged.  Patent mesenteric vasculature.    Nonspecific gallbladder wall edema. Correlate for right upper quadrant pain.      < end of copied text >

## 2021-08-22 NOTE — OCCUPATIONAL THERAPY INITIAL EVALUATION ADULT - PRECAUTIONS/LIMITATIONS, REHAB EVAL
c/b perianal abscess and left posterior distal anal intersphincteric fistula and presents diarrhea/BRBPR  2/2 to CD flare c/b c diff colitis. Initially treat for C diff, s/p FMT. Taken emergently to OR on 8/20 for acute deterioration for total colostomy with end ileostomy. c/b perianal abscess and left posterior distal anal intersphincteric fistula and presents diarrhea/BRBPR  2/2 to CD flare c/b c diff colitis. Initially treat for C diff, s/p FMT. Taken emergently to OR on 8/20 for acute deterioration for total colostomy with end ileostomy./fall precautions

## 2021-08-23 NOTE — PHYSICAL THERAPY INITIAL EVALUATION ADULT - CRITERIA FOR SKILLED THERAPEUTIC INTERVENTIONS
impairments found/functional limitations in following categories/risk reduction/prevention/rehab potential/therapy frequency/predicted duration of therapy intervention/anticipated equipment needs at discharge/anticipated discharge recommendation
impairments found/functional limitations in following categories

## 2021-08-23 NOTE — PROGRESS NOTE ADULT - SUBJECTIVE AND OBJECTIVE BOX
Patient is a 81y old  Male who presents with a chief complaint of diarrhea with blood in it , abd pain and weakness (23 Aug 2021 12:46)      INTERVAL HPI/OVERNIGHT EVENTS: doing fair , feeling very weak   T(C): 37 (08-23-21 @ 19:00), Max: 37.1 (08-23-21 @ 15:00)  HR: 91 (08-23-21 @ 22:00) (78 - 156)  BP: 131/62 (08-23-21 @ 07:43) (131/62 - 143/63)  RR: 21 (08-23-21 @ 22:00) (18 - 44)  SpO2: 100% (08-23-21 @ 22:00) (92% - 100%)  Wt(kg): --  I&O's Summary    22 Aug 2021 07:01  -  23 Aug 2021 07:00  --------------------------------------------------------  IN: 2831 mL / OUT: 1685 mL / NET: 1146 mL    23 Aug 2021 07:01  -  23 Aug 2021 23:42  --------------------------------------------------------  IN: 1735 mL / OUT: 1125 mL / NET: 610 mL        PAST MEDICAL & SURGICAL HISTORY:  No pertinent past medical history    No significant past surgical history        SOCIAL HISTORY  Alcohol:  Tobacco:  Illicit substance use:    FAMILY HISTORY:    REVIEW OF SYSTEMS:  CONSTITUTIONAL: No fever, weight loss, or fatigue  EYES: No eye pain, visual disturbances, or discharge  ENMT:  No difficulty hearing, tinnitus, vertigo; No sinus or throat pain  NECK: No pain or stiffness  RESPIRATORY: No cough, wheezing, chills or hemoptysis; No shortness of breath  CARDIOVASCULAR: No chest pain, palpitations, dizziness, or leg swelling  GASTROINTESTINAL: No abdominal or epigastric pain. No nausea, vomiting, or hematemesis; No diarrhea or constipation. No melena or hematochezia.  GENITOURINARY: No dysuria, frequency, hematuria, or incontinence  NEUROLOGICAL: No headaches, memory loss, loss of strength, numbness, or tremors  SKIN: No itching, burning, rashes, or lesions   LYMPH NODES: No enlarged glands  ENDOCRINE: No heat or cold intolerance; No hair loss  MUSCULOSKELETAL: No joint pain or swelling; No muscle, back, or extremity pain  PSYCHIATRIC: No depression, anxiety, mood swings, or difficulty sleeping  HEME/LYMPH: No easy bruising, or bleeding gums  ALLERY AND IMMUNOLOGIC: No hives or eczema    RADIOLOGY & ADDITIONAL TESTS:    Imaging Personally Reviewed:  [ ] YES  [ ] NO    Consultant(s) Notes Reviewed:  [ ] YES  [ ] NO    PHYSICAL EXAM:  GENERAL: NAD, well-groomed, well-developed  HEAD:  Atraumatic, Normocephalic  EYES: EOMI, PERRLA, conjunctiva and sclera clear  ENMT: No tonsillar erythema, exudates, or enlargement; Moist mucous membranes, Good dentition, No lesions  NECK: Supple, No JVD, Normal thyroid  NERVOUS SYSTEM:  Alert & Oriented X3, Good concentration; Motor Strength 5/5 B/L upper and lower extremities; DTRs 2+ intact and symmetric  CHEST/LUNG: Clear to percussion bilaterally; No rales, rhonchi, wheezing, or rubs  HEART: Regular rate and rhythm; No murmurs, rubs, or gallops  ABDOMEN: Soft, Nontender, Nondistended; Bowel sounds present  EXTREMITIES:  2+ Peripheral Pulses, No clubbing, cyanosis, or edema  LYMPH: No lymphadenopathy noted  SKIN: No rashes or lesions    LABS:                        9.0    22.07 )-----------( See note    ( 23 Aug 2021 11:40 )             27.0     08-23    137  |  106  |  34<H>  ----------------------------<  145<H>  4.5   |  23  |  0.43<L>    Ca    7.9<L>      23 Aug 2021 11:40  Phos  3.2     08-23  Mg     2.1     08-23      PT/INR - ( 22 Aug 2021 23:53 )   PT: 12.4 sec;   INR: 1.04 ratio         PTT - ( 22 Aug 2021 23:53 )  PTT:32.0 sec    CAPILLARY BLOOD GLUCOSE      POCT Blood Glucose.: 120 mg/dL (23 Aug 2021 18:07)  POCT Blood Glucose.: 158 mg/dL (23 Aug 2021 05:36)    ABG - ( 23 Aug 2021 11:32 )  pH, Arterial: 7.52  pH, Blood: x     /  pCO2: 33    /  pO2: 78    / HCO3: 27    / Base Excess: 4.0   /  SaO2: 97.5                    MEDICATIONS  (STANDING):  chlorhexidine 2% Cloths 1 Application(s) Topical daily  enoxaparin Injectable 40 milliGRAM(s) SubCutaneous every 24 hours  fat emulsion (Fish Oil and Plant Based) 20% Infusion 25 mL/Hr (25 mL/Hr) IV Continuous <Continuous>  insulin lispro (ADMELOG) corrective regimen sliding scale   SubCutaneous every 6 hours  metoprolol tartrate Injectable 5 milliGRAM(s) IV Push every 4 hours  pantoprazole  Injectable 40 milliGRAM(s) IV Push daily  Parenteral Nutrition - Adult 1 Each (94 mL/Hr) TPN Continuous <Continuous>  piperacillin/tazobactam IVPB.. 3.375 Gram(s) IV Intermittent every 8 hours    MEDICATIONS  (PRN):  acetaminophen  IVPB .. 1000 milliGRAM(s) IV Intermittent every 6 hours PRN Mild Pain (1 - 3)      Care Discussed with Consultants/Other Providers [ ] YES  [ ] NO

## 2021-08-23 NOTE — PHYSICAL THERAPY INITIAL EVALUATION ADULT - LIVES WITH, PROFILE
Per chart, pt lives with spouse in [address] and 14 steps inside. Pt was independent with self care and functional mobility PTA without AD.
spouse

## 2021-08-23 NOTE — PROGRESS NOTE ADULT - ASSESSMENT
82 yo M with Crohn's on treatment, recently cared for at [facility],adnutted 7/13/21 with ongoing diarrhea    Antibiotics  Flagyl 7/13 7/21-->25  Cipro 7/13-->14  po Vanco 7/14 -->-->7/29; 8/1-->8/2  (7/28: colonoscopic FMT)  Fidaxomicin 8/2--> 8/20  Zosyn 8/20-->      1) Crohns colitis s/p colectomy 8/20  2) C diff colitis   s/p Fidaxomcin   - S/p colonoscopic FMT 7/28  3) Leukocytosis  4) previous Para flu 3 viral uri with cough and fevers  5) malnourished state, hypoalbuminema  TPN/Smoflipid 8/16-->  6) debility  7) transaminitis     8/20 Total colectomy with end-ileostomy, murky ascitis noted and washed out   currently extubated    Suggest  Continue Zosyn  await path  repeat LFTs

## 2021-08-23 NOTE — PROGRESS NOTE ADULT - ASSESSMENT
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 SICU consulted for hemodynamic monitoring.     Interval Events  - Started on metoprolol 5mg. Q6 hrs   - Lovenox for VTE prophylaxis started   - Extubated, follows commands but lethargic     Plan:  Neurologic:   - N tylenol for pain control     Respiratory:   - spo2 adequate  - On mechanical ventilation; daily SBT will attempt extubation in AM     Cardiovascular:   - NSR  - Required pressors postoperatively; will wean as tolerated  - History of AFib currently rate controlled with IV metoprolol   - Continue IV lopressor 5 Q6 hrs     Gastrointestinal/Nutrition:   - NPO  - NGT in place; ileostomy pink and viable without output  - Surgical sites CDI   - Will continue TPN     Renal/Genitourinary:   - Monitor and replete electrolytes as needed  - Monitor UO  - Continue with [place]     Hematologic:   - Treng H&H and transfuse Hgb < 7  - Lovenox for DVT ppx     Infectious Disease: C. diff colitis s/p subtotal colectomy   - Zoyn emperically    Tubes/Lines/Drains:   - Arterial line  - PIV  - NGT     Endocrine:   - Monitor BMP  - C/W steroid taper   - ISS     Disposition: SICU

## 2021-08-23 NOTE — PROGRESS NOTE ADULT - SUBJECTIVE AND OBJECTIVE BOX
Chief Complaint:  Patient is a 81y old  Male who presents with a chief complaint of diarrhea with blood in it , abd pain and weakness (23 Aug 2021 08:55)      Interval Events: Extubated yesterday. Is lethargic and minimally responsive this AM.      Hospital Medications:  chlorhexidine 2% Cloths 1 Application(s) Topical daily  enoxaparin Injectable 40 milliGRAM(s) SubCutaneous every 24 hours  fat emulsion (Fish Oil and Plant Based) 20% Infusion 25 mL/Hr IV Continuous <Continuous>  insulin lispro (ADMELOG) corrective regimen sliding scale   SubCutaneous every 6 hours  methylPREDNISolone sodium succinate Injectable 20 milliGRAM(s) IV Push every 12 hours  metoprolol tartrate Injectable 5 milliGRAM(s) IV Push every 4 hours  pantoprazole  Injectable 40 milliGRAM(s) IV Push daily  Parenteral Nutrition - Adult 1 Each TPN Continuous <Continuous>  Parenteral Nutrition - Adult 1 Each TPN Continuous <Continuous>  piperacillin/tazobactam IVPB.. 3.375 Gram(s) IV Intermittent every 8 hours      PMHX/PSHX:  No pertinent past medical history    No significant past surgical history    GENERAL:  NAD, Appears stated age  HEENT:  NC/AT,  conjunctivae clear and pink, sclera -anicteric  CHEST:  Normal Effort  ABDOMEN:   soft, Non-tender, Non-distended. Ostomy pink and viable with dark output  EXTREMITIES:  no cyanosis or edema  SKIN:  Warm & Dry. No rash or erythema  NEURO:  lethargic and minimally responsive      Vital Signs:  Vital Signs Last 24 Hrs  T(C): 36.3 (23 Aug 2021 07:00), Max: 36.5 (22 Aug 2021 15:00)  T(F): 97.3 (23 Aug 2021 07:00), Max: 97.7 (22 Aug 2021 15:00)  HR: 101 (23 Aug 2021 10:00) (78 - 158)  BP: 131/62 (23 Aug 2021 07:43) (111/80 - 155/73)  BP(mean): 89 (23 Aug 2021 07:43) (80 - 105)  RR: 27 (23 Aug 2021 10:00) (23 - 40)  SpO2: 99% (23 Aug 2021 10:00) (99% - 100%)  Daily     Daily Weight in k.2 (23 Aug 2021 04:00)    LABS:                        8.7    23.38 )-----------( 103      ( 22 Aug 2021 23:53 )             26.4     08    137  |  106  |  34<H>  ----------------------------<  168<H>  4.5   |  23  |  0.46<L>    Ca    7.8<L>      22 Aug 2021 23:53  Phos  3.4       Mg     2.1             PT/INR - ( 22 Aug 2021 23:53 )   PT: 12.4 sec;   INR: 1.04 ratio         PTT - ( 22 Aug 2021 23:53 )  PTT:32.0 sec        Imaging:             Chief Complaint:  Patient is a 81y old  Male who presents with a chief complaint of diarrhea with blood in it , abd pain and weakness (23 Aug 2021 08:55)      Interval Events: Extubated yesterday. Is lethargic and minimally responsive this AM.      Hospital Medications:  chlorhexidine 2% Cloths 1 Application(s) Topical daily  enoxaparin Injectable 40 milliGRAM(s) SubCutaneous every 24 hours  fat emulsion (Fish Oil and Plant Based) 20% Infusion 25 mL/Hr IV Continuous <Continuous>  insulin lispro (ADMELOG) corrective regimen sliding scale   SubCutaneous every 6 hours  methylPREDNISolone sodium succinate Injectable 20 milliGRAM(s) IV Push every 12 hours  metoprolol tartrate Injectable 5 milliGRAM(s) IV Push every 4 hours  pantoprazole  Injectable 40 milliGRAM(s) IV Push daily  Parenteral Nutrition - Adult 1 Each TPN Continuous <Continuous>  Parenteral Nutrition - Adult 1 Each TPN Continuous <Continuous>  piperacillin/tazobactam IVPB.. 3.375 Gram(s) IV Intermittent every 8 hours      PMHX/PSHX:  No pertinent past medical history    No significant past surgical history    GENERAL:  NAD, Appears stated age  HEENT:  NC/AT,  conjunctivae clear and pink, sclera -anicteric  CHEST:  Normal Effort  ABDOMEN:   soft, Non-tender, Non-distended. Ostomy pink and viable with dark bilious output  EXTREMITIES:  no cyanosis or edema  SKIN:  Warm & Dry. No rash or erythema  NEURO:  lethargic and minimally responsive      Vital Signs:  Vital Signs Last 24 Hrs  T(C): 36.3 (23 Aug 2021 07:00), Max: 36.5 (22 Aug 2021 15:00)  T(F): 97.3 (23 Aug 2021 07:00), Max: 97.7 (22 Aug 2021 15:00)  HR: 101 (23 Aug 2021 10:00) (78 - 158)  BP: 131/62 (23 Aug 2021 07:43) (111/80 - 155/73)  BP(mean): 89 (23 Aug 2021 07:43) (80 - 105)  RR: 27 (23 Aug 2021 10:00) (23 - 40)  SpO2: 99% (23 Aug 2021 10:00) (99% - 100%)  Daily     Daily Weight in k.2 (23 Aug 2021 04:00)    LABS:                        8.7    23.38 )-----------( 103      ( 22 Aug 2021 23:53 )             26.4         137  |  106  |  34<H>  ----------------------------<  168<H>  4.5   |  23  |  0.46<L>    Ca    7.8<L>      22 Aug 2021 23:53  Phos  3.4       Mg     2.1             PT/INR - ( 22 Aug 2021 23:53 )   PT: 12.4 sec;   INR: 1.04 ratio         PTT - ( 22 Aug 2021 23:53 )  PTT:32.0 sec     negative...

## 2021-08-23 NOTE — PHYSICAL THERAPY INITIAL EVALUATION ADULT - GAIT TRAINING, PT EVAL
GOAL: Pt will ambulate 50' minAx1 with or without AD as needed in 3-4wks.
Pt will ambulate 200-300 ft independently with RW in 2 weeks

## 2021-08-23 NOTE — PROGRESS NOTE ADULT - ATTENDING COMMENTS
Patient seen and examined, agree with above. S/p colectomy with end ileostomy for severe Crohn's colitis. Continue to taper steroids to off.

## 2021-08-23 NOTE — PHYSICAL THERAPY INITIAL EVALUATION ADULT - TRANSFER TRAINING, PT EVAL
GOAL: Pt will perform sit to/from stand transfers CGA with/without AD as needed within 3-4weeks.
Pt will transfer independenly with RW or least restrictive AD in 2 weeks.

## 2021-08-23 NOTE — PHYSICAL THERAPY INITIAL EVALUATION ADULT - BALANCE TRAINING, PT EVAL
GOAL: Pt will improve balance during (static/dynamic) (sitting/standing) activites by atleast 1 balance grade within 3-4 weeks to assist with greater independence during functional mobility and ADL's.
normal balance for safe upright activity in 2 weeks

## 2021-08-23 NOTE — PHYSICAL THERAPY INITIAL EVALUATION ADULT - IMPAIRMENTS FOUND, PT EVAL
gait, locomotion, and balance/gross motor/muscle strength
aerobic capacity/endurance/gait, locomotion, and balance/muscle strength

## 2021-08-23 NOTE — PROGRESS NOTE ADULT - ASSESSMENT
81y Male with GERD on omeprazole daily and Crohn's disease diagnosed about 9 mos prior to admission at outside hospital c/b h/o abscess and left posterior distal anal intersphincteric fistula and presents diarrhea/BRBPR  2/2 to CD flare c/b c diff colitis. Initially treat for C diff, given that it did not get better got FMT. Switched PO vanc in favor of Fidaxomicin without any improvement. Therefore, patient got Flex sig to reassess which showed no pseudomembranous colitis anymore but severe Crohn colitis. Was started on steroids on 8/9. Has had minimal improvement. Started tapering IV steroids and pt is currently on TPN as he has had poor PO intake. Case was discussed at IBD conference 8/18 and CRS + GI team in agreement with surgical resection. s/p laparoscopic total abdominal colectomy with end ileostomy on 8/20. Now recovering in SICU.    # c diff - pseudomembranous colitis seen on colonoscopy 7/19/21, treated w/ vanc, fidaxomicin, FMT; repeat flex sig w/o further pseudomembranes but ongoing severe CD. 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    Teri Hess MD  GI Fellow, PGY-4  Available via [employer] Teams    NON-URGENT CONSULTS:  Please email [email or web address]  AT NIGHT AND ON WEEKENDS:  Contact on-call GI fellow via answering service ([phone]) from 5pm-8am and on weekends/holidays  MONDAY-FRIDAY 8AM-5PM:  Pager# [phone] ([facility]) or [phone] ([facility])  GI Phone# [phone] ([facility])       81y Male with GERD on omeprazole daily and Crohn's disease diagnosed about 9 mos prior to admission at outside hospital c/b h/o abscess and left posterior distal anal intersphincteric fistula and presents diarrhea/BRBPR  2/2 to CD flare c/b c diff colitis. Initially treat for C diff, given that it did not get better got FMT. Switched PO vanc in favor of Fidaxomicin without any improvement. Therefore, patient got Flex sig to reassess which showed no pseudomembranous colitis anymore but severe Crohn colitis. Was started on steroids on 8/9. Has had minimal improvement. Started tapering IV steroids and pt is currently on TPN as he has had poor PO intake. Case was discussed at IBD conference 8/18 and CRS + GI team in agreement with surgical resection. s/p laparoscopic total abdominal colectomy with end ileostomy on 8/20. Now recovering in SICU.    # c diff - pseudomembranous colitis seen on colonoscopy 7/19/21, treated w/ vanc, fidaxomicin, FMT; repeat flex sig w/o further pseudomembranes but ongoing severe CD. 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    Teri Hess MD  GI Fellow, PGY-4  Available via [employer] Teams    NON-URGENT CONSULTS:  Please email [email or web address]  AT NIGHT AND ON WEEKENDS:  Contact on-call GI fellow via answering service ([phone]) from 5pm-8am and on weekends/holidays  MONDAY-FRIDAY 8AM-5PM:  Pager# [phone] ([facility]) or [phone] ([facility])  GI Phone# [phone] ([facility])

## 2021-08-23 NOTE — PHYSICAL THERAPY INITIAL EVALUATION ADULT - IMPAIRMENTS CONTRIBUTING IMPAIRED BED MOBILITY, REHAB EVAL
impaired balance/decreased strength
impaired balance/impaired coordination/impaired motor control/impaired postural control/decreased strength

## 2021-08-23 NOTE — PHYSICAL THERAPY INITIAL EVALUATION ADULT - MANUAL MUSCLE TESTING RESULTS, REHAB EVAL
grossly 2-/5 except B  knee grossly 3-/5 and R ankle 1/5
grossly 3/5 t/o extremities/grossly assessed due to

## 2021-08-23 NOTE — PROGRESS NOTE ADULT - SUBJECTIVE AND OBJECTIVE BOX
Follow Up:  s/p colectomy    Interval History/ROS: lethargic, son and daughter at bedside    Allergies  No Known Allergies        ANTIMICROBIALS:  piperacillin/tazobactam IVPB.. 3.375 every 8 hours      OTHER MEDS:  MEDICATIONS  (STANDING):  enoxaparin Injectable 40 every 24 hours  insulin lispro (ADMELOG) corrective regimen sliding scale  every 6 hours  methylPREDNISolone sodium succinate Injectable 20 every 12 hours  metoprolol tartrate Injectable 5 every 4 hours  pantoprazole  Injectable 40 daily      Vital Signs Last 24 Hrs  T(C): 37 (23 Aug 2021 11:00), Max: 37 (23 Aug 2021 11:00)  T(F): 98.6 (23 Aug 2021 11:00), Max: 98.6 (23 Aug 2021 11:00)  HR: 88 (23 Aug 2021 12:00) (78 - 158)  BP: 131/62 (23 Aug 2021 07:43) (111/80 - 155/73)  BP(mean): 89 (23 Aug 2021 07:43) (80 - 105)  RR: 33 (23 Aug 2021 12:00) (23 - 44)  SpO2: 100% (23 Aug 2021 12:00) (92% - 100%)    PHYSICAL EXAM:  General: NAD, Non-toxic  Neurology: lethargic  Respiratory: Clear to auscultation bilaterally  CV: RRR, S1S2, no murmurs, rubs or gallops  Abdominal: Soft, Non-tender, non-distended, ostomy in place, mucosa intact  Extremities: No edema,   Line Sites: Clear  Skin: No rash                        9.0    22.07 )-----------( x        ( 23 Aug 2021 11:40 )             27.0     WBC Count: 22.07 (08-23 @ 11:40)  WBC Count: 23.38 (08-22 @ 23:53)  WBC Count: 28.83 (08-22 @ 14:21)  WBC Count: 28.81 (08-22 @ 05:31)  WBC Count: 23.12 (08-22 @ 00:08)  WBC Count: 37.99 (08-21 @ 02:35)  WBC Count: 26.33 (08-20 @ 21:00)  WBC Count: 23.31 (08-20 @ 06:40)  WBC Count: 15.91 (08-19 @ 08:59)  WBC Count: 10.19 (08-18 @ 16:12)      08-23    137  |  106  |  34<H>  ----------------------------<  145<H>  4.5   |  23  |  0.43<L>    Ca    7.9<L>      23 Aug 2021 11:40  Phos  3.2     08-23  Mg     2.1     08-23      MICROBIOLOGY:  .Blood Blood-Peripheral  08-05-21   No Growth Final  --  --      .Blood Blood-Peripheral  08-05-21   No Growth Final  --  --      .Blood Blood-Peripheral  07-30-21   No Growth Final  --  --      .Blood Blood-Peripheral  07-27-21   No Growth Final  --  --      RADIOLOGY:  < from: Xray Chest 1 View- PORTABLE-Routine (Xray Chest 1 View- PORTABLE-Routine in AM.) (08.23.21 @ 08:35) >  The heart is normal in size. Collapse right lower lobe. The left lung appears to be clear. NG tube is in the stomach. A PICC line is seen on the right and the tip is in the superior vena cava. No pneumothorax. Degenerative changes of the thoracic spine.    < end of copied text >  < from: CT Abdomen and Pelvis w/ IV Cont (08.16.21 @ 11:51) >  IMPRESSION:  Diffuse colitis which may be infectious or inflammatory, unchanged.  Patent mesenteric vasculature.    Nonspecific gallbladder wall edema. Correlate for right upper quadrant pain.    < end of copied text >      Benjamin Phillips MD; Division of Infectious Disease; Pager: [phone]; nights and weekends: [phone]

## 2021-08-23 NOTE — PROGRESS NOTE ADULT - ASSESSMENT
A/p    s/p total colectomy with ileostomy   -in SICU   -surgery following     post op resp failure on mech vent   s/p extubation   doing fair     leukocytosis :  -on steroids     hypotension :  resolved     Chron's dis : s/p total colectomy with ileostomy   -stable

## 2021-08-23 NOTE — PROGRESS NOTE ADULT - SUBJECTIVE AND OBJECTIVE BOX
[facility] NUTRITION SUPPORT-- FOLLOW UP NOTE  --------------------------------------------------------------------------------  24 hour events/subjective: pt seen and examined. tpn running. lethargic in bed but arousable. extubated and sp 2u prbc yesterday. outputs reviewed. FS trend noted.    Diet:  Diet, NPO (08-21-21 @ 06:43)      PAST HISTORY  --------------------------------------------------------------------------------  No significant changes to PMH, PSH, FHx, SHx, unless otherwise noted    ALLERGIES & MEDICATIONS  --------------------------------------------------------------------------------  Allergies    No Known Allergies    Intolerances      Standing Inpatient Medications  chlorhexidine 2% Cloths 1 Application(s) Topical daily  enoxaparin Injectable 40 milliGRAM(s) SubCutaneous every 24 hours  fat emulsion (Fish Oil and Plant Based) 20% Infusion 25 mL/Hr IV Continuous <Continuous>  insulin lispro (ADMELOG) corrective regimen sliding scale   SubCutaneous every 6 hours  methylPREDNISolone sodium succinate Injectable 20 milliGRAM(s) IV Push every 12 hours  metoprolol tartrate Injectable 5 milliGRAM(s) IV Push every 4 hours  pantoprazole  Injectable 40 milliGRAM(s) IV Push daily  Parenteral Nutrition - Adult 1 Each TPN Continuous <Continuous>  piperacillin/tazobactam IVPB.. 3.375 Gram(s) IV Intermittent every 8 hours    PRN Inpatient Medications        REVIEW OF SYSTEMS  --------------------------------------------------------------------------------    unable to obtain, see hpi      VITALS/PHYSICAL EXAM  --------------------------------------------------------------------------------  T(C): 36.3 (08-23-21 @ 07:00), Max: 36.6 (08-22-21 @ 11:00)  HR: 84 (08-23-21 @ 07:43) (78 - 158)  BP: 131/62 (08-23-21 @ 07:43) (111/80 - 155/73)  RR: 25 (08-23-21 @ 07:43) (18 - 40)  SpO2: 100% (08-23-21 @ 07:43) (93% - 100%)  Wt(kg): --      08-22-21 @ 07:01  -  08-23-21 @ 07:00  --------------------------------------------------------  IN: 2831 mL / OUT: 1685 mL / NET: 1146 mL    08-23-21 @ 07:01  -  08-23-21 @ 08:55  --------------------------------------------------------  IN: 94 mL / OUT: 75 mL / NET: 19 mL      I&O's Detail    22 Aug 2021 07:01  -  23 Aug 2021 07:00  --------------------------------------------------------  IN:    Fat Emulsion (Fish Oil &amp; Plant Based) 20% Infusion: 325 mL    IV PiggyBack: 250 mL    TPN (Total Parenteral Nutrition): 2256 mL  Total IN: 2831 mL    OUT:    Bulb (mL): 90 mL    Bulb (mL): 250 mL    Ileostomy (mL): 150 mL    Indwelling Catheter - Urethral (mL): 1095 mL    Nasogastric/Oral tube (mL): 100 mL  Total OUT: 1685 mL    Total NET: 1146 mL      23 Aug 2021 07:01  -  23 Aug 2021 08:55  --------------------------------------------------------  IN:    TPN (Total Parenteral Nutrition): 94 mL  Total IN: 94 mL    OUT:    Fat Emulsion (Fish Oil &amp; Plant Based) 20% Infusion: 0 mL    Indwelling Catheter - Urethral (mL): 75 mL  Total OUT: 75 mL    Total NET: 19 mL      Physical Exam:  Gen: lying in bed, frail, cachectic   HEENT: ncat, trachea midline +ngt  Chest: respirations non labored  Abd: soft nd ostomy pink +dressings c/d/i +alcides  LE:  no edema  Neuro: lethargic but arousable      LABS/STUDIES  --------------------------------------------------------------------------------              8.7    23.38 >-----------<  103      [08-22-21 @ 23:53]              26.4     137  |  106  |  34  ----------------------------<  168      [08-22-21 @ 23:53]  4.5   |  23  |  0.46        Ca     7.8     [08-22-21 @ 23:53]      Mg     2.1     [08-22-21 @ 23:53]      Phos  3.4     [08-22-21 @ 23:53]      PT/INR: PT 12.4 , INR 1.04       [08-22-21 @ 23:53]  PTT: 32.0       [08-22-21 @ 23:53]      Ca ionizedBlood Gas Arterial - Calcium, Ionized: 1.15 mmol/L (08-22-21 @ 23:51)  Blood Gas Arterial - Calcium, Ionized: 1.16 mmol/L (08-22-21 @ 14:09)    Creatinine Trend:  POC glucoseGlucose, Serum: 168 mg/dL (08-22-21 @ 23:53)  CAPILLARY BLOOD GLUCOSE      POCT Blood Glucose.: 158 mg/dL (23 Aug 2021 05:36)  POCT Blood Glucose.: 138 mg/dL (22 Aug 2021 17:42)  POCT Blood Glucose.: 155 mg/dL (22 Aug 2021 11:31)    PrealbuminPrealbumin, Serum: 8 mg/dL (08-15-21 @ 10:17)    Triglycerides

## 2021-08-23 NOTE — PROGRESS NOTE ADULT - ASSESSMENT
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 Admitted for crohns management, course c/b cdiff colitis sp dificid/fmt and findings of indeterminate quantiferon, pending id clearance to start biologic. Prealb 8. TPN consulted in setting of significant weight loss, severe pcm, and worsening colitis suspected 2/2 CD. Pt at high risk for refeeding syndrome. TPN started 8/16. Repeat CT showing unchanged diffuse colitis and non specific gb wall edema.  Taken to OR emergently on 8/20 for lap total colectomy, end ileostomy    TPN GOAL recommendations as per RD: , dextrose 210g, SMOF 60g    -cont TPN at goal; PICC in place, maintenance as per protocol  -h/o hypokalemia- continue KCl 100meq  -h/o hypocalcemia continue CaGluconate 14meq   -hyperglycemia- FS trend/SSI coverage noted, remains on IV steroids, cont with 20U total of insulin in TPN bag for now, will adjust as needed; cont FS checks with ISS coverage  -electrolyte imbalance risk- monitor CMP, Mg, Ionized Ca, Phosphorus qd  -monitor TG weekly (8/27); strict I/O's  -care per primary/SICU    discussed with Dr. Fernandez and Dr. SARMAD Multani, agreeable with above    TPN pager [phone], spectra [phone]  M-F 6A-4P, Weekends and holidays 8A-12P

## 2021-08-23 NOTE — PROGRESS NOTE ADULT - ATTENDING COMMENTS
Extubated yesterday without issues. UOP and WBC improving.  Having some scant ostomy output    abd soft, non-distended, appropriately tender to palpation  healthy stoma w/ small amount of dark (almost melanotic) ostomy output  dressings c/d/i  NG tube in place  Sánchez w/ clear urine    - NG tube removed at bedside, okay for sips of clear  - continue TPN  - trend labs  - continue sánchez  - would benefit from PT  - continue IV abx  - possible transfer to the floor tomorrow    Ca Malone MD  Attending Physician

## 2021-08-23 NOTE — PROGRESS NOTE ADULT - ASSESSMENT
Assessment	  81M with recently dx Crohn's disease c/b perianal abscess and left posterior distal anal intersphincteric fistula and presents diarrhea/BRBPR  2/2 to CD flare c/b c diff colitis. Initially treat for C diff, s/p FMT. Taken emergently to OR on 8/20 for acute deterioration for laparoscopic total colostomy with end ileostomy.     PLAN:  - NPO, NGT, TPN  - Monitor drain and ileostomy output  - IV abx: Zosyn for ?perforation found in OR  - Appreciate excellent care per SICU    Red Surgery  [id number]

## 2021-08-23 NOTE — PROGRESS NOTE ADULT - SUBJECTIVE AND OBJECTIVE BOX
Surgery Progress Note    POD#3 Laparoscopic total abdominal colectomy with end ileostomy    SUBJECTIVE: Pt seen and examined at bedside. Patient comfortable and in no-apparent distress. No nausea, vomiting, diarrhea. Pain is controlled    Vital Signs Last 24 Hrs  T(C): 36.2 (22 Aug 2021 23:00), Max: 36.6 (22 Aug 2021 11:00)  T(F): 97.2 (22 Aug 2021 23:00), Max: 97.9 (22 Aug 2021 11:00)  HR: 129 (23 Aug 2021 02:00) (78 - 158)  BP: 143/63 (23 Aug 2021 01:00) (96/53 - 155/73)  BP(mean): 91 (23 Aug 2021 01:00) (69 - 105)  RR: 23 (23 Aug 2021 02:00) (18 - 41)  SpO2: 100% (23 Aug 2021 02:00) (93% - 100%)    PHYSICAL EXAM:  Constitutional: NAD, intubated, responsive  Neck: supple  Respiratory: CTA bilaterally  Cardiovascular: RRR  Gastrointestinal: abdomen soft, nondistended, tender around ostomy. No obvious masses. Incisions c/d/i with wet-to-dry dressing, ileostomy pink with stool and gas, upper and lower L drains with mildly blood-tinged serous output  Extremities: warm    LABS:                        8.7    23.38 )-----------( 103      ( 22 Aug 2021 23:53 )             26.4     08-22    137  |  106  |  34<H>  ----------------------------<  168<H>  4.5   |  23  |  0.46<L>    Ca    7.8<L>      22 Aug 2021 23:53  Phos  3.4     08-22  Mg     2.1     08-22      PT/INR - ( 22 Aug 2021 23:53 )   PT: 12.4 sec;   INR: 1.04 ratio         PTT - ( 22 Aug 2021 23:53 )  PTT:32.0 sec      INs and OUTs:    08-21-21 @ 07:01  -  08-22-21 @ 07:00  --------------------------------------------------------  IN: 3650.9 mL / OUT: 1430 mL / NET: 2220.9 mL    08-22-21 @ 07:01  -  08-23-21 @ 03:06  --------------------------------------------------------  IN: 2186 mL / OUT: 1045 mL / NET: 1141 mL    Assessment and Plan:   · Assessment	  81M with recently dx Crohn's disease c/b perianal abscess and left posterior distal anal intersphincteric fistula and presents diarrhea/BRBPR  2/2 to CD flare c/b c diff colitis. Initially treat for C diff, s/p FMT. Taken emergently to OR on 8/20 for acute deterioration for laparoscopic total colostomy with end ileostomy.    PLAN:  [facility] - f/u CBC  - Wean pressors and vent per SICU  - NPO, NGT, TPN  - Monitor drain and ileostomy output  - IV abx: Zosyn for ?perforation found in OR  - Appreciate excellent care per SICU    Red Surgery  [id number] Surgery Progress Note    POD#3 Laparoscopic total abdominal colectomy with end ileostomy    SUBJECTIVE: Pt seen and examined at bedside. Patient does not respond to questions. Extubated and on 3L NC.     Vital Signs Last 24 Hrs  T(C): 36.2 (22 Aug 2021 23:00), Max: 36.6 (22 Aug 2021 11:00)  T(F): 97.2 (22 Aug 2021 23:00), Max: 97.9 (22 Aug 2021 11:00)  HR: 129 (23 Aug 2021 02:00) (78 - 158)  BP: 143/63 (23 Aug 2021 01:00) (96/53 - 155/73)  BP(mean): 91 (23 Aug 2021 01:00) (69 - 105)  RR: 23 (23 Aug 2021 02:00) (18 - 41)  SpO2: 100% (23 Aug 2021 02:00) (93% - 100%)    PHYSICAL EXAM:  Constitutional: non-responsive, A&Ox0  Neck: supple  Respiratory: no acute respiratory distress  Cardiovascular: NSR  Gastrointestinal: abdomen soft, nondistended, tender around ostomy. No obvious masses. Incisions c/d/i with wet-to-dry dressing, ileostomy pink with stool and gas, upper and lower L drains with serous output.   Extremities: warm    LABS:                        8.7    23.38 )-----------( 103      ( 22 Aug 2021 23:53 )             26.4     08-22    137  |  106  |  34<H>  ----------------------------<  168<H>  4.5   |  23  |  0.46<L>    Ca    7.8<L>      22 Aug 2021 23:53  Phos  3.4     08-22  Mg     2.1     08-22      PT/INR - ( 22 Aug 2021 23:53 )   PT: 12.4 sec;   INR: 1.04 ratio         PTT - ( 22 Aug 2021 23:53 )  PTT:32.0 sec      INs and OUTs:    08-21-21 @ 07:01  -  08-22-21 @ 07:00  --------------------------------------------------------  IN: 3650.9 mL / OUT: 1430 mL / NET: 2220.9 mL    08-22-21 @ 07:01  -  08-23-21 @ 03:06  --------------------------------------------------------  IN: 2186 mL / OUT: 1045 mL / NET: 1141 mL    Assessment and Plan:   · Assessment	  81M with recently dx Crohn's disease c/b perianal abscess and left posterior distal anal intersphincteric fistula and presents diarrhea/BRBPR  2/2 to CD flare c/b c diff colitis. Initially treat for C diff, s/p FMT. Taken emergently to OR on 8/20 for acute deterioration for laparoscopic total colostomy with end ileostomy.     PLAN:  - follow up mental status   - NPO, NGT, TPN  - Monitor drain and ileostomy output  - IV abx: Zosyn for ?perforation found in OR  - Appreciate excellent care per SICU    Red Surgery  [id number] Surgery Progress Note    POD#3 Laparoscopic total abdominal colectomy with end ileostomy    SUBJECTIVE: Pt seen and examined at bedside. Patient does not respond to questions. Extubated and on 3L NC.     Vital Signs Last 24 Hrs  T(C): 36.2 (22 Aug 2021 23:00), Max: 36.6 (22 Aug 2021 11:00)  T(F): 97.2 (22 Aug 2021 23:00), Max: 97.9 (22 Aug 2021 11:00)  HR: 129 (23 Aug 2021 02:00) (78 - 158)  BP: 143/63 (23 Aug 2021 01:00) (96/53 - 155/73)  BP(mean): 91 (23 Aug 2021 01:00) (69 - 105)  RR: 23 (23 Aug 2021 02:00) (18 - 41)  SpO2: 100% (23 Aug 2021 02:00) (93% - 100%)    PHYSICAL EXAM:  Constitutional: non-responsive, A&Ox0  Neck: supple  Respiratory: no acute respiratory distress  Cardiovascular: NSR  Gastrointestinal: abdomen soft, nondistended, tender around ostomy. No obvious masses. Incisions c/d/i with wet-to-dry dressing, ileostomy pink with stool and gas, upper and lower L drains with serous output.   Extremities: warm    LABS:                        8.7    23.38 )-----------( 103      ( 22 Aug 2021 23:53 )             26.4     08-22    137  |  106  |  34<H>  ----------------------------<  168<H>  4.5   |  23  |  0.46<L>    Ca    7.8<L>      22 Aug 2021 23:53  Phos  3.4     08-22  Mg     2.1     08-22      PT/INR - ( 22 Aug 2021 23:53 )   PT: 12.4 sec;   INR: 1.04 ratio         PTT - ( 22 Aug 2021 23:53 )  PTT:32.0 sec      INs and OUTs:    08-21-21 @ 07:01  -  08-22-21 @ 07:00  --------------------------------------------------------  IN: 3650.9 mL / OUT: 1430 mL / NET: 2220.9 mL    08-22-21 @ 07:01  -  08-23-21 @ 03:06  --------------------------------------------------------  IN: 2186 mL / OUT: 1045 mL / NET: 1141 mL     Surgery Progress Note    POD#4 Laparoscopic total abdominal colectomy with end ileostomy    SUBJECTIVE: Pt seen and examined at bedside. Patient does not respond to questions. Extubated and on 3L NC.     Vital Signs Last 24 Hrs  T(C): 36.2 (22 Aug 2021 23:00), Max: 36.6 (22 Aug 2021 11:00)  T(F): 97.2 (22 Aug 2021 23:00), Max: 97.9 (22 Aug 2021 11:00)  HR: 129 (23 Aug 2021 02:00) (78 - 158)  BP: 143/63 (23 Aug 2021 01:00) (96/53 - 155/73)  BP(mean): 91 (23 Aug 2021 01:00) (69 - 105)  RR: 23 (23 Aug 2021 02:00) (18 - 41)  SpO2: 100% (23 Aug 2021 02:00) (93% - 100%)    PHYSICAL EXAM:  Constitutional: non-responsive, A&Ox0  Neck: supple  Respiratory: no acute respiratory distress  Cardiovascular: NSR  Gastrointestinal: abdomen soft, nondistended, tender around ostomy. No obvious masses. Incisions c/d/i with wet-to-dry dressing, ileostomy pink with stool and gas, upper and lower L drains with serous output.   Extremities: warm    LABS:                        8.7    23.38 )-----------( 103      ( 22 Aug 2021 23:53 )             26.4     08-22    137  |  106  |  34<H>  ----------------------------<  168<H>  4.5   |  23  |  0.46<L>    Ca    7.8<L>      22 Aug 2021 23:53  Phos  3.4     08-22  Mg     2.1     08-22      PT/INR - ( 22 Aug 2021 23:53 )   PT: 12.4 sec;   INR: 1.04 ratio         PTT - ( 22 Aug 2021 23:53 )  PTT:32.0 sec      INs and OUTs:    08-21-21 @ 07:01  -  08-22-21 @ 07:00  --------------------------------------------------------  IN: 3650.9 mL / OUT: 1430 mL / NET: 2220.9 mL    08-22-21 @ 07:01  -  08-23-21 @ 03:06  --------------------------------------------------------  IN: 2186 mL / OUT: 1045 mL / NET: 1141 mL

## 2021-08-23 NOTE — PHYSICAL THERAPY INITIAL EVALUATION ADULT - PERTINENT HX OF CURRENT PROBLEM, REHAB EVAL
81 y.o M A&Ox3 with PMH of HTN and recent dx Crohn's disease presents to the ED c/o abd discomfort, diarrhea, and intermittent fevers. Pt. had temp of 102.7 relieved after tylenol.
Pt is a 81 y.o M with recently dx Crohn's disease c/b perianal abscess and left posterior distal anal intersphincteric fistula and presents diarrhea/BRBPR  2/2 to CD flare c/b c diff colitis. Initially treat for C diff, s/p FMT. Taken emergently to OR on 8/20 for acute deterioration for laparoscopic total colostomy with end ileostomy.

## 2021-08-23 NOTE — PROGRESS NOTE ADULT - ATTENDING COMMENTS
Patient seen and examined and agree with above.   POD #4 s/p total abdominal colectomy and end ileostomy.  Patient extubated yesterday and overall doing well.   Current management includes:  1) Patient confused today but following commands.   Tylenol for pain control  2) acute respiratory insufficiency - on 3 liters NC supplementation  CXR with right lower lobe atelectasis  3) Atrial fibrillation with RVR- started on lopressor and now rate controlled  4) GI bleeding - likely postoperative   -stable H/h  -Repeat CBC at noon  NPO at this time   5) Improving leukocytosis - continue IV antibiotics   6) plan for steroid taper   7) Hyperglycemia - likely secondary to steroids  -controlled and will continue insulin in TPN bag

## 2021-08-23 NOTE — CHART NOTE - NSCHARTNOTEFT_GEN_A_CORE
Nutrition Follow Up Note     Patient seen for: nutrition/TPN follow up     Source: medical record, TPN Team rounds    Chart reviewed, events noted.     Nutrition Status:    Diet Order: Diet, NPO (08-21-21 @ 06:43)        NGT output x 24-hours:     Last emesis:     Last BM:       Anthrpometric Measurements:   Height (cm): 170.2 (08-20-21 @ 14:14)  Weight (kg): 59 (08-20-21 @ 14:14)  BMI (kg/m2): 20.4 (08-20-21 @ 14:14)  IBW:     Medications: MEDICATIONS  (STANDING):  chlorhexidine 2% Cloths 1 Application(s) Topical daily  enoxaparin Injectable 40 milliGRAM(s) SubCutaneous every 24 hours  fat emulsion (Fish Oil and Plant Based) 20% Infusion 25 mL/Hr (25 mL/Hr) IV Continuous <Continuous>  insulin lispro (ADMELOG) corrective regimen sliding scale   SubCutaneous every 6 hours  methylPREDNISolone sodium succinate Injectable 20 milliGRAM(s) IV Push every 12 hours  metoprolol tartrate Injectable 5 milliGRAM(s) IV Push every 4 hours  pantoprazole  Injectable 40 milliGRAM(s) IV Push daily  Parenteral Nutrition - Adult 1 Each (94 mL/Hr) TPN Continuous <Continuous>  piperacillin/tazobactam IVPB.. 3.375 Gram(s) IV Intermittent every 8 hours    MEDICATIONS  (PRN):      Labs: 08-22 @ 23:53: Sodium 137, Potassium 4.5, Calcium 7.8<L>, Magnesium 2.1, Phosphorus 3.4, BUN 34<H>, Creatinine 0.46<L>, Glucose 168<H>, Total Bilirubin --, Prealbumin --, Albumin, --, C-Reactive Protein --      Hemoglobin : 8.7 g/dL  Hematocrit : 26.4 %          Triglycerides, Serum: 140 mg/dL (08-20-21 @ 06:36)  Triglycerides, Serum: 106 mg/dL (08-19-21 @ 08:59)  Triglycerides, Serum: 116 mg/dL (08-18-21 @ 06:45)  Triglycerides, Serum: 107 mg/dL (08-17-21 @ 07:08)    Hemoglobin A1C     POCT Blood Glucose.: 158 mg/dL (08-23-21 @ 05:36)  POCT Blood Glucose.: 138 mg/dL (08-22-21 @ 17:42)  POCT Blood Glucose.: 155 mg/dL (08-22-21 @ 11:31)      Skin:   Edema:     Estimated Needs: based on xx Kg, with consideration for TPN  Energy:  Protein:      Previous Nutrition Diagnosis:   Nutrition Diagnosis is:     New Nutrition Diagnosis:      Recommended Interventions:   1. TPN per TPN Team/Nutrition Assessment  2.   3.   4.     Monitoring and Evaluation:   Continue to monitor nutrition provision and tolerance, weights, labs, skin integrity.   RD remains available upon request and will follow up per protocol. Nutrition Follow Up Note     Patient [facility] for: malnutrition/TPN [facility]    Source: medical record, TPN Team rounds. Pt extubated but unable to participate in interview    Chart reviewed, events noted. "81M with recently dx Crohn's disease c/b perianal abscess and left posterior distal anal intersphincteric fistula and presents diarrhea/BRBPR  2/2 to CD flare c/b c diff colitis. Initially treat for C diff, s/p FMT. Taken emergently to OR on 8/20 for acute deterioration for laparoscopic total colostomy with end ileostomy."    Nutrition Status:  - TPN initiated 8/16; infusing at goal  - NPO post-op with NGT to suction.  - Hyperglycemia addressed with SSI q6hrs, and 20 units insulin in TPN. Solu-Medrol Rx noted.  - Potassium, phosphorus and calcium increased in TPN; total volume increased to 2.25L accordingly  - Palliative/GOC consult ordered 8/20    Diet Order: Diet, NPO (08-21-21 @ 06:43)    PARENTERAL NUTRITION:    GOAL TPN (ordered 8/22): 2.25L infusing at 94 ml/hr (105 gm amino acids, 210 gm dextrose, 60 gm SMOF lipid) to provide 1734 kcal/day (29 kcal/kg and 1.8 gm/kg protein per dosing wt 59kg)    Non-Protein Calories: 1314 kcal/day (22 kcal/kg)  Dextrose Infusion Rate: 2.5 mg/kg/min  Lipid Infusion Rate: 1 gm/kg/day; 0.08 gm/kg/hr    Electrolytes and Insulin: (ordered 8/22/21)  Insulin (units): 10  NaCl (mEq):  40  Na acetate (mEq): 60  Na Phos (mmol): 75  KCL (mEq):  100  Calcium gluconate (mEq): 12 --> increased to 14 (8/19)  Mg sulfate (mEq): 12  MTE-5 concentrate (ml): 1  MVI (ml): 10    GI:  Ileostomy output x 24-hrs: 45ml (8/22), 150ml (8/23)  NGT output x 24-hrs: 70ml (8/22), 100ml (8/23)    Anthropometric Measurements:   Height (cm): 170.2 (08-09-21 @ 12:23)  DOSING Weight (kg): 59 (07-13-21)  BMI (kg/m2): 20.4 (08-09-21 @ 12:23)  Daily Weight: 50.3 kg (08-16); 53.2 kg (08-18); 64.2 kg (08-23-21); weight gain likely reflects fluid shifts  IBW: 67.1 kg    Medications: MEDICATIONS  (STANDING):  chlorhexidine 2% Cloths 1 Application(s) Topical daily  enoxaparin Injectable 40 milliGRAM(s) SubCutaneous every 24 hours  fat emulsion (Fish Oil and Plant Based) 20% Infusion 25 mL/Hr (25 mL/Hr) IV Continuous <Continuous>  insulin lispro (ADMELOG) corrective regimen sliding scale   SubCutaneous every 6 hours  methylPREDNISolone sodium succinate Injectable 20 milliGRAM(s) IV Push every 12 hours  metoprolol tartrate Injectable 5 milliGRAM(s) IV Push every 4 hours  pantoprazole  Injectable 40 milliGRAM(s) IV Push daily  Parenteral Nutrition - Adult 1 Each (94 mL/Hr) TPN Continuous <Continuous>  piperacillin/tazobactam IVPB.. 3.375 Gram(s) IV Intermittent every 8 hours    Labs: 08-22 @ 23:53: Sodium 137, Potassium 4.5, Calcium 7.8<L>, Magnesium 2.1, Phosphorus 3.4, BUN 34<H>, Creatinine 0.46<L>, Glucose 168<H>    Hemoglobin : 8.7 g/dL  Hematocrit : 26.4 %    Triglycerides, Serum: 140 mg/dL (08-20-21 @ 06:36)  Triglycerides, Serum: 106 mg/dL (08-19-21 @ 08:59)  Triglycerides, Serum: 116 mg/dL (08-18-21 @ 06:45)  Triglycerides, Serum: 107 mg/dL (08-17-21 @ 07:08)    Hemoglobin A1C 5.2%    POCT Blood Glucose.: 158 mg/dL (08-23-21 @ 05:36)  POCT Blood Glucose.: 138 mg/dL (08-22-21 @ 17:42)  POCT Blood Glucose.: 155 mg/dL (08-22-21 @ 11:31)    Skin: Stage II sacrum  Edema: 2+ generalized/dependent    Estimated Needs: based on dosing wt 59 kg, with consideration for TPN and malnutrition  Energy: 5235-1100 calories (30-35 kcal/kg)  Protein:  grams (1.6-1.8 gm/kg)    Previous Nutrition Diagnosis: 1) Severe Malnutrition 2) Increased Nutrient Needs  Nutrition Diagnosis is: ongoing, addressed with TPN     New Nutrition Diagnosis:  none    Recommended Interventions:   1. TPN per TPN Team/Nutrition Assessment         Monitoring and Evaluation:   Continue to monitor nutrition provision and tolerance, weights, labs, skin integrity.   RD remains available upon request and will follow up per protocol.    Miriam Manning MS RD CDN [facility], #[phone]. Nutrition Follow Up Note     Patient seen for: malnutrition[facility]TPN [facility]    Source: medical record, TPN Team rounds. Pt extubated but unable to participate in interview    Chart reviewed, events noted. "81M with recently dx Crohn's disease c/b perianal abscess and left posterior distal anal intersphincteric fistula and presents diarrhea/BRBPR  2/2 to CD flare c/b c diff colitis. Initially treat for C diff, s/p FMT. Taken emergently to OR on 8/20 for acute deterioration for laparoscopic total colostomy with end ileostomy."    Nutrition Status:  - TPN initiated 8/16; infusing at goal  - NPO post-op with NGT to suction.  - Hyperglycemia addressed with SSI q6hrs, and 20 units insulin in TPN. Solu-Medrol Rx noted.  - Potassium, phosphorus and calcium increased in TPN; total volume increased to 2.25L accordingly  - Palliative/GOC consult ordered 8/20    Diet Order: Diet, NPO (08-21-21 @ 06:43)    PARENTERAL NUTRITION:    GOAL TPN (ordered 8/22): 2.25L infusing at 94 ml/hr (105 gm amino acids, 210 gm dextrose, 60 gm SMOF lipid) to provide 1734 kcal/day (29 kcal/kg and 1.8 gm/kg protein per dosing wt 59kg)    Non-Protein Calories: 1314 kcal/day (22 kcal/kg)  Dextrose Infusion Rate: 2.5 mg/kg/min  Lipid Infusion Rate: 1 gm/kg/day; 0.08 gm/kg/hr    Electrolytes and Insulin: (ordered 8/22/21)  Insulin (units): 20  NaCl (mEq):  40  Na acetate (mEq): 60  Na Phos (mmol): 75  KCL (mEq):  100  Calcium gluconate (mEq): 14  Mg sulfate (mEq): 12  MTE-5 concentrate (ml): 1  MVI (ml): 10    GI:  Ileostomy output x 24-hrs: 45ml (8/22), 150ml (8/23)  NGT output x 24-hrs: 70ml (8/22), 100ml (8/23)    Anthropometric Measurements:   Height (cm): 170.2 (08-09-21 @ 12:23)  DOSING Weight (kg): 59 (07-13-21)  BMI (kg/m2): 20.4 (08-09-21 @ 12:23)  Daily Weight: 50.3 kg (08-16); 53.2 kg (08-18); 64.2 kg (08-23-21); weight gain likely reflects fluid shifts  IBW: 67.1 kg    Medications: MEDICATIONS  (STANDING):  chlorhexidine 2% Cloths 1 Application(s) Topical daily  enoxaparin Injectable 40 milliGRAM(s) SubCutaneous every 24 hours  fat emulsion (Fish Oil and Plant Based) 20% Infusion 25 mL/Hr (25 mL/Hr) IV Continuous <Continuous>  insulin lispro (ADMELOG) corrective regimen sliding scale   SubCutaneous every 6 hours  methylPREDNISolone sodium succinate Injectable 20 milliGRAM(s) IV Push every 12 hours  metoprolol tartrate Injectable 5 milliGRAM(s) IV Push every 4 hours  pantoprazole  Injectable 40 milliGRAM(s) IV Push daily  Parenteral Nutrition - Adult 1 Each (94 mL/Hr) TPN Continuous <Continuous>  piperacillin/tazobactam IVPB.. 3.375 Gram(s) IV Intermittent every 8 hours    Labs: 08-22 @ 23:53: Sodium 137, Potassium 4.5, Calcium 7.8<L>, Magnesium 2.1, Phosphorus 3.4, BUN 34<H>, Creatinine 0.46<L>, Glucose 168<H>    Hemoglobin : 8.7 g/dL  Hematocrit : 26.4 %    Triglycerides, Serum: 140 mg/dL (08-20-21 @ 06:36)  Triglycerides, Serum: 106 mg/dL (08-19-21 @ 08:59)  Triglycerides, Serum: 116 mg/dL (08-18-21 @ 06:45)  Triglycerides, Serum: 107 mg/dL (08-17-21 @ 07:08)    Hemoglobin A1C 5.2%    POCT Blood Glucose.: 158 mg/dL (08-23-21 @ 05:36)  POCT Blood Glucose.: 138 mg/dL (08-22-21 @ 17:42)  POCT Blood Glucose.: 155 mg/dL (08-22-21 @ 11:31)    Skin: Stage II sacrum  Edema: 2+ generalized/dependent    Estimated Needs: based on dosing wt 59 kg, with consideration for TPN and malnutrition  Energy: 7953-0466 calories (30-35 kcal/kg)  Protein:  grams (1.6-1.8 gm/kg)    Previous Nutrition Diagnosis: 1) Severe Malnutrition 2) Increased Nutrient Needs  Nutrition Diagnosis is: ongoing, addressed with TPN     New Nutrition Diagnosis:  none    Recommended Interventions:   1. TPN per TPN Team/Nutrition Assessment         Monitoring and Evaluation:   Continue to monitor nutrition provision and tolerance, weights, labs, skin integrity.   RD remains available upon request and will follow up per protocol.    Miriam Manning MS RD CDN [facility], #[phone]. Nutrition Follow Up Note     Patient [facility] for: malnutrition[facility]TPN follow [facility]    Source: medical record, TPN Team rounds. Pt extubated but unable to participate in interview    Chart reviewed, events noted. "81M with recently dx Crohn's disease c/b perianal abscess and left posterior distal anal intersphincteric fistula and presents diarrhea/BRBPR  2/2 to CD flare c/b c diff colitis. Initially treat for C diff, s/p FMT. Taken emergently to OR on 8/20 for acute deterioration for laparoscopic total colostomy with end ileostomy."    Nutrition Status:  - TPN initiated 8/16; infusing at goal  - NPO post-op with NGT to suction.  - Hyperglycemia addressed with SSI q6hrs, and 20 units insulin in TPN. Solu-Medrol Rx noted.  - Potassium, phosphorus and calcium increased in TPN; total volume increased to 2.25L accordingly  - Palliative/GOC consult ordered 8/20    Diet Order: Diet, NPO (08-21-21 @ 06:43)    PARENTERAL NUTRITION:    GOAL TPN (ordered 8/22): 2.25L infusing at 94 ml/hr (105 gm amino acids, 210 gm dextrose, 60 gm SMOF lipid) to provide 1734 kcal/day (29 kcal/kg and 1.8 gm/kg protein per dosing wt 59kg)    Non-Protein Calories: 1314 kcal/day (22 kcal/kg)  Dextrose Infusion Rate: 2.5 mg/kg/min  Lipid Infusion Rate: 1 gm/kg/day; 0.08 gm/kg/hr    Electrolytes and Insulin: (ordered 8/22/21)  Insulin (units): 20  NaCl (mEq):  40  Na acetate (mEq): 60  Na Phos (mmol): 75  KCL (mEq):  100  Calcium gluconate (mEq): 14  Mg sulfate (mEq): 12  MTE-5 concentrate (ml): 1  MVI (ml): 10    GI:  Ileostomy output x 24-hrs: 45ml (8/22), 150ml (8/23)  NGT output x 24-hrs: 70ml (8/22), 100ml (8/23)    Anthropometric Measurements:   Height (cm): 170.2 (08-09-21 @ 12:23)  DOSING Weight (kg): 59 (07-13-21)  BMI (kg/m2): 20.4 (08-09-21 @ 12:23)  Daily Weight: 50.3 kg (08-16); 53.2 kg (08-18); 64.2 kg (08-23-21); weight gain likely reflects fluid shifts  IBW: 67.1 kg    Medications: MEDICATIONS  (STANDING):  chlorhexidine 2% Cloths 1 Application(s) Topical daily  enoxaparin Injectable 40 milliGRAM(s) SubCutaneous every 24 hours  fat emulsion (Fish Oil and Plant Based) 20% Infusion 25 mL/Hr (25 mL/Hr) IV Continuous <Continuous>  insulin lispro (ADMELOG) corrective regimen sliding scale   SubCutaneous every 6 hours  methylPREDNISolone sodium succinate Injectable 20 milliGRAM(s) IV Push every 12 hours  metoprolol tartrate Injectable 5 milliGRAM(s) IV Push every 4 hours  pantoprazole  Injectable 40 milliGRAM(s) IV Push daily  Parenteral Nutrition - Adult 1 Each (94 mL/Hr) TPN Continuous <Continuous>  piperacillin/tazobactam IVPB.. 3.375 Gram(s) IV Intermittent every 8 hours    Labs: 08-22 @ 23:53: Sodium 137, Potassium 4.5, Calcium 7.8<L>, Magnesium 2.1, Phosphorus 3.4, BUN 34<H>, Creatinine 0.46<L>, Glucose 168<H>    Hemoglobin : 8.7 g/dL  Hematocrit : 26.4 %    Triglycerides, Serum: 140 mg/dL (08-20-21 @ 06:36)  Triglycerides, Serum: 106 mg/dL (08-19-21 @ 08:59)  Triglycerides, Serum: 116 mg/dL (08-18-21 @ 06:45)  Triglycerides, Serum: 107 mg/dL (08-17-21 @ 07:08)    Hemoglobin A1C 5.2%    POCT Blood Glucose.: 158 mg/dL (08-23-21 @ 05:36)  POCT Blood Glucose.: 138 mg/dL (08-22-21 @ 17:42)  POCT Blood Glucose.: 155 mg/dL (08-22-21 @ 11:31)    Skin: Stage II sacrum  Edema: 2+ generalized/dependent    Estimated Needs: based on dosing wt 59 kg, with consideration for TPN and malnutrition  Energy: 3376-0306 calories (30-35 kcal/kg)  Protein:  grams (1.6-1.8 gm/kg)    Previous Nutrition Diagnosis: 1) Severe Malnutrition 2) Increased Nutrient Needs  Nutrition Diagnosis is: ongoing, addressed with TPN     New Nutrition Diagnosis:  none    Recommended Interventions:   1. TPN per TPN Team/Nutrition Assessment      Monitoring and Evaluation:   Continue to monitor nutrition provision and tolerance, weights, labs, skin integrity.   RD remains available upon request and will follow up per protocol.    Miriam Manning MS RD CDN [facility], #[phone]. Nutrition Follow Up Note     Patient seen for: malnutrition/TPN follow up [facility]    Source: medical record, TPN Team rounds. Pt extubated but unable to participate in interview    Chart reviewed, events noted. "81M with recently dx Crohn's disease c/b perianal abscess and left posterior distal anal intersphincteric fistula and presents diarrhea/BRBPR  2/2 to CD flare c/b c diff colitis. Initially treat for C diff, s/p FMT. Taken emergently to OR on 8/20 for acute deterioration for laparoscopic total colostomy with end ileostomy."    Nutrition Status:  - TPN initiated 8/16; infusing at goal  - NPO post-op with NGT to suction.  - Acute post-op hyperglycemia noted; addressed with SSI q6hrs (4 units required overnight) and 20 units insulin in TPN (no change). Solu-Medrol Rx noted; plan for steroid taper per team.  - Potassium, phosphorus and calcium increased in TPN; total volume increased to 2.25L accordingly  - Palliative/GOC consult ordered 8/20    Diet Order: Diet, NPO (08-21-21 @ 06:43)    PARENTERAL NUTRITION:    GOAL TPN (ordered 8/22): 2.25L infusing at 94 ml/hr (105 gm amino acids, 210 gm dextrose, 60 gm SMOF lipid) to provide 1734 kcal/day (29 kcal/kg and 1.8 gm/kg protein per dosing wt 59kg)    Non-Protein Calories: 1314 kcal/day (22 kcal/kg)  Dextrose Infusion Rate: 2.5 mg/kg/min  Lipid Infusion Rate: 1 gm/kg/day; 0.08 gm/kg/hr    Electrolytes and Insulin: (ordered 8/22/21)  Insulin (units): 20  NaCl (mEq):  40  Na acetate (mEq): 60  Na Phos (mmol): 75  KCL (mEq):  100  Calcium gluconate (mEq): 14  Mg sulfate (mEq): 12  MTE-5 concentrate (ml): 1  MVI (ml): 10    GI:  Ileostomy output x 24-hrs: 45ml (8/22), 150ml (8/23)  NGT output x 24-hrs: 70ml (8/22), 100ml (8/23)    Anthropometric Measurements:   Height (cm): 170.2 (08-09-21 @ 12:23)  DOSING Weight (kg): 59 (07-13-21)  BMI (kg/m2): 20.4 (08-09-21 @ 12:23)  Daily Weight: 50.3 kg (08-16); 53.2 kg (08-18); 64.2 kg (08-23-21); weight gain likely reflects fluid shifts  IBW: 67.1 kg    Medications: MEDICATIONS  (STANDING):  chlorhexidine 2% Cloths 1 Application(s) Topical daily  enoxaparin Injectable 40 milliGRAM(s) SubCutaneous every 24 hours  fat emulsion (Fish Oil and Plant Based) 20% Infusion 25 mL/Hr (25 mL/Hr) IV Continuous <Continuous>  insulin lispro (ADMELOG) corrective regimen sliding scale   SubCutaneous every 6 hours  methylPREDNISolone sodium succinate Injectable 20 milliGRAM(s) IV Push every 12 hours  metoprolol tartrate Injectable 5 milliGRAM(s) IV Push every 4 hours  pantoprazole  Injectable 40 milliGRAM(s) IV Push daily  Parenteral Nutrition - Adult 1 Each (94 mL/Hr) TPN Continuous <Continuous>  piperacillin/tazobactam IVPB.. 3.375 Gram(s) IV Intermittent every 8 hours    Labs: 08-22 @ 23:53: Sodium 137, Potassium 4.5, Calcium 7.8<L>, Magnesium 2.1, Phosphorus 3.4, BUN 34<H>, Creatinine 0.46<L>, Glucose 168<H>    Hemoglobin : 8.7 g/dL  Hematocrit : 26.4 %    Triglycerides, Serum: 140 mg/dL (08-20-21 @ 06:36)  Triglycerides, Serum: 106 mg/dL (08-19-21 @ 08:59)  Triglycerides, Serum: 116 mg/dL (08-18-21 @ 06:45)  Triglycerides, Serum: 107 mg/dL (08-17-21 @ 07:08)    Hemoglobin A1C 5.2%    POCT Blood Glucose.: 158 mg/dL (08-23-21 @ 05:36)  POCT Blood Glucose.: 138 mg/dL (08-22-21 @ 17:42)  POCT Blood Glucose.: 155 mg/dL (08-22-21 @ 11:31)    Skin: Stage II sacrum  Edema: 2+ generalized/dependent    Estimated Needs: based on dosing wt 59 kg, with consideration for TPN and malnutrition  Energy: 5688-1985 calories (30-35 kcal/kg)  Protein:  grams (1.6-1.8 gm/kg)    Previous Nutrition Diagnosis: 1) Severe Malnutrition 2) Increased Nutrient Needs  Nutrition Diagnosis is: ongoing, addressed with TPN     New Nutrition Diagnosis:  none    Recommended Interventions:   1. TPN per TPN Team/Nutrition Assessment  2. Monitor BG trend post-op      Monitoring and Evaluation:   Continue to monitor nutrition provision and tolerance, weights, labs, skin integrity.   RD remains available upon request and will follow up per protocol.    Miriam Manning MS RD CDN [facility], #[phone].

## 2021-08-23 NOTE — PHYSICAL THERAPY INITIAL EVALUATION ADULT - GENERAL OBSERVATIONS, REHAB EVAL
Pt received supine +ICU monitoring, +PAOLO drainsx2, +sánchez, +neeta.
Pt encountered supine in bed, + PIV. + AO x3

## 2021-08-23 NOTE — PHYSICAL THERAPY INITIAL EVALUATION ADULT - BED MOBILITY TRAINING, PT EVAL
GOAL: Pt will perform all bed mobility minAxs1 within 3-4 wks.
Pt will perform all bed mobility in 2 weeks independently

## 2021-08-23 NOTE — PHYSICAL THERAPY INITIAL EVALUATION ADULT - PRECAUTIONS/LIMITATIONS, REHAB EVAL
CT A/P: Findings compatible with active inflammatory bowel disease involving the rectosigmoid and descending colon and proximal ascending colon/cecum./fall precautions
fall precautions

## 2021-08-23 NOTE — PROGRESS NOTE ADULT - SUBJECTIVE AND OBJECTIVE BOX
HISTORY  81y Male M with Crohn disease, over the last 9 mo, he was started on several medication and was recently told that he may need to go on IV infusion for chrons, he has several hospitalization at [facility] , and follows with GI near [facility]. but his diarrhea is not improving , having abd cramps , denies any N/V . , discharged 1 mo ago from [facility], now having trouble walking, on going diarrhea. + subjective fever but denies cough or nasal congestion or uri or urinary symptoms.  (13 Jul 2021 20:48)    24 HOUR EVENTS:  - Started on metoprolol 5mg. Q6 hrs   - Lovenox for VTE prophylaxis started   - Extubated, follows commands but lethargic     SUBJECTIVE/ROS:  [ ] A ten-point review of systems was otherwise negative except as noted.  [ ] Due to altered mental status/intubation, subjective information were not able to be obtained from the patient. History was obtained, to the extent possible, from review of the chart and collateral sources of information.      NEURO  Exam: lethargic but arousable and follows commands   Meds: none   [x] Adequacy of sedation and pain control has been assessed and adjusted      RESPIRATORY  RR: 23 (08-23-21 @ 02:00) (18 - 41)  SpO2: 100% (08-23-21 @ 02:00) (93% - 100%)  Exam: unlabored, clear to auscultation bilaterally  Mechanical Ventilation: Mode: CPAP with PS, RR (patient): 25, FiO2: 30, PEEP: 5, PS: 5, MAP: 7, PIP: 10  ABG - ( 22 Aug 2021 23:51 )  pH: 7.46  /  pCO2: 37    /  pO2: 150   / HCO3: 26    / Base Excess: 2.4   /  SaO2: 99.9    Lactate: x      [N/A] Extubation Readiness Assessed  Meds: none       CARDIOVASCULAR  HR: 129 (08-23-21 @ 02:00) (78 - 158)  BP: 143/63 (08-23-21 @ 01:00) (96/53 - 155/73)  BP(mean): 91 (08-23-21 @ 01:00) (69 - 105)  ABP: 118/61 (08-23-21 @ 02:00) (66/60 - 171/87)  ABP(mean): 82 (08-23-21 @ 02:00) (61 - 114)  Exam: regular rate and rhythm  Cardiac Rhythm: sinus  Perfusion     [x]Adequate   [ ]Inadequate  Mentation   [x]Normal       [ ]Reduced  Extremities  [x]Warm         [ ]Cool  Volume Status [ ]Hypervolemic [x]Euvolemic [ ]Hypovolemic  Meds: metoprolol tartrate Injectable 5 milliGRAM(s) IV Push every 4 hours        GI/NUTRITION  Exam: soft, nontender, nondistended, incision C/D/I  Diet: NPO/NGT   Meds: pantoprazole  Injectable 40 milliGRAM(s) IV Push daily      GENITOURINARY  I&O's Detail    08-21 @ 07:01  -  08-22 @ 07:00  --------------------------------------------------------  IN:    Dexmedetomidine: 9 mL    dextrose 5% + lactated ringers: 150 mL    Fat Emulsion (Fish Oil &amp; Plant Based) 20% Infusion: 50 mL    Fat Emulsion (Fish Oil &amp; Plant Based) 20% Infusion: 450 mL    IV PiggyBack: 500 mL    IV PiggyBack: 300 mL    Phenylephrine: 123.9 mL    TPN (Total Parenteral Nutrition): 2068 mL  Total IN: 3650.9 mL    OUT:    Bulb (mL): 250 mL    Bulb (mL): 170 mL    Ileostomy (mL): 45 mL    Indwelling Catheter - Urethral (mL): 895 mL    Nasogastric/Oral tube (mL): 70 mL  Total OUT: 1430 mL    Total NET: 2220.9 mL      08-22 @ 07:01 - 08-23 @ 03:25  --------------------------------------------------------  IN:    Fat Emulsion (Fish Oil &amp; Plant Based) 20% Infusion: 250 mL    IV PiggyBack: 150 mL    TPN (Total Parenteral Nutrition): 1786 mL  Total IN: 2186 mL    OUT:    Bulb (mL): 100 mL    Bulb (mL): 50 mL    Ileostomy (mL): 100 mL    Indwelling Catheter - Urethral (mL): 795 mL    Nasogastric/Oral tube (mL): 0 mL  Total OUT: 1045 mL    Total NET: 1141 mL          08-22    137  |  106  |  34<H>  ----------------------------<  168<H>  4.5   |  23  |  0.46<L>    Ca    7.8<L>      22 Aug 2021 23:53  Phos  3.4     08-22  Mg     2.1     08-22      [ ] Roberson catheter, indication: N/A  Meds: fat emulsion (Fish Oil and Plant Based) 20% Infusion 25 mL/Hr IV Continuous <Continuous>  Parenteral Nutrition - Adult 1 Each TPN Continuous <Continuous>        HEMATOLOGIC  Meds: enoxaparin Injectable 40 milliGRAM(s) SubCutaneous every 24 hours    [x] VTE Prophylaxis                        8.7    23.38 )-----------( 103      ( 22 Aug 2021 23:53 )             26.4     PT/INR - ( 22 Aug 2021 23:53 )   PT: 12.4 sec;   INR: 1.04 ratio         PTT - ( 22 Aug 2021 23:53 )  PTT:32.0 sec  Transfusion     [ ] PRBC   [ ] Platelets   [ ] FFP   [ ] Cryoprecipitate      INFECTIOUS DISEASES  WBC Count: 23.38 K/uL (08-22 @ 23:53)  WBC Count: 28.83 K/uL (08-22 @ 14:21)  WBC Count: 28.81 K/uL (08-22 @ 05:31)    RECENT CULTURES:    Meds: piperacillin/tazobactam IVPB.. 3.375 Gram(s) IV Intermittent every 8 hours        ENDOCRINE  CAPILLARY BLOOD GLUCOSE      POCT Blood Glucose.: 138 mg/dL (22 Aug 2021 17:42)  POCT Blood Glucose.: 155 mg/dL (22 Aug 2021 11:31)  POCT Blood Glucose.: 203 mg/dL (22 Aug 2021 06:06)    Meds: insulin lispro (ADMELOG) corrective regimen sliding scale   SubCutaneous every 6 hours  methylPREDNISolone sodium succinate Injectable 20 milliGRAM(s) IV Push every 12 hours        ACCESS DEVICES:  [x] Peripheral IV  [ ] Central Venous Line	[ ] R	[ ] L	[ ] IJ	[ ] Fem	[ ] SC	Placed:   [ ] Arterial Line		[ ] R	[ ] L	[ ] Fem	[ ] Rad	[ ] Ax	Placed:   [ ] PICC:					[ ] Mediport  [ ] Urinary Catheter, Date Placed:   [x] Necessity of urinary, arterial, and venous catheters discussed    OTHER MEDICATIONS:  chlorhexidine 2% Cloths 1 Application(s) Topical daily      CODE STATUS: full code       IMAGING: < from: CT Abdomen and Pelvis w/ IV Cont (08.16.21 @ 11:51) >  FINDINGS:  LOWER CHEST: Cardiomegaly. Coronary calcification. Small bilateral pleural effusions with associated mild compressive atelectasis.    LIVER: Within normal limits.  BILE DUCTS: Normal caliber.  GALLBLADDER: Nonspecific gallbladder wall edema.  SPLEEN: Within normal limits.  PANCREAS: Within normallimits.  ADRENALS: Within normal limits.  KIDNEYS/URETERS: A 3 mm nonobstructing left lower pole renal calculus. Left renal cyst.    BLADDER: Mild dependent bladder debris.  REPRODUCTIVE ORGANS: Prostate is enlarged.    BOWEL: No bowel obstruction. Diffuse colon thickening consistent with colitis which may be infectious or inflammatory. Colonic diverticulosis. Appendix is normal.  PERITONEUM: No ascites.  VESSELS: Atherosclerotic changes including irregularity with mild dilatation of the SMA to 1.0 cm. Celiac axis, SMA and SARAH are patent as are the SMV and portal veins.  RETROPERITONEUM/LYMPH NODES: No lymphadenopathy.  ABDOMINAL WALL: Within normal limits.  BONES: Degenerative changes.    IMPRESSION:  Diffuse colitis which may be infectious or inflammatory, unchanged.  Patent mesenteric vasculature.    Nonspecific gallbladder wall edema. Correlate for right upper quadrant pain.      < end of copied text >

## 2021-08-24 NOTE — PROGRESS NOTE ADULT - ASSESSMENT
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 SICU consulted for hemodynamic monitoring.       Plan:  Neurologic:   - Tylenol for pain control     Respiratory:   - Saturating adequately on RA, s/p extubation on 8/22    Cardiovascular:   - NSR  - History of AFib currently rate controlled with IV metoprolol   - Continue IV lopressor 5 Q4 hrs     Gastrointestinal/Nutrition:   - NPO  - NGT in place; ileostomy pink and viable without output  - Surgical sites CDI   - Will continue TPN     Renal/Genitourinary:   - Monitor and replete electrolytes as needed  - Monitor UO  - Continue with Roberson     Hematologic:   - Trend H&H and transfuse Hgb < 7  - Lovenox for DVT ppx     Infectious Disease: C. diff colitis s/p subtotal colectomy   - Zosyn empirically    Tubes/Lines/Drains:   - Arterial line: L radial 8/20  - PIV  - NGT: 8/20  - LUQ/LQ drain: 8/20    Endocrine:   - Monitor BMP  - C/W steroid taper   - ISS     Disposition: SICU     81 M with PMH of Crohns disease s/p laparoscopic total abdominal colectomy with end ileostomy. SICU consulted for hemodynamic monitoring.     Plan:  Neurologic:   - Tylenol for pain control   - Patient slightly altered but redirectable    Respiratory:   - Saturating adequately on RA, s/p extubation on 8/22  - CXR showing RLL atelectasis -> Chest PT     Cardiovascular:   - NSR  - History of AFib currently rate controlled with IV metoprolol   - Continue IV lopressor 5 Q4 hrs     Gastrointestinal/Nutrition:   - NPO  - NGT in place; ileostomy pink and viable without output  - Surgical sites CDI   - Will continue TPN     Renal/Genitourinary:   - Monitor and replete electrolytes as needed  - Monitor UO  - Continue with Roberson     Hematologic:   - Trend H&H and transfuse Hgb < 7  - Lovenox for DVT ppx     Infectious Disease: C. diff colitis s/p subtotal colectomy   - Zosyn empirically    Tubes/Lines/Drains:   - Arterial line: L radial 8/20  - PIV  - NGT: 8/20  - LUQ/LQ drain: 8/20    Endocrine:   - Monitor BMP  - C/W steroid taper   - ISS     Disposition: SICU     81 M with PMH of Crohns disease s/p laparoscopic total abdominal colectomy with end ileostomy. SICU consulted for hemodynamic monitoring.     Plan:  Neurologic:   - Tylenol for pain control   - Patient slightly altered but redirectable    Respiratory:   - Saturating adequately on RA, s/p extubation on 8/22  - CXR showing RLL atelectasis -> Chest PT     Cardiovascular:   - NSR  - History of AFib currently rate controlled with IV metoprolol   - Continue IV lopressor 5 Q4 hrs     Gastrointestinal/Nutrition:   - NPO  - NGT in place; ileostomy pink and viable without output  - Surgical sites CDI   - Will continue TPN   - Patient may need formal S&S as failed bedside    Renal/Genitourinary:   - Monitor and replete electrolytes as needed  - Monitor UO  - Continue with Roberson     Hematologic:   - Trend H&H and transfuse Hgb < 7  - Lovenox for DVT ppx     Infectious Disease: C. diff colitis s/p subtotal colectomy   - Zosyn empirically    Tubes/Lines/Drains:   - Arterial line: L radial 8/20  - PIV  - NGT: 8/20  - LUQ/LQ drain: 8/20    Endocrine:   - Monitor BMP  - C/W steroid taper   - ISS     Disposition: SICU

## 2021-08-24 NOTE — PROGRESS NOTE ADULT - ASSESSMENT
81M with recently dx Crohn's disease c/b perianal abscess and left posterior distal anal intersphincteric fistula and presents diarrhea/BRBPR  2/2 to CD flare c/b c diff colitis. Initially treat for C diff, s/p FMT. Taken emergently to OR on 8/20 for acute deterioration for laparoscopic total colostomy with end ileostomy.     PLAN:  - NPO, NGT, TPN  - Monitor drain and ileostomy output  - IV abx: Zosyn for ?perforation found in OR  - Appreciate excellent care per SICU    [facility]  [id number]   81M with recently dx Crohn's disease c/b perianal abscess and left posterior distal anal intersphincteric fistula and presents diarrhea/BRBPR  2/2 to CD flare c/b c diff colitis. Initially treat for C diff, s/p FMT. Taken emergently to OR on 8/20 for acute deterioration for laparoscopic total colostomy with end ileostomy.     PLAN:  - speech and swallow eval   - NPO and TPN  - Monitor drain and ileostomy output  - IV abx: Zosyn for ?perforation found in OR  - Appreciate excellent care per SICU    [facility]  [id number]   81M with recently dx Crohn's disease c/b perianal abscess and left posterior distal anal intersphincteric fistula and presents diarrhea/BRBPR  2/2 to CD flare c/b c diff colitis. Initially treat for C diff, s/p FMT. Taken emergently to OR on 8/20 for acute deterioration for laparoscopic total colectomy with end ileostomy.     PLAN:  - speech and swallow eval   - NPO and TPN  - Monitor drain and ileostomy output  - IV abx: Zosyn for ?perforation found in OR  - Appreciate excellent care per SICU    [facility]  [id number]

## 2021-08-24 NOTE — PROGRESS NOTE ADULT - ASSESSMENT
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 Admitted for crohns management, course c/b cdiff colitis sp dificid/fmt and findings of indeterminate quantiferon, pending id clearance to start biologic. Prealb 8. TPN consulted in setting of significant weight loss, severe pcm, and worsening colitis suspected 2/2 CD. Pt at high risk for refeeding syndrome. TPN started 8/16. Repeat CT showing unchanged diffuse colitis and non specific gb wall edema.  Taken to OR emergently on 8/20 for lap total colectomy, end ileostomy    TPN GOAL recommendations as per RD: , dextrose 210g, SMOF 60g    -cont TPN at goal; PICC in place, maintenance as per protocol  -hypocalcemia- increase CaGluconate to 16meq in TPN bag  -vitamin d deficiency- rec ergocalciferol 50,000U q wk x 6wks  -hyperglycemia- FS trend noted, steroids tapered, will decrease to 16U total of insulin in TPN bag- dw SICU PA; cont close FS monitoring with ISS coverage as needed  -electrolyte imbalance risk- monitor CMP, Mg, Ionized Ca, Phosphorus qd  -monitor TG weekly (8/27); strict I/O's  -care per primary/SICU    discussed with Dr. Fernandez, agreeable with above    TPN pager [phone], spectra [phone]  M-F 6A-4P, Weekends and holidays 8A-12P

## 2021-08-24 NOTE — PROGRESS NOTE ADULT - ATTENDING COMMENTS
Patient seen and examined, agree with above. Still lethargic, but participated with PT yesterday. Discussed with daughter at bedside. Continue to taper off steroids. Appreciate SICU care.

## 2021-08-24 NOTE — PROGRESS NOTE ADULT - SUBJECTIVE AND OBJECTIVE BOX
Patient is a 81y old  Male who presents with a chief complaint of diarrhea with blood in it , abd pain and weakness (24 Aug 2021 18:06)      INTERVAL HPI/OVERNIGHT EVENTS: seen and examined, s/p NGT for dysphagia   T(C): 36.2 (08-24-21 @ 15:00), Max: 37 (08-23-21 @ 19:00)  HR: 74 (08-24-21 @ 18:00) (72 - 91)  BP: 142/65 (08-24-21 @ 07:00) (142/65 - 142/65)  RR: 30 (08-24-21 @ 18:00) (18 - 43)  SpO2: 100% (08-24-21 @ 18:00) (94% - 100%)  Wt(kg): --  I&O's Summary    23 Aug 2021 07:01  -  24 Aug 2021 07:00  --------------------------------------------------------  IN: 2806 mL / OUT: 1980 mL / NET: 826 mL    24 Aug 2021 07:01  -  24 Aug 2021 18:18  --------------------------------------------------------  IN: 1284 mL / OUT: 2495 mL / NET: -1211 mL        PAST MEDICAL & SURGICAL HISTORY:  No pertinent past medical history    No significant past surgical history        SOCIAL HISTORY  Alcohol:  Tobacco:  Illicit substance use:    FAMILY HISTORY:    REVIEW OF SYSTEMS:  CONSTITUTIONAL: No fever, weight loss, or fatigue  EYES: No eye pain, visual disturbances, or discharge  ENMT:  No difficulty hearing, tinnitus, vertigo; No sinus or throat pain  NECK: No pain or stiffness  RESPIRATORY: No cough, wheezing, chills or hemoptysis; No shortness of breath  CARDIOVASCULAR: No chest pain, palpitations, dizziness, or leg swelling  GASTROINTESTINAL: No abdominal or epigastric pain. No nausea, vomiting, or hematemesis; No diarrhea or constipation. No melena or hematochezia.  GENITOURINARY: No dysuria, frequency, hematuria, or incontinence  NEUROLOGICAL: No headaches, memory loss, loss of strength, numbness, or tremors  SKIN: No itching, burning, rashes, or lesions   LYMPH NODES: No enlarged glands  ENDOCRINE: No heat or cold intolerance; No hair loss  MUSCULOSKELETAL: No joint pain or swelling; No muscle, back, or extremity pain  PSYCHIATRIC: No depression, anxiety, mood swings, or difficulty sleeping  HEME/LYMPH: No easy bruising, or bleeding gums  ALLERY AND IMMUNOLOGIC: No hives or eczema    RADIOLOGY & ADDITIONAL TESTS:    Imaging Personally Reviewed:  [ ] YES  [ ] NO    Consultant(s) Notes Reviewed:  [ ] YES  [ ] NO    PHYSICAL EXAM:  GENERAL: NAD, well-groomed, well-developed  HEAD:  Atraumatic, Normocephalic  EYES: EOMI, PERRLA, conjunctiva and sclera clear  ENMT: No tonsillar erythema, exudates, or enlargement; Moist mucous membranes, Good dentition, No lesions  NECK: Supple, No JVD, Normal thyroid  NERVOUS SYSTEM:  Alert & Oriented X3, Good concentration; Motor Strength 5/5 B/L upper and lower extremities; DTRs 2+ intact and symmetric  CHEST/LUNG: Clear to percussion bilaterally; No rales, rhonchi, wheezing, or rubs  HEART: Regular rate and rhythm; No murmurs, rubs, or gallops  ABDOMEN: Soft, Nontender, Nondistended; Bowel sounds present  EXTREMITIES:  2+ Peripheral Pulses, No clubbing, cyanosis, or edema  LYMPH: No lymphadenopathy noted  SKIN: No rashes or lesions    LABS:                        8.7    20.99 )-----------( 123      ( 24 Aug 2021 13:42 )             27.6     08-24    138  |  105  |  32<H>  ----------------------------<  146<H>  4.5   |  22  |  0.41<L>    Ca    7.3<L>      24 Aug 2021 01:49  Phos  3.2     08-24  Mg     2.0     08-24    TPro  4.4<L>  /  Alb  1.6<L>  /  TBili  0.6  /  DBili  0.2  /  AST  60<H>  /  ALT  77<H>  /  AlkPhos  191<H>  08-24    PT/INR - ( 24 Aug 2021 01:49 )   PT: 13.6 sec;   INR: 1.14 ratio         PTT - ( 24 Aug 2021 01:49 )  PTT:30.8 sec    CAPILLARY BLOOD GLUCOSE      POCT Blood Glucose.: 122 mg/dL (24 Aug 2021 17:29)  POCT Blood Glucose.: 132 mg/dL (24 Aug 2021 10:53)  POCT Blood Glucose.: 128 mg/dL (24 Aug 2021 06:28)    ABG - ( 23 Aug 2021 11:32 )  pH, Arterial: 7.52  pH, Blood: x     /  pCO2: 33    /  pO2: 78    / HCO3: 27    / Base Excess: 4.0   /  SaO2: 97.5                    MEDICATIONS  (STANDING):  chlorhexidine 2% Cloths 1 Application(s) Topical daily  enoxaparin Injectable 40 milliGRAM(s) SubCutaneous every 24 hours  fat emulsion (Fish Oil and Plant Based) 20% Infusion 25 mL/Hr (25 mL/Hr) IV Continuous <Continuous>  insulin lispro (ADMELOG) corrective regimen sliding scale   SubCutaneous every 6 hours  methylPREDNISolone sodium succinate Injectable 10 milliGRAM(s) IV Push every 12 hours  metoprolol tartrate Injectable 5 milliGRAM(s) IV Push every 4 hours  pantoprazole  Injectable 40 milliGRAM(s) IV Push daily  Parenteral Nutrition - Adult 1 Each (94 mL/Hr) TPN Continuous <Continuous>  Parenteral Nutrition - Adult 1 Each (94 mL/Hr) TPN Continuous <Continuous>    MEDICATIONS  (PRN):  acetaminophen  IVPB .. 1000 milliGRAM(s) IV Intermittent every 6 hours PRN Mild Pain (1 - 3)      Care Discussed with Consultants/Other Providers [ ] YES  [ ] NO

## 2021-08-24 NOTE — PROGRESS NOTE ADULT - ATTENDING COMMENTS
no acute events, coughing when trying clears  + ileostomy output    awake, weak, drowsy, difficulty keeping eyes open and talking  abd soft, incisions c/d/i  ileostomy pink    s/p TAC  cont TPN  speech and swallow eval before starting diet  OOB, PT to improve strength

## 2021-08-24 NOTE — PROGRESS NOTE ADULT - ASSESSMENT
A/p    s/p total colectomy with ileostomy   -in SICU   -surgery following     post op resp failure on mech vent   s/p extubation   doing fair     leukocytosis :  -on steroids     hypotension :  resolved     dysphagia :  -s/p NGT     Chron's dis : s/p total colectomy with ileostomy   -stable

## 2021-08-24 NOTE — PROGRESS NOTE ADULT - ATTENDING COMMENTS
Patient seen and examined and agree with above.   POD #5 s/p total abdominal colectomy and end ileostomy.  Awake and alert. Hypophonic this morning and weak and deconditioned.   Current management includes:  1)n Alert this morning.   Tylenol for pain control  2) acute respiratory insufficiency - on 3 liters NC supplementation  CXR with right lower lobe atelectasis. Will continue continue encouragement of incentive spirometer.   Acapella   3) Atrial fibrillation with RVR- controlled with metoprolol  4) NPO at this time and receivign TPN  -swallow assessment   Ileostomy functioning   5) Improving leukocytosis - continue IV antibiotics   6) plan for steroid taper   7) Hyperglycemia - likely secondary to steroids  -controlled and will [id number] units  insulin in TPN bag

## 2021-08-24 NOTE — PROGRESS NOTE ADULT - SUBJECTIVE AND OBJECTIVE BOX
Interval Events:   - NAEON, afebrile HD stable   - Lethargic   - Hb downtrending to 8.0 <-- 9.0      Hospital Medications:  chlorhexidine 2% Cloths 1 Application(s) Topical daily  enoxaparin Injectable 40 milliGRAM(s) SubCutaneous every 24 hours  fat emulsion (Fish Oil and Plant Based) 20% Infusion 25 mL/Hr IV Continuous <Continuous>  insulin lispro (ADMELOG) corrective regimen sliding scale   SubCutaneous every 6 hours  methylPREDNISolone sodium succinate Injectable 20 milliGRAM(s) IV Push every 12 hours  metoprolol tartrate Injectable 5 milliGRAM(s) IV Push every 4 hours  pantoprazole  Injectable 40 milliGRAM(s) IV Push daily  Parenteral Nutrition - Adult 1 Each TPN Continuous <Continuous>  Parenteral Nutrition - Adult 1 Each TPN Continuous <Continuous>  piperacillin/tazobactam IVPB.. 3.375 Gram(s) IV Intermittent every 8 hours      PMHX/PSHX:  No pertinent past medical history    No significant past surgical history    GENERAL:  NAD, Appears stated age  HEENT:  NC/AT,  conjunctivae clear and pink, sclera -anicteric  CHEST:  Normal Effort  ABDOMEN:   soft, Non-tender, Non-distended. Ostomy pink and viable with dark bilious output  EXTREMITIES:  no cyanosis or edema  SKIN:  Warm & Dry. No rash or erythema  NEURO:  lethargic and minimally responsive      Vital Signs:  Vital Signs Last 24 Hrs  T(C): 36.3 (23 Aug 2021 07:00), Max: 36.5 (22 Aug 2021 15:00)  T(F): 97.3 (23 Aug 2021 07:00), Max: 97.7 (22 Aug 2021 15:00)  HR: 101 (23 Aug 2021 10:00) (78 - 158)  BP: 131/62 (23 Aug 2021 07:43) (111/80 - 155/73)  BP(mean): 89 (23 Aug 2021 07:43) (80 - 105)  RR: 27 (23 Aug 2021 10:00) (23 - 40)  SpO2: 99% (23 Aug 2021 10:00) (99% - 100%)  Daily     Daily Weight in k.2 (23 Aug 2021 04:00)    LABS:                        8.7    23.38 )-----------( 103      ( 22 Aug 2021 23:53 )             26.4     08    137  |  106  |  34<H>  ----------------------------<  168<H>  4.5   |  23  |  0.46<L>    Ca    7.8<L>      22 Aug 2021 23:53  Phos  3.4       Mg     2.1             PT/INR - ( 22 Aug 2021 23:53 )   PT: 12.4 sec;   INR: 1.04 ratio         PTT - ( 22 Aug 2021 23:53 )  PTT:32.0 sec

## 2021-08-24 NOTE — PROGRESS NOTE ADULT - ASSESSMENT
80 yo M with Crohn's on treatment, recently cared for at [facility],adnutted 7/13/21 with ongoing diarrhea    Antibiotics  Flagyl 7/13 7/21-->25  Cipro 7/13-->14  po Vanco 7/14 -->-->7/29; 8/1-->8/2  (7/28: colonoscopic FMT)  Fidaxomicin 8/2--> 8/20  Zosyn 8/20--> 24      1) Crohns colitis s/p colectomy 8/20  2) C diff colitis   s/p Fidaxomcin   - S/p colonoscopic FMT 7/28  3) Leukocytosis  4) previous Para flu 3 viral uri with cough and fevers  5) malnourished state, hypoalbuminema  TPN/Smoflipid 8/16-->  6) debility  7) transaminitis - improved    8/20 Total colectomy with end-ileostomy, murky ascitis noted and washed out   currently extubated    8/9 Path Left colon, biopsy  - Active (nonspecific) colitis.  - Negative for CMV inclusions (CMV IHC).    Suggest  Continue Zosyn through 8/30  await path

## 2021-08-24 NOTE — PROGRESS NOTE ADULT - SUBJECTIVE AND OBJECTIVE BOX
Follow Up:  s/p colectomy    Interval History/ROS:  lethargic    Allergies  No Known Allergies    ANTIMICROBIALS:      OTHER MEDS:  MEDICATIONS  (STANDING):  acetaminophen  IVPB .. 1000 every 6 hours PRN  enoxaparin Injectable 40 every 24 hours  insulin lispro (ADMELOG) corrective regimen sliding scale  every 6 hours  methylPREDNISolone sodium succinate Injectable 10 every 12 hours  metoprolol tartrate Injectable 5 every 4 hours  pantoprazole  Injectable 40 daily      Vital Signs Last 24 Hrs  T(C): 36.2 (24 Aug 2021 15:00), Max: 37 (23 Aug 2021 19:00)  T(F): 97.2 (24 Aug 2021 15:00), Max: 98.6 (23 Aug 2021 19:00)  HR: 74 (24 Aug 2021 18:00) (72 - 91)  BP: 142/65 (24 Aug 2021 07:00) (142/65 - 142/65)  BP(mean): 94 (24 Aug 2021 07:00) (94 - 94)  RR: 30 (24 Aug 2021 18:00) (18 - 43)  SpO2: 100% (24 Aug 2021 18:00) (94% - 100%)    PHYSICAL EXAM:  General: thin,  NAD, Non-toxic  Neurology: lethargic  Respiratory: Clear to auscultation bilaterally  CV: RRR, S1S2, no murmurs, rubs or gallops  Abdominal: Soft, Non-tender, non-distended, ostomy site clear, pink mucosa  Extremities: No edema, + peripheral pulses  Line Sites: Clear  Skin: No rash                        8.7    20.99 )-----------( 123      ( 24 Aug 2021 13:42 )             27.6   WBC Count: 20.99 (08-24 @ 13:42)  WBC Count: 20.00 (08-24 @ 01:49)  WBC Count: 22.07 (08-23 @ 11:40)  WBC Count: 23.38 (08-22 @ 23:53)  WBC Count: 28.83 (08-22 @ 14:21)  WBC Count: 28.81 (08-22 @ 05:31)  WBC Count: 23.12 (08-22 @ 00:08)  WBC Count: 37.99 (08-21 @ 02:35)  WBC Count: 26.33 (08-20 @ 21:00)  WBC Count: 23.31 (08-20 @ 06:40)       08-24    138  |  105  |  32<H>  ----------------------------<  146<H>  4.5   |  22  |  0.41<L>    Ca    7.3<L>      24 Aug 2021 01:49  Phos  3.2     08-24  Mg     2.0     08-24    TPro  4.4<L>  /  Alb  1.6<L>  /  TBili  0.6  /  DBili  0.2  /  AST  60<H>  /  ALT  77<H>  /  AlkPhos  191<H>  08-24      MICROBIOLOGY:  .Blood Blood-Peripheral  08-05-21   No Growth Final  --  --      .Blood Blood-Peripheral  08-05-21   No Growth Final  --  --      .Blood Blood-Peripheral  07-30-21   No Growth Final  --  --      .Blood Blood-Peripheral  07-27-21   No Growth Final  --  --      RADIOLOGY:    Benjamin Phillips MD; Division of Infectious Disease; Pager: [phone]; nights and weekends: [phone]

## 2021-08-24 NOTE — PROGRESS NOTE ADULT - SUBJECTIVE AND OBJECTIVE BOX
Subjective:   Patient seen at bedside this AM. No acute events overnight.    Objective:  Vital Signs  T(C): 37 (08-23 @ 19:00), Max: 37.1 (08-23 @ 15:00)  HR: 91 (08-23 @ 22:00) (78 - 156)  BP: 131/62 (08-23 @ 07:43) (131/62 - 143/63)  RR: 21 (08-23 @ 22:00) (18 - 44)  SpO2: 100% (08-23 @ 22:00) (92% - 100%)  08-22-21 @ 07:01  -  08-23-21 @ 07:00  --------------------------------------------------------  IN:  Total IN: 0 mL    OUT:    Bulb (mL): 90 mL    Bulb (mL): 250 mL    Ileostomy (mL): 150 mL    Indwelling Catheter - Urethral (mL): 1095 mL    Nasogastric/Oral tube (mL): 100 mL  Total OUT: 1685 mL    Total NET: -1685 mL      08-23-21 @ 07:01  -  08-24-21 @ 00:46  --------------------------------------------------------  IN:  Total IN: 0 mL    OUT:    Bulb (mL): 30 mL    Bulb (mL): 110 mL    Ileostomy (mL): 15 mL    Indwelling Catheter - Urethral (mL): 920 mL    Nasogastric/Oral tube (mL): 50 mL  Total OUT: 1125 mL    Total NET: -1125 mL    PHYSICAL EXAM:  Constitutional: non-responsive, A&Ox0  Neck: supple  Respiratory: no acute respiratory distress  Cardiovascular: NSR  Gastrointestinal: abdomen soft, nondistended, tender around ostomy. No obvious masses. Incisions c/d/i with wet-to-dry dressing, ileostomy pink with stool and gas, upper and lower L drains with serous output.   Extremities: warm    Labs:                        9.0    22.07 )-----------( See note    ( 23 Aug 2021 11:40 )             27.0   08-23    137  |  106  |  34<H>  ----------------------------<  145<H>  4.5   |  23  |  0.43<L>    Ca    7.9<L>      23 Aug 2021 11:40  Phos  3.2     08-23  Mg     2.1     08-23      CAPILLARY BLOOD GLUCOSE      POCT Blood Glucose.: 120 mg/dL (23 Aug 2021 18:07)  POCT Blood Glucose.: 158 mg/dL (23 Aug 2021 05:36)      Imaging:     Subjective:   Patient seen at bedside this AM. No acute events overnight. Patient advance to NPO with sips and choked while drinking. Backed down to NPO.     Objective:  Vital Signs  T(C): 37 (08-23 @ 19:00), Max: 37.1 (08-23 @ 15:00)  HR: 91 (08-23 @ 22:00) (78 - 156)  BP: 131/62 (08-23 @ 07:43) (131/62 - 143/63)  RR: 21 (08-23 @ 22:00) (18 - 44)  SpO2: 100% (08-23 @ 22:00) (92% - 100%)  08-22-21 @ 07:01  -  08-23-21 @ 07:00  --------------------------------------------------------  IN:  Total IN: 0 mL    OUT:    Bulb (mL): 90 mL    Bulb (mL): 250 mL    Ileostomy (mL): 150 mL    Indwelling Catheter - Urethral (mL): 1095 mL    Nasogastric/Oral tube (mL): 100 mL  Total OUT: 1685 mL    Total NET: -1685 mL      08-23-21 @ 07:01  -  08-24-21 @ 00:46  --------------------------------------------------------  IN:  Total IN: 0 mL    OUT:    Bulb (mL): 30 mL    Bulb (mL): 110 mL    Ileostomy (mL): 15 mL    Indwelling Catheter - Urethral (mL): 920 mL    Nasogastric/Oral tube (mL): 50 mL  Total OUT: 1125 mL    Total NET: -1125 mL    PHYSICAL EXAM:  Constitutional: A&Ox0  Neck: supple  Respiratory: no acute respiratory distress  Cardiovascular: NSR  Gastrointestinal: abdomen soft, nondistended, tender around ostomy. No obvious masses. Incisions c/d/i with wet-to-dry dressing, ileostomy pink with stool and gas, upper and lower L drains with serous output.   Extremities: warm    Labs:                        9.0    22.07 )-----------( See note    ( 23 Aug 2021 11:40 )             27.0   08-23    137  |  106  |  34<H>  ----------------------------<  145<H>  4.5   |  23  |  0.43<L>    Ca    7.9<L>      23 Aug 2021 11:40  Phos  3.2     08-23  Mg     2.1     08-23      CAPILLARY BLOOD GLUCOSE      POCT Blood Glucose.: 120 mg/dL (23 Aug 2021 18:07)  POCT Blood Glucose.: 158 mg/dL (23 Aug 2021 05:36)      Imaging:

## 2021-08-24 NOTE — PROGRESS NOTE ADULT - SUBJECTIVE AND OBJECTIVE BOX
HISTORY  81y Male    24 HOUR EVENTS: CXR significant for RLL atelectasis, Chest PT started    SUBJECTIVE/ROS:  [ ] A ten-point review of systems was otherwise negative except as noted.  [ ] Due to altered mental status/intubation, subjective information were not able to be obtained from the patient. History was obtained, to the extent possible, from review of the chart and collateral sources of information.      NEURO  GCS: 15  Exam: AO x 1-2   Meds: acetaminophen  IVPB .. 1000 milliGRAM(s) IV Intermittent every 6 hours PRN Mild Pain (1 - 3)    [x] Adequacy of sedation and pain control has been assessed and adjusted      RESPIRATORY  RR: 18 (08-24-21 @ 03:00) (18 - 44)  SpO2: 100% (08-24-21 @ 03:00) (92% - 100%)  Wt(kg): --  Exam: breathing unlabored   Mechanical Ventilation:   ABG - ( 23 Aug 2021 11:32 )  pH: 7.52  /  pCO2: 33    /  pO2: 78    / HCO3: 27    / Base Excess: 4.0   /  SaO2: 97.5    Lactate: x            Meds:       CARDIOVASCULAR  HR: 73 (08-24-21 @ 03:00) (73 - 156)  BP: 131/62 (08-23-21 @ 07:43) (131/62 - 131/62)  BP(mean): 89 (08-23-21 @ 07:43) (89 - 89)  ABP: 156/63 (08-24-21 @ 03:00) (129/66 - 160/66)  ABP(mean): 100 (08-24-21 @ 03:00) (88 - 107)  Wt(kg): --  CVP(cm H2O): --      Exam: RRR  Cardiac Rhythm: NSR  Perfusion     [x]Adequate   [ ]Inadequate  Mentation   [x]Normal       [ ]Reduced  Extremities  [x]Warm         [ ]Cool  Volume Status []Hypervolemic [x]Euvolemic [ ]Hypovolemic  Meds: metoprolol tartrate Injectable 5 milliGRAM(s) IV Push every 4 hours        GI/NUTRITION  Exam: abdomen soft, NT/ND, SS c/d/i  Diet: TPN  Meds: pantoprazole  Injectable 40 milliGRAM(s) IV Push daily      GENITOURINARY  I&O's Detail    08-22 @ 07:01 - 08-23 @ 07:00  --------------------------------------------------------  IN:    Fat Emulsion (Fish Oil &amp; Plant Based) 20% Infusion: 325 mL    IV PiggyBack: 250 mL    TPN (Total Parenteral Nutrition): 2256 mL  Total IN: 2831 mL    OUT:    Bulb (mL): 90 mL    Bulb (mL): 250 mL    Ileostomy (mL): 150 mL    Indwelling Catheter - Urethral (mL): 1095 mL    Nasogastric/Oral tube (mL): 100 mL  Total OUT: 1685 mL    Total NET: 1146 mL      08-23 @ 07:01 - 08-24 @ 04:08  --------------------------------------------------------  IN:    Fat Emulsion (Fish Oil &amp; Plant Based) 20% Infusion: 250 mL    IV PiggyBack: 200 mL    TPN (Total Parenteral Nutrition): 1880 mL  Total IN: 2330 mL    OUT:    Bulb (mL): 30 mL    Bulb (mL): 110 mL    Fat Emulsion (Fish Oil &amp; Plant Based) 20% Infusion: 0 mL    Ileostomy (mL): 15 mL    Indwelling Catheter - Urethral (mL): 1195 mL    Nasogastric/Oral tube (mL): 50 mL  Total OUT: 1400 mL    Total NET: 930 mL          08-24    138  |  105  |  32<H>  ----------------------------<  146<H>  4.5   |  22  |  0.41<L>    Ca    7.3<L>      24 Aug 2021 01:49  Phos  3.2     08-24  Mg     2.0     08-24    TPro  4.4<L>  /  Alb  1.6<L>  /  TBili  0.6  /  DBili  0.2  /  AST  60<H>  /  ALT  77<H>  /  AlkPhos  191<H>  08-24    [ ] Roberson catheter, indication:   Meds: fat emulsion (Fish Oil and Plant Based) 20% Infusion 25 mL/Hr IV Continuous <Continuous>  Parenteral Nutrition - Adult 1 Each TPN Continuous <Continuous>        HEMATOLOGIC  Meds: enoxaparin Injectable 40 milliGRAM(s) SubCutaneous every 24 hours    [x] VTE Prophylaxis                        8.0    20.00 )-----------( 97       ( 24 Aug 2021 01:49 )             24.6     PT/INR - ( 24 Aug 2021 01:49 )   PT: 13.6 sec;   INR: 1.14 ratio         PTT - ( 24 Aug 2021 01:49 )  PTT:30.8 sec  Transfusion     [ ] PRBC   [ ] Platelets   [ ] FFP   [ ] Cryoprecipitate      INFECTIOUS DISEASES  T(C): 36.9 (08-24-21 @ 03:00), Max: 37.1 (08-23-21 @ 15:00)  Wt(kg): --  WBC Count: 20.00 K/uL (08-24 @ 01:49)  WBC Count: 22.07 K/uL (08-23 @ 11:40)    Recent Cultures:    Meds: piperacillin/tazobactam IVPB.. 3.375 Gram(s) IV Intermittent every 8 hours        ENDOCRINE  Capillary Blood Glucose    Meds: insulin lispro (ADMELOG) corrective regimen sliding scale   SubCutaneous every 6 hours  methylPREDNISolone sodium succinate Injectable 10 milliGRAM(s) IV Push every 12 hours        ACCESS DEVICES:  [x] Peripheral IV  [ ] Central Venous Line	[ ] R	[ ] L	[ ] IJ	[ ] Fem	[ ] SC	Placed:   [ ] Arterial Line		[ ] R	[ ] L	[ ] Fem	[ ] Rad	[ ] Ax	Placed:   [ ] PICC:					[ ] Mediport  [ ] Urinary Catheter, Date Placed:   [x] Necessity of urinary, arterial, and venous catheters discussed    OTHER MEDICATIONS:  chlorhexidine 2% Cloths 1 Application(s) Topical daily      CODE STATUS:     IMAGING: HISTORY  81y Male M with Crohn disease, over the last 9 mo, he was started on several medication and was recently told that he may need to go on IV infusion for chrons, he has several hospitalization at [facility] , and follows with GI near [facility]. but his diarrhea is not improving , having abd cramps , denies any N/V . , discharged 1 mo ago from [facility], now having trouble walking, on going diarrhea. + subjective fever but denies cough or nasal congestion or uri or urinary symptoms.  (13 Jul 2021 20:48)    24 HOUR EVENTS:   - CXR significant for RLL atelectasis, Chest PT started  - Patient unable to tolerate oral meds, failed bedside S&S, oral meds switched to IV.    SUBJECTIVE/ROS:  [ ] A ten-point review of systems was otherwise negative except as noted.  [ ] Due to altered mental status/intubation, subjective information were not able to be obtained from the patient. History was obtained, to the extent possible, from review of the chart and collateral sources of information.      NEURO  GCS: 15  Exam: AO x 1-2   Meds: acetaminophen  IVPB .. 1000 milliGRAM(s) IV Intermittent every 6 hours PRN Mild Pain (1 - 3)    [x] Adequacy of sedation and pain control has been assessed and adjusted      RESPIRATORY  RR: 18 (08-24-21 @ 03:00) (18 - 44)  SpO2: 100% (08-24-21 @ 03:00) (92% - 100%)  Wt(kg): --  Exam: breathing unlabored   Mechanical Ventilation:   ABG - ( 23 Aug 2021 11:32 )  pH: 7.52  /  pCO2: 33    /  pO2: 78    / HCO3: 27    / Base Excess: 4.0   /  SaO2: 97.5    Lactate: x            Meds:       CARDIOVASCULAR  HR: 73 (08-24-21 @ 03:00) (73 - 156)  BP: 131/62 (08-23-21 @ 07:43) (131/62 - 131/62)  BP(mean): 89 (08-23-21 @ 07:43) (89 - 89)  ABP: 156/63 (08-24-21 @ 03:00) (129/66 - 160/66)  ABP(mean): 100 (08-24-21 @ 03:00) (88 - 107)  Wt(kg): --  CVP(cm H2O): --      Exam: RRR  Cardiac Rhythm: NSR  Perfusion     [x]Adequate   [ ]Inadequate  Mentation   [x]Normal       [ ]Reduced  Extremities  [x]Warm         [ ]Cool  Volume Status []Hypervolemic [x]Euvolemic [ ]Hypovolemic  Meds: metoprolol tartrate Injectable 5 milliGRAM(s) IV Push every 4 hours        GI/NUTRITION  Exam: abdomen soft, NT/ND, SS c/d/i  Diet: TPN  Meds: pantoprazole  Injectable 40 milliGRAM(s) IV Push daily      GENITOURINARY  I&O's Detail    08-22 @ 07:01  -  08-23 @ 07:00  --------------------------------------------------------  IN:    Fat Emulsion (Fish Oil &amp; Plant Based) 20% Infusion: 325 mL    IV PiggyBack: 250 mL    TPN (Total Parenteral Nutrition): 2256 mL  Total IN: 2831 mL    OUT:    Bulb (mL): 90 mL    Bulb (mL): 250 mL    Ileostomy (mL): 150 mL    Indwelling Catheter - Urethral (mL): 1095 mL    Nasogastric/Oral tube (mL): 100 mL  Total OUT: 1685 mL    Total NET: 1146 mL      08-23 @ 07:01  -  08-24 @ 04:08  --------------------------------------------------------  IN:    Fat Emulsion (Fish Oil &amp; Plant Based) 20% Infusion: 250 mL    IV PiggyBack: 200 mL    TPN (Total Parenteral Nutrition): 1880 mL  Total IN: 2330 mL    OUT:    Bulb (mL): 30 mL    Bulb (mL): 110 mL    Fat Emulsion (Fish Oil &amp; Plant Based) 20% Infusion: 0 mL    Ileostomy (mL): 15 mL    Indwelling Catheter - Urethral (mL): 1195 mL    Nasogastric/Oral tube (mL): 50 mL  Total OUT: 1400 mL    Total NET: 930 mL          08-24    138  |  105  |  32<H>  ----------------------------<  146<H>  4.5   |  22  |  0.41<L>    Ca    7.3<L>      24 Aug 2021 01:49  Phos  3.2     08-24  Mg     2.0     08-24    TPro  4.4<L>  /  Alb  1.6<L>  /  TBili  0.6  /  DBili  0.2  /  AST  60<H>  /  ALT  77<H>  /  AlkPhos  191<H>  08-24    [ ] Roberson catheter, indication:   Meds: fat emulsion (Fish Oil and Plant Based) 20% Infusion 25 mL/Hr IV Continuous <Continuous>  Parenteral Nutrition - Adult 1 Each TPN Continuous <Continuous>        HEMATOLOGIC  Meds: enoxaparin Injectable 40 milliGRAM(s) SubCutaneous every 24 hours    [x] VTE Prophylaxis                        8.0    20.00 )-----------( 97       ( 24 Aug 2021 01:49 )             24.6     PT/INR - ( 24 Aug 2021 01:49 )   PT: 13.6 sec;   INR: 1.14 ratio         PTT - ( 24 Aug 2021 01:49 )  PTT:30.8 sec  Transfusion     [ ] PRBC   [ ] Platelets   [ ] FFP   [ ] Cryoprecipitate      INFECTIOUS DISEASES  T(C): 36.9 (08-24-21 @ 03:00), Max: 37.1 (08-23-21 @ 15:00)  Wt(kg): --  WBC Count: 20.00 K/uL (08-24 @ 01:49)  WBC Count: 22.07 K/uL (08-23 @ 11:40)    Recent Cultures:    Meds: piperacillin/tazobactam IVPB.. 3.375 Gram(s) IV Intermittent every 8 hours        ENDOCRINE  Capillary Blood Glucose    Meds: insulin lispro (ADMELOG) corrective regimen sliding scale   SubCutaneous every 6 hours  methylPREDNISolone sodium succinate Injectable 10 milliGRAM(s) IV Push every 12 hours        ACCESS DEVICES:  [x] Peripheral IV  [ ] Central Venous Line	[ ] R	[ ] L	[ ] IJ	[ ] Fem	[ ] SC	Placed:   [ ] Arterial Line		[ ] R	[ ] L	[ ] Fem	[ ] Rad	[ ] Ax	Placed:   [ ] PICC:					[ ] Mediport  [ ] Urinary Catheter, Date Placed:   [x] Necessity of urinary, arterial, and venous catheters discussed    OTHER MEDICATIONS:  chlorhexidine 2% Cloths 1 Application(s) Topical daily      CODE STATUS:     IMAGING:

## 2021-08-24 NOTE — PROGRESS NOTE ADULT - SUBJECTIVE AND OBJECTIVE BOX
Patient is a 81y old  Male who presents with a chief complaint of diarrhea with blood in it , abd pain and weakness (24 Aug 2021 09:20)      INTERVAL HISTORY:  + tired + lethargic but alert to voice- daughter at bedside     TELEMETRY Personally reviewed: Sinus 80s Pacs    REVIEW OF SYSTEMS:   CONSTITUTIONAL: + weakness    MEDICATIONS:  metoprolol tartrate Injectable 5 milliGRAM(s) IV Push every 4 hours    PHYSICAL EXAM:  T(C): 36.2 (08-24-21 @ 15:00), Max: 37 (08-23-21 @ 19:00)  HR: 81 (08-24-21 @ 15:00) (72 - 91)  BP: 142/65 (08-24-21 @ 07:00) (142/65 - 142/65)  RR: 30 (08-24-21 @ 15:00) (18 - 43)  SpO2: 100% (08-24-21 @ 15:00) (94% - 100%)  Wt(kg): --  I&O's Summary    23 Aug 2021 07:01  -  24 Aug 2021 07:00  --------------------------------------------------------  IN: 2806 mL / OUT: 1980 mL / NET: 826 mL    24 Aug 2021 07:01  -  24 Aug 2021 15:10  --------------------------------------------------------  IN: 858 mL / OUT: 2005 mL / NET: -1147 mL    Appearance: In no distress	  HEENT:    PERRL, EOMI	  Cardiovascular:  S1 S2, No JVD  Respiratory: Lungs clear to auscultation	  Gastrointestinal:  Soft, Non-tender, + BS	  Vascularature:  No edema of LE  Psychiatric: Appropriate affect   Neuro: no acute focal deficits                         8.7    20.99 )-----------( 123      ( 24 Aug 2021 13:42 )             27.6     08-24    138  |  105  |  32<H>  ----------------------------<  146<H>  4.5   |  22  |  0.41<L>    Ca    7.3<L>      24 Aug 2021 01:49  Phos  3.2     08-24  Mg     2.0     08-24    TPro  4.4<L>  /  Alb  1.6<L>  /  TBili  0.6  /  DBili  0.2  /  AST  60<H>  /  ALT  77<H>  /  AlkPhos  191<H>  08-24  Labs personally reviewed    ASSESSMENT/PLAN: 	  Problem/Plan - 1:  ·  Problem: CAD (coronary artery disease).  Plan: c/w home meds  We discussed the importance of SAPT with low dose ASA 81mg given CAD  pt denies any current chest pain, SOB or chest pressure.       Problem/Plan -2:  ·  Problem: Colitis.  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 SR   - rate controlled and regular   - 8/17 am pt went into rapid Afib with RVR rate of 154 cardiezem was given   - pt was transferred to monitored unit and now SR on monitor   - pt is ? candidate for AC seeing recent GIB. FOBT positive as recent as 8/8/21   - rate controlled now continue ot monitor    - possible bleed with decreasing H&H ?  - pt has active GIB no AC indicated        Problem/Plan - 4:   ·  Problem: GIB (gastrointestinal bleeding).  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   - Failed Speech and swallow test- NGT in place     Problem/Plan - 5:  ·  Problem: DVt ppx SCDs     Problem/Plan - 6:  ·  Problem: B/L LE edema   - CXR showed mildly enlarged heart   -monitor IVF with I&O  -B/L L E edema improved   -TTE with mod-severe AI, mild AS, preserved EF          Sanam Heath ANP-C  Rufus Onofre DO [facility]  Cardiovascular Medicine  [address] [place] [address], [address]  Office: [phone]  Cell: [phone] Patient is a 81y old  Male who presents with a chief complaint of diarrhea with blood in it , abd pain and weakness (24 Aug 2021 09:20)      INTERVAL HISTORY:  + tired + lethargic but alert to voice- daughter at bedside     TELEMETRY Personally reviewed: Sinus 80s Pacs    REVIEW OF SYSTEMS:   CONSTITUTIONAL: + weakness    MEDICATIONS:  metoprolol tartrate Injectable 5 milliGRAM(s) IV Push every 4 hours    PHYSICAL EXAM:  T(C): 36.2 (08-24-21 @ 15:00), Max: 37 (08-23-21 @ 19:00)  HR: 81 (08-24-21 @ 15:00) (72 - 91)  BP: 142/65 (08-24-21 @ 07:00) (142/65 - 142/65)  RR: 30 (08-24-21 @ 15:00) (18 - 43)  SpO2: 100% (08-24-21 @ 15:00) (94% - 100%)  Wt(kg): --  I&O's Summary    23 Aug 2021 07:01  -  24 Aug 2021 07:00  --------------------------------------------------------  IN: 2806 mL / OUT: 1980 mL / NET: 826 mL    24 Aug 2021 07:01  -  24 Aug 2021 15:10  --------------------------------------------------------  IN: 858 mL / OUT: 2005 mL / NET: -1147 mL    Appearance: In no distress	  HEENT:    PERRL, EOMI	  Cardiovascular:  S1 S2, No JVD  Respiratory: Lungs clear to auscultation	  Gastrointestinal:  Soft, Non-tender, + BS	  Vascularature:  No edema of LE  Psychiatric: Appropriate affect   Neuro: no acute focal deficits                         8.7    20.99 )-----------( 123      ( 24 Aug 2021 13:42 )             27.6     08-24    138  |  105  |  32<H>  ----------------------------<  146<H>  4.5   |  22  |  0.41<L>    Ca    7.3<L>      24 Aug 2021 01:49  Phos  3.2     08-24  Mg     2.0     08-24    TPro  4.4<L>  /  Alb  1.6<L>  /  TBili  0.6  /  DBili  0.2  /  AST  60<H>  /  ALT  77<H>  /  AlkPhos  191<H>  08-24  Labs personally reviewed    ASSESSMENT/PLAN: 	  Problem/Plan - 1:  ·  Problem: CAD (coronary artery disease).  Plan: c/w home meds  We discussed the importance of SAPT with low dose ASA 81mg given CAD  pt denies any current chest pain, SOB or chest pressure.     Problem/Plan -2:  ·  Problem: Colitis.  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 SR   - pt is ? candidate for AC seeing recent GIB. FOBT positive as recent as 8/8/21   - pt has active GIB no AC indicated    - rate Control with IV lopressor     Problem/Plan - 4:   ·  Problem: GIB (gastrointestinal bleeding).  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   - Failed Speech and swallow test- NGT in place     Problem/Plan - 5:  ·  Problem: DVt ppx SCDs     Problem/Plan - 6:  ·  Problem: B/L LE edema   - CXR showed mildly enlarged heart   -monitor IVF with I&O  -B/L L E edema improved   -TTE with mod-severe AI, mild AS, preserved EF          Sanam BARRON-JACKY Onofre DO [facility]  Cardiovascular Medicine  [address] [place] [address], [address]  Office: [phone]  Cell: [phone]

## 2021-08-24 NOTE — PROGRESS NOTE ADULT - ASSESSMENT
81y Male with GERD on omeprazole daily and Crohn's disease diagnosed about 9 mos prior to admission at outside hospital c/b h/o abscess and left posterior distal anal intersphincteric fistula and presents diarrhea/BRBPR  2/2 to CD flare c/b c diff colitis. Initially treat for C diff, given that it did not get better got FMT. Switched PO vanc in favor of Fidaxomicin without any improvement. Therefore, patient got Flex sig to reassess which showed no pseudomembranous colitis anymore but severe Crohn colitis. Was started on steroids on 8/9. Has had minimal improvement. Started tapering IV steroids and pt is currently on TPN as he has had poor PO intake. Case was discussed at IBD conference 8/18 and CRS + GI team in agreement with surgical resection. s/p laparoscopic total abdominal colectomy with end ileostomy on 8/20. Now recovering in SICU.    # c diff - pseudomembranous colitis seen on colonoscopy 7/19/21, treated w/ vanc, fidaxomicin, FMT; repeat flex sig w/o further pseudomembranes but ongoing severe CD. 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     Manuel Bowen MD  Gastroenterology Fellow, PGY4    Available on [employer] Teams  [phone] ([facility])  [id number] ([facility])  Please contact on call fellow weekdays after 5pm-7am and weekends: [phone]

## 2021-08-24 NOTE — PROGRESS NOTE ADULT - SUBJECTIVE AND OBJECTIVE BOX
[facility] NUTRITION SUPPORT-- FOLLOW UP NOTE  --------------------------------------------------------------------------------    24 hour events/subjective: pt seen and examined. tpn running. lethargic in bed but arousable and denies abd pain. unable to tolerate oral meds, failed bedside S&S. given ca gluconate overnight and steroids tapered.    Diet:  Diet, NPO (08-23-21 @ 22:21)      PAST HISTORY  --------------------------------------------------------------------------------  No significant changes to PMH, PSH, FHx, SHx, unless otherwise noted    ALLERGIES & MEDICATIONS  --------------------------------------------------------------------------------  Allergies    No Known Allergies    Intolerances      Standing Inpatient Medications  chlorhexidine 2% Cloths 1 Application(s) Topical daily  enoxaparin Injectable 40 milliGRAM(s) SubCutaneous every 24 hours  fat emulsion (Fish Oil and Plant Based) 20% Infusion 25 mL/Hr IV Continuous <Continuous>  insulin lispro (ADMELOG) corrective regimen sliding scale   SubCutaneous every 6 hours  methylPREDNISolone sodium succinate Injectable 10 milliGRAM(s) IV Push every 12 hours  metoprolol tartrate Injectable 5 milliGRAM(s) IV Push every 4 hours  pantoprazole  Injectable 40 milliGRAM(s) IV Push daily  Parenteral Nutrition - Adult 1 Each TPN Continuous <Continuous>  piperacillin/tazobactam IVPB.. 3.375 Gram(s) IV Intermittent every 8 hours    PRN Inpatient Medications  acetaminophen  IVPB .. 1000 milliGRAM(s) IV Intermittent every 6 hours PRN        REVIEW OF SYSTEMS  --------------------------------------------------------------------------------  unable to obtain, see hpi      VITALS/PHYSICAL EXAM  --------------------------------------------------------------------------------  T(C): 36.9 (08-24-21 @ 03:00), Max: 37.1 (08-23-21 @ 15:00)  HR: 72 (08-24-21 @ 06:00) (72 - 156)  BP: 131/62 (08-23-21 @ 07:43) (131/62 - 131/62)  RR: 26 (08-24-21 @ 06:00) (18 - 44)  SpO2: 100% (08-24-21 @ 06:00) (92% - 100%)  Wt(kg): --      08-23-21 @ 07:01  -  08-24-21 @ 07:00  --------------------------------------------------------  IN: 2687 mL / OUT: 1880 mL / NET: 807 mL      I&O's Detail    23 Aug 2021 07:01  -  24 Aug 2021 07:00  --------------------------------------------------------  IN:    Fat Emulsion (Fish Oil &amp; Plant Based) 20% Infusion: 325 mL    IV PiggyBack: 200 mL    TPN (Total Parenteral Nutrition): 2162 mL  Total IN: 2687 mL    OUT:    Bulb (mL): 160 mL    Bulb (mL): 80 mL    Fat Emulsion (Fish Oil &amp; Plant Based) 20% Infusion: 0 mL    Ileostomy (mL): 215 mL    Indwelling Catheter - Urethral (mL): 1375 mL    Nasogastric/Oral tube (mL): 50 mL  Total OUT: 1880 mL    Total NET: 807 mL        Physical Exam:  Gen: lying in bed, frail, cachectic   HEENT: ncat, trachea midline  Chest: respirations non labored  Abd: soft nd ostomy with dark stool in bag +dressings c/d/i +drain x2  Extrem:  trace le edema; +ue edema  Neuro: lethargic but arousable and responsive      LABS/STUDIES  --------------------------------------------------------------------------------              8.0    20.00 >-----------<  97       [08-24-21 @ 01:49]              24.6     138  |  105  |  32  ----------------------------<  146      [08-24-21 @ 01:49]  4.5   |  22  |  0.41        Ca     7.3     [08-24-21 @ 01:49]      iCa    1.09     [08-24 @ 01:48]      Mg     2.0     [08-24-21 @ 01:49]      Phos  3.2     [08-24-21 @ 01:49]    TPro  4.4  /  Alb  1.6  /  TBili  0.6  /  DBili  0.2  /  AST  60  /  ALT  77  /  AlkPhos  191  [08-24-21 @ 01:49]    PT/INR: PT 13.6 , INR 1.14       [08-24-21 @ 01:49]  PTT: 30.8       [08-24-21 @ 01:49]      Ca ionizedBlood Gas Arterial - Calcium, Ionized: 1.15 mmol/L (08-23-21 @ 11:32)  Blood Gas Arterial - Calcium, Ionized: 1.15 mmol/L (08-22-21 @ 23:51)    Creatinine Trend:  POC glucoseGlucose, Serum: 146 mg/dL (08-24-21 @ 01:49)  Glucose, Serum: 145 mg/dL (08-23-21 @ 11:40)  CAPILLARY BLOOD GLUCOSE      POCT Blood Glucose.: 128 mg/dL (24 Aug 2021 06:28)  POCT Blood Glucose.: 120 mg/dL (23 Aug 2021 18:07)    PrealbuminPrealbumin, Serum: 8 mg/dL (08-15-21 @ 10:17)    Triglycerides     [facility] NUTRITION SUPPORT-- FOLLOW UP NOTE  --------------------------------------------------------------------------------    24 hour events/subjective: pt seen and examined. tpn running. lethargic in bed but arousable and denies abd pain. unable to tolerate oral meds, failed bedside S&S. given ca gluconate overnight and steroids tapered. for formal slp eval; getting lasix x1    Diet:  Diet, NPO (08-23-21 @ 22:21)      PAST HISTORY  --------------------------------------------------------------------------------  No significant changes to PMH, PSH, FHx, SHx, unless otherwise noted    ALLERGIES & MEDICATIONS  --------------------------------------------------------------------------------  Allergies    No Known Allergies    Intolerances      Standing Inpatient Medications  chlorhexidine 2% Cloths 1 Application(s) Topical daily  enoxaparin Injectable 40 milliGRAM(s) SubCutaneous every 24 hours  fat emulsion (Fish Oil and Plant Based) 20% Infusion 25 mL/Hr IV Continuous <Continuous>  insulin lispro (ADMELOG) corrective regimen sliding scale   SubCutaneous every 6 hours  methylPREDNISolone sodium succinate Injectable 10 milliGRAM(s) IV Push every 12 hours  metoprolol tartrate Injectable 5 milliGRAM(s) IV Push every 4 hours  pantoprazole  Injectable 40 milliGRAM(s) IV Push daily  Parenteral Nutrition - Adult 1 Each TPN Continuous <Continuous>  piperacillin/tazobactam IVPB.. 3.375 Gram(s) IV Intermittent every 8 hours    PRN Inpatient Medications  acetaminophen  IVPB .. 1000 milliGRAM(s) IV Intermittent every 6 hours PRN        REVIEW OF SYSTEMS  --------------------------------------------------------------------------------  unable to obtain, see hpi      VITALS/PHYSICAL EXAM  --------------------------------------------------------------------------------  T(C): 36.9 (08-24-21 @ 03:00), Max: 37.1 (08-23-21 @ 15:00)  HR: 72 (08-24-21 @ 06:00) (72 - 156)  BP: 131/62 (08-23-21 @ 07:43) (131/62 - 131/62)  RR: 26 (08-24-21 @ 06:00) (18 - 44)  SpO2: 100% (08-24-21 @ 06:00) (92% - 100%)  Wt(kg): --      08-23-21 @ 07:01  -  08-24-21 @ 07:00  --------------------------------------------------------  IN: 2687 mL / OUT: 1880 mL / NET: 807 mL      I&O's Detail    23 Aug 2021 07:01  -  24 Aug 2021 07:00  --------------------------------------------------------  IN:    Fat Emulsion (Fish Oil &amp; Plant Based) 20% Infusion: 325 mL    IV PiggyBack: 200 mL    TPN (Total Parenteral Nutrition): 2162 mL  Total IN: 2687 mL    OUT:    Bulb (mL): 160 mL    Bulb (mL): 80 mL    Fat Emulsion (Fish Oil &amp; Plant Based) 20% Infusion: 0 mL    Ileostomy (mL): 215 mL    Indwelling Catheter - Urethral (mL): 1375 mL    Nasogastric/Oral tube (mL): 50 mL  Total OUT: 1880 mL    Total NET: 807 mL        Physical Exam:  Gen: lying in bed, frail, cachectic   HEENT: ncat, trachea midline  Chest: respirations non labored  Abd: soft nd ostomy with dark stool in bag +dressings c/d/i +drain x2  Extrem:  trace le edema; +ue edema  Neuro: lethargic but arousable and responsive      LABS/STUDIES  --------------------------------------------------------------------------------              8.0    20.00 >-----------<  97       [08-24-21 @ 01:49]              24.6     138  |  105  |  32  ----------------------------<  146      [08-24-21 @ 01:49]  4.5   |  22  |  0.41        Ca     7.3     [08-24-21 @ 01:49]      iCa    1.09     [08-24 @ 01:48]      Mg     2.0     [08-24-21 @ 01:49]      Phos  3.2     [08-24-21 @ 01:49]    TPro  4.4  /  Alb  1.6  /  TBili  0.6  /  DBili  0.2  /  AST  60  /  ALT  77  /  AlkPhos  191  [08-24-21 @ 01:49]    PT/INR: PT 13.6 , INR 1.14       [08-24-21 @ 01:49]  PTT: 30.8       [08-24-21 @ 01:49]      Ca ionizedBlood Gas Arterial - Calcium, Ionized: 1.15 mmol/L (08-23-21 @ 11:32)  Blood Gas Arterial - Calcium, Ionized: 1.15 mmol/L (08-22-21 @ 23:51)    Creatinine Trend:  POC glucoseGlucose, Serum: 146 mg/dL (08-24-21 @ 01:49)  Glucose, Serum: 145 mg/dL (08-23-21 @ 11:40)  CAPILLARY BLOOD GLUCOSE      POCT Blood Glucose.: 128 mg/dL (24 Aug 2021 06:28)  POCT Blood Glucose.: 120 mg/dL (23 Aug 2021 18:07)    PrealbuminPrealbumin, Serum: 8 mg/dL (08-15-21 @ 10:17)    Triglycerides

## 2021-08-25 NOTE — PROGRESS NOTE ADULT - SUBJECTIVE AND OBJECTIVE BOX
Follow Up:  s/p colectomy    Interval History/ROS: debilatated, fatigue,     Allergies  No Known Allergies    ANTIMICROBIALS:  piperacillin/tazobactam IVPB.. 3.375 every 8 hours      OTHER MEDS:  MEDICATIONS  (STANDING):  enoxaparin Injectable 40 every 24 hours  insulin lispro (ADMELOG) corrective regimen sliding scale  every 6 hours  methylPREDNISolone sodium succinate Injectable 10 every 12 hours  metoprolol tartrate Injectable 5 every 4 hours  pantoprazole  Injectable 40 daily      Vital Signs Last 24 Hrs  T(C): 36 (25 Aug 2021 11:00), Max: 36.5 (24 Aug 2021 20:00)  T(F): 96.8 (25 Aug 2021 11:00), Max: 97.7 (24 Aug 2021 20:00)  HR: 80 (25 Aug 2021 13:00) (74 - 97)  BP: --  BP(mean): --  RR: 28 (25 Aug 2021 13:00) (20 - 32)  SpO2: 100% (25 Aug 2021 13:00) (95% - 100%)    PHYSICAL EXAM:  General: ill appearing NAD, Non-toxic  Neurology: lethargic, weak  Respiratory: Clear to auscultation bilaterally  CV: RRR, S1S2, no murmurs, rubs or gallops  Abdominal: Soft, Non-tender, non-distended  Extremities: No edema,  Line Sites: Clear  Skin: No rash                        8.5    22.21 )-----------( 129      ( 24 Aug 2021 21:48 )             26.5   WBC Count: 22.21 (08-24 @ 21:48)  WBC Count: 20.99 (08-24 @ 13:42)  WBC Count: 20.00 (08-24 @ 01:49)  WBC Count: 22.07 (08-23 @ 11:40)  WBC Count: 23.38 (08-22 @ 23:53)  WBC Count: 28.83 (08-22 @ 14:21)  WBC Count: 28.81 (08-22 @ 05:31)  WBC Count: 23.12 (08-22 @ 00:08)  WBC Count: 37.99 (08-21 @ 02:35)  WBC Count: 26.33 (08-20 @ 21:00)       08-24    137  |  104  |  31<H>  ----------------------------<  137<H>  4.4   |  24  |  0.44<L>    Ca    7.6<L>      24 Aug 2021 21:48  Phos  3.7     08-24  Mg     2.0     08-24    TPro  4.4<L>  /  Alb  1.6<L>  /  TBili  0.6  /  DBili  0.2  /  AST  60<H>  /  ALT  77<H>  /  AlkPhos  191<H>  08-24    MICROBIOLOGY:  .Blood Blood-Peripheral  08-05-21   No Growth Final  --  --      .Blood Blood-Peripheral  08-05-21   No Growth Final  --  --      .Blood Blood-Peripheral  07-30-21   No Growth Final  --  --      .Blood Blood-Peripheral  07-27-21   No Growth Final  --  --      RADIOLOGY:  < from: Xray Chest 1 View- PORTABLE-Routine (Xray Chest 1 View- PORTABLE-Routine in AM.) (08.25.21 @ 06:54) >  The heart is slightly enlarged. Improving right lower lobe pneumonia and/or atelectasis. A PICC line is seen on the right and the tip is in superior vena cava. No pleural effusion. No pneumothorax. Calcified aortic knob. Degenerative changes of the thoracic spine.    < end of copied text >    Benjamin Phillips MD; Division of Infectious Disease; Pager: [phone]; nights and weekends: [phone]

## 2021-08-25 NOTE — SWALLOW BEDSIDE ASSESSMENT ADULT - COMMENTS
Course: Diarrhea, C-diff colitis, Weight loss - likely 2/2 CD c/b oral aphthous ulcers and diarrhea as he has not been able to tolerate po and will need to ensure adequate hydration. GERD, Parainfluenza infection. Cachexia.  -Colonoscopy on 7/19/21: Pseudomembranous enterocolitis consistent with C diff infection, predominantly in left colon.     Per GI: GERD on omeprazole daily, Crohn's disease diagnosed ~9 mos prior to adm at OSH, c/b h/o abscess and L posterior distal anal intersphincteric fistula, presents as new consult to GI for diarrhea/BRBPR likely 2/2 to CD flare c/b c diff colitis. Given sx persisted despite Abx, Pt underwent FMT (fecal transplant) 7/28/21. Pt had initial response to it, with decrease in frequency of BM (5, compared to 10 or more) but now reports having 10 again. Pt and family attribute this to restarting PO Vanco. Unclear if worsening due to untreated Crohns vs failure of FMT. Vanco stopped in favor of Fidaxomicin (8/2/21). Given no improvement after 1 week, repeat Flex sig was done on 8/9/21 which showed deep ulceration, but C-Diff resolved. After further discussion with ID of benefits vs risk, decided to start steroids given endoscopic findings are mostly concerning for uncontrolled Crohns.    8/14: overnight: Pt becoming confused. Stat VBG shows low Na and phos. Per Medicine: psychosis - likely steroid induced.  8/15: Colorectal consult: consulted to evaluate for need of surgical mgmt. No indication for surgical intervention at this time, lactate of 2.4 likely from dehydration / malnutrition as pt has been having multiple episodes of diarrhea and unable to tolerate PO well 2/2 lethargy.Rx: optimize hydration, trend lactate, optimize nutrition status, consider parental nutrition if necessary.  8/16: Started on TPN  8/18: Per Colorectal pt has failed medical therapy and will require surgical resection (Total abdominal colectomy w/ end ileostomy)  8/20: s/p Laparoscopic total abdominal colectomy with end ileostomy  8/22: s/p extubation

## 2021-08-25 NOTE — PROGRESS NOTE ADULT - ASSESSMENT
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 SICU consulted for hemodynamic monitoring.     Plan:  Neurologic:   - Tylenol for pain control   - Patient slightly altered but redirectable    Respiratory:   - Acute respiratory insufficiency requiring 2-3L NC  - CXR showing RLL atelectasis -> Chest PT, acapella    Cardiovascular:   - NSR  - History of AFib currently rate controlled with IV metoprolol   - Continue IV lopressor 5 Q4 hrs     Gastrointestinal/Nutrition:   - NPO  - Ileostomy pink and viable without output  - Surgical sites CDI   - Will continue TPN   - Pending S&S as failed bedside    Renal/Genitourinary:   - Monitor and replete electrolytes as needed  - Monitor UO  - Continue with Roberson     Hematologic:   - Trend H&H and transfuse Hgb < 7  - Lovenox for DVT ppx     Infectious Disease: C. diff colitis s/p subtotal colectomy   - White count stable, paitent afebrile, will continue to monitor off antibitoics    Endocrine:   - Monitor BMP  - C/W steroid taper   - ISS, patient also receiving 16u of insulin in TPN bag    lines and drains:  -Roberson   - A line L. radial 8/20  -LLQ & LUQ drain 8/20  -TPN 8/16    Disposition: SICU

## 2021-08-25 NOTE — SWALLOW BEDSIDE ASSESSMENT ADULT - SWALLOW EVAL: DIAGNOSIS
82 yo male with h/o Crohn's disease, with prolonged hospital course c/b C-Diff, Crohn's unresponsive to medical treatments, now s/p Laparoscopic total abdominal colectomy with end ileostomy, currently presents with an oropharyngeal dysphagia notable for mildly prolonged oral management, suspect impaired airway closure/protection, palpable hyolaryngeal elevation, suspect reduced anterior excursion, and overt s/s laryngeal penetration/aspiration with conservative textures. Pt's presentation is consistent with deconditioning with disuse atrophy and recent intubation x2 days.

## 2021-08-25 NOTE — PROGRESS NOTE ADULT - SUBJECTIVE AND OBJECTIVE BOX
24h Events:  No acute events overnight.    Subjective:   Patient seen at bedside this AM.     Objective:  Vital Signs  T(C): 36.5 (08-24 @ 20:00), Max: 36.9 (08-24 @ 03:00)  HR: 97 (08-25 @ 00:00) (72 - 97)  BP: 142/65 (08-24 @ 07:00) (142/65 - 142/65)  RR: 21 (08-25 @ 00:00) (18 - 43)  SpO2: 100% (08-25 @ 00:00) (94% - 100%)  08-23-21 @ 07:01  -  08-24-21 @ 07:00  --------------------------------------------------------  IN:  Total IN: 0 mL    OUT:    Bulb (mL): 160 mL    Bulb (mL): 80 mL    Ileostomy (mL): 215 mL    Indwelling Catheter - Urethral (mL): 1475 mL    Nasogastric/Oral tube (mL): 50 mL  Total OUT: 1980 mL    Total NET: -1980 mL      08-24-21 @ 07:01  -  08-25-21 @ 01:06  --------------------------------------------------------  IN:  Total IN: 0 mL    OUT:    Bulb (mL): 120 mL    Bulb (mL): 120 mL    Ileostomy (mL): 250 mL    Indwelling Catheter - Urethral (mL): 3865 mL  Total OUT: 4355 mL    Total NET: -4355 mL      PHYSICAL EXAM:  Gen: NAD  Respiratory: no increased WOB  Gastrointestinal: abdomen soft, nondistended, tender around ostomy. No obvious masses. Incisions c/d/i with wet-to-dry dressing, ileostomy pink with stool and gas, upper and lower L drains with serous output.   Extremities: warm, well-perfused    Labs:                        8.5    22.21 )-----------( 129      ( 24 Aug 2021 21:48 )             26.5   08-24    137  |  104  |  31<H>  ----------------------------<  137<H>  4.4   |  24  |  0.44<L>    Ca    7.6<L>      24 Aug 2021 21:48  Phos  3.7     08-24  Mg     2.0     08-24    TPro  4.4<L>  /  Alb  1.6<L>  /  TBili  0.6  /  DBili  0.2  /  AST  60<H>  /  ALT  77<H>  /  AlkPhos  191<H>  08-24    CAPILLARY BLOOD GLUCOSE      POCT Blood Glucose.: 133 mg/dL (24 Aug 2021 22:55)  POCT Blood Glucose.: 122 mg/dL (24 Aug 2021 17:29)  POCT Blood Glucose.: 132 mg/dL (24 Aug 2021 10:53)  POCT Blood Glucose.: 128 mg/dL (24 Aug 2021 06:28)      Imaging:     24h Events:  No acute events overnight.  Diuresed with 40mg lasix yesteryday.  Pending formal S&S.    Subjective:   Patient seen at bedside this AM. Patient c/o mild abdominal pain.  No n/v.  Ostomy functioning.    Objective:  Vital Signs  T(C): 36.5 (08-24 @ 20:00), Max: 36.9 (08-24 @ 03:00)  HR: 97 (08-25 @ 00:00) (72 - 97)  BP: 142/65 (08-24 @ 07:00) (142/65 - 142/65)  RR: 21 (08-25 @ 00:00) (18 - 43)  SpO2: 100% (08-25 @ 00:00) (94% - 100%)  08-23-21 @ 07:01  -  08-24-21 @ 07:00  --------------------------------------------------------  IN:  Total IN: 0 mL    OUT:    Bulb (mL): 160 mL    Bulb (mL): 80 mL    Ileostomy (mL): 215 mL    Indwelling Catheter - Urethral (mL): 1475 mL    Nasogastric/Oral tube (mL): 50 mL  Total OUT: 1980 mL    Total NET: -1980 mL      08-24-21 @ 07:01  -  08-25-21 @ 01:06  --------------------------------------------------------  IN:  Total IN: 0 mL    OUT:    Bulb (mL): 120 mL    Bulb (mL): 120 mL    Ileostomy (mL): 250 mL    Indwelling Catheter - Urethral (mL): 3865 mL  Total OUT: 4355 mL    Total NET: -4355 mL      PHYSICAL EXAM:  Gen: NAD  Respiratory: no increased WOB  Gastrointestinal: abdomen soft, nondistended, tender around ostomy. No obvious masses. Incisions c/d/i with wet-to-dry dressing, ileostomy pink with stool and gas, upper and lower L drains with serous output.   Extremities: warm, well-perfused    Labs:                        8.5    22.21 )-----------( 129      ( 24 Aug 2021 21:48 )             26.5   08-24    137  |  104  |  31<H>  ----------------------------<  137<H>  4.4   |  24  |  0.44<L>    Ca    7.6<L>      24 Aug 2021 21:48  Phos  3.7     08-24  Mg     2.0     08-24    TPro  4.4<L>  /  Alb  1.6<L>  /  TBili  0.6  /  DBili  0.2  /  AST  60<H>  /  ALT  77<H>  /  AlkPhos  191<H>  08-24    CAPILLARY BLOOD GLUCOSE      POCT Blood Glucose.: 133 mg/dL (24 Aug 2021 22:55)  POCT Blood Glucose.: 122 mg/dL (24 Aug 2021 17:29)  POCT Blood Glucose.: 132 mg/dL (24 Aug 2021 10:53)  POCT Blood Glucose.: 128 mg/dL (24 Aug 2021 06:28)      Imaging:

## 2021-08-25 NOTE — PROGRESS NOTE ADULT - ASSESSMENT
82 yo M with Crohn's on treatment, recently cared for at [facility],adnutted 7/13/21 with ongoing diarrhea    Antibiotics  Flagyl 7/13 7/21-->25  Cipro 7/13-->14  po Vanco 7/14 -->-->7/29; 8/1-->8/2  (7/28: colonoscopic FMT)  Fidaxomicin 8/2--> 8/20  Zosyn 8/20--> 24      1) Crohns colitis s/p colectomy 8/20  2) C diff colitis   s/p Fidaxomcin   - S/p colonoscopic FMT 7/28  3) Leukocytosis  4) previous Para flu 3 viral uri with cough and fevers  5) malnourished state, hypoalbuminema  TPN/Smoflipid 8/16-->  6) debility  7) transaminitis - improved    8/20 Total colectomy with end-ileostomy, murky ascitis noted with fibrinous exudate and washed out   currently extubated    8/9 Path Left colon, biopsy  - Active (nonspecific) colitis.  - Negative for CMV inclusions (CMV IHC).      Suggest  Continue Zosyn through 8/30  await path    discussed with intensivist  daughter at bedside prefers patient not to be on antibiotics and will discuss with Surgery - with the operative findings suggesting leakage from colon, persistent leukocytosis in this debilated patient on tapering steroids, I am concerned about potential intraperitoneal infectious complication

## 2021-08-25 NOTE — PROGRESS NOTE ADULT - ATTENDING COMMENTS
Patient seen and examined and agree with above.   POD #6 s/p total abdominal colectomy and end ileostomy.  Awake and alert. Hypophonic this morning and weak and deconditioned.   Current management includes:  1)n Alert this morning.   Tylenol for pain control  2) acute respiratory insufficiency - on room air   CXR with right lower lobe atelectasis which has improved and open the right lower lobe. Will continue continue encouragement of incentive spirometer.   Acapella   3) Atrial fibrillation with RVR- controlled with metoprolol  Mg 2.0 - will give 1 unit of Mg   4) NPO at this time and receiving TPN  -swallow assessment   Ileostomy functioning   5) Stable elevated leukocytosis - continue IV antibiotics   6) plan for steroid taper   7) Hyperglycemia - likely secondary to steroids  -controlled and will continue 16 units  insulin in TPN bag Patient seen and examined and agree with above.   POD #6 s/p total abdominal colectomy and end ileostomy.  Awake and alert. Hypophonic this morning and weak and deconditioned.   Current management includes:  1)n Alert this morning.   Tylenol for pain control  2) acute respiratory insufficiency - on room air   CXR with right lower lobe atelectasis which has improved and open the right lower lobe. Will continue continue encouragement of incentive spirometer.   Acapella   -will give lasix 20 mg Iv x 1 dose as has bilateral upper extremity edema  3) Atrial fibrillation with RVR- controlled with metoprolol  Mg 2.0 - will give 1 unit of Mg   4) NPO at this time and receiving TPN  -swallow assessment   Ileostomy functioning   5) Stable elevated leukocytosis - continue IV antibiotics   6) plan for steroid taper   7) Hyperglycemia - likely secondary to steroids  -controlled and will continue 16 units  insulin in TPN bag

## 2021-08-25 NOTE — PROGRESS NOTE ADULT - SUBJECTIVE AND OBJECTIVE BOX
HISTORY  81y Male M with Crohn disease, over the last 9 mo, he was started on several medication and was recently told that he may need to go on IV infusion for chrons, he has several hospitalization at [facility] , and follows with GI near [facility]. but his diarrhea is not improving , having abd cramps , denies any N/V . , discharged 1 mo ago from [facility], now having trouble walking, on going diarrhea. + subjective fever but denies cough or nasal congestion or uri or urinary symptoms.  (13 Jul 2021 20:48)    24 HOUR EVENTS:  - lasix 20mg for goal net even, given extra 20mg for CXR c/f fluid overload  - Official speech and swallow pending    SUBJECTIVE/ROS:  [x] A ten-point review of systems was otherwise negative except as noted.  [ ] Due to altered mental status/intubation, subjective information were not able to be obtained from the patient. History was obtained, to the extent possible, from review of the chart and collateral sources of information.      NEURO  GCS: 15  Exam: AOx2, patient is mildly confused but redirectable  Meds: acetaminophen  IVPB .. 1000 milliGRAM(s) IV Intermittent every 6 hours PRN Mild Pain (1 - 3)    [x] Adequacy of sedation and pain control has been assessed and adjusted      RESPIRATORY  RR: 21 (08-25-21 @ 00:00) (18 - 43)  SpO2: 100% (08-25-21 @ 00:00) (94% - 100%)  Wt(kg): --  Exam: Lungs CTAB, breathing unlabored  ABG - ( 23 Aug 2021 11:32 )  pH: 7.52  /  pCO2: 33    /  pO2: 78    / HCO3: 27    / Base Excess: 4.0   /  SaO2: 97.5    Lactate: x        Meds:       CARDIOVASCULAR  HR: 97 (08-25-21 @ 00:00) (72 - 97)  BP: 142/65 (08-24-21 @ 07:00) (142/65 - 142/65)  BP(mean): 94 (08-24-21 @ 07:00) (94 - 94)  ABP: 146/62 (08-25-21 @ 00:00) (131/58 - 177/78)  ABP(mean): 91 (08-25-21 @ 00:00) (85 - 118)  Wt(kg): --  CVP(cm H2O): --    Exam: +S1/S2, no m/r/g  Cardiac Rhythm: Afib  Perfusion     [x]Adequate   [ ]Inadequate  Mentation   [x]Normal       [ ]Reduced  Extremities  [x]Warm         [ ]Cool  Volume Status [ ]Hypervolemic [x]Euvolemic [ ]Hypovolemic  Meds: metoprolol tartrate Injectable 5 milliGRAM(s) IV Push every 4 hours        GI/NUTRITION  Exam: abdomen soft, nt/nd, ss c/d/i  Diet: TPN  Meds: pantoprazole  Injectable 40 milliGRAM(s) IV Push daily      GENITOURINARY  I&O's Detail    08-23 @ 07:01  -  08-24 @ 07:00  --------------------------------------------------------  IN:    Fat Emulsion (Fish Oil &amp; Plant Based) 20% Infusion: 325 mL    IV PiggyBack: 225 mL    TPN (Total Parenteral Nutrition): 2256 mL  Total IN: 2806 mL    OUT:    Bulb (mL): 160 mL    Bulb (mL): 80 mL    Fat Emulsion (Fish Oil &amp; Plant Based) 20% Infusion: 0 mL    Ileostomy (mL): 215 mL    Indwelling Catheter - Urethral (mL): 1475 mL    Nasogastric/Oral tube (mL): 50 mL  Total OUT: 1980 mL    Total NET: 826 mL      08-24 @ 07:01  -  08-25 @ 00:24  --------------------------------------------------------  IN:    Fat Emulsion (Fish Oil &amp; Plant Based) 20% Infusion: 175 mL    IV PiggyBack: 100 mL    IV PiggyBack: 200 mL    TPN (Total Parenteral Nutrition): 1598 mL  Total IN: 2073 mL    OUT:    Bulb (mL): 120 mL    Bulb (mL): 120 mL    Ileostomy (mL): 250 mL    Indwelling Catheter - Urethral (mL): 3600 mL  Total OUT: 4090 mL    Total NET: -2017 mL          08-24    137  |  104  |  31<H>  ----------------------------<  137<H>  4.4   |  24  |  0.44<L>    Ca    7.6<L>      24 Aug 2021 21:48  Phos  3.7     08-24  Mg     2.0     08-24    TPro  4.4<L>  /  Alb  1.6<L>  /  TBili  0.6  /  DBili  0.2  /  AST  60<H>  /  ALT  77<H>  /  AlkPhos  191<H>  08-24    [x] Roberson catheter, indication: urine output monitoring  Meds: fat emulsion (Fish Oil and Plant Based) 20% Infusion 25 mL/Hr IV Continuous <Continuous>  Parenteral Nutrition - Adult 1 Each TPN Continuous <Continuous>        HEMATOLOGIC  Meds: enoxaparin Injectable 40 milliGRAM(s) SubCutaneous every 24 hours    [x] VTE Prophylaxis                        8.5    22.21 )-----------( 129      ( 24 Aug 2021 21:48 )             26.5     PT/INR - ( 24 Aug 2021 21:48 )   PT: 14.2 sec;   INR: 1.19 ratio         PTT - ( 24 Aug 2021 21:48 )  PTT:29.3 sec  Transfusion     [ ] PRBC   [ ] Platelets   [ ] FFP   [ ] Cryoprecipitate      INFECTIOUS DISEASES  T(C): 36.5 (08-24-21 @ 20:00), Max: 36.9 (08-24-21 @ 03:00)  Wt(kg): --  WBC Count: 22.21 K/uL (08-24 @ 21:48)  WBC Count: 20.99 K/uL (08-24 @ 13:42)  WBC Count: 20.00 K/uL (08-24 @ 01:49)    Recent Cultures:    Meds:       ENDOCRINE  Capillary Blood Glucose    Meds: insulin lispro (ADMELOG) corrective regimen sliding scale   SubCutaneous every 6 hours  methylPREDNISolone sodium succinate Injectable 10 milliGRAM(s) IV Push every 12 hours        ACCESS DEVICES:  [x] Peripheral IV  [ ] Central Venous Line	[ ] R	[ ] L	[ ] IJ	[ ] Fem	[ ] SC	Placed:   [ ] Arterial Line		[ ] R	[ ] L	[ ] Fem	[ ] Rad	[ ] Ax	Placed:   [ ] PICC:					[ ] Mediport  [ ] Urinary Catheter, Date Placed:   [x] Necessity of urinary, arterial, and venous catheters discussed    OTHER MEDICATIONS:  chlorhexidine 2% Cloths 1 Application(s) Topical daily      CODE STATUS:     IMAGING:

## 2021-08-25 NOTE — PROGRESS NOTE ADULT - ASSESSMENT
A/p    s/p total colectomy with ileostomy   -in SICU   -surgery following     post op resp failure on mech vent   s/p extubation   doing fair     leukocytosis :  on zosyn IV   ID following   -there is concern during surgey regarding colonic leak : to prevent peritonitis     hypotension :  resolved     dysphagia :  -s/p NGT     Chron's dis : s/p total colectomy with ileostomy   -stable

## 2021-08-25 NOTE — PROGRESS NOTE ADULT - SUBJECTIVE AND OBJECTIVE BOX
[facility] NUTRITION SUPPORT-- FOLLOW UP NOTE  --------------------------------------------------------------------------------    24 hour events/subjective: pt seen and examined. sp lasix 20mg x2 and ca gluconate yesterday. s&s eval pending. oob in chair. lethargic/weak but states he feels ok, denies abd pain.     Diet:  Diet, NPO (08-23-21 @ 22:21)      PAST HISTORY  --------------------------------------------------------------------------------  No significant changes to PMH, PSH, FHx, SHx, unless otherwise noted    ALLERGIES & MEDICATIONS  --------------------------------------------------------------------------------  Allergies    No Known Allergies    Intolerances      Standing Inpatient Medications  chlorhexidine 2% Cloths 1 Application(s) Topical <User Schedule>  enoxaparin Injectable 40 milliGRAM(s) SubCutaneous every 24 hours  fat emulsion (Fish Oil and Plant Based) 20% Infusion 25 mL/Hr IV Continuous <Continuous>  insulin lispro (ADMELOG) corrective regimen sliding scale   SubCutaneous every 6 hours  methylPREDNISolone sodium succinate Injectable 10 milliGRAM(s) IV Push every 12 hours  metoprolol tartrate Injectable 5 milliGRAM(s) IV Push every 4 hours  pantoprazole  Injectable 40 milliGRAM(s) IV Push daily  Parenteral Nutrition - Adult 1 Each TPN Continuous <Continuous>    PRN Inpatient Medications  acetaminophen  IVPB .. 1000 milliGRAM(s) IV Intermittent every 6 hours PRN        REVIEW OF SYSTEMS  --------------------------------------------------------------------------------    see hpi, unable to obtain in entirety         VITALS/PHYSICAL EXAM  --------------------------------------------------------------------------------  T(C): 36 (08-25-21 @ 07:00), Max: 36.5 (08-24-21 @ 20:00)  HR: 92 (08-25-21 @ 08:00) (74 - 97)  BP: --  RR: 27 (08-25-21 @ 08:00) (20 - 43)  SpO2: 100% (08-25-21 @ 08:00) (95% - 100%)  Wt(kg): --      08-24-21 @ 07:01  -  08-25-21 @ 07:00  --------------------------------------------------------  IN: 2956 mL / OUT: 5295 mL / NET: -2339 mL    08-25-21 @ 07:01  -  08-25-21 @ 08:51  --------------------------------------------------------  IN: 94 mL / OUT: 30 mL / NET: 64 mL      I&O's Detail    24 Aug 2021 07:01  -  25 Aug 2021 07:00  --------------------------------------------------------  IN:    Fat Emulsion (Fish Oil &amp; Plant Based) 20% Infusion: 300 mL    IV PiggyBack: 200 mL    IV PiggyBack: 200 mL    TPN (Total Parenteral Nutrition): 2256 mL  Total IN: 2956 mL    OUT:    Bulb (mL): 160 mL    Bulb (mL): 140 mL    Ileostomy (mL): 330 mL    Indwelling Catheter - Urethral (mL): 4665 mL  Total OUT: 5295 mL    Total NET: -2339 mL      25 Aug 2021 07:01  -  25 Aug 2021 08:51  --------------------------------------------------------  IN:    TPN (Total Parenteral Nutrition): 94 mL  Total IN: 94 mL    OUT:    Fat Emulsion (Fish Oil &amp; Plant Based) 20% Infusion: 0 mL    Indwelling Catheter - Urethral (mL): 30 mL  Total OUT: 30 mL    Total NET: 64 mL        Physical Exam:  Gen: oob in chair, frail, cachectic   HEENT: ncat, trachea midline  Chest: respirations non labored  Abd: soft nd ostomy with dark stool   Extrem:  trace le edema; +ue edema  Neuro: more awake alert hypophonic       LABS/STUDIES  --------------------------------------------------------------------------------              8.5    22.21 >-----------<  129      [08-24-21 @ 21:48]              26.5     137  |  104  |  31  ----------------------------<  137      [08-24-21 @ 21:48]  4.4   |  24  |  0.44        Ca     7.6     [08-24-21 @ 21:48]      iCa    1.09     [08-24 @ 01:48]      Mg     2.0     [08-24-21 @ 21:48]      Phos  3.7     [08-24-21 @ 21:48]    TPro  4.4  /  Alb  1.6  /  TBili  0.6  /  DBili  0.2  /  AST  60  /  ALT  77  /  AlkPhos  191  [08-24-21 @ 01:49]    PT/INR: PT 14.2 , INR 1.19       [08-24-21 @ 21:48]  PTT: 29.3       [08-24-21 @ 21:48]      Ca ionizedBlood Gas Arterial - Calcium, Ionized: 1.15 mmol/L (08-23-21 @ 11:32)    Creatinine Trend:  POC glucoseGlucose, Serum: 137 mg/dL (08-24-21 @ 21:48)  CAPILLARY BLOOD GLUCOSE      POCT Blood Glucose.: 128 mg/dL (25 Aug 2021 04:37)  POCT Blood Glucose.: 133 mg/dL (24 Aug 2021 22:55)  POCT Blood Glucose.: 122 mg/dL (24 Aug 2021 17:29)  POCT Blood Glucose.: 132 mg/dL (24 Aug 2021 10:53)    PrealbuminPrealbumin, Serum: 8 mg/dL (08-15-21 @ 10:17)    Triglycerides     [facility] NUTRITION SUPPORT-- FOLLOW UP NOTE  --------------------------------------------------------------------------------    24 hour events/subjective: pt seen and examined. radha lasix 20mg x2 and ca gluconate yesterday. s&s eval pending. oob in chair. lethargic/weak but states he feels ok, denies abd pain. radha addtl lasix and mag supp today    Diet:  Diet, NPO (08-23-21 @ 22:21)      PAST HISTORY  --------------------------------------------------------------------------------  No significant changes to PMH, PSH, FHx, SHx, unless otherwise noted    ALLERGIES & MEDICATIONS  --------------------------------------------------------------------------------  Allergies    No Known Allergies    Intolerances      Standing Inpatient Medications  chlorhexidine 2% Cloths 1 Application(s) Topical <User Schedule>  enoxaparin Injectable 40 milliGRAM(s) SubCutaneous every 24 hours  fat emulsion (Fish Oil and Plant Based) 20% Infusion 25 mL/Hr IV Continuous <Continuous>  insulin lispro (ADMELOG) corrective regimen sliding scale   SubCutaneous every 6 hours  methylPREDNISolone sodium succinate Injectable 10 milliGRAM(s) IV Push every 12 hours  metoprolol tartrate Injectable 5 milliGRAM(s) IV Push every 4 hours  pantoprazole  Injectable 40 milliGRAM(s) IV Push daily  Parenteral Nutrition - Adult 1 Each TPN Continuous <Continuous>    PRN Inpatient Medications  acetaminophen  IVPB .. 1000 milliGRAM(s) IV Intermittent every 6 hours PRN        REVIEW OF SYSTEMS  --------------------------------------------------------------------------------    see hpi, unable to obtain in entirety         VITALS/PHYSICAL EXAM  --------------------------------------------------------------------------------  T(C): 36 (08-25-21 @ 07:00), Max: 36.5 (08-24-21 @ 20:00)  HR: 92 (08-25-21 @ 08:00) (74 - 97)  BP: --  RR: 27 (08-25-21 @ 08:00) (20 - 43)  SpO2: 100% (08-25-21 @ 08:00) (95% - 100%)  Wt(kg): --      08-24-21 @ 07:01  -  08-25-21 @ 07:00  --------------------------------------------------------  IN: 2956 mL / OUT: 5295 mL / NET: -2339 mL    08-25-21 @ 07:01  -  08-25-21 @ 08:51  --------------------------------------------------------  IN: 94 mL / OUT: 30 mL / NET: 64 mL      I&O's Detail    24 Aug 2021 07:01  -  25 Aug 2021 07:00  --------------------------------------------------------  IN:    Fat Emulsion (Fish Oil &amp; Plant Based) 20% Infusion: 300 mL    IV PiggyBack: 200 mL    IV PiggyBack: 200 mL    TPN (Total Parenteral Nutrition): 2256 mL  Total IN: 2956 mL    OUT:    Bulb (mL): 160 mL    Bulb (mL): 140 mL    Ileostomy (mL): 330 mL    Indwelling Catheter - Urethral (mL): 4665 mL  Total OUT: 5295 mL    Total NET: -2339 mL      25 Aug 2021 07:01  -  25 Aug 2021 08:51  --------------------------------------------------------  IN:    TPN (Total Parenteral Nutrition): 94 mL  Total IN: 94 mL    OUT:    Fat Emulsion (Fish Oil &amp; Plant Based) 20% Infusion: 0 mL    Indwelling Catheter - Urethral (mL): 30 mL  Total OUT: 30 mL    Total NET: 64 mL        Physical Exam:  Gen: oob in chair, frail, cachectic   HEENT: ncat, trachea midline  Chest: respirations non labored  Abd: soft nd ostomy with dark stool   Extrem:  trace le edema; +ue edema  Neuro: more awake alert hypophonic       LABS/STUDIES  --------------------------------------------------------------------------------              8.5    22.21 >-----------<  129      [08-24-21 @ 21:48]              26.5     137  |  104  |  31  ----------------------------<  137      [08-24-21 @ 21:48]  4.4   |  24  |  0.44        Ca     7.6     [08-24-21 @ 21:48]      iCa    1.09     [08-24 @ 01:48]      Mg     2.0     [08-24-21 @ 21:48]      Phos  3.7     [08-24-21 @ 21:48]    TPro  4.4  /  Alb  1.6  /  TBili  0.6  /  DBili  0.2  /  AST  60  /  ALT  77  /  AlkPhos  191  [08-24-21 @ 01:49]    PT/INR: PT 14.2 , INR 1.19       [08-24-21 @ 21:48]  PTT: 29.3       [08-24-21 @ 21:48]      Ca ionizedBlood Gas Arterial - Calcium, Ionized: 1.15 mmol/L (08-23-21 @ 11:32)    Creatinine Trend:  POC glucoseGlucose, Serum: 137 mg/dL (08-24-21 @ 21:48)  CAPILLARY BLOOD GLUCOSE      POCT Blood Glucose.: 128 mg/dL (25 Aug 2021 04:37)  POCT Blood Glucose.: 133 mg/dL (24 Aug 2021 22:55)  POCT Blood Glucose.: 122 mg/dL (24 Aug 2021 17:29)  POCT Blood Glucose.: 132 mg/dL (24 Aug 2021 10:53)    PrealbuminPrealbumin, Serum: 8 mg/dL (08-15-21 @ 10:17)    Triglycerides

## 2021-08-25 NOTE — PROGRESS NOTE ADULT - SUBJECTIVE AND OBJECTIVE BOX
Patient is a 81y old  Male who presents with a chief complaint of diarrhea with blood in it , abd pain and weakness (26 Aug 2021 01:49)      INTERVAL HPI/OVERNIGHT EVENTS: feeling very weak   T(C): 36.2 (08-25-21 @ 23:00), Max: 36.2 (08-25-21 @ 19:00)  HR: 74 (08-26-21 @ 01:00) (70 - 97)  BP: --  RR: 50 (08-26-21 @ 01:00) (17 - 50)  SpO2: 100% (08-26-21 @ 01:00) (95% - 100%)  Wt(kg): --  I&O's Summary    24 Aug 2021 07:01  -  25 Aug 2021 07:00  --------------------------------------------------------  IN: 2956 mL / OUT: 5295 mL / NET: -2339 mL    25 Aug 2021 07:01  -  26 Aug 2021 02:08  --------------------------------------------------------  IN: 2342 mL / OUT: 2015 mL / NET: 327 mL        PAST MEDICAL & SURGICAL HISTORY:  No pertinent past medical history    No significant past surgical history        SOCIAL HISTORY  Alcohol:  Tobacco:  Illicit substance use:    FAMILY HISTORY:    REVIEW OF SYSTEMS:  CONSTITUTIONAL: No fever, weight loss, or fatigue  EYES: No eye pain, visual disturbances, or discharge  ENMT:  No difficulty hearing, tinnitus, vertigo; No sinus or throat pain  NECK: No pain or stiffness  RESPIRATORY: No cough, wheezing, chills or hemoptysis; No shortness of breath  CARDIOVASCULAR: No chest pain, palpitations, dizziness, or leg swelling  GASTROINTESTINAL: No abdominal or epigastric pain. No nausea, vomiting, or hematemesis; No diarrhea or constipation. No melena or hematochezia.  GENITOURINARY: No dysuria, frequency, hematuria, or incontinence  NEUROLOGICAL: No headaches, memory loss, loss of strength, numbness, or tremors  SKIN: No itching, burning, rashes, or lesions   LYMPH NODES: No enlarged glands  ENDOCRINE: No heat or cold intolerance; No hair loss  MUSCULOSKELETAL: No joint pain or swelling; No muscle, back, or extremity pain  PSYCHIATRIC: No depression, anxiety, mood swings, or difficulty sleeping  HEME/LYMPH: No easy bruising, or bleeding gums  ALLERY AND IMMUNOLOGIC: No hives or eczema    RADIOLOGY & ADDITIONAL TESTS:    Imaging Personally Reviewed:  [ ] YES  [ ] NO    Consultant(s) Notes Reviewed:  [ ] YES  [ ] NO    PHYSICAL EXAM:  GENERAL: NAD, well-groomed, well-developed  HEAD:  Atraumatic, Normocephalic  EYES: EOMI, PERRLA, conjunctiva and sclera clear  ENMT: No tonsillar erythema, exudates, or enlargement; Moist mucous membranes, Good dentition, No lesions  NECK: Supple, No JVD, Normal thyroid  NERVOUS SYSTEM:  Alert & Oriented X3, Good concentration; Motor Strength 5/5 B/L upper and lower extremities; DTRs 2+ intact and symmetric  CHEST/LUNG: Clear to percussion bilaterally; No rales, rhonchi, wheezing, or rubs  HEART: Regular rate and rhythm; No murmurs, rubs, or gallops  ABDOMEN: Soft, Nontender, Nondistended; Bowel sounds present  EXTREMITIES:  2+ Peripheral Pulses, No clubbing, cyanosis, or edema  LYMPH: No lymphadenopathy noted  SKIN: No rashes or lesions    LABS:                        8.2    18.97 )-----------( 153      ( 25 Aug 2021 23:22 )             25.7     08-25    133<L>  |  100  |  32<H>  ----------------------------<  140<H>  4.1   |  24  |  0.44<L>    Ca    7.5<L>      25 Aug 2021 23:22  Phos  4.3     08-25  Mg     2.4     08-25    TPro  4.7<L>  /  Alb  2.0<L>  /  TBili  0.5  /  DBili  x   /  AST  27  /  ALT  63<H>  /  AlkPhos  159<H>  08-25    PT/INR - ( 24 Aug 2021 21:48 )   PT: 14.2 sec;   INR: 1.19 ratio         PTT - ( 24 Aug 2021 21:48 )  PTT:29.3 sec    CAPILLARY BLOOD GLUCOSE      POCT Blood Glucose.: 138 mg/dL (25 Aug 2021 23:11)  POCT Blood Glucose.: 115 mg/dL (25 Aug 2021 17:15)  POCT Blood Glucose.: 140 mg/dL (25 Aug 2021 11:07)  POCT Blood Glucose.: 128 mg/dL (25 Aug 2021 04:37)            MEDICATIONS  (STANDING):  Biotene Dry Mouth Oral Rinse 5 milliLiter(s) Swish and Spit four times a day  chlorhexidine 2% Cloths 1 Application(s) Topical <User Schedule>  enoxaparin Injectable 40 milliGRAM(s) SubCutaneous every 24 hours  fat emulsion (Fish Oil and Plant Based) 20% Infusion 25 mL/Hr (25 mL/Hr) IV Continuous <Continuous>  insulin lispro (ADMELOG) corrective regimen sliding scale   SubCutaneous every 6 hours  methylPREDNISolone sodium succinate Injectable 10 milliGRAM(s) IV Push daily  metoprolol tartrate Injectable 5 milliGRAM(s) IV Push every 4 hours  pantoprazole  Injectable 40 milliGRAM(s) IV Push daily  Parenteral Nutrition - Adult 1 Each (94 mL/Hr) TPN Continuous <Continuous>  piperacillin/tazobactam IVPB.. 3.375 Gram(s) IV Intermittent every 8 hours    MEDICATIONS  (PRN):  acetaminophen  IVPB .. 1000 milliGRAM(s) IV Intermittent every 6 hours PRN Mild Pain (1 - 3)      Care Discussed with Consultants/Other Providers [ ] YES  [ ] NO

## 2021-08-25 NOTE — SWALLOW BEDSIDE ASSESSMENT ADULT - PHARYNGEAL PHASE
palpable hyolaryngeal elevation, suspect reduced anterior excursion/Delayed pharyngeal swallow/Wet vocal quality post oral intake/Delayed cough post oral intake

## 2021-08-25 NOTE — PROGRESS NOTE ADULT - ASSESSMENT
81M with recently dx Crohn's disease c/b perianal abscess and left posterior distal anal intersphincteric fistula and presents diarrhea/BRBPR  2/2 to CD flare c/b c diff colitis. Initially treat for C diff, s/p FMT. Taken emergently to OR on 8/20 for acute deterioration for laparoscopic total colectomy with end ileostomy.     PLAN:  - Failed bedside speech and swallow, pending official S&S  - NPO and TPN  - Monitor drain and ileostomy output  - Off abx, monitor  - Appreciate excellent care per SICU    [facility]  [id number] 81M with recently dx Crohn's disease c/b perianal abscess and left posterior distal anal intersphincteric fistula and presents diarrhea/BRBPR  2/2 to CD flare c/b c diff colitis. Initially treat for C diff, s/p FMT. Taken emergently to OR on 8/20 for acute deterioration for laparoscopic total colectomy with end ileostomy.     PLAN:  - Failed bedside speech and swallow, pending official S&S  - NPO and TPN  - Monitor drain and ileostomy output  - Off abx, monitor  - Consider transfer to surgical floor  - Appreciate excellent care per SICU    [facility]  [id number]

## 2021-08-25 NOTE — SWALLOW BEDSIDE ASSESSMENT ADULT - SLP GENERAL OBSERVATIONS
Pt seen at bedside, awake, adequately alert for participation in minimal PO trials, but lethargic. Pt oriented to self and grossly to place, confused, verbally responsive, but poorly intelligible due to severe hypophonia c/w deconditioning and recent intubation. Pt following simple directions for the purposes of the exam. Pt initially essentially aphonic, with phonation that improved slightly with cues, repeated use and after cued coughs. Cough noted to be weak, but somewhat stronger with cues to "cough hard."

## 2021-08-25 NOTE — SWALLOW BEDSIDE ASSESSMENT ADULT - SWALLOW EVAL: PATIENT/FAMILY GOALS STATEMENT
Pt asking for water. Pt's daughter denies prior h/o dysphagia, states she wishes to avoid a feeding tube.

## 2021-08-25 NOTE — SWALLOW BEDSIDE ASSESSMENT ADULT - ADDITIONAL RECOMMENDATIONS
Pt may have small ice chips sparingly with 100% assistance. May have 2-3 ice chips only every 2-3 hours. Cued coughs and reswallows.  If RN gives permission, Pt's daughter may assist Pt with ice chips.  Suggest order Biotene for xerostomia.  Frequent thorough oral hygiene to facilitate improvement of xerostomia, and oral awareness for improved swallow function.  Restorative swallow therapy. Will continue to follow while patient is in-house, as schedule permits.

## 2021-08-25 NOTE — SWALLOW BEDSIDE ASSESSMENT ADULT - SLP PERTINENT HISTORY OF CURRENT PROBLEM
82 yo M with Crohn disease, over the last 9 mo, he was started on several medications and was recently told that he may need to go on IV infusion for chrons, he has several hospitalization at [facility] , and follows with GI near [facility]. but his diarrhea is not improving , having abd cramps , denies any N/V . , discharged 1 mo ago from [facility], now having trouble walking, ongoing diarrhea. + subjective fever but denies cough or nasal congestion or uri or urinary symptoms. Pt admitted with bloody diarrhea 2/2 Crohn's dis exacerbation, CT shows Crohn's dis exacerbation.

## 2021-08-25 NOTE — PROGRESS NOTE ADULT - ASSESSMENT
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 Admitted for crohns management, course c/b cdiff colitis sp dificid/fmt and findings of indeterminate quantiferon, pending id clearance to start biologic. Prealb 8. TPN consulted in setting of significant weight loss, severe pcm, and worsening colitis suspected 2/2 CD. Pt at high risk for refeeding syndrome. TPN started 8/16. Repeat CT showing unchanged diffuse colitis and non specific gb wall edema.  Taken to OR emergently on 8/20 for lap total colectomy, end ileostomy.     TPN GOAL recommendations as per RD: , dextrose 210g, SMOF 60g    -cont TPN at goal; PICC in place, maintenance as per protocol  -follow up speech and swallow evaluation; aspiration precautions  -hypocalcemia- continue ca gluconate at 16meq in TPN bag  -vitamin d deficiency- rec ergocalciferol 50,000U q wk x 6wks  -hyperglycemia- FS trend noted, steroids tapered to 10mg daily x3 days, will decrease to 12U total of insulin in TPN bag- dw SICU PA; cont close FS monitoring with ISS coverage as needed  -electrolyte imbalance risk- monitor CMP, Mg, Ionized Ca, Phosphorus qd  -monitor TG weekly (8/27); strict I/O's  -care per primary/SICU    discussed with Dr. Fernandez, agreeable with above    TPN pager [phone], spectra [phone]  M-F 6A-4P, Weekends and holidays 8A-12P

## 2021-08-26 NOTE — PROGRESS NOTE ADULT - ASSESSMENT
81M with recently dx Crohn's disease c/b perianal abscess and left posterior distal anal intersphincteric fistula and presents diarrhea/BRBPR  2/2 to CD flare c/b c diff colitis. Initially treat for C diff, s/p FMT. Taken emergently to OR on 8/20 for acute deterioration for laparoscopic total colectomy with end ileostomy.     PLAN:  - Failed bedside speech and swallow, pending official S&S  - NPO and TPN  - Monitor drain and ileostomy output  - Off abx, monitor  - Consider transfer to surgical floor  - Appreciate excellent care per SICU    Red Surgery  [id number] 81M with recently dx Crohn's disease c/b perianal abscess and left posterior distal anal intersphincteric fistula and presents diarrhea/BRBPR  2/2 to CD flare c/b c diff colitis. Initially treat for C diff, s/p FMT. Taken emergently to OR on 8/20 for acute deterioration for laparoscopic total colectomy with end ileostomy.     PLAN:  - Failed official speech and swallow study   - No further diuresis   - follow up discussion with family regarding KO feeding tube   - follow up on whether contact precautions are still necessary   - NPO and TPN  - Monitor drain and ileostomy output  - Off abx, monitor  - Transfer to floor if bed available on 2Monti  - Appreciate excellent care per SICU    Red Surgery  [id number]

## 2021-08-26 NOTE — PROGRESS NOTE ADULT - ASSESSMENT
81y Male with GERD on omeprazole daily and Crohn's disease diagnosed about 9 mos prior to admission at outside hospital c/b h/o abscess and left posterior distal anal intersphincteric fistula and presents diarrhea/BRBPR  2/2 to CD flare c/b c diff colitis. Initially treat for C diff, given that it did not get better got FMT. Switched PO vanc in favor of Fidaxomicin without any improvement. Therefore, patient got Flex sig to reassess which showed no pseudomembranous colitis anymore but severe Crohn colitis. Was started on steroids on 8/9. Has had minimal improvement. Started tapering IV steroids and pt is currently on TPN as he has had poor PO intake. Case was discussed at IBD conference 8/18 and CRS + GI team in agreement with surgical resection. s/p laparoscopic total abdominal colectomy with end ileostomy on 8/20. Now recovering in SICU.    # c diff - pseudomembranous colitis seen on colonoscopy 7/19/21, treated w/ vanc, fidaxomicin, FMT; repeat flex sig w/o further pseudomembranes but ongoing severe CD. 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     Manuel Bowen MD  Gastroenterology Fellow, PGY4    Available on Microsoft Teams  [phone] ([facility])  [phone] ([facility])  Please contact on call fellow weekdays after 5pm-7am and weekends: [phone]       81y Male with GERD on omeprazole daily and Crohn's disease diagnosed about 9 mos prior to admission at outside hospital c/b h/o abscess and left posterior distal anal intersphincteric fistula and presents diarrhea/BRBPR  2/2 to CD flare c/b c diff colitis. Initially treat for C diff, given that it did not get better got FMT. Switched PO vanc in favor of Fidaxomicin without any improvement. Therefore, patient got Flex sig to reassess which showed no pseudomembranous colitis anymore but severe Crohn colitis. Was started on steroids on 8/9. Has had minimal improvement. Started tapering IV steroids and pt is currently on TPN as he has had poor PO intake. Case was discussed at IBD conference 8/18 and CRS + GI team in agreement with surgical resection. s/p laparoscopic total abdominal colectomy with end ileostomy on 8/20. Now recovering in SICU.    # c diff - pseudomembranous colitis seen on colonoscopy 7/19/21, treated w/ vanc, fidaxomicin, FMT; repeat flex sig w/o further pseudomembranes but ongoing severe CD. 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    Patient seen and discussed with Attending, Dr. Concepcion.    Aminah Zhong MD  Gastroenterology/Hepatology Fellow, PGY-V  Available via Microsoft Teams    NON-URGENT CONSULTS:  Please email [email or web address]  AT NIGHT AND ON WEEKENDS:  Contact on-call GI fellow via answering service ([phone]) from 5pm-8am and on weekends/holidays  MONDAY-FRIDAY 8AM-5PM:  Pager# [phone] ([facility])  GI Phone# [phone] ([facility]) 81y Male with GERD on omeprazole daily and Crohn's disease diagnosed about 9 mos prior to admission at outside hospital c/b h/o abscess and left posterior distal anal intersphincteric fistula and presents diarrhea/BRBPR  2/2 to CD flare c/b c diff colitis. Initially treat for C diff, given that it did not get better got FMT. Switched PO vanc in favor of Fidaxomicin without any improvement. Therefore, patient got Flex sig to reassess which showed no pseudomembranous colitis anymore but severe Crohn colitis. Was started on steroids on 8/9. Has had minimal improvement. Started tapering IV steroids and pt is currently on TPN as he has had poor PO intake. Case was discussed at IBD conference 8/18 and CRS + GI team in agreement with surgical resection. s/p laparoscopic total abdominal colectomy with end ileostomy on 8/20. Now recovering in SICU.    # c diff - pseudomembranous colitis seen on colonoscopy 7/19/21, treated w/ vanc, fidaxomicin, FMT; repeat flex sig w/o further pseudomembranes but ongoing severe CD. 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    Patient seen and discussed with Attending, Dr. Concepcion.    Aminah Zhong MD  Gastroenterology/Hepatology Fellow, PGY-V  Available via [employer] Teams    NON-URGENT CONSULTS:  Please email [email or web address]  AT NIGHT AND ON WEEKENDS:  Contact on-call GI fellow via answering service ([phone]) from 5pm-8am and on weekends/holidays  MONDAY-FRIDAY 8AM-5PM:  Pager# [phone] ([facility])  GI Phone# [phone] ([facility])

## 2021-08-26 NOTE — PROGRESS NOTE ADULT - SUBJECTIVE AND OBJECTIVE BOX
24h Events:  No acute events overnight.    Subjective:   Patient seen at bedside this AM.     Objective:  Vital Signs  T(C): 36.2 (08-25 @ 23:00), Max: 36.2 (08-25 @ 19:00)  HR: 74 (08-26 @ 01:00) (70 - 97)  BP: --  RR: 50 (08-26 @ 01:00) (17 - 50)  SpO2: 100% (08-26 @ 01:00) (95% - 100%)  08-24-21 @ 07:01  -  08-25-21 @ 07:00  --------------------------------------------------------  IN:  Total IN: 0 mL    OUT:    Bulb (mL): 160 mL    Bulb (mL): 140 mL    Ileostomy (mL): 330 mL    Indwelling Catheter - Urethral (mL): 4665 mL  Total OUT: 5295 mL    Total NET: -5295 mL      08-25-21 @ 07:01  -  08-26-21 @ 01:49  --------------------------------------------------------  IN:  Total IN: 0 mL    OUT:    Bulb (mL): 30 mL    Bulb (mL): 30 mL    Ileostomy (mL): 300 mL    Indwelling Catheter - Urethral (mL): 1105 mL    Voided (mL): 550 mL  Total OUT: 2015 mL    Total NET: -2015 mL    PHYSICAL EXAM:  Gen: NAD  Respiratory: no increased WOB  Gastrointestinal: abdomen soft, nondistended, tender around ostomy. No obvious masses. Incisions c/d/i with wet-to-dry dressing, ileostomy pink with stool and gas, upper and lower L drains with serous output.   Extremities: warm, well-perfused      Labs:                        8.2    18.97 )-----------( 153      ( 25 Aug 2021 23:22 )             25.7   08-25    133<L>  |  100  |  32<H>  ----------------------------<  140<H>  4.1   |  24  |  0.44<L>    Ca    7.5<L>      25 Aug 2021 23:22  Phos  4.3     08-25  Mg     2.4     08-25    TPro  4.7<L>  /  Alb  2.0<L>  /  TBili  0.5  /  DBili  x   /  AST  27  /  ALT  63<H>  /  AlkPhos  159<H>  08-25    CAPILLARY BLOOD GLUCOSE      POCT Blood Glucose.: 138 mg/dL (25 Aug 2021 23:11)  POCT Blood Glucose.: 115 mg/dL (25 Aug 2021 17:15)  POCT Blood Glucose.: 140 mg/dL (25 Aug 2021 11:07)  POCT Blood Glucose.: 128 mg/dL (25 Aug 2021 04:37)      Imaging:     24h Events:  No acute events overnight.    Subjective:   Patient seen at bedside this AM. Reports wants to "go home and die."     Objective:  Vital Signs  T(C): 36.2 (08-25 @ 23:00), Max: 36.2 (08-25 @ 19:00)  HR: 74 (08-26 @ 01:00) (70 - 97)  BP: --  RR: 50 (08-26 @ 01:00) (17 - 50)  SpO2: 100% (08-26 @ 01:00) (95% - 100%)  08-24-21 @ 07:01  -  08-25-21 @ 07:00  --------------------------------------------------------  IN:  Total IN: 0 mL    OUT:    Bulb (mL): 160 mL    Bulb (mL): 140 mL    Ileostomy (mL): 330 mL    Indwelling Catheter - Urethral (mL): 4665 mL  Total OUT: 5295 mL    Total NET: -5295 mL      08-25-21 @ 07:01  -  08-26-21 @ 01:49  --------------------------------------------------------  IN:  Total IN: 0 mL    OUT:    Bulb (mL): 30 mL    Bulb (mL): 30 mL    Ileostomy (mL): 300 mL    Indwelling Catheter - Urethral (mL): 1105 mL    Voided (mL): 550 mL  Total OUT: 2015 mL    Total NET: -2015 mL    PHYSICAL EXAM:  Gen: NAD  Respiratory: no increased WOB  Gastrointestinal: abdomen soft, nondistended, tender around ostomy. No obvious masses. Incisions c/d/i with wet-to-dry dressing, ileostomy pink with stool and gas, upper and lower L drains with serous output.   Extremities: warm, well-perfused      Labs:                        8.2    18.97 )-----------( 153      ( 25 Aug 2021 23:22 )             25.7   08-25    133<L>  |  100  |  32<H>  ----------------------------<  140<H>  4.1   |  24  |  0.44<L>    Ca    7.5<L>      25 Aug 2021 23:22  Phos  4.3     08-25  Mg     2.4     08-25    TPro  4.7<L>  /  Alb  2.0<L>  /  TBili  0.5  /  DBili  x   /  AST  27  /  ALT  63<H>  /  AlkPhos  159<H>  08-25    CAPILLARY BLOOD GLUCOSE      POCT Blood Glucose.: 138 mg/dL (25 Aug 2021 23:11)  POCT Blood Glucose.: 115 mg/dL (25 Aug 2021 17:15)  POCT Blood Glucose.: 140 mg/dL (25 Aug 2021 11:07)  POCT Blood Glucose.: 128 mg/dL (25 Aug 2021 04:37)      Imaging:

## 2021-08-26 NOTE — PROGRESS NOTE ADULT - ATTENDING COMMENTS
Patient seen and examined, agree with above. Discussed with patient's daughter at bedside. Failed S/S, on TPN now. Would recommend eventually repeating swallow eval once more awake. Continue to taper steroids to off.

## 2021-08-26 NOTE — CHART NOTE - NSCHARTNOTEFT_GEN_A_CORE
Wound Care Team Note:    Attempted to see patient this am in response to request for reevaluation of sacrum/bilateral buttocks. Patient unavailable at that time. Will reattempt at a later time.    Chantelle Tracy, GUY-C, CWOCN [id number]

## 2021-08-26 NOTE — PROGRESS NOTE ADULT - ASSESSMENT
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 Admitted for crohns management, course c/b cdiff colitis sp dificid/fmt and findings of indeterminate quantiferon, pending id clearance to start biologic. Prealb 8. TPN consulted in setting of significant weight loss, severe pcm, and worsening colitis suspected 2/2 CD. Pt at high risk for refeeding syndrome. TPN started 8/16. Repeat CT showing unchanged diffuse colitis and non specific gb wall edema.  Taken to OR emergently on 8/20 for lap total colectomy, end ileostomy. Failed S&S eval. Discussions with family re: chantel feeds ongoing, final decision pending    Current Diet: NPO  TPN GOAL recommendations as per RD: , dextrose 210g, SMOF 60g    -cont TPN at goal; PICC in place, maintenance as per protocol  -hypocalcemia- continue ca gluconate at 16meq in TPN bag; additionally supplemented w calcium gluconate outside of bag overnight  -vitamin d deficiency- rec ergocalciferol 50,000U q wk x 6wks  -hyperglycemia- resolving, FS trend noted, steroids tapered to 10mg daily x2 more days, will decrease to 8U total of insulin in TPN bag- dw SICU PA; cont close FS monitoring with ISS coverage as needed  -electrolyte imbalance risk- monitor CMP, Mg, Ionized Ca, Phosphorus qd  -monitor TG weekly (8/27); strict I/O's  -care per primary/SICU    plan discussed with Dr. Fernandez and Dr GERARD Multani, agreeable with above    TPN pager [phone], spectra [phone]  M-F 6A-4P, Weekends and holidays 8A-12P

## 2021-08-26 NOTE — PROGRESS NOTE ADULT - SUBJECTIVE AND OBJECTIVE BOX
Chief Complaint:  Patient is a 81y old  Male who presents with a chief complaint of diarrhea with blood in it , abd pain and weakness (26 Aug 2021 13:30)      Interval Events:   mental status unchanged  failed speech/swallow eval    Hospital Medications:  acetaminophen  IVPB .. 1000 milliGRAM(s) IV Intermittent every 6 hours PRN  Biotene Dry Mouth Oral Rinse 5 milliLiter(s) Swish and Spit four times a day  chlorhexidine 2% Cloths 1 Application(s) Topical <User Schedule>  enoxaparin Injectable 40 milliGRAM(s) SubCutaneous every 24 hours  fat emulsion (Fish Oil and Plant Based) 20% Infusion 25 mL/Hr IV Continuous <Continuous>  insulin lispro (ADMELOG) corrective regimen sliding scale   SubCutaneous every 6 hours  methylPREDNISolone sodium succinate Injectable 10 milliGRAM(s) IV Push daily  metoprolol tartrate Injectable 5 milliGRAM(s) IV Push every 4 hours  pantoprazole  Injectable 40 milliGRAM(s) IV Push daily  Parenteral Nutrition - Adult 1 Each TPN Continuous <Continuous>  Parenteral Nutrition - Adult 1 Each TPN Continuous <Continuous>  piperacillin/tazobactam IVPB.. 3.375 Gram(s) IV Intermittent every 8 hours      ROS: limited due to pt condition     PHYSICAL EXAM:   Vital Signs:  Vital Signs Last 24 Hrs  T(C): 36.1 (26 Aug 2021 15:00), Max: 36.2 (25 Aug 2021 19:00)  T(F): 97 (26 Aug 2021 15:00), Max: 97.2 (25 Aug 2021 19:00)  HR: 67 (26 Aug 2021 16:00) (66 - 83)  BP: 120/55 (26 Aug 2021 16:00) (120/55 - 137/63)  BP(mean): 82 (26 Aug 2021 16:00) (82 - 91)  RR: 20 (26 Aug 2021 16:00) (17 - 50)  SpO2: 100% (26 Aug 2021 16:00) (100% - 100%)  Daily     Daily     GENERAL:  NAD, Appears stated age  HEENT:  NC/AT,  conjunctivae clear and pink, sclera -anicteric  CHEST:  Normal Effort  ABDOMEN:   soft, Non-tender, Non-distended. Ostomy pink and viable with dark bilious output  EXTREMITIES:  no cyanosis or edema  SKIN:  Warm & Dry. No rash or erythema  NEURO:  lethargic and minimally responsive    LABS: reviewed                        8.2    18.97 )-----------( 153      ( 25 Aug 2021 23:22 )             25.7     08-25    133<L>  |  100  |  32<H>  ----------------------------<  140<H>  4.1   |  24  |  0.44<L>    Ca    7.5<L>      25 Aug 2021 23:22  Phos  4.3     08-25  Mg     2.4     08-25    TPro  4.7<L>  /  Alb  2.0<L>  /  TBili  0.5  /  DBili  x   /  AST  27  /  ALT  63<H>  /  AlkPhos  159<H>  08-25    LIVER FUNCTIONS - ( 25 Aug 2021 23:22 )  Alb: 2.0 g/dL / Pro: 4.7 g/dL / ALK PHOS: 159 U/L / ALT: 63 U/L / AST: 27 U/L / GGT: x             Interval Diagnostic Studies: see sunrise for full report

## 2021-08-26 NOTE — PROGRESS NOTE ADULT - ASSESSMENT
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 SICU consulted for hemodynamic monitoring.     Plan:  Neurologic:   - Tylenol for pain control   - Patient AO x3     Respiratory:   - Acute respiratory insufficiency resolving, patient on RA  - CXR showing RLL atelectasis now improving    Cardiovascular:   - NSR  - History of AFib currently rate controlled with IV metoprolol   - Continue IV lopressor 5 Q4 hrs     Gastrointestinal/Nutrition:   - NPO  - Ileostomy pink and viable   - Surgical sites CDI   - Will continue TPN   - Failed official speech and swallow. Conversation of KO feeds have been started with family, awaiting decision    Renal/Genitourinary:   - Monitor and replete electrolytes as needed  - Monitor UO  - Continue with Roberson     Hematologic:   - Trend H&H and transfuse Hgb < 7  - Lovenox for DVT ppx     Infectious Disease: C. diff colitis s/p subtotal colectomy   - Zosyn started for c/f potential peritonitis. Will continue through 8/30.    Endocrine:   - Monitor BMP  - C/W steroid taper   - ISS, patient also receiving 12u of insulin in TPN bag    ENT  - Concern for new onset hypophonia without any neuro deficits, ENT consult for scope in the morning.    lines and drains:  -Roberson   - A line L. radial 8/20  -LLQ & LUQ drain 8/20  -TPN 8/16    Disposition: SICU

## 2021-08-26 NOTE — PROGRESS NOTE ADULT - ASSESSMENT
Impression:    Sacral/bilateral buttocks with deep tissue injury in evolution present on admission  incontinence dermatitis  Incontinence of bowel and bladder  suggestive of fungal rash, improving    Recommend:  1.) topical therapy: sacral/inner buttocks injury - cleanse with incontinence cleanser, pat dry, apply allevyn foam dressing daily  bilateral buttocks - cleanse with incontinence cleanser, pat dry, apply Rosina antifungal with 2% Miconozole BID and PRN for incontinent episodes  For discharge, order Clotrimazole 1% cream BID to sacral/buttocks, perianal and perineal skin  2.) Incontinence management - incontinence cleanser, pads, pericare BID, consider external male urinary catheter  3.) Maintain on an alternating air with low air loss surface  4.) Turn and reposition Q 2 hours  5.) Nutrition optimization  6.) Offload heels/feet with complete care air fluidized boots/ensure soles of feet do not rest on foot board of the bed.    Care as per medicine will follow w/ you  Upon [facility] [address] [place] [address] [phone]  Discussed with clinical nurse  Thank you for this consult  Chantelle Tracy, NP-C, CWOCN [id number]

## 2021-08-26 NOTE — PROGRESS NOTE ADULT - SUBJECTIVE AND OBJECTIVE BOX
Wound Surgery Progress Note:    HPI:  82 yo M with Crohn disease, over the last 9 mo, he was started on several medication and was recently told that he may need to go on IV infusion for chrons, he has several hospitalization at [facility] , and follows with GI near [facility]. but his diarrhea is not improving , having abd cramps , denies any N/V . , discharged 1 mo ago from [facility], now having trouble walking, on going diarrhea. + subjective fever but denies cough or nasal congestion or uri or urinary symptoms.  (13 Jul 2021 20:48)    Request for wound care reevaluation for sacral/bilateral buttocks skin breakdown received from nursing. Nursing documentation of purple and marilyn discoloration with blistering on 7.14.21 with 24 hours of admission is noted however, this was categorized as incontinence associated dermatitis since he has frequent loose stools. On exam, today there is a superficial erosion over the sacrum with bright red weeping skin on the bilateral buttocks, perineum, perianal and groins. In retrospect the skin condition noted within 72 hours of admission should have been documented as deep tissue injury present on admission. His immobility, inactivity, extremely frequent loose stools and poor nutritional status contributes to high risk for pressure injury development and hinders healing even in the setting of optimal pressure injury prevention measures.      PAST MEDICAL & SURGICAL HISTORY:  Crohn's Disease  C-Diff  Perianal abscess    No significant past surgical history  s/p total Colectomy and end ileostomy    MEDICATIONS  (STANDING):  Biotene Dry Mouth Oral Rinse 5 milliLiter(s) Swish and Spit four times a day  chlorhexidine 2% Cloths 1 Application(s) Topical <User Schedule>  enoxaparin Injectable 40 milliGRAM(s) SubCutaneous every 24 hours  fat emulsion (Fish Oil and Plant Based) 20% Infusion 25 mL/Hr (25 mL/Hr) IV Continuous <Continuous>  insulin lispro (ADMELOG) corrective regimen sliding scale   SubCutaneous every 6 hours  methylPREDNISolone sodium succinate Injectable 10 milliGRAM(s) IV Push daily  metoprolol tartrate Injectable 5 milliGRAM(s) IV Push every 4 hours  pantoprazole  Injectable 40 milliGRAM(s) IV Push daily  Parenteral Nutrition - Adult 1 Each (94 mL/Hr) TPN Continuous <Continuous>  Parenteral Nutrition - Adult 1 Each (94 mL/Hr) TPN Continuous <Continuous>  piperacillin/tazobactam IVPB.. 3.375 Gram(s) IV Intermittent every 8 hours    MEDICATIONS  (PRN):  acetaminophen  IVPB .. 1000 milliGRAM(s) IV Intermittent every 6 hours PRN Mild Pain (1 - 3)    Allergies    No Known Allergies    Intolerances    Vital Signs Last 24 Hrs  T(C): 36.2 (26 Aug 2021 11:00), Max: 36.2 (25 Aug 2021 19:00)  T(F): 97.2 (26 Aug 2021 11:00), Max: 97.2 (25 Aug 2021 19:00)  HR: 69 (26 Aug 2021 13:00) (67 - 83)  BP: --  BP(mean): --  RR: 22 (26 Aug 2021 13:00) (17 - 50)  SpO2: 100% (26 Aug 2021 13:00) (100% - 100%)    Physical Exam:  General: confused, thin, frail   Respiratory: no SOB on room air  Gastrointestinal: soft NT/ND (+)BM   Neurology: verbally limited, not following commands  Musculoskeletal: no contractures  Vascular: BLE edema equal  Skin:  sacral area skin deep maroon discolored intact skin over L 6cm x W 6cm x D none, with central deep erosion L 0.5cm x W 0.5cm x D 0.2cm wound bed is pink, no necrotic tissue, scant serosanguinous drainage  Perianal. perineal, bilateral buttocks, gluteal cleft and bilateral groins with dull red erythema  No odor, increased warmth, induration, fluctuance, tenderness    LABS:  08-25    133<L>  |  100  |  32<H>  ----------------------------<  140<H>  4.1   |  24  |  0.44<L>    Ca    7.5<L>      25 Aug 2021 23:22  Phos  4.3     08-25  Mg     2.4     08-25    TPro  4.7<L>  /  Alb  2.0<L>  /  TBili  0.5  /  DBili  x   /  AST  27  /  ALT  63<H>  /  AlkPhos  159<H>  08-25                          8.2    18.97 )-----------( 153      ( 25 Aug 2021 23:22 )             25.7     PT/INR - ( 24 Aug 2021 21:48 )   PT: 14.2 sec;   INR: 1.19 ratio         PTT - ( 24 Aug 2021 21:48 )  PTT:29.3 sec

## 2021-08-26 NOTE — PROGRESS NOTE ADULT - SUBJECTIVE AND OBJECTIVE BOX
HISTORY  81y Male M with Crohn disease, over the last 9 mo, he was started on several medication and was recently told that he may need to go on IV infusion for chrons, he has several hospitalization at [facility] , and follows with GI near [facility]. but his diarrhea is not improving , having abd cramps , denies any N/V . , discharged 1 mo ago from [facility], now having trouble walking, on going diarrhea. + subjective fever but denies cough or nasal congestion or uri or urinary symptoms.  (13 Jul 2021 20:48)    24 HOUR EVENTS:  - 20 lasix x2 given today with interval improvement in CXR  - Failed formal speech and swallow, conversation about KO feeds with family is ongoing  - Zosyn added for murky ascitic fluid in OR with concern for intraperitoneal infectious complication   SUBJECTIVE/ROS:  [x] A ten-point review of systems was otherwise negative except as noted.  [ ] Due to altered mental status/intubation, subjective information were not able to be obtained from the patient. History was obtained, to the extent possible, from review of the chart and collateral sources of information.      NEURO  GCS: 15  Exam: AOx3-4  Meds: acetaminophen  IVPB .. 1000 milliGRAM(s) IV Intermittent every 6 hours PRN Mild Pain (1 - 3)    [x] Adequacy of sedation and pain control has been assessed and adjusted      RESPIRATORY  RR: 33 (08-26-21 @ 03:00) (17 - 50)  SpO2: 100% (08-26-21 @ 03:00) (95% - 100%)  Wt(kg): --  Exam: breathing unlabored, CTAB     Meds:       CARDIOVASCULAR  HR: 78 (08-26-21 @ 03:00) (70 - 97)  BP: --  BP(mean): --  ABP: 137/55 (08-26-21 @ 03:00) (105/44 - 160/59)  ABP(mean): 84 (08-26-21 @ 03:00) (64 - 101)  Wt(kg): --  CVP(cm H2O): --      Exam: Normal rate, no m/r/g appreciated  Cardiac Rhythm: atrial fibrilation  Perfusion     [x]Adequate   [ ]Inadequate  Mentation   [x]Normal       [ ]Reduced  Extremities  [x]Warm         [ ]Cool  Volume Status [ ]Hypervolemic [x]Euvolemic [ ]Hypovolemic  Meds: metoprolol tartrate Injectable 5 milliGRAM(s) IV Push every 4 hours        GI/NUTRITION  Exam: abdomen soft, nt/nd, ss c/d/i  Diet: NPO, TPN  Meds: pantoprazole  Injectable 40 milliGRAM(s) IV Push daily      GENITOURINARY  I&O's Detail    08-24 @ 07:01 - 08-25 @ 07:00  --------------------------------------------------------  IN:    Fat Emulsion (Fish Oil &amp; Plant Based) 20% Infusion: 300 mL    IV PiggyBack: 200 mL    IV PiggyBack: 200 mL    TPN (Total Parenteral Nutrition): 2256 mL  Total IN: 2956 mL    OUT:    Bulb (mL): 160 mL    Bulb (mL): 140 mL    Ileostomy (mL): 330 mL    Indwelling Catheter - Urethral (mL): 4665 mL  Total OUT: 5295 mL    Total NET: -2339 mL      08-25 @ 07:01 - 08-26 @ 03:35  --------------------------------------------------------  IN:    Fat Emulsion (Fish Oil &amp; Plant Based) 20% Infusion: 250 mL    IV PiggyBack: 450 mL    TPN (Total Parenteral Nutrition): 1880 mL  Total IN: 2580 mL    OUT:    Bulb (mL): 30 mL    Bulb (mL): 30 mL    Fat Emulsion (Fish Oil &amp; Plant Based) 20% Infusion: 0 mL    Ileostomy (mL): 300 mL    Indwelling Catheter - Urethral (mL): 1105 mL    Voided (mL): 550 mL  Total OUT: 2015 mL    Total NET: 565 mL          08-25    133<L>  |  100  |  32<H>  ----------------------------<  140<H>  4.1   |  24  |  0.44<L>    Ca    7.5<L>      25 Aug 2021 23:22  Phos  4.3     08-25  Mg     2.4     08-25    TPro  4.7<L>  /  Alb  2.0<L>  /  TBili  0.5  /  DBili  x   /  AST  27  /  ALT  63<H>  /  AlkPhos  159<H>  08-25    [x] Roberson catheter    Meds: fat emulsion (Fish Oil and Plant Based) 20% Infusion 25 mL/Hr IV Continuous <Continuous>  Parenteral Nutrition - Adult 1 Each TPN Continuous <Continuous>        HEMATOLOGIC  Meds: enoxaparin Injectable 40 milliGRAM(s) SubCutaneous every 24 hours    [x] VTE Prophylaxis                        8.2    18.97 )-----------( 153      ( 25 Aug 2021 23:22 )             25.7     PT/INR - ( 24 Aug 2021 21:48 )   PT: 14.2 sec;   INR: 1.19 ratio         PTT - ( 24 Aug 2021 21:48 )  PTT:29.3 sec  Transfusion     [ ] PRBC   [ ] Platelets   [ ] FFP   [ ] Cryoprecipitate      INFECTIOUS DISEASES  T(C): 36.2 (08-25-21 @ 23:00), Max: 36.2 (08-25-21 @ 19:00)  Wt(kg): --  WBC Count: 18.97 K/uL (08-25 @ 23:22)    Recent Cultures:    Meds: piperacillin/tazobactam IVPB.. 3.375 Gram(s) IV Intermittent every 8 hours        ENDOCRINE  Capillary Blood Glucose    Meds: insulin lispro (ADMELOG) corrective regimen sliding scale   SubCutaneous every 6 hours  methylPREDNISolone sodium succinate Injectable 10 milliGRAM(s) IV Push daily        ACCESS DEVICES:  [x] Peripheral IV  [ ] Central Venous Line	[ ] R	[] L	[ ] IJ	[ ] Fem	[ ] SC	Placed:   [ ] Arterial Line		[ ] R	[x] L	[ ] Fem	[x] Rad	[ ] Ax	Placed:   [ ] PICC:					[ ] Mediport  [ ] Urinary Catheter, Date Placed:   [x] Necessity of urinary, arterial, and venous catheters discussed    OTHER MEDICATIONS:  Biotene Dry Mouth Oral Rinse 5 milliLiter(s) Swish and Spit four times a day  chlorhexidine 2% Cloths 1 Application(s) Topical <User Schedule>      CODE STATUS:     IMAGING:

## 2021-08-26 NOTE — CHART NOTE - NSCHARTNOTEFT_GEN_A_CORE
Nutrition Follow Up Note     Patient seen for: malnutrition/TPN follow up on 8ICU    Source: daughter at bedside, medical record, TPN Team rounds. Pt lethargic with new onset hypophonia    Chart reviewed, events noted. "81M with recently dx Crohn's disease c/b perianal abscess and left posterior distal anal intersphincteric fistula and presents diarrhea/BRBPR  2/2 to CD flare c/b c diff colitis. Initially treat for C diff, s/p FMT. Taken emergently to OR on 8/20 for acute deterioration for laparoscopic total colostomy with end ileostomy."    Nutrition Status:  - TPN initiated 8/16; infusing at goal  - NPO post-op; pt failed Speech and Swallow eval 8/24; trial of ice chips allowed  - Concern for new onset hypophonia without any neuro deficits, ENT consult for scope in the morning.  - Team initiated discussion of feeding tube with family; RD discussed at length with daughter that TPN is utilized as temporary nutrition support, until the now-functioning gut can be utilized.  - Acute post-op hyperglycemia resolved; insulin in TPN reduced to 12 units. Solu-Medrol Rx noted; steroid taper per team.  - Potassium, phosphorus and calcium increased in TPN; total volume increased to 2.25L accordingly    Diet Order: Diet, NPO:   With Ice Chips/Sips of Water (08-25-21 @ 15:22)    PARENTERAL NUTRITION:    GOAL TPN (ordered 8/25): 2.25L infusing at 94 ml/hr (105 gm amino acids, 210 gm dextrose, 60 gm SMOF lipid) to provide 1734 kcal/day (29 kcal/kg and 1.8 gm/kg protein per dosing wt 59kg)    Non-Protein Calories: 1314 kcal/day (22 kcal/kg)  Dextrose Infusion Rate: 2.5 mg/kg/min  Lipid Infusion Rate: 1 gm/kg/day; 0.08 gm/kg/hr    Electrolytes and Insulin: (ordered 8/25/21)  Insulin (units): 12  NaCl (mEq):  40  Na acetate (mEq): 60  Na Phos (mmol): 75  KCL (mEq):  100  Calcium gluconate (mEq): 16  Mg sulfate (mEq): 12  MTE-5 concentrate (ml): 1  MVI (ml): 10    GI:  Ileostomy output x 24-hrs:     Anthropometric Measurements:   Height (cm): 170.2 (08-09-21 @ 12:23)  DOSING Weight (kg): 59 (07-13-21)  BMI (kg/m2): 20.4 (08-09-21 @ 12:23)  Daily Weight: 50.3 kg (08-16); 53.2 kg (08-18); 64.2 kg (08-23-21); 61.5 (08-25-21);   IBW: 67.1 kg    Medications: MEDICATIONS  (STANDING):  Biotene Dry Mouth Oral Rinse 5 milliLiter(s) Swish and Spit four times a day  chlorhexidine 2% Cloths 1 Application(s) Topical <User Schedule>  enoxaparin Injectable 40 milliGRAM(s) SubCutaneous every 24 hours  fat emulsion (Fish Oil and Plant Based) 20% Infusion 25 mL/Hr (25 mL/Hr) IV Continuous <Continuous>  insulin lispro (ADMELOG) corrective regimen sliding scale   SubCutaneous every 6 hours  methylPREDNISolone sodium succinate Injectable 10 milliGRAM(s) IV Push daily  metoprolol tartrate Injectable 5 milliGRAM(s) IV Push every 4 hours  pantoprazole  Injectable 40 milliGRAM(s) IV Push daily  Parenteral Nutrition - Adult 1 Each (94 mL/Hr) TPN Continuous <Continuous>  piperacillin/tazobactam IVPB.. 3.375 Gram(s) IV Intermittent every 8 hours    MEDICATIONS  (PRN):  acetaminophen  IVPB .. 1000 milliGRAM(s) IV Intermittent every 6 hours PRN Mild Pain (1 - 3)    Labs: 08-25 @ 23:22: Sodium 133<L>, Potassium 4.1, Calcium 7.5<L>, Magnesium 2.4, Phosphorus 4.3, BUN 32<H>, Creatinine 0.44<L>, Glucose 140<H>, Total Bilirubin 0.5,  Albumin, 2.0<L>    Hemoglobin : 8.2 g/dL  Hematocrit : 25.7 %    LIVER FUNCTIONS - ( 25 Aug 2021 23:22 )  Alb: 2.0 g/dL / Pro: 4.7 g/dL / ALK PHOS: 159 U/L / ALT: 63 U/L / AST: 27 U/L / GGT: x           Triglycerides, Serum: 140 mg/dL (08-20-21 @ 06:36)  Triglycerides, Serum: 106 mg/dL (08-19-21 @ 08:59)  Triglycerides, Serum: 116 mg/dL (08-18-21 @ 06:45)  Triglycerides, Serum: 107 mg/dL (08-17-21 @ 07:08)    Hemoglobin A1C     POCT Blood Glucose.: 107 mg/dL (08-26-21 @ 05:29)  POCT Blood Glucose.: 138 mg/dL (08-25-21 @ 23:11)  POCT Blood Glucose.: 115 mg/dL (08-25-21 @ 17:15)  POCT Blood Glucose.: 140 mg/dL (08-25-21 @ 11:07)      Skin:   Edema:     Estimated Needs:  Energy:  Protein:      Previous Nutrition Diagnosis:   Nutrition Diagnosis is:     New Nutrition Diagnosis:      Recommended Interventions:   1. TPN per TPN Team/Nutrition Assessment  2.   3.   4.     Monitoring and Evaluation:   Continue to monitor nutrition provision and tolerance, weights, labs, skin integrity.   RD remains available upon request and will follow up per protocol.    Miriam Manning, MS RD CDN [facility], #[phone] Nutrition Follow Up Note     Patient seen for: malnutrition/TPN Team follow up on 8ICU    Source: daughter at bedside, medical record, TPN Team rounds. Pt lethargic with new onset hypophonia    Chart reviewed, events noted. "81M with recently dx Crohn's disease c/b perianal abscess and left posterior distal anal intersphincteric fistula and presents diarrhea/BRBPR  2/2 to CD flare c/b c diff colitis. Initially treat for C diff, s/p FMT. Taken emergently to OR on 8/20 for acute deterioration for laparoscopic total colostomy with end ileostomy."    Nutrition Status:  - TPN initiated 8/16; infusing at goal  - NPO post-op; pt failed Speech and Swallow eval 8/24; trial of ice chips allowed  - Concern for new onset hypophonia without any neuro deficits, ENT consult for scope in the morning (8/26).  - Team initiated discussion of feeding tube with family; RD discussed EN option at length with daughter, reiterated that TPN is utilized as temporary nutrition support, until the now-functioning gut can be utilized. Family is weighing options in setting of failed swallow eval.  - Acute post-op hyperglycemia resolved; insulin in TPN reduced to 8 units on 8/26. SSI ordered q6 hrs; no insulin required overnight.   - Solu-Medrol ordered; steroid taper per team.  - Potassium, phosphorus and calcium increased in TPN; total volume increased to 2.25L accordingly. Calcium gluconate supplemented overnight.  - Lasix ordered overnight    Diet Order: Diet, NPO:   With Ice Chips/Sips of Water (08-25-21 @ 15:22)    PARENTERAL NUTRITION:    GOAL TPN (ordered 8/25): 2.25L infusing at 94 ml/hr (105 gm amino acids, 210 gm dextrose, 60 gm SMOF lipid) to provide 1734 kcal/day (29 kcal/kg and 1.8 gm/kg protein per dosing wt 59kg)    Non-Protein Calories: 1314 kcal/day (22 kcal/kg)  Dextrose Infusion Rate: 2.5 mg/kg/min  Lipid Infusion Rate: 1 gm/kg/day; 0.08 gm/kg/hr    Electrolytes and Insulin: (ordered 8/25/21)  Insulin (units): 12 --> reduced to 8 (8/26)  NaCl (mEq):  40  Na acetate (mEq): 60  Na Phos (mmol): 75  KCL (mEq):  100  Calcium gluconate (mEq): 16  Mg sulfate (mEq): 12  MTE-5 concentrate (ml): 1  MVI (ml): 10    GI:  Ileostomy output x 24-hrs: 330ml (8/25), 400ml (8/26)  Mucous bloody stool 8/26    Anthropometric Measurements:   Height (cm): 170.2 (08-09-21 @ 12:23)  DOSING Weight (kg): 59 (07-13-21)  BMI (kg/m2): 20.4 (08-09-21 @ 12:23)  Daily Weight: 50.3 kg (08-16); 53.2 kg (08-18); 64.2 kg (08-23-21); 61.5 (08-25-21); weight shifts noted with diuresis  IBW: 67.1 kg    Medications: MEDICATIONS  (STANDING):  Biotene Dry Mouth Oral Rinse 5 milliLiter(s) Swish and Spit four times a day  chlorhexidine 2% Cloths 1 Application(s) Topical <User Schedule>  enoxaparin Injectable 40 milliGRAM(s) SubCutaneous every 24 hours  fat emulsion (Fish Oil and Plant Based) 20% Infusion 25 mL/Hr (25 mL/Hr) IV Continuous <Continuous>  insulin lispro (ADMELOG) corrective regimen sliding scale   SubCutaneous every 6 hours  methylPREDNISolone sodium succinate Injectable 10 milliGRAM(s) IV Push daily  metoprolol tartrate Injectable 5 milliGRAM(s) IV Push every 4 hours  pantoprazole  Injectable 40 milliGRAM(s) IV Push daily  Parenteral Nutrition - Adult 1 Each (94 mL/Hr) TPN Continuous <Continuous>  piperacillin/tazobactam IVPB.. 3.375 Gram(s) IV Intermittent every 8 hours    MEDICATIONS  (PRN):  acetaminophen  IVPB .. 1000 milliGRAM(s) IV Intermittent every 6 hours PRN Mild Pain (1 - 3)    Labs: 08-25 @ 23:22: Sodium 133<L>, Potassium 4.1, Calcium 7.5<L>, Magnesium 2.4, Phosphorus 4.3, BUN 32<H>, Creatinine 0.44<L>, Glucose 140<H>, Total Bilirubin 0.5,  Albumin, 2.0<L>    Hemoglobin : 8.2 g/dL  Hematocrit : 25.7 %    LIVER FUNCTIONS - ( 25 Aug 2021 23:22 )  Alb: 2.0 g/dL / Pro: 4.7 g/dL / ALK PHOS: 159 U/L / ALT: 63 U/L / AST: 27 U/L / GGT: x           Triglycerides, Serum: 140 mg/dL (08-20-21 @ 06:36)  Triglycerides, Serum: 106 mg/dL (08-19-21 @ 08:59)  Triglycerides, Serum: 116 mg/dL (08-18-21 @ 06:45)  Triglycerides, Serum: 107 mg/dL (08-17-21 @ 07:08)    Hemoglobin A1C 5.2%    POCT Blood Glucose.: 107 mg/dL (08-26-21 @ 05:29)  POCT Blood Glucose.: 138 mg/dL (08-25-21 @ 23:11)  POCT Blood Glucose.: 115 mg/dL (08-25-21 @ 17:15)  POCT Blood Glucose.: 140 mg/dL (08-25-21 @ 11:07)    Skin: Stage II sacrum  Edema: 2+ generalized; 3+ left/right arm    Estimated Needs: based on dosing wt 59 kg, with consideration for TPN and malnutrition  Energy: 6209-1937 calories (30-35 kcal/kg)  Protein:  grams (1.4-1.8 gm/kg)    Previous Nutrition Diagnosis: 1) Severe Malnutrition 2) Increased Nutrient Needs  Nutrition Diagnosis is: ongoing, addressed with TPN     New Nutrition Diagnosis:  none    Recommended Interventions:   1. TPN per TPN Team/Nutrition Assessment  2. Monitor ENT scope results, future swallow evaluations, family decision regarding feeding tube  3. If EN is warranted, recommend goal of Vital1.5 @ 50 ml/hr x 24 hrs to provide 1200ml formula, 1800 calories (30.5 kcal/kg), 81 grams protein (1.37 gm/kg), 917ml free water; based on dosing wt 59kg      Monitoring and Evaluation:   Continue to monitor nutrition provision and tolerance, weights, labs, skin integrity.   RD remains available upon request and will follow up per protocol.    Miriam Manning, MS RD CDN [facility], #[phone].

## 2021-08-26 NOTE — PROGRESS NOTE ADULT - SUBJECTIVE AND OBJECTIVE BOX
Patient is a 81y old  Male who presents with a chief complaint of diarrhea with blood in it , abd pain and weakness (26 Aug 2021 17:04)      INTERVAL HPI/OVERNIGHT EVENTS:  T(C): 36.2 (08-26-21 @ 23:00), Max: 36.3 (08-26-21 @ 19:00)  HR: 84 (08-26-21 @ 23:00) (66 - 84)  BP: 116/57 (08-26-21 @ 23:00) (116/57 - 147/65)  RR: 26 (08-26-21 @ 23:00) (17 - 50)  SpO2: 100% (08-26-21 @ 23:00) (99% - 100%)  Wt(kg): --  I&O's Summary    25 Aug 2021 07:01  -  26 Aug 2021 07:00  --------------------------------------------------------  IN: 3131 mL / OUT: 3300 mL / NET: -169 mL    26 Aug 2021 07:01  -  27 Aug 2021 00:00  --------------------------------------------------------  IN: 2004 mL / OUT: 2960 mL / NET: -956 mL        PAST MEDICAL & SURGICAL HISTORY:  No pertinent past medical history    No significant past surgical history        SOCIAL HISTORY  Alcohol:  Tobacco:  Illicit substance use:    FAMILY HISTORY:    REVIEW OF SYSTEMS:  CONSTITUTIONAL: No fever, weight loss, or fatigue  EYES: No eye pain, visual disturbances, or discharge  ENMT:  No difficulty hearing, tinnitus, vertigo; No sinus or throat pain  NECK: No pain or stiffness  RESPIRATORY: No cough, wheezing, chills or hemoptysis; No shortness of breath  CARDIOVASCULAR: No chest pain, palpitations, dizziness, or leg swelling  GASTROINTESTINAL: No abdominal or epigastric pain. No nausea, vomiting, or hematemesis; No diarrhea or constipation. No melena or hematochezia.  GENITOURINARY: No dysuria, frequency, hematuria, or incontinence  NEUROLOGICAL: No headaches, memory loss, loss of strength, numbness, or tremors  SKIN: No itching, burning, rashes, or lesions   LYMPH NODES: No enlarged glands  ENDOCRINE: No heat or cold intolerance; No hair loss  MUSCULOSKELETAL: No joint pain or swelling; No muscle, back, or extremity pain  PSYCHIATRIC: No depression, anxiety, mood swings, or difficulty sleeping  HEME/LYMPH: No easy bruising, or bleeding gums  ALLERY AND IMMUNOLOGIC: No hives or eczema    RADIOLOGY & ADDITIONAL TESTS:    Imaging Personally Reviewed:  [ ] YES  [ ] NO    Consultant(s) Notes Reviewed:  [ ] YES  [ ] NO    PHYSICAL EXAM:  GENERAL: NAD, well-groomed, well-developed  HEAD:  Atraumatic, Normocephalic  EYES: EOMI, PERRLA, conjunctiva and sclera clear  ENMT: No tonsillar erythema, exudates, or enlargement; Moist mucous membranes, Good dentition, No lesions  NECK: Supple, No JVD, Normal thyroid  NERVOUS SYSTEM:  Alert & Oriented X3, Good concentration; Motor Strength 5/5 B/L upper and lower extremities; DTRs 2+ intact and symmetric  CHEST/LUNG: Clear to percussion bilaterally; No rales, rhonchi, wheezing, or rubs  HEART: Regular rate and rhythm; No murmurs, rubs, or gallops  ABDOMEN: Soft, Nontender, Nondistended; Bowel sounds present  EXTREMITIES:  2+ Peripheral Pulses, No clubbing, cyanosis, or edema  LYMPH: No lymphadenopathy noted  SKIN: No rashes or lesions    LABS:                        8.1    15.97 )-----------( 186      ( 26 Aug 2021 23:31 )             25.7     08-25    133<L>  |  100  |  32<H>  ----------------------------<  140<H>  4.1   |  24  |  0.44<L>    Ca    7.5<L>      25 Aug 2021 23:22  Phos  4.3     08-25  Mg     2.4     08-25    TPro  4.7<L>  /  Alb  2.0<L>  /  TBili  0.5  /  DBili  x   /  AST  27  /  ALT  63<H>  /  AlkPhos  159<H>  08-25        CAPILLARY BLOOD GLUCOSE      POCT Blood Glucose.: 118 mg/dL (26 Aug 2021 23:17)  POCT Blood Glucose.: 573 mg/dL (26 Aug 2021 23:14)  POCT Blood Glucose.: 111 mg/dL (26 Aug 2021 17:08)  POCT Blood Glucose.: 131 mg/dL (26 Aug 2021 11:04)  POCT Blood Glucose.: 107 mg/dL (26 Aug 2021 05:29)            MEDICATIONS  (STANDING):  Biotene Dry Mouth Oral Rinse 5 milliLiter(s) Swish and Spit four times a day  chlorhexidine 2% Cloths 1 Application(s) Topical <User Schedule>  enoxaparin Injectable 40 milliGRAM(s) SubCutaneous every 24 hours  fat emulsion (Fish Oil and Plant Based) 20% Infusion 25 mL/Hr (25 mL/Hr) IV Continuous <Continuous>  insulin lispro (ADMELOG) corrective regimen sliding scale   SubCutaneous every 6 hours  methylPREDNISolone sodium succinate Injectable 10 milliGRAM(s) IV Push daily  metoprolol tartrate Injectable 5 milliGRAM(s) IV Push every 4 hours  pantoprazole  Injectable 40 milliGRAM(s) IV Push daily  Parenteral Nutrition - Adult 1 Each (94 mL/Hr) TPN Continuous <Continuous>  piperacillin/tazobactam IVPB.. 3.375 Gram(s) IV Intermittent every 8 hours    MEDICATIONS  (PRN):  acetaminophen  IVPB .. 1000 milliGRAM(s) IV Intermittent every 6 hours PRN Mild Pain (1 - 3)      Care Discussed with Consultants/Other Providers [ ] YES  [ ] NO

## 2021-08-26 NOTE — CONSULT NOTE ADULT - SUBJECTIVE AND OBJECTIVE BOX
CC: hypophonia    HPI: 81y Male with GERD on omeprazole daily and Crohn's disease diagnosed about 9 mos prior to admission at outside hospital c/b h/o abscess and left posterior distal anal intersphincteric fistula and presents diarrhea/BRBPR 2/2 to CD flare c/b c diff colitis. Pt now s/p laparoscopic total abdominal colectomy with end ileostomy on 8/20. Now presenting with hypophonia. Pt was reportedly intubated for 2 days after the procedure. Unable to obtain further history from pt 2/2 clinical condition.       PAST MEDICAL & SURGICAL HISTORY:  No pertinent past medical history    No significant past surgical history      Allergies    No Known Allergies    Intolerances      MEDICATIONS  (STANDING):  Biotene Dry Mouth Oral Rinse 5 milliLiter(s) Swish and Spit four times a day  chlorhexidine 2% Cloths 1 Application(s) Topical <User Schedule>  enoxaparin Injectable 40 milliGRAM(s) SubCutaneous every 24 hours  fat emulsion (Fish Oil and Plant Based) 20% Infusion 25 mL/Hr (25 mL/Hr) IV Continuous <Continuous>  insulin lispro (ADMELOG) corrective regimen sliding scale   SubCutaneous every 6 hours  methylPREDNISolone sodium succinate Injectable 10 milliGRAM(s) IV Push daily  metoprolol tartrate Injectable 5 milliGRAM(s) IV Push every 4 hours  pantoprazole  Injectable 40 milliGRAM(s) IV Push daily  Parenteral Nutrition - Adult 1 Each (94 mL/Hr) TPN Continuous <Continuous>  Parenteral Nutrition - Adult 1 Each (94 mL/Hr) TPN Continuous <Continuous>  piperacillin/tazobactam IVPB.. 3.375 Gram(s) IV Intermittent every 8 hours    MEDICATIONS  (PRN):  acetaminophen  IVPB .. 1000 milliGRAM(s) IV Intermittent every 6 hours PRN Mild Pain (1 - 3)      Social History: no pertinent social history    Family history: no pertinent family history     ROS:   uto    Vital Signs Last 24 Hrs  T(C): 36.1 (26 Aug 2021 15:00), Max: 36.2 (25 Aug 2021 19:00)  T(F): 97 (26 Aug 2021 15:00), Max: 97.2 (25 Aug 2021 19:00)  HR: 71 (26 Aug 2021 17:00) (66 - 83)  BP: 128/61 (26 Aug 2021 17:00) (120/55 - 137/63)  BP(mean): 88 (26 Aug 2021 17:00) (82 - 91)  RR: 20 (26 Aug 2021 17:00) (17 - 50)  SpO2: 100% (26 Aug 2021 17:00) (100% - 100%)                          8.2    18.97 )-----------( 153      ( 25 Aug 2021 23:22 )             25.7    08-25    133<L>  |  100  |  32<H>  ----------------------------<  140<H>  4.1   |  24  |  0.44<L>    Ca    7.5<L>      25 Aug 2021 23:22  Phos  4.3     08-25  Mg     2.4     08-25    TPro  4.7<L>  /  Alb  2.0<L>  /  TBili  0.5  /  DBili  x   /  AST  27  /  ALT  63<H>  /  AlkPhos  159<H>  08-25   PT/INR - ( 24 Aug 2021 21:48 )   PT: 14.2 sec;   INR: 1.19 ratio         PTT - ( 24 Aug 2021 21:48 )  PTT:29.3 sec    PHYSICAL EXAM:  Gen: NAD  Skin: No rashes, bruises, or lesions  Head: Normocephalic, Atraumatic  Face: no edema, erythema, or fluctuance. Parotid glands soft without mass  Eyes: no scleral injection  Nose: Nares bilaterally patent, no discharge  Mouth: No Stridor / Drooling / Trismus.  Mucosa moist, tongue/uvula midline, oropharynx clear  Neck: Flat, supple, no lymphadenopathy, trachea midline, no masses  Lymphatic: No lymphadenopathy  Resp: breathing easily, no stridor  CV: no peripheral edema/cyanosis  GI: nondistended   Peripheral vascular: no JVD or edema  Neuro: facial nerve intact, no facial droop      Fiberoptic Indirect laryngoscopy:  (Scope #2 used)  Reason for Laryngoscopy: hypophonia    Patient was unable to cooperate with mirror.  +Bowing of b/l vocal cords, b/l vocal cords mobile with poor contact anteriorly, +presbylarynx. Nasopharynx, oropharynx, and hypopharynx clear, no bleeding. Tongue base, posterior pharyngeal wall, vallecula, epiglottis, and subglottis appear normal. No erythema, edema, pooling of secretions, masses or lesions. Airway patent, no foreign body visualized. No glottic/supraglottic edema. Arytenoids, vestibular folds, ventricles, pyriform sinuses, and aryepiglottic folds appear normal bilaterally.

## 2021-08-26 NOTE — PROGRESS NOTE ADULT - SUBJECTIVE AND OBJECTIVE BOX
[facility] NUTRITION SUPPORT-- FOLLOW UP NOTE  --------------------------------------------------------------------------------  24 hour events/subjective: pt seen and examined. oob in chair, awake alert, c/o thirst and asking for water. denies n/v/abd pain.  failed formal s&s eval, discussions ongoing w family about ko feeds. pending ent eval for hypophonia. back on zosyn. sp ca gluconate and lasix 20mg overnight. FS trend noted, no ssi coverage required    Diet:  Diet, NPO:   With Ice Chips/Sips of Water (08-25-21 @ 15:22)      PAST HISTORY  --------------------------------------------------------------------------------  No significant changes to PMH, PSH, FHx, SHx, unless otherwise noted    ALLERGIES & MEDICATIONS  --------------------------------------------------------------------------------  Allergies    No Known Allergies    Intolerances      Standing Inpatient Medications  Biotene Dry Mouth Oral Rinse 5 milliLiter(s) Swish and Spit four times a day  chlorhexidine 2% Cloths 1 Application(s) Topical <User Schedule>  enoxaparin Injectable 40 milliGRAM(s) SubCutaneous every 24 hours  fat emulsion (Fish Oil and Plant Based) 20% Infusion 25 mL/Hr IV Continuous <Continuous>  insulin lispro (ADMELOG) corrective regimen sliding scale   SubCutaneous every 6 hours  methylPREDNISolone sodium succinate Injectable 10 milliGRAM(s) IV Push daily  metoprolol tartrate Injectable 5 milliGRAM(s) IV Push every 4 hours  pantoprazole  Injectable 40 milliGRAM(s) IV Push daily  Parenteral Nutrition - Adult 1 Each TPN Continuous <Continuous>  piperacillin/tazobactam IVPB.. 3.375 Gram(s) IV Intermittent every 8 hours    PRN Inpatient Medications  acetaminophen  IVPB .. 1000 milliGRAM(s) IV Intermittent every 6 hours PRN        REVIEW OF SYSTEMS  --------------------------------------------------------------------------------  General:  see hpi  HEENT:  no headaches, no vision changes, no ear pain  CV:  no chest pain, no palpitations  Resp:  no dyspnea, no cough  GI:  see hpi  :  no pain, no bleeding, no dysuria  Muscle:  +weakness  Neuro:  no numbness, no tingling, no memory problems  Psych:  no insomnia, no mood problems, no depression  Endocrine:  no cold/heat intolerance  Heme: no easy bruising  Skin: +edema    all other systems were reviewed and are negative, except as noted         VITALS/PHYSICAL EXAM  --------------------------------------------------------------------------------  T(C): 36 (08-26-21 @ 07:00), Max: 36.2 (08-25-21 @ 19:00)  HR: 83 (08-26-21 @ 08:00) (67 - 97)  BP: --  RR: 32 (08-26-21 @ 08:00) (17 - 50)  SpO2: 100% (08-26-21 @ 08:00) (100% - 100%)  Wt(kg): --      08-25-21 @ 07:01  -  08-26-21 @ 07:00  --------------------------------------------------------  IN: 3131 mL / OUT: 3300 mL / NET: -169 mL    08-26-21 @ 07:01  -  08-26-21 @ 08:58  --------------------------------------------------------  IN: 288 mL / OUT: 0 mL / NET: 288 mL      I&O's Detail    25 Aug 2021 07:01  -  26 Aug 2021 07:00  --------------------------------------------------------  IN:    Fat Emulsion (Fish Oil &amp; Plant Based) 20% Infusion: 325 mL    IV PiggyBack: 550 mL    TPN (Total Parenteral Nutrition): 2256 mL  Total IN: 3131 mL    OUT:    Bulb (mL): 35 mL    Bulb (mL): 60 mL    Fat Emulsion (Fish Oil &amp; Plant Based) 20% Infusion: 0 mL    Ileostomy (mL): 400 mL    Indwelling Catheter - Urethral (mL): 1105 mL    Voided (mL): 1700 mL  Total OUT: 3300 mL    Total NET: -169 mL      26 Aug 2021 07:01  -  26 Aug 2021 08:58  --------------------------------------------------------  IN:    IV PiggyBack: 100 mL    TPN (Total Parenteral Nutrition): 188 mL  Total IN: 288 mL    OUT:  Total OUT: 0 mL    Total NET: 288 mL          Physical Exam:  Gen: oob in chair, frail, cachectic   HEENT: ncat, trachea midline  Chest: respirations non labored  Abd: soft nd ostomy with dark stool   Extrem:  +le edema and ue edema  Neuro: awake alert responsive, hypophonic         LABS/STUDIES  --------------------------------------------------------------------------------              8.2    18.97 >-----------<  153      [08-25-21 @ 23:22]              25.7     133  |  100  |  32  ----------------------------<  140      [08-25-21 @ 23:22]  4.1   |  24  |  0.44        Ca     7.5     [08-25-21 @ 23:22]      iCa    1.09     [08-25 @ 23:30]      Mg     2.4     [08-25-21 @ 23:22]      Phos  4.3     [08-25-21 @ 23:22]    TPro  4.7  /  Alb  2.0  /  TBili  0.5  /  DBili  x   /  AST  27  /  ALT  63  /  AlkPhos  159  [08-25-21 @ 23:22]    PT/INR: PT 14.2 , INR 1.19       [08-24-21 @ 21:48]  PTT: 29.3       [08-24-21 @ 21:48]      Ca ionized  Creatinine Trend:  POC glucoseGlucose, Serum: 140 mg/dL (08-25-21 @ 23:22)  CAPILLARY BLOOD GLUCOSE      POCT Blood Glucose.: 107 mg/dL (26 Aug 2021 05:29)  POCT Blood Glucose.: 138 mg/dL (25 Aug 2021 23:11)  POCT Blood Glucose.: 115 mg/dL (25 Aug 2021 17:15)  POCT Blood Glucose.: 140 mg/dL (25 Aug 2021 11:07)    PrealbuminPrealbumin, Serum: 8 mg/dL (08-15-21 @ 10:17)    Triglycerides     [facility] NUTRITION SUPPORT-- FOLLOW UP NOTE  --------------------------------------------------------------------------------  24 hour events/subjective: pt seen and examined. oob in chair, awake alert, c/o thirst and asking for water. denies n/v/abd pain.  failed formal s&s eval, discussions ongoing w family about chantel feeds. pending ent eval for hypophonia. back on zosyn. sp ca gluconate and lasix 20mg overnight. FS trend noted, no ssi coverage required    Diet:  Diet, NPO:   With Ice Chips/Sips of Water (08-25-21 @ 15:22)      PAST HISTORY  --------------------------------------------------------------------------------  No significant changes to PMH, PSH, FHx, SHx, unless otherwise noted    ALLERGIES & MEDICATIONS  --------------------------------------------------------------------------------  Allergies    No Known Allergies    Intolerances      Standing Inpatient Medications  Biotene Dry Mouth Oral Rinse 5 milliLiter(s) Swish and Spit four times a day  chlorhexidine 2% Cloths 1 Application(s) Topical <User Schedule>  enoxaparin Injectable 40 milliGRAM(s) SubCutaneous every 24 hours  fat emulsion (Fish Oil and Plant Based) 20% Infusion 25 mL/Hr IV Continuous <Continuous>  insulin lispro (ADMELOG) corrective regimen sliding scale   SubCutaneous every 6 hours  methylPREDNISolone sodium succinate Injectable 10 milliGRAM(s) IV Push daily  metoprolol tartrate Injectable 5 milliGRAM(s) IV Push every 4 hours  pantoprazole  Injectable 40 milliGRAM(s) IV Push daily  Parenteral Nutrition - Adult 1 Each TPN Continuous <Continuous>  piperacillin/tazobactam IVPB.. 3.375 Gram(s) IV Intermittent every 8 hours    PRN Inpatient Medications  acetaminophen  IVPB .. 1000 milliGRAM(s) IV Intermittent every 6 hours PRN        REVIEW OF SYSTEMS  --------------------------------------------------------------------------------  General:  see hpi  HEENT:  no headaches, no vision changes, no ear pain  CV:  no chest pain, no palpitations  Resp:  no dyspnea, no cough  GI:  see hpi  :  no pain, no bleeding, no dysuria  Muscle:  +weakness  Neuro:  no numbness, no tingling, no memory problems  Psych:  no insomnia, no mood problems, no depression  Endocrine:  no cold/heat intolerance  Heme: no easy bruising  Skin: +edema    all other systems were reviewed and are negative, except as noted         VITALS/PHYSICAL EXAM  --------------------------------------------------------------------------------  T(C): 36 (08-26-21 @ 07:00), Max: 36.2 (08-25-21 @ 19:00)  HR: 83 (08-26-21 @ 08:00) (67 - 97)  BP: --  RR: 32 (08-26-21 @ 08:00) (17 - 50)  SpO2: 100% (08-26-21 @ 08:00) (100% - 100%)  Wt(kg): --      08-25-21 @ 07:01  -  08-26-21 @ 07:00  --------------------------------------------------------  IN: 3131 mL / OUT: 3300 mL / NET: -169 mL    08-26-21 @ 07:01  -  08-26-21 @ 08:58  --------------------------------------------------------  IN: 288 mL / OUT: 0 mL / NET: 288 mL      I&O's Detail    25 Aug 2021 07:01  -  26 Aug 2021 07:00  --------------------------------------------------------  IN:    Fat Emulsion (Fish Oil &amp; Plant Based) 20% Infusion: 325 mL    IV PiggyBack: 550 mL    TPN (Total Parenteral Nutrition): 2256 mL  Total IN: 3131 mL    OUT:    Bulb (mL): 35 mL    Bulb (mL): 60 mL    Fat Emulsion (Fish Oil &amp; Plant Based) 20% Infusion: 0 mL    Ileostomy (mL): 400 mL    Indwelling Catheter - Urethral (mL): 1105 mL    Voided (mL): 1700 mL  Total OUT: 3300 mL    Total NET: -169 mL      26 Aug 2021 07:01  -  26 Aug 2021 08:58  --------------------------------------------------------  IN:    IV PiggyBack: 100 mL    TPN (Total Parenteral Nutrition): 188 mL  Total IN: 288 mL    OUT:  Total OUT: 0 mL    Total NET: 288 mL          Physical Exam:  Gen: oob in chair, frail, cachectic   HEENT: ncat, trachea midline  Chest: respirations non labored  Abd: soft nd ostomy with dark stool   Extrem:  +le edema and ue edema  Neuro: awake alert responsive, hypophonic         LABS/STUDIES  --------------------------------------------------------------------------------              8.2    18.97 >-----------<  153      [08-25-21 @ 23:22]              25.7     133  |  100  |  32  ----------------------------<  140      [08-25-21 @ 23:22]  4.1   |  24  |  0.44        Ca     7.5     [08-25-21 @ 23:22]      iCa    1.09     [08-25 @ 23:30]      Mg     2.4     [08-25-21 @ 23:22]      Phos  4.3     [08-25-21 @ 23:22]    TPro  4.7  /  Alb  2.0  /  TBili  0.5  /  DBili  x   /  AST  27  /  ALT  63  /  AlkPhos  159  [08-25-21 @ 23:22]    PT/INR: PT 14.2 , INR 1.19       [08-24-21 @ 21:48]  PTT: 29.3       [08-24-21 @ 21:48]      Ca ionized  Creatinine Trend:  POC glucoseGlucose, Serum: 140 mg/dL (08-25-21 @ 23:22)  CAPILLARY BLOOD GLUCOSE      POCT Blood Glucose.: 107 mg/dL (26 Aug 2021 05:29)  POCT Blood Glucose.: 138 mg/dL (25 Aug 2021 23:11)  POCT Blood Glucose.: 115 mg/dL (25 Aug 2021 17:15)  POCT Blood Glucose.: 140 mg/dL (25 Aug 2021 11:07)    PrealbuminPrealbumin, Serum: 8 mg/dL (08-15-21 @ 10:17)    Triglycerides

## 2021-08-26 NOTE — CONSULT NOTE ADULT - ASSESSMENT
81y Male with GERD on omeprazole daily and Crohn's disease now s/p laparoscopic total abdominal colectomy with end ileostomy on 8/20. Now presenting with hypophonia. Indirect laryngoscopy performed which was notable for bowing of b/l vocal cords and presbylarynx.

## 2021-08-27 NOTE — PROGRESS NOTE ADULT - SUBJECTIVE AND OBJECTIVE BOX
Chief Complaint:  Patient is a 81y old  Male who presents with a chief complaint of diarrhea with blood in it , abd pain and weakness (27 Aug 2021 12:16)      Interval Events:   Mental status relatively unchanged though slightly more responsive than yesterday.   Completed steroid taper     Hospital Medications:  acetaminophen  IVPB .. 1000 milliGRAM(s) IV Intermittent every 6 hours PRN  Biotene Dry Mouth Oral Rinse 5 milliLiter(s) Swish and Spit four times a day  chlorhexidine 2% Cloths 1 Application(s) Topical <User Schedule>  enoxaparin Injectable 40 milliGRAM(s) SubCutaneous every 24 hours  fat emulsion (Fish Oil and Plant Based) 20% Infusion 25 mL/Hr IV Continuous <Continuous>  insulin lispro (ADMELOG) corrective regimen sliding scale   SubCutaneous every 6 hours  metoprolol tartrate Injectable 5 milliGRAM(s) IV Push every 4 hours  pantoprazole  Injectable 40 milliGRAM(s) IV Push daily  Parenteral Nutrition - Adult 1 Each TPN Continuous <Continuous>  Parenteral Nutrition - Adult 1 Each TPN Continuous <Continuous>  piperacillin/tazobactam IVPB.. 3.375 Gram(s) IV Intermittent every 8 hours      ROS: limited due to patient's condition.    PHYSICAL EXAM:   Vital Signs:  Vital Signs Last 24 Hrs  T(C): 36.7 (27 Aug 2021 09:00), Max: 36.7 (27 Aug 2021 09:00)  T(F): 98 (27 Aug 2021 09:00), Max: 98 (27 Aug 2021 09:00)  HR: 73 (27 Aug 2021 12:00) (66 - 88)  BP: 120/55 (27 Aug 2021 12:00) (85/51 - 152/65)  BP(mean): 79 (27 Aug 2021 12:00) (64 - 94)  RR: 29 (27 Aug 2021 12:00) (17 - 29)  SpO2: 100% (27 Aug 2021 12:00) (98% - 100%)  Daily     Daily Weight in k.5 (27 Aug 2021 06:48)    GENERAL:  NAD, Appears stated age  HEENT:  NC/AT,  conjunctivae clear and pink, sclera -anicteric  CHEST:  Normal Effort  ABDOMEN:   soft, Non-tender, Non-distended. Ostomy pink and viable with dark bilious output  EXTREMITIES:  no cyanosis or edema  SKIN:  Warm & Dry. No rash or erythema  NEURO:  lethargic and minimally responsive    LABS: reviewed                        8.1    15.97 )-----------( 186      ( 26 Aug 2021 23:31 )             25.7         135  |  101  |  34<H>  ----------------------------<  109<H>  3.6   |  25  |  0.48<L>    Ca    7.4<L>      26 Aug 2021 23:31  Phos  4.5       Mg     2.4         TPro  4.8<L>  /  Alb  1.7<L>  /  TBili  0.6  /  DBili  x   /  AST  28  /  ALT  54<H>  /  AlkPhos  135<H>      LIVER FUNCTIONS - ( 26 Aug 2021 23:31 )  Alb: 1.7 g/dL / Pro: 4.8 g/dL / ALK PHOS: 135 U/L / ALT: 54 U/L / AST: 28 U/L / GGT: x             Interval Diagnostic Studies: see sunrise for full report

## 2021-08-27 NOTE — PROGRESS NOTE ADULT - ATTENDING COMMENTS
Pt seen and examined with SICU team on 8/27, agree with above.    1. Crohn's disease refractory to maximal medical therapy, s/p TAC with end ileostomy, has been on TPN with severe protein-calorie malnutrition and frailty:  - Pt's daughter has agreed to chantel tube, place today and start TF  - Continue TPN for now until pt fully tolerating TF  - Ileostomy functioning, ostomy RN teaching  - Per ID, zosyn until 8/30  - Has PICC for TPN (8/20)  - Steroids weaned off    2. Paroxysmal afib, CAD  - Home meds: sotalol  - Pt has had GIB before, not on AC or AP because of bleeding  - On IV metoprolol for now with good rate control  - Was getting diuresis: admission weight 59kg, current weight 56kg, is on RA with clear CXR on my review of images, will hold off on further diuresis today  - Once chantel placed, will consider switching to po rate control - will discuss sotalol versus po metoprolol with Cardiology    3. Hypophonia with VC bowing and dysphagia:  - Appreciate ENT and Sp/Sw eval, discussed with Sp/Sw therapist. Both services note that pt's condition may improve over the next few weeks - if pt is able to leave the hospital in less time, may consider PEG?

## 2021-08-27 NOTE — PROGRESS NOTE ADULT - ATTENDING COMMENTS
Patient with stable vitals  Doing okay on TPN, feels weak  Discussed role of being off steroids with patient's daughter - had multiple questions.  No active GI issues at this time, rest of care as per surgical team    GI to follow

## 2021-08-27 NOTE — PROGRESS NOTE ADULT - ASSESSMENT
81y Male with GERD on omeprazole daily and Crohn's disease diagnosed about 9 mos prior to admission at outside hospital c/b h/o abscess and left posterior distal anal intersphincteric fistula and presents diarrhea/BRBPR  2/2 to CD flare c/b c diff colitis. Initially treat for C diff, given that it did not get better got FMT. Switched PO vanc in favor of Fidaxomicin without any improvement. Therefore, patient got Flex sig to reassess which showed no pseudomembranous colitis anymore but severe Crohn colitis. Was started on steroids on 8/9. Has had minimal improvement. Started tapering IV steroids and pt is currently on TPN as he has had poor PO intake. Case was discussed at IBD conference 8/18 and CRS + GI team in agreement with surgical resection. s/p laparoscopic total abdominal colectomy with end ileostomy on 8/20. Now recovering in SICU.    # c diff - pseudomembranous colitis seen on colonoscopy 7/19/21, treated w/ vanc, fidaxomicin, FMT; repeat flex sig w/o further pseudomembranes but ongoing severe CD. 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    Patient seen and discussed with Attending, Dr. Colt Roy.    Aminah Zhong MD  Gastroenterology/Hepatology Fellow, PGY-V  Available via [employer] Teams    NON-URGENT CONSULTS:  Please email [email or web address]  AT NIGHT AND ON WEEKENDS:  Contact on-call GI fellow via answering service ([phone]) from 5pm-8am and on weekends/holidays  MONDAY-FRIDAY 8AM-5PM:  Pager# [phone] ([facility])  GI Phone# [phone] ([facility])

## 2021-08-27 NOTE — PROGRESS NOTE ADULT - SUBJECTIVE AND OBJECTIVE BOX
Patient is a 81y old  Male who presents with a chief complaint of diarrhea with blood in it , abd pain and weakness (27 Aug 2021 13:25)      INTERVAL HISTORY: sitting up in chair- daugher at bedisde- speech and swallow eval being done now     TELEMETRY Personally reviewed: Sinus 80s     REVIEW OF SYSTEMS:   CONSTITUTIONAL: No weakness  EYES/ENT: No visual changes; No throat pain  Neck: No pain or stiffness  Respiratory: No cough, wheezing, No shortness of breath  CARDIOVASCULAR: no chest pain or palpitations  GASTROINTESTINAL: No abdominal pain, no nausea, vomiting or hematemesis  GENITOURINARY: No dysuria, frequency or hematuria  NEUROLOGICAL: No stroke like symptoms  SKIN: No rashes    MEDICATIONS:  metoprolol tartrate Injectable 5 milliGRAM(s) IV Push every 4 hours    PHYSICAL EXAM:  T(C): 36.7 (08-27-21 @ 09:00), Max: 36.7 (08-27-21 @ 09:00)  HR: 76 (08-27-21 @ 14:00) (66 - 88)  BP: 120/59 (08-27-21 @ 14:00) (85/51 - 152/65)  RR: 20 (08-27-21 @ 14:00) (17 - 30)  SpO2: 100% (08-27-21 @ 14:00) (98% - 100%)  Wt(kg): --  I&O's Summary    26 Aug 2021 07:01  -  27 Aug 2021 07:00  --------------------------------------------------------  IN: 3331 mL / OUT: 3995 mL / NET: -664 mL    27 Aug 2021 07:01  -  27 Aug 2021 14:11  --------------------------------------------------------  IN: 758 mL / OUT: 0 mL / NET: 758 mL      Appearance: In no distress	  HEENT:    PERRL, EOMI	  Cardiovascular:  S1 S2, No JVD  Respiratory: Lungs clear to auscultation	  Gastrointestinal:  Soft, Non-tender, + BS	  Vascularature:  No edema of LE  Psychiatric: Appropriate affect   Neuro: no acute focal deficits                         8.1    15.97 )-----------( 186      ( 26 Aug 2021 23:31 )             25.7     08-26    135  |  101  |  34<H>  ----------------------------<  109<H>  3.6   |  25  |  0.48<L>    Ca    7.4<L>      26 Aug 2021 23:31  Phos  4.5     08-26  Mg     2.4     08-26    TPro  4.8<L>  /  Alb  1.7<L>  /  TBili  0.6  /  DBili  x   /  AST  28  /  ALT  54<H>  /  AlkPhos  135<H>  08-2  Labs personally reviewed    ASSESSMENT/PLAN: 	  Problem/Plan - 1:  ·  Problem: CAD (coronary artery disease).  Plan: c/w home meds  We discussed the importance of SAPT with low dose ASA 81mg given CAD  pt denies any current chest pain, SOB or chest pressure.     Problem/Plan -2:  ·  Problem: Colitis.  Plan: Ct shows crohn's  exacerbation   f/u flex sig with biopsies   s/p FMT 7/28  plan for repeat FMT vs Fidoxomaicin if sx don't improve  f/p flex sig on 8/9- no evidence of pseudomembranous   started on Fidoxomaicin and steroids   per GI if fails medical therapy, then surgical evaluation would be indicated  - s/p laparoscopic total abdominal colectomy with end ileostomy, currently in SICU - pending transfer to medicine floor.     Problem/Plan - 3:  ·  Problem: Afib pt said he was placed on sotalol for afib   -c/w sotalol   -EKG noted. SR   - pt is ? candidate for AC seeing recent GIB. FOBT positive as recent as 8/8/21   - pt has active GIB no AC indicated    continue rate control with IV lopressor     Problem/Plan - 4:   ·  Problem: GIB (gastrointestinal bleeding).  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   - Failed Speech and swallow test- NGT in place     Problem/Plan - 5:  ·  Problem: DVt ppx SCDs     Problem/Plan - 6:  ·  Problem: B/L LE edema   - CXR showed mildly enlarged heart   -monitor IVF with I&O  -B/L L E edema improved   -TTE with mod-severe AI, mild AS, preserved EF        pending transfer to medicine floor.     Sanam Heath ANP-C  Rufus Onofre DO [facility]  Cardiovascular Medicine  [address], [place], [address]  Office: [phone]  Cell: [phone]

## 2021-08-27 NOTE — PROGRESS NOTE ADULT - SUBJECTIVE AND OBJECTIVE BOX
[facility] NUTRITION SUPPORT--  Attending/ PA FOLLOW UP NOTE      24 hour events/subjective: TPN originally consulted for nutrition support and started TPN on ____.  Pt is tolerating wihtou issues, and denies palpitations, chest pain, shortness of breath. Pt further denies fevers, chills nor night sweats. Denies nausea nor vomiting nor abdominal pain.        PAST HISTORY  --------------------------------------------------------------------------------  No significant changes to PMH, PSH, FHx, SHx, unless otherwise noted    ALLERGIES & MEDICATIONS  --------------------------------------------------------------------------------  Allergies    No Known Allergies    Intolerances      Standing Inpatient Medications  Biotene Dry Mouth Oral Rinse 5 milliLiter(s) Swish and Spit four times a day  chlorhexidine 2% Cloths 1 Application(s) Topical <User Schedule>  enoxaparin Injectable 40 milliGRAM(s) SubCutaneous every 24 hours  fat emulsion (Fish Oil and Plant Based) 20% Infusion 25 mL/Hr IV Continuous <Continuous>  insulin lispro (ADMELOG) corrective regimen sliding scale   SubCutaneous every 6 hours  metoprolol tartrate Injectable 5 milliGRAM(s) IV Push every 4 hours  pantoprazole  Injectable 40 milliGRAM(s) IV Push daily  Parenteral Nutrition - Adult 1 Each TPN Continuous <Continuous>  Parenteral Nutrition - Adult 1 Each TPN Continuous <Continuous>  piperacillin/tazobactam IVPB.. 3.375 Gram(s) IV Intermittent every 8 hours    PRN Inpatient Medications  acetaminophen  IVPB .. 1000 milliGRAM(s) IV Intermittent every 6 hours PRN      REVIEW OF SYSTEMS  --------------------------------------------------------------------------------  Gen: as per HPI  Skin: No rashes  Head/Eyes/Ears/Mouth: No headache;No sore throat  Respiratory: No dyspnea, cough,   CV: No chest pain, PND, orthopnea  GI: as per HPI  : No increased frequency, dysuria, hematuria, nocturia  MSK: No joint pain/swelling; no back pain; no edema  Neuro: No dizziness/lightheadedness, weakness, seizures, numbness, tingling  Psych: No significant nervousness, anxiety, stress, depression    All other systems were reviewed and are negative, except as noted.      LABS/STUDIES  --------------------------------------------------------------------------------              8.1    15.97 >-----------<  186      [08-26-21 @ 23:31]              25.7     135  |  101  |  34  ----------------------------<  109      [08-26-21 @ 23:31]  3.6   |  25  |  0.48        Ca     7.4     [08-26-21 @ 23:31]      iCa    1.05     [08-26 @ 23:32]      Mg     2.4     [08-26-21 @ 23:31]      Phos  4.5     [08-26-21 @ 23:31]    TPro  4.8  /  Alb  1.7  /  TBili  0.6  /  DBili  x   /  AST  28  /  ALT  54  /  AlkPhos  135  [08-26-21 @ 23:31]            Glucose, Serum: 109 mg/dL (08-26-21 @ 23:31)    Prealbumin, Serum: 8 mg/dL (08-15-21 @ 10:17)          08-26-21 @ 07:01  -  08-27-21 @ 07:00  --------------------------------------------------------  IN: 3331 mL / OUT: 3995 mL / NET: -664 mL    08-27-21 @ 07:01  -  08-27-21 @ 12:16  --------------------------------------------------------  IN: 188 mL / OUT: 0 mL / NET: 188 mL        VITALS/PHYSICAL EXAM  --------------------------------------------------------------------------------  T(C): 36.7 (08-27-21 @ 09:00), Max: 36.7 (08-27-21 @ 09:00)  HR: 82 (08-27-21 @ 09:00) (66 - 88)  BP: 113/58 (08-27-21 @ 09:00) (85/51 - 152/65)  RR: 22 (08-27-21 @ 09:00) (17 - 29)  SpO2: 100% (08-27-21 @ 09:00) (98% - 100%)  Wt(kg): --    Physical Exam:    Gen: WD sitting up in chair, frail, cachectic appearing  HEENT: NCAT PERRL  Neck: trachea midline, no noted JVD  Chest: respirations non labored  Abd: ND soft ostomy RLQ dark green liquid   Extrem:  LUE hand swollen, b/l LE edema +2 RUE PICC dressing CDI  Neuro: awake alert responsive, hypophonic   Skin: warm no rashes noted    .  .  .   [facility] NUTRITION SUPPORT--  Attending/ PA FOLLOW UP NOTE      24 hour events/subjective: TPN originally consulted for nutrition support and started TPN on 8/16.  Pt is tolerating without issues, and is asymptomatic for palpitations, chest pain, shortness of breath, nor fevers, chills nor night sweats, nor nausea nor vomiting nor abdominal pain.        PAST HISTORY  --------------------------------------------------------------------------------  No significant changes to PMH, PSH, FHx, SHx, unless otherwise noted    ALLERGIES & MEDICATIONS  --------------------------------------------------------------------------------  Allergies    No Known Allergies    Intolerances      Standing Inpatient Medications  Biotene Dry Mouth Oral Rinse 5 milliLiter(s) Swish and Spit four times a day  chlorhexidine 2% Cloths 1 Application(s) Topical <User Schedule>  enoxaparin Injectable 40 milliGRAM(s) SubCutaneous every 24 hours  fat emulsion (Fish Oil and Plant Based) 20% Infusion 25 mL/Hr IV Continuous <Continuous>  insulin lispro (ADMELOG) corrective regimen sliding scale   SubCutaneous every 6 hours  metoprolol tartrate Injectable 5 milliGRAM(s) IV Push every 4 hours  pantoprazole  Injectable 40 milliGRAM(s) IV Push daily  Parenteral Nutrition - Adult 1 Each TPN Continuous <Continuous>  Parenteral Nutrition - Adult 1 Each TPN Continuous <Continuous>  piperacillin/tazobactam IVPB.. 3.375 Gram(s) IV Intermittent every 8 hours    PRN Inpatient Medications  acetaminophen  IVPB .. 1000 milliGRAM(s) IV Intermittent every 6 hours PRN      REVIEW OF SYSTEMS  --------------------------------------------------------------------------------  Gen: as per HPI  Skin: No rashes  Head/Eyes/Ears/Mouth: No headache;No sore throat  Respiratory: No dyspnea, cough,   CV: No chest pain, PND, orthopnea  GI: as per HPI  : No increased frequency, dysuria, hematuria, nocturia  MSK: No joint pain/swelling; no back pain; no edema  Neuro: No dizziness/lightheadedness, weakness, seizures, numbness, tingling  Psych: No significant nervousness, anxiety, stress, depression    All other systems were reviewed and are negative, except as noted.      LABS/STUDIES  --------------------------------------------------------------------------------              8.1    15.97 >-----------<  186      [08-26-21 @ 23:31]              25.7     135  |  101  |  34  ----------------------------<  109      [08-26-21 @ 23:31]  3.6   |  25  |  0.48        Ca     7.4     [08-26-21 @ 23:31]      iCa    1.05     [08-26 @ 23:32]      Mg     2.4     [08-26-21 @ 23:31]      Phos  4.5     [08-26-21 @ 23:31]    TPro  4.8  /  Alb  1.7  /  TBili  0.6  /  DBili  x   /  AST  28  /  ALT  54  /  AlkPhos  135  [08-26-21 @ 23:31]            Glucose, Serum: 109 mg/dL (08-26-21 @ 23:31)    Prealbumin, Serum: 8 mg/dL (08-15-21 @ 10:17)          08-26-21 @ 07:01  -  08-27-21 @ 07:00  --------------------------------------------------------  IN: 3331 mL / OUT: 3995 mL / NET: -664 mL    08-27-21 @ 07:01  -  08-27-21 @ 12:16  --------------------------------------------------------  IN: 188 mL / OUT: 0 mL / NET: 188 mL        VITALS/PHYSICAL EXAM  --------------------------------------------------------------------------------  T(C): 36.7 (08-27-21 @ 09:00), Max: 36.7 (08-27-21 @ 09:00)  HR: 82 (08-27-21 @ 09:00) (66 - 88)  BP: 113/58 (08-27-21 @ 09:00) (85/51 - 152/65)  RR: 22 (08-27-21 @ 09:00) (17 - 29)  SpO2: 100% (08-27-21 @ 09:00) (98% - 100%)  Wt(kg): --    Physical Exam:    Gen: WD sitting up in chair, frail, cachectic appearing  HEENT: NCAT PERRL  Neck: trachea midline, no noted JVD  Chest: respirations non labored  Abd: ND soft ostomy RLQ dark green liquid   Extrem:  LUE hand swollen, b/l LE edema +2 RUE PICC dressing CDI  Neuro: awake alert responsive, hypophonic   Skin: warm no rashes noted    .  .  .

## 2021-08-27 NOTE — CHART NOTE - NSCHARTNOTEFT_GEN_A_CORE
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    Pt seen at bedside for repeat swallow evaluation, swallow therapy. Pt awake, lethargic, but arousable, able to participate in minimal PO trials and exercises with continual cuing, with alertness and responsiveness somewhat better than noted yesterday. Pt inconsistently following simple directions for the purposes of the exam. Pt continues to present with severe hypophonia with poor intelligibility due to low vocal intensity c/w deconditioning and recent intubation. Voice appears to be slightly improving in quality and loudness, and strength of cough. However, Pt requires maximal cues to perform sustained phonation tasks. Pt with improving consistency in ability to elicit cough on request.    Pt was administered trials of small crushed ice chips x3, and honey-thick liquids via 1/2 tsp x5. Pt continues to present with evidence of an oropharyngeal dysphagia notable for reduced oral grading, requiring continual cues to open and close mouth for acceptance of bolus, delayed oral transport, suspected delay in pharyngeal swallow trigger, palpable hyolaryngeal elevation with suspected reduced anterior excursion, and s/s laryngeal penetration/aspiration including wet/gurgly vocal quality and delayed wet sounding cough.       RECOMMENDATIONS:  Continue NPO, with non-oral nutrition/hydration/medications.   Strict aspiration and reflux precautions for secretions and if enteral feeds are initiated.   Maintain good oral hygiene.   This service will continue to follow. Will monitor candidacy for PO intake, participation in instrumental exam.  Rehab TBD pending hospital course.      Sonia Barragan MA, CCC-SLP  Speech-Language Pathologist  pager #[phone]

## 2021-08-27 NOTE — PROGRESS NOTE ADULT - ASSESSMENT
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 Admitted for crohns management, course c/b cdiff colitis sp dificid/fmt and findings of indeterminate quantiferon, pending id clearance to start biologic. Prealb 8. TPN consulted in setting of significant weight loss, severe pcm, and worsening colitis suspected 2/2 CD. Pt at high risk for refeeding syndrome. TPN started 8/16. Repeat CT showing unchanged diffuse colitis and non specific gb wall edema.  Taken to OR emergently on 8/20 for lap total colectomy, end ileostomy. Failed S&S eval. Discussions with family re: chantel feeds ongoing, final decision pending    Current Diet: NPO  TPN GOAL recommendations as per RD: , dextrose 210g, SMOF 60g    -Continue TPN at goal; will stop TPN next Wednesday in anticipation pt and family will agree to enteral feeds, as risk of sepsis outweighs benefits given pt has a functioning GI tract and would optimize nutritional status with enteral feeds given less associated risks.  -PICC in place, maintenance as per protocol  -Electrolyte imbalance risk- monitor CMP, Mg, Ionized Ca, Phosphorus qd. Hypocalcemia- increase CaGlu to 18meq in TPN bag; additionally supplemented w calcium gluconate outside of bag overnight  -Vitamin D deficiency- rec ergocalciferol 50,000U q wk x 6wks  -Hyperglycemia- resolving, FS trend noted, steroids tapered to 10mg daily x2 more days, was decreased to 8U total of insulin yesterday in TPN bag. No ISS required overnight, will decrease further to 4U insulin in TPN bag as per SICU request given steroid taper.  -Monitor TG weekly (8/27);  -Strict I/O', volume decreased to 2.0 liters, with goal of decreasing diuresis needs.  -Care per primary/SICU.    plan discussed with Dr. Fernandez and Dr GERARD Multani, agreeable with above    TPN pager [phone], spectra [phone]  M-F 6A-4P, Weekends and holidays 8A-12P

## 2021-08-27 NOTE — PROGRESS NOTE ADULT - ASSESSMENT
81M with recently dx Crohn's disease c/b perianal abscess and left posterior distal anal intersphincteric fistula and presents diarrhea/BRBPR  2/2 to CD flare c/b c diff colitis. Initially treat for C diff, s/p FMT. Taken emergently to OR on 8/20 for acute deterioration for laparoscopic total colectomy with end ileostomy.     PLAN:  - Failed official speech and swallow study   - No further diuresis   - follow up discussion with family regarding KO feeding tube   - follow up on whether contact precautions are still necessary   - NPO and TPN  - Monitor drain and ileostomy output  - Off abx, monitor  - Transfer to floor if bed available on [address]  - Appreciate excellent care per SICU    [facility]  [id number]

## 2021-08-27 NOTE — PROGRESS NOTE ADULT - SUBJECTIVE AND OBJECTIVE BOX
24h Events:  No acute events overnight.    Subjective:   Patient seen at bedside this AM.     Objective:  Vital Signs  T(C): 36.2 (08-26 @ 23:00), Max: 36.3 (08-26 @ 19:00)  HR: 77 (08-27 @ 01:00) (66 - 84)  BP: 136/60 (08-27 @ 01:00) (116/57 - 147/65)  RR: 24 (08-27 @ 01:00) (17 - 38)  SpO2: 100% (08-27 @ 01:00) (99% - 100%)  08-25-21 @ 07:01  -  08-26-21 @ 07:00  --------------------------------------------------------  IN:  Total IN: 0 mL    OUT:    Bulb (mL): 35 mL    Bulb (mL): 60 mL    Ileostomy (mL): 400 mL    Indwelling Catheter - Urethral (mL): 1105 mL    Voided (mL): 1700 mL  Total OUT: 3300 mL    Total NET: -3300 mL      08-26-21 @ 07:01  -  08-27-21 @ 01:54  --------------------------------------------------------  IN:  Total IN: 0 mL    OUT:    Bulb (mL): 45 mL    Bulb (mL): 15 mL    Ileostomy (mL): 275 mL    Voided (mL): 2625 mL  Total OUT: 2960 mL    Total NET: -2960 mL        PHYSICAL EXAM:  Gen: NAD  Respiratory: no increased WOB  Gastrointestinal: abdomen soft, nondistended, tender around ostomy. No obvious masses. Incisions c/d/i with wet-to-dry dressing, ileostomy pink with stool and gas, upper and lower L drains with serous output.   Extremities: warm, well-perfused    Labs:                        8.1    15.97 )-----------( 186      ( 26 Aug 2021 23:31 )             25.7   08-26    135  |  101  |  34<H>  ----------------------------<  109<H>  3.6   |  25  |  0.48<L>    Ca    7.4<L>      26 Aug 2021 23:31  Phos  4.5     08-26  Mg     2.4     08-26    TPro  4.8<L>  /  Alb  1.7<L>  /  TBili  0.6  /  DBili  x   /  AST  28  /  ALT  54<H>  /  AlkPhos  135<H>  08-26    CAPILLARY BLOOD GLUCOSE      POCT Blood Glucose.: 118 mg/dL (26 Aug 2021 23:17)  POCT Blood Glucose.: 573 mg/dL (26 Aug 2021 23:14)  POCT Blood Glucose.: 111 mg/dL (26 Aug 2021 17:08)  POCT Blood Glucose.: 131 mg/dL (26 Aug 2021 11:04)  POCT Blood Glucose.: 107 mg/dL (26 Aug 2021 05:29)      Imaging:

## 2021-08-27 NOTE — PROGRESS NOTE ADULT - SUBJECTIVE AND OBJECTIVE BOX
Follow Up:  s/p colectomy    Interval History/ROS:  deysiguru - daughter in tears at bedside    Allergies  No Known Allergies    ANTIMICROBIALS:  piperacillin/tazobactam IVPB.. 3.375 every 8 hours      OTHER MEDS:  MEDICATIONS  (STANDING):  acetaminophen  IVPB .. 1000 every 6 hours PRN  enoxaparin Injectable 40 every 24 hours  insulin lispro (ADMELOG) corrective regimen sliding scale  every 6 hours  metoprolol tartrate Injectable 5 every 4 hours  pantoprazole  Injectable 40 daily    Vital Signs Last 24 Hrs  T(C): 36.7 (27 Aug 2021 16:00), Max: 36.8 (27 Aug 2021 12:00)  T(F): 98 (27 Aug 2021 16:00), Max: 98.2 (27 Aug 2021 12:00)  HR: 71 (27 Aug 2021 17:00) (68 - 88)  BP: 110/55 (27 Aug 2021 17:00) (85/51 - 152/65)  BP(mean): 78 (27 Aug 2021 17:00) (64 - 94)  RR: 17 (27 Aug 2021 17:00) (17 - 31)  SpO2: 100% (27 Aug 2021 17:00) (98% - 100%)    PHYSICAL EXAM:  General: cachectic, NAD  TPN in progress  soft NG feeding tube in place  Neurology: guru matos  Respiratory: Clear to auscultation bilaterally  Abdominal: ostomy with pink mucosa  Line Sites: Clear  Skin: No rash                        8.1    15.97 )-----------( 186      ( 26 Aug 2021 23:31 )             25.7   WBC Count: 15.97 (08-26 @ 23:31)  WBC Count: 18.97 (08-25 @ 23:22)  WBC Count: 22.21 (08-24 @ 21:48)  WBC Count: 20.99 (08-24 @ 13:42)  WBC Count: 20.00 (08-24 @ 01:49)  WBC Count: 22.07 (08-23 @ 11:40)  WBC Count: 23.38 (08-22 @ 23:53)      08-26    135  |  101  |  34<H>  ----------------------------<  109<H>  3.6   |  25  |  0.48<L>    Ca    7.4<L>      26 Aug 2021 23:31  Phos  4.5     08-26  Mg     2.4     08-26    TPro  4.8<L>  /  Alb  1.7<L>  /  TBili  0.6  /  DBili  x   /  AST  28  /  ALT  54<H>  /  AlkPhos  135<H>  08-26    MICROBIOLOGY:  .Blood Blood-Peripheral  08-05-21   No Growth Final  --  --      .Blood Blood-Peripheral  08-05-21   No Growth Final  --  --      .Blood Blood-Peripheral  07-30-21   No Growth Final  --  --    RADIOLOGY:    Benjamin Phillips MD; Division of Infectious Disease; Pager: [phone]; nights and weekends: [phone]

## 2021-08-27 NOTE — PROGRESS NOTE ADULT - ASSESSMENT
80 yo M with Crohn's on treatment, recently cared for at [facility],adnutted 7/13/21 with ongoing diarrhea    Antibiotics  Flagyl 7/13 7/21-->25  Cipro 7/13-->14  po Vanco 7/14 -->-->7/29; 8/1-->8/2  (7/28: colonoscopic FMT)  Fidaxomicin 8/2--> 8/20  Zosyn 8/20--> 24      1) Crohns colitis s/p colectomy 8/20  2) C diff colitis   s/p Fidaxomcin   - S/p colonoscopic FMT 7/28  3) Leukocytosis  4) previous Para flu 3 viral uri with cough and fevers  5) malnourished state, hypoalbuminema  TPN/Smoflipid 8/16-->  6) debility  7) transaminitis - improved    8/20 Total colectomy with end-ileostomy, murky ascitis noted with fibrinous exudate and washed out   currently extubated    8/9 Path Left colon, biopsy  - Active (nonspecific) colitis.  - Negative for CMV inclusions (CMV IHC)    Methyprednisolone tapered off 8/27    Suggest  Continue Zosyn through 8/30  await path    Please call ID if needed over weekend  I will be working at different hospital next week - ID service will continue to follow

## 2021-08-27 NOTE — PROGRESS NOTE ADULT - ASSESSMENT
81y Male M with Crohn disease, over the last 9 mo, he was started on several medication and was recently told that he may need to go on IV infusion for chrons, he has several hospitalization at [facility] , and follows with GI near [facility]. but his diarrhea is not improving , having abd cramps , denies any N/V . , discharged 1 mo ago from [facility], now having trouble walking, on going diarrhea. + subjective fever but denies cough or nasal congestion or uri or urinary symptoms.  (13 Jul 2021 20:48)    Now POD 7 from a Total abdominal colectomy w/ L upper and lower PAOLO bulbs      Neurologic:   - Tylenol for pain control   - Patient AO x3     Respiratory:   - Acute respiratory insufficiency resolving, patient on RA  - CXR showed RLL atelectasis now improving    Cardiovascular:   - NSR  - History of AFib currently rate controlled with IV metoprolol   - Continue IV lopressor 5 Q4 hrs     Gastrointestinal/Nutrition:   - NPO  - Ileostomy pink and viable   - Surgical sites CDI   - Will continue TPN   - Failed official speech and swallow. Conversation of KO feeds have been started with family, awaiting decision    Renal/Genitourinary:   - Monitor and replete electrolytes as needed  - Monitor UO and diurese  as necessary   - Continue with Roberson     Hematologic:   - Trend H&H and transfuse Hgb < 7  - Lovenox for DVT ppx     Infectious Disease: C. diff colitis s/p subtotal colectomy   - Zosyn started for c/f potential peritonitis. Will continue through 8/30.    Endocrine:   - Monitor BMP  - C/W steroid taper   - ISS, patient also receiving 12u of insulin in TPN bag    ENT  - Concern for new onset hypophonia without any neuro deficits, ENT consulted and told to f/u in 2-3 if no improvement with Dr. Patterson    lines and drains:  -Roberson   - A line L. radial 8/20  -LLQ & LUQ drain 8/20  -TPN 8/16    Disposition: SICU     82yo M with Crohn's disease, now s/p total abdominal colectomy on 8/20.     PLAN:  Neurologic: post-op pain control   - Tylenol for pain control   - Patient AO x3     Respiratory:   - Acute respiratory insufficiency resolving, patient on RA  - CXR showed RLL atelectasis now improving    Cardiovascular:   - NSR  - History of AFib currently rate controlled with IV metoprolol   - Continue IV lopressor 5 Q4 hrs     Gastrointestinal/Nutrition:   - NPO  - Ileostomy pink and viable   - Surgical sites CDI   - Will continue TPN   - Failed official speech and swallow. Conversation of KO feeds have been started with family, awaiting decision    Renal/Genitourinary:   - Monitor and replete electrolytes as needed  - Monitor UO and diurese  as necessary   - Continue with Roberson     Hematologic:   - Trend H&H and transfuse Hgb < 7  - Lovenox for DVT ppx     Infectious Disease: C. diff colitis s/p subtotal colectomy   - Zosyn started for c/f potential peritonitis. Will continue through 8/30.    Endocrine:   - Monitor BMP  - C/W steroid taper   - ISS, patient also receiving 12u of insulin in TPN bag    ENT  - Concern for new onset hypophonia without any neuro deficits, ENT consulted and told to f/u in 2-3 if no improvement with Dr. Patterson    lines and drains:  -Roberson   - A line L. radial 8/20  -LLQ & LUQ drain 8/20  -TPN 8/16    Disposition: SICU     82yo M with Crohn's disease, now s/p total abdominal colectomy with end ileostomy on 8/20.     PLAN:  Neurologic: post-op pain control, new-onset hypophonia without focal neuro deficits  - Pain control: Tylenol  - ENT c/s'd for hypophonia, OP f/u Dr. Patterson in 2-3wks if no clinical improvement    Respiratory: Acute respiratory insufficiency resolving, patient on RA  - CXR showed RLL atelectasis now improving  - Incentive spirometry 10x/hr while in bed     Cardiovascular: History of AFib currently rate controlled with IV metoprolol, now in NSR  - Continue IV lopressor 5 Q4 hrs     Gastrointestinal/Nutrition: s/p total abdominal colectomy with end ileostomy; failed S/S  - Diet: NPO  - C/w TPN  - F/u family decision re: KO feeds    Renal/Genitourinary: no active issues   - Monitor and replete electrolytes as needed  - Monitor UO and diurese as needed  - C/w Roberson    Hematologic:   - Trend H&H and transfuse Hgb < 7  - Lovenox for DVT ppx     Infectious Disease: C. diff colitis s/p subtotal colectomy   - Zosyn started 2/2 c/f potential peritonitis until 8/30    Endocrine: no active issues   - Monitor BMP  - C/w steroid taper   - ISS, patient also receiving 12u of insulin in TPN bag    Lines & Drains:   - Roberson   - A line L. radial 8/20  - LLQ & LUQ drain 8/20  - TPN 8/16    Disposition: SICU  Code Status: Full Code      Ana Corley, PGY-2  SICU  #[id number]

## 2021-08-28 NOTE — PROGRESS NOTE ADULT - ASSESSMENT
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 Admitted for crohns management, course c/b cdiff colitis sp dificid/fmt and findings of indeterminate quantiferon, pending id clearance to start biologic. Prealb 8. TPN consulted in setting of significant weight loss, severe pcm, and worsening colitis suspected 2/2 CD. Pt at high risk for refeeding syndrome. TPN started 8/16. Repeat CT showing unchanged diffuse colitis and non specific gb wall edema.  Taken to OR emergently on 8/20 for lap total colectomy, end ileostomy. Failed S&S eval. Family agreed to TANJA tube feeds, which had been started yesterday but then dc'd early this AM due to noted blood in ostomy.    Current Diet: NPO/TFs (TFs on hold)  TPN GOAL recommendations as per RD: , dextrose 210g, SMOF 60g    -Continue TPN at goal, as tube feeds were stopped this AM due to blood in ostomy.  -Nutritional Assessment. Please obtain prealbumin today, last prealbumin on 8/15 was 8.  -PICC in place, maintenance as per protocol  -Electrolyte imbalance risk- monitor CMP, Mg, Ionized Ca, Phosphorus qd. Hypocalcemia- increased CaGlu to 18meq in TPN bag on 8/27, ionized calcium improved to 1.14 today.t  -Vitamin D deficiency- would recommend Ergocalciferol 50,000U q wk x 6wks  -Hyperglycemia- resolving, FS trend noted, as steroids were tapered off over last few days, insulin was decreased to 4U  yesterday in TPN bag. No ISS required overnight, will decrease further to 0U insulin in TPN bag.  -Monitor TG weekly, last done on 8/26 and was 125, keep SMOF at goal. Please repeat on 9/2 if pt still on TPN.  -Strict I/O's, volume decreased to 2.0 liters, with goal of decreasing diuresis needs.  -Care per primary/SICU.    plan discussed with Dr GERARD Multani, agreeable with above    TPN pager [phone], spectra [phone]  M-F 6A-4P, Weekends and holidays 8A-12P

## 2021-08-28 NOTE — PROGRESS NOTE ADULT - ASSESSMENT
81y Male with GERD on omeprazole daily and Crohn's disease diagnosed about 9 mos prior to admission at outside hospital c/b h/o abscess and left posterior distal anal intersphincteric fistula and presents diarrhea/BRBPR  2/2 to CD flare c/b c diff colitis. Initially treat for C diff, given that it did not get better got FMT. Switched PO vanc in favor of Fidaxomicin without any improvement. Therefore, patient got Flex sig to reassess which showed no pseudomembranous colitis anymore but severe Crohn colitis. Was started on steroids on 8/9. Has had minimal improvement. Started tapering IV steroids and pt is currently on TPN as he has had poor PO intake. Case was discussed at IBD conference 8/18 and CRS + GI team in agreement with surgical resection. s/p laparoscopic total abdominal colectomy with end ileostomy on 8/20. Now recovering in SICU.    # c diff - pseudomembranous colitis seen on colonoscopy 7/19/21, treated w/ vanc, fidaxomicin, FMT; repeat flex sig w/o further pseudomembranes but ongoing severe CD. 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, improving  #Bloody ostomy output - likely small bowel source given only tube feeds lavage from NGT     Recommendations:  - likely small bowel source given no blood on NGT lavage  - can c/w PPI IV BID although less likely to be of benefit with no bleeding in the stomach  - would obtain CTA to localize  - given recent ostomy and likely deep small bowel source, would hold off on endoscopic evaluation at this time  - getting 1U prbcs today  - monitor CBC and transfuse as needed    Kat Chowdhury PGY-5  Gastroenterology Fellow  Pager #[phone] (GEMINI) or [phone] (NS)  Available on [employer] Teams.  Please contact on-call GI fellow via answering service ([phone]) after 5pm and before 8am, and on weekends.

## 2021-08-28 NOTE — PROGRESS NOTE ADULT - ASSESSMENT
81M with recently dx Crohn's disease c/b perianal abscess and left posterior distal anal intersphincteric fistula and presents diarrhea/BRBPR  2/2 to CD flare c/b c diff colitis. Initially treat for C diff, s/p FMT. Taken emergently to OR on 8/20 for acute deterioration for laparoscopic total colectomy with end ileostomy.     PLAN:  - Failed official speech and swallow study   - No further diuresis   - follow up discussion with family regarding KO feeding tube   - follow up on whether contact precautions are still necessary   - NPO and TPN  - Monitor drain and ileostomy output  - Off abx, monitor  - Transfer to floor if bed available on 2Monti  - Appreciate excellent care per SICU    [facility]  [phone]

## 2021-08-28 NOTE — PROGRESS NOTE ADULT - SUBJECTIVE AND OBJECTIVE BOX
[facility] NUTRITION SUPPORT--  Attending/ PA FOLLOW UP NOTE      24 hour events/subjective: TPN originally consulted for nutrition support and started TPN on 8/16. Pt is tolerating without issues, and is asymptomatic for palpitations, chest pain, shortness of breath, nor fevers, chills nor night sweats, nor nausea nor vomiting nor abdominal pain. Of note, pt had reached 30cc/hr of TANJA tube feeds this AM, however feeds were stopped at 7am, as blood was noted in ostomy. Pt now receiving PRBCs at bedside.        PAST HISTORY  --------------------------------------------------------------------------------  No significant changes to PMH, PSH, FHx, SHx, unless otherwise noted    ALLERGIES & MEDICATIONS  --------------------------------------------------------------------------------  Allergies    No Known Allergies    Intolerances      Standing Inpatient Medications  Biotene Dry Mouth Oral Rinse 5 milliLiter(s) Swish and Spit four times a day  chlorhexidine 2% Cloths 1 Application(s) Topical <User Schedule>  fat emulsion (Fish Oil and Plant Based) 20% Infusion 25 mL/Hr IV Continuous <Continuous>  insulin lispro (ADMELOG) corrective regimen sliding scale   SubCutaneous every 6 hours  metoprolol tartrate Injectable 5 milliGRAM(s) IV Push every 4 hours  pantoprazole  Injectable 40 milliGRAM(s) IV Push every 12 hours  Parenteral Nutrition - Adult 1 Each TPN Continuous <Continuous>  Parenteral Nutrition - Adult 1 Each TPN Continuous <Continuous>  piperacillin/tazobactam IVPB.. 3.375 Gram(s) IV Intermittent every 8 hours    PRN Inpatient Medications  acetaminophen  IVPB .. 1000 milliGRAM(s) IV Intermittent every 6 hours PRN      REVIEW OF SYSTEMS  --------------------------------------------------------------------------------  Gen: as per HPI  Skin: No rashes  Head/Eyes/Ears/Mouth: No headache;No sore throat  Respiratory: No dyspnea, cough,   CV: No chest pain, PND, orthopnea  GI: as per HPI  : No increased frequency, dysuria, hematuria, nocturia  MSK: No joint pain/swelling; no back pain; no edema  Neuro: No dizziness/lightheadedness, weakness, seizures, numbness, tingling  Psych: No significant nervousness, anxiety, stress, depression    All other systems were reviewed and are negative, except as noted.      LABS/STUDIES  --------------------------------------------------------------------------------              7.3    13.53 >-----------<  154      [08-28-21 @ 10:25]              23.8     138  |  105  |  38  ----------------------------<  110      [08-27-21 @ 23:47]  4.2   |  24  |  0.48        Ca     7.8     [08-27-21 @ 23:47]      iCa    1.14     [08-27 @ 23:47]      Mg     2.2     [08-27-21 @ 23:47]      Phos  3.2     [08-27-21 @ 23:47]    TPro  4.8  /  Alb  1.8  /  TBili  0.6  /  DBili  x   /  AST  32  /  ALT  48  /  AlkPhos  120  [08-27-21 @ 23:47]      PTT: 30.3       [08-28-21 @ 10:25]      Blood Gas Calcium, Ionized - Venous: 1.21 mmol/L (08-28-21 @ 04:51)    Glucose, Serum: 110 mg/dL (08-27-21 @ 23:47)    Prealbumin, Serum: 8 mg/dL (08-15-21 @ 10:17)          08-27-21 @ 07:01  -  08-28-21 @ 07:00  --------------------------------------------------------  IN: 2892 mL / OUT: 2240 mL / NET: 652 mL    08-28-21 @ 07:01  -  08-28-21 @ 12:50  --------------------------------------------------------  IN: 1240 mL / OUT: 350 mL / NET: 890 mL        VITALS/PHYSICAL EXAM  --------------------------------------------------------------------------------  T(C): 36.6 (08-28-21 @ 11:00), Max: 36.7 (08-27-21 @ 16:00)  HR: 76 (08-28-21 @ 12:00) (67 - 89)  BP: 111/55 (08-28-21 @ 12:00) (84/50 - 144/65)  RR: 27 (08-28-21 @ 12:00) (17 - 31)  SpO2: 100% (08-28-21 @ 12:00) (100% - 100%)  Wt(kg): --    Physical Exam:    Gen: WD laying in bed, frail, cachectic appearing  HEENT: NCAT PERRL  Neck: trachea midline, no noted JVD  Chest: respirations non labored  Abd: ND soft ostomy RLQ dark green liquid   Extrem:  LUE hand swollen, b/l LE edema +2 RUE PICC dressing CDI  Neuro: awake alert responsive, hypophonic   Skin: warm no rashes noted    .  .  .    .  .

## 2021-08-28 NOTE — PROGRESS NOTE ADULT - SUBJECTIVE AND OBJECTIVE BOX
HISTORY  81y Male M with Crohn disease s/p failed medical management. 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 Transferred to SICU intubated and on pressors. Now extubated and off pressors.      24 HOUR EVENTS:  - Failed bedside swallow eval  - TANJA placed, trickle tube feeds started with plan to advance to goal  - Pt with bloody output from ileostomy, LVX held      NEURO  Exam: lethargic although arousable. WIll nod head to questions and follow commands. hypophonic  Meds: acetaminophen  IVPB .. 1000 milliGRAM(s) IV Intermittent every 6 hours PRN Mild Pain (1 - 3)  [x] Adequacy of sedation and pain control has been assessed and adjusted      RESPIRATORY  RR: 28 (08-28-21 @ 03:00) (17 - 31)  SpO2: 100% (08-28-21 @ 03:00) (98% - 100%)  Exam: unlabored respirations, saturating well on room air  Meds:       CARDIOVASCULAR  HR: 75 (08-28-21 @ 03:00) (67 - 88)  BP: 127/61 (08-28-21 @ 03:00) (85/51 - 144/65)  BP(mean): 88 (08-28-21 @ 03:00) (64 - 93)  VBG - ( 28 Aug 2021 04:51 )  pH: 7.41  /  pCO2: 44    /  pO2: 34    / HCO3: 28    / Base Excess: 2.9   /  SaO2: 56.0   Lactate: 1.7    Exam: RRR  Cardiac Rhythm: normal sinus  Perfusion     [x]Adequate   [ ]Inadequate  Mentation   [ ]Normal       [x]Reduced  Extremities  [x]Warm         [ ]Cool  Volume Status [ ]Hypervolemic [x]Euvolemic [ ]Hypovolemic  Meds: metoprolol tartrate Injectable 5 milliGRAM(s) IV Push every 4 hours      GI/NUTRITION  Exam: soft, nondistended, nontender. Ostomy with bloody output  Diet: Tube feeds, Glucerna 1.2 with goal rate of 55cc/hr  Meds: pantoprazole  Injectable 40 milliGRAM(s) IV Push daily      GENITOURINARY  I&O's Detail    08-26 @ 07:01  -  08-27 @ 07:00  --------------------------------------------------------  IN:    Fat Emulsion (Fish Oil &amp; Plant Based) 20% Infusion: 325 mL    IV PiggyBack: 500 mL    IV PiggyBack: 250 mL    TPN (Total Parenteral Nutrition): 2256 mL  Total IN: 3331 mL    OUT:    Bulb (mL): 50 mL    Bulb (mL): 20 mL    Ileostomy (mL): 400 mL    Voided (mL): 3525 mL  Total OUT: 3995 mL    Total NET: -664 mL    08-27 @ 07:01 - 08-28 @ 05:00  --------------------------------------------------------  IN:    Fat Emulsion (Fish Oil &amp; Plant Based) 20% Infusion: 250 mL    Glucerna: 120 mL    IV PiggyBack: 150 mL    TPN (Total Parenteral Nutrition): 1770 mL  Total IN: 2290 mL    OUT:    Bulb (mL): 20 mL    Bulb (mL): 20 mL    Ileostomy (mL): 380 mL    Voided (mL): 700 mL  Total OUT: 1120 mL    Total NET: 1170 mL    08-27    138  |  105  |  38<H>  ----------------------------<  110<H>  4.2   |  24  |  0.48<L>    Ca    7.8<L>      27 Aug 2021 23:47  Phos  3.2     08-27  Mg     2.2     08-27    TPro  4.8<L>  /  Alb  1.8<L>  /  TBili  0.6  /  DBili  x   /  AST  32  /  ALT  48<H>  /  AlkPhos  120  08-27    [ ] Roberson catheter, indication: none  Meds: fat emulsion (Fish Oil and Plant Based) 20% Infusion 25 mL/Hr IV Continuous <Continuous>  Parenteral Nutrition - Adult 1 Each TPN Continuous <Continuous>      HEMATOLOGIC  Meds: enoxaparin Injectable 40 milliGRAM(s) SubCutaneous every 24 hours  [x] VTE Prophylaxis                        7.5    13.56 )-----------( 178      ( 27 Aug 2021 23:47 )             24.1     Transfusion     [ ] PRBC   [ ] Platelets   [ ] FFP   [ ] Cryoprecipitate      INFECTIOUS DISEASES  T(C): 36.2 (08-27-21 @ 23:00), Max: 36.8 (08-27-21 @ 12:00)  WBC Count: 13.56 K/uL (08-27 @ 23:47)    Recent Cultures:    Meds: piperacillin/tazobactam IVPB.. 3.375 Gram(s) IV Intermittent every 8 hours      ENDOCRINE  Capillary Blood Glucose    Meds: insulin lispro (ADMELOG) corrective regimen sliding scale   SubCutaneous every 6 hours      ACCESS DEVICES:  [x] Peripheral IV  [ ] Central Venous Line	[ ] R	[ ] L	[ ] IJ	[ ] Fem	[ ] SC	Placed:   [ ] Arterial Line		[ ] R	[ ] L	[ ] Fem	[ ] Rad	[ ] Ax	Placed:   [ ] PICC:					[ ] Mediport  [ ] Urinary Catheter, Date Placed:   [x] Necessity of urinary, arterial, and venous catheters discussed      OTHER MEDICATIONS:  Biotene Dry Mouth Oral Rinse 5 milliLiter(s) Swish and Spit four times a day  chlorhexidine 2% Cloths 1 Application(s) Topical <User Schedule>    CODE STATUS: full code    IMAGING: HISTORY  81y Male M with Crohn disease s/p failed medical management. 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 Transferred to SICU intubated and on pressors. Now extubated and off pressors.      24 HOUR EVENTS:  - Failed bedside swallow eval  - TANJA placed, trickle tube feeds started with plan to advance to goal  - Pt with bloody output from ileostomy, LVX held, protonix increased to BID. Lactate normal and H/H relatively stable      NEURO  Exam: lethargic although arousable. WIll nod head to questions and follow commands. hypophonic  Meds: acetaminophen  IVPB .. 1000 milliGRAM(s) IV Intermittent every 6 hours PRN Mild Pain (1 - 3)  [x] Adequacy of sedation and pain control has been assessed and adjusted      RESPIRATORY  RR: 28 (08-28-21 @ 03:00) (17 - 31)  SpO2: 100% (08-28-21 @ 03:00) (98% - 100%)  Exam: unlabored respirations, saturating well on room air  Meds:       CARDIOVASCULAR  HR: 75 (08-28-21 @ 03:00) (67 - 88)  BP: 127/61 (08-28-21 @ 03:00) (85/51 - 144/65)  BP(mean): 88 (08-28-21 @ 03:00) (64 - 93)  VBG - ( 28 Aug 2021 04:51 )  pH: 7.41  /  pCO2: 44    /  pO2: 34    / HCO3: 28    / Base Excess: 2.9   /  SaO2: 56.0   Lactate: 1.7    Exam: RRR  Cardiac Rhythm: normal sinus  Perfusion     [x]Adequate   [ ]Inadequate  Mentation   [ ]Normal       [x]Reduced  Extremities  [x]Warm         [ ]Cool  Volume Status [ ]Hypervolemic [x]Euvolemic [ ]Hypovolemic  Meds: metoprolol tartrate Injectable 5 milliGRAM(s) IV Push every 4 hours      GI/NUTRITION  Exam: soft, nondistended, nontender. Ostomy with bloody output  Diet: Tube feeds, Glucerna 1.2 with goal rate of 55cc/hr  Meds: pantoprazole  Injectable 40 milliGRAM(s) IV Push daily      GENITOURINARY  I&O's Detail    08-26 @ 07:01 - 08-27 @ 07:00  --------------------------------------------------------  IN:    Fat Emulsion (Fish Oil &amp; Plant Based) 20% Infusion: 325 mL    IV PiggyBack: 500 mL    IV PiggyBack: 250 mL    TPN (Total Parenteral Nutrition): 2256 mL  Total IN: 3331 mL    OUT:    Bulb (mL): 50 mL    Bulb (mL): 20 mL    Ileostomy (mL): 400 mL    Voided (mL): 3525 mL  Total OUT: 3995 mL    Total NET: -664 mL    08-27 @ 07:01 - 08-28 @ 05:00  --------------------------------------------------------  IN:    Fat Emulsion (Fish Oil &amp; Plant Based) 20% Infusion: 250 mL    Glucerna: 120 mL    IV PiggyBack: 150 mL    TPN (Total Parenteral Nutrition): 1770 mL  Total IN: 2290 mL    OUT:    Bulb (mL): 20 mL    Bulb (mL): 20 mL    Ileostomy (mL): 380 mL    Voided (mL): 700 mL  Total OUT: 1120 mL    Total NET: 1170 mL    08-27    138  |  105  |  38<H>  ----------------------------<  110<H>  4.2   |  24  |  0.48<L>    Ca    7.8<L>      27 Aug 2021 23:47  Phos  3.2     08-27  Mg     2.2     08-27    TPro  4.8<L>  /  Alb  1.8<L>  /  TBili  0.6  /  DBili  x   /  AST  32  /  ALT  48<H>  /  AlkPhos  120  08-27    [ ] Roberson catheter, indication: none  Meds: fat emulsion (Fish Oil and Plant Based) 20% Infusion 25 mL/Hr IV Continuous <Continuous>  Parenteral Nutrition - Adult 1 Each TPN Continuous <Continuous>      HEMATOLOGIC  Meds: enoxaparin Injectable 40 milliGRAM(s) SubCutaneous every 24 hours  [x] VTE Prophylaxis                        7.5    13.56 )-----------( 178      ( 27 Aug 2021 23:47 )             24.1     Transfusion     [ ] PRBC   [ ] Platelets   [ ] FFP   [ ] Cryoprecipitate      INFECTIOUS DISEASES  T(C): 36.2 (08-27-21 @ 23:00), Max: 36.8 (08-27-21 @ 12:00)  WBC Count: 13.56 K/uL (08-27 @ 23:47)    Recent Cultures:    Meds: piperacillin/tazobactam IVPB.. 3.375 Gram(s) IV Intermittent every 8 hours      ENDOCRINE  Capillary Blood Glucose    Meds: insulin lispro (ADMELOG) corrective regimen sliding scale   SubCutaneous every 6 hours      ACCESS DEVICES:  [x] Peripheral IV  [ ] Central Venous Line	[ ] R	[ ] L	[ ] IJ	[ ] Fem	[ ] SC	Placed:   [ ] Arterial Line		[ ] R	[ ] L	[ ] Fem	[ ] Rad	[ ] Ax	Placed:   [ ] PICC:					[ ] Mediport  [ ] Urinary Catheter, Date Placed:   [x] Necessity of urinary, arterial, and venous catheters discussed      OTHER MEDICATIONS:  Biotene Dry Mouth Oral Rinse 5 milliLiter(s) Swish and Spit four times a day  chlorhexidine 2% Cloths 1 Application(s) Topical <User Schedule>    CODE STATUS: full code    IMAGING:

## 2021-08-28 NOTE — PROGRESS NOTE ADULT - ATTENDING COMMENTS
81 year old male S/P colectomy with ileostomy, now with BRB coming through the ostomy.    Suggest: CTA to localize bleeding source.

## 2021-08-28 NOTE — PROGRESS NOTE ADULT - ATTENDING COMMENTS
Patient seen and examined and agree with above.   s/p total abdominal colectomy and end ileostomy.  Awake and alert. Hypophonia seen by ENT and will need outpatient follow up.   Current management includes:  1)n Alert this morning.   Tylenol for pain control  2) CXR clear and pulmonary status is improved. Some rigth lower atelectasis is noted.   Will continue continue encouragement of incentive spirometer.   Acapella   3) Atrial fibrillation with RVR- controlled with metoprolol. Will call cardiologist about if sotalol is to be continued.   4) NPO at this time and receiving TPN  GIB noted on ileostomy. GI consulted and pending recommendation.   Acute blood loss anemia - s/p 1 unit PRBC received with no real change in H/H. Will repeat in 4 hours.   5) Stable elevated leukocytosis - continue IV antibiotics   6) plan for steroid taper   7) Hyperglycemia - likely secondary to steroids  -controlled and will continue 16 units  insulin in TPN bag Patient seen and examined and agree with above.   s/p total abdominal colectomy and end ileostomy.  Awake and alert. Hypophonia seen by ENT and will need outpatient follow up.   Current management includes:  1)n Alert this morning.   Tylenol for pain control  2) CXR clear and pulmonary status is improved. Some rigth lower atelectasis is noted.   Will continue continue encouragement of incentive spirometer.   Acapella   3) Atrial fibrillation with RVR- controlled with metoprolol. Will call cardiologist about if sotalol is to be continued.   4) NPO at this time and receiving TPN  GIB noted on ileostomy. GI consulted and pending recommendation.   Acute blood loss anemia - s/p 1 unit PRBC received with no real change in H/H. Will repeat in 4 hours.   5) Stable elevated leukocytosis - continue IV antibiotics until 4/30  6) Steroid taper completed  7) Hyperglycemia - likely secondary to steroids  -controlled and will continue 4 units  insulin in TPN bag    OOB into chair.

## 2021-08-28 NOTE — PROGRESS NOTE ADULT - ASSESSMENT
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     PLAN:  Neurologic: post-op pain control, new-onset hypophonia without focal neuro deficits  - Pain control: Tylenol prn  - ENT c/s'd for hypophonia, OP f/u Dr. Patterson in 2-3wks if no clinical improvement    Respiratory: Acute respiratory insufficiency resolving, patient on RA  - CXR showed RLL atelectasis, now improving  - Incentive spirometry 10x/hr while in bed, OOBTC    Cardiovascular: History of AFib currently rate controlled with IV metoprolol, now in NSR  - Continue IV metoprolol 5mg Q4 hrs (pt takes sotalol at home)  - Cardiology following    Gastrointestinal/Nutrition: s/p total abdominal colectomy with end ileostomy; failed S/S  - Diet: NPO  - Tube feeds, Glucerna 1.2 with goal of 55cc/hr  - C/w TPN  - Continue home PPI  - Consider GI c/s for bleeding from ostomy    Renal/Genitourinary: no active issues   - Monitor and replete electrolytes as needed  - Monitor UOP and diurese as needed    Hematologic:   - Trend H&H and transfuse Hgb < 7  - Lovenox for DVT ppx     Infectious Disease: C. diff colitis s/p subtotal colectomy   - Zosyn started 2/2 concern for potential peritonitis until 8/30    Endocrine: no active issues   - Monitor BMP  - Steroid taper completed 8/26  - ISS, patient also receiving 12u of insulin in TPN bag    Lines & Drains:   - Roberson   - LLQ & LUQ drain 8/20  - TPN 8/16    Disposition: SICU  Code Status: Full Code

## 2021-08-28 NOTE — PROGRESS NOTE ADULT - SUBJECTIVE AND OBJECTIVE BOX
24h Events:  No acute events overnight.    Subjective:   Patient seen at bedside this AM.     Objective:  Vital Signs  T(C): 36 (08-27 @ 19:00), Max: 36.8 (08-27 @ 12:00)  HR: 75 (08-27 @ 23:00) (67 - 88)  BP: 125/59 (08-27 @ 23:00) (85/51 - 152/65)  RR: 29 (08-27 @ 23:00) (17 - 31)  SpO2: 100% (08-27 @ 23:00) (98% - 100%)  08-26-21 @ 07:01  -  08-27-21 @ 07:00  --------------------------------------------------------  IN:  Total IN: 0 mL    OUT:    Bulb (mL): 50 mL    Bulb (mL): 20 mL    Ileostomy (mL): 400 mL    Voided (mL): 3525 mL  Total OUT: 3995 mL    Total NET: -3995 mL      08-27-21 @ 07:01  -  08-28-21 @ 00:44  --------------------------------------------------------  IN:  Total IN: 0 mL    OUT:    Bulb (mL): 20 mL    Bulb (mL): 20 mL    Ileostomy (mL): 100 mL    Voided (mL): 700 mL  Total OUT: 840 mL    Total NET: -840 mL    PHYSICAL EXAM:  Gen: NAD  Respiratory: no increased WOB  Gastrointestinal: abdomen soft, nondistended, tender around ostomy. No obvious masses. Incisions c/d/i with wet-to-dry dressing, ileostomy pink with stool and gas, upper and lower L drains with serous output.   Extremities: warm, well-perfused    Labs:                        7.5    13.56 )-----------( 178      ( 27 Aug 2021 23:47 )             24.1   08-27    138  |  105  |  38<H>  ----------------------------<  110<H>  4.2   |  24  |  0.48<L>    Ca    7.8<L>      27 Aug 2021 23:47  Phos  3.2     08-27  Mg     2.2     08-27    TPro  4.8<L>  /  Alb  1.8<L>  /  TBili  0.6  /  DBili  x   /  AST  32  /  ALT  48<H>  /  AlkPhos  120  08-27    CAPILLARY BLOOD GLUCOSE      POCT Blood Glucose.: 114 mg/dL (27 Aug 2021 17:49)  POCT Blood Glucose.: 148 mg/dL (27 Aug 2021 12:43)  POCT Blood Glucose.: 109 mg/dL (27 Aug 2021 06:59)      Imaging:

## 2021-08-28 NOTE — PROGRESS NOTE ADULT - SUBJECTIVE AND OBJECTIVE BOX
Chief Complaint:  Patient is a 81y old  Male who presents with a chief complaint of diarrhea with blood in it , abd pain and weakness (28 Aug 2021 12:49)      Interval Events: early this AM started passing blood via ostomy  - previously reported as greenish brown  - started having bright red output around 4AM and now having maroon, dark red liquid output  - NGT lavaged at bedside - no blood, only tube feeds returned      Hospital Medications:  acetaminophen  IVPB .. 1000 milliGRAM(s) IV Intermittent every 6 hours PRN  Biotene Dry Mouth Oral Rinse 5 milliLiter(s) Swish and Spit four times a day  chlorhexidine 2% Cloths 1 Application(s) Topical <User Schedule>  fat emulsion (Fish Oil and Plant Based) 20% Infusion 25 mL/Hr IV Continuous <Continuous>  insulin lispro (ADMELOG) corrective regimen sliding scale   SubCutaneous every 6 hours  metoprolol tartrate Injectable 5 milliGRAM(s) IV Push every 4 hours  pantoprazole  Injectable 40 milliGRAM(s) IV Push every 12 hours  Parenteral Nutrition - Adult 1 Each TPN Continuous <Continuous>  Parenteral Nutrition - Adult 1 Each TPN Continuous <Continuous>  piperacillin/tazobactam IVPB.. 3.375 Gram(s) IV Intermittent every 8 hours      PMHX/PSHX:  No pertinent past medical history    No significant past surgical history            ROS: unable to obtain      PHYSICAL EXAM:   GENERAL:  NAD, lethargic  HEENT:  NC/AT,  conjunctivae clear and pink, sclera -anicteric  CHEST:  Normal Effort  ABDOMEN:   soft, Non-tender, Non-distended. Ostomy with dark maroon liquid output  EXTREMITIES:  no cyanosis or edema  SKIN:  Warm & Dry. No rash or erythema  NEURO:  lethargic and minimally responsive    Vital Signs:  Vital Signs Last 24 Hrs  T(C): 36.6 (28 Aug 2021 11:00), Max: 36.7 (27 Aug 2021 16:00)  T(F): 97.9 (28 Aug 2021 11:00), Max: 98 (27 Aug 2021 16:00)  HR: 72 (28 Aug 2021 13:00) (67 - 89)  BP: 95/50 (28 Aug 2021 13:00) (84/50 - 144/65)  BP(mean): 69 (28 Aug 2021 13:00) (62 - 93)  RR: 20 (28 Aug 2021 13:00) (17 - 31)  SpO2: 100% (28 Aug 2021 13:00) (100% - 100%)  Daily     Daily Weight in k.3 (28 Aug 2021 04:00)    LABS:                        7.3    13.53 )-----------( 154      ( 28 Aug 2021 10:25 )             23.8     08-27    138  |  105  |  38<H>  ----------------------------<  110<H>  4.2   |  24  |  0.48<L>    Ca    7.8<L>      27 Aug 2021 23:47  Phos  3.2       Mg     2.2         TPro  4.8<L>  /  Alb  1.8<L>  /  TBili  0.6  /  DBili  x   /  AST  32  /  ALT  48<H>  /  AlkPhos  120      LIVER FUNCTIONS - ( 27 Aug 2021 23:47 )  Alb: 1.8 g/dL / Pro: 4.8 g/dL / ALK PHOS: 120 U/L / ALT: 48 U/L / AST: 32 U/L / GGT: x           PTT - ( 28 Aug 2021 10:25 )  PTT:30.3 sec        Imaging:

## 2021-08-29 NOTE — PROGRESS NOTE ADULT - SUBJECTIVE AND OBJECTIVE BOX
HPI:  81y Male M with Crohn disease s/p failed medical management. 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 Transferred to SICU intubated and on pressors. Now extubated and off pressors.    24 HOUR EVENTS:  - Bloody OP from ileostomy slowed  - CTA AP demonstrated NO active extravasation  - Given 2u pRBC  - Lovenox d/c'd  - Tube feeds restarted @ 30cc/h  - IV metoprolol converted to PO metoprolol 25mg BID  - Self-d/c'd NGT, now replaced    SUBJECTIVE/ROS:  Patient seen at bedside this AM. Reports feeling well, without complaints. Pain is well-controlled. Denies headache, shortness of breath, chest pain, dizziness.     PHYSICAL EXAM:    NEURO  Exam: more awake that prior, alert  Meds: acetaminophen  IVPB .. 1000 milliGRAM(s) IV Intermittent every 6 hours PRN Mild Pain (1 - 3)  [x] Adequacy of sedation and pain control has been assessed and adjusted    RESPIRATORY  RR: 30 (08-29-21 @ 02:00) (20 - 33)  SpO2: 100% (08-29-21 @ 02:00) (99% - 100%)  Exam: unlabored respirations, saturating well on room air    CARDIOVASCULAR  HR: 100 (08-29-21 @ 02:00) (72 - 102)  BP: 122/64 (08-29-21 @ 02:00) (84/50 - 153/69)  BP(mean): 87 (08-29-21 @ 02:00) (62 - 99)  Exam: rate-controlled AF noted on cardiac monitor  Cardiac Rhythm: AF  Perfusion     [x]Adequate   [ ]Inadequate  Mentation   [x]Normal       [ ]Reduced  Extremities  [x]Warm         [ ]Cool  Volume Status [ ]Hypervolemic [x]Euvolemic [ ]Hypovolemic  Meds: metoprolol tartrate 25 milliGRAM(s) Oral every 12 hours    GI/NUTRITION  Exam: soft, nondistended, nontender. Ostomy with darker red OP  Meds: pantoprazole  Injectable 40 milliGRAM(s) IV Push every 12 hours    GENITOURINARY  I&O's Detail    08-27 @ 07:01  -  08-28 @ 07:00  --------------------------------------------------------  IN:    Fat Emulsion (Fish Oil &amp; Plant Based) 20% Infusion: 300 mL    Glucerna: 240 mL    IV PiggyBack: 250 mL    TPN (Total Parenteral Nutrition): 2102 mL  Total IN: 2892 mL    OUT:    Bulb (mL): 80 mL    Bulb (mL): 30 mL    Ileostomy (mL): 1230 mL    Voided (mL): 900 mL  Total OUT: 2240 mL    Total NET: 652 mL    08-28 @ 07:01  -  08-29 @ 02:28  --------------------------------------------------------  IN:    Fat Emulsion (Fish Oil &amp; Plant Based) 20% Infusion: 225 mL    Glucerna: 170 mL    IV PiggyBack: 200 mL    Lactated Ringers Bolus: 500 mL    PRBCs (Packed Red Blood Cells): 600 mL    TPN (Total Parenteral Nutrition): 1577 mL  Total IN: 3272 mL    OUT:    Bulb (mL): 5 mL    Bulb (mL): 60 mL    Fat Emulsion (Fish Oil &amp; Plant Based) 20% Infusion: 0 mL    Ileostomy (mL): 150 mL    Voided (mL): 1400 mL  Total OUT: 1615 mL    Total NET: 1657 mL    Labs: 08-29    137  |  107  |  36<H>  ----------------------------<  117<H>  4.2   |  22  |  0.45<L>    Ca    7.6<L>      29 Aug 2021 00:45  Phos  2.5     08-29  Mg     2.1     08-29    TPro  4.6<L>  /  Alb  1.7<L>  /  TBili  0.7  /  DBili  x   /  AST  31  /  ALT  54<H>  /  AlkPhos  105  08-29    Meds: fat emulsion (Fish Oil and Plant Based) 20% Infusion 25 mL/Hr IV Continuous <Continuous>  Parenteral Nutrition - Adult 1 Each TPN Continuous <Continuous>      HEMATOLOGIC  Meds:   [x] VTE Prophylaxis  Labs:              8.3    13.41 )-----------( 150      ( 29 Aug 2021 00:45 )             26.0   PT/INR - ( 29 Aug 2021 00:45 )   PT: 13.6 sec;   INR: 1.14 ratio    PTT - ( 28 Aug 2021 10:25 )  PTT:30.3 sec  Transfusion     [ ] PRBC   [ ] Platelets   [ ] FFP   [ ] Cryoprecipitate    INFECTIOUS DISEASES  Labs:   WBC Count: 13.41 K/uL (08-29 @ 00:45)  WBC Count: 11.49 K/uL (08-28 @ 18:03)  WBC Count: 12.70 K/uL (08-28 @ 14:17)  WBC Count: 13.53 K/uL (08-28 @ 10:25)  WBC Count: 14.71 K/uL (08-28 @ 04:52)  Meds: piperacillin/tazobactam IVPB.. 3.375 Gram(s) IV Intermittent every 8 hours    ENDOCRINE  POCT Blood Glucose.: 119 mg/dL (29 Aug 2021 00:35)  POCT Blood Glucose.: 109 mg/dL (28 Aug 2021 17:12)  POCT Blood Glucose.: 141 mg/dL (28 Aug 2021 12:05)  POCT Blood Glucose.: 115 mg/dL (28 Aug 2021 05:31)    Meds: insulin lispro (ADMELOG) corrective regimen sliding scale   SubCutaneous every 6 hours    ACCESS DEVICES:  [x] Peripheral IV  [ ] Central Venous Line	[ ] R	[ ] L	[ ] IJ	[ ] Fem	[ ] SC	Placed:   [ ] Arterial Line		[ ] R	[ ] L	[ ] Fem	[ ] Rad	[ ] Ax	Placed:   [ ] PICC:					[ ] Mediport  [ ] Urinary Catheter, Date Placed:   [x] Necessity of urinary, arterial, and venous catheters discussed    OTHER MEDICATIONS:  Biotene Dry Mouth Oral Rinse 5 milliLiter(s) Swish and Spit four times a day  chlorhexidine 2% Cloths 1 Application(s) Topical <User Schedule>

## 2021-08-29 NOTE — PROGRESS NOTE ADULT - ASSESSMENT
81y Male with GERD on omeprazole daily and Crohn's disease diagnosed about 9 mos prior to admission at outside hospital c/b h/o abscess and left posterior distal anal intersphincteric fistula and presents diarrhea/BRBPR  2/2 to CD flare c/b c diff colitis. Initially treat for C diff, given that it did not get better got FMT. Switched PO vanc in favor of Fidaxomicin without any improvement. Therefore, patient got Flex sig to reassess which showed no pseudomembranous colitis anymore but severe Crohn colitis. Was started on steroids on 8/9. Has had minimal improvement. Started tapering IV steroids and pt is currently on TPN as he has had poor PO intake. Case was discussed at IBD conference 8/18 and CRS + GI team in agreement with surgical resection. s/p laparoscopic total abdominal colectomy with end ileostomy on 8/20. Now recovering in SICU.    # c diff - pseudomembranous colitis seen on colonoscopy 7/19/21, treated w/ vanc, fidaxomicin, FMT; repeat flex sig w/o further pseudomembranes but ongoing severe CD. 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  Please contact on-call GI fellow via answering service ([phone]) after 5pm and before 8am, and on weekends.

## 2021-08-29 NOTE — PROGRESS NOTE ADULT - SUBJECTIVE AND OBJECTIVE BOX
Chief Complaint:  Patient is a 81y old  Male who presents with a chief complaint of diarrhea with blood in it , abd pain and weakness (29 Aug 2021 10:14)      Interval Events: no acute events overnight  - stool less red in ostomy - more dark brown  - s/p 1U with uptrending hgb      Hospital Medications:  acetaminophen  IVPB .. 1000 milliGRAM(s) IV Intermittent every 6 hours PRN  Biotene Dry Mouth Oral Rinse 5 milliLiter(s) Swish and Spit four times a day  chlorhexidine 2% Cloths 1 Application(s) Topical <User Schedule>  clonazePAM  Tablet 0.25 milliGRAM(s) Oral at bedtime  fat emulsion (Fish Oil and Plant Based) 20% Infusion 25 mL/Hr IV Continuous <Continuous>  insulin lispro (ADMELOG) corrective regimen sliding scale   SubCutaneous every 6 hours  metoprolol tartrate Injectable 5 milliGRAM(s) IV Push every 4 hours  pantoprazole  Injectable 40 milliGRAM(s) IV Push every 12 hours  Parenteral Nutrition - Adult 1 Each TPN Continuous <Continuous>  Parenteral Nutrition - Adult 1 Each TPN Continuous <Continuous>  piperacillin/tazobactam IVPB.. 3.375 Gram(s) IV Intermittent every 8 hours      PMHX/PSHX:  No pertinent past medical history    No pertinent past medical history    Crohn disease    Paroxysmal atrial fibrillation    CAD (coronary artery disease)    GI bleed    No significant past surgical history            ROS:     General:  No weight loss, fevers, chills, night sweats, fatigue   Eyes:  No vision changes  ENT:  No sore throat, pain, runny nose  CV:  No chest pain, palpitations, dizziness   Resp:  No SOB, cough, wheezing  GI:  See HPI  :  No burning with urination, hematuria  Muscle:  No pain, weakness  Neuro:  No weakness/tingling, memory problems  Psych:  No fatigue, insomnia, mood problems, depression  Heme:  No easy bruisability  Skin:  No rash, edema      PHYSICAL EXAM:   GENERAL:  NAD, lethargic  HEENT:  NC/AT,  conjunctivae clear and pink, sclera -anicteric  CHEST:  Normal Effort  ABDOMEN:   soft, Non-tender, Non-distended. Ostomy with dark maroon liquid output  EXTREMITIES:  no cyanosis or edema  SKIN:  Warm & Dry. No rash or erythema  NEURO:  lethargic and minimally responsive    Vital Signs:  Vital Signs Last 24 Hrs  T(C): 36.6 (29 Aug 2021 15:00), Max: 36.7 (29 Aug 2021 08:00)  T(F): 97.9 (29 Aug 2021 15:00), Max: 98.1 (29 Aug 2021 08:00)  HR: 93 (29 Aug 2021 15:00) (73 - 110)  BP: 115/63 (29 Aug 2021 15:00) (99/57 - 153/69)  BP(mean): 83 (29 Aug 2021 15:00) (71 - 105)  RR: 34 (29 Aug 2021 15:00) (24 - 40)  SpO2: 98% (29 Aug 2021 15:00) (96% - 100%)  Daily     Daily Weight in k.1 (29 Aug 2021 04:52)    LABS:                        8.6    14.60 )-----------( 165      ( 29 Aug 2021 10:27 )             27.3     0829    137  |  107  |  36<H>  ----------------------------<  117<H>  4.2   |  22  |  0.45<L>    Ca    7.6<L>      29 Aug 2021 00:45  Phos  2.5       Mg     2.1         TPro  4.6<L>  /  Alb  1.7<L>  /  TBili  0.7  /  DBili  x   /  AST  31  /  ALT  54<H>  /  AlkPhos  105  0829    LIVER FUNCTIONS - ( 29 Aug 2021 00:45 )  Alb: 1.7 g/dL / Pro: 4.6 g/dL / ALK PHOS: 105 U/L / ALT: 54 U/L / AST: 31 U/L / GGT: x           PT/INR - ( 29 Aug 2021 00:45 )   PT: 13.6 sec;   INR: 1.14 ratio         PTT - ( 28 Aug 2021 10:25 )  PTT:30.3 sec        Imaging:  < from: CT Abdomen and Pelvis w/ IV Cont (21 @ 17:45) >  FINDINGS:  LOWER CHEST: NG tube with its tip in the stomach. Small bilateral pleural effusions which have improved. Atelectatic changes more so at the right lung base.    LIVER: Within normal limits.  BILE DUCTS: Normal caliber.  GALLBLADDER: Within normal limits.  SPLEEN: Within normal limits.  PANCREAS: Within normal limits.  ADRENALS: Within normal limits.Within normal limits. KIDNEYS/URETERS: 5 mm nonobstructing renal stones in the lower pole of the left kidney. Left renal cyst.    BLADDER: .  REPRODUCTIVE ORGANS: Prominent and heterogeneous prostate gland.    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 Wall thickening of the sigmoid colon.  PERITONEUM: Postsurgical changes including mild pneumobilia. Mild intra-abdominal edema the pelvis which may be postsurgical or related to bowel inflammation. Intra-abdominal drains with tips in the rightupper pelvis and right lower pelvis.  VESSELS: Vascular calcifications. No active bleeding identified.  RETROPERITONEUM/LYMPH NODES: No lymphadenopathy.  ABDOMINAL WALL: Postsurgical changes.  BONES: Degenerative changes of bone.    IMPRESSION:  Interval colectomy with postsurgical changes. No active bleed visualized.    Wall thickening of the sigmoid colon extending to what appears to be mucous fistula raising concern for inflammation.        --- End of Report ---    < end of copied text >

## 2021-08-29 NOTE — PROGRESS NOTE ADULT - ASSESSMENT
80 yo M with pmhx of Crohns disease (dxd 9 mos ago, hx of perianal fistula, sp tx w remicaide until developed ab; since managed on budesonide, mtx/5asa), h/o cdiff, CAD, afib (not on ac), p/w bloody diarrhea, abd pain and weakness. Admitted for crohns management, course c/b cdiff colitis sp dificid/fmt and findings of indeterminate quantiferon, pending id clearance to start biologic. Prealb 8. TPN consulted in setting of significant weight loss, severe pcm, and worsening colitis suspected 2/2 CD. Pt at high risk for refeeding syndrome. TPN started 8/16. Repeat CT showing unchanged diffuse colitis and non specific gb wall edema.  Taken to OR emergently on 8/20 for lap total colectomy, end ileostomy. Failed S&S eval. Family agreed to TANJA tube feeds, which had been started yesterday but then dc'd early this AM due to noted blood in ostomy.    Current Diet: NPO/TFs (TFs on hold)  TPN GOAL recommendations as per RD: , dextrose 210g, SMOF 60g    -Can considering weaning TPN, as tube feeds were restarted 8pm last night.  -Nutritional Assessment. Please obtain prealbumin today, last prealbumin on 8/15 was 8.  -PICC in place, maintenance as per protocol  -Electrolyte imbalance risk- monitor CMP, Mg, Ionized Ca, Phosphorus qd. Hypocalcemia- increased CaGlu to 18meq in TPN bag on 8/27, ionized calcium improved to 1.14 yesterday, however today is 1.12  -Vitamin D deficiency- would recommend Ergocalciferol 50,000U q wk x 6wks  -Hyperglycemia- resolving, FS trend noted, as steroids were tapered off over last few days, insulin was decreased to 0U yesterday in TPN bag. No ISS required overnight, will keep at  0U insulin in TPN bag.  -Monitor TG weekly, last done on 8/26 and was 125, keep SMOF at goal. Please repeat on 9/2 if pt still on TPN.  -Strict I/O's, volume decreased to 2.0 liters, with goal of decreasing diuresis needs.  -Care per primary/SICU.    plan discussed with Dr GERARD Multani, agreeable with above    TPN pager [phone], spectra [phone]  M-F 6A-4P, Weekends and holidays 8A-12P     82 yo M with pmhx of Crohns disease (dxd 9 mos ago, hx of perianal fistula, sp tx w remicaide until developed ab; since managed on budesonide, mtx/5asa), h/o cdiff, CAD, afib (not on ac), p/w bloody diarrhea, abd pain and weakness. Admitted for crohns management, course c/b cdiff colitis sp dificid/fmt and findings of indeterminate quantiferon, pending id clearance to start biologic. Prealb 8. TPN consulted in setting of significant weight loss, severe pcm, and worsening colitis suspected 2/2 CD. Pt at high risk for refeeding syndrome. TPN started 8/16. Repeat CT showing unchanged diffuse colitis and non specific gb wall edema.  Taken to OR emergently on 8/20 for lap total colectomy, end ileostomy. Failed S&S eval. Family agreed to TANJA tube feeds, which had been started yesterday but then dc'd early this AM due to noted blood in ostomy.    Current Diet: NPO/TFs (TFs on hold)  TPN GOAL recommendations as per RD: , dextrose 210g, SMOF 60g    -Can considering weaning TPN, as tube feeds were restarted 8pm last night and pt tolerating at 55cc/hr; however SICU (spoke with PGY Rene) requesting to stay at full dose today and wean tomorrow.  -Nutritional Assessment. Please obtain prealbumin today, last prealbumin on 8/15 was 8.  -PICC in place, maintenance as per protocol  -Electrolyte imbalance risk- monitor CMP, Mg, Ionized Ca, Phosphorus qd. Hypocalcemia- increased CaGlu to 22meq in TPN bag, ionized calcium improved to 1.14 yesterday, however today is 1.12  -Vitamin D deficiency- would recommend Ergocalciferol 50,000U q wk x 6wks  -Hyperglycemia- resolving, FS trend noted, as steroids were tapered off over last few days, insulin was decreased to 0U yesterday in TPN bag. No ISS required overnight, will keep at  0U insulin in TPN bag.  -Monitor TG weekly, last done on 8/26 and was 125, keep SMOF at goal. Please repeat on 9/2 if pt still on TPN.  -Strict I/O's, volume decreased to 2.0 liters, with goal of decreasing diuresis needs.  -Care per primary/SICU, spoke with PGY Rene [phone].    plan discussed with Dr GERARD Multani and Dr. Fernandez, agreeable with above    TPN pager [phone], spectra [phone]  M-F 6A-4P, Weekends and holidays 8A-12P

## 2021-08-29 NOTE — PROGRESS NOTE ADULT - SUBJECTIVE AND OBJECTIVE BOX
24h Events:  -Bloody ostomy output noted AM 08/28 aw HD instability BP 80's systolic, w H/H drop   -s/p 2 U PRBC 8/28 w inadequate response in H/H initially, however today H/H w moderate improvement  -GI consulted, no scope at this time, recommended CT AP, which demonstrated no active extravasation   -Lovenox was dc'd  -Tube feeds were stopped yesterday AM 2/2 bloody output from ostomy, restarted o/n   -bloody ostomy output has slowed today   -NGT pulled by pt, now replaced     Subjective:   Patient seen at bedside this AM. Dressing changed at midline incision. Denies n/v, pain well controlled.     Objective:  Vital Signs  T(C): 36.7 (08-29 @ 08:00), Max: 36.7 (08-29 @ 08:00)  HR: 110 (08-29 @ 09:00) (72 - 110)  BP: 132/59 (08-29 @ 09:00) (95/50 - 153/69)  RR: 40 (08-29 @ 09:00) (20 - 40)  SpO2: 97% (08-29 @ 09:00) (97% - 100%)  08-28-21 @ 07:01  -  08-29-21 @ 07:00  --------------------------------------------------------  IN:  Total IN: 0 mL    OUT:    Bulb (mL): 5 mL    Bulb (mL): 200 mL    Ileostomy (mL): 250 mL    Voided (mL): 1950 mL  Total OUT: 2405 mL    Total NET: -2405 mL          Physical Exam:  GEN: resting in bed comfortably in NAD  RESP: no increased WOB  ENT: NGT   ABD: soft, non-distended, non-tender without rebound tenderness or guarding. Incision sites clean, dry, and intact without erythema or edema. PAOLO x2 w ss output. Ostomy w minimal bloody output mixed with stool.   EXTR: warm, well-perfused, no edema  NEURO: awake    Labs:                        8.3    13.41 )-----------( 150      ( 29 Aug 2021 00:45 )             26.0   08-29    137  |  107  |  36<H>  ----------------------------<  117<H>  4.2   |  22  |  0.45<L>    Ca    7.6<L>      29 Aug 2021 00:45  Phos  2.5     08-29  Mg     2.1     08-29    TPro  4.6<L>  /  Alb  1.7<L>  /  TBili  0.7  /  DBili  x   /  AST  31  /  ALT  54<H>  /  AlkPhos  105  08-29    CAPILLARY BLOOD GLUCOSE      POCT Blood Glucose.: 118 mg/dL (29 Aug 2021 05:10)  POCT Blood Glucose.: 119 mg/dL (29 Aug 2021 00:35)  POCT Blood Glucose.: 109 mg/dL (28 Aug 2021 17:12)  POCT Blood Glucose.: 141 mg/dL (28 Aug 2021 12:05)      Imaging:

## 2021-08-29 NOTE — PROGRESS NOTE ADULT - SUBJECTIVE AND OBJECTIVE BOX
[facility] NUTRITION SUPPORT--  Attending/ PA FOLLOW UP NOTE      24 hour events/subjective: TPN originally consulted for nutrition support and started TPN on 8/16. Pt is tolerating without issues, and is asymptomatic for palpitations, chest pain, shortness of breath, nor fevers, chills nor night sweats, nor nausea nor vomiting nor abdominal pain. Of note, pt had reached 30cc/hr of TANJA tube feeds yesterday AM, however feeds were stopped at 7am, as blood was noted in ostomy and received PRBCs at bedside. Tube feeds restarted yesterday evening at 8pm.    PAST HISTORY  --------------------------------------------------------------------------------  No significant changes to PMH, PSH, FHx, SHx, unless otherwise noted    ALLERGIES & MEDICATIONS  --------------------------------------------------------------------------------  Allergies    No Known Allergies    Intolerances      Standing Inpatient Medications  Biotene Dry Mouth Oral Rinse 5 milliLiter(s) Swish and Spit four times a day  chlorhexidine 2% Cloths 1 Application(s) Topical <User Schedule>  furosemide   Injectable 20 milliGRAM(s) IV Push once  insulin lispro (ADMELOG) corrective regimen sliding scale   SubCutaneous every 6 hours  metoprolol tartrate Injectable 5 milliGRAM(s) IV Push every 4 hours  pantoprazole  Injectable 40 milliGRAM(s) IV Push every 12 hours  Parenteral Nutrition - Adult 1 Each TPN Continuous <Continuous>  piperacillin/tazobactam IVPB.. 3.375 Gram(s) IV Intermittent every 8 hours    PRN Inpatient Medications  acetaminophen  IVPB .. 1000 milliGRAM(s) IV Intermittent every 6 hours PRN      REVIEW OF SYSTEMS  --------------------------------------------------------------------------------  Gen: as per HPI  Skin: No rashes  Head/Eyes/Ears/Mouth: No headache;No sore throat  Respiratory: No dyspnea, cough,   CV: No chest pain, PND, orthopnea  GI: as per HPI  : No increased frequency, dysuria, hematuria, nocturia  MSK: No joint pain/swelling; no back pain; no edema  Neuro: No dizziness/lightheadedness, weakness, seizures, numbness, tingling  Psych: No significant nervousness, anxiety, stress, depression    All other systems were reviewed and are negative, except as noted.      LABS/STUDIES  --------------------------------------------------------------------------------              8.3    13.41 >-----------<  150      [08-29-21 @ 00:45]              26.0     137  |  107  |  36  ----------------------------<  117      [08-29-21 @ 00:45]  4.2   |  22  |  0.45        Ca     7.6     [08-29-21 @ 00:45]      iCa    1.12     [08-29 @ 00:49]      Mg     2.1     [08-29-21 @ 00:45]      Phos  2.5     [08-29-21 @ 00:45]    TPro  4.6  /  Alb  1.7  /  TBili  0.7  /  DBili  x   /  AST  31  /  ALT  54  /  AlkPhos  105  [08-29-21 @ 00:45]    PT/INR: PT 13.6 , INR 1.14       [08-29-21 @ 00:45]  PTT: 30.3       [08-28-21 @ 10:25]      Blood Gas Calcium, Ionized - Venous: 1.21 mmol/L (08-28-21 @ 04:51)    Glucose, Serum: 117 mg/dL (08-29-21 @ 00:45)    Prealbumin, Serum: 8 mg/dL (08-15-21 @ 10:17)          08-28-21 @ 07:01  -  08-29-21 @ 07:00  --------------------------------------------------------  IN: 4432 mL / OUT: 2405 mL / NET: 2027 mL    08-29-21 @ 07:01  -  08-29-21 @ 09:35  --------------------------------------------------------  IN: 138 mL / OUT: 0 mL / NET: 138 mL        VITALS/PHYSICAL EXAM  --------------------------------------------------------------------------------  T(C): 36.7 (08-29-21 @ 08:00), Max: 36.7 (08-29-21 @ 08:00)  HR: 110 (08-29-21 @ 09:00) (72 - 110)  BP: 132/59 (08-29-21 @ 09:00) (95/50 - 153/69)  RR: 40 (08-29-21 @ 09:00) (20 - 40)  SpO2: 97% (08-29-21 @ 09:00) (97% - 100%)  Wt(kg): --    Physical Exam:    Gen: WD laying in bed, frail, cachectic appearing  HEENT: NCAT PERRL  Neck: trachea midline, no noted JVD  Chest: respirations non labored  Abd: ND soft ostomy RLQ dark green liquid   Extrem:  LUE hand swollen, b/l LE edema +2 RUE PICC dressing CDI  Neuro: awake alert responsive, hypophonic   Skin: warm no rashes noted    .  .  .    .  .        .   [facility] NUTRITION SUPPORT--  Attending/ PA FOLLOW UP NOTE      24 hour events/subjective: TPN originally consulted for nutrition support and started TPN on 8/16. Pt is tolerating without issues, and is asymptomatic for palpitations, chest pain, shortness of breath, nor fevers, chills nor night sweats, nor nausea nor vomiting nor abdominal pain. Of note, pt had reached 30cc/hr of TANJA tube feeds yesterday AM, however feeds were stopped at 7am, as blood was noted in ostomy and received PRBCs at bedside. Tube feeds restarted yesterday evening at 8pm.    PAST HISTORY  --------------------------------------------------------------------------------  No significant changes to PMH, PSH, FHx, SHx, unless otherwise noted    ALLERGIES & MEDICATIONS  --------------------------------------------------------------------------------  Allergies    No Known Allergies    Intolerances      Standing Inpatient Medications  Biotene Dry Mouth Oral Rinse 5 milliLiter(s) Swish and Spit four times a day  chlorhexidine 2% Cloths 1 Application(s) Topical <User Schedule>  furosemide   Injectable 20 milliGRAM(s) IV Push once  insulin lispro (ADMELOG) corrective regimen sliding scale   SubCutaneous every 6 hours  metoprolol tartrate Injectable 5 milliGRAM(s) IV Push every 4 hours  pantoprazole  Injectable 40 milliGRAM(s) IV Push every 12 hours  Parenteral Nutrition - Adult 1 Each TPN Continuous <Continuous>  piperacillin/tazobactam IVPB.. 3.375 Gram(s) IV Intermittent every 8 hours    PRN Inpatient Medications  acetaminophen  IVPB .. 1000 milliGRAM(s) IV Intermittent every 6 hours PRN      REVIEW OF SYSTEMS  --------------------------------------------------------------------------------  Gen: as per HPI  Skin: No rashes  Head/Eyes/Ears/Mouth: No headache;No sore throat  Respiratory: No dyspnea, cough,   CV: No chest pain, PND, orthopnea  GI: as per HPI  : No increased frequency, dysuria, hematuria, nocturia  MSK: No joint pain/swelling; no back pain; no edema  Neuro: No dizziness/lightheadedness, weakness, seizures, numbness, tingling  Psych: No significant nervousness, anxiety, stress, depression    All other systems were reviewed and are negative, except as noted.      LABS/STUDIES  --------------------------------------------------------------------------------              8.3    13.41 >-----------<  150      [08-29-21 @ 00:45]              26.0     137  |  107  |  36  ----------------------------<  117      [08-29-21 @ 00:45]  4.2   |  22  |  0.45        Ca     7.6     [08-29-21 @ 00:45]      iCa    1.12     [08-29 @ 00:49]      Mg     2.1     [08-29-21 @ 00:45]      Phos  2.5     [08-29-21 @ 00:45]    TPro  4.6  /  Alb  1.7  /  TBili  0.7  /  DBili  x   /  AST  31  /  ALT  54  /  AlkPhos  105  [08-29-21 @ 00:45]    PT/INR: PT 13.6 , INR 1.14       [08-29-21 @ 00:45]  PTT: 30.3       [08-28-21 @ 10:25]      Blood Gas Calcium, Ionized - Venous: 1.21 mmol/L (08-28-21 @ 04:51)    Glucose, Serum: 117 mg/dL (08-29-21 @ 00:45)    Prealbumin, Serum: 8 mg/dL (08-15-21 @ 10:17)          08-28-21 @ 07:01  -  08-29-21 @ 07:00  --------------------------------------------------------  IN: 4432 mL / OUT: 2405 mL / NET: 2027 mL    08-29-21 @ 07:01  -  08-29-21 @ 09:35  --------------------------------------------------------  IN: 138 mL / OUT: 0 mL / NET: 138 mL        VITALS/PHYSICAL EXAM  --------------------------------------------------------------------------------  T(C): 36.7 (08-29-21 @ 08:00), Max: 36.7 (08-29-21 @ 08:00)  HR: 110 (08-29-21 @ 09:00) (72 - 110)  BP: 132/59 (08-29-21 @ 09:00) (95/50 - 153/69)  RR: 40 (08-29-21 @ 09:00) (20 - 40)  SpO2: 97% (08-29-21 @ 09:00) (97% - 100%)  Wt(kg): --    Physical Exam:    Gen: WD laying in bed, frail, cachectic appearing  HEENT: NCAT PERRL tanja Tube in place feeds running at 55cc/hr  Neck: trachea midline, no noted JVD  Chest: respirations non labored  Abd: ND soft ostomy RLQ dark green brownish liquid maroon tinged  Extrem:  LUE hand swollen, b/l LE edema +2 RUE PICC dressing CDI  Neuro: awake alert responsive, hypophonic   Skin: warm no rashes noted    .  .  .    .  .        .

## 2021-08-29 NOTE — PROGRESS NOTE ADULT - ASSESSMENT
81M with recently dx Crohn's disease c/b perianal abscess and left posterior distal anal intersphincteric fistula and presents diarrhea/BRBPR  2/2 to CD flare c/b c diff colitis. Initially treat for C diff, s/p FMT. Taken emergently to OR on 8/20 for acute deterioration for laparoscopic total colectomy with end ileostomy.     PLAN:  - HDS, cw PO metoprolol  - CXR w RLL atelactasis, cw Incentive spirometry   - Failed speech and swallow, NPO w tube feeds TPN  - NGT  - F/u AM H/H given bloody ostomy output 8/28 requiring 2 U PRBC  - F/u ostomy output for quantity/quality (blood)  - monitor PAOLO drain output, overnight Left Upper PAOLO with high output   - Zosyn restarted by SICU 2/2 cf peritonitis   - Appreciate excellent care per SICU    Red Surgery  [id number]   81M with recently dx Crohn's disease c/b perianal abscess and left posterior distal anal intersphincteric fistula and presents diarrhea/BRBPR  2/2 to CD flare c/b c diff colitis. Initially treat for C diff, s/p FMT. Taken emergently to OR on 8/20 for acute deterioration for laparoscopic total colectomy with end ileostomy.     PLAN:  - HDS, cw PO metoprolol  - CXR w RLL atelactasis, cw Incentive spirometry   - Failed speech and swallow, NPO w tube feeds TPN  - NGT  - F/u AM H/H given bloody ostomy output 8/28 requiring 2 U PRBC  - F/u ostomy output for quantity/quality (blood)  - monitor PAOLO drain output, overnight Left Upper PAOLO with high output   - Zosyn restarted by SICU 2/2 cf peritonitis   - Appreciate GI Recs  - Appreciate excellent care per SICU    Red Surgery  [id number]

## 2021-08-29 NOTE — PROGRESS NOTE ADULT - ASSESSMENT
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     PLAN:  Neurologic: post-op pain control, new-onset hypophonia without focal neuro deficits  - Pain control: Tylenol prn  - ENT c/s'd for hypophonia, OP f/u Dr. Patterson in 2-3wks if no clinical improvement    Respiratory: Acute respiratory insufficiency resolving, patient on RA  - CXR showed RLL atelectasis, now improving  - Incentive spirometry 10x/hr while in bed, OOBTC    Cardiovascular: History of AFib currently rate controlled with IV metoprolol, now in NSR  - Continue PO metoprolol 25mg BID (pt takes sotalol at home)  - Cardiology following    Gastrointestinal/Nutrition: s/p total abdominal colectomy with end ileostomy; failed S/S  - Diet: NPO  - Tube feeds, Glucerna 1.2 with goal of 55cc/hr  - C/w TPN  - Continue home PPI  - Bloody ostomy OP on 8/28 AM, CTA without active extrav    Renal/Genitourinary: no active issues   - Monitor and replete electrolytes as needed  - Monitor UOP and diurese as needed    Hematologic: last transfusion 2u pRBC on 8/28  - Trend H&H and transfuse Hgb < 7  - Lovenox HELD due to bloody OP on ileostomy    Infectious Disease: C. diff colitis s/p subtotal colectomy   - Zosyn started 2/2 concern for potential peritonitis until 8/30    Endocrine: no active issues   - Monitor BMP  - Steroid taper completed 8/26  - ISS, patient also receiving 12u of insulin in TPN bag    Lines & Drains:   - [place]   - LLQ & LUQ drain 8/20  - TPN 8/16    Disposition: SICU  Code Status: Full Code      Ana Corley, PGY-2  SICU  #[id number]

## 2021-08-30 NOTE — PROGRESS NOTE ADULT - SUBJECTIVE AND OBJECTIVE BOX
[facility] NUTRITION SUPPORT-- FOLLOW UP NOTE  --------------------------------------------------------------------------------    24 hour events/subjective: pt seen and examined. interim events noted, w blood from ostomy, sp prbc transfusion, now dnr/dni, tf stopped due to aspiration risk. lethargic upon eval, unable to participate in ros. tpn running. elevated ostomy outputs noted    Diet:  Diet, NPO:   Except Medications (08-30-21 @ 04:39)      PAST HISTORY  --------------------------------------------------------------------------------  No significant changes to PMH, PSH, FHx, SHx, unless otherwise noted    ALLERGIES & MEDICATIONS  --------------------------------------------------------------------------------  Allergies    No Known Allergies    Intolerances      Standing Inpatient Medications  aMIOdarone Infusion 0.5 mG/Min IV Continuous <Continuous>  Biotene Dry Mouth Oral Rinse 5 milliLiter(s) Swish and Spit four times a day  chlorhexidine 2% Cloths 1 Application(s) Topical <User Schedule>  fat emulsion (Fish Oil and Plant Based) 20% Infusion 25 mL/Hr IV Continuous <Continuous>  insulin lispro (ADMELOG) corrective regimen sliding scale   SubCutaneous every 6 hours  metoprolol tartrate Injectable 5 milliGRAM(s) IV Push every 4 hours  pantoprazole  Injectable 40 milliGRAM(s) IV Push every 12 hours  Parenteral Nutrition - Adult 1 Each TPN Continuous <Continuous>  piperacillin/tazobactam IVPB.. 3.375 Gram(s) IV Intermittent every 8 hours    PRN Inpatient Medications        REVIEW OF SYSTEMS  --------------------------------------------------------------------------------  see hpi unable to obtain       VITALS/PHYSICAL EXAM  --------------------------------------------------------------------------------  T(C): 36.9 (08-30-21 @ 07:00), Max: 37.8 (08-30-21 @ 06:00)  HR: 72 (08-30-21 @ 09:00) (67 - 134)  BP: 109/59 (08-30-21 @ 09:00) (78/46 - 137/84)  RR: 26 (08-30-21 @ 09:00) (20 - 49)  SpO2: 100% (08-30-21 @ 09:00) (96% - 100%)  Wt(kg): --      08-29-21 @ 07:01  -  08-30-21 @ 07:00  --------------------------------------------------------  IN: 5740.3 mL / OUT: 4775 mL / NET: 965.3 mL    08-30-21 @ 07:01  -  08-30-21 @ 09:04  --------------------------------------------------------  IN: 199.4 mL / OUT: 400 mL / NET: -200.6 mL      I&O's Detail    29 Aug 2021 07:01  -  30 Aug 2021 07:00  --------------------------------------------------------  IN:    Albumin 5%  - 250 mL: 500 mL    Amiodarone: 100.2 mL    Amiodarone: 233.1 mL    Fat Emulsion (Fish Oil &amp; Plant Based) 20% Infusion: 325 mL    Glucerna: 990 mL    IV PiggyBack: 700 mL    IV PiggyBack: 300 mL    PRBCs (Packed Red Blood Cells): 600 mL    TPN (Total Parenteral Nutrition): 1992 mL  Total IN: 5740.3 mL    OUT:    Bulb (mL): 320 mL    Bulb (mL): 10 mL    Ileostomy (mL): 2225 mL    Incontinent per Condom Catheter (mL): 970 mL    Voided (mL): 1250 mL  Total OUT: 4775 mL    Total NET: 965.3 mL      30 Aug 2021 07:01  -  30 Aug 2021 09:04  --------------------------------------------------------  IN:    Amiodarone: 33.4 mL    TPN (Total Parenteral Nutrition): 166 mL  Total IN: 199.4 mL    OUT:    Fat Emulsion (Fish Oil &amp; Plant Based) 20% Infusion: 0 mL    Incontinent per Condom Catheter (mL): 400 mL  Total OUT: 400 mL    Total NET: -200.6 mL        Physical Exam:  Gen: lying in bed, frail, cachectic   HEENT: ncat, trachea midline +ngt  Chest: respirations non labored  Abd: soft nd ostomy with dark red blood dressings c/d/i  Extrem:  generalized anasarca   Neuro: lethargic        LABS/STUDIES  --------------------------------------------------------------------------------              8.3    15.23 >-----------<  131      [08-30-21 @ 08:16]              26.1     137  |  106  |  39  ----------------------------<  115      [08-30-21 @ 00:17]  3.9   |  22  |  0.47        Ca     7.9     [08-30-21 @ 00:17]      iCa    1.17     [08-30 @ 00:29]      Mg     2.3     [08-30-21 @ 00:17]      Phos  2.3     [08-30-21 @ 00:17]    TPro  4.5  /  Alb  1.9  /  TBili  0.4  /  DBili  x   /  AST  28  /  ALT  46  /  AlkPhos  101  [08-30-21 @ 00:17]    PT/INR: PT 13.6 , INR 1.14       [08-29-21 @ 00:45]  PTT: 30.3       [08-28-21 @ 10:25]      Ca ionizedBlood Gas Arterial - Calcium, Ionized: 1.17 mmol/L (08-30-21 @ 03:50)  Blood Gas Arterial - Calcium, Ionized: 1.26 mmol/L (08-30-21 @ 00:02)    Creatinine Trend:  POC glucoseGlucose, Serum: 115 mg/dL (08-30-21 @ 00:17)  Glucose, Serum: 119 mg/dL (08-29-21 @ 17:13)  CAPILLARY BLOOD GLUCOSE      POCT Blood Glucose.: 129 mg/dL (30 Aug 2021 07:20)  POCT Blood Glucose.: 134 mg/dL (29 Aug 2021 17:28)  POCT Blood Glucose.: 126 mg/dL (29 Aug 2021 12:20)    PrealbuminPrealbumin, Serum: 8 mg/dL (08-15-21 @ 10:17)    Triglycerides     [facility] NUTRITION SUPPORT-- FOLLOW UP NOTE  --------------------------------------------------------------------------------    24 hour events/subjective: pt seen and examined. interim events noted, w blood from ostomy, sp prbc transfusion, now dnr/dni, tf stopped due to aspiration risk. lethargic upon eval, unable to participate in ros. tpn running. elevated ostomy outputs noted. repeat prealb 15    Diet:  Diet, NPO:   Except Medications (08-30-21 @ 04:39)      PAST HISTORY  --------------------------------------------------------------------------------  No significant changes to PMH, PSH, FHx, SHx, unless otherwise noted    ALLERGIES & MEDICATIONS  --------------------------------------------------------------------------------  Allergies    No Known Allergies    Intolerances      Standing Inpatient Medications  aMIOdarone Infusion 0.5 mG/Min IV Continuous <Continuous>  Biotene Dry Mouth Oral Rinse 5 milliLiter(s) Swish and Spit four times a day  chlorhexidine 2% Cloths 1 Application(s) Topical <User Schedule>  fat emulsion (Fish Oil and Plant Based) 20% Infusion 25 mL/Hr IV Continuous <Continuous>  insulin lispro (ADMELOG) corrective regimen sliding scale   SubCutaneous every 6 hours  metoprolol tartrate Injectable 5 milliGRAM(s) IV Push every 4 hours  pantoprazole  Injectable 40 milliGRAM(s) IV Push every 12 hours  Parenteral Nutrition - Adult 1 Each TPN Continuous <Continuous>  piperacillin/tazobactam IVPB.. 3.375 Gram(s) IV Intermittent every 8 hours    PRN Inpatient Medications        REVIEW OF SYSTEMS  --------------------------------------------------------------------------------  see hpi unable to obtain       VITALS/PHYSICAL EXAM  --------------------------------------------------------------------------------  T(C): 36.9 (08-30-21 @ 07:00), Max: 37.8 (08-30-21 @ 06:00)  HR: 72 (08-30-21 @ 09:00) (67 - 134)  BP: 109/59 (08-30-21 @ 09:00) (78/46 - 137/84)  RR: 26 (08-30-21 @ 09:00) (20 - 49)  SpO2: 100% (08-30-21 @ 09:00) (96% - 100%)  Wt(kg): --      08-29-21 @ 07:01  -  08-30-21 @ 07:00  --------------------------------------------------------  IN: 5740.3 mL / OUT: 4775 mL / NET: 965.3 mL    08-30-21 @ 07:01  -  08-30-21 @ 09:04  --------------------------------------------------------  IN: 199.4 mL / OUT: 400 mL / NET: -200.6 mL      I&O's Detail    29 Aug 2021 07:01  -  30 Aug 2021 07:00  --------------------------------------------------------  IN:    Albumin 5%  - 250 mL: 500 mL    Amiodarone: 100.2 mL    Amiodarone: 233.1 mL    Fat Emulsion (Fish Oil &amp; Plant Based) 20% Infusion: 325 mL    Glucerna: 990 mL    IV PiggyBack: 700 mL    IV PiggyBack: 300 mL    PRBCs (Packed Red Blood Cells): 600 mL    TPN (Total Parenteral Nutrition): 1992 mL  Total IN: 5740.3 mL    OUT:    Bulb (mL): 320 mL    Bulb (mL): 10 mL    Ileostomy (mL): 2225 mL    Incontinent per Condom Catheter (mL): 970 mL    Voided (mL): 1250 mL  Total OUT: 4775 mL    Total NET: 965.3 mL      30 Aug 2021 07:01  -  30 Aug 2021 09:04  --------------------------------------------------------  IN:    Amiodarone: 33.4 mL    TPN (Total Parenteral Nutrition): 166 mL  Total IN: 199.4 mL    OUT:    Fat Emulsion (Fish Oil &amp; Plant Based) 20% Infusion: 0 mL    Incontinent per Condom Catheter (mL): 400 mL  Total OUT: 400 mL    Total NET: -200.6 mL        Physical Exam:  Gen: lying in bed, frail, cachectic   HEENT: ncat, trachea midline +ngt  Chest: respirations non labored  Abd: soft nd ostomy with dark red blood dressings c/d/i  Extrem:  generalized anasarca   Neuro: lethargic        LABS/STUDIES  --------------------------------------------------------------------------------              8.3    15.23 >-----------<  131      [08-30-21 @ 08:16]              26.1     137  |  106  |  39  ----------------------------<  115      [08-30-21 @ 00:17]  3.9   |  22  |  0.47        Ca     7.9     [08-30-21 @ 00:17]      iCa    1.17     [08-30 @ 00:29]      Mg     2.3     [08-30-21 @ 00:17]      Phos  2.3     [08-30-21 @ 00:17]    TPro  4.5  /  Alb  1.9  /  TBili  0.4  /  DBili  x   /  AST  28  /  ALT  46  /  AlkPhos  101  [08-30-21 @ 00:17]    PT/INR: PT 13.6 , INR 1.14       [08-29-21 @ 00:45]  PTT: 30.3       [08-28-21 @ 10:25]      Ca ionizedBlood Gas Arterial - Calcium, Ionized: 1.17 mmol/L (08-30-21 @ 03:50)  Blood Gas Arterial - Calcium, Ionized: 1.26 mmol/L (08-30-21 @ 00:02)    Creatinine Trend:  POC glucoseGlucose, Serum: 115 mg/dL (08-30-21 @ 00:17)  Glucose, Serum: 119 mg/dL (08-29-21 @ 17:13)  CAPILLARY BLOOD GLUCOSE      POCT Blood Glucose.: 129 mg/dL (30 Aug 2021 07:20)  POCT Blood Glucose.: 134 mg/dL (29 Aug 2021 17:28)  POCT Blood Glucose.: 126 mg/dL (29 Aug 2021 12:20)    PrealbuminPrealbumin, Serum: 8 mg/dL (08-15-21 @ 10:17)    Triglycerides

## 2021-08-30 NOTE — PROGRESS NOTE ADULT - ASSESSMENT
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 Admitted for crohns management, course c/b cdiff colitis sp dificid/fmt and findings of indeterminate quantiferon, pending id clearance to start biologic. Prealb 8. TPN consulted in setting of significant weight loss, severe pcm, and worsening colitis suspected 2/2 CD. Pt at high risk for refeeding syndrome. TPN started 8/16. Repeat CT showing unchanged diffuse colitis and non specific gb wall edema.  Taken to OR emergently on 8/20 for lap total colectomy, end ileostomy. Failed S&S eval. Family agreed to TANJA tube feeds, which had been started yesterday but then dc'd due to noted blood in ostomy and risk for aspiration given deterioration in mental status. pt now dnr/dni, goc discussions ongoing    Current Diet: NPO  TPN GOAL recommendations as per RD: , dextrose 210g, SMOF 60g    -dw sicu, will cont tpn for now pending further goc discussions w family  -PICC in place, maintenance as per protocol  -hypophosphatemia- will increase naphos to 45 mmol in tpn bag  -vitamin D deficiency- suggest ergocalciferol 50,000U q wk x 6wks  -anemia/gib- trend cbc, transfuse prn  -electrolyte imbalance risk- monitor CMP, Mg, Ionized Ca, Phosphorus qd   -monitor TG weekly (9/6)  -strict I/O's  -further care per primary/SICU    plan discussed with Dr. Fernandez, agreeable with above    TPN pager [phone], spectra [phone]  M-F 6A-4P, Weekends and holidays 8A-12P

## 2021-08-30 NOTE — PROVIDER CONTACT NOTE (CHANGE IN STATUS NOTIFICATION) - ASSESSMENT
Pt's mental status was previously somnolent, oriented x1, following commands. At ~ 2052-8478, pt become obtunded, only arousing to painful stimuli, not opening his eyes, no longer verbalizing or following commands. VSS at this time. Labs sent. Pt's mental status was previously somnolent, oriented x1, following commands, GCS 11. This mental status was discussed with SICU team during PM rounds. At ~ 2475-9827, pt become obtunded, only arousing to painful stimuli, not opening his eyes, no longer verbalizing or following commands, GCS now 8. VSS at this time. Labs sent.

## 2021-08-30 NOTE — PROVIDER CONTACT NOTE (CHANGE IN STATUS NOTIFICATION) - RECOMMENDATIONS
Concern for airway protection in the setting of diminished mental status. Contact family and consider intubation.

## 2021-08-30 NOTE — PROGRESS NOTE ADULT - ASSESSMENT
81M with recently dx Crohn's disease c/b perianal abscess and left posterior distal anal intersphincteric fistula and presents diarrhea/BRBPR  2/2 to CD flare c/b c diff colitis. Initially treat for C diff, s/p FMT. Taken emergently to OR on 8/20 for acute deterioration for laparoscopic total colectomy with end ileostomy.     PLAN:  - HDS, cw PO metoprolol  - CXR w RLL atelactasis, cw Incentive spirometry   - Failed speech and swallow, NPO w tube feeds TPN  - NGT  - F/u AM H/H given bloody ostomy output 8/28 requiring 2 U PRBC  - F/u ostomy output for quantity/quality (blood)  - monitor PAOLO drain output, overnight Left Upper PAOLO with high output   - Zosyn restarted by SICU 2/2 cf peritonitis   - Appreciate GI Recs  - Appreciate excellent care per SICU    [facility]  [id number]   81M with recently dx Crohn's disease c/b perianal abscess and left posterior distal anal intersphincteric fistula and presents diarrhea/BRBPR  2/2 to CD flare c/b c diff colitis. Initially treat for C diff, s/p FMT. Taken emergently to OR on 8/20 for acute deterioration for laparoscopic total colectomy with end ileostomy.     PLAN:  - HDS during AM rounds, cw IV metoprolol if HTN  - NGT/NPO  - cw TPN  - DNR/DNI, molst in chart   - H/H w adequate response to 1 U prbc 2/2 bloody ostomy output overnight   - F/u ostomy output for quantity/quality (blood)  - Zosyn restarted by SICU 8/29 2/2 cf peritonitis   - Appreciate GI Recs  - Appreciate excellent care per SICU    [facility]  [id number]

## 2021-08-30 NOTE — PROGRESS NOTE ADULT - ASSESSMENT
80yo M with Crohn's disease, now s/p total abdominal colectomy with end ileostomy on 8/20.     PLAN:  Neurologic: post-op pain control, new-onset hypophonia without focal neuro deficits, obtundation  - Monitor mental status, currently unable to protect airway  - Pain control: Tylenol prn  - Avoid narcotics / benzos iso obtundation  - ENT c/s'd for hypophonia, OP f/u Dr. Patterson in 2-3wks if no clinical improvement    Respiratory: Acute respiratory insufficiency resolving, patient on RA  - CXR showed RLL atelectasis, now improving  - Incentive spirometry 10x/hr while in bed, OOBTC    Cardiovascular: History of AFib currently rate controlled with IV metoprolol and amiodarone, now in NSR  - Monitor vital signs, currently hemodynamically stable  - Continue IV metoprolol 5mg q4hrs  - S/p amio IVPB, currently on amio gtt for a-fib RVR  - Cardiology following  - Episode of NSVT (7 beats) 8/29, troponin mildly elevated, although downtrending. Will no longer trend    Gastrointestinal/Nutrition: s/p total abdominal colectomy with end ileostomy, failed S/S, bloody ostomy output  - Diet: NPO  - Avoid tube feeds due to aspiration risk  - C/w TPN  - Protonix BID for GIB  - Bloody ostomy OP on 8/28 AM, CTA without active extrav, GI consulted with no indication for scope  - Monitor ostomy output, currently bloody    Renal/Genitourinary: no active issues   - Monitor and replete electrolytes as needed  - Monitor UOP, voiding spontaneously  - IVF: TPN @ 83cc/hr    Hematologic: last transfusion 2u pRBC on 8/28  - Trend H&H and transfuse Hgb < 7  - Lovenox HELD due to bloody OP on ileostomy    Infectious Disease: C. diff colitis vs Crohns flair s/p subtotal colectomy   - Zosyn started 2/2 concern for potential peritonitis until 8/30  - Monitor WBC, temp    Endocrine: no active issues   - Monitor BMP  - Steroid taper completed 8/26  - ISS    Lines & Drains:   - LLQ & LUQ drain 8/20  - TPN 8/16  - RUE PICC placed 8/16    Disposition: SICU  Code Status: DNR/DNI, MOLST in chart, family to discuss GOC together 8/30

## 2021-08-30 NOTE — CHART NOTE - NSCHARTNOTEFT_GEN_A_CORE
Nutrition Follow Up Note     Patient seen for: malnutrition/TPN Team follow up on 8ICU    Source: medical record, TPN Team rounds, 8ICU Interdisciplinary Rounds. Pt with decreasing mental status; made DNR/DNI 8/30.    Chart reviewed, events noted. "81M with recently dx Crohn's disease c/b perianal abscess and left posterior distal anal intersphincteric fistula and presents diarrhea/BRBPR  2/2 to CD flare c/b c diff colitis. Initially treat for C diff, s/p FMT. Taken emergently to OR on 8/20 for acute deterioration for laparoscopic total colostomy with end ileostomy."    24-Hour Events:  - Afib EVR, decreasing mental status; made DNR/DNI 8/30  - Sudden bloody output from ileostomy; tube feeds discontinued 8/30    Nutrition Status:  - TPN initiated 8/16; infusing at goal.   - NPO post-op; pt failed Speech and Swallow eval 8/24  - Enteral Nutrition: NGT placed and EN initiated 8/27, advanced to goal (Glucerna1.2 @ 55 ml/hr) as of 8/29. EN stopped 8/30 in setting of aspiration risk, bloody ostomy output, and declining medical and mental status.  - Post-op hyperglycemia resolved; insulin removed from TPN. SSI ordered q6 hrs.  - Hypocalcemia addressed with increased calcium in TPN to 22mEq     Diet Order: Diet, NPO:   Except Medications (08-30-21 @ 04:39)    PARENTERAL NUTRITION:    GOAL TPN (ordered 8/29): 2L infusing at 83 ml/hr (105 gm amino acids, 210 gm dextrose, 60 gm SMOF lipid) to provide 1734 kcal/day (29 kcal/kg and 1.8 gm/kg protein per dosing wt 59kg)    Non-Protein Calories: 1314 kcal/day (22 kcal/kg)  Dextrose Infusion Rate: 2.5 mg/kg/min  Lipid Infusion Rate: 1 gm/kg/day; 0.08 gm/kg/hr    Electrolytes and Insulin: (ordered 8/29/21)  Insulin (units): 0  NaCl (mEq):  40  Na acetate (mEq): 60  Na Phos (mmol): 30  KCL (mEq):  100  Calcium gluconate (mEq): 22  Mg sulfate (mEq): 12  MTE-5 concentrate (ml): 1  MVI (ml): 10    GI:  Ileostomy output x 24-hrs: 2225 ml (8/30)  Sudden bloody output from ileostomy 8/30    Anthropometric Measurements:   Height (cm): 170.2 (08-09-21 @ 12:23)  DOSING Weight (kg): 59 (07-13-21)  BMI (kg/m2): 20.4 (08-09-21 @ 12:23)  Daily Weight: 50.3 kg (08-16); 53.2 kg (08-18); 64.2 kg (08-23-21); 61.5 kg (08-25-21); 60.4 kg (8/30); weight shifts noted with diuresis  IBW: 67.1 kg    Medications: MEDICATIONS  (STANDING):  aMIOdarone Infusion 0.5 mG/Min (16.7 mL/Hr) IV Continuous <Continuous>  Biotene Dry Mouth Oral Rinse 5 milliLiter(s) Swish and Spit four times a day  chlorhexidine 2% Cloths 1 Application(s) Topical <User Schedule>  fat emulsion (Fish Oil and Plant Based) 20% Infusion 25 mL/Hr (25 mL/Hr) IV Continuous <Continuous>  insulin lispro (ADMELOG) corrective regimen sliding scale   SubCutaneous every 6 hours  metoprolol tartrate Injectable 5 milliGRAM(s) IV Push every 4 hours  pantoprazole  Injectable 40 milliGRAM(s) IV Push every 12 hours  Parenteral Nutrition - Adult 1 Each (83 mL/Hr) TPN Continuous <Continuous>  piperacillin/tazobactam IVPB.. 3.375 Gram(s) IV Intermittent every 8 hours      Labs: 08-30 @ 00:17: Sodium 137, Potassium 3.9, Calcium 7.9<L>, Magnesium 2.3, Phosphorus 2.3<L>, BUN 39<H>, Creatinine 0.47<L>, Glucose 115<H>, Total Bilirubin 0.4, Albumin, 1.9<L>,   08-29 @ 17:13: Sodium 137, Potassium 4.1, Calcium 7.4<L>, Magnesium 2.0, Phosphorus 2.8, BUN 40<H>, Creatinine 0.53, Glucose 119<H>, Total Bilirubin 0.5, Albumin, 1.7<L>    Hemoglobin : 8.3 g/dL  Hematocrit : 26.1 %    LIVER FUNCTIONS - ( 30 Aug 2021 00:17 )  Alb: 1.9 g/dL / Pro: 4.5 g/dL / ALK PHOS: 101 U/L / ALT: 46 U/L / AST: 28 U/L / GGT: x           Triglycerides, Serum: 89 mg/dL (08-30-21 @ 00:17)  Triglycerides, Serum: 99 mg/dL (08-29-21 @ 13:22)  Triglycerides, Serum: 125 mg/dL (08-26-21 @ 23:31)  Triglycerides, Serum: 140 mg/dL (08-20-21 @ 06:36)  Triglycerides, Serum: 106 mg/dL (08-19-21 @ 08:59)  Triglycerides, Serum: 116 mg/dL (08-18-21 @ 06:45)  Triglycerides, Serum: 107 mg/dL (08-17-21 @ 07:08)    Hemoglobin A1C 5.2%    POCT Blood Glucose.: 129 mg/dL (08-30-21 @ 07:20)  POCT Blood Glucose.: 134 mg/dL (08-29-21 @ 17:28)  POCT Blood Glucose.: 126 mg/dL (08-29-21 @ 12:20)      Skin: Stage II sacrum; thoracic spine stage II  Edema: 2+ dependent; 3+ left/right arm, wrist, hand    Estimated Needs: based on dosing wt 59 kg, with consideration for TPN and malnutrition  Energy: 5681-1770 calories (30-35 kcal/kg)  Protein:  grams (1.4-1.8 gm/kg)    Previous Nutrition Diagnosis: 1) Severe Malnutrition 2) Increased Nutrient Needs  Nutrition Diagnosis is: ongoing, currently addressed with TPN; GOC pending    New Nutrition Diagnosis:  none    Recommended Interventions:   1. TPN per TPN Team/Nutrition Assessment, if appropriate for GOC  2. Monitor GOC; DNR/DNI as of 8/30/21      Monitoring and Evaluation:   Continue to monitor nutrition provision and tolerance, weights, labs, skin integrity.   RD remains available upon request and will follow up per protocol.    Miriam Manning, MS RD CDN [facility], #[phone]. Nutrition Follow Up Note     Patient seen for: malnutrition/TPN Team follow up on 8ICU    Source: medical record, TPN Team rounds, 8ICU Interdisciplinary Rounds. Pt with decreasing mental status; made DNR/DNI 8/30.    Chart reviewed, events noted. "81M with recently dx Crohn's disease c/b perianal abscess and left posterior distal anal intersphincteric fistula and presents diarrhea/BRBPR  2/2 to CD flare c/b c diff colitis. Initially treat for C diff, s/p FMT. Taken emergently to OR on 8/20 for acute deterioration for laparoscopic total colostomy with end ileostomy."    24-Hour Events:  - Afib EVR, decreasing mental status; made DNR/DNI 8/30  - Sudden bloody output from ileostomy; tube feeds discontinued 8/30    Nutrition Status:  - TPN initiated 8/16; infusing at goal.   - NPO post-op; pt failed Speech and Swallow eval 8/24  - Enteral Nutrition: NGT placed and EN initiated 8/27, advanced to goal (Glucerna1.2 @ 55 ml/hr) as of 8/29. EN stopped 8/30 in setting of aspiration risk, bloody ostomy output, and declining medical and mental status.  - Post-op hyperglycemia resolved; insulin removed from TPN. SSI ordered q6 hrs.  - Hypocalcemia addressed with increased calcium in TPN to 22 mEq   - Hypophosphatemia in setting of acute respiratory alkalosis; Na Phos increased to 45 mmol in TPN (8/30)    Diet Order: Diet, NPO:   Except Medications (08-30-21 @ 04:39)    PARENTERAL NUTRITION:    GOAL TPN (ordered 8/29): 2L infusing at 83 ml/hr (105 gm amino acids, 210 gm dextrose, 60 gm SMOF lipid) to provide 1734 kcal/day (29 kcal/kg and 1.8 gm/kg protein per dosing wt 59kg)    Non-Protein Calories: 1314 kcal/day (22 kcal/kg)  Dextrose Infusion Rate: 2.5 mg/kg/min  Lipid Infusion Rate: 1 gm/kg/day; 0.08 gm/kg/hr    Electrolytes and Insulin: (ordered 8/29/21)  Insulin (units): 0  NaCl (mEq):  40  Na acetate (mEq): 60  Na Phos (mmol): 30 --> increased to 45 (8/30)  KCL (mEq):  100  Calcium gluconate (mEq): 22  Mg sulfate (mEq): 12  MTE-5 concentrate (ml): 1  MVI (ml): 10    GI:  Ileostomy output x 24-hrs: 2225 ml (8/30)  Sudden bloody output from ileostomy 8/30    Anthropometric Measurements:   Height (cm): 170.2 (08-09-21 @ 12:23)  DOSING Weight (kg): 59 (07-13-21)  BMI (kg/m2): 20.4 (08-09-21 @ 12:23)  Daily Weight: 50.3 kg (08-16); 53.2 kg (08-18); 64.2 kg (08-23-21); 61.5 kg (08-25-21); 60.4 kg (8/30); weight shifts noted with diuresis  IBW: 67.1 kg    Medications: MEDICATIONS  (STANDING):  aMIOdarone Infusion 0.5 mG/Min (16.7 mL/Hr) IV Continuous <Continuous>  Biotene Dry Mouth Oral Rinse 5 milliLiter(s) Swish and Spit four times a day  chlorhexidine 2% Cloths 1 Application(s) Topical <User Schedule>  fat emulsion (Fish Oil and Plant Based) 20% Infusion 25 mL/Hr (25 mL/Hr) IV Continuous <Continuous>  insulin lispro (ADMELOG) corrective regimen sliding scale   SubCutaneous every 6 hours  metoprolol tartrate Injectable 5 milliGRAM(s) IV Push every 4 hours  pantoprazole  Injectable 40 milliGRAM(s) IV Push every 12 hours  Parenteral Nutrition - Adult 1 Each (83 mL/Hr) TPN Continuous <Continuous>  piperacillin/tazobactam IVPB.. 3.375 Gram(s) IV Intermittent every 8 hours      Labs: 08-30 @ 00:17: Sodium 137, Potassium 3.9, Calcium 7.9<L>, Magnesium 2.3, Phosphorus 2.3<L>, BUN 39<H>, Creatinine 0.47<L>, Glucose 115<H>, Total Bilirubin 0.4, Albumin, 1.9<L>,   08-29 @ 17:13: Sodium 137, Potassium 4.1, Calcium 7.4<L>, Magnesium 2.0, Phosphorus 2.8, BUN 40<H>, Creatinine 0.53, Glucose 119<H>, Total Bilirubin 0.5, Albumin, 1.7<L>    Hemoglobin : 8.3 g/dL  Hematocrit : 26.1 %    LIVER FUNCTIONS - ( 30 Aug 2021 00:17 )  Alb: 1.9 g/dL / Pro: 4.5 g/dL / ALK PHOS: 101 U/L / ALT: 46 U/L / AST: 28 U/L / GGT: x           Triglycerides, Serum: 89 mg/dL (08-30-21 @ 00:17)  Triglycerides, Serum: 99 mg/dL (08-29-21 @ 13:22)  Triglycerides, Serum: 125 mg/dL (08-26-21 @ 23:31)  Triglycerides, Serum: 140 mg/dL (08-20-21 @ 06:36)  Triglycerides, Serum: 106 mg/dL (08-19-21 @ 08:59)  Triglycerides, Serum: 116 mg/dL (08-18-21 @ 06:45)  Triglycerides, Serum: 107 mg/dL (08-17-21 @ 07:08)    Hemoglobin A1C 5.2%    POCT Blood Glucose.: 129 mg/dL (08-30-21 @ 07:20)  POCT Blood Glucose.: 134 mg/dL (08-29-21 @ 17:28)  POCT Blood Glucose.: 126 mg/dL (08-29-21 @ 12:20)      Skin: Stage II sacrum; thoracic spine stage II  Edema: 2+ dependent; 3+ left/right arm, wrist, hand    Estimated Needs: based on dosing wt 59 kg, with consideration for TPN and malnutrition  Energy: 7642-7353 calories (30-35 kcal/kg)  Protein:  grams (1.4-1.8 gm/kg)    Previous Nutrition Diagnosis: 1) Severe Malnutrition 2) Increased Nutrient Needs  Nutrition Diagnosis is: ongoing, currently addressed with TPN; GOC pending    New Nutrition Diagnosis:  none    Recommended Interventions:   1. TPN per TPN Team/Nutrition Assessment  2. Monitor GOC; DNR/DNI as of 8/30/21      Monitoring and Evaluation:   Continue to monitor nutrition provision and tolerance, weights, labs, skin integrity.   RD remains available upon request and will follow up per protocol.    Miriam Manning, MS RD CDN [facility], #[phone].

## 2021-08-30 NOTE — PROGRESS NOTE ADULT - ASSESSMENT
81y Male with GERD on omeprazole daily and Crohn's disease diagnosed about 9 mos prior to admission at outside hospital c/b h/o abscess and left posterior distal anal intersphincteric fistula and presents diarrhea/BRBPR  2/2 to CD flare c/b c diff colitis. Initially treat for C diff, given that it did not get better got FMT. Switched PO vanc in favor of Fidaxomicin without any improvement. Therefore, patient got Flex sig to reassess which showed no pseudomembranous colitis anymore but severe Crohn colitis. Was started on steroids on 8/9. Has had minimal improvement. Started tapering IV steroids and pt is currently on TPN as he has had poor PO intake. Case was discussed at IBD conference 8/18 and CRS + GI team in agreement with surgical resection. s/p laparoscopic total abdominal colectomy with end ileostomy on 8/20. Now recovering in SICU.    # c diff - pseudomembranous colitis seen on colonoscopy 7/19/21, treated w/ vanc, fidaxomicin, FMT; repeat flex sig w/o further pseudomembranes but ongoing severe CD. 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    Patient seen and discussed with Attending, Dr. Luis Manuel Smith.    Aminah Zhong MD  Gastroenterology/Hepatology Fellow, PGY-V  Available via [employer] Teams    NON-URGENT CONSULTS:  Please email [email or web address]  AT NIGHT AND ON WEEKENDS:  Contact on-call GI fellow via answering service ([phone]) from 5pm-8am and on weekends/holidays  MONDAY-FRIDAY 8AM-5PM:  Pager# [phone] ([facility])  GI Phone# [phone] ([facility])       81y Male with GERD on omeprazole daily and Crohn's disease diagnosed about 9 mos prior to admission at outside hospital c/b h/o abscess and left posterior distal anal intersphincteric fistula and presents diarrhea/BRBPR  2/2 to CD flare c/b c diff colitis. Initially treat for C diff, given that it did not get better got FMT. Switched PO vanc in favor of Fidaxomicin without any improvement. Therefore, patient got Flex sig to reassess which showed no pseudomembranous colitis anymore but severe Crohn colitis. Was started on steroids on 8/9. Has had minimal improvement. Started tapering IV steroids and pt is currently on TPN as he has had poor PO intake. Case was discussed at IBD conference 8/18 and CRS + GI team in agreement with surgical resection. s/p laparoscopic total abdominal colectomy with end ileostomy on 8/20. Now recovering in SICU.    # c diff - pseudomembranous colitis seen on colonoscopy 7/19/21, treated w/ vanc, fidaxomicin, FMT; repeat flex sig w/o further pseudomembranes but ongoing severe CD. Suspect ongoing diarrhea 2/2 CD and not c diff  # CD - diagnosed 9 months ago, started on remicade as outpatient but developed antibodies, started on steroids 8/9/21, completed steroids taper 8/27; s/p total colectomy and end ileostomy 8/20  #Bloody ostomy output - likely small bowel source given only tube feeds lavage from NGT     Recommendations:  - can c/w PPI IV daily  - monitor CBC and transfuse as needed  - continue TPN, feeding as per surgery; consider repeating swallow evaluation once patient is more awake  - Rest of care per primary       Thank you for involving us in this patient's care.    Patient seen and discussed with Attending, Dr. Luis Manuel Smith.    Aminah Zhong MD  Gastroenterology/Hepatology Fellow, PGY-V  Available via [employer] Teams    NON-URGENT CONSULTS:  Please email [email or web address]  AT NIGHT AND ON WEEKENDS:  Contact on-call GI fellow via answering service ([phone]) from 5pm-8am and on weekends/holidays  MONDAY-FRIDAY 8AM-5PM:  Pager# [phone] ([facility])  GI Phone# [phone] ([facility])

## 2021-08-30 NOTE — PROVIDER CONTACT NOTE (CHANGE IN STATUS NOTIFICATION) - ACTION/TREATMENT ORDERED:
SICU team fully assessed pt at bedside. Pt's family was contacted, and the plan of care was discussed. DNR/DNI order placed at this time, as per family's wishes. Will continue to monitor. SICU team fully assessed pt at bedside. Labs reviewed. Pt's family was contacted, and the plan of care was discussed. DNR/DNI order placed at this time, as per family's wishes. Will continue to monitor.

## 2021-08-30 NOTE — PROGRESS NOTE ADULT - ATTENDING COMMENTS
ON: afib rvr requiring amio load and bolus and IV metop  last night was obtunded, after discussion with son, made DNR DNI.  1u PRBC, received a second unit    tenuous mental status, while family present, moved lower ext distally, and opened eyes to his name  no indication for repeat head CT given waxing and waning nature  has vocal cord issues and concern for aspiration  AM CXR no emergent findings  on RA  no resp distress  afib now converted  amio drip to cont for a full load  metop IV standing  tube feeds on hold out of concern  on TPN  LFTs mildly elevated  intermittent bloody ostomy output, previsouly CTA failed to show source, endoscopy not indicated per GI  ostomy output monitor 2.2 liters  urine output monitor  VTE PPX on hold  check PM CBC  zosyn to complete today for possible contaminated abd intra op  low grade fever, and increasing leukocytosis  good glycemic control  s/p steroid taper for crohns  GI evaluating at bedside  will discuss with daughter GOC given morbid condition of patient and long course ICU ON: afib rvr requiring amio load and bolus and IV metop  last night was obtunded, after discussion with son, made DNR DNI.  1u PRBC, received a second unit    tenuous mental status, while family present, moved lower ext distally, and opened eyes to his name  no indication for repeat head CT given waxing and waning nature  has vocal cord issues and concern for aspiration  AM CXR no emergent findings  on RA  no resp distress  afib now converted  amio drip to cont for a full load  metop IV standing  tube feeds on hold out of concern  on TPN  LFTs mildly elevated  intermittent bloody ostomy output, previsouly CTA failed to show source, endoscopy not indicated per GI  ostomy output monitor 2.2 liters  urine output monitor  VTE PPX on hold  check PM CBC  zosyn to complete today for possible contaminated abd intra op  low grade fever, and increasing leukocytosis  good glycemic control  s/p steroid taper for crohns  GI evaluating at bedside  will discuss with daughter WILLIAM given morbid condition of patient and long course ICU    addendum: I spoke with tiffanie in person and his wife on the phone. I explained to them that given the protracted course in ICU, it is very unlikely that he would return to his baseline level of activity. I verified their wishes for him to be DNR DNI, and I suggested that given the low likelihood of him being able to be discharged from SICU and return to normal life, they should consider comfort options. I clearly said that should we choose this path, we accept that he is dying and focus our attention to making him comfortable. She was very upset and cried, but said she thinks he is also tired from all this. She later told me that his father when prompted whether would want to continue like this said no and wants to be made comfortable. Given Tiffanie's condition I asked her to think about this and discuss it with her step mom and other family members. She was very appreciative. The colo rectal surgeon was present for part of the conversation.

## 2021-08-30 NOTE — PROGRESS NOTE ADULT - SUBJECTIVE AND OBJECTIVE BOX
24h Events:  No acute events overnight.    Subjective:   Patient seen at bedside this AM.     Objective:  Vital Signs  T(C): 37.1 (08-29 @ 23:00), Max: 37.1 (08-29 @ 23:00)  HR: 110 (08-30 @ 00:00) (72 - 134)  BP: 96/54 (08-30 @ 00:00) (78/46 - 137/84)  RR: 30 (08-30 @ 00:00) (20 - 40)  SpO2: 100% (08-30 @ 00:00) (96% - 100%)  08-28-21 @ 07:01  -  08-29-21 @ 07:00  --------------------------------------------------------  IN:  Total IN: 0 mL    OUT:    Bulb (mL): 5 mL    Bulb (mL): 200 mL    Ileostomy (mL): 250 mL    Voided (mL): 1950 mL  Total OUT: 2405 mL    Total NET: -2405 mL      08-29-21 @ 07:01  -  08-30-21 @ 00:35  --------------------------------------------------------  IN:  Total IN: 0 mL    OUT:    Bulb (mL): 170 mL    Bulb (mL): 10 mL    Ileostomy (mL): 1000 mL    Incontinent per Condom Catheter (mL): 500 mL    Voided (mL): 1250 mL  Total OUT: 2930 mL    Total NET: -2930 mL        Physical Exam:  GEN: resting in bed comfortably in NAD  RESP: no increased WOB  ENT: NGT   ABD: soft, non-distended, non-tender without rebound tenderness or guarding. Incision sites clean, dry, and intact without erythema or edema. PALOO x2 w ss output. Ostomy w minimal bloody output mixed with stool.   EXTR: warm, well-perfused, no edema  NEURO: awake      Labs:                        7.1    13.77 )-----------( 144      ( 30 Aug 2021 00:17 )             22.5   08-29    137  |  105  |  40<H>  ----------------------------<  119<H>  4.1   |  24  |  0.53    Ca    7.4<L>      29 Aug 2021 17:13  Phos  2.8     08-29  Mg     2.0     08-29    TPro  4.6<L>  /  Alb  1.7<L>  /  TBili  0.5  /  DBili  x   /  AST  36  /  ALT  60<H>  /  AlkPhos  131<H>  08-29    CAPILLARY BLOOD GLUCOSE      POCT Blood Glucose.: 134 mg/dL (29 Aug 2021 17:28)  POCT Blood Glucose.: 126 mg/dL (29 Aug 2021 12:20)  POCT Blood Glucose.: 118 mg/dL (29 Aug 2021 05:10)      Imaging:     24h Events:  -Still in SICU  -Afib w RVR, unresponsive to metoprolol, w resolution after amiodarone infusion   -brief period of NSVT, resolved spontaneously   -patient hypotensive to 79/47, 250cc bolus Albumin 5% given, with return to HDS   -decreasing mental status, per SICU, pt has a GCS of 7  -GO discussion with pt's son, wife, patient made DNR/DNI   -MOLST form in chart   -bloody output from ostomy, now s/p 1 U PRBC o/n    Subjective:   Patient seen at bedside this AM. Pt was unresponsive to verbal stimulation.     Objective:  Vital Signs  T(C): 37.1 (08-29 @ 23:00), Max: 37.1 (08-29 @ 23:00)  HR: 110 (08-30 @ 00:00) (72 - 134)  BP: 96/54 (08-30 @ 00:00) (78/46 - 137/84)  RR: 30 (08-30 @ 00:00) (20 - 40)  SpO2: 100% (08-30 @ 00:00) (96% - 100%)  08-28-21 @ 07:01  -  08-29-21 @ 07:00  --------------------------------------------------------  IN:  Total IN: 0 mL    OUT:    Bulb (mL): 5 mL    Bulb (mL): 200 mL    Ileostomy (mL): 250 mL    Voided (mL): 1950 mL  Total OUT: 2405 mL    Total NET: -2405 mL      08-29-21 @ 07:01  -  08-30-21 @ 00:35  --------------------------------------------------------  IN:  Total IN: 0 mL    OUT:    Bulb (mL): 170 mL    Bulb (mL): 10 mL    Ileostomy (mL): 1000 mL    Incontinent per Condom Catheter (mL): 500 mL    Voided (mL): 1250 mL  Total OUT: 2930 mL    Total NET: -2930 mL        Physical Exam:  GEN: in bed, NAD, appears uncomfortable   RESP: tachypneic  ENT: NGT   ABD: soft, non-distended, difficult to assess pain given altered mental status. Incision sites clean, dry, and intact without erythema or edema. PAOLO x2 w ss output. Ostomy w bloody output mixed with stool.   EXTR: warm, well-perfused, edematous UE b/l  NEURO: Altered, A/Ox0      Labs:                        7.1    13.77 )-----------( 144      ( 30 Aug 2021 00:17 )             22.5   08-29    137  |  105  |  40<H>  ----------------------------<  119<H>  4.1   |  24  |  0.53    Ca    7.4<L>      29 Aug 2021 17:13  Phos  2.8     08-29  Mg     2.0     08-29    TPro  4.6<L>  /  Alb  1.7<L>  /  TBili  0.5  /  DBili  x   /  AST  36  /  ALT  60<H>  /  AlkPhos  131<H>  08-29    CAPILLARY BLOOD GLUCOSE      POCT Blood Glucose.: 134 mg/dL (29 Aug 2021 17:28)  POCT Blood Glucose.: 126 mg/dL (29 Aug 2021 12:20)  POCT Blood Glucose.: 118 mg/dL (29 Aug 2021 05:10)      Imaging:

## 2021-08-30 NOTE — CHART NOTE - NSCHARTNOTEFT_GEN_A_CORE
Called to bedside because pt is not responsive. On my exam, pt's VS are normal: 113/55  79  100% on R/A. He is mildly tachypneic but does not appear distressed. No gurgling/ stridor. Does not open eyes or move extremities to pain, does not attempt to speak. Pupils medium sized and responsive to light, symmetric. Meets criteria for intubation to protect his airway. Deep suctioning of the airway produces some white mucus, pt does not gag intensely (very minor grimace to suctioning).    O2 sat 100%  pCO2 33  pH 7.49/ LA 1.9  Electrolytes acceptable (no major electrolyte derangements)  BUN 39  Glucose 115  Has not received any sedative medication, including BDZ or opioids tonight  Not in rapid afib or hypotensive currently  No signs of new sepsis or septic shock  No history of seizures or any movements that would suggest seizure activity      We reached out to pt's wife, who stated that she cannot make the decision about intubation herself and defers to her children. We tried to reach pt's daughter, who is his primary caretaker and is very dedicated to his care (she visits every day, has been taking care of him during the past months with severe Crohn's disease). Unfortunately, we were unable to reach her. Pt's son Shemar told SICU resident Dr. Dotson that no breathing tube should be placed, even if this results in death.    I tried to call pt's daughter also at [phone], and left a message. I spoke with pt's son Shemar at [phone] and confirmed that he does not want his father intubated even if this results in his death. He stated clearly and firmly that his father expressed to his family that he would not want a breathing tube. I advised him that if his father's heart stops because his oxygen levels drop or he can't breathe, without being able to intubate him, CPR would be futile and will not be attempted. He expressed his understanding and agreed that CPR will not be attempted if his father's heart stops.    MOLST form filled out, DNR order in the chart. Without being able to protect the patient's airway, obtaining a head CT carries more risk than potential benefit, given that he has had surgery recently and has an active GIB and is not a candidate for tPA. Will continue suctioning airway for support, will give blood for ongoing GIB.

## 2021-08-30 NOTE — PROGRESS NOTE ADULT - SUBJECTIVE AND OBJECTIVE BOX
Chief Complaint:  Patient is a 81y old  Male who presents with a chief complaint of diarrhea with blood in it , abd pain and weakness (30 Aug 2021 09:03)        Interval Events:   Overnight patient became unresponsive to verbal/painful stimuli, unclear cause.   Had 7 beats NSVT  Patient's family declined intubation, offered for airway protection, and patient made DNR/DNI.  Remains unresponsive on exam.  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  He was started on [facility] Medications:  aMIOdarone Infusion 0.5 mG/Min IV Continuous <Continuous>  Biotene Dry Mouth Oral Rinse 5 milliLiter(s) Swish and Spit four times a day  chlorhexidine 2% Cloths 1 Application(s) Topical <User Schedule>  fat emulsion (Fish Oil and Plant Based) 20% Infusion 25 mL/Hr IV Continuous <Continuous>  insulin lispro (ADMELOG) corrective regimen sliding scale   SubCutaneous every 6 hours  metoprolol tartrate Injectable 5 milliGRAM(s) IV Push every 4 hours  pantoprazole  Injectable 40 milliGRAM(s) IV Push every 12 hours  Parenteral Nutrition - Adult 1 Each TPN Continuous <Continuous>  Parenteral Nutrition - Adult 1 Each TPN Continuous <Continuous>  piperacillin/tazobactam IVPB.. 3.375 Gram(s) IV Intermittent every 8 hours      ROS: limited due to patient's condition    PHYSICAL EXAM:   Vital Signs:  Vital Signs Last 24 Hrs  T(C): 37.1 (30 Aug 2021 11:00), Max: 37.8 (30 Aug 2021 06:00)  T(F): 98.7 (30 Aug 2021 11:00), Max: 100.1 (30 Aug 2021 06:00)  HR: 66 (30 Aug 2021 13:00) (66 - 134)  BP: 93/54 (30 Aug 2021 13:00) (78/46 - 117/57)  BP(mean): 69 (30 Aug 2021 13:00) (58 - 82)  RR: 23 (30 Aug 2021 13:00) (20 - 49)  SpO2: 100% (30 Aug 2021 13:00) (97% - 100%)  Daily     Daily Weight in k.4 (30 Aug 2021 02:54)    GENERAL: no acute distress  NEURO: alert  HEENT: anicteric sclera, no conjunctival pallor appreciated  CHEST: no respiratory distress, no accessory muscle use  CARDIAC: regular rate, rhythm  ABDOMEN: soft, non-tender, non-distended, no rebound or guarding  EXTREMITIES: warm, well perfused, no edema  SKIN: no lesions noted    LABS: reviewed                        8.3    15.23 )-----------( 131      ( 30 Aug 2021 08:16 )             26.1         137  |  106  |  39<H>  ----------------------------<  115<H>  3.9   |  22  |  0.47<L>    Ca    7.9<L>      30 Aug 2021 00:17  Phos  2.3       Mg     2.3         TPro  4.5<L>  /  Alb  1.9<L>  /  TBili  0.4  /  DBili  x   /  AST  28  /  ALT  46<H>  /  AlkPhos  101      LIVER FUNCTIONS - ( 30 Aug 2021 00:17 )  Alb: 1.9 g/dL / Pro: 4.5 g/dL / ALK PHOS: 101 U/L / ALT: 46 U/L / AST: 28 U/L / GGT: x             Interval Diagnostic Studies: see sunrise for full report   Chief Complaint:  Patient is a 81y old  Male who presents with a chief complaint of diarrhea with blood in it , abd pain and weakness (30 Aug 2021 09:03)        Interval Events:   Overnight patient became unresponsive to verbal/painful stimuli, unclear cause.   Had 7 beats NSVT  Patient's family declined intubation, offered for airway protection, and patient made DNR/DNI.  Remains unresponsive on exam.  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  He was started on [facility] Medications:  aMIOdarone Infusion 0.5 mG/Min IV Continuous <Continuous>  Biotene Dry Mouth Oral Rinse 5 milliLiter(s) Swish and Spit four times a day  chlorhexidine 2% Cloths 1 Application(s) Topical <User Schedule>  fat emulsion (Fish Oil and Plant Based) 20% Infusion 25 mL/Hr IV Continuous <Continuous>  insulin lispro (ADMELOG) corrective regimen sliding scale   SubCutaneous every 6 hours  metoprolol tartrate Injectable 5 milliGRAM(s) IV Push every 4 hours  pantoprazole  Injectable 40 milliGRAM(s) IV Push every 12 hours  Parenteral Nutrition - Adult 1 Each TPN Continuous <Continuous>  Parenteral Nutrition - Adult 1 Each TPN Continuous <Continuous>  piperacillin/tazobactam IVPB.. 3.375 Gram(s) IV Intermittent every 8 hours      ROS: limited due to patient's condition    PHYSICAL EXAM:   Vital Signs:  Vital Signs Last 24 Hrs  T(C): 37.1 (30 Aug 2021 11:00), Max: 37.8 (30 Aug 2021 06:00)  T(F): 98.7 (30 Aug 2021 11:00), Max: 100.1 (30 Aug 2021 06:00)  HR: 66 (30 Aug 2021 13:00) (66 - 134)  BP: 93/54 (30 Aug 2021 13:00) (78/46 - 117/57)  BP(mean): 69 (30 Aug 2021 13:00) (58 - 82)  RR: 23 (30 Aug 2021 13:00) (20 - 49)  SpO2: 100% (30 Aug 2021 13:00) (97% - 100%)  Daily     Daily Weight in k.4 (30 Aug 2021 02:54)    GENERAL:  NAD, non responsive to verbal/painful stimulation  HEENT:  NC/AT,  conjunctivae clear and pink, sclera -anicteric  CHEST:  Normal Effort  ABDOMEN:   soft, Non-tender, Non-distended. ostomy c/d/i  EXTREMITIES:  no cyanosis or edema  SKIN:  Warm & Dry. No rash or erythema  NEURO:  lethargic   LABS: reviewed                        8.3    15.23 )-----------( 131      ( 30 Aug 2021 08:16 )             26.1         137  |  106  |  39<H>  ----------------------------<  115<H>  3.9   |  22  |  0.47<L>    Ca    7.9<L>      30 Aug 2021 00:17  Phos  2.3       Mg     2.3         TPro  4.5<L>  /  Alb  1.9<L>  /  TBili  0.4  /  DBili  x   /  AST  28  /  ALT  46<H>  /  AlkPhos  101      LIVER FUNCTIONS - ( 30 Aug 2021 00:17 )  Alb: 1.9 g/dL / Pro: 4.5 g/dL / ALK PHOS: 101 U/L / ALT: 46 U/L / AST: 28 U/L / GGT: x             Interval Diagnostic Studies: see sunrise for full report

## 2021-08-30 NOTE — PROGRESS NOTE ADULT - SUBJECTIVE AND OBJECTIVE BOX
81y old  Male who presents with a chief complaint of diarrhea with blood in it , abd pain and weakness (30 Aug 2021 15:16)      Interval history:  Low grade temp, no abdominal pain per daughter at bedside, s/p 2 units of PRBC overnight due to bloody output in ostomy. Now resolved       Allergies:   No Known Allergies    Antimicrobials:      REVIEW OF SYSTEMS:  No chest pain   No SOB  No rash.       Vital Signs Last 24 Hrs  T(C): 36.2 (08-30-21 @ 23:00), Max: 37.8 (08-30-21 @ 06:00)  T(F): 97.2 (08-30-21 @ 23:00), Max: 100.1 (08-30-21 @ 06:00)  HR: 60 (08-30-21 @ 23:00) (58 - 99)  BP: 112/55 (08-30-21 @ 23:00) (88/50 - 120/59)  BP(mean): 79 (08-30-21 @ 23:00) (64 - 85)  RR: 24 (08-30-21 @ 23:00) (19 - 49)  SpO2: 100% (08-30-21 @ 23:00) (99% - 100%)      PHYSICAL EXAM:  Patient sleepy, arousable but snoring   + NG   Cardiovascular: S1S2 normal.  Lungs: + air entry B/L lung fields.  Gastrointestinal: soft, nondistended. + ostomy   Extremities: anasarca   + PICC                            7.8    11.26 )-----------( 140      ( 30 Aug 2021 22:53 )             24.7   08-30    135  |  105  |  34<H>  ----------------------------<  100<H>  4.3   |  23  |  0.47<L>    Ca    7.4<L>      30 Aug 2021 22:53  Phos  3.0     08-30  Mg     2.2     08-30    TPro  4.5<L>  /  Alb  1.8<L>  /  TBili  0.7  /  DBili  x   /  AST  17  /  ALT  34  /  AlkPhos  77  08-30      LIVER FUNCTIONS - ( 30 Aug 2021 22:53 )  Alb: 1.8 g/dL / Pro: 4.5 g/dL / ALK PHOS: 77 U/L / ALT: 34 U/L / AST: 17 U/L / GGT: x             < from: Xray Chest 1 View- PORTABLE-Routine (Xray Chest 1 View- PORTABLE-Routine in AM.) (08.30.21 @ 06:48) >    IMPRESSION:    The heart is normal in size. The lungs appear to be clear. No pleural effusion. No pneumothorax. NG tube is in the stomach. A PICC line is seen on the right and the tip is in the superior vena cava. Degenerative changes of the thoracic spine. No change in the appearance the chest when compared to previous study done on August 29, 2021 at 7:01 AM.

## 2021-08-30 NOTE — CHART NOTE - NSCHARTNOTEFT_GEN_A_CORE
Upon routine nursing evaluation, patient was found to be completely unresponsive to voice or painful stimuli. Providers were alerted by nursing staff and brought to examine patient at bedside. After examination by multiple providers at bedside, patient's best GCS score was determined to be 7 (V1, E1, M5).     Based on patient's current mental status with GCS score of 7, it was deemed that patient was unable to protect his airway and would require endotracheal intubation for protection against a potential aspiration event that may ultimately be fatal. Thus, patient's daughter (Tiffanie Bustamante) was contacted at [phone]. Upon routine nursing evaluation, patient was found to be completely unresponsive to voice or painful stimuli. Providers were alerted by nursing staff and brought to examine patient at bedside. After examination by multiple providers at bedside, patient's best GCS score was determined to be 7 (V1, E1, M5). However, patient remained hemodynamically stable and with appropriate O2 saturations at room air.    Based on patient's current mental status with GCS score of 7, it was deemed that patient was unable to protect his airway and would require endotracheal intubation for protection against an aspiration event that may ultimately be fatal. Thus, family members were contacted to determine family's wishes regarding intubation. Patient's daughter (Tiffanie Bustamante) was initially called at [phone] twice but with no response. Hence, patient's wife (Deyanira Bustamante) was successfully contacted at [phone]. It was explained to Deyanira that patient was unarousable, unable to protect his airway and in need of endotracheal intubation for protection against fatal aspiration event. Deyanira deferred to Tiffanie and/or patient's son (Shemar Dugan) to make the decision regarding intubation. Tiffanie was once more contacted with no response for the third time. In light of not being able to contact Tiffanie, Shemar Dugan was successfully contacted at [phone]. The situation was then explained to Shemar Dugan for the need of endotracheal intubation given the risk of fatal aspiration event. Shemar Dugan expressed his wishes of NOT intubating the patient while also expressing full understanding of the risks of not pursuing endotracheal intubation in the setting of patient's current clinical status. Risks explained to Shemar Dugan included potential aspiration event that may lead to patient's death. Given Shemar Dugan decision of not intubating the patient, then clarification of wishes for cardiopulmonary resuscitation in the setting of cardiac arrest was explored. Shemar Dugan then expressed his wishes of NOT pursuing CPR in case of cardiac arrest.     At this time, a MOLST form was filled out stating patient's DNR and DNI status. Deyanira was again contacted to inform her of the conversations had with Shemar Barb and patient's newly determined DNR and DNI status. She understood the decision and said she would contact Shemar Dugan to discuss these decisions. Shortly after, Shemar Dugan contacted the SICU to confirm he spoke to Deyanira and that both were in agreement with the DNR and DNI status overnight. Shemar Dugan also informed the SICU providers that family will discuss further in the morning and will update the SICU regarding final DNR/DNI status [email or web address].     It was then decided to keep DNR/DNI status overnight until family updates SICU in the AM. Tiffanie was contacted a fourth time with no response, voicemail was left in her inbox. Upon routine nursing evaluation, patient was found to be completely unresponsive to voice or painful stimuli. Providers were alerted by nursing staff and brought to examine patient at bedside. After examination by multiple providers at bedside, patient's best GCS score was determined to be 7 (V1, E1, M5). However, patient remained hemodynamically stable and with appropriate O2 saturations at room air.    Based on patient's current mental status with GCS score of 7, it was deemed that patient was unable to protect his airway and would require endotracheal intubation for protection against an aspiration event that may ultimately be fatal. Thus, family members were contacted to determine family's wishes regarding intubation. Patient's daughter (Tiffanie Bustamante) was initially called at [phone] twice but with no response. Hence, patient's wife (Deyanira Bustamante) was successfully contacted at [phone]. It was explained to Deyanira that patient was unarousable, unable to protect his airway and in need of endotracheal intubation for protection against fatal aspiration event. Deyanira deferred to Tiffanie and/or patient's son (Shemar Dugan) to make the decision regarding intubation. Tiffanie was once more contacted with no response for the third time. In light of not being able to contact Tiffanie, Shemar Dugan was successfully contacted at [phone]. The situation was then explained to Shemar Dugan for the need of endotracheal intubation given the risk of fatal aspiration event. Shemar Dugan expressed his wishes of NOT intubating the patient while also expressing full understanding of the risks of not pursuing endotracheal intubation in the setting of patient's current clinical status. Risks explained to Shemar Dugan included potential aspiration event that may lead to patient's death. Given Shemar Dugan decision of not intubating the patient, then clarification of wishes for cardiopulmonary resuscitation in the setting of cardiac arrest was explored. Shemar Dugan then expressed his wishes of NOT pursuing CPR in case of cardiac arrest.     At this time, a MOLST form was filled out stating patient's DNR and DNI status. Deyanira was again contacted to inform her of the conversations had with Shemar Barb and patient's newly determined DNR and DNI status. She understood the decision and said she would contact Shemar Dugan to discuss these decisions. Shortly after, Shemar Dugan contacted the SICU to confirm he spoke to Deyanira and that both were in agreement with the DNR and DNI status overnight. Shemar Dugan also informed the SICU providers that family will discuss further in the morning and will update the SICU regarding final DNR/DNI status hernandez.     It was then decided to keep DNR/DNI status overnight until family updates SICU in the AM. Tiffanie was contacted a fourth time with no response, voicemail was left in her inbox.    HARSHIL Yi, PGY-2   [facility]   Surgical Intensive Care Unit  [id number]

## 2021-08-30 NOTE — PROGRESS NOTE ADULT - ASSESSMENT
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  - Negative for CMV inclusions (CMV IHC)    Methyprednisolone tapered off 8/27    overall leucocytosis, peritonitis, malnourished state.    Suggest  on zosyn, needs 10 days total for peritonitis, s/p completion   trend cbc for leucocytosis,       Plan discussed with SICU team    Ezequiel Oates  Pager: [phone]. If no response or past 5 pm call [phone].

## 2021-08-30 NOTE — PROGRESS NOTE ADULT - SUBJECTIVE AND OBJECTIVE BOX
HISTORY  81y Male M with Crohn disease s/p failed medical management. 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 Transferred to SICU intubated and on pressors. Now extubated and off pressors.      24 HOUR EVENTS:  - Pt with episodes of A-fib RVR (max HR 170s), given 2 additional IVP of metoprolol 5mg without resolution. Started amiodarone load with conversion to sinus rhythm heart rate normal  - Pt with episode of NSVT (7 beats), trops mildly elevated but downtrending. EKG unimpressive  - Pt's home clonazepam restarted for insomnia  - Pt with decreasing mental status, GCS 7 (M5 V1 E1), GOC discussion had with family, pt made DNR/DNI with plans to re-discuss with family in AM. Tube feeds discontinued, clonazepam discontinued, lovenox discontinued  - Troponin downtrending, no longer trending  - Diuresed with 20mg IV lasix during the day due to +2L fluid balance with good response. Pt hypotensive overnight (MAP 50s), POCUS with hyperdynamic LV, poorly visualized IVC, given 250cc 5% albumin bolus x2 with improvement  - Pt with sudden bloody output from ileostomy, given 1u pRBC with incomplete response, hemodynamically stable, will trend CBCs more frequently      NEURO  Exam: obtunded, GCS 7 (M5 V1 E1). Pupils equal and reactive.  Meds: clonazePAM  Tablet 0.25 milliGRAM(s) Oral at bedtime  [x] Adequacy of sedation and pain control has been assessed and adjusted      RESPIRATORY  RR: 28 (08-30-21 @ 03:45) (20 - 49)  SpO2: 100% (08-30-21 @ 03:45) (96% - 100%)  Exam: unlabored respirations, not using accessory muscles. No stridor or gurgling  ABG - ( 30 Aug 2021 03:50 )  pH: 7.46  /  pCO2: 32    /  pO2: 151   / HCO3: 23    / Base Excess: -0.8  /  SaO2: 99.6    Meds:       CARDIOVASCULAR  HR: 75 (08-30-21 @ 03:45) (67 - 134)  BP: 116/58 (08-30-21 @ 03:45) (78/46 - 137/84)  BP(mean): 81 (08-30-21 @ 03:45) (58 - 105)  VBG - ( 28 Aug 2021 14:12 )  pH: x     /  pCO2: x     /  pO2: x     / HCO3: x     / Base Excess: x     /  SaO2: x      Lactate: 0.9    Exam: currently RRR  Cardiac Rhythm: normal sinus  Perfusion     [x]Adequate   [ ]Inadequate  Mentation   [ ]Normal       [x]Reduced  Extremities  [x]Warm         [ ]Cool  Volume Status [ ]Hypervolemic [ ]Euvolemic [x]Hypovolemic  Meds: aMIOdarone Infusion 1 mG/Min IV Continuous <Continuous>  aMIOdarone Infusion 0.5 mG/Min IV Continuous <Continuous>  metoprolol tartrate Injectable 5 milliGRAM(s) IV Push every 4 hours      GI/NUTRITION  Exam: soft nondistended. TANJA tube in place. Ileostomy with bloody output  Diet: NPO  Meds: pantoprazole  Injectable 40 milliGRAM(s) IV Push every 12 hours      GENITOURINARY  I&O's Detail    08-28 @ 07:01 - 08-29 @ 07:00  --------------------------------------------------------  IN:    Fat Emulsion (Fish Oil &amp; Plant Based) 20% Infusion: 275 mL    Glucerna: 415 mL    IV PiggyBack: 100 mL    IV PiggyBack: 550 mL    Lactated Ringers Bolus: 500 mL    PRBCs (Packed Red Blood Cells): 600 mL    TPN (Total Parenteral Nutrition): 1992 mL  Total IN: 4432 mL    OUT:    Bulb (mL): 5 mL    Bulb (mL): 200 mL    Fat Emulsion (Fish Oil &amp; Plant Based) 20% Infusion: 0 mL    Ileostomy (mL): 250 mL    Voided (mL): 1950 mL  Total OUT: 2405 mL    Total NET: 2027 mL    08-29 @ 07:01  -  08-30 @ 04:19  --------------------------------------------------------  IN:    Albumin 5%  - 250 mL: 500 mL    Amiodarone: 233.1 mL    Amiodarone: 50.1 mL    Fat Emulsion (Fish Oil &amp; Plant Based) 20% Infusion: 300 mL    Glucerna: 990 mL    IV PiggyBack: 250 mL    IV PiggyBack: 575 mL    PRBCs (Packed Red Blood Cells): 300 mL    TPN (Total Parenteral Nutrition): 1743 mL  Total IN: 4941.2 mL    OUT:    Bulb (mL): 260 mL    Bulb (mL): 10 mL    Ileostomy (mL): 1925 mL    Incontinent per Condom Catheter (mL): 760 mL    Voided (mL): 1250 mL  Total OUT: 4205 mL    Total NET: 736.2 mL    08-30    137  |  106  |  39<H>  ----------------------------<  115<H>  3.9   |  22  |  0.47<L>    Ca    7.9<L>      30 Aug 2021 00:17  Phos  2.3     08-30  Mg     2.3     08-30    TPro  4.5<L>  /  Alb  1.9<L>  /  TBili  0.4  /  DBili  x   /  AST  28  /  ALT  46<H>  /  AlkPhos  101  08-30    [ ] Roberson catheter, indication: none  Meds: fat emulsion (Fish Oil and Plant Based) 20% Infusion 25 mL/Hr IV Continuous <Continuous>  Parenteral Nutrition - Adult 1 Each TPN Continuous <Continuous>      HEMATOLOGIC  Meds: enoxaparin Injectable 40 milliGRAM(s) SubCutaneous every 24 hours  [x] VTE Prophylaxis                        7.4    16.03 )-----------( 130      ( 30 Aug 2021 03:56 )             23.1     PT/INR - ( 29 Aug 2021 00:45 )   PT: 13.6 sec;   INR: 1.14 ratio    PTT - ( 28 Aug 2021 10:25 )  PTT:30.3 sec  Transfusion     [1] PRBC   [ ] Platelets   [ ] FFP   [ ] Cryoprecipitate      INFECTIOUS DISEASES  T(C): 37.1 (08-29-21 @ 23:00), Max: 37.1 (08-29-21 @ 23:00)  WBC Count: 16.03 K/uL (08-30 @ 03:56)  WBC Count: 13.77 K/uL (08-30 @ 00:17)  WBC Count: 14.40 K/uL (08-29 @ 17:13)  WBC Count: 14.60 K/uL (08-29 @ 10:27)    Recent Cultures:    Meds: piperacillin/tazobactam IVPB.. 3.375 Gram(s) IV Intermittent every 8 hours      ENDOCRINE  Capillary Blood Glucose    Meds: insulin lispro (ADMELOG) corrective regimen sliding scale   SubCutaneous every 6 hours      ACCESS DEVICES:  [x] Peripheral IV  [ ] Central Venous Line	[ ] R	[ ] L	[ ] IJ	[ ] Fem	[ ] SC	Placed:   [ ] Arterial Line		[ ] R	[ ] L	[ ] Fem	[ ] Rad	[ ] Ax	Placed:   [x] PICC (RUE): placed 08/16					[ ] Mediport  [ ] Urinary Catheter, Date Placed:   [x] Necessity of urinary, arterial, and venous catheters discussed      OTHER MEDICATIONS:  Biotene Dry Mouth Oral Rinse 5 milliLiter(s) Swish and Spit four times a day  chlorhexidine 2% Cloths 1 Application(s) Topical <User Schedule>    CODE STATUS: DNR/DNI, MOLST in chart, family to discuss code status in AM    IMAGING:

## 2021-08-31 NOTE — PROGRESS NOTE ADULT - SUBJECTIVE AND OBJECTIVE BOX
Patient is a 81y old  Male who presents with a chief complaint of diarrhea with blood in it , abd pain and weakness (31 Aug 2021 11:44)      INTERVAL HPI/OVERNIGHT EVENTS:  T(C): 36.4 (08-31-21 @ 11:00), Max: 36.6 (08-31-21 @ 07:00)  HR: 62 (08-31-21 @ 18:00) (59 - 80)  BP: 114/57 (08-31-21 @ 18:00) (112/54 - 132/60)  RR: 25 (08-31-21 @ 18:00) (15 - 35)  SpO2: 100% (08-31-21 @ 18:00) (100% - 100%)  Wt(kg): --  I&O's Summary    30 Aug 2021 07:01  -  31 Aug 2021 07:00  --------------------------------------------------------  IN: 2984 mL / OUT: 2450 mL / NET: 534 mL    31 Aug 2021 07:01  -  31 Aug 2021 19:07  --------------------------------------------------------  IN: 938 mL / OUT: 910 mL / NET: 28 mL        PAST MEDICAL & SURGICAL HISTORY:  Crohn disease    Paroxysmal atrial fibrillation    CAD (coronary artery disease)  ?stents? Off ASA/Plavix due to intolerance - unknown    GI bleed    No significant past surgical history        SOCIAL HISTORY  Alcohol:  Tobacco:  Illicit substance use:    FAMILY HISTORY:    REVIEW OF SYSTEMS:  CONSTITUTIONAL: No fever, weight loss, or fatigue  EYES: No eye pain, visual disturbances, or discharge  ENMT:  No difficulty hearing, tinnitus, vertigo; No sinus or throat pain  NECK: No pain or stiffness  RESPIRATORY: No cough, wheezing, chills or hemoptysis; No shortness of breath  CARDIOVASCULAR: No chest pain, palpitations, dizziness, or leg swelling  GASTROINTESTINAL: No abdominal or epigastric pain. No nausea, vomiting, or hematemesis; No diarrhea or constipation. No melena or hematochezia.  GENITOURINARY: No dysuria, frequency, hematuria, or incontinence  NEUROLOGICAL: No headaches, memory loss, loss of strength, numbness, or tremors  SKIN: No itching, burning, rashes, or lesions   LYMPH NODES: No enlarged glands  ENDOCRINE: No heat or cold intolerance; No hair loss  MUSCULOSKELETAL: No joint pain or swelling; No muscle, back, or extremity pain  PSYCHIATRIC: No depression, anxiety, mood swings, or difficulty sleeping  HEME/LYMPH: No easy bruising, or bleeding gums  ALLERY AND IMMUNOLOGIC: No hives or eczema    RADIOLOGY & ADDITIONAL TESTS:    Imaging Personally Reviewed:  [ ] YES  [ ] NO    Consultant(s) Notes Reviewed:  [ ] YES  [ ] NO    PHYSICAL EXAM:  GENERAL: NAD, well-groomed, well-developed  HEAD:  Atraumatic, Normocephalic  EYES: EOMI, PERRLA, conjunctiva and sclera clear  ENMT: No tonsillar erythema, exudates, or enlargement; Moist mucous membranes, Good dentition, No lesions  NECK: Supple, No JVD, Normal thyroid  NERVOUS SYSTEM:  Alert & Oriented X3, Good concentration; Motor Strength 5/5 B/L upper and lower extremities; DTRs 2+ intact and symmetric  CHEST/LUNG: Clear to percussion bilaterally; No rales, rhonchi, wheezing, or rubs  HEART: Regular rate and rhythm; No murmurs, rubs, or gallops  ABDOMEN: Soft, Nontender, Nondistended; Bowel sounds present  EXTREMITIES:  2+ Peripheral Pulses, No clubbing, cyanosis, or edema  LYMPH: No lymphadenopathy noted  SKIN: No rashes or lesions    LABS:                        7.8    11.26 )-----------( 140      ( 30 Aug 2021 22:53 )             24.7     08-30    135  |  105  |  34<H>  ----------------------------<  100<H>  4.3   |  23  |  0.47<L>    Ca    7.4<L>      30 Aug 2021 22:53  Phos  3.0     08-30  Mg     2.2     08-30    TPro  4.5<L>  /  Alb  1.8<L>  /  TBili  0.7  /  DBili  x   /  AST  17  /  ALT  34  /  AlkPhos  77  08-30        CAPILLARY BLOOD GLUCOSE        ABG - ( 30 Aug 2021 03:50 )  pH, Arterial: 7.46  pH, Blood: x     /  pCO2: 32    /  pO2: 151   / HCO3: 23    / Base Excess: -0.8  /  SaO2: 99.6                    MEDICATIONS  (STANDING):  Biotene Dry Mouth Oral Rinse 5 milliLiter(s) Swish and Spit four times a day  chlorhexidine 2% Cloths 1 Application(s) Topical <User Schedule>  enoxaparin Injectable 40 milliGRAM(s) SubCutaneous every 24 hours  fat emulsion (Fish Oil and Plant Based) 20% Infusion 25 mL/Hr (25 mL/Hr) IV Continuous <Continuous>  loperamide Liquid 1 milliGRAM(s) Oral three times a day  metoprolol tartrate Injectable 5 milliGRAM(s) IV Push every 4 hours  pantoprazole  Injectable 40 milliGRAM(s) IV Push every 12 hours  Parenteral Nutrition - Adult 1 Each (83 mL/Hr) TPN Continuous <Continuous>    MEDICATIONS  (PRN):  acetaminophen    Suspension .. 650 milliGRAM(s) Oral every 6 hours PRN Mild Pain (1 - 3)      Care Discussed with Consultants/Other Providers [ ] YES  [ ] NO

## 2021-08-31 NOTE — PROGRESS NOTE ADULT - SUBJECTIVE AND OBJECTIVE BOX
Patient is a 81y old  Male who presents with a chief complaint of diarrhea with blood in it , abd pain and weakness (31 Aug 2021 08:37)      TELEMETRY Personally reviewed: sinus 80s     REVIEW OF SYSTEMS:   MEDICATIONS:  metoprolol tartrate Injectable 5 milliGRAM(s) IV Push every 4 hours    PHYSICAL EXAM:  T(C): 36.4 (08-31-21 @ 11:00), Max: 37 (08-30-21 @ 15:00)  HR: 68 (08-31-21 @ 11:00) (58 - 70)  BP: 132/60 (08-31-21 @ 11:00) (93/54 - 132/60)  RR: 30 (08-31-21 @ 11:00) (15 - 30)  SpO2: 100% (08-31-21 @ 11:00) (99% - 100%)  Wt(kg): --  I&O's Summary  30 Aug 2021 07:01  -  31 Aug 2021 07:00  --------------------------------------------------------  IN: 2984 mL / OUT: 2450 mL / NET: 534 mL    31 Aug 2021 07:01  -  31 Aug 2021 11:45  --------------------------------------------------------  IN: 332 mL / OUT: 300 mL / NET: 32 mL    Appearance: In no distress	  HEENT:    PERRL, EOMI	  Cardiovascular:  S1 S2, No JVD  Respiratory: Lungs clear to auscultation	  Gastrointestinal:  Soft, Non-tender, + BS	  Vascularature:  No edema of LE  Psychiatric: Appropriate affect   Neuro: no acute focal deficits                        7.8    11.26 )-----------( 140      ( 30 Aug 2021 22:53 )             24.7     08-30    135  |  105  |  34<H>  ----------------------------<  100<H>  4.3   |  23  |  0.47<L>    Ca    7.4<L>      30 Aug 2021 22:53  Phos  3.0     08-30  Mg     2.2     08-30    TPro  4.5<L>  /  Alb  1.8<L>  /  TBili  0.7  /  DBili  x   /  AST  17  /  ALT  34  /  AlkPhos  77  08-30  Labs personally reviewed    ASSESSMENT/PLAN: 	    Problem/Plan - 1:  ·  Problem: CAD (coronary artery disease).  Plan: c/w home meds  We discussed the importance of SAPT with low dose ASA 81mg given CAD  pt denies any current chest pain, SOB or chest pressure.     Problem/Plan -2:  ·  Problem: Colitis.  Plan: Ct shows crohn's  exacerbation   f/u flex sig with biopsies   s/p FMT 7/28  plan for repeat FMT vs Fidoxomaicin if sx don't improve  f/p flex sig on 8/9- no evidence of pseudomembranous   started on Fidoxomaicin and steroids   per GI if fails medical therapy, then surgical evaluation would be indicated  - s/p laparoscopic total abdominal colectomy with end ileostomy, currently in SICU    Problem/Plan - 3:  ·  Problem: Afib  -EKG noted. SR   - pt is ? candidate for AC seeing recent GIB. FOBT positive as recent as 8/8/21   continue rate control with IV lopressor - currently sinus  on tele     Problem/Plan - 4:   ·  Problem: GIB (gastrointestinal bleeding).  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, currently in SICU   - Failed Speech and swallow test- NGT in place/TPN/ NPO     Problem/Plan - 5:  ·  Problem: DVt ppx SCDs     Problem/Plan - 6:  ·  Problem: B/L LE edema   - CXR showed mildly enlarged heart   -monitor IVF with I&O  -B/L L E edema improved   -TTE with mod-severe AI, mild AS, preserved EF              Sanam Heath ANP-C  Rufus Onofre DO [facility]  Cardiovascular Medicine  [address], [address]  Office: [phone]  Cell: [phone] Patient is a 81y old  Male who presents with a chief complaint of diarrhea with blood in it , abd pain and weakness (31 Aug 2021 08:37)      TELEMETRY Personally reviewed: sinus 80s     REVIEW OF SYSTEMS:   MEDICATIONS:  metoprolol tartrate Injectable 5 milliGRAM(s) IV Push every 4 hours    PHYSICAL EXAM:  T(C): 36.4 (08-31-21 @ 11:00), Max: 37 (08-30-21 @ 15:00)  HR: 68 (08-31-21 @ 11:00) (58 - 70)  BP: 132/60 (08-31-21 @ 11:00) (93/54 - 132/60)  RR: 30 (08-31-21 @ 11:00) (15 - 30)  SpO2: 100% (08-31-21 @ 11:00) (99% - 100%)  Wt(kg): --  I&O's Summary  30 Aug 2021 07:01  -  31 Aug 2021 07:00  --------------------------------------------------------  IN: 2984 mL / OUT: 2450 mL / NET: 534 mL    31 Aug 2021 07:01  -  31 Aug 2021 11:45  ---------------------------------   IN: 332 mL / OUT: 300 mL / NET: 32 mL    Appearance: In no distress	  HEENT:    PERRL, EOMI	  Cardiovascular:  S1 S2, No JVD  Respiratory: Lungs clear to auscultation	  Gastrointestinal:  Soft, Non-tender, + BS	  Vascularature:  No edema of LE  Psychiatric: Appropriate affect   Neuro: no acute focal deficits                        7.8    11.26 )-----------( 140      ( 30 Aug 2021 22:53 )             24.7     08-30    135  |  105  |  34<H>  ----------------------------<  100<H>  4.3   |  23  |  0.47<L>    Ca    7.4<L>      30 Aug 2021 22:53  Phos  3.0     08-30  Mg     2.2     08-30    TPro  4.5<L>  /  Alb  1.8<L>  /  TBili  0.7  /  DBili  x   /  AST  17  /  ALT  34  /  AlkPhos  77  08-30  Labs personally reviewed    ASSESSMENT/PLAN: 	    Problem/Plan - 1:  ·  Problem: CAD (coronary artery disease).  Plan: c/w home meds  We discussed the importance of SAPT with low dose ASA 81mg given CAD  pt denies any current chest pain, SOB or chest pressure.     Problem/Plan -2:  ·  Problem: Colitis.  Plan: Ct shows crohn's  exacerbation   f/u flex sig with biopsies   s/p FMT 7/28  plan for repeat FMT vs Fidoxomaicin if sx don't improve  f/p flex sig on 8/9- no evidence of pseudomembranous   started on Fidoxomaicin and steroids   per GI if fails medical therapy, then surgical evaluation would be indicated  - s/p laparoscopic total abdominal colectomy with end ileostomy, currently in SICU    Problem/Plan - 3:  ·  Problem: Afib  -EKG noted. SR   - pt is ? candidate for AC seeing recent GIB. FOBT positive as recent as 8/8/21   continue rate control with IV lopressor - currently sinus  on tele     Problem/Plan - 4:   ·  Problem: GIB (gastrointestinal bleeding).  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, currently in SICU   - Failed Speech and swallow test- NGT in place/TPN/ NPO     Problem/Plan - 5:  ·  Problem: DVt ppx SCDs     Problem/Plan - 6:  ·  Problem: B/L LE edema   - CXR showed mildly enlarged heart   -monitor IVF with I&O  -B/L L E edema improved   -TTE with mod-severe AI, mild AS, preserved EF              Sanam Heath ANP-C  Rufus Onofre DO [facility]  Cardiovascular Medicine  [address], [address]  Office: [phone]  Cell: [phone]

## 2021-08-31 NOTE — PROGRESS NOTE ADULT - SUBJECTIVE AND OBJECTIVE BOX
[facility] NUTRITION SUPPORT-- FOLLOW UP NOTE  --------------------------------------------------------------------------------  24 hour events/subjective: pt seen and examined. tpn running. sitting up in bed. more awake/responsive this am. c/o back pain. denies nausea and abd pain. received ca gluconate supp overnight. goc discussions with family noted per chart and ongoing.    Diet:  Diet, NPO:   Except Medications (08-30-21 @ 04:39)      PAST HISTORY  --------------------------------------------------------------------------------  No significant changes to PMH, PSH, FHx, SHx, unless otherwise noted    ALLERGIES & MEDICATIONS  --------------------------------------------------------------------------------  Allergies    No Known Allergies    Intolerances      Standing Inpatient Medications  Biotene Dry Mouth Oral Rinse 5 milliLiter(s) Swish and Spit four times a day  chlorhexidine 2% Cloths 1 Application(s) Topical <User Schedule>  fat emulsion (Fish Oil and Plant Based) 20% Infusion 25 mL/Hr IV Continuous <Continuous>  metoprolol tartrate Injectable 5 milliGRAM(s) IV Push every 4 hours  pantoprazole  Injectable 40 milliGRAM(s) IV Push every 12 hours  Parenteral Nutrition - Adult 1 Each TPN Continuous <Continuous>    PRN Inpatient Medications        REVIEW OF SYSTEMS  --------------------------------------------------------------------------------    unable to obtain in entirety, see above      VITALS/PHYSICAL EXAM  --------------------------------------------------------------------------------  T(C): 36.6 (08-31-21 @ 07:00), Max: 37.1 (08-30-21 @ 11:00)  HR: 65 (08-31-21 @ 08:00) (58 - 95)  BP: 118/58 (08-31-21 @ 08:00) (93/54 - 132/60)  RR: 18 (08-31-21 @ 08:00) (18 - 28)  SpO2: 100% (08-31-21 @ 08:00) (99% - 100%)  Wt(kg): --      08-30-21 @ 07:01  -  08-31-21 @ 07:00  --------------------------------------------------------  IN: 2984 mL / OUT: 2450 mL / NET: 534 mL    08-31-21 @ 07:01  -  08-31-21 @ 08:38  --------------------------------------------------------  IN: 83 mL / OUT: 0 mL / NET: 83 mL      I&O's Detail    30 Aug 2021 07:01  -  31 Aug 2021 07:00  --------------------------------------------------------  IN:    Amiodarone: 167 mL    Fat Emulsion (Fish Oil &amp; Plant Based) 20% Infusion: 325 mL    IV PiggyBack: 200 mL    IV PiggyBack: 300 mL    TPN (Total Parenteral Nutrition): 1992 mL  Total IN: 2984 mL    OUT:    Bulb (mL): 15 mL    Bulb (mL): 160 mL    Fat Emulsion (Fish Oil &amp; Plant Based) 20% Infusion: 0 mL    Ileostomy (mL): 275 mL    Incontinent per Condom Catheter (mL): 2000 mL  Total OUT: 2450 mL    Total NET: 534 mL      31 Aug 2021 07:01  -  31 Aug 2021 08:38  --------------------------------------------------------  IN:    TPN (Total Parenteral Nutrition): 83 mL  Total IN: 83 mL    OUT:    Fat Emulsion (Fish Oil &amp; Plant Based) 20% Infusion: 0 mL  Total OUT: 0 mL    Total NET: 83 mL          Physical Exam:  Gen: sitting up in bed, frail, cachectic   HEENT: ncat, trachea midline +ngt  Chest: respirations non labored  Abd: soft nd ostomy with greenish stool placed to drainage bag  Extrem: no le edema appreciated  Neuro: more awake and responsive      LABS/STUDIES  --------------------------------------------------------------------------------              7.8    11.26 >-----------<  140      [08-30-21 @ 22:53]              24.7     135  |  105  |  34  ----------------------------<  100      [08-30-21 @ 22:53]  4.3   |  23  |  0.47        Ca     7.4     [08-30-21 @ 22:53]      iCa    1.10     [08-30 @ 23:18]      Mg     2.2     [08-30-21 @ 22:53]      Phos  3.0     [08-30-21 @ 22:53]    TPro  4.5  /  Alb  1.8  /  TBili  0.7  /  DBili  x   /  AST  17  /  ALT  34  /  AlkPhos  77  [08-30-21 @ 22:53]          Ca ionizedBlood Gas Arterial - Calcium, Ionized: 1.17 mmol/L (08-30-21 @ 03:50)  Blood Gas Arterial - Calcium, Ionized: 1.26 mmol/L (08-30-21 @ 00:02)    Creatinine Trend:  POC glucoseGlucose, Serum: 100 mg/dL (08-30-21 @ 22:53)  CAPILLARY BLOOD GLUCOSE      POCT Blood Glucose.: 119 mg/dL (30 Aug 2021 17:31)  POCT Blood Glucose.: 129 mg/dL (30 Aug 2021 11:28)    PrealbuminPrealbumin, Serum: 15 mg/dL (08-30-21 @ 03:32)  Prealbumin, Serum: 8 mg/dL (08-15-21 @ 10:17)    Triglycerides     [facility] NUTRITION SUPPORT-- FOLLOW UP NOTE  --------------------------------------------------------------------------------  24 hour events/subjective: pt seen and examined. tpn running. sitting up in bed. more awake/responsive this am. c/o back pain. denies nausea and abd pain. received ca gluconate supp overnight. goc discussions with family noted per chart and ongoing. started on loperamide    Diet:  Diet, NPO:   Except Medications (08-30-21 @ 04:39)      PAST HISTORY  --------------------------------------------------------------------------------  No significant changes to PMH, PSH, FHx, SHx, unless otherwise noted    ALLERGIES & MEDICATIONS  --------------------------------------------------------------------------------  Allergies    No Known Allergies    Intolerances      Standing Inpatient Medications  Biotene Dry Mouth Oral Rinse 5 milliLiter(s) Swish and Spit four times a day  chlorhexidine 2% Cloths 1 Application(s) Topical <User Schedule>  fat emulsion (Fish Oil and Plant Based) 20% Infusion 25 mL/Hr IV Continuous <Continuous>  metoprolol tartrate Injectable 5 milliGRAM(s) IV Push every 4 hours  pantoprazole  Injectable 40 milliGRAM(s) IV Push every 12 hours  Parenteral Nutrition - Adult 1 Each TPN Continuous <Continuous>    PRN Inpatient Medications        REVIEW OF SYSTEMS  --------------------------------------------------------------------------------    unable to obtain in entirety, see above      VITALS/PHYSICAL EXAM  --------------------------------------------------------------------------------  T(C): 36.6 (08-31-21 @ 07:00), Max: 37.1 (08-30-21 @ 11:00)  HR: 65 (08-31-21 @ 08:00) (58 - 95)  BP: 118/58 (08-31-21 @ 08:00) (93/54 - 132/60)  RR: 18 (08-31-21 @ 08:00) (18 - 28)  SpO2: 100% (08-31-21 @ 08:00) (99% - 100%)  Wt(kg): --      08-30-21 @ 07:01  -  08-31-21 @ 07:00  --------------------------------------------------------  IN: 2984 mL / OUT: 2450 mL / NET: 534 mL    08-31-21 @ 07:01  -  08-31-21 @ 08:38  --------------------------------------------------------  IN: 83 mL / OUT: 0 mL / NET: 83 mL      I&O's Detail    30 Aug 2021 07:01  -  31 Aug 2021 07:00  --------------------------------------------------------  IN:    Amiodarone: 167 mL    Fat Emulsion (Fish Oil &amp; Plant Based) 20% Infusion: 325 mL    IV PiggyBack: 200 mL    IV PiggyBack: 300 mL    TPN (Total Parenteral Nutrition): 1992 mL  Total IN: 2984 mL    OUT:    Bulb (mL): 15 mL    Bulb (mL): 160 mL    Fat Emulsion (Fish Oil &amp; Plant Based) 20% Infusion: 0 mL    Ileostomy (mL): 275 mL    Incontinent per Condom Catheter (mL): 2000 mL  Total OUT: 2450 mL    Total NET: 534 mL      31 Aug 2021 07:01  -  31 Aug 2021 08:38  --------------------------------------------------------  IN:    TPN (Total Parenteral Nutrition): 83 mL  Total IN: 83 mL    OUT:    Fat Emulsion (Fish Oil &amp; Plant Based) 20% Infusion: 0 mL  Total OUT: 0 mL    Total NET: 83 mL          Physical Exam:  Gen: sitting up in bed, frail, cachectic   HEENT: ncat, trachea midline +ngt  Chest: respirations non labored  Abd: soft nd ostomy with greenish stool placed to drainage bag  Extrem: no le edema appreciated  Neuro: more awake and responsive      LABS/STUDIES  --------------------------------------------------------------------------------              7.8    11.26 >-----------<  140      [08-30-21 @ 22:53]              24.7     135  |  105  |  34  ----------------------------<  100      [08-30-21 @ 22:53]  4.3   |  23  |  0.47        Ca     7.4     [08-30-21 @ 22:53]      iCa    1.10     [08-30 @ 23:18]      Mg     2.2     [08-30-21 @ 22:53]      Phos  3.0     [08-30-21 @ 22:53]    TPro  4.5  /  Alb  1.8  /  TBili  0.7  /  DBili  x   /  AST  17  /  ALT  34  /  AlkPhos  77  [08-30-21 @ 22:53]          Ca ionizedBlood Gas Arterial - Calcium, Ionized: 1.17 mmol/L (08-30-21 @ 03:50)  Blood Gas Arterial - Calcium, Ionized: 1.26 mmol/L (08-30-21 @ 00:02)    Creatinine Trend:  POC glucoseGlucose, Serum: 100 mg/dL (08-30-21 @ 22:53)  CAPILLARY BLOOD GLUCOSE      POCT Blood Glucose.: 119 mg/dL (30 Aug 2021 17:31)  POCT Blood Glucose.: 129 mg/dL (30 Aug 2021 11:28)    PrealbuminPrealbumin, Serum: 15 mg/dL (08-30-21 @ 03:32)  Prealbumin, Serum: 8 mg/dL (08-15-21 @ 10:17)    Triglycerides

## 2021-08-31 NOTE — PROGRESS NOTE ADULT - ATTENDING COMMENTS
Pt more talkative today. Expressed his concern for medical bills, expressed being tired and also having pain when being rolled or moved  Reviewed surgery w/ him at his request.  Explained to pt, daughter and wife that the pt is very sick and that this took a time to get like this and as such his recovery will be very long and there is always a chance that he could take a turn for the worse. But at this time he is stable.  Will keep DNR/DNI in place but will continue treatment and rehab to help him with healing and his strength    - resume tube feeds  - continue TPN until on goal tube feeds for 48hrs  - start TID or QID imodium for high ostomy output  - recommend psych consult for depression  - continue abx  - continue PT  - continue ICU care (appreciate assistance)    Ca Malone MD  Attending Physician

## 2021-08-31 NOTE — PROGRESS NOTE ADULT - ASSESSMENT
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     PLAN:  Neurologic: post-op pain control, new-onset hypophonia without focal neuro deficits, obtundation  - Monitor mental status, currently unable to protect airway  - Pain control: Tylenol prn  - Avoid narcotics / benzos iso obtundation  - ENT c/s'd for hypophonia, OP f/u Dr. Patterson in 2-3wks if no clinical improvement    Respiratory: Acute respiratory insufficiency resolving, patient on RA  - CXR showed RLL atelectasis, now improving  - Incentive spirometry 10x/hr while in bed, OOBTC    Cardiovascular: History of AFib currently rate controlled with IV metoprolol and amiodarone, now in NSR  - Monitor vital signs, currently hemodynamically stable  - Continue IV metoprolol 5mg q4hrs  - S/p amio IVPB, currently on amio gtt for a-fib RVR  - Cardiology following  - Episode of NSVT (7 beats) 8/29, troponin mildly elevated, although downtrending. Will no longer trend    Gastrointestinal/Nutrition: s/p total abdominal colectomy with end ileostomy, failed S/S, bloody ostomy output  - Diet: NPO  - Avoid tube feeds due to aspiration risk  - C/w TPN  - Protonix BID for GIB  - Bloody ostomy OP on 8/28 AM, CTA without active extrav, GI consulted with no indication for scope  - Monitor ostomy output, currently bloody    Renal/Genitourinary: no active issues   - Monitor and replete electrolytes as needed  - Monitor UOP, voiding spontaneously  - IVF: TPN @ 83cc/hr    Hematologic: last transfusion 2u pRBC on 8/28  - Trend H&H and transfuse Hgb < 7  - Lovenox HELD due to bloody OP on ileostomy    Infectious Disease: C. diff colitis vs Crohns flair s/p subtotal colectomy   - Zosyn started 2/2 concern for potential peritonitis until 8/30  - Monitor WBC, temp    Endocrine: no active issues   - Monitor BMP  - Steroid taper completed 8/26  - ISS    Lines & Drains:   - LLQ & LUQ drain 8/20  - TPN 8/16  - RUE PICC placed 8/16    Disposition: SICU  Code Status: DNR/DNI, MOLST in chart 80yo M with Crohn's disease, now s/p total abdominal colectomy with end ileostomy on 8/20.     PLAN:  Neurologic: post-op pain control, new-onset hypophonia without focal neuro deficits, obtundation  - Monitor mental status, currently unable to protect airway  - Pain control: Tylenol prn  - Avoid narcotics / benzos iso obtundation  - ENT c/s'd for hypophonia, OP f/u Dr. Patterson in 2-3wks if no clinical improvement    Respiratory: Acute respiratory insufficiency resolving, patient on RA  - CXR showed RLL atelectasis, now improving  - Incentive spirometry 10x/hr while in bed, OOBTC    Cardiovascular: History of AFib currently rate controlled with IV metoprolol and amiodarone, now in NSR  - Monitor vital signs, currently hemodynamically stable  - Continue IV metoprolol 5mg q4hrs    Gastrointestinal/Nutrition: s/p total abdominal colectomy with end ileostomy, failed S/S, bloody ostomy output  - Diet: NPO  - Avoid tube feeds due to aspiration risk  - C/w TPN  - Protonix BID for GIB  - Monitor ostomy output, currently bloody    Renal/Genitourinary: no active issues   - Monitor and replete electrolytes as needed  - Monitor UOP, voiding spontaneously  - IVF: TPN @ 25cc/hr    Hematologic: last transfusion 2u pRBC on 8/28  - Trend H&H and transfuse Hgb < 7  - Lovenox HELD due to bloody OP on ileostomy    Infectious Disease: C. diff colitis vs Crohns flair s/p subtotal colectomy   - Zosyn started 2/2 concern for potential peritonitis until 8/30  - Monitor WBC, temp    Endocrine: no active issues   - Monitor BMP  - Steroid taper completed 8/26  - ISS    Lines & Drains:   - LLQ & LUQ drain 8/20  - TPN 8/16  - RUE PICC placed 8/16    Disposition: SICU  Code Status: DNR/DNI, MOLST in chart

## 2021-08-31 NOTE — PROGRESS NOTE ADULT - SUBJECTIVE AND OBJECTIVE BOX
SICU Daily Progress Note  =====================================================  Interval/Overnight Events:     - Continued to be nonresponsive during the day, improved by night (spontaneous eye opening and following commands)  - Started on Protonix BID for intermittent episodes of bloody ileostomy output  - Discontinued ISS 2/2 multiple stable glucose levels with no need for sliding scale coverage    HPI: 81y Male M with Crohn disease s/p failed medical management. 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 Transferred to SICU intubated and on pressors. Now extubated and off pressors.    Allergies:   No Known Allergies    MEDICATIONS:   --------------------------------------------------------------------------------------  Neurologic Medications    Respiratory Medications    Cardiovascular Medications  metoprolol tartrate Injectable 5 milliGRAM(s) IV Push every 4 hours    Gastrointestinal Medications  fat emulsion (Fish Oil and Plant Based) 20% Infusion 25 mL/Hr IV Continuous <Continuous>  pantoprazole  Injectable 40 milliGRAM(s) IV Push every 12 hours  Parenteral Nutrition - Adult 1 Each TPN Continuous <Continuous>    Genitourinary Medications    Hematologic/Oncologic Medications    Antimicrobial/Immunologic Medications    Endocrine/Metabolic Medications    Topical/Other Medications  Biotene Dry Mouth Oral Rinse 5 milliLiter(s) Swish and Spit four times a day  chlorhexidine 2% Cloths 1 Application(s) Topical <User Schedule>    --------------------------------------------------------------------------------------  VITAL SIGNS, INS/OUTS (last 24 hours):  --------------------------------------------------------------------------------------  T(C): 36.3 (08-31-21 @ 03:00), Max: 37.8 (08-30-21 @ 06:00)  HR: 63 (08-31-21 @ 04:00) (58 - 99)  BP: 129/60 (08-31-21 @ 04:00) (88/50 - 132/60)  RR: 26 (08-31-21 @ 04:00) (19 - 31)  SpO2: 100% (08-31-21 @ 04:00) (99% - 100%)    08-29-21 @ 07:01  -  08-30-21 @ 07:00  --------------------------------------------------------  IN: 5740.3 mL / OUT: 4775 mL / NET: 965.3 mL    08-30-21 @ 07:01  -  08-31-21 @ 05:13  --------------------------------------------------------  IN: 2685 mL / OUT: 2275 mL / NET: 410 mL      --------------------------------------------------------------------------------------  EXAM  NEUROLOGY  Exam: Normal, no focal deficits.    HEENT  Exam: Normocephalic, atraumatic, EOMI.     RESPIRATORY  Exam: Normal expansion/effort.    CARDIOVASCULAR  Exam: Regular rate and rhythm.       GI/NUTRITION  Exam: Abdomen soft, Non-tender, Non-distended.     VASCULAR  Exam: Extremities warm, pink, well-perfused.     MUSCULOSKELETAL  Exam: All extremities moving spontaneously without limitations.     SKIN  Exam: Good skin turgor, no skin breakdown.     LABS  --------------------------------------------------------------------------------------                        7.8    11.26 )-----------( 140      ( 30 Aug 2021 22:53 )             24.7   08-30    135  |  105  |  34<H>  ----------------------------<  100<H>  4.3   |  23  |  0.47<L>    Ca    7.4<L>      30 Aug 2021 22:53  Phos  3.0     08-30  Mg     2.2     08-30    TPro  4.5<L>  /  Alb  1.8<L>  /  TBili  0.7  /  DBili  x   /  AST  17  /  ALT  34  /  AlkPhos  77  08-30  ABG - ( 30 Aug 2021 03:50 )  pH, Arterial: 7.46  pH, Blood: x     /  pCO2: 32    /  pO2: 151   / HCO3: 23    / Base Excess: -0.8  /  SaO2: 99.6    --------------------------------------------------------------------------------------     SICU Daily Progress Note  =====================================================  Interval/Overnight Events:     - Continued to be nonresponsive during the day, improved by night (spontaneous eye opening and following commands)  - Started on Protonix BID for intermittent episodes of bloody ileostomy output  - Discontinued ISS 2/2 multiple stable glucose levels with no need for sliding scale coverage    HPI: 81y Male M with Crohn disease s/p failed medical management. 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 Transferred to SICU intubated and on pressors. Now extubated and off pressors.    Allergies:   No Known Allergies    MEDICATIONS:   --------------------------------------------------------------------------------------  Neurologic Medications    Respiratory Medications    Cardiovascular Medications  metoprolol tartrate Injectable 5 milliGRAM(s) IV Push every 4 hours    Gastrointestinal Medications  fat emulsion (Fish Oil and Plant Based) 20% Infusion 25 mL/Hr IV Continuous <Continuous>  pantoprazole  Injectable 40 milliGRAM(s) IV Push every 12 hours  Parenteral Nutrition - Adult 1 Each TPN Continuous <Continuous>    Genitourinary Medications    Hematologic/Oncologic Medications    Antimicrobial/Immunologic Medications    Endocrine/Metabolic Medications    Topical/Other Medications  Biotene Dry Mouth Oral Rinse 5 milliLiter(s) Swish and Spit four times a day  chlorhexidine 2% Cloths 1 Application(s) Topical <User Schedule>    --------------------------------------------------------------------------------------  VITAL SIGNS, INS/OUTS (last 24 hours):  --------------------------------------------------------------------------------------  T(C): 36.3 (08-31-21 @ 03:00), Max: 37.8 (08-30-21 @ 06:00)  HR: 63 (08-31-21 @ 04:00) (58 - 99)  BP: 129/60 (08-31-21 @ 04:00) (88/50 - 132/60)  RR: 26 (08-31-21 @ 04:00) (19 - 31)  SpO2: 100% (08-31-21 @ 04:00) (99% - 100%)    08-29-21 @ 07:01  -  08-30-21 @ 07:00  --------------------------------------------------------  IN: 5740.3 mL / OUT: 4775 mL / NET: 965.3 mL    08-30-21 @ 07:01  -  08-31-21 @ 05:13  --------------------------------------------------------  IN: 2685 mL / OUT: 2275 mL / NET: 410 mL      --------------------------------------------------------------------------------------  EXAM  NEUROLOGY  Exam: Normal, no focal deficits.    HEENT  Exam: Normocephalic, atraumatic, EOMI.     RESPIRATORY  Exam: Normal expansion/effort.    CARDIOVASCULAR  Exam: Regular rate and rhythm.       GI/NUTRITION  Exam: Abdomen soft, Non-tender, Non-distended.     VASCULAR  Exam: Extremities warm, pink, well-perfused.     MUSCULOSKELETAL  Exam: All extremities moving spontaneously without limitations.     SKIN  Exam: Good skin turgor, no skin breakdown.     LABS  --------------------------------------------------------------------------------------                        7.8    11.26 )-----------( 140      ( 30 Aug 2021 22:53 )             24.7   08-30    135  |  105  |  34<H>  ----------------------------<  100<H>  4.3   |  23  |  0.47<L>    Ca    7.4<L>      30 Aug 2021 22:53  Phos  3.0     08-30  Mg     2.2     08-30    TPro  4.5<L>  /  Alb  1.8<L>  /  TBili  0.7  /  DBili  x   /  AST  17  /  ALT  34  /  AlkPhos  77  08-30   ABG - ( 30 Aug 2021 03:50 )  pH, Arterial: 7.46  pH, Blood: x     /  pCO2: 32    /  pO2: 151   / HCO3: 23    / Base Excess: -0.8  /  SaO2: 99.6    --------------------------------------------------------------------------------------

## 2021-08-31 NOTE — PROGRESS NOTE ADULT - ASSESSMENT
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 Admitted for crohns management, course c/b cdiff colitis sp dificid/fmt and findings of indeterminate quantiferon, pending id clearance to start biologic. Prealb 8. TPN consulted in setting of significant weight loss, severe pcm, and worsening colitis suspected 2/2 CD. Pt at high risk for refeeding syndrome. TPN started 8/16. Repeat CT showing unchanged diffuse colitis and non specific gb wall edema.  Taken to OR emergently on 8/20 for lap total colectomy, end ileostomy. Failed S&S eval. Family agreed to TANJA tube feeds, which had been started yesterday but then dc'd due to noted blood in ostomy and risk for aspiration given deterioration in mental status. pt now dnr/dni, goc discussions ongoing    Current Diet: NPO/NGT feeds (glucerna 1.2)  TPN GOAL recommendations as per RD: , dextrose 210g, SMOF 60g    -dw sicu, will cont tpn for now pending further goc discussions w family  -PICC in place, maintenance as per protocol  -restarted on ngt feeds, monitor tolerance  -hypocalcemia- increase ca gluconate to 24meq in TPN bag  -vitamin D deficiency- suggest ergocalciferol 50,000U q wk x 6wks  -anemia/gib- trend cbc, transfuse prn  -electrolyte imbalance risk- monitor CMP, Mg, Ionized Ca, Phosphorus qd   -monitor TG weekly (9/6)  -strict I/O's  -further care per primary/SICU    plan discussed with Dr. Fernandez, agreeable with above    TPN pager [phone], spectra [phone]  M-F 6A-4P, Weekends and holidays 8A-12P

## 2021-08-31 NOTE — PROGRESS NOTE ADULT - ATTENDING COMMENTS
ON: rate control, 1 u RBC    tenuous mental status, much better with family present, now awake and follows command  has vocal cord issues and concern for aspiration  AM CXR 8/30 no new  on RA  paroxysmal afib, rate controlled  amio drip to cont for a full load  metop IV standing  restart TEN  cont TPN  LFTs mildly elevated  intermittent bloody ostomy output, previsouly CTA failed to show source, no more episode in 24hrs  ostomy output monitor 2.4 liters  urine output monitor  VTE PPX restart  slow downtrend of Hb  zosyn to complete today for possible contaminated abd intra op  afebrile, and decreasing leukocytosis  completed zosyn 8/30  good glycemic control  s/p steroid taper for crohns  GI evaluating at bedside  DNR DNI, discussed with patient and family, wife and daughter at bedside. will discuss whether comfort measures is being considered.

## 2021-08-31 NOTE — PROGRESS NOTE ADULT - SUBJECTIVE AND OBJECTIVE BOX
24h Events:  No acute events overnight.    Subjective:   Patient seen at bedside this AM.     Objective:  Vital Signs  T(C): 36.2 (08-30 @ 23:00), Max: 37.8 (08-30 @ 06:00)  HR: 67 (08-31 @ 01:00) (58 - 99)  BP: 122/58 (08-31 @ 01:00) (88/50 - 127/59)  RR: 24 (08-31 @ 01:00) (19 - 31)  SpO2: 100% (08-31 @ 01:00) (99% - 100%)  08-29-21 @ 07:01  -  08-30-21 @ 07:00  --------------------------------------------------------  IN:  Total IN: 0 mL    OUT:    Bulb (mL): 320 mL    Bulb (mL): 10 mL    Ileostomy (mL): 2225 mL    Incontinent per Condom Catheter (mL): 970 mL    Voided (mL): 1250 mL  Total OUT: 4775 mL    Total NET: -4775 mL      08-30-21 @ 07:01  -  08-31-21 @ 01:16  --------------------------------------------------------  IN:  Total IN: 0 mL    OUT:    Bulb (mL): 10 mL    Bulb (mL): 100 mL    Ileostomy (mL): 200 mL    Incontinent per Condom Catheter (mL): 1200 mL  Total OUT: 1510 mL    Total NET: -1510 mL      Physical Exam:  GEN: in bed, NAD, appears uncomfortable   RESP: tachypneic  ENT: NGT   ABD: soft, non-distended, difficult to assess pain given altered mental status. Incision sites clean, dry, and intact without erythema or edema. PAOLO x2 w ss output. Ostomy w bloody output mixed with stool.   EXTR: warm, well-perfused, edematous UE b/l  NEURO: Altered, A/Ox0    Labs:                        7.8    11.26 )-----------( 140      ( 30 Aug 2021 22:53 )             24.7   08-30    135  |  105  |  34<H>  ----------------------------<  100<H>  4.3   |  23  |  0.47<L>    Ca    7.4<L>      30 Aug 2021 22:53  Phos  3.0     08-30  Mg     2.2     08-30    TPro  4.5<L>  /  Alb  1.8<L>  /  TBili  0.7  /  DBili  x   /  AST  17  /  ALT  34  /  AlkPhos  77  08-30    CAPILLARY BLOOD GLUCOSE      POCT Blood Glucose.: 119 mg/dL (30 Aug 2021 17:31)  POCT Blood Glucose.: 129 mg/dL (30 Aug 2021 11:28)  POCT Blood Glucose.: 129 mg/dL (30 Aug 2021 07:20)      Imaging:     24h Events:  No acute events overnight. Improving mental status this AM w eye opening and following commands. ISS dc'd, Protonix BID started for bloody ostomy output.     Subjective:   Patient seen at bedside this AM.     Objective:  Vital Signs  T(C): 36.2 (08-30 @ 23:00), Max: 37.8 (08-30 @ 06:00)  HR: 67 (08-31 @ 01:00) (58 - 99)  BP: 122/58 (08-31 @ 01:00) (88/50 - 127/59)  RR: 24 (08-31 @ 01:00) (19 - 31)  SpO2: 100% (08-31 @ 01:00) (99% - 100%)  08-29-21 @ 07:01  -  08-30-21 @ 07:00  --------------------------------------------------------  IN:  Total IN: 0 mL    OUT:    Bulb (mL): 320 mL    Bulb (mL): 10 mL    Ileostomy (mL): 2225 mL    Incontinent per Condom Catheter (mL): 970 mL    Voided (mL): 1250 mL  Total OUT: 4775 mL    Total NET: -4775 mL      08-30-21 @ 07:01  -  08-31-21 @ 01:16  --------------------------------------------------------  IN:  Total IN: 0 mL    OUT:    Bulb (mL): 10 mL    Bulb (mL): 100 mL    Ileostomy (mL): 200 mL    Incontinent per Condom Catheter (mL): 1200 mL  Total OUT: 1510 mL    Total NET: -1510 mL      Physical Exam:  GEN: in bed, NAD, appears uncomfortable   RESP: tachypneic  ENT: NGT, TANJA   ABD: soft, non-distended, difficult to assess pain given altered mental status. Incision sites clean, dry, and intact without erythema or edema. PAOLO x2 w ss output. Ostomy w bloody output mixed with stool.   EXTR: warm, well-perfused, edematous UE b/l  NEURO: Altered, A/Ox0    Labs:                        7.8    11.26 )-----------( 140      ( 30 Aug 2021 22:53 )             24.7   08-30    135  |  105  |  34<H>  ----------------------------<  100<H>  4.3   |  23  |  0.47<L>    Ca    7.4<L>      30 Aug 2021 22:53  Phos  3.0     08-30  Mg     2.2     08-30    TPro  4.5<L>  /  Alb  1.8<L>  /  TBili  0.7  /  DBili  x   /  AST  17  /  ALT  34  /  AlkPhos  77  08-30    CAPILLARY BLOOD GLUCOSE      POCT Blood Glucose.: 119 mg/dL (30 Aug 2021 17:31)  POCT Blood Glucose.: 129 mg/dL (30 Aug 2021 11:28)  POCT Blood Glucose.: 129 mg/dL (30 Aug 2021 07:20)      Imaging:

## 2021-08-31 NOTE — PROGRESS NOTE ADULT - ASSESSMENT
81M with recently dx Crohn's disease c/b perianal abscess and left posterior distal anal intersphincteric fistula and presents diarrhea/BRBPR  2/2 to CD flare c/b c diff colitis. Initially treat for C diff, s/p FMT. Taken emergently to OR on 8/20 for acute deterioration for laparoscopic total colectomy with end ileostomy.     PLAN:  - HDS during AM rounds, cw IV metoprolol if HTN  - NGT/NPO  - cw TPN  - DNR/DNI, molst in chart   - H/H w adequate response to 1 U prbc 2/2 bloody ostomy output overnight   - F/u ostomy output for quantity/quality (blood)  - Zosyn restarted by SICU 8/29 2/2 cf peritonitis   - Appreciate GI Recs  - Appreciate excellent care per SICU    Red Surgery  [id number]   81M with recently dx Crohn's disease c/b perianal abscess and left posterior distal anal intersphincteric fistula and presents diarrhea/BRBPR  2/2 to CD flare c/b c diff colitis. Initially treat for C diff, s/p FMT. Taken emergently to OR on 8/20 for acute deterioration for laparoscopic total colectomy with end ileostomy.     PLAN:  - HDS during AM rounds, cw IV metoprolol if HTN  - NGT/NPO w tube feed   - cw TPN  - DNR/DNI, molst in chart   - H/H w adequate response to 1 U prbc 2/2 bloody ostomy output overnight   - F/u ostomy output for quantity/quality (blood)  - Zosyn restarted by SICU 8/29 2/2 cf peritonitis   - Appreciate GI Recs  - Appreciate excellent care per SICU    Red Surgery  [id number]

## 2021-09-01 NOTE — CHART NOTE - NSCHARTNOTEFT_GEN_A_CORE
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    Pt seen at bedside for repeat swallow evaluation, swallow therapy. Pt awake, lethargic, more arousable than on previous encounters, maintaining eyes open with intermittent cues, and more verbally responsive and following simple directions more consistently and more quickly. Pt continues to present with severe hypophonia with reduced intelligibility due to low vocal intensity c/w deconditioning and recent intubation. Voice appears to be improving somewhat in quality and loudness, and strength of cough. Pt more cooperative and with somewhat improved endurance for sustained phonation tasks. Pt with improving consistency in ability to elicit cough on request, with quicker response times for cued coughs and reswallows.    Pt was administered trials of small crushed ice chips x3, and honey-thick liquids via 1/2 tsp x4, thin puree via 1/2 tsp x3, and thick puree via 1/2 tsp x3. Pt with significantly improved response to bolus presentation, now with functional and timely bolus acceptance. Pt continues to present with evidence of an oropharyngeal dysphagia notable for delayed oral transport, suspected delay in pharyngeal swallow trigger, palpable hyolaryngeal elevation with suspected reduced anterior excursion, and s/s laryngeal penetration/aspiration including wet/gurgly vocal quality and wet vocal quality post swallow. Pt able to initiate a timely cued cough and reswallow, which appears effective for clearing wet vocal quality. Pt remains at high risk of aspiration, and is not a candidate for PO feeding at this time. However, he demonstrates gradual overall improvement in mental status and alertness, responsiveness and participation in tasks, and gradual improvement in swallow function. Purposeful proactive rounding reinforced and 5 Ps addressed. Pt left in no distress.       RECOMMENDATIONS:  Continue NPO, with non-oral nutrition/hydration/medications.   Strict aspiration and reflux precautions for secretions and if enteral feeds are initiated.   Maintain good oral hygiene.   This service will continue to follow. Will monitor candidacy for PO intake, participation in instrumental exam.  Rehab TBD pending hospital course.      Sonia Barragan MA, CCC-SLP  Speech-Language Pathologist  pager #[phone]

## 2021-09-01 NOTE — BH CONSULTATION LIAISON PROGRESS NOTE - NSBHCHARTREVIEWLAB_PSY_A_CORE FT
08-17    137  |  104  |  15  ----------------------------<  157<H>  3.5   |  23  |  0.49<L>    Ca    8.1<L>      17 Aug 2021 07:08  Phos  2.8     08-17  Mg     2.1     08-17    TPro  5.3<L>  /  Alb  1.9<L>  /  TBili  0.4  /  DBili  x   /  AST  124<H>  /  ALT  367<H>  /  AlkPhos  382<H>  08-17                        8.3    15.91 )-----------( 360      ( 15 Aug 2021 15:35 )             27.6   
08-17    137  |  104  |  15  ----------------------------<  157<H>  3.5   |  23  |  0.49<L>    Ca    8.1<L>      17 Aug 2021 07:08  Phos  2.8     08-17  Mg     2.1     08-17    TPro  5.3<L>  /  Alb  1.9<L>  /  TBili  0.4  /  DBili  x   /  AST  124<H>  /  ALT  367<H>  /  AlkPhos  382<H>  08-17                        8.3    15.91 )-----------( 360      ( 15 Aug 2021 15:35 )             27.6

## 2021-09-01 NOTE — BH CONSULTATION LIAISON PROGRESS NOTE - NSICDXBHPRIMARYDX_PSY_ALL_CORE
Adjustment disorder with depressed mood   F43.21  
Adjustment disorder with depressed mood   F43.21

## 2021-09-01 NOTE — BH CONSULTATION LIAISON PROGRESS NOTE - NSBHCHARTREVIEWINVESTIGATE_PSY_A_CORE FT
< from: 12 Lead ECG (08.12.21 @ 23:47) >      Ventricular Rate 89 BPM    Atrial Rate 89 BPM    P-R Interval 138 ms    QRS Duration 92 ms    Q-T Interval 362 ms    QTC Calculation(Bazett) 440 ms    P Axis 33 degrees    R Axis -32 degrees    T Axis 19 degrees    Diagnosis Line NORMAL SINUS RHYTHM  LEFT AXIS DEVIATION  MINIMAL VOLTAGE CRITERIA FOR LVH, MAY BE NORMAL VARIANT  ABNORMAL ECG  WHEN COMPARED WITH ECG OF 21-JUL-2021 15:34,  NO SIGNIFICANT CHANGE WAS FOUND  Confirmed by MD AUSTIN, LESLIE ([id number]) on 8/14/2021 12:07:12 AM    < end of copied text >    
< from: 12 Lead ECG (08.12.21 @ 23:47) >      Ventricular Rate 89 BPM    Atrial Rate 89 BPM    P-R Interval 138 ms    QRS Duration 92 ms    Q-T Interval 362 ms    QTC Calculation(Bazett) 440 ms    P Axis 33 degrees    R Axis -32 degrees    T Axis 19 degrees    Diagnosis Line NORMAL SINUS RHYTHM  LEFT AXIS DEVIATION  MINIMAL VOLTAGE CRITERIA FOR LVH, MAY BE NORMAL VARIANT  ABNORMAL ECG  WHEN COMPARED WITH ECG OF 21-JUL-2021 15:34,  NO SIGNIFICANT CHANGE WAS FOUND  Confirmed by MD AUSTIN, LESLIE ([id number]) on 8/14/2021 12:07:12 AM    < end of copied text >

## 2021-09-01 NOTE — PROGRESS NOTE ADULT - ASSESSMENT
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 Admitted for crohns management, course c/b cdiff colitis sp dificid/fmt and findings of indeterminate quantiferon, pending id clearance to start biologic. Prealb 8. TPN consulted in setting of significant weight loss, severe pcm, and worsening colitis suspected 2/2 CD. Pt at high risk for refeeding syndrome. TPN started 8/16. Repeat CT showing unchanged diffuse colitis and non specific gb wall edema.  Taken to OR emergently on 8/20 for lap total colectomy, end ileostomy. Failed S&S eval. Family agreed to TANJA tube feeds, which had been started yesterday but then dc'd due to noted blood in ostomy and risk for aspiration given deterioration in mental status. pt now dnr/dni, goc discussions ongoing    Current Diet: NPO/NGT feeds (pivot 1.5 goal 40cc/hr)  TPN GOAL recommendations as per RD: , dextrose 210g, SMOF 60g    -dw surgery team, plan to increase ngt feeds to goal as tolerated and thereafter taper tpn; monitor tolerance to EN  -PICC in place, maintenance as per protocol  -hypocalcemia- improving, cont ca gluconate at 24meq in TPN bag  -vitamin D deficiency- recommend ergocalciferol 50,000U q wk x 6wks  -anemia/gib- trend cbc, transfuse prn  -electrolyte imbalance risk- monitor CMP, Mg, Ionized Ca, Phosphorus qd   -monitor TG weekly (9/6); strict I/O's  -goc discussions  -further care per primary/SICU; dw SICU PA    plan discussed with Dr. Fernandez, agreeable with above    TPN pager [phone], spectra [phone]  M-F 6A-4P, Weekends and holidays 8A-12P

## 2021-09-01 NOTE — BH CONSULTATION LIAISON PROGRESS NOTE - OTHER
- Pt has gradually worsening medical condition and has been hospitalized for many weeks  - Pt's wife has Parkinson's Disease
- Pt has gradually worsening medical condition and has been hospitalized for many weeks  - Pt's wife has Parkinson's Disease

## 2021-09-01 NOTE — BH CONSULTATION LIAISON PROGRESS NOTE - NSBHASSESSMENTFT_PSY_ALL_CORE
81y  man, retired , domiciled at home with wife, with no PPHx, PMH GERD and severe Crohn's disease diagnosed about 9 mos prior to admission, refractory to multiple treatments including Remicade and biologics, c/b abscess, left posterior distal anal intersphincteric fistula, who presents with diarrhea and BRBPR 2/2 to CD flare c/b c diff colitis. Psych was consulted after pt reportedly endorsed to daughter that he does not want to live anymore. On interview, pt is depressed, appears tired, and AOx2. Patient currently says that being in the hospital makes him want to die and repeatedly starts that he wants to go home. Denies plan to kill himself, saying that he wants to go home to spend time with this family. No h/o depression, suicide attempts, self harm. Pt endorses poor sleep and appetite. Daughter denies that pt has ever endorsed any suicidality to her. Daughter open to trying Remeron for pt for sleep and appetite, but wants to d/w her brother first. Pt has no active SI, future-oriented, not at acute risk of suicide currently. 
81y  man, retired , domiciled at home with wife, with no PPHx, PMH GERD and severe Crohn's disease diagnosed about 9 mos prior to admission, refractory to multiple treatments including Remicade and biologics, c/b abscess, left posterior distal anal intersphincteric fistula, who presents with diarrhea and BRBPR 2/2 to CD flare c/b c diff colitis. Psych was consulted after pt reportedly endorsed to daughter that he does not want to live anymore. On interview, pt is depressed, appears tired, and AOx2. Patient currently says that being in the hospital makes him want to die and repeatedly starts that he wants to go home. Denies plan to kill himself, saying that he wants to go home to spend time with this family. No h/o depression, suicide attempts, self harm. Pt endorses poor sleep and appetite. Daughter denies that pt has ever endorsed any suicidality to her. Daughter open to trying Remeron for pt for sleep and appetite, but wants to d/w her brother first. Pt has no active SI, future-oriented, not at acute risk of suicide currently.

## 2021-09-01 NOTE — PROGRESS NOTE ADULT - ATTENDING COMMENTS
No acute events overnight  Still alert and oriented and conversational  abd soft, non-distended  dressings c/d/i  healthy stoma w/o non-bloody output    - advance tube feeds to goal  - continue TPN until at goal tube feeds for at least 48hrs  - would repeat swallow study now that he is more alert  - continue IV abx  - continue PT and encourage OOB  - continue imodium for ostomy output  - f/u psych recs    Adebulmaro Malone MD  Attending Physician

## 2021-09-01 NOTE — PROGRESS NOTE ADULT - SUBJECTIVE AND OBJECTIVE BOX
[facility] NUTRITION SUPPORT-- FOLLOW UP NOTE  --------------------------------------------------------------------------------  24 hour events/subjective: pt seen and examined. oob in chair. tpn running and receiving ngt feeds at 20cc/hr at time of eval. received ca gluconate and naphos overnight. seen by psych. pt c/o generalized pain and nausea. ostomy functioning    Diet:  Diet, NPO with Tube Feed:   Tube Feeding Modality: Nasogastric  Pivot 1.5 Blayne ([id number])  Total Volume for 24 Hours (mL): 480  Continuous  Starting Tube Feed Rate mL per Hour: 20     Every 4 hours  Until Goal Tube Feed Rate (mL per Hour): 20  Tube Feed Duration (in Hours): 24  Tube Feed Start Time: 10:30 (09-01-21 @ 09:30)      PAST HISTORY  --------------------------------------------------------------------------------  No significant changes to PMH, PSH, FHx, SHx, unless otherwise noted    ALLERGIES & MEDICATIONS  --------------------------------------------------------------------------------  Allergies    No Known Allergies    Intolerances      Standing Inpatient Medications  Biotene Dry Mouth Oral Rinse 5 milliLiter(s) Swish and Spit four times a day  chlorhexidine 2% Cloths 1 Application(s) Topical <User Schedule>  enoxaparin Injectable 40 milliGRAM(s) SubCutaneous every 24 hours  fat emulsion (Fish Oil and Plant Based) 20% Infusion 25 mL/Hr IV Continuous <Continuous>  loperamide Liquid 1 milliGRAM(s) Oral three times a day  metoprolol tartrate 12.5 milliGRAM(s) Oral every 6 hours  pantoprazole  Injectable 40 milliGRAM(s) IV Push every 12 hours  Parenteral Nutrition - Adult 1 Each TPN Continuous <Continuous>    PRN Inpatient Medications  acetaminophen    Suspension .. 650 milliGRAM(s) Oral every 6 hours PRN  metoprolol tartrate Injectable 5 milliGRAM(s) IV Push every 6 hours PRN        REVIEW OF SYSTEMS  --------------------------------------------------------------------------------    see hpi        VITALS/PHYSICAL EXAM  --------------------------------------------------------------------------------  T(C): 36.7 (09-01-21 @ 07:00), Max: 36.7 (08-31-21 @ 19:00)  HR: 77 (09-01-21 @ 09:00) (61 - 87)  BP: 130/65 (09-01-21 @ 09:00) (112/58 - 144/70)  RR: 25 (09-01-21 @ 09:00) (15 - 35)  SpO2: 98% (09-01-21 @ 09:00) (97% - 100%)  Wt(kg): --      08-31-21 @ 07:01  -  09-01-21 @ 07:00  --------------------------------------------------------  IN: 2907 mL / OUT: 2250 mL / NET: 657 mL    09-01-21 @ 07:01  -  09-01-21 @ 09:56  --------------------------------------------------------  IN: 206 mL / OUT: 200 mL / NET: 6 mL      I&O's Detail    31 Aug 2021 07:01  -  01 Sep 2021 07:00  --------------------------------------------------------  IN:    Fat Emulsion (Fish Oil &amp; Plant Based) 20% Infusion: 325 mL    Glucerna: 240 mL    IV PiggyBack: 350 mL    TPN (Total Parenteral Nutrition): 1992 mL  Total IN: 2907 mL    OUT:    Bulb (mL): 5 mL    Bulb (mL): 95 mL    Fat Emulsion (Fish Oil &amp; Plant Based) 20% Infusion: 0 mL    Ileostomy (mL): 250 mL    Incontinent per Condom Catheter (mL): 1900 mL  Total OUT: 2250 mL    Total NET: 657 mL      01 Sep 2021 07:01  -  01 Sep 2021 09:56  --------------------------------------------------------  IN:    Glucerna: 40 mL    TPN (Total Parenteral Nutrition): 166 mL  Total IN: 206 mL    OUT:    Incontinent per Condom Catheter (mL): 200 mL  Total OUT: 200 mL    Total NET: 6 mL          Physical Exam:  Gen: oob in chair, frail, cachectic   HEENT: ncat, trachea midline +ngt  Chest: respirations non labored  Abd: soft nd +ostomy  Extrem: no le edema; +b/l ue extrem edema, distal to picc  Neuro: more awake and responsive    LABS/STUDIES  --------------------------------------------------------------------------------              7.7    8.80  >-----------<  158      [08-31-21 @ 23:24]              24.8     137  |  108  |  32  ----------------------------<  94      [08-31-21 @ 23:24]  4.3   |  23  |  0.41        Ca     7.6     [08-31-21 @ 23:24]      iCa    1.14     [08-31 @ 23:26]      Mg     2.0     [08-31-21 @ 23:24]      Phos  2.8     [08-31-21 @ 23:24]    TPro  4.4  /  Alb  1.8  /  TBili  0.3  /  DBili  x   /  AST  24  /  ALT  34  /  AlkPhos  92  [08-31-21 @ 23:24]          Ca ionized  Creatinine Trend:  POC glucoseGlucose, Serum: 94 mg/dL (08-31-21 @ 23:24)  CAPILLARY BLOOD GLUCOSE      POCT Blood Glucose.: 130 mg/dL (01 Sep 2021 07:18)    PrealbuminPrealbumin, Serum: 15 mg/dL (08-30-21 @ 03:32)  Prealbumin, Serum: 8 mg/dL (08-15-21 @ 10:17)    Triglycerides

## 2021-09-01 NOTE — BH CONSULTATION LIAISON PROGRESS NOTE - NSBHCONSULTFOLLOWAFTERCARE_PSY_A_CORE FT
PT may benefit from outpt therapy at may have f/u at the walk in clinic at [facility] [phone]
PT may benefit from outpt therapy at may have f/u at the walk in clinic at [facility] [phone]

## 2021-09-01 NOTE — PROGRESS NOTE ADULT - SUBJECTIVE AND OBJECTIVE BOX
24h Events:  No acute events overnight.     Subjective:   Patient seen at bedside this AM.     Objective:  Vital Signs  T(C): 36.7 (09-01 @ 07:00), Max: 36.7 (08-31 @ 19:00)  HR: 82 (09-01 @ 07:00) (61 - 87)  BP: 114/57 (09-01 @ 07:00) (112/58 - 144/70)  RR: 23 (09-01 @ 07:00) (15 - 35)  SpO2: 97% (09-01 @ 07:00) (97% - 100%)  08-31-21 @ 07:01  -  09-01-21 @ 07:00  --------------------------------------------------------  IN:  Total IN: 0 mL    OUT:    Bulb (mL): 5 mL    Bulb (mL): 95 mL    Ileostomy (mL): 250 mL    Incontinent per Condom Catheter (mL): 1900 mL  Total OUT: 2250 mL    Total NET: -2250 mL          Physical Exam:  GEN: resting in bed, NAD, appears uncomfortable   RESP: increased WOB, tachypneic   ENT: NGT, TANJA tube  ABD: soft, non-distended, non-tender without rebound tenderness or guarding. Midline incision w dressing in place cdi, PAOLO x2 w SS output, ostomy w non bloody stool  EXTR: warm, well-perfused, no edema  NEURO: awake, more alert than previously    Labs:                        7.7    8.80  )-----------( 158      ( 31 Aug 2021 23:24 )             24.8   08-31    137  |  108  |  32<H>  ----------------------------<  94  4.3   |  23  |  0.41<L>    Ca    7.6<L>      31 Aug 2021 23:24  Phos  2.8     08-31  Mg     2.0     08-31    TPro  4.4<L>  /  Alb  1.8<L>  /  TBili  0.3  /  DBili  x   /  AST  24  /  ALT  34  /  AlkPhos  92  08-31    CAPILLARY BLOOD GLUCOSE      POCT Blood Glucose.: 130 mg/dL (01 Sep 2021 07:18)      Imaging:

## 2021-09-01 NOTE — PROGRESS NOTE ADULT - ATTENDING COMMENTS
ON: no major events    improving mental status, sitting in chair eyes open  has vocal cord issues and concern for aspiration, ENT will revisit in 2wks  CXR 8/30 no acute findings  on RA  paroxysmal afib, rate controlled  s/p amio  metop 12.5 PO q6hrs, metop IV PRN  TEN advance to goal slowly, discussed the risk of aspiration with family and colorectal service  cont TPN  LFTs mildly elevated  intermittent bloody ostomy output, previsouly CTA failed to show source, no more episode in 24hrs  ostomy output monitor 2.4 liters, dc imodium  JPs output sero sang  urine output monitor, has condom cath  VTE PPX restart  Hb stable  s/p zosyn  afebrile, and decreasing leukocytosis  good glycemic control  s/p steroid taper for crohns  SW to discuss rehab options    DNR DNI, discussed with patient and family, wife and daughter at bedside. given improved mental status, family and patient does not consider comfort care and colorectal recommended psych eval for possible depression

## 2021-09-01 NOTE — BH CONSULTATION LIAISON PROGRESS NOTE - NSBHCHARTREVIEWVS_PSY_A_CORE FT
Vital Signs Last 24 Hrs  T(C): 36.9 (01 Sep 2021 14:00), Max: 36.9 (01 Sep 2021 14:00)  T(F): 98.4 (01 Sep 2021 14:00), Max: 98.4 (01 Sep 2021 14:00)  HR: 79 (01 Sep 2021 16:00) (62 - 87)  BP: 131/60 (01 Sep 2021 16:00) (114/57 - 144/70)  BP(mean): 87 (01 Sep 2021 16:00) (80 - 100)  RR: 21 (01 Sep 2021 16:00) (16 - 31)  SpO2: 98% (01 Sep 2021 16:00) (97% - 100%)
Vital Signs Last 24 Hrs  T(C): 36.4 (31 Aug 2021 11:00), Max: 36.6 (31 Aug 2021 07:00)  T(F): 97.5 (31 Aug 2021 11:00), Max: 97.9 (31 Aug 2021 07:00)  HR: 67 (31 Aug 2021 19:00) (59 - 80)  BP: 125/60 (31 Aug 2021 19:00) (112/54 - 132/60)  BP(mean): 86 (31 Aug 2021 19:00) (78 - 91)  RR: 23 (31 Aug 2021 19:00) (15 - 35)  SpO2: 100% (31 Aug 2021 19:00) (100% - 100%)

## 2021-09-01 NOTE — PROGRESS NOTE ADULT - ASSESSMENT
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     PLAN:  Neurologic: post-op pain control, new-onset hypophonia without focal neuro deficits, obtundation  - Monitor mental status, currently unable to protect airway  - Pain control: PO Tylenol 650mg PRN  - Avoid narcotics/benzos iso obtundation  - ENT c/s'd for hypophonia, OP f/u Dr. Patterson in 2-3wks if no clinical improvement    Respiratory: Acute respiratory insufficiency resolving, patient on RA  - CXR showed RLL atelectasis, now improving  - Incentive spirometry 10x/hr while in bed, OOBTC    Cardiovascular: History of AFib currently rate controlled with IV metoprolol and amiodarone, now in NSR  - Monitor vital signs, currently hemodynamically stable  - Continue IV metoprolol 5mg q4hrs    Gastrointestinal/Nutrition: s/p total abdominal colectomy with end ileostomy, failed S/S, bloody ostomy output  - Diet: NPO  - Avoid tube feeds due to aspiration risk  - C/w TPN  - Protonix BID for GIB  - Monitor ostomy output, currently bloody    Renal/Genitourinary: no active issues   - Monitor and replete electrolytes as needed  - Monitor UOP, voiding spontaneously  - IVF: TPN @ 25cc/hr    Hematologic: last transfusion 2u pRBC on 8/28  - Trend H&H and transfuse Hgb < 7  - Lovenox for DVT ppx    Infectious Disease: C. diff colitis vs Crohns flair s/p subtotal colectomy   - Zosyn started 2/2 concern for potential peritonitis until 8/30  - Monitor WBC, temp    Endocrine: no active issues   - Monitor BMP  - Steroid taper completed 8/26  - ISS    Lines & Drains:   - LLQ & LUQ drain 8/20  - TPN 8/16  - RUE PICC placed 8/16    Disposition: SICU  Code Status: DNR/DNI, MOL in chart 80yo M with Crohn's disease, now s/p total abdominal colectomy with end ileostomy on 8/20.     PLAN:  Neurologic: post-op pain control, new-onset hypophonia without focal neuro deficits, obtundation  - Monitor mental status, currently unable to protect airway  - Pain control: PO Tylenol 650mg PRN  - Avoid narcotics/benzos iso obtundation  - ENT c/s'd for hypophonia, OP f/u Dr. Patterson in 2-3wks if no clinical improvement    Respiratory: Acute respiratory insufficiency resolving, patient on RA  - Incentive spirometry 10x/hr while in bed, OOBTC    Cardiovascular: History of AFib currently rate controlled with IV metoprolol and amiodarone, now in NSR  - Monitor vital signs, currently hemodynamically stable  - Continue IV metoprolol 5mg q4hrs    Gastrointestinal/Nutrition: s/p total abdominal colectomy with end ileostomy, failed S/S, bloody ostomy output  - Diet: TF started (trickle overnight), advance to goal this AM  - C/w TPN  - Protonix BID for GIB  - Imodium TID  - Monitor ostomy output, currently bloody    Renal/Genitourinary: no active issues   - Monitor and replete electrolytes as needed  - Monitor UOP, voiding spontaneously  - IVF: TPN @ 25cc/hr    Hematologic: last transfusion 2u pRBC on 8/28  - Trend H&H and transfuse Hgb < 7  - Lovenox for DVT ppx    Infectious Disease: C. diff colitis vs Crohns flair s/p subtotal colectomy   - Zosyn started 2/2 concern for potential peritonitis until 8/30  - Monitor WBC, temp    Endocrine: no active issues   - Monitor BMP  - Steroid taper completed 8/26  - ISS    Lines & Drains:   - LLQ & LUQ drain 8/20  - TPN 8/16  - RUE PICC placed 8/16    Disposition: SICU  Code Status: DNR/DNI, MOL in chart

## 2021-09-01 NOTE — BH CONSULTATION LIAISON PROGRESS NOTE - NSBHFUPINTERVALCCFT_PSY_A_CORE
Pt nods that he is not feeling well. 
Pt is too tired to speak much and his family says that he may be able to speak in the morning   since he already had PT and is too tired now

## 2021-09-01 NOTE — BH CONSULTATION LIAISON PROGRESS NOTE - NSBHFUPINTERVALHXFT_PSY_A_CORE
Pt's daughter and wife are at bedside an concerned that he is more depressed.  They had refused starting an antidepressant when initially seen and continue to be concerned that any medication will be oversedating.  They want him to participate in psychotherapy, but he is currently too tired and sedated.  They deny that he has any recent suicidal thoughts. 
Pt's daughter is at bedside an concerned that he is more depressed.  They had refused starting an antidepressant when initially seen and continue to be concerned that any medication will be oversedating.  They want him to participate in psychotherapy, but he is currently unable to say much because of the NG tube.   Pt is fatigued and complaining of intermittent pain. Discussed starting an antidepressant with him and his daughter again.  Pt may benefit from Remeron 7.5mg to help with anxiety, mood, insomnia, and appetite, (once he is able to eat again).

## 2021-09-01 NOTE — PROGRESS NOTE ADULT - ASSESSMENT
81M with recently dx Crohn's disease c/b perianal abscess and left posterior distal anal intersphincteric fistula and presents diarrhea/BRBPR  2/2 to CD flare c/b c diff colitis. Initially treat for C diff, s/p FMT. Taken emergently to OR on 8/20 for acute deterioration for laparoscopic total colectomy with end ileostomy.     PLAN:  - HDS during AM rounds  - NGT/NPO w tube feed, titrate to goal   - cw TPN  - cw PPI BID   - DNR/DNI, molst in chart   - F/u ostomy output for quantity/quality (blood)  - OOB  - PT to see and encourage ambulation   - Appreciate Psychiatry input   - Appreciate GI Recs  - Appreciate excellent care per SICU    [facility]  [id number]

## 2021-09-01 NOTE — PROGRESS NOTE ADULT - SUBJECTIVE AND OBJECTIVE BOX
SICU Daily Progress Note  =====================================================  Interval/Overnight Events:     - More awake, alert and following commands  - Restarted trickle feeds, plan to increase to goal 9.1.21  - Imodium started for ileostomy output  - Psych consult obtained for evaluation for depression: Remeron started to aid with sleep and appetite  - Tramadol 25 x1 given at night for breakthrough pain    HPI: 81y Male M with Crohn disease s/p failed medical management. 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 Transferred to SICU intubated and on pressors. Now extubated and off pressors.    Allergies:   No Known Allergies    MEDICATIONS:   --------------------------------------------------------------------------------------  Neurologic Medications  acetaminophen    Suspension .. 650 milliGRAM(s) Oral every 6 hours PRN Mild Pain (1 - 3)    Respiratory Medications    Cardiovascular Medications  metoprolol tartrate Injectable 5 milliGRAM(s) IV Push every 4 hours    Gastrointestinal Medications  fat emulsion (Fish Oil and Plant Based) 20% Infusion 25 mL/Hr IV Continuous <Continuous>  loperamide Liquid 1 milliGRAM(s) Oral three times a day  pantoprazole  Injectable 40 milliGRAM(s) IV Push every 12 hours  Parenteral Nutrition - Adult 1 Each TPN Continuous <Continuous>  sodium phosphate IVPB 15 milliMole(s) IV Intermittent once    Genitourinary Medications    Hematologic/Oncologic Medications  enoxaparin Injectable 40 milliGRAM(s) SubCutaneous every 24 hours    Antimicrobial/Immunologic Medications    Endocrine/Metabolic Medications    Topical/Other Medications  Biotene Dry Mouth Oral Rinse 5 milliLiter(s) Swish and Spit four times a day  chlorhexidine 2% Cloths 1 Application(s) Topical <User Schedule>    --------------------------------------------------------------------------------------  VITAL SIGNS, INS/OUTS (last 24 hours):  --------------------------------------------------------------------------------------  T(C): 36.6 (08-31-21 @ 23:00), Max: 36.7 (08-31-21 @ 19:00)  HR: 75 (09-01-21 @ 00:00) (61 - 80)  BP: 124/58 (09-01-21 @ 00:00) (112/58 - 135/61)  RR: 25 (09-01-21 @ 00:00) (15 - 35)  SpO2: 100% (09-01-21 @ 00:00) (100% - 100%)    08-30-21 @ 07:01  -  08-31-21 @ 07:00  --------------------------------------------------------  IN: 2984 mL / OUT: 2450 mL / NET: 534 mL    08-31-21 @ 07:01  -  09-01-21 @ 01:04  --------------------------------------------------------  IN: 1586 mL / OUT: 910 mL / NET: 676 mL  --------------------------------------------------------------------------------------  EXAM  NEUROLOGY  Exam: Normal, NAD, alert, oriented x3, no focal deficits.    HEENT  Exam: Normocephalic, atraumatic, EOMI.     RESPIRATORY  Exam: Normal expansion/effort.    CARDIOVASCULAR  Exam: Regular rate and rhythm.       GI/NUTRITION  Exam: Abdomen soft, Non-tender, Non-distended. Ileostomy functioning with brown output.    VASCULAR  Exam: Extremities warm, pink, well-perfused.     MUSCULOSKELETAL  Exam: All extremities moving spontaneously without limitations.     SKIN  Exam: Good skin turgor, no skin breakdown.     LABS  --------------------------------------------------------------------------------------                        7.7    8.80  )-----------( 158      ( 31 Aug 2021 23:24 )             24.8   08-31    137  |  108  |  32<H>  ----------------------------<  94  4.3   |  23  |  0.41<L>    Ca    7.6<L>      31 Aug 2021 23:24  Phos  2.8     08-31  Mg     2.0     08-31    TPro  4.4<L>  /  Alb  1.8<L>  /  TBili  0.3  /  DBili  x   /  AST  24  /  ALT  34  /  AlkPhos  92  08-31  ABG - ( 30 Aug 2021 03:50 )  pH, Arterial: 7.46  pH, Blood: x     /  pCO2: 32    /  pO2: 151   / HCO3: 23    / Base Excess: -0.8  /  SaO2: 99.6    --------------------------------------------------------------------------------------

## 2021-09-01 NOTE — BH CONSULTATION LIAISON PROGRESS NOTE - CURRENT MEDICATION
MEDICATIONS  (STANDING):  Biotene Dry Mouth Oral Rinse 5 milliLiter(s) Swish and Spit four times a day  chlorhexidine 2% Cloths 1 Application(s) Topical <User Schedule>  cholecalciferol 1000 Unit(s) Oral daily  enoxaparin Injectable 40 milliGRAM(s) SubCutaneous every 24 hours  fat emulsion (Fish Oil and Plant Based) 20% Infusion 25 mL/Hr (25 mL/Hr) IV Continuous <Continuous>  metoprolol tartrate 12.5 milliGRAM(s) Oral every 6 hours  pantoprazole  Injectable 40 milliGRAM(s) IV Push every 12 hours  Parenteral Nutrition - Adult 1 Each (83 mL/Hr) TPN Continuous <Continuous>  Parenteral Nutrition - Adult 1 Each (83 mL/Hr) TPN Continuous <Continuous>    MEDICATIONS  (PRN):  acetaminophen    Suspension .. 650 milliGRAM(s) Oral every 6 hours PRN Mild Pain (1 - 3)  metoprolol tartrate Injectable 5 milliGRAM(s) IV Push every 6 hours PRN HR >110  oxyCODONE    IR 2.5 milliGRAM(s) Oral every 6 hours PRN Moderate Pain (4 - 6)  
MEDICATIONS  (STANDING):  Biotene Dry Mouth Oral Rinse 5 milliLiter(s) Swish and Spit four times a day  chlorhexidine 2% Cloths 1 Application(s) Topical <User Schedule>  enoxaparin Injectable 40 milliGRAM(s) SubCutaneous every 24 hours  fat emulsion (Fish Oil and Plant Based) 20% Infusion 25 mL/Hr (25 mL/Hr) IV Continuous <Continuous>  loperamide Liquid 1 milliGRAM(s) Oral three times a day  metoprolol tartrate Injectable 5 milliGRAM(s) IV Push every 4 hours  pantoprazole  Injectable 40 milliGRAM(s) IV Push every 12 hours  Parenteral Nutrition - Adult 1 Each (83 mL/Hr) TPN Continuous <Continuous>    MEDICATIONS  (PRN):  acetaminophen    Suspension .. 650 milliGRAM(s) Oral every 6 hours PRN Mild Pain (1 - 3)

## 2021-09-02 NOTE — PROGRESS NOTE ADULT - SUBJECTIVE AND OBJECTIVE BOX
[facility] NUTRITION SUPPORT-- FOLLOW UP NOTE  --------------------------------------------------------------------------------  24 hour events/subjective: pt seen and examined. oob in chair. tpn running. tolerating ngt feeds at 30cc/hr. svt overnight. seen by slp, rec'ing npo. started on vit d. c/o abd pain and feeling unwell. ostomy functioning. denies fevers, nausea, vomiting.    Diet:  Diet, NPO with Tube Feed:   Tube Feeding Modality: Nasogastric  Pivot 1.5 Blayne (PIVOT1.5RTH)  Total Volume for 24 Hours (mL): 960  Continuous  Starting Tube Feed Rate mL per Hour: 20     Every 12 hours  Until Goal Tube Feed Rate (mL per Hour): 40  Tube Feed Duration (in Hours): 24  Tube Feed Start Time: 10:30 (09-01-21 @ 12:44)      PAST HISTORY  --------------------------------------------------------------------------------  No significant changes to PMH, PSH, FHx, SHx, unless otherwise noted    ALLERGIES & MEDICATIONS  --------------------------------------------------------------------------------  Allergies    No Known Allergies    Intolerances      Standing Inpatient Medications  Biotene Dry Mouth Oral Rinse 5 milliLiter(s) Swish and Spit four times a day  chlorhexidine 2% Cloths 1 Application(s) Topical <User Schedule>  cholecalciferol 1000 Unit(s) Oral daily  enoxaparin Injectable 40 milliGRAM(s) SubCutaneous every 24 hours  fat emulsion (Fish Oil and Plant Based) 20% Infusion 25 mL/Hr IV Continuous <Continuous>  metoprolol tartrate 37.5 milliGRAM(s) Oral every 12 hours  pantoprazole  Injectable 40 milliGRAM(s) IV Push every 12 hours  Parenteral Nutrition - Adult 1 Each TPN Continuous <Continuous>    PRN Inpatient Medications  acetaminophen    Suspension .. 650 milliGRAM(s) Oral every 6 hours PRN  traMADol 25 milliGRAM(s) Oral every 8 hours PRN        REVIEW OF SYSTEMS  --------------------------------------------------------------------------------  General:  see hpi  HEENT:  no headaches, no vision changes, no ear pain  CV:  no chest pain, no palpitations  Resp:  no dyspnea, no cough  GI:  see hpi  :  no pain, no bleeding, no dysuria  Muscle:  +weakness  Neuro:  no numbness, no tingling, no memory problems  Psych:  no insomnia, no mood problems, no depression  Endocrine:  no cold/heat intolerance  Skin: +edema          VITALS/PHYSICAL EXAM  --------------------------------------------------------------------------------  T(C): 36.4 (09-02-21 @ 07:00), Max: 37 (09-01-21 @ 17:00)  HR: 79 (09-02-21 @ 08:00) (72 - 104)  BP: 111/57 (09-02-21 @ 08:00) (101/61 - 154/71)  RR: 30 (09-02-21 @ 08:00) (16 - 35)  SpO2: 100% (09-02-21 @ 08:00) (97% - 100%)  Wt(kg): --      09-01-21 @ 07:01  -  09-02-21 @ 07:00  --------------------------------------------------------  IN: 3302 mL / OUT: 2530 mL / NET: 772 mL    09-02-21 @ 07:01  -  09-02-21 @ 09:02  --------------------------------------------------------  IN: 113 mL / OUT: 0 mL / NET: 113 mL      I&O's Detail    01 Sep 2021 07:01  -  02 Sep 2021 07:00  --------------------------------------------------------  IN:    Fat Emulsion (Fish Oil &amp; Plant Based) 20% Infusion: 350 mL    Glucerna: 120 mL    IV PiggyBack: 300 mL    Pivot 1.5: 540 mL    TPN (Total Parenteral Nutrition): 1992 mL  Total IN: 3302 mL    OUT:    Bulb (mL): 10 mL    Bulb (mL): 110 mL    Ileostomy (mL): 750 mL    Incontinent per Condom Catheter (mL): 1660 mL  Total OUT: 2530 mL    Total NET: 772 mL      02 Sep 2021 07:01  -  02 Sep 2021 09:02  --------------------------------------------------------  IN:    Pivot 1.5: 30 mL    TPN (Total Parenteral Nutrition): 83 mL  Total IN: 113 mL    OUT:    Fat Emulsion (Fish Oil &amp; Plant Based) 20% Infusion: 0 mL  Total OUT: 0 mL    Total NET: 113 mL          Physical Exam:  Gen: oob in chair, frail, cachectic   HEENT: ncat, trachea midline +ngt  Chest: respirations non labored  Abd: soft nd +ostomy w yellow/brown stool  Extrem: +le edema; +b/l ue extrem edema, distal to picc  Neuro: awake alert responsive      LABS/STUDIES  --------------------------------------------------------------------------------              8.2    8.42  >-----------<  193      [09-01-21 @ 22:50]              26.1     136  |  106  |  30  ----------------------------<  96      [09-01-21 @ 22:50]  4.3   |  23  |  0.39        Ca     7.7     [09-01-21 @ 22:50]      iCa    1.14     [09-01 @ 22:53]      Mg     1.8     [09-01-21 @ 22:50]      Phos  2.8     [09-01-21 @ 22:50]    TPro  4.9  /  Alb  1.9  /  TBili  0.3  /  DBili  x   /  AST  27  /  ALT  37  /  AlkPhos  105  [09-01-21 @ 22:50]          Ca ionized  Creatinine Trend:  POC glucoseGlucose, Serum: 96 mg/dL (09-01-21 @ 22:50)  CAPILLARY BLOOD GLUCOSE      POCT Blood Glucose.: 124 mg/dL (02 Sep 2021 05:34)  POCT Blood Glucose.: 98 mg/dL (01 Sep 2021 16:56)  POCT Blood Glucose.: 119 mg/dL (01 Sep 2021 11:28)    PrealbuminPrealbumin, Serum: 15 mg/dL (08-30-21 @ 03:32)  Prealbumin, Serum: 8 mg/dL (08-15-21 @ 10:17)    Triglycerides     [facility] NUTRITION SUPPORT-- FOLLOW UP NOTE  --------------------------------------------------------------------------------  24 hour events/subjective: pt seen and examined. oob in chair. tpn running. tolerating ngt feeds at 30cc/hr. svt overnight. seen by slp, rec'ing npo. started on vit d. to receive lasix x1. c/o abd pain and feeling unwell. ostomy functioning. denies fevers, nausea, vomiting.    Diet:  Diet, NPO with Tube Feed:   Tube Feeding Modality: Nasogastric  Pivot 1.5 Blayne (PIVOT1.5RTH)  Total Volume for 24 Hours (mL): 960  Continuous  Starting Tube Feed Rate mL per Hour: 20     Every 12 hours  Until Goal Tube Feed Rate (mL per Hour): 40  Tube Feed Duration (in Hours): 24  Tube Feed Start Time: 10:30 (09-01-21 @ 12:44)      PAST HISTORY  --------------------------------------------------------------------------------  No significant changes to PMH, PSH, FHx, SHx, unless otherwise noted    ALLERGIES & MEDICATIONS  --------------------------------------------------------------------------------  Allergies    No Known Allergies    Intolerances      Standing Inpatient Medications  Biotene Dry Mouth Oral Rinse 5 milliLiter(s) Swish and Spit four times a day  chlorhexidine 2% Cloths 1 Application(s) Topical <User Schedule>  cholecalciferol 1000 Unit(s) Oral daily  enoxaparin Injectable 40 milliGRAM(s) SubCutaneous every 24 hours  fat emulsion (Fish Oil and Plant Based) 20% Infusion 25 mL/Hr IV Continuous <Continuous>  metoprolol tartrate 37.5 milliGRAM(s) Oral every 12 hours  pantoprazole  Injectable 40 milliGRAM(s) IV Push every 12 hours  Parenteral Nutrition - Adult 1 Each TPN Continuous <Continuous>    PRN Inpatient Medications  acetaminophen    Suspension .. 650 milliGRAM(s) Oral every 6 hours PRN  traMADol 25 milliGRAM(s) Oral every 8 hours PRN        REVIEW OF SYSTEMS  --------------------------------------------------------------------------------  General:  see hpi  HEENT:  no headaches, no vision changes, no ear pain  CV:  no chest pain, no palpitations  Resp:  no dyspnea, no cough  GI:  see hpi  :  no pain, no bleeding, no dysuria  Muscle:  +weakness  Neuro:  no numbness, no tingling, no memory problems  Psych:  no insomnia, no mood problems, no depression  Endocrine:  no cold/heat intolerance  Skin: +edema          VITALS/PHYSICAL EXAM  --------------------------------------------------------------------------------  T(C): 36.4 (09-02-21 @ 07:00), Max: 37 (09-01-21 @ 17:00)  HR: 79 (09-02-21 @ 08:00) (72 - 104)  BP: 111/57 (09-02-21 @ 08:00) (101/61 - 154/71)  RR: 30 (09-02-21 @ 08:00) (16 - 35)  SpO2: 100% (09-02-21 @ 08:00) (97% - 100%)  Wt(kg): --      09-01-21 @ 07:01  -  09-02-21 @ 07:00  --------------------------------------------------------  IN: 3302 mL / OUT: 2530 mL / NET: 772 mL    09-02-21 @ 07:01  -  09-02-21 @ 09:02  --------------------------------------------------------  IN: 113 mL / OUT: 0 mL / NET: 113 mL      I&O's Detail    01 Sep 2021 07:01  -  02 Sep 2021 07:00  --------------------------------------------------------  IN:    Fat Emulsion (Fish Oil &amp; Plant Based) 20% Infusion: 350 mL    Glucerna: 120 mL    IV PiggyBack: 300 mL    Pivot 1.5: 540 mL    TPN (Total Parenteral Nutrition): 1992 mL  Total IN: 3302 mL    OUT:    Bulb (mL): 10 mL    Bulb (mL): 110 mL    Ileostomy (mL): 750 mL    Incontinent per Condom Catheter (mL): 1660 mL  Total OUT: 2530 mL    Total NET: 772 mL      02 Sep 2021 07:01  -  02 Sep 2021 09:02  --------------------------------------------------------  IN:    Pivot 1.5: 30 mL    TPN (Total Parenteral Nutrition): 83 mL  Total IN: 113 mL    OUT:    Fat Emulsion (Fish Oil &amp; Plant Based) 20% Infusion: 0 mL  Total OUT: 0 mL    Total NET: 113 mL          Physical Exam:  Gen: oob in chair, frail, cachectic   HEENT: ncat, trachea midline +ngt  Chest: respirations non labored  Abd: soft nd +ostomy w yellow/brown stool  Extrem: +le edema; +b/l ue extrem edema, distal to picc  Neuro: awake alert responsive      LABS/STUDIES  --------------------------------------------------------------------------------              8.2    8.42  >-----------<  193      [09-01-21 @ 22:50]              26.1     136  |  106  |  30  ----------------------------<  96      [09-01-21 @ 22:50]  4.3   |  23  |  0.39        Ca     7.7     [09-01-21 @ 22:50]      iCa    1.14     [09-01 @ 22:53]      Mg     1.8     [09-01-21 @ 22:50]      Phos  2.8     [09-01-21 @ 22:50]    TPro  4.9  /  Alb  1.9  /  TBili  0.3  /  DBili  x   /  AST  27  /  ALT  37  /  AlkPhos  105  [09-01-21 @ 22:50]          Ca ionized  Creatinine Trend:  POC glucoseGlucose, Serum: 96 mg/dL (09-01-21 @ 22:50)  CAPILLARY BLOOD GLUCOSE      POCT Blood Glucose.: 124 mg/dL (02 Sep 2021 05:34)  POCT Blood Glucose.: 98 mg/dL (01 Sep 2021 16:56)  POCT Blood Glucose.: 119 mg/dL (01 Sep 2021 11:28)    PrealbuminPrealbumin, Serum: 15 mg/dL (08-30-21 @ 03:32)  Prealbumin, Serum: 8 mg/dL (08-15-21 @ 10:17)    Triglycerides

## 2021-09-02 NOTE — PROGRESS NOTE ADULT - ASSESSMENT
80 yo M with pmhx of Crohns disease (dxd 9 mos ago, hx of perianal fistula, sp tx w remicaide until developed ab; since managed on budesonide, mtx/5asa), h/o cdiff, CAD, afib (not on ac), p/w bloody diarrhea, abd pain and weakness. Admitted for crohns management, course c/b cdiff colitis sp dificid/fmt and findings of indeterminate quantiferon, pending id clearance to start biologic. Prealb 8. TPN consulted in setting of significant weight loss, severe pcm, and worsening colitis suspected 2/2 CD. Pt at high risk for refeeding syndrome. TPN started 8/16. Repeat CT showing unchanged diffuse colitis and non specific gb wall edema.  Taken to OR emergently on 8/20 for lap total colectomy, end ileostomy. Failed S&S eval. Family agreed to TANJA tube feeds, which had been started yesterday but then dc'd due to noted blood in ostomy and risk for aspiration given deterioration in mental status. pt now dnr/dni. Mental status improved and restarted on ngt feeds, tolerating    Current Diet: NPO/NGT feeds (pivot 1.5 goal 40cc/hr)  TPN GOAL recommendations as per RD: , dextrose 210g, SMOF 60g    -cont ngt feeds and advance to goal as tolerated, will cont tpn until pt with established tolerance to enteral feeds  -PICC in place, maintenance as per protocol  -hypocalcemia- improved, cont ca gluconate at 24meq in TPN bag  -vitamin D deficiency- cont vitamin d supplemetation  -anemia/gib- trend cbc, transfuse prn  -electrolyte imbalance risk- monitor CMP, Mg, Ionized Ca, Phosphorus qd   -monitor TG weekly (9/6); strict I/O's, monitor volume status  -further care per primary/SICU    plan discussed with Dr. Fernandez, agreeable with above    TPN pager [phone], spectra [phone]  M-F 6A-4P, Weekends and holidays 8A-12P

## 2021-09-02 NOTE — PROGRESS NOTE ADULT - ATTENDING COMMENTS
ON: no major events    improving mental status, sitting in chair eyes open and interactive  has vocal cord issues and concern for aspiration, ENT will revisit in 2wks  CXR 8/30 no acute findings  on RA  paroxysmal afib, rate controlled now sinus  s/p amio  metop 12.5 PO q6hrs, metop IV PRN  TEN advance to goal today, if 3 days of tolerance may discontinue TPN  cont TPN  LFTs wnl  intermittent bloody ostomy output, previously CTA failed to show source, no more episodes  ostomy output monitor 750cc liters  JPs output sero sang, small, ask whether ok to remove  urine output monitor, has condom cath  VTE PPX restart  s/p zosyn  afebrile, and decreasing leukocytosis  good glycemic control  s/p steroid taper for crohns  SW to discuss rehab options    DNR DNI  discussed with colorectal regarding PEG but no immediate plan. speech and swallow evaluating, currently failing, but hoping to improve. pt himself declined PEG.

## 2021-09-02 NOTE — PROGRESS NOTE ADULT - ASSESSMENT
81M with recently dx Crohn's disease c/b perianal abscess and left posterior distal anal intersphincteric fistula and presents diarrhea/BRBPR  2/2 to CD flare c/b c diff colitis. Initially treat for C diff, s/p FMT. Taken emergently to OR on 8/20 for acute deterioration for laparoscopic total colectomy with end ileostomy.     PLAN:  - HDS during AM rounds  - NGT/NPO w tube feed, titrate to goal   - cw TPN  - cw PPI BID   - DNR/DNI, molst in chart   - F/u ostomy output for quantity/quality (blood)  - OOB  - PT to see and encourage ambulation   - Appreciate Psychiatry input   - Appreciate GI Recs  - Appreciate excellent care per SICU    [facility]  [id number]

## 2021-09-02 NOTE — PROGRESS NOTE ADULT - SUBJECTIVE AND OBJECTIVE BOX
SICU Daily Progress Note  =====================================================  Interval/Overnight Events:     - TF changed to Pivot, advancing 10cc/hr every 12hours (goal of 40)  - Overnight, went into SVT, self resolved  - PO metoprolol increased to 37.5 BID    HPI: 81y Male M with Crohn disease s/p failed medical management. 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 Transferred to SICU intubated and on pressors. Now extubated and off pressors.    Allergies:   No Known Allergies    MEDICATIONS:   --------------------------------------------------------------------------------------  Neurologic Medications  acetaminophen    Suspension .. 650 milliGRAM(s) Oral every 6 hours PRN Mild Pain (1 - 3)  oxyCODONE    IR 2.5 milliGRAM(s) Oral every 6 hours PRN Moderate Pain (4 - 6)    Respiratory Medications    Cardiovascular Medications  metoprolol tartrate 37.5 milliGRAM(s) Oral every 12 hours    Gastrointestinal Medications  cholecalciferol 1000 Unit(s) Oral daily  fat emulsion (Fish Oil and Plant Based) 20% Infusion 25 mL/Hr IV Continuous <Continuous>  pantoprazole  Injectable 40 milliGRAM(s) IV Push every 12 hours  Parenteral Nutrition - Adult 1 Each TPN Continuous <Continuous>  sodium phosphate IVPB 15 milliMole(s) IV Intermittent once    Genitourinary Medications    Hematologic/Oncologic Medications  enoxaparin Injectable 40 milliGRAM(s) SubCutaneous every 24 hours    Antimicrobial/Immunologic Medications    Endocrine/Metabolic Medications    Topical/Other Medications  Biotene Dry Mouth Oral Rinse 5 milliLiter(s) Swish and Spit four times a day  chlorhexidine 2% Cloths 1 Application(s) Topical <User Schedule>    --------------------------------------------------------------------------------------  VITAL SIGNS, INS/OUTS (last 24 hours):  --------------------------------------------------------------------------------------  T(C): 36.5 (09-01-21 @ 23:00), Max: 37 (09-01-21 @ 17:00)  HR: 90 (09-02-21 @ 02:00) (72 - 104)  BP: 119/57 (09-02-21 @ 02:00) (114/57 - 154/71)  RR: 28 (09-02-21 @ 02:00) (16 - 35)  SpO2: 98% (09-02-21 @ 02:00) (97% - 100%)    08-31-21 @ 07:01  -  09-01-21 @ 07:00  --------------------------------------------------------  IN: 2907 mL / OUT: 2250 mL / NET: 657 mL    09-01-21 @ 07:01  -  09-02-21 @ 02:14  --------------------------------------------------------  IN: 2387 mL / OUT: 1440 mL / NET: 947 mL      --------------------------------------------------------------------------------------  EXAM  NEUROLOGY  Exam: Normal, NAD, alert, oriented x3, no focal deficits.    HEENT  Exam: Normocephalic, atraumatic, EOMI.     RESPIRATORY  Exam: Normal expansion/effort.    CARDIOVASCULAR  Exam: Regular rate and rhythm.       GI/NUTRITION  Exam: Abdomen soft, Non-tender, Non-distended.     VASCULAR  Exam: Extremities warm, pink, well-perfused.     MUSCULOSKELETAL  Exam: All extremities moving spontaneously without limitations.     SKIN  Exam: Good skin turgor, no skin breakdown.     LABS  --------------------------------------------------------------------------------------                        8.2    8.42  )-----------( 193      ( 01 Sep 2021 22:50 )             26.1   09-01    136  |  106  |  30<H>  ----------------------------<  96  4.3   |  23  |  0.39<L>    Ca    7.7<L>      01 Sep 2021 22:50  Phos  2.8     09-01  Mg     1.8     09-01    TPro  4.9<L>  /  Alb  1.9<L>  /  TBili  0.3  /  DBili  x   /  AST  27  /  ALT  37  /  AlkPhos  105  09-01    --------------------------------------------------------------------------------------

## 2021-09-02 NOTE — PROGRESS NOTE ADULT - ASSESSMENT
80yo M with Crohn's disease, now s/p total abdominal colectomy with end ileostomy on 8/20.     PLAN:  Neurologic: post-op pain control, new-onset hypophonia without focal neuro deficits, obtundation  - Monitor mental status  - Pain control: PO Tylenol 650mg q6hr PRN, Oxy 2.5mg q6hr pRN  - Vitamin D3 daily  - Avoid narcotics/benzos iso obtundation    Respiratory: Acute respiratory insufficiency resolving, patient on RA  - Incentive spirometry 10x/hr while in bed, OOBTC    Cardiovascular: History of AFib currently rate controlled with IV metoprolol and amiodarone, now in NSR  - Monitor vital signs, currently hemodynamically stable  - Metoprolol 37.5mg BID    Gastrointestinal/Nutrition: s/p total abdominal colectomy with end ileostomy, failed S/S, bloody ostomy output  - TF Pivot 1.5 being advance 10cc/hr every 12 hours (goal of 40)  - C/w TPN  - Protonix 40mg BID for GIB  - Monitor ostomy output and character    Renal/Genitourinary: no active issues   - Monitor and replete electrolytes as needed  - Monitor UOP, voiding spontaneously  - IVF: TPN @ 25cc/hr    Hematologic: last transfusion 2u pRBC on 8/28  - Trend H/H and transfuse Hgb < 7  - Lovenox 40mg daily for DVT ppx    Infectious Disease: C. diff colitis vs Crohn's flair s/p subtotal colectomy   - Monitor WBC, temp    Endocrine: no active issues   - Monitor BMP  - Steroid taper completed 8/26  - ISS    Lines & Drains:   - LLQ & LUQ drain 8/20  - TPN 8/16  - RUE PICC placed 8/16    Disposition: SICU  Code Status: DNR/DNI, MOLST in chart

## 2021-09-02 NOTE — PROGRESS NOTE ADULT - ASSESSMENT
82 yo M with Crohn's on treatment, recently cared for at [facility],adnutted 7/13/21 with ongoing diarrhea    Antibiotics  Flagyl 7/13 7/21-->25  Cipro 7/13-->14  po Vanco 7/14 -->-->7/29; 8/1-->8/2  (7/28: colonoscopic FMT)  Fidaxomicin 8/2--> 8/20  Zosyn 8/20--> 24      Crohns colitis s/p colectomy 8/20  C diff colitis   s/p Fidaxomcin   - S/p colonoscopic FMT 7/28  Leukocytosis  malnourished state, hypoalbuminema  TPN/Smoflipid 8/16-->      8/20 Total colectomy with end-ileostomy, murky ascitis noted with fibrinous exudate and washed out   currently extubated    8/9 Path Left colon, biopsy  - Active (nonspecific) colitis.  - Negative for CMV inclusions (CMV IHC)    Methyprednisolone tapered off 8/27    overall leucocytosis, peritonitis, malnourished state.    Suggest  S/p completion of 10 days of abx for peritonitis    trend cbc for leucocytosis, resolved  clinically doing well.       Will sign off, please call with questions.     Ezequiel Oates  Pager: [phone]. If no response or past 5 pm call [phone].

## 2021-09-02 NOTE — PROGRESS NOTE ADULT - SUBJECTIVE AND OBJECTIVE BOX
81y old  Male who presents with a chief complaint of diarrhea with blood in it , abd pain and weakness (02 Sep 2021 09:02)      Interval history:  Afebrile, awake, no abdominal pain, no more blood in ostomy.       Allergies:   No Known Allergies      Antimicrobials:      REVIEW OF SYSTEMS:  No chest pain   No N/V  No rash.       Vital Signs Last 24 Hrs  T(C): 36.6 (09-02-21 @ 15:00), Max: 36.8 (09-01-21 @ 19:00)  T(F): 97.9 (09-02-21 @ 15:00), Max: 98.2 (09-01-21 @ 19:00)  HR: 93 (09-02-21 @ 17:00) (73 - 104)  BP: 137/64 (09-02-21 @ 17:00) (101/61 - 154/71)  BP(mean): 92 (09-02-21 @ 17:00) (76 - 102)  RR: 27 (09-02-21 @ 17:00) (19 - 35)  SpO2: 100% (09-02-21 @ 17:00) (97% - 100%)      PHYSICAL EXAM:  Patient in NAD, awake, alert.   + NG   Cardiovascular: S1S2 normal.  Lungs: + air entry B/L lung fields.  Gastrointestinal: soft, nondistended. + ostomy   Extremities: anasarca   + PICC                              8.2    8.42  )-----------( 193      ( 01 Sep 2021 22:50 )             26.1   09-01    136  |  106  |  30<H>  ----------------------------<  96  4.3   |  23  |  0.39<L>    Ca    7.7<L>      01 Sep 2021 22:50  Phos  2.8     09-01  Mg     1.8     09-01    TPro  4.9<L>  /  Alb  1.9<L>  /  TBili  0.3  /  DBili  x   /  AST  27  /  ALT  37  /  AlkPhos  105  09-01      LIVER FUNCTIONS - ( 01 Sep 2021 22:50 )  Alb: 1.9 g/dL / Pro: 4.9 g/dL / ALK PHOS: 105 U/L / ALT: 37 U/L / AST: 27 U/L / GGT: x               Radiology:  < from: Xray Chest 1 View- PORTABLE-Routine (Xray Chest 1 View- PORTABLE-Routine in AM.) (08.30.21 @ 06:48) >  IMPRESSION:    The heart is normal in size. The lungs appear to be clear. No pleural effusion. No pneumothorax. NG tube is in the stomach. A PICC line is seen on the right and the tip is in the superior vena cava. Degenerative changes of the thoracic spine.

## 2021-09-02 NOTE — PROGRESS NOTE ADULT - SUBJECTIVE AND OBJECTIVE BOX
24h Events:  - Overnight went into SVT, self-resolved    Subjective:   Patient seen at bedside this AM.     Objective:  Vital Signs  T(C): 36.5 (09-01 @ 23:00), Max: 37 (09-01 @ 17:00)  HR: 90 (09-02 @ 02:00) (72 - 104)  BP: 119/57 (09-02 @ 02:00) (114/57 - 154/71)  RR: 28 (09-02 @ 02:00) (16 - 35)  SpO2: 98% (09-02 @ 02:00) (97% - 100%)  08-31-21 @ 07:01  -  09-01-21 @ 07:00  --------------------------------------------------------  IN:  Total IN: 0 mL    OUT:    Bulb (mL): 5 mL    Bulb (mL): 95 mL    Ileostomy (mL): 250 mL    Incontinent per Condom Catheter (mL): 1900 mL  Total OUT: 2250 mL    Total NET: -2250 mL      09-01-21 @ 07:01  -  09-02-21 @ 02:53  --------------------------------------------------------  IN:  Total IN: 0 mL    OUT:    Bulb (mL): 10 mL    Bulb (mL): 20 mL    Ileostomy (mL): 550 mL    Incontinent per Condom Catheter (mL): 860 mL  Total OUT: 1440 mL    Total NET: -1440 mL    Physical Exam:  GEN: resting in bed, NAD, appears uncomfortable   RESP: increased WOB, tachypneic   ENT: NGT, TANJA tube  ABD: soft, non-distended, non-tender without rebound tenderness or guarding. Midline incision w dressing in place cdi, PAOLO x2 w SS output, ostomy w non bloody stool  EXTR: warm, well-perfused, no edema  NEURO: awake, more alert than previously    Labs:                        8.2    8.42  )-----------( 193      ( 01 Sep 2021 22:50 )             26.1   09-01    136  |  106  |  30<H>  ----------------------------<  96  4.3   |  23  |  0.39<L>    Ca    7.7<L>      01 Sep 2021 22:50  Phos  2.8     09-01  Mg     1.8     09-01    TPro  4.9<L>  /  Alb  1.9<L>  /  TBili  0.3  /  DBili  x   /  AST  27  /  ALT  37  /  AlkPhos  105  09-01    CAPILLARY BLOOD GLUCOSE      POCT Blood Glucose.: 98 mg/dL (01 Sep 2021 16:56)  POCT Blood Glucose.: 119 mg/dL (01 Sep 2021 11:28)  POCT Blood Glucose.: 130 mg/dL (01 Sep 2021 07:18)      Imaging:

## 2021-09-03 NOTE — PROGRESS NOTE ADULT - SUBJECTIVE AND OBJECTIVE BOX
[facility] NUTRITION SUPPORT--  Attending/ PA FOLLOW UP NOTE      24 hour events/subjective: TPN originally consulted for nutrition support and started TPN on 8/16.  Pt is tolerating without issues, and is asymptomatic palpitations, chest pain, shortness of breath, nor fevers, chills nor night sweats, nor nausea nor vomiting nor abdominal pain.    24 HOUR EVENTS:  - Tube feed rate increased to 40cc/hr (goal)  - Diuresed with 20mg IV lasix x3 doses with goal fluid balance -1L  - Protonix decreased from BID to daily        PAST HISTORY  --------------------------------------------------------------------------------  No significant changes to PMH, PSH, FHx, SHx, unless otherwise noted    ALLERGIES & MEDICATIONS  --------------------------------------------------------------------------------  Allergies    No Known Allergies    Intolerances      Standing Inpatient Medications  Biotene Dry Mouth Oral Rinse 5 milliLiter(s) Swish and Spit two times a day  chlorhexidine 2% Cloths 1 Application(s) Topical <User Schedule>  cholecalciferol 1000 Unit(s) Oral daily  enoxaparin Injectable 40 milliGRAM(s) SubCutaneous every 24 hours  metoprolol tartrate 37.5 milliGRAM(s) Oral every 12 hours  pantoprazole  Injectable 40 milliGRAM(s) IV Push every 12 hours  Parenteral Nutrition - Adult 1 Each TPN Continuous <Continuous>    PRN Inpatient Medications  acetaminophen    Suspension .. 650 milliGRAM(s) Oral every 6 hours PRN  traMADol 25 milliGRAM(s) Oral every 8 hours PRN      REVIEW OF SYSTEMS  --------------------------------------------------------------------------------  Gen: as per HPI  Skin: No rashes  Head/Eyes/Ears/Mouth: No headache;No sore throat  Respiratory: No dyspnea, cough,   CV: No chest pain, PND, orthopnea  GI: as per HPI  : No increased frequency, dysuria, hematuria, nocturia  MSK: No joint pain/swelling; no back pain; no edema  Neuro: No dizziness/lightheadedness, weakness, seizures, numbness, tingling  Psych: No significant nervousness, anxiety, stress, depression    All other systems were reviewed and are negative, except as noted.      LABS/STUDIES  --------------------------------------------------------------------------------              7.6    7.41  >-----------<  200      [09-02-21 @ 22:57]              23.3     138  |  106  |  35  ----------------------------<  108      [09-03-21 @ 00:40]  4.0   |  25  |  0.41        Ca     7.6     [09-03-21 @ 00:40]      iCa    1.09     [09-03 @ 00:40]      Mg     2.0     [09-03-21 @ 00:40]      Phos  2.5     [09-03-21 @ 00:40]    TPro  5.0  /  Alb  2.0  /  TBili  0.2  /  DBili  x   /  AST  24  /  ALT  33  /  AlkPhos  97  [09-03-21 @ 00:40]            Glucose, Serum: 108 mg/dL (09-03-21 @ 00:40)  Glucose, Serum: 457 mg/dL (09-02-21 @ 22:57)    Prealbumin, Serum: 22 mg/dL (09-03-21 @ 05:19)  Prealbumin, Serum: 16 mg/dL (09-03-21 @ 03:19)  Prealbumin, Serum: 15 mg/dL (08-30-21 @ 03:32)  Prealbumin, Serum: 8 mg/dL (08-15-21 @ 10:17)          09-02-21 @ 07:01  -  09-03-21 @ 07:00  --------------------------------------------------------  IN: 3662 mL / OUT: 4140 mL / NET: -478 mL    09-03-21 @ 07:01  -  09-03-21 @ 09:08  --------------------------------------------------------  IN: 246 mL / OUT: 0 mL / NET: 246 mL        VITALS/PHYSICAL EXAM  --------------------------------------------------------------------------------  T(C): 36.9 (09-03-21 @ 07:00), Max: 37.1 (09-03-21 @ 03:00)  HR: 103 (09-03-21 @ 09:00) (77 - 103)  BP: 112/59 (09-03-21 @ 09:00) (104/59 - 144/66)  RR: 29 (09-03-21 @ 09:00) (22 - 34)  SpO2: 95% (09-03-21 @ 09:00) (95% - 100%)  Wt(kg): --    Physical Exam:    Gen: oob in chair, frail, cachectic   HEENT: ncat, trachea midline +ngt  Chest: respirations non labored  Abd: soft nd +ostomy w yellow/brown stool  Extrem: +le edema; +b/l ue extrem edema, distal to picc    .  .  .   [facility] NUTRITION SUPPORT--  Attending/ PA FOLLOW UP NOTE      24 hour events/subjective: TPN originally consulted for nutrition support and started TPN on 8/16.  Pt is tolerating without issues, and is asymptomatic palpitations, chest pain, shortness of breath, nor fevers, chills nor night sweats, nor nausea nor vomiting nor abdominal pain. Pt tolerating tube feeds, with plan to slowly wean off TPN,    24 HOUR EVENTS:  - Tube feed rate increased to 40cc/hr (goal)  - Diuresed with 20mg IV lasix x3 doses with goal fluid balance -1L  - Protonix decreased from BID to daily        PAST HISTORY  --------------------------------------------------------------------------------  No significant changes to PMH, PSH, FHx, SHx, unless otherwise noted    ALLERGIES & MEDICATIONS  --------------------------------------------------------------------------------  Allergies    No Known Allergies    Intolerances      Standing Inpatient Medications  Biotene Dry Mouth Oral Rinse 5 milliLiter(s) Swish and Spit two times a day  chlorhexidine 2% Cloths 1 Application(s) Topical <User Schedule>  cholecalciferol 1000 Unit(s) Oral daily  enoxaparin Injectable 40 milliGRAM(s) SubCutaneous every 24 hours  metoprolol tartrate 37.5 milliGRAM(s) Oral every 12 hours  pantoprazole  Injectable 40 milliGRAM(s) IV Push every 12 hours  Parenteral Nutrition - Adult 1 Each TPN Continuous <Continuous>    PRN Inpatient Medications  acetaminophen    Suspension .. 650 milliGRAM(s) Oral every 6 hours PRN  traMADol 25 milliGRAM(s) Oral every 8 hours PRN      REVIEW OF SYSTEMS  --------------------------------------------------------------------------------  Gen: as per HPI  Skin: No rashes  Head/Eyes/Ears/Mouth: No headache;No sore throat  Respiratory: No dyspnea, cough,   CV: No chest pain, PND, orthopnea  GI: as per HPI  : No increased frequency, dysuria, hematuria, nocturia  MSK: No joint pain/swelling; no back pain; no edema  Neuro: No dizziness/lightheadedness, weakness, seizures, numbness, tingling  Psych: No significant nervousness, anxiety, stress, depression    All other systems were reviewed and are negative, except as noted.      LABS/STUDIES  --------------------------------------------------------------------------------              7.6    7.41  >-----------<  200      [09-02-21 @ 22:57]              23.3     138  |  106  |  35  ----------------------------<  108      [09-03-21 @ 00:40]  4.0   |  25  |  0.41        Ca     7.6     [09-03-21 @ 00:40]      iCa    1.09     [09-03 @ 00:40]      Mg     2.0     [09-03-21 @ 00:40]      Phos  2.5     [09-03-21 @ 00:40]    TPro  5.0  /  Alb  2.0  /  TBili  0.2  /  DBili  x   /  AST  24  /  ALT  33  /  AlkPhos  97  [09-03-21 @ 00:40]            Glucose, Serum: 108 mg/dL (09-03-21 @ 00:40)  Glucose, Serum: 457 mg/dL (09-02-21 @ 22:57)    Prealbumin, Serum: 22 mg/dL (09-03-21 @ 05:19)  Prealbumin, Serum: 16 mg/dL (09-03-21 @ 03:19)  Prealbumin, Serum: 15 mg/dL (08-30-21 @ 03:32)  Prealbumin, Serum: 8 mg/dL (08-15-21 @ 10:17)          09-02-21 @ 07:01  -  09-03-21 @ 07:00  --------------------------------------------------------  IN: 3662 mL / OUT: 4140 mL / NET: -478 mL    09-03-21 @ 07:01  -  09-03-21 @ 09:08  --------------------------------------------------------  IN: 246 mL / OUT: 0 mL / NET: 246 mL        VITALS/PHYSICAL EXAM  --------------------------------------------------------------------------------  T(C): 36.9 (09-03-21 @ 07:00), Max: 37.1 (09-03-21 @ 03:00)  HR: 103 (09-03-21 @ 09:00) (77 - 103)  BP: 112/59 (09-03-21 @ 09:00) (104/59 - 144/66)  RR: 29 (09-03-21 @ 09:00) (22 - 34)  SpO2: 95% (09-03-21 @ 09:00) (95% - 100%)  Wt(kg): --    Physical Exam:    Gen: oob in chair, frail, cachectic   HEENT: NCAT +NGT  Neck: trachea midline, no noted JVD  Chest: respirations non labored  Abd: ND soft NT+ostomy w yellow/brown stool  Extrem: +le edema; +b/l ue extrem edema, distal to PICC  Skin: no rashes noted    .  .  .

## 2021-09-03 NOTE — PROGRESS NOTE ADULT - SUBJECTIVE AND OBJECTIVE BOX
Patient is a 81y old  Male who presents with a chief complaint of diarrhea with blood in it , abd pain and weakness (03 Sep 2021 09:08)      INTERVAL HPI/OVERNIGHT EVENTS: NPo for dysphagia   T(C): 36.8 (09-03-21 @ 23:00), Max: 37.1 (09-03-21 @ 03:00)  HR: 92 (09-03-21 @ 23:00) (77 - 103)  BP: 147/67 (09-03-21 @ 23:00) (102/52 - 147/67)  RR: 27 (09-03-21 @ 23:00) (22 - 42)  SpO2: 100% (09-03-21 @ 23:00) (90% - 100%)  Wt(kg): --  I&O's Summary    02 Sep 2021 07:01  -  03 Sep 2021 07:00  --------------------------------------------------------  IN: 3662 mL / OUT: 4140 mL / NET: -478 mL    03 Sep 2021 07:01  -  03 Sep 2021 23:29  --------------------------------------------------------  IN: 1510 mL / OUT: 2450 mL / NET: -940 mL        PAST MEDICAL & SURGICAL HISTORY:  Crohn disease    Paroxysmal atrial fibrillation    CAD (coronary artery disease)  not on ASA or plavix, no stents as per daughter    GI bleed    No significant past surgical history        SOCIAL HISTORY  Alcohol:  Tobacco:  Illicit substance use:    FAMILY HISTORY:    REVIEW OF SYSTEMS:  CONSTITUTIONAL: No fever, weight loss, or fatigue  EYES: No eye pain, visual disturbances, or discharge  ENMT:  No difficulty hearing, tinnitus, vertigo; No sinus or throat pain  NECK: No pain or stiffness  RESPIRATORY: No cough, wheezing, chills or hemoptysis; No shortness of breath  CARDIOVASCULAR: No chest pain, palpitations, dizziness, or leg swelling  GASTROINTESTINAL: No abdominal or epigastric pain. No nausea, vomiting, or hematemesis; No diarrhea or constipation. No melena or hematochezia.  GENITOURINARY: No dysuria, frequency, hematuria, or incontinence  NEUROLOGICAL: No headaches, memory loss, loss of strength, numbness, or tremors  SKIN: No itching, burning, rashes, or lesions   LYMPH NODES: No enlarged glands  ENDOCRINE: No heat or cold intolerance; No hair loss  MUSCULOSKELETAL: No joint pain or swelling; No muscle, back, or extremity pain  PSYCHIATRIC: No depression, anxiety, mood swings, or difficulty sleeping  HEME/LYMPH: No easy bruising, or bleeding gums  ALLERY AND IMMUNOLOGIC: No hives or eczema    RADIOLOGY & ADDITIONAL TESTS:    Imaging Personally Reviewed:  [ ] YES  [ ] NO    Consultant(s) Notes Reviewed:  [ ] YES  [ ] NO    PHYSICAL EXAM:  GENERAL: NAD, well-groomed, well-developed  HEAD:  Atraumatic, Normocephalic  EYES: EOMI, PERRLA, conjunctiva and sclera clear  ENMT: No tonsillar erythema, exudates, or enlargement; Moist mucous membranes, Good dentition, No lesions  NECK: Supple, No JVD, Normal thyroid  NERVOUS SYSTEM:  Alert & Oriented X3, Good concentration; Motor Strength 5/5 B/L upper and lower extremities; DTRs 2+ intact and symmetric  CHEST/LUNG: Clear to percussion bilaterally; No rales, rhonchi, wheezing, or rubs  HEART: Regular rate and rhythm; No murmurs, rubs, or gallops  ABDOMEN: Soft, Nontender, Nondistended; Bowel sounds present  EXTREMITIES:  2+ Peripheral Pulses, No clubbing, cyanosis, or edema  LYMPH: No lymphadenopathy noted  SKIN: No rashes or lesions    LABS:                        7.4    7.31  )-----------( 214      ( 03 Sep 2021 22:05 )             23.8     09-03    139  |  105  |  38<H>  ----------------------------<  105<H>  3.8   |  27  |  0.43<L>    Ca    8.1<L>      03 Sep 2021 22:05  Phos  3.2     09-03  Mg     1.9     09-03    TPro  5.4<L>  /  Alb  1.9<L>  /  TBili  0.3  /  DBili  x   /  AST  21  /  ALT  31  /  AlkPhos  94  09-03        CAPILLARY BLOOD GLUCOSE      POCT Blood Glucose.: 100 mg/dL (03 Sep 2021 17:15)  POCT Blood Glucose.: 125 mg/dL (03 Sep 2021 12:43)  POCT Blood Glucose.: 120 mg/dL (03 Sep 2021 07:18)  POCT Blood Glucose.: 114 mg/dL (02 Sep 2021 23:59)  POCT Blood Glucose.: 370 mg/dL (02 Sep 2021 23:52)            MEDICATIONS  (STANDING):  Biotene Dry Mouth Oral Rinse 5 milliLiter(s) Swish and Spit two times a day  chlorhexidine 2% Cloths 1 Application(s) Topical <User Schedule>  cholecalciferol 1000 Unit(s) Oral daily  enoxaparin Injectable 40 milliGRAM(s) SubCutaneous every 24 hours  fat emulsion (Fish Oil and Plant Based) 20% Infusion 25 mL/Hr (25 mL/Hr) IV Continuous <Continuous>  metoprolol tartrate 37.5 milliGRAM(s) Oral every 12 hours  pantoprazole  Injectable 40 milliGRAM(s) IV Push every 12 hours  Parenteral Nutrition - Adult 1 Each (83 mL/Hr) TPN Continuous <Continuous>    MEDICATIONS  (PRN):  acetaminophen    Suspension .. 650 milliGRAM(s) Oral every 6 hours PRN Mild Pain (1 - 3)  traMADol 25 milliGRAM(s) Oral every 8 hours PRN Moderate Pain (4 - 6)      Care Discussed with Consultants/Other Providers [ ] YES  [ ] NO

## 2021-09-03 NOTE — PROGRESS NOTE ADULT - ATTENDING COMMENTS
Pt doing well. Voice much stronger. Hgb stable and nl WBC. Tolerating goal tube feeds though tube fell out this morning.    abd soft, non-distended, non-tender to palpation  stoma w/ stool and air in the bag  condom cath in place  drains w/ serous drainage    - prn pain management  - repeat today or Monday and pending those results advance diet or replace tube and restart tube feeds, can 1/2 TPN if he is back on feeds today  - PT and encourage pt to do as much in the bed as possible    Ca Malone MD  Attending Physician

## 2021-09-03 NOTE — ADVANCED PRACTICE NURSE CONSULT - RECOMMEDATIONS
Will recommend the followin. B/l buttocks: Cavilon to periwound skin- Medihoney colloid cut-to-fit the size of the wound- follow with Allevyn foam - change every other day and prn for soiling/drainage.  2. thoracic spine; Cavilon to periwound skin- Allevyn foam- change every 3 days and prn for drainage  3. routine pericare with Rosina  4. continue with condom catheter  5. continue with turning and positioning  6. Nutrition support as pt condition allows  Tx plan discussed with RN

## 2021-09-03 NOTE — CHART NOTE - NSCHARTNOTEFT_GEN_A_CORE
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    Pt seen at bedside for repeat swallow evaluation, swallow therapy. Pt awake, lethargic, more arousable than on previous encounters, maintaining eyes open with intermittent cues, and more verbally responsive and following simple directions more consistently and more quickly. Pt continues to present with severe hypophonia with reduced intelligibility due to low vocal intensity c/w deconditioning and recent intubation. Voice appears to be improving somewhat in quality and loudness, and strength of cough. Pt more cooperative and with somewhat improved endurance for sustained phonation tasks. Pt with improving consistency in ability to elicit cough on request, with quicker response times for cued coughs and reswallows.    Pt was administered trials of small crushed ice chips x2, thin liquids vis small cup sip x2, honey-thick liquids via 1/2 tsp x5, and thick puree via 1/2 tsp x4. Pt with functional and timely bolus acceptance an spoon stripping. Pt continues to present with evidence of an oropharyngeal dysphagia notable for delayed oral transport, suspected delay in pharyngeal swallow trigger, palpable hyolaryngeal elevation with suspected reduced anterior excursion, and s/s laryngeal penetration/aspiration including wet/gurgly vocal quality and intermittent delayed throat clears and coughs post trials. Pt more able to initiate a timely cued cough and reswallow, which appears effective for clearing wet vocal quality. Pt remains at high risk of aspiration, and is not yet a candidate for PO feeding at this time. However, he demonstrates continued overall improvement in mental status and alertness, responsiveness and participation in tasks, and gradual improvement in swallow function. Will proceed with instrumental exam for more comprehensive evaluation. Purposeful proactive rounding reinforced and 5 Ps addressed. Pt left in no distress.       RECOMMENDATIONS:  Continue NPO, with non-oral nutrition/hydration/medications.   Strict aspiration and reflux precautions for secretions and if enteral feeds are initiated.   Maintain good oral hygiene.  MBS scheduled for Tuesday, 9/7, as per d/w SICU team ([facility])  This service will continue to follow.   Rehab TBD pending hospital course.      Sonia Barragan MA, CCC-SLP  Speech-Language Pathologist  pager #[phone]

## 2021-09-03 NOTE — ADVANCED PRACTICE NURSE CONSULT - ASSESSMENT
The pt was encountered in the 8ICU- about to begin working with PT at bedside. PT goal is to assist pt with standing today. The pt was OOB to the chair earlier in the day.  Mr Mohr daughter Tiffanie was at the bedside and stated she was not aware that her father had a wound on his buttocks. I explained that the pt was a/w with this and that it was called a deep tissue injury- I explained the evolution of an injury like this in laymen's terms. I also explained that he had a wound on his upper spine. she declined to visualize the wounds today.  Education was also provided about pressure injury prevention and  principles of healing. I explained that nutrition, mobility and care of the skin were important components of pressure injury prevention.  Today, the deep tissue injury previously noted to the sacrum and  b/l buttocks has continued to evolve. It measures 10cmx 6cm x0.5cm with the depth noted in the center of the wound. There was approximately 40% black necrotic tissue and 60% moist pink tissue- with moderate serosanguinous drainage. There was no odor the periwound skin presented with blanchable erythema. A Medihoney colloid dressing was applied for its antimicrobial and debridement properties- the colloid will also help to absorb moisture,  Mr Bustamante is incontinent of urine and staff have applied a condom catheter to divert urine from the skin.  On the thoracic spine is a stage 2 pressure injury measuring 3.5cm x 2.5cm x0cm- the spine is kyphotic with protuberant bony prominences-the wound appears to be superficial at this time with moist pink tissue, the periwound skin is intact , there is no drainage. staff have applied a foam dressing to cushion- will recommend to continue with same.  Mr Bustamante is NPO - he is being followed by both Nutrition and TPN team as he receives TPN and enteral feeds. He is thin and frail.  A Progressa support surface is in use and as per review of the nursing documentation , the pt is being assisted with turning and positioning.

## 2021-09-03 NOTE — PROGRESS NOTE ADULT - ASSESSMENT
A/P  80yo M with Crohn's disease, now s/p total abdominal colectomy with end ileostomy on 8/20.     AMS :   - new-onset hypophonia without focal neuro deficits, obtundation  - Monitor mental status  - Pain control: PO Tylenol 650mg q6hr PRN, Oxy 2.5mg q6hr PRN  - NPO    # CAD / PAF/ HTN   -History of AFib currently rate controlled with IV metoprolol and amiodarone, now in NSR  - Metoprolol 37.5mg BID for a-fib    # Sever colitis 2/2 chron's and c diff   - s/p total abdominal colectomy with end ileostomy, failed S/S, bloody ostomy output  - TF Pivot 1.5 @ goal of 40cc/hr  - C/w TPN  - Protonix 40mg daily  - Monitor ostomy output    # Sever malnutrition   - IVF: TPN @ 83cc/hr  -seen by s7S  MBS on Monday    Anemia 2/2 ac blood loss   - last transfusion 2u pRBC on 8/28  - Trend H/H and transfuse Hgb < 7  - Lovenox 40mg daily for DVT ppx    Code Status: DNR/DNI, MOLST in chart

## 2021-09-03 NOTE — PROGRESS NOTE ADULT - ASSESSMENT
81M with recently dx Crohn's disease c/b perianal abscess and left posterior distal anal intersphincteric fistula and presents diarrhea/BRBPR  2/2 to CD flare c/b c diff colitis. Initially treat for C diff, s/p FMT. Taken emergently to OR on 8/20 for acute deterioration for laparoscopic total colectomy with end ileostomy.     PLAN:  - NGT/NPO w tube feed, titrate to goal   - cw TPN  - cw PPI BID   - F/u ostomy output for quantity/quality (blood)  - PT to see and encourage ambulation   - Appreciate Psychiatry input   - Appreciate GI Recs  - Appreciate excellent care per SICU    Red Surgery  [id number] 81M with recently dx Crohn's disease c/b perianal abscess and left posterior distal anal intersphincteric fistula and presents diarrhea/BRBPR  2/2 to CD flare c/b c diff colitis. Initially treat for C diff, s/p FMT. Taken emergently to OR on 8/20 for acute deterioration for laparoscopic total colectomy with end ileostomy.     PLAN:  - NGT/NPO w tube feed, currently at goal  - wean TPN   - cw PPI BID   - F/u ostomy output for quantity/quality (blood)  - PT to see and encourage ambulation   - Appreciate Psychiatry input   - Appreciate GI Recs  - Appreciate excellent care per SICU    Red Surgery  [id number]

## 2021-09-03 NOTE — ADVANCED PRACTICE NURSE CONSULT - REASON FOR CONSULT
Request by staff to re-evaluate skin status: b/l buttocks. PMH is noted:  82 yo M with pmhx of Crohns disease (dxd 9 mos ago, hx of perianal fistula, sp tx w remicaide until developed ab; since managed on budesonide, mtx/5asa), h/o cdiff, CAD, afib (not on ac), p/w bloody diarrhea, abd pain and weakness. Admitted for crohns management, course c/b cdiff colitis sp dificid/fmt and findings of indeterminate quantiferon, pending id clearance to start biologic. Prealb 8. TPN consulted in setting of significant weight loss, severe pcm, and worsening colitis suspected 2/2 CD. Pt at high risk for refeeding syndrome. TPN started 8/16. Repeat CT showing unchanged diffuse colitis and non specific gb wall edema.  Taken to OR emergently on 8/20 for lap total colectomy, end ileostomy. Failed S&S eval. Family agreed to TANJA tube feeds, which had been started yesterday but then dc'd due to noted blood in ostomy and risk for aspiration given deterioration in mental status. pt now dnr/dni. Mental status improved and restarted on ngt feeds, tolerating    PAST MEDICAL & SURGICAL HISTORY:  Crohn's Disease  C-Diff  Perianal abscess    No significant past surgical history  s/p total Colectomy and end ileostomy    The pt was last seen by the CWON on 8/26

## 2021-09-03 NOTE — PROGRESS NOTE ADULT - SUBJECTIVE AND OBJECTIVE BOX
24h Events:  No acute events overnight.    Subjective:   Patient seen at bedside this AM.     Objective:  Vital Signs  T(C): 37 (09-02 @ 23:00), Max: 37 (09-02 @ 23:00)  HR: 90 (09-03 @ 01:00) (73 - 101)  BP: 126/59 (09-03 @ 01:00) (101/61 - 141/72)  RR: 23 (09-03 @ 01:00) (19 - 34)  SpO2: 100% (09-03 @ 01:00) (97% - 100%)  09-01-21 @ 07:01  -  09-02-21 @ 07:00  --------------------------------------------------------  IN:  Total IN: 0 mL    OUT:    Bulb (mL): 10 mL    Bulb (mL): 110 mL    Ileostomy (mL): 750 mL    Incontinent per Condom Catheter (mL): 1660 mL  Total OUT: 2530 mL    Total NET: -2530 mL      09-02-21 @ 07:01  -  09-03-21 @ 01:55  --------------------------------------------------------  IN:  Total IN: 0 mL    OUT:    Bulb (mL): 0 mL    Bulb (mL): 80 mL    Ileostomy (mL): 250 mL    Incontinent per Condom Catheter (mL): 2400 mL  Total OUT: 2730 mL    Total NET: -2730 mL          Physical Exam:  GEN: resting in bed comfortably in NAD  RESP: no increased WOB  ABD: soft, non-distended, non-tender without rebound tenderness or guarding. Incision sites clean, dry, and intact without erythema or edema.  EXTR: warm, well-perfused, no edema  NEURO: awake, alert    Labs:                        7.6    7.41  )-----------( 200      ( 02 Sep 2021 22:57 )             23.3   09-03    138  |  106  |  35<H>  ----------------------------<  108<H>  4.0   |  25  |  0.41<L>    Ca    7.6<L>      03 Sep 2021 00:40  Phos  2.5     09-03  Mg     2.0     09-03    TPro  5.0<L>  /  Alb  2.0<L>  /  TBili  0.2  /  DBili  x   /  AST  24  /  ALT  33  /  AlkPhos  97  09-03    CAPILLARY BLOOD GLUCOSE      POCT Blood Glucose.: 114 mg/dL (02 Sep 2021 23:59)  POCT Blood Glucose.: 370 mg/dL (02 Sep 2021 23:52)  POCT Blood Glucose.: 100 mg/dL (02 Sep 2021 18:07)  POCT Blood Glucose.: 124 mg/dL (02 Sep 2021 05:34)      Imaging:     24h Events:  No acute events overnight.    Subjective:   Patient seen at bedside this AM. No complaints this morning.     Objective:  Vital Signs  T(C): 37 (09-02 @ 23:00), Max: 37 (09-02 @ 23:00)  HR: 90 (09-03 @ 01:00) (73 - 101)  BP: 126/59 (09-03 @ 01:00) (101/61 - 141/72)  RR: 23 (09-03 @ 01:00) (19 - 34)  SpO2: 100% (09-03 @ 01:00) (97% - 100%)  09-01-21 @ 07:01  -  09-02-21 @ 07:00  --------------------------------------------------------  IN:  Total IN: 0 mL    OUT:    Bulb (mL): 10 mL    Bulb (mL): 110 mL    Ileostomy (mL): 750 mL    Incontinent per Condom Catheter (mL): 1660 mL  Total OUT: 2530 mL    Total NET: -2530 mL      09-02-21 @ 07:01  -  09-03-21 @ 01:55  --------------------------------------------------------  IN:  Total IN: 0 mL    OUT:    Bulb (mL): 0 mL    Bulb (mL): 80 mL    Ileostomy (mL): 250 mL    Incontinent per Condom Catheter (mL): 2400 mL  Total OUT: 2730 mL    Total NET: -2730 mL          Physical Exam:  GEN: resting in bed comfortably in NAD  RESP: no increased WOB  ABD: soft, non-distended, non-tender without rebound tenderness or guarding. Incision sites clean, dry, and intact without erythema or edema.  EXTR: warm, well-perfused, no edema  NEURO: awake, alert    Labs:                        7.6    7.41  )-----------( 200      ( 02 Sep 2021 22:57 )             23.3   09-03    138  |  106  |  35<H>  ----------------------------<  108<H>  4.0   |  25  |  0.41<L>    Ca    7.6<L>      03 Sep 2021 00:40  Phos  2.5     09-03  Mg     2.0     09-03    TPro  5.0<L>  /  Alb  2.0<L>  /  TBili  0.2  /  DBili  x   /  AST  24  /  ALT  33  /  AlkPhos  97  09-03    CAPILLARY BLOOD GLUCOSE      POCT Blood Glucose.: 114 mg/dL (02 Sep 2021 23:59)  POCT Blood Glucose.: 370 mg/dL (02 Sep 2021 23:52)  POCT Blood Glucose.: 100 mg/dL (02 Sep 2021 18:07)  POCT Blood Glucose.: 124 mg/dL (02 Sep 2021 05:34)      Imaging:

## 2021-09-03 NOTE — PROGRESS NOTE ADULT - ASSESSMENT
80 yo M with pmhx of Crohns disease (dxd 9 mos ago, hx of perianal fistula, sp tx w remicaide until developed ab; since managed on budesonide, mtx/5asa), h/o cdiff, CAD, afib (not on ac), p/w bloody diarrhea, abd pain and weakness. Admitted for crohns management, course c/b cdiff colitis sp dificid/fmt and findings of indeterminate quantiferon, pending id clearance to start biologic. Prealb 8. TPN consulted in setting of significant weight loss, severe pcm, and worsening colitis suspected 2/2 CD. Pt at high risk for refeeding syndrome. TPN started 8/16. Repeat CT showing unchanged diffuse colitis and non specific gb wall edema.  Taken to OR emergently on 8/20 for lap total colectomy, end ileostomy. Failed S&S eval. Family agreed to TANJA tube feeds, which had been started yesterday but then dc'd due to noted blood in ostomy and risk for aspiration given deterioration in mental status. pt now dnr/dni. Mental status improved and restarted on ngt feeds, tolerating    Current Diet: NPO/NGT feeds (pivot 1.5 goal 40cc/hr)  TPN GOAL recommendations as per RD: , dextrose 210g, SMOF 60g    -cont ngt feeds and advance to goal as tolerated, will cont tpn until pt with established tolerance to enteral feeds  -PICC in place, maintenance as per protocol  -hypocalcemia- improved, cont ca gluconate at 24meq in TPN bag  -vitamin D deficiency- cont vitamin d supplemetation  -anemia/gib- trend cbc, transfuse prn  -electrolyte imbalance risk- monitor CMP, Mg, Ionized Ca, Phosphorus qd   -monitor TG weekly (9/6); strict I/O's, monitor volume status  -further care per primary/SICU    plan discussed with Dr. Fernandez, agreeable with above    TPN pager [phone], spectra [phone]  M-F 6A-4P, Weekends and holidays 8A-12P         82 yo M with pmhx of Crohns disease (dxd 9 mos ago, hx of perianal fistula, sp tx w remicaide until developed ab; since managed on budesonide, mtx/5asa), h/o cdiff, CAD, afib (not on ac), p/w bloody diarrhea, abd pain and weakness. Admitted for crohns management, course c/b cdiff colitis sp dificid/fmt and findings of indeterminate quantiferon, pending id clearance to start biologic. Prealb 8. TPN consulted in setting of significant weight loss, severe pcm, and worsening colitis suspected 2/2 CD. Pt at high risk for refeeding syndrome. TPN started 8/16. Repeat CT showing unchanged diffuse colitis and non specific gb wall edema.  Taken to OR emergently on 8/20 for lap total colectomy, end ileostomy. Failed S&S eval. Family agreed to TANJA tube feeds, which had been started yesterday but then dc'd due to noted blood in ostomy and risk for aspiration given deterioration in mental status. pt now dnr/dni. Mental status improved and restarted on ngt feeds, tolerating    Current Diet: NPO/NGT feeds (pivot 1.5 goal 40cc/hr)  TPN GOAL recommendations as per RD: , dextrose 210g, SMOF 60g    -Cont NGT feeds at goal as tolerated, will cont TPN until pt with established tolerance to enteral feeds  -PICC in place, maintenance as per protocol.  -Vitamin D deficiency- cont vitamin d supplemetation.  -Anemia/gib- trend cbc, transfuse prn  -Electrolyte imbalance risk- monitor CMP, Mg, Ionized Ca, Phosphorus qd. Hypocalcemia-  cont CaGlu at 24meq in TPN bag  -Monitor TG weekly (9/6); strict I/O's, monitor volume status  -Further care per primary/SICU, spoke with LEANDRO Zurita.    plan discussed with Dr. Multani and  Dr. Fernandez, agreeable with above    TPN pager [phone], spectra [phone]  M-F 6A-4P, Weekends and holidays 8A-12P

## 2021-09-03 NOTE — PROGRESS NOTE ADULT - SUBJECTIVE AND OBJECTIVE BOX
HISTORY  81y Male M with Crohn disease s/p failed medical management. 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 Transferred to SICU intubated and on pressors. Now extubated and off pressors.      24 HOUR EVENTS:  - Tube feed rate increased to 40cc/hr (goal)  - Diuresed with 20mg IV lasix x3 doses with goal fluid balance -1L  - Protonix decreased from BID to daily      NEURO  Exam: AOx3 (disoriented to time), following commands, hypophonic  Meds: acetaminophen    Suspension .. 650 milliGRAM(s) Oral every 6 hours PRN Mild Pain (1 - 3)  traMADol 25 milliGRAM(s) Oral every 8 hours PRN Moderate Pain (4 - 6)  [x] Adequacy of sedation and pain control has been assessed and adjusted      RESPIRATORY  RR: 29 (09-03-21 @ 04:00) (19 - 34)  SpO2: 100% (09-03-21 @ 04:00) (99% - 100%)  Exam: unlabored respirations on room air  Meds:       CARDIOVASCULAR  HR: 94 (09-03-21 @ 04:00) (73 - 101)  BP: 144/66 (09-03-21 @ 04:00) (101/61 - 144/66)  BP(mean): 95 (09-03-21 @ 04:00) (76 - 106)  Exam: RRR  Cardiac Rhythm: normal sinus  Perfusion     [x]Adequate   [ ]Inadequate  Mentation   [ ]Normal       [x]Reduced  Extremities  [x]Warm         [ ]Cool  Volume Status [ ]Hypervolemic [x]Euvolemic [ ]Hypovolemic  Meds: furosemide   Injectable 20 milliGRAM(s) IV Push once  metoprolol tartrate 37.5 milliGRAM(s) Oral every 12 hours      GI/NUTRITION  Exam: soft, nontender, nondistended. Ostomy with stool in bag  Diet: NPO, tube feeds Pivot 1.5cal @ 40cc/hr  Meds: pantoprazole  Injectable 40 milliGRAM(s) IV Push every 12 hours      GENITOURINARY  I&O's Detail    09-01 @ 07:01  -  09-02 @ 07:00  --------------------------------------------------------  IN:    Fat Emulsion (Fish Oil &amp; Plant Based) 20% Infusion: 350 mL    Glucerna: 120 mL    IV PiggyBack: 300 mL    Pivot 1.5: 540 mL    TPN (Total Parenteral Nutrition): 1992 mL  Total IN: 3302 mL    OUT:    Bulb (mL): 10 mL    Bulb (mL): 110 mL    Ileostomy (mL): 750 mL    Incontinent per Condom Catheter (mL): 1660 mL  Total OUT: 2530 mL    Total NET: 772 mL    09-02 @ 07:01  -  09-03 @ 04:28  --------------------------------------------------------  IN:    Enteral Tube Flush: 20 mL    Fat Emulsion (Fish Oil &amp; Plant Based) 20% Infusion: 250 mL    IV PiggyBack: 250 mL    Pivot 1.5: 790 mL    TPN (Total Parenteral Nutrition): 1660 mL  Total IN: 2970 mL    OUT:    Bulb (mL): 0 mL    Bulb (mL): 100 mL    Fat Emulsion (Fish Oil &amp; Plant Based) 20% Infusion: 0 mL    Ileostomy (mL): 450 mL    Incontinent per Condom Catheter (mL): 2700 mL  Total OUT: 3250 mL    Total NET: -280 mL    09-03    138  |  106  |  35<H>  ----------------------------<  108<H>  4.0   |  25  |  0.41<L>    Ca    7.6<L>      03 Sep 2021 00:40  Phos  2.5     09-03  Mg     2.0     09-03    TPro  5.0<L>  /  Alb  2.0<L>  /  TBili  0.2  /  DBili  x   /  AST  24  /  ALT  33  /  AlkPhos  97  09-03    [ ] Roberson catheter, indication: none  Meds: cholecalciferol 1000 Unit(s) Oral daily  fat emulsion (Fish Oil and Plant Based) 20% Infusion 25 mL/Hr IV Continuous <Continuous>  Parenteral Nutrition - Adult 1 Each TPN Continuous <Continuous>      HEMATOLOGIC  Meds: enoxaparin Injectable 40 milliGRAM(s) SubCutaneous every 24 hours  [x] VTE Prophylaxis                        7.6    7.41  )-----------( 200      ( 02 Sep 2021 22:57 )             23.3     Transfusion     [ ] PRBC   [ ] Platelets   [ ] FFP   [ ] Cryoprecipitate      INFECTIOUS DISEASES  T(C): 37.1 (09-03-21 @ 03:00), Max: 37.1 (09-03-21 @ 03:00)  WBC Count: 7.41 K/uL (09-02 @ 22:57)    Recent Cultures:    Meds:       ENDOCRINE  Capillary Blood Glucose    Meds:       ACCESS DEVICES:  [x] Peripheral IV  [ ] Central Venous Line	[ ] R	[ ] L	[ ] IJ	[ ] Fem	[ ] SC	Placed:   [ ] Arterial Line		[ ] R	[ ] L	[ ] Fem	[ ] Rad	[ ] Ax	Placed:   [x] PICC:	RUE placed 8/16				[ ] Mediport  [ ] Urinary Catheter, Date Placed:  [x] Necessity of urinary, arterial, and venous catheters discussed      OTHER MEDICATIONS:  Biotene Dry Mouth Oral Rinse 5 milliLiter(s) Swish and Spit two times a day  chlorhexidine 2% Cloths 1 Application(s) Topical <User Schedule>    CODE STATUS: DNR/DNI    IMAGING:

## 2021-09-03 NOTE — CHART NOTE - NSCHARTNOTEFT_GEN_A_CORE
Nutrition Follow Up Note     Patient seen for: nutrition/TPN follow up     Source: medical record, TPN Team rounds    Chart reviewed, events noted. Pt is a 82 yo M with PMH: Crohn's disease, h/o C.Diff, CAD, A.Fib. Presented with bloody diarrhea, abdominal pain and weakness. Course c/b C.Diff colitis and findings of indeterminate QuantiFeron. TPN consulted in setting of wt loss, protein calorie malnutrition and worsening colitis suspected 2/2 CD. TPN started 8/16. Taken to emergent OR on 8/20 for lap total colectomy and end ileostomy.     Nutrition Status:  -S/P swallow evaluation 9/1. Recommendations noted for pt to remain NPO with non-oral means of nutrition/hydration/medications.   -Pt now continues on full dose TPN + EN feeds (Pivot 1.5 at 40ml/hr x 24 hrs).   -Plan to slowly wean off TPN, once tolerated.     Diet Order: Diet, NPO with Tube Feed:   Tube Feeding Modality: Nasogastric  Pivot 1.5 Blayne ([id number])  Total Volume for 24 Hours (mL): 960  Continuous  Starting Tube Feed Rate {mL per Hour}: 20     Every 12 hours  Until Goal Tube Feed Rate (mL per Hour): 40  Tube Feed Duration (in Hours): 24  Tube Feed Start Time: 10:30 (09-01-21 @ 12:44)    EN Provides: 960ml, 1440kcal and 90g protein.     EN Provision (per nursing flow sheet):  (9/3): 440ml (thus far)  (9/2): 870ml (91% of goal)  (9/1): 580ml (60% of goal; EN feeds titrating up towards goal rate)    GOAL TPN (ordered 8/29): 2L infusing at 83 ml/hr (105 gm amino acids, 210 gm dextrose, 60 gm SMOF lipid) to provide 1734 kcal/day (29 kcal/kg and 1.8 gm/kg protein per dosing wt 59kg)    Non-Protein Calories: 1314 kcal/day (22 kcal/kg)  Dextrose Infusion Rate: 2.5 mg/kg/min  Lipid Infusion Rate: 1 gm/kg/day; 0.08 gm/kg/hr    Electrolytes and Insulin:   Insulin (units): 0  NaCl (mEq): 40  Na acetate (mEq): 60  Na Phos (mmol): 45  KCL (mEq): 100  Calcium gluconate (mEq): 22  Mg sulfate (mEq):  12  MTE-5 concentrate (ml): 1  MVI (ml): 10    Ileostomy output: (9/3): 320ml; (9/2): 450ml; (9/1): 750ml     Anthropometric Measurements:   Height (cm): 170.2 (08-20-21 @ 14:14)  Weight (kg): 59 (08-20-21 @ 14:14)  BMI (kg/m2): 20.4 (08-20-21 @ 14:14)  IBW: 67.1kg     Medications: MEDICATIONS  (STANDING):  Biotene Dry Mouth Oral Rinse 5 milliLiter(s) Swish and Spit two times a day  chlorhexidine 2% Cloths 1 Application(s) Topical <User Schedule>  cholecalciferol 1000 Unit(s) Oral daily  enoxaparin Injectable 40 milliGRAM(s) SubCutaneous every 24 hours  fat emulsion (Fish Oil and Plant Based) 20% Infusion 25 mL/Hr (25 mL/Hr) IV Continuous <Continuous>  metoprolol tartrate Injectable 5 milliGRAM(s) IV Push every 6 hours  pantoprazole  Injectable 40 milliGRAM(s) IV Push every 12 hours  Parenteral Nutrition - Adult 1 Each (83 mL/Hr) TPN Continuous <Continuous>  Parenteral Nutrition - Adult 1 Each (83 mL/Hr) TPN Continuous <Continuous>    MEDICATIONS  (PRN):  acetaminophen    Suspension .. 650 milliGRAM(s) Oral every 6 hours PRN Mild Pain (1 - 3)  traMADol 25 milliGRAM(s) Oral every 8 hours PRN Moderate Pain (4 - 6)    Labs: 09-03 @ 05:19: Sodium --, Potassium --, Calcium --, Magnesium --, Phosphorus --, BUN --, Creatinine --, Glucose --, Total Bilirubin --, Prealbumin 22, Albumin, --, C-Reactive Protein --  09-03 @ 03:19: Sodium --, Potassium --, Calcium --, Magnesium --, Phosphorus --, BUN --, Creatinine --, Glucose --, Total Bilirubin --, Prealbumin 16<L>, Albumin, --, C-Reactive Protein --  09-03 @ 00:40: Sodium 138, Potassium 4.0, Calcium 7.6<L>, Magnesium 2.0, Phosphorus 2.5, BUN 35<H>, Creatinine 0.41<L>, Glucose 108<H>, Total Bilirubin 0.2, Prealbumin --, Albumin, 2.0<L>, C-Reactive Protein --  09-02 @ 22:57: Sodium 132<L>, Potassium 6.1<H>, Calcium 8.3<L>, Magnesium 2.2, Phosphorus 5.4<H>, BUN 32<H>, Creatinine 0.42<L>, Glucose 457<HH>, Total Bilirubin 0.3, Prealbumin --, Albumin, 1.6<L>, C-Reactive Protein --    Hemoglobin : 7.6 g/dL  Hematocrit : 23.3 %    LIVER FUNCTIONS - ( 03 Sep 2021 00:40 )  Alb: 2.0 g/dL / Pro: 5.0 g/dL / ALK PHOS: 97 U/L / ALT: 33 U/L / AST: 24 U/L / GGT: x         Triglycerides, Serum: 61 mg/dL (09-03-21 @ 00:40)  Triglycerides, Serum: 649 mg/dL (09-02-21 @ 22:57)  Triglycerides, Serum: 89 mg/dL (08-30-21 @ 00:17)  Triglycerides, Serum: 99 mg/dL (08-29-21 @ 13:22)  Triglycerides, Serum: 125 mg/dL (08-26-21 @ 23:31)    Hemoglobin A1C     POCT Blood Glucose.: 125 mg/dL (09-03-21 @ 12:43)  POCT Blood Glucose.: 120 mg/dL (09-03-21 @ 07:18)  POCT Blood Glucose.: 114 mg/dL (09-02-21 @ 23:59)  POCT Blood Glucose.: 370 mg/dL (09-02-21 @ 23:52)  POCT Blood Glucose.: 100 mg/dL (09-02-21 @ 18:07)    Skin: sacrum stage II, thoracic spine stage II  Edema: 2+ generalized; left ankle; right ankle;  3+ left arm; right arm; right wrist; left wrist; right hand; left hand    Estimated Needs: based on dosing wt 59 kg, with consideration for TPN and malnutrition  Energy: 5897-6043 calories (30-35 kcal/kg)  Protein:  grams (1.4-1.8 gm/kg)    Previous Nutrition Diagnosis: 1) Severe Malnutrition 2) Increased Nutrient Needs  Nutrition Diagnosis is: ongoing, currently addressed with TPN; C pending    New Nutrition Diagnosis: 1) severe malnutrition 2) increased nutrient needs  Nutrition Diagnosis: ongoing, currently addressed with TPN    Recommended Interventions:   1. TPN per TPN Team/Nutrition Assessment  2. Consider change feeds to Vital 1.5 at GOAL rate 50ml/hr x 24 hrs. To provide (based on dosing wt 59kg): 1200ml, 1800kcal (30.5kcal/kg) and 81g protein (1.37g protein/kg).   3. Monitor GI tolerance. RD to remain available to adjust EN formulary, volume/rate PRN.     Monitoring and Evaluation:   Continue to monitor nutrition provision and tolerance, weights, labs, skin integrity.   RD remains available upon request and will follow up per protocol.    Alison Kleiner, RD [facility]; pager number [phone]

## 2021-09-03 NOTE — PROGRESS NOTE ADULT - ATTENDING COMMENTS
ON: no major events, removed NG tube this AM    improving mental status, sitting in chair eyes open and interactive  has vocal cord issues and concern for aspiration, ENT will revisit in 2wks, improving phonation  CXR 8/30 no acute findings  on RA  paroxysmal afib, rate controlled now sinus  s/p amio  metop 12.5 PO q6hrs, metop IV PRN  TEN at goal, will need enteric tube replaced vs if passes swallow may eat on his own  TPN may decrease given good enteric feeds  LFTs wnl  intermittent bloody ostomy output, previously CTA failed to show source, no more episodes  ostomy output monitor 570cc liters  JPs output sero sang, small, ask whether ok to remove  urine output monitor, has condom cath  VTE PPX , will do med rec regarding asa/AC  s/p zosyn  afebrile, and decreasing leukocytosis  good glycemic control  s/p steroid taper for crohns  SW to discuss rehab options  may go to floor    DNR DNI

## 2021-09-04 NOTE — PROGRESS NOTE ADULT - SUBJECTIVE AND OBJECTIVE BOX
RED TEAM Surgery Progress Note    INTERVAL EVENTS:   - No acute events overnight.    SUBJECTIVE: Patient seen and examined at bedside with surgical team.    OBJECTIVE:    Vital Signs Last 24 Hrs  T(C): 36.9 (04 Sep 2021 08:00), Max: 37.1 (03 Sep 2021 19:00)  T(F): 98.4 (04 Sep 2021 08:00), Max: 98.8 (03 Sep 2021 19:00)  HR: 86 (04 Sep 2021 10:00) (77 - 96)  BP: 120/58 (04 Sep 2021 10:00) (103/55 - 147/67)  BP(mean): 80 (04 Sep 2021 10:00) (76 - 97)  RR: 25 (04 Sep 2021 10:00) (22 - 42)  SpO2: 99% (04 Sep 2021 10:00) (90% - 100%)I&O's Detail    03 Sep 2021 07:01  -  04 Sep 2021 07:00  --------------------------------------------------------  IN:    Enteral Tube Flush: 170 mL    Fat Emulsion (Fish Oil &amp; Plant Based) 20% Infusion: 300 mL    IV PiggyBack: 150 mL    Pivot 1.5: 600 mL    TPN (Total Parenteral Nutrition): 1992 mL  Total IN: 3212 mL    OUT:    Bulb (mL): 60 mL    Bulb (mL): 5 mL    Ileostomy (mL): 520 mL    Incontinent per Condom Catheter (mL): 3260 mL  Total OUT: 3845 mL    Total NET: -633 mL      04 Sep 2021 07:01  -  04 Sep 2021 11:01  --------------------------------------------------------  IN:    Pivot 1.5: 120 mL    TPN (Total Parenteral Nutrition): 249 mL  Total IN: 369 mL    OUT:    Incontinent per Condom Catheter (mL): 200 mL  Total OUT: 200 mL    Total NET: 169 mL      MEDICATIONS  (STANDING):  Biotene Dry Mouth Oral Rinse 5 milliLiter(s) Swish and Spit two times a day  chlorhexidine 2% Cloths 1 Application(s) Topical <User Schedule>  cholecalciferol 1000 Unit(s) Oral daily  enoxaparin Injectable 40 milliGRAM(s) SubCutaneous every 24 hours  metoprolol tartrate 37.5 milliGRAM(s) Oral every 12 hours  Parenteral Nutrition - Adult 1 Each (83 mL/Hr) TPN Continuous <Continuous>    MEDICATIONS  (PRN):  acetaminophen    Suspension .. 650 milliGRAM(s) Oral every 6 hours PRN Mild Pain (1 - 3)  traMADol 25 milliGRAM(s) Oral every 8 hours PRN Moderate Pain (4 - 6)      Physical Exam:  GEN: resting in bed comfortably in NAD  RESP: no increased WOB  ABD: soft, non-distended, non-tender without rebound tenderness or guarding. Incision site with seropurulent drainage. Ostomy with gas and stool..   EXTR: warm, well-perfused, no edema  NEURO: awake, alert  LABS:                        7.4    7.31  )-----------( 214      ( 03 Sep 2021 22:05 )             23.8     09-03    139  |  105  |  38<H>  ----------------------------<  105<H>  3.8   |  27  |  0.43<L>    Ca    8.1<L>      03 Sep 2021 22:05  Phos  3.2     09-03  Mg     1.9     09-03    TPro  5.4<L>  /  Alb  1.9<L>  /  TBili  0.3  /  DBili  x   /  AST  21  /  ALT  31  /  AlkPhos  94  09-03      LIVER FUNCTIONS - ( 03 Sep 2021 22:05 )  Alb: 1.9 g/dL / Pro: 5.4 g/dL / ALK PHOS: 94 U/L / ALT: 31 U/L / AST: 21 U/L / GGT: x             ABO Interpretation: A2 (09-04-21 @ 08:35)      IMAGING:

## 2021-09-04 NOTE — PROGRESS NOTE ADULT - ASSESSMENT
80yo M with Crohn's disease, now s/p total abdominal colectomy with end ileostomy on 8/20    PLAN:  Neurologic: post-op pain control, new-onset hypophonia without focal neuro deficits, obtundation  - Monitor mental status  - Pain control: PO Tylenol 650mg q6hr PRN, Oxy 2.5mg q6hr pRN  - Vitamin D3 daily  - Avoid narcotics/benzos iso obtundation    Respiratory: Acute respiratory insufficiency resolving, patient on RA  - Incentive spirometry 10x/hr while in bed, OOBTC    Cardiovascular: History of AFib currently rate controlled with IV metoprolol and amiodarone, now in NSR  - Monitor vital signs, currently hemodynamically stable  - Metoprolol 37.5mg BID    Gastrointestinal/Nutrition: s/p total abdominal colectomy with end ileostomy, failed S/S, bloody ostomy output  - TF Pivot 1.5 being advance 10cc/hr every 12 hours (goal of 40)  - C/w TPN; tentative plan to discontinue TPN monday 9/6.   - Protonix 40mg BID for GIB  - Monitor ostomy output and character    Renal/Genitourinary: no active issues   - Monitor and replete electrolytes as needed  - Monitor UOP, voiding spontaneously  - IVF: TPN @ 25cc/hr    Hematologic: last transfusion 2u pRBC on 8/28  - Trend H/H and transfuse Hgb < 7  - Lovenox 40mg daily for DVT ppx  - Will f/u with cardiology and primary team regarding AC fir afib and ASA for CAD     Infectious Disease: C. diff colitis vs Crohn's flair s/p subtotal colectomy   - Monitor WBC, temp    Endocrine: no active issues   - Monitor BMP  - Steroid taper completed 8/26  - ISS    Lines & Drains:   - LLQ & LUQ drain 8/20  - TPN 8/16  - RUE PICC placed 8/16    Disposition: SICU  Code Status: DNR/DNI, MOLST in chart

## 2021-09-04 NOTE — PROGRESS NOTE ADULT - SUBJECTIVE AND OBJECTIVE BOX
HISTORY  81y Male with Crohn disease s/p failed medical management. 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 Transferred to SICU intubated and on pressors. Now extubated and off pressors.      24 HOUR EVENTS:  - Diuresed with lasix x2  - Failed repeat speech/swallow eval     SUBJECTIVE/ROS:  [ ] A ten-point review of systems was otherwise negative except as noted.  [ ] Due to altered mental status/intubation, subjective information were not able to be obtained from the patient. History was obtained, to the extent possible, from review of the chart and collateral sources of information.      NEURO  Exam: awake, alert, oriented  Meds: acetaminophen    Suspension .. 650 milliGRAM(s) Oral every 6 hours PRN Mild Pain (1 - 3)  traMADol 25 milliGRAM(s) Oral every 8 hours PRN Moderate Pain (4 - 6)    [x] Adequacy of sedation and pain control has been assessed and adjusted      RESPIRATORY  RR: 28 (09-04-21 @ 03:00) (22 - 42)  SpO2: 98% (09-04-21 @ 03:00) (90% - 100%)  Exam: unlabored, clear to auscultation bilaterally  Mechanical Ventilation: none   [N/A] Extubation Readiness Assessed  Meds: none       CARDIOVASCULAR  HR: 87 (09-04-21 @ 03:00) (77 - 103)  BP: 121/59 (09-04-21 @ 03:00) (102/52 - 147/67)  BP(mean): 84 (09-04-21 @ 03:00) (75 - 99)  Exam: regular rate and rhythm  Cardiac Rhythm: sinus  Perfusion     [x]Adequate   [ ]Inadequate  Mentation   [x]Normal       [ ]Reduced  Extremities  [x]Warm         [ ]Cool  Volume Status [ ]Hypervolemic [x]Euvolemic [ ]Hypovolemic  Meds: metoprolol tartrate 37.5 milliGRAM(s) Oral every 12 hours        GI/NUTRITION  Exam: soft, nontender, nondistended, incision C/D/I  Diet: NPO with tube feeds @ 40  Meds: pantoprazole  Injectable 40 milliGRAM(s) IV Push every 12 hours      GENITOURINARY  I&O's Detail    09-02 @ 07:01  -  09-03 @ 07:00  --------------------------------------------------------  IN:    Enteral Tube Flush: 70 mL    Fat Emulsion (Fish Oil &amp; Plant Based) 20% Infusion: 300 mL    IV PiggyBack: 350 mL    Pivot 1.5: 950 mL    TPN (Total Parenteral Nutrition): 1992 mL  Total IN: 3662 mL    OUT:    Bulb (mL): 110 mL    Bulb (mL): 0 mL    Fat Emulsion (Fish Oil &amp; Plant Based) 20% Infusion: 0 mL    Ileostomy (mL): 570 mL    Incontinent per Condom Catheter (mL): 3460 mL  Total OUT: 4140 mL    Total NET: -478 mL      09-03 @ 07:01  -  09-04 @ 04:26  --------------------------------------------------------  IN:    Enteral Tube Flush: 120 mL    Fat Emulsion (Fish Oil &amp; Plant Based) 20% Infusion: 225 mL    IV PiggyBack: 150 mL    Pivot 1.5: 400 mL    TPN (Total Parenteral Nutrition): 1577 mL  Total IN: 2472 mL    OUT:    Bulb (mL): 5 mL    Bulb (mL): 40 mL    Ileostomy (mL): 300 mL    Incontinent per Condom Catheter (mL): 2360 mL  Total OUT: 2705 mL    Total NET: -233 mL          09-03    139  |  105  |  38<H>  ----------------------------<  105<H>  3.8   |  27  |  0.43<L>    Ca    8.1<L>      03 Sep 2021 22:05  Phos  3.2     09-03  Mg     1.9     09-03    TPro  5.4<L>  /  Alb  1.9<L>  /  TBili  0.3  /  DBili  x   /  AST  21  /  ALT  31  /  AlkPhos  94  09-03    [ ] Roberson catheter, indication: N/A  Meds: cholecalciferol 1000 Unit(s) Oral daily  fat emulsion (Fish Oil and Plant Based) 20% Infusion 25 mL/Hr IV Continuous <Continuous>  Parenteral Nutrition - Adult 1 Each TPN Continuous <Continuous>        HEMATOLOGIC  Meds: enoxaparin Injectable 40 milliGRAM(s) SubCutaneous every 24 hours    [x] VTE Prophylaxis                        7.4    7.31  )-----------( 214      ( 03 Sep 2021 22:05 )             23.8       Transfusion     [ ] PRBC   [ ] Platelets   [ ] FFP   [ ] Cryoprecipitate      INFECTIOUS DISEASES  WBC Count: 7.31 K/uL (09-03 @ 22:05)    RECENT CULTURES:    Meds:       ENDOCRINE  CAPILLARY BLOOD GLUCOSE      POCT Blood Glucose.: 108 mg/dL (04 Sep 2021 00:18)  POCT Blood Glucose.: 100 mg/dL (03 Sep 2021 17:15)  POCT Blood Glucose.: 125 mg/dL (03 Sep 2021 12:43)  POCT Blood Glucose.: 120 mg/dL (03 Sep 2021 07:18)    Meds:       ACCESS DEVICES:  [ ] Peripheral IV  [ ] Central Venous Line	[ ] R	[ ] L	[ ] IJ	[ ] Fem	[ ] SC	Placed:   [ ] Arterial Line		[ ] R	[ ] L	[ ] Fem	[ ] Rad	[ ] Ax	Placed:   [ ] PICC:					[ ] Mediport  [ ] Urinary Catheter, Date Placed:   [x] Necessity of urinary, arterial, and venous catheters discussed    OTHER MEDICATIONS:  Biotene Dry Mouth Oral Rinse 5 milliLiter(s) Swish and Spit two times a day  chlorhexidine 2% Cloths 1 Application(s) Topical <User Schedule>      CODE STATUS:  Yes    Yes        IMAGING: HISTORY  81y Male with Crohn disease s/p failed medical management. 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 Transferred to SICU intubated and on pressors. Now extubated and off pressors.      24 HOUR EVENTS:  - Diuresed with lasix 40mg.   - Failed repeat speech/swallow eval; MBS ordered for Monday     SUBJECTIVE/ROS:  [ ] A ten-point review of systems was otherwise negative except as noted.  [ ] Due to altered mental status/intubation, subjective information were not able to be obtained from the patient. History was obtained, to the extent possible, from review of the chart and collateral sources of information.      NEURO  Exam: awake, alert, oriented  Meds: acetaminophen    Suspension .. 650 milliGRAM(s) Oral every 6 hours PRN Mild Pain (1 - 3)  traMADol 25 milliGRAM(s) Oral every 8 hours PRN Moderate Pain (4 - 6)    [x] Adequacy of sedation and pain control has been assessed and adjusted      RESPIRATORY  RR: 28 (09-04-21 @ 03:00) (22 - 42)  SpO2: 98% (09-04-21 @ 03:00) (90% - 100%)  Exam: unlabored, clear to auscultation bilaterally  Mechanical Ventilation: none   [N/A] Extubation Readiness Assessed  Meds: none       CARDIOVASCULAR  HR: 87 (09-04-21 @ 03:00) (77 - 103)  BP: 121/59 (09-04-21 @ 03:00) (102/52 - 147/67)  BP(mean): 84 (09-04-21 @ 03:00) (75 - 99)  Exam: regular rate and rhythm  Cardiac Rhythm: sinus  Perfusion     [x]Adequate   [ ]Inadequate  Mentation   [x]Normal       [ ]Reduced  Extremities  [x]Warm         [ ]Cool  Volume Status [ ]Hypervolemic [x]Euvolemic [ ]Hypovolemic  Meds: metoprolol tartrate 37.5 milliGRAM(s) Oral every 12 hours        GI/NUTRITION  Exam: soft, nontender, nondistended, incision C/D/I  Diet: NPO with tube feeds @ 40  Meds: pantoprazole  Injectable 40 milliGRAM(s) IV Push every 12 hours      GENITOURINARY  I&O's Detail    09-02 @ 07:01  -  09-03 @ 07:00  --------------------------------------------------------  IN:    Enteral Tube Flush: 70 mL    Fat Emulsion (Fish Oil &amp; Plant Based) 20% Infusion: 300 mL    IV PiggyBack: 350 mL    Pivot 1.5: 950 mL    TPN (Total Parenteral Nutrition): 1992 mL  Total IN: 3662 mL    OUT:    Bulb (mL): 110 mL    Bulb (mL): 0 mL    Fat Emulsion (Fish Oil &amp; Plant Based) 20% Infusion: 0 mL    Ileostomy (mL): 570 mL    Incontinent per Condom Catheter (mL): 3460 mL  Total OUT: 4140 mL    Total NET: -478 mL      09-03 @ 07:01  -  09-04 @ 04:26  --------------------------------------------------------  IN:    Enteral Tube Flush: 120 mL    Fat Emulsion (Fish Oil &amp; Plant Based) 20% Infusion: 225 mL    IV PiggyBack: 150 mL    Pivot 1.5: 400 mL    TPN (Total Parenteral Nutrition): 1577 mL  Total IN: 2472 mL    OUT:    Bulb (mL): 5 mL    Bulb (mL): 40 mL    Ileostomy (mL): 300 mL    Incontinent per Condom Catheter (mL): 2360 mL  Total OUT: 2705 mL    Total NET: -233 mL          09-03    139  |  105  |  38<H>  ----------------------------<  105<H>  3.8   |  27  |  0.43<L>    Ca    8.1<L>      03 Sep 2021 22:05  Phos  3.2     09-03  Mg     1.9     09-03    TPro  5.4<L>  /  Alb  1.9<L>  /  TBili  0.3  /  DBili  x   /  AST  21  /  ALT  31  /  AlkPhos  94  09-03    [ ] Roberson catheter, indication: N/A  Meds: cholecalciferol 1000 Unit(s) Oral daily  fat emulsion (Fish Oil and Plant Based) 20% Infusion 25 mL/Hr IV Continuous <Continuous>  Parenteral Nutrition - Adult 1 Each TPN Continuous <Continuous>        HEMATOLOGIC  Meds: enoxaparin Injectable 40 milliGRAM(s) SubCutaneous every 24 hours    [x] VTE Prophylaxis                        7.4    7.31  )-----------( 214      ( 03 Sep 2021 22:05 )             23.8       Transfusion     [ ] PRBC   [ ] Platelets   [ ] FFP   [ ] Cryoprecipitate      INFECTIOUS DISEASES  WBC Count: 7.31 K/uL (09-03 @ 22:05)    RECENT CULTURES: none    Meds: none       ENDOCRINE  CAPILLARY BLOOD GLUCOSE      POCT Blood Glucose.: 108 mg/dL (04 Sep 2021 00:18)  POCT Blood Glucose.: 100 mg/dL (03 Sep 2021 17:15)  POCT Blood Glucose.: 125 mg/dL (03 Sep 2021 12:43)  POCT Blood Glucose.: 120 mg/dL (03 Sep 2021 07:18)    Meds: none      ACCESS DEVICES: none  [x] Peripheral IV  [ ] Central Venous Line	[ ] R	[ ] L	[ ] IJ	[ ] Fem	[ ] SC	Placed:   [ ] Arterial Line		[ ] R	[ ] L	[ ] Fem	[ ] Rad	[ ] Ax	Placed:   [ ] PICC:					[ ] Mediport  [ ] Urinary Catheter, Date Placed:   [x] Necessity of urinary, arterial, and venous catheters discussed    OTHER MEDICATIONS:  Biotene Dry Mouth Oral Rinse 5 milliLiter(s) Swish and Spit two times a day  chlorhexidine 2% Cloths 1 Application(s) Topical <User Schedule>      CODE STATUS: DNR         IMAGING:< from: CT Abdomen and Pelvis w/ IV Cont (08.28.21 @ 17:45) >  IMPRESSION:  Interval colectomy with postsurgical changes. No active bleed visualized.    Wall thickening of the sigmoid colon extending to what appears to be mucous fistula raising concern for inflammation.        < end of copied text >

## 2021-09-04 NOTE — PROGRESS NOTE ADULT - ASSESSMENT
81M with recently dx Crohn's disease c/b perianal abscess and left posterior distal anal intersphincteric fistula and presents diarrhea/BRBPR  2/2 to CD flare c/b c diff colitis. Initially treat for C diff, s/p FMT. Taken emergently to OR on 8/20 for acute deterioration for laparoscopic total colectomy with end ileostomy, [id number].     PLAN:  - MBS Tuesday   - NGT/NPO w tube feed, currently at goal  - cw PPI BID   - F/u ostomy output for quantity/quality (blood)  - PT to see and encourage ambulation   - Appreciate Psychiatry input   - Appreciate GI Recs  - Appreciate excellent care per SICU    [facility]  [id number]

## 2021-09-04 NOTE — PROGRESS NOTE ADULT - ATTENDING COMMENTS
ON: no major events    improving mental status, sitting in chair eyes open and interactive  has vocal cord issues and concern for aspiration, ENT will revisit in 2wks, improving phonation  CXR 9/3 no acute findings  on RA  paroxysmal afib, rate controlled now sinus  s/p amio  metop 37.5 PO q12hrs, metop IV PRN  TEN at goal, enteric tube replaced  TPN keep at full dose for now  LFTs wnl  primary defer on PEG, hoping to have improved swallowing so he can eat  speech saw 9/3, still not cleared for PO  intermittent bloody ostomy output, previously CTA failed to show source, no more episodes since 8/30  ostomy output monitor 300cc liters  JPs output sero sang, small, ask primary whether ok to remove   daily protonix (home med)  urine output monitor, has condom cath  lasix 40mg, goal -1 liter goal  VTE PPX, hold asa for now  per med rec, pt was not on AC for a long time, which is reasonable given his advanced age  s/p zosyn  afebrile, resolved leukocytosis  good glycemic control  s/p steroid taper for crohns  SW for options for rehab  may go to floor  family has been updated on his status daily    DNR DNI ON: no major events    improving mental status, sitting in chair eyes open and interactive  has vocal cord issues and concern for aspiration, ENT will revisit in 2wks, improving phonation  CXR 9/3 no acute findings  on RA  paroxysmal afib, rate controlled now sinus  s/p amio  metop 37.5 PO q12hrs, metop IV PRN  TEN at goal, enteric tube replaced  TPN keep at full dose for now  LFTs wnl  primary defer on PEG, hoping to have improved swallowing so he can eat  speech saw 9/3, still not cleared for PO  intermittent bloody ostomy output, previously CTA failed to show source, no more episodes since 8/30  ostomy output monitor 300cc liters  JPs output sero sang, small, ask primary whether ok to remove   daily protonix (home med)  urine output monitor, has condom cath  lasix 40mg, goal -1 liter goal  VTE PPX, hold asa for now  per med rec, pt was not on AC for a long time, which is reasonable given his advanced age  s/p zosyn  afebrile, resolved leukocytosis  good glycemic control  s/p steroid taper for crohns  SW for options for rehab  working with PT OT  may go to floor  family has been updated on his status daily    DNR DNI

## 2021-09-04 NOTE — PROGRESS NOTE ADULT - SUBJECTIVE AND OBJECTIVE BOX
[facility] NUTRITION SUPPORT--  Attending/ PA FOLLOW UP NOTE      Subjective:    TPN originally consulted for nutrition support and started TPN on 8/16.  Pt is tolerating without issues, and is asymptomatic palpitations, chest pain, shortness of breath, nor fevers, chills nor night sweats, nor nausea nor vomiting nor abdominal pain. Pt tolerating tube feeds, with plan to slowly wean off TPN,      24 HOUR EVENTS:  - Diuresed with lasix 40mg.   - Failed repeat speech/swallow eval; MBS ordered for Monday           PAST HISTORY  --------------------------------------------------------------------------------  No significant changes to PMH, PSH, FHx, SHx, unless otherwise noted    ALLERGIES & MEDICATIONS  --------------------------------------------------------------------------------  Allergies    No Known Allergies    Intolerances      Standing Inpatient Medications  Biotene Dry Mouth Oral Rinse 5 milliLiter(s) Swish and Spit two times a day  chlorhexidine 2% Cloths 1 Application(s) Topical <User Schedule>  cholecalciferol 1000 Unit(s) Oral daily  enoxaparin Injectable 40 milliGRAM(s) SubCutaneous every 24 hours  fat emulsion (Fish Oil and Plant Based) 20% Infusion 25 mL/Hr IV Continuous <Continuous>  metoprolol tartrate 37.5 milliGRAM(s) Oral every 12 hours  Parenteral Nutrition - Adult 1 Each TPN Continuous <Continuous>  Parenteral Nutrition - Adult 1 Each TPN Continuous <Continuous>    PRN Inpatient Medications  acetaminophen    Suspension .. 650 milliGRAM(s) Oral every 6 hours PRN  traMADol 25 milliGRAM(s) Oral every 8 hours PRN      REVIEW OF SYSTEMS  --------------------------------------------------------------------------------  Gen: as per HPI  Skin: No rashes  Head/Eyes/Ears/Mouth: No headache;No sore throat  Respiratory: No dyspnea, cough,   CV: No chest pain, PND, orthopnea  GI: as per HPI  : No increased frequency, dysuria, hematuria, nocturia  MSK: No joint pain/swelling; no back pain; no edema  Neuro: No dizziness/lightheadedness, weakness, seizures, numbness, tingling  Psych: No significant nervousness, anxiety, stress, depression    All other systems were reviewed and are negative, except as noted.      LABS/STUDIES  --------------------------------------------------------------------------------              7.4    7.31  >-----------<  214      [09-03-21 @ 22:05]              23.8     139  |  105  |  38  ----------------------------<  105      [09-03-21 @ 22:05]  3.8   |  27  |  0.43        Ca     8.1     [09-03-21 @ 22:05]      iCa    1.13     [09-03 @ 22:13]      Mg     1.9     [09-03-21 @ 22:05]      Phos  3.2     [09-03-21 @ 22:05]    TPro  5.4  /  Alb  1.9  /  TBili  0.3  /  DBili  x   /  AST  21  /  ALT  31  /  AlkPhos  94  [09-03-21 @ 22:05]            Glucose, Serum: 105 mg/dL (09-03-21 @ 22:05)    Prealbumin, Serum: 22 mg/dL (09-03-21 @ 05:19)  Prealbumin, Serum: 16 mg/dL (09-03-21 @ 03:19)  Prealbumin, Serum: 15 mg/dL (08-30-21 @ 03:32)  Prealbumin, Serum: 8 mg/dL (08-15-21 @ 10:17)          09-03-21 @ 07:01  -  09-04-21 @ 07:00  --------------------------------------------------------  IN: 3212 mL / OUT: 3845 mL / NET: -633 mL    09-04-21 @ 07:01  -  09-04-21 @ 12:32  --------------------------------------------------------  IN: 369 mL / OUT: 200 mL / NET: 169 mL        VITALS/PHYSICAL EXAM  --------------------------------------------------------------------------------  T(C): 36.9 (09-04-21 @ 12:00), Max: 37.1 (09-03-21 @ 19:00)  HR: 108 (09-04-21 @ 12:00) (77 - 108)  BP: 114/65 (09-04-21 @ 12:00) (103/55 - 147/67)  RR: 34 (09-04-21 @ 12:00) (22 - 34)  SpO2: 100% (09-04-21 @ 12:00) (90% - 100%)  Wt(kg): --    Physical Exam:    Gen: Sitting up in chair, thin and frail  HEENT: NCAT +NGT  Neck: trachea midline, no noted JVD  Chest: respirations non labored  Abd: ND soft NT+ostomy w yellow/brown stool  Extrem: +le edema; +b/l ue extrem edema, distal to PICC  Skin: no rashes noted    .  .  .    .  .

## 2021-09-04 NOTE — PROGRESS NOTE ADULT - ASSESSMENT
80 yo M with pmhx of Crohns disease (dxd 9 mos ago, hx of perianal fistula, sp tx w remicaide until developed ab; since managed on budesonide, mtx/5asa), h/o cdiff, CAD, afib (not on ac), p/w bloody diarrhea, abd pain and weakness. Admitted for crohns management, course c/b cdiff colitis sp dificid/fmt and findings of indeterminate quantiferon, pending id clearance to start biologic. Prealb 8. TPN consulted in setting of significant weight loss, severe pcm, and worsening colitis suspected 2/2 CD. Pt at high risk for refeeding syndrome. TPN started 8/16. Repeat CT showing unchanged diffuse colitis and non specific gb wall edema.  Taken to OR emergently on 8/20 for lap total colectomy, end ileostomy. Failed S&S eval. Family agreed to TANJA tube feeds, which had been started yesterday but then dc'd due to noted blood in ostomy and risk for aspiration given deterioration in mental status. pt now dnr/dni. Mental status improved and restarted on ngt feeds, and tolerating    Current Diet: NPO/NGT feeds (pivot 1.5 goal 40cc/hr)  TPN GOAL recommendations as per RD: , dextrose 210g, SMOF 60g    -Cont NGT feeds at goal as tolerated, will cont TPN at goal with plan to wean tomorrow and will DC Monday 9/6.  -PICC in place, maintenance as per protocol.  -Vitamin D deficiency- continue vitamin Dsupplementation.  -Anemia/GIB- trend CBC, transfuse PRN  -Electrolyte imbalance risk- monitor CMP, Mg, Ionized Ca, Phosphorus qd. Hypocalcemia-  cont CaGlu at 24meq in TPN bag, will continue to track and trend and adjust as needed in TPN bag.  -Monitor TG weekly (9/6); strict I/O's, monitor volume status  -Further care per primary/SICU, spoke with ACP  Mannie [phone].    plan discussed with Dr. Multani and  Dr. Fernandez, agreeable with above    TPN pager [phone], spectra [phone]  M-F 6A-4P, Weekends and holidays 8A-12P

## 2021-09-04 NOTE — PROGRESS NOTE ADULT - SUBJECTIVE AND OBJECTIVE BOX
Patient is a 81y old  Male who presents with a chief complaint of diarrhea with blood in it , abd pain and weakness (04 Sep 2021 12:32)      INTERVAL HPI/OVERNIGHT EVENTS:  T(C): 36.9 (09-04-21 @ 12:00), Max: 37.1 (09-03-21 @ 19:00)  HR: 108 (09-04-21 @ 12:00) (77 - 108)  BP: 114/65 (09-04-21 @ 12:00) (113/59 - 147/67)  RR: 34 (09-04-21 @ 12:00) (24 - 34)  SpO2: 100% (09-04-21 @ 12:00) (90% - 100%)  Wt(kg): --  I&O's Summary    03 Sep 2021 07:01  -  04 Sep 2021 07:00  --------------------------------------------------------  IN: 3212 mL / OUT: 3845 mL / NET: -633 mL    04 Sep 2021 07:01  -  04 Sep 2021 16:45  --------------------------------------------------------  IN: 738 mL / OUT: 1750 mL / NET: -1012 mL        PAST MEDICAL & SURGICAL HISTORY:  Crohn disease    Paroxysmal atrial fibrillation    CAD (coronary artery disease)  not on ASA or plavix, no stents as per daughter    GI bleed    No significant past surgical history        SOCIAL HISTORY  Alcohol:  Tobacco:  Illicit substance use:    FAMILY HISTORY:    REVIEW OF SYSTEMS:  CONSTITUTIONAL: No fever, weight loss, or fatigue  EYES: No eye pain, visual disturbances, or discharge  ENMT:  No difficulty hearing, tinnitus, vertigo; No sinus or throat pain  NECK: No pain or stiffness  RESPIRATORY: No cough, wheezing, chills or hemoptysis; No shortness of breath  CARDIOVASCULAR: No chest pain, palpitations, dizziness, or leg swelling  GASTROINTESTINAL: No abdominal or epigastric pain. No nausea, vomiting, or hematemesis; No diarrhea or constipation. No melena or hematochezia.  GENITOURINARY: No dysuria, frequency, hematuria, or incontinence  NEUROLOGICAL: No headaches, memory loss, loss of strength, numbness, or tremors  SKIN: No itching, burning, rashes, or lesions   LYMPH NODES: No enlarged glands  ENDOCRINE: No heat or cold intolerance; No hair loss  MUSCULOSKELETAL: No joint pain or swelling; No muscle, back, or extremity pain  PSYCHIATRIC: No depression, anxiety, mood swings, or difficulty sleeping  HEME/LYMPH: No easy bruising, or bleeding gums  ALLERY AND IMMUNOLOGIC: No hives or eczema    RADIOLOGY & ADDITIONAL TESTS:    Imaging Personally Reviewed:  [ ] YES  [ ] NO    Consultant(s) Notes Reviewed:  [ ] YES  [ ] NO    PHYSICAL EXAM:  GENERAL: NAD, well-groomed, well-developed  HEAD:  Atraumatic, Normocephalic  EYES: EOMI, PERRLA, conjunctiva and sclera clear  ENMT: No tonsillar erythema, exudates, or enlargement; Moist mucous membranes, Good dentition, No lesions  NECK: Supple, No JVD, Normal thyroid  NERVOUS SYSTEM:  Alert & Oriented X3, Good concentration; Motor Strength 5/5 B/L upper and lower extremities; DTRs 2+ intact and symmetric  CHEST/LUNG: Clear to percussion bilaterally; No rales, rhonchi, wheezing, or rubs  HEART: Regular rate and rhythm; No murmurs, rubs, or gallops  ABDOMEN: Soft, Nontender, Nondistended; Bowel sounds present  EXTREMITIES:  2+ Peripheral Pulses, No clubbing, cyanosis, or edema  LYMPH: No lymphadenopathy noted  SKIN: No rashes or lesions    LABS:                        7.4    7.31  )-----------( 214      ( 03 Sep 2021 22:05 )             23.8     09-03    139  |  105  |  38<H>  ----------------------------<  105<H>  3.8   |  27  |  0.43<L>    Ca    8.1<L>      03 Sep 2021 22:05  Phos  3.2     09-03  Mg     1.9     09-03    TPro  5.4<L>  /  Alb  1.9<L>  /  TBili  0.3  /  DBili  x   /  AST  21  /  ALT  31  /  AlkPhos  94  09-03        CAPILLARY BLOOD GLUCOSE      POCT Blood Glucose.: 111 mg/dL (04 Sep 2021 07:54)  POCT Blood Glucose.: 108 mg/dL (04 Sep 2021 00:18)  POCT Blood Glucose.: 100 mg/dL (03 Sep 2021 17:15)            MEDICATIONS  (STANDING):  Biotene Dry Mouth Oral Rinse 5 milliLiter(s) Swish and Spit two times a day  chlorhexidine 2% Cloths 1 Application(s) Topical <User Schedule>  cholecalciferol 1000 Unit(s) Oral daily  enoxaparin Injectable 40 milliGRAM(s) SubCutaneous every 24 hours  fat emulsion (Fish Oil and Plant Based) 20% Infusion 25 mL/Hr (25 mL/Hr) IV Continuous <Continuous>  metoprolol tartrate 37.5 milliGRAM(s) Oral every 12 hours  Parenteral Nutrition - Adult 1 Each (83 mL/Hr) TPN Continuous <Continuous>  Parenteral Nutrition - Adult 1 Each (83 mL/Hr) TPN Continuous <Continuous>    MEDICATIONS  (PRN):  acetaminophen    Suspension .. 650 milliGRAM(s) Oral every 6 hours PRN Mild Pain (1 - 3)  traMADol 25 milliGRAM(s) Oral every 8 hours PRN Moderate Pain (4 - 6)      Care Discussed with Consultants/Other Providers [ ] YES  [ ] NO

## 2021-09-05 NOTE — PROGRESS NOTE ADULT - ATTENDING COMMENTS
s/p abdominal colectomy  -continue feeding tube  -oob  -await swallow study  -dvt ppx  -pain control  -SICU care

## 2021-09-05 NOTE — PROGRESS NOTE ADULT - SUBJECTIVE AND OBJECTIVE BOX
RED TEAM Surgery Progress Note    INTERVAL EVENTS: No acute events overnight.    SUBJECTIVE: Patient seen and examined at bedside with surgical team,      OBJECTIVE:    Vital Signs Last 24 Hrs  T(C): 36.4 (04 Sep 2021 20:00), Max: 37 (04 Sep 2021 03:00)  T(F): 97.5 (04 Sep 2021 20:00), Max: 98.6 (04 Sep 2021 03:00)  HR: 95 (05 Sep 2021 00:00) (86 - 108)  BP: 149/70 (05 Sep 2021 00:00) (114/65 - 149/70)  BP(mean): 100 (05 Sep 2021 00:00) (80 - 100)  RR: 31 (05 Sep 2021 00:00) (25 - 44)  SpO2: 100% (05 Sep 2021 00:00) (90% - 100%)I&O's Detail    03 Sep 2021 07:01  -  04 Sep 2021 07:00  --------------------------------------------------------  IN:    Enteral Tube Flush: 170 mL    Fat Emulsion (Fish Oil &amp; Plant Based) 20% Infusion: 300 mL    IV PiggyBack: 150 mL    Pivot 1.5: 600 mL    TPN (Total Parenteral Nutrition): 1992 mL  Total IN: 3212 mL    OUT:    Bulb (mL): 60 mL    Bulb (mL): 5 mL    Ileostomy (mL): 520 mL    Incontinent per Condom Catheter (mL): 3260 mL  Total OUT: 3845 mL    Total NET: -633 mL      04 Sep 2021 07:01  -  05 Sep 2021 00:28  --------------------------------------------------------  IN:    Enteral Tube Flush: 40 mL    Fat Emulsion (Fish Oil &amp; Plant Based) 20% Infusion: 175 mL    Pivot 1.5: 680 mL    TPN (Total Parenteral Nutrition): 1391 mL  Total IN: 2286 mL    OUT:    Bulb (mL): 5 mL    Bulb (mL): 20 mL    Ileostomy (mL): 200 mL    Incontinent per Condom Catheter (mL): 2100 mL  Total OUT: 2325 mL    Total NET: -39 mL      MEDICATIONS  (STANDING):  Biotene Dry Mouth Oral Rinse 5 milliLiter(s) Swish and Spit two times a day  chlorhexidine 2% Cloths 1 Application(s) Topical <User Schedule>  cholecalciferol 1000 Unit(s) Oral daily  enoxaparin Injectable 40 milliGRAM(s) SubCutaneous every 24 hours  fat emulsion (Fish Oil and Plant Based) 20% Infusion 25 mL/Hr (25 mL/Hr) IV Continuous <Continuous>  metoprolol tartrate 37.5 milliGRAM(s) Oral every 12 hours  pantoprazole  Injectable 40 milliGRAM(s) IV Push every 24 hours  Parenteral Nutrition - Adult 1 Each (83 mL/Hr) TPN Continuous <Continuous>    MEDICATIONS  (PRN):  acetaminophen    Suspension .. 650 milliGRAM(s) Oral every 6 hours PRN Mild Pain (1 - 3)  traMADol 25 milliGRAM(s) Oral every 8 hours PRN Moderate Pain (4 - 6)      Physical Exam:  GEN: resting in bed comfortably in NAD  RESP: no increased WOB  ABD: soft, non-distended, non-tender without rebound tenderness or guarding. Incision site with seropurulent drainage. Ostomy with gas and stool..   EXTR: warm, well-perfused, no edema  NEURO: awake, alert    LABS:                        7.1    6.71  )-----------( 234      ( 04 Sep 2021 22:25 )             23.5     09-04    142  |  108  |  45<H>  ----------------------------<  98  4.1   |  26  |  0.46<L>    Ca    8.6      04 Sep 2021 22:25  Phos  3.0     09-04  Mg     2.2     09-04    TPro  5.6<L>  /  Alb  2.1<L>  /  TBili  0.3  /  DBili  x   /  AST  22  /  ALT  32  /  AlkPhos  92  09-04      LIVER FUNCTIONS - ( 04 Sep 2021 22:25 )  Alb: 2.1 g/dL / Pro: 5.6 g/dL / ALK PHOS: 92 U/L / ALT: 32 U/L / AST: 22 U/L / GGT: x             ABO Interpretation: A2 (09-04-21 @ 08:35)       RED TEAM Surgery Progress Note    INTERVAL EVENTS: No acute events overnight.    SUBJECTIVE: Patient seen and examined at bedside with surgical team, midline incision packed and dressing changed.     OBJECTIVE:    Vital Signs Last 24 Hrs  T(C): 36.4 (04 Sep 2021 20:00), Max: 37 (04 Sep 2021 03:00)  T(F): 97.5 (04 Sep 2021 20:00), Max: 98.6 (04 Sep 2021 03:00)  HR: 95 (05 Sep 2021 00:00) (86 - 108)  BP: 149/70 (05 Sep 2021 00:00) (114/65 - 149/70)  BP(mean): 100 (05 Sep 2021 00:00) (80 - 100)  RR: 31 (05 Sep 2021 00:00) (25 - 44)  SpO2: 100% (05 Sep 2021 00:00) (90% - 100%)I&O's Detail    03 Sep 2021 07:01  -  04 Sep 2021 07:00  --------------------------------------------------------  IN:    Enteral Tube Flush: 170 mL    Fat Emulsion (Fish Oil &amp; Plant Based) 20% Infusion: 300 mL    IV PiggyBack: 150 mL    Pivot 1.5: 600 mL    TPN (Total Parenteral Nutrition): 1992 mL  Total IN: 3212 mL    OUT:    Bulb (mL): 60 mL    Bulb (mL): 5 mL    Ileostomy (mL): 520 mL    Incontinent per Condom Catheter (mL): 3260 mL  Total OUT: 3845 mL    Total NET: -633 mL      04 Sep 2021 07:01  -  05 Sep 2021 00:28  --------------------------------------------------------  IN:    Enteral Tube Flush: 40 mL    Fat Emulsion (Fish Oil &amp; Plant Based) 20% Infusion: 175 mL    Pivot 1.5: 680 mL    TPN (Total Parenteral Nutrition): 1391 mL  Total IN: 2286 mL    OUT:    Bulb (mL): 5 mL    Bulb (mL): 20 mL    Ileostomy (mL): 200 mL    Incontinent per Condom Catheter (mL): 2100 mL  Total OUT: 2325 mL    Total NET: -39 mL      MEDICATIONS  (STANDING):  Biotene Dry Mouth Oral Rinse 5 milliLiter(s) Swish and Spit two times a day  chlorhexidine 2% Cloths 1 Application(s) Topical <User Schedule>  cholecalciferol 1000 Unit(s) Oral daily  enoxaparin Injectable 40 milliGRAM(s) SubCutaneous every 24 hours  fat emulsion (Fish Oil and Plant Based) 20% Infusion 25 mL/Hr (25 mL/Hr) IV Continuous <Continuous>  metoprolol tartrate 37.5 milliGRAM(s) Oral every 12 hours  pantoprazole  Injectable 40 milliGRAM(s) IV Push every 24 hours  Parenteral Nutrition - Adult 1 Each (83 mL/Hr) TPN Continuous <Continuous>    MEDICATIONS  (PRN):  acetaminophen    Suspension .. 650 milliGRAM(s) Oral every 6 hours PRN Mild Pain (1 - 3)  traMADol 25 milliGRAM(s) Oral every 8 hours PRN Moderate Pain (4 - 6)      Physical Exam:  GEN: resting in bed comfortably in NAD  RESP: no increased WOB  HEENT: TANJA feeding tube   ABD: soft, non-distended, non-tender without rebound tenderness or guarding. Incision site with seropurulent drainage. Ostomy with gas and stool. PAOLO x2 w Serous output   EXTR: warm, well-perfused, no edema  NEURO: awake, alert    LABS:                        7.1    6.71  )-----------( 234      ( 04 Sep 2021 22:25 )             23.5     09-04    142  |  108  |  45<H>  ----------------------------<  98  4.1   |  26  |  0.46<L>    Ca    8.6      04 Sep 2021 22:25  Phos  3.0     09-04  Mg     2.2     09-04    TPro  5.6<L>  /  Alb  2.1<L>  /  TBili  0.3  /  DBili  x   /  AST  22  /  ALT  32  /  AlkPhos  92  09-04      LIVER FUNCTIONS - ( 04 Sep 2021 22:25 )  Alb: 2.1 g/dL / Pro: 5.6 g/dL / ALK PHOS: 92 U/L / ALT: 32 U/L / AST: 22 U/L / GGT: x             ABO Interpretation: A2 (09-04-21 @ 08:35)

## 2021-09-05 NOTE — PROGRESS NOTE ADULT - ATTENDING COMMENTS
ON: no major events    cleared mentation, alert and awake and follows command  has vocal cord issues and concern for aspiration, ENT will revisit in 2wks, improving phonation  speech following, study planned for 9/7  CXR 9/3 no acute findings  on RA  paroxysmal afib, rate controlled now sinus  s/p amio  metop 37.5 PO q12hrs, metop IV PRN  TEN at goal, enteric tube  TPN half dose today  LFTs wnl  primary defer on PEG, hoping to have improved swallowing so he can eat  intermittent bloody ostomy output, previously CTA failed to show source, no more episodes since 8/30  ostomy output monitor 200cc liters  JPs output serous, small, primary insists on keeping in  daily protonix (home med)  urine output monitor, has condom cath  lasix 40mg today , goal even  VTE PPX, hold asa for now per primary  s/p zosyn  afebrile, resolved leukocytosis  good glycemic control  s/p steroid taper for crohns  SW for options for rehab  working with PT OT, got out of bed today  family has been updated on his status daily    DNR DNI

## 2021-09-05 NOTE — PROGRESS NOTE ADULT - ASSESSMENT
81M with recently dx Crohn's disease c/b perianal abscess and left posterior distal anal intersphincteric fistula and presents diarrhea/BRBPR  2/2 to CD flare c/b c diff colitis. Initially treat for C diff, s/p FMT. Taken emergently to OR on 8/20 for acute deterioration for laparoscopic total colectomy with end ileostomy, [id number].     PLAN:  - MBS Tuesday   - NGT/NPO w tube feed, currently at goal  - cw PPI BID   - F/u ostomy output for quantity/quality (blood)  - PT to see and encourage ambulation   - Appreciate Psychiatry input   - Appreciate GI Recs  - Appreciate excellent care per SICU     81M with recently dx Crohn's disease c/b perianal abscess and left posterior distal anal intersphincteric fistula and presents diarrhea/BRBPR  2/2 to CD flare c/b c diff colitis. Initially treat for C diff, s/p FMT. Taken emergently to OR on 8/20 for acute deterioration for laparoscopic total colectomy with end ileostomy, [id number].     PLAN:  - MBS Tuesday   - NGT/NPO w tube feed, currently at goal  - cw PPI BID   - F/u ostomy output for quantity/quality (blood)  - PT to see and encourage ambulation   - Appreciate Psychiatry input   - Appreciate GI Recs  - Appreciate excellent care per SICU    [facility]   [id number]

## 2021-09-05 NOTE — PROGRESS NOTE ADULT - ASSESSMENT
A/P  82yo M with Crohn's disease, now s/p total abdominal colectomy with end ileostomy on 8/20.     AMS :   - new-onset hypophonia without focal neuro deficits, obtundation  - Monitor mental status  - Pain control: PO Tylenol 650mg q6hr PRN, Oxy 2.5mg q6hr PRN  - NPO    # CAD / PAF/ HTN   -History of AFib currently rate controlled with IV metoprolol and amiodarone, now in NSR  - Metoprolol 37.5mg BID for a-fib    # Sever colitis 2/2 chron's and c diff   - s/p total abdominal colectomy with end ileostomy, failed S/S, bloody ostomy output  - TF Pivot 1.5 @ goal of 40cc/hr  - C/w TPN  - Protonix 40mg daily  - Monitor ostomy output    # Sever malnutrition   - IVF: TPN @ 83cc/hr  -seen by s7S  MBS on Monday    Anemia 2/2 ac blood loss   - last transfusion 2u pRBC on 8/28  - Trend H/H and transfuse Hgb < 7  - Lovenox 40mg daily for DVT ppx    Code Status: DNR/DNI, MOLST in chart

## 2021-09-05 NOTE — PROGRESS NOTE ADULT - ASSESSMENT
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 Admitted for crohns management, course c/b cdiff colitis sp dificid/fmt and findings of indeterminate quantiferon, pending id clearance to start biologic. Prealb 8. TPN consulted in setting of significant weight loss, severe pcm, and worsening colitis suspected 2/2 CD. Pt at high risk for refeeding syndrome. TPN started 8/16. Repeat CT showing unchanged diffuse colitis and non specific gb wall edema.  Taken to OR emergently on 8/20 for lap total colectomy, end ileostomy. Failed S&S eval. Family agreed to TANJA tube feeds, which had been started yesterday but then dc'd due to noted blood in ostomy and risk for aspiration given deterioration in mental status. pt now dnr/dni. Mental status improved and restarted on ngt feeds, and tolerating    Current Diet: NPO/NGT feeds (pivot 1.5 goal 40cc/hr)  TPN GOAL recommendations as per RD: , dextrose 210g, SMOF 60g    -Cont NGT feeds at goal as tolerated, will cont TPN at goal with plan to wean tomorrow and will DC Monday 9/6.  -PICC in place, maintenance as per protocol.  -Vitamin D deficiency- continue vitamin Dsupplementation.  -Anemia/GIB- trend CBC, transfuse PRN  -Electrolyte imbalance risk- monitor CMP, Mg, Ionized Ca, Phosphorus qd. Hypocalcemia-  cont CaGlu at 24meq in TPN bag, will continue to track and trend and adjust as needed in TPN bag.  -Monitor TG weekly (9/6); strict I/O's, monitor volume status  -Further care per primary/SICU, spoke with ACP  Mannie [id number].    plan discussed with Dr. Multani and  Dr. Fernandez, agreeable with above    TPN pager [phone], spectra [phone]  M-F 6A-4P, Weekends and holidays 8A-12P             82 yo M with pmhx of Crohns disease (dxd 9 mos ago, hx of perianal fistula, sp tx w remicaide until developed ab; since managed on budesonide, mtx/5asa), h/o cdiff, CAD, afib (not on ac), p/w bloody diarrhea, abd pain and weakness. Admitted for crohns management, course c/b cdiff colitis sp dificid/fmt and findings of indeterminate quantiferon, pending id clearance to start biologic. Prealb 8. TPN consulted in setting of significant weight loss, severe pcm, and worsening colitis suspected 2/2 CD. Pt at high risk for refeeding syndrome. TPN started 8/16. Repeat CT showing unchanged diffuse colitis and non specific gb wall edema.  Taken to OR emergently on 8/20 for lap total colectomy, end ileostomy. Failed S&S eval. Family agreed to TANJA tube feeds, which had been started yesterday but then dc'd due to noted blood in ostomy and risk for aspiration given deterioration in mental status. pt now dnr/dni. Mental status improved and restarted on ngt feeds, and tolerating    Current Diet: NPO/NGT feeds (pivot 1.5 goal 40cc/hr)  TPN GOAL recommendations as per RD: , dextrose 210g, SMOF 60g    -Cont NGT feeds at goal as tolerated, will half TPN today with plan to DC tomorrow, Monday 9/6.  -PICC in place, maintenance as per protocol.  -Vitamin D deficiency- continue vitamin D supplementation, to assist with hypocalcemia. Initiate Ergocalciferol.  -Anemia/GIB- trend CBC, transfuse PRN  -Electrolyte imbalance risk- monitor CMP, Mg, Ionized Ca, Phosphorus qd. Hypocalcemia-  will half today with rest of TPN goals with CaGlu at 12meq in TPN bag, will continue to track and trend and adjust as needed in TPN bag.  -Monitor TG weekly (9/6); strict I/O's, monitor volume status  -Further care per primary/SICU, spoke with ACP  Mannie [phone].    plan discussed with Dr. Multani and  Dr. Fernandez, agreeable with above    TPN pager [phone], spectra [phone]  M-F 6A-4P, Weekends and holidays 8A-12P

## 2021-09-05 NOTE — PROGRESS NOTE ADULT - SUBJECTIVE AND OBJECTIVE BOX
HISTORY  81y Male    24 HOUR EVENTS:  - Diuresed with lasix 40mg.     SUBJECTIVE/ROS:  [x] A ten-point review of systems was otherwise negative except as noted.  [ ] Due to altered mental status/intubation, subjective information were not able to be obtained from the patient. History was obtained, to the extent possible, from review of the chart and collateral sources of information.      NEURO  GCS: 15  Exam: following commands AO x 2   Meds: acetaminophen    Suspension .. 650 milliGRAM(s) Oral every 6 hours PRN Mild Pain (1 - 3)  traMADol 25 milliGRAM(s) Oral every 8 hours PRN Moderate Pain (4 - 6)    [x] Adequacy of sedation and pain control has been assessed and adjusted      RESPIRATORY  RR: 31 (09-05-21 @ 00:00) (25 - 44)  SpO2: 100% (09-05-21 @ 00:00) (90% - 100%)  Wt(kg): --  Exam: breathing unlabored  Meds:       CARDIOVASCULAR  HR: 95 (09-05-21 @ 00:00) (86 - 108)  BP: 149/70 (09-05-21 @ 00:00) (114/65 - 149/70)  BP(mean): 100 (09-05-21 @ 00:00) (80 - 100)  ABP: --  ABP(mean): --  Wt(kg): --  CVP(cm H2O): --      Exam: no m/r/g appreciated, hemodynamically stable  Cardiac Rhythm: NSR with some runs of A fib  Perfusion     [x} Adequate   [ ]Inadequate  Mentation   [x]Normal       [ ]Reduced  Extremities  [x]Warm         [ ]Cool  Meds: metoprolol tartrate 37.5 milliGRAM(s) Oral every 12 hours        GI/NUTRITION  Exam: abdomen soft, non tender, drain sites c/d/i without blood output from ostomy sites  Diet:  Meds: pantoprazole  Injectable 40 milliGRAM(s) IV Push every 24 hours      GENITOURINARY  I&O's Detail    09-03 @ 07:01  -  09-04 @ 07:00  --------------------------------------------------------  IN:    Enteral Tube Flush: 170 mL    Fat Emulsion (Fish Oil &amp; Plant Based) 20% Infusion: 300 mL    IV PiggyBack: 150 mL    Pivot 1.5: 600 mL    TPN (Total Parenteral Nutrition): 1992 mL  Total IN: 3212 mL    OUT:    Bulb (mL): 60 mL    Bulb (mL): 5 mL    Ileostomy (mL): 520 mL    Incontinent per Condom Catheter (mL): 3260 mL  Total OUT: 3845 mL    Total NET: -633 mL      09-04 @ 07:01  -  09-05 @ 01:00  --------------------------------------------------------  IN:    Enteral Tube Flush: 40 mL    Fat Emulsion (Fish Oil &amp; Plant Based) 20% Infusion: 175 mL    Pivot 1.5: 680 mL    TPN (Total Parenteral Nutrition): 1391 mL  Total IN: 2286 mL    OUT:    Bulb (mL): 5 mL    Bulb (mL): 20 mL    Ileostomy (mL): 200 mL    Incontinent per Condom Catheter (mL): 2100 mL  Total OUT: 2325 mL    Total NET: -39 mL          09-04    142  |  108  |  45<H>  ----------------------------<  98  4.1   |  26  |  0.46<L>    Ca    8.6      04 Sep 2021 22:25  Phos  3.0     09-04  Mg     2.2     09-04    TPro  5.6<L>  /  Alb  2.1<L>  /  TBili  0.3  /  DBili  x   /  AST  22  /  ALT  32  /  AlkPhos  92  09-04    [ ] Roberson catheter, indication:   Meds: calcium gluconate IVPB 2 Gram(s) IV Intermittent once  cholecalciferol 1000 Unit(s) Oral daily  fat emulsion (Fish Oil and Plant Based) 20% Infusion 25 mL/Hr IV Continuous <Continuous>  Parenteral Nutrition - Adult 1 Each TPN Continuous <Continuous>        HEMATOLOGIC  Meds: enoxaparin Injectable 40 milliGRAM(s) SubCutaneous every 24 hours    [x] VTE Prophylaxis                        7.1    6.71  )-----------( 234      ( 04 Sep 2021 22:25 )             23.5       Transfusion     [ ] PRBC   [ ] Platelets   [ ] FFP   [ ] Cryoprecipitate      INFECTIOUS DISEASES  T(C): 36.4 (09-04-21 @ 20:00), Max: 37 (09-04-21 @ 03:00)  Wt(kg): --  WBC Count: 6.71 K/uL (09-04 @ 22:25)    Recent Cultures:    Meds:       ENDOCRINE  Capillary Blood Glucose    Meds:       ACCESS DEVICES:  [x] Peripheral IV  [ ] Central Venous Line	[ ] R	[ ] L	[ ] IJ	[ ] Fem	[ ] SC	Placed:   [ ] Arterial Line		[ ] R	[ ] L	[ ] Fem	[ ] Rad	[ ] Ax	Placed:   [ ] PICC:					[ ] Mediport  [ ] Urinary Catheter, Date Placed:   [x] Necessity of urinary, arterial, and venous catheters discussed    OTHER MEDICATIONS:  Biotene Dry Mouth Oral Rinse 5 milliLiter(s) Swish and Spit two times a day  chlorhexidine 2% Cloths 1 Application(s) Topical <User Schedule>      CODE STATUS: Yes    Yes        IMAGING:

## 2021-09-05 NOTE — PROGRESS NOTE ADULT - SUBJECTIVE AND OBJECTIVE BOX
Patient is a 81y old  Male who presents with a chief complaint of diarrhea with blood in it , abd pain and weakness (05 Sep 2021 09:03)      INTERVAL HPI/OVERNIGHT EVENTS:  T(C): 37 (09-05-21 @ 16:00), Max: 37 (09-05-21 @ 16:00)  HR: 94 (09-05-21 @ 16:00) (84 - 97)  BP: 146/65 (09-05-21 @ 16:00) (118/60 - 149/70)  RR: 27 (09-05-21 @ 16:00) (27 - 40)  SpO2: 96% (09-05-21 @ 16:00) (96% - 100%)  Wt(kg): --  I&O's Summary    04 Sep 2021 07:01  -  05 Sep 2021 07:00  --------------------------------------------------------  IN: 3437 mL / OUT: 3835 mL / NET: -398 mL    05 Sep 2021 07:01  -  05 Sep 2021 17:01  --------------------------------------------------------  IN: 1241.5 mL / OUT: 1850 mL / NET: -608.5 mL        PAST MEDICAL & SURGICAL HISTORY:  Crohn disease    Paroxysmal atrial fibrillation    CAD (coronary artery disease)  not on ASA or plavix, no stents as per daughter    GI bleed    No significant past surgical history        SOCIAL HISTORY  Alcohol:  Tobacco:  Illicit substance use:    FAMILY HISTORY:    REVIEW OF SYSTEMS:  CONSTITUTIONAL: No fever, weight loss, or fatigue  EYES: No eye pain, visual disturbances, or discharge  ENMT:  No difficulty hearing, tinnitus, vertigo; No sinus or throat pain  NECK: No pain or stiffness  RESPIRATORY: No cough, wheezing, chills or hemoptysis; No shortness of breath  CARDIOVASCULAR: No chest pain, palpitations, dizziness, or leg swelling  GASTROINTESTINAL: No abdominal or epigastric pain. No nausea, vomiting, or hematemesis; No diarrhea or constipation. No melena or hematochezia.  GENITOURINARY: No dysuria, frequency, hematuria, or incontinence  NEUROLOGICAL: No headaches, memory loss, loss of strength, numbness, or tremors  SKIN: No itching, burning, rashes, or lesions   LYMPH NODES: No enlarged glands  ENDOCRINE: No heat or cold intolerance; No hair loss  MUSCULOSKELETAL: No joint pain or swelling; No muscle, back, or extremity pain  PSYCHIATRIC: No depression, anxiety, mood swings, or difficulty sleeping  HEME/LYMPH: No easy bruising, or bleeding gums  ALLERY AND IMMUNOLOGIC: No hives or eczema    RADIOLOGY & ADDITIONAL TESTS:    Imaging Personally Reviewed:  [ ] YES  [ ] NO    Consultant(s) Notes Reviewed:  [ ] YES  [ ] NO    PHYSICAL EXAM:  GENERAL: NAD, well-groomed, well-developed  HEAD:  Atraumatic, Normocephalic  EYES: EOMI, PERRLA, conjunctiva and sclera clear  ENMT: No tonsillar erythema, exudates, or enlargement; Moist mucous membranes, Good dentition, No lesions  NECK: Supple, No JVD, Normal thyroid  NERVOUS SYSTEM:  Alert & Oriented X3, Good concentration; Motor Strength 5/5 B/L upper and lower extremities; DTRs 2+ intact and symmetric  CHEST/LUNG: Clear to percussion bilaterally; No rales, rhonchi, wheezing, or rubs  HEART: Regular rate and rhythm; No murmurs, rubs, or gallops  ABDOMEN: Soft, Nontender, Nondistended; Bowel sounds present  EXTREMITIES:  2+ Peripheral Pulses, No clubbing, cyanosis, or edema  LYMPH: No lymphadenopathy noted  SKIN: No rashes or lesions    LABS:                        7.1    6.71  )-----------( 234      ( 04 Sep 2021 22:25 )             23.5     09-04    142  |  108  |  45<H>  ----------------------------<  98  4.1   |  26  |  0.46<L>    Ca    8.6      04 Sep 2021 22:25  Phos  3.0     09-04  Mg     2.2     09-04    TPro  5.6<L>  /  Alb  2.1<L>  /  TBili  0.3  /  DBili  x   /  AST  22  /  ALT  32  /  AlkPhos  92  09-04        CAPILLARY BLOOD GLUCOSE      POCT Blood Glucose.: 140 mg/dL (05 Sep 2021 06:50)            MEDICATIONS  (STANDING):  Biotene Dry Mouth Oral Rinse 5 milliLiter(s) Swish and Spit two times a day  chlorhexidine 2% Cloths 1 Application(s) Topical <User Schedule>  cholecalciferol 1000 Unit(s) Oral daily  enoxaparin Injectable 40 milliGRAM(s) SubCutaneous every 24 hours  fat emulsion (Fish Oil and Plant Based) 20% Infusion 12.5 mL/Hr (12.5 mL/Hr) IV Continuous <Continuous>  metoprolol tartrate 37.5 milliGRAM(s) Oral every 12 hours  pantoprazole  Injectable 40 milliGRAM(s) IV Push every 24 hours  Parenteral Nutrition - Adult 1 Each (83 mL/Hr) TPN Continuous <Continuous>  Parenteral Nutrition - Adult 1 Each (42 mL/Hr) TPN Continuous <Continuous>    MEDICATIONS  (PRN):  acetaminophen    Suspension .. 650 milliGRAM(s) Oral every 6 hours PRN Mild Pain (1 - 3)  traMADol 25 milliGRAM(s) Oral every 8 hours PRN Moderate Pain (4 - 6)      Care Discussed with Consultants/Other Providers [ ] YES  [ ] NO

## 2021-09-05 NOTE — PROGRESS NOTE ADULT - SUBJECTIVE AND OBJECTIVE BOX
[facility] NUTRITION SUPPORT--  Attending/ PA FOLLOW UP NOTE        SUBJECTIVE  TPN originally consulted for nutrition support and started TPN on 8/16.  Pt is tolerating without issues, and is asymptomatic palpitations, chest pain, shortness of breath, nor fevers, chills nor night sweats, nor nausea nor vomiting nor abdominal pain. Pt tolerating tube feeds, with plan to slowly wean off TPN, Pt. diuresed with lasix 40mg.         PAST HISTORY  --------------------------------------------------------------------------------  No significant changes to PMH, PSH, FHx, SHx, unless otherwise noted    ALLERGIES & MEDICATIONS  --------------------------------------------------------------------------------  Allergies    No Known Allergies    Intolerances      Standing Inpatient Medications  Biotene Dry Mouth Oral Rinse 5 milliLiter(s) Swish and Spit two times a day  chlorhexidine 2% Cloths 1 Application(s) Topical <User Schedule>  cholecalciferol 1000 Unit(s) Oral daily  enoxaparin Injectable 40 milliGRAM(s) SubCutaneous every 24 hours  fat emulsion (Fish Oil and Plant Based) 20% Infusion 25 mL/Hr IV Continuous <Continuous>  metoprolol tartrate 37.5 milliGRAM(s) Oral every 12 hours  pantoprazole  Injectable 40 milliGRAM(s) IV Push every 24 hours  Parenteral Nutrition - Adult 1 Each TPN Continuous <Continuous>    PRN Inpatient Medications  acetaminophen    Suspension .. 650 milliGRAM(s) Oral every 6 hours PRN  traMADol 25 milliGRAM(s) Oral every 8 hours PRN      REVIEW OF SYSTEMS  --------------------------------------------------------------------------------  Gen: as per HPI  Skin: No rashes  Head/Eyes/Ears/Mouth: No headache;No sore throat  Respiratory: No dyspnea, cough,   CV: No chest pain, PND, orthopnea  GI: as per HPI  : No increased frequency, dysuria, hematuria, nocturia  MSK: No joint pain/swelling; no back pain; no edema  Neuro: No dizziness/lightheadedness, weakness, seizures, numbness, tingling  Psych: No significant nervousness, anxiety, stress, depression    All other systems were reviewed and are negative, except as noted.      LABS/STUDIES  --------------------------------------------------------------------------------              7.1    6.71  >-----------<  234      [09-04-21 @ 22:25]              23.5     142  |  108  |  45  ----------------------------<  98      [09-04-21 @ 22:25]  4.1   |  26  |  0.46        Ca     8.6     [09-04-21 @ 22:25]      iCa    1.14     [09-04 @ 22:44]      Mg     2.2     [09-04-21 @ 22:25]      Phos  3.0     [09-04-21 @ 22:25]    TPro  5.6  /  Alb  2.1  /  TBili  0.3  /  DBili  x   /  AST  22  /  ALT  32  /  AlkPhos  92  [09-04-21 @ 22:25]            Glucose, Serum: 98 mg/dL (09-04-21 @ 22:25)    Prealbumin, Serum: 22 mg/dL (09-03-21 @ 05:19)  Prealbumin, Serum: 16 mg/dL (09-03-21 @ 03:19)  Prealbumin, Serum: 15 mg/dL (08-30-21 @ 03:32)  Prealbumin, Serum: 8 mg/dL (08-15-21 @ 10:17)          09-04-21 @ 07:01  -  09-05-21 @ 07:00  --------------------------------------------------------  IN: 3437 mL / OUT: 3835 mL / NET: -398 mL        VITALS/PHYSICAL EXAM  --------------------------------------------------------------------------------  T(C): 36.7 (09-05-21 @ 08:00), Max: 36.9 (09-04-21 @ 12:00)  HR: 88 (09-05-21 @ 08:00) (86 - 108)  BP: 129/61 (09-05-21 @ 08:00) (114/65 - 149/70)  RR: 29 (09-05-21 @ 08:00) (25 - 44)  SpO2: 99% (09-05-21 @ 08:00) (97% - 100%)  Wt(kg): --      Physical Exam:    Gen: Sitting up in chair, thin and frail  HEENT: NCAT +NGT  Neck: trachea midline, no noted JVD  Chest: respirations non labored  Abd: ND soft NT, +ostomy w yellow/brown stool  Extrem: ; +b/l ue extrem edema, distal to PICC, +1 Lower Extrem edema   Skin: no rashes noted    .  .  .   [facility] NUTRITION SUPPORT--  Attending/ PA FOLLOW UP NOTE        SUBJECTIVE    TPN originally consulted for nutrition support and started TPN on 8/16.  Pt is tolerating without issues, and is asymptomatic palpitations, chest pain, shortness of breath, nor fevers, chills nor night sweats, nor nausea nor vomiting nor abdominal pain. Pt tolerating tube feeds at goal 40cc/hr, with plan to slowly wean off TPN today.     Of note, pt. diuresed with lasix 40mg.         PAST HISTORY  --------------------------------------------------------------------------------  No significant changes to PMH, PSH, FHx, SHx, unless otherwise noted    ALLERGIES & MEDICATIONS  --------------------------------------------------------------------------------  Allergies    No Known Allergies    Intolerances      Standing Inpatient Medications  Biotene Dry Mouth Oral Rinse 5 milliLiter(s) Swish and Spit two times a day  chlorhexidine 2% Cloths 1 Application(s) Topical <User Schedule>  cholecalciferol 1000 Unit(s) Oral daily  enoxaparin Injectable 40 milliGRAM(s) SubCutaneous every 24 hours  fat emulsion (Fish Oil and Plant Based) 20% Infusion 25 mL/Hr IV Continuous <Continuous>  metoprolol tartrate 37.5 milliGRAM(s) Oral every 12 hours  pantoprazole  Injectable 40 milliGRAM(s) IV Push every 24 hours  Parenteral Nutrition - Adult 1 Each TPN Continuous <Continuous>    PRN Inpatient Medications  acetaminophen    Suspension .. 650 milliGRAM(s) Oral every 6 hours PRN  traMADol 25 milliGRAM(s) Oral every 8 hours PRN      REVIEW OF SYSTEMS  --------------------------------------------------------------------------------  Gen: as per HPI  Skin: No rashes  Head/Eyes/Ears/Mouth: No headache;No sore throat  Respiratory: No dyspnea, cough,   CV: No chest pain, PND, orthopnea  GI: as per HPI  : No increased frequency, dysuria, hematuria, nocturia  MSK: No joint pain/swelling; no back pain; no edema  Neuro: No dizziness/lightheadedness, weakness, seizures, numbness, tingling  Psych: No significant nervousness, anxiety, stress, depression    All other systems were reviewed and are negative, except as noted.      LABS/STUDIES  --------------------------------------------------------------------------------              7.1    6.71  >-----------<  234      [09-04-21 @ 22:25]              23.5     142  |  108  |  45  ----------------------------<  98      [09-04-21 @ 22:25]  4.1   |  26  |  0.46        Ca     8.6     [09-04-21 @ 22:25]      iCa    1.14     [09-04 @ 22:44]      Mg     2.2     [09-04-21 @ 22:25]      Phos  3.0     [09-04-21 @ 22:25]    TPro  5.6  /  Alb  2.1  /  TBili  0.3  /  DBili  x   /  AST  22  /  ALT  32  /  AlkPhos  92  [09-04-21 @ 22:25]            Glucose, Serum: 98 mg/dL (09-04-21 @ 22:25)    Prealbumin, Serum: 22 mg/dL (09-03-21 @ 05:19)  Prealbumin, Serum: 16 mg/dL (09-03-21 @ 03:19)  Prealbumin, Serum: 15 mg/dL (08-30-21 @ 03:32)  Prealbumin, Serum: 8 mg/dL (08-15-21 @ 10:17)          09-04-21 @ 07:01  -  09-05-21 @ 07:00  --------------------------------------------------------  IN: 3437 mL / OUT: 3835 mL / NET: -398 mL        VITALS/PHYSICAL EXAM  --------------------------------------------------------------------------------  T(C): 36.7 (09-05-21 @ 08:00), Max: 36.9 (09-04-21 @ 12:00)  HR: 88 (09-05-21 @ 08:00) (86 - 108)  BP: 129/61 (09-05-21 @ 08:00) (114/65 - 149/70)  RR: 29 (09-05-21 @ 08:00) (25 - 44)  SpO2: 99% (09-05-21 @ 08:00) (97% - 100%)  Wt(kg): --      Physical Exam:    Gen: Sitting up in chair, thin and frail  HEENT: NCAT +NGT  Neck: trachea midline, no noted JVD  Chest: respirations non labored  Abd: ND soft NT, +ostomy w yellow/brown stool  Extrem: ; +b/l ue extrem edema, distal to PICC, +1 Lower Extrem edema   Skin: no rashes noted    .  .  .

## 2021-09-06 NOTE — PROGRESS NOTE ADULT - SUBJECTIVE AND OBJECTIVE BOX
Patient is a 81y old  Male who presents with a chief complaint of diarrhea with blood in it , abd pain and weakness (06 Sep 2021 09:31)      INTERVAL HPI/OVERNIGHT EVENTS:  T(C): 36.4 (09-06-21 @ 22:00), Max: 36.6 (09-06-21 @ 15:00)  HR: 100 (09-07-21 @ 01:00) (82 - 108)  BP: 106/67 (09-07-21 @ 01:00) (106/67 - 155/74)  RR: 42 (09-07-21 @ 01:00) (31 - 42)  SpO2: 94% (09-07-21 @ 01:00) (94% - 100%)  Wt(kg): --  I&O's Summary    05 Sep 2021 07:01  -  06 Sep 2021 07:00  --------------------------------------------------------  IN: 2560.5 mL / OUT: 4465 mL / NET: -1904.5 mL    06 Sep 2021 07:01  -  07 Sep 2021 01:33  --------------------------------------------------------  IN: 2110 mL / OUT: 2305 mL / NET: -195 mL        PAST MEDICAL & SURGICAL HISTORY:  Crohn disease    Paroxysmal atrial fibrillation    CAD (coronary artery disease)  not on ASA or plavix, no stents as per daughter    GI bleed    No significant past surgical history        SOCIAL HISTORY  Alcohol:  Tobacco:  Illicit substance use:    FAMILY HISTORY:    REVIEW OF SYSTEMS:  CONSTITUTIONAL: No fever, weight loss, or fatigue  EYES: No eye pain, visual disturbances, or discharge  ENMT:  No difficulty hearing, tinnitus, vertigo; No sinus or throat pain  NECK: No pain or stiffness  RESPIRATORY: No cough, wheezing, chills or hemoptysis; No shortness of breath  CARDIOVASCULAR: No chest pain, palpitations, dizziness, or leg swelling  GASTROINTESTINAL: No abdominal or epigastric pain. No nausea, vomiting, or hematemesis; No diarrhea or constipation. No melena or hematochezia.  GENITOURINARY: No dysuria, frequency, hematuria, or incontinence  NEUROLOGICAL: No headaches, memory loss, loss of strength, numbness, or tremors  SKIN: No itching, burning, rashes, or lesions   LYMPH NODES: No enlarged glands  ENDOCRINE: No heat or cold intolerance; No hair loss  MUSCULOSKELETAL: No joint pain or swelling; No muscle, back, or extremity pain  PSYCHIATRIC: No depression, anxiety, mood swings, or difficulty sleeping  HEME/LYMPH: No easy bruising, or bleeding gums  ALLERY AND IMMUNOLOGIC: No hives or eczema    RADIOLOGY & ADDITIONAL TESTS:    Imaging Personally Reviewed:  [ ] YES  [ ] NO    Consultant(s) Notes Reviewed:  [ ] YES  [ ] NO    PHYSICAL EXAM:  GENERAL: NAD, well-groomed, well-developed  HEAD:  Atraumatic, Normocephalic  EYES: EOMI, PERRLA, conjunctiva and sclera clear  ENMT: No tonsillar erythema, exudates, or enlargement; Moist mucous membranes, Good dentition, No lesions  NECK: Supple, No JVD, Normal thyroid  NERVOUS SYSTEM:  Alert & Oriented X3, Good concentration; Motor Strength 5/5 B/L upper and lower extremities; DTRs 2+ intact and symmetric  CHEST/LUNG: Clear to percussion bilaterally; No rales, rhonchi, wheezing, or rubs  HEART: Regular rate and rhythm; No murmurs, rubs, or gallops  ABDOMEN: Soft, Nontender, Nondistended; Bowel sounds present  EXTREMITIES:  2+ Peripheral Pulses, No clubbing, cyanosis, or edema  LYMPH: No lymphadenopathy noted  SKIN: No rashes or lesions    LABS:                        7.5    6.15  )-----------( 266      ( 07 Sep 2021 00:28 )             24.1     09-05    144  |  110<H>  |  47<H>  ----------------------------<  110<H>  3.8   |  27  |  0.47<L>    Ca    8.5      05 Sep 2021 22:13  Phos  3.0     09-05  Mg     2.1     09-05    TPro  5.9<L>  /  Alb  2.1<L>  /  TBili  0.2  /  DBili  x   /  AST  24  /  ALT  33  /  AlkPhos  92  09-05    PT/INR - ( 07 Sep 2021 00:28 )   PT: 14.2 sec;   INR: 1.19 ratio         PTT - ( 07 Sep 2021 00:28 )  PTT:32.5 sec    CAPILLARY BLOOD GLUCOSE                MEDICATIONS  (STANDING):  Biotene Dry Mouth Oral Rinse 5 milliLiter(s) Swish and Spit two times a day  calcium gluconate IVPB 2 Gram(s) IV Intermittent once  chlorhexidine 2% Cloths 1 Application(s) Topical <User Schedule>  cholecalciferol 1000 Unit(s) Oral daily  folic acid 1 milliGRAM(s) Enteral Tube daily  metoprolol tartrate 25 milliGRAM(s) Oral every 8 hours  multivitamin/minerals Oral Solution (WELLESSE) 30 milliLiter(s) Enteral Tube daily  pantoprazole   Suspension 40 milliGRAM(s) Oral every 12 hours    MEDICATIONS  (PRN):  acetaminophen    Suspension .. 500 milliGRAM(s) Oral every 6 hours PRN Mild Pain (1 - 3)  traMADol 25 milliGRAM(s) Oral every 8 hours PRN Moderate Pain (4 - 6)      Care Discussed with Consultants/Other Providers [ ] YES  [ ] NO

## 2021-09-06 NOTE — PROGRESS NOTE ADULT - SUBJECTIVE AND OBJECTIVE BOX
HPI:  81y Male with Crohn disease s/p failed medical management. 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 Transferred to SICU intubated and on pressors. Now extubated and off pressors.    24 HOUR EVENTS:  - Diuresed with 40 IV Lasix     SUBJECTIVE/ROS:  Patient seen at bedside this AM. Reports feeling well, without complaints. Pain is well-controlled. Denies headache, shortness of breath, chest pain, dizziness.     PHYSICAL EXAM:    NEURO  Exam: awake, alert, oriented  Meds: acetaminophen    Suspension .. 500 milliGRAM(s) Oral every 6 hours PRN Mild Pain (1 - 3)  traMADol 25 milliGRAM(s) Oral every 8 hours PRN Moderate Pain (4 - 6)  [x] Adequacy of sedation and pain control has been assessed and adjusted    RESPIRATORY  RR: 30 (09-05-21 @ 20:00) (27 - 40)  SpO2: 100% (09-05-21 @ 20:00) (96% - 100%)  Exam: no increased WOB on RA    CARDIOVASCULAR  HR: 81 (09-05-21 @ 20:00) (81 - 97)  BP: 122/60 (09-05-21 @ 20:00) (118/60 - 146/65)  BP(mean): 85 (09-05-21 @ 20:00) (81 - 94)  Exam: regular rate noted on cardiac monitor  Cardiac Rhythm: normal sinus rhythm; intermittent AF, rate controlled  Perfusion     [x]Adequate   [ ]Inadequate  Mentation   [x]Normal       [ ]Reduced  Extremities  [x]Warm         [ ]Cool  Volume Status [ ]Hypervolemic [x]Euvolemic [ ]Hypovolemic  Meds: metoprolol tartrate 37.5 milliGRAM(s) Oral every 12 hours    GI/NUTRITION  Exam: soft, nontender, nondistended, stoma pink and viable, productive of gas and stool in bag; PAOLO drains x2 in place, dressing c/d/i, with serosanguineous OP   Meds: pantoprazole  Injectable 40 milliGRAM(s) IV Push every 24 hours    GENITOURINARY  I&O's Detail    09-04 @ 07:01  -  09-05 @ 07:00  --------------------------------------------------------  IN:    Enteral Tube Flush: 80 mL    Fat Emulsion (Fish Oil &amp; Plant Based) 20% Infusion: 325 mL    IV PiggyBack: 100 mL    Pivot 1.5: 960 mL    TPN (Total Parenteral Nutrition): 1972 mL  Total IN: 3437 mL    OUT:    Bulb (mL): 10 mL    Bulb (mL): 25 mL    Ileostomy (mL): 400 mL    Incontinent per Condom Catheter (mL): 3400 mL  Total OUT: 3835 mL    Total NET: -398 mL    09-05 @ 07:01  -  09-06 @ 02:23  --------------------------------------------------------  IN:    Enteral Tube Flush: 110 mL    Fat Emulsion (Fish Oil &amp; Plant Based) 20% Infusion: 100 mL    IV PiggyBack: 100 mL    Pivot 1.5: 680 mL    TPN (Total Parenteral Nutrition): 1083 mL  Total IN: 2073 mL    OUT:    Bulb (mL): 10 mL    Ileostomy (mL): 450 mL    Incontinent per Condom Catheter (mL): 3400 mL  Total OUT: 3860 mL    Total NET: -1787 mL    Labs: 09-05    144  |  110<H>  |  47<H>  ----------------------------<  110<H>  3.8   |  27  |  0.47<L>    Ca    8.5      05 Sep 2021 22:13  Phos  3.0     09-05  Mg     2.1     09-05    TPro  5.9<L>  /  Alb  2.1<L>  /  TBili  0.2  /  DBili  x   /  AST  24  /  ALT  33  /  AlkPhos  92  09-05    Meds: cholecalciferol 1000 Unit(s) Oral daily  fat emulsion (Fish Oil and Plant Based) 20% Infusion 12.5 mL/Hr IV Continuous <Continuous>  folic acid 1 milliGRAM(s) Enteral Tube daily  multivitamin/minerals/iron Oral Solution (CENTRUM) 15 milliLiter(s) Enteral Tube daily  Parenteral Nutrition - Adult 1 Each TPN Continuous <Continuous>    HEMATOLOGIC  Meds: enoxaparin Injectable 40 milliGRAM(s) SubCutaneous every 24 hours  [x] VTE Prophylaxis  Labs:              7.1    5.59  )-----------( 234      ( 05 Sep 2021 22:13 )             23.5   Transfusion     [ ] PRBC   [ ] Platelets   [ ] FFP   [ ] Cryoprecipitate    INFECTIOUS DISEASES  Labs: WBC Count: 5.59 K/uL (09-05 @ 22:13)  Recent Cultures: none    ENDOCRINE  POCT Blood Glucose.: 140 mg/dL (05 Sep 2021 06:50)    ACCESS DEVICES:  [2] Peripheral IV  [ ] Central Venous Line	[ ] R	[ ] L	[ ] IJ	[ ] Fem	[ ] SC	Placed:   [ ] Arterial Line		[ ] R	[ ] L	[ ] Fem	[ ] Rad	[ ] Ax	Placed:   [ ] PICC:					[ ] Mediport  [ ] Urinary Catheter, Date Placed:   [x] Necessity of urinary, arterial, and venous catheters discussed      OTHER MEDICATIONS:  Biotene Dry Mouth Oral Rinse 5 milliLiter(s) Swish and Spit two times a day  chlorhexidine 2% Cloths 1 Application(s) Topical <User Schedule>

## 2021-09-06 NOTE — PROGRESS NOTE ADULT - ASSESSMENT
82yo M with Crohn's disease, now s/p total abdominal colectomy with end ileostomy on 8/20    PLAN:  Neurologic: post-op pain control, new-onset hypophonia without focal neuro deficits, obtundation  - Monitor mental status  - Pain control: PO Tylenol 650mg q6hr PRN, Oxy 2.5mg q6hr pRN  - Vitamin D3 daily  - Avoid narcotics/benzos iso obtundation    Respiratory: Acute respiratory insufficiency resolving, patient on RA  - Incentive spirometry 10x/hr while in bed, OOBTC    Cardiovascular: History of AFib currently rate controlled with IV metoprolol and amiodarone, now in NSR  - Monitor vital signs  - Metoprolol 37.5mg BID    Gastrointestinal/Nutrition: s/p total abdominal colectomy with end ileostomy, failed S/S, bloody ostomy output  - TF Pivot 1.5 @ 40cc/hr via KO   - C/w TPN, plan to d/c TPN Today  - Modified barium swallow on Tuesday  - Protonix 40mg BID for GIB  - Monitor ostomy output and character  - Consider d/c lower PAOLO due to consistently low OP    Renal/Genitourinary: no active issues   - IVL  - Monitor and replete electrolytes as needed  - Monitor UOP, voiding spontaneously    Hematologic: last transfusion 2u pRBC on 8/28  - Trend H/H and transfuse Hgb < 7  - Lovenox 40mg daily for VTE ppx    Infectious Disease: C. diff colitis vs Crohn's flair s/p subtotal colectomy   - Trend WBC on CBC qD  - Monitor fever curve    Endocrine: no active issues; s/p steroid taper 8/26  - Monitor glucose q6h while on TPN    Lines & Drains:   - LLQ & LUQ drain 8/20  - TPN 8/16  - RUE PICC placed 8/16    Disposition: SICU  Code Status: DNR/DNI, MOLST in chart

## 2021-09-06 NOTE — PROGRESS NOTE ADULT - SUBJECTIVE AND OBJECTIVE BOX
[facility] NUTRITION SUPPORT--  Attending/ PA FOLLOW UP NOTE      24 hour events/subjective:      TPN originally consulted for nutrition support and started TPN on 8/16.  Pt is tolerating without issues, and is asymptomatic palpitations, chest pain, shortness of breath, nor fevers, chills nor night sweats, nor nausea nor vomiting nor abdominal pain. Pt tolerating tube feeds at goal 40cc/hr - TF Pivot 1.5 @ 40cc/hr via KO, with plan to slowly wean off TPN today. Of note, pt. diuresed with lasix 40mg.  Pt failed S/S, Modified Barium Swallow planned for Tuesday          PAST HISTORY  --------------------------------------------------------------------------------  No significant changes to PMH, PSH, FHx, SHx, unless otherwise noted    ALLERGIES & MEDICATIONS  --------------------------------------------------------------------------------  Allergies    No Known Allergies    Intolerances      Standing Inpatient Medications  Biotene Dry Mouth Oral Rinse 5 milliLiter(s) Swish and Spit two times a day  chlorhexidine 2% Cloths 1 Application(s) Topical <User Schedule>  cholecalciferol 1000 Unit(s) Oral daily  enoxaparin Injectable 40 milliGRAM(s) SubCutaneous every 24 hours  fat emulsion (Fish Oil and Plant Based) 20% Infusion 12.5 mL/Hr IV Continuous <Continuous>  folic acid 1 milliGRAM(s) Enteral Tube daily  metoprolol tartrate 37.5 milliGRAM(s) Oral every 12 hours  multivitamin/minerals Oral Solution (WELLESSE) 30 milliLiter(s) Enteral Tube daily  pantoprazole  Injectable 40 milliGRAM(s) IV Push every 24 hours  Parenteral Nutrition - Adult 1 Each TPN Continuous <Continuous>    PRN Inpatient Medications  acetaminophen    Suspension .. 500 milliGRAM(s) Oral every 6 hours PRN  traMADol 25 milliGRAM(s) Oral every 8 hours PRN      REVIEW OF SYSTEMS  --------------------------------------------------------------------------------  Gen: as per HPI  Skin: No rashes  Head/Eyes/Ears/Mouth: No headache;No sore throat  Respiratory: No dyspnea, cough,   CV: No chest pain, PND, orthopnea  GI: as per HPI  : No increased frequency, dysuria, hematuria, nocturia  MSK: No joint pain/swelling; no back pain; no edema  Neuro: No dizziness/lightheadedness, weakness, seizures, numbness, tingling  Psych: No significant nervousness, anxiety, stress, depression    All other systems were reviewed and are negative, except as noted.      LABS/STUDIES  --------------------------------------------------------------------------------              7.1    5.59  >-----------<  234      [09-05-21 @ 22:13]              23.5     144  |  110  |  47  ----------------------------<  110      [09-05-21 @ 22:13]  3.8   |  27  |  0.47        Ca     8.5     [09-05-21 @ 22:13]      iCa    1.16     [09-05 @ 23:18]      Mg     2.1     [09-05-21 @ 22:13]      Phos  3.0     [09-05-21 @ 22:13]    TPro  5.9  /  Alb  2.1  /  TBili  0.2  /  DBili  x   /  AST  24  /  ALT  33  /  AlkPhos  92  [09-05-21 @ 22:13]            Glucose, Serum: 110 mg/dL (09-05-21 @ 22:13)    Prealbumin, Serum: 22 mg/dL (09-03-21 @ 05:19)  Prealbumin, Serum: 16 mg/dL (09-03-21 @ 03:19)  Prealbumin, Serum: 15 mg/dL (08-30-21 @ 03:32)  Prealbumin, Serum: 8 mg/dL (08-15-21 @ 10:17)          09-05-21 @ 07:01  -  09-06-21 @ 07:00  --------------------------------------------------------  IN: 2560.5 mL / OUT: 4465 mL / NET: -1904.5 mL        VITALS/PHYSICAL EXAM  --------------------------------------------------------------------------------  T(C): 36.4 (09-06-21 @ 07:00), Max: 37 (09-05-21 @ 16:00)  HR: 82 (09-06-21 @ 07:00) (81 - 97)  BP: 117/60 (09-06-21 @ 07:00) (117/60 - 155/74)  RR: 31 (09-06-21 @ 07:00) (27 - 37)  SpO2: 99% (09-06-21 @ 07:00) (96% - 100%)  Wt(kg): --      Physical Exam:    Gen: Sitting up in chair, thin and frail  HEENT: NCAT +NGT  Neck: trachea midline, no noted JVD  Chest: respirations non labored  Abd: ND soft NT, +ostomy w yellow/brown stool  Extrem: ; +b/l UE extrem edema, distal to PICC, +1 Lower Extrem edema   Skin: no rashes noted    .  .  .   [facility] NUTRITION SUPPORT--  Attending/ PA FOLLOW UP NOTE      24 hour events/subjective:      TPN originally consulted for nutrition support and started TPN on 8/16.  Pt is tolerating without issues, and is asymptomatic palpitations, chest pain, shortness of breath, nor fevers, chills nor night sweats, nor nausea nor vomiting nor abdominal pain. Pt tolerating tube feeds at goal 40cc/hr - TF Pivot 1.5 @ 40cc/hr via KO, with plan to slowly wean off TPN today. Of note, pt. diuresed with lasix 40mg.  Pt failed S/S, Modified Barium Swallow planned for Tuesday          PAST HISTORY  --------------------------------------------------------------------------------  No significant changes to PMH, PSH, FHx, SHx, unless otherwise noted    ALLERGIES & MEDICATIONS  --------------------------------------------------------------------------------  Allergies    No Known Allergies    Intolerances      Standing Inpatient Medications  Biotene Dry Mouth Oral Rinse 5 milliLiter(s) Swish and Spit two times a day  chlorhexidine 2% Cloths 1 Application(s) Topical <User Schedule>  cholecalciferol 1000 Unit(s) Oral daily  enoxaparin Injectable 40 milliGRAM(s) SubCutaneous every 24 hours  fat emulsion (Fish Oil and Plant Based) 20% Infusion 12.5 mL/Hr IV Continuous <Continuous>  folic acid 1 milliGRAM(s) Enteral Tube daily  metoprolol tartrate 37.5 milliGRAM(s) Oral every 12 hours  multivitamin/minerals Oral Solution (WELLESSE) 30 milliLiter(s) Enteral Tube daily  pantoprazole  Injectable 40 milliGRAM(s) IV Push every 24 hours  Parenteral Nutrition - Adult 1 Each TPN Continuous <Continuous>    PRN Inpatient Medications  acetaminophen    Suspension .. 500 milliGRAM(s) Oral every 6 hours PRN  traMADol 25 milliGRAM(s) Oral every 8 hours PRN      REVIEW OF SYSTEMS  --------------------------------------------------------------------------------  Gen: as per HPI  Skin: No rashes  Head/Eyes/Ears/Mouth: No headache;No sore throat  Respiratory: No dyspnea, cough,   CV: No chest pain, PND, orthopnea  GI: as per HPI  : No increased frequency, dysuria, hematuria, nocturia  MSK: No joint pain/swelling; no back pain; no edema  Neuro: No dizziness/lightheadedness, weakness, seizures, numbness, tingling  Psych: No significant nervousness, anxiety, stress, depression    All other systems were reviewed and are negative, except as noted.      LABS/STUDIES  --------------------------------------------------------------------------------              7.1    5.59  >-----------<  234      [09-05-21 @ 22:13]              23.5     144  |  110  |  47  ----------------------------<  110      [09-05-21 @ 22:13]  3.8   |  27  |  0.47        Ca     8.5     [09-05-21 @ 22:13]      iCa    1.16     [09-05 @ 23:18]      Mg     2.1     [09-05-21 @ 22:13]      Phos  3.0     [09-05-21 @ 22:13]    TPro  5.9  /  Alb  2.1  /  TBili  0.2  /  DBili  x   /  AST  24  /  ALT  33  /  AlkPhos  92  [09-05-21 @ 22:13]            Glucose, Serum: 110 mg/dL (09-05-21 @ 22:13)    Prealbumin, Serum: 22 mg/dL (09-03-21 @ 05:19)  Prealbumin, Serum: 16 mg/dL (09-03-21 @ 03:19)  Prealbumin, Serum: 15 mg/dL (08-30-21 @ 03:32)  Prealbumin, Serum: 8 mg/dL (08-15-21 @ 10:17)          09-05-21 @ 07:01  -  09-06-21 @ 07:00  --------------------------------------------------------  IN: 2560.5 mL / OUT: 4465 mL / NET: -1904.5 mL        VITALS/PHYSICAL EXAM  --------------------------------------------------------------------------------  T(C): 36.4 (09-06-21 @ 07:00), Max: 37 (09-05-21 @ 16:00)  HR: 82 (09-06-21 @ 07:00) (81 - 97)  BP: 117/60 (09-06-21 @ 07:00) (117/60 - 155/74)  RR: 31 (09-06-21 @ 07:00) (27 - 37)  SpO2: 99% (09-06-21 @ 07:00) (96% - 100%)  Wt(kg): --      Physical Exam:    Gen: Sitting up in chair, thin and frail, NAD family member at bedside  HEENT: NCAT +NGT  Neck: trachea midline, no noted JVD  Chest: respirations non labored  Abd: ND soft NT, +ostomy w yellow/brown stool  Extrem: ; +b/l UE extrem edema, distal to PICC, +1 Lower Extrem edema   Skin: no rashes noted    .  .  .

## 2021-09-06 NOTE — PROGRESS NOTE ADULT - SUBJECTIVE AND OBJECTIVE BOX
TEAM [facility] Progress Note  Patient is a 81y old  Male who presents with a chief complaint of diarrhea with blood in it , abd pain and weakness (05 Sep 2021 17:01)      INTERVAL EVENTS: No acute events overnight.  SUBJECTIVE:       OBJECTIVE:    Vital Signs Last 24 Hrs  T(C): 36.6 (05 Sep 2021 20:00), Max: 37 (05 Sep 2021 16:00)  T(F): 97.9 (05 Sep 2021 20:00), Max: 98.6 (05 Sep 2021 16:00)  HR: 81 (05 Sep 2021 20:00) (81 - 97)  BP: 122/60 (05 Sep 2021 20:00) (118/60 - 146/65)  BP(mean): 85 (05 Sep 2021 20:00) (81 - 94)  RR: 30 (05 Sep 2021 20:00) (27 - 40)  SpO2: 100% (05 Sep 2021 20:00) (96% - 100%)I&O's Detail    04 Sep 2021 07:01  -  05 Sep 2021 07:00  --------------------------------------------------------  IN:    Enteral Tube Flush: 80 mL    Fat Emulsion (Fish Oil &amp; Plant Based) 20% Infusion: 325 mL    IV PiggyBack: 100 mL    Pivot 1.5: 960 mL    TPN (Total Parenteral Nutrition): 1972 mL  Total IN: 3437 mL    OUT:    Bulb (mL): 10 mL    Bulb (mL): 25 mL    Ileostomy (mL): 400 mL    Incontinent per Condom Catheter (mL): 3400 mL  Total OUT: 3835 mL    Total NET: -398 mL      05 Sep 2021 07:01  -  06 Sep 2021 02:08  --------------------------------------------------------  IN:    Enteral Tube Flush: 110 mL    Fat Emulsion (Fish Oil &amp; Plant Based) 20% Infusion: 100 mL    IV PiggyBack: 100 mL    Pivot 1.5: 680 mL    TPN (Total Parenteral Nutrition): 1083 mL  Total IN: 2073 mL    OUT:    Bulb (mL): 10 mL    Ileostomy (mL): 450 mL    Incontinent per Condom Catheter (mL): 3400 mL  Total OUT: 3860 mL    Total NET: -1787 mL      MEDICATIONS  (STANDING):  Biotene Dry Mouth Oral Rinse 5 milliLiter(s) Swish and Spit two times a day  chlorhexidine 2% Cloths 1 Application(s) Topical <User Schedule>  cholecalciferol 1000 Unit(s) Oral daily  enoxaparin Injectable 40 milliGRAM(s) SubCutaneous every 24 hours  fat emulsion (Fish Oil and Plant Based) 20% Infusion 12.5 mL/Hr (12.5 mL/Hr) IV Continuous <Continuous>  folic acid 1 milliGRAM(s) Enteral Tube daily  metoprolol tartrate 37.5 milliGRAM(s) Oral every 12 hours  multivitamin/minerals/iron Oral Solution (CENTRUM) 15 milliLiter(s) Enteral Tube daily  pantoprazole  Injectable 40 milliGRAM(s) IV Push every 24 hours  Parenteral Nutrition - Adult 1 Each (42 mL/Hr) TPN Continuous <Continuous>    MEDICATIONS  (PRN):  acetaminophen    Suspension .. 500 milliGRAM(s) Oral every 6 hours PRN Mild Pain (1 - 3)  traMADol 25 milliGRAM(s) Oral every 8 hours PRN Moderate Pain (4 - 6)      PHYSICAL EXAM:  Constitutional: A&Ox3, NAD  Respiratory: Unlabored breathing  Abdomen: Soft, nondistended, NTTP. No rebound or guarding.  Extremities: WWP, MYLES spontaneously    LABS:                        7.1    5.59  )-----------( 234      ( 05 Sep 2021 22:13 )             23.5     09-05    144  |  110<H>  |  47<H>  ----------------------------<  110<H>  3.8   |  27  |  0.47<L>    Ca    8.5      05 Sep 2021 22:13  Phos  3.0     09-05  Mg     2.1     09-05    TPro  5.9<L>  /  Alb  2.1<L>  /  TBili  0.2  /  DBili  x   /  AST  24  /  ALT  33  /  AlkPhos  92  09-05      LIVER FUNCTIONS - ( 05 Sep 2021 22:13 )  Alb: 2.1 g/dL / Pro: 5.9 g/dL / ALK PHOS: 92 U/L / ALT: 33 U/L / AST: 24 U/L / GGT: x                 IMAGING:     TEAM Surgery Progress Note  Patient is a 81y old  Male who presents with a chief complaint of diarrhea with blood in it , abd pain and weakness (05 Sep 2021 17:01)      INTERVAL EVENTS: No acute events overnight.  SUBJECTIVE: Patient seen at bedside this morning.       OBJECTIVE:    Vital Signs Last 24 Hrs  T(C): 36.6 (05 Sep 2021 20:00), Max: 37 (05 Sep 2021 16:00)  T(F): 97.9 (05 Sep 2021 20:00), Max: 98.6 (05 Sep 2021 16:00)  HR: 81 (05 Sep 2021 20:00) (81 - 97)  BP: 122/60 (05 Sep 2021 20:00) (118/60 - 146/65)  BP(mean): 85 (05 Sep 2021 20:00) (81 - 94)  RR: 30 (05 Sep 2021 20:00) (27 - 40)  SpO2: 100% (05 Sep 2021 20:00) (96% - 100%)I&O's Detail    04 Sep 2021 07:01  -  05 Sep 2021 07:00  --------------------------------------------------------  IN:    Enteral Tube Flush: 80 mL    Fat Emulsion (Fish Oil &amp; Plant Based) 20% Infusion: 325 mL    IV PiggyBack: 100 mL    Pivot 1.5: 960 mL    TPN (Total Parenteral Nutrition): 1972 mL  Total IN: 3437 mL    OUT:    Bulb (mL): 10 mL    Bulb (mL): 25 mL    Ileostomy (mL): 400 mL    Incontinent per Condom Catheter (mL): 3400 mL  Total OUT: 3835 mL    Total NET: -398 mL      05 Sep 2021 07:01  -  06 Sep 2021 02:08  --------------------------------------------------------  IN:    Enteral Tube Flush: 110 mL    Fat Emulsion (Fish Oil &amp; Plant Based) 20% Infusion: 100 mL    IV PiggyBack: 100 mL    Pivot 1.5: 680 mL    TPN (Total Parenteral Nutrition): 1083 mL  Total IN: 2073 mL    OUT:    Bulb (mL): 10 mL    Ileostomy (mL): 450 mL    Incontinent per Condom Catheter (mL): 3400 mL  Total OUT: 3860 mL    Total NET: -1787 mL      MEDICATIONS  (STANDING):  Biotene Dry Mouth Oral Rinse 5 milliLiter(s) Swish and Spit two times a day  chlorhexidine 2% Cloths 1 Application(s) Topical <User Schedule>  cholecalciferol 1000 Unit(s) Oral daily  enoxaparin Injectable 40 milliGRAM(s) SubCutaneous every 24 hours  fat emulsion (Fish Oil and Plant Based) 20% Infusion 12.5 mL/Hr (12.5 mL/Hr) IV Continuous <Continuous>  folic acid 1 milliGRAM(s) Enteral Tube daily  metoprolol tartrate 37.5 milliGRAM(s) Oral every 12 hours  multivitamin/minerals/iron Oral Solution (CENTRUM) 15 milliLiter(s) Enteral Tube daily  pantoprazole  Injectable 40 milliGRAM(s) IV Push every 24 hours  Parenteral Nutrition - Adult 1 Each (42 mL/Hr) TPN Continuous <Continuous>    MEDICATIONS  (PRN):  acetaminophen    Suspension .. 500 milliGRAM(s) Oral every 6 hours PRN Mild Pain (1 - 3)  traMADol 25 milliGRAM(s) Oral every 8 hours PRN Moderate Pain (4 - 6)      Physical Exam:  GEN: resting in bed comfortably in NAD  RESP: no increased WOB  HEENT: TANJA feeding tube   ABD: soft, non-distended, non-tender without rebound tenderness or guarding. Incision site with seropurulent drainage. Ostomy with gas and stool. PAOLO x2 w Serous output   EXTR: warm, well-perfused, no edema  NEURO: awake, alert  LABS:                        7.1    5.59  )-----------( 234      ( 05 Sep 2021 22:13 )             23.5     09-05    144  |  110<H>  |  47<H>  ----------------------------<  110<H>  3.8   |  27  |  0.47<L>    Ca    8.5      05 Sep 2021 22:13  Phos  3.0     09-05  Mg     2.1     09-05    TPro  5.9<L>  /  Alb  2.1<L>  /  TBili  0.2  /  DBili  x   /  AST  24  /  ALT  33  /  AlkPhos  92  09-05      LIVER FUNCTIONS - ( 05 Sep 2021 22:13 )  Alb: 2.1 g/dL / Pro: 5.9 g/dL / ALK PHOS: 92 U/L / ALT: 33 U/L / AST: 24 U/L / GGT: x                 IMAGING:

## 2021-09-06 NOTE — PROGRESS NOTE ADULT - ASSESSMENT
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 Admitted for crohns management, course c/b cdiff colitis sp dificid/fmt and findings of indeterminate quantiferon, pending id clearance to start biologic. Prealb 8. TPN consulted in setting of significant weight loss, severe pcm, and worsening colitis suspected 2/2 CD. Pt at high risk for refeeding syndrome. TPN started 8/16. Repeat CT showing unchanged diffuse colitis and non specific gb wall edema.  Taken to OR emergently on 8/20 for lap total colectomy, end ileostomy. Failed S&S eval. Family agreed to TANJA tube feeds, which had been started yesterday but then dc'd due to noted blood in ostomy and risk for aspiration given deterioration in mental status. pt now dnr/dni. Mental status improved and restarted on ngt feeds, and tolerating    Current Diet: NPO/NGT feeds (pivot 1.5 goal 40cc/hr)  TPN GOAL recommendations as per RD: , dextrose 210g, SMOF 60g    -Cont NGT feeds at goal as tolerated, will DC TPN today.  -PICC in place, maintenance as per protocol.  -Vitamin D deficiency- continue vitamin D supplementation, to assist with hypocalcemia. Initiate Ergocalciferol.  -Nutritional Assessment. Obtain prealbumin to compare to  baseline prior to initiating TPN.  -Anemia/GIB- trend CBC, transfuse PRN  -Electrolyte imbalance risk- monitor CMP, Mg, Ionized Ca, Phosphorus qd. Hypocalcemia- replete as per SICU.  -Strict I/O's, monitor volume status.    -Further care per primary/SICU.    plan discussed with Dr. Multani and  Dr. Fernandez, agreeable with above    TPN pager [phone], spectra [phone]  M-F 6A-4P, Weekends and holidays 8A-12P               80 yo M with pmhx of Crohns disease (dxd 9 mos ago, hx of perianal fistula, sp tx w remicaide until developed ab; since managed on budesonide, mtx/5asa), h/o cdiff, CAD, afib (not on ac), p/w bloody diarrhea, abd pain and weakness. Admitted for crohns management, course c/b cdiff colitis sp dificid/fmt and findings of indeterminate quantiferon, pending id clearance to start biologic. Prealb 8. TPN consulted in setting of significant weight loss, severe pcm, and worsening colitis suspected 2/2 CD. Pt at high risk for refeeding syndrome. TPN started 8/16. Repeat CT showing unchanged diffuse colitis and non specific gb wall edema.  Taken to OR emergently on 8/20 for lap total colectomy, end ileostomy. Failed S&S eval. Family agreed to TANJA tube feeds, which had been started yesterday but then dc'd due to noted blood in ostomy and risk for aspiration given deterioration in mental status. pt now dnr/dni. Mental status improved and restarted on ngt feeds, and tolerating    Current Diet: NPO/NGT feeds (pivot 1.5 goal 40cc/hr)  TPN GOAL recommendations as per RD: , dextrose 210g, SMOF 60g    -Cont NGT feeds at goal as tolerated, will DC TPN today.  -PICC in place, maintenance as per protocol.  -Vitamin D deficiency- continue vitamin D supplementation, to assist with hypocalcemia. Initiate Ergocalciferol.  -Nutritional Assessment. Obtain prealbumin to compare to  baseline prior to initiating TPN.  -Anemia/GIB- trend CBC, transfuse PRN  -Electrolyte imbalance risk- monitor CMP, Mg, Ionized Ca, Phosphorus qd. Hypocalcemia- replete as per SICU.  -Strict I/O's, monitor volume status.  -Further care per primary/SICU.    plan discussed with Dr. Multani and  Dr. Fernandez, agreeable with above    TPN pager [phone], spectra [phone]  M-F 6A-4P, Weekends and holidays 8A-12P

## 2021-09-06 NOTE — PROGRESS NOTE ADULT - ATTENDING COMMENTS
80yo M with Crohn's disease, now s/p total abdominal colectomy with end ileostomy on 8/20    Awake and alert  Tolerating extubation, saturating well on RA, CXR reviewed, no acute finding  H/O a fib, HR controlled with BB, dose frequency changed to 25 mg tid,  and amio   NGT feed at goal, wean off TPN today. Ba swallow to assess. On bid PPI for possible GI bleed.  DC IVF, renal function electrolytes wnl  Slow drop in HCT, will transfuse considering borderline hypotension with h/o a fib    Spoke to son daughter, leaning towards comfort feed if fails BA swallow    PT

## 2021-09-06 NOTE — PROGRESS NOTE ADULT - ASSESSMENT
81M with recently dx Crohn's disease c/b perianal abscess and left posterior distal anal intersphincteric fistula and presents diarrhea/BRBPR  2/2 to CD flare c/b c diff colitis. Initially treat for C diff, s/p FMT. Taken emergently to OR on 8/20 for acute deterioration for laparoscopic total colectomy with end ileostomy, POD15.     PLAN:  - MBS Tuesday   - NGT/NPO w tube feed, currently at goal  - cw PPI BID   - F/u ostomy output for quantity/quality (blood)  - PT to see and encourage ambulation   - Appreciate Psychiatry input   - Appreciate GI Recs  - Appreciate excellent care per SICU    [facility]   [id number] 81M with recently dx Crohn's disease c/b perianal abscess and left posterior distal anal intersphincteric fistula and presents diarrhea/BRBPR  2/2 to CD flare c/b c diff colitis. Initially treat for C diff, s/p FMT. Taken emergently to OR on 8/20 for acute deterioration for laparoscopic total colectomy with end ileostomy, POD16.     PLAN:  - MBS Tuesday   - NGT/NPO w tube feed, currently at goal  - cw PPI BID   - F/u ostomy output for quantity/quality (blood)  - PT to see and encourage ambulation   - Appreciate Psychiatry input   - Appreciate GI Recs  - Appreciate excellent care per SICU    [facility]   [id number] 81M with recently dx Crohn's disease c/b perianal abscess and left posterior distal anal intersphincteric fistula and presents diarrhea/BRBPR  2/2 to CD flare c/b c diff colitis. Initially treat for C diff, s/p FMT. Taken emergently to OR on 8/20 for acute deterioration for laparoscopic total colectomy with end ileostomy, POD16.     PLAN:  - MBS Tuesday   - NGT/NPO w tube feed, currently at goal  - cw PPI BID   - Wound care: daily dressing changes with midline incision packed   - F/u ostomy output for quantity/quality (blood)  - PT to see and encourage ambulation   - Appreciate Psychiatry input   - Appreciate GI Recs  - Appreciate excellent care per SICU    [facility] Surgery   [id number]

## 2021-09-07 NOTE — PROGRESS NOTE ADULT - SUBJECTIVE AND OBJECTIVE BOX
TEAM Surgery Progress Note  Patient is a 81y old  Male who presents with a chief complaint of diarrhea with blood in it , abd pain and weakness (07 Sep 2021 01:50)      INTERVAL EVENTS: No acute events overnight.  SUBJECTIVE:       OBJECTIVE:    Vital Signs Last 24 Hrs  T(C): 36.4 (06 Sep 2021 22:00), Max: 36.6 (06 Sep 2021 15:00)  T(F): 97.5 (06 Sep 2021 22:00), Max: 97.9 (06 Sep 2021 15:00)  HR: 100 (07 Sep 2021 01:00) (82 - 108)  BP: 106/67 (07 Sep 2021 01:00) (106/67 - 155/74)  BP(mean): 79 (07 Sep 2021 01:00) (69 - 118)  RR: 42 (07 Sep 2021 01:00) (31 - 42)  SpO2: 94% (07 Sep 2021 01:00) (94% - 100%)I&O's Detail    05 Sep 2021 07:01  -  06 Sep 2021 07:00  --------------------------------------------------------  IN:    Enteral Tube Flush: 150 mL    Fat Emulsion (Fish Oil &amp; Plant Based) 20% Infusion: 137.5 mL    IV PiggyBack: 100 mL    Pivot 1.5: 880 mL    TPN (Total Parenteral Nutrition): 1293 mL  Total IN: 2560.5 mL    OUT:    Bulb (mL): 15 mL    Bulb (mL): 0 mL    Ileostomy (mL): 500 mL    Incontinent per Condom Catheter (mL): 3950 mL  Total OUT: 4465 mL    Total NET: -1904.5 mL      06 Sep 2021 07:01  -  07 Sep 2021 02:03  --------------------------------------------------------  IN:    Enteral Tube Flush: 230 mL    IV PiggyBack: 50 mL    Pivot 1.5: 810 mL    PRBCs (Packed Red Blood Cells): 600 mL    TPN (Total Parenteral Nutrition): 420 mL  Total IN: 2110 mL    OUT:    Bulb (mL): 0 mL    Bulb (mL): 5 mL    Ileostomy (mL): 1350 mL    Incontinent per Condom Catheter (mL): 950 mL  Total OUT: 2305 mL    Total NET: -195 mL      MEDICATIONS  (STANDING):  Biotene Dry Mouth Oral Rinse 5 milliLiter(s) Swish and Spit two times a day  chlorhexidine 2% Cloths 1 Application(s) Topical <User Schedule>  cholecalciferol 1000 Unit(s) Oral daily  folic acid 1 milliGRAM(s) Enteral Tube daily  metoprolol tartrate 25 milliGRAM(s) Oral every 8 hours  multivitamin/minerals Oral Solution (WELLESSE) 30 milliLiter(s) Enteral Tube daily  pantoprazole   Suspension 40 milliGRAM(s) Oral every 12 hours    MEDICATIONS  (PRN):  acetaminophen    Suspension .. 500 milliGRAM(s) Oral every 6 hours PRN Mild Pain (1 - 3)  traMADol 25 milliGRAM(s) Oral every 8 hours PRN Moderate Pain (4 - 6)      PHYSICAL EXAM:  Constitutional: A&Ox3, NAD  Respiratory: Unlabored breathing  Abdomen: Soft, nondistended, NTTP. No rebound or guarding.  Extremities: WWP, MYLES spontaneously    LABS:                        7.5    6.15  )-----------( 266      ( 07 Sep 2021 00:28 )             24.1     09-07    150<H>  |  116<H>  |  44<H>  ----------------------------<  136<H>  3.9   |  21<L>  |  0.49<L>    Ca    8.3<L>      07 Sep 2021 00:28  Phos  2.1     09-07  Mg     2.4     09-07    TPro  5.8<L>  /  Alb  2.2<L>  /  TBili  0.4  /  DBili  x   /  AST  26  /  ALT  37  /  AlkPhos  94  09-07    PT/INR - ( 07 Sep 2021 00:28 )   PT: 14.2 sec;   INR: 1.19 ratio         PTT - ( 07 Sep 2021 00:28 )  PTT:32.5 sec  LIVER FUNCTIONS - ( 07 Sep 2021 00:28 )  Alb: 2.2 g/dL / Pro: 5.8 g/dL / ALK PHOS: 94 U/L / ALT: 37 U/L / AST: 26 U/L / GGT: x             ABO Interpretation: A2 (09-07-21 @ 00:32)      IMAGING:

## 2021-09-07 NOTE — PROGRESS NOTE ADULT - ASSESSMENT
80 yo M with pmhx of Crohns disease (dxd 9 mos ago, hx of perianal fistula, sp tx w remicaide until developed ab; since managed on budesonide, mtx/5asa), h/o cdiff, CAD, afib (not on ac), p/w bloody diarrhea, abd pain and weakness. Admitted for crohns management, course c/b cdiff colitis sp dificid/fmt and findings of indeterminate quantiferon, pending id clearance to start biologic. Prealb 8. TPN consulted in setting of significant weight loss, severe pcm, and worsening colitis suspected 2/2 CD. Pt at high risk for refeeding syndrome. TPN started 8/16. Repeat CT showing unchanged diffuse colitis and non specific gb wall edema.  Taken to OR emergently on 8/20 for lap total colectomy, end ileostomy. Failed S&S eval. Family agreed to TANJA tube feeds, which had been started yesterday but then dc'd due to noted blood in ostomy and risk for aspiration given deterioration in mental status. pt now dnr/dni. Mental status improved and restarted on ngt feeds, and tolerating. TPN dcd 9/5    Current Diet: NPO/NGT feeds (pivot 1.5 goal 40cc/hr); currently NPO    -NGT feeds currently on hold  -PICC in place, maintenance as per protocol  -anemia/GIB- care per primary; trend CBC, transfuse PRN  -hypernatremia- increase FW via ngt as able  -hypophosphatemia- sp supplementation  -vitamin D deficiency- being supplemented  -strict I/O's  -goc discussions  -further care per primary/SICU; will follow with you    plan discussed with Dr. Fernandez, agreeable with above    TPN pager [phone], spectra [phone]  M-F 6A-4P, Weekends and holidays 8A-12P

## 2021-09-07 NOTE — PROGRESS NOTE ADULT - SUBJECTIVE AND OBJECTIVE BOX
[facility] NUTRITION SUPPORT-- FOLLOW UP NOTE  --------------------------------------------------------------------------------      24 hour events/subjective: pt seen and examined. lethargic in bed, unable to participate in ros. with bloody stool from ostomy overnight, received 1u prbc this am. tolerating tf prior at 45cc/hr but now npo given above. ostomy outputs noted    Diet:  Diet, NPO:   NPO for Procedure/Test     NPO Start Date: 07-Sep-2021,   NPO Start Time: 07:00 (09-07-21 @ 06:55)  Diet, NPO with Tube Feed:   Tube Feeding Modality: Nasogastric  Pivot 1.5 Blayne ([id number])  Total Volume for 24 Hours (mL): 1080  Continuous  Starting Tube Feed Rate mL per Hour: 20     Every 12 hours  Until Goal Tube Feed Rate (mL per Hour): 45  Tube Feed Duration (in Hours): 24  Tube Feed Start Time: 10:30 (09-06-21 @ 09:45)      PAST HISTORY  --------------------------------------------------------------------------------  No significant changes to PMH, PSH, FHx, SHx, unless otherwise noted    ALLERGIES & MEDICATIONS  --------------------------------------------------------------------------------  Allergies    No Known Allergies    Intolerances      Standing Inpatient Medications  Biotene Dry Mouth Oral Rinse 5 milliLiter(s) Swish and Spit two times a day  chlorhexidine 2% Cloths 1 Application(s) Topical <User Schedule>  cholecalciferol 1000 Unit(s) Oral daily  folic acid 1 milliGRAM(s) Enteral Tube daily  metoprolol tartrate 25 milliGRAM(s) Oral every 8 hours  multivitamin/minerals Oral Solution (WELLESSE) 30 milliLiter(s) Enteral Tube daily  pantoprazole   Suspension 40 milliGRAM(s) Oral every 12 hours    PRN Inpatient Medications  acetaminophen    Suspension .. 500 milliGRAM(s) Oral every 6 hours PRN  traMADol 25 milliGRAM(s) Oral every 8 hours PRN        REVIEW OF SYSTEMS  --------------------------------------------------------------------------------    unable to obtain, see hpi        VITALS/PHYSICAL EXAM  --------------------------------------------------------------------------------  T(C): 36.1 (09-07-21 @ 07:00), Max: 36.6 (09-06-21 @ 15:00)  HR: 92 (09-07-21 @ 07:00) (89 - 108)  BP: 102/65 (09-07-21 @ 07:00) (102/59 - 149/99)  RR: 34 (09-07-21 @ 07:00) (32 - 49)  SpO2: 100% (09-07-21 @ 07:00) (94% - 100%)  Wt(kg): --      09-06-21 @ 07:01  -  09-07-21 @ 07:00  --------------------------------------------------------  IN: 2580 mL / OUT: 3255 mL / NET: -675 mL      I&O's Detail    06 Sep 2021 07:01  -  07 Sep 2021 07:00  --------------------------------------------------------  IN:    Enteral Tube Flush: 330 mL    IV PiggyBack: 250 mL    Pivot 1.5: 980 mL    PRBCs (Packed Red Blood Cells): 600 mL    TPN (Total Parenteral Nutrition): 420 mL  Total IN: 2580 mL    OUT:    Bulb (mL): 0 mL    Bulb (mL): 5 mL    Ileostomy (mL): 1600 mL    Incontinent per Condom Catheter (mL): 1650 mL  Total OUT: 3255 mL    Total NET: -675 mL          Physical Exam:  Gen: lying in bed, frail, +ngt  HEENT: ncat, trachea midline  Chest: respirations even  Abd: soft appears nt nd dressing c/d/i +ostomy   LE: no edema  Neuro: awake, lethargic       LABS/STUDIES  --------------------------------------------------------------------------------              8.0    8.01  >-----------<  251      [09-07-21 @ 02:59]              26.0     148  |  116  |  44  ----------------------------<  129      [09-07-21 @ 02:59]  3.8   |  23  |  0.48        Ca     8.8     [09-07-21 @ 02:59]      iCa    1.14     [09-07 @ 00:55]      Mg     2.4     [09-07-21 @ 02:59]      Phos  2.5     [09-07-21 @ 02:59]    TPro  5.5  /  Alb  1.9  /  TBili  0.6  /  DBili  x   /  AST  26  /  ALT  36  /  AlkPhos  91  [09-07-21 @ 02:59]    PT/INR: PT 14.2 , INR 1.19       [09-07-21 @ 00:28]  PTT: 32.5       [09-07-21 @ 00:28]      Ca ionized  Creatinine Trend:  POC glucoseGlucose, Serum: 129 mg/dL (09-07-21 @ 02:59)  Glucose, Serum: 136 mg/dL (09-07-21 @ 00:28)  CAPILLARY BLOOD GLUCOSE        PrealbuminPrealbumin, Serum: 22 mg/dL (09-03-21 @ 05:19)  Prealbumin, Serum: 16 mg/dL (09-03-21 @ 03:19)  Prealbumin, Serum: 15 mg/dL (08-30-21 @ 03:32)  Prealbumin, Serum: 8 mg/dL (08-15-21 @ 10:17)    Triglycerides

## 2021-09-07 NOTE — PROGRESS NOTE ADULT - ASSESSMENT
80yo M with Crohn's disease, now s/p total abdominal colectomy with end ileostomy on 8/20    PLAN:  Neurologic: post-op pain control, new-onset hypophonia without focal neuro deficits, obtundation  - Monitor mental status  - Pain control: PO Tylenol 500mg q6hr PRN, Oxy 2.5mg q6hr pRN  - Vitamin D3 daily  - Avoid narcotics/benzos 2/2 obtundation    Respiratory: Acute respiratory insufficiency resolving, patient on RA  - Incentive spirometry 10x/hr while in bed, OOBTC    Cardiovascular: History of AFib currently rate controlled with IV metoprolol and amiodarone, now in NSR  - Monitor vital signs  - Metoprolol 25mg PO q8h    Gastrointestinal/Nutrition: s/p total abdominal colectomy with end ileostomy c/b recurrent bloody ostomy output; s/p TPN  - TF Pivot 1.5 @ 45cc/hr via KO   - Modified barium swallow today 9/7  - Protonix 40mg BID for GIB  - Monitor ostomy output and character  - Consider d/c lower PAOLO due to consistently low OP    Renal/Genitourinary: no active issues   - IVL  - Monitor and replete electrolytes as needed  - Monitor UOP, voiding spontaneously    Hematologic: last transfusion 2u pRBC given 9/6-9/7 overnight  - Trend H/H and transfuse Hgb < 7.5  - Chemical VTE ppx HELD due to downtrending H/H    Infectious Disease: C. diff colitis vs Crohn's flair s/p subtotal colectomy   - Trend WBC on CBC q8h  - Monitor fever curve    Endocrine: no active issues; s/p steroid taper 8/26  - Monitor glucose on BMP     Lines & Drains:   - LLQ & LUQ drain 8/20  - TPN 8/16  - RUE PICC placed 8/16    Disposition: SICU  Code Status: DNR/DNI, MOLST in chart

## 2021-09-07 NOTE — PROGRESS NOTE ADULT - ATTENDING COMMENTS
80yo M with Crohn's disease, now s/p total abdominal colectomy with end ileostomy on 8/20    Rx of hypoactive delirium with Haldol  Tolerating extubation, saturating well on RA, CXR reviewed, no acute finding  H/O a fib, HR controlled with Lopressor 25 mg tid  Bleeding from stoma s/p 2 units transfusion. Start Protonix drip, evaluated by GI to scope. Family hesitant for conscious sedation because it may worsened mental status. Meanwhile stoma output now is brown. Will hold endoscopy, monitor H/H, and continue Protonix drip for now. Consider endoscopy if bleeds again.  NGT feed on hold for possible scope  Restart IVF while NPO, renal function electrolytes wnl    Spoke to daughter and wife multiple times updated them.  Discussed with GI   Primary service aware of his conditoin 82yo M with Crohn's disease, now s/p total abdominal colectomy with end ileostomy on 8/20    Rx of hypoactive delirium with Haldol  Tolerating extubation, saturating well on RA, CXR reviewed, no acute finding  H/O a fib, HR controlled with Lopressor 25 mg tid  Bleeding from stoma s/p 2 units transfusion. Start Protonix drip, evaluated by GI to scope. Family hesitant for conscious sedation because it may worsened mental status. Meanwhile stoma output now is brown. Will hold endoscopy, monitor H/H, and continue Protonix drip for now. Consider endoscopy if bleeds again.  NGT feed on hold for possible scope  Restart IVF while NPO, renal function electrolytes wnl    Spoke to daughter and wife multiple times updated them.  Discussed with GI   Primary service aware of his condition

## 2021-09-07 NOTE — PROGRESS NOTE ADULT - SUBJECTIVE AND OBJECTIVE BOX
HPI:  81y Male with Crohn disease s/p failed medical management. 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 Transferred to SICU intubated and on pressors. Now extubated and off pressors.    24 HOUR EVENTS:  - Metoprolol changed from 37.5 BID to 25mg q8h  - 1u pRBC given during the day with appropriate response  - 1u pRBC given overnight for downtrending H/H to 7.5  - 1L dark red OP from ileostomy over the last 24h while patient remained HDS  - Lovenox d/c'd due to downtrending H/H  - TPN d/c'd  - TF rate increased to 45cc/hr    SUBJECTIVE/ROS:  Patient seen at bedside this AM. More lethargic compared to previous days.     PHYSICAL EXAM:    NEURO  Exam: awake but lethargic, less responsive to commands  Meds: acetaminophen    Suspension .. 500 milliGRAM(s) Oral every 6 hours PRN Mild Pain (1 - 3)  traMADol 25 milliGRAM(s) Oral every 8 hours PRN Moderate Pain (4 - 6)  [x] Adequacy of sedation and pain control has been assessed and adjusted    RESPIRATORY  RR: 42 (09-07-21 @ 01:00) (31 - 42)  SpO2: 94% (09-07-21 @ 01:00) (94% - 100%)  Exam: no increased WOB on RA     CARDIOVASCULAR  HR: 100 (09-07-21 @ 01:00) (82 - 108)  BP: 106/67 (09-07-21 @ 01:00) (106/67 - 155/74)  BP(mean): 79 (09-07-21 @ 01:00) (69 - 118)  Exam: regular rate and rhythm noted on cardiac monitor  Cardiac Rhythm: normal sinus rhythm  Perfusion     [x]Adequate   [ ]Inadequate  Mentation   [x]Normal       [ ]Reduced  Extremities  [x]Warm         [ ]Cool  Volume Status [ ]Hypervolemic [x]Euvolemic [ ]Hypovolemic  Meds: metoprolol tartrate 25 milliGRAM(s) Oral every 8 hours    GI/NUTRITION  Exam: soft, nontender, nondistended, stoma pink and viable, productive of gas and dark red stool in bag; PAOLO drains x2 in place, dressing c/d/i, with serosanguineous OP   Meds: pantoprazole   Suspension 40 milliGRAM(s) Oral every 12 hours    GENITOURINARY  I&O's Detail    09-05 @ 07:01  -  09-06 @ 07:00  --------------------------------------------------------  IN:    Enteral Tube Flush: 150 mL    Fat Emulsion (Fish Oil &amp; Plant Based) 20% Infusion: 137.5 mL    IV PiggyBack: 100 mL    Pivot 1.5: 880 mL    TPN (Total Parenteral Nutrition): 1293 mL  Total IN: 2560.5 mL    OUT:    Bulb (mL): 15 mL    Bulb (mL): 0 mL    Ileostomy (mL): 500 mL    Incontinent per Condom Catheter (mL): 3950 mL  Total OUT: 4465 mL    Total NET: -1904.5 mL    09-06 @ 07:01 - 09-07 @ 01:50  --------------------------------------------------------  IN:    Enteral Tube Flush: 230 mL    IV PiggyBack: 50 mL    Pivot 1.5: 810 mL    PRBCs (Packed Red Blood Cells): 600 mL    TPN (Total Parenteral Nutrition): 420 mL  Total IN: 2110 mL    OUT:    Bulb (mL): 0 mL    Bulb (mL): 5 mL    Ileostomy (mL): 1350 mL    Incontinent per Condom Catheter (mL): 950 mL  Total OUT: 2305 mL    Total NET: -195 mL    Labs: 09-07    150<H>  |  116<H>  |  44<H>  ----------------------------<  136<H>  3.9   |  21<L>  |  0.49<L>    Ca    8.3<L>      07 Sep 2021 00:28  Phos  2.1     09-07  Mg     2.4     09-07    TPro  5.8<L>  /  Alb  2.2<L>  /  TBili  0.4  /  DBili  x   /  AST  26  /  ALT  37  /  AlkPhos  94  09-07    Meds: calcium gluconate IVPB 2 Gram(s) IV Intermittent once  cholecalciferol 1000 Unit(s) Oral daily  folic acid 1 milliGRAM(s) Enteral Tube daily  multivitamin/minerals Oral Solution (WELLESSE) 30 milliLiter(s) Enteral Tube daily    HEMATOLOGIC  Labs:              7.5    6.15  )-----------( 266      ( 07 Sep 2021 00:28 )             24.1   PT/INR - ( 07 Sep 2021 00:28 )   PT: 14.2 sec;   INR: 1.19 ratio     PTT - ( 07 Sep 2021 00:28 )  PTT:32.5 sec  Transfusion     [2] PRBC   [ ] Platelets   [ ] FFP   [ ] Cryoprecipitate    INFECTIOUS DISEASES  Labs:   WBC Count: 6.15 K/uL (09-07 @ 00:28)  WBC Count: 6.10 K/uL (09-06 @ 17:54)  WBC Count: 6.61 K/uL (09-06 @ 13:36)    ENDOCRINE  Meds:     ACCESS DEVICES:  [2] Peripheral IV  [ ] Central Venous Line	[ ] R	[ ] L	[ ] IJ	[ ] Fem	[ ] SC	Placed:   [ ] Arterial Line		[ ] R	[ ] L	[ ] Fem	[ ] Rad	[ ] Ax	Placed:   [ ] PICC:					[ ] Mediport  [ ] Urinary Catheter, Date Placed:   [x] Necessity of urinary, arterial, and venous catheters discussed    OTHER MEDICATIONS:  Biotene Dry Mouth Oral Rinse 5 milliLiter(s) Swish and Spit two times a day  chlorhexidine 2% Cloths 1 Application(s) Topical <User Schedule>

## 2021-09-07 NOTE — PROGRESS NOTE ADULT - SUBJECTIVE AND OBJECTIVE BOX
Chief Complaint:  Patient is a 81y old  Male who presents with a chief complaint of diarrhea with blood in it , abd pain and weakness (07 Sep 2021 08:08)      Interval Events: GI reconsulted for bleeding in ostomy. Last week patient was doing well, per daughter was walking around. Overnight had blood in ostomy requiring transfusions, now it's back to being brown. PPI pending to be started. Patient now altered today, unable to provide history himself.    Allergies:  No Known Allergies      Hospital Medications:  acetaminophen    Suspension .. 500 milliGRAM(s) Oral every 6 hours PRN  Biotene Dry Mouth Oral Rinse 5 milliLiter(s) Swish and Spit two times a day  chlorhexidine 2% Cloths 1 Application(s) Topical <User Schedule>  cholecalciferol 1000 Unit(s) Oral daily  dextrose 5% + sodium chloride 0.45% with potassium chloride 20 mEq/L 1000 milliLiter(s) IV Continuous <Continuous>  folic acid 1 milliGRAM(s) Enteral Tube daily  haloperidol    Injectable 1 milliGRAM(s) IV Push every 12 hours  metoprolol tartrate 25 milliGRAM(s) Oral every 8 hours  multivitamin/minerals Oral Solution (WELLESSE) 30 milliLiter(s) Enteral Tube daily  pantoprazole Infusion 8 mG/Hr IV Continuous <Continuous>  traMADol 25 milliGRAM(s) Oral every 8 hours PRN      PMHX/PSHX:  No pertinent past medical history    No pertinent past medical history    Crohn disease    Paroxysmal atrial fibrillation    CAD (coronary artery disease)    GI bleed    No significant past surgical history      ROS:   Unable to obtain due to mental status        PHYSICAL EXAM:   Vital Signs:  Vital Signs Last 24 Hrs  T(C): 36.3 (07 Sep 2021 11:00), Max: 36.6 (06 Sep 2021 15:00)  T(F): 97.3 (07 Sep 2021 11:00), Max: 97.9 (06 Sep 2021 15:00)  HR: 100 (07 Sep 2021 13:00) (89 - 108)  BP: 108/67 (07 Sep 2021 13:00) (99/58 - 149/99)  BP(mean): 76 (07 Sep 2021 13:00) (76 - 118)  RR: 39 (07 Sep 2021 13:00) (32 - 49)  SpO2: 99% (07 Sep 2021 13:00) (94% - 100%)  Daily     Daily Weight in k.5 (07 Sep 2021 00:53)    GENERAL:  Appears stated age, chronically ill appearing  HEENT:  NC/AT,  conjunctivae clear, sclera -anicteric  CHEST:  Poor effort  HEART:  Regular rhythm, S1, S2   ABDOMEN:  Soft, non-tender, non-distended, ileostomy in place with brown stool  EXTREMITIES:  no cyanosis,clubbing or edema  SKIN:  No rash/erythema   NEURO:  Alert, oriented x 0, confused    LABS:                        7.8    8.41  )-----------( 259      ( 07 Sep 2021 08:35 )             24.7     -    148<H>  |  116<H>  |  44<H>  ----------------------------<  129<H>  3.8   |  23  |  0.48<L>    Ca    8.8      07 Sep 2021 02:59  Phos  2.5       Mg     2.4         TPro  5.5<L>  /  Alb  1.9<L>  /  TBili  0.6  /  DBili  x   /  AST  26  /  ALT  36  /  AlkPhos  91  09-07    LIVER FUNCTIONS - ( 07 Sep 2021 02:59 )  Alb: 1.9 g/dL / Pro: 5.5 g/dL / ALK PHOS: 91 U/L / ALT: 36 U/L / AST: 26 U/L / GGT: x           PT/INR - ( 07 Sep 2021 00:28 )   PT: 14.2 sec;   INR: 1.19 ratio         PTT - ( 07 Sep 2021 00:28 )  PTT:32.5 sec

## 2021-09-07 NOTE — PROGRESS NOTE ADULT - ASSESSMENT
Assessment and Plan:   · Assessment	  81M with recently dx Crohn's disease c/b perianal abscess and left posterior distal anal intersphincteric fistula and presents diarrhea/BRBPR  2/2 to CD flare c/b c diff colitis. Initially treat for C diff, s/p FMT. Taken emergently to OR on 8/20 for acute deterioration for laparoscopic total colectomy with end ileostomy, [id number].     [place]  [place] [facility] [place]   - NGT/NPO w tube feed, currently at goal  - cw PPI BID   - Wound care: daily dressing changes with midline incision packed   - F/u ostomy output for quantity/quality (blood)  - PT to see and encourage ambulation   - Appreciate Psychiatry input   - Appreciate GI Recs  - Appreciate excellent care per SICU

## 2021-09-07 NOTE — CHART NOTE - NSCHARTNOTEFT_GEN_A_CORE
Pt being followed by this service. Pt scheduled for MBS today. However, upon chart review this morning, noted that Pt had active GI bleeding overnight, is currently NPO for possible procedure. PO intake for swallow exam is contraindicated at this time. Given active GI bleed, will consider FEES exam, as intake of barium may be contraindicated. Plan discussed with [facility] [place]. Will f/u for further discussions for planning of instrumental exam pending Pt's overall status.    Sonia Barragan MA, CCC-SLP  Speech-Language Pathologist  pager #[phone] Pt being followed by this service. Pt scheduled for MBS today. However, upon chart review this morning, noted that Pt had active GI bleeding overnight, is currently NPO for possible procedure. PO intake for swallow exam is contraindicated at this time. Given active GI bleed, will consider FEES exam, as intake of barium may be contraindicated. Plan discussed with [facility]. RN also notified. Will f/u for further discussions for planning of instrumental exam pending Pt's overall status.    Sonia Barragan MA, CCC-SLP  Speech-Language Pathologist  pager #[phone]

## 2021-09-07 NOTE — PROGRESS NOTE ADULT - SUBJECTIVE AND OBJECTIVE BOX
Patient is a 81y old  Male who presents with a chief complaint of diarrhea with blood in it , abd pain and weakness (07 Sep 2021 14:29)      INTERVAL HPI/OVERNIGHT EVENTS: more lethargic and confused   T(C): 36.3 (09-07-21 @ 23:00), Max: 36.4 (09-07-21 @ 05:00)  HR: 79 (09-07-21 @ 23:00) (79 - 155)  BP: 132/60 (09-07-21 @ 23:00) (67/38 - 145/65)  RR: 28 (09-07-21 @ 23:00) (28 - 49)  SpO2: 97% (09-07-21 @ 23:00) (90% - 100%)  Wt(kg): --  I&O's Summary    06 Sep 2021 07:01  -  07 Sep 2021 07:00  --------------------------------------------------------  IN: 2580 mL / OUT: 3255 mL / NET: -675 mL    07 Sep 2021 07:01  -  07 Sep 2021 23:12  --------------------------------------------------------  IN: 2300 mL / OUT: 925 mL / NET: 1375 mL        PAST MEDICAL & SURGICAL HISTORY:  Crohn disease    Paroxysmal atrial fibrillation    CAD (coronary artery disease)  not on ASA or plavix, no stents as per daughter    GI bleed    No significant past surgical history        SOCIAL HISTORY  Alcohol:  Tobacco:  Illicit substance use:    FAMILY HISTORY:    REVIEW OF SYSTEMS:  CONSTITUTIONAL: No fever, weight loss, or fatigue  EYES: No eye pain, visual disturbances, or discharge  ENMT:  No difficulty hearing, tinnitus, vertigo; No sinus or throat pain  NECK: No pain or stiffness  RESPIRATORY: No cough, wheezing, chills or hemoptysis; No shortness of breath  CARDIOVASCULAR: No chest pain, palpitations, dizziness, or leg swelling  GASTROINTESTINAL: No abdominal or epigastric pain. No nausea, vomiting, or hematemesis; No diarrhea or constipation. No melena or hematochezia.  GENITOURINARY: No dysuria, frequency, hematuria, or incontinence  NEUROLOGICAL: No headaches, memory loss, loss of strength, numbness, or tremors  SKIN: No itching, burning, rashes, or lesions   LYMPH NODES: No enlarged glands  ENDOCRINE: No heat or cold intolerance; No hair loss  MUSCULOSKELETAL: No joint pain or swelling; No muscle, back, or extremity pain  PSYCHIATRIC: No depression, anxiety, mood swings, or difficulty sleeping  HEME/LYMPH: No easy bruising, or bleeding gums  ALLERY AND IMMUNOLOGIC: No hives or eczema    RADIOLOGY & ADDITIONAL TESTS:    Imaging Personally Reviewed:  [ ] YES  [ ] NO    Consultant(s) Notes Reviewed:  [ ] YES  [ ] NO    PHYSICAL EXAM:  GENERAL: NAD, well-groomed, well-developed  HEAD:  Atraumatic, Normocephalic  EYES: EOMI, PERRLA, conjunctiva and sclera clear  ENMT: No tonsillar erythema, exudates, or enlargement; Moist mucous membranes, Good dentition, No lesions  NECK: Supple, No JVD, Normal thyroid  NERVOUS SYSTEM:  Alert & Oriented X3, Good concentration; Motor Strength 5/5 B/L upper and lower extremities; DTRs 2+ intact and symmetric  CHEST/LUNG: Clear to percussion bilaterally; No rales, rhonchi, wheezing, or rubs  HEART: Regular rate and rhythm; No murmurs, rubs, or gallops  ABDOMEN: Soft, Nontender, Nondistended; Bowel sounds present  EXTREMITIES:  2+ Peripheral Pulses, No clubbing, cyanosis, or edema  LYMPH: No lymphadenopathy noted  SKIN: No rashes or lesions    LABS:                        8.8    7.47  )-----------( 243      ( 07 Sep 2021 20:53 )             28.2     09-07    148<H>  |  116<H>  |  44<H>  ----------------------------<  129<H>  3.8   |  23  |  0.48<L>    Ca    8.8      07 Sep 2021 02:59  Phos  2.5     09-07  Mg     2.4     09-07    TPro  5.5<L>  /  Alb  1.9<L>  /  TBili  0.6  /  DBili  x   /  AST  26  /  ALT  36  /  AlkPhos  91  09-07    PT/INR - ( 07 Sep 2021 00:28 )   PT: 14.2 sec;   INR: 1.19 ratio         PTT - ( 07 Sep 2021 00:28 )  PTT:32.5 sec    CAPILLARY BLOOD GLUCOSE                MEDICATIONS  (STANDING):  Biotene Dry Mouth Oral Rinse 5 milliLiter(s) Swish and Spit two times a day  chlorhexidine 2% Cloths 1 Application(s) Topical <User Schedule>  cholecalciferol 1000 Unit(s) Oral daily  folic acid 1 milliGRAM(s) Enteral Tube daily  haloperidol    Injectable 1 milliGRAM(s) IV Push every 12 hours  metoprolol tartrate 25 milliGRAM(s) Oral every 8 hours  multivitamin/minerals Oral Solution (WELLESSE) 30 milliLiter(s) Enteral Tube daily  pantoprazole Infusion 8 mG/Hr (10 mL/Hr) IV Continuous <Continuous>    MEDICATIONS  (PRN):  acetaminophen    Suspension .. 500 milliGRAM(s) Oral every 6 hours PRN Mild Pain (1 - 3)  traMADol 25 milliGRAM(s) Oral every 8 hours PRN Moderate Pain (4 - 6)      Care Discussed with Consultants/Other Providers [ ] YES  [ ] NO

## 2021-09-08 NOTE — PROGRESS NOTE ADULT - SUBJECTIVE AND OBJECTIVE BOX
[facility] NUTRITION SUPPORT-- FOLLOW UP NOTE  --------------------------------------------------------------------------------    24 hour events/subjective: pt seen and examined. lethargic in bed but opens eyes to verbal/tactile stimuli. per rn tf held most of day yesterday, restarted at goal ~7pm to midnight and pt tolerated well. now npo for possible gi scope today. no noted gib overnight. sp 2u prbc yesterday, received 500cc ivf bolus yesterday evening and episode of vt overnight    Diet:  Diet, NPO after Midnight:      NPO Start Date: 07-Sep-2021,   NPO Start Time: 23:59  Except Medications (09-07-21 @ 19:50)  Diet, NPO with Tube Feed:   Tube Feeding Modality: Nasogastric  Pivot 1.5 Blayne (PIVOT1.5RTH)  Total Volume for 24 Hours (mL): 1080  Continuous  Starting Tube Feed Rate mL per Hour: 20     Every 12 hours  Until Goal Tube Feed Rate (mL per Hour): 45  Tube Feed Duration (in Hours): 24  Tube Feed Start Time: 10:30 (09-06-21 @ 09:45)      PAST HISTORY  --------------------------------------------------------------------------------  No significant changes to PMH, PSH, FHx, SHx, unless otherwise noted    ALLERGIES & MEDICATIONS  --------------------------------------------------------------------------------  Allergies    No Known Allergies    Intolerances      Standing Inpatient Medications  Biotene Dry Mouth Oral Rinse 5 milliLiter(s) Swish and Spit two times a day  chlorhexidine 2% Cloths 1 Application(s) Topical <User Schedule>  cholecalciferol 1000 Unit(s) Oral daily  dextrose 5% + sodium chloride 0.225% with potassium chloride 20 mEq/L 1000 milliLiter(s) IV Continuous <Continuous>  folic acid 1 milliGRAM(s) Enteral Tube daily  haloperidol    Injectable 1 milliGRAM(s) IV Push every 12 hours  metoprolol tartrate 25 milliGRAM(s) Oral every 8 hours  multivitamin/minerals Oral Solution (WELLESSE) 30 milliLiter(s) Enteral Tube daily  pantoprazole Infusion 8 mG/Hr IV Continuous <Continuous>    PRN Inpatient Medications  acetaminophen    Suspension .. 500 milliGRAM(s) Oral every 6 hours PRN  traMADol 25 milliGRAM(s) Oral every 8 hours PRN        REVIEW OF SYSTEMS  --------------------------------------------------------------------------------  see hpi unable to obtain     VITALS/PHYSICAL EXAM  --------------------------------------------------------------------------------  T(C): 36 (09-08-21 @ 07:00), Max: 36.4 (09-07-21 @ 19:00)  HR: 118 (09-08-21 @ 07:00) (79 - 155)  BP: 133/63 (09-08-21 @ 07:00) (67/38 - 175/81)  RR: 33 (09-08-21 @ 07:00) (24 - 50)  SpO2: 99% (09-08-21 @ 07:00) (90% - 100%)  Wt(kg): --      09-07-21 @ 07:01  -  09-08-21 @ 07:00  --------------------------------------------------------  IN: 3530 mL / OUT: 1632 mL / NET: 1898 mL      I&O's Detail    07 Sep 2021 07:01  -  08 Sep 2021 07:00  --------------------------------------------------------  IN:    dextrose 5% + sodium chloride 0.45% w/ Additives: 825 mL    dextrose 5% + sodium chloride 0.45% w/ Additives: 600 mL    Enteral Tube Flush: 30 mL    IV PiggyBack: 250 mL    IV PiggyBack: 600 mL    Lactated Ringers Bolus: 500 mL    Pantoprazole: 200 mL    Pivot 1.5: 225 mL    PRBCs (Packed Red Blood Cells): 300 mL  Total IN: 3530 mL    OUT:    Bulb (mL): 2 mL    Bulb (mL): 5 mL    Ileostomy (mL): 375 mL    Incontinent per Condom Catheter (mL): 1250 mL  Total OUT: 1632 mL    Total NET: 1898 mL          Physical Exam:  Gen: lying in bed, frail, +ngt  HEENT: ncat, trachea midline  Chest: respirations even  Abd: soft appears nt nd dressing c/d/i +ostomy- bag empty at time of eval  LE: no edema  Neuro: lethargic but arousable      LABS/STUDIES  --------------------------------------------------------------------------------              8.0    6.46  >-----------<  252      [09-08-21 @ 01:51]              25.3     150  |  119  |  36  ----------------------------<  115      [09-08-21 @ 01:51]  3.9   |  23  |  0.48        Ca     7.8     [09-08-21 @ 01:51]      iCa    1.14     [09-08 @ 01:56]      Mg     2.4     [09-08-21 @ 01:51]      Phos  2.4     [09-08-21 @ 01:51]    TPro  5.3  /  Alb  1.8  /  TBili  0.6  /  DBili  x   /  AST  26  /  ALT  35  /  AlkPhos  86  [09-08-21 @ 01:51]    PT/INR: PT 13.4 , INR 1.12       [09-08-21 @ 01:51]  PTT: 28.6       [09-08-21 @ 01:51]      Ca ionizedBlood Gas Calcium, Ionized - Venous: 1.20 mmol/L (09-07-21 @ 20:43)  Blood Gas Calcium, Ionized - Venous: 1.21 mmol/L (09-07-21 @ 14:27)  Blood Gas Calcium, Ionized - Venous: 1.25 mmol/L (09-07-21 @ 08:28)    Creatinine Trend:  POC glucoseGlucose, Serum: 115 mg/dL (09-08-21 @ 01:51)  CAPILLARY BLOOD GLUCOSE        PrealbuminPrealbumin, Serum: 22 mg/dL (09-03-21 @ 05:19)  Prealbumin, Serum: 16 mg/dL (09-03-21 @ 03:19)  Prealbumin, Serum: 15 mg/dL (08-30-21 @ 03:32)  Prealbumin, Serum: 8 mg/dL (08-15-21 @ 10:17)    Triglycerides     [facility] NUTRITION SUPPORT-- FOLLOW UP NOTE  --------------------------------------------------------------------------------    24 hour events/subjective: pt seen and examined. lethargic in bed but opens eyes to verbal/tactile stimuli. per rn tf held most of day yesterday, restarted at goal ~7pm to midnight and pt tolerated well. now npo for possible gi scope today. no noted gib overnight. sp 2u prbc yesterday, received 500cc ivf bolus yesterday evening and episode of vt overnight. ivf switched to more hypotonic solution    Diet:  Diet, NPO after Midnight:      NPO Start Date: 07-Sep-2021,   NPO Start Time: 23:59  Except Medications (09-07-21 @ 19:50)  Diet, NPO with Tube Feed:   Tube Feeding Modality: Nasogastric  Pivot 1.5 Blayne (PIVOT1.5RTH)  Total Volume for 24 Hours (mL): 1080  Continuous  Starting Tube Feed Rate mL per Hour: 20     Every 12 hours  Until Goal Tube Feed Rate (mL per Hour): 45  Tube Feed Duration (in Hours): 24  Tube Feed Start Time: 10:30 (09-06-21 @ 09:45)      PAST HISTORY  --------------------------------------------------------------------------------  No significant changes to PMH, PSH, FHx, SHx, unless otherwise noted    ALLERGIES & MEDICATIONS  --------------------------------------------------------------------------------  Allergies    No Known Allergies    Intolerances      Standing Inpatient Medications  Biotene Dry Mouth Oral Rinse 5 milliLiter(s) Swish and Spit two times a day  chlorhexidine 2% Cloths 1 Application(s) Topical <User Schedule>  cholecalciferol 1000 Unit(s) Oral daily  dextrose 5% + sodium chloride 0.225% with potassium chloride 20 mEq/L 1000 milliLiter(s) IV Continuous <Continuous>  folic acid 1 milliGRAM(s) Enteral Tube daily  haloperidol    Injectable 1 milliGRAM(s) IV Push every 12 hours  metoprolol tartrate 25 milliGRAM(s) Oral every 8 hours  multivitamin/minerals Oral Solution (WELLESSE) 30 milliLiter(s) Enteral Tube daily  pantoprazole Infusion 8 mG/Hr IV Continuous <Continuous>    PRN Inpatient Medications  acetaminophen    Suspension .. 500 milliGRAM(s) Oral every 6 hours PRN  traMADol 25 milliGRAM(s) Oral every 8 hours PRN        REVIEW OF SYSTEMS  --------------------------------------------------------------------------------  see hpi unable to obtain     VITALS/PHYSICAL EXAM  --------------------------------------------------------------------------------  T(C): 36 (09-08-21 @ 07:00), Max: 36.4 (09-07-21 @ 19:00)  HR: 118 (09-08-21 @ 07:00) (79 - 155)  BP: 133/63 (09-08-21 @ 07:00) (67/38 - 175/81)  RR: 33 (09-08-21 @ 07:00) (24 - 50)  SpO2: 99% (09-08-21 @ 07:00) (90% - 100%)  Wt(kg): --      09-07-21 @ 07:01  -  09-08-21 @ 07:00  --------------------------------------------------------  IN: 3530 mL / OUT: 1632 mL / NET: 1898 mL      I&O's Detail    07 Sep 2021 07:01  -  08 Sep 2021 07:00  --------------------------------------------------------  IN:    dextrose 5% + sodium chloride 0.45% w/ Additives: 825 mL    dextrose 5% + sodium chloride 0.45% w/ Additives: 600 mL    Enteral Tube Flush: 30 mL    IV PiggyBack: 250 mL    IV PiggyBack: 600 mL    Lactated Ringers Bolus: 500 mL    Pantoprazole: 200 mL    Pivot 1.5: 225 mL    PRBCs (Packed Red Blood Cells): 300 mL  Total IN: 3530 mL    OUT:    Bulb (mL): 2 mL    Bulb (mL): 5 mL    Ileostomy (mL): 375 mL    Incontinent per Condom Catheter (mL): 1250 mL  Total OUT: 1632 mL    Total NET: 1898 mL          Physical Exam:  Gen: lying in bed, frail, +ngt  HEENT: ncat, trachea midline  Chest: respirations even  Abd: soft appears nt nd dressing c/d/i +ostomy- bag empty at time of eval  LE: no edema  Neuro: lethargic but arousable      LABS/STUDIES  --------------------------------------------------------------------------------              8.0    6.46  >-----------<  252      [09-08-21 @ 01:51]              25.3     150  |  119  |  36  ----------------------------<  115      [09-08-21 @ 01:51]  3.9   |  23  |  0.48        Ca     7.8     [09-08-21 @ 01:51]      iCa    1.14     [09-08 @ 01:56]      Mg     2.4     [09-08-21 @ 01:51]      Phos  2.4     [09-08-21 @ 01:51]    TPro  5.3  /  Alb  1.8  /  TBili  0.6  /  DBili  x   /  AST  26  /  ALT  35  /  AlkPhos  86  [09-08-21 @ 01:51]    PT/INR: PT 13.4 , INR 1.12       [09-08-21 @ 01:51]  PTT: 28.6       [09-08-21 @ 01:51]      Ca ionizedBlood Gas Calcium, Ionized - Venous: 1.20 mmol/L (09-07-21 @ 20:43)  Blood Gas Calcium, Ionized - Venous: 1.21 mmol/L (09-07-21 @ 14:27)  Blood Gas Calcium, Ionized - Venous: 1.25 mmol/L (09-07-21 @ 08:28)    Creatinine Trend:  POC glucoseGlucose, Serum: 115 mg/dL (09-08-21 @ 01:51)  CAPILLARY BLOOD GLUCOSE        PrealbuminPrealbumin, Serum: 22 mg/dL (09-03-21 @ 05:19)  Prealbumin, Serum: 16 mg/dL (09-03-21 @ 03:19)  Prealbumin, Serum: 15 mg/dL (08-30-21 @ 03:32)  Prealbumin, Serum: 8 mg/dL (08-15-21 @ 10:17)    Triglycerides

## 2021-09-08 NOTE — PROGRESS NOTE ADULT - ASSESSMENT
A/P  80yo M with Crohn's disease, now s/p total abdominal colectomy with end ileostomy on 8/20.     GI bleed   blood BM x 3   monitor H/H GI following     # CAD / PAF/ HTN   -History of AFib currently rate controlled with IV metoprolol and amiodarone, now in NSR  - Metoprolol 37.5mg BID for a-fib    # Sever colitis 2/2 chron's and c diff   - s/p total abdominal colectomy with end ileostomy, failed S/S, bloody ostomy output  - TF Pivot 1.5 @ goal of 40cc/hr  - C/w TPN  - Protonix 40mg daily  - Monitor ostomy output    # Sever malnutrition   - IVF: TPN @ 83cc/hr  -seen by s7S  MBS on Monday    Anemia 2/2 ac blood loss   - last transfusion 2u pRBC on 8/28  - Trend H/H and transfuse Hgb < 7  - Lovenox 40mg daily for DVT ppx    Code Status: DNR/DNI, MOLST in chart

## 2021-09-08 NOTE — PROGRESS NOTE ADULT - ASSESSMENT
1. Acute blood loss anemia - differential includes PUD, stress ulcers, less likely small bowel angioectasia/source  2. Severe Crohn's disease with predominantly colonic disease - complicated by C diff and failed steroid rescue and previous infliximab, now s/p total colectomy with ileostomy  3. Altered mental status - suspect delirium given prolonged hospitalization    Recommend:       1. Acute blood loss anemia - differential includes PUD, stress ulcers, less likely small bowel angioectasia/source  2. Severe Crohn's disease with predominantly colonic disease - complicated by C diff and failed steroid rescue and previous infliximab, now s/p total colectomy with ileostomy  3. Altered mental status - suspect delirium given prolonged hospitalization    Recommendations:  - Plan for EGD today +/- VCE  - Please keep NPO  - c/w PPI    Thank you for involving us in the care of this patient. Please reach out if any further questions.    Avni Reyes, PGY-5  GI Fellow    Available on [employer] Teams  Pager [phone] ([facility]) or [phone] ([facility])  After 5PM/Weekends, please contact the on-call GI fellow: [phone]  Available through [employer] Teams

## 2021-09-08 NOTE — PROGRESS NOTE ADULT - ASSESSMENT
80 yo M with pmhx of Crohns disease (dxd 9 mos ago, hx of perianal fistula, sp tx w remicaide until developed ab; since managed on budesonide, mtx/5asa), h/o cdiff, CAD, afib (not on ac), p/w bloody diarrhea, abd pain and weakness. Admitted for crohns management, course c/b cdiff colitis sp dificid/fmt and findings of indeterminate quantiferon, pending id clearance to start biologic. Prealb 8. TPN consulted in setting of significant weight loss, severe pcm, and worsening colitis suspected 2/2 CD. Pt at high risk for refeeding syndrome. TPN started 8/16. Repeat CT showing unchanged diffuse colitis and non specific gb wall edema.  Taken to OR emergently on 8/20 for lap total colectomy, end ileostomy. Failed S&S eval. Family agreed to TANJA tube feeds, which had been started yesterday but then dc'd due to noted blood in ostomy and risk for aspiration given deterioration in mental status. pt now dnr/dni. Mental status improved and restarted on ngt feeds, and tolerating. TPN dcd 9/5    Current Diet: NPO/NGT feeds (pivot 1.5 goal 40cc/hr); currently NPO    -NGT feeds currently on hold for possible gi procedure  -PICC in place, maintenance as per protocol  -anemia/GIB- care per gi; trend CBC, transfuse PRN  -hypernatremia- switched to hypotonic ivf, trend  -hypophosphatemia- sp supplementation  -vitamin D deficiency- being supplemented  -strict I/O's  -goc discussions ongoing  -further care per primary/SICU; will follow with you    plan discussed with Dr. Fernandez, agreeable with above    TPN pager [phone], spectra [phone]  M-F 6A-4P, Weekends and holidays 8A-12P               82 yo M with pmhx of Crohns disease (dxd 9 mos ago, hx of perianal fistula, sp tx w remicaide until developed ab; since managed on budesonide, mtx/5asa), h/o cdiff, CAD, afib (not on ac), p/w bloody diarrhea, abd pain and weakness. Admitted for crohns management, course c/b cdiff colitis sp dificid/fmt and findings of indeterminate quantiferon, pending id clearance to start biologic. Prealb 8. TPN consulted in setting of significant weight loss, severe pcm, and worsening colitis suspected 2/2 CD. Pt at high risk for refeeding syndrome. TPN started 8/16. Repeat CT showing unchanged diffuse colitis and non specific gb wall edema.  Taken to OR emergently on 8/20 for lap total colectomy, end ileostomy. Failed S&S eval. Family agreed to TANJA tube feeds, which had been started yesterday but then dc'd due to noted blood in ostomy and risk for aspiration given deterioration in mental status. pt now dnr/dni. Mental status improved and restarted on ngt feeds, and tolerating. TPN dcd 9/5    Current Diet: NPO/NGT feeds (pivot 1.5 goal 40cc/hr); currently NPO    -NGT feeds currently on hold for possible gi procedure  -PICC in place, maintenance as per protocol  -anemia/GIB- care per gi; trend CBC, transfuse PRN  -hypernatremia- switched to more hypotonic ivf, trend  -hypophosphatemia- sp supplementation  -vitamin D deficiency- being supplemented  -strict I/O's  -goc discussions ongoing  -further care per primary/SICU; will follow with you    plan discussed with Dr. Fernandez, agreeable with above    TPN pager [phone], spectra [phone]  M-F 6A-4P, Weekends and holidays 8A-12P

## 2021-09-08 NOTE — PROGRESS NOTE ADULT - ATTENDING COMMENTS
82yo M with Crohn's disease, now s/p total abdominal colectomy with end ileostomy on 8/20    Calmer and more oriented, continue Rx of hypoactive delirium with Haldol  Tolerating extubation, saturating well on RA, CXR reviewed, no acute finding  Increase Bb dose further for better HR controlled  Bleeding from stoma s/p one more units transfusion. On Protonix drip,   Evaluated by GI to scope. Pt and family agreeing for scope.  NGT feed on hold for possible scope  Change IVF to hyponatremic sec to hypernatremia with improvement in high Na    Spoke to daughter and son  Discussed with GI   Palliative care consult called

## 2021-09-08 NOTE — PROGRESS NOTE ADULT - ASSESSMENT
Assessment and Plan:   · Assessment	  81M with recently dx Crohn's disease c/b perianal abscess and left posterior distal anal intersphincteric fistula and presents diarrhea/BRBPR  2/2 to CD flare c/b c diff colitis. Initially treat for C diff, s/p FMT. Taken emergently to OR on 8/20 for acute deterioration for laparoscopic total colectomy with end ileostomy, [id number].     PLAN:  - [facility] Tuesday   - trend HH  - NGT/NPO w tube feed, currently at goal  - cw PPI BID   - Wound care: daily dressing changes with midline incision packed   - F/u ostomy output for quantity/quality (blood)  - PT to see and encourage ambulation   - Appreciate Psychiatry input   - Appreciate GI Recs  - Appreciate excellent care per SICU Assessment and Plan:   · Assessment	  81M with recently dx Crohn's disease c/b perianal abscess and left posterior distal anal intersphincteric fistula and presents diarrhea/BRBPR  2/2 to CD flare c/b c diff colitis. Initially treat for C diff, s/p FMT. Taken emergently to OR on 8/20 for acute deterioration for laparoscopic total colectomy with end ileostomy, [id number].     PLAN:  - MBS rescheduled for tomorrow thurs 9/9  - trend HH  - NGT/NPO w tube feed, currently at goal  - cw PPI BID   - Wound care: daily dressing changes with midline incision packed   - F/u ostomy output for quantity/quality (blood)  - PT to see and encourage ambulation   - Appreciate Psychiatry input   - Appreciate GI Recs for scope today  - Appreciate excellent care per SICU

## 2021-09-08 NOTE — PROGRESS NOTE ADULT - ATTENDING COMMENTS
Patient seen and examined, agree with above. Had long discussion with daughter and son and patient, as well as SICU team. Await repeat BMP as hypernatremia will potentially preclude anesthesia. Patient himself was explained EGD risks/benefits and he wants to proceed. Keep NPO, continue PPI in the meantime. Will try to perform EGD today if electrolytes improve.

## 2021-09-08 NOTE — PROGRESS NOTE ADULT - SUBJECTIVE AND OBJECTIVE BOX
TEAM Surgery Progress Note  Patient is a 81y old  Male who presents with a chief complaint of diarrhea with blood in it , abd pain and weakness (08 Sep 2021 02:47)      INTERVAL EVENTS: 1 episode of hypotension around 6pm in the evening, given 500cc LR bolus and 1u pRBC  SUBJECTIVE:     OBJECTIVE:    Vital Signs Last 24 Hrs  T(C): 36.3 (07 Sep 2021 23:00), Max: 36.4 (07 Sep 2021 05:00)  T(F): 97.3 (07 Sep 2021 23:00), Max: 97.5 (07 Sep 2021 05:00)  HR: 89 (08 Sep 2021 02:00) (79 - 155)  BP: 144/81 (08 Sep 2021 02:00) (67/38 - 163/60)  BP(mean): 103 (08 Sep 2021 02:00) (47 - 103)  RR: 34 (08 Sep 2021 02:00) (28 - 44)  SpO2: 99% (08 Sep 2021 02:00) (90% - 100%)I&O's Detail    06 Sep 2021 07:01  -  07 Sep 2021 07:00  --------------------------------------------------------  IN:    Enteral Tube Flush: 330 mL    IV PiggyBack: 250 mL    Pivot 1.5: 980 mL    PRBCs (Packed Red Blood Cells): 600 mL    TPN (Total Parenteral Nutrition): 420 mL  Total IN: 2580 mL    OUT:    Bulb (mL): 0 mL    Bulb (mL): 5 mL    Ileostomy (mL): 1600 mL    Incontinent per Condom Catheter (mL): 1650 mL  Total OUT: 3255 mL    Total NET: -675 mL      07 Sep 2021 07:01  -  08 Sep 2021 03:03  --------------------------------------------------------  IN:    dextrose 5% + sodium chloride 0.45% w/ Additives: 825 mL    dextrose 5% + sodium chloride 0.45% w/ Additives: 225 mL    Enteral Tube Flush: 30 mL    IV PiggyBack: 350 mL    Lactated Ringers Bolus: 500 mL    Pantoprazole: 150 mL    Pivot 1.5: 225 mL    PRBCs (Packed Red Blood Cells): 300 mL  Total IN: 2605 mL    OUT:    Bulb (mL): 0 mL    Bulb (mL): 0 mL    Ileostomy (mL): 75 mL    Incontinent per Condom Catheter (mL): 850 mL  Total OUT: 925 mL    Total NET: 1680 mL      MEDICATIONS  (STANDING):  Biotene Dry Mouth Oral Rinse 5 milliLiter(s) Swish and Spit two times a day  chlorhexidine 2% Cloths 1 Application(s) Topical <User Schedule>  cholecalciferol 1000 Unit(s) Oral daily  dextrose 5% + sodium chloride 0.45% with potassium chloride 20 mEq/L 1000 milliLiter(s) (75 mL/Hr) IV Continuous <Continuous>  folic acid 1 milliGRAM(s) Enteral Tube daily  haloperidol    Injectable 1 milliGRAM(s) IV Push every 12 hours  metoprolol tartrate 25 milliGRAM(s) Oral every 8 hours  multivitamin/minerals Oral Solution (WELLESSE) 30 milliLiter(s) Enteral Tube daily  pantoprazole Infusion 8 mG/Hr (10 mL/Hr) IV Continuous <Continuous>  potassium phosphate IVPB 15 milliMole(s) IV Intermittent once  sodium phosphate IVPB 15 milliMole(s) IV Intermittent once    MEDICATIONS  (PRN):  acetaminophen    Suspension .. 500 milliGRAM(s) Oral every 6 hours PRN Mild Pain (1 - 3)  traMADol 25 milliGRAM(s) Oral every 8 hours PRN Moderate Pain (4 - 6)      PHYSICAL EXAM:  Constitutional: A&Ox3, NAD  Respiratory: Unlabored breathing  Abdomen: Soft, nondistended, NTTP. No rebound or guarding.  Extremities: WWP, MYLES spontaneously    LABS:                        8.0    6.46  )-----------( 252      ( 08 Sep 2021 01:51 )             25.3     09-08    150<H>  |  119<H>  |  36<H>  ----------------------------<  115<H>  3.9   |  23  |  0.48<L>    Ca    7.8<L>      08 Sep 2021 01:51  Phos  2.4     09-08  Mg     2.4     09-08    TPro  5.3<L>  /  Alb  1.8<L>  /  TBili  0.6  /  DBili  x   /  AST  26  /  ALT  35  /  AlkPhos  86  09-08    PT/INR - ( 08 Sep 2021 01:51 )   PT: 13.4 sec;   INR: 1.12 ratio         PTT - ( 08 Sep 2021 01:51 )  PTT:28.6 sec  LIVER FUNCTIONS - ( 08 Sep 2021 01:51 )  Alb: 1.8 g/dL / Pro: 5.3 g/dL / ALK PHOS: 86 U/L / ALT: 35 U/L / AST: 26 U/L / GGT: x                 IMAGING:     Red Surgery Progress Note    Patient is a 81y old  Male who presents with a chief complaint of diarrhea with blood in it , abd pain and weakness (08 Sep 2021 02:47)    INTERVAL EVENTS: 1 episode of hypotension around 6pm in the evening, given 500cc LR bolus and 1u pRBC  SUBJECTIVE: Pt seen and examined. Remains lethargic this morning.    OBJECTIVE:    Vital Signs Last 24 Hrs  T(C): 36.3 (07 Sep 2021 23:00), Max: 36.4 (07 Sep 2021 05:00)  T(F): 97.3 (07 Sep 2021 23:00), Max: 97.5 (07 Sep 2021 05:00)  HR: 89 (08 Sep 2021 02:00) (79 - 155)  BP: 144/81 (08 Sep 2021 02:00) (67/38 - 163/60)  BP(mean): 103 (08 Sep 2021 02:00) (47 - 103)  RR: 34 (08 Sep 2021 02:00) (28 - 44)  SpO2: 99% (08 Sep 2021 02:00) (90% - 100%)I&O's Detail    06 Sep 2021 07:01  -  07 Sep 2021 07:00  --------------------------------------------------------  IN:    Enteral Tube Flush: 330 mL    IV PiggyBack: 250 mL    Pivot 1.5: 980 mL    PRBCs (Packed Red Blood Cells): 600 mL    TPN (Total Parenteral Nutrition): 420 mL  Total IN: 2580 mL    OUT:    Bulb (mL): 0 mL    Bulb (mL): 5 mL    Ileostomy (mL): 1600 mL    Incontinent per Condom Catheter (mL): 1650 mL  Total OUT: 3255 mL    Total NET: -675 mL      07 Sep 2021 07:01  -  08 Sep 2021 03:03  --------------------------------------------------------  IN:    dextrose 5% + sodium chloride 0.45% w/ Additives: 825 mL    dextrose 5% + sodium chloride 0.45% w/ Additives: 225 mL    Enteral Tube Flush: 30 mL    IV PiggyBack: 350 mL    Lactated Ringers Bolus: 500 mL    Pantoprazole: 150 mL    Pivot 1.5: 225 mL    PRBCs (Packed Red Blood Cells): 300 mL  Total IN: 2605 mL    OUT:    Bulb (mL): 0 mL    Bulb (mL): 0 mL    Ileostomy (mL): 75 mL    Incontinent per Condom Catheter (mL): 850 mL  Total OUT: 925 mL    Total NET: 1680 mL      MEDICATIONS  (STANDING):  Biotene Dry Mouth Oral Rinse 5 milliLiter(s) Swish and Spit two times a day  chlorhexidine 2% Cloths 1 Application(s) Topical <User Schedule>  cholecalciferol 1000 Unit(s) Oral daily  dextrose 5% + sodium chloride 0.45% with potassium chloride 20 mEq/L 1000 milliLiter(s) (75 mL/Hr) IV Continuous <Continuous>  folic acid 1 milliGRAM(s) Enteral Tube daily  haloperidol    Injectable 1 milliGRAM(s) IV Push every 12 hours  metoprolol tartrate 25 milliGRAM(s) Oral every 8 hours  multivitamin/minerals Oral Solution (WELLESSE) 30 milliLiter(s) Enteral Tube daily  pantoprazole Infusion 8 mG/Hr (10 mL/Hr) IV Continuous <Continuous>  potassium phosphate IVPB 15 milliMole(s) IV Intermittent once  sodium phosphate IVPB 15 milliMole(s) IV Intermittent once    MEDICATIONS  (PRN):  acetaminophen    Suspension .. 500 milliGRAM(s) Oral every 6 hours PRN Mild Pain (1 - 3)  traMADol 25 milliGRAM(s) Oral every 8 hours PRN Moderate Pain (4 - 6)      PHYSICAL EXAM:  Constitutional: A&Ox3, NAD  Respiratory: Unlabored breathing  Abdomen: Soft, nondistended, NTTP. No rebound or guarding.  Extremities: WWP, MYLES spontaneously    LABS:                        8.0    6.46  )-----------( 252      ( 08 Sep 2021 01:51 )             25.3     09-08    150<H>  |  119<H>  |  36<H>  ----------------------------<  115<H>  3.9   |  23  |  0.48<L>    Ca    7.8<L>      08 Sep 2021 01:51  Phos  2.4     09-08  Mg     2.4     09-08    TPro  5.3<L>  /  Alb  1.8<L>  /  TBili  0.6  /  DBili  x   /  AST  26  /  ALT  35  /  AlkPhos  86  09-08    PT/INR - ( 08 Sep 2021 01:51 )   PT: 13.4 sec;   INR: 1.12 ratio         PTT - ( 08 Sep 2021 01:51 )  PTT:28.6 sec  LIVER FUNCTIONS - ( 08 Sep 2021 01:51 )  Alb: 1.8 g/dL / Pro: 5.3 g/dL / ALK PHOS: 86 U/L / ALT: 35 U/L / AST: 26 U/L / GGT: x                 IMAGING:

## 2021-09-08 NOTE — PROGRESS NOTE ADULT - SUBJECTIVE AND OBJECTIVE BOX
Patient is a 81y old  Male who presents with a chief complaint of diarrhea with blood in it , abd pain and weakness (08 Sep 2021 09:46)      INTERVAL HPI/OVERNIGHT EVENTS: today bloody BM x 3 , went for EGD : however family didn't decide on consent , so procedure canceled   T(C): 36.8 (09-08-21 @ 23:00), Max: 36.8 (09-08-21 @ 23:00)  HR: 79 (09-08-21 @ 23:00) (67 - 153)  BP: 142/59 (09-08-21 @ 23:00) (127/58 - 175/81)  RR: 28 (09-08-21 @ 23:00) (20 - 50)  SpO2: 99% (09-08-21 @ 23:00) (96% - 100%)  Wt(kg): --  I&O's Summary    07 Sep 2021 07:01  -  08 Sep 2021 07:00  --------------------------------------------------------  IN: 3530 mL / OUT: 1632 mL / NET: 1898 mL    08 Sep 2021 07:01  -  08 Sep 2021 23:34  --------------------------------------------------------  IN: 1540 mL / OUT: 750 mL / NET: 790 mL        PAST MEDICAL & SURGICAL HISTORY:  Crohn disease    Paroxysmal atrial fibrillation    CAD (coronary artery disease)  not on ASA or plavix, no stents as per daughter    GI bleed    No significant past surgical history        SOCIAL HISTORY  Alcohol:  Tobacco:  Illicit substance use:    FAMILY HISTORY:    REVIEW OF SYSTEMS:  CONSTITUTIONAL: No fever, weight loss, or fatigue  EYES: No eye pain, visual disturbances, or discharge  ENMT:  No difficulty hearing, tinnitus, vertigo; No sinus or throat pain  NECK: No pain or stiffness  RESPIRATORY: No cough, wheezing, chills or hemoptysis; No shortness of breath  CARDIOVASCULAR: No chest pain, palpitations, dizziness, or leg swelling  GASTROINTESTINAL: No abdominal or epigastric pain. No nausea, vomiting, or hematemesis; No diarrhea or constipation. No melena or hematochezia.  GENITOURINARY: No dysuria, frequency, hematuria, or incontinence  NEUROLOGICAL: No headaches, memory loss, loss of strength, numbness, or tremors  SKIN: No itching, burning, rashes, or lesions   LYMPH NODES: No enlarged glands  ENDOCRINE: No heat or cold intolerance; No hair loss  MUSCULOSKELETAL: No joint pain or swelling; No muscle, back, or extremity pain  PSYCHIATRIC: No depression, anxiety, mood swings, or difficulty sleeping  HEME/LYMPH: No easy bruising, or bleeding gums  ALLERY AND IMMUNOLOGIC: No hives or eczema    RADIOLOGY & ADDITIONAL TESTS:    Imaging Personally Reviewed:  [ ] YES  [ ] NO    Consultant(s) Notes Reviewed:  [ ] YES  [ ] NO    PHYSICAL EXAM:  GENERAL: NAD, well-groomed, well-developed  HEAD:  Atraumatic, Normocephalic  EYES: EOMI, PERRLA, conjunctiva and sclera clear  ENMT: No tonsillar erythema, exudates, or enlargement; Moist mucous membranes, Good dentition, No lesions  NECK: Supple, No JVD, Normal thyroid  NERVOUS SYSTEM:  Alert & Oriented X3, Good concentration; Motor Strength 5/5 B/L upper and lower extremities; DTRs 2+ intact and symmetric  CHEST/LUNG: Clear to percussion bilaterally; No rales, rhonchi, wheezing, or rubs  HEART: Regular rate and rhythm; No murmurs, rubs, or gallops  ABDOMEN: Soft, Nontender, Nondistended; Bowel sounds present  EXTREMITIES:  2+ Peripheral Pulses, No clubbing, cyanosis, or edema  LYMPH: No lymphadenopathy noted  SKIN: No rashes or lesions    LABS:                        8.1    5.86  )-----------( 243      ( 08 Sep 2021 17:51 )             25.5     09-08    146<H>  |  116<H>  |  25<H>  ----------------------------<  118<H>  3.8   |  22  |  0.47<L>    Ca    7.9<L>      08 Sep 2021 11:58  Phos  3.2     09-08  Mg     2.3     09-08    TPro  5.3<L>  /  Alb  2.0<L>  /  TBili  0.6  /  DBili  x   /  AST  24  /  ALT  35  /  AlkPhos  80  09-08    PT/INR - ( 08 Sep 2021 01:51 )   PT: 13.4 sec;   INR: 1.12 ratio         PTT - ( 08 Sep 2021 01:51 )  PTT:28.6 sec    CAPILLARY BLOOD GLUCOSE                MEDICATIONS  (STANDING):  Biotene Dry Mouth Oral Rinse 5 milliLiter(s) Swish and Spit two times a day  chlorhexidine 2% Cloths 1 Application(s) Topical <User Schedule>  cholecalciferol 1000 Unit(s) Oral daily  dextrose 5% + sodium chloride 0.225% with potassium chloride 20 mEq/L 1000 milliLiter(s) (75 mL/Hr) IV Continuous <Continuous>  folic acid 1 milliGRAM(s) Enteral Tube daily  haloperidol    Injectable 1 milliGRAM(s) IV Push every 12 hours  lidocaine 4% Injection for Nebulization 4 milliLiter(s) Nebulizer once  metoprolol tartrate 25 milliGRAM(s) Oral every 6 hours  multivitamin/minerals Oral Solution (WELLESSE) 30 milliLiter(s) Enteral Tube daily  pantoprazole Infusion 8 mG/Hr (10 mL/Hr) IV Continuous <Continuous>    MEDICATIONS  (PRN):  acetaminophen    Suspension .. 500 milliGRAM(s) Oral every 6 hours PRN Mild Pain (1 - 3)  traMADol 25 milliGRAM(s) Oral every 8 hours PRN Moderate Pain (4 - 6)      Care Discussed with Consultants/Other Providers [ ] YES  [ ] NO

## 2021-09-08 NOTE — PROGRESS NOTE ADULT - SUBJECTIVE AND OBJECTIVE BOX
Interval Events:   - Team reports 3 bloody BMs overnight  - Hb 7-->8.8-->8 s/p 1u pRBC  - Family now interested in endoscopy    Allergies:  No Known Allergies      Hospital Medications:  acetaminophen    Suspension .. 500 milliGRAM(s) Oral every 6 hours PRN  Biotene Dry Mouth Oral Rinse 5 milliLiter(s) Swish and Spit two times a day  chlorhexidine 2% Cloths 1 Application(s) Topical <User Schedule>  cholecalciferol 1000 Unit(s) Oral daily  dextrose 5% + sodium chloride 0.225% with potassium chloride 20 mEq/L 1000 milliLiter(s) IV Continuous <Continuous>  folic acid 1 milliGRAM(s) Enteral Tube daily  haloperidol    Injectable 1 milliGRAM(s) IV Push every 12 hours  metoprolol tartrate 25 milliGRAM(s) Oral every 6 hours  multivitamin/minerals Oral Solution (WELLESSE) 30 milliLiter(s) Enteral Tube daily  pantoprazole Infusion 8 mG/Hr IV Continuous <Continuous>  traMADol 25 milliGRAM(s) Oral every 8 hours PRN      PMHX/PSHX:  No pertinent past medical history    No pertinent past medical history    Crohn disease    Paroxysmal atrial fibrillation    CAD (coronary artery disease)    GI bleed    No significant past surgical history        Family history:      ROS:   Unable to obtain due to AMS      PHYSICAL EXAM:   Vital Signs:  Vital Signs Last 24 Hrs  T(C): 36 (08 Sep 2021 07:00), Max: 36.4 (07 Sep 2021 19:00)  T(F): 96.8 (08 Sep 2021 07:00), Max: 97.5 (07 Sep 2021 19:00)  HR: 86 (08 Sep 2021 09:00) (79 - 155)  BP: 140/110 (08 Sep 2021 09:00) (67/38 - 175/81)  BP(mean): 121 (08 Sep 2021 09:00) (47 - 121)  RR: 29 (08 Sep 2021 09:00) (24 - 50)  SpO2: 98% (08 Sep 2021 09:00) (90% - 100%)  Daily     Daily Weight in k.4 (08 Sep 2021 05:00)      21 @ 07:01  -  21 @ 07:00  --------------------------------------------------------  IN: 3530 mL / OUT: 1632 mL / NET: 1898 mL    21 @ 07:01  -  21 @ 09:47  --------------------------------------------------------  IN: 170 mL / OUT: 0 mL / NET: 170 mL        GENERAL:  Appears stated age, chronically ill appearing  HEENT:  NC/AT,  conjunctivae clear, sclera -anicteric  CHEST:  Poor effort  HEART:  Regular rhythm, S1, S2   ABDOMEN:  Soft, non-tender, non-distended, ileostomy in place with scant brown stool  EXTREMITIES:  no cyanosis,clubbing or edema  SKIN:  No rash/erythema   NEURO:  Alert, oriented x 0, confused      LABS:                        7.9    6.29  )-----------( 251      ( 08 Sep 2021 08:29 )             25.0     Mean Cell Volume: 90.9 fl (21 @ 08:29)        150<H>  |  119<H>  |  36<H>  ----------------------------<  115<H>  3.9   |  23  |  0.48<L>    Ca    7.8<L>      08 Sep 2021 01:51  Phos  2.4       Mg     2.4         TPro  5.3<L>  /  Alb  1.8<L>  /  TBili  0.6  /  DBili  x   /  AST  26  /  ALT  35  /  AlkPhos  86      LIVER FUNCTIONS - ( 08 Sep 2021 01:51 )  Alb: 1.8 g/dL / Pro: 5.3 g/dL / ALK PHOS: 86 U/L / ALT: 35 U/L / AST: 26 U/L / GGT: x           PT/INR - ( 08 Sep 2021 01:51 )   PT: 13.4 sec;   INR: 1.12 ratio         PTT - ( 08 Sep 2021 01:51 )  PTT:28.6 sec                            7.9    6.29  )-----------( 251      ( 08 Sep 2021 08:29 )             25.0                         8.0    6.46  )-----------( 252      ( 08 Sep 2021 01:51 )             25.3                         8.8    7.47  )-----------( 243      ( 07 Sep 2021 20:53 )             28.2                         7.2    7.31  )-----------( 260      ( 07 Sep 2021 14:29 )             23.5                         7.8    8.41  )-----------( 259      ( 07 Sep 2021 08:35 )             24.7       Imaging:

## 2021-09-08 NOTE — CHART NOTE - NSCHARTNOTEFT_GEN_A_CORE
KAYLEEN schwab    Had long and multiple discussions with patient's wife/decision maker on the phone while patient is in endoscopy unit. Explained in detail the EGD and risks/benefits including risks of bleeding/infection/perforation. Since he has a DNR/I in place, discussed the options of doing procedure with temporary suspension of DNR/I, vs having it remain in place. Explained that despite the low risk procedure, given his chronic illness there is always a chance he will require intubation or CPR/cardiac resuscitation if he was to decompensate during the procedure. Explained this multiple times in detail along with anesthesia attending with nursing as witness, in layman's terms as much as possible. The patient's wife voices understanding. The patient's wife is unable to make a decision at this time, and she cannot give us guidance or consent for the anesthesia. Also discussed whether she would want to delegate the decision to her son/daughter, she also declined and does not want them to make the decision. Therefore, the procedure is being cancelled today as patient's decision maker cannot make a decision on how to proceed with anesthesia. Also explained all of this to patient's daughter Marium over the phone. They will talk overnight and get back to us with regards to their decision. Would strongly suggest palliative care consultation to help family to make [facility] decisions. Will follow, if they decide to move forward with EGD with anesthesia +/- DNR or to rescind it, we will reattempt tomorrow.    MD KAYLEEN Braga iphone: [phone]  KAYLEEN e-mail: [email or web address]

## 2021-09-08 NOTE — PROGRESS NOTE ADULT - SUBJECTIVE AND OBJECTIVE BOX
HPI:  81y Male with Crohn disease s/p failed medical management. 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 Transferred to SICU intubated and on pressors. Now extubated and off pressors.    24 HOUR EVENTS:  - GI c/s'd for scope, family agreed to scope late in the afternoon  - Started on Haldol 1mg BID x 3d for agitation  - Started on protonix gtt  - 1 episode of hypotension around 6pm in the evening, given 500cc LR bolus and 1u pRBC    SUBJECTIVE/ROS:  Patient seen at bedside this AM.  Due to altered mental status, subjective information were not able to be obtained from the patient. History was obtained, to the extent possible, from review of the chart and collateral sources of information.    PHYSICAL EXAM:    NEURO  Exam: awake but lethargic, less responsive to commandsoriented  Meds: acetaminophen    Suspension .. 500 milliGRAM(s) Oral every 6 hours PRN Mild Pain (1 - 3)  haloperidol    Injectable 1 milliGRAM(s) IV Push every 12 hours  traMADol 25 milliGRAM(s) Oral every 8 hours PRN Moderate Pain (4 - 6)  [x] Adequacy of sedation and pain control has been assessed and adjusted    RESPIRATORY  RR: 34 (09-08-21 @ 02:00) (28 - 49)  SpO2: 99% (09-08-21 @ 02:00) (90% - 100%)  Exam: no increased WOB on RA    CARDIOVASCULAR  HR: 89 (09-08-21 @ 02:00) (79 - 155)  BP: 144/81 (09-08-21 @ 02:00) (67/38 - 163/60)  BP(mean): 103 (09-08-21 @ 02:00) (47 - 103)  VBG - ( 07 Sep 2021 20:43 )  pH: 7.40  /  pCO2: 43    /  pO2: 27    / HCO3: 27    / Base Excess: 1.6   /  SaO2: 52.0   Lactate: 1.2    Exam: regular rate and rhythm noted on cardiac monitor  Cardiac Rhythm: normal sinus rhythm  Perfusion     [x]Adequate   [ ]Inadequate  Mentation   [x]Normal       [ ]Reduced  Extremities  [x]Warm         [ ]Cool  Volume Status [ ]Hypervolemic [x]Euvolemic [ ]Hypovolemic  Meds: metoprolol tartrate 25 milliGRAM(s) Oral every 8 hours    GI/NUTRITION  Exam: soft, nontender, nondistended, stoma pink and viable, productive of gas and dark red stool in bag; PAOLO drains x2 in place, dressing c/d/i, with serosanguineous OP   Meds: pantoprazole Infusion 8 mG/Hr IV Continuous <Continuous>    GENITOURINARY  I&O's Detail    09-06 @ 07:01  -  09-07 @ 07:00  --------------------------------------------------------  IN:    Enteral Tube Flush: 330 mL    IV PiggyBack: 250 mL    Pivot 1.5: 980 mL    PRBCs (Packed Red Blood Cells): 600 mL    TPN (Total Parenteral Nutrition): 420 mL  Total IN: 2580 mL    OUT:    Bulb (mL): 0 mL    Bulb (mL): 5 mL    Ileostomy (mL): 1600 mL    Incontinent per Condom Catheter (mL): 1650 mL  Total OUT: 3255 mL    Total NET: -675 mL    09-07 @ 07:01  -  09-08 @ 02:49  --------------------------------------------------------  IN:    dextrose 5% + sodium chloride 0.45% w/ Additives: 825 mL    dextrose 5% + sodium chloride 0.45% w/ Additives: 225 mL    Enteral Tube Flush: 30 mL    IV PiggyBack: 350 mL    Lactated Ringers Bolus: 500 mL    Pantoprazole: 150 mL    Pivot 1.5: 225 mL    PRBCs (Packed Red Blood Cells): 300 mL  Total IN: 2605 mL    OUT:    Bulb (mL): 0 mL    Bulb (mL): 0 mL    Ileostomy (mL): 75 mL    Incontinent per Condom Catheter (mL): 850 mL  Total OUT: 925 mL    Total NET: 1680 mL    Labs: 09-08    150<H>  |  119<H>  |  36<H>  ----------------------------<  115<H>  3.9   |  23  |  0.48<L>    Ca    7.8<L>      08 Sep 2021 01:51  Phos  2.4     09-08  Mg     2.4     09-08    TPro  5.3<L>  /  Alb  1.8<L>  /  TBili  0.6  /  DBili  x   /  AST  26  /  ALT  35  /  AlkPhos  86  09-08    Meds: cholecalciferol 1000 Unit(s) Oral daily  dextrose 5% + sodium chloride 0.45% with potassium chloride 20 mEq/L 1000 milliLiter(s) IV Continuous <Continuous>  folic acid 1 milliGRAM(s) Enteral Tube daily  multivitamin/minerals Oral Solution (WELLESSE) 30 milliLiter(s) Enteral Tube daily  potassium phosphate IVPB 15 milliMole(s) IV Intermittent once  sodium phosphate IVPB 15 milliMole(s) IV Intermittent once      HEMATOLOGIC  Meds: none  Labs:              8.0    6.46  )-----------( 252      ( 08 Sep 2021 01:51 )             25.3   PT/INR - ( 08 Sep 2021 01:51 )   PT: 13.4 sec;   INR: 1.12 ratio    PTT - ( 08 Sep 2021 01:51 )  PTT:28.6 sec  Transfusion     [ ] PRBC   [ ] Platelets   [ ] FFP   [ ] Cryoprecipitate    INFECTIOUS DISEASES  Labs:   WBC Count: 6.46 K/uL (09-08 @ 01:51)  WBC Count: 7.47 K/uL (09-07 @ 20:53)  WBC Count: 7.31 K/uL (09-07 @ 14:29)  WBC Count: 8.41 K/uL (09-07 @ 08:35)  WBC Count: 8.01 K/uL (09-07 @ 02:59)    ENDOCRINE  Meds: none      ACCESS DEVICES:  [2] Peripheral IV  [ ] Central Venous Line	[ ] R	[ ] L	[ ] IJ	[ ] Fem	[ ] SC	Placed:   [ ] Arterial Line		[ ] R	[ ] L	[ ] Fem	[ ] Rad	[ ] Ax	Placed:   [ ] PICC:					[ ] Mediport  [ ] Urinary Catheter, Date Placed:   [x] Necessity of urinary, arterial, and venous catheters discussed      OTHER MEDICATIONS:  Biotene Dry Mouth Oral Rinse 5 milliLiter(s) Swish and Spit two times a day  chlorhexidine 2% Cloths 1 Application(s) Topical <User Schedule> HPI:  81y Male with Crohn disease s/p failed medical management. 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 Transferred to SICU intubated and on pressors. Now extubated and off pressors.    24 HOUR EVENTS:  - GI c/s'd for scope, family agreed to scope late in the afternoon  - Started on Haldol 1mg BID x 3d for agitation  - Started on protonix gtt  - 1 episode of hypotension around 6pm in the evening, given 500cc LR bolus and 1u pRBC  - Overnight, 1 episode of non-sustained Vtachy on telemetry while aSx    SUBJECTIVE/ROS:  Patient seen at bedside this AM.  Due to altered mental status, subjective information were not able to be obtained from the patient. History was obtained, to the extent possible, from review of the chart and collateral sources of information.    PHYSICAL EXAM:    NEURO  Exam: awake but lethargic, less responsive to commandsoriented  Meds: acetaminophen    Suspension .. 500 milliGRAM(s) Oral every 6 hours PRN Mild Pain (1 - 3)  haloperidol    Injectable 1 milliGRAM(s) IV Push every 12 hours  traMADol 25 milliGRAM(s) Oral every 8 hours PRN Moderate Pain (4 - 6)  [x] Adequacy of sedation and pain control has been assessed and adjusted    RESPIRATORY  RR: 34 (09-08-21 @ 02:00) (28 - 49)  SpO2: 99% (09-08-21 @ 02:00) (90% - 100%)  Exam: no increased WOB on RA    CARDIOVASCULAR  HR: 89 (09-08-21 @ 02:00) (79 - 155)  BP: 144/81 (09-08-21 @ 02:00) (67/38 - 163/60)  BP(mean): 103 (09-08-21 @ 02:00) (47 - 103)  VBG - ( 07 Sep 2021 20:43 )  pH: 7.40  /  pCO2: 43    /  pO2: 27    / HCO3: 27    / Base Excess: 1.6   /  SaO2: 52.0   Lactate: 1.2    Exam: regular rate and rhythm noted on cardiac monitor  Cardiac Rhythm: normal sinus rhythm  Perfusion     [x]Adequate   [ ]Inadequate  Mentation   [x]Normal       [ ]Reduced  Extremities  [x]Warm         [ ]Cool  Volume Status [ ]Hypervolemic [x]Euvolemic [ ]Hypovolemic  Meds: metoprolol tartrate 25 milliGRAM(s) Oral every 8 hours    GI/NUTRITION  Exam: soft, nontender, nondistended, stoma pink and viable, productive of gas and dark red stool in bag; PAOLO drains x2 in place, dressing c/d/i, with serosanguineous OP   Meds: pantoprazole Infusion 8 mG/Hr IV Continuous <Continuous>    GENITOURINARY  I&O's Detail    09-06 @ 07:01  -  09-07 @ 07:00  --------------------------------------------------------  IN:    Enteral Tube Flush: 330 mL    IV PiggyBack: 250 mL    Pivot 1.5: 980 mL    PRBCs (Packed Red Blood Cells): 600 mL    TPN (Total Parenteral Nutrition): 420 mL  Total IN: 2580 mL    OUT:    Bulb (mL): 0 mL    Bulb (mL): 5 mL    Ileostomy (mL): 1600 mL    Incontinent per Condom Catheter (mL): 1650 mL  Total OUT: 3255 mL    Total NET: -675 mL    09-07 @ 07:01  -  09-08 @ 02:49  --------------------------------------------------------  IN:    dextrose 5% + sodium chloride 0.45% w/ Additives: 825 mL    dextrose 5% + sodium chloride 0.45% w/ Additives: 225 mL    Enteral Tube Flush: 30 mL    IV PiggyBack: 350 mL    Lactated Ringers Bolus: 500 mL    Pantoprazole: 150 mL    Pivot 1.5: 225 mL    PRBCs (Packed Red Blood Cells): 300 mL  Total IN: 2605 mL    OUT:    Bulb (mL): 0 mL    Bulb (mL): 0 mL    Ileostomy (mL): 75 mL    Incontinent per Condom Catheter (mL): 850 mL  Total OUT: 925 mL    Total NET: 1680 mL    Labs: 09-08    150<H>  |  119<H>  |  36<H>  ----------------------------<  115<H>  3.9   |  23  |  0.48<L>    Ca    7.8<L>      08 Sep 2021 01:51  Phos  2.4     09-08  Mg     2.4     09-08    TPro  5.3<L>  /  Alb  1.8<L>  /  TBili  0.6  /  DBili  x   /  AST  26  /  ALT  35  /  AlkPhos  86  09-08    Meds: cholecalciferol 1000 Unit(s) Oral daily  dextrose 5% + sodium chloride 0.45% with potassium chloride 20 mEq/L 1000 milliLiter(s) IV Continuous <Continuous>  folic acid 1 milliGRAM(s) Enteral Tube daily  multivitamin/minerals Oral Solution (WELLESSE) 30 milliLiter(s) Enteral Tube daily  potassium phosphate IVPB 15 milliMole(s) IV Intermittent once  sodium phosphate IVPB 15 milliMole(s) IV Intermittent once      HEMATOLOGIC  Meds: none  Labs:              8.0    6.46  )-----------( 252      ( 08 Sep 2021 01:51 )             25.3   PT/INR - ( 08 Sep 2021 01:51 )   PT: 13.4 sec;   INR: 1.12 ratio    PTT - ( 08 Sep 2021 01:51 )  PTT:28.6 sec  Transfusion     [ ] PRBC   [ ] Platelets   [ ] FFP   [ ] Cryoprecipitate    INFECTIOUS DISEASES  Labs:   WBC Count: 6.46 K/uL (09-08 @ 01:51)  WBC Count: 7.47 K/uL (09-07 @ 20:53)  WBC Count: 7.31 K/uL (09-07 @ 14:29)  WBC Count: 8.41 K/uL (09-07 @ 08:35)  WBC Count: 8.01 K/uL (09-07 @ 02:59)    ENDOCRINE  Meds: none      ACCESS DEVICES:  [2] Peripheral IV  [ ] Central Venous Line	[ ] R	[ ] L	[ ] IJ	[ ] Fem	[ ] SC	Placed:   [ ] Arterial Line		[ ] R	[ ] L	[ ] Fem	[ ] Rad	[ ] Ax	Placed:   [ ] PICC:					[ ] Mediport  [ ] Urinary Catheter, Date Placed:   [x] Necessity of urinary, arterial, and venous catheters discussed      OTHER MEDICATIONS:  Biotene Dry Mouth Oral Rinse 5 milliLiter(s) Swish and Spit two times a day  chlorhexidine 2% Cloths 1 Application(s) Topical <User Schedule> HPI:  81y Male with Crohn disease s/p failed medical management. 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 Transferred to SICU intubated and on pressors. Now extubated and off pressors.    24 HOUR EVENTS:  - GI c/s'd for scope, family agreed to scope late in the afternoon  - Started on Haldol 1mg BID x 3d for agitation  - Started on protonix gtt  - 1 episode of hypotension around 6pm in the evening, given 500cc LR bolus and 1u pRBC  - Overnight, 1 episode of non-sustained Vtach on telemetry while aSx    SUBJECTIVE/ROS:  Patient seen at bedside this AM.  Due to altered mental status, subjective information were not able to be obtained from the patient. History was obtained, to the extent possible, from review of the chart and collateral sources of information.    PHYSICAL EXAM:    NEURO  Exam: awake but lethargic, less responsive to commandsoriented  Meds: acetaminophen    Suspension .. 500 milliGRAM(s) Oral every 6 hours PRN Mild Pain (1 - 3)  haloperidol    Injectable 1 milliGRAM(s) IV Push every 12 hours  traMADol 25 milliGRAM(s) Oral every 8 hours PRN Moderate Pain (4 - 6)  [x] Adequacy of sedation and pain control has been assessed and adjusted    RESPIRATORY  RR: 34 (09-08-21 @ 02:00) (28 - 49)  SpO2: 99% (09-08-21 @ 02:00) (90% - 100%)  Exam: no increased WOB on RA    CARDIOVASCULAR  HR: 89 (09-08-21 @ 02:00) (79 - 155)  BP: 144/81 (09-08-21 @ 02:00) (67/38 - 163/60)  BP(mean): 103 (09-08-21 @ 02:00) (47 - 103)  VBG - ( 07 Sep 2021 20:43 )  pH: 7.40  /  pCO2: 43    /  pO2: 27    / HCO3: 27    / Base Excess: 1.6   /  SaO2: 52.0   Lactate: 1.2    Exam: regular rate and rhythm noted on cardiac monitor  Cardiac Rhythm: normal sinus rhythm  Perfusion     [x]Adequate   [ ]Inadequate  Mentation   [x]Normal       [ ]Reduced  Extremities  [x]Warm         [ ]Cool  Volume Status [ ]Hypervolemic [x]Euvolemic [ ]Hypovolemic  Meds: metoprolol tartrate 25 milliGRAM(s) Oral every 8 hours    GI/NUTRITION  Exam: soft, nontender, nondistended, stoma pink and viable, productive of gas and dark red stool in bag; PAOLO drains x2 in place, dressing c/d/i, with serosanguineous OP   Meds: pantoprazole Infusion 8 mG/Hr IV Continuous <Continuous>    GENITOURINARY  I&O's Detail    09-06 @ 07:01  -  09-07 @ 07:00  --------------------------------------------------------  IN:    Enteral Tube Flush: 330 mL    IV PiggyBack: 250 mL    Pivot 1.5: 980 mL    PRBCs (Packed Red Blood Cells): 600 mL    TPN (Total Parenteral Nutrition): 420 mL  Total IN: 2580 mL    OUT:    Bulb (mL): 0 mL    Bulb (mL): 5 mL    Ileostomy (mL): 1600 mL    Incontinent per Condom Catheter (mL): 1650 mL  Total OUT: 3255 mL    Total NET: -675 mL    09-07 @ 07:01  -  09-08 @ 02:49  --------------------------------------------------------  IN:    dextrose 5% + sodium chloride 0.45% w/ Additives: 825 mL    dextrose 5% + sodium chloride 0.45% w/ Additives: 225 mL    Enteral Tube Flush: 30 mL    IV PiggyBack: 350 mL    Lactated Ringers Bolus: 500 mL    Pantoprazole: 150 mL    Pivot 1.5: 225 mL    PRBCs (Packed Red Blood Cells): 300 mL  Total IN: 2605 mL    OUT:    Bulb (mL): 0 mL    Bulb (mL): 0 mL    Ileostomy (mL): 75 mL    Incontinent per Condom Catheter (mL): 850 mL  Total OUT: 925 mL    Total NET: 1680 mL    Labs: 09-08    150<H>  |  119<H>  |  36<H>  ----------------------------<  115<H>  3.9   |  23  |  0.48<L>    Ca    7.8<L>      08 Sep 2021 01:51  Phos  2.4     09-08  Mg     2.4     09-08    TPro  5.3<L>  /  Alb  1.8<L>  /  TBili  0.6  /  DBili  x   /  AST  26  /  ALT  35  /  AlkPhos  86  09-08    Meds: cholecalciferol 1000 Unit(s) Oral daily  dextrose 5% + sodium chloride 0.45% with potassium chloride 20 mEq/L 1000 milliLiter(s) IV Continuous <Continuous>  folic acid 1 milliGRAM(s) Enteral Tube daily  multivitamin/minerals Oral Solution (WELLESSE) 30 milliLiter(s) Enteral Tube daily  potassium phosphate IVPB 15 milliMole(s) IV Intermittent once  sodium phosphate IVPB 15 milliMole(s) IV Intermittent once      HEMATOLOGIC  Meds: none  Labs:              8.0    6.46  )-----------( 252      ( 08 Sep 2021 01:51 )             25.3   PT/INR - ( 08 Sep 2021 01:51 )   PT: 13.4 sec;   INR: 1.12 ratio    PTT - ( 08 Sep 2021 01:51 )  PTT:28.6 sec  Transfusion     [ ] PRBC   [ ] Platelets   [ ] FFP   [ ] Cryoprecipitate    INFECTIOUS DISEASES  Labs:   WBC Count: 6.46 K/uL (09-08 @ 01:51)  WBC Count: 7.47 K/uL (09-07 @ 20:53)  WBC Count: 7.31 K/uL (09-07 @ 14:29)  WBC Count: 8.41 K/uL (09-07 @ 08:35)  WBC Count: 8.01 K/uL (09-07 @ 02:59)    ENDOCRINE  Meds: none      ACCESS DEVICES:  [2] Peripheral IV  [ ] Central Venous Line	[ ] R	[ ] L	[ ] IJ	[ ] Fem	[ ] SC	Placed:   [ ] Arterial Line		[ ] R	[ ] L	[ ] Fem	[ ] Rad	[ ] Ax	Placed:   [ ] PICC:					[ ] Mediport  [ ] Urinary Catheter, Date Placed:   [x] Necessity of urinary, arterial, and venous catheters discussed      OTHER MEDICATIONS:  Biotene Dry Mouth Oral Rinse 5 milliLiter(s) Swish and Spit two times a day  chlorhexidine 2% Cloths 1 Application(s) Topical <User Schedule>

## 2021-09-08 NOTE — CHART NOTE - NSCHARTNOTEFT_GEN_A_CORE
Pt being followed by this service. Pt tentascheduled for FEES exam today. However, Pt for possible EGD, currently NPO for possible procedure, also continues with AMS, more lethargic. PO intake for swallow exam is contraindicated at this time. Will tentatively reschedule for tomorrow, pending Pt's overall status and other scheduling. Plan discussed with ANJANA Zurita.     Sonia Barragan MA, CCC-SLP  Speech-Language Pathologist  pager #[phone]

## 2021-09-08 NOTE — PROGRESS NOTE ADULT - ASSESSMENT
82yo M with Crohn's disease, now s/p total abdominal colectomy with end ileostomy on 8/20    PLAN:  Neurologic: post-op pain control, new-onset hypophonia without focal neuro deficits, obtundation  - Monitor mental status  - Pain control: PO Tylenol 500mg q6hr PRN, Tramadol 25mg PO PRN  - Vitamin D3 daily    Respiratory: Acute respiratory insufficiency resolving, patient on RA  - Incentive spirometry 10x/hr while in bed, OOBTC    Cardiovascular: History of AFib currently rate controlled with IV metoprolol and amiodarone, now in NSR  - Monitor vital signs  - Metoprolol 25mg PO q8h    Gastrointestinal/Nutrition: s/p total abdominal colectomy with end ileostomy c/b recurrent bloody ostomy output; s/p TPN  - TF Pivot 1.5 @ 45cc/hr via TANJA -- TF HELD AFTER MIDNIGHT FOR POSSIBLE GI SCOPE  - Protonix gtt for GIB  - Monitor ostomy output and character  - PAOLO drains x2 with persistently low OP, consider d/c by primary team    Renal/Genitourinary: no active issues   - IVF: D5 1/2 NS + 20 KCl @ 75cc/hr  - Monitor and replete electrolytes as needed  - Monitor UOP, voiding spontaneously    Hematologic: last transfusion 2u pRBC given 9/6-9/7 overnight  - Trend H/H and transfuse Hgb < 7.5  - Chemical VTE ppx HELD due to downtrending H/H    Infectious Disease: C. diff colitis vs Crohn's flair s/p subtotal colectomy   - Trend WBC on CBC q8h  - Monitor fever curve    Endocrine: no active issues; s/p steroid taper 8/26  - Monitor glucose on BMP     Lines & Drains:   - LLQ & LUQ drain 8/20  - TPN 8/16  - RUE PICC placed 8/16    Disposition: SICU  Code Status: DNR/DNI, MOLST in chart 82yo M with Crohn's disease, now s/p total abdominal colectomy with end ileostomy on 8/20    PLAN:  Neurologic: post-op pain control, new-onset hypophonia without focal neuro deficits, obtundation  - Monitor mental status  - Pain control: PO Tylenol 500mg q6hr PRN, Tramadol 25mg PO PRN  - Vitamin D3 daily    Respiratory: Acute respiratory insufficiency resolving, patient on RA  - Incentive spirometry 10x/hr while in bed, OOBTC    Cardiovascular: History of AFib currently rate controlled with IV metoprolol and amiodarone, now in NSR  - Monitor vital signs  - Metoprolol 25mg PO q8h    Gastrointestinal/Nutrition: s/p total abdominal colectomy with end ileostomy c/b recurrent bloody ostomy output; s/p TPN  - TF Pivot 1.5 @ 45cc/hr via TANJA -- TF HELD AFTER MIDNIGHT FOR POSSIBLE GI SCOPE  - Protonix gtt for GIB  - Monitor ostomy output and character  - PAOLO drains x2 with persistently low OP, consider d/c by primary team    Renal/Genitourinary: no active issues   - IVF: D5 1/2 NS + 20 KCl @ 75cc/hr  - Monitor and replete electrolytes as needed  - Monitor UOP, voiding spontaneously    Hematologic: recurrent GIB -- last transfusion: 1u pRBC 9/7  - Trend H/H and transfuse Hgb < 7.5  - Chemical VTE ppx HELD due to downtrending H/H    Infectious Disease: C. diff colitis vs Crohn's flair s/p subtotal colectomy   - Trend WBC on CBC q8h  - Monitor fever curve    Endocrine: no active issues; s/p steroid taper 8/26  - Monitor glucose on BMP     Lines & Drains:   - LLQ & LUQ drain 8/20  - TPN 8/16  - RUE PICC placed 8/16    Disposition: SICU  Code Status: DNR/DNI, MOLST in chart

## 2021-09-09 NOTE — PROGRESS NOTE ADULT - ATTENDING COMMENTS
82yo M with Crohn's disease, now s/p total abdominal colectomy with end ileostomy on 8/20    Calmer and more oriented, continue Rx of hypoactive delirium with Haldol  On BB q 6 rhs, HR better controlled  Bleeding from stoma resolved, HCt stable, chane PPI drip to bid   Evaluated by GI to scope. Pt and family agreeing for scope.  NGT feed on hold for possible scope  Change IVF to hyponatremic sec to hypernatremia with improvement in high Na    Spoke to daughter and son  Discussed with GI   Palliative care consult called 80yo M with Crohn's disease, now s/p total abdominal colectomy with end ileostomy on 8/20    Calmer and more oriented, continue Rx of hypoactive delirium with Haldol  On BB q 6 rhs, HR better controlled  Bleeding from stoma resolved, HCT stable, change PPI drip to bid   Will hold endoscopy  NGT feed at goal  Resolving hypernatremia, add free water thr NGT  Swallow reeval

## 2021-09-09 NOTE — CHART NOTE - NSCHARTNOTEFT_GEN_A_CORE
Nutrition Follow Up Note  Patient seen for: malnutrition follow up on SICU    Chart reviewed, events noted. "81M with recently dx Crohn's disease c/b perianal abscess and left posterior distal anal intersphincteric fistula and presents diarrhea/BRBPR  2/2 to CD flare c/b c diff colitis. Initially treat for C diff, s/p FMT. Taken emergently to OR on  for acute deterioration for laparoscopic total colectomy with end ileostomy, POD20."    Source: [] Patient       [x] EMR        [] RN        [] Family at bedside       [X] Other: 8ICU Interdisciplinary Rounds     -If unable to interview patient: [] Trach/Vent/BiPAP  [X] Disoriented/confused/inappropriate to interview    Diet Order:   Diet, NPO with Tube Feed:   Tube Feeding Modality: Nasogastric  Pivot 1.5 Blayne (PIVOT1.5RTH)  Total Volume for 24 Hours (mL): 1080  Continuous  Starting Tube Feed Rate {mL per Hour}: 45  Until Goal Tube Feed Rate (mL per Hour): 45  Tube Feed Duration (in Hours): 24  Tube Feed Start Time: 19:30 (21)    EN Order Provides: 1080 ml formula, 1620 calories (27.5 kcal/kg), 101 grams protein (1.7 gm/kg), 820 ml free water; based on dosing wt 59kg    Current Pump Rate: 45ml/hr  EN provision: 68% EN volume goal provided in past 7 days; held frequently for procedures    Is current diet order appropriate/adequate? [X] Yes  []  No:     Nutrition Status:  - Pt remains NPO post-op in setting of dysphagia. Per S&S (), FEES deferred for planned EGD.   - TPN initiated ; discontinued  with EN tolerance  - Enteral Nutrition: EN initiated . Glucerna1.2 changed to Pivot1.5; currently infusing at goal after multiple interruptions.  - EGD  deferred pending code status clarifications; Palliative Care consult placed to clarify GOC  - Pressure injuries addressed with high protein EN regimen and micronutrient supplementation  - Nutrition Supplementation: multivitamin/minerals, folic acid, vit D3    GI:    Ileostomy Output x 24-hours: 375ml (), 275ml ()    Weights:   Daily Weight in k.4 (),   DOSING Weight (kg): 59 (21 @ 14:14) *used for calculations  BMI (kg/m2): 20.4 (21 @ 14:14)  IBW: 67.1kg     MEDICATIONS  (STANDING):  cholecalciferol  folic acid  metoprolol tartrate  multivitamin/minerals Oral Solution (WELLESSE)  pantoprazole Infusion    Pertinent Labs:  @ 00:24: Na 145, BUN 24<H>, Cr 0.48<L>, <H>, K+ 3.6, Phos 2.3<L>, Mg 2.1, Alk Phos 87, ALT/SGPT 36, AST/SGOT 27   @ 11:58: Na 146<H>, BUN 25<H>, Cr 0.47<L>, <H>, K+ 3.8, Phos 3.2, Mg 2.3, Alk Phos 80, ALT/SGPT 35, AST/SGOT 24    A1C with Estimated Average Glucose Result: 5.2 % (21 @ 09:06)    Triglycerides, Serum: 61 mg/dL (21 @ 00:40)  Triglycerides, Serum: 649 mg/dL (21 @ 22:57)  Triglycerides, Serum: 89 mg/dL (21 @ 00:17)  Triglycerides, Serum: 99 mg/dL (21 @ 13:22)  Triglycerides, Serum: 125 mg/dL (21 @ 23:31)  Triglycerides, Serum: 140 mg/dL (21 @ 06:36)  Triglycerides, Serum: 106 mg/dL (21 @ 08:59)  Triglycerides, Serum: 116 mg/dL (21 @ 06:45)  Triglycerides, Serum: 107 mg/dL (21 @ 07:08)    Skin per nursing documentation: stage II thoracic spine, suspected DTI b/l buttocks/sacrum  Edema: 1+ generalized, 2+ left/right arm    Estimated Needs: based on dosing wt 59 kg, with consideration for malnutrition  Energy: 5425-4733 calories (25-35 kcal/kg)  Protein:  grams (1.4-1.8 gm/kg)    Previous Nutrition Diagnosis: 1) Severe Malnutrition 2) Increased Nutrient Needs  Nutrition Diagnosis is: ongoing, addressed with EN at goal, plan for FEES    New Nutrition Diagnosis: [X] Not applicable    Nutrition Care Plan:  [X] In Progress  [] Achieved  [] Not applicable    Nutrition Interventions:     Education Provided:       [] Yes:  [X] No: not appropriate/applicable       Recommendations:      1. Continue Pivot1.5 @ 45 ml/hr x 24 hours to provide 1080 ml formula, 1620 calories (27.5 kcal/kg), 101 grams protein (1.7 gm/kg), 820 ml free water; based on dosing wt 59kg  2. Monitor S&S evaluations/FEES and possibility of advancing diet  3. Continue micronutrient supplementation to promote wound healing  4. Monitor GOC and Palliative consult    Monitoring and Evaluation:   Continue to monitor nutritional intake, tolerance to diet prescription, weights, labs, skin integrity      RD remains available upon request and will follow up per protocol  Miriam Manning, MS AGUSTIN CDN [facility] (Pager #[phone])

## 2021-09-09 NOTE — CONSULT NOTE ADULT - PROBLEM SELECTOR RECOMMENDATION 5
Actions:  [x] Rapport building     [x] Symptom assessment    [] Eliciting preferences of goals   [] Prognostic understanding    [x] Emotional Support  [] Coping skill development  []  Other  Interdisciplinary Referrals: Sw following  Communication: see below for stakeholder engagement  Documentation Review: [x] Primary Team [] Consultants [] Interdisciplinary team  Content: Wife is vacillating about EGD  Recent QTc 457 ms      Link to GOC note  Non Face to Face Time  Total time spent including the following  [x]chart review: protracted hospitalization  []care coordination  [x]discussion with the primary team: frontline nurse, SICU attending, Sx attending, GI attending, GI fellow  [x]discussion with the patient, surrogate decision maker, or family  Time spent: > 60 min

## 2021-09-09 NOTE — PROGRESS NOTE ADULT - ASSESSMENT
Assessment and Plan:   · Assessment	  81M with recently dx Crohn's disease c/b perianal abscess and left posterior distal anal intersphincteric fistula and presents diarrhea/BRBPR  2/2 to CD flare c/b c diff colitis. Initially treat for C diff, s/p FMT. Taken emergently to OR on 8/20 for acute deterioration for laparoscopic total colectomy with end ileostomy, POD20.     PLAN:  - MBS rescheduled for tomorrow thurs 9/9  - trend HH  - NGT/NPO w tube feed, currently at goal  - cw PPI BID   - Wound care: daily dressing changes with midline incision packed   - F/u ostomy output for quantity/quality (blood)  - PT to see and encourage ambulation   - Appreciate Psychiatry input   - Appreciate GI Recs for scope today  - Appreciate excellent care per SICU Assessment and Plan:   · Assessment	  81M with recently dx Crohn's disease c/b perianal abscess and left posterior distal anal intersphincteric fistula and presents diarrhea/BRBPR  2/2 to CD flare c/b c diff colitis. Initially treat for C diff, s/p FMT. Taken emergently to OR on 8/20 for acute deterioration for laparoscopic total colectomy with end ileostomy, POD20.     PLAN:  - follow up pallative consult.   - Appreciate GI recs: need palliative discussion regarding DNR status before proceeding with EGD  - MBS rescheduled for tomorrow thurs 9/9  - trend HH  - NGT/NPO w tube feed, currently at goal  - cw PPI BID   - Wound care: daily dressing changes with midline incision packed   - F/u ostomy output for quantity/quality (blood)  - PT to see and encourage ambulation   - Appreciate Psychiatry input   - Appreciate GI Recs for scope today  - Appreciate excellent care per SICU      [facility]  [id number]

## 2021-09-09 NOTE — PROGRESS NOTE ADULT - SUBJECTIVE AND OBJECTIVE BOX
[facility] NUTRITION SUPPORT-- FOLLOW UP NOTE  --------------------------------------------------------------------------------    24 hour events/subjective: pt seen and examined. lethargic in bed. not scoped by gi yesterday, [facility] discussions ongoing, [facility] care eval pending. tolerating ngt feeds at goal. ostomy functioning.     Diet:  Diet, NPO with Tube Feed:   Tube Feeding Modality: Nasogastric  Pivot 1.5 Blayne (PIVOT1.5RTH)  Total Volume for 24 Hours (mL): 1080  Continuous  Starting Tube Feed Rate mL per Hour: 45  Until Goal Tube Feed Rate (mL per Hour): 45  Tube Feed Duration (in Hours): 24  Tube Feed Start Time: 19:30 (09-08-21 @ 18:58)      PAST HISTORY  --------------------------------------------------------------------------------  No significant changes to PMH, PSH, FHx, SHx, unless otherwise noted    ALLERGIES & MEDICATIONS  --------------------------------------------------------------------------------  Allergies    No Known Allergies    Intolerances      Standing Inpatient Medications  Biotene Dry Mouth Oral Rinse 5 milliLiter(s) Swish and Spit two times a day  chlorhexidine 2% Cloths 1 Application(s) Topical <User Schedule>  cholecalciferol 1000 Unit(s) Oral daily  folic acid 1 milliGRAM(s) Enteral Tube daily  haloperidol    Injectable 1 milliGRAM(s) IV Push every 12 hours  lidocaine 4% Injection for Nebulization 4 milliLiter(s) Nebulizer once  metoprolol tartrate 25 milliGRAM(s) Oral every 6 hours  multivitamin/minerals Oral Solution (WELLESSE) 30 milliLiter(s) Enteral Tube daily  pantoprazole Infusion 8 mG/Hr IV Continuous <Continuous>    PRN Inpatient Medications  acetaminophen    Suspension .. 500 milliGRAM(s) Oral every 6 hours PRN  traMADol 25 milliGRAM(s) Oral every 8 hours PRN        REVIEW OF SYSTEMS  --------------------------------------------------------------------------------  see hpi unable to obtain       VITALS/PHYSICAL EXAM  --------------------------------------------------------------------------------  T(C): 36.6 (09-09-21 @ 07:00), Max: 37.1 (09-09-21 @ 03:00)  HR: 85 (09-09-21 @ 08:00) (64 - 99)  BP: 157/67 (09-09-21 @ 08:00) (127/58 - 163/88)  RR: 38 (09-09-21 @ 08:00) (19 - 38)  SpO2: 100% (09-09-21 @ 08:00) (97% - 100%)  Wt(kg): --  Height (cm): 170.2 (09-08-21 @ 16:38)  Weight (kg): 59 (09-08-21 @ 16:38)  BMI (kg/m2): 20.4 (09-08-21 @ 16:38)  BSA (m2): 1.68 (09-08-21 @ 16:38)    09-08-21 @ 07:01  -  09-09-21 @ 07:00  --------------------------------------------------------  IN: 2870 mL / OUT: 1300 mL / NET: 1570 mL      I&O's Detail    08 Sep 2021 07:01  -  09 Sep 2021 07:00  --------------------------------------------------------  IN:    dextrose 5% + sodium chloride 0.225% w/ Additives: 1500 mL    Enteral Tube Flush: 90 mL    IV PiggyBack: 500 mL    Pantoprazole: 240 mL    Pivot 1.5: 540 mL  Total IN: 2870 mL    OUT:    Bulb (mL): 20 mL    Bulb (mL): 5 mL    Ileostomy (mL): 275 mL    Incontinent per Condom Catheter (mL): 1000 mL  Total OUT: 1300 mL    Total NET: 1570 mL          Physical Exam:  Gen: lying in bed, frail, +ngt  HEENT: ncat, trachea midline  Chest: respirations even  Abd: soft nd dressing c/d/i +ostomy  LE: no edema  Neuro: lethargic but arousable      LABS/STUDIES  --------------------------------------------------------------------------------              7.7    6.14  >-----------<  220      [09-09-21 @ 06:03]              25.3     145  |  115  |  24  ----------------------------<  120      [09-09-21 @ 00:24]  3.6   |  22  |  0.48        Ca     7.8     [09-09-21 @ 00:24]      iCa    1.16     [09-09 @ 00:28]      Mg     2.1     [09-09-21 @ 00:24]      Phos  2.3     [09-09-21 @ 00:24]    TPro  5.2  /  Alb  1.9  /  TBili  0.4  /  DBili  x   /  AST  27  /  ALT  36  /  AlkPhos  87  [09-09-21 @ 00:24]    PT/INR: PT 13.9 , INR 1.17       [09-09-21 @ 00:24]  PTT: 28.6       [09-09-21 @ 00:24]      Ca ionizedBlood Gas Calcium, Ionized - Venous: 1.20 mmol/L (09-07-21 @ 20:43)  Blood Gas Calcium, Ionized - Venous: 1.21 mmol/L (09-07-21 @ 14:27)    Creatinine Trend:  POC glucoseGlucose, Serum: 120 mg/dL (09-09-21 @ 00:24)  Glucose, Serum: 118 mg/dL (09-08-21 @ 11:58)  CAPILLARY BLOOD GLUCOSE        PrealbuminPrealbumin, Serum: 22 mg/dL (09-03-21 @ 05:19)  Prealbumin, Serum: 16 mg/dL (09-03-21 @ 03:19)  Prealbumin, Serum: 15 mg/dL (08-30-21 @ 03:32)  Prealbumin, Serum: 8 mg/dL (08-15-21 @ 10:17)    Triglycerides

## 2021-09-09 NOTE — GOALS OF CARE CONVERSATION - ADVANCED CARE PLANNING - CONVERSATION DETAILS
[place]: Review    Topic discussed previously    Yes [x]      No []    Participants aware of Palliative involvement prior to call/meeting  Yes []   No  []  N/A    Complexity        Low[]              Medium []      High [x]       Unknown []    Potential barriers to expeditious decision making:  Intrafamilial conflict between children  Vacillation displayed by the wife    Objectives defined in premeeting huddle: Discussion with GI attending, spoke about tentative plan for EGD and concerns about code status    Provider:  Jusitce Malone        Content: The meeting occurred in three stages. 1) Discussion with the family 2) Discussion with the patient and family, with Surgery 3) Discussion with the patient and the family    I introduced my self and explained my role in helping to delineate a care plan surrounding EGD. I reviewed the case with the wife and daughter, who recounted their discussion with the Gastroenterology team this morning. I made attempts to wake the patient up and his daughter explained that there may be some challenges since he was woken up very early. We addressed the unmet need of a clear and consistent plan to allow for reflexive action by the staff in the event of a newly emerging situation. His wife and daughter explained that there is no longer a need for emergent EGD. The patient's spouse also described the challenges she faced in having to make a quick decision about the patient's care. Shortly into our conversation his surgeon, Dr. Malone, arrived. She provided context about his previous and current care. We both emphasized the importance of delineating a care plan during a time of low acuity and low emotional intensity. The concept of rehab was broached as a means of improving his performance status, and the family seem inclined to pursue that option. I interviewed the patient using visual aids (questions) since he became more alert during the encounter. Although he was capable of communicating verbally, his hypophonia was an issue. Moreover, he was fatigued and demonstrated that there would be challenges to pointing to yes or no responses on the visual aid, so we employed a hand squeezing system to signify yes or no. He was A0X3. The patient was aware of the fact that doctors were interested in performing an endoscopy and that there was potential GI bleeding. He was apprised that the bleeding has possibly stopped but it is unclear if it will resume. We spoke about the potential need for endoscopy and did no multiple times to ensure consistency. He was able to consistently report that he would want an endoscopy using hand gestures and nods, but then could not further discuss whether or not he would be willing to forgo his code status. The wife and daughter expressed that he would never want a tracheostomy. They felt well informed and that the serious risk communicated to them was "1%". They felt given his history as a patient with Crohn's that he  would be acutely aware of the risk of undergoing colonoscopy and its impact. I explained to the family that they ought to discuss this among themselves in the next hour and make a decision. His wife agreed and seemed willing to adhere to that timeline.     Tone: Neutral mostly, some frustration about his recent decline since the initial diagnosis.      Summary:   His wife will decide today about EGD.  At time, the goals are incompatible with hospice since the family is looking to address reversible causes.  Subsequent to the meeting, I met with the primary team and the scope of the consult request was broadened to include conversations about feeding and the potential for hospice.     Action Items:  Forthcoming speech and swallow to help determine modification of diet textures should rehab be sought    Follow up:  Will continue to follow  Will confer with SW    ACP Time > 46 min
Goals of care discussed with the patient and daughter at the bedside. He declined conversation  starter guide.

## 2021-09-09 NOTE — PROGRESS NOTE ADULT - SUBJECTIVE AND OBJECTIVE BOX
Interval Events:   - EGD cancelled as family was contemplating procedure and DNR suspension and could not consent  - No further bloody output from stoma  - H/H stable    Allergies:  No Known Allergies    Hospital Medications:  acetaminophen    Suspension .. 500 milliGRAM(s) Oral every 6 hours PRN  Biotene Dry Mouth Oral Rinse 5 milliLiter(s) Swish and Spit two times a day  chlorhexidine 2% Cloths 1 Application(s) Topical <User Schedule>  cholecalciferol 1000 Unit(s) Oral daily  enoxaparin Injectable 40 milliGRAM(s) SubCutaneous every 24 hours  folic acid 1 milliGRAM(s) Enteral Tube daily  haloperidol    Injectable 1 milliGRAM(s) IV Push every 12 hours  lidocaine 4% Injection for Nebulization 4 milliLiter(s) Nebulizer once  metoprolol tartrate 25 milliGRAM(s) Oral every 6 hours  multivitamin/minerals Oral Solution (WELLESSE) 30 milliLiter(s) Enteral Tube daily  pantoprazole  Injectable 40 milliGRAM(s) IV Push every 12 hours  traMADol 25 milliGRAM(s) Oral every 8 hours PRN      PMHX/PSHX:  No pertinent past medical history    No pertinent past medical history    Crohn disease    Paroxysmal atrial fibrillation    CAD (coronary artery disease)    GI bleed    No significant past surgical history        Family history:      ROS:   Unable to obtain due to AMS    PHYSICAL EXAM:   Vital Signs:  Vital Signs Last 24 Hrs  T(C): 36.1 (09 Sep 2021 11:00), Max: 37.1 (09 Sep 2021 03:00)  T(F): 97 (09 Sep 2021 11:00), Max: 98.8 (09 Sep 2021 03:00)  HR: 84 (09 Sep 2021 14:00) (64 - 90)  BP: 173/71 (09 Sep 2021 14:00) (127/58 - 173/71)  BP(mean): 102 (09 Sep 2021 14:00) (81 - 115)  RR: 31 (09 Sep 2021 14:00) (19 - 38)  SpO2: 99% (09 Sep 2021 14:00) (97% - 100%)  Daily Height in cm: 170.18 (08 Sep 2021 16:38)    Daily Weight in k.4 (09 Sep 2021 04:55)      21 @ 07:01  -  21 @ 07:00  --------------------------------------------------------  IN: 2870 mL / OUT: 1300 mL / NET: 1570 mL    21 @ 07:01  -  21 @ 14:47  --------------------------------------------------------  IN: 485 mL / OUT: 450 mL / NET: 35 mL      GENERAL:  Appears stated age, chronically ill appearing  HEENT:  NC/AT,  conjunctivae clear, sclera -anicteric  CHEST:  Poor effort  HEART:  Regular rhythm, S1, S2   ABDOMEN:  Soft, non-tender, non-distended, ileostomy in place with scant brown stool  EXTREMITIES:  no cyanosis,clubbing or edema  SKIN:  No rash/erythema   NEURO:  Alert, oriented x 0, confused    LABS:                        7.8    5.56  )-----------( 235      ( 09 Sep 2021 14:00 )             25.2     Mean Cell Volume: 93.7 fl (21 @ 14:00)        145  |  115<H>  |  24<H>  ----------------------------<  120<H>  3.6   |  22  |  0.48<L>    Ca    7.8<L>      09 Sep 2021 00:24  Phos  2.3       Mg     2.1         TPro  5.2<L>  /  Alb  1.9<L>  /  TBili  0.4  /  DBili  x   /  AST  27  /  ALT  36  /  AlkPhos  87      LIVER FUNCTIONS - ( 09 Sep 2021 00:24 )  Alb: 1.9 g/dL / Pro: 5.2 g/dL / ALK PHOS: 87 U/L / ALT: 36 U/L / AST: 27 U/L / GGT: x           PT/INR - ( 09 Sep 2021 00:24 )   PT: 13.9 sec;   INR: 1.17 ratio         PTT - ( 09 Sep 2021 00:24 )  PTT:28.6 sec                            7.8    5.56  )-----------( 235      ( 09 Sep 2021 14:00 )             25.2                         7.7    6.14  )-----------( 220      ( 09 Sep 2021 06:03 )             25.3                         7.8    5.72  )-----------( 233      ( 09 Sep 2021 00:24 )             25.2                         8.1    5.86  )-----------( 243      ( 08 Sep 2021 17:51 )             25.5                         7.7    5.88  )-----------( 242      ( 08 Sep 2021 11:58 )             24.4       Imaging:           Interval Events:   - EGD cancelled as family was contemplating procedure and DNR suspension and could not consent  - No further bloody output from stoma  - H/H stable    Allergies:  No Known Allergies    Hospital Medications:  acetaminophen    Suspension .. 500 milliGRAM(s) Oral every 6 hours PRN  Biotene Dry Mouth Oral Rinse 5 milliLiter(s) Swish and Spit two times a day  chlorhexidine 2% Cloths 1 Application(s) Topical <User Schedule>  cholecalciferol 1000 Unit(s) Oral daily  enoxaparin Injectable 40 milliGRAM(s) SubCutaneous every 24 hours  folic acid 1 milliGRAM(s) Enteral Tube daily  haloperidol    Injectable 1 milliGRAM(s) IV Push every 12 hours  lidocaine 4% Injection for Nebulization 4 milliLiter(s) Nebulizer once  metoprolol tartrate 25 milliGRAM(s) Oral every 6 hours  multivitamin/minerals Oral Solution (WELLESSE) 30 milliLiter(s) Enteral Tube daily  pantoprazole  Injectable 40 milliGRAM(s) IV Push every 12 hours  traMADol 25 milliGRAM(s) Oral every 8 hours PRN      PMHX/PSHX:  No pertinent past medical history    No pertinent past medical history    Crohn disease    Paroxysmal atrial fibrillation    CAD (coronary artery disease)    GI bleed    No significant past surgical history        Family history:      ROS:   Unable to obtain due to patient unable to phonate    PHYSICAL EXAM:   Vital Signs:  Vital Signs Last 24 Hrs  T(C): 36.1 (09 Sep 2021 11:00), Max: 37.1 (09 Sep 2021 03:00)  T(F): 97 (09 Sep 2021 11:00), Max: 98.8 (09 Sep 2021 03:00)  HR: 84 (09 Sep 2021 14:00) (64 - 90)  BP: 173/71 (09 Sep 2021 14:00) (127/58 - 173/71)  BP(mean): 102 (09 Sep 2021 14:00) (81 - 115)  RR: 31 (09 Sep 2021 14:00) (19 - 38)  SpO2: 99% (09 Sep 2021 14:00) (97% - 100%)  Daily Height in cm: 170.18 (08 Sep 2021 16:38)    Daily Weight in k.4 (09 Sep 2021 04:55)      21 @ 07:21 @ 07:00  --------------------------------------------------------  IN: 2870 mL / OUT: 1300 mL / NET: 1570 mL    21 @ 07:  -  21 @ 14:47  --------------------------------------------------------  IN: 485 mL / OUT: 450 mL / NET: 35 mL      GENERAL:  Appears stated age, chronically ill appearing  HEENT:  NC/AT,  conjunctivae clear, sclera -anicteric  CHEST:  Poor effort  HEART:  Regular rhythm, S1, S2   ABDOMEN:  Soft, non-tender, non-distended, ileostomy in place with scant brown stool  EXTREMITIES:  no cyanosis,clubbing or edema  SKIN:  No rash/erythema   NEURO:  Alert, oriented x 0, confused    LABS:                        7.8    5.56  )-----------( 235      ( 09 Sep 2021 14:00 )             25.2     Mean Cell Volume: 93.7 fl (21 @ 14:00)        145  |  115<H>  |  24<H>  ----------------------------<  120<H>  3.6   |  22  |  0.48<L>    Ca    7.8<L>      09 Sep 2021 00:24  Phos  2.3       Mg     2.1         TPro  5.2<L>  /  Alb  1.9<L>  /  TBili  0.4  /  DBili  x   /  AST  27  /  ALT  36  /  AlkPhos  87      LIVER FUNCTIONS - ( 09 Sep 2021 00:24 )  Alb: 1.9 g/dL / Pro: 5.2 g/dL / ALK PHOS: 87 U/L / ALT: 36 U/L / AST: 27 U/L / GGT: x           PT/INR - ( 09 Sep 2021 00:24 )   PT: 13.9 sec;   INR: 1.17 ratio         PTT - ( 09 Sep 2021 00:24 )  PTT:28.6 sec                            7.8    5.56  )-----------( 235      ( 09 Sep 2021 14:00 )             25.2                         7.7    6.14  )-----------( 220      ( 09 Sep 2021 06:03 )             25.3                         7.8    5.72  )-----------( 233      ( 09 Sep 2021 00:24 )             25.2                         8.1    5.86  )-----------( 243      ( 08 Sep 2021 17:51 )             25.5                         7.7    5.88  )-----------( 242      ( 08 Sep 2021 11:58 )             24.4

## 2021-09-09 NOTE — CONSULT NOTE ADULT - PROBLEM SELECTOR RECOMMENDATION 2
Condition: Crohn's  Active treatment: Dx approximately 8 months ago according to the family. He has gotten Remicade in the past. He is s/p colectomy  Clinical impact on complexity: Significant

## 2021-09-09 NOTE — CONSULT NOTE ADULT - SUBJECTIVE AND OBJECTIVE BOX
HPI:  80 yo M with Crohn disease, afib on sotalol (not on AC), over the last 9 mo, he was started on several medication and was recently told that he may need to go on IV infusion for chrons, he has several hospitalization at [facility] , and follows with GI near [facility]. but his diarrhea is not improving , having abd cramps , denies any N/V discharged 1 mo ago from [facility], now having trouble walking, on going diarrhea. + subjective fever but denies cough or nasal congestion or uri or urinary symptoms.  (13 Jul 2021 20:48)    PERTINENT PM/SXH:   No pertinent past medical history    No pertinent past medical history    Crohn disease    Paroxysmal atrial fibrillation    CAD (coronary artery disease)    GI bleed      No significant past surgical history      FAMILY HISTORY:    ITEMS NOT CHECKED ARE NOT PRESENT    SOCIAL HISTORY:   Significant other/partner[ ]  Children[ ]  Episcopal/Spirituality:  Substance hx:  [ ]   Tobacco hx:  [ ]   Alcohol hx: [ ]   Home Opioid hx:  [ ] I-Stop Reference No:  Living Situation: [ ]Home  [ ]Long term care  [ ]Rehab [ ]Other    ADVANCE DIRECTIVES:    DNR  MOLST  [ ]  Living Will  [ ]   DECISION MAKER(s):  [ ] Health Care Proxy(s)  [ ] Surrogate(s)  [ ] Guardian           Name(s): Phone Number(s):    BASELINE (I)ADL(s) (prior to admission):  [place]: [ ]Total  [ ] Moderate [ ]Dependent    Allergies    No Known Allergies    Intolerances    MEDICATIONS  (STANDING):  Biotene Dry Mouth Oral Rinse 5 milliLiter(s) Swish and Spit two times a day  chlorhexidine 2% Cloths 1 Application(s) Topical <User Schedule>  cholecalciferol 1000 Unit(s) Oral daily  enoxaparin Injectable 40 milliGRAM(s) SubCutaneous every 24 hours  folic acid 1 milliGRAM(s) Enteral Tube daily  haloperidol    Injectable 1 milliGRAM(s) IV Push every 12 hours  lidocaine 4% Injection for Nebulization 4 milliLiter(s) Nebulizer once  metoprolol tartrate 25 milliGRAM(s) Oral every 6 hours  multivitamin/minerals Oral Solution (WELLESSE) 30 milliLiter(s) Enteral Tube daily  pantoprazole  Injectable 40 milliGRAM(s) IV Push every 12 hours    MEDICATIONS  (PRN):  acetaminophen    Suspension .. 500 milliGRAM(s) Oral every 6 hours PRN Mild Pain (1 - 3)  traMADol 25 milliGRAM(s) Oral every 8 hours PRN Moderate Pain (4 - 6)    PRESENT SYMPTOMS: [ ]Unable to obtain due to poor mentation   Source if other than patient:  [ ]Family   [ ]Team     Pain: [ ]yes [ ]no  QOL impact -   Location -                    Aggravating factors -  Quality -  Radiation -  Timing-  Severity (0-10 scale):  Minimal acceptable level (0-10 scale):     PAIN AD Score:     [email or web address] (press ctrl +  left click to view)    Dyspnea:                           [ ]Mild [ ]Moderate [ ]Severe  Anxiety:                             [ ]Mild [ ]Moderate [ ]Severe  Fatigue:                             [ ]Mild [ ]Moderate [ ]Severe  Nausea:                             [ ]Mild [ ]Moderate [ ]Severe  Loss of appetite:              [ ]Mild [ ]Moderate [ ]Severe  Constipation:                    [ ]Mild [ ]Moderate [ ]Severe    Other Symptoms:  [ ]All other review of systems negative     Palliative Performance Status Version 2:         %    http://npcrc.org/files/news/palliative_performance_scale_ppsv2.pdf  PHYSICAL EXAM:  Vital Signs Last 24 Hrs  T(C): 36.6 (09 Sep 2021 07:00), Max: 37.1 (09 Sep 2021 03:00)  T(F): 97.9 (09 Sep 2021 07:00), Max: 98.8 (09 Sep 2021 03:00)  HR: 86 (09 Sep 2021 10:00) (64 - 90)  BP: 147/71 (09 Sep 2021 10:00) (127/58 - 163/88)  BP(mean): 95 (09 Sep 2021 10:00) (81 - 115)  RR: 37 (09 Sep 2021 10:00) (19 - 38)  SpO2: 100% (09 Sep 2021 10:00) (97% - 100%) I&O's Summary    08 Sep 2021 07:01  -  09 Sep 2021 07:00  --------------------------------------------------------  IN: 2870 mL / OUT: 1300 mL / NET: 1570 mL    09 Sep 2021 07:01  -  09 Sep 2021 10:40  --------------------------------------------------------  IN: 155 mL / OUT: 0 mL / NET: 155 mL      GENERAL:  [ ]Alert  [ ]Oriented x   [ ]Lethargic  [ ]Cachexia  [ ]Unarousable  [ ]Verbal  [ ]Non-Verbal  Behavioral:   [ ] Anxiety  [ ] Delirium [ ] Agitation [ ] Other  HEENT:  [ ]Normal   [ ]Dry mouth   [ ]ET Tube/Trach  [ ]Oral lesions  PULMONARY:   [ ]Clear [ ]Tachypnea  [ ]Audible excessive secretions   [ ]Rhonchi        [ ]Right [ ]Left [ ]Bilateral  [ ]Crackles        [ ]Right [ ]Left [ ]Bilateral  [ ]Wheezing     [ ]Right [ ]Left [ ]Bilatera  [ ]Diminished breath sounds [ ]right [ ]left [ ]bilateral  CARDIOVASCULAR:    [ ]Regular [ ]Irregular [ ]Tachy  [ ]Milan [ ]Murmur [ ]Other  GASTROINTESTINAL:  [ ]Soft  [ ]Distended   [ ]+BS  [ ]Non tender [ ]Tender  [ ]PEG [ ]OGT/ NGT  Last BM:   GENITOURINARY:  [ ]Normal [ ] Incontinent   [ ]Oliguria/Anuria   [ ]Roberson  MUSCULOSKELETAL:   [ ]Normal   [ ]Weakness  [ ]Bed/Wheelchair bound [ ]Edema  NEUROLOGIC:   [ ]No focal deficits  [ ]Cognitive impairment  [ ]Dysphagia [ ]Dysarthria [ ]Paresis [ ]Other   SKIN:   [ ]Normal    [ ]Rash  [ ]Pressure ulcer(s)       Present on admission [ ]y [ ]n    CRITICAL CARE:  [ ] Shock Present  [ ]Septic [ ]Cardiogenic [ ]Neurologic [ ]Hypovolemic  [ ]  Vasopressors [ ]  Inotropes   [ ]Respiratory failure present [ ]Mechanical ventilation [ ]Non-invasive ventilatory support [ ]High flow  [ ]Acute  [ ]Chronic [ ]Hypoxic  [ ]Hypercarbic [ ]Other  [ ]Other organ failure     LABS:                        7.7    6.14  )-----------( 220      ( 09 Sep 2021 06:03 )             25.3   09-09    145  |  115<H>  |  24<H>  ----------------------------<  120<H>  3.6   |  22  |  0.48<L>    Ca    7.8<L>      09 Sep 2021 00:24  Phos  2.3     09-09  Mg     2.1     09-09    TPro  5.2<L>  /  Alb  1.9<L>  /  TBili  0.4  /  DBili  x   /  AST  27  /  ALT  36  /  AlkPhos  87  09-09  PT/INR - ( 09 Sep 2021 00:24 )   PT: 13.9 sec;   INR: 1.17 ratio         PTT - ( 09 Sep 2021 00:24 )  PTT:28.6 sec      RADIOLOGY & ADDITIONAL STUDIES:    PROTEIN CALORIE MALNUTRITION PRESENT: [ ]mild [ ]moderate [ ]severe [ ]underweight [ ]morbid obesity  https://www.andeal.org/vault/2440/web/files/ONC/Table_Clinical%20Characteristics%20to%20Document%20Malnutrition-White%20JV%20et%20al%202012.pdf    Height (cm): 170.2 (09-08-21 @ 16:38)  Weight (kg): 59 (09-08-21 @ 16:38)  BMI (kg/m2): 20.4 (09-08-21 @ 16:38)    [ ]PPSV2 < or = to 30% [ ]significant weight loss  [ ]poor nutritional intake  [ ]anasarca      [ ]Artificial Nutrition      REFERRALS:   [ ]Chaplaincy  [ ]Hospice  [ ]Child Life  [ ]Social Work  [ ]Case management [ ]Holistic Therapy     Goals of Care Document:  HPI:  82 yo M with Crohn disease, afib on sotalol (not on AC), over the last 9 mo, he was started on several medication and was recently told that he may need to go on IV infusion for chrons, he has several hospitalization at [facility] , and follows with GI near [facility]. but his diarrhea is not improving , having abd cramps , denies any N/V discharged 1 mo ago from [facility], now having trouble walking, on going diarrhea. + subjective fever but denies cough or nasal congestion or uri or urinary symptoms.  (13 Jul 2021 20:48)    PERTINENT PM/SXH:   No pertinent past medical history    No pertinent past medical history    Crohn disease    Paroxysmal atrial fibrillation    CAD (coronary artery disease)    GI bleed      No significant past surgical history      FAMILY HISTORY:    ITEMS NOT CHECKED ARE NOT PRESENT    SOCIAL HISTORY:   Significant other/partner[ x]  Children[x ]  Presybeterian/Spirituality:  Substance hx:  [ ]   Tobacco hx:  [ ]   Alcohol hx: [ ]   Home Opioid hx:  [ ] I-Stop Reference No:  Living Situation: [ ]Home  [ ]Long term care  [ ]Rehab [ ]Other    ADVANCE DIRECTIVES:    DNR  MOLST  [ ]  Living Will  [ ]   DECISION MAKER(s):  [ ] Health Care Proxy(s)  [ ] Surrogate(s)  [ ] Guardian           Name(s): Phone Number(s):    BASELINE (I)ADL(s) (prior to admission):  [place]: [ ]Total  [ ] Moderate [ ]Dependent    Allergies    No Known Allergies    Intolerances    MEDICATIONS  (STANDING):  Biotene Dry Mouth Oral Rinse 5 milliLiter(s) Swish and Spit two times a day  chlorhexidine 2% Cloths 1 Application(s) Topical <User Schedule>  cholecalciferol 1000 Unit(s) Oral daily  enoxaparin Injectable 40 milliGRAM(s) SubCutaneous every 24 hours  folic acid 1 milliGRAM(s) Enteral Tube daily  haloperidol    Injectable 1 milliGRAM(s) IV Push every 12 hours  lidocaine 4% Injection for Nebulization 4 milliLiter(s) Nebulizer once  metoprolol tartrate 25 milliGRAM(s) Oral every 6 hours  multivitamin/minerals Oral Solution (WELLESSE) 30 milliLiter(s) Enteral Tube daily  pantoprazole  Injectable 40 milliGRAM(s) IV Push every 12 hours    MEDICATIONS  (PRN):  acetaminophen    Suspension .. 500 milliGRAM(s) Oral every 6 hours PRN Mild Pain (1 - 3)  traMADol 25 milliGRAM(s) Oral every 8 hours PRN Moderate Pain (4 - 6)    PRESENT SYMPTOMS: [  ]Unable to obtain due to poor mentation   Source if other than patient:  [ ]Family   [ ]Team     Pain: [ ]yes [x ]no  QOL impact -   Location -                    Aggravating factors -  Quality -  Radiation -  Timing-  Severity (0-10 scale):  Minimal acceptable level (0-10 scale):     PAIN AD Score:     http://[email or web address]/[email or web address].pdf (press ctrl +  left click to view)    Dyspnea:                           [ ]Mild [ ]Moderate [ ]Severe  Anxiety:                             [ ]Mild [ ]Moderate [ ]Severe  Fatigue:                             [ ]Mild [ ]Moderate [x ]Severe  Nausea:                             [ ]Mild [ ]Moderate [ ]Severe  Loss of appetite:              [ ]Mild [ ]Moderate [ ]Severe  Constipation:                    [ ]Mild [ ]Moderate [ ]Severe    Other Symptoms:  [ ]All other review of systems negative     Palliative Performance Status Version 2: 40        %    http://npcrc.org/files/news/palliative_performance_scale_ppsv2.pdf  PHYSICAL EXAM:  Vital Signs Last 24 Hrs  T(C): 36.6 (09 Sep 2021 07:00), Max: 37.1 (09 Sep 2021 03:00)  T(F): 97.9 (09 Sep 2021 07:00), Max: 98.8 (09 Sep 2021 03:00)  HR: 86 (09 Sep 2021 10:00) (64 - 90)  BP: 147/71 (09 Sep 2021 10:00) (127/58 - 163/88)  BP(mean): 95 (09 Sep 2021 10:00) (81 - 115)  RR: 37 (09 Sep 2021 10:00) (19 - 38)  SpO2: 100% (09 Sep 2021 10:00) (97% - 100%) I&O's Summary    08 Sep 2021 07:01  -  09 Sep 2021 07:00  --------------------------------------------------------  IN: 2870 mL / OUT: 1300 mL / NET: 1570 mL    09 Sep 2021 07:01  -  09 Sep 2021 10:40  --------------------------------------------------------  IN: 155 mL / OUT: 0 mL / NET: 155 mL      GENERAL:  [ ]Alert  [ ]Oriented x   [ x]Lethargic  [ ]Cachexia  [ ]Unarousable  [ ]Verbal  [ ]Non-Verbal  Behavioral:   [ ] Anxiety  [ ] Delirium [ ] Agitation [x ] Other Calm,  HEENT:  [ ]Normal   [ x]Dry mouth   [ ]ET Tube/Trach  [ ]Oral lesions  PULMONARY:   [ ]Clear [ ]Tachypnea  [ ]Audible excessive secretions   [ ]Rhonchi        [ ]Right [ ]Left [ ]Bilateral  [ ]Crackles        [ ]Right [ ]Left [ ]Bilateral  [ ]Wheezing     [ ]Right [ ]Left [ ]Bilatera  [x ]Diminished breath sounds [ ]right [ ]left [ ]bilateral  CARDIOVASCULAR:    [x ]Regular [ ]Irregular [ ]Tachy  [ ]Milan [ ]Murmur [ ]Other  GASTROINTESTINAL: ostomy  [ x]Soft  [ ]Distended   [ ]+BS  [ ]Non tender [ ]Tender  [ ]PEG [ ]OGT/ NGT  Last BM:   GENITOURINARY:  [ ]Normal [ ] Incontinent   [ ]Oliguria/Anuria   [ ]Roberson  MUSCULOSKELETAL:   [ ]Normal   [x ]Weakness  [ ]Bed/Wheelchair bound [ ]Edema  NEUROLOGIC:   [ ]No focal deficits  [ ]Cognitive impairment  [ ]Dysphagia [ ]Dysarthria [ ]Paresis [x ]Other dysphonia  SKIN:   [ x]Normal    [ ]Rash  [ ]Pressure ulcer(s)       Present on admission [ ]y [ ]n    CRITICAL CARE:  [ ] Shock Present  [ ]Septic [ ]Cardiogenic [ ]Neurologic [ ]Hypovolemic  [ ]  Vasopressors [ ]  Inotropes   [ ]Respiratory failure present [ ]Mechanical ventilation [ ]Non-invasive ventilatory support [ ]High flow  [ ]Acute  [ ]Chronic [ ]Hypoxic  [ ]Hypercarbic [ ]Other  [ ]Other organ failure     LABS:                        7.7    6.14  )-----------( 220      ( 09 Sep 2021 06:03 )             25.3   09-09    145  |  115<H>  |  24<H>  ----------------------------<  120<H>  3.6   |  22  |  0.48<L>    Ca    7.8<L>      09 Sep 2021 00:24  Phos  2.3     09-09  Mg     2.1     09-09    TPro  5.2<L>  /  Alb  1.9<L>  /  TBili  0.4  /  DBili  x   /  AST  27  /  ALT  36  /  AlkPhos  87  09-09  PT/INR - ( 09 Sep 2021 00:24 )   PT: 13.9 sec;   INR: 1.17 ratio         PTT - ( 09 Sep 2021 00:24 )  PTT:28.6 sec      RADIOLOGY & ADDITIONAL STUDIES:    PROTEIN CALORIE MALNUTRITION PRESENT: [ ]mild [ ]moderate [ ]severe [ ]underweight [ ]morbid obesity  https://www.andeal.org/vault/2440/web/files/ONC/Table_Clinical%20Characteristics%20to%20Document%20Malnutrition-White%20JV%20et%20al%202012.pdf    Height (cm): 170.2 (09-08-21 @ 16:38)  Weight (kg): 59 (09-08-21 @ 16:38)  BMI (kg/m2): 20.4 (09-08-21 @ 16:38)    [ ]PPSV2 < or = to 30% [ ]significant weight loss  [ ]poor nutritional intake  [ ]anasarca      [ ]Artificial Nutrition      REFERRALS:   [ ]Chaplaincy  [ ]Hospice  [ ]Child Life  [ ]Social Work  [ ]Case management [ ]Holistic Therapy     Goals of Care Document:

## 2021-09-09 NOTE — PROGRESS NOTE ADULT - ASSESSMENT
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 Admitted for crohns management, course c/b cdiff colitis sp dificid/fmt and findings of indeterminate quantiferon, pending id clearance to start biologic. Prealb 8. TPN consulted in setting of significant weight loss, severe pcm, and worsening colitis suspected 2/2 CD. Pt at high risk for refeeding syndrome. TPN started 8/16. Repeat CT showing unchanged diffuse colitis and non specific gb wall edema.  Taken to OR emergently on 8/20 for lap total colectomy, end ileostomy. Failed S&S eval. Family agreed to TANJA tube feeds, which had been started yesterday but then dc'd due to noted blood in ostomy and risk for aspiration given deterioration in mental status. pt now dnr/dni. Mental status improved and restarted on ngt feeds, and tolerating. TPN dcd 9/5    Current Diet: NPO/NGT feeds (pivot 1.5 goal 40cc/hr)    -cont enteral feeds at goal as tolerated  -PICC in place, maintenance as per protocol  -anemia/GIB- care per gi; trend CBC, transfuse PRN  -hypernatremia- resolving sp hypotonic ivf  -hypophosphatemia/hypokalemia- replete as per primary  -vitamin D deficiency- being supplemented  -goc discussions ongoing, f/u pall care eval  -strict I/O's  -further care per primary/SICU; will follow with you    plan discussed with Dr. Fernandez and Dr GERARD Multani, agreeable with above    TPN pager [phone], spectra [phone]  M-F 6A-4P, Weekends and holidays 8A-12P

## 2021-09-09 NOTE — PROGRESS NOTE ADULT - SUBJECTIVE AND OBJECTIVE BOX
TRANSFER - OUT REPORT:    Verbal report given to LEANDRA RN(name) on Antonietta Sale  being transferred to [facility]unit) for routine post - op       Report consisted of patients Situation, Background, Assessment and   Recommendations(SBAR). Information from the following report(s) SBAR was reviewed with the receiving nurse. Lines:   Venous Access Device Proline CT with cuff 09/08/20 (Active)        Opportunity for questions and clarification was provided.       Patient transported with:   [email or web address] TEAM Surgery Progress Note  Patient is a 81y old  Male who presents with a chief complaint of diarrhea with blood in it , abd pain and weakness (08 Sep 2021 19:33)      INTERVAL EVENTS: No acute events overnight.  SUBJECTIVE:        OBJECTIVE:    Vital Signs Last 24 Hrs  T(C): 36.8 (08 Sep 2021 23:00), Max: 36.8 (08 Sep 2021 23:00)  T(F): 98.2 (08 Sep 2021 23:00), Max: 98.2 (08 Sep 2021 23:00)  HR: 69 (09 Sep 2021 01:00) (67 - 139)  BP: 138/60 (09 Sep 2021 01:00) (127/58 - 175/81)  BP(mean): 86 (09 Sep 2021 01:00) (81 - 121)  RR: 20 (09 Sep 2021 01:00) (20 - 50)  SpO2: 100% (09 Sep 2021 01:00) (96% - 100%)I&O's Detail    07 Sep 2021 07:01  -  08 Sep 2021 07:00  --------------------------------------------------------  IN:    dextrose 5% + sodium chloride 0.45% w/ Additives: 600 mL    dextrose 5% + sodium chloride 0.45% w/ Additives: 825 mL    Enteral Tube Flush: 30 mL    IV PiggyBack: 250 mL    IV PiggyBack: 600 mL    Lactated Ringers Bolus: 500 mL    Pantoprazole: 200 mL    Pivot 1.5: 225 mL    PRBCs (Packed Red Blood Cells): 300 mL  Total IN: 3530 mL    OUT:    Bulb (mL): 2 mL    Bulb (mL): 5 mL    Ileostomy (mL): 375 mL    Incontinent per Condom Catheter (mL): 1250 mL  Total OUT: 1632 mL    Total NET: 1898 mL      08 Sep 2021 07:01  -  09 Sep 2021 02:32  --------------------------------------------------------  IN:    dextrose 5% + sodium chloride 0.225% w/ Additives: 1200 mL    Pantoprazole: 160 mL    Pivot 1.5: 180 mL  Total IN: 1540 mL    OUT:    Bulb (mL): 0 mL    Bulb (mL): 0 mL    Ileostomy (mL): 25 mL    Incontinent per Condom Catheter (mL): 725 mL  Total OUT: 750 mL    Total NET: 790 mL      MEDICATIONS  (STANDING):  Biotene Dry Mouth Oral Rinse 5 milliLiter(s) Swish and Spit two times a day  chlorhexidine 2% Cloths 1 Application(s) Topical <User Schedule>  cholecalciferol 1000 Unit(s) Oral daily  dextrose 5% + sodium chloride 0.225% with potassium chloride 20 mEq/L 1000 milliLiter(s) (75 mL/Hr) IV Continuous <Continuous>  folic acid 1 milliGRAM(s) Enteral Tube daily  haloperidol    Injectable 1 milliGRAM(s) IV Push every 12 hours  lidocaine 4% Injection for Nebulization 4 milliLiter(s) Nebulizer once  metoprolol tartrate 25 milliGRAM(s) Oral every 6 hours  multivitamin/minerals Oral Solution (WELLESSE) 30 milliLiter(s) Enteral Tube daily  pantoprazole Infusion 8 mG/Hr (10 mL/Hr) IV Continuous <Continuous>  potassium chloride   Solution 40 milliEquivalent(s) Oral once  sodium phosphate IVPB 15 milliMole(s) IV Intermittent every 1 hour    MEDICATIONS  (PRN):  acetaminophen    Suspension .. 500 milliGRAM(s) Oral every 6 hours PRN Mild Pain (1 - 3)  traMADol 25 milliGRAM(s) Oral every 8 hours PRN Moderate Pain (4 - 6)      PHYSICAL EXAM:  Constitutional: A&Ox3, NAD  Respiratory: Unlabored breathing  Abdomen: Soft, nondistended, NTTP. No rebound or guarding.  Extremities: WWP, MYLES spontaneously    LABS:                        7.8    5.72  )-----------( 233      ( 09 Sep 2021 00:24 )             25.2     09-09    145  |  115<H>  |  24<H>  ----------------------------<  120<H>  3.6   |  22  |  0.48<L>    Ca    7.8<L>      09 Sep 2021 00:24  Phos  2.3     09-09  Mg     2.1     09-09    TPro  5.2<L>  /  Alb  1.9<L>  /  TBili  0.4  /  DBili  x   /  AST  27  /  ALT  36  /  AlkPhos  87  09-09    PT/INR - ( 09 Sep 2021 00:24 )   PT: 13.9 sec;   INR: 1.17 ratio         PTT - ( 09 Sep 2021 00:24 )  PTT:28.6 sec  LIVER FUNCTIONS - ( 09 Sep 2021 00:24 )  Alb: 1.9 g/dL / Pro: 5.2 g/dL / ALK PHOS: 87 U/L / ALT: 36 U/L / AST: 27 U/L / GGT: x                 IMAGING:     TEAM Surgery Progress Note  Patient is a 81y old  Male who presents with a chief complaint of diarrhea with blood in it , abd pain and weakness (08 Sep 2021 19:33)      INTERVAL EVENTS: No acute events overnight.  SUBJECTIVE: Patient seen at bedside this morning. Patient A&Ox0.     OBJECTIVE:    Vital Signs Last 24 Hrs  T(C): 36.8 (08 Sep 2021 23:00), Max: 36.8 (08 Sep 2021 23:00)  T(F): 98.2 (08 Sep 2021 23:00), Max: 98.2 (08 Sep 2021 23:00)  HR: 69 (09 Sep 2021 01:00) (67 - 139)  BP: 138/60 (09 Sep 2021 01:00) (127/58 - 175/81)  BP(mean): 86 (09 Sep 2021 01:00) (81 - 121)  RR: 20 (09 Sep 2021 01:00) (20 - 50)  SpO2: 100% (09 Sep 2021 01:00) (96% - 100%)I&O's Detail    07 Sep 2021 07:01  -  08 Sep 2021 07:00  --------------------------------------------------------  IN:    dextrose 5% + sodium chloride 0.45% w/ Additives: 600 mL    dextrose 5% + sodium chloride 0.45% w/ Additives: 825 mL    Enteral Tube Flush: 30 mL    IV PiggyBack: 250 mL    IV PiggyBack: 600 mL    Lactated Ringers Bolus: 500 mL    Pantoprazole: 200 mL    Pivot 1.5: 225 mL    PRBCs (Packed Red Blood Cells): 300 mL  Total IN: 3530 mL    OUT:    Bulb (mL): 2 mL    Bulb (mL): 5 mL    Ileostomy (mL): 375 mL    Incontinent per Condom Catheter (mL): 1250 mL  Total OUT: 1632 mL    Total NET: 1898 mL      08 Sep 2021 07:01  -  09 Sep 2021 02:32  --------------------------------------------------------  IN:    dextrose 5% + sodium chloride 0.225% w/ Additives: 1200 mL    Pantoprazole: 160 mL    Pivot 1.5: 180 mL  Total IN: 1540 mL    OUT:    Bulb (mL): 0 mL    Bulb (mL): 0 mL    Ileostomy (mL): 25 mL    Incontinent per Condom Catheter (mL): 725 mL  Total OUT: 750 mL    Total NET: 790 mL      MEDICATIONS  (STANDING):  Biotene Dry Mouth Oral Rinse 5 milliLiter(s) Swish and Spit two times a day  chlorhexidine 2% Cloths 1 Application(s) Topical <User Schedule>  cholecalciferol 1000 Unit(s) Oral daily  dextrose 5% + sodium chloride 0.225% with potassium chloride 20 mEq/L 1000 milliLiter(s) (75 mL/Hr) IV Continuous <Continuous>  folic acid 1 milliGRAM(s) Enteral Tube daily  haloperidol    Injectable 1 milliGRAM(s) IV Push every 12 hours  lidocaine 4% Injection for Nebulization 4 milliLiter(s) Nebulizer once  metoprolol tartrate 25 milliGRAM(s) Oral every 6 hours  multivitamin/minerals Oral Solution (WELLESSE) 30 milliLiter(s) Enteral Tube daily  pantoprazole Infusion 8 mG/Hr (10 mL/Hr) IV Continuous <Continuous>  potassium chloride   Solution 40 milliEquivalent(s) Oral once  sodium phosphate IVPB 15 milliMole(s) IV Intermittent every 1 hour    MEDICATIONS  (PRN):  acetaminophen    Suspension .. 500 milliGRAM(s) Oral every 6 hours PRN Mild Pain (1 - 3)  traMADol 25 milliGRAM(s) Oral every 8 hours PRN Moderate Pain (4 - 6)      PHYSICAL EXAM:  Constitutional: A&Ox0  Respiratory: Unlabored breathing  Abdomen: Soft, nondistended, nontender. Ostomy with dark stool. Incision dressing C/D/i.   Extremities: WWP, MYLES spontaneously    LABS:                        7.8    5.72  )-----------( 233      ( 09 Sep 2021 00:24 )             25.2     09-09    145  |  115<H>  |  24<H>  ----------------------------<  120<H>  3.6   |  22  |  0.48<L>    Ca    7.8<L>      09 Sep 2021 00:24  Phos  2.3     09-09  Mg     2.1     09-09    TPro  5.2<L>  /  Alb  1.9<L>  /  TBili  0.4  /  DBili  x   /  AST  27  /  ALT  36  /  AlkPhos  87  09-09    PT/INR - ( 09 Sep 2021 00:24 )   PT: 13.9 sec;   INR: 1.17 ratio         PTT - ( 09 Sep 2021 00:24 )  PTT:28.6 sec  LIVER FUNCTIONS - ( 09 Sep 2021 00:24 )  Alb: 1.9 g/dL / Pro: 5.2 g/dL / ALK PHOS: 87 U/L / ALT: 36 U/L / AST: 27 U/L / GGT: x                 IMAGING:

## 2021-09-09 NOTE — PROGRESS NOTE ADULT - ASSESSMENT
82yo M with Crohn's disease, now s/p total abdominal colectomy with end ileostomy on 8/20      PLAN:  Neurologic: post-op pain control, new-onset hypophonia without focal neuro deficits, obtundation  - Monitor mental status  - Pain control: PO Tylenol 500mg q6hr PRN, Tramadol 25mg PO PRN  - Vitamin D3 daily    Respiratory: Acute respiratory insufficiency resolving, patient on RA  - Incentive spirometry 10x/hr while in bed, OOBTC    Cardiovascular: History of AFib currently rate controlled with IV metoprolol and amiodarone, now in NSR  - Monitor vital signs  - Metoprolol 25mg PO q8h    Gastrointestinal/Nutrition: s/p total abdominal colectomy with end ileostomy c/b recurrent bloody ostomy output; s/p TPN  - TF Pivot 1.5 @ 45cc/hr via TANJA   - Protonix gtt for GIB  - Monitor ostomy output and character  - PAOLO drains x2 with persistently low OP, consider d/c by primary team    Renal/Genitourinary: no active issues   - IVL  - Monitor and replete electrolytes as needed  - Monitor UOP, voiding spontaneously    Hematologic: recurrent GIB -- last transfusion: 1u pRBC 9/7  - Trend H/H and transfuse Hgb < 7.5  - Chemical VTE ppx HELD due to downtrending H/H    Infectious Disease: C. diff colitis vs Crohn's flair s/p subtotal colectomy   - Trend WBC on CBC q8h  - Monitor fever curve    Endocrine: no active issues; s/p steroid taper 8/26  - Monitor glucose on BMP     Lines & Drains:   - LLQ & LUQ drain 8/20  - TPN 8/16  - RUE PICC placed 8/16    Disposition: SICU  Code Status: DNR/DNI, MOLST in chart

## 2021-09-09 NOTE — PROGRESS NOTE ADULT - ATTENDING COMMENTS
No additional bleeding overnight. Remains weak and hypophonic today.  When GI had wanted to proceed w/ EGD yesterday there was uncertainty from his family regarding the DNR and DNI status for the procedure.    abd soft, non-distended, non-tender to palpation  ostomy pink and viable w/ brown liquid stool and air in the pouch (no blood)  drain to bulb suction w/ serous output    - would push reassessing his swallowing ability to try to get the NG tube out as soon as possible   - agree a/ palliative care consult for GOC conversation (appreciate assistance)  - explained to the pt, wife and daughter that I feel that an EGD would not be helpful now that he has stopped bleeding but they should make a game plan in case he should rebleed. Explained that everything has a risk including an EGD but that it is a relatively low risk procedure w/ possible larger payoff/benefit  - encourage OOB and PT    Ca Malone MD  Attending Physician

## 2021-09-09 NOTE — CONSULT NOTE ADULT - CONSULT REASON
consulted for assistance with medical decision making in the context of a patient with conceptual challenges with potential EGD

## 2021-09-09 NOTE — CONSULT NOTE ADULT - PROBLEM SELECTOR RECOMMENDATION 3
Current functional PPSv2: 40  Nursing care required: Assistance with ADLs  Code status documented: DNR     Family Health Care Decision Act (FHCDA) Surrogate Decision Maker Hierarchy in the absence of a health care agent  [facility] Article 81 Guardian-->Spouse or domestic partner--> Adult child-->Parent-->Sibling--> Close friend

## 2021-09-09 NOTE — PROGRESS NOTE ADULT - ATTENDING COMMENTS
Patient seen and examined, agree with above. H/H stable, brown stool in ostomy today. Discussed with patient, his wife and daughter at bedside again regarding EGD if he has more bleeding, and discussed the need to address code status during the procedure. They are still undecided and have difficulty in arriving in conclusion. Will await palliative care GOC discussion/consultation. Continue PPI, monitor H/H.

## 2021-09-09 NOTE — PROGRESS NOTE ADULT - ASSESSMENT
1. Acute blood loss anemia - differential includes PUD, stress ulcers, less likely small bowel angioectasia/source. Resolved for now with stable H/H. Awaiting family's decision re: GOC.   2. Severe Crohn's disease with predominantly colonic disease - complicated by C diff and failed steroid rescue and previous infliximab, now s/p total colectomy with ileostomy  3. Altered mental status - suspect delirium given prolonged hospitalization    Recommendations:  - Possible endoscopy pending clinical course and GOC discussion with family  - c/w PPI    Thank you for involving us in the care of this patient. Please reach out if any further questions.    Avni Reyes, PGY-5  GI Fellow    Available on [employer] Teams  Pager [phone] ([facility]) or [phone] ([facility])  After 5PM/Weekends, please contact the on-call GI fellow: [phone]  Available through Microsoft Teams     1. Acute blood loss anemia - differential includes PUD, stress ulcers, less likely small bowel angioectasia/source. Resolved for now with stable H/H. Awaiting family's decision re: GOC.   2. Severe Crohn's disease with predominantly colonic disease - complicated by C diff and failed steroid rescue and previous infliximab, now s/p total colectomy with ileostomy  3. Altered mental status - suspect delirium given prolonged hospitalization    Recommendations:  - Possible endoscopy pending clinical course and GOC discussion with family  - c/w PPI  - Follow-up palliative care GOC discussion  - Discussed with SICU team    Thank you for involving us in the care of this patient. Please reach out if any further questions.    Avni Reyes, PGY-5  GI Fellow    Available on Microsoft Teams  Pager [phone] ([facility]) or [phone] ([facility])  After 5PM/Weekends, please contact the on-call GI fellow: [phone]  Available through [employer] Teams

## 2021-09-09 NOTE — PROGRESS NOTE ADULT - SUBJECTIVE AND OBJECTIVE BOX
HISTORY   81y Male with Crohn disease s/p failed medical management. 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 Transferred to SICU intubated and on pressors. Now extubated and off pressors.    24 HOUR EVENTS:  - Went down for EGD; however GI could not clarify code status for procedure so procedure was not done.   - TF restarted  - Palliative Carec consult placed to establish definitive GOC prior to reconsidering EGD      SUBJECTIVE/ROS:  [ ] A ten-point review of systems was otherwise negative except as noted.  [ ] Due to altered mental status/intubation, subjective information were not able to be obtained from the patient. History was obtained, to the extent possible, from review of the chart and collateral sources of information.      NEURO  Exam: awake, alert  Meds: acetaminophen    Suspension .. 500 milliGRAM(s) Oral every 6 hours PRN Mild Pain (1 - 3)  haloperidol    Injectable 1 milliGRAM(s) IV Push every 12 hours  traMADol 25 milliGRAM(s) Oral every 8 hours PRN Moderate Pain (4 - 6)    [x] Adequacy of sedation and pain control has been assessed and adjusted      RESPIRATORY  RR: 24 (09-09-21 @ 03:00) (19 - 50)  SpO2: 99% (09-09-21 @ 03:00) (96% - 100%)  Exam: unlabored, clear to auscultation bilaterally  Mechanical Ventilation: none   [N/A] Extubation Readiness Assessed  Meds: none       CARDIOVASCULAR  HR: 82 (09-09-21 @ 03:00) (67 - 139)  BP: 153/70 (09-09-21 @ 03:00) (127/58 - 175/81)  BP(mean): 100 (09-09-21 @ 03:00) (81 - 121)  VBG - ( 07 Sep 2021 20:43 )  pH: 7.40  /  pCO2: 43    /  pO2: 27    / HCO3: 27    / Base Excess: 1.6   /  SaO2: 52.0   Lactate: 1.2    Exam: regular rate and rhythm  Cardiac Rhythm: sinus  Perfusion     [x]Adequate   [ ]Inadequate  Mentation   [x]Normal       [ ]Reduced  Extremities  [x]Warm         [ ]Cool  Volume Status [ ]Hypervolemic [x]Euvolemic [ ]Hypovolemic  Meds: metoprolol tartrate 25 milliGRAM(s) Oral every 6 hours        GI/NUTRITION  Exam: soft, nontender, nondistended, incision C/D/I  Diet: TF Pivot @ 45ml/hr   Meds: pantoprazole Infusion 8 mG/Hr IV Continuous <Continuous>      GENITOURINARY  I&O's Detail    09-07 @ 07:01  -  09-08 @ 07:00  --------------------------------------------------------  IN:    dextrose 5% + sodium chloride 0.45% w/ Additives: 825 mL    dextrose 5% + sodium chloride 0.45% w/ Additives: 600 mL    Enteral Tube Flush: 30 mL    IV PiggyBack: 250 mL    IV PiggyBack: 600 mL    Lactated Ringers Bolus: 500 mL    Pantoprazole: 200 mL    Pivot 1.5: 225 mL    PRBCs (Packed Red Blood Cells): 300 mL  Total IN: 3530 mL    OUT:    Bulb (mL): 2 mL    Bulb (mL): 5 mL    Ileostomy (mL): 375 mL    Incontinent per Condom Catheter (mL): 1250 mL  Total OUT: 1632 mL    Total NET: 1898 mL      09-08 @ 07:01  -  09-09 @ 03:13  --------------------------------------------------------  IN:    dextrose 5% + sodium chloride 0.225% w/ Additives: 1500 mL    Enteral Tube Flush: 60 mL    IV PiggyBack: 250 mL    Pantoprazole: 200 mL    Pivot 1.5: 360 mL  Total IN: 2370 mL    OUT:    Bulb (mL): 0 mL    Bulb (mL): 0 mL    Ileostomy (mL): 25 mL    Incontinent per Condom Catheter (mL): 900 mL  Total OUT: 925 mL    Total NET: 1445 mL        Weight (kg): 59 (09-08 @ 16:38)  09-09    145  |  115<H>  |  24<H>  ----------------------------<  120<H>  3.6   |  22  |  0.48<L>    Ca    7.8<L>      09 Sep 2021 00:24  Phos  2.3     09-09  Mg     2.1     09-09    TPro  5.2<L>  /  Alb  1.9<L>  /  TBili  0.4  /  DBili  x   /  AST  27  /  ALT  36  /  AlkPhos  87  09-09    [ ] Roberson catheter, indication: N/A  Meds: cholecalciferol 1000 Unit(s) Oral daily  dextrose 5% + sodium chloride 0.225% with potassium chloride 20 mEq/L 1000 milliLiter(s) IV Continuous <Continuous>  folic acid 1 milliGRAM(s) Enteral Tube daily  multivitamin/minerals Oral Solution (WELLESSE) 30 milliLiter(s) Enteral Tube daily  sodium phosphate IVPB 15 milliMole(s) IV Intermittent every 1 hour        HEMATOLOGIC  Meds: none   [x] VTE Prophylaxis                        7.8    5.72  )-----------( 233      ( 09 Sep 2021 00:24 )             25.2     PT/INR - ( 09 Sep 2021 00:24 )   PT: 13.9 sec;   INR: 1.17 ratio         PTT - ( 09 Sep 2021 00:24 )  PTT:28.6 sec  Transfusion     [ ] PRBC   [ ] Platelets   [ ] FFP   [ ] Cryoprecipitate      INFECTIOUS DISEASES  WBC Count: 5.72 K/uL (09-09 @ 00:24)  WBC Count: 5.86 K/uL (09-08 @ 17:51)  WBC Count: 5.88 K/uL (09-08 @ 11:58)  WBC Count: 6.29 K/uL (09-08 @ 08:29)    RECENT CULTURES: none     Meds: none       ENDOCRINE  CAPILLARY BLOOD GLUCOSE        Meds: none       ACCESS DEVICES:  [x] Peripheral IV  [ ] Central Venous Line	[ ] R	[ ] L	[ ] IJ	[ ] Fem	[ ] SC	Placed:   [ ] Arterial Line		[ ] R	[ ] L	[ ] Fem	[ ] Rad	[ ] Ax	Placed:   [ ] PICC:					[ ] Mediport  [ ] Urinary Catheter, Date Placed:   [x] Necessity of urinary, arterial, and venous catheters discussed    OTHER MEDICATIONS:  Biotene Dry Mouth Oral Rinse 5 milliLiter(s) Swish and Spit two times a day  chlorhexidine 2% Cloths 1 Application(s) Topical <User Schedule>  lidocaine 4% Injection for Nebulization 4 milliLiter(s) Nebulizer once      CODE STATUS:    DNR         IMAGING: < from: CT Abdomen and Pelvis w/ IV Cont (08.28.21 @ 17:45) >  BLADDER: .  REPRODUCTIVE ORGANS: Prominent and heterogeneous prostate gland.    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 Wall thickening of the sigmoid colon.  PERITONEUM: Postsurgical changes including mild pneumobilia. Mild intra-abdominal edema the pelvis which may be postsurgical or related to bowel inflammation. Intra-abdominal drains with tips in the rightupper pelvis and right lower pelvis.  VESSELS: Vascular calcifications. No active bleeding identified.  RETROPERITONEUM/LYMPH NODES: No lymphadenopathy.  ABDOMINAL WALL: Postsurgical changes.  BONES: Degenerative changes of bone.    IMPRESSION:  Interval colectomy with postsurgical changes. No active bleed visualized.    Wall thickening of the sigmoid colon extending to what appears to be mucous fistula raising concern for inflammation.      < end of copied text >

## 2021-09-09 NOTE — CONSULT NOTE ADULT - PROBLEM SELECTOR RECOMMENDATION 9
- Voice should improve over the next 2-3 weeks, if no improvement then f/u with laryngologist Dr. Patterson as an outpatient for vocal cord injection.   - No further ENT intervention at this time   - Call with questions or concerns
? post extubation  d/w Sx who reports that this has happened previously   Consider obtaining and using a communication board to assess his needs  Visual prompts and questions used to elicit patient preferences today

## 2021-09-10 NOTE — PROGRESS NOTE ADULT - ASSESSMENT
80 yo M with pmhx of Crohns disease (dxd 9 mos ago, hx of perianal fistula, sp tx w remicaide until developed ab; since managed on budesonide, mtx/5asa), h/o cdiff, CAD, afib (not on ac), p/w bloody diarrhea, abd pain and weakness. Admitted for crohns management, course c/b cdiff colitis sp dificid/fmt and findings of indeterminate quantiferon, pending id clearance to start biologic. Prealb 8. TPN consulted in setting of significant weight loss, severe pcm, and worsening colitis suspected 2/2 CD. Pt at high risk for refeeding syndrome. TPN started 8/16. Repeat CT showing unchanged diffuse colitis and non specific gb wall edema.  Taken to OR emergently on 8/20 for lap total colectomy, end ileostomy. Failed S&S eval. Family agreed to TANJA tube feeds, which had been started yesterday but then dc'd due to noted blood in ostomy and risk for aspiration given deterioration in mental status. pt now dnr/dni. Mental status improved and restarted on ngt feeds, and tolerating. TPN dcd 9/5    Current Diet: NPO/NGT feeds (pivot 1.5 goal 40cc/hr)    -Cont enteral feeds at goal as tolerated.  -Nutritional Assessment, obtain weekly prealbumin.  -PICC in place, maintenance as per protocol  -Anemia/GIB- care per GI, trend CBC, transfuse PRN  -Electrolyte Imbalance Risk, daily CMP, Mg, Phos, K, iCa. Hypernatremia- resolving sp hypotonic IVF. Hypophosphatemia and hypokalemia- replete as per primary  -Vitamin D deficiency- being supplemented  -GOC discussions ongoing, f/u pall care eval  -Strict I/O's  -Further care per primary/SICU; will follow with you    plan discussed with Dr. Fernandez and Dr GERARD Multani, agreeable with above    TPN pager [phone], spectra [phone]  M-F 6A-4P, Weekends and holidays 8A-12P

## 2021-09-10 NOTE — PROGRESS NOTE ADULT - ASSESSMENT
A/P 82yo M with Crohn's disease, now s/p total abdominal colectomy with end ileostomy on 8/20    Sacral/bilateral buttocks with deep tissue injury in evolution present on admission  stage 2 thoracic spine pressure injury  incontinence dermatitis  Incontinence of bowel and bladder      Recommend:  1.)  sacral/inner buttocks injury - medihoney dressing        Thoracic spine- allevyn foam  2.) Incontinence management - incontinence cleanser, pads, pericare BID, consider external male urinary catheter  3.) Maintain on an alternating air with low air loss surface  4.) Turn and reposition Q 2 hours  5.) Nutrition optimization w/ PIVot TF in pt w/ severe protein calorie malnutrition and increased nutritional needs          also MVI, & folic acid  6.) Offload heels/feet with complete care air fluidized boots/ensure soles of feet do not rest on foot board of the bed.  Care as per SICU, will follow w/ you  Upon discharge f/u as outpatient at [facility] [address] [facility] [phone]  D/w team  Jessica Silveira PA-C, CWS [id number] A/P 80yo M with Crohn's disease, now s/p total abdominal colectomy with end ileostomy on 8/20    Sacral/bilateral buttocks with deep tissue injury in evolution present on admission  stage 2 thoracic spine pressure injury    Recommend:  1.)  sacral/inner buttocks injury - medihoney dressing        Thoracic spine- allevyn foam  2.)  Rosina w/ pericare BID and prn soiling external male urinary catheter  3.) Maintain on an alternating air with low air loss surface  4.) Turn and reposition Q 2 hours  5.) Nutrition optimization w/ PIVot TF in pt w/ severe protein calorie malnutrition and increased nutritional needs          also MVI, & folic acid  6.) Offload heels/feet with complete care air fluidized boots/ensure soles of feet do not rest on foot board of the bed.  Care as per SICU, will follow w/ you  Upon discharge f/u as outpatient at [facility] [address] [facility] [phone]  D/w team  Jessica Silveira PA-C, CWS [id number] A/P 82yo M with Crohn's disease, now s/p total abdominal colectomy with end ileostomy on 8/20    Sacral/bilateral buttocks with deep tissue injury in evolution present on admission  stage 2 thoracic spine pressure injury  incontinence of urine    Recommend:  1.)  sacral/inner buttocks injury - medihoney dressing        Thoracic spine- allevyn foam  2.)  Rosina w/ pericare BID and prn soiling external male urinary catheter  3.) Maintain on an alternating air with low air loss surface  4.) Turn and reposition Q 2 hours  5.) Nutrition optimization w/ PIVot TF in pt w/ severe protein calorie malnutrition and increased nutritional needs          also MVI, & folic acid  6.) Offload heels/feet with complete care air fluidized boots/ensure soles of feet do not rest on foot board of the bed.  Care as per SICU, will follow w/ you  Upon discharge f/u as outpatient at [facility] [address] [facility] [phone]  D/w team  Jessica Silveira PA-C, CWS [id number] A/P 82yo M with Crohn's disease, now s/p total abdominal colectomy with end ileostomy on 8/20    Sacral/bilateral buttocks with deep tissue injury in evolution present on admission  stage 2 thoracic spine pressure injury  incontinence of urine    Recommend:  1.)  sacral/inner buttocks injury - medihoney dressing        Thoracic spine- allevyn foam  2.)  Rosina w/ pericare BID and prn soiling external male urinary catheter  3.) Maintain on an alternating air with low air loss surface  4.) Turn and reposition Q 2 hours  5.) Nutrition optimization w/ PIVot TF in pt w/ severe protein calorie malnutrition and increased nutritional needs          also MVI, & folic acid  6.) Offload heels/feet with complete care air fluidized boots/ensure soles of feet do not rest on foot board of the bed.  Care as per SICU, will follow w/ you  Upon discharge f/u as outpatient at [facility] [address] [facility] [phone]  D/w team  Jessica Silveira PA-C, CWS [id number]  I spent 25minutes face to face w/ this pt of which more than 50% of the time was spent counseling & coordinating care of this pt.

## 2021-09-10 NOTE — PROGRESS NOTE ADULT - SUBJECTIVE AND OBJECTIVE BOX
Patient is a 81y old  Male who presents with a chief complaint of diarrhea with blood in it , abd pain and weakness (10 Sep 2021 16:35)      INTERVAL HPI/OVERNIGHT EVENTS: condition same , very weak   T(C): 36.4 (09-10-21 @ 15:00), Max: 37.5 (09-09-21 @ 23:00)  HR: 85 (09-10-21 @ 19:00) (75 - 95)  BP: 129/60 (09-10-21 @ 19:00) (97/55 - 181/77)  RR: 26 (09-10-21 @ 19:00) (25 - 36)  SpO2: 100% (09-10-21 @ 19:00) (96% - 100%)  Wt(kg): --  I&O's Summary    09 Sep 2021 07:01  -  10 Sep 2021 07:00  --------------------------------------------------------  IN: 1740 mL / OUT: 1950 mL / NET: -210 mL    10 Sep 2021 07:01  -  10 Sep 2021 21:30  --------------------------------------------------------  IN: 1290 mL / OUT: 1065 mL / NET: 225 mL        PAST MEDICAL & SURGICAL HISTORY:  Crohn disease    Paroxysmal atrial fibrillation    CAD (coronary artery disease)  not on ASA or plavix, no stents as per daughter    GI bleed    No significant past surgical history        SOCIAL HISTORY  Alcohol:  Tobacco:  Illicit substance use:    FAMILY HISTORY:    REVIEW OF SYSTEMS:  CONSTITUTIONAL: No fever, weight loss, or fatigue  EYES: No eye pain, visual disturbances, or discharge  ENMT:  No difficulty hearing, tinnitus, vertigo; No sinus or throat pain  NECK: No pain or stiffness  RESPIRATORY: No cough, wheezing, chills or hemoptysis; No shortness of breath  CARDIOVASCULAR: No chest pain, palpitations, dizziness, or leg swelling  GASTROINTESTINAL: No abdominal or epigastric pain. No nausea, vomiting, or hematemesis; No diarrhea or constipation. No melena or hematochezia.  GENITOURINARY: No dysuria, frequency, hematuria, or incontinence  NEUROLOGICAL: No headaches, memory loss, loss of strength, numbness, or tremors  SKIN: No itching, burning, rashes, or lesions   LYMPH NODES: No enlarged glands  ENDOCRINE: No heat or cold intolerance; No hair loss  MUSCULOSKELETAL: No joint pain or swelling; No muscle, back, or extremity pain  PSYCHIATRIC: No depression, anxiety, mood swings, or difficulty sleeping  HEME/LYMPH: No easy bruising, or bleeding gums  ALLERY AND IMMUNOLOGIC: No hives or eczema    RADIOLOGY & ADDITIONAL TESTS:    Imaging Personally Reviewed:  [ ] YES  [ ] NO    Consultant(s) Notes Reviewed:  [ ] YES  [ ] NO    PHYSICAL EXAM:  GENERAL: NAD, well-groomed, well-developed  HEAD:  Atraumatic, Normocephalic  EYES: EOMI, PERRLA, conjunctiva and sclera clear  ENMT: No tonsillar erythema, exudates, or enlargement; Moist mucous membranes, Good dentition, No lesions  NECK: Supple, No JVD, Normal thyroid  NERVOUS SYSTEM:  Alert & Oriented X3, Good concentration; Motor Strength 5/5 B/L upper and lower extremities; DTRs 2+ intact and symmetric  CHEST/LUNG: Clear to percussion bilaterally; No rales, rhonchi, wheezing, or rubs  HEART: Regular rate and rhythm; No murmurs, rubs, or gallops  ABDOMEN: Soft, Nontender, Nondistended; Bowel sounds present  EXTREMITIES:  2+ Peripheral Pulses, No clubbing, cyanosis, or edema  LYMPH: No lymphadenopathy noted  SKIN: No rashes or lesions    LABS:                        8.0    7.42  )-----------( 278      ( 10 Sep 2021 14:17 )             25.5     09-10    144  |  114<H>  |  23  ----------------------------<  119<H>  4.1   |  23  |  0.42<L>    Ca    7.6<L>      10 Sep 2021 14:17  Phos  4.0     09-10  Mg     2.2     09-10    TPro  5.8<L>  /  Alb  2.2<L>  /  TBili  0.3  /  DBili  x   /  AST  21  /  ALT  37  /  AlkPhos  96  09-10    PT/INR - ( 10 Sep 2021 02:10 )   PT: 14.1 sec;   INR: 1.18 ratio         PTT - ( 10 Sep 2021 02:10 )  PTT:30.3 sec    CAPILLARY BLOOD GLUCOSE                MEDICATIONS  (STANDING):  Biotene Dry Mouth Oral Rinse 5 milliLiter(s) Swish and Spit two times a day  chlorhexidine 2% Cloths 1 Application(s) Topical <User Schedule>  cholecalciferol 1000 Unit(s) Oral daily  enoxaparin Injectable 40 milliGRAM(s) SubCutaneous every 24 hours  folic acid 1 milliGRAM(s) Enteral Tube daily  haloperidol    Injectable 1 milliGRAM(s) IV Push every 12 hours  metoprolol tartrate 25 milliGRAM(s) Oral every 6 hours  multivitamin/minerals Oral Solution (WELLESSE) 30 milliLiter(s) Enteral Tube daily  pantoprazole  Injectable 40 milliGRAM(s) IV Push every 12 hours    MEDICATIONS  (PRN):  acetaminophen    Suspension .. 500 milliGRAM(s) Oral every 6 hours PRN Mild Pain (1 - 3)  traMADol 25 milliGRAM(s) Oral every 8 hours PRN Moderate Pain (4 - 6)      Care Discussed with Consultants/Other Providers [ ] YES  [ ] NO

## 2021-09-10 NOTE — PROGRESS NOTE ADULT - ASSESSMENT
1. Acute blood loss anemia - differential includes PUD, stress ulcers, less likely small bowel angioectasia/source. Resolved for now with stable H/H. Given resolution of bleeding will hold off from endoscopy at this time.  2. Severe Crohn's disease with predominantly colonic disease - complicated by C diff and failed steroid rescue and previous infliximab, now s/p total colectomy with ileostomy  3. Altered mental status - suspect delirium given prolonged hospitalization    Recommendations:  - No plans for endoscopy at this time  - c/w PPI  - Follow-up palliative care [facility] discussion  - GI will sign off    Thank you for involving us in the care of this patient. Please reach out if any further questions.    Avni Reyes, PGY-5  GI Fellow    Available on [employer] Teams  Pager [phone] ([facility]) or [phone] ([facility])  After 5PM/Weekends, please contact the on-call GI fellow: [phone]  Available through Microsoft Teams   1. Acute blood loss anemia - differential includes PUD, stress ulcers, less likely small bowel angioectasia/source. Resolved for now with stable H/H. Given resolution of bleeding will hold off from endoscopy at this time.  2. Severe Crohn's disease with predominantly colonic disease - complicated by C diff and failed steroid rescue and previous infliximab, now s/p total colectomy with ileostomy  3. Altered mental status - suspect delirium given prolonged hospitalization    Recommendations:  - No plans for endoscopy at this time  - c/w PPI  - Follow-up palliative care [facility] discussion  - Patient does not want NGT and wants pleasure feeds once family is here tomorrow  - Would recommend putting patient on dysphagia III diet with nectar thick liquids as per his wishes; he and his son at bedside understands the risks of aspiration and wants to have pleasure feeds  - GI will sign off    Thank you for involving us in the care of this patient. Please reach out if any further questions.    Avni Reyes, PGY-5  GI Fellow    Available on Microsoft Teams  Pager [phone] ([facility]) or [phone] ([facility])  After 5PM/Weekends, please contact the on-call GI fellow: [phone]  Available through [employer] Teams

## 2021-09-10 NOTE — PROGRESS NOTE ADULT - SUBJECTIVE AND OBJECTIVE BOX
TEAM Surgery Progress Note  Patient is a 81y old  Male who presents with a chief complaint of diarrhea with blood in it , abd pain and weakness (09 Sep 2021 14:46)      INTERVAL EVENTS: No acute events overnight.  SUBJECTIVE:       OBJECTIVE:    Vital Signs Last 24 Hrs  T(C): 37.5 (09 Sep 2021 23:00), Max: 37.5 (09 Sep 2021 23:00)  T(F): 99.5 (09 Sep 2021 23:00), Max: 99.5 (09 Sep 2021 23:00)  HR: 77 (10 Sep 2021 02:00) (64 - 87)  BP: 144/59 (10 Sep 2021 02:00) (136/62 - 173/71)  BP(mean): 87 (10 Sep 2021 02:00) (87 - 115)  RR: 28 (10 Sep 2021 02:00) (17 - 38)  SpO2: 96% (10 Sep 2021 02:00) (96% - 100%)I&O's Detail    08 Sep 2021 07:01  -  09 Sep 2021 07:00  --------------------------------------------------------  IN:    dextrose 5% + sodium chloride 0.225% w/ Additives: 1500 mL    Enteral Tube Flush: 90 mL    IV PiggyBack: 500 mL    Pantoprazole: 240 mL    Pivot 1.5: 540 mL  Total IN: 2870 mL    OUT:    Bulb (mL): 20 mL    Bulb (mL): 5 mL    Ileostomy (mL): 275 mL    Incontinent per Condom Catheter (mL): 1000 mL  Total OUT: 1300 mL    Total NET: 1570 mL      09 Sep 2021 07:01  -  10 Sep 2021 02:59  --------------------------------------------------------  IN:    Enteral Tube Flush: 310 mL    Pantoprazole: 20 mL    Pivot 1.5: 765 mL  Total IN: 1095 mL    OUT:    Bulb (mL): 0 mL    Bulb (mL): 0 mL    Ileostomy (mL): 200 mL    Incontinent per Condom Catheter (mL): 1125 mL  Total OUT: 1325 mL    Total NET: -230 mL      MEDICATIONS  (STANDING):  Biotene Dry Mouth Oral Rinse 5 milliLiter(s) Swish and Spit two times a day  chlorhexidine 2% Cloths 1 Application(s) Topical <User Schedule>  cholecalciferol 1000 Unit(s) Oral daily  enoxaparin Injectable 40 milliGRAM(s) SubCutaneous every 24 hours  folic acid 1 milliGRAM(s) Enteral Tube daily  haloperidol    Injectable 1 milliGRAM(s) IV Push every 12 hours  lidocaine 4% Injection for Nebulization 4 milliLiter(s) Nebulizer once  magnesium sulfate  IVPB 2 Gram(s) IV Intermittent once  metoprolol tartrate 25 milliGRAM(s) Oral every 6 hours  multivitamin/minerals Oral Solution (WELLESSE) 30 milliLiter(s) Enteral Tube daily  pantoprazole  Injectable 40 milliGRAM(s) IV Push every 12 hours    MEDICATIONS  (PRN):  acetaminophen    Suspension .. 500 milliGRAM(s) Oral every 6 hours PRN Mild Pain (1 - 3)  traMADol 25 milliGRAM(s) Oral every 8 hours PRN Moderate Pain (4 - 6)      PHYSICAL EXAM:  Constitutional: A&Ox0  Respiratory: Unlabored breathing  Abdomen: Soft, nondistended, nontender. Ostomy with dark stool. Incision dressing C/D/i.   Extremities: WWP, MYLES spontaneously    LABS:                        7.6    6.01  )-----------( 239      ( 10 Sep 2021 02:10 )             24.6     09-10    148<H>  |  116<H>  |  24<H>  ----------------------------<  116<H>  3.3<L>   |  22  |  0.47<L>    Ca    7.7<L>      10 Sep 2021 02:10  Phos  1.8     09-10  Mg     1.8     09-10    TPro  5.2<L>  /  Alb  1.9<L>  /  TBili  0.3  /  DBili  x   /  AST  21  /  ALT  37  /  AlkPhos  87  09-10    PT/INR - ( 10 Sep 2021 02:10 )   PT: 14.1 sec;   INR: 1.18 ratio         PTT - ( 10 Sep 2021 02:10 )  PTT:30.3 sec  LIVER FUNCTIONS - ( 10 Sep 2021 02:10 )  Alb: 1.9 g/dL / Pro: 5.2 g/dL / ALK PHOS: 87 U/L / ALT: 37 U/L / AST: 21 U/L / GGT: x                 IMAGING:     RED TEAM Surgery Progress Note  Patient is a 81y old  Male who presents with a chief complaint of diarrhea with blood in it , abd pain and weakness (09 Sep 2021 14:46)      INTERVAL EVENTS: No acute events overnight.  SUBJECTIVE: Patient seen at bedside today.       OBJECTIVE:    Vital Signs Last 24 Hrs  T(C): 37.5 (09 Sep 2021 23:00), Max: 37.5 (09 Sep 2021 23:00)  T(F): 99.5 (09 Sep 2021 23:00), Max: 99.5 (09 Sep 2021 23:00)  HR: 77 (10 Sep 2021 02:00) (64 - 87)  BP: 144/59 (10 Sep 2021 02:00) (136/62 - 173/71)  BP(mean): 87 (10 Sep 2021 02:00) (87 - 115)  RR: 28 (10 Sep 2021 02:00) (17 - 38)  SpO2: 96% (10 Sep 2021 02:00) (96% - 100%)I&O's Detail    08 Sep 2021 07:01  -  09 Sep 2021 07:00  --------------------------------------------------------  IN:    dextrose 5% + sodium chloride 0.225% w/ Additives: 1500 mL    Enteral Tube Flush: 90 mL    IV PiggyBack: 500 mL    Pantoprazole: 240 mL    Pivot 1.5: 540 mL  Total IN: 2870 mL    OUT:    Bulb (mL): 20 mL    Bulb (mL): 5 mL    Ileostomy (mL): 275 mL    Incontinent per Condom Catheter (mL): 1000 mL  Total OUT: 1300 mL    Total NET: 1570 mL      09 Sep 2021 07:01  -  10 Sep 2021 02:59  --------------------------------------------------------  IN:    Enteral Tube Flush: 310 mL    Pantoprazole: 20 mL    Pivot 1.5: 765 mL  Total IN: 1095 mL    OUT:    Bulb (mL): 0 mL    Bulb (mL): 0 mL    Ileostomy (mL): 200 mL    Incontinent per Condom Catheter (mL): 1125 mL  Total OUT: 1325 mL    Total NET: -230 mL      MEDICATIONS  (STANDING):  Biotene Dry Mouth Oral Rinse 5 milliLiter(s) Swish and Spit two times a day  chlorhexidine 2% Cloths 1 Application(s) Topical <User Schedule>  cholecalciferol 1000 Unit(s) Oral daily  enoxaparin Injectable 40 milliGRAM(s) SubCutaneous every 24 hours  folic acid 1 milliGRAM(s) Enteral Tube daily  haloperidol    Injectable 1 milliGRAM(s) IV Push every 12 hours  lidocaine 4% Injection for Nebulization 4 milliLiter(s) Nebulizer once  magnesium sulfate  IVPB 2 Gram(s) IV Intermittent once  metoprolol tartrate 25 milliGRAM(s) Oral every 6 hours  multivitamin/minerals Oral Solution (WELLESSE) 30 milliLiter(s) Enteral Tube daily  pantoprazole  Injectable 40 milliGRAM(s) IV Push every 12 hours    MEDICATIONS  (PRN):  acetaminophen    Suspension .. 500 milliGRAM(s) Oral every 6 hours PRN Mild Pain (1 - 3)  traMADol 25 milliGRAM(s) Oral every 8 hours PRN Moderate Pain (4 - 6)      PHYSICAL EXAM:  Constitutional: A&Ox0  Respiratory: Unlabored breathing  Abdomen: Soft, nondistended, nontender. Ostomy with dark stool. Incision with mucopurulent drainage packed at bedside.   Extremities: WWP, MYLES spontaneously    LABS:                        7.6    6.01  )-----------( 239      ( 10 Sep 2021 02:10 )             24.6     09-10    148<H>  |  116<H>  |  24<H>  ----------------------------<  116<H>  3.3<L>   |  22  |  0.47<L>    Ca    7.7<L>      10 Sep 2021 02:10  Phos  1.8     09-10  Mg     1.8     09-10    TPro  5.2<L>  /  Alb  1.9<L>  /  TBili  0.3  /  DBili  x   /  AST  21  /  ALT  37  /  AlkPhos  87  09-10    PT/INR - ( 10 Sep 2021 02:10 )   PT: 14.1 sec;   INR: 1.18 ratio         PTT - ( 10 Sep 2021 02:10 )  PTT:30.3 sec  LIVER FUNCTIONS - ( 10 Sep 2021 02:10 )  Alb: 1.9 g/dL / Pro: 5.2 g/dL / ALK PHOS: 87 U/L / ALT: 37 U/L / AST: 21 U/L / GGT: x                 IMAGING:

## 2021-09-10 NOTE — PROGRESS NOTE ADULT - ATTENDING COMMENTS
Patient seen and examined, agree with above. No further bleeding, H/H stable, no transfusion needed since 9/7. He does not want NGT and he wants to have pleasure feeds - both he and his son at bedside understand the high risk of aspiration. Would give dysphagia III diet to patient as he is requesting, and follow-up with family eventually regarding placement as he wants to go home.

## 2021-09-10 NOTE — PROGRESS NOTE ADULT - ASSESSMENT
81M with recently dx Crohn's disease c/b perianal abscess and left posterior distal anal intersphincteric fistula and presents diarrhea/BRBPR  2/2 to CD flare c/b c diff colitis. Initially treat for C diff, s/p FMT. Taken emergently to OR on 8/20 for acute deterioration for laparoscopic total colectomy with end ileostomy, [id number].     PLAN:  - follow up pallative consult.   - Appreciate GI recs: need palliative discussion regarding DNR status before proceeding with EGD  - MBS rescheduled for tomorrow thurs 9/9  - trend HH  - NGT/NPO w tube feed, currently at goal  - cw PPI BID   - Wound care: daily dressing changes with midline incision packed   - F/u ostomy output for quantity/quality (blood)  - PT to see and encourage ambulation   - Appreciate Psychiatry input   - Appreciate GI Recs for scope today  - Appreciate excellent care per SICU      Red Surgery  [id number]  81M with recently dx Crohn's disease c/b perianal abscess and left posterior distal anal intersphincteric fistula and presents diarrhea/BRBPR  2/2 to CD flare c/b c diff colitis. Initially treat for C diff, s/p FMT. Taken emergently to OR on 8/20 for acute deterioration for laparoscopic total colectomy with end ileostomy, POD21.     PLAN:  - MBS today   - follow up pallative consult.   - Appreciate GI recs: need palliative discussion regarding DNR status before proceeding with EGD  - MBS rescheduled for tomorrow thurs 9/9  - trend HH  - NGT/NPO w tube feed, currently at goal  - cw PPI BID   - Wound care: daily dressing changes with midline incision packed   - F/u ostomy output for quantity/quality (blood)  - PT to see and encourage ambulation   - Appreciate Psychiatry input   - Appreciate GI Recs for scope today  - Appreciate excellent care per SICU      Red Surgery  [id number]

## 2021-09-10 NOTE — PROGRESS NOTE ADULT - SUBJECTIVE AND OBJECTIVE BOX
Interval Events:   - No further bleeding    Allergies:  No Known Allergies      Hospital Medications:  acetaminophen    Suspension .. 500 milliGRAM(s) Oral every 6 hours PRN  Biotene Dry Mouth Oral Rinse 5 milliLiter(s) Swish and Spit two times a day  chlorhexidine 2% Cloths 1 Application(s) Topical <User Schedule>  cholecalciferol 1000 Unit(s) Oral daily  enoxaparin Injectable 40 milliGRAM(s) SubCutaneous every 24 hours  folic acid 1 milliGRAM(s) Enteral Tube daily  haloperidol    Injectable 1 milliGRAM(s) IV Push every 12 hours  metoprolol tartrate 25 milliGRAM(s) Oral every 6 hours  multivitamin/minerals Oral Solution (WELLESSE) 30 milliLiter(s) Enteral Tube daily  pantoprazole  Injectable 40 milliGRAM(s) IV Push every 12 hours  sodium phosphate IVPB 15 milliMole(s) IV Intermittent every 1 hour  traMADol 25 milliGRAM(s) Oral every 8 hours PRN      PMHX/PSHX:  No pertinent past medical history    No pertinent past medical history    Crohn disease    Paroxysmal atrial fibrillation    CAD (coronary artery disease)    GI bleed    No significant past surgical history        Family history:      ROS:   Unable to obtain due to patient unable to phonate      PHYSICAL EXAM:   Vital Signs:  Vital Signs Last 24 Hrs  T(C): 36.9 (10 Sep 2021 07:00), Max: 37.5 (09 Sep 2021 23:00)  T(F): 98.4 (10 Sep 2021 07:00), Max: 99.5 (09 Sep 2021 23:00)  HR: 87 (10 Sep 2021 09:00) (71 - 87)  BP: 165/80 (10 Sep 2021 09:00) (139/62 - 181/77)  BP(mean): 115 (10 Sep 2021 09:00) (87 - 116)  RR: 31 (10 Sep 2021 09:00) (17 - 38)  SpO2: 98% (10 Sep 2021 09:00) (96% - 100%)  Daily     Daily Weight in k.7 (10 Sep 2021 05:00)      21 @ 07:01  -  09-10-21 @ 07:00  --------------------------------------------------------  IN: 1740 mL / OUT: 1950 mL / NET: -210 mL    09-10-21 @ 07:01  -  09-10-21 @ 11:11  --------------------------------------------------------  IN: 590 mL / OUT: 250 mL / NET: 340 mL      GENERAL:  Appears stated age, chronically ill appearing  HEENT:  NC/AT,  conjunctivae clear, sclera -anicteric  CHEST:  Poor effort  HEART:  Regular rhythm, S1, S2   ABDOMEN:  Soft, non-tender, non-distended, ileostomy in place with scant brown stool  EXTREMITIES:  no cyanosis,clubbing or edema  SKIN:  No rash/erythema   NEURO:  Alert, oriented x 0, confused    LABS:                        7.6    6.01  )-----------( 239      ( 10 Sep 2021 02:10 )             24.6     Mean Cell Volume: 92.5 fl (09-10-21 @ 02:10)    10    148<H>  |  116<H>  |  24<H>  ----------------------------<  116<H>  3.3<L>   |  22  |  0.47<L>    Ca    7.7<L>      10 Sep 2021 02:10  Phos  1.8     09-10  Mg     1.8     10    TPro  5.2<L>  /  Alb  1.9<L>  /  TBili  0.3  /  DBili  x   /  AST  21  /  ALT  37  /  AlkPhos  87  10    LIVER FUNCTIONS - ( 10 Sep 2021 02:10 )  Alb: 1.9 g/dL / Pro: 5.2 g/dL / ALK PHOS: 87 U/L / ALT: 37 U/L / AST: 21 U/L / GGT: x           PT/INR - ( 10 Sep 2021 02:10 )   PT: 14.1 sec;   INR: 1.18 ratio         PTT - ( 10 Sep 2021 02:10 )  PTT:30.3 sec                            7.6    6.01  )-----------( 239      ( 10 Sep 2021 02:10 )             24.6                         7.8    5.56  )-----------( 235      ( 09 Sep 2021 14:00 )             25.2                         7.7    6.14  )-----------( 220      ( 09 Sep 2021 06:03 )             25.3                         7.8    5.72  )-----------( 233      ( 09 Sep 2021 00:24 )             25.2                         8.1    5.86  )-----------( 243      ( 08 Sep 2021 17:51 )             25.5       Imaging:           Interval Events:   - No further bleeding  - Does not want NGT anymore, wants pleasure feeds    Allergies:  No Known Allergies      Hospital Medications:  acetaminophen    Suspension .. 500 milliGRAM(s) Oral every 6 hours PRN  Biotene Dry Mouth Oral Rinse 5 milliLiter(s) Swish and Spit two times a day  chlorhexidine 2% Cloths 1 Application(s) Topical <User Schedule>  cholecalciferol 1000 Unit(s) Oral daily  enoxaparin Injectable 40 milliGRAM(s) SubCutaneous every 24 hours  folic acid 1 milliGRAM(s) Enteral Tube daily  haloperidol    Injectable 1 milliGRAM(s) IV Push every 12 hours  metoprolol tartrate 25 milliGRAM(s) Oral every 6 hours  multivitamin/minerals Oral Solution (WELLESSE) 30 milliLiter(s) Enteral Tube daily  pantoprazole  Injectable 40 milliGRAM(s) IV Push every 12 hours  sodium phosphate IVPB 15 milliMole(s) IV Intermittent every 1 hour  traMADol 25 milliGRAM(s) Oral every 8 hours PRN      PMHX/PSHX:  No pertinent past medical history    No pertinent past medical history    Crohn disease    Paroxysmal atrial fibrillation    CAD (coronary artery disease)    GI bleed    No significant past surgical history        Family history:      ROS:   Unable to obtain due to patient unable to phonate      PHYSICAL EXAM:   Vital Signs:  Vital Signs Last 24 Hrs  T(C): 36.9 (10 Sep 2021 07:00), Max: 37.5 (09 Sep 2021 23:00)  T(F): 98.4 (10 Sep 2021 07:00), Max: 99.5 (09 Sep 2021 23:00)  HR: 87 (10 Sep 2021 09:00) (71 - 87)  BP: 165/80 (10 Sep 2021 09:00) (139/62 - 181/77)  BP(mean): 115 (10 Sep 2021 09:00) (87 - 116)  RR: 31 (10 Sep 2021 09:00) (17 - 38)  SpO2: 98% (10 Sep 2021 09:00) (96% - 100%)  Daily     Daily Weight in k.7 (10 Sep 2021 05:00)      21 @ 07:01  -  09-10-21 @ 07:00  --------------------------------------------------------  IN: 1740 mL / OUT: 1950 mL / NET: -210 mL    09-10-21 @ 07:01  -  09-10-21 @ 11:11  --------------------------------------------------------  IN: 590 mL / OUT: 250 mL / NET: 340 mL      GENERAL:  Appears stated age, chronically ill appearing  HEENT:  NC/AT,  conjunctivae clear, sclera -anicteric  CHEST:  Poor effort  HEART:  Regular rhythm, S1, S2   ABDOMEN:  Soft, non-tender, non-distended, ileostomy in place with brown stool  EXTREMITIES:  no cyanosis,clubbing or edema  SKIN:  No rash/erythema   NEURO:  Alert, oriented x 0, confused    LABS:                        7.6    6.01  )-----------( 239      ( 10 Sep 2021 02:10 )             24.6     Mean Cell Volume: 92.5 fl (09-10-21 @ 02:10)    10    148<H>  |  116<H>  |  24<H>  ----------------------------<  116<H>  3.3<L>   |  22  |  0.47<L>    Ca    7.7<L>      10 Sep 2021 02:10  Phos  1.8     09-10  Mg     1.8     09-10    TPro  5.2<L>  /  Alb  1.9<L>  /  TBili  0.3  /  DBili  x   /  AST  21  /  ALT  37  /  AlkPhos  87  10    LIVER FUNCTIONS - ( 10 Sep 2021 02:10 )  Alb: 1.9 g/dL / Pro: 5.2 g/dL / ALK PHOS: 87 U/L / ALT: 37 U/L / AST: 21 U/L / GGT: x           PT/INR - ( 10 Sep 2021 02:10 )   PT: 14.1 sec;   INR: 1.18 ratio         PTT - ( 10 Sep 2021 02:10 )  PTT:30.3 sec                            7.6    6.01  )-----------( 239      ( 10 Sep 2021 02:10 )             24.6                         7.8    5.56  )-----------( 235      ( 09 Sep 2021 14:00 )             25.2                         7.7    6.14  )-----------( 220      ( 09 Sep 2021 06:03 )             25.3                         7.8    5.72  )-----------( 233      ( 09 Sep 2021 00:24 )             25.2                         8.1    5.86  )-----------( 243      ( 08 Sep 2021 17:51 )             25.5       Imaging:

## 2021-09-10 NOTE — PROGRESS NOTE ADULT - ASSESSMENT
A/P  82yo M with Crohn's disease, now s/p total abdominal colectomy with end ileostomy on 8/20.     GI bleed   blood BM x 3   monitor H/H GI following     # CAD / PAF/ HTN   -History of AFib currently rate controlled with IV metoprolol and amiodarone, now in NSR  lopressor 25 mg q 6    # Sever colitis 2/2 chron's and c diff   - s/p total abdominal colectomy with end ileostomy, failed S/S, bloody ostomy output  -Tube feeding   protonix bid     # Sever malnutrition   - IVF: TPN @ 83cc/hr  -seen by s7S  MBS on Monday    Anemia 2/2 ac blood loss   - last transfusion 2u pRBC on 8/28  - Trend H/H and transfuse Hgb < 7  - Lovenox 40mg daily for DVT ppx    Code Status: DNR/DNI, MOLST in chart

## 2021-09-10 NOTE — PROGRESS NOTE ADULT - SUBJECTIVE AND OBJECTIVE BOX
SICU Daily Progress Note  =====================================================  Interval/Overnight Events:     - Off Protonix gtt, transitioned to Protonix PO  - Palliative care meeting held with patient and family; ongoing GOC   - CBC stable     HPI: 81y Male with Crohn disease s/p failed medical management. 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 Transferred to SICU intubated and on pressors. Now extubated and off pressors.    Allergies: No Known Allergies    MEDICATIONS:   --------------------------------------------------------------------------------------  Neurologic Medications  acetaminophen    Suspension .. 500 milliGRAM(s) Oral every 6 hours PRN Mild Pain (1 - 3)  haloperidol    Injectable 1 milliGRAM(s) IV Push every 12 hours  traMADol 25 milliGRAM(s) Oral every 8 hours PRN Moderate Pain (4 - 6)    Respiratory Medications    Cardiovascular Medications  metoprolol tartrate 25 milliGRAM(s) Oral every 6 hours    Gastrointestinal Medications  cholecalciferol 1000 Unit(s) Oral daily  folic acid 1 milliGRAM(s) Enteral Tube daily  magnesium sulfate  IVPB 2 Gram(s) IV Intermittent once  multivitamin/minerals Oral Solution (WELLESSE) 30 milliLiter(s) Enteral Tube daily  pantoprazole  Injectable 40 milliGRAM(s) IV Push every 12 hours    Genitourinary Medications    Hematologic/Oncologic Medications  enoxaparin Injectable 40 milliGRAM(s) SubCutaneous every 24 hours    Antimicrobial/Immunologic Medications    Endocrine/Metabolic Medications    Topical/Other Medications  Biotene Dry Mouth Oral Rinse 5 milliLiter(s) Swish and Spit two times a day  chlorhexidine 2% Cloths 1 Application(s) Topical <User Schedule>  lidocaine 4% Injection for Nebulization 4 milliLiter(s) Nebulizer once    --------------------------------------------------------------------------------------    VITAL SIGNS, INS/OUTS (last 24 hours):  --------------------------------------------------------------------------------------  T(C): 37.5 (09-09-21 @ 23:00), Max: 37.5 (09-09-21 @ 23:00)  HR: 77 (09-10-21 @ 02:00) (64 - 87)  BP: 144/59 (09-10-21 @ 02:00) (136/62 - 173/71)  RR: 28 (09-10-21 @ 02:00) (17 - 38)  SpO2: 96% (09-10-21 @ 02:00) (96% - 100%)    09-08-21 @ 07:01  -  09-09-21 @ 07:00  --------------------------------------------------------  IN: 2870 mL / OUT: 1300 mL / NET: 1570 mL    09-09-21 @ 07:01  -  09-10-21 @ 02:56  --------------------------------------------------------  IN: 1095 mL / OUT: 1325 mL / NET: -230 mL  --------------------------------------------------------------------------------------  EXAM  NEUROLOGY  Exam: Normal, NAD, alert, oriented x3, no focal deficits.    HEENT  Exam: Normocephalic, atraumatic, EOMI.     RESPIRATORY  Exam: Normal expansion/effort.    CARDIOVASCULAR  Exam: Regular rate and rhythm.       GI/NUTRITION  Exam: Abdomen soft, Non-tender, Non-distended.     VASCULAR  Exam: Extremities warm, pink, well-perfused.     MUSCULOSKELETAL  Exam: All extremities moving spontaneously without limitations.     SKIN  Exam: Good skin turgor, no skin breakdown.       LABS  --------------------------------------------------------------------------------------                        7.6    6.01  )-----------( 239      ( 10 Sep 2021 02:10 )             24.6   09-10    148<H>  |  116<H>  |  24<H>  ----------------------------<  116<H>  3.3<L>   |  22  |  0.47<L>    Ca    7.7<L>      10 Sep 2021 02:10  Phos  1.8     09-10  Mg     1.8     09-10    TPro  5.2<L>  /  Alb  1.9<L>  /  TBili  0.3  /  DBili  x   /  AST  21  /  ALT  37  /  AlkPhos  87  09-10  PT/INR - ( 10 Sep 2021 02:10 )   PT: 14.1 sec;   INR: 1.18 ratio         PTT - ( 10 Sep 2021 02:10 )  PTT:30.3 sec  --------------------------------------------------------------------------------------     SICU Daily Progress Note  =====================================================  Interval/Overnight Events:     - Off Protonix gtt, transitioned to Protonix PO  - Palliative care meeting held with patient and family; ongoing GOC   - CBC stable  - Zyprexa x1 given overnight     HPI: 81y Male with Crohn disease s/p failed medical management. 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 Transferred to SICU intubated and on pressors. Now extubated and off pressors.    Allergies: No Known Allergies    MEDICATIONS:   --------------------------------------------------------------------------------------  Neurologic Medications  acetaminophen    Suspension .. 500 milliGRAM(s) Oral every 6 hours PRN Mild Pain (1 - 3)  haloperidol    Injectable 1 milliGRAM(s) IV Push every 12 hours  traMADol 25 milliGRAM(s) Oral every 8 hours PRN Moderate Pain (4 - 6)    Respiratory Medications    Cardiovascular Medications  metoprolol tartrate 25 milliGRAM(s) Oral every 6 hours    Gastrointestinal Medications  cholecalciferol 1000 Unit(s) Oral daily  folic acid 1 milliGRAM(s) Enteral Tube daily  magnesium sulfate  IVPB 2 Gram(s) IV Intermittent once  multivitamin/minerals Oral Solution (WELLESSE) 30 milliLiter(s) Enteral Tube daily  pantoprazole  Injectable 40 milliGRAM(s) IV Push every 12 hours    Genitourinary Medications    Hematologic/Oncologic Medications  enoxaparin Injectable 40 milliGRAM(s) SubCutaneous every 24 hours    Antimicrobial/Immunologic Medications    Endocrine/Metabolic Medications    Topical/Other Medications  Biotene Dry Mouth Oral Rinse 5 milliLiter(s) Swish and Spit two times a day  chlorhexidine 2% Cloths 1 Application(s) Topical <User Schedule>  lidocaine 4% Injection for Nebulization 4 milliLiter(s) Nebulizer once    --------------------------------------------------------------------------------------    VITAL SIGNS, INS/OUTS (last 24 hours):  --------------------------------------------------------------------------------------  T(C): 37.5 (09-09-21 @ 23:00), Max: 37.5 (09-09-21 @ 23:00)  HR: 77 (09-10-21 @ 02:00) (64 - 87)  BP: 144/59 (09-10-21 @ 02:00) (136/62 - 173/71)  RR: 28 (09-10-21 @ 02:00) (17 - 38)  SpO2: 96% (09-10-21 @ 02:00) (96% - 100%)    09-08-21 @ 07:01  -  09-09-21 @ 07:00  --------------------------------------------------------  IN: 2870 mL / OUT: 1300 mL / NET: 1570 mL    09-09-21 @ 07:01  -  09-10-21 @ 02:56  --------------------------------------------------------  IN: 1095 mL / OUT: 1325 mL / NET: -230 mL  --------------------------------------------------------------------------------------  EXAM  NEUROLOGY  Exam: Normal, NAD, alert, oriented x3, no focal deficits.    HEENT  Exam: Normocephalic, atraumatic, EOMI.     RESPIRATORY  Exam: Normal expansion/effort.    CARDIOVASCULAR  Exam: Regular rate and rhythm.       GI/NUTRITION  Exam: Abdomen soft, Non-tender, Non-distended.     VASCULAR  Exam: Extremities warm, pink, well-perfused.     MUSCULOSKELETAL  Exam: All extremities moving spontaneously without limitations.     SKIN  Exam: Good skin turgor, no skin breakdown.       LABS  --------------------------------------------------------------------------------------                        7.6    6.01  )-----------( 239      ( 10 Sep 2021 02:10 )             24.6   09-10    148<H>  |  116<H>  |  24<H>  ----------------------------<  116<H>  3.3<L>   |  22  |  0.47<L>    Ca    7.7<L>      10 Sep 2021 02:10  Phos  1.8     09-10  Mg     1.8     09-10    TPro  5.2<L>  /  Alb  1.9<L>  /  TBili  0.3  /  DBili  x   /  AST  21  /  ALT  37  /  AlkPhos  87  09-10  PT/INR - ( 10 Sep 2021 02:10 )   PT: 14.1 sec;   INR: 1.18 ratio         PTT - ( 10 Sep 2021 02:10 )  PTT:30.3 sec  --------------------------------------------------------------------------------------     SICU Daily Progress Note  =====================================================  Interval/Overnight Events:     - Off Protonix gtt, transitioned to Protonix IV BID  - Palliative care meeting held with patient and family; ongoing GOC   - CBC stable  - Zyprexa x1 given overnight     HPI: 81y Male with Crohn disease s/p failed medical management. 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 Transferred to SICU intubated and on pressors. Now extubated and off pressors.    Allergies: No Known Allergies    MEDICATIONS:   --------------------------------------------------------------------------------------  Neurologic Medications  acetaminophen    Suspension .. 500 milliGRAM(s) Oral every 6 hours PRN Mild Pain (1 - 3)  haloperidol    Injectable 1 milliGRAM(s) IV Push every 12 hours  traMADol 25 milliGRAM(s) Oral every 8 hours PRN Moderate Pain (4 - 6)    Respiratory Medications    Cardiovascular Medications  metoprolol tartrate 25 milliGRAM(s) Oral every 6 hours    Gastrointestinal Medications  cholecalciferol 1000 Unit(s) Oral daily  folic acid 1 milliGRAM(s) Enteral Tube daily  magnesium sulfate  IVPB 2 Gram(s) IV Intermittent once  multivitamin/minerals Oral Solution (WELLESSE) 30 milliLiter(s) Enteral Tube daily  pantoprazole  Injectable 40 milliGRAM(s) IV Push every 12 hours    Genitourinary Medications    Hematologic/Oncologic Medications  enoxaparin Injectable 40 milliGRAM(s) SubCutaneous every 24 hours    Antimicrobial/Immunologic Medications    Endocrine/Metabolic Medications    Topical/Other Medications  Biotene Dry Mouth Oral Rinse 5 milliLiter(s) Swish and Spit two times a day  chlorhexidine 2% Cloths 1 Application(s) Topical <User Schedule>  lidocaine 4% Injection for Nebulization 4 milliLiter(s) Nebulizer once    --------------------------------------------------------------------------------------    VITAL SIGNS, INS/OUTS (last 24 hours):  --------------------------------------------------------------------------------------  T(C): 37.5 (09-09-21 @ 23:00), Max: 37.5 (09-09-21 @ 23:00)  HR: 77 (09-10-21 @ 02:00) (64 - 87)  BP: 144/59 (09-10-21 @ 02:00) (136/62 - 173/71)  RR: 28 (09-10-21 @ 02:00) (17 - 38)  SpO2: 96% (09-10-21 @ 02:00) (96% - 100%)    09-08-21 @ 07:01  -  09-09-21 @ 07:00  --------------------------------------------------------  IN: 2870 mL / OUT: 1300 mL / NET: 1570 mL    09-09-21 @ 07:01  -  09-10-21 @ 02:56  --------------------------------------------------------  IN: 1095 mL / OUT: 1325 mL / NET: -230 mL  --------------------------------------------------------------------------------------  EXAM  NEUROLOGY  Exam: Normal, NAD, alert, oriented x3, no focal deficits.    HEENT  Exam: Normocephalic, atraumatic, EOMI.     RESPIRATORY  Exam: Normal expansion/effort.    CARDIOVASCULAR  Exam: Regular rate and rhythm.       GI/NUTRITION  Exam: Abdomen soft, Non-tender, Non-distended.     VASCULAR  Exam: Extremities warm, pink, well-perfused.     MUSCULOSKELETAL  Exam: All extremities moving spontaneously without limitations.     SKIN  Exam: Good skin turgor, no skin breakdown.       LABS  --------------------------------------------------------------------------------------                        7.6    6.01  )-----------( 239      ( 10 Sep 2021 02:10 )             24.6   09-10    148<H>  |  116<H>  |  24<H>  ----------------------------<  116<H>  3.3<L>   |  22  |  0.47<L>    Ca    7.7<L>      10 Sep 2021 02:10  Phos  1.8     09-10  Mg     1.8     09-10    TPro  5.2<L>  /  Alb  1.9<L>  /  TBili  0.3  /  DBili  x   /  AST  21  /  ALT  37  /  AlkPhos  87  09-10  PT/INR - ( 10 Sep 2021 02:10 )   PT: 14.1 sec;   INR: 1.18 ratio         PTT - ( 10 Sep 2021 02:10 )  PTT:30.3 sec  --------------------------------------------------------------------------------------

## 2021-09-10 NOTE — PROGRESS NOTE ADULT - SUBJECTIVE AND OBJECTIVE BOX
[facility] NUTRITION SUPPORT--  Attending/ PA FOLLOW UP NOTE      24 hour events/subjective:     Pt not scoped, GOC discussions ongoing, [facility] care eval pending. Pt tolerating NGT feeds at goal. ostomy functioning.           PAST HISTORY  --------------------------------------------------------------------------------  No significant changes to PMH, PSH, FHx, SHx, unless otherwise noted    ALLERGIES & MEDICATIONS  --------------------------------------------------------------------------------  Allergies    No Known Allergies    Intolerances      Standing Inpatient Medications  Biotene Dry Mouth Oral Rinse 5 milliLiter(s) Swish and Spit two times a day  chlorhexidine 2% Cloths 1 Application(s) Topical <User Schedule>  cholecalciferol 1000 Unit(s) Oral daily  enoxaparin Injectable 40 milliGRAM(s) SubCutaneous every 24 hours  folic acid 1 milliGRAM(s) Enteral Tube daily  haloperidol    Injectable 1 milliGRAM(s) IV Push every 12 hours  metoprolol tartrate 25 milliGRAM(s) Oral every 6 hours  multivitamin/minerals Oral Solution (WELLESSE) 30 milliLiter(s) Enteral Tube daily  pantoprazole  Injectable 40 milliGRAM(s) IV Push every 12 hours    PRN Inpatient Medications  acetaminophen    Suspension .. 500 milliGRAM(s) Oral every 6 hours PRN  traMADol 25 milliGRAM(s) Oral every 8 hours PRN      REVIEW OF SYSTEMS  --------------------------------------------------------------------------------  Gen: as per HPI  Skin: No rashes  Head/Eyes/Ears/Mouth: No headache;No sore throat  Respiratory: No dyspnea, cough,   CV: No chest pain, PND, orthopnea  GI: as per HPI  : No increased frequency, dysuria, hematuria, nocturia  MSK: No joint pain/swelling; no back pain; no edema  Neuro: No dizziness/lightheadedness, weakness, seizures, numbness, tingling  Psych: No significant nervousness, anxiety, stress, depression    All other systems were reviewed and are negative, except as noted.      LABS/STUDIES  --------------------------------------------------------------------------------              7.6    6.01  >-----------<  239      [09-10-21 @ 02:10]              24.6     148  |  116  |  24  ----------------------------<  116      [09-10-21 @ 02:10]  3.3   |  22  |  0.47        Ca     7.7     [09-10-21 @ 02:10]      iCa    1.15     [09-10 @ 02:23]      Mg     1.8     [09-10-21 @ 02:10]      Phos  1.8     [09-10-21 @ 02:10]    TPro  5.2  /  Alb  1.9  /  TBili  0.3  /  DBili  x   /  AST  21  /  ALT  37  /  AlkPhos  87  [09-10-21 @ 02:10]    PT/INR: PT 14.1 , INR 1.18       [09-10-21 @ 02:10]  PTT: 30.3       [09-10-21 @ 02:10]        Glucose, Serum: 116 mg/dL (09-10-21 @ 02:10)    Prealbumin, Serum: 22 mg/dL (09-03-21 @ 05:19)  Prealbumin, Serum: 16 mg/dL (09-03-21 @ 03:19)  Prealbumin, Serum: 15 mg/dL (08-30-21 @ 03:32)  Prealbumin, Serum: 8 mg/dL (08-15-21 @ 10:17)          09-09-21 @ 07:01  -  09-10-21 @ 07:00  --------------------------------------------------------  IN: 1740 mL / OUT: 1950 mL / NET: -210 mL    09-10-21 @ 07:01  -  09-10-21 @ 12:40  --------------------------------------------------------  IN: 725 mL / OUT: 250 mL / NET: 475 mL        VITALS/PHYSICAL EXAM  --------------------------------------------------------------------------------  T(C): 36.5 (09-10-21 @ 11:00), Max: 37.5 (09-09-21 @ 23:00)  HR: 85 (09-10-21 @ 12:00) (73 - 89)  BP: 112/58 (09-10-21 @ 12:00) (112/58 - 181/77)  RR: 33 (09-10-21 @ 12:00) (17 - 38)  SpO2: 100% (09-10-21 @ 12:00) (96% - 100%)  Wt(kg): --  Height (cm): 170.2 (09-08-21 @ 16:38)  Weight (kg): 59 (09-08-21 @ 16:38)  BMI (kg/m2): 20.4 (09-08-21 @ 16:38)  BSA (m2): 1.68 (09-08-21 @ 16:38)    Physical Exam:    Gen: Sitting up in chair, thin and frail, NAD family member at bedside  HEENT: NCAT +NGT  Neck: trachea midline, no noted JVD  Chest: respirations non labored  Abd: ND soft NT, +ostomy w yellow/brown stool  Extrem: ; +b/l UE extrem edema, distal to PICC, +1 Lower Extrem edema   Skin: no rashes noted  .  .  .

## 2021-09-10 NOTE — CHART NOTE - NSCHARTNOTEFT_GEN_A_CORE
82 yo male with h/o Crohn's disease diagnosed ~9 months ago, with poor response to treatments, presented with persistent diarrhea and weakness, admitted with bloody diarrhea 2/2 Crohn's dis exacerbation, CT shows Crohn's dis exacerbation,  with prolonged hospital course c/b C-Diff, Crohn's unresponsive to medical treatments, steroid induced psychosis, now s/p Laparoscopic total abdominal colectomy with end ileostomy, s/p extubation 8/22.    Pt seen at bedside for repeat swallow evaluation, swallow therapy. Pt awake, lethargic, arousable but weak, inconsistently maintaining eyes open with verbal and tactile cues. Pt remains verbally responsive, but with significant hypophonia which impairs intelligibility, c/w significant deconditioning and reduced phonorespiratory effort, has only slightly improved since extubation on 8/22. Pt inconsistently following simple directions. Pt with reduced cooperation for evaluation, required maximal cues to participate in tasks and PO trials.     Pt was administered trials of small crushed ice chips x2, honey-thick liquids via 1/2 tsp x2, and thick puree via 1/2 tsp x2. Pt continues to present with evidence of an oropharyngeal dysphagia notable for delayed bolus acceptance and prolonged oral transport, suspected delay in pharyngeal swallow trigger, benefitting from verbal cues to swallow, palpable hyolaryngeal elevation with suspected reduced anterior excursion, and s/s laryngeal penetration/aspiration including wet/gurgly vocal quality and intermittent delayed throat clears and coughs post trials. Additionally, Pt continues with fluctuating mental status that is not compatible with ability to sustain consistent PO intake. Pt remains at high risk of aspiration, and is not a candidate for safe PO feeding at this time. Purposeful proactive rounding reinforced and 5 Ps addressed. Pt left in no distress. Findings d/w Dr. Carney of SICU service.      RECOMMENDATIONS:  Continue NPO, with non-oral nutrition/hydration/medications, if in keeping with Pt/family wishes  Strict aspiration and reflux precautions for secretions and if enteral feeds are initiated.   Maintain good oral hygiene.  This service will continue to follow.   d/c tbd.      Sonia Barragan MA, CCC-SLP  Speech-Language Pathologist  pager #[phone]

## 2021-09-10 NOTE — PROGRESS NOTE ADULT - ASSESSMENT
80yo M with Crohn's disease, now s/p total abdominal colectomy with end ileostomy on 8/20    PLAN:  Neurologic: post-op pain control, new-onset hypophonia without focal neuro deficits, obtundation  - Monitor mental status  - Pain control: PO Tylenol 500mg q6hr PRN, Tramadol 25mg PO PRN  - Haldol 1mg q12hr x3days  - Vitamin D3, Folic acid, and multivitamins daily    Respiratory: Acute respiratory insufficiency resolving, patient on RA  - Incentive spirometry 10x/hr while in bed, OOBTC    Cardiovascular: History of AFib currently rate controlled with IV metoprolol and amiodarone, now in NSR  - Metoprolol 25mg PO q8h  - Monitor hemodynamics    Gastrointestinal/Nutrition: s/p total abdominal colectomy with end ileostomy c/b recurrent bloody ostomy output; s/p TPN  - TF Pivot 1.5 @ 45cc/hr via TANJA   - Protonix 40 IV BID  - Biotene mouth wash  - Monitor ostomy output and character  - PAOLO drains x2 with persistently low OP, consider d/c by primary team    Renal/Genitourinary: no active issues   - Monitor and replete electrolytes as needed  - Monitor I&Os, voiding spontaneously  - BMP daily    Hematologic: recurrent GIB -- last transfusion: 1u pRBC 9/7  - Lovenox 40mg daily for VTE ppx  - Trend H/H and transfuse Hgb < 7.5  - CBC q12hr    Infectious Disease: C. diff colitis vs Crohn's flair s/p subtotal colectomy   - Trend WBC on CBC q12hr  - Monitor fever curve    Endocrine: no active issues; s/p steroid taper 8/26  - Monitor glucose on BMP daily    Lines & Drains:   - LLQ & LUQ drain 8/20  - TPN 8/16  - RUE PICC placed 8/16    Disposition: SICU  Code Status: DNR/DNI, MOLST in chart

## 2021-09-10 NOTE — PROGRESS NOTE ADULT - SUBJECTIVE AND OBJECTIVE BOX
[facility]-- WOUND TEAM -- FOLLOW UP NOTE  --------------------------------------------------------------------------------    24 hour events/subjective:    tolerating tg  s&S follow up  ongoing GOC  incontinent      Diet:  Diet, NPO with Tube Feed:   Tube Feeding Modality: Nasogastric  Pivot 1.5 Blayne (PIVOT1.5RTH)  Total Volume for 24 Hours (mL): 1080  Continuous  Starting Tube Feed Rate mL per Hour: 45  Until Goal Tube Feed Rate (mL per Hour): 45  Tube Feed Duration (in Hours): 24  Tube Feed Start Time: 19:30 (09-08-21 @ 18:58)      ROS: pt unable to offer    ALLERGIES & MEDICATIONS  --------------------------------------------------------------------------------  No Known Allergies    STANDING INPATIENT MEDICATIONS  Biotene Dry Mouth Oral Rinse 5 milliLiter(s) Swish and Spit two times a day  chlorhexidine 2% Cloths 1 Application(s) Topical <User Schedule>  cholecalciferol 1000 Unit(s) Oral daily  enoxaparin Injectable 40 milliGRAM(s) SubCutaneous every 24 hours  folic acid 1 milliGRAM(s) Enteral Tube daily  haloperidol    Injectable 1 milliGRAM(s) IV Push every 12 hours  metoprolol tartrate 25 milliGRAM(s) Oral every 6 hours  multivitamin/minerals Oral Solution (WELLESSE) 30 milliLiter(s) Enteral Tube daily  pantoprazole  Injectable 40 milliGRAM(s) IV Push every 12 hours    PRN INPATIENT MEDICATION  acetaminophen    Suspension .. 500 milliGRAM(s) Oral every 6 hours PRN  traMADol 25 milliGRAM(s) Oral every 8 hours PRN        VITALS/PHYSICAL EXAM  --------------------------------------------------------------------------------  T(C): 36.4 (09-10-21 @ 15:00), Max: 37.5 (09-09-21 @ 23:00)  HR: 90 (09-10-21 @ 15:00) (73 - 90)  BP: 125/63 (09-10-21 @ 15:00) (102/66 - 181/77)  RR: 25 (09-10-21 @ 15:00) (17 - 38)  SpO2: 100% (09-10-21 @ 15:00) (96% - 100%)  Wt(kg): --  Height (cm): 170.2 (09-08-21 @ 16:38)  Weight (kg): 59 (09-08-21 @ 16:38)  BMI (kg/m2): 20.4 (09-08-21 @ 16:38)  BSA (m2): 1.68 (09-08-21 @ 16:38)      09-09-21 @ 07:01  -  09-10-21 @ 07:00  --------------------------------------------------------  IN: 1740 mL / OUT: 1950 mL / NET: -210 mL    09-10-21 @ 07:01  -  09-10-21 @ 16:35  --------------------------------------------------------  IN: 1155 mL / OUT: 500 mL / NET: 655 mL        Physical Exam:  General: guarded but stable, confused, thin, frail   GI: soft NT/ ND (+)stoma pink / functioning (+)NGT  Psych: appropriate  Neurology: verbally limited, not following commands  Musculoskeletal: passive ROM  Vascular: BLE edema equal, no ischemia, equally warm  Skin:  pale, moist, w/ good turgot  sacral area skin 10cmx 6cm x0.5cm 30% black necrotic tissue and 70% moist pink tissue-     scant serosanguinous drainage  No odor, increased warmth, induration, fluctuance, tenderness   thoracic spine is a stage 2 pressure injury measuring 3.5cm x 2.5cm x0cm-w/moist pink tissue,    the spine is kyphotic with protuberant bony prominences   periwound skin is intact , there is no drainage.   No odor, increased warmth, induration, fluctuance, tenderness        LABS/ CULTURES/ RADIOLOGY:              8.0    7.42  >-----------<  278      [09-10-21 @ 14:17]              25.5     144  |  114  |  23  ----------------------------<  119      [09-10-21 @ 14:17]  4.1   |  23  |  0.42        Ca     7.6     [09-10-21 @ 14:17]      iCa    1.15     [09-10 @ 02:23]      Mg     2.2     [09-10-21 @ 14:17]      Phos  4.0     [09-10-21 @ 14:17]    TPro  5.8  /  Alb  2.2  /  TBili  0.3  /  DBili  x   /  AST  21  /  ALT  37  /  AlkPhos  96  [09-10-21 @ 14:17]    PT/INR: PT 14.1 , INR 1.18       [09-10-21 @ 02:10]  PTT: 30.3       [09-10-21 @ 02:10]      A1C with Estimated Average Glucose Result: 5.2 % (08-17-21 @ 09:06)   [facility]-- WOUND TEAM -- FOLLOW UP NOTE  --------------------------------------------------------------------------------    24 hour events/subjective:    tolerating tg  s&S follow up  ongoing [facility]        Diet:  Diet, NPO with Tube Feed:   Tube Feeding Modality: Nasogastric  Pivot 1.5 Blayne (PIVOT1.5RTH)  Total Volume for 24 Hours (mL): 1080  Continuous  Starting Tube Feed Rate mL per Hour: 45  Until Goal Tube Feed Rate (mL per Hour): 45  Tube Feed Duration (in Hours): 24  Tube Feed Start Time: 19:30 (09-08-21 @ 18:58)      ROS: pt unable to offer    ALLERGIES & MEDICATIONS  --------------------------------------------------------------------------------  No Known Allergies    STANDING INPATIENT MEDICATIONS  Biotene Dry Mouth Oral Rinse 5 milliLiter(s) Swish and Spit two times a day  chlorhexidine 2% Cloths 1 Application(s) Topical <User Schedule>  cholecalciferol 1000 Unit(s) Oral daily  enoxaparin Injectable 40 milliGRAM(s) SubCutaneous every 24 hours  folic acid 1 milliGRAM(s) Enteral Tube daily  haloperidol    Injectable 1 milliGRAM(s) IV Push every 12 hours  metoprolol tartrate 25 milliGRAM(s) Oral every 6 hours  multivitamin/minerals Oral Solution (WELLESSE) 30 milliLiter(s) Enteral Tube daily  pantoprazole  Injectable 40 milliGRAM(s) IV Push every 12 hours    PRN INPATIENT MEDICATION  acetaminophen    Suspension .. 500 milliGRAM(s) Oral every 6 hours PRN  traMADol 25 milliGRAM(s) Oral every 8 hours PRN        VITALS/PHYSICAL EXAM  --------------------------------------------------------------------------------  T(C): 36.4 (09-10-21 @ 15:00), Max: 37.5 (09-09-21 @ 23:00)  HR: 90 (09-10-21 @ 15:00) (73 - 90)  BP: 125/63 (09-10-21 @ 15:00) (102/66 - 181/77)  RR: 25 (09-10-21 @ 15:00) (17 - 38)  SpO2: 100% (09-10-21 @ 15:00) (96% - 100%)  Wt(kg): --  Height (cm): 170.2 (09-08-21 @ 16:38)  Weight (kg): 59 (09-08-21 @ 16:38)  BMI (kg/m2): 20.4 (09-08-21 @ 16:38)  BSA (m2): 1.68 (09-08-21 @ 16:38)      09-09-21 @ 07:01  -  09-10-21 @ 07:00  --------------------------------------------------------  IN: 1740 mL / OUT: 1950 mL / NET: -210 mL    09-10-21 @ 07:01  -  09-10-21 @ 16:35  --------------------------------------------------------  IN: 1155 mL / OUT: 500 mL / NET: 655 mL        Physical Exam:  General: guarded but stable, confused, thin, frail   GI: soft NT/ ND (+)stoma pink / functioning (+)NGT  Psych: appropriate  Neurology: verbally limited, not following commands  Musculoskeletal: passive ROM  Vascular: BLE edema equal, no ischemia, equally warm  Skin:  pale, moist, w/ good turgot  sacral area skin 10cmx 6cm x0.5cm 30% black necrotic tissue and 70% moist pink tissue-     scant serosanguinous drainage  No odor, increased warmth, induration, fluctuance, tenderness   thoracic spine is a stage 2 pressure injury measuring 3.5cm x 2.5cm x0cm-w/moist pink tissue,    the spine is kyphotic with protuberant bony prominences   periwound skin is intact , there is no drainage.   No odor, increased warmth, induration, fluctuance, tenderness        LABS/ CULTURES/ RADIOLOGY:              8.0    7.42  >-----------<  278      [09-10-21 @ 14:17]              25.5     144  |  114  |  23  ----------------------------<  119      [09-10-21 @ 14:17]  4.1   |  23  |  0.42        Ca     7.6     [09-10-21 @ 14:17]      iCa    1.15     [09-10 @ 02:23]      Mg     2.2     [09-10-21 @ 14:17]      Phos  4.0     [09-10-21 @ 14:17]    TPro  5.8  /  Alb  2.2  /  TBili  0.3  /  DBili  x   /  AST  21  /  ALT  37  /  AlkPhos  96  [09-10-21 @ 14:17]    PT/INR: PT 14.1 , INR 1.18       [09-10-21 @ 02:10]  PTT: 30.3       [09-10-21 @ 02:10]      A1C with Estimated Average Glucose Result: 5.2 % (08-17-21 @ 09:06)

## 2021-09-10 NOTE — PROGRESS NOTE ADULT - ATTENDING COMMENTS
80yo M with Crohn's disease, now s/p total abdominal colectomy with end ileostomy on 8/20    Calmer and more oriented, continue small dose Haldol for hypoactive delirium   On BB q 6 rhs, HR controlled  Bleeding from stoma resolved, HCT stable,  PPI via NGT bid  NGT feed at goal  Add free water for worsening hypernatremia   Swallow reeval  PT

## 2021-09-11 NOTE — PROGRESS NOTE ADULT - ATTENDING COMMENTS
82yo M with Crohn's disease, now s/p total abdominal colectomy with end ileostomy on 8/20    Continue small dose Haldol for hypoactive delirium  On BB q 6 rhs, HR controlled  HCT stable,  PPI changed to IV  Start IVF with dextrose    Multiple discussion with daughter wife in presence of pt. All of them wanted NGT out. Pt not awake enough for feed.  Family not sure whether willing to go for comfort feed route. Will continue to discuss the goals of care  PT

## 2021-09-11 NOTE — PROGRESS NOTE ADULT - ASSESSMENT
A/P  80yo M with Crohn's disease, now s/p total abdominal colectomy with end ileostomy on 8/20.     GI bleed   blood BM x 3   monitor H/H GI following     # CAD / PAF/ HTN   -History of AFib currently rate controlled with IV metoprolol and amiodarone, now in NSR  lopressor 25 mg q 6    # Sever colitis 2/2 chron's and c diff   - s/p total abdominal colectomy with end ileostomy, failed S/S, bloody ostomy output  -Tube feeding   protonix bid     # Sever malnutrition   - IVF: TPN @ 83cc/hr  -seen by s7S  MBS on Monday    Anemia 2/2 ac blood loss   - last transfusion 2u pRBC on 8/28  - Trend H/H and transfuse Hgb < 7  - Lovenox 40mg daily for DVT ppx    Code Status: DNR/DNI, MOLST in chart

## 2021-09-11 NOTE — PROGRESS NOTE ADULT - SUBJECTIVE AND OBJECTIVE BOX
TEAM Surgery Progress Note  Patient is a 81y old  Male who presents with a chief complaint of diarrhea with blood in it , abd pain and weakness (11 Sep 2021 01:07)      INTERVAL EVENTS: Failed SS evaluation; No acute events overnight.  SUBJECTIVE:       OBJECTIVE:    Vital Signs Last 24 Hrs  T(C): 37.7 (10 Sep 2021 23:00), Max: 37.7 (10 Sep 2021 23:00)  T(F): 99.9 (10 Sep 2021 23:00), Max: 99.9 (10 Sep 2021 23:00)  HR: 82 (11 Sep 2021 02:00) (75 - 98)  BP: 151/67 (11 Sep 2021 02:00) (97/55 - 181/77)  BP(mean): 96 (11 Sep 2021 02:00) (73 - 116)  RR: 27 (11 Sep 2021 02:00) (25 - 36)  SpO2: 99% (11 Sep 2021 02:00) (97% - 100%)I&O's Detail    09 Sep 2021 07:01  -  10 Sep 2021 07:00  --------------------------------------------------------  IN:    Enteral Tube Flush: 340 mL    IV PiggyBack: 300 mL    Pantoprazole: 20 mL    Pivot 1.5: 1080 mL  Total IN: 1740 mL    OUT:    Bulb (mL): 0 mL    Bulb (mL): 0 mL    Ileostomy (mL): 375 mL    Incontinent per Condom Catheter (mL): 1575 mL  Total OUT: 1950 mL    Total NET: -210 mL      10 Sep 2021 07:01  -  11 Sep 2021 02:10  --------------------------------------------------------  IN:    Enteral Tube Flush: 30 mL    Free Water: 750 mL    IV PiggyBack: 350 mL    Pivot 1.5: 810 mL  Total IN: 1940 mL    OUT:    Bulb (mL): 10 mL    Bulb (mL): 5 mL    Ileostomy (mL): 350 mL    Incontinent per Condom Catheter (mL): 1050 mL  Total OUT: 1415 mL    Total NET: 525 mL      MEDICATIONS  (STANDING):  Biotene Dry Mouth Oral Rinse 5 milliLiter(s) Swish and Spit two times a day  chlorhexidine 2% Cloths 1 Application(s) Topical <User Schedule>  cholecalciferol 1000 Unit(s) Oral daily  enoxaparin Injectable 40 milliGRAM(s) SubCutaneous every 24 hours  folic acid 1 milliGRAM(s) Enteral Tube daily  haloperidol    Injectable 1 milliGRAM(s) IV Push every 12 hours  metoprolol tartrate 25 milliGRAM(s) Oral every 6 hours  multivitamin/minerals Oral Solution (WELLESSE) 30 milliLiter(s) Enteral Tube daily  pantoprazole  Injectable 40 milliGRAM(s) IV Push every 12 hours  potassium chloride   Solution 40 milliEquivalent(s) Oral once  sodium phosphate IVPB 15 milliMole(s) IV Intermittent every 1 hour    MEDICATIONS  (PRN):  acetaminophen    Suspension .. 500 milliGRAM(s) Oral every 6 hours PRN Mild Pain (1 - 3)  traMADol 25 milliGRAM(s) Oral every 8 hours PRN Moderate Pain (4 - 6)      PHYSICAL EXAM:  Constitutional: A&Ox0  Respiratory: Unlabored breathing  Abdomen: Soft, nondistended, nontender. Ostomy with dark stool. Incision with mucopurulent drainage packed at bedside.   Extremities: WWP, MYLES spontaneously    LABS:                        7.7    6.80  )-----------( 252      ( 11 Sep 2021 00:53 )             25.1     09-11    142  |  112<H>  |  21  ----------------------------<  116<H>  3.5   |  23  |  0.42<L>    Ca    7.7<L>      11 Sep 2021 00:53  Phos  1.9     09-11  Mg     2.0     09-11    TPro  5.4<L>  /  Alb  2.0<L>  /  TBili  0.2  /  DBili  x   /  AST  17  /  ALT  30  /  AlkPhos  86  09-11    PT/INR - ( 11 Sep 2021 00:53 )   PT: 14.3 sec;   INR: 1.20 ratio         PTT - ( 11 Sep 2021 00:53 )  PTT:30.6 sec  LIVER FUNCTIONS - ( 11 Sep 2021 00:53 )  Alb: 2.0 g/dL / Pro: 5.4 g/dL / ALK PHOS: 86 U/L / ALT: 30 U/L / AST: 17 U/L / GGT: x             ABO Interpretation: A2 (09-10-21 @ 02:23)      IMAGING:     TEAM Surgery Progress Note  Patient is a 81y old  Male who presents with a chief complaint of diarrhea with blood in it , abd pain and weakness (11 Sep 2021 01:07)      INTERVAL EVENTS: Failed SS evaluation; No acute events overnight.    SUBJECTIVE: Patient seen and examined at bedside this morning with the team. Pt is still not interactive. Unable to obtain subjective history.      OBJECTIVE:    Vital Signs Last 24 Hrs  T(C): 37.7 (10 Sep 2021 23:00), Max: 37.7 (10 Sep 2021 23:00)  T(F): 99.9 (10 Sep 2021 23:00), Max: 99.9 (10 Sep 2021 23:00)  HR: 82 (11 Sep 2021 02:00) (75 - 98)  BP: 151/67 (11 Sep 2021 02:00) (97/55 - 181/77)  BP(mean): 96 (11 Sep 2021 02:00) (73 - 116)  RR: 27 (11 Sep 2021 02:00) (25 - 36)  SpO2: 99% (11 Sep 2021 02:00) (97% - 100%)I&O's Detail    09 Sep 2021 07:01  -  10 Sep 2021 07:00  --------------------------------------------------------  IN:    Enteral Tube Flush: 340 mL    IV PiggyBack: 300 mL    Pantoprazole: 20 mL    Pivot 1.5: 1080 mL  Total IN: 1740 mL    OUT:    Bulb (mL): 0 mL    Bulb (mL): 0 mL    Ileostomy (mL): 375 mL    Incontinent per Condom Catheter (mL): 1575 mL  Total OUT: 1950 mL    Total NET: -210 mL      10 Sep 2021 07:01  -  11 Sep 2021 02:10  --------------------------------------------------------  IN:    Enteral Tube Flush: 30 mL    Free Water: 750 mL    IV PiggyBack: 350 mL    Pivot 1.5: 810 mL  Total IN: 1940 mL    OUT:    Bulb (mL): 10 mL    Bulb (mL): 5 mL    Ileostomy (mL): 350 mL    Incontinent per Condom Catheter (mL): 1050 mL  Total OUT: 1415 mL    Total NET: 525 mL      MEDICATIONS  (STANDING):  Biotene Dry Mouth Oral Rinse 5 milliLiter(s) Swish and Spit two times a day  chlorhexidine 2% Cloths 1 Application(s) Topical <User Schedule>  cholecalciferol 1000 Unit(s) Oral daily  enoxaparin Injectable 40 milliGRAM(s) SubCutaneous every 24 hours  folic acid 1 milliGRAM(s) Enteral Tube daily  haloperidol    Injectable 1 milliGRAM(s) IV Push every 12 hours  metoprolol tartrate 25 milliGRAM(s) Oral every 6 hours  multivitamin/minerals Oral Solution (WELLESSE) 30 milliLiter(s) Enteral Tube daily  pantoprazole  Injectable 40 milliGRAM(s) IV Push every 12 hours  potassium chloride   Solution 40 milliEquivalent(s) Oral once  sodium phosphate IVPB 15 milliMole(s) IV Intermittent every 1 hour    MEDICATIONS  (PRN):  acetaminophen    Suspension .. 500 milliGRAM(s) Oral every 6 hours PRN Mild Pain (1 - 3)  traMADol 25 milliGRAM(s) Oral every 8 hours PRN Moderate Pain (4 - 6)      PHYSICAL EXAM:  Constitutional: A&Ox0, not interactive  Respiratory: Unlabored breathing  Abdomen: Soft, nondistended, nontender. Ostomy with dark stool. Midline dressing c/d/i.   Extremities: warm, well perfused    LABS:                        7.7    6.80  )-----------( 252      ( 11 Sep 2021 00:53 )             25.1     09-11    142  |  112<H>  |  21  ----------------------------<  116<H>  3.5   |  23  |  0.42<L>    Ca    7.7<L>      11 Sep 2021 00:53  Phos  1.9     09-11  Mg     2.0     09-11    TPro  5.4<L>  /  Alb  2.0<L>  /  TBili  0.2  /  DBili  x   /  AST  17  /  ALT  30  /  AlkPhos  86  09-11    PT/INR - ( 11 Sep 2021 00:53 )   PT: 14.3 sec;   INR: 1.20 ratio         PTT - ( 11 Sep 2021 00:53 )  PTT:30.6 sec  LIVER FUNCTIONS - ( 11 Sep 2021 00:53 )  Alb: 2.0 g/dL / Pro: 5.4 g/dL / ALK PHOS: 86 U/L / ALT: 30 U/L / AST: 17 U/L / GGT: x             ABO Interpretation: A2 (09-10-21 @ 02:23)      IMAGING:

## 2021-09-11 NOTE — PROGRESS NOTE ADULT - ASSESSMENT
80yo M with Crohn's disease, now s/p total abdominal colectomy with end ileostomy on 8/20    PLAN:  Neurologic: post-op pain control, new-onset hypophonia without focal neuro deficits, obtundation  - Monitor mental status  - Pain control: PO Tylenol 500mg q6hr PRN, Tramadol 25mg PO PRN  - Haldol 1mg q12hr x3days  - Vitamin D3, Folic acid, and multivitamins daily    Respiratory: Acute respiratory insufficiency resolving, patient on RA  - Incentive spirometry 10x/hr while in bed, OOBTC    Cardiovascular: History of AFib currently rate controlled with IV metoprolol and amiodarone, now in NSR  - Metoprolol 25mg PO q8h  - Monitor hemodynamics    Gastrointestinal/Nutrition: s/p total abdominal colectomy with end ileostomy c/b recurrent bloody ostomy output; s/p TPN  - TF Pivot 1.5 @ 45cc/hr via TANJA   - Protonix 40 IV BID  - Biotene mouth wash  - Monitor ostomy output and character  - PAOLO drains x2 with persistently low OP, consider d/c by primary team    Renal/Genitourinary: no active issues   - Monitor and replete electrolytes as needed  - Monitor I&Os, voiding spontaneously  - BMP q12hr    Hematologic: recurrent GIB -- last transfusion: 1u pRBC 9/7  - Lovenox 40mg daily for VTE ppx  - Trend H/H and transfuse Hgb < 7.5  - CBC q12hr    Infectious Disease: C. diff colitis vs Crohn's flair s/p subtotal colectomy   - Trend WBC on CBC q12hr  - Monitor fever curve    Endocrine: no active issues; s/p steroid taper 8/26  - Monitor glucose on BMP daily    Lines & Drains:   - LLQ & LUQ drain 8/20  - TPN 8/16  - RUE PICC placed 8/16    Disposition: SICU  Code Status: DNR/DNI, MOLST in chart

## 2021-09-11 NOTE — PROGRESS NOTE ADULT - ASSESSMENT
81M with recently dx Crohn's disease c/b perianal abscess and left posterior distal anal intersphincteric fistula and presents diarrhea/BRBPR  2/2 to CD flare c/b c diff colitis. Initially treat for C diff, s/p FMT. Taken emergently to OR on 8/20 for acute deterioration for laparoscopic total colectomy with end ileostomy, POD21. Failed speech and swallow 9/10.    PLAN:  - plan to d/c chantel tube and start comfort feeds when daughter at bedside.  - follow up pallative consult.   - Appreciate GI recs: need palliative discussion regarding DNR status before proceeding with EGD  - trend HH  - NGT/NPO w tube feed, currently at goal  - cw PPI BID   - Wound care: daily dressing changes with midline incision packed   - F/u ostomy output for quantity/quality (blood)  - PT to see and encourage ambulation   - Appreciate Psychiatry input   - Appreciate GI Recs for scope today  - Appreciate excellent care per SICU       81M with recently dx Crohn's disease c/b perianal abscess and left posterior distal anal intersphincteric fistula and presents diarrhea/BRBPR  2/2 to CD flare c/b c diff colitis. Initially treat for C diff, s/p FMT. Taken emergently to OR on 8/20 for acute deterioration for laparoscopic total colectomy with end ileostomy, POD21. Failed speech and swallow 9/10.    PLAN:  - Plan to d/c chantel tube and start comfort feeds when daughter at bedside.  - F/u Palliative and GOC. Overall aim is likely hospice, continue ongoing discussions with multidisciplinary teams and family  - Wound care: daily dressing changes with midline incision packed   - Appreciate excellent care per SICU    Red Team Surgery  [id number]

## 2021-09-11 NOTE — PROGRESS NOTE ADULT - ASSESSMENT
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 Admitted for crohns management, course c/b cdiff colitis sp dificid/fmt and findings of indeterminate quantiferon, pending id clearance to start biologic. Prealb 8. TPN consulted in setting of significant weight loss, severe pcm, and worsening colitis suspected 2/2 CD. Pt at high risk for refeeding syndrome. TPN started 8/16. Repeat CT showing unchanged diffuse colitis and non specific gb wall edema.  Taken to OR emergently on 8/20 for lap total colectomy, end ileostomy. Failed S&S eval. Family agreed to TANJA tube feeds, which had been started yesterday but then dc'd due to noted blood in ostomy and risk for aspiration given deterioration in mental status. pt now dnr/dni. Mental status improved and restarted on ngt feeds, and tolerating. TPN dcd 9/5    Current Diet: NPO/NGT feeds (pivot 1.5 goal 40cc/hr)    -PT previously tolerating TFs, plan to DC after pt failed S&S eval and GOC discussions.  -Nutritional Assessment, last prealbumin on 9/3 was 22. Pt previously tolerating TFs therefore no indication for TPN. After GOC discussion, current plan to DC Tube feeds   -Anemia/GIB- care per GI, trend CBC, transfuse PRN  -Electrolyte Imbalance Risk, daily CMP, Mg, Phos, K, iCa. h/o Hypernatremia, hypophosphatemia and hypokalemia- management and repletions as per SICU  -Vitamin D deficiency-as per SICU  -GOC discussions ongoing, f/u pall care eval  -Strict I/O's  -Further care per primary/SICU; will follow with you    plan discussed with Dr. Fernandez and Dr GERARD Multani, agreeable with above    TPN pager [phone], spectra [phone]  M-F 6A-4P, Weekends and holidays 8A-12P

## 2021-09-11 NOTE — PROGRESS NOTE ADULT - SUBJECTIVE AND OBJECTIVE BOX
[facility] NUTRITION SUPPORT--  Attending/ PA FOLLOW UP NOTE      24 hour events/subjective:     Pt previously on TPN for nutritiion support.. Pt tolerating NGT feeds at goal w Ostomy functioning.  however plan now to d/c chantel tube and start comfort feeds when daughter at bedside, pt failed speech & swallow eval. TPN dc'd 9/5.    PAST HISTORY  --------------------------------------------------------------------------------  No significant changes to PMH, PSH, FHx, SHx, unless otherwise noted    ALLERGIES & MEDICATIONS  --------------------------------------------------------------------------------  Allergies    No Known Allergies    Intolerances      Standing Inpatient Medications  Biotene Dry Mouth Oral Rinse 5 milliLiter(s) Swish and Spit two times a day  chlorhexidine 2% Cloths 1 Application(s) Topical <User Schedule>  cholecalciferol 1000 Unit(s) Oral daily  dextrose 5% + lactated ringers. 1000 milliLiter(s) IV Continuous <Continuous>  enoxaparin Injectable 40 milliGRAM(s) SubCutaneous every 24 hours  folic acid 1 milliGRAM(s) Enteral Tube daily  haloperidol    Injectable 1 milliGRAM(s) IV Push every 12 hours  magnesium sulfate  IVPB 2 Gram(s) IV Intermittent once  metoprolol tartrate Injectable 5 milliGRAM(s) IV Push every 6 hours  multivitamin/minerals Oral Solution (WELLESSE) 30 milliLiter(s) Enteral Tube daily  pantoprazole  Injectable 40 milliGRAM(s) IV Push every 12 hours    PRN Inpatient Medications  acetaminophen    Suspension .. 500 milliGRAM(s) Oral every 6 hours PRN  traMADol 25 milliGRAM(s) Oral every 8 hours PRN      REVIEW OF SYSTEMS  --------------------------------------------------------------------------------  Gen: as per HPI  Skin: No rashes  Head/Eyes/Ears/Mouth: No headache;No sore throat  Respiratory: No dyspnea, cough,   CV: No chest pain, PND, orthopnea  GI: as per HPI  : No increased frequency, dysuria, hematuria, nocturia  MSK: No joint pain/swelling; no back pain; no edema  Neuro: No dizziness/lightheadedness, weakness, seizures, numbness, tingling  Psych: No significant nervousness, anxiety, stress, depression    All other systems were reviewed and are negative, except as noted.      LABS/STUDIES  --------------------------------------------------------------------------------              7.5    7.34  >-----------<  239      [09-11-21 @ 12:10]              24.5     142  |  112  |  22  ----------------------------<  103      [09-11-21 @ 12:10]  4.0   |  23  |  0.41        Ca     7.5     [09-11-21 @ 12:10]      iCa    1.13     [09-11 @ 00:56]      Mg     1.8     [09-11-21 @ 12:10]      Phos  2.7     [09-11-21 @ 12:10]    TPro  5.2  /  Alb  1.8  /  TBili  0.2  /  DBili  x   /  AST  19  /  ALT  30  /  AlkPhos  88  [09-11-21 @ 12:10]    PT/INR: PT 14.3 , INR 1.20       [09-11-21 @ 00:53]  PTT: 30.6       [09-11-21 @ 00:53]        Glucose, Serum: 103 mg/dL (09-11-21 @ 12:10)  Glucose, Serum: 116 mg/dL (09-11-21 @ 00:53)  Glucose, Serum: 119 mg/dL (09-10-21 @ 14:17)    Prealbumin, Serum: 22 mg/dL (09-03-21 @ 05:19)  Prealbumin, Serum: 16 mg/dL (09-03-21 @ 03:19)  Prealbumin, Serum: 15 mg/dL (08-30-21 @ 03:32)  Prealbumin, Serum: 8 mg/dL (08-15-21 @ 10:17)          09-10-21 @ 07:01  -  09-11-21 @ 07:00  --------------------------------------------------------  IN: 2990 mL / OUT: 2165 mL / NET: 825 mL    09-11-21 @ 07:01  -  09-11-21 @ 14:05  --------------------------------------------------------  IN: 435 mL / OUT: 300 mL / NET: 135 mL        VITALS/PHYSICAL EXAM  --------------------------------------------------------------------------------  T(C): 36.6 (09-11-21 @ 11:00), Max: 37.7 (09-10-21 @ 23:00)  HR: 76 (09-11-21 @ 13:00) (70 - 98)  BP: 124/60 (09-11-21 @ 13:00) (97/55 - 151/67)  RR: 35 (09-11-21 @ 13:00) (21 - 35)  SpO2: 100% (09-11-21 @ 13:00) (98% - 100%)  Wt(kg): --    Physical Exam:    Gen: Sitting up in chair, thin and frail, NAD family member at bedside  HEENT: NCAT +NGT  Neck: trachea midline, no noted JVD  Chest: respirations non labored  Abd: ND soft NT, +ostomy w yellow/brown stool  Extrem: ; +b/l UE extrem edema, distal to PICC, +1 Lower Extrem edema   Skin: no rashes noted  .  .  .

## 2021-09-11 NOTE — PROGRESS NOTE ADULT - SUBJECTIVE AND OBJECTIVE BOX
Patient is a 81y old  Male who presents with a chief complaint of diarrhea with blood in it , abd pain and weakness (11 Sep 2021 14:05)      INTERVAL HPI/OVERNIGHT EVENTS: remain in ICU  T(C): 36 (09-11-21 @ 19:00), Max: 37 (09-11-21 @ 07:00)  HR: 73 (09-11-21 @ 20:00) (68 - 98)  BP: 116/55 (09-11-21 @ 20:00) (102/57 - 151/67)  RR: 24 (09-11-21 @ 20:00) (21 - 35)  SpO2: 100% (09-11-21 @ 20:00) (98% - 100%)  Wt(kg): --  I&O's Summary    10 Sep 2021 07:01  -  11 Sep 2021 07:00  --------------------------------------------------------  IN: 2990 mL / OUT: 2165 mL / NET: 825 mL    11 Sep 2021 07:01  -  11 Sep 2021 23:22  --------------------------------------------------------  IN: 835 mL / OUT: 922 mL / NET: -87 mL        PAST MEDICAL & SURGICAL HISTORY:  Crohn disease    Paroxysmal atrial fibrillation    CAD (coronary artery disease)  not on ASA or plavix, no stents as per daughter    GI bleed    No significant past surgical history        SOCIAL HISTORY  Alcohol:  Tobacco:  Illicit substance use:    FAMILY HISTORY:    REVIEW OF SYSTEMS:  CONSTITUTIONAL: No fever, weight loss, or fatigue  EYES: No eye pain, visual disturbances, or discharge  ENMT:  No difficulty hearing, tinnitus, vertigo; No sinus or throat pain  NECK: No pain or stiffness  RESPIRATORY: No cough, wheezing, chills or hemoptysis; No shortness of breath  CARDIOVASCULAR: No chest pain, palpitations, dizziness, or leg swelling  GASTROINTESTINAL: No abdominal or epigastric pain. No nausea, vomiting, or hematemesis; No diarrhea or constipation. No melena or hematochezia.  GENITOURINARY: No dysuria, frequency, hematuria, or incontinence  NEUROLOGICAL: No headaches, memory loss, loss of strength, numbness, or tremors  SKIN: No itching, burning, rashes, or lesions   LYMPH NODES: No enlarged glands  ENDOCRINE: No heat or cold intolerance; No hair loss  MUSCULOSKELETAL: No joint pain or swelling; No muscle, back, or extremity pain  PSYCHIATRIC: No depression, anxiety, mood swings, or difficulty sleeping  HEME/LYMPH: No easy bruising, or bleeding gums  ALLERY AND IMMUNOLOGIC: No hives or eczema    RADIOLOGY & ADDITIONAL TESTS:    Imaging Personally Reviewed:  [ ] YES  [ ] NO    Consultant(s) Notes Reviewed:  [ ] YES  [ ] NO    PHYSICAL EXAM:  GENERAL: NAD, well-groomed, well-developed  HEAD:  Atraumatic, Normocephalic  EYES: EOMI, PERRLA, conjunctiva and sclera clear  ENMT: No tonsillar erythema, exudates, or enlargement; Moist mucous membranes, Good dentition, No lesions  NECK: Supple, No JVD, Normal thyroid  NERVOUS SYSTEM:  Alert & Oriented X3, Good concentration; Motor Strength 5/5 B/L upper and lower extremities; DTRs 2+ intact and symmetric  CHEST/LUNG: Clear to percussion bilaterally; No rales, rhonchi, wheezing, or rubs  HEART: Regular rate and rhythm; No murmurs, rubs, or gallops  ABDOMEN: Soft, Nontender, Nondistended; Bowel sounds present  EXTREMITIES:  2+ Peripheral Pulses, No clubbing, cyanosis, or edema  LYMPH: No lymphadenopathy noted  SKIN: No rashes or lesions    LABS:                        7.5    7.34  )-----------( 239      ( 11 Sep 2021 12:10 )             24.5     09-11    142  |  112<H>  |  22  ----------------------------<  103<H>  4.0   |  23  |  0.41<L>    Ca    7.5<L>      11 Sep 2021 12:10  Phos  2.7     09-11  Mg     1.8     09-11    TPro  5.2<L>  /  Alb  1.8<L>  /  TBili  0.2  /  DBili  x   /  AST  19  /  ALT  30  /  AlkPhos  88  09-11    PT/INR - ( 11 Sep 2021 00:53 )   PT: 14.3 sec;   INR: 1.20 ratio         PTT - ( 11 Sep 2021 00:53 )  PTT:30.6 sec    CAPILLARY BLOOD GLUCOSE                MEDICATIONS  (STANDING):  Biotene Dry Mouth Oral Rinse 5 milliLiter(s) Swish and Spit two times a day  chlorhexidine 2% Cloths 1 Application(s) Topical <User Schedule>  cholecalciferol 1000 Unit(s) Oral daily  dextrose 5% + lactated ringers. 1000 milliLiter(s) (50 mL/Hr) IV Continuous <Continuous>  enoxaparin Injectable 40 milliGRAM(s) SubCutaneous every 24 hours  folic acid 1 milliGRAM(s) Enteral Tube daily  haloperidol    Injectable 1 milliGRAM(s) IV Push every 12 hours  metoprolol tartrate Injectable 5 milliGRAM(s) IV Push every 6 hours  multivitamin/minerals Oral Solution (WELLESSE) 30 milliLiter(s) Enteral Tube daily  pantoprazole  Injectable 40 milliGRAM(s) IV Push every 12 hours    MEDICATIONS  (PRN):  acetaminophen    Suspension .. 500 milliGRAM(s) Oral every 6 hours PRN Mild Pain (1 - 3)  traMADol 25 milliGRAM(s) Oral every 8 hours PRN Moderate Pain (4 - 6)      Care Discussed with Consultants/Other Providers [ ] YES  [ ] NO

## 2021-09-11 NOTE — PROGRESS NOTE ADULT - SUBJECTIVE AND OBJECTIVE BOX
SICU Daily Progress Note  =====================================================  Interval/Overnight Events:     - Failed SS evaluation; plan to d/c chantel tube and start comfort feeds when daughter at bedside  - 250cc Free water flushed q6hr for hypernatremia    HPI: 81y Male with Crohn disease s/p failed medical management. 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 Transferred to SICU intubated and on pressors. Now extubated and off pressors.    Allergies: No Known Allergies      MEDICATIONS:   --------------------------------------------------------------------------------------  Neurologic Medications  acetaminophen    Suspension .. 500 milliGRAM(s) Oral every 6 hours PRN Mild Pain (1 - 3)  haloperidol    Injectable 1 milliGRAM(s) IV Push every 12 hours  traMADol 25 milliGRAM(s) Oral every 8 hours PRN Moderate Pain (4 - 6)    Respiratory Medications    Cardiovascular Medications  metoprolol tartrate 25 milliGRAM(s) Oral every 6 hours    Gastrointestinal Medications  cholecalciferol 1000 Unit(s) Oral daily  folic acid 1 milliGRAM(s) Enteral Tube daily  multivitamin/minerals Oral Solution (WELLESSE) 30 milliLiter(s) Enteral Tube daily  pantoprazole  Injectable 40 milliGRAM(s) IV Push every 12 hours    Genitourinary Medications    Hematologic/Oncologic Medications  enoxaparin Injectable 40 milliGRAM(s) SubCutaneous every 24 hours    Antimicrobial/Immunologic Medications    Endocrine/Metabolic Medications    Topical/Other Medications  Biotene Dry Mouth Oral Rinse 5 milliLiter(s) Swish and Spit two times a day  chlorhexidine 2% Cloths 1 Application(s) Topical <User Schedule>    --------------------------------------------------------------------------------------    VITAL SIGNS, INS/OUTS (last 24 hours):  --------------------------------------------------------------------------------------  T(C): 37.7 (09-10-21 @ 23:00), Max: 37.7 (09-10-21 @ 23:00)  HR: 98 (09-11-21 @ 00:00) (75 - 98)  BP: 147/62 (09-11-21 @ 00:00) (97/55 - 181/77)  RR: 31 (09-11-21 @ 00:00) (25 - 36)  SpO2: 100% (09-11-21 @ 00:00) (96% - 100%)    09-09-21 @ 07:01  -  09-10-21 @ 07:00  --------------------------------------------------------  IN: 1740 mL / OUT: 1950 mL / NET: -210 mL    09-10-21 @ 07:01  -  09-11-21 @ 01:07  --------------------------------------------------------  IN: 1840 mL / OUT: 1415 mL / NET: 425 mL      --------------------------------------------------------------------------------------    EXAM  NEUROLOGY  Exam: Normal, NAD, alert, oriented x3, no focal deficits.    HEENT  Exam: Normocephalic, atraumatic, EOMI.     RESPIRATORY  Exam: Normal expansion/effort.  Mechanical Ventilation:     CARDIOVASCULAR  Exam: Regular rate and rhythm.       GI/NUTRITION  Exam: Abdomen soft, Non-tender, Non-distended.     VASCULAR  Exam: Extremities warm, pink, well-perfused.     MUSCULOSKELETAL  Exam: All extremities moving spontaneously without limitations.     SKIN  Exam: Good skin turgor, no skin breakdown.       LABS  --------------------------------------------------------------------------------------                        7.7    6.80  )-----------( 252      ( 11 Sep 2021 00:53 )             25.1   09-10    144  |  114<H>  |  23  ----------------------------<  119<H>  4.1   |  23  |  0.42<L>    Ca    7.6<L>      10 Sep 2021 14:17  Phos  4.0     09-10  Mg     2.2     09-10    TPro  5.8<L>  /  Alb  2.2<L>  /  TBili  0.3  /  DBili  x   /  AST  21  /  ALT  37  /  AlkPhos  96  09-10  PT/INR - ( 10 Sep 2021 02:10 )   PT: 14.1 sec;   INR: 1.18 ratio         PTT - ( 10 Sep 2021 02:10 )  PTT:30.3 sec  --------------------------------------------------------------------------------------     SICU Daily Progress Note  =====================================================  Interval/Overnight Events:     - Failed SS evaluation; plan to d/c chantel tube and start comfort feeds when daughter at bedside    HPI: 81y Male with Crohn disease s/p failed medical management. 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 Transferred to SICU intubated and on pressors. Now extubated and off pressors.    Allergies: No Known Allergies      MEDICATIONS:   --------------------------------------------------------------------------------------  Neurologic Medications  acetaminophen    Suspension .. 500 milliGRAM(s) Oral every 6 hours PRN Mild Pain (1 - 3)  haloperidol    Injectable 1 milliGRAM(s) IV Push every 12 hours  traMADol 25 milliGRAM(s) Oral every 8 hours PRN Moderate Pain (4 - 6)    Respiratory Medications    Cardiovascular Medications  metoprolol tartrate 25 milliGRAM(s) Oral every 6 hours    Gastrointestinal Medications  cholecalciferol 1000 Unit(s) Oral daily  folic acid 1 milliGRAM(s) Enteral Tube daily  multivitamin/minerals Oral Solution (WELLESSE) 30 milliLiter(s) Enteral Tube daily  pantoprazole  Injectable 40 milliGRAM(s) IV Push every 12 hours    Genitourinary Medications    Hematologic/Oncologic Medications  enoxaparin Injectable 40 milliGRAM(s) SubCutaneous every 24 hours    Antimicrobial/Immunologic Medications    Endocrine/Metabolic Medications    Topical/Other Medications  Biotene Dry Mouth Oral Rinse 5 milliLiter(s) Swish and Spit two times a day  chlorhexidine 2% Cloths 1 Application(s) Topical <User Schedule>    --------------------------------------------------------------------------------------    VITAL SIGNS, INS/OUTS (last 24 hours):  --------------------------------------------------------------------------------------  T(C): 37.7 (09-10-21 @ 23:00), Max: 37.7 (09-10-21 @ 23:00)  HR: 98 (09-11-21 @ 00:00) (75 - 98)  BP: 147/62 (09-11-21 @ 00:00) (97/55 - 181/77)  RR: 31 (09-11-21 @ 00:00) (25 - 36)  SpO2: 100% (09-11-21 @ 00:00) (96% - 100%)    09-09-21 @ 07:01  -  09-10-21 @ 07:00  --------------------------------------------------------  IN: 1740 mL / OUT: 1950 mL / NET: -210 mL    09-10-21 @ 07:01  -  09-11-21 @ 01:07  --------------------------------------------------------  IN: 1840 mL / OUT: 1415 mL / NET: 425 mL      --------------------------------------------------------------------------------------    EXAM  NEUROLOGY  Exam: Normal, NAD, alert, oriented x3, no focal deficits.    HEENT  Exam: Normocephalic, atraumatic, EOMI.     RESPIRATORY  Exam: Normal expansion/effort.  Mechanical Ventilation:     CARDIOVASCULAR  Exam: Regular rate and rhythm.       GI/NUTRITION  Exam: Abdomen soft, Non-tender, Non-distended.     VASCULAR  Exam: Extremities warm, pink, well-perfused.     MUSCULOSKELETAL  Exam: All extremities moving spontaneously without limitations.     SKIN  Exam: Good skin turgor, no skin breakdown.       LABS  --------------------------------------------------------------------------------------                        7.7    6.80  )-----------( 252      ( 11 Sep 2021 00:53 )             25.1   09-10    144  |  114<H>  |  23  ----------------------------<  119<H>  4.1   |  23  |  0.42<L>    Ca    7.6<L>      10 Sep 2021 14:17  Phos  4.0     09-10  Mg     2.2     09-10    TPro  5.8<L>  /  Alb  2.2<L>  /  TBili  0.3  /  DBili  x   /  AST  21  /  ALT  37  /  AlkPhos  96  09-10  PT/INR - ( 10 Sep 2021 02:10 )   PT: 14.1 sec;   INR: 1.18 ratio         PTT - ( 10 Sep 2021 02:10 )  PTT:30.3 sec  --------------------------------------------------------------------------------------

## 2021-09-12 NOTE — PROGRESS NOTE ADULT - SUBJECTIVE AND OBJECTIVE BOX
Patient is a 81y old  Male who presents with a chief complaint of diarrhea with blood in it , abd pain and weakness (12 Sep 2021 15:00)      INTERVAL HPI/OVERNIGHT EVENTS: NGT removed as per family wishes and GOC   T(C): 36 (09-12-21 @ 19:00), Max: 36.6 (09-11-21 @ 23:00)  HR: 74 (09-12-21 @ 20:00) (69 - 92)  BP: 101/51 (09-12-21 @ 20:00) (88/55 - 145/67)  RR: 20 (09-12-21 @ 20:00) (15 - 34)  SpO2: 100% (09-12-21 @ 20:00) (94% - 100%)  Wt(kg): --  I&O's Summary    11 Sep 2021 07:01  -  12 Sep 2021 07:00  --------------------------------------------------------  IN: 1868.2 mL / OUT: 1949 mL / NET: -80.8 mL    12 Sep 2021 07:01  -  12 Sep 2021 20:17  --------------------------------------------------------  IN: 783.3 mL / OUT: 405 mL / NET: 378.3 mL        PAST MEDICAL & SURGICAL HISTORY:  Crohn disease    Paroxysmal atrial fibrillation    CAD (coronary artery disease)  not on ASA or plavix, no stents as per daughter    GI bleed    No significant past surgical history        SOCIAL HISTORY  Alcohol:  Tobacco:  Illicit substance use:    FAMILY HISTORY:    REVIEW OF SYSTEMS:  CONSTITUTIONAL: No fever, weight loss, or fatigue  EYES: No eye pain, visual disturbances, or discharge  ENMT:  No difficulty hearing, tinnitus, vertigo; No sinus or throat pain  NECK: No pain or stiffness  RESPIRATORY: No cough, wheezing, chills or hemoptysis; No shortness of breath  CARDIOVASCULAR: No chest pain, palpitations, dizziness, or leg swelling  GASTROINTESTINAL: No abdominal or epigastric pain. No nausea, vomiting, or hematemesis; No diarrhea or constipation. No melena or hematochezia.  GENITOURINARY: No dysuria, frequency, hematuria, or incontinence  NEUROLOGICAL: No headaches, memory loss, loss of strength, numbness, or tremors  SKIN: No itching, burning, rashes, or lesions   LYMPH NODES: No enlarged glands  ENDOCRINE: No heat or cold intolerance; No hair loss  MUSCULOSKELETAL: No joint pain or swelling; No muscle, back, or extremity pain  PSYCHIATRIC: No depression, anxiety, mood swings, or difficulty sleeping  HEME/LYMPH: No easy bruising, or bleeding gums  ALLERY AND IMMUNOLOGIC: No hives or eczema    RADIOLOGY & ADDITIONAL TESTS:    Imaging Personally Reviewed:  [ ] YES  [ ] NO    Consultant(s) Notes Reviewed:  [ ] YES  [ ] NO    PHYSICAL EXAM:  GENERAL: NAD, well-groomed, well-developed  HEAD:  Atraumatic, Normocephalic  EYES: EOMI, PERRLA, conjunctiva and sclera clear  ENMT: No tonsillar erythema, exudates, or enlargement; Moist mucous membranes, Good dentition, No lesions  NECK: Supple, No JVD, Normal thyroid  NERVOUS SYSTEM:  Alert & Oriented X3, Good concentration; Motor Strength 5/5 B/L upper and lower extremities; DTRs 2+ intact and symmetric  CHEST/LUNG: Clear to percussion bilaterally; No rales, rhonchi, wheezing, or rubs  HEART: Regular rate and rhythm; No murmurs, rubs, or gallops  ABDOMEN: Soft, Nontender, Nondistended; Bowel sounds present  EXTREMITIES:  2+ Peripheral Pulses, No clubbing, cyanosis, or edema  LYMPH: No lymphadenopathy noted  SKIN: No rashes or lesions    LABS:                        8.3    8.40  )-----------( 275      ( 12 Sep 2021 11:24 )             27.5     09-12    141  |  110<H>  |  15  ----------------------------<  101<H>  4.1   |  22  |  0.45<L>    Ca    8.1<L>      12 Sep 2021 11:24  Phos  3.8     09-12  Mg     2.0     09-12    TPro  5.8<L>  /  Alb  2.0<L>  /  TBili  0.3  /  DBili  x   /  AST  26  /  ALT  28  /  AlkPhos  88  09-12    PT/INR - ( 11 Sep 2021 23:27 )   PT: 13.8 sec;   INR: 1.16 ratio         PTT - ( 11 Sep 2021 23:27 )  PTT:32.5 sec    CAPILLARY BLOOD GLUCOSE                MEDICATIONS  (STANDING):  Biotene Dry Mouth Oral Rinse 5 milliLiter(s) Swish and Spit two times a day  chlorhexidine 2% Cloths 1 Application(s) Topical <User Schedule>  cholecalciferol 1000 Unit(s) Oral daily  dextrose 5% + lactated ringers. 1000 milliLiter(s) (50 mL/Hr) IV Continuous <Continuous>  enoxaparin Injectable 40 milliGRAM(s) SubCutaneous every 24 hours  folic acid 1 milliGRAM(s) Oral daily  haloperidol    Injectable 1 milliGRAM(s) IV Push every 12 hours  lidocaine   4% Patch 1 Patch Transdermal daily  metoprolol tartrate 25 milliGRAM(s) Oral every 12 hours  multivitamin 1 Tablet(s) Oral daily  pantoprazole  Injectable 40 milliGRAM(s) IV Push every 12 hours    MEDICATIONS  (PRN):  acetaminophen   Tablet .. 650 milliGRAM(s) Oral every 6 hours PRN Mild Pain (1 - 3)  HYDROmorphone  Injectable 0.25 milliGRAM(s) IV Push every 6 hours PRN Severe Pain (7 - 10)      Care Discussed with Consultants/Other Providers [ ] YES  [ ] NO

## 2021-09-12 NOTE — PROGRESS NOTE ADULT - ATTENDING COMMENTS
82yo M with Crohn's disease, now s/p total abdominal colectomy with end ileostomy on 8/20    More awake and alert, calmer, Continue small dose Haldol for hypoactive delirium  On BB q 6 rhs, HR controlled  HCT stable,  PPI changed to IV  NGT pulled yesterday as per pt wishes and family request. Will do bed side swallow eval and feed if passes. Discussed the other options of feed through via PEG discussed if fails swallow eval

## 2021-09-12 NOTE — PROGRESS NOTE ADULT - ASSESSMENT
80yo M with Crohn's disease, now s/p total abdominal colectomy with end ileostomy on 8/20    PLAN:  Neurologic: post-op pain control, new-onset hypophonia without focal neuro deficits, obtundation  - Monitor mental status  - Pain control: PO Tylenol 500mg q6hr PRN, Tramadol 25mg PO PRN  - Haldol 1mg q12hr x3days  - Vitamin D3, Folic acid, and multivitamins daily    Respiratory: Acute respiratory insufficiency resolving, patient on RA  - Incentive spirometry 10x/hr while in bed, OOBTC    Cardiovascular: History of AFib currently rate controlled with IV metoprolol and amiodarone, now in NSR  - Metoprolol 25mg PO q8h  - Monitor hemodynamics    Gastrointestinal/Nutrition: s/p total abdominal colectomy with end ileostomy c/b recurrent bloody ostomy output; s/p TPN  - NPO except meds  - Protonix 40 IV BID  - Biotene mouth wash  - Monitor ostomy output and character  - PAOLO drains x2 with persistently low OP, consider d/c by primary team    Renal/Genitourinary: no active issues   - D5 LR @ 50cc/hr  - Monitor and replete electrolytes as needed  - Monitor I&Os, voiding spontaneously  - BMP q12hr    Hematologic: recurrent GIB -- last transfusion: 1u pRBC 9/7  - Lovenox 40mg daily for VTE ppx  - Trend H/H and transfuse Hgb < 7.5  - CBC q12hr    Infectious Disease: C. diff colitis vs Crohn's flair s/p subtotal colectomy   - Trend WBC on CBC q12hr  - Monitor fever curve    Endocrine: no active issues; s/p steroid taper 8/26  - Monitor glucose on BMP daily    Lines & Drains:   - LLQ & LUQ drain 8/20  - TPN 8/16  - RUE PICC placed 8/16    Disposition: SICU  Code Status: DNR/DNI, MOLST in chart

## 2021-09-12 NOTE — PROGRESS NOTE ADULT - SUBJECTIVE AND OBJECTIVE BOX
[facility] NUTRITION SUPPORT--  Attending/ PA FOLLOW UP NOTE      24 hour events/subjective:     Pt previously on TPN for nutrition support and was dc'd 9/5, when pt was tolerating NGT feeds at goal. Pt was previously tolerating NGT feeds at goal w Ostomy functioning, however NGT feeds were dc'd with plan start comfort feeds when daughter at bedside as pt failed speech & swallow evaluation versus PEG tube placement discussion        PAST HISTORY  --------------------------------------------------------------------------------  No significant changes to PMH, PSH, FHx, SHx, unless otherwise noted    ALLERGIES & MEDICATIONS  --------------------------------------------------------------------------------  Allergies    No Known Allergies    Intolerances      Standing Inpatient Medications  Biotene Dry Mouth Oral Rinse 5 milliLiter(s) Swish and Spit two times a day  chlorhexidine 2% Cloths 1 Application(s) Topical <User Schedule>  cholecalciferol 1000 Unit(s) Oral daily  dextrose 5% + lactated ringers. 1000 milliLiter(s) IV Continuous <Continuous>  enoxaparin Injectable 40 milliGRAM(s) SubCutaneous every 24 hours  folic acid 1 milliGRAM(s) Oral daily  haloperidol    Injectable 1 milliGRAM(s) IV Push every 12 hours  lidocaine   4% Patch 1 Patch Transdermal daily  metoprolol tartrate 12.5 milliGRAM(s) Oral every 12 hours  multivitamin 1 Tablet(s) Oral daily  pantoprazole  Injectable 40 milliGRAM(s) IV Push every 12 hours    PRN Inpatient Medications  acetaminophen  IVPB .. 500 milliGRAM(s) IV Intermittent every 6 hours PRN  HYDROmorphone  Injectable 0.25 milliGRAM(s) IV Push every 6 hours PRN      REVIEW OF SYSTEMS  --------------------------------------------------------------------------------  Gen: as per HPI  Skin: No rashes  Head/Eyes/Ears/Mouth: No headache;No sore throat  Respiratory: No dyspnea, cough,   CV: No chest pain, PND, orthopnea  GI: as per HPI  : No increased frequency, dysuria, hematuria, nocturia  MSK: No joint pain/swelling; no back pain; no edema  Neuro: No dizziness/lightheadedness, weakness, seizures, numbness, tingling  Psych: No significant nervousness, anxiety, stress, depression    All other systems were reviewed and are negative, except as noted.      LABS/STUDIES  --------------------------------------------------------------------------------              8.3    8.40  >-----------<  275      [09-12-21 @ 11:24]              27.5     141  |  110  |  15  ----------------------------<  101      [09-12-21 @ 11:24]  4.1   |  22  |  0.45        Ca     8.1     [09-12-21 @ 11:24]      iCa    1.14     [09-11 @ 23:29]      Mg     2.0     [09-12-21 @ 11:24]      Phos  3.8     [09-12-21 @ 11:24]    TPro  5.8  /  Alb  2.0  /  TBili  0.3  /  DBili  x   /  AST  26  /  ALT  28  /  AlkPhos  88  [09-12-21 @ 11:24]    PT/INR: PT 13.8 , INR 1.16       [09-11-21 @ 23:27]  PTT: 32.5       [09-11-21 @ 23:27]        Glucose, Serum: 101 mg/dL (09-12-21 @ 11:24)  Glucose, Serum: 91 mg/dL (09-11-21 @ 23:27)    Prealbumin, Serum: 22 mg/dL (09-03-21 @ 05:19)  Prealbumin, Serum: 16 mg/dL (09-03-21 @ 03:19)  Prealbumin, Serum: 15 mg/dL (08-30-21 @ 03:32)  Prealbumin, Serum: 8 mg/dL (08-15-21 @ 10:17)          09-11-21 @ 07:01  -  09-12-21 @ 07:00  --------------------------------------------------------  IN: 1868.2 mL / OUT: 1949 mL / NET: -80.8 mL    09-12-21 @ 07:01  -  09-12-21 @ 15:00  --------------------------------------------------------  IN: 433.3 mL / OUT: 150 mL / NET: 283.3 mL        VITALS/PHYSICAL EXAM  --------------------------------------------------------------------------------  T(C): 36.1 (09-12-21 @ 11:00), Max: 36.6 (09-11-21 @ 23:00)  HR: 71 (09-12-21 @ 14:00) (68 - 92)  BP: 108/52 (09-12-21 @ 14:00) (88/55 - 147/66)  RR: 29 (09-12-21 @ 14:00) (15 - 34)  SpO2: 100% (09-12-21 @ 14:00) (94% - 100%)  Wt(kg): --    Physical Exam:    Gen: Sitting up in chair, thin and frail, NAD  HEENT: NCAT   Neck: trachea midline, no noted JVD  Chest: respirations non labored  Abd: ND soft NT, +ostomy w yellow/brown stool  Extrem: ; +b/l UE extrem edema, distal to PICC, +1 Lower Extrem edema   Skin: no rashes noted  .  .  .

## 2021-09-12 NOTE — PROGRESS NOTE ADULT - SUBJECTIVE AND OBJECTIVE BOX
SICU Daily Progress Note  =====================================================  Interval/Overnight Events:     - NGT removed, per families [facility]    HPI: 81y Male with Crohn disease s/p failed medical management. 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 Transferred to SICU intubated and on pressors. Now extubated and off pressors.    Allergies:   No Known Allergies    MEDICATIONS:   --------------------------------------------------------------------------------------  Neurologic Medications  acetaminophen  IVPB .. 500 milliGRAM(s) IV Intermittent every 6 hours PRN Mild Pain (1 - 3)  haloperidol    Injectable 1 milliGRAM(s) IV Push every 12 hours  HYDROmorphone  Injectable 0.25 milliGRAM(s) IV Push every 6 hours PRN Severe Pain (7 - 10)    Respiratory Medications    Cardiovascular Medications  metoprolol tartrate Injectable 5 milliGRAM(s) IV Push every 6 hours    Gastrointestinal Medications  cholecalciferol 1000 Unit(s) Oral daily  dextrose 5% + lactated ringers. 1000 milliLiter(s) IV Continuous <Continuous>  folic acid 1 milliGRAM(s) Enteral Tube daily  multivitamin/minerals Oral Solution (WELLESSE) 30 milliLiter(s) Enteral Tube daily  pantoprazole  Injectable 40 milliGRAM(s) IV Push every 12 hours    Genitourinary Medications    Hematologic/Oncologic Medications  enoxaparin Injectable 40 milliGRAM(s) SubCutaneous every 24 hours    Antimicrobial/Immunologic Medications    Endocrine/Metabolic Medications    Topical/Other Medications  Biotene Dry Mouth Oral Rinse 5 milliLiter(s) Swish and Spit two times a day  chlorhexidine 2% Cloths 1 Application(s) Topical <User Schedule>  lidocaine   4% Patch 1 Patch Transdermal daily    --------------------------------------------------------------------------------------  VITAL SIGNS, INS/OUTS (last 24 hours):  --------------------------------------------------------------------------------------  T(C): 36.6 (09-12-21 @ 03:00), Max: 37 (09-11-21 @ 07:00)  HR: 73 (09-12-21 @ 03:00) (68 - 92)  BP: 124/57 (09-12-21 @ 03:00) (102/57 - 147/66)  RR: 17 (09-12-21 @ 03:00) (17 - 35)  SpO2: 99% (09-12-21 @ 03:00) (98% - 100%)    09-10-21 @ 07:01  -  09-11-21 @ 07:00  --------------------------------------------------------  IN: 2990 mL / OUT: 2165 mL / NET: 825 mL    09-11-21 @ 07:01  -  09-12-21 @ 03:38  --------------------------------------------------------  IN: 1235 mL / OUT: 1522 mL / NET: -287 mL      --------------------------------------------------------------------------------------  EXAM  NEUROLOGY  Exam: Normal, NAD, alert, oriented x3, no focal deficits.    HEENT  Exam: Normocephalic, atraumatic, EOMI.     RESPIRATORY  Exam: Normal expansion/effort.    CARDIOVASCULAR  Exam: Regular rate and rhythm.       GI/NUTRITION  Exam: Abdomen soft, Non-tender, Non-distended.     VASCULAR  Exam: Extremities warm, pink, well-perfused.     MUSCULOSKELETAL  Exam: All extremities moving spontaneously without limitations.     SKIN  Exam: Good skin turgor, no skin breakdown.       LABS  --------------------------------------------------------------------------------------                        8.1    7.21  )-----------( 275      ( 11 Sep 2021 23:27 )             26.3   09-11    142  |  111<H>  |  16  ----------------------------<  91  3.7   |  24  |  0.43<L>    Ca    7.5<L>      11 Sep 2021 23:27  Phos  2.1     09-11  Mg     2.2     09-11    TPro  5.6<L>  /  Alb  1.8<L>  /  TBili  0.3  /  DBili  x   /  AST  21  /  ALT  28  /  AlkPhos  89  09-11  PT/INR - ( 11 Sep 2021 23:27 )   PT: 13.8 sec;   INR: 1.16 ratio         PTT - ( 11 Sep 2021 23:27 )  PTT:32.5 sec  --------------------------------------------------------------------------------------

## 2021-09-12 NOTE — PROGRESS NOTE ADULT - ASSESSMENT
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 Admitted for crohns management, course c/b cdiff colitis sp dificid/fmt and findings of indeterminate quantiferon, pending id clearance to start biologic. Prealb 8. TPN consulted in setting of significant weight loss, severe pcm, and worsening colitis suspected 2/2 CD. Pt at high risk for refeeding syndrome. TPN started 8/16 for Protein Calorie Malnutrition.. Repeat CT showing unchanged diffuse colitis and non specific gb wall edema.  Taken to OR emergently on 8/20 for lap total colectomy, end ileostomy. Failed S&S eval. Family agreed to TANJA tube feeds, which had been started and subsequently tolerated at goal, so TPN dc'd 9/5.    Current Diet: NPO    -Pt previously tolerating NGT feeds at goal so TPN dc'd 9/5. Pt failed S&S eval---> GOC discussions for comfort feeds versus PEG tube placement.  -Nutritional Assessment, last prealbumin on 9/3 was 22.   -Anemia/GIB- care per GI, trend CBC, transfuse PRN  -Electrolyte Imbalance Risk, daily CMP, Mg, Phos, K, iCa. h/o Hypernatremia, hypophosphatemia and hypokalemia- management and repletions as per SICU  -Vitamin D deficiency-as per SICU  -GOC discussions ongoing, f/u pall care eval  -Strict I/O's  -Further care per primary/SICU; will follow with you    plan discussed with Dr. Fernandez and Dr GERARD Multani, agreeable with above    TPN pager [phone], spectra [phone]  M-F 6A-4P, Weekends and holidays 8A-12P

## 2021-09-12 NOTE — PROGRESS NOTE ADULT - SUBJECTIVE AND OBJECTIVE BOX
[facility] TE[facility] Surgery Progress Note    INTERVAL EVENTS:   - NGT removed, per families GOC    SUBJECTIVE: Patient seen and examined at bedside with surgical team, patient without complaints. Denies fever, chills, CP, SOB nausea, vomiting.     OBJECTIVE:    Vital Signs Last 24 Hrs  T(C): 36 (12 Sep 2021 07:00), Max: 36.6 (11 Sep 2021 11:00)  T(F): 96.8 (12 Sep 2021 07:00), Max: 97.9 (11 Sep 2021 11:00)  HR: 80 (12 Sep 2021 09:00) (68 - 92)  BP: 100/55 (12 Sep 2021 09:00) (88/55 - 147/66)  BP(mean): 75 (12 Sep 2021 09:00) (63 - 96)  RR: 21 (12 Sep 2021 09:00) (16 - 35)  SpO2: 97% (12 Sep 2021 09:00) (97% - 100%)I&O's Detail    11 Sep 2021 07:01  -  12 Sep 2021 07:00  --------------------------------------------------------  IN:    dextrose 5% + lactated ringers: 1050 mL    Enteral Tube Flush: 150 mL    IV PiggyBack: 50 mL    IV PiggyBack: 483.2 mL    Pivot 1.5: 135 mL  Total IN: 1868.2 mL    OUT:    Bulb (mL): 4 mL    Bulb (mL): 45 mL    Ileostomy (mL): 400 mL    Incontinent per Condom Catheter (mL): 1500 mL  Total OUT: 1949 mL    Total NET: -80.8 mL      12 Sep 2021 07:01  -  12 Sep 2021 10:11  --------------------------------------------------------  IN:    dextrose 5% + lactated ringers: 150 mL    IV PiggyBack: 83.3 mL  Total IN: 233.3 mL    OUT:  Total OUT: 0 mL    Total NET: 233.3 mL      MEDICATIONS  (STANDING):  Biotene Dry Mouth Oral Rinse 5 milliLiter(s) Swish and Spit two times a day  chlorhexidine 2% Cloths 1 Application(s) Topical <User Schedule>  cholecalciferol 1000 Unit(s) Oral daily  dextrose 5% + lactated ringers. 1000 milliLiter(s) (50 mL/Hr) IV Continuous <Continuous>  enoxaparin Injectable 40 milliGRAM(s) SubCutaneous every 24 hours  folic acid 1 milliGRAM(s) Enteral Tube daily  haloperidol    Injectable 1 milliGRAM(s) IV Push every 12 hours  lidocaine   4% Patch 1 Patch Transdermal daily  metoprolol tartrate Injectable 5 milliGRAM(s) IV Push every 6 hours  multivitamin/minerals Oral Solution (WELLESSE) 30 milliLiter(s) Enteral Tube daily  pantoprazole  Injectable 40 milliGRAM(s) IV Push every 12 hours    MEDICATIONS  (PRN):  acetaminophen  IVPB .. 500 milliGRAM(s) IV Intermittent every 6 hours PRN Mild Pain (1 - 3)  HYDROmorphone  Injectable 0.25 milliGRAM(s) IV Push every 6 hours PRN Severe Pain (7 - 10)      PHYSICAL EXAM:  Constitutional: A&Ox0, not interactive  Respiratory: Unlabored breathing  Abdomen: Soft, nondistended, nontender. Ostomy with dark stool. Midline dressing c/d/i.   Extremities: warm, well perfused      LABS:                        8.1    7.21  )-----------( 275      ( 11 Sep 2021 23:27 )             26.3     09-11    142  |  111<H>  |  16  ----------------------------<  91  3.7   |  24  |  0.43<L>    Ca    7.5<L>      11 Sep 2021 23:27  Phos  2.1     09-11  Mg     2.2     09-11    TPro  5.6<L>  /  Alb  1.8<L>  /  TBili  0.3  /  DBili  x   /  AST  21  /  ALT  28  /  AlkPhos  89  09-11    PT/INR - ( 11 Sep 2021 23:27 )   PT: 13.8 sec;   INR: 1.16 ratio         PTT - ( 11 Sep 2021 23:27 )  PTT:32.5 sec  LIVER FUNCTIONS - ( 11 Sep 2021 23:27 )  Alb: 1.8 g/dL / Pro: 5.6 g/dL / ALK PHOS: 89 U/L / ALT: 28 U/L / AST: 21 U/L / GGT: x

## 2021-09-12 NOTE — PROGRESS NOTE ADULT - ASSESSMENT
81M with recently dx Crohn's disease c/b perianal abscess and left posterior distal anal intersphincteric fistula and presents diarrhea/BRBPR  2/2 to CD flare c/b c diff colitis. Initially treat for C diff, s/p FMT. Taken emergently to OR on 8/20 for acute deterioration for laparoscopic total colectomy with end ileostomy, POD21. Failed speech and swallow 9/10.    PLAN:  - NGT out, follow up GOC discussion with family regarding comfort feeds   - F/u Palliative and GOC. Overall aim is likely hospice, continue ongoing discussions with multidisciplinary teams and family  - Wound care: daily dressing changes with midline incision packed   - Appreciate excellent care per SICU    [facility]  [id number]      81M with recently dx Crohn's disease c/b perianal abscess and left posterior distal anal intersphincteric fistula and presents diarrhea/BRBPR  2/2 to CD flare c/b c diff colitis. Initially treat for C diff, s/p FMT. Taken emergently to OR on 8/20 for acute deterioration for laparoscopic total colectomy with end ileostomy, POD23. Failed speech and swallow 9/10.    PLAN:  - NGT out, follow up GOC discussion with family regarding comfort feeds   - F/u Palliative and GOC. Overall aim is likely hospice, continue ongoing discussions with multidisciplinary teams and family  - Wound care: daily dressing changes with midline incision packed   - Appreciate excellent care per SICU    [facility]  [id number]

## 2021-09-13 NOTE — PROGRESS NOTE ADULT - SUBJECTIVE AND OBJECTIVE BOX
Patient is a 81y old  Male who presents with a chief complaint of diarrhea with blood in it , abd pain and weakness (13 Sep 2021 11:15)      INTERVAL HISTORY: pt feels much better up in the chair and oriented     TELEMETRY Personally reviewed: SR 80'S    REVIEW OF SYSTEMS:   CONSTITUTIONAL: No weakness  EYES/ENT: No visual changes; No throat pain  Neck: No pain or stiffness  Respiratory: No cough, wheezing, No shortness of breath  CARDIOVASCULAR: no chest pain or palpitations  GASTROINTESTINAL: No abdominal pain, no nausea, vomiting or hematemesis  GENITOURINARY: No dysuria, frequency or hematuria  NEUROLOGICAL: No stroke like symptoms  SKIN: No rashes    	  MEDICATIONS:  metoprolol tartrate 25 milliGRAM(s) Oral every 12 hours        PHYSICAL EXAM:  T(C): 37.1 (09-13-21 @ 15:00), Max: 37.1 (09-13-21 @ 15:00)  HR: 132 (09-13-21 @ 15:30) (74 - 145)  BP: 106/58 (09-13-21 @ 15:30) (95/51 - 162/72)  RR: 26 (09-13-21 @ 15:30) (15 - 29)  SpO2: 100% (09-13-21 @ 15:30) (96% - 100%)  Wt(kg): --  I&O's Summary    12 Sep 2021 07:01  -  13 Sep 2021 07:00  --------------------------------------------------------  IN: 1833.1 mL / OUT: 1320 mL / NET: 513.1 mL    13 Sep 2021 07:01  -  13 Sep 2021 16:20  --------------------------------------------------------  IN: 316.6 mL / OUT: 0 mL / NET: 316.6 mL          Appearance: In no distress	  HEENT:    PERRL, EOMI	  Cardiovascular:  S1 S2, No JVD  Respiratory: Lungs clear to auscultation	  Gastrointestinal:  Soft, Non-tender, + BS	  Vascularature:  No edema of LE  Psychiatric: Appropriate affect   Neuro: no acute focal deficits                               7.8    9.05  )-----------( 284      ( 13 Sep 2021 09:48 )             25.3     09-13    144  |  110<H>  |  12  ----------------------------<  119<H>  3.9   |  21<L>  |  0.46<L>    Ca    7.8<L>      13 Sep 2021 09:48  Phos  4.2     09-13  Mg     2.3     09-13    TPro  5.5<L>  /  Alb  2.0<L>  /  TBili  0.2  /  DBili  x   /  AST  19  /  ALT  24  /  AlkPhos  88  09-13        Labs personally reviewed      ASSESSMENT/PLAN: 	    ·  Problem: CAD (coronary artery disease).  Plan: c/w home meds  We discussed the importance of SAPT with low dose ASA 81mg given CAD  pt denies any current chest pain, SOB or chest pressure.     Problem/Plan -2:  ·  Problem: Colitis.  Plan: Ct shows crohn's  exacerbation   f/u flex sig with biopsies   s/p FMT 7/28  plan for repeat FMT vs Fidoxomaicin if sx don't improve  f/p flex sig on 8/9- no evidence of pseudomembranous   - s/p laparoscopic total abdominal colectomy with end ileostomy,   - GI following   - started on diet, tolerating fine   - will be transferred to floor from SICU     Problem/Plan - 3:  ·  Problem: Afib  -EKG noted. SR   - pt is ? candidate for AC seeing recent GIB. FOBT positive as recent as 8/8/21   continue rate control with IV lopressor - currently sinus  on tele     Problem/Plan - 4:   ·  Problem: GIB (gastrointestinal bleeding).  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, currently in SICU   - Restarted on diet now tolerating well    Problem/Plan - 5:  ·  Problem: DVt ppx SCDs     Problem/Plan - 6:  ·  Problem: B/L LE edema   - CXR showed mildly enlarged heart   -monitor IVF with I&O  -B/L L E edema improved   -TTE with mod-severe AI, mild AS, preserved EF    palliative on board, [facility] discussion with pt and family     Kar WHALEY-BC   Rufus Onofre DO [facility]  Cardiovascular Medicine  [address] [place] [address], [address]  Office: [phone]  Cell: [phone]

## 2021-09-13 NOTE — PROGRESS NOTE ADULT - SUBJECTIVE AND OBJECTIVE BOX
TEAM [facility] Progress Note  Patient is a 81y old  Male who presents with a chief complaint of diarrhea with blood in it , abd pain and weakness (13 Sep 2021 00:37)      INTERVAL EVENTS PER SICU"  - Diet advanced to mechanical soft/honey thick + Ensures TID    - Metoprolol increased from 12.5 BID to 25 BID     SUBJECTIVE:       OBJECTIVE:    Vital Signs Last 24 Hrs  T(C): 36 (12 Sep 2021 23:00), Max: 36.6 (12 Sep 2021 03:00)  T(F): 96.8 (12 Sep 2021 23:00), Max: 97.9 (12 Sep 2021 03:00)  HR: 78 (13 Sep 2021 01:00) (69 - 92)  BP: 141/66 (13 Sep 2021 01:00) (88/55 - 150/67)  BP(mean): 95 (13 Sep 2021 01:00) (63 - 98)  RR: 25 (13 Sep 2021 01:00) (15 - 29)  SpO2: 100% (13 Sep 2021 01:00) (94% - 100%)I&O's Detail    11 Sep 2021 07:01  -  12 Sep 2021 07:00  --------------------------------------------------------  IN:    dextrose 5% + lactated ringers: 1050 mL    Enteral Tube Flush: 150 mL    IV PiggyBack: 50 mL    IV PiggyBack: 483.2 mL    Pivot 1.5: 135 mL  Total IN: 1868.2 mL    OUT:    Bulb (mL): 4 mL    Bulb (mL): 45 mL    Ileostomy (mL): 400 mL    Incontinent per Condom Catheter (mL): 1500 mL  Total OUT: 1949 mL    Total NET: -80.8 mL      12 Sep 2021 07:01  -  13 Sep 2021 01:12  --------------------------------------------------------  IN:    dextrose 5% + lactated ringers: 950 mL    IV PiggyBack: 83.3 mL  Total IN: 1033.3 mL    OUT:    Bulb (mL): 5 mL    Bulb (mL): 0 mL    Incontinent per Condom Catheter (mL): 400 mL  Total OUT: 405 mL    Total NET: 628.3 mL      MEDICATIONS  (STANDING):  Biotene Dry Mouth Oral Rinse 5 milliLiter(s) Swish and Spit two times a day  chlorhexidine 2% Cloths 1 Application(s) Topical <User Schedule>  cholecalciferol 1000 Unit(s) Oral daily  dextrose 5% + lactated ringers. 1000 milliLiter(s) (50 mL/Hr) IV Continuous <Continuous>  enoxaparin Injectable 40 milliGRAM(s) SubCutaneous every 24 hours  folic acid 1 milliGRAM(s) Oral daily  haloperidol    Injectable 1 milliGRAM(s) IV Push every 12 hours  lidocaine   4% Patch 1 Patch Transdermal daily  metoprolol tartrate 25 milliGRAM(s) Oral every 12 hours  multivitamin 1 Tablet(s) Oral daily  pantoprazole  Injectable 40 milliGRAM(s) IV Push every 12 hours    MEDICATIONS  (PRN):  acetaminophen   Tablet .. 650 milliGRAM(s) Oral every 6 hours PRN Mild Pain (1 - 3)  HYDROmorphone  Injectable 0.25 milliGRAM(s) IV Push every 6 hours PRN Severe Pain (7 - 10)      PHYSICAL EXAM:  Constitutional: A&Ox0, not interactive  Respiratory: Unlabored breathing  Abdomen: Soft, nondistended, nontender. Ostomy with dark stool. Midline dressing c/d/i.   Extremities: warm, well perfused    LABS:                        7.6    7.15  )-----------( 248      ( 12 Sep 2021 21:22 )             25.2     09-12    140  |  112<H>  |  16  ----------------------------<  105<H>  3.7   |  22  |  0.52    Ca    8.0<L>      12 Sep 2021 21:22  Phos  2.8     09-12  Mg     1.9     09-12    TPro  5.3<L>  /  Alb  1.7<L>  /  TBili  0.2  /  DBili  x   /  AST  19  /  ALT  26  /  AlkPhos  87  09-12    PT/INR - ( 11 Sep 2021 23:27 )   PT: 13.8 sec;   INR: 1.16 ratio         PTT - ( 11 Sep 2021 23:27 )  PTT:32.5 sec  LIVER FUNCTIONS - ( 12 Sep 2021 21:22 )  Alb: 1.7 g/dL / Pro: 5.3 g/dL / ALK PHOS: 87 U/L / ALT: 26 U/L / AST: 19 U/L / GGT: x                 IMAGING:     TEAM Surgery Progress Note  Patient is a 81y old  Male who presents with a chief complaint of diarrhea with blood in it , abd pain and weakness (13 Sep 2021 00:37)      INTERVAL EVENTS PER SICU"  - Diet advanced to mechanical soft/honey thick + Ensures TID    - Metoprolol increased from 12.5 BID to 25 BID     SUBJECTIVE: Patient seen on morning rounds. Patient appears more alert and answered questions appropriately No nausea/vomiting while on mechanical soft so far. Pain is controlled.       OBJECTIVE:    Vital Signs Last 24 Hrs  T(C): 36 (12 Sep 2021 23:00), Max: 36.6 (12 Sep 2021 03:00)  T(F): 96.8 (12 Sep 2021 23:00), Max: 97.9 (12 Sep 2021 03:00)  HR: 78 (13 Sep 2021 01:00) (69 - 92)  BP: 141/66 (13 Sep 2021 01:00) (88/55 - 150/67)  BP(mean): 95 (13 Sep 2021 01:00) (63 - 98)  RR: 25 (13 Sep 2021 01:00) (15 - 29)  SpO2: 100% (13 Sep 2021 01:00) (94% - 100%)I&O's Detail    11 Sep 2021 07:01  -  12 Sep 2021 07:00  --------------------------------------------------------  IN:    dextrose 5% + lactated ringers: 1050 mL    Enteral Tube Flush: 150 mL    IV PiggyBack: 50 mL    IV PiggyBack: 483.2 mL    Pivot 1.5: 135 mL  Total IN: 1868.2 mL    OUT:    Bulb (mL): 4 mL    Bulb (mL): 45 mL    Ileostomy (mL): 400 mL    Incontinent per Condom Catheter (mL): 1500 mL  Total OUT: 1949 mL    Total NET: -80.8 mL      12 Sep 2021 07:01  -  13 Sep 2021 01:12  --------------------------------------------------------  IN:    dextrose 5% + lactated ringers: 950 mL    IV PiggyBack: 83.3 mL  Total IN: 1033.3 mL    OUT:    Bulb (mL): 5 mL    Bulb (mL): 0 mL    Incontinent per Condom Catheter (mL): 400 mL  Total OUT: 405 mL    Total NET: 628.3 mL      MEDICATIONS  (STANDING):  Biotene Dry Mouth Oral Rinse 5 milliLiter(s) Swish and Spit two times a day  chlorhexidine 2% Cloths 1 Application(s) Topical <User Schedule>  cholecalciferol 1000 Unit(s) Oral daily  dextrose 5% + lactated ringers. 1000 milliLiter(s) (50 mL/Hr) IV Continuous <Continuous>  enoxaparin Injectable 40 milliGRAM(s) SubCutaneous every 24 hours  folic acid 1 milliGRAM(s) Oral daily  haloperidol    Injectable 1 milliGRAM(s) IV Push every 12 hours  lidocaine   4% Patch 1 Patch Transdermal daily  metoprolol tartrate 25 milliGRAM(s) Oral every 12 hours  multivitamin 1 Tablet(s) Oral daily  pantoprazole  Injectable 40 milliGRAM(s) IV Push every 12 hours    MEDICATIONS  (PRN):  acetaminophen   Tablet .. 650 milliGRAM(s) Oral every 6 hours PRN Mild Pain (1 - 3)  HYDROmorphone  Injectable 0.25 milliGRAM(s) IV Push every 6 hours PRN Severe Pain (7 - 10)      PHYSICAL EXAM:  Constitutional: A&Ox0, not interactive  Respiratory: Unlabored breathing  Abdomen: Soft, nondistended, nontender. Ostomy with dark stool. Midline dressing c/d/i.   Extremities: warm, well perfused    LABS:                        7.6    7.15  )-----------( 248      ( 12 Sep 2021 21:22 )             25.2     09-12    140  |  112<H>  |  16  ----------------------------<  105<H>  3.7   |  22  |  0.52    Ca    8.0<L>      12 Sep 2021 21:22  Phos  2.8     09-12  Mg     1.9     09-12    TPro  5.3<L>  /  Alb  1.7<L>  /  TBili  0.2  /  DBili  x   /  AST  19  /  ALT  26  /  AlkPhos  87  09-12    PT/INR - ( 11 Sep 2021 23:27 )   PT: 13.8 sec;   INR: 1.16 ratio         PTT - ( 11 Sep 2021 23:27 )  PTT:32.5 sec  LIVER FUNCTIONS - ( 12 Sep 2021 21:22 )  Alb: 1.7 g/dL / Pro: 5.3 g/dL / ALK PHOS: 87 U/L / ALT: 26 U/L / AST: 19 U/L / GGT: x                 IMAGING:

## 2021-09-13 NOTE — DISCHARGE NOTE PROVIDER - NSDCCPTREATMENT_GEN_ALL_CORE_FT
PRINCIPAL PROCEDURE  Procedure: Laparoscopic total abdominal colectomy with end ileostomy  Findings and Treatment:

## 2021-09-13 NOTE — PROGRESS NOTE ADULT - ASSESSMENT
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 Admitted for crohns management, course c/b cdiff colitis sp dificid/fmt and findings of indeterminate quantiferon, pending id clearance to start biologic. Prealb 8. TPN consulted in setting of significant weight loss, severe pcm, and worsening colitis suspected 2/2 CD. Pt at high risk for refeeding syndrome. TPN started 8/16 for Protein Calorie Malnutrition.. Repeat CT showing unchanged diffuse colitis and non specific gb wall edema.  Taken to OR emergently on 8/20 for lap total colectomy, end ileostomy. Failed S&S eval. Family agreed to TANJA tube feeds, which had been started but then dc'd due to noted blood in ostomy and risk for aspiration given deterioration in mental status. made dnr/dni. Mental status improved and restarted on ngt feeds, and tolerating; later dcd as per pt/family wishes. TPN dcd 9/5    Current Diet: dysphagia 2 + ensure supps    -dysphagia diet as tolerated; aspiration precautions  -electrolyte imbalance risk- monitor and replete elytes as needed  -anemia/GIB- trend cbc, transfuse prn  -vitamin d deficiency- on supplementation  -goc discussions ongoing  -further care per primary/SICU; will follow with you    plan discussed with Dr. Fernandez and Dr GERARD Multani, agreeable with above    TPN pager [phone], spectra [phone]  M-F 6A-4P, Weekends and holidays 8A-12P

## 2021-09-13 NOTE — PROGRESS NOTE ADULT - ASSESSMENT
81M with recently dx Crohn's disease c/b perianal abscess and left posterior distal anal intersphincteric fistula and presents diarrhea/BRBPR  2/2 to CD flare c/b c diff colitis. Initially treat for C diff, s/p FMT. Taken emergently to OR on 8/20 for acute deterioration for laparoscopic total colectomy with end ileostomy, POD24. Failed speech and swallow 9/10.    PLAN:  - NGT out, follow up GOC discussion with family regarding comfort feeds   - F/u Palliative and GOC. Overall aim is likely hospice, continue ongoing discussions with multidisciplinary teams and family  - Wound care: daily dressing changes with midline incision packed   - Appreciate excellent care per SICU    [facility]  [id number]  81M with recently dx Crohn's disease c/b perianal abscess and left posterior distal anal intersphincteric fistula and presents diarrhea/BRBPR  2/2 to CD flare c/b c diff colitis. Initially treat for C diff, s/p FMT. Taken emergently to OR on 8/20 for acute deterioration for laparoscopic total colectomy with end ileostomy, POD24. Failed speech and swallow 9/10.    PLAN:  - Diet: mechanical soft, dysphagia type 2   - F/u Palliative and GOC. Overall aim is likely hospice, continue ongoing discussions with multidisciplinary teams and family  - Wound care: daily dressing changes with midline incision packed   - Appreciate excellent care per SICU    [facility]  [id number]  81M with recently dx Crohn's disease c/b perianal abscess and left posterior distal anal intersphincteric fistula and presents diarrhea/BRBPR  2/2 to CD flare c/b c diff colitis. Initially treat for C diff, s/p FMT. Taken emergently to OR on 8/20 for acute deterioration for laparoscopic total colectomy with end ileostomy, POD24. Failed speech and swallow 9/10.    PLAN:  - Transfer to floor (2monti only)   - Dispo planning for rehab   - D/C IV fluids  - Diet: mechanical soft, dysphagia type 2   - F/u Palliative and GOC. Overall aim is likely hospice, continue ongoing discussions with multidisciplinary teams and family  - Wound care: daily dressing changes with midline incision packed   - Appreciate excellent care per SICU    [facility]  [id number]

## 2021-09-13 NOTE — PROGRESS NOTE ADULT - ASSESSMENT
A/P  82yo M with Crohn's disease, now s/p total abdominal colectomy with end ileostomy on 8/20.     GI bleed   stable H/h now     # CAD / PAF/ HTN   -History of AFib currently rate controlled with IV metoprolol and amiodarone, now in NSR  lopressor 25 mg q 6    # Sever colitis 2/2 chron's and c diff   - s/p total abdominal colectomy with end ileostomy, failed S/S, bloody ostomy output  protonix bid     # Sever malnutrition   started on puree diet       Code Status: DNR/DNI, MOLST in chart

## 2021-09-13 NOTE — DISCHARGE NOTE PROVIDER - HOSPITAL COURSE
HISTORY OF PRESENT ILLNESS  81M h/o Afib (on Sotalol, not on AC), with several recent hospitalizations, presenting 7/13 with persistent diarrhea. Patient states he has been having 6+ BMs per day; they were initially bloody (bright red), but denies bloody BMs for about 1 week. However continues to have nausea and crampy diffuse abdominal pain that is more tender on the lower/left-lower abdomen, but also mildly tender on the right. His sx are c/b oral ulcers which make it difficulty for him to eat because of the oral pain (no pain upon swallowing in the esophagus). States he has lost about 60 pounds in a month due to the inability to eat properly and finds it difficult to stay hydrated. Denies chills, but reports intermittent subjective fever, no joint pain or skin lesions. He is currently on mesalamine 1.2 mg - 4 tabs daily, budesonide 3 mg daily, and methotrexate 2.5 mg- 6 tabs every week. 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 They were just approved for humira and are supposed to receive it by the end of the month.     Patient states he had a flex sig in May with his Gastroenterologist, Dr. Juan Luis Tran ([phone]) at [facility], and recalls being told that he has inflammation. Says his last colonoscopy/EGD was done with Dr. Tran at the time of diagnosis. However, I spoke with Dr. Nixon's PA who mentioned that his last colonoscopy was 12/2020 and they will be faxing over that report as well as a prior EGD 2019 and capsule study 11/2020 for anemia. Patient denies h/o cdiff.    Of note, Mr. Bustamante says he has had all of these sx ongoing since being diagnosed about 9 mos ago: nausea, abdominal pain, bloody diarrhea, and oral ulcers and that is how he presented. Says he was initially started on mesalamine and budesonide without relief, so then methotrexate was added which did not provide any relief either so he was started on Remicade. Patient reporting mild improvement of his sx on Remicade only initially after which he says it did not help. His outside GI PA states it helped with healing his fistula/abscess. He was on it for 6 mos, prior to developing antibodies mentioned above and currently has plans to start Humira at the end of this month. However, he came to [facility] for care because they feel his current management of the CD is not working and wanted to see if someone else would be able to help manage it.    On admission to [facility] he was HDS with elevated white count and Hgb 8.4. CTAP revealed pericolonic inflammation, wall thickening concerning for inflammation of the rectosigmoid, descending colon, proximal ascending colon/cecum; the TI was underdistended. Stool studies including cdiff were sent and he was started on IV cipro/flagyl.    HOSPITAL COURSE  7/15: C. Diff culture positive, started on vancomycin PO x10d  7/16: started on Budesonamide for Crohns flare  7/19: underwent Flex Sig, which showed: skin tags in perianal region, diffuse granular mucosa in entire colon, diffuse white exudate (pseudomembrane) at the anus, in the rectum, in the recotsigmoid colon, and distal sigmoid colon. Biopsies were taken.    *INCOMPLETE* HISTORY OF PRESENT ILLNESS  81M h/o Afib (on Sotalol, not on AC), with several recent hospitalizations, presenting 7/13 with persistent diarrhea. Patient states he has been having 6+ BMs per day; they were initially bloody (bright red), but denies bloody BMs for about 1 week. However continues to have nausea and crampy diffuse abdominal pain that is more tender on the lower/left-lower abdomen, but also mildly tender on the right. His sx are c/b oral ulcers which make it difficulty for him to eat because of the oral pain (no pain upon swallowing in the esophagus). States he has lost about 60 pounds in a month due to the inability to eat properly and finds it difficult to stay hydrated. Denies chills, but reports intermittent subjective fever, no joint pain or skin lesions. He is currently on mesalamine 1.2 mg - 4 tabs daily, budesonide 3 mg daily, and methotrexate 2.5 mg- 6 tabs every week. 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 They were just approved for humira and are supposed to receive it by the end of the month.     Patient states he had a flex sig in May with his Gastroenterologist, Dr. Juan Luis Tran ([phone]) at [facility], and recalls being told that he has inflammation. Says his last colonoscopy/EGD was done with Dr. Tran at the time of diagnosis. However, I spoke with Dr. Nixon's PA who mentioned that his last colonoscopy was 12/2020 and they will be faxing over that report as well as a prior EGD 2019 and capsule study 11/2020 for anemia. Patient denies h/o cdiff.    Of note, Mr. Bustamante says he has had all of these sx ongoing since being diagnosed about 9 mos ago: nausea, abdominal pain, bloody diarrhea, and oral ulcers and that is how he presented. Says he was initially started on mesalamine and budesonide without relief, so then methotrexate was added which did not provide any relief either so he was started on Remicade. Patient reporting mild improvement of his sx on Remicade only initially after which he says it did not help. His outside GI PA states it helped with healing his fistula/abscess. He was on it for 6 mos, prior to developing antibodies mentioned above and currently has plans to start Humira at the end of this month. However, he came to [facility] for care because they feel his current management of the CD is not working and wanted to see if someone else would be able to help manage it.    On admission to [facility] he was HDS with elevated white count and Hgb 8.4. CTAP revealed pericolonic inflammation, wall thickening concerning for inflammation of the rectosigmoid, descending colon, proximal ascending colon/cecum; the TI was underdistended. Stool studies including cdiff were sent and he was started on IV cipro/flagyl.    HOSPITAL COURSE  7/15: C. Diff culture positive, started on vancomycin PO x10d  7/16: started on Budesonide for Crohns flare  7/19: underwent Flex Sig, which showed: skin tags in perianal region, diffuse granular mucosa in entire colon, diffuse white exudate (pseudomembrane) at the anus, in the rectum, in the recotsigmoid colon, and distal sigmoid colon. Biopsies were taken.  7/21: continued to have diarrhea. HBVsAg nonreactive. Vancomycin increased to 500mg PO Q6. Flagyl 500mg Q8 added  7/22: pt had fever overnight. Pathology revealing active chronic colitis.  7/28: due to lack of improvement from antibiotics pt underwent colonoscopy with FMT. Vancomycin discontinued  7/30: pt continuing to have intermittent fevers. RVP with parainfluenzae  8/1: restarted on vanco oral BID by ID due to lack of improvement  8/2: Fidaxomicin started  8/9: Flex sig performed: Colonic Crohns disease, worsening ulceration throughout rectum and sigmoid. No pseudomembranes visualized. Methylprednisolone started  8/13: change in mental status, pt became A&Ox1 no focal deficits. CTH non-con without any acute changes.  8/15: Colorectal surgery consulted due to lack of improvement. No plan for surgical intervention at that time.  8/16 GI and sx are discussing about biologic vs surgical intervention, s/p PICC, TPN started today, family expressed pt has severe depression - psych consult tmrw in am\  8/16- NIGHTS- AFib w/'s, cardizem IVP given, labs sent, standing sotalol PO  8/16 psych consulted for depression/suicidal ideation to daughter yesterday  8/17patient and family refusing surgery   -- pt is not a candidate for AC seeing recent GIB. FOBT positive as recent as 8/8/21   patient NSR hr 70's   8/18 h/h 7/23. no obvious GIB. family refusing PRBC, family deciding reg;colectomy with Dr garcía  8/19  -RN reports BRBPR this AM.  Protonix gtt, CBC  8/19	h/h--7.3/24.1--pt is refusing PRBC, on TPN, Possible Colectomy next week--fam to decide, ppi drip, Attending is aware.  8/20 Patient with Hgb 6.8.  SBP 90s with lethargy.  Pt given blood and taken to OR emergently.  O/N: TPN?. Cont. Steroid taper. Cont. NGT. 500cc LR and 250 albumin 5% given. On phenylephrine  ggt.   8/24: lasix 20mg goal net even, acupella. S/S eval  9/1:  metoprolol increaed 37.5 q 12  9/2 diuresed Lasix 20mg IV x 2. oxy changed to tramadol.  9/6: metoprolol changed to 25mg q8h . TF increased to 45cc. given 1 unit pRBC for H7.1  protonix iv to oral. TPN stopped.  9/7: bloody ileostomy output, clots. given 1u PRBC overnight. started on protonix gtt. GI called for scope.      *INCOMPLETE* HISTORY OF PRESENT ILLNESS  81M h/o Afib (on Sotalol, not on AC), with several recent hospitalizations, presenting 7/13 with persistent diarrhea. Patient states he has been having 6+ BMs per day; they were initially bloody (bright red), but denies bloody BMs for about 1 week. However continues to have nausea and crampy diffuse abdominal pain that is more tender on the lower/left-lower abdomen, but also mildly tender on the right. His sx are c/b oral ulcers which make it difficulty for him to eat because of the oral pain (no pain upon swallowing in the esophagus). States he has lost about 60 pounds in a month due to the inability to eat properly and finds it difficult to stay hydrated. Denies chills, but reports intermittent subjective fever, no joint pain or skin lesions. He is currently on mesalamine 1.2 mg - 4 tabs daily, budesonide 3 mg daily, and methotrexate 2.5 mg- 6 tabs every week. 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 They were just approved for humira and are supposed to receive it by the end of the month.     Patient states he had a flex sig in May with his Gastroenterologist, Dr. Juan Luis Tran ([phone]) at [facility], and recalls being told that he has inflammation. Says his last colonoscopy/EGD was done with Dr. Tran at the time of diagnosis. However, I spoke with Dr. Nixon's PA who mentioned that his last colonoscopy was 12/2020 and they will be faxing over that report as well as a prior EGD 2019 and capsule study 11/2020 for anemia. Patient denies h/o cdiff.    Of note, Mr. Bustamante says he has had all of these sx ongoing since being diagnosed about 9 mos ago: nausea, abdominal pain, bloody diarrhea, and oral ulcers and that is how he presented. Says he was initially started on mesalamine and budesonide without relief, so then methotrexate was added which did not provide any relief either so he was started on Remicade. Patient reporting mild improvement of his sx on Remicade only initially after which he says it did not help. His outside GI PA states it helped with healing his fistula/abscess. He was on it for 6 mos, prior to developing antibodies mentioned above and currently has plans to start Humira at the end of this month. However, he came to [facility] for care because they feel his current management of the CD is not working and wanted to see if someone else would be able to help manage it.    On admission to [facility] he was HDS with elevated white count and Hgb 8.4. CTAP revealed pericolonic inflammation, wall thickening concerning for inflammation of the rectosigmoid, descending colon, proximal ascending colon/cecum; the TI was underdistended. Stool studies including cdiff were sent and he was started on IV cipro/flagyl.    HOSPITAL COURSE  7/15: C. Diff culture positive, started on vancomycin PO x10d  7/16: started on Budesonide for Crohns flare  7/19: underwent Flex Sig, which showed: skin tags in perianal region, diffuse granular mucosa in entire colon, diffuse white exudate (pseudomembrane) at the anus, in the rectum, in the recotsigmoid colon, and distal sigmoid colon. Biopsies were taken.  7/21: continued to have diarrhea. HBVsAg nonreactive. Vancomycin increased to 500mg PO Q6. Flagyl 500mg Q8 added  7/22: pt had fever overnight. Pathology revealing active chronic colitis.  7/28: due to lack of improvement from antibiotics pt underwent colonoscopy with FMT. Vancomycin discontinued  7/30: pt continuing to have intermittent fevers. RVP with parainfluenzae  8/1: restarted on vanco oral BID by ID due to lack of improvement  8/2: Fidaxomicin started  8/9: Flex sig performed: Colonic Crohns disease, worsening ulceration throughout rectum and sigmoid. No pseudomembranes visualized. Methylprednisolone started  8/13: change in mental status, pt became A&Ox1 no focal deficits. CTH non-con without any acute changes.  8/15: Colorectal surgery consulted due to lack of improvement. No plan for surgical intervention at that time.  8/16 GI and sx are discussing about biologic vs surgical intervention, s/p PICC, TPN started today, family expressed pt has severe depression - psych consult tmrw in am\  8/16- NIGHTS- AFib w/'s, cardizem IVP given, labs sent, standing sotalol PO  8/16 psych consulted for depression/suicidal ideation to daughter yesterday  8/17patient and family refusing surgery   -- pt is not a candidate for AC seeing recent GIB. FOBT positive as recent as 8/8/21   patient NSR hr 70's   8/18 h/h 7/23. no obvious GIB. family refusing PRBC, family deciding reg;colectomy with Dr garcía  8/19  -RN reports BRBPR this AM.  Protonix gtt, CBC  8/19	h/h--7.3/24.1--pt is refusing PRBC, on TPN, Possible Colectomy next week--fam to decide, ppi drip, Attending is aware.  8/20 Patient with Hgb 6.8.  SBP 90s with lethargy.  Pt given blood and taken to OR emergently.  O/N: TPN?. Cont. Steroid taper. Cont. NGT. 500cc LR and 250 albumin 5% given. On phenylephrine  ggt.   8/24: lasix 20mg goal net even, acupella. S/S eval  9/1:  metoprolol increaed 37.5 q 12  9/2 diuresed Lasix 20mg IV x 2. oxy changed to tramadol.  9/6: metoprolol changed to 25mg q8h . TF increased to 45cc. given 1 unit pRBC for H7.1  protonix iv to oral. TPN stopped.  9/7: bloody ileostomy output, clots. given 1u PRBC overnight. started on protonix gtt. GI called for scope. 1L dark red OP from ileostomy over the last 24h while patient remained HDS, Lovenox d/c'd due to downtrending H/H, TPN d/c'd and TF rate increased to 45cc/hr    9/8: Pt was started on Haldol 1mg BID x 3d for agitation. He was started on protonix gtt. 1 episode of hypotension around 6pm in the evening, given 500cc LR bolus and 1u pRBC. Overnight, 1 episode of non-sustained Vtach on telemetry while aSx. [facility] rescheduled for tomorrow    9/14: Metoprolol increased to 25mg q 8hrs. Listed for surgical tele bed. +DVT sono for below knee DVTs, will repeat in 1 week   9/16: still with bleeding from ostomy. pt and family deliberating on whether to undergo EGD or not   HISTORY OF PRESENT ILLNESS  81M h/o Afib (on Sotalol, not on AC), with several recent hospitalizations, presenting 7/13 with persistent diarrhea. Patient states he has been having 6+ BMs per day; they were initially bloody (bright red), but denies bloody BMs for about 1 week. However continues to have nausea and crampy diffuse abdominal pain that is more tender on the lower/left-lower abdomen, but also mildly tender on the right. His sx are c/b oral ulcers which make it difficulty for him to eat because of the oral pain (no pain upon swallowing in the esophagus). States he has lost about 60 pounds in a month due to the inability to eat properly and finds it difficult to stay hydrated. Denies chills, but reports intermittent subjective fever, no joint pain or skin lesions. He is currently on mesalamine 1.2 mg - 4 tabs daily, budesonide 3 mg daily, and methotrexate 2.5 mg- 6 tabs every week. 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 They were just approved for humira and are supposed to receive it by the end of the month.     Patient states he had a flex sig in May with his Gastroenterologist, Dr. Juan Luis Tran ([phone]) at [facility], and recalls being told that he has inflammation. Says his last colonoscopy/EGD was done with Dr. Tran at the time of diagnosis. However, I spoke with Dr. Nixon's PA who mentioned that his last colonoscopy was 12/2020 and they will be faxing over that report as well as a prior EGD 2019 and capsule study 11/2020 for anemia. Patient denies h/o cdiff.    Of note, Mr. Bustamante says he has had all of these sx ongoing since being diagnosed about 9 mos ago: nausea, abdominal pain, bloody diarrhea, and oral ulcers and that is how he presented. Says he was initially started on mesalamine and budesonide without relief, so then methotrexate was added which did not provide any relief either so he was started on Remicade. Patient reporting mild improvement of his sx on Remicade only initially after which he says it did not help. His outside GI PA states it helped with healing his fistula/abscess. He was on it for 6 mos, prior to developing antibodies mentioned above and currently has plans to start Humira at the end of this month. However, he came to [facility] for care because they feel his current management of the CD is not working and wanted to see if someone else would be able to help manage it.    On admission to [facility] he was HDS with elevated white count and Hgb 8.4. CTAP revealed pericolonic inflammation, wall thickening concerning for inflammation of the rectosigmoid, descending colon, proximal ascending colon/cecum; the TI was underdistended. Stool studies including cdiff were sent and he was started on IV cipro/flagyl.    HOSPITAL COURSE  7/15: C. Diff culture positive, started on vancomycin PO x10d  7/16: started on Budesonide for Crohns flare  7/19: underwent Flex Sig, which showed: skin tags in perianal region, diffuse granular mucosa in entire colon, diffuse white exudate (pseudomembrane) at the anus, in the rectum, in the recotsigmoid colon, and distal sigmoid colon. Biopsies were taken.  7/21: continued to have diarrhea. HBVsAg nonreactive. Vancomycin increased to 500mg PO Q6. Flagyl 500mg Q8 added  7/22: pt had fever overnight. Pathology revealing active chronic colitis.  7/28: due to lack of improvement from antibiotics pt underwent colonoscopy with FMT. Vancomycin discontinued  7/30: pt continuing to have intermittent fevers. RVP with parainfluenzae  8/1: restarted on vanco oral BID by ID due to lack of improvement  8/2: Fidaxomicin started  8/9: Flex sig performed: Colonic Crohns disease, worsening ulceration throughout rectum and sigmoid. No pseudomembranes visualized. Methylprednisolone started  8/13: change in mental status, pt became A&Ox1 no focal deficits. CTH non-con without any acute changes.  8/15: Colorectal surgery consulted due to lack of improvement. No plan for surgical intervention at that time.  8/16 GI and sx are discussing about biologic vs surgical intervention, s/p PICC, TPN started today, family expressed pt has severe depression - psych consult tmrw in am\  8/16- NIGHTS- AFib w/'s, cardizem IVP given, labs sent, standing sotalol PO  8/16 psych consulted for depression/suicidal ideation to daughter yesterday  8/17patient and family refusing surgery   -- pt is not a candidate for AC seeing recent GIB. FOBT positive as recent as 8/8/21   patient NSR hr 70's   8/18 h/h 7/23. no obvious GIB. family refusing PRBC, family deciding reg;colectomy with Dr garcía  8/19  -RN reports BRBPR this AM.  Protonix gtt, CBC  8/19	h/h--7.3/24.1--pt is refusing PRBC, on TPN, Possible Colectomy next week--fam to decide, ppi drip, Attending is aware.  8/20 Patient with Hgb 6.8.  SBP 90s with lethargy.  Pt given blood and taken to OR emergently.  O/N: TPN?. Cont. Steroid taper. Cont. NGT. 500cc LR and 250 albumin 5% given. On phenylephrine  ggt.   8/24: lasix 20mg goal net even, acupella. S/S eval  9/1:  metoprolol increaed 37.5 q 12  9/2 diuresed Lasix 20mg IV x 2. oxy changed to tramadol.  9/6: metoprolol changed to 25mg q8h . TF increased to 45cc. given 1 unit pRBC for H7.1  protonix iv to oral. TPN stopped.  9/7: bloody ileostomy output, clots. given 1u PRBC overnight. started on protonix gtt. GI called for scope. 1L dark red OP from ileostomy over the last 24h while patient remained HDS, Lovenox d/c'd due to downtrending H/H, TPN d/c'd and TF rate increased to 45cc/hr    9/8: Pt was started on Haldol 1mg BID x 3d for agitation. He was started on protonix gtt. 1 episode of hypotension around 6pm in the evening, given 500cc LR bolus and 1u pRBC. Overnight, 1 episode of non-sustained Vtach on telemetry while aSx. [facility] rescheduled for tomorrow  9/9: Went down for EGD; however GI could not clarify code status for procedure so procedure was not done. TF restarted, Palliative Carec consult placed to establish definitive GOC prior to reconsidering EGD  9/14:  Metoprolol increased to 25mg q 8hrs. Listed for surgical tele bed. +DVT sono for below knee DVTs, will repeat in 1 week   9/16: still with bleeding from ostomy. pt and family deliberating on whether to undergo EGD or not   HISTORY OF PRESENT ILLNESS  81M h/o Afib (on Sotalol, not on AC), with several recent hospitalizations, presenting 7/13 with persistent diarrhea. Patient states he has been having 6+ BMs per day; they were initially bloody (bright red), but denies bloody BMs for about 1 week. However continues to have nausea and crampy diffuse abdominal pain that is more tender on the lower/left-lower abdomen, but also mildly tender on the right. His sx are c/b oral ulcers which make it difficulty for him to eat because of the oral pain (no pain upon swallowing in the esophagus). States he has lost about 60 pounds in a month due to the inability to eat properly and finds it difficult to stay hydrated. Denies chills, but reports intermittent subjective fever, no joint pain or skin lesions. He is currently on mesalamine 1.2 mg - 4 tabs daily, budesonide 3 mg daily, and methotrexate 2.5 mg- 6 tabs every week. 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 They were just approved for humira and are supposed to receive it by the end of the month.     Patient states he had a flex sig in May with his Gastroenterologist, Dr. Juan Luis Tran ([phone]) at [facility], and recalls being told that he has inflammation. Says his last colonoscopy/EGD was done with Dr. Tran at the time of diagnosis. However, I spoke with Dr. Nixon's PA who mentioned that his last colonoscopy was 12/2020 and they will be faxing over that report as well as a prior EGD 2019 and capsule study 11/2020 for anemia. Patient denies h/o cdiff.    Of note, Mr. Bustamante says he has had all of these sx ongoing since being diagnosed about 9 mos ago: nausea, abdominal pain, bloody diarrhea, and oral ulcers and that is how he presented. Says he was initially started on mesalamine and budesonide without relief, so then methotrexate was added which did not provide any relief either so he was started on Remicade. Patient reporting mild improvement of his sx on Remicade only initially after which he says it did not help. His outside GI PA states it helped with healing his fistula/abscess. He was on it for 6 mos, prior to developing antibodies mentioned above and currently has plans to start Humira at the end of this month. However, he came to [facility] for care because they feel his current management of the CD is not working and wanted to see if someone else would be able to help manage it.    On admission to [facility] he was HDS with elevated white count and Hgb 8.4. CTAP revealed pericolonic inflammation, wall thickening concerning for inflammation of the rectosigmoid, descending colon, proximal ascending colon/cecum; the TI was underdistended. Stool studies including cdiff were sent and he was started on IV cipro/flagyl.    HOSPITAL COURSE  7/15: C. Diff culture positive, started on vancomycin PO x10d  7/16: started on Budesonide for Crohns flare  7/19: underwent Flex Sig, which showed: skin tags in perianal region, diffuse granular mucosa in entire colon, diffuse white exudate (pseudomembrane) at the anus, in the rectum, in the recotsigmoid colon, and distal sigmoid colon. Biopsies were taken.  7/21: continued to have diarrhea. HBVsAg nonreactive. Vancomycin increased to 500mg PO Q6. Flagyl 500mg Q8 added  7/22: pt had fever overnight. Pathology revealing active chronic colitis.  7/28: due to lack of improvement from antibiotics pt underwent colonoscopy with FMT. Vancomycin discontinued  7/30: pt continuing to have intermittent fevers. RVP with parainfluenzae  8/1: restarted on vanco oral BID by ID due to lack of improvement  8/2: Fidaxomicin started  8/9: Flex sig performed: Colonic Crohns disease, worsening ulceration throughout rectum and sigmoid. No pseudomembranes visualized. Methylprednisolone started  8/13: change in mental status, pt became A&Ox1 no focal deficits. CTH non-con without any acute changes.  8/15: Colorectal surgery consulted due to lack of improvement. No plan for surgical intervention at that time.  8/16 GI and sx are discussing about biologic vs surgical intervention, s/p PICC, TPN started today, family expressed pt has severe depression - psych consult tmrw in am\  8/16- NIGHTS- AFib w/'s, cardizem IVP given, labs sent, standing sotalol PO  8/16 psych consulted for depression/suicidal ideation to daughter yesterday  8/17patient and family refusing surgery   -- pt is not a candidate for AC seeing recent GIB. FOBT positive as recent as 8/8/21   patient NSR hr 70's   8/18 h/h 7/23. no obvious GIB. family refusing PRBC, family deciding reg;colectomy with Dr garcía  8/19  -RN reports BRBPR this AM.  Protonix gtt, CBC  8/19	h/h--7.3/24.1--pt is refusing PRBC, on TPN, Possible Colectomy next week--fam to decide, ppi drip, Attending is aware.  8/20 Patient with Hgb 6.8.  SBP 90s with lethargy.  Pt given blood and taken to OR emergently.  O/N: TPN?. Cont. Steroid taper. Cont. NGT. 500cc LR and 250 albumin 5% given. On phenylephrine  ggt.   8/24: lasix 20mg goal net even, acupella. S/S eval  9/1:  metoprolol increaed 37.5 q 12  9/2 diuresed Lasix 20mg IV x 2. oxy changed to tramadol.  9/6: metoprolol changed to 25mg q8h . TF increased to 45cc. given 1 unit pRBC for H7.1  protonix iv to oral. TPN stopped.  9/7: bloody ileostomy output, clots. given 1u PRBC overnight. started on protonix gtt. GI called for scope. 1L dark red OP from ileostomy over the last 24h while patient remained HDS, Lovenox d/c'd due to downtrending H/H, TPN d/c'd and TF rate increased to 45cc/hr  9/8: Pt was started on Haldol 1mg BID x 3d for agitation. He was started on protonix gtt. 1 episode of hypotension around 6pm in the evening, given 500cc LR bolus and 1u pRBC. Overnight, 1 episode of non-sustained Vtach on telemetry while aSx. [facility] rescheduled for tomorrow  9/9: Went down for EGD; however GI could not clarify code status for procedure so procedure was not done. TF restarted, Palliative Carec consult placed to establish definitive GOC prior to reconsidering EGD  9/14:  Metoprolol increased to 25mg q 8hrs. Listed for surgical tele bed. +DVT sono for below knee DVTs, will repeat in 1 week   9/16: still with bleeding from ostomy. pt and family deliberating on whether to undergo EGD or not  9/17: Patient underwent upper GI endoscopy which showed non-bleeding erosive gastropathy. Normal endoscopy with no recent or active bleeds. Patient still with some blood in ostomy.   9/18: Hemoglobin dropped and was transfused 1 unit of RBCs. Ostomy bag with brown stools.   9/18: Anemia stable, oxycodone discontinued   9/19: Patient transferred to floor. Started on dysphagia diet with ensure supplementation. Tolerated well.   9/20: Duplex of lower extremities showing the R below the knee DVT in soleus vein, on L side soleal DVT without proximal propagation.   9/21: Seen by vascular team, will require repeat Duplex in 1 week's time.   9/22: On day of discharge patient was tolerating diet, urinating, with functional ostomy with good output. Patient is stable for discharge to rehab facility.    HISTORY OF PRESENT ILLNESS  81M h/o Afib (on Sotalol, not on AC), with several recent hospitalizations, presenting 7/13 with persistent diarrhea. Patient states he has been having 6+ BMs per day; they were initially bloody (bright red), but denies bloody BMs for about 1 week. However continues to have nausea and crampy diffuse abdominal pain that is more tender on the lower/left-lower abdomen, but also mildly tender on the right. His sx are c/b oral ulcers which make it difficulty for him to eat because of the oral pain (no pain upon swallowing in the esophagus). States he has lost about 60 pounds in a month due to the inability to eat properly and finds it difficult to stay hydrated. Denies chills, but reports intermittent subjective fever, no joint pain or skin lesions. He is currently on mesalamine 1.2 mg - 4 tabs daily, budesonide 3 mg daily, and methotrexate 2.5 mg- 6 tabs every week. 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 They were just approved for humira and are supposed to receive it by the end of the month.     Patient states he had a flex sig in May with his Gastroenterologist, Dr. Juan Luis Tran ([phone]) at [facility], and recalls being told that he has inflammation. Says his last colonoscopy/EGD was done with Dr. Tran at the time of diagnosis. However, I spoke with Dr. Nixon's PA who mentioned that his last colonoscopy was 12/2020 and they will be faxing over that report as well as a prior EGD 2019 and capsule study 11/2020 for anemia. Patient denies h/o cdiff.    Of note, Mr. Bustamante says he has had all of these sx ongoing since being diagnosed about 9 mos ago: nausea, abdominal pain, bloody diarrhea, and oral ulcers and that is how he presented. Says he was initially started on mesalamine and budesonide without relief, so then methotrexate was added which did not provide any relief either so he was started on Remicade. Patient reporting mild improvement of his sx on Remicade only initially after which he says it did not help. His outside GI PA states it helped with healing his fistula/abscess. He was on it for 6 mos, prior to developing antibodies mentioned above and currently has plans to start Humira at the end of this month. However, he came to [facility] for care because they feel his current management of the CD is not working and wanted to see if someone else would be able to help manage it.    On admission to [facility] he was HDS with elevated white count and Hgb 8.4. CTAP revealed pericolonic inflammation, wall thickening concerning for inflammation of the rectosigmoid, descending colon, proximal ascending colon/cecum; the TI was underdistended. Stool studies including cdiff were sent and he was started on IV cipro/flagyl.    HOSPITAL COURSE  7/15: C. Diff culture positive, started on vancomycin PO x10d  7/16: started on Budesonide for Crohns flare  7/19: underwent Flex Sig, which showed: skin tags in perianal region, diffuse granular mucosa in entire colon, diffuse white exudate (pseudomembrane) at the anus, in the rectum, in the recotsigmoid colon, and distal sigmoid colon. Biopsies were taken.  7/21: continued to have diarrhea. HBVsAg nonreactive. Vancomycin increased to 500mg PO Q6. Flagyl 500mg Q8 added  7/22: pt had fever overnight. Pathology revealing active chronic colitis.  7/28: due to lack of improvement from antibiotics pt underwent colonoscopy with FMT. Vancomycin discontinued  7/30: pt continuing to have intermittent fevers. RVP with parainfluenzae  8/1: restarted on vanco oral BID by ID due to lack of improvement  8/2: Fidaxomicin started  8/9: Flex sig performed: Colonic Crohns disease, worsening ulceration throughout rectum and sigmoid. No pseudomembranes visualized. Methylprednisolone started  8/13: change in mental status, pt became A&Ox1 no focal deficits. CTH non-con without any acute changes.  8/15: Colorectal surgery consulted due to lack of improvement. No plan for surgical intervention at that time.  8/16 GI and sx are discussing about biologic vs surgical intervention, s/p PICC, TPN started today, family expressed pt has severe depression - psych consult tmrw in am\  8/16- NIGHTS- AFib w/'s, cardizem IVP given, labs sent, standing sotalol PO  8/16 psych consulted for depression/suicidal ideation to daughter yesterday  8/17patient and family refusing surgery   -- pt is not a candidate for AC seeing recent GIB. FOBT positive as recent as 8/8/21   patient NSR hr 70's   8/18 h/h 7/23. no obvious GIB. family refusing PRBC, family deciding reg;colectomy with Dr garcía  8/19  -RN reports BRBPR this AM.  Protonix gtt, CBC  8/19	h/h--7.3/24.1--pt is refusing PRBC, on TPN, Possible Colectomy next week--fam to decide, ppi drip, Attending is aware.  8/20 Patient with Hgb 6.8.  SBP 90s with lethargy.  Pt given blood and taken to OR emergently.  O/N: TPN?. Cont. Steroid taper. Cont. NGT. 500cc LR and 250 albumin 5% given. On phenylephrine  ggt.   8/24: lasix 20mg goal net even, acupella. S/S eval  9/1:  metoprolol increaed 37.5 q 12  9/2 diuresed Lasix 20mg IV x 2. oxy changed to tramadol.  9/6: metoprolol changed to 25mg q8h . TF increased to 45cc. given 1 unit pRBC for H7.1  protonix iv to oral. TPN stopped.  9/7: bloody ileostomy output, clots. given 1u PRBC overnight. started on protonix gtt. GI called for scope. 1L dark red OP from ileostomy over the last 24h while patient remained HDS, Lovenox d/c'd due to downtrending H/H, TPN d/c'd and TF rate increased to 45cc/hr  9/8: Pt was started on Haldol 1mg BID x 3d for agitation. He was started on protonix gtt. 1 episode of hypotension around 6pm in the evening, given 500cc LR bolus and 1u pRBC. Overnight, 1 episode of non-sustained Vtach on telemetry while aSx. [facility] [place]  9/9: Went down for EGD; however GI could not clarify code status for procedure so procedure was not done. TF restarted, Palliative Carec consult placed to establish definitive GOC prior to reconsidering EGD  9/14:  Metoprolol increased to 25mg q 8hrs. Listed for surgical tele bed. +DVT sono for below knee DVTs, will repeat in 1 week   9/16: still with bleeding from ostomy. pt and family deliberating on whether to undergo EGD or not  9/17: Patient underwent upper GI endoscopy which showed non-bleeding erosive gastropathy. Normal endoscopy with no recent or active bleeds. Patient still with some blood in ostomy.   9/18: Hemoglobin dropped and was transfused 1 unit of RBCs. Ostomy bag with brown stools.   9/18: Anemia stable, oxycodone discontinued   9/19: Patient transferred to floor. Started on dysphagia diet with ensure supplementation. Tolerated well.   9/20: Duplex of lower extremities showing the R below the knee DVT in soleus vein, on L side soleal DVT without proximal propagation.   9/21: Seen by vascular team, will require repeat Duplex in 1 week's time.   9/22: On day of discharge patient was tolerating diet, urinating, with functional ostomy with good output. Patient is stable for discharge to rehab facility and discharged on 9/23.

## 2021-09-13 NOTE — PROGRESS NOTE ADULT - ASSESSMENT
81y Male with Crohn disease s/p failed medical management. 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 Transferred to SICU intubated and on pressors. Now extubated and off pressors.    PLAN:  Neurologic: post-op pain control, new-onset hypophonia without focal neuro deficits, obtundation  - Monitor mental status  - Pain control: PO Tylenol 500mg q6hr PRN, Dilaudid 0.25mg prn   - Haldol 1mg q12hr x3days  - Vitamin D3, Folic acid, and multivitamins daily    Respiratory: Acute respiratory insufficiency resolving, patient on RA  - Incentive spirometry 10x/hr while in bed, OOBTC    Cardiovascular: History of AFib currently rate controlled with IV metoprolol and amiodarone, now in NSR  - Metoprolol 25mg PO q12h   - Monitor hemodynamics    Gastrointestinal/Nutrition: s/p total abdominal colectomy with end ileostomy c/b recurrent bloody ostomy output; s/p TPN  - Mechanical Soft / Honey Thickened liquid diet + Ensures 3x/day   - Protonix 40 IV BID  - Biotene mouth wash  - Monitor ostomy output and character  - PAOLO drains x2 with persistently low OP, consider d/c by primary team    Renal/Genitourinary: no active issues   - D5 LR @ 50cc/hr  - Monitor and replete electrolytes as needed  - Monitor I&Os, voiding spontaneously  - BMP q12hr    Hematologic: recurrent GIB -- last transfusion: 1u pRBC 9/7  - Lovenox 40mg daily for VTE ppx  - Trend H/H and transfuse Hgb < 7.5  - CBC q12hr    Infectious Disease: C. diff colitis vs Crohn's flair s/p subtotal colectomy   - Trend WBC on CBC q12hr  - Monitor fever curve    Endocrine: no active issues; s/p steroid taper 8/26  - Monitor glucose on BMP daily    Lines & Drains:   - LLQ & LUQ drain 8/20  - TPN 8/16  - RUE PICC placed 8/16    Disposition: SICU  Code Status: DNR/DNI, MOLST in chart

## 2021-09-13 NOTE — PROGRESS NOTE ADULT - SUBJECTIVE AND OBJECTIVE BOX
Patient is a 81y old  Male who presents with a chief complaint of diarrhea with blood in it , abd pain and weakness (13 Sep 2021 16:20)      INTERVAL HPI/OVERNIGHT EVENTS: tolerating oral diet   T(C): 37.1 (09-13-21 @ 15:00), Max: 37.1 (09-13-21 @ 15:00)  HR: 93 (09-13-21 @ 21:00) (74 - 145)  BP: 103/56 (09-13-21 @ 21:00) (78/53 - 162/72)  RR: 20 (09-13-21 @ 21:00) (15 - 29)  SpO2: 100% (09-13-21 @ 21:00) (96% - 100%)  Wt(kg): --  I&O's Summary    12 Sep 2021 07:01  -  13 Sep 2021 07:00  --------------------------------------------------------  IN: 1833.1 mL / OUT: 1320 mL / NET: 513.1 mL    13 Sep 2021 07:01  -  13 Sep 2021 22:55  --------------------------------------------------------  IN: 1026.6 mL / OUT: 780 mL / NET: 246.6 mL        PAST MEDICAL & SURGICAL HISTORY:  Crohn disease    Paroxysmal atrial fibrillation    CAD (coronary artery disease)  not on ASA or plavix, no stents as per daughter    GI bleed    No significant past surgical history        SOCIAL HISTORY  Alcohol:  Tobacco:  Illicit substance use:    FAMILY HISTORY:    REVIEW OF SYSTEMS:  CONSTITUTIONAL: No fever, weight loss, or fatigue  EYES: No eye pain, visual disturbances, or discharge  ENMT:  No difficulty hearing, tinnitus, vertigo; No sinus or throat pain  NECK: No pain or stiffness  RESPIRATORY: No cough, wheezing, chills or hemoptysis; No shortness of breath  CARDIOVASCULAR: No chest pain, palpitations, dizziness, or leg swelling  GASTROINTESTINAL: No abdominal or epigastric pain. No nausea, vomiting, or hematemesis; No diarrhea or constipation. No melena or hematochezia.  GENITOURINARY: No dysuria, frequency, hematuria, or incontinence  NEUROLOGICAL: No headaches, memory loss, loss of strength, numbness, or tremors  SKIN: No itching, burning, rashes, or lesions   LYMPH NODES: No enlarged glands  ENDOCRINE: No heat or cold intolerance; No hair loss  MUSCULOSKELETAL: No joint pain or swelling; No muscle, back, or extremity pain  PSYCHIATRIC: No depression, anxiety, mood swings, or difficulty sleeping  HEME/LYMPH: No easy bruising, or bleeding gums  ALLERY AND IMMUNOLOGIC: No hives or eczema    RADIOLOGY & ADDITIONAL TESTS:    Imaging Personally Reviewed:  [ ] YES  [ ] NO    Consultant(s) Notes Reviewed:  [ ] YES  [ ] NO    PHYSICAL EXAM:  GENERAL: NAD, well-groomed, well-developed  HEAD:  Atraumatic, Normocephalic  EYES: EOMI, PERRLA, conjunctiva and sclera clear  ENMT: No tonsillar erythema, exudates, or enlargement; Moist mucous membranes, Good dentition, No lesions  NECK: Supple, No JVD, Normal thyroid  NERVOUS SYSTEM:  Alert & Oriented X3, Good concentration; Motor Strength 5/5 B/L upper and lower extremities; DTRs 2+ intact and symmetric  CHEST/LUNG: Clear to percussion bilaterally; No rales, rhonchi, wheezing, or rubs  HEART: Regular rate and rhythm; No murmurs, rubs, or gallops  ABDOMEN: Soft, Nontender, Nondistended; Bowel sounds present  EXTREMITIES:  2+ Peripheral Pulses, No clubbing, cyanosis, or edema  LYMPH: No lymphadenopathy noted  SKIN: No rashes or lesions    LABS:                        7.8    9.05  )-----------( 284      ( 13 Sep 2021 09:48 )             25.3     09-13    144  |  110<H>  |  12  ----------------------------<  119<H>  3.9   |  21<L>  |  0.46<L>    Ca    7.8<L>      13 Sep 2021 09:48  Phos  4.2     09-13  Mg     2.3     09-13    TPro  5.5<L>  /  Alb  2.0<L>  /  TBili  0.2  /  DBili  x   /  AST  19  /  ALT  24  /  AlkPhos  88  09-13    PT/INR - ( 11 Sep 2021 23:27 )   PT: 13.8 sec;   INR: 1.16 ratio         PTT - ( 11 Sep 2021 23:27 )  PTT:32.5 sec    CAPILLARY BLOOD GLUCOSE                MEDICATIONS  (STANDING):  Biotene Dry Mouth Oral Rinse 5 milliLiter(s) Swish and Spit two times a day  chlorhexidine 2% Cloths 1 Application(s) Topical <User Schedule>  cholecalciferol 1000 Unit(s) Oral daily  dextrose 5% + lactated ringers. 1000 milliLiter(s) (30 mL/Hr) IV Continuous <Continuous>  digoxin  Injectable 250 MICROGram(s) IV Push every 6 hours  enoxaparin Injectable 40 milliGRAM(s) SubCutaneous every 24 hours  folic acid 1 milliGRAM(s) Oral daily  lidocaine   4% Patch 1 Patch Transdermal daily  metoprolol tartrate 25 milliGRAM(s) Oral every 12 hours  multivitamin 1 Tablet(s) Oral daily  pantoprazole  Injectable 40 milliGRAM(s) IV Push every 12 hours    MEDICATIONS  (PRN):  acetaminophen   Tablet .. 650 milliGRAM(s) Oral every 6 hours PRN Mild Pain (1 - 3)  traMADol 25 milliGRAM(s) Oral every 4 hours PRN Moderate Pain (4 - 6)      Care Discussed with Consultants/Other Providers [ ] YES  [ ] NO

## 2021-09-13 NOTE — CHART NOTE - NSCHARTNOTEFT_GEN_A_CORE
Palliative Medicine Service      The patient's symptoms are well controlled.  The patient's care plan has been delineated.  Pt does not have a hospice diagnosis.  Thank you for the consult, feel free to reconsult when clinical needs arise      In the event of newly developing, evolving, or worsening symptoms, please contact the Palliative Medicine team via pager (if the patient is at [facility] #[phone] or if the patient is at [facility] #[phone]) The Geriatric and Palliative Medicine service has coverage 24 hours a day/ 7 days a week to provide medical recommendations regarding symptom management needs via telephone        Justice Cisse MD   of Geriatric and Palliative Medicine  [facility]        After 5pm and on weekends, please see the contact information below:    In the event of newly developing, evolving, or worsening symptoms, please contact the Palliative Medicine team via pager (if the patient is at [facility] #[phone] or if the patient is at [facility] #[phone]) The Geriatric and Palliative Medicine service has coverage 24 hours a day/ 7 days a week to provide medical recommendations regarding symptom management needs via telephone

## 2021-09-13 NOTE — DISCHARGE NOTE PROVIDER - NSDCFUADDINST_GEN_ALL_CORE_FT
Please administer 500cc normal saline bolus every other day. Please apply aquacel dressing one a day over midline incision. Keep covered with dry, sterile dressing.  Please administer 500cc normal saline bolus every other day. Please apply aquacel dressing one a day over midline incision. Keep covered with dry, sterile dressing. Please follow up with vascular surgery for repeat duplex in a week.  Please administer 500cc lactated ringer bolus every other day. Please apply aquacel dressing one a day over midline incision. Keep covered with dry, sterile dressing. Please follow up with vascular surgery for repeat duplex in a week.

## 2021-09-13 NOTE — DISCHARGE NOTE PROVIDER - DETAILS OF MALNUTRITION DIAGNOSIS/DIAGNOSES
This patient has been assessed with a concern for Malnutrition and was treated during this hospitalization for the following Nutrition diagnosis/diagnoses:     -  07/15/2021: Severe protein-calorie malnutrition

## 2021-09-13 NOTE — DISCHARGE NOTE PROVIDER - NSDCMRMEDTOKEN_GEN_ALL_CORE_FT
budesonide 3 mg oral delayed release capsule: 3 cap(s) orally once a day (in the morning)  folic acid 1 mg oral tablet: 1 tab(s) orally once a day  mesalamine 1.2 g oral delayed release tablet: 4 tab(s) orally once a day  methotrexate 2.5 mg oral tablet: 10 tab(s) orally once a week  omeprazole 20 mg oral delayed release capsule: 1 cap(s) orally once a day  sotalol  mg oral tablet: 1 tab(s) orally 2 times a day   Aquacel : 1 each transdermal once a day   budesonide 3 mg oral delayed release capsule: 3 cap(s) orally once a day (in the morning)  cholecalciferol oral tablet: 1000 unit(s) orally once a day  folic acid 1 mg oral tablet: 1 tab(s) orally once a day  mesalamine 1.2 g oral delayed release tablet: 4 tab(s) orally once a day  methotrexate 2.5 mg oral tablet: 10 tab(s) orally once a week  metoprolol tartrate 25 mg oral tablet: 1 tab(s) orally every 8 hours  Multiple Vitamins oral tablet: 1 tab(s) orally once a day  Normal Saline Bolus: 500 milliliter(s) intravenously every other day   omeprazole 20 mg oral delayed release capsule: 1 cap(s) orally once a day  pantoprazole 40 mg oral delayed release tablet: 1 tab(s) orally every 12 hours  sotalol  mg oral tablet: 1 tab(s) orally 2 times a day  sucralfate 1 g oral tablet: 1 tab(s) orally every 12 hours   Aquacel : 1 each transdermal once a day   budesonide 3 mg oral delayed release capsule: 3 cap(s) orally once a day (in the morning)  cholecalciferol oral tablet: 1000 unit(s) orally once a day  folic acid 1 mg oral tablet: 1 tab(s) orally once a day  mesalamine 1.2 g oral delayed release tablet: 4 tab(s) orally once a day  methotrexate 2.5 mg oral tablet: 10 tab(s) orally once a week  metoprolol tartrate 25 mg oral tablet: 1 tab(s) orally every 8 hours  Multiple Vitamins oral tablet: 1 tab(s) orally once a day  Normal Saline Bolus: 500 milliliter(s) intravenously every other day   omeprazole 20 mg oral delayed release capsule: 1 cap(s) orally once a day  sotalol  mg oral tablet: 1 tab(s) orally 2 times a day  sucralfate 1 g oral tablet: 1 tab(s) orally every 12 hours   Aquacel : 1 each transdermal once a day   budesonide 3 mg oral delayed release capsule: 3 cap(s) orally once a day (in the morning)  cholecalciferol oral tablet: 1000 unit(s) orally once a day  folic acid 1 mg oral tablet: 1 tab(s) orally once a day  mesalamine 1.2 g oral delayed release tablet: 4 tab(s) orally once a day  methotrexate 2.5 mg oral tablet: 10 tab(s) orally once a week  metoprolol tartrate 25 mg oral tablet: 1 tab(s) orally every 8 hours  Multiple Vitamins oral tablet: 1 tab(s) orally once a day  sotalol  mg oral tablet: 1 tab(s) orally 2 times a day  sucralfate 1 g oral tablet: 1 tab(s) orally every 12 hours

## 2021-09-13 NOTE — PROGRESS NOTE ADULT - SUBJECTIVE AND OBJECTIVE BOX
[facility] NUTRITION SUPPORT-- FOLLOW UP NOTE  --------------------------------------------------------------------------------    24 hour events/subjective: pt seen and examined. oob in chair. diet advanced, eating chocolate pudding upon eval this am. states tolerating po. denies n/v/abd pain. ostomy outputs noted    Diet:  Diet, Dysphagia 2 Mechanical Soft-Honey Consistency Fluid:   Supplement Feeding Modality:  Oral  Ensure Enlive Servings Per Day:  3       Frequency:  Three Times a day (09-12-21 @ 21:25)      PAST HISTORY  --------------------------------------------------------------------------------  No significant changes to PMH, PSH, FHx, SHx, unless otherwise noted    ALLERGIES & MEDICATIONS  --------------------------------------------------------------------------------  Allergies    No Known Allergies    Intolerances      Standing Inpatient Medications  Biotene Dry Mouth Oral Rinse 5 milliLiter(s) Swish and Spit two times a day  chlorhexidine 2% Cloths 1 Application(s) Topical <User Schedule>  cholecalciferol 1000 Unit(s) Oral daily  dextrose 5% + lactated ringers. 1000 milliLiter(s) IV Continuous <Continuous>  enoxaparin Injectable 40 milliGRAM(s) SubCutaneous every 24 hours  folic acid 1 milliGRAM(s) Oral daily  lidocaine   4% Patch 1 Patch Transdermal daily  metoprolol tartrate 25 milliGRAM(s) Oral every 12 hours  multivitamin 1 Tablet(s) Oral daily  pantoprazole  Injectable 40 milliGRAM(s) IV Push every 12 hours    PRN Inpatient Medications  acetaminophen   Tablet .. 650 milliGRAM(s) Oral every 6 hours PRN  HYDROmorphone  Injectable 0.25 milliGRAM(s) IV Push every 6 hours PRN        REVIEW OF SYSTEMS  --------------------------------------------------------------------------------  unable to obtain in entirety see above      VITALS/PHYSICAL EXAM  --------------------------------------------------------------------------------  T(C): 36.2 (09-13-21 @ 03:00), Max: 36.2 (09-13-21 @ 03:00)  HR: 80 (09-13-21 @ 08:00) (71 - 86)  BP: 136/60 (09-13-21 @ 08:00) (95/51 - 152/68)  RR: 21 (09-13-21 @ 08:00) (15 - 29)  SpO2: 100% (09-13-21 @ 08:00) (94% - 100%)  Wt(kg): --      09-12-21 @ 07:01  -  09-13-21 @ 07:00  --------------------------------------------------------  IN: 1833.1 mL / OUT: 1320 mL / NET: 513.1 mL      I&O's Detail    12 Sep 2021 07:01  -  13 Sep 2021 07:00  --------------------------------------------------------  IN:    dextrose 5% + lactated ringers: 1200 mL    IV PiggyBack: 499.8 mL    IV PiggyBack: 133.3 mL  Total IN: 1833.1 mL    OUT:    Bulb (mL): 0 mL    Bulb (mL): 20 mL    Ileostomy (mL): 200 mL    Incontinent per Condom Catheter (mL): 1100 mL  Total OUT: 1320 mL    Total NET: 513.1 mL          Physical Exam:  Gen: oob in chair in nad frail  HEENT: ncat, trachea midline  Chest: respirations non labored  Abd: soft, nontender, nondistended +ostomy dressing c/d/i  LE: mild edema  Neuro: awake alert responsive        LABS/STUDIES  --------------------------------------------------------------------------------              7.6    7.15  >-----------<  248      [09-12-21 @ 21:22]              25.2     140  |  112  |  16  ----------------------------<  105      [09-12-21 @ 21:22]  3.7   |  22  |  0.52        Ca     8.0     [09-12-21 @ 21:22]      iCa    1.14     [09-11 @ 23:29]      Mg     1.9     [09-12-21 @ 21:22]      Phos  2.8     [09-12-21 @ 21:22]    TPro  5.3  /  Alb  1.7  /  TBili  0.2  /  DBili  x   /  AST  19  /  ALT  26  /  AlkPhos  87  [09-12-21 @ 21:22]    PT/INR: PT 13.8 , INR 1.16       [09-11-21 @ 23:27]  PTT: 32.5       [09-11-21 @ 23:27]      Ca ionized  Creatinine Trend:  POC glucoseGlucose, Serum: 105 mg/dL (09-12-21 @ 21:22)  Glucose, Serum: 101 mg/dL (09-12-21 @ 11:24)  CAPILLARY BLOOD GLUCOSE        PrealbuminPrealbumin, Serum: 22 mg/dL (09-03-21 @ 05:19)  Prealbumin, Serum: 16 mg/dL (09-03-21 @ 03:19)  Prealbumin, Serum: 15 mg/dL (08-30-21 @ 03:32)  Prealbumin, Serum: 8 mg/dL (08-15-21 @ 10:17)    Triglycerides

## 2021-09-13 NOTE — PROGRESS NOTE ADULT - ATTENDING COMMENTS
no further concerns for hemorrhage  ostomy output is manageable   on lopressor for atrial fibrillation  stabilizing for transfer to the floor

## 2021-09-13 NOTE — DISCHARGE NOTE PROVIDER - CARE PROVIDER_API CALL
Ca Malone)  Surgery  [address] [facility], [address]  [place] [address]  Phone: [phone]  Fax: [phone]  Follow Up Time:

## 2021-09-13 NOTE — DISCHARGE NOTE PROVIDER - NSDCCPCAREPLAN_GEN_ALL_CORE_FT
PRINCIPAL DISCHARGE DIAGNOSIS  Diagnosis: GIB (gastrointestinal bleeding)  Assessment and Plan of Treatment: GIB (gastrointestinal bleeding)       PRINCIPAL DISCHARGE DIAGNOSIS  Diagnosis: GIB (gastrointestinal bleeding)  Assessment and Plan of Treatment: FLUIDS: Lactated ringers 500ml bolus every other day  BATHING: You may shower and/or sponge bathe.  ACTIVITY: No heavy lifting anything more than 10-15lbs or straining. Otherwise, you may return to your usual level of physical activity. If you are taking narcotic pain medication (such as Percocet), do NOT drive a car, operate machinery or make important decisions.  NOTIFY YOUR SURGEON IF: You have any bleeding that does not stop, any fever (over 100.4 F) or chills, persistent nausea/vomiting with inability to tolerate food or liquids, persistent diarrhea, or if your pain is not controlled on your discharge pain medications.  FOLLOW-UP:  Please follow up with  in 1-2 weeks regarding your hospitalization.

## 2021-09-13 NOTE — PROGRESS NOTE ADULT - SUBJECTIVE AND OBJECTIVE BOX
SICU Daily Progress Note  =====================================================  Interval/Overnight Events:     - Diet advanced to mechanical soft/honey thick + Ensures TID    - Metoprolol increased from 12.5 BID to 25 BID     HPI:  81y Male with Crohn disease s/p failed medical management. 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 Transferred to SICU intubated and on pressors. Now extubated and off pressors.    Allergies: No Known Allergies      MEDICATIONS:   --------------------------------------------------------------------------------------  Neurologic Medications  acetaminophen   Tablet .. 650 milliGRAM(s) Oral every 6 hours PRN Mild Pain (1 - 3)  haloperidol    Injectable 1 milliGRAM(s) IV Push every 12 hours  HYDROmorphone  Injectable 0.25 milliGRAM(s) IV Push every 6 hours PRN Severe Pain (7 - 10)    Respiratory Medications    Cardiovascular Medications  metoprolol tartrate 25 milliGRAM(s) Oral every 12 hours    Gastrointestinal Medications  cholecalciferol 1000 Unit(s) Oral daily  dextrose 5% + lactated ringers. 1000 milliLiter(s) IV Continuous <Continuous>  folic acid 1 milliGRAM(s) Oral daily  multivitamin 1 Tablet(s) Oral daily  pantoprazole  Injectable 40 milliGRAM(s) IV Push every 12 hours    Genitourinary Medications    Hematologic/Oncologic Medications  enoxaparin Injectable 40 milliGRAM(s) SubCutaneous every 24 hours    Antimicrobial/Immunologic Medications    Endocrine/Metabolic Medications    Topical/Other Medications  Biotene Dry Mouth Oral Rinse 5 milliLiter(s) Swish and Spit two times a day  chlorhexidine 2% Cloths 1 Application(s) Topical <User Schedule>  lidocaine   4% Patch 1 Patch Transdermal daily    --------------------------------------------------------------------------------------    VITAL SIGNS, INS/OUTS (last 24 hours):  --------------------------------------------------------------------------------------  T(C): 36 (09-12-21 @ 23:00), Max: 36.6 (09-12-21 @ 03:00)  HR: 85 (09-12-21 @ 23:00) (69 - 92)  BP: 143/68 (09-12-21 @ 23:00) (88/55 - 145/67)  RR: 29 (09-12-21 @ 23:00) (15 - 34)  SpO2: 100% (09-12-21 @ 23:00) (94% - 100%)    09-11-21 @ 07:01  -  09-12-21 @ 07:00  --------------------------------------------------------  IN: 1868.2 mL / OUT: 1949 mL / NET: -80.8 mL    09-12-21 @ 07:01  -  09-13-21 @ 00:37  --------------------------------------------------------  IN: 933.3 mL / OUT: 405 mL / NET: 528.3 mL      --------------------------------------------------------------------------------------    EXAM  NEUROLOGY  Exam: Normal, NAD, alert, no focal deficits.    HEENT  Exam: Normocephalic, atraumatic, EOMI.     RESPIRATORY  Exam: Normal expansion/effort.  Mechanical Ventilation:     CARDIOVASCULAR  Exam: Regular rate and rhythm.       GI/NUTRITION  Exam: Abdomen soft, Non-tender, Non-distended. Ileostomy site without bloody o/p; c/d/i    VASCULAR  Exam: Extremities warm, pink, well-perfused.     MUSCULOSKELETAL  Exam: All extremities moving spontaneously without limitations.     SKIN  Exam: Good skin turgor, no skin breakdown.       LABS  --------------------------------------------------------------------------------------                        7.6    7.15  )-----------( 248      ( 12 Sep 2021 21:22 )             25.2   09-12    140  |  112<H>  |  16  ----------------------------<  105<H>  3.7   |  22  |  0.52    Ca    8.0<L>      12 Sep 2021 21:22  Phos  2.8     09-12  Mg     1.9     09-12    TPro  5.3<L>  /  Alb  1.7<L>  /  TBili  0.2  /  DBili  x   /  AST  19  /  ALT  26  /  AlkPhos  87  09-12  PT/INR - ( 11 Sep 2021 23:27 )   PT: 13.8 sec;   INR: 1.16 ratio         PTT - ( 11 Sep 2021 23:27 )  PTT:32.5 sec  --------------------------------------------------------------------------------------

## 2021-09-14 NOTE — PROGRESS NOTE ADULT - SUBJECTIVE AND OBJECTIVE BOX
SICU Daily Progress Note  =====================================================  Interval/Overnight Events:     - Tramadol changed to oxy 2.5  - s/p 500 cc bolus for hypotension  - Dopplers ordered to r/o DVT   - s/p dig load for Afib    HPI:  81y Male with Crohn disease s/p failed medical management. 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 Transferred to SICU intubated and on pressors. Now extubated and off pressors.    Allergies: No Known Allergies      MEDICATIONS:   --------------------------------------------------------------------------------------  Neurologic Medications  acetaminophen   Tablet .. 650 milliGRAM(s) Oral every 6 hours PRN Mild Pain (1 - 3)  oxyCODONE    IR 2.5 milliGRAM(s) Oral every 4 hours PRN Moderate Pain (4 - 6)    Respiratory Medications    Cardiovascular Medications  digoxin  Injectable 250 MICROGram(s) IV Push every 6 hours  metoprolol tartrate 25 milliGRAM(s) Oral every 12 hours    Gastrointestinal Medications  cholecalciferol 1000 Unit(s) Oral daily  dextrose 5% + lactated ringers. 1000 milliLiter(s) IV Continuous <Continuous>  folic acid 1 milliGRAM(s) Oral daily  multivitamin 1 Tablet(s) Oral daily  pantoprazole  Injectable 40 milliGRAM(s) IV Push every 12 hours    Genitourinary Medications    Hematologic/Oncologic Medications  enoxaparin Injectable 40 milliGRAM(s) SubCutaneous every 24 hours    Antimicrobial/Immunologic Medications    Endocrine/Metabolic Medications    Topical/Other Medications  Biotene Dry Mouth Oral Rinse 5 milliLiter(s) Swish and Spit two times a day  chlorhexidine 2% Cloths 1 Application(s) Topical <User Schedule>  lidocaine   4% Patch 1 Patch Transdermal daily    --------------------------------------------------------------------------------------    VITAL SIGNS, INS/OUTS (last 24 hours):  --------------------------------------------------------------------------------------  T(C): 37.1 (09-13-21 @ 15:00), Max: 37.1 (09-13-21 @ 15:00)  HR: 93 (09-13-21 @ 21:00) (74 - 145)  BP: 103/56 (09-13-21 @ 21:00) (78/53 - 162/72)  RR: 20 (09-13-21 @ 21:00) (15 - 28)  SpO2: 100% (09-13-21 @ 21:00) (96% - 100%)    09-12-21 @ 07:01  -  09-13-21 @ 07:00  --------------------------------------------------------  IN: 1833.1 mL / OUT: 1320 mL / NET: 513.1 mL    09-13-21 @ 07:01  -  09-14-21 @ 02:34  --------------------------------------------------------  IN: 1026.6 mL / OUT: 780 mL / NET: 246.6 mL      --------------------------------------------------------------------------------------    EXAM  NEUROLOGY  Exam: Normal, NAD, alert, oriented x3, no focal deficits.    HEENT  Exam: Normocephalic, atraumatic, EOMI.     RESPIRATORY  Exam: Normal expansion/effort.  Mechanical Ventilation:     CARDIOVASCULAR  Exam: Regular rate and rhythm.       GI/NUTRITION  Exam: Abdomen soft, Non-tender, Non-distended.     VASCULAR  Exam: Extremities warm, pink, well-perfused.     MUSCULOSKELETAL  Exam: All extremities moving spontaneously without limitations.     SKIN  Exam: Good skin turgor, no skin breakdown.       LABS  --------------------------------------------------------------------------------------                        7.8    9.05  )-----------( 284      ( 13 Sep 2021 09:48 )             25.3   09-13    144  |  110<H>  |  12  ----------------------------<  119<H>  3.9   |  21<L>  |  0.46<L>    Ca    7.8<L>      13 Sep 2021 09:48  Phos  4.2     09-13  Mg     2.3     09-13    TPro  5.5<L>  /  Alb  2.0<L>  /  TBili  0.2  /  DBili  x   /  AST  19  /  ALT  24  /  AlkPhos  88  09-13    --------------------------------------------------------------------------------------

## 2021-09-14 NOTE — PROGRESS NOTE ADULT - ASSESSMENT
80 yo M with pmhx of Crohns disease (dxd 9 mos ago, hx of perianal fistula, sp tx w remicaide until developed ab; since managed on budesonide, mtx/5asa), h/o cdiff, CAD, afib (not on ac), p/w bloody diarrhea, abd pain and weakness. Admitted for crohns management, course c/b cdiff colitis sp dificid/fmt and findings of indeterminate quantiferon, pending id clearance to start biologic. Prealb 8. TPN consulted in setting of significant weight loss, severe pcm, and worsening colitis suspected 2/2 CD. Pt at high risk for refeeding syndrome. TPN started 8/16 for Protein Calorie Malnutrition.. Repeat CT showing unchanged diffuse colitis and non specific gb wall edema.  Taken to OR emergently on 8/20 for lap total colectomy, end ileostomy. Failed S&S eval. Family agreed to TANJA tube feeds, which had been started but then dc'd due to noted blood in ostomy and risk for aspiration given deterioration in mental status. made dnr/dni. Mental status improved and restarted on ngt feeds, and tolerating; later dcd as per pt/family wishes. TPN dcd 9/5    Current Diet: dysphagia 2 + ensure supps    -dysphagia diet as tolerated; aspiration precautions  -electrolyte imbalance risk- monitor and replete elytes as needed  -anemia/GIB- trend cbc, transfuse prn  -vitamin d deficiency- on supplementation  -goc discussions ongoing  -further care per primary/SICU; will follow with you    plan discussed with Dr. Fernandez and Dr GERARD Multani, agreeable with above    TPN pager [phone], spectra [phone]  M-F 6A-4P, Weekends and holidays 8A-12P                  80 yo M with pmhx of Crohns disease (dxd 9 mos ago, hx of perianal fistula, sp tx w remicaide until developed ab; since managed on budesonide, mtx/5asa), h/o cdiff, CAD, afib (not on ac), p/w bloody diarrhea, abd pain and weakness. Admitted for crohns management, course c/b cdiff colitis sp dificid/fmt and findings of indeterminate quantiferon, pending id clearance to start biologic. Prealb 8. TPN consulted in setting of significant weight loss, severe pcm, and worsening colitis suspected 2/2 CD. Pt at high risk for refeeding syndrome. TPN started 8/16 for Protein Calorie Malnutrition.. Repeat CT showing unchanged diffuse colitis and non specific gb wall edema.  Taken to OR emergently on 8/20 for lap total colectomy, end ileostomy. Failed S&S eval. Family agreed to TANJA tube feeds, which had been started but then dc'd due to noted blood in ostomy and risk for aspiration given deterioration in mental status. made dnr/dni. Mental status improved and restarted on ngt feeds, and tolerating; later dcd as per pt/family wishes. TPN dcd 9/5    Current Diet: dysphagia 2 + ensure supps    -dysphagia diet as tolerated; aspiration precautions  -electrolyte imbalance risk- monitor and replete elytes as needed  -anemia- sp gib, trend cbc, transfuse prn  -vitamin d deficiency- suggest switching to ergocalciferol given hypophosphatemia   -goc discussions ongoing  -further care per primary/SICU; will follow with you    plan discussed with Dr. Fernandez and Dr GERARD Multani, agreeable with above    TPN pager [phone], spectra [phone]  M-F 6A-4P, Weekends and holidays 8A-12P

## 2021-09-14 NOTE — PROGRESS NOTE ADULT - SUBJECTIVE AND OBJECTIVE BOX
[facility] Progress Note  Patient is a 81y old  Male who presents with a chief complaint of diarrhea with blood in it , abd pain and weakness (14 Sep 2021 02:34)      INTERVAL EVENTS PER SICU:  - Tramadol changed to oxy 2.5  - s/p 500 cc bolus for hypotension  - Dopplers ordered to r/o DVT   - s/p dig load for Afib    SUBJECTIVE:       OBJECTIVE:    Vital Signs Last 24 Hrs  T(C): 37.1 (13 Sep 2021 15:00), Max: 37.1 (13 Sep 2021 15:00)  T(F): 98.8 (13 Sep 2021 15:00), Max: 98.8 (13 Sep 2021 15:00)  HR: 93 (13 Sep 2021 21:00) (74 - 145)  BP: 103/56 (13 Sep 2021 21:00) (78/53 - 162/72)  BP(mean): 75 (13 Sep 2021 21:00) (61 - 103)  RR: 20 (13 Sep 2021 21:00) (15 - 28)  SpO2: 100% (13 Sep 2021 21:00) (96% - 100%)I&O's Detail    12 Sep 2021 07:01  -  13 Sep 2021 07:00  --------------------------------------------------------  IN:    dextrose 5% + lactated ringers: 1200 mL    IV PiggyBack: 499.8 mL    IV PiggyBack: 133.3 mL  Total IN: 1833.1 mL    OUT:    Bulb (mL): 0 mL    Bulb (mL): 20 mL    Ileostomy (mL): 200 mL    Incontinent per Condom Catheter (mL): 1100 mL  Total OUT: 1320 mL    Total NET: 513.1 mL      13 Sep 2021 07:01  -  14 Sep 2021 02:43  --------------------------------------------------------  IN:    dextrose 5% + lactated ringers: 150 mL    dextrose 5% + lactated ringers: 210 mL    IV PiggyBack: 166.6 mL    Lactated Ringers Bolus: 500 mL  Total IN: 1026.6 mL    OUT:    Bulb (mL): 10 mL    Bulb (mL): 20 mL    Ileostomy (mL): 150 mL    Incontinent per Condom Catheter (mL): 600 mL  Total OUT: 780 mL    Total NET: 246.6 mL      MEDICATIONS  (STANDING):  Biotene Dry Mouth Oral Rinse 5 milliLiter(s) Swish and Spit two times a day  chlorhexidine 2% Cloths 1 Application(s) Topical <User Schedule>  cholecalciferol 1000 Unit(s) Oral daily  dextrose 5% + lactated ringers. 1000 milliLiter(s) (30 mL/Hr) IV Continuous <Continuous>  digoxin  Injectable 250 MICROGram(s) IV Push every 6 hours  enoxaparin Injectable 40 milliGRAM(s) SubCutaneous every 24 hours  folic acid 1 milliGRAM(s) Oral daily  lidocaine   4% Patch 1 Patch Transdermal daily  metoprolol tartrate 25 milliGRAM(s) Oral every 12 hours  multivitamin 1 Tablet(s) Oral daily  pantoprazole  Injectable 40 milliGRAM(s) IV Push every 12 hours    MEDICATIONS  (PRN):  acetaminophen   Tablet .. 650 milliGRAM(s) Oral every 6 hours PRN Mild Pain (1 - 3)  oxyCODONE    IR 2.5 milliGRAM(s) Oral every 4 hours PRN Moderate Pain (4 - 6)      PHYSICAL EXAM:  Constitutional: A&Ox0, not interactive  Respiratory: Unlabored breathing  Abdomen: Soft, nondistended, nontender. Ostomy with dark stool. Midline dressing c/d/i.   Extremities: warm, well perfused    LABS:                        7.8    9.05  )-----------( 284      ( 13 Sep 2021 09:48 )             25.3     09-13    144  |  110<H>  |  12  ----------------------------<  119<H>  3.9   |  21<L>  |  0.46<L>    Ca    7.8<L>      13 Sep 2021 09:48  Phos  4.2     09-13  Mg     2.3     09-13    TPro  5.5<L>  /  Alb  2.0<L>  /  TBili  0.2  /  DBili  x   /  AST  19  /  ALT  24  /  AlkPhos  88  09-13      LIVER FUNCTIONS - ( 13 Sep 2021 09:48 )  Alb: 2.0 g/dL / Pro: 5.5 g/dL / ALK PHOS: 88 U/L / ALT: 24 U/L / AST: 19 U/L / GGT: x                 IMAGING:     [facility] Progress Note  Patient is a 81y old  Male who presents with a chief complaint of diarrhea with blood in it , abd pain and weakness (14 Sep 2021 02:34)      INTERVAL EVENTS PER SICU:  - Tramadol changed to oxy 2.5  - s/p 500 cc bolus for hypotension  - Dopplers ordered to r/o DVT   - s/p dig load for Afib    SUBJECTIVE: Patient seen on morning rounds. Reports pain is a little worst today.       OBJECTIVE:    Vital Signs Last 24 Hrs  T(C): 37.1 (13 Sep 2021 15:00), Max: 37.1 (13 Sep 2021 15:00)  T(F): 98.8 (13 Sep 2021 15:00), Max: 98.8 (13 Sep 2021 15:00)  HR: 93 (13 Sep 2021 21:00) (74 - 145)  BP: 103/56 (13 Sep 2021 21:00) (78/53 - 162/72)  BP(mean): 75 (13 Sep 2021 21:00) (61 - 103)  RR: 20 (13 Sep 2021 21:00) (15 - 28)  SpO2: 100% (13 Sep 2021 21:00) (96% - 100%)I&O's Detail    12 Sep 2021 07:01  -  13 Sep 2021 07:00  --------------------------------------------------------  IN:    dextrose 5% + lactated ringers: 1200 mL    IV PiggyBack: 499.8 mL    IV PiggyBack: 133.3 mL  Total IN: 1833.1 mL    OUT:    Bulb (mL): 0 mL    Bulb (mL): 20 mL    Ileostomy (mL): 200 mL    Incontinent per Condom Catheter (mL): 1100 mL  Total OUT: 1320 mL    Total NET: 513.1 mL      13 Sep 2021 07:01  -  14 Sep 2021 02:43  --------------------------------------------------------  IN:    dextrose 5% + lactated ringers: 150 mL    dextrose 5% + lactated ringers: 210 mL    IV PiggyBack: 166.6 mL    Lactated Ringers Bolus: 500 mL  Total IN: 1026.6 mL    OUT:    Bulb (mL): 10 mL    Bulb (mL): 20 mL    Ileostomy (mL): 150 mL    Incontinent per Condom Catheter (mL): 600 mL  Total OUT: 780 mL    Total NET: 246.6 mL      MEDICATIONS  (STANDING):  Biotene Dry Mouth Oral Rinse 5 milliLiter(s) Swish and Spit two times a day  chlorhexidine 2% Cloths 1 Application(s) Topical <User Schedule>  cholecalciferol 1000 Unit(s) Oral daily  dextrose 5% + lactated ringers. 1000 milliLiter(s) (30 mL/Hr) IV Continuous <Continuous>  digoxin  Injectable 250 MICROGram(s) IV Push every 6 hours  enoxaparin Injectable 40 milliGRAM(s) SubCutaneous every 24 hours  folic acid 1 milliGRAM(s) Oral daily  lidocaine   4% Patch 1 Patch Transdermal daily  metoprolol tartrate 25 milliGRAM(s) Oral every 12 hours  multivitamin 1 Tablet(s) Oral daily  pantoprazole  Injectable 40 milliGRAM(s) IV Push every 12 hours    MEDICATIONS  (PRN):  acetaminophen   Tablet .. 650 milliGRAM(s) Oral every 6 hours PRN Mild Pain (1 - 3)  oxyCODONE    IR 2.5 milliGRAM(s) Oral every 4 hours PRN Moderate Pain (4 - 6)      PHYSICAL EXAM:  Constitutional: A&Ox0, not interactive  Respiratory: Unlabored breathing  Abdomen: Soft, nondistended, nontender. Ostomy with dark stool. Midline dressing c/d/i.   Extremities: warm, well perfused    LABS:                        7.8    9.05  )-----------( 284      ( 13 Sep 2021 09:48 )             25.3     09-13    144  |  110<H>  |  12  ----------------------------<  119<H>  3.9   |  21<L>  |  0.46<L>    Ca    7.8<L>      13 Sep 2021 09:48  Phos  4.2     09-13  Mg     2.3     09-13    TPro  5.5<L>  /  Alb  2.0<L>  /  TBili  0.2  /  DBili  x   /  AST  19  /  ALT  24  /  AlkPhos  88  09-13      LIVER FUNCTIONS - ( 13 Sep 2021 09:48 )  Alb: 2.0 g/dL / Pro: 5.5 g/dL / ALK PHOS: 88 U/L / ALT: 24 U/L / AST: 19 U/L / GGT: x                 IMAGING:

## 2021-09-14 NOTE — PROGRESS NOTE ADULT - SUBJECTIVE AND OBJECTIVE BOX
Patient is a 81y old  Male who presents with a chief complaint of diarrhea with blood in it , abd pain and weakness (14 Sep 2021 08:56)      INTERVAL HISTORY: pt feels ok     TELEMETRY Personally reviewed: Afib 80's     REVIEW OF SYSTEMS:   CONSTITUTIONAL: No weakness  EYES/ENT: No visual changes; No throat pain  Neck: No pain or stiffness  Respiratory: No cough, wheezing, No shortness of breath  CARDIOVASCULAR: no chest pain or palpitations  GASTROINTESTINAL: No abdominal pain, no nausea, vomiting or hematemesis  GENITOURINARY: No dysuria, frequency or hematuria  NEUROLOGICAL: No stroke like symptoms  SKIN: No rashes    	  MEDICATIONS:  metoprolol tartrate 25 milliGRAM(s) Oral every 8 hours        PHYSICAL EXAM:  T(C): 36.3 (09-14-21 @ 11:00), Max: 37.1 (09-13-21 @ 15:00)  HR: 97 (09-14-21 @ 13:00) (80 - 145)  BP: 127/63 (09-14-21 @ 13:00) (78/53 - 134/61)  RR: 36 (09-14-21 @ 13:00) (15 - 36)  SpO2: 100% (09-14-21 @ 13:00) (95% - 100%)  Wt(kg): --  I&O's Summary    13 Sep 2021 07:01  -  14 Sep 2021 07:00  --------------------------------------------------------  IN: 1266.6 mL / OUT: 2070 mL / NET: -803.4 mL    14 Sep 2021 07:01  -  14 Sep 2021 13:57  --------------------------------------------------------  IN: 460 mL / OUT: 0 mL / NET: 460 mL          Appearance: In no distress	  HEENT:    PERRL, EOMI	  Cardiovascular:  S1 S2, No JVD  Respiratory: Lungs clear to auscultation	  Gastrointestinal:  Soft, Non-tender, + BS	  Vascularature:  No edema of LE  Psychiatric: Appropriate affect   Neuro: no acute focal deficits                               8.0    8.12  )-----------( 272      ( 14 Sep 2021 06:03 )             26.2     09-14    138  |  107  |  9   ----------------------------<  96  3.5   |  23  |  0.51    Ca    7.9<L>      14 Sep 2021 06:03  Phos  2.4     09-14  Mg     2.0     09-14    TPro  5.3<L>  /  Alb  1.8<L>  /  TBili  0.2  /  DBili  x   /  AST  17  /  ALT  20  /  AlkPhos  86  09-14        Labs personally reviewed      ASSESSMENT/PLAN: 	      Problem/Plan - 1:  ·  Problem: CAD (coronary artery disease).  Plan: c/w home meds  We discussed the importance of SAPT with low dose ASA 81mg given CAD  pt denies any current chest pain, SOB or chest pressure.    Problem/Plan -2:  ·  Problem: Colitis.  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 SR   - pt is ? candidate for AC seeing recent GIB. FOBT positive as recent as 8/8/21   continue rate control with IV lopressor - currently sinus  on tele   - 9/14 pt back to Afib rate controlled on metoprolol  no AC with hx of GIB     Problem/Plan - 4:   ·  Problem: GIB (gastrointestinal bleeding).  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, currently in SICU   - Restarted on diet now tolerating well    Problem/Plan - 5:  ·  Problem: DVt ppx SCDs     Problem/Plan - 6:  ·  Problem: B/L LE edema   - CXR showed mildly enlarged heart   -monitor IVF with I&O  -B/L L E edema improved   -TTE with mod-severe AI, mild AS, preserved EF    palliative on board, GOC discussion with pt and family       Kar Powell [facility]-BC   Rufus Onofre DO [facility]  Cardiovascular Medicine  [address] [place] [address], [address]  Office: [phone]  Cell: [phone]

## 2021-09-14 NOTE — PROGRESS NOTE ADULT - ASSESSMENT
81M with recently dx Crohn's disease c/b perianal abscess and left posterior distal anal intersphincteric fistula and presents diarrhea/BRBPR  2/2 to CD flare c/b c diff colitis. Initially treat for C diff, s/p FMT. Taken emergently to OR on 8/20 for acute deterioration for laparoscopic total colectomy with end ileostomy, POD25. Failed speech and swallow 9/10.    PLAN:  - Diet: mechanical soft, dysphagia type 2   - F/u Palliative and GOC. Overall aim is likely hospice, continue ongoing discussions with multidisciplinary teams and family  - Wound care: daily dressing changes with midline incision packed   - Appreciate excellent care per SICU 81M with recently dx Crohn's disease c/b perianal abscess and left posterior distal anal intersphincteric fistula and presents diarrhea/BRBPR  2/2 to CD flare c/b c diff colitis. Initially treat for C diff, s/p FMT. Taken emergently to OR on 8/20 for acute deterioration for laparoscopic total colectomy with end ileostomy, POD25. Failed speech and swallow 9/10.    PLAN:  - Diet: mechanical soft, dysphagia type 2   - F/u Palliative and GOC discussion with family.   - Dispo planning for rehab   - Wound care: daily dressing changes with midline incision packed   - Appreciate excellent care per SICU    Red Surgery  [id number]  81M with recently dx Crohn's disease c/b perianal abscess and left posterior distal anal intersphincteric fistula and presents diarrhea/BRBPR  2/2 to CD flare c/b c diff colitis. Initially treat for C diff, s/p FMT. Taken emergently to OR on 8/20 for acute deterioration for laparoscopic total colectomy with end ileostomy, POD25. Failed speech and swallow 9/10.    PLAN:  - Diet: mechanical soft, dysphagia type 2   - F/u Palliative and GOC discussion with family.   - Dispo planning for rehab, patient should receive 500cc bolus every 2 days until his PO intake is adequate  - Wound care: daily dressing changes with midline incision packed   - Appreciate excellent care per SICU    Red Surgery  [id number]

## 2021-09-14 NOTE — PROGRESS NOTE ADULT - ATTENDING COMMENTS
patient has shown daily improvements  is taking oral nutrition with good tolerance   good mental status  breathing comfortable  was tachycardic with atrial fibrillation - controlled with initial Digoxin load.  will now transition back to lopressor  coordinating with team to transfer to telementry

## 2021-09-14 NOTE — CHART NOTE - NSCHARTNOTEFT_GEN_A_CORE
Pt has been followed by this service. As per documentation of family discussion with regard to GOC, Pt now DNR/DNI, MOSLT in chart, with diet ordered for pleasure feeds. Given current plan of care, no further dysphagia work-up warranted at this time. As per d/w 8ICU LUKAS Garcia, this service will no longer actively follow. Please reconsult if clinically warranted and in keeping with Pt/family wishes.    Sonia Barragan MA, CCC-SLP  Speech-Language Pathologist  pager #[phone]

## 2021-09-14 NOTE — PROGRESS NOTE ADULT - ASSESSMENT
81y Male with Crohn disease s/p failed medical management. 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 Transferred to SICU intubated and on pressors. Now extubated and off pressors.    PLAN:  Neurologic: post-op pain control, new-onset hypophonia without focal neuro deficits, obtundation  - Monitor mental status  - Pain control: PO Tylenol 500mg q6hr PRN, Oxycodone 2.5mg prn   - Vitamin D3, Folic acid, and multivitamins daily    Respiratory: Acute respiratory insufficiency resolving, patient on RA  - Incentive spirometry 10x/hr while in bed, OOBTC    Cardiovascular: History of AFib currently rate controlled with IV metoprolol and digoxin  - Metoprolol 25mg PO q12h with dig load  - Monitor hemodynamics    Gastrointestinal/Nutrition: s/p total abdominal colectomy with end ileostomy c/b recurrent bloody ostomy output; s/p TPN  - Mechanical Soft / Honey Thickened liquid diet + Ensures 3x/day   - Protonix 40 IV BID  - Biotene mouth wash  - Monitor ostomy output and character    Renal/Genitourinary: no active issues   - D5 LR @ 50cc/hr  - Monitor and replete electrolytes as needed  - Monitor I&Os, voiding spontaneously  - BMP q12hr    Hematologic: recurrent GIB -- last transfusion: 1u pRBC 9/7  - Lovenox 40mg daily for VTE ppx  - Trend H/H and transfuse Hgb < 7.5  - CBC q12hr    Infectious Disease: C. diff colitis vs Crohn's flair s/p subtotal colectomy   - Trend WBC on CBC q12hr  - Monitor fever curve    Endocrine: no active issues; s/p steroid taper 8/26  - Monitor glucose on BMP daily    Lines & Drains:   - LLQ & LUQ drain 8/20  - TPN 8/16  - RUE PICC placed 8/16    Disposition: SICU  Code Status: DNR/DNI, MOLST in chart

## 2021-09-14 NOTE — PROGRESS NOTE ADULT - SUBJECTIVE AND OBJECTIVE BOX
Patient is a 81y old  Male who presents with a chief complaint of diarrhea with blood in it , abd pain and weakness (14 Sep 2021 13:56)      INTERVAL HPI/OVERNIGHT EVENTS: seen and examined   T(C): 36.9 (09-14-21 @ 15:00), Max: 36.9 (09-14-21 @ 15:00)  HR: 89 (09-14-21 @ 19:00) (80 - 124)  BP: 114/61 (09-14-21 @ 19:00) (98/54 - 143/84)  RR: 20 (09-14-21 @ 19:00) (19 - 36)  SpO2: 100% (09-14-21 @ 19:00) (95% - 100%)  Wt(kg): --  I&O's Summary    13 Sep 2021 07:01  -  14 Sep 2021 07:00  --------------------------------------------------------  IN: 1266.6 mL / OUT: 2070 mL / NET: -803.4 mL    14 Sep 2021 07:01  -  14 Sep 2021 20:18  --------------------------------------------------------  IN: 610 mL / OUT: 740 mL / NET: -130 mL        PAST MEDICAL & SURGICAL HISTORY:  Crohn disease    Paroxysmal atrial fibrillation    CAD (coronary artery disease)  not on ASA or plavix, no stents as per daughter    GI bleed    No significant past surgical history        SOCIAL HISTORY  Alcohol:  Tobacco:  Illicit substance use:    FAMILY HISTORY:    REVIEW OF SYSTEMS:  CONSTITUTIONAL: No fever, weight loss, or fatigue  EYES: No eye pain, visual disturbances, or discharge  ENMT:  No difficulty hearing, tinnitus, vertigo; No sinus or throat pain  NECK: No pain or stiffness  RESPIRATORY: No cough, wheezing, chills or hemoptysis; No shortness of breath  CARDIOVASCULAR: No chest pain, palpitations, dizziness, or leg swelling  GASTROINTESTINAL: No abdominal or epigastric pain. No nausea, vomiting, or hematemesis; No diarrhea or constipation. No melena or hematochezia.  GENITOURINARY: No dysuria, frequency, hematuria, or incontinence  NEUROLOGICAL: No headaches, memory loss, loss of strength, numbness, or tremors  SKIN: No itching, burning, rashes, or lesions   LYMPH NODES: No enlarged glands  ENDOCRINE: No heat or cold intolerance; No hair loss  MUSCULOSKELETAL: No joint pain or swelling; No muscle, back, or extremity pain  PSYCHIATRIC: No depression, anxiety, mood swings, or difficulty sleeping  HEME/LYMPH: No easy bruising, or bleeding gums  ALLERY AND IMMUNOLOGIC: No hives or eczema    RADIOLOGY & ADDITIONAL TESTS:    Imaging Personally Reviewed:  [ ] YES  [ ] NO    Consultant(s) Notes Reviewed:  [ ] YES  [ ] NO    PHYSICAL EXAM:  GENERAL: NAD, well-groomed, well-developed  HEAD:  Atraumatic, Normocephalic  EYES: EOMI, PERRLA, conjunctiva and sclera clear  ENMT: No tonsillar erythema, exudates, or enlargement; Moist mucous membranes, Good dentition, No lesions  NECK: Supple, No JVD, Normal thyroid  NERVOUS SYSTEM:  Alert & Oriented X3, Good concentration; Motor Strength 5/5 B/L upper and lower extremities; DTRs 2+ intact and symmetric  CHEST/LUNG: Clear to percussion bilaterally; No rales, rhonchi, wheezing, or rubs  HEART: Regular rate and rhythm; No murmurs, rubs, or gallops  ABDOMEN: Soft, Nontender, Nondistended; Bowel sounds present  EXTREMITIES:  2+ Peripheral Pulses, No clubbing, cyanosis, or edema  LYMPH: No lymphadenopathy noted  SKIN: No rashes or lesions    LABS:                        8.0    8.12  )-----------( 272      ( 14 Sep 2021 06:03 )             26.2     09-14    138  |  107  |  9   ----------------------------<  96  3.5   |  23  |  0.51    Ca    7.9<L>      14 Sep 2021 06:03  Phos  2.4     09-14  Mg     2.0     09-14    TPro  5.3<L>  /  Alb  1.8<L>  /  TBili  0.2  /  DBili  x   /  AST  17  /  ALT  20  /  AlkPhos  86  09-14        CAPILLARY BLOOD GLUCOSE                MEDICATIONS  (STANDING):  Biotene Dry Mouth Oral Rinse 5 milliLiter(s) Swish and Spit two times a day  chlorhexidine 2% Cloths 1 Application(s) Topical <User Schedule>  cholecalciferol 1000 Unit(s) Oral daily  dextrose 5% + lactated ringers. 1000 milliLiter(s) (30 mL/Hr) IV Continuous <Continuous>  enoxaparin Injectable 40 milliGRAM(s) SubCutaneous every 24 hours  folic acid 1 milliGRAM(s) Oral daily  lidocaine   4% Patch 1 Patch Transdermal daily  metoprolol tartrate 25 milliGRAM(s) Oral every 8 hours  multivitamin 1 Tablet(s) Oral daily  pantoprazole    Tablet 40 milliGRAM(s) Oral every 12 hours    MEDICATIONS  (PRN):  acetaminophen   Tablet .. 650 milliGRAM(s) Oral every 6 hours PRN Mild Pain (1 - 3)  oxyCODONE    IR 2.5 milliGRAM(s) Oral every 4 hours PRN Moderate Pain (4 - 6)      Care Discussed with Consultants/Other Providers [ ] YES  [ ] NO

## 2021-09-14 NOTE — PROGRESS NOTE ADULT - SUBJECTIVE AND OBJECTIVE BOX
[facility] NUTRITION SUPPORT-- FOLLOW UP NOTE  --------------------------------------------------------------------------------    24 hour events/subjective: pt seen and examined. sp 500cc ivf bolus yesterday for hypotension. c/o le pain. eating somewhat per dw nursing. denies n/v/abd pain.    Diet:  Diet, Dysphagia 2 Mechanical Soft-Honey Consistency Fluid:   Supplement Feeding Modality:  Oral  Ensure Enlive Servings Per Day:  3       Frequency:  Three Times a day (09-12-21 @ 21:25)      PAST HISTORY  --------------------------------------------------------------------------------  No significant changes to PMH, PSH, FHx, SHx, unless otherwise noted    ALLERGIES & MEDICATIONS  --------------------------------------------------------------------------------  Allergies    No Known Allergies    Intolerances      Standing Inpatient Medications  Biotene Dry Mouth Oral Rinse 5 milliLiter(s) Swish and Spit two times a day  chlorhexidine 2% Cloths 1 Application(s) Topical <User Schedule>  cholecalciferol 1000 Unit(s) Oral daily  dextrose 5% + lactated ringers. 1000 milliLiter(s) IV Continuous <Continuous>  enoxaparin Injectable 40 milliGRAM(s) SubCutaneous every 24 hours  folic acid 1 milliGRAM(s) Oral daily  lidocaine   4% Patch 1 Patch Transdermal daily  metoprolol tartrate 25 milliGRAM(s) Oral every 12 hours  multivitamin 1 Tablet(s) Oral daily  pantoprazole  Injectable 40 milliGRAM(s) IV Push every 12 hours  potassium chloride    Tablet ER 40 milliEquivalent(s) Oral once  sodium phosphate IVPB 15 milliMole(s) IV Intermittent once    PRN Inpatient Medications  acetaminophen   Tablet .. 650 milliGRAM(s) Oral every 6 hours PRN  oxyCODONE    IR 2.5 milliGRAM(s) Oral every 4 hours PRN        REVIEW OF SYSTEMS  --------------------------------------------------------------------------------    General:  as per hpi  HEENT:  no headaches, no vision changes, no ear pain, no epistaxis, no throat pain   CV:  no chest pain, no palpitations  Resp:  no dyspnea, no cough  GI:  as per hpi  :  no pain, no bleeding, no dysuria  Muscle:  no pain, no weakness  Neuro:  no numbness, no tingling  Psych:  no insomnia, no mood problems,  Endocrine:  no cold/heat intolerance  Heme: no ecchymosis, no easy bruising        VITALS/PHYSICAL EXAM  --------------------------------------------------------------------------------  T(C): 36.5 (09-14-21 @ 07:00), Max: 37.1 (09-13-21 @ 15:00)  HR: 81 (09-14-21 @ 08:00) (74 - 145)  BP: 119/59 (09-14-21 @ 08:00) (78/53 - 162/72)  RR: 22 (09-14-21 @ 08:00) (15 - 28)  SpO2: 100% (09-14-21 @ 08:00) (96% - 100%)  Wt(kg): --      09-13-21 @ 07:01  -  09-14-21 @ 07:00  --------------------------------------------------------  IN: 1266.6 mL / OUT: 2070 mL / NET: -803.4 mL      I&O's Detail    13 Sep 2021 07:01  -  14 Sep 2021 07:00  --------------------------------------------------------  IN:    dextrose 5% + lactated ringers: 450 mL    dextrose 5% + lactated ringers: 150 mL    IV PiggyBack: 166.6 mL    Lactated Ringers Bolus: 500 mL  Total IN: 1266.6 mL    OUT:    Bulb (mL): 20 mL    Bulb (mL): 50 mL    Ileostomy (mL): 200 mL    Incontinent per Condom Catheter (mL): 1800 mL  Total OUT: 2070 mL    Total NET: -803.4 mL          Physical Exam:  Gen: lying in bed in nad frail  HEENT: ncat, trachea midline  Chest: respirations non labored  Abd: soft, nontender, nondistended +ostomy dressing c/d/i  LE: trace edema  Neuro: awake alert responsive        LABS/STUDIES  --------------------------------------------------------------------------------              8.0    8.12  >-----------<  272      [09-14-21 @ 06:03]              26.2     138  |  107  |  9   ----------------------------<  96      [09-14-21 @ 06:03]  3.5   |  23  |  0.51        Ca     7.9     [09-14-21 @ 06:03]      iCa    1.16     [09-14 @ 06:06]      Mg     2.0     [09-14-21 @ 06:03]      Phos  2.4     [09-14-21 @ 06:03]    TPro  5.3  /  Alb  1.8  /  TBili  0.2  /  DBili  x   /  AST  17  /  ALT  20  /  AlkPhos  86  [09-14-21 @ 06:03]          Ca ionized  Creatinine Trend:  POC glucoseGlucose, Serum: 96 mg/dL (09-14-21 @ 06:03)  Glucose, Serum: 119 mg/dL (09-13-21 @ 09:48)  CAPILLARY BLOOD GLUCOSE        PrealbuminPrealbumin, Serum: 22 mg/dL (09-03-21 @ 05:19)  Prealbumin, Serum: 16 mg/dL (09-03-21 @ 03:19)  Prealbumin, Serum: 15 mg/dL (08-30-21 @ 03:32)  Prealbumin, Serum: 8 mg/dL (08-15-21 @ 10:17)    Triglycerides

## 2021-09-15 NOTE — PROGRESS NOTE ADULT - ATTENDING COMMENTS
markedly weak and deconditioned  concern now is patient has melanotic output via ostomy  consulting CRAIG to consider EGD  continue telemetry monitoring - beta blocker for atrial fibrillation

## 2021-09-15 NOTE — PROGRESS NOTE ADULT - ASSESSMENT
81y Male with Crohn disease s/p failed medical management. 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 Transferred to SICU intubated and on pressors. Now extubated and off pressors.    PLAN:  Neurologic: post-op pain control, new-onset hypophonia without focal neuro deficits, obtundation  - Monitor mental status  - Pain control: PO Tylenol 500mg q6hr PRN, Oxycodone 2.5mg prn   - Vitamin D3, Folic acid, and multivitamins daily    Respiratory: Acute respiratory insufficiency resolving, patient on RA  - Incentive spirometry 10x/hr while in bed, OOBTC    Cardiovascular: History of AFib currently rate controlled with IV metoprolol and digoxin  - Metoprolol 25mg PO q8h   - Monitor hemodynamics    Gastrointestinal/Nutrition: s/p total abdominal colectomy with end ileostomy c/b recurrent bloody ostomy output; s/p TPN  - Mechanical Soft / Honey Thickened liquid diet + Ensures 3x/day   - Protonix 40 PO BID  - Biotene mouth wash  - Monitor ostomy output and character    Renal/Genitourinary: no active issues   - D5 LR @ 30cc/hr  - Monitor and replete electrolytes as needed  - Monitor I&Os, voiding spontaneously  - BMP q12hr    Hematologic: recurrent GIB -- last transfusion: 1u pRBC 9/7  - Lovenox 40mg daily for VTE ppx  - f/u AM CBC  - Duplex study in 1 week given VTE findings  - Trend H/H and transfuse Hgb < 7.5  - CBC q12hr    Infectious Disease: C. diff colitis vs Crohn's flair s/p subtotal colectomy   - Trend WBC on CBC q12hr  - Monitor fever curve    Endocrine: no active issues; s/p steroid taper 8/26  - Monitor glucose on BMP daily    Lines & Drains:   - LLQ & LUQ drain 8/20  - TPN 8/16  - RUE PICC placed 8/16    Disposition: SICU  Code Status: DNR/DNI, MOLST in chart

## 2021-09-15 NOTE — PROGRESS NOTE ADULT - ASSESSMENT
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 Admitted for crohns management, course c/b cdiff colitis sp dificid/fmt and findings of indeterminate quantiferon, pending id clearance to start biologic. Prealb 8. TPN consulted in setting of significant weight loss, severe pcm, and worsening colitis suspected 2/2 CD. Pt at high risk for refeeding syndrome. TPN started 8/16 for Protein Calorie Malnutrition.. Repeat CT showing unchanged diffuse colitis and non specific gb wall edema.  Taken to OR emergently on 8/20 for lap total colectomy, end ileostomy. Failed S&S eval. Family agreed to TANJA tube feeds, which had been started but then dc'd due to noted blood in ostomy and risk for aspiration given deterioration in mental status. made dnr/dni. Mental status improved and restarted on ngt feeds, and tolerating; later dcd as per pt/family wishes. TPN dcd 9/5    Current Diet: npo/d5+lr at 30cc/hr    -electrolyte imbalance risk- monitor and replete elytes as needed  -anemia/gib- concern for recurrent bleed, trend cbc, transfuse prn, care per primary  -vitamin d deficiency- suggest switching to ergocalciferol given hypophosphatemia   -goc discussions ongoing  -further care per primary/SICU; will follow with you    plan discussed with Dr. Fernandez, agreeable with above    TPN pager [phone], spectra [phone]  M-F 6A-4P, Weekends and holidays 8A-12P

## 2021-09-15 NOTE — PROGRESS NOTE ADULT - ASSESSMENT
81M with recently dx Crohn's disease c/b perianal abscess and left posterior distal anal intersphincteric fistula and presents diarrhea/BRBPR  2/2 to CD flare c/b c diff colitis. Initially treat for C diff, s/p FMT. Taken emergently to OR on 8/20 for acute deterioration for laparoscopic total colectomy with end ileostomy, POD26. Failed speech and swallow 9/10.    PLAN:  - FU ostomy output and morning labs  - Diet: mechanical soft, dysphagia type 2   - F/u Palliative and GOC discussion with family.   - Dispo planning for rehab, patient should receive 500cc bolus every 2 days until his PO intake is adequate  - Wound care: daily dressing changes with midline incision packed   - Appreciate excellent care per SICU    Red Surgery  [id number]  81M with recently dx Crohn's disease c/b perianal abscess and left posterior distal anal intersphincteric fistula and presents diarrhea/BRBPR  2/2 to CD flare c/b c diff colitis. Initially treat for C diff, s/p FMT. Taken emergently to OR on 8/20 for acute deterioration for laparoscopic total colectomy with end ileostomy, POD26. Failed speech and swallow 9/10.    PLAN:  - Ostomy with large amount of dark red output, concern for bleed again  - Protonix gtt  - GI consult for possible endoscopy  - Diet: mechanical soft, dysphagia type 2   - Ongoing Palliative and GOC discussions with family.   - Dispo planning for rehab, patient should receive 500cc bolus every 2 days until his PO intake is adequate  - Wound care: daily dressing changes with midline incision packed   - Appreciate excellent care per SICU    Red Surgery  [id number]  81M with recently dx Crohn's disease c/b perianal abscess and left posterior distal anal intersphincteric fistula and presents diarrhea/BRBPR  2/2 to CD flare c/b c diff colitis. Initially treat for C diff, s/p FMT. Taken emergently to OR on 8/20 for acute deterioration for laparoscopic total colectomy with end ileostomy, POD26. Failed speech and swallow 9/10.    PLAN:  - Consult vascular surgery   - Ostomy with large amount of dark red output, concern for bleed again  - Protonix gtt  - GI consult for possible endoscopy  - Diet: mechanical soft, dysphagia type 2   - Ongoing Palliative and GOC discussions with family.   - Dispo planning for rehab, patient should receive 500cc bolus every 2 days until his PO intake is adequate  - Wound care: daily dressing changes with midline incision packed   - Appreciate excellent care per SICU    Red Surgery  [id number]

## 2021-09-15 NOTE — PROGRESS NOTE ADULT - SUBJECTIVE AND OBJECTIVE BOX
[facility] NUTRITION SUPPORT-- FOLLOW UP NOTE  --------------------------------------------------------------------------------  24 hour events/subjective: pt seen and examined. oob in chair. c/o fatigue and mild abd discomfort. tolerating po but appetite fair. denies f/c/n/v. outputs reviewed. + le dvt on imaging    Diet:  Diet, Dysphagia 2 Mechanical Soft-Honey Consistency Fluid:   Supplement Feeding Modality:  Oral  Ensure Enlive Servings Per Day:  3       Frequency:  Three Times a day (09-12-21 @ 21:25)      PAST HISTORY  --------------------------------------------------------------------------------  No significant changes to PMH, PSH, FHx, SHx, unless otherwise noted    ALLERGIES & MEDICATIONS  --------------------------------------------------------------------------------  Allergies    No Known Allergies    Intolerances      Standing Inpatient Medications  Biotene Dry Mouth Oral Rinse 5 milliLiter(s) Swish and Spit two times a day  chlorhexidine 2% Cloths 1 Application(s) Topical <User Schedule>  cholecalciferol 1000 Unit(s) Oral daily  dextrose 5% + lactated ringers. 1000 milliLiter(s) IV Continuous <Continuous>  enoxaparin Injectable 40 milliGRAM(s) SubCutaneous every 24 hours  folic acid 1 milliGRAM(s) Oral daily  lidocaine   4% Patch 1 Patch Transdermal daily  metoprolol tartrate 25 milliGRAM(s) Oral every 8 hours  multivitamin 1 Tablet(s) Oral daily  pantoprazole    Tablet 40 milliGRAM(s) Oral every 12 hours    PRN Inpatient Medications  acetaminophen   Tablet .. 650 milliGRAM(s) Oral every 6 hours PRN  oxyCODONE    IR 2.5 milliGRAM(s) Oral every 4 hours PRN        REVIEW OF SYSTEMS  --------------------------------------------------------------------------------    General:  as per hpi  HEENT:  no headaches, no vision changes, no ear pain, no epistaxis  CV:  no chest pain, no palpitations  Resp:  no dyspnea, no cough  GI:  as per hpi  :  no pain, no bleeding, no dysuria  Muscle:  no pain, no weakness  Neuro:  no numbness, no tingling  Psych:  no insomnia  Endocrine:  no cold/heat intolerance      VITALS/PHYSICAL EXAM  --------------------------------------------------------------------------------  T(C): 36.2 (09-15-21 @ 07:00), Max: 36.9 (09-14-21 @ 15:00)  HR: 70 (09-15-21 @ 08:00) (66 - 124)  BP: 112/59 (09-15-21 @ 08:00) (95/50 - 156/70)  RR: 18 (09-15-21 @ 08:00) (17 - 36)  SpO2: 100% (09-15-21 @ 08:00) (95% - 100%)  Wt(kg): --      09-14-21 @ 07:01  -  09-15-21 @ 07:00  --------------------------------------------------------  IN: 1186.6 mL / OUT: 1570 mL / NET: -383.4 mL    09-15-21 @ 07:01  -  09-15-21 @ 08:44  --------------------------------------------------------  IN: 113.3 mL / OUT: 0 mL / NET: 113.3 mL      I&O's Detail    14 Sep 2021 07:01  -  15 Sep 2021 07:00  --------------------------------------------------------  IN:    dextrose 5% + lactated ringers: 720 mL    IV PiggyBack: 300 mL    IV PiggyBack: 166.6 mL  Total IN: 1186.6 mL    OUT:    Bulb (mL): 10 mL    Bulb (mL): 60 mL    Ileostomy (mL): 400 mL    Incontinent per Condom Catheter (mL): 1100 mL  Total OUT: 1570 mL    Total NET: -383.4 mL      15 Sep 2021 07:01  -  15 Sep 2021 08:44  --------------------------------------------------------  IN:    dextrose 5% + lactated ringers: 30 mL    IV PiggyBack: 83.3 mL  Total IN: 113.3 mL    OUT:  Total OUT: 0 mL    Total NET: 113.3 mL        Physical Exam:  Gen: oob in chair in nad frail  HEENT: ncat, trachea midline  Chest: respirations non labored  Abd: soft, nondistended +ostomy   LE: mild edema  Neuro: awake alert responsive      LABS/STUDIES  --------------------------------------------------------------------------------              7.9    8.42  >-----------<  268      [09-15-21 @ 01:19]              25.9     139  |  108  |  7   ----------------------------<  86      [09-15-21 @ 01:19]  3.6   |  23  |  0.53        Ca     7.9     [09-15-21 @ 01:19]      iCa    1.16     [09-14 @ 06:06]      Mg     1.7     [09-15-21 @ 01:19]      Phos  2.6     [09-15-21 @ 01:19]    TPro  5.1  /  Alb  1.7  /  TBili  0.2  /  DBili  x   /  AST  16  /  ALT  20  /  AlkPhos  84  [09-15-21 @ 01:19]          Ca ionizedBlood Gas Calcium, Ionized - Venous: 1.24 mmol/L (09-15-21 @ 01:17)  Blood Gas Calcium, Ionized - Venous: 1.22 mmol/L (09-14-21 @ 21:09)    Creatinine Trend:  POC glucoseGlucose, Serum: 86 mg/dL (09-15-21 @ 01:19)  CAPILLARY BLOOD GLUCOSE        PrealbuminPrealbumin, Serum: 22 mg/dL (09-03-21 @ 05:19)  Prealbumin, Serum: 16 mg/dL (09-03-21 @ 03:19)  Prealbumin, Serum: 15 mg/dL (08-30-21 @ 03:32)  Prealbumin, Serum: 8 mg/dL (08-15-21 @ 10:17)    Triglycerides     [facility] NUTRITION SUPPORT-- FOLLOW UP NOTE  --------------------------------------------------------------------------------  24 hour events/subjective: pt seen and examined. oob in chair. c/o fatigue and mild abd discomfort. tolerating po but appetite fair. denies f/c/n/v. outputs reviewed. + le dvt on imaging. in interim pt now made npo and started on ppi gtt, repeat cbc noted    Diet:  Diet, Dysphagia 2 Mechanical Soft-Honey Consistency Fluid:   Supplement Feeding Modality:  Oral  Ensure Enlive Servings Per Day:  3       Frequency:  Three Times a day (09-12-21 @ 21:25)      PAST HISTORY  --------------------------------------------------------------------------------  No significant changes to PMH, PSH, FHx, SHx, unless otherwise noted    ALLERGIES & MEDICATIONS  --------------------------------------------------------------------------------  Allergies    No Known Allergies    Intolerances      Standing Inpatient Medications  Biotene Dry Mouth Oral Rinse 5 milliLiter(s) Swish and Spit two times a day  chlorhexidine 2% Cloths 1 Application(s) Topical <User Schedule>  cholecalciferol 1000 Unit(s) Oral daily  dextrose 5% + lactated ringers. 1000 milliLiter(s) IV Continuous <Continuous>  enoxaparin Injectable 40 milliGRAM(s) SubCutaneous every 24 hours  folic acid 1 milliGRAM(s) Oral daily  lidocaine   4% Patch 1 Patch Transdermal daily  metoprolol tartrate 25 milliGRAM(s) Oral every 8 hours  multivitamin 1 Tablet(s) Oral daily  pantoprazole    Tablet 40 milliGRAM(s) Oral every 12 hours    PRN Inpatient Medications  acetaminophen   Tablet .. 650 milliGRAM(s) Oral every 6 hours PRN  oxyCODONE    IR 2.5 milliGRAM(s) Oral every 4 hours PRN        REVIEW OF SYSTEMS  --------------------------------------------------------------------------------    General:  as per hpi  HEENT:  no headaches, no vision changes, no ear pain, no epistaxis  CV:  no chest pain, no palpitations  Resp:  no dyspnea, no cough  GI:  as per hpi  :  no pain, no bleeding, no dysuria  Muscle:  no pain, no weakness  Neuro:  no numbness, no tingling  Psych:  no insomnia  Endocrine:  no cold/heat intolerance      VITALS/PHYSICAL EXAM  --------------------------------------------------------------------------------  T(C): 36.2 (09-15-21 @ 07:00), Max: 36.9 (09-14-21 @ 15:00)  HR: 70 (09-15-21 @ 08:00) (66 - 124)  BP: 112/59 (09-15-21 @ 08:00) (95/50 - 156/70)  RR: 18 (09-15-21 @ 08:00) (17 - 36)  SpO2: 100% (09-15-21 @ 08:00) (95% - 100%)  Wt(kg): --      09-14-21 @ 07:01  -  09-15-21 @ 07:00  --------------------------------------------------------  IN: 1186.6 mL / OUT: 1570 mL / NET: -383.4 mL    09-15-21 @ 07:01  -  09-15-21 @ 08:44  --------------------------------------------------------  IN: 113.3 mL / OUT: 0 mL / NET: 113.3 mL      I&O's Detail    14 Sep 2021 07:01  -  15 Sep 2021 07:00  --------------------------------------------------------  IN:    dextrose 5% + lactated ringers: 720 mL    IV PiggyBack: 300 mL    IV PiggyBack: 166.6 mL  Total IN: 1186.6 mL    OUT:    Bulb (mL): 10 mL    Bulb (mL): 60 mL    Ileostomy (mL): 400 mL    Incontinent per Condom Catheter (mL): 1100 mL  Total OUT: 1570 mL    Total NET: -383.4 mL      15 Sep 2021 07:01  -  15 Sep 2021 08:44  --------------------------------------------------------  IN:    dextrose 5% + lactated ringers: 30 mL    IV PiggyBack: 83.3 mL  Total IN: 113.3 mL    OUT:  Total OUT: 0 mL    Total NET: 113.3 mL        Physical Exam:  Gen: oob in chair in nad frail  HEENT: ncat, trachea midline  Chest: respirations non labored  Abd: soft, nondistended +ostomy   LE: mild edema  Neuro: awake alert responsive      LABS/STUDIES  --------------------------------------------------------------------------------              7.9    8.42  >-----------<  268      [09-15-21 @ 01:19]              25.9     139  |  108  |  7   ----------------------------<  86      [09-15-21 @ 01:19]  3.6   |  23  |  0.53        Ca     7.9     [09-15-21 @ 01:19]      iCa    1.16     [09-14 @ 06:06]      Mg     1.7     [09-15-21 @ 01:19]      Phos  2.6     [09-15-21 @ 01:19]    TPro  5.1  /  Alb  1.7  /  TBili  0.2  /  DBili  x   /  AST  16  /  ALT  20  /  AlkPhos  84  [09-15-21 @ 01:19]          Ca ionizedBlood Gas Calcium, Ionized - Venous: 1.24 mmol/L (09-15-21 @ 01:17)  Blood Gas Calcium, Ionized - Venous: 1.22 mmol/L (09-14-21 @ 21:09)    Creatinine Trend:  POC glucoseGlucose, Serum: 86 mg/dL (09-15-21 @ 01:19)  CAPILLARY BLOOD GLUCOSE        PrealbuminPrealbumin, Serum: 22 mg/dL (09-03-21 @ 05:19)  Prealbumin, Serum: 16 mg/dL (09-03-21 @ 03:19)  Prealbumin, Serum: 15 mg/dL (08-30-21 @ 03:32)  Prealbumin, Serum: 8 mg/dL (08-15-21 @ 10:17)    Triglycerides

## 2021-09-15 NOTE — PROGRESS NOTE ADULT - SUBJECTIVE AND OBJECTIVE BOX
24 hr events:  - ostomy output maroon colored --> CBC, gas stable --> repeat CBC in AM  - delisted (soft tele)  - DVT study positive for VTE in right gastrocnemius and left soleal veins  - increased metoprolol to 25mg q8  - protonix made PO    SUBJECTIVE:  81y Male with Crohn disease s/p failed medical management. 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 Transferred to SICU intubated and on pressors. Now extubated and off pressors.    Flatus: [ ] YES [ ] NO             Bowel Movement: [ ] YES [ ] NO  Pain (0-10):            Pain Control Adequate: [ ] YES [ ] NO  Nausea: [ ] YES [ ] NO            Vomiting: [ ] YES [ ] NO  Diarrhea: [ ] YES [ ] NO         Constipation: [ ] YES [ ] NO     Chest Pain: [ ] YES [ ] NO    SOB:  [ ] YES [ ] NO    MEDICATIONS  (STANDING):  Biotene Dry Mouth Oral Rinse 5 milliLiter(s) Swish and Spit two times a day  chlorhexidine 2% Cloths 1 Application(s) Topical <User Schedule>  cholecalciferol 1000 Unit(s) Oral daily  dextrose 5% + lactated ringers. 1000 milliLiter(s) (30 mL/Hr) IV Continuous <Continuous>  enoxaparin Injectable 40 milliGRAM(s) SubCutaneous every 24 hours  folic acid 1 milliGRAM(s) Oral daily  lidocaine   4% Patch 1 Patch Transdermal daily  metoprolol tartrate 25 milliGRAM(s) Oral every 8 hours  multivitamin 1 Tablet(s) Oral daily  pantoprazole    Tablet 40 milliGRAM(s) Oral every 12 hours    MEDICATIONS  (PRN):  acetaminophen   Tablet .. 650 milliGRAM(s) Oral every 6 hours PRN Mild Pain (1 - 3)  oxyCODONE    IR 2.5 milliGRAM(s) Oral every 4 hours PRN Moderate Pain (4 - 6)      Roberson:	  [ ] None	[ ] Daily Roberson Order Placed	   Indication:	  [ ] Strict I and O's    [ ] Obstruction     [ ] Incontinence + Stage 3 or 4 Decubitus  Central Line:  [ ] None	   [ ]  Medication / TPN Administration     [ ] No Peripheral IV     ICU Vital Signs Last 24 Hrs  T(C): 36.2 (14 Sep 2021 23:00), Max: 36.9 (14 Sep 2021 15:00)  T(F): 97.2 (14 Sep 2021 23:00), Max: 98.4 (14 Sep 2021 15:00)  HR: 71 (14 Sep 2021 23:00) (71 - 124)  BP: 119/59 (14 Sep 2021 23:00) (98/54 - 143/84)  BP(mean): 85 (14 Sep 2021 23:00) (71 - 95)  ABP: --  ABP(mean): --  RR: 17 (14 Sep 2021 23:00) (17 - 36)  SpO2: 100% (14 Sep 2021 23:00) (95% - 100%)      Physical Exam:  NEUROLOGY  Exam: Normal, NAD, alert, oriented x3, no focal deficits.    HEENT  Exam: Normocephalic, atraumatic, EOMI.     RESPIRATORY  Exam: Normal expansion/effort.    CARDIOVASCULAR  Exam: Regular rate and rhythm.       GI/NUTRITION  Exam: Abdomen soft, Non-tender, Non-distended. ostomy output maroon    VASCULAR  Exam: Extremities warm, pink, well-perfused.     MUSCULOSKELETAL  Exam: All extremities moving spontaneously without limitations.     SKIN  Exam: Good skin turgor, no skin breakdown.         I&O's Summary    13 Sep 2021 07:01  -  14 Sep 2021 07:00  --------------------------------------------------------  IN: 1266.6 mL / OUT: 2070 mL / NET: -803.4 mL    14 Sep 2021 07:01  -  15 Sep 2021 00:49  --------------------------------------------------------  IN: 640 mL / OUT: 740 mL / NET: -100 mL        LABS:                        8.1    8.51  )-----------( 286      ( 14 Sep 2021 21:12 )             26.6     09-14    138  |  107  |  9   ----------------------------<  96  3.5   |  23  |  0.51    Ca    7.9<L>      14 Sep 2021 06:03  Phos  2.4     09-14  Mg     2.0     09-14    TPro  5.3<L>  /  Alb  1.8<L>  /  TBili  0.2  /  DBili  x   /  AST  17  /  ALT  20  /  AlkPhos  86  09-14        CAPILLARY BLOOD GLUCOSE        LIVER FUNCTIONS - ( 14 Sep 2021 06:03 )  Alb: 1.8 g/dL / Pro: 5.3 g/dL / ALK PHOS: 86 U/L / ALT: 20 U/L / AST: 17 U/L / GGT: x                24 hr events:  - ostomy output maroon colored --> CBC, gas stable --> repeat CBC in AM  - delisted (soft tele)  - DVT study positive for VTE in right gastrocnemius and left soleal veins  - increased metoprolol to 25mg q8  - protonix made PO    SUBJECTIVE:  81y Male with Crohn disease s/p failed medical management. 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 Transferred to SICU intubated and on pressors. Now extubated and off pressors.    MEDICATIONS  (STANDING):  Biotene Dry Mouth Oral Rinse 5 milliLiter(s) Swish and Spit two times a day  chlorhexidine 2% Cloths 1 Application(s) Topical <User Schedule>  cholecalciferol 1000 Unit(s) Oral daily  dextrose 5% + lactated ringers. 1000 milliLiter(s) (30 mL/Hr) IV Continuous <Continuous>  enoxaparin Injectable 40 milliGRAM(s) SubCutaneous every 24 hours  folic acid 1 milliGRAM(s) Oral daily  lidocaine   4% Patch 1 Patch Transdermal daily  metoprolol tartrate 25 milliGRAM(s) Oral every 8 hours  multivitamin 1 Tablet(s) Oral daily  pantoprazole    Tablet 40 milliGRAM(s) Oral every 12 hours    MEDICATIONS  (PRN):  acetaminophen   Tablet .. 650 milliGRAM(s) Oral every 6 hours PRN Mild Pain (1 - 3)  oxyCODONE    IR 2.5 milliGRAM(s) Oral every 4 hours PRN Moderate Pain (4 - 6)      Roberson:	  [ ] None	[ ] Daily Roberson Order Placed	   Indication:	  [ ] Strict I and O's    [ ] Obstruction     [ ] Incontinence + Stage 3 or 4 Decubitus  Central Line:  [ ] None	   [ ]  Medication / TPN Administration     [ ] No Peripheral IV     ICU Vital Signs Last 24 Hrs  T(C): 36.2 (14 Sep 2021 23:00), Max: 36.9 (14 Sep 2021 15:00)  T(F): 97.2 (14 Sep 2021 23:00), Max: 98.4 (14 Sep 2021 15:00)  HR: 71 (14 Sep 2021 23:00) (71 - 124)  BP: 119/59 (14 Sep 2021 23:00) (98/54 - 143/84)  BP(mean): 85 (14 Sep 2021 23:00) (71 - 95)  ABP: --  ABP(mean): --  RR: 17 (14 Sep 2021 23:00) (17 - 36)  SpO2: 100% (14 Sep 2021 23:00) (95% - 100%)      Physical Exam:  NEUROLOGY  Exam: Normal, NAD, alert, oriented x3, no focal deficits.    HEENT  Exam: Normocephalic, atraumatic, EOMI.     RESPIRATORY  Exam: Normal expansion/effort.    CARDIOVASCULAR  Exam: Regular rate and rhythm.       GI/NUTRITION  Exam: Abdomen soft, Non-tender, Non-distended. ostomy output maroon    VASCULAR  Exam: Extremities warm, pink, well-perfused.     MUSCULOSKELETAL  Exam: All extremities moving spontaneously without limitations.     SKIN  Exam: Good skin turgor, no skin breakdown.         I&O's Summary    13 Sep 2021 07:01  -  14 Sep 2021 07:00  --------------------------------------------------------  IN: 1266.6 mL / OUT: 2070 mL / NET: -803.4 mL    14 Sep 2021 07:01  -  15 Sep 2021 00:49  --------------------------------------------------------  IN: 640 mL / OUT: 740 mL / NET: -100 mL        LABS:                        8.1    8.51  )-----------( 286      ( 14 Sep 2021 21:12 )             26.6     09-14    138  |  107  |  9   ----------------------------<  96  3.5   |  23  |  0.51    Ca    7.9<L>      14 Sep 2021 06:03  Phos  2.4     09-14  Mg     2.0     09-14    TPro  5.3<L>  /  Alb  1.8<L>  /  TBili  0.2  /  DBili  x   /  AST  17  /  ALT  20  /  AlkPhos  86  09-14        CAPILLARY BLOOD GLUCOSE        LIVER FUNCTIONS - ( 14 Sep 2021 06:03 )  Alb: 1.8 g/dL / Pro: 5.3 g/dL / ALK PHOS: 86 U/L / ALT: 20 U/L / AST: 17 U/L / GGT: x

## 2021-09-15 NOTE — PROGRESS NOTE ADULT - SUBJECTIVE AND OBJECTIVE BOX
TEAM [facility] Progress Note  Patient is a 81y old  Male who presents with a chief complaint of diarrhea with blood in it , abd pain and weakness (15 Sep 2021 00:48)      INTERVAL EVENTS PER SICU:  24 hr events:  - ostomy output maroon colored --> CBC, gas stable --> repeat CBC in AM  - delisted (soft tele)  - DVT study positive for VTE in right gastrocnemius and left soleal veins  - increased metoprolol to 25mg q8  - protonix made PO    SUBJECTIVE:     OBJECTIVE:    Vital Signs Last 24 Hrs  T(C): 36.2 (14 Sep 2021 23:00), Max: 36.9 (14 Sep 2021 15:00)  T(F): 97.2 (14 Sep 2021 23:00), Max: 98.4 (14 Sep 2021 15:00)  HR: 72 (15 Sep 2021 03:00) (71 - 124)  BP: 116/58 (15 Sep 2021 02:00) (98/54 - 151/69)  BP(mean): 83 (15 Sep 2021 02:00) (71 - 99)  RR: 21 (15 Sep 2021 03:00) (17 - 36)  SpO2: 100% (15 Sep 2021 03:00) (95% - 100%)I&O's Detail    13 Sep 2021 07:01  -  14 Sep 2021 07:00  --------------------------------------------------------  IN:    dextrose 5% + lactated ringers: 150 mL    dextrose 5% + lactated ringers: 450 mL    IV PiggyBack: 166.6 mL    Lactated Ringers Bolus: 500 mL  Total IN: 1266.6 mL    OUT:    Bulb (mL): 50 mL    Bulb (mL): 20 mL    Ileostomy (mL): 200 mL    Incontinent per Condom Catheter (mL): 1800 mL  Total OUT: 2070 mL    Total NET: -803.4 mL      14 Sep 2021 07:01  -  15 Sep 2021 03:19  --------------------------------------------------------  IN:    dextrose 5% + lactated ringers: 390 mL    IV PiggyBack: 250 mL  Total IN: 640 mL    OUT:    Bulb (mL): 10 mL    Bulb (mL): 30 mL    Ileostomy (mL): 200 mL    Incontinent per Condom Catheter (mL): 500 mL  Total OUT: 740 mL    Total NET: -100 mL      MEDICATIONS  (STANDING):  Biotene Dry Mouth Oral Rinse 5 milliLiter(s) Swish and Spit two times a day  chlorhexidine 2% Cloths 1 Application(s) Topical <User Schedule>  cholecalciferol 1000 Unit(s) Oral daily  dextrose 5% + lactated ringers. 1000 milliLiter(s) (30 mL/Hr) IV Continuous <Continuous>  enoxaparin Injectable 40 milliGRAM(s) SubCutaneous every 24 hours  folic acid 1 milliGRAM(s) Oral daily  lidocaine   4% Patch 1 Patch Transdermal daily  magnesium sulfate  IVPB 2 Gram(s) IV Intermittent once  metoprolol tartrate 25 milliGRAM(s) Oral every 8 hours  multivitamin 1 Tablet(s) Oral daily  pantoprazole    Tablet 40 milliGRAM(s) Oral every 12 hours  potassium phosphate IVPB 30 milliMole(s) IV Intermittent once    MEDICATIONS  (PRN):  acetaminophen   Tablet .. 650 milliGRAM(s) Oral every 6 hours PRN Mild Pain (1 - 3)  oxyCODONE    IR 2.5 milliGRAM(s) Oral every 4 hours PRN Moderate Pain (4 - 6)      PHYSICAL EXAM:  Constitutional: A&Ox0, not interactive  Respiratory: Unlabored breathing  Abdomen: Soft, nondistended, nontender. Ostomy with dark stool. Midline dressing c/d/i.     LABS:                        7.9    8.42  )-----------( 268      ( 15 Sep 2021 01:19 )             25.9     09-15    139  |  108  |  7   ----------------------------<  86  3.6   |  23  |  0.53    Ca    7.9<L>      15 Sep 2021 01:19  Phos  2.6     09-15  Mg     1.7     09-15    TPro  5.1<L>  /  Alb  1.7<L>  /  TBili  0.2  /  DBili  x   /  AST  16  /  ALT  20  /  AlkPhos  84  09-15      LIVER FUNCTIONS - ( 15 Sep 2021 01:19 )  Alb: 1.7 g/dL / Pro: 5.1 g/dL / ALK PHOS: 84 U/L / ALT: 20 U/L / AST: 16 U/L / GGT: x                 IMAGING:     TEAM Surgery Progress Note  Patient is a 81y old  Male who presents with a chief complaint of diarrhea with blood in it , abd pain and weakness (15 Sep 2021 00:48)      INTERVAL EVENTS PER SICU:  24 hr events:  - ostomy output maroon colored --> CBC, gas stable --> repeat CBC in AM  - delisted (soft tele)  - DVT study positive for VTE in right gastrocnemius and left soleal veins  - increased metoprolol to 25mg q8  - protonix made PO    SUBJECTIVE: Patient seen and examined at bedside this morning with the team. Mental status still improved, but patient appears sad and not verbalizing much.    OBJECTIVE:    Vital Signs Last 24 Hrs  T(C): 36.2 (14 Sep 2021 23:00), Max: 36.9 (14 Sep 2021 15:00)  T(F): 97.2 (14 Sep 2021 23:00), Max: 98.4 (14 Sep 2021 15:00)  HR: 72 (15 Sep 2021 03:00) (71 - 124)  BP: 116/58 (15 Sep 2021 02:00) (98/54 - 151/69)  BP(mean): 83 (15 Sep 2021 02:00) (71 - 99)  RR: 21 (15 Sep 2021 03:00) (17 - 36)  SpO2: 100% (15 Sep 2021 03:00) (95% - 100%)I&O's Detail    13 Sep 2021 07:01  -  14 Sep 2021 07:00  --------------------------------------------------------  IN:    dextrose 5% + lactated ringers: 150 mL    dextrose 5% + lactated ringers: 450 mL    IV PiggyBack: 166.6 mL    Lactated Ringers Bolus: 500 mL  Total IN: 1266.6 mL    OUT:    Bulb (mL): 50 mL    Bulb (mL): 20 mL    Ileostomy (mL): 200 mL    Incontinent per Condom Catheter (mL): 1800 mL  Total OUT: 2070 mL    Total NET: -803.4 mL      14 Sep 2021 07:01  -  15 Sep 2021 03:19  --------------------------------------------------------  IN:    dextrose 5% + lactated ringers: 390 mL    IV PiggyBack: 250 mL  Total IN: 640 mL    OUT:    Bulb (mL): 10 mL    Bulb (mL): 30 mL    Ileostomy (mL): 200 mL    Incontinent per Condom Catheter (mL): 500 mL  Total OUT: 740 mL    Total NET: -100 mL      MEDICATIONS  (STANDING):  Biotene Dry Mouth Oral Rinse 5 milliLiter(s) Swish and Spit two times a day  chlorhexidine 2% Cloths 1 Application(s) Topical <User Schedule>  cholecalciferol 1000 Unit(s) Oral daily  dextrose 5% + lactated ringers. 1000 milliLiter(s) (30 mL/Hr) IV Continuous <Continuous>  enoxaparin Injectable 40 milliGRAM(s) SubCutaneous every 24 hours  folic acid 1 milliGRAM(s) Oral daily  lidocaine   4% Patch 1 Patch Transdermal daily  magnesium sulfate  IVPB 2 Gram(s) IV Intermittent once  metoprolol tartrate 25 milliGRAM(s) Oral every 8 hours  multivitamin 1 Tablet(s) Oral daily  pantoprazole    Tablet 40 milliGRAM(s) Oral every 12 hours  potassium phosphate IVPB 30 milliMole(s) IV Intermittent once    MEDICATIONS  (PRN):  acetaminophen   Tablet .. 650 milliGRAM(s) Oral every 6 hours PRN Mild Pain (1 - 3)  oxyCODONE    IR 2.5 milliGRAM(s) Oral every 4 hours PRN Moderate Pain (4 - 6)      PHYSICAL EXAM:  Constitutional: A&Ox3  Respiratory: Unlabored breathing  Abdomen: Soft, nondistended, nontender. Ostomy bag with significant dark red output. Midline dressing c/d/i.     LABS:                        7.9    8.42  )-----------( 268      ( 15 Sep 2021 01:19 )             25.9     09-15    139  |  108  |  7   ----------------------------<  86  3.6   |  23  |  0.53    Ca    7.9<L>      15 Sep 2021 01:19  Phos  2.6     09-15  Mg     1.7     09-15    TPro  5.1<L>  /  Alb  1.7<L>  /  TBili  0.2  /  DBili  x   /  AST  16  /  ALT  20  /  AlkPhos  84  09-15      LIVER FUNCTIONS - ( 15 Sep 2021 01:19 )  Alb: 1.7 g/dL / Pro: 5.1 g/dL / ALK PHOS: 84 U/L / ALT: 20 U/L / AST: 16 U/L / GGT: x                 IMAGING:

## 2021-09-15 NOTE — PROGRESS NOTE ADULT - SUBJECTIVE AND OBJECTIVE BOX
Patient is a 81y old  Male who presents with a chief complaint of diarrhea with blood in it , abd pain and weakness (15 Sep 2021 08:44)      INTERVAL HISTORY:     TELEMETRY Personally reviewed:    REVIEW OF SYSTEMS:   CONSTITUTIONAL: No weakness  EYES/ENT: No visual changes; No throat pain  Neck: No pain or stiffness  Respiratory: No cough, wheezing, No shortness of breath  CARDIOVASCULAR: no chest pain or palpitations  GASTROINTESTINAL: No abdominal pain, no nausea, vomiting or hematemesis  GENITOURINARY: No dysuria, frequency or hematuria  NEUROLOGICAL: No stroke like symptoms  SKIN: No rashes    	  MEDICATIONS:  metoprolol tartrate 25 milliGRAM(s) Oral every 8 hours        PHYSICAL EXAM:  T(C): 36 (09-15-21 @ 11:00), Max: 36.9 (09-14-21 @ 15:00)  HR: 73 (09-15-21 @ 13:00) (66 - 124)  BP: 112/54 (09-15-21 @ 13:00) (95/50 - 156/70)  RR: 19 (09-15-21 @ 13:00) (17 - 34)  SpO2: 100% (09-15-21 @ 13:00) (98% - 100%)  Wt(kg): --  I&O's Summary    14 Sep 2021 07:01  -  15 Sep 2021 07:00  --------------------------------------------------------  IN: 1186.6 mL / OUT: 1570 mL / NET: -383.4 mL    15 Sep 2021 07:01  -  15 Sep 2021 14:41  --------------------------------------------------------  IN: 429.9 mL / OUT: 0 mL / NET: 429.9 mL          Appearance: In no distress	  HEENT:    PERRL, EOMI	  Cardiovascular:  S1 S2, No JVD  Respiratory: Lungs clear to auscultation	  Gastrointestinal:  Soft, Non-tender, + BS	  Vascularature:  No edema of LE  Psychiatric: Appropriate affect   Neuro: no acute focal deficits                               7.8    11.82 )-----------( 347      ( 15 Sep 2021 10:32 )             26.0     09-15    139  |  108  |  7   ----------------------------<  86  3.6   |  23  |  0.53    Ca    7.9<L>      15 Sep 2021 01:19  Phos  2.6     09-15  Mg     1.7     09-15    TPro  5.1<L>  /  Alb  1.7<L>  /  TBili  0.2  /  DBili  x   /  AST  16  /  ALT  20  /  AlkPhos  84  09-15        Labs personally reviewed      ASSESSMENT/PLAN: 	    Problem/Plan - 1:  ·  Problem: CAD (coronary artery disease).  Plan: c/w home meds  We discussed the importance of SAPT with low dose ASA 81mg given CAD  pt denies any current chest pain, SOB or chest pressure.    Problem/Plan -2:  ·  Problem: Colitis.  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 SR   - pt is ? candidate for AC seeing recent GIB. FOBT positive as recent as 8/8/21   continue rate control with IV lopressor - currently sinus  on tele   - 9/14 pt back to Afib rate controlled on metoprolol  no AC with hx of GIB   - pt self converted to SR rate controlled     Problem/Plan - 4:   ·  Problem: GIB (gastrointestinal bleeding).  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, currently in SICU   - Restarted on diet now tolerating well    Problem/Plan - 5:  ·  Problem: DVt ppx SCDs     Problem/Plan - 6:  ·  Problem: B/L LE edema   - CXR showed mildly enlarged heart   -monitor IVF with I&O  -B/L L E edema improved   -TTE with mod-severe AI, mild AS, preserved EF    palliative on board, GOC discussion with pt and family         Kar Powell [facility]   Rufus Onofre DO [facility]  Cardiovascular Medicine  [address] [place] [address], [address]  Office: [phone]  Cell: [phone] Patient is a 81y old  Male who presents with a chief complaint of diarrhea with blood in it , abd pain and weakness (15 Sep 2021 08:44)      INTERVAL HISTORY:     TELEMETRY Personally reviewed:    REVIEW OF SYSTEMS:   CONSTITUTIONAL: No weakness  EYES/ENT: No visual changes; No throat pain  Neck: No pain or stiffness  Respiratory: No cough, wheezing, No shortness of breath  CARDIOVASCULAR: no chest pain or palpitations  GASTROINTESTINAL: No abdominal pain, no nausea, vomiting or hematemesis  GENITOURINARY: No dysuria, frequency or hematuria  NEUROLOGICAL: No stroke like symptoms  SKIN: No rashes    	  MEDICATIONS:  metoprolol tartrate 25 milliGRAM(s) Oral every 8 hours        PHYSICAL EXAM:  T(C): 36 (09-15-21 @ 11:00), Max: 36.9 (09-14-21 @ 15:00)  HR: 73 (09-15-21 @ 13:00) (66 - 124)  BP: 112/54 (09-15-21 @ 13:00) (95/50 - 156/70)  RR: 19 (09-15-21 @ 13:00) (17 - 34)  SpO2: 100% (09-15-21 @ 13:00) (98% - 100%)  Wt(kg): --  I&O's Summary    14 Sep 2021 07:01  -  15 Sep 2021 07:00  --------------------------------------------------------  IN: 1186.6 mL / OUT: 1570 mL / NET: -383.4 mL    15 Sep 2021 07:01  -  15 Sep 2021 14:41  --------------------------------------------------------  IN: 429.9 mL / OUT: 0 mL / NET: 429.9 mL          Appearance: In no distress	  HEENT:    PERRL, EOMI	  Cardiovascular:  S1 S2, No JVD  Respiratory: Lungs clear to auscultation	  Gastrointestinal:  Soft, Non-tender, + BS	  Vascularature:  No edema of LE  Psychiatric: Appropriate affect   Neuro: no acute focal deficits                               7.8    11.82 )-----------( 347      ( 15 Sep 2021 10:32 )             26.0     09-15    139  |  108  |  7   ----------------------------<  86  3.6   |  23  |  0.53    Ca    7.9<L>      15 Sep 2021 01:19  Phos  2.6     09-15  Mg     1.7     09-15    TPro  5.1<L>  /  Alb  1.7<L>  /  TBili  0.2  /  DBili  x   /  AST  16  /  ALT  20  /  AlkPhos  84  09-15        Labs personally reviewed      ASSESSMENT/PLAN: 	    Problem/Plan - 1:  ·  Problem: CAD (coronary artery disease).  Plan: c/w home meds  We discussed the importance of SAPT with low dose ASA 81mg given CAD  pt denies any current chest pain, SOB or chest pressure.    Problem/Plan -2:  ·  Problem: Colitis.  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 SR   - pt is ? candidate for AC seeing recent GIB. FOBT positive as recent as 8/8/21   continue rate control with IV lopressor - currently sinus  on tele   - 9/14 pt back to Afib rate controlled on metoprolol  no AC with hx of GIB   - pt self converted to SR rate controlled     Problem/Plan - 4:   ·  Problem: GIB (gastrointestinal bleeding).  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, currently in SICU   - Restarted on diet now tolerating well    Problem/Plan - 5:  ·  Problem: DVt ppx SCDs     Problem/Plan - 6:  ·  Problem: B/L LE edema   - CXR showed mildly enlarged heart   -monitor IVF with I&O  -B/L L E edema improved   -TTE with mod-severe AI, mild AS, preserved EF    palliative on board, GOC discussion with pt and family     Thursday September 16th 2021 will be covered by [facility]. They can be reached at [phone]      Kar Powell [facility]   Rufus Onofre DO [facility]  Cardiovascular Medicine  [address] [place] [address], [address]  Office: [phone]  Cell: [phone]

## 2021-09-15 NOTE — PROGRESS NOTE ADULT - ATTENDING COMMENTS
Pt reports that 2days ago he felt really good but today he feels a little weak and heavy    ostomy w/ slight melanotic appearance to it  abd soft, non-distended, non-tender to palpation  drain to bulb suction w/ serous output    - extensive conversation had with pt (with son present) explaining the recommendation for an EGD to try identify a source of bleeding and to see if it can be treated. Explained it is a low risk possibly high yield procedure. Pt states he didn't like prior endoscopies. Explained that he is recovering (slowly but surely) but that he keeps on backtracking every time he has a bleed and so the thinking is to try to stop it from repeating but if he doesn't want us treating him anymore or aiming for recuperation then he needs to let us know that so we can transition to comfort care. I explained that I would love for him to consider this and make a decision while he is alert and able to participate in the conversation. He expressed his understanding but would like more time to think about it.  - agree w/ no immediate need for anticoagulation for his below knee DVTs, repeat US in 1wk    Ca Malone MD  Attending Physician

## 2021-09-16 NOTE — CONSULT NOTE ADULT - SUBJECTIVE AND OBJECTIVE BOX
Vascular Surgery Consult Note  Attending: Dr. David Brooks  Service: Vascular Surgery  [id number]    HPI: [id number] with recently dx Crohn's disease c/b perianal abscess and left posterior distal anal intersphincteric fistula and presents diarrhea/BRBPR  2/2 to CD flare c/b c diff colitis. Initially treat for C diff, s/p FMT. Taken emergently to OR on 8/20 for acute deterioration for laparoscopic total colectomy with end ileostomy. Patient has been intermittently having GI bleed from his ileostomy. GI service on board with plans to perform EGD. Screening duplex significant for bilateral below knee venous thrombosis. Vascular surgery consulted for AC recommendations.    PAST MEDICAL HISTORY:  Crohn disease  Paroxysmal atrial fibrillation  CAD (coronary artery disease)  not on ASA or plavix, no stents as per daughter  GI bleed  No significant past surgical history    ALLERGIES:  No Known Allergies    PHYSICAL EXAM:  General: NAD, resting comfortably  HEENT: NC/AT, EOMI, normal hearing, no oral lesions, no LAD, neck supple  Pulmonary: normal resp effort, CTA-B  Cardiovascular: NSR, no murmurs  Abdominal: soft, ND/NT, no organomegaly, ileostomy with maroon colored stool  Extremities: WWP, normal strength, no clubbing/cyanosis/edema  Pulses: palpable distal pulses    VITAL SIGNS:  Vital Signs Last 24 Hrs  T(C): 36 (16 Sep 2021 13:09), Max: 36.6 (15 Sep 2021 23:00)  T(F): 96.8 (16 Sep 2021 12:00), Max: 97.9 (15 Sep 2021 23:00)  HR: 68 (16 Sep 2021 14:00) (61 - 90)  BP: 129/60 (16 Sep 2021 13:09) (95/50 - 148/63)  BP(mean): 86 (16 Sep 2021 13:09) (68 - 95)  RR: 20 (16 Sep 2021 14:00) (12 - 32)  SpO2: 100% (16 Sep 2021 14:00) (100% - 100%)    I&O's Summary    15 Sep 2021 07:01  -  16 Sep 2021 07:00  --------------------------------------------------------  IN: 1629.9 mL / OUT: 1530 mL / NET: 99.9 mL    16 Sep 2021 07:01  -  16 Sep 2021 14:06  --------------------------------------------------------  IN: 530 mL / OUT: 450 mL / NET: 80 mL        LABS:                        7.8    10.41 )-----------( 275      ( 15 Sep 2021 21:52 )             25.5     09-15    138  |  107  |  8   ----------------------------<  111<H>  3.8   |  24  |  0.56    Ca    7.9<L>      15 Sep 2021 21:52  Phos  3.0     09-15  Mg     2.0     09-15    TPro  5.3<L>  /  Alb  1.8<L>  /  TBili  0.2  /  DBili  x   /  AST  15  /  ALT  19  /  AlkPhos  88  09-15        CAPILLARY BLOOD GLUCOSE        LIVER FUNCTIONS - ( 15 Sep 2021 21:52 )  Alb: 1.8 g/dL / Pro: 5.3 g/dL / ALK PHOS: 88 U/L / ALT: 19 U/L / AST: 15 U/L / GGT: x           RADIOLOGY & ADDITIONAL STUDIES:    VA Duplex Lower Ext Vein Scan, Bilat (09.14.21 @ 12:46)  FINDINGS:    RIGHT:  Normal compressibility of the RIGHT common femoral, femoral and popliteal veins.  Doppler examination shows normal spontaneous and phasic flow.    The right posterior tibial and peroneal veins are patent and free of thrombus.    There is acute below the knee DVT affecting a right gastrocnemius vein.    LEFT:  Normal compressibility of the LEFT common femoral, femoral and popliteal veins.  Doppler examination shows normal spontaneous and phasic flow.    The left posterior tibial and peroneal veins are patent and free of thrombus.    There is acute below the knee DVT affecting a left soleal vein.    IMPRESSION:    There is acute belowthe knee DVT affecting the right gastrocnemius and left soleal veins.

## 2021-09-16 NOTE — PROGRESS NOTE ADULT - ASSESSMENT
1. Acute blood loss anemia - differential includes PUD, stress ulcers, less likely small bowel angioectasia/source. Discussed with family and patient at bedside - agreeable for an endoscopic evaluation. Patient agrees to rescind DNR/DNI for the duration of the procedure.  2. Severe Crohn's disease with predominantly colonic disease - complicated by C diff and failed steroid rescue and previous infliximab, now s/p total colectomy with ileostomy  3. Altered mental status - suspect delirium given prolonged hospitalization    Recommendations:  - EGD/enteroscopy today  - Keep NPO  - c/w PPI gtt  - Please get COVID PCR in anticipation of procedure  - Please get repeat CBC, CMP, INR now    Thank you for involving us in the care of this patient. Please reach out if any further questions.    Avni Reyes, PGY-5  GI Fellow    Available on [employer] Teams  Pager [phone] ([facility]) or [phone] ([facility])  After 5PM/Weekends, please contact the on-call GI fellow: [phone]  Available through [employer] Teams

## 2021-09-16 NOTE — PRE-ANESTHESIA EVALUATION ADULT - NSDENTALSD_ENT_ALL_CORE
appears normal and intact

## 2021-09-16 NOTE — CONSULT NOTE ADULT - REASON FOR ADMISSION
diarrhea with blood in it , abd pain and weakness

## 2021-09-16 NOTE — CONSULT NOTE ADULT - CONSULT REQUESTED DATE/TIME
16-Aug-2021 12:46
09-Sep-2021
14-Jul-2021 12:14
16-Sep-2021 14:03
14-Jul-2021
18-Aug-2021 17:05
19-Aug-2021 16:46
20-Aug-2021 22:11
15-Aug-2021 18:45
17-Aug-2021 12:07
26-Aug-2021
21-Jul-2021 15:23

## 2021-09-16 NOTE — CHART NOTE - NSCHARTNOTEFT_GEN_A_CORE
Nutrition Follow Up Note  Patient seen for: Malnutrition Follow Up     Chart reviewed, events noted. Noted overnight, pt c multiple episodes of dark red ostomy output. Noted patient and family refusing scope. Noted NGT was removed  after discussion c pt and family. Pt was started on pleasure feeds, full liquids on , then advanced to dysphagia 2 c honey consistency liquids -15. Made NPO in setting of GIB. GOC discussions continue. Noted Speech Pathologist was on case, now no longer warranted given no need for further work up as per note on .     Source: [] Patient       [x] EMR        [x] RN        [] Family at bedside       [] Other:    -If unable to interview patient: [] Trach/Vent/BiPAP  [x] Disoriented/confused/inappropriate to interview    Diet Order:   Diet, NPO:   Except Medications (09-15-21)    Nutrition-related concerns:  -pt resting at this time  -noted pt previously on tube feeds, off since NGT out on   -as per RN, discussions continue between family and team     GI:  Last BM .   Bowel Regimen? [] Yes   [x] No  Ostomy Output: : 250ml , 9/15; 350ml, : 350ml      Weights:   Daily Weight in k.7 (), Weight in k.7 (09-15), Weight in k.1 (), Weight in k.7 (09-10)  Noted wt remains stable     MEDICATIONS  (STANDING):  cholecalciferol  dextrose 5% + lactated ringers.  folic acid  metoprolol tartrate  multivitamin  pantoprazole Infusion    Pertinent Labs: 09-15 @ 21:52: Na 138, BUN 8, Cr 0.56, <H>, K+ 3.8, Phos 3.0, Mg 2.0, Alk Phos 88, ALT/SGPT 19, AST/SGOT 15, HbA1c --    A1C with Estimated Average Glucose Result: 5.2 % (21 @ 09:06)    Finger Sticks:    Triglycerides, Serum: 61 mg/dL (21 @ 00:40)  Triglycerides, Serum: 649 mg/dL (21 @ 22:57)  Triglycerides, Serum: 89 mg/dL (08-30-21 @ 00:17)  Triglycerides, Serum: 99 mg/dL (21 @ 13:22)  Triglycerides, Serum: 125 mg/dL (21 @ 23:31)      Skin per nursing documentation: stage II pressure injury to thoracic spine, suspected DTI on sacral spine   Edema: +2 generalized, +3 figueroa. arm     Estimated Needs: based on dosing wt 59 kg, with consideration for malnutrition  Energy: 2698-4474 calories (25-35 kcal/kg)  Protein:  grams (1.4-1.8 gm/kg)    Previous Nutrition Diagnosis: 1) Severe Malnutrition 2) Increased Nutrient Needs  Nutrition Diagnosis is: ongoing at this time, to be addressed c [facility]     New Nutrition Diagnosis: [x] Not applicable    Nutrition Care Plan:  [x] In Progress  [] Achieved  [] Not applicable         Recommendations:      1. Defer PO diet to team at this time.   2. Continue c micronutrient coverage.   3. RD remains available for further nutritional interventions as needed.     Monitoring and Evaluation:   Continue to monitor nutritional intake, tolerance to diet prescription, weights, labs, skin integrity      RD remains available upon request and will follow up per protocol  Brunilda Chapman MS RD CDN [facility], Pager #[phone]

## 2021-09-16 NOTE — PROVIDER CONTACT NOTE (OTHER) - ASSESSMENT
HR 88 BP 67/38 MAP 47, 100% o2, altered mental status
Patient aaox2-3 lethargic, sleeping between care. Patient has stage 4 sacral pressure ulcer and a stage 2 pressure ulcer on spine.  Attempting to t/p patient to help offload off of pressure ulcer and patient states "he is comfortable right now and does not want to move"
Patient's daughter is refusing to have labs drawn at this time.  She states they should only be done every 12 hours.
Pt has Elevated temperature 101.3
Pt has insufficient IV access at this time. Unable to administer ordered electrolyte repletions until additional IV is placed by provider via ultrasound.
patient confused, refusing to take Klonopin first mouth wash.  pt a&ox1  with illogical speech, NP Wash aware see change in status note.
Patient oral temperature of 100.6F other VSS  Patient denies chills, headache, or dizziness  No s/s of distress noted or verbalized
Pt has DTI on his sacrum which is being treated with metahoney; around the dressing there is a light brown mucus discharge that seems to be coming from his rectum. This color is similar to the mucus discharge coming from his abdominal incision which is being packed by the surgery team
Pt hemodynamically stable, neuro status unchanged.
Pt is stable, A&OX4. Denies chest pains or SOB.
anxious, nauseated
patient daughter at bedside, VSS, x1 BM liquid BM, no signs of blood present.
pt febrile; temp of 102.4 orally. BP: 126/68, HR:92, spo2:93%RA, RR:18. pt additionally complaining of GI upset and heartburn. denies chest pain. pt states he occasionally experiences the heartburn. pt has poor po intake; did not eat dinner. requesting maalox.
Pt Hemoglobin has been consistent at 7.1 for two hours, pt has no complaints of pain of ostomy site. Majority of output was stool.
Ileostomy output previously liquid, brown/rust-colored stool. At ~ 0130, ileostomy output became bright red blood with clots. Pt's VSS at this time. AM labs sent.
Patient is restless, no longer following commands or responding to questions. Pt tachycardic to 104, /82 (91). Ileostomy has dark red blood output (450 ml) with large clots.
Pt is restless and no longer following commands. Pt pulling off wrist bands and trying to pull out zahira feed. Pt is no longer answering questions or opening eyes but does localize pain
pt a&ox4, forgetful at times. pt states he has a lot of pain on buttocks area and rectum d/t numerous bms.
pt alert and oriented x4. pt temp was 100.8F oral.
Pt a+ox1-2, lethargy, cachetic. TPN in place, PO intake encouraged
pt has dark red blood with very large clots coming from ilestomy. The color is the same as the previous output at 0000. The total output for this shift by 1:20 am is 850 ml. pt is receiving 1 unit of blood
Pt a+ox1 on contact for cdiff hx of crohns disease. Denies lightheadness and dizziness
pt a&ox3, forgetful. pt has a temp of 100.9 this am. pt also NPO for fecal transplant.
Patient alert and oriented x4. Patient complaining of frequents episodes of diarrhea and is only made worse by the antibiotics. Denies dizziness, N/V, diarrhea, SOB. Daughter(Tiffanie) agrees with patient and also does not want any antibiotics until G.I reconsults.
HR 97  /99  99% on RA

## 2021-09-16 NOTE — PRE-ANESTHESIA EVALUATION ADULT - NSANTHRISKNONERD_GEN_ALL_CORE
No risk alerts present
Risk Alerts:
Risk Alerts:

## 2021-09-16 NOTE — PROGRESS NOTE ADULT - SUBJECTIVE AND OBJECTIVE BOX
SICU Daily Progress Note  =====================================================  Interval/Overnight Events:     - Protonix gtt started for c/f GI bleed  - GI consulted for possible scope, patient does not want scope at this time  - D5LR @ 50   HPI:  81y Male with Crohn disease s/p failed medical management. 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 Transferred to SICU intubated and on pressors. Now extubated and off pressors.    Allergies: No Known Allergies      MEDICATIONS:   --------------------------------------------------------------------------------------  Neurologic Medications  acetaminophen   Tablet .. 650 milliGRAM(s) Oral every 6 hours PRN Mild Pain (1 - 3)  oxyCODONE    IR 2.5 milliGRAM(s) Oral every 4 hours PRN Moderate Pain (4 - 6)    Respiratory Medications    Cardiovascular Medications  metoprolol tartrate 25 milliGRAM(s) Oral every 8 hours    Gastrointestinal Medications  calcium gluconate IVPB 1 Gram(s) IV Intermittent once  cholecalciferol 1000 Unit(s) Oral daily  dextrose 5% + lactated ringers. 1000 milliLiter(s) IV Continuous <Continuous>  folic acid 1 milliGRAM(s) Oral daily  multivitamin 1 Tablet(s) Oral daily  pantoprazole Infusion 8 mG/Hr IV Continuous <Continuous>    Genitourinary Medications    Hematologic/Oncologic Medications    Antimicrobial/Immunologic Medications    Endocrine/Metabolic Medications    Topical/Other Medications  Biotene Dry Mouth Oral Rinse 5 milliLiter(s) Swish and Spit two times a day  chlorhexidine 2% Cloths 1 Application(s) Topical <User Schedule>  lidocaine   4% Patch 1 Patch Transdermal daily    --------------------------------------------------------------------------------------    VITAL SIGNS, INS/OUTS (last 24 hours):  --------------------------------------------------------------------------------------  T(C): 36.6 (09-15-21 @ 23:00), Max: 36.6 (09-15-21 @ 23:00)  HR: 67 (09-16-21 @ 03:00) (65 - 90)  BP: 128/62 (09-16-21 @ 03:00) (95/50 - 156/70)  RR: 19 (09-16-21 @ 03:00) (16 - 31)  SpO2: 100% (09-16-21 @ 03:00) (98% - 100%)    09-14-21 @ 07:01  -  09-15-21 @ 07:00  --------------------------------------------------------  IN: 1186.6 mL / OUT: 1570 mL / NET: -383.4 mL    09-15-21 @ 07:01  -  09-16-21 @ 03:08  --------------------------------------------------------  IN: 1229.9 mL / OUT: 770 mL / NET: 459.9 mL      --------------------------------------------------------------------------------------    EXAM  NEUROLOGY  Exam: Normal, NAD, alert, oriented x3, no focal deficits.    HEENT  Exam: Normocephalic, atraumatic, EOMI.     RESPIRATORY  Exam: Normal expansion/effort.  Mechanical Ventilation:     CARDIOVASCULAR  Exam: Regular rate and rhythm.       GI/NUTRITION  Exam: Abdomen soft, Non-tender, Non-distended.     VASCULAR  Exam: Extremities warm, pink, well-perfused.     MUSCULOSKELETAL  Exam: All extremities moving spontaneously without limitations.     SKIN  Exam: Good skin turgor, no skin breakdown.       LABS  --------------------------------------------------------------------------------------                        7.8    10.41 )-----------( 275      ( 15 Sep 2021 21:52 )             25.5   09-15    138  |  107  |  8   ----------------------------<  111<H>  3.8   |  24  |  0.56    Ca    7.9<L>      15 Sep 2021 21:52  Phos  3.0     09-15  Mg     2.0     09-15    TPro  5.3<L>  /  Alb  1.8<L>  /  TBili  0.2  /  DBili  x   /  AST  15  /  ALT  19  /  AlkPhos  88  09-15    --------------------------------------------------------------------------------------

## 2021-09-16 NOTE — PRE-ANESTHESIA EVALUATION ADULT - NSANTHAPLANRD_GEN_ALL_CORE
monitored anesthesia care (MAC)
general
monitored anesthesia care (MAC)
general/monitored anesthesia care (MAC)

## 2021-09-16 NOTE — PRE-ANESTHESIA EVALUATION ADULT - NSANTHAIRWAYFT_ENT_ALL_CORE
unable obtunded unable to follow commands
MP 2, mouth opening > 3 cm, TM distance > 6 cm, normal neck ROM

## 2021-09-16 NOTE — PRE-ANESTHESIA EVALUATION ADULT - NSANTHADDINFOFT_GEN_ALL_CORE
Chart reviewed, including medical and cardiac workup. Informed consent obtained, including all R/B/A.
DNR

## 2021-09-16 NOTE — CHART NOTE - NSCHARTNOTEFT_GEN_A_CORE
Patient planned today for endoscopy, ileoscopy in endoscopy  Case delayed from afternoon pending COVID swab given OR/Endoscopy protocol.  Ultimately negative  Repeat hgb to 7.3, vital stable  Patient initially consented for procedure along with dtr/son    When patient in endoscopy suite, new anesthesia team evaluated team   Anesthesia requested prbc xfusion, a-line placement, and likely intubation for procedure or topical sedation only for procedure  This was change from earlier outlined plan between anesthesia and patient/family  Separate to this discussion, patient expressed to GI team 'I'm tired, I don't want to do this anymore'    Given multiple necessary interventions prior to endoscopy, evolving anesthesia plan, and most importantly patient's expressed wishes, decision made to cancel procedure this evening    Called patient's son and explained all of the above.  Also explained that patient expressly requesting for son to visit him tonight.  All questions answered at length.  Family agreed to plan with aborting procedure, informing GI team that similar change in patient's decision had occurred last time as well.  Discussed that we would re-group in the AM after transfusions, stabilization, and patient has time to confer with family about his wishes      Plan:  - Please transfuse prbc (at least 1 unit) overnight for patient  - Keep NPO after midnight for potential procedures in AM  - Will discuss consideration for procedures in the AM

## 2021-09-16 NOTE — PROVIDER CONTACT NOTE (OTHER) - RECOMMENDATIONS
LUKAS Boogie came to bedside to assess. CBC, Venus Gas and Type and screen were ordered and sent awaiting results.
Will continue to clean area and monitor dressings
Assess pt at bedside. Review labs and consider transfusion.
Mittens applied
come assess pt.  unit of PRBC, send labs?
send labwork
Please come place ultrasound-guided IV?
NP Davey Roe notified
NP at bedside, NP explained risks/benefits of transfusion. Daughter request to recheck hgb levels at a later time. Will continue to monitor.
NP made aware
Notified NP
Will draw with next set of labs
Zofran
Adhere to  NP orders, recommend EKG to be obtained
CBC? come assess pt. at bedside
NP notified. Patient wants to see G.I not NP. Risks vs. Benefits explained. Patient still refusing.
Tylenol and maalox be ordered
recheck CBC 1 hour after blood transfusion is finished
Continue to monitor output color
PA to be aware
LUKAS Gibbons notified.
Recommend cbc draw, adhere to NP orders

## 2021-09-16 NOTE — PROGRESS NOTE ADULT - ASSESSMENT
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 Admitted for crohns management, course c/b cdiff colitis sp dificid/fmt and findings of indeterminate quantiferon, pending id clearance to start biologic. Prealb 8. TPN consulted in setting of significant weight loss, severe pcm, and worsening colitis suspected 2/2 CD. Pt at high risk for refeeding syndrome. TPN started 8/16 for Protein Calorie Malnutrition.. Repeat CT showing unchanged diffuse colitis and non specific gb wall edema.  Taken to OR emergently on 8/20 for lap total colectomy, end ileostomy. Failed S&S eval. Family agreed to TANJA tube feeds, which had been started but then dc'd due to noted blood in ostomy and risk for aspiration given deterioration in mental status. made dnr/dni. Mental status improved and restarted on ngt feeds, and tolerating; later dcd as per pt/family wishes. TPN dcd 9/5    Current Diet: npo/d5+lr at 50cc/hr    -npo given recurrent gib  -anemia/gib- trend cbc, transfuse prn, care per gi/primary  -vitamin d deficiency- on supplementation  -electrolyte imbalance risk- monitor and replete elytes as needed   -goc discussions ongoing  -further care per primary/SICU; will follow with you    plan discussed with Dr. Fernandez and Dr GERARD Multani, agreeable with above    TPN pager [phone], spectra [phone]  M-F 6A-4P, Weekends and holidays 8A-12P

## 2021-09-16 NOTE — PRE-ANESTHESIA EVALUATION ADULT - NSANTHOSAYNRD_GEN_A_CORE
No. DWAIN screening performed.  STOP BANG Legend: 0-2 = LOW Risk; 3-4 = INTERMEDIATE Risk; 5-8 = HIGH Risk

## 2021-09-16 NOTE — CONSULT NOTE ADULT - CONSULT REQUESTED BY NAME
Dr Blair Oliver
Medicine
Medicine
RN
Dr. Tinsley
Jamey/Faisal
MED
Primary Team
8icu
medicine
Benito
Benito

## 2021-09-16 NOTE — PRE-ANESTHESIA EVALUATION ADULT - NSANTHPEFT_GEN_ALL_CORE
cor reg  lungs clear  abd soft  AO X3
General: Alert and oriented x 3, well-groomed  Heart: RRR  Neuro: Grossly intact
cor reg  lungs clear  abd soft  AO X3
cor irreg  lungs clear  abd soft  awake

## 2021-09-16 NOTE — CONSULT NOTE ADULT - ASSESSMENT
81M with recently dx Crohn's disease c/b perianal abscess and left posterior distal anal intersphincteric fistula and presents diarrhea/BRBPR  2/2 to CD flare c/b c diff colitis. Initially treat for C diff, s/p FMT. Taken emergently to OR on 8/20 for acute deterioration for laparoscopic total colectomy with end ileostomy. Patient has been intermittently having GI bleed from his ileostomy. GI service on board with plans to perform EGD. Screening duplex significant for bilateral below knee venous thrombosis. Vascular surgery consulted for AC recommendations.    PLAN:  - In the setting of patient's prothrombotic status, patient would ideally benefit from full AC for his below knee venous thrombi.  - Given patient's intermittent GI bleeds, would instead recommend repeat venous duplex in 1 week to assess clot propagation.   - When clinically stable for AC, would trial patient with a short course (15day-30days) of full AC then repeat LE duplex.    Discussed with vascular surgery fellow Dr. Shafer.    HARSHIL Yi, PGY-2   [facility]   Vascular Surgery   [id number]

## 2021-09-16 NOTE — CONSULT NOTE ADULT - ATTENDING COMMENTS
81yr M hx of c diff colitis s/p total colectomy end ileostomy. off vasopressors.   moderate EBL    intubated and sedated, upon pause, will follow command  off sedation and calm  vent minimal settings 30% 420 /18/5  good gas exchange  SBT AM  off vasopressor, good hemodynamics, in sinus on metop IV  lact clear  NPO, OG tube in place, ileostomy appear pink and intact, no function yet  soft abd on exam  on TPN before surgery, will resume  monitor urine output  post op labs
Hepatology Staff: Shayy Wells MD  I saw and examined the patient along with  Dr. Marley on 08-19-21.    Patient Medical Record, hosptial course was reviewed and summarized as below:    Vitals: Vital Signs Last 24 Hrs  T(C): 36.8 (19 Aug 2021 13:06), Max: 36.8 (19 Aug 2021 07:14)  T(F): 98.3 (19 Aug 2021 13:06), Max: 98.3 (19 Aug 2021 07:14)  HR: 65 (19 Aug 2021 13:06) (65 - 77)  BP: 128/68 (19 Aug 2021 13:06) (126/74 - 158/74)    RR: 19 (19 Aug 2021 13:06) (18 - 19)  SpO2: 95% (19 Aug 2021 13:06) (93% - 95%)    Labs:Creatinine, Serum: 0.43 mg/dL (08-19-21 @ 08:59)  Bilirubin Total, Serum: 0.4 mg/dL (08-19-21 @ 08:59)      I/O: I&O's Summary    18 Aug 2021 07:01  -  19 Aug 2021 07:00  --------------------------------------------------------  IN: 710 mL / OUT: 0 mL / NET: 710 mL    19 Aug 2021 07:01  -  19 Aug 2021 19:58  --------------------------------------------------------  IN: 1703 mL / OUT: 0 mL / NET: 1703 mL      Nutritional Status:   Albumin, Serum: 1.9 g/dL (08-19-21 @ 08:59)      Recommendations:  This is a 81-year-old male with known history of fistulizing Crohn's disease with complicated medical course including C. difficile colitis refractory to medical therapy currently awaiting possible colectomy. Hepatology was consulted for elevated liver enzymes. Noted normal liver enzymes on hospital admission in a mixed cholestatic/hepatitis pattern with normal bilirubin level (which appears to be improving overall). I agree etiology of elevated liver tests possibly related to underlying inflammatory conditions (Crohn's disease and severe C. difficile colitis) with systemic inflammatory response syndrome vs drug-induced liver injury vs TPN. I agree his mildly elevated liver tests is not going to preclude surgical consideration for colectomy (as already planned) and might help improve his liver injury. Hepatology will follow up.    Plan discussed with Primary team.
I would consider most reasonable to continue with biologics at this time - ?? other medications than Remicade perhaps. Will d/w GI.
Pt w/ complicated history. Crohn's diagnosis 9mos ago, previously on Remicade but developed antibodies. 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 C diff better but continued stool frequency and abdominal pain w/ 60lbs wt loss over the last several months. Started on TPN on Monday.  No prior abdominal surgeries.    abd soft, non-distended, mild R tenderness to palpation  significant perianal irritation w/ stool noted    - continue TPN  - pt has failed medical therapy and will require surgical resection (Total abdominal colectomy w/ end ileostomy). Will discuss the risks and benefits with the pt and daughter some more and will discuss timing. Would ideally like to have better optimised so if steroids could be weaned off and TPN continued    Adebulmaro Malone MD  Attending Physician
bilat calf vein dvt, recent GI surgery, w intermittent GI bleed.    In general, the rec is to treat calf vein dvt with 3 mo AC.  However, in this pt it reasonable to repeat duplex on Mon.  If there is propagation of the the DVT above the knee, then rec would be AC v retrievable IVC filter.
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  Likely to continue to improve, if still with significant dysphonia after a few weeks, may benefit from in office injection laryngoplasty.  would have him f/up with Dr. Patterson as above if that is the case.
Patient seen and examined, agree with above. Patient has a diagnosis of Crohn's disease involving the colon and also had history of perianal fistula. He was initially on Remicade with some improvement and healing of the fistula, but developed antibodies and lost effect. Last dose of Remicade was 2 months ago. He's since been on budesonide, methotrexate with 5-ASA. Would first follow-up C diff - if positive, would treat this first. Continue budeonside in the meantime.    If C diff is negative, would start solumedrol and plan for flex sig to re-evaluate current state of disease and biopsy for CMV. Discussed with patient and daughter in detail regarding this plan. Also discussed that if he has antibodies to Remicade, then the only readily available inpatient rescue medication would be IV steroids. Also discussed surgical intervention as a last resort if he fails medical therapy. Called [facility], asked for previous reports to be faxed to us today.

## 2021-09-16 NOTE — PROGRESS NOTE ADULT - ASSESSMENT
A/P  80yo M with Crohn's disease, now s/p total abdominal colectomy with end ileostomy on 8/20.     GI bleed   stable H/h now     # DVT below knee b/L   -seen by vascular   hold off on AC 2/2 multiple episodes of GI bleed and transfusion    # CAD / PAF/ HTN   -History of AFib currently rate controlled with IV metoprolol and amiodarone, now in NSR  lopressor 25 mg q 8    # Sever colitis 2/2 chron's and c diff   - s/p total abdominal colectomy with end ileostomy, failed S/S, bloody ostomy output  protonix bid     # Sever malnutrition   started on puree diet       Code Status: DNR/DNI, MOLST in chart

## 2021-09-16 NOTE — PROVIDER CONTACT NOTE (OTHER) - BACKGROUND
Patient admitted for Diarrhea
Pt admitted for diarrhea. PMH: Crohn's
patient admitted for diarrhea, +C-Diff, hx of colitis, GIB, CAD
pt admitted for diarrhea.
pt admitted w/ Cdiff
pt admitted with a ileostomy creation
s.p abdominal colectomy with ileostomy creation
s/p total abdominal colectomy
80 y/o male admitted for diarrhea c-diff +
Patient admitted with complications from crohn's disease s/p total abdominal colectomy with end ileostomy c/b respiratory failure and afib with RVR.
Admitted with diarrhea
Admitted with diarrhea
Pt admitted s.p ileostomy creation
pos C Diff
pt admitted for diarrhea
Pt admitted s.p ileostomy creation
pt c-diff + and +parainfluenza
s/p total colectomy
Pt admitted to sicu s.p abdominal colectomy and end ileostomy
Pt s/p Colectomy with end ileostomy
s/p ileostomy
Diarrhea
s/o colectomy
Patient admitted for diarrhea  Positive for C.diff
Patient admitted with complications from crohn's disease s/p total abdominal colectomy with end ileostomy c/b respiratory failure and afib with RVR.

## 2021-09-16 NOTE — PROGRESS NOTE ADULT - SUBJECTIVE AND OBJECTIVE BOX
Interval Events:   - Overnight patient had multiple episodes of dark, red output from ostomy site  - Off pressors, hemodynamically stable    Allergies:  No Known Allergies      Hospital Medications:  acetaminophen   Tablet .. 650 milliGRAM(s) Oral every 6 hours PRN  Biotene Dry Mouth Oral Rinse 5 milliLiter(s) Swish and Spit two times a day  chlorhexidine 2% Cloths 1 Application(s) Topical <User Schedule>  cholecalciferol 1000 Unit(s) Oral daily  dextrose 5% + lactated ringers. 1000 milliLiter(s) IV Continuous <Continuous>  folic acid 1 milliGRAM(s) Oral daily  lidocaine   4% Patch 1 Patch Transdermal daily  metoprolol tartrate 25 milliGRAM(s) Oral every 8 hours  multivitamin 1 Tablet(s) Oral daily  oxyCODONE    IR 2.5 milliGRAM(s) Oral every 4 hours PRN  pantoprazole Infusion 8 mG/Hr IV Continuous <Continuous>      PMHX/PSHX:  No pertinent past medical history    No pertinent past medical history    Crohn disease    Paroxysmal atrial fibrillation    CAD (coronary artery disease)    GI bleed    No significant past surgical history        Family history:      ROS:   Grossly denies any discomfort.      PHYSICAL EXAM:   Vital Signs:  Vital Signs Last 24 Hrs  T(C): 36 (16 Sep 2021 13:09), Max: 36.6 (15 Sep 2021 23:00)  T(F): 96.8 (16 Sep 2021 12:00), Max: 97.9 (15 Sep 2021 23:00)  HR: 68 (16 Sep 2021 14:00) (61 - 90)  BP: 129/60 (16 Sep 2021 13:09) (95/50 - 148/63)  BP(mean): 86 (16 Sep 2021 13:09) (68 - 95)  RR: 20 (16 Sep 2021 14:00) (12 - 32)  SpO2: 100% (16 Sep 2021 14:00) (100% - 100%)  Daily Height in cm: 170.18 (16 Sep 2021 13:09)    Daily Weight in k.7 (16 Sep 2021 00:55)      09-15-21 @ 07:01  -  21 @ 07:00  --------------------------------------------------------  IN: 1629.9 mL / OUT: 1530 mL / NET: 99.9 mL    21 @ 07:01  -  21 @ 14:05  --------------------------------------------------------  IN: 530 mL / OUT: 450 mL / NET: 80 mL        GENERAL:  Appears stated age, chronically ill appearing  HEENT:  NC/AT,  conjunctivae clear, sclera -anicteric  CHEST:  Poor effort  HEART:  Regular rhythm, S1, S2   ABDOMEN:  Soft, non-tender, non-distended, ileostomy in place with dark-red output  EXTREMITIES:  no cyanosis,clubbing or edema  SKIN:  No rash/erythema   NEURO:  Alert, oriented x 2      LABS:                        7.8    10.41 )-----------( 275      ( 15 Sep 2021 21:52 )             25.5     Mean Cell Volume: 94.8 fl (09-15-21 @ 21:52)    09-15    138  |  107  |  8   ----------------------------<  111<H>  3.8   |  24  |  0.56    Ca    7.9<L>      15 Sep 2021 21:52  Phos  3.0     09-15  Mg     2.0     09-15    TPro  5.3<L>  /  Alb  1.8<L>  /  TBili  0.2  /  DBili  x   /  AST  15  /  ALT  19  /  AlkPhos  88  15    LIVER FUNCTIONS - ( 15 Sep 2021 21:52 )  Alb: 1.8 g/dL / Pro: 5.3 g/dL / ALK PHOS: 88 U/L / ALT: 19 U/L / AST: 15 U/L / GGT: x                                       7.8    10.41 )-----------( 275      ( 15 Sep 2021 21:52 )             25.5                         7.8    11.82 )-----------( 347      ( 15 Sep 2021 10:32 )             26.0                         7.9    8.42  )-----------( 268      ( 15 Sep 2021 01:19 )             25.9                         8.1    8.51  )-----------( 286      ( 14 Sep 2021 21:12 )             26.6                         8.0    8.12  )-----------( 272      ( 14 Sep 2021 06:03 )             26.2       Imaging:

## 2021-09-16 NOTE — PROVIDER CONTACT NOTE (OTHER) - SITUATION
pt febrile; temp of 102.4 orally
Patient's daughter is refusing to have labs drawn at this time.  She states they should only be done every 12 hours.
Pt had bloody liquid stool with clots
pt states he has a lot of pain on buttocks area and rectum d/t numerous bms
pt npted with elevated temp of 100.8F oral
pt has a temp of 100.9 this am. pt also NPO for fecal transplant
Pt only has 1 available port on PICC line at this time, pt ordered for Protonix, D5LR, and multiple electrolyte repletions that are incompatible. unable to place PIV x3 attempts by multiple RNS
100.6 Oral Temp
Provider at bedside, daughter refuse 1 U PRBC blood transfusion
 , anxious, C/O nausea
Pt having large bright red blood clots and higher output in ileostomy bag
patient confused, refusing to take Klonopin first mouth wash.
pt has 400 ml of dark red blood from ostomy within the hour
pt. with 400ml bloody/brown illeostomy output
HR racing, see flowsheet
Ileostomy output has become bright red blood with clots
Patient and Daughter(Tiffanie) refusing IV and oral antibiotics until G.I reconsults with patient
Patient refuses turn and positioning
Pt has Elevated temperature 101.3
Pt has a light brown mucus discharge from both his midline abdominal incision as well as his rectum
Pt ileostomy has dark savannah red blood with clots
Pt is restless and trying to pull out zahira feed. Pt will no longer answer any questions and will not open his eyes.
When emptying pt ostomy, pt had a thin film of light blood
pt is refusing EKG at this time. A&OX4. Daughter at bedside. Pt requesting nystatin swish and swallow for mouth sores
pt. hypotensive 67/38 MAP 47 with frequent arrhythmias

## 2021-09-16 NOTE — PROGRESS NOTE ADULT - ATTENDING COMMENTS
Pt is a 81 year old male with a medical history significant for CAD/HTN/afib s/p total colectomy for Crohn's disease.  markedly weak and deconditioned  concern now is patient has melanotic output via ostomy  Pt refusing EGD  Continue PPI drip  GI called for follow up  continue telemetry monitoring   Continue beta blocker for atrial fibrillation.  DVT proph on hold for bleeding

## 2021-09-16 NOTE — PROGRESS NOTE ADULT - SUBJECTIVE AND OBJECTIVE BOX
Patient is a 81y old  Male who presents with a chief complaint of diarrhea with blood in it , abd pain and weakness (16 Sep 2021 14:04)      INTERVAL HPI/OVERNIGHT EVENTS: pos B/L DVT below knee   T(C): 36.7 (09-16-21 @ 19:46), Max: 36.7 (09-16-21 @ 19:46)  HR: 96 (09-16-21 @ 20:00) (61 - 102)  BP: 129/70 (09-16-21 @ 19:46) (115/58 - 148/63)  RR: 28 (09-16-21 @ 20:00) (12 - 32)  SpO2: 100% (09-16-21 @ 20:00) (96% - 100%)  Wt(kg): --  I&O's Summary    15 Sep 2021 07:01  -  16 Sep 2021 07:00  --------------------------------------------------------  IN: 1629.9 mL / OUT: 1530 mL / NET: 99.9 mL    16 Sep 2021 07:01  -  16 Sep 2021 22:41  --------------------------------------------------------  IN: 770 mL / OUT: 1895 mL / NET: -1125 mL        PAST MEDICAL & SURGICAL HISTORY:  Crohn disease    Paroxysmal atrial fibrillation    CAD (coronary artery disease)  not on ASA or plavix, no stents as per daughter    GI bleed    No significant past surgical history        SOCIAL HISTORY  Alcohol:  Tobacco:  Illicit substance use:    FAMILY HISTORY:    REVIEW OF SYSTEMS:  CONSTITUTIONAL: No fever, weight loss, or fatigue  EYES: No eye pain, visual disturbances, or discharge  ENMT:  No difficulty hearing, tinnitus, vertigo; No sinus or throat pain  NECK: No pain or stiffness  RESPIRATORY: No cough, wheezing, chills or hemoptysis; No shortness of breath  CARDIOVASCULAR: No chest pain, palpitations, dizziness, or leg swelling  GASTROINTESTINAL: No abdominal or epigastric pain. No nausea, vomiting, or hematemesis; No diarrhea or constipation. No melena or hematochezia.  GENITOURINARY: No dysuria, frequency, hematuria, or incontinence  NEUROLOGICAL: No headaches, memory loss, loss of strength, numbness, or tremors  SKIN: No itching, burning, rashes, or lesions   LYMPH NODES: No enlarged glands  ENDOCRINE: No heat or cold intolerance; No hair loss  MUSCULOSKELETAL: No joint pain or swelling; No muscle, back, or extremity pain  PSYCHIATRIC: No depression, anxiety, mood swings, or difficulty sleeping  HEME/LYMPH: No easy bruising, or bleeding gums  ALLERY AND IMMUNOLOGIC: No hives or eczema    RADIOLOGY & ADDITIONAL TESTS:    Imaging Personally Reviewed:  [ ] YES  [ ] NO    Consultant(s) Notes Reviewed:  [ ] YES  [ ] NO    PHYSICAL EXAM:  GENERAL: NAD, well-groomed, well-developed  HEAD:  Atraumatic, Normocephalic  EYES: EOMI, PERRLA, conjunctiva and sclera clear  ENMT: No tonsillar erythema, exudates, or enlargement; Moist mucous membranes, Good dentition, No lesions  NECK: Supple, No JVD, Normal thyroid  NERVOUS SYSTEM:  Alert & Oriented X3, Good concentration; Motor Strength 5/5 B/L upper and lower extremities; DTRs 2+ intact and symmetric  CHEST/LUNG: Clear to percussion bilaterally; No rales, rhonchi, wheezing, or rubs  HEART: Regular rate and rhythm; No murmurs, rubs, or gallops  ABDOMEN: Soft, Nontender, Nondistended; Bowel sounds present  EXTREMITIES:  2+ Peripheral Pulses, No clubbing, cyanosis, or edema  LYMPH: No lymphadenopathy noted  SKIN: No rashes or lesions    LABS:                        7.3    9.67  )-----------( 268      ( 16 Sep 2021 15:01 )             23.8     09-15    138  |  107  |  8   ----------------------------<  111<H>  3.8   |  24  |  0.56    Ca    7.9<L>      15 Sep 2021 21:52  Phos  3.0     09-15  Mg     2.0     09-15    TPro  5.3<L>  /  Alb  1.8<L>  /  TBili  0.2  /  DBili  x   /  AST  15  /  ALT  19  /  AlkPhos  88  09-15    PT/INR - ( 16 Sep 2021 15:01 )   PT: 13.8 sec;   INR: 1.16 ratio         PTT - ( 16 Sep 2021 15:01 )  PTT:26.5 sec    CAPILLARY BLOOD GLUCOSE                MEDICATIONS  (STANDING):  Biotene Dry Mouth Oral Rinse 5 milliLiter(s) Swish and Spit two times a day  chlorhexidine 2% Cloths 1 Application(s) Topical <User Schedule>  cholecalciferol 1000 Unit(s) Oral daily  dextrose 5% + lactated ringers. 1000 milliLiter(s) (50 mL/Hr) IV Continuous <Continuous>  folic acid 1 milliGRAM(s) Oral daily  lidocaine   4% Patch 1 Patch Transdermal daily  metoprolol tartrate 25 milliGRAM(s) Oral every 8 hours  multivitamin 1 Tablet(s) Oral daily  pantoprazole Infusion 8 mG/Hr (10 mL/Hr) IV Continuous <Continuous>    MEDICATIONS  (PRN):  acetaminophen   Tablet .. 650 milliGRAM(s) Oral every 6 hours PRN Mild Pain (1 - 3)  oxyCODONE    IR 2.5 milliGRAM(s) Oral every 4 hours PRN Moderate Pain (4 - 6)      Care Discussed with Consultants/Other Providers [ ] YES  [ ] NO

## 2021-09-16 NOTE — PRE-ANESTHESIA EVALUATION ADULT - MALLAMPATI CLASS
Class II - visualization of the soft palate, fauces, and uvula

## 2021-09-16 NOTE — PROGRESS NOTE ADULT - SUBJECTIVE AND OBJECTIVE BOX
[facility] NUTRITION SUPPORT-- FOLLOW UP NOTE  --------------------------------------------------------------------------------  24 hour events/subjective: pt seen and examined. remains npo on ivf. oob in chair. states he is tired/uncomfortable and asking to get back in bed. denies n/v/abd pain. ostomy w loose dark red stool. deferring egd at present.    Diet:  Diet, NPO:   Except Medications (09-15-21 @ 09:57)      PAST HISTORY  --------------------------------------------------------------------------------  No significant changes to PMH, PSH, FHx, SHx, unless otherwise noted    ALLERGIES & MEDICATIONS  --------------------------------------------------------------------------------  Allergies    No Known Allergies    Intolerances      Standing Inpatient Medications  Biotene Dry Mouth Oral Rinse 5 milliLiter(s) Swish and Spit two times a day  chlorhexidine 2% Cloths 1 Application(s) Topical <User Schedule>  cholecalciferol 1000 Unit(s) Oral daily  dextrose 5% + lactated ringers. 1000 milliLiter(s) IV Continuous <Continuous>  folic acid 1 milliGRAM(s) Oral daily  lidocaine   4% Patch 1 Patch Transdermal daily  metoprolol tartrate 25 milliGRAM(s) Oral every 8 hours  multivitamin 1 Tablet(s) Oral daily  pantoprazole Infusion 8 mG/Hr IV Continuous <Continuous>    PRN Inpatient Medications  acetaminophen   Tablet .. 650 milliGRAM(s) Oral every 6 hours PRN  oxyCODONE    IR 2.5 milliGRAM(s) Oral every 4 hours PRN        REVIEW OF SYSTEMS  --------------------------------------------------------------------------------    General:  as per hpi  HEENT:  no headaches, no vision changes, no ear pain, no epistaxis  CV:  no chest pain, no palpitations  Resp:  no dyspnea, no cough  GI:  as per hpi  :  no pain, no bleeding, no dysuria  Muscle:  no pain, +weakness  Neuro:  no numbness, no tingling  Psych:  no insomnia  Endocrine:  no cold/heat intolerance        VITALS/PHYSICAL EXAM  --------------------------------------------------------------------------------  T(C): 35.8 (09-16-21 @ 08:00), Max: 36.6 (09-15-21 @ 23:00)  HR: 62 (09-16-21 @ 08:00) (62 - 90)  BP: 128/59 (09-16-21 @ 08:00) (95/50 - 148/63)  RR: 19 (09-16-21 @ 08:00) (14 - 25)  SpO2: 100% (09-16-21 @ 08:00) (98% - 100%)  Wt(kg): --      09-15-21 @ 07:01  -  09-16-21 @ 07:00  --------------------------------------------------------  IN: 1569.9 mL / OUT: 1530 mL / NET: 39.9 mL      I&O's Detail    15 Sep 2021 07:01  -  16 Sep 2021 07:00  --------------------------------------------------------  IN:    dextrose 5% + lactated ringers: 330 mL    dextrose 5% + lactated ringers: 600 mL    IV PiggyBack: 249.9 mL    IV PiggyBack: 200 mL    Pantoprazole: 190 mL  Total IN: 1569.9 mL    OUT:    Bulb (mL): 25 mL    Bulb (mL): 5 mL    Ileostomy (mL): 450 mL    Incontinent per Condom Catheter (mL): 1050 mL  Total OUT: 1530 mL    Total NET: 39.9 mL          Physical Exam:  Gen: oob in chair in nad frail  HEENT: ncat, trachea midline  Chest: respirations non labored  Abd: soft, nondistended +ostomy w bloody stool  Extrem: ue edema  Neuro: awake alert responsive      LABS/STUDIES  --------------------------------------------------------------------------------              7.8    10.41 >-----------<  275      [09-15-21 @ 21:52]              25.5     138  |  107  |  8   ----------------------------<  111      [09-15-21 @ 21:52]  3.8   |  24  |  0.56        Ca     7.9     [09-15-21 @ 21:52]      iCa    1.13     [09-15 @ 21:51]      Mg     2.0     [09-15-21 @ 21:52]      Phos  3.0     [09-15-21 @ 21:52]    TPro  5.3  /  Alb  1.8  /  TBili  0.2  /  DBili  x   /  AST  15  /  ALT  19  /  AlkPhos  88  [09-15-21 @ 21:52]          Ca ionizedBlood Gas Calcium, Ionized - Venous: 1.24 mmol/L (09-15-21 @ 01:17)  Blood Gas Calcium, Ionized - Venous: 1.22 mmol/L (09-14-21 @ 21:09)    Creatinine Trend:  POC glucoseGlucose, Serum: 111 mg/dL (09-15-21 @ 21:52)  CAPILLARY BLOOD GLUCOSE        PrealbuminPrealbumin, Serum: 22 mg/dL (09-03-21 @ 05:19)  Prealbumin, Serum: 16 mg/dL (09-03-21 @ 03:19)  Prealbumin, Serum: 15 mg/dL (08-30-21 @ 03:32)  Prealbumin, Serum: 8 mg/dL (08-15-21 @ 10:17)    Triglycerides

## 2021-09-16 NOTE — PROGRESS NOTE ADULT - ATTENDING COMMENTS
Patient with complicated course.  Since last seen by GI, patient had initially stopped having melena  During last course of melena, family and patient had held off on endoscopic evaluation  Overnight, had recurrence of melena  Patient, now more alert and participating in decision making, wishes to have endoscopic evaluation  Patients children, agree with the above plan    We had an extensive discussion with patient and patient's 2 children re: risk of procedures including but not/limited to perforation, bleeding, infection/aspiration, and given critically ill status - death.  In addition, we discussed that there is a chance these tests will not be able to be therapeutic.  Lastly, during prior considerations of endoscopy last week with team, there was indecision re: whether or not to revoke the patient's DNR/DNI for the procedure.  Patient asked multiple questions about these scenarios, and he determined that he wishes to revoke the DNR/DNI for the procedure.  The son/daughter of patient at bedside witnessed and agree with this decision.  They will also discuss this with patient's wife so that she is aware of patient's decision.    Will plan at this time to do EGD and ileo-ostomy to assess for causes of bleeding.  Needs COVID swab stat  c/w iv ppi    Discussed extensively with patient, family, and ICU team  All questions answered.

## 2021-09-16 NOTE — PROGRESS NOTE ADULT - ASSESSMENT
81y Male with Crohn disease s/p failed medical management. 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 Transferred to SICU intubated and on pressors. Now extubated and off pressors.    PLAN:  Neurologic: post-op pain control, new-onset hypophonia without focal neuro deficits, obtundation  - Monitor mental status  - Pain control: PO Tylenol 500mg q6hr PRN, Oxycodone 2.5mg prn   - Vitamin D3, Folic acid, and multivitamins daily    Respiratory: Acute respiratory insufficiency resolving, patient on RA  - Incentive spirometry 10x/hr while in bed, OOBTC    Cardiovascular: History of AFib currently rate controlled with IV metoprolol and digoxin  - Metoprolol 25mg PO q8h   - Monitor hemodynamics    Gastrointestinal/Nutrition: s/p total abdominal colectomy with end ileostomy c/b recurrent bloody ostomy output; s/p TPN  - NPO in setting of GI Bleed (Mechanical Soft / Honey Thickened liquid diet + Ensures 3x/day)    - Protonix 40 PO BID  - Biotene mouth wash  - Monitor ostomy output and character    Renal/Genitourinary: no active issues   - D5 LR @ 50cc/hr  - Monitor and replete electrolytes as needed  - Monitor I&Os, voiding spontaneously  - BMP q12hr    Hematologic: recurrent GIB -- last transfusion: 1u pRBC 9/7  - VTE ppx held i/s/o concern for bleed  - f/u AM CBC  - Duplex study in 1 week given VTE findings  - Trend H/H and transfuse Hgb < 7.5  - CBC q12hr    Infectious Disease: C. diff colitis vs Crohn's flair s/p subtotal colectomy   - Trend WBC on CBC q12hr  - Monitor fever curve    Endocrine: no active issues; s/p steroid taper 8/26  - Monitor glucose on BMP daily    Lines & Drains:   - LLQ & LUQ drain 8/20  - TPN 8/16  - RUE PICC placed 8/16    Disposition: SICU  Code Status: DNR/DNI, MOLST in chart

## 2021-09-16 NOTE — PROGRESS NOTE ADULT - ASSESSMENT
81M with recently dx Crohn's disease c/b perianal abscess and left posterior distal anal intersphincteric fistula and presents diarrhea/BRBPR  2/2 to CD flare c/b c diff colitis. Initially treat for C diff, s/p FMT. Taken emergently to OR on 8/20 for acute deterioration for laparoscopic total colectomy with end ileostomy, POD27. Failed speech and swallow 9/10.    PLAN:  - Consult vascular surgery   - Ostomy with large amount of dark red output, concern for bleed again  - Protonix gtt  - GI consult for possible endoscopy  - Diet: mechanical soft, dysphagia type 2   - Ongoing Palliative and GOC discussions with family.   - Dispo planning for rehab, patient should receive 500cc bolus every 2 days until his PO intake is adequate  - Wound care: daily dressing changes with midline incision packed   - Appreciate excellent care per SICU    [facility]  [id number]  81M with recently dx Crohn's disease c/b perianal abscess and left posterior distal anal intersphincteric fistula and presents diarrhea/BRBPR  2/2 to CD flare c/b c diff colitis. Initially treat for C diff, s/p FMT. Taken emergently to OR on 8/20 for acute deterioration for laparoscopic total colectomy with end ileostomy, POD27. Failed speech and swallow 9/10.    PLAN:  - Ostomy with large amount of dark red output, concern for bleed again, patient and family refusing scope at this time  - Protonix gtt  - Diet: NPO/IVF  - Ongoing Palliative and GOC discussions with family.   - Dispo planning for rehab, patient should receive 500cc bolus every 2 days until his PO intake is adequate  - Wound care: daily dressing changes with midline incision packed   - Appreciate excellent care per SICU    [facility]  [id number]

## 2021-09-16 NOTE — CONSULT NOTE ADULT - PROVIDER SPECIALTY LIST ADULT
Colorectal Surgery
Palliative Care
Gastroenterology
Vascular Surgery
Hepatology
Colorectal Surgery
Wound Care
Cardiology
Gastroenterology
Nutrition Support
ENT
SICU
Infectious Disease

## 2021-09-16 NOTE — PROGRESS NOTE ADULT - SUBJECTIVE AND OBJECTIVE BOX
[facility] Progress Note  Patient is a 81y old  Male who presents with a chief complaint of diarrhea with blood in it , abd pain and weakness (15 Sep 2021 14:40)      INTERVAL EVENTS: No acute events overnight.  SUBJECTIVE:     OBJECTIVE:    Vital Signs Last 24 Hrs  T(C): 36.6 (15 Sep 2021 23:00), Max: 36.6 (15 Sep 2021 23:00)  T(F): 97.9 (15 Sep 2021 23:00), Max: 97.9 (15 Sep 2021 23:00)  HR: 67 (16 Sep 2021 01:00) (65 - 90)  BP: 140/60 (16 Sep 2021 01:00) (95/50 - 156/70)  BP(mean): 87 (16 Sep 2021 01:00) (67 - 100)  RR: 17 (16 Sep 2021 01:00) (16 - 31)  SpO2: 100% (16 Sep 2021 01:00) (98% - 100%)I&O's Detail    14 Sep 2021 07:01  -  15 Sep 2021 07:00  --------------------------------------------------------  IN:    dextrose 5% + lactated ringers: 720 mL    IV PiggyBack: 300 mL    IV PiggyBack: 166.6 mL  Total IN: 1186.6 mL    OUT:    Bulb (mL): 10 mL    Bulb (mL): 60 mL    Ileostomy (mL): 400 mL    Incontinent per Condom Catheter (mL): 1100 mL  Total OUT: 1570 mL    Total NET: -383.4 mL      15 Sep 2021 07:01  -  16 Sep 2021 02:37  --------------------------------------------------------  IN:    dextrose 5% + lactated ringers: 330 mL    dextrose 5% + lactated ringers: 300 mL    IV PiggyBack: 200 mL    IV PiggyBack: 249.9 mL    Pantoprazole: 130 mL  Total IN: 1209.9 mL    OUT:    Bulb (mL): 20 mL    Bulb (mL): 0 mL    Ileostomy (mL): 150 mL    Incontinent per Condom Catheter (mL): 600 mL  Total OUT: 770 mL    Total NET: 439.9 mL      MEDICATIONS  (STANDING):  Biotene Dry Mouth Oral Rinse 5 milliLiter(s) Swish and Spit two times a day  calcium gluconate IVPB 1 Gram(s) IV Intermittent once  chlorhexidine 2% Cloths 1 Application(s) Topical <User Schedule>  cholecalciferol 1000 Unit(s) Oral daily  dextrose 5% + lactated ringers. 1000 milliLiter(s) (50 mL/Hr) IV Continuous <Continuous>  folic acid 1 milliGRAM(s) Oral daily  lidocaine   4% Patch 1 Patch Transdermal daily  metoprolol tartrate 25 milliGRAM(s) Oral every 8 hours  multivitamin 1 Tablet(s) Oral daily  pantoprazole Infusion 8 mG/Hr (10 mL/Hr) IV Continuous <Continuous>    MEDICATIONS  (PRN):  acetaminophen   Tablet .. 650 milliGRAM(s) Oral every 6 hours PRN Mild Pain (1 - 3)  oxyCODONE    IR 2.5 milliGRAM(s) Oral every 4 hours PRN Moderate Pain (4 - 6)      PHYSICAL EXAM:  Constitutional: A&Ox3  Respiratory: Unlabored breathing  Abdomen: Soft, nondistended, nontender. Ostomy bag with significant dark red output. Midline dressing c/d/i.     LABS:                        7.8    10.41 )-----------( 275      ( 15 Sep 2021 21:52 )             25.5     09-15    138  |  107  |  8   ----------------------------<  111<H>  3.8   |  24  |  0.56    Ca    7.9<L>      15 Sep 2021 21:52  Phos  3.0     09-15  Mg     2.0     09-15    TPro  5.3<L>  /  Alb  1.8<L>  /  TBili  0.2  /  DBili  x   /  AST  15  /  ALT  19  /  AlkPhos  88  09-15      LIVER FUNCTIONS - ( 15 Sep 2021 21:52 )  Alb: 1.8 g/dL / Pro: 5.3 g/dL / ALK PHOS: 88 U/L / ALT: 19 U/L / AST: 15 U/L / GGT: x             ABO Interpretation: A2 (09-15-21 @ 03:06)      IMAGING:     [facility] Progress Note  Patient is a 81y old  Male who presents with a chief complaint of diarrhea with blood in it , abd pain and weakness (15 Sep 2021 14:40)      INTERVAL EVENTS: No acute events overnight.  SUBJECTIVE: Patient seen on morning rounds. Patient reports he does not want a scope right now, wants to wait for the bleeding to stop.     OBJECTIVE:    Vital Signs Last 24 Hrs  T(C): 36.6 (15 Sep 2021 23:00), Max: 36.6 (15 Sep 2021 23:00)  T(F): 97.9 (15 Sep 2021 23:00), Max: 97.9 (15 Sep 2021 23:00)  HR: 67 (16 Sep 2021 01:00) (65 - 90)  BP: 140/60 (16 Sep 2021 01:00) (95/50 - 156/70)  BP(mean): 87 (16 Sep 2021 01:00) (67 - 100)  RR: 17 (16 Sep 2021 01:00) (16 - 31)  SpO2: 100% (16 Sep 2021 01:00) (98% - 100%)I&O's Detail    14 Sep 2021 07:01  -  15 Sep 2021 07:00  --------------------------------------------------------  IN:    dextrose 5% + lactated ringers: 720 mL    IV PiggyBack: 300 mL    IV PiggyBack: 166.6 mL  Total IN: 1186.6 mL    OUT:    Bulb (mL): 10 mL    Bulb (mL): 60 mL    Ileostomy (mL): 400 mL    Incontinent per Condom Catheter (mL): 1100 mL  Total OUT: 1570 mL    Total NET: -383.4 mL      15 Sep 2021 07:01  -  16 Sep 2021 02:37  --------------------------------------------------------  IN:    dextrose 5% + lactated ringers: 330 mL    dextrose 5% + lactated ringers: 300 mL    IV PiggyBack: 200 mL    IV PiggyBack: 249.9 mL    Pantoprazole: 130 mL  Total IN: 1209.9 mL    OUT:    Bulb (mL): 20 mL    Bulb (mL): 0 mL    Ileostomy (mL): 150 mL    Incontinent per Condom Catheter (mL): 600 mL  Total OUT: 770 mL    Total NET: 439.9 mL      MEDICATIONS  (STANDING):  Biotene Dry Mouth Oral Rinse 5 milliLiter(s) Swish and Spit two times a day  calcium gluconate IVPB 1 Gram(s) IV Intermittent once  chlorhexidine 2% Cloths 1 Application(s) Topical <User Schedule>  cholecalciferol 1000 Unit(s) Oral daily  dextrose 5% + lactated ringers. 1000 milliLiter(s) (50 mL/Hr) IV Continuous <Continuous>  folic acid 1 milliGRAM(s) Oral daily  lidocaine   4% Patch 1 Patch Transdermal daily  metoprolol tartrate 25 milliGRAM(s) Oral every 8 hours  multivitamin 1 Tablet(s) Oral daily  pantoprazole Infusion 8 mG/Hr (10 mL/Hr) IV Continuous <Continuous>    MEDICATIONS  (PRN):  acetaminophen   Tablet .. 650 milliGRAM(s) Oral every 6 hours PRN Mild Pain (1 - 3)  oxyCODONE    IR 2.5 milliGRAM(s) Oral every 4 hours PRN Moderate Pain (4 - 6)      PHYSICAL EXAM:  Constitutional: A&Ox3  Respiratory: Unlabored breathing  Abdomen: Soft, nondistended, nontender. Ostomy bag with significant dark red output. Midline dressing c/d/i.     LABS:                        7.8    10.41 )-----------( 275      ( 15 Sep 2021 21:52 )             25.5     09-15    138  |  107  |  8   ----------------------------<  111<H>  3.8   |  24  |  0.56    Ca    7.9<L>      15 Sep 2021 21:52  Phos  3.0     09-15  Mg     2.0     09-15    TPro  5.3<L>  /  Alb  1.8<L>  /  TBili  0.2  /  DBili  x   /  AST  15  /  ALT  19  /  AlkPhos  88  09-15      LIVER FUNCTIONS - ( 15 Sep 2021 21:52 )  Alb: 1.8 g/dL / Pro: 5.3 g/dL / ALK PHOS: 88 U/L / ALT: 19 U/L / AST: 15 U/L / GGT: x             ABO Interpretation: A2 (09-15-21 @ 03:06)      IMAGING:

## 2021-09-16 NOTE — PRE-ANESTHESIA EVALUATION ADULT - NSANTHPMHFT_GEN_ALL_CORE
81M with recently dx Crohn's disease c/b perianal abscess and left posterior distal anal intersphincteric fistula and presents diarrhea/BRBPR  2/2 to CD flare c/b c diff colitis. Initially treat for C diff, s/p FMT. Taken emergently to OR on 8/20 for acute deterioration for laparoscopic total colectomy with end ileostomy, POD27. Failed speech and swallow 9/10.    - Ostomy with large amount of dark red output, concern for bleed again  CAD  HTN  DVT  New onset hypophonia
81 M w/ Crohn disease s/p failed medical management, w/ C diff colitis vs Crohns flair now s/p total abdominal colectomy with end ileostomy on 8/20 intubated and on pressors post-op, now extubated and stable, but w/ UGI bleed
mod-sev AI  Anemia Crohn's

## 2021-09-17 NOTE — PROGRESS NOTE ADULT - SUBJECTIVE AND OBJECTIVE BOX
[facility] Progress Note  Patient is a 81y old  Male who presents with a chief complaint of diarrhea with blood in it , abd pain and weakness (17 Sep 2021 00:37)      INTERVAL EVENTS PER SICU:  - did not get endoscopy due to anxiety and delay in procedure, willing to go tomorrow with daughter  - 1 prbc for Hb 7.3; post transfusion increase to 8.5  - RUE PICC placed    SUBJECTIVE:       OBJECTIVE:    Vital Signs Last 24 Hrs  T(C): 36.4 (16 Sep 2021 23:00), Max: 36.7 (16 Sep 2021 19:46)  T(F): 97.5 (16 Sep 2021 23:00), Max: 98.1 (16 Sep 2021 19:46)  HR: 80 (17 Sep 2021 01:00) (61 - 102)  BP: 89/53 (17 Sep 2021 01:00) (89/53 - 143/66)  BP(mean): 66 (17 Sep 2021 01:00) (66 - 95)  RR: 14 (17 Sep 2021 01:00) (12 - 32)  SpO2: 100% (17 Sep 2021 01:00) (96% - 100%)I&O's Detail    15 Sep 2021 07:01  -  16 Sep 2021 07:00  --------------------------------------------------------  IN:    dextrose 5% + lactated ringers: 650 mL    dextrose 5% + lactated ringers: 330 mL    IV PiggyBack: 249.9 mL    IV PiggyBack: 200 mL    Pantoprazole: 200 mL  Total IN: 1629.9 mL    OUT:    Bulb (mL): 25 mL    Bulb (mL): 5 mL    Ileostomy (mL): 450 mL    Incontinent per Condom Catheter (mL): 1050 mL  Total OUT: 1530 mL    Total NET: 99.9 mL      16 Sep 2021 07:01  -  17 Sep 2021 02:09  --------------------------------------------------------  IN:    dextrose 5% + lactated ringers: 800 mL    IV PiggyBack: 50 mL    Pantoprazole: 160 mL  Total IN: 1010 mL    OUT:    Bulb (mL): 5 mL    Bulb (mL): 40 mL    Ileostomy (mL): 1100 mL    Incontinent per Condom Catheter (mL): 750 mL  Total OUT: 1895 mL    Total NET: -885 mL      MEDICATIONS  (STANDING):  Biotene Dry Mouth Oral Rinse 5 milliLiter(s) Swish and Spit two times a day  chlorhexidine 2% Cloths 1 Application(s) Topical <User Schedule>  cholecalciferol 1000 Unit(s) Oral daily  dextrose 5% + lactated ringers. 1000 milliLiter(s) (50 mL/Hr) IV Continuous <Continuous>  folic acid 1 milliGRAM(s) Oral daily  lidocaine   4% Patch 1 Patch Transdermal daily  magnesium sulfate  IVPB 2 Gram(s) IV Intermittent once  metoprolol tartrate 25 milliGRAM(s) Oral every 8 hours  multivitamin 1 Tablet(s) Oral daily  pantoprazole Infusion 8 mG/Hr (10 mL/Hr) IV Continuous <Continuous>  potassium chloride  10 mEq/100 mL IVPB 10 milliEquivalent(s) IV Intermittent every 1 hour    MEDICATIONS  (PRN):  acetaminophen   Tablet .. 650 milliGRAM(s) Oral every 6 hours PRN Mild Pain (1 - 3)  oxyCODONE    IR 2.5 milliGRAM(s) Oral every 4 hours PRN Moderate Pain (4 - 6)      PHYSICAL EXAM:  Constitutional: A&Ox3  Respiratory: Unlabored breathing  Abdomen: Soft, nondistended, nontender. Ostomy bag with significant dark red output. Midline dressing c/d/i.     LABS:                        8.5    17.14 )-----------( 309      ( 17 Sep 2021 00:45 )             26.7     09-17    138  |  107  |  8   ----------------------------<  117<H>  3.7   |  24  |  0.53    Ca    7.5<L>      17 Sep 2021 00:45  Phos  2.6     09-17  Mg     1.8     09-17    TPro  5.2<L>  /  Alb  1.8<L>  /  TBili  0.4  /  DBili  x   /  AST  13  /  ALT  16  /  AlkPhos  82  09-17    PT/INR - ( 17 Sep 2021 00:45 )   PT: 14.1 sec;   INR: 1.18 ratio         PTT - ( 17 Sep 2021 00:45 )  PTT:30.3 sec  LIVER FUNCTIONS - ( 17 Sep 2021 00:45 )  Alb: 1.8 g/dL / Pro: 5.2 g/dL / ALK PHOS: 82 U/L / ALT: 16 U/L / AST: 13 U/L / GGT: x                 IMAGING:     [facility] Progress Note  Patient is a 81y old  Male who presents with a chief complaint of diarrhea with blood in it , abd pain and weakness (17 Sep 2021 00:37)      INTERVAL EVENTS PER SICU:  - did not get endoscopy due to anxiety and delay in procedure, willing to go tomorrow with daughter  - 1 prbc for Hb 7.3; post transfusion increase to 8.5  - RUE PICC placed    SUBJECTIVE: Patient seen on morning rounds. Patient acutely delirious this AM.       OBJECTIVE:    Vital Signs Last 24 Hrs  T(C): 36.4 (16 Sep 2021 23:00), Max: 36.7 (16 Sep 2021 19:46)  T(F): 97.5 (16 Sep 2021 23:00), Max: 98.1 (16 Sep 2021 19:46)  HR: 80 (17 Sep 2021 01:00) (61 - 102)  BP: 89/53 (17 Sep 2021 01:00) (89/53 - 143/66)  BP(mean): 66 (17 Sep 2021 01:00) (66 - 95)  RR: 14 (17 Sep 2021 01:00) (12 - 32)  SpO2: 100% (17 Sep 2021 01:00) (96% - 100%)I&O's Detail    15 Sep 2021 07:01  -  16 Sep 2021 07:00  --------------------------------------------------------  IN:    dextrose 5% + lactated ringers: 650 mL    dextrose 5% + lactated ringers: 330 mL    IV PiggyBack: 249.9 mL    IV PiggyBack: 200 mL    Pantoprazole: 200 mL  Total IN: 1629.9 mL    OUT:    Bulb (mL): 25 mL    Bulb (mL): 5 mL    Ileostomy (mL): 450 mL    Incontinent per Condom Catheter (mL): 1050 mL  Total OUT: 1530 mL    Total NET: 99.9 mL      16 Sep 2021 07:01  -  17 Sep 2021 02:09  --------------------------------------------------------  IN:    dextrose 5% + lactated ringers: 800 mL    IV PiggyBack: 50 mL    Pantoprazole: 160 mL  Total IN: 1010 mL    OUT:    Bulb (mL): 5 mL    Bulb (mL): 40 mL    Ileostomy (mL): 1100 mL    Incontinent per Condom Catheter (mL): 750 mL  Total OUT: 1895 mL    Total NET: -885 mL      MEDICATIONS  (STANDING):  Biotene Dry Mouth Oral Rinse 5 milliLiter(s) Swish and Spit two times a day  chlorhexidine 2% Cloths 1 Application(s) Topical <User Schedule>  cholecalciferol 1000 Unit(s) Oral daily  dextrose 5% + lactated ringers. 1000 milliLiter(s) (50 mL/Hr) IV Continuous <Continuous>  folic acid 1 milliGRAM(s) Oral daily  lidocaine   4% Patch 1 Patch Transdermal daily  magnesium sulfate  IVPB 2 Gram(s) IV Intermittent once  metoprolol tartrate 25 milliGRAM(s) Oral every 8 hours  multivitamin 1 Tablet(s) Oral daily  pantoprazole Infusion 8 mG/Hr (10 mL/Hr) IV Continuous <Continuous>  potassium chloride  10 mEq/100 mL IVPB 10 milliEquivalent(s) IV Intermittent every 1 hour    MEDICATIONS  (PRN):  acetaminophen   Tablet .. 650 milliGRAM(s) Oral every 6 hours PRN Mild Pain (1 - 3)  oxyCODONE    IR 2.5 milliGRAM(s) Oral every 4 hours PRN Moderate Pain (4 - 6)      PHYSICAL EXAM:  Constitutional: A&Ox0  Respiratory: Unlabored breathing  Abdomen: Soft, nondistended, nontender. Ostomy bag with significant dark red output. Midline dressing c/d/i.     LABS:                        8.5    17.14 )-----------( 309      ( 17 Sep 2021 00:45 )             26.7     09-17    138  |  107  |  8   ----------------------------<  117<H>  3.7   |  24  |  0.53    Ca    7.5<L>      17 Sep 2021 00:45  Phos  2.6     09-17  Mg     1.8     09-17    TPro  5.2<L>  /  Alb  1.8<L>  /  TBili  0.4  /  DBili  x   /  AST  13  /  ALT  16  /  AlkPhos  82  09-17    PT/INR - ( 17 Sep 2021 00:45 )   PT: 14.1 sec;   INR: 1.18 ratio         PTT - ( 17 Sep 2021 00:45 )  PTT:30.3 sec  LIVER FUNCTIONS - ( 17 Sep 2021 00:45 )  Alb: 1.8 g/dL / Pro: 5.2 g/dL / ALK PHOS: 82 U/L / ALT: 16 U/L / AST: 13 U/L / GGT: x                 IMAGING:

## 2021-09-17 NOTE — PROGRESS NOTE ADULT - SUBJECTIVE AND OBJECTIVE BOX
SICU Daily Progress Note  =====================================================  Interval/Overnight Events:     - did not get endoscopy due to anxiety and delay in procedure, willing to go tomorrow with daughter  - 1 prbc for Hb 7.3    HPI:  81y Male with Crohn disease s/p failed medical management. 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 Transferred to SICU intubated and on pressors. Now extubated and off pressors.      Allergies: No Known Allergies      MEDICATIONS:   --------------------------------------------------------------------------------------  Neurologic Medications  acetaminophen   Tablet .. 650 milliGRAM(s) Oral every 6 hours PRN Mild Pain (1 - 3)  oxyCODONE    IR 2.5 milliGRAM(s) Oral every 4 hours PRN Moderate Pain (4 - 6)    Respiratory Medications    Cardiovascular Medications  metoprolol tartrate 25 milliGRAM(s) Oral every 8 hours    Gastrointestinal Medications  cholecalciferol 1000 Unit(s) Oral daily  dextrose 5% + lactated ringers. 1000 milliLiter(s) IV Continuous <Continuous>  folic acid 1 milliGRAM(s) Oral daily  multivitamin 1 Tablet(s) Oral daily  pantoprazole Infusion 8 mG/Hr IV Continuous <Continuous>    Genitourinary Medications    Hematologic/Oncologic Medications    Antimicrobial/Immunologic Medications    Endocrine/Metabolic Medications    Topical/Other Medications  Biotene Dry Mouth Oral Rinse 5 milliLiter(s) Swish and Spit two times a day  chlorhexidine 2% Cloths 1 Application(s) Topical <User Schedule>  lidocaine   4% Patch 1 Patch Transdermal daily    ICU Vital Signs Last 24 Hrs  T(C): 36.4 (16 Sep 2021 23:00), Max: 36.7 (16 Sep 2021 19:46)  T(F): 97.5 (16 Sep 2021 23:00), Max: 98.1 (16 Sep 2021 19:46)  HR: 90 (16 Sep 2021 23:00) (61 - 102)  BP: 104/56 (16 Sep 2021 23:00) (101/58 - 148/63)  BP(mean): 74 (16 Sep 2021 23:00) (73 - 95)  ABP: --  ABP(mean): --  RR: 24 (16 Sep 2021 23:00) (12 - 32)  SpO2: 100% (16 Sep 2021 23:00) (96% - 100%)      I&O's Summary    15 Sep 2021 07:01  -  16 Sep 2021 07:00  --------------------------------------------------------  IN: 1629.9 mL / OUT: 1530 mL / NET: 99.9 mL    16 Sep 2021 07:01  -  17 Sep 2021 00:46  --------------------------------------------------------  IN: 1010 mL / OUT: 1895 mL / NET: -885 mL      EXAM  NEUROLOGY  RASS:   	GCS:    Exam: Normal, NAD, alert, oriented x3, no focal deficits    HEENT  Exam: Normocephalic, atraumatic, EOMI.      RESPIRATORY  Exam: Lungs clear to auscultation, Normal expansion/effort.    CARDIOVASCULAR  Exam: S1, S2.  Regular rate and rhythm.      GI/NUTRITION  Exam: Abdomen soft, Non-tender, Non-distended.     VASCULAR  Exam: Extremities warm, pink, well-perfused.     MUSCULOSKELETAL  Exam: All extremities moving spontaneously without limitations. Peripheral edema in BUE with weeping from L hand    SKIN  Exam: Good skin turgor, no skin breakdown.     Tubes/Lines/Drains    [x] Peripheral IV  [] Central Venous Line     	[] R	[] L	[] IJ	[] Fem	[] SC	Date Placed:   [] Arterial Line		[] R	[] L	[] Fem	[] Rad	[] Ax	Date Placed:   [] PICC		[] Midline		[] Mediport  [] Urinary Catheter		Date Placed:   [x] Necessity of urinary, arterial, and venous catheters discussed      LABS:                        7.3    9.67  )-----------( 268      ( 16 Sep 2021 15:01 )             23.8     09-15    138  |  107  |  8   ----------------------------<  111<H>  3.8   |  24  |  0.56    Ca    7.9<L>      15 Sep 2021 21:52  Phos  3.0     09-15  Mg     2.0     09-15    TPro  5.3<L>  /  Alb  1.8<L>  /  TBili  0.2  /  DBili  x   /  AST  15  /  ALT  19  /  AlkPhos  88  09-15    PT/INR - ( 16 Sep 2021 15:01 )   PT: 13.8 sec;   INR: 1.16 ratio         PTT - ( 16 Sep 2021 15:01 )  PTT:26.5 sec    CAPILLARY BLOOD GLUCOSE        LIVER FUNCTIONS - ( 15 Sep 2021 21:52 )  Alb: 1.8 g/dL / Pro: 5.3 g/dL / ALK PHOS: 88 U/L / ALT: 19 U/L / AST: 15 U/L / GGT: x

## 2021-09-17 NOTE — PROGRESS NOTE ADULT - ASSESSMENT
A/P 82yo M with Crohn's disease, now s/p total abdominal colectomy with end ileostomy on 8/20    Sacral/bilateral buttocks with deep tissue injury in evolution present on admission  stage 2 thoracic spine pressure injury  incontinence of urine    Recommend:  1.)  sacral/inner buttocks injury - dakins bid        Thoracic spine- allevyn foam  2.)  Rosina w/ pericare BID and prn soiling external male urinary catheter  3.) Maintain on an alternating air with low air loss surface  4.) Turn and reposition Q 2 hours  5.) Nutrition optimization w/dysphagia diet and supplements (pudding and enlive) in pt w/ severe protein          calorie malnutrition and increased nutritional needs          also MVI, & folic acid          improved prealbumin noted  6.) Offload heels/feet with complete care air fluidized boots/ensure soles of feet do not rest on foot board of the bed.  Care as per SICU, will follow w/ you  Upon discharge f/u as outpatient at [facility] [address] [facility] [phone]  D/w team  Jessica Silveira [place]-C, [place] [id number]  I spent 35minutes face to face w/ this pt of which more than 50% of the time was spent counseling & coordinating care of this pt.    A/P 80yo M with Crohn's disease, now s/p total abdominal colectomy with end ileostomy on 8/20    Sacral/bilateral buttocks with unstageable pressure injury  Suspected DTI w/stage 2 thoracic spine pressure injury  incontinence of urine    Recommend:  1.)  sacral/inner buttocks injury - dakins bid        Thoracic spine- allevyn foam  2.)  Rosina w/ pericare BID and prn soiling external male urinary catheter  3.) Maintain on an alternating air with low air loss surface  4.) Turn and reposition Q 2 hours  5.) Nutrition optimization w/dysphagia diet and supplements (pudding and enlive) in pt w/ severe protein          calorie malnutrition and increased nutritional needs          also MVI, & folic acid          improved prealbumin noted  6.) Offload heels/feet with complete care air fluidized boots/ensure soles of feet do not rest on foot board of the bed.  Care as per SICU, will follow w/ you  Upon discharge f/u as outpatient at [facility] [address] [facility] [phone]  D/w team & seen w/ RN  Jessica Silveira PA-C, CWS [id number]  I spent 35minutes face to face w/ this pt of which more than 50% of the time was spent counseling & coordinating care of this pt.

## 2021-09-17 NOTE — PROGRESS NOTE ADULT - ASSESSMENT
81y Male with Crohn disease s/p failed medical management. 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 Transferred to SICU intubated and on pressors. Now extubated and off pressors.    PLAN:  Neurologic: post-op pain control, new-onset hypophonia without focal neuro deficits, obtundation  - Monitor mental status  - Pain control: PO Tylenol 500mg q6hr PRN, Oxycodone 2.5mg prn   - Vitamin D3, Folic acid, and multivitamins daily    Respiratory: Acute respiratory insufficiency resolving, patient on RA  - Incentive spirometry 10x/hr while in bed, OOBTC    Cardiovascular: History of AFib currently rate controlled with IV metoprolol and digoxin  - Metoprolol 25mg PO q8h   - Monitor hemodynamics    Gastrointestinal/Nutrition: s/p total abdominal colectomy with end ileostomy c/b recurrent bloody ostomy output; s/p TPN  - NPO in setting of GI Bleed (Mechanical Soft / Honey Thickened liquid diet + Ensures 3x/day)    - Protonix drop 80 mG/hr  - Biotene mouth wash  - Monitor ostomy output and character    Renal/Genitourinary: no active issues   - D5 LR @ 50cc/hr  - Monitor and replete electrolytes as needed  - Monitor I&Os, voiding spontaneously  - BMP q12hr    Hematologic: recurrent GIB -- last transfusion: 1u pRBC 9/7  - VTE ppx held i/s/o concern for bleed  - f/u AM CBC  - Duplex study in 1 week given VTE findings  - Trend H/H and transfuse Hgb < 7.5  - CBC q12hr    Infectious Disease: C. diff colitis vs Crohn's flair s/p subtotal colectomy   - Trend WBC on CBC q12hr  - Monitor fever curve    Endocrine: no active issues; s/p steroid taper 8/26  - Monitor glucose on BMP daily    Lines & Drains:   - LLQ & LUQ drain 8/20  - TPN 8/16  - RUE PICC placed 8/16    Disposition: SICU  Code Status: DNR/DNI, MOLST in chart

## 2021-09-17 NOTE — PROGRESS NOTE ADULT - ASSESSMENT
81M with recently dx Crohn's disease c/b perianal abscess and left posterior distal anal intersphincteric fistula and presents diarrhea/BRBPR  2/2 to CD flare c/b c diff colitis. Initially treat for C diff, s/p FMT. Taken emergently to OR on 8/20 for acute deterioration for laparoscopic total colectomy with end ileostomy, POD28. Failed speech and swallow 9/10.    PLAN:  - Ostomy with large amount of dark red output, concern for bleed again, patient and family refusing scope at this time  - FU labs and transfuse as necessary  - Protonix gtt  - Diet: NPO/IVF  - Ongoing Palliative and GOC discussions with family.   - Dispo planning for rehab, patient should receive 500cc bolus every 2 days until his PO intake is adequate  - Wound care: daily dressing changes with midline incision packed   - Appreciate excellent care per SICU    [facility]  [id number]  81M with recently dx Crohn's disease c/b perianal abscess and left posterior distal anal intersphincteric fistula and presents diarrhea/BRBPR  2/2 to CD flare c/b c diff colitis. Initially treat for C diff, s/p FMT. Taken emergently to OR on 8/20 for acute deterioration for laparoscopic total colectomy with end ileostomy, POD28. Failed speech and swallow 9/10.    PLAN:  - f/u if GI will scope patient, if not patient can have a diet  - start carafate   - Ostomy with large amount of dark red output, concern for bleed again, patient and family refusing scope at this time  - FU labs and transfuse as necessary  - Protonix gtt  - Diet: NPO/IVF  - Ongoing Palliative and GOC discussions with family.   - Dispo planning for rehab, patient should receive 500cc bolus every 2 days until his PO intake is adequate  - Wound care: daily dressing changes with midline incision packed   - Appreciate excellent care per SICU    [facility]  [id number]

## 2021-09-17 NOTE — PROGRESS NOTE ADULT - ATTENDING COMMENTS
Pt is a 81 year old male with a medical history significant for CAD/HTN/afib s/p total colectomy for Crohn's disease.  markedly weak and deconditioned  concern now is patient has melanotic output via ostomy  Pt underwent endoscopy with findings of two non-bleeding ulcers  Resume diet  Continue PPI drip  continue telemetry monitoring   Continue beta blocker for atrial fibrillation.  DVT proph on hold for bleeding .

## 2021-09-17 NOTE — PRE PROCEDURE NOTE - PRE PROCEDURE EVALUATION
Attending Physician:     Dr. Roy                       Procedure:   EGD/Enteroscopy    Indication for Procedure:   Chronic Diarrhea  ________________________________________________________  PAST MEDICAL & SURGICAL HISTORY:    Crohn disease  Paroxysmal atrial fibrillation  CAD (coronary artery disease) not on ASA or plavix, no stents as per daughter  GI bleed    No significant past surgical history      ALLERGIES:  No Known Allergies    HOME MEDICATIONS:  budesonide 3 mg oral delayed release capsule: 3 cap(s) orally once a day (in the morning)  folic acid 1 mg oral tablet: 1 tab(s) orally once a day  mesalamine 1.2 g oral delayed release tablet: 4 tab(s) orally once a day  methotrexate 2.5 mg oral tablet: 10 tab(s) orally once a week  omeprazole 20 mg oral delayed release capsule: 1 cap(s) orally once a day  sotalol  mg oral tablet: 1 tab(s) orally 2 times a day    AICD/PPM: [ ] yes   [X ] no    PERTINENT LAB DATA:                        8.5    17.14 )-----------( 309      ( 17 Sep 2021 00:45 )             26.7     09-17    138  |  107  |  8   ----------------------------<  117<H>  3.7   |  24  |  0.53    Ca    7.5<L>      17 Sep 2021 00:45  Phos  2.6     09-17  Mg     1.8     09-17  TPro  5.2<L>  /  Alb  1.8<L>  /  TBili  0.4  /  DBili  x   /  AST  13  /  ALT  16  /  AlkPhos  82  09-17  PT/INR - ( 17 Sep 2021 00:45 )   PT: 14.1 sec;   INR: 1.18 ratio    PTT - ( 17 Sep 2021 00:45 )  PTT:30.3 sec    PHYSICAL EXAMINATION:    Height (cm): 170.2  Weight (kg): 59  BMI (kg/m2): 20.4  BSA (m2): 1.68T(C): 36.5  HR: 69  BP: 109/53  RR: 20  SpO2: 100%    Constitutional: NAD    Neck:  No JVD  Respiratory: CTAB/L  Cardiovascular: S1 and S2  Gastrointestinal: BS+, soft, NT/ND  Extremities: No peripheral edema  Neurological: A/O x 3      COMMENTS:    The patient is a suitable candidate for the planned procedure unless box checked [ ]  No, explain:

## 2021-09-17 NOTE — PROGRESS NOTE ADULT - SUBJECTIVE AND OBJECTIVE BOX
[facility]-- WOUND TEAM -- FOLLOW UP NOTE  --------------------------------------------------------------------------------    24 hour events/subjective:    pt had endoscopy this am  pt having diet advanced today      Diet:  Diet, Dysphagia 2 Mechanical Soft-Honey Consistency Fluid:   Supplement Feeding Modality:  Oral  Ensure Enlive Servings Per Day:  3       Frequency:  Three Times a day  Ensure Pudding Cans or Servings Per Day:  2       Frequency:  Daily (09-17-21 @ 14:08)      ROS: General/ SKIN see HPI  all other systems negative    ALLERGIES & MEDICATIONS  --------------------------------------------------------------------------------  No Known Allergies    STANDING INPATIENT MEDICATIONS  Biotene Dry Mouth Oral Rinse 5 milliLiter(s) Swish and Spit two times a day  chlorhexidine 2% Cloths 1 Application(s) Topical <User Schedule>  cholecalciferol 1000 Unit(s) Oral daily  dextrose 5% + lactated ringers. 1000 milliLiter(s) IV Continuous <Continuous>  folic acid 1 milliGRAM(s) Oral daily  lidocaine   4% Patch 1 Patch Transdermal daily  metoprolol tartrate 25 milliGRAM(s) Oral every 8 hours  multivitamin 1 Tablet(s) Oral daily  pantoprazole    Tablet 40 milliGRAM(s) Oral every 12 hours  sodium chloride 0.9%. 500 milliLiter(s) IV Continuous <Continuous>      PRN INPATIENT MEDICATION  acetaminophen   Tablet .. 650 milliGRAM(s) Oral every 6 hours PRN  oxyCODONE    IR 2.5 milliGRAM(s) Oral every 4 hours PRN        VITALS/PHYSICAL EXAM  --------------------------------------------------------------------------------  T(C): 36 (09-17-21 @ 15:00), Max: 36.7 (09-16-21 @ 19:46)  HR: 82 (09-17-21 @ 16:00) (61 - 102)  BP: 136/67 (09-17-21 @ 16:00) (89/53 - 159/72)  RR: 18 (09-17-21 @ 16:00) (12 - 31)  SpO2: 100% (09-17-21 @ 16:00) (96% - 100%)  Wt(kg): --  Height (cm): 170.2 (09-16-21 @ 16:49)  Weight (kg): 59 (09-16-21 @ 16:49)  BMI (kg/m2): 20.4 (09-16-21 @ 16:49)  BSA (m2): 1.68 (09-16-21 @ 16:49)      09-16-21 @ 07:01  -  09-17-21 @ 07:00  --------------------------------------------------------  IN: 1840 mL / OUT: 2808 mL / NET: -968 mL    09-17-21 @ 07:01  -  09-17-21 @ 16:39  --------------------------------------------------------  IN: 520 mL / OUT: 0 mL / NET: 520 mL      Physical Exam:  General: guarded but stable, confused, thin, frail   GI: soft NT/ ND (+)stoma pink / functioning (+)NGT  Psych: appropriate  Neurology: verbally limited, not following commands  Musculoskeletal: passive ROM  Vascular: BLE edema equal, no ischemia, equally warm  Skin:  pale, moist, w/ good turgor  sacral area skin 10cmx 6cm x0.5cm 60% eschar, 10% nonviable tissue/ slough and 30% granular  Procedure Note  Using aseptic technique sacral wound was excisionally debrided using a scissor and forceps through necrotic & nonviable dermis and subcutaneous tissue to fascia. bone palpable but not exposed. Pt tolerated procedure well.  Hemostasis was maintained throughout.  Debulking debridement of necrotic tissue.  Post measurements  10cmx 6cm x 1.5cm w/ 30% eschar, 20% nonviable and s héctor and 50% granular     scant serosanguinous drainage,  & ttp  No odor, increased warmth, induration, fluctuance   thoracic spine DTI  pressure injury w/ partial thickness/ denuded skin measuring 3.5cm x 2.5cm x0cm   the spine is kyphotic with protuberant bony prominences   periwound skin is intact , there is no drainage.   No odor, increased warmth, induration, fluctuance, tenderness      LABS/ CULTURES/ RADIOLOGY:              8.5    17.14 >-----------<  309      [09-17-21 @ 00:45]              26.7     138  |  107  |  8   ----------------------------<  117      [09-17-21 @ 00:45]  3.7   |  24  |  0.53        Ca     7.5     [09-17-21 @ 00:45]      iCa    1.17     [09-17 @ 00:46]      Mg     1.8     [09-17-21 @ 00:45]      Phos  2.6     [09-17-21 @ 00:45]    TPro  5.2  /  Alb  1.8  /  TBili  0.4  /  DBili  x   /  AST  13  /  ALT  16  /  AlkPhos  82  [09-17-21 @ 00:45]    PT/INR: PT 14.1 , INR 1.18       [09-17-21 @ 00:45]  PTT: 30.3       [09-17-21 @ 00:45]      Prealbumin, Serum: 22 mg/dL (09-03-21 @ 05:19)  Prealbumin, Serum: 16 mg/dL (09-03-21 @ 03:19)  Prealbumin, Serum: 15 mg/dL (08-30-21 @ 03:32)  Prealbumin, Serum: 8 mg/dL (08-15-21 @ 10:17)    A1C with Estimated Average Glucose Result: 5.2 % (08-17-21 @ 09:06)

## 2021-09-17 NOTE — PROGRESS NOTE ADULT - SUBJECTIVE AND OBJECTIVE BOX
Patient is a 81y old  Male who presents with a chief complaint of diarrhea with blood in it , abd pain and weakness (17 Sep 2021 16:39)      INTERVAL HPI/OVERNIGHT EVENTS:  T(C): 36.1 (09-17-21 @ 19:00), Max: 36.6 (09-17-21 @ 03:00)  HR: 75 (09-17-21 @ 22:00) (61 - 91)  BP: 141/64 (09-17-21 @ 22:00) (89/53 - 159/72)  RR: 23 (09-17-21 @ 22:00) (12 - 34)  SpO2: 100% (09-17-21 @ 22:00) (98% - 100%)  Wt(kg): --  I&O's Summary    16 Sep 2021 07:01  -  17 Sep 2021 07:00  --------------------------------------------------------  IN: 1840 mL / OUT: 2808 mL / NET: -968 mL    17 Sep 2021 07:01  -  17 Sep 2021 22:12  --------------------------------------------------------  IN: 870 mL / OUT: 720 mL / NET: 150 mL        PAST MEDICAL & SURGICAL HISTORY:  Crohn disease    Paroxysmal atrial fibrillation    CAD (coronary artery disease)  not on ASA or plavix, no stents as per daughter    GI bleed    No significant past surgical history        SOCIAL HISTORY  Alcohol:  Tobacco:  Illicit substance use:    FAMILY HISTORY:    REVIEW OF SYSTEMS:  CONSTITUTIONAL: No fever, weight loss, or fatigue  EYES: No eye pain, visual disturbances, or discharge  ENMT:  No difficulty hearing, tinnitus, vertigo; No sinus or throat pain  NECK: No pain or stiffness  RESPIRATORY: No cough, wheezing, chills or hemoptysis; No shortness of breath  CARDIOVASCULAR: No chest pain, palpitations, dizziness, or leg swelling  GASTROINTESTINAL: No abdominal or epigastric pain. No nausea, vomiting, or hematemesis; No diarrhea or constipation. No melena or hematochezia.  GENITOURINARY: No dysuria, frequency, hematuria, or incontinence  NEUROLOGICAL: No headaches, memory loss, loss of strength, numbness, or tremors  SKIN: No itching, burning, rashes, or lesions   LYMPH NODES: No enlarged glands  ENDOCRINE: No heat or cold intolerance; No hair loss  MUSCULOSKELETAL: No joint pain or swelling; No muscle, back, or extremity pain  PSYCHIATRIC: No depression, anxiety, mood swings, or difficulty sleeping  HEME/LYMPH: No easy bruising, or bleeding gums  ALLERY AND IMMUNOLOGIC: No hives or eczema    RADIOLOGY & ADDITIONAL TESTS:    Imaging Personally Reviewed:  [ ] YES  [ ] NO    Consultant(s) Notes Reviewed:  [ ] YES  [ ] NO    PHYSICAL EXAM:  GENERAL: NAD, well-groomed, well-developed  HEAD:  Atraumatic, Normocephalic  EYES: EOMI, PERRLA, conjunctiva and sclera clear  ENMT: No tonsillar erythema, exudates, or enlargement; Moist mucous membranes, Good dentition, No lesions  NECK: Supple, No JVD, Normal thyroid  NERVOUS SYSTEM:  Alert & Oriented X3, Good concentration; Motor Strength 5/5 B/L upper and lower extremities; DTRs 2+ intact and symmetric  CHEST/LUNG: Clear to percussion bilaterally; No rales, rhonchi, wheezing, or rubs  HEART: Regular rate and rhythm; No murmurs, rubs, or gallops  ABDOMEN: Soft, Nontender, Nondistended; Bowel sounds present  EXTREMITIES:  2+ Peripheral Pulses, No clubbing, cyanosis, or edema  LYMPH: No lymphadenopathy noted  SKIN: No rashes or lesions    LABS:                        8.5    17.14 )-----------( 309      ( 17 Sep 2021 00:45 )             26.7     09-17    138  |  107  |  8   ----------------------------<  117<H>  3.7   |  24  |  0.53    Ca    7.5<L>      17 Sep 2021 00:45  Phos  2.6     09-17  Mg     1.8     09-17    TPro  5.2<L>  /  Alb  1.8<L>  /  TBili  0.4  /  DBili  x   /  AST  13  /  ALT  16  /  AlkPhos  82  09-17    PT/INR - ( 17 Sep 2021 00:45 )   PT: 14.1 sec;   INR: 1.18 ratio         PTT - ( 17 Sep 2021 00:45 )  PTT:30.3 sec    CAPILLARY BLOOD GLUCOSE                MEDICATIONS  (STANDING):  Biotene Dry Mouth Oral Rinse 5 milliLiter(s) Swish and Spit two times a day  chlorhexidine 2% Cloths 1 Application(s) Topical <User Schedule>  cholecalciferol 1000 Unit(s) Oral daily  Dakins Solution - 1/4 Strength 1 Application(s) Topical every 12 hours  dextrose 5% + lactated ringers. 1000 milliLiter(s) (50 mL/Hr) IV Continuous <Continuous>  folic acid 1 milliGRAM(s) Oral daily  lidocaine   4% Patch 1 Patch Transdermal daily  metoprolol tartrate 25 milliGRAM(s) Oral every 8 hours  multivitamin 1 Tablet(s) Oral daily  pantoprazole    Tablet 40 milliGRAM(s) Oral every 12 hours  sodium chloride 0.9%. 500 milliLiter(s) (30 mL/Hr) IV Continuous <Continuous>    MEDICATIONS  (PRN):  acetaminophen   Tablet .. 650 milliGRAM(s) Oral every 6 hours PRN Mild Pain (1 - 3)  oxyCODONE    IR 2.5 milliGRAM(s) Oral every 4 hours PRN Moderate Pain (4 - 6)      Care Discussed with Consultants/Other Providers [ ] YES  [ ] NO

## 2021-09-17 NOTE — PROGRESS NOTE ADULT - SUBJECTIVE AND OBJECTIVE BOX
Patient is a 81y old  Male who presents with a chief complaint of diarrhea with blood in it , abd pain and weakness (17 Sep 2021 02:09)      INTERVAL HISTORY: pt drowsy     TELEMETRY Personally reviewed: SR 60'S-80'S     REVIEW OF SYSTEMS:   CONSTITUTIONAL: No weakness  EYES/ENT: No visual changes; No throat pain  Neck: No pain or stiffness  Respiratory: No cough, wheezing, No shortness of breath  CARDIOVASCULAR: no chest pain or palpitations  GASTROINTESTINAL: No abdominal pain, no nausea, vomiting or hematemesis  GENITOURINARY: No dysuria, frequency or hematuria  NEUROLOGICAL: No stroke like symptoms  SKIN: No rashes    	  MEDICATIONS:  metoprolol tartrate 25 milliGRAM(s) Oral every 8 hours        PHYSICAL EXAM:  T(C): 36.2 (09-17-21 @ 11:44), Max: 36.7 (09-16-21 @ 19:46)  HR: 79 (09-17-21 @ 13:26) (61 - 102)  BP: 159/72 (09-17-21 @ 13:26) (89/53 - 159/72)  RR: 18 (09-17-21 @ 13:26) (12 - 31)  SpO2: 100% (09-17-21 @ 13:26) (96% - 100%)  Wt(kg): --  I&O's Summary    16 Sep 2021 07:01  -  17 Sep 2021 07:00  --------------------------------------------------------  IN: 1840 mL / OUT: 2808 mL / NET: -968 mL    17 Sep 2021 07:01  -  17 Sep 2021 14:12  --------------------------------------------------------  IN: 180 mL / OUT: 0 mL / NET: 180 mL      Height (cm): 170.2 (09-16 @ 16:49)  Weight (kg): 59 (09-16 @ 16:49)  BMI (kg/m2): 20.4 (09-16 @ 16:49)  BSA (m2): 1.68 (09-16 @ 16:49)    Appearance: In no distress	  HEENT:    PERRL, EOMI	  Cardiovascular:  S1 S2, No JVD  Respiratory: Lungs clear to auscultation	  Gastrointestinal:  Soft, Non-tender, + BS	  Vascularature:  No edema of LE  Psychiatric: Appropriate affect   Neuro: no acute focal deficits                               8.5    17.14 )-----------( 309      ( 17 Sep 2021 00:45 )             26.7     09-17    138  |  107  |  8   ----------------------------<  117<H>  3.7   |  24  |  0.53    Ca    7.5<L>      17 Sep 2021 00:45  Phos  2.6     09-17  Mg     1.8     09-17    TPro  5.2<L>  /  Alb  1.8<L>  /  TBili  0.4  /  DBili  x   /  AST  13  /  ALT  16  /  AlkPhos  82  09-17        Labs personally reviewed      ASSESSMENT/PLAN: 	    Problem/Plan - 1:  ·  Problem: CAD (coronary artery disease).  Plan: c/w home meds  We discussed the importance of SAPT with low dose ASA 81mg given CAD  pt denies any current chest pain, SOB or chest pressure.    Problem/Plan -2:  ·  Problem: Colitis.  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 SR   - pt is ? candidate for AC seeing recent GIB. FOBT positive as recent as 8/8/21   - continue rate control with IV lopressor - currently sinus  on tele   - 9/14 pt back to Afib rate controlled on metoprolol  no AC with hx of GIB   - pt self converted to SR rate controlled     Problem/Plan - 4:   ·  Problem: GIB (gastrointestinal bleeding).  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

## 2021-09-18 NOTE — PROGRESS NOTE ADULT - SUBJECTIVE AND OBJECTIVE BOX
Patient is a 81y old  Male who presents with a chief complaint of diarrhea with blood in it , abd pain and weakness (18 Sep 2021 17:23)      INTERVAL HPI/OVERNIGHT EVENTS: seen and examined   T(C): 36.2 (09-18-21 @ 19:00), Max: 36.5 (09-17-21 @ 23:00)  HR: 65 (09-18-21 @ 22:00) (63 - 87)  BP: 147/67 (09-18-21 @ 22:00) (95/50 - 149/67)  RR: 16 (09-18-21 @ 22:00) (13 - 32)  SpO2: 100% (09-18-21 @ 22:00) (99% - 100%)  Wt(kg): --  I&O's Summary    17 Sep 2021 07:01  -  18 Sep 2021 07:00  --------------------------------------------------------  IN: 1320 mL / OUT: 1560 mL / NET: -240 mL    18 Sep 2021 07:01  -  18 Sep 2021 22:24  --------------------------------------------------------  IN: 2450 mL / OUT: 725 mL / NET: 1725 mL        PAST MEDICAL & SURGICAL HISTORY:  Crohn disease    Paroxysmal atrial fibrillation    CAD (coronary artery disease)  not on ASA or plavix, no stents as per daughter    GI bleed    No significant past surgical history        SOCIAL HISTORY  Alcohol:  Tobacco:  Illicit substance use:    FAMILY HISTORY:    REVIEW OF SYSTEMS:  CONSTITUTIONAL: No fever, weight loss, or fatigue  EYES: No eye pain, visual disturbances, or discharge  ENMT:  No difficulty hearing, tinnitus, vertigo; No sinus or throat pain  NECK: No pain or stiffness  RESPIRATORY: No cough, wheezing, chills or hemoptysis; No shortness of breath  CARDIOVASCULAR: No chest pain, palpitations, dizziness, or leg swelling  GASTROINTESTINAL: No abdominal or epigastric pain. No nausea, vomiting, or hematemesis; No diarrhea or constipation. No melena or hematochezia.  GENITOURINARY: No dysuria, frequency, hematuria, or incontinence  NEUROLOGICAL: No headaches, memory loss, loss of strength, numbness, or tremors  SKIN: No itching, burning, rashes, or lesions   LYMPH NODES: No enlarged glands  ENDOCRINE: No heat or cold intolerance; No hair loss  MUSCULOSKELETAL: No joint pain or swelling; No muscle, back, or extremity pain  PSYCHIATRIC: No depression, anxiety, mood swings, or difficulty sleeping  HEME/LYMPH: No easy bruising, or bleeding gums  ALLERY AND IMMUNOLOGIC: No hives or eczema    RADIOLOGY & ADDITIONAL TESTS:    Imaging Personally Reviewed:  [ ] YES  [ ] NO    Consultant(s) Notes Reviewed:  [ ] YES  [ ] NO    PHYSICAL EXAM:  GENERAL: NAD, well-groomed, well-developed  HEAD:  Atraumatic, Normocephalic  EYES: EOMI, PERRLA, conjunctiva and sclera clear  ENMT: No tonsillar erythema, exudates, or enlargement; Moist mucous membranes, Good dentition, No lesions  NECK: Supple, No JVD, Normal thyroid  NERVOUS SYSTEM:  Alert & Oriented X3, Good concentration; Motor Strength 5/5 B/L upper and lower extremities; DTRs 2+ intact and symmetric  CHEST/LUNG: Clear to percussion bilaterally; No rales, rhonchi, wheezing, or rubs  HEART: Regular rate and rhythm; No murmurs, rubs, or gallops  ABDOMEN: Soft, Nontender, Nondistended; Bowel sounds present  EXTREMITIES:  2+ Peripheral Pulses, No clubbing, cyanosis, or edema  LYMPH: No lymphadenopathy noted  SKIN: No rashes or lesions    LABS:                        8.6    13.26 )-----------( 269      ( 18 Sep 2021 16:59 )             27.3     09-18    137  |  105  |  6<L>  ----------------------------<  119<H>  4.2   |  24  |  0.56    Ca    7.7<L>      18 Sep 2021 16:59  Phos  3.5     09-18  Mg     2.0     09-18    TPro  4.9<L>  /  Alb  1.7<L>  /  TBili  0.3  /  DBili  x   /  AST  11  /  ALT  14  /  AlkPhos  77  09-18    PT/INR - ( 17 Sep 2021 00:45 )   PT: 14.1 sec;   INR: 1.18 ratio         PTT - ( 17 Sep 2021 00:45 )  PTT:30.3 sec    CAPILLARY BLOOD GLUCOSE                MEDICATIONS  (STANDING):  Biotene Dry Mouth Oral Rinse 5 milliLiter(s) Swish and Spit two times a day  chlorhexidine 2% Cloths 1 Application(s) Topical <User Schedule>  cholecalciferol 1000 Unit(s) Oral daily  Dakins Solution - 1/4 Strength 1 Application(s) Topical every 12 hours  dextrose 5% + lactated ringers. 1000 milliLiter(s) (50 mL/Hr) IV Continuous <Continuous>  folic acid 1 milliGRAM(s) Oral daily  lidocaine   4% Patch 1 Patch Transdermal daily  metoprolol tartrate 25 milliGRAM(s) Oral every 8 hours  multivitamin 1 Tablet(s) Oral daily  pantoprazole    Tablet 40 milliGRAM(s) Oral every 12 hours    MEDICATIONS  (PRN):  acetaminophen   Tablet .. 650 milliGRAM(s) Oral every 6 hours PRN Mild Pain (1 - 3)  oxyCODONE    IR 2.5 milliGRAM(s) Oral every 4 hours PRN Moderate Pain (4 - 6)      Care Discussed with Consultants/Other Providers [ ] YES  [ ] NO

## 2021-09-18 NOTE — PROGRESS NOTE ADULT - SUBJECTIVE AND OBJECTIVE BOX
Chief Complaint:  Patient is a 81y old  Male who presents with a chief complaint of diarrhea with blood in it , abd pain and weakness (18 Sep 2021 04:54)      Interval Events: Hgb dropped to 6.7, s/p 1 u pRBC this AM. Still having some abdominal pain post EGD/ileoscopy yesterday but ileostomy output with less blood.      Hospital Medications:  acetaminophen   Tablet .. 650 milliGRAM(s) Oral every 6 hours PRN  Biotene Dry Mouth Oral Rinse 5 milliLiter(s) Swish and Spit two times a day  chlorhexidine 2% Cloths 1 Application(s) Topical <User Schedule>  cholecalciferol 1000 Unit(s) Oral daily  Dakins Solution - 1/4 Strength 1 Application(s) Topical every 12 hours  dextrose 5% + lactated ringers. 1000 milliLiter(s) IV Continuous <Continuous>  folic acid 1 milliGRAM(s) Oral daily  lidocaine   4% Patch 1 Patch Transdermal daily  metoprolol tartrate 25 milliGRAM(s) Oral every 8 hours  multivitamin 1 Tablet(s) Oral daily  oxyCODONE    IR 2.5 milliGRAM(s) Oral every 4 hours PRN  pantoprazole    Tablet 40 milliGRAM(s) Oral every 12 hours  potassium chloride  10 mEq/100 mL IVPB 10 milliEquivalent(s) IV Intermittent every 1 hour      PMHX/PSHX:  No pertinent past medical history    No pertinent past medical history    Crohn disease    Paroxysmal atrial fibrillation    CAD (coronary artery disease)    GI bleed    No significant past surgical history            ROS: 14 point ROS negative unless otherwise stated in subjective      PHYSICAL EXAM:     GENERAL:  Appears stated age, pale, chronically ill appearing  HEENT:  NC/AT,  conjunctivae clear, sclera -anicteric  CHEST:  Poor effort  HEART:  Regular rhythm, S1, S2   ABDOMEN:  Soft, non-tender, non-distended, ileostomy in place with brown output  EXTREMITIES:  no cyanosis, clubbing or edema  SKIN:  +pale  NEURO:  Alert, oriented    Vital Signs:  Vital Signs Last 24 Hrs  T(C): 36.3 (18 Sep 2021 15:00), Max: 36.5 (17 Sep 2021 23:00)  T(F): 97.3 (18 Sep 2021 15:00), Max: 97.7 (17 Sep 2021 23:00)  HR: 75 (18 Sep 2021 17:00) (63 - 87)  BP: 149/67 (18 Sep 2021 17:00) (95/50 - 149/67)  BP(mean): 96 (18 Sep 2021 17:00) (65 - 100)  RR: 27 (18 Sep 2021 17:00) (13 - 34)  SpO2: 100% (18 Sep 2021 17:00) (99% - 100%)  Daily     Daily     LABS:                        8.6    13.26 )-----------( 269      ( 18 Sep 2021 16:59 )             27.3     09-18    139  |  106  |  6<L>  ----------------------------<  145<H>  3.2<L>   |  26  |  0.54    Ca    7.5<L>      18 Sep 2021 13:23  Phos  4.2     09-18  Mg     2.1     09-18    TPro  4.6<L>  /  Alb  1.6<L>  /  TBili  0.2  /  DBili  x   /  AST  11  /  ALT  12  /  AlkPhos  70  09-18    LIVER FUNCTIONS - ( 18 Sep 2021 13:23 )  Alb: 1.6 g/dL / Pro: 4.6 g/dL / ALK PHOS: 70 U/L / ALT: 12 U/L / AST: 11 U/L / GGT: x           PT/INR - ( 17 Sep 2021 00:45 )   PT: 14.1 sec;   INR: 1.18 ratio         PTT - ( 17 Sep 2021 00:45 )  PTT:30.3 sec        Imaging:

## 2021-09-18 NOTE — PROGRESS NOTE ADULT - ATTENDING COMMENTS
s/p EGD, ileoscopy yest  EGD normal  Ileoscopy with scattered well healed ulcers (3 in distal 15-20 cm)  Ostomy bag with brown stool  Mild discomfort today in abd and legs  vital stable, otherwise benign exam with no TTP    c/w daily ppi  monitor hgb  no further gi endoscopy evaluation at this time  GI to follow

## 2021-09-18 NOTE — PROGRESS NOTE ADULT - ATTENDING COMMENTS
Pt is a 81 year old male with a medical history significant for CAD/HTN/afib s/p total colectomy for Crohn's disease.  markedly weak and deconditioned  Pt with crampy abd pain and worsening anemia today  Recent endoscopy with findings of two non-bleeding ulcers  Continue diet  Continue PPI BID  continue telemetry monitoring   Continue beta blocker for atrial fibrillation.  Transfuse 1U PRBC.  DVT proph on hold for bleeding .

## 2021-09-18 NOTE — PROGRESS NOTE ADULT - ASSESSMENT
A/P  82yo M with Crohn's disease, now s/p total abdominal colectomy with end ileostomy on 8/20.     GI bleed   stable H/h now     # DVT below knee b/L   -seen by vascular   hold off on AC 2/2 multiple episodes of GI bleed and transfusion    # CAD / PAF/ HTN   -History of AFib currently rate controlled with IV metoprolol and amiodarone, now in NSR  lopressor 25 mg q 8    # Sever colitis 2/2 chron's and c diff   - s/p total abdominal colectomy with end ileostomy, failed S/S, bloody ostomy output  protonix bid     # Sever malnutrition   started on puree diet       Code Status: DNR/DNI, MOLST in chart

## 2021-09-18 NOTE — PROGRESS NOTE ADULT - ASSESSMENT
1. Acute blood loss anemia - differential includes PUD, stress ulcers, less likely small bowel angioectasia/source. s/p EGD/ileoscopy 9/17 showing few ulcers in distal ileum, LA grade A esophagitis, few non-bleeding erosions in gastric antrum w/o stigmata of recent bleeding  2. Severe Crohn's disease with predominantly colonic disease - complicated by C diff and failed steroid rescue and previous infliximab, now s/p total colectomy with ileostomy  3. Altered mental status - suspect delirium given prolonged hospitalization    Recommendations:  - monitor ileostomy output  - c/w IV PPI 40 mg BID  - trending daily CBC    Teri Hess MD  GI Fellow, PGY-4  Available via [employer] Teams    NON-URGENT CONSULTS:  Please email [email or web address]  AT NIGHT AND ON WEEKENDS:  Contact on-call GI fellow via answering service ([phone]) from 5pm-8am and on weekends/holidays  MONDAY-FRIDAY 8AM-5PM:  Pager# [phone] ([facility]) or [phone] ([facility])  GI Phone# [phone] ([facility])

## 2021-09-18 NOTE — CHART NOTE - NSCHARTNOTEFT_GEN_A_CORE
Surgery Post-Op Note    Pre-Op Dx: Gastrointestinal bleeding  Procedure: Upper GI Endoscopy    Surgeon: Eric Roy    SUBJECTIVE:  Pt seen and examined at the bedside. Pt w/ no complaints. Denies F/C/N/V. Pain controlled with medication. No chest pain or shortness of breath. Ostomy with fecal output; no blood.                                                                                                  OBJECTIVE:  Vital Signs Last 24 Hrs  T(C): 36.2 (18 Sep 2021 03:00), Max: 36.5 (17 Sep 2021 07:00)  T(F): 97.2 (18 Sep 2021 03:00), Max: 97.7 (17 Sep 2021 07:00)  HR: 69 (18 Sep 2021 04:00) (61 - 91)  BP: 129/61 (18 Sep 2021 04:00) (94/55 - 159/72)  BP(mean): 88 (18 Sep 2021 04:00) (72 - 104)  RR: 16 (18 Sep 2021 04:00) (12 - 34)  SpO2: 99% (18 Sep 2021 04:00) (98% - 100%)    Physical Exam:  General: NAD, resting comfortably in bed  Neuro: A/O x 3, no focal deficits  Pulmonary: Nonlabored breathing, no respiratory distress  Cardiovascular: NSR  Abdominal: soft, ATTP. ND. Ostomy with fecal output; no blood.  Extremities: WWP    LABS:                        7.5    9.83  )-----------( 243      ( 18 Sep 2021 02:02 )             24.2     09-18    140  |  108  |  7   ----------------------------<  102<H>  3.5   |  25  |  0.58    Ca    7.6<L>      18 Sep 2021 02:02  Phos  2.7     09-18  Mg     1.9     09-18    TPro  4.7<L>  /  Alb  1.6<L>  /  TBili  0.3  /  DBili  x   /  AST  12  /  ALT  12  /  AlkPhos  73  09-18    PT/INR - ( 17 Sep 2021 00:45 )   PT: 14.1 sec;   INR: 1.18 ratio         PTT - ( 17 Sep 2021 00:45 )  PTT:30.3 sec  CAPILLARY BLOOD GLUCOSE          LIVER FUNCTIONS - ( 18 Sep 2021 02:02 )  Alb: 1.6 g/dL / Pro: 4.7 g/dL / ALK PHOS: 73 U/L / ALT: 12 U/L / AST: 12 U/L / GGT: x               IMAGING:    ASSESSMENT: 81y Male now 4hours s/p upper GI endoscopy. Procedure showed non-bleeding erosive gastropathy and otherwise normal endoscopy with no recent or active bleeding    PLAN:  GI recs:    - IV PPI Daily    - Return patient to SICU care     Team Surgery

## 2021-09-18 NOTE — PROGRESS NOTE ADULT - SUBJECTIVE AND OBJECTIVE BOX
HPI:  81y Male with Crohn disease s/p failed medical management. 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 Transferred to SICU intubated and on pressors. Now extubated and off pressors.    24 HOUR EVENTS:  - Finally underwent EGD and ileoscopy - erosive gastropathy and ileal ulcers visualized without evidence of acute bleed  - Protonix gtt d/c'd, transitioned to Protonix BID    SUBJECTIVE/ROS:  Patient seen at bedside this AM. Reports feeling well, without complaints. Pain is well-controlled. Denies headache, shortness of breath, chest pain, dizziness.     PHYSICAL EXAM:    NEURO  Exam: awake, alert, oriented  Meds: acetaminophen   Tablet .. 650 milliGRAM(s) Oral every 6 hours PRN Mild Pain (1 - 3)  oxyCODONE    IR 2.5 milliGRAM(s) Oral every 4 hours PRN Moderate Pain (4 - 6)  [x] Adequacy of sedation and pain control has been assessed and adjusted    RESPIRATORY  RR: 18 (09-18-21 @ 02:00) (12 - 34)  SpO2: 100% (09-18-21 @ 02:00) (98% - 100%)  Exam: no increased WOB on RA    CARDIOVASCULAR  HR: 87 (09-18-21 @ 02:00) (61 - 91)  BP: 112/60 (09-18-21 @ 02:00) (93/50 - 159/72)  BP(mean): 81 (09-18-21 @ 02:00) (66 - 104)  Exam: regular rate and rhythm noted on cardiac monitor  Cardiac Rhythm: normal sinus rhythm  Perfusion     [x]Adequate   [ ]Inadequate  Mentation   [x]Normal       [ ]Reduced  Extremities  [x]Warm         [ ]Cool  Volume Status [ ]Hypervolemic [x]Euvolemic [ ]Hypovolemic  Meds: metoprolol tartrate 25 milliGRAM(s) Oral every 8 hours    GI/NUTRITION  Exam: soft, nontender, nondistended  Meds: pantoprazole    Tablet 40 milliGRAM(s) Oral every 12 hours    GENITOURINARY  I&O's Detail    09-16 @ 07:01  -  09-17 @ 07:00  --------------------------------------------------------  IN:    dextrose 5% + lactated ringers: 1200 mL    IV PiggyBack: 100 mL    IV PiggyBack: 300 mL    Pantoprazole: 240 mL  Total IN: 1840 mL    OUT:    Bulb (mL): 8 mL    Bulb (mL): 50 mL    Ileostomy (mL): 1300 mL    Incontinent per Condom Catheter (mL): 1450 mL  Total OUT: 2808 mL    Total NET: -968 mL    09-17 @ 07:01  -  09-18 @ 03:07  --------------------------------------------------------  IN:    dextrose 5% + lactated ringers: 900 mL    Pantoprazole: 70 mL  Total IN: 970 mL    OUT:    Bulb (mL): 15 mL    Bulb (mL): 5 mL    Ileostomy (mL): 100 mL    Incontinent per Condom Catheter (mL): 600 mL  Total OUT: 720 mL    Total NET: 250 mL    Labs: 09-18    140  |  108  |  7   ----------------------------<  102<H>  3.5   |  25  |  0.58    Ca    7.6<L>      18 Sep 2021 02:02  Phos  2.7     09-18  Mg     1.9     09-18    TPro  4.7<L>  /  Alb  1.6<L>  /  TBili  0.3  /  DBili  x   /  AST  12  /  ALT  12  /  AlkPhos  73  09-18    Meds: cholecalciferol 1000 Unit(s) Oral daily  dextrose 5% + lactated ringers. 1000 milliLiter(s) IV Continuous <Continuous>  folic acid 1 milliGRAM(s) Oral daily  magnesium sulfate  IVPB 2 Gram(s) IV Intermittent once  multivitamin 1 Tablet(s) Oral daily  sodium chloride 0.9%. 500 milliLiter(s) IV Continuous <Continuous>    HEMATOLOGIC  Labs:              7.5    9.83  )-----------( 243      ( 18 Sep 2021 02:02 )             24.2   PT/INR - ( 17 Sep 2021 00:45 )   PT: 14.1 sec;   INR: 1.18 ratio    PTT - ( 17 Sep 2021 00:45 )  PTT:30.3 sec  Transfusion     [ ] PRBC   [ ] Platelets   [ ] FFP   [ ] Cryoprecipitate    INFECTIOUS DISEASES  Labs: WBC Count: 9.83 K/uL (09-18 @ 02:02)  Recent Cultures: none      ACCESS DEVICES:  [2] Peripheral IV  [ ] Central Venous Line	[ ] R	[ ] L	[ ] IJ	[ ] Fem	[ ] SC	Placed:   [ ] Arterial Line		[ ] R	[ ] L	[ ] Fem	[ ] Rad	[ ] Ax	Placed:   [ ] PICC:					[ ] Mediport  [ ] Urinary Catheter, Date Placed:   [x] Necessity of urinary, arterial, and venous catheters discussed      OTHER MEDICATIONS:  Biotene Dry Mouth Oral Rinse 5 milliLiter(s) Swish and Spit two times a day  chlorhexidine 2% Cloths 1 Application(s) Topical <User Schedule>  Dakins Solution - 1/4 Strength 1 Application(s) Topical every 12 hours  lidocaine   4% Patch 1 Patch Transdermal daily      IMAGING:    EGD - 9/17:  Procedure:           Pre-Anesthesia Assessment:                       - Prior to the procedure, a History and Physical was performed, and patient                        medications and allergies were reviewed. The patient is unable to give                        consent secondary to the patient's altered mental status. The risks and                        benefits of the procedure and the sedation options and risks were discussed                        with the patient's spouse. All questions were answered and informed consent                        was obtained. Patient identification and proposed procedure were verified by                        the physician, the nurse, the anesthesiologist and the anesthetist in the                        pre-procedure area in the procedure room in the endoscopy suite. Mental                        Status Examination: alert but confused. Prophylactic Antibiotics: The patient                        does not require prophylactic antibiotics. Prior Anticoagulants: The patient                        has taken no previous anticoagulant or antiplatelet agents. ASA Grade                        Assessment: IV - A patient with severe systemic disease that is a constant                        threat to life. After reviewing the risks and benefits, the patient was                        deemed in satisfactory condition to undergo the procedure. The anesthesia                        plan was to use monitored anesthesia care (MAC). Immediately prior to                        administration of medications, the patient was re-assessed for adequacy to                        receive sedatives. The heart rate, respiratory rate, oxygen saturations,                        blood pressure, adequacy of pulmonary ventilation, and response to care were                        monitored throughout the procedure. The physical status of the patient was                        re-assessed after the procedure.                       After obtaining informed consent, the endoscope was passed under direct                        vision. Throughout the procedure, the patient's blood pressure, pulse, and                        oxygen saturations were monitored continuously. The Endoscope was introduced                        through the mouth, and advanced to the second part of duodenum. The upper GI                        endoscopy was accomplished without difficulty. The patient tolerated the                        procedure well.                                                                                                        Findings:       LA Grade A (one or more mucosal breaks less than 5 mm, not extending between tops of 2        mucosal folds) esophagitis with no bleeding was found at the gastroesophageal junction.       The exam of the esophagus was otherwise normal.       A few dispersed, diminutive non-bleeding erosions were found in the gastric antrum. There        were no stigmata of recent bleeding.       The exam of the stomach was otherwise normal.       The examined duodenum was normal.                                                                                                        Impression:          - LA Grade A esophagitis.                       - Non-bleeding erosive gastropathy.                       - Otherwise normal endoscopy with no recent or active bleeding  Recommendation:      - IV PPI Daily                       - Proceed to Ileoscopy (images from Ileoscopy are on this study)    Enteroscopy - 9/17:  Procedure:           After obtaining informed consent, the endoscope was passed under direct                        vision. Throughout the procedure, the patient's blood pressure, pulse, and                        oxygen saturations were monitored continuously. The Endoscope was introduced                        through the ileostomy and advanced 50cm distal to entry. The small bowel                        enteroscopy was accomplished without difficulty. The patient tolerated the                        procedure well. After obtaining informed consent, the endoscope was passed                        under direct vision. Throughout the procedure, the patient's blood pressure,                        pulse, and oxygen saturations were monitored continuously.                                                                                                        Findings:       The distal ileum contained scattered erosions/ulcers with clean bases. They ranged in size        from 5 to 8 mm. No bleeding was present. No stigmata of recent bleeding were seen.       The remainder of the exam in the distal ileum was normal. The endoscope was advanced                                                                                                        Impression:          - A few ulcers in the distal ileum.                       - No specimens collected.                       (please see endoscopy report for images)

## 2021-09-18 NOTE — PROGRESS NOTE ADULT - ASSESSMENT
81y Male with Crohn disease s/p failed medical management. 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 Transferred to SICU intubated and on pressors. Now extubated and off pressors.    PLAN:  Neurologic: post-op pain control, new-onset hypophonia without focal neuro deficits, obtundation  - Monitor mental status  - Pain control: PO Tylenol 500mg q6hr PRN, Oxycodone 2.5mg prn   - Vitamin D3, Folic acid, and multivitamins daily    Respiratory: Acute respiratory insufficiency resolving, patient on RA  - Incentive spirometry 10x/hr while in bed, OOBTC    Cardiovascular: History of AFib currently rate controlled with IV metoprolol and digoxin  - Metoprolol 25mg PO q8h   - Monitor hemodynamics    Gastrointestinal/Nutrition: s/p total abdominal colectomy with end ileostomy c/b recurrent bloody ostomy output; s/p TPN  - Diet: Mechanical Soft / Honey Thickened liquid diet + Ensures 3x/day  - Protonix 40mg PO BID  - Biotene mouth wash  - Monitor ostomy output and character    Renal/Genitourinary: no active issues   - D5 LR @ 50cc/hr  - Monitor and replete electrolytes as needed  - Monitor I&Os, voiding spontaneously  - BMP q12hr    Hematologic: recurrent GIB  - VTE ppx held due to c/f GIB  - f/u AM CBC  - Duplex study in 1 week given VTE findings  - Trend H/H and transfuse Hgb < 7.5 on CBCs qD    Infectious Disease: C. diff colitis vs Crohn's flair s/p subtotal colectomy   - Trend WBC on CBC q12hr  - Monitor fever curve    Endocrine: no active issues; s/p steroid taper 8/26  - Monitor glucose on BMP daily    Lines & Drains:   - LLQ & LUQ drain 8/20  - TPN 8/16  - RUE PICC placed 8/16    Disposition: SICU  Code Status: DNR/DNI, MOLST in chart

## 2021-09-18 NOTE — PROGRESS NOTE ADULT - ASSESSMENT
81M with recently dx Crohn's disease c/b perianal abscess and left posterior distal anal intersphincteric fistula and presents diarrhea/BRBPR  2/2 to CD flare c/b c diff colitis. Initially treat for C diff, s/p FMT. Taken emergently to OR on 8/20 for acute deterioration for laparoscopic total colectomy with end ileostomy, POD29. Failed speech and swallow 9/10. Now 1 day post upper GI endoscopy which showed no with no recent or active bleeding (9/17).    PLAN:  - Protonix BID  - FU labs and transfuse as necessary  - Diet: dysphagia 2  - Ongoing Palliative and GOC discussions with family.   - Dispo planning for rehab, patient should receive 500cc bolus every 2 days until his PO intake is adequate  - Wound care: daily dressing changes with midline incision packed   - Appreciate excellent care per SICU    [facility]  [id number]  81M with recently dx Crohn's disease c/b perianal abscess and left posterior distal anal intersphincteric fistula and presents diarrhea/BRBPR  2/2 to CD flare c/b c diff colitis. Initially treat for C diff, s/p FMT. Taken emergently to OR on 8/20 for acute deterioration for laparoscopic total colectomy with end ileostomy, POD29. Failed speech and swallow 9/10. Now 1 day post upper GI endoscopy which showed no with no recent or active bleeding (9/17).    PLAN:  - C/w Protonix BID  - Diet: dysphagia 2  - Would recommend carafate   - Ongoing Palliative and GOC discussions with family.   - Dispo planning for rehab, patient should receive 500cc bolus every 2 days until his PO intake is adequate  - Wound care: daily dressing changes with midline incision packed   - Appreciate excellent care per SICU    [facility]  [id number]

## 2021-09-19 NOTE — PROGRESS NOTE ADULT - SUBJECTIVE AND OBJECTIVE BOX
24h Events:  No acute events overnight.    Subjective:   Patient seen at bedside this AM.     Objective:  Vital Signs  T(C): 36.3 (09-18 @ 23:00), Max: 36.3 (09-18 @ 11:00)  HR: 61 (09-19 @ 00:00) (61 - 87)  BP: 140/65 (09-19 @ 00:00) (95/50 - 149/67)  RR: 14 (09-19 @ 00:00) (13 - 32)  SpO2: 100% (09-19 @ 00:00) (99% - 100%)  09-17-21 @ 07:01  -  09-18-21 @ 07:00  --------------------------------------------------------  IN:  Total IN: 0 mL    OUT:    Bulb (mL): 5 mL    Bulb (mL): 55 mL    Ileostomy (mL): 200 mL    Incontinent per Condom Catheter (mL): 1300 mL  Total OUT: 1560 mL    Total NET: -1560 mL      09-18-21 @ 07:01  -  09-19-21 @ 00:36  --------------------------------------------------------  IN:  Total IN: 0 mL    OUT:    Bulb (mL): 5 mL    Bulb (mL): 120 mL    Ileostomy (mL): 350 mL    Incontinent per Condom Catheter (mL): 450 mL  Total OUT: 925 mL    Total NET: -925 mL      PHYSICAL EXAM:  General: NAD, resting comfortably in bed  Neuro: A/O x 3, no focal deficits  Pulmonary: Nonlabored breathing, no respiratory distress  Abdominal: soft, ATTP. ND. Ostomy with fecal output; no blood.  Extremities: warm, well perfused    Labs:                        7.9    11.61 )-----------( 237      ( 19 Sep 2021 00:12 )             25.4   09-18    137  |  105  |  6<L>  ----------------------------<  119<H>  4.2   |  24  |  0.56    Ca    7.7<L>      18 Sep 2021 16:59  Phos  3.5     09-18  Mg     2.0     09-18    TPro  4.9<L>  /  Alb  1.7<L>  /  TBili  0.3  /  DBili  x   /  AST  11  /  ALT  14  /  AlkPhos  77  09-18    CAPILLARY BLOOD GLUCOSE          Imaging:     24h Events:  No acute events overnight.    Subjective:   Patient seen at bedside this AM. No complaints this morning.     Objective:  Vital Signs  T(C): 36.3 (09-18 @ 23:00), Max: 36.3 (09-18 @ 11:00)  HR: 61 (09-19 @ 00:00) (61 - 87)  BP: 140/65 (09-19 @ 00:00) (95/50 - 149/67)  RR: 14 (09-19 @ 00:00) (13 - 32)  SpO2: 100% (09-19 @ 00:00) (99% - 100%)  09-17-21 @ 07:01  -  09-18-21 @ 07:00  --------------------------------------------------------  IN:  Total IN: 0 mL    OUT:    Bulb (mL): 5 mL    Bulb (mL): 55 mL    Ileostomy (mL): 200 mL    Incontinent per Condom Catheter (mL): 1300 mL  Total OUT: 1560 mL    Total NET: -1560 mL      09-18-21 @ 07:01  -  09-19-21 @ 00:36  --------------------------------------------------------  IN:  Total IN: 0 mL    OUT:    Bulb (mL): 5 mL    Bulb (mL): 120 mL    Ileostomy (mL): 350 mL    Incontinent per Condom Catheter (mL): 450 mL  Total OUT: 925 mL    Total NET: -925 mL      PHYSICAL EXAM:  General: NAD, resting comfortably in bed  Neuro: A/O x 3, no focal deficits  Pulmonary: Nonlabored breathing, no respiratory distress  Abdominal: soft, ATTP. ND. Ostomy with fecal output; no blood.  Extremities: warm, well perfused    Labs:                        7.9    11.61 )-----------( 237      ( 19 Sep 2021 00:12 )             25.4   09-18    137  |  105  |  6<L>  ----------------------------<  119<H>  4.2   |  24  |  0.56    Ca    7.7<L>      18 Sep 2021 16:59  Phos  3.5     09-18  Mg     2.0     09-18    TPro  4.9<L>  /  Alb  1.7<L>  /  TBili  0.3  /  DBili  x   /  AST  11  /  ALT  14  /  AlkPhos  77  09-18    CAPILLARY BLOOD GLUCOSE          Imaging:

## 2021-09-19 NOTE — PROGRESS NOTE ADULT - SUBJECTIVE AND OBJECTIVE BOX
Patient is a 81y old  Male who presents with a chief complaint of diarrhea with blood in it , abd pain and weakness (19 Sep 2021 18:16)      INTERVAL HPI/OVERNIGHT EVENTS: transfer to floor   T(C): 36.5 (09-19-21 @ 20:00), Max: 36.5 (09-19-21 @ 20:00)  HR: 85 (09-19-21 @ 20:00) (61 - 85)  BP: 135/63 (09-19-21 @ 20:00) (106/56 - 164/72)  RR: 26 (09-19-21 @ 20:00) (14 - 37)  SpO2: 100% (09-19-21 @ 20:00) (98% - 100%)  Wt(kg): --  I&O's Summary    18 Sep 2021 07:01  -  19 Sep 2021 07:00  --------------------------------------------------------  IN: 3060 mL / OUT: 2130 mL / NET: 930 mL    19 Sep 2021 07:01  -  19 Sep 2021 23:50  --------------------------------------------------------  IN: 950 mL / OUT: 1270 mL / NET: -320 mL        PAST MEDICAL & SURGICAL HISTORY:  Crohn disease    Paroxysmal atrial fibrillation    CAD (coronary artery disease)  not on ASA or plavix, no stents as per daughter    GI bleed    No significant past surgical history        SOCIAL HISTORY  Alcohol:  Tobacco:  Illicit substance use:    FAMILY HISTORY:    REVIEW OF SYSTEMS:  CONSTITUTIONAL: No fever, weight loss, or fatigue  EYES: No eye pain, visual disturbances, or discharge  ENMT:  No difficulty hearing, tinnitus, vertigo; No sinus or throat pain  NECK: No pain or stiffness  RESPIRATORY: No cough, wheezing, chills or hemoptysis; No shortness of breath  CARDIOVASCULAR: No chest pain, palpitations, dizziness, or leg swelling  GASTROINTESTINAL: No abdominal or epigastric pain. No nausea, vomiting, or hematemesis; No diarrhea or constipation. No melena or hematochezia.  GENITOURINARY: No dysuria, frequency, hematuria, or incontinence  NEUROLOGICAL: No headaches, memory loss, loss of strength, numbness, or tremors  SKIN: No itching, burning, rashes, or lesions   LYMPH NODES: No enlarged glands  ENDOCRINE: No heat or cold intolerance; No hair loss  MUSCULOSKELETAL: No joint pain or swelling; No muscle, back, or extremity pain  PSYCHIATRIC: No depression, anxiety, mood swings, or difficulty sleeping  HEME/LYMPH: No easy bruising, or bleeding gums  ALLERY AND IMMUNOLOGIC: No hives or eczema    RADIOLOGY & ADDITIONAL TESTS:    Imaging Personally Reviewed:  [ ] YES  [ ] NO    Consultant(s) Notes Reviewed:  [ ] YES  [ ] NO    PHYSICAL EXAM:  GENERAL: NAD, well-groomed, well-developed  HEAD:  Atraumatic, Normocephalic  EYES: EOMI, PERRLA, conjunctiva and sclera clear  ENMT: No tonsillar erythema, exudates, or enlargement; Moist mucous membranes, Good dentition, No lesions  NECK: Supple, No JVD, Normal thyroid  NERVOUS SYSTEM:  Alert & Oriented X3, Good concentration; Motor Strength 5/5 B/L upper and lower extremities; DTRs 2+ intact and symmetric  CHEST/LUNG: Clear to percussion bilaterally; No rales, rhonchi, wheezing, or rubs  HEART: Regular rate and rhythm; No murmurs, rubs, or gallops  ABDOMEN: Soft, Nontender, Nondistended; Bowel sounds present  EXTREMITIES:  2+ Peripheral Pulses, No clubbing, cyanosis, or edema  LYMPH: No lymphadenopathy noted  SKIN: No rashes or lesions    LABS:                        7.9    11.61 )-----------( 237      ( 19 Sep 2021 00:12 )             25.4     09-19    136  |  105  |  6<L>  ----------------------------<  103<H>  3.9   |  24  |  0.55    Ca    7.4<L>      19 Sep 2021 00:12  Phos  2.6     09-19  Mg     2.0     09-19    TPro  4.7<L>  /  Alb  1.6<L>  /  TBili  0.3  /  DBili  x   /  AST  11  /  ALT  12  /  AlkPhos  71  09-19        CAPILLARY BLOOD GLUCOSE                MEDICATIONS  (STANDING):  Biotene Dry Mouth Oral Rinse 5 milliLiter(s) Swish and Spit two times a day  chlorhexidine 2% Cloths 1 Application(s) Topical <User Schedule>  cholecalciferol 1000 Unit(s) Oral daily  Dakins Solution - 1/4 Strength 1 Application(s) Topical every 12 hours  dextrose 5% + lactated ringers. 1000 milliLiter(s) (50 mL/Hr) IV Continuous <Continuous>  folic acid 1 milliGRAM(s) Oral daily  lidocaine   4% Patch 1 Patch Transdermal daily  metoprolol tartrate 25 milliGRAM(s) Oral every 8 hours  multivitamin 1 Tablet(s) Oral daily  pantoprazole    Tablet 40 milliGRAM(s) Oral every 12 hours  sucralfate 1 Gram(s) Oral every 12 hours    MEDICATIONS  (PRN):  acetaminophen   Tablet .. 650 milliGRAM(s) Oral every 6 hours PRN Mild Pain (1 - 3)      Care Discussed with Consultants/Other Providers [ ] YES  [ ] NO

## 2021-09-19 NOTE — PROGRESS NOTE ADULT - SUBJECTIVE AND OBJECTIVE BOX
Chief Complaint:  Patient is a 81y old  Male who presents with a chief complaint of diarrhea with blood in it , abd pain and weakness (19 Sep 2021 00:55)      Interval Events: Hgb 7.9 this AM after transfusion. Reports pain in his buttock 2/2 sore but no other acute complaints this morning.      Hospital Medications:  acetaminophen   Tablet .. 650 milliGRAM(s) Oral every 6 hours PRN  Biotene Dry Mouth Oral Rinse 5 milliLiter(s) Swish and Spit two times a day  chlorhexidine 2% Cloths 1 Application(s) Topical <User Schedule>  cholecalciferol 1000 Unit(s) Oral daily  Dakins Solution - 1/4 Strength 1 Application(s) Topical every 12 hours  dextrose 5% + lactated ringers. 1000 milliLiter(s) IV Continuous <Continuous>  folic acid 1 milliGRAM(s) Oral daily  lidocaine   4% Patch 1 Patch Transdermal daily  metoprolol tartrate 25 milliGRAM(s) Oral every 8 hours  multivitamin 1 Tablet(s) Oral daily  pantoprazole    Tablet 40 milliGRAM(s) Oral every 12 hours  sucralfate 1 Gram(s) Oral every 12 hours      PMHX/PSHX:  No pertinent past medical history    No pertinent past medical history    Crohn disease    Paroxysmal atrial fibrillation    CAD (coronary artery disease)    GI bleed    No significant past surgical history            ROS: 14 point ROS negative unless otherwise stated in subjective      PHYSICAL EXAM:     GENERAL:  Appears stated age, pale, chronically ill appearing  HEENT:  NC/AT,  conjunctivae clear, sclera -anicteric  CHEST:  Poor effort  HEART:  Regular rhythm, S1, S2   ABDOMEN:  Soft, non-tender, non-distended, ileostomy in place with brown output  EXTREMITIES:  no cyanosis, clubbing or edema  SKIN:  +pale  NEURO:  Alert, oriented    Vital Signs:  Vital Signs Last 24 Hrs  T(C): 36.3 (19 Sep 2021 12:00), Max: 36.3 (18 Sep 2021 23:00)  T(F): 97.3 (19 Sep 2021 12:00), Max: 97.3 (18 Sep 2021 23:00)  HR: 84 (19 Sep 2021 13:00) (61 - 84)  BP: 120/57 (19 Sep 2021 13:00) (106/56 - 164/72)  BP(mean): 82 (19 Sep 2021 13:00) (78 - 104)  RR: 24 (19 Sep 2021 13:00) (14 - 37)  SpO2: 99% (19 Sep 2021 13:00) (98% - 100%)  Daily     Daily Weight in k.4 (19 Sep 2021 06:00)    LABS:                        7.9    11.61 )-----------( 237      ( 19 Sep 2021 00:12 )             25.4         136  |  105  |  6<L>  ----------------------------<  103<H>  3.9   |  24  |  0.55    Ca    7.4<L>      19 Sep 2021 00:12  Phos  2.6       Mg     2.0         TPro  4.7<L>  /  Alb  1.6<L>  /  TBili  0.3  /  DBili  x   /  AST  11  /  ALT  12  /  AlkPhos  71  -19    LIVER FUNCTIONS - ( 19 Sep 2021 00:12 )  Alb: 1.6 g/dL / Pro: 4.7 g/dL / ALK PHOS: 71 U/L / ALT: 12 U/L / AST: 11 U/L / GGT: x                   Imaging: No new abdominal imaging

## 2021-09-19 NOTE — PROGRESS NOTE ADULT - ATTENDING COMMENTS
Did require xfusion yesterday with appropriate increase.  Slight drift in hgb today  no more abdominal pain  sig swelling in extremity is causing discomfort  brown stool in ostomy  No further gi intervention at this

## 2021-09-19 NOTE — PROGRESS NOTE ADULT - ATTENDING COMMENTS
Pt is a 81 year old male with a medical history significant for CAD/HTN/afib s/p total colectomy for Crohn's disease.  markedly weak and deconditioned  Resolution of crampy abd pain   anemia stable today   Recent endoscopy with findings of two non-bleeding ulcers  D/c oxycodone  Continue metoprolol  Continue diet  Continue PPI BID  continue telemetry monitoring   Continue beta blocker for atrial fibrillation.  DVT proph on hold for recent bleeding .   Pt stable for transfer to floor.

## 2021-09-19 NOTE — PROGRESS NOTE ADULT - ASSESSMENT
1. Acute blood loss anemia - differential includes PUD, stress ulcers, less likely small bowel angioectasia/source. s/p EGD/ileoscopy 9/17 showing few ulcers in distal ileum, LA grade A esophagitis, few non-bleeding erosions in gastric antrum w/o stigmata of recent bleeding  2. Severe Crohn's disease with predominantly colonic disease - complicated by C diff and failed steroid rescue and previous infliximab, now s/p total colectomy with ileostomy  3. Altered mental status - suspect delirium given prolonged hospitalization    Recommendations:  - monitor ileostomy output  - c/w IV PPI 40 mg BID  - trending daily CBC    Teri Hess MD  GI Fellow, PGY-4  Available via [employer] Teams    NON-URGENT CONSULTS:  Please email [email or web address]  AT NIGHT AND ON WEEKENDS:  Contact on-call GI fellow via answering service ([phone]) from 5pm-8am and on weekends/holidays  MONDAY-FRIDAY 8AM-5PM:  Pager# [phone] ([facility] or [phone] ([facility])  GI Phone# [phone] ([facility])

## 2021-09-19 NOTE — PROGRESS NOTE ADULT - ASSESSMENT
81M with recently dx Crohn's disease c/b perianal abscess and left posterior distal anal intersphincteric fistula and presents diarrhea/BRBPR  2/2 to CD flare c/b c diff colitis. Initially treat for C diff, s/p FMT. Taken emergently to OR on 8/20 for acute deterioration for laparoscopic total colectomy with end ileostomy, POD29. Failed speech and swallow 9/10. Now 1 day post upper GI endoscopy which showed no with no recent or active bleeding (9/17).    PLAN:  - C/w Protonix BID  - Diet: dysphagia 2  - Would recommend carafate   - Ongoing Palliative and GOC discussions with family.   - Dispo planning for rehab, patient should receive 500cc bolus every 2 days until his PO intake is adequate  - Wound care: daily dressing changes with midline incision packed   - Appreciate excellent care per SICU    Red Surgery  [id number]

## 2021-09-19 NOTE — PROGRESS NOTE ADULT - SUBJECTIVE AND OBJECTIVE BOX
HPI:  81y Male with Crohn disease s/p failed medical management. 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 Transferred to SICU intubated and on pressors. Now extubated and off pressors.    24 HOUR EVENTS:  - 1unit prbc for Hb 6/7    SUBJECTIVE/ROS:  Patient seen at bedside this AM. Reports feeling well, without complaints. Pain is well-controlled. Denies headache, shortness of breath, chest pain, dizziness.     PHYSICAL EXAM:    NEURO  Exam: awake, alert, oriented  Meds: acetaminophen   Tablet .. 650 milliGRAM(s) Oral every 6 hours PRN Mild Pain (1 - 3)  oxyCODONE    IR 2.5 milliGRAM(s) Oral every 4 hours PRN Moderate Pain (4 - 6)  [x] Adequacy of sedation and pain control has been assessed and adjusted    RESPIRATORY  RR: 14 (09-19-21 @ 00:00) (13 - 32)  SpO2: 100% (09-19-21 @ 00:00) (99% - 100%)  Wt(kg): --  Exam: no increased WOB on RA    CARDIOVASCULAR  HR: 61 (09-19-21 @ 00:00) (61 - 87)  BP: 140/65 (09-19-21 @ 00:00) (95/50 - 149/67)  BP(mean): 94 (09-19-21 @ 00:00) (65 - 100)  ABP: --  ABP(mean): --  Wt(kg): --  CVP(cm H2O): --      Exam: regular rate and rhythm noted on cardiac monitor  Cardiac Rhythm: normal sinus rhythm  Perfusion     [x]Adequate   [ ]Inadequate  Mentation   [x]Normal       [ ]Reduced  Extremities  [x]Warm         [ ]Cool  Volume Status [ ]Hypervolemic [x]Euvolemic [ ]Hypovolemic  Meds: metoprolol tartrate 25 milliGRAM(s) Oral every 8 hours      GI/NUTRITION  Exam: soft, nontender, nondistended, incision c/d/i  Meds: pantoprazole    Tablet 40 milliGRAM(s) Oral every 12 hours      GENITOURINARY  I&O's Detail    09-17 @ 07:01  -  09-18 @ 07:00  --------------------------------------------------------  IN:    dextrose 5% + lactated ringers: 1200 mL    IV PiggyBack: 50 mL    Pantoprazole: 70 mL  Total IN: 1320 mL    OUT:    Bulb (mL): 5 mL    Bulb (mL): 55 mL    Ileostomy (mL): 200 mL    Incontinent per Condom Catheter (mL): 1300 mL  Total OUT: 1560 mL    Total NET: -240 mL      09-18 @ 07:01  -  09-19 @ 00:56  --------------------------------------------------------  IN:    dextrose 5% + lactated ringers: 850 mL    IV PiggyBack: 250 mL    IV PiggyBack: 600 mL    Lactated Ringers Bolus: 500 mL    PRBCs (Packed Red Blood Cells): 300 mL  Total IN: 2500 mL    OUT:    Bulb (mL): 5 mL    Bulb (mL): 120 mL    Ileostomy (mL): 350 mL    Incontinent per Condom Catheter (mL): 450 mL  Total OUT: 925 mL    Total NET: 1575 mL        Labs: 09-18    137  |  105  |  6<L>  ----------------------------<  119<H>  4.2   |  24  |  0.56    Ca    7.7<L>      18 Sep 2021 16:59  Phos  3.5     09-18  Mg     2.0     09-18    TPro  4.9<L>  /  Alb  1.7<L>  /  TBili  0.3  /  DBili  x   /  AST  11  /  ALT  14  /  AlkPhos  77  09-18    Meds: calcium gluconate IVPB 2 Gram(s) IV Intermittent once  cholecalciferol 1000 Unit(s) Oral daily  dextrose 5% + lactated ringers. 1000 milliLiter(s) IV Continuous <Continuous>  folic acid 1 milliGRAM(s) Oral daily  multivitamin 1 Tablet(s) Oral daily      HEMATOLOGIC  Meds:   [x] VTE Prophylaxis  Labs:                         7.9    11.61 )-----------( 237      ( 19 Sep 2021 00:12 )             25.4       Transfusion     [ ] PRBC   [ ] Platelets   [ ] FFP   [ ] Cryoprecipitate    INFECTIOUS DISEASES  Labs:   WBC Count: 11.61 K/uL (09-19 @ 00:12)  WBC Count: 13.26 K/uL (09-18 @ 16:59)  WBC Count: 10.80 K/uL (09-18 @ 13:23)  WBC Count: 9.83 K/uL (09-18 @ 02:02)    ENDOCRINE  CAPILLARY BLOOD GLUCOSE    ACCESS DEVICES:  [x] Peripheral IV  [ ] Central Venous Line	[ ] R	[ ] L	[ ] IJ	[ ] Fem	[ ] SC	Placed:   [ ] Arterial Line		[ ] R	[ ] L	[ ] Fem	[ ] Rad	[ ] Ax	Placed:   [ ] PICC:					[ ] Mediport  [ ] Urinary Catheter, Date Placed:   [x] Necessity of urinary, arterial, and venous catheters discussed      OTHER MEDICATIONS:  Biotene Dry Mouth Oral Rinse 5 milliLiter(s) Swish and Spit two times a day  chlorhexidine 2% Cloths 1 Application(s) Topical <User Schedule>  Dakins Solution - 1/4 Strength 1 Application(s) Topical every 12 hours  lidocaine   4% Patch 1 Patch Transdermal daily    IMAGING:    EGD - 9/17:  Procedure:           Pre-Anesthesia Assessment:                       - Prior to the procedure, a History and Physical was performed, and patient                        medications and allergies were reviewed. The patient is unable to give                        consent secondary to the patient's altered mental status. The risks and                        benefits of the procedure and the sedation options and risks were discussed                        with the patient's spouse. All questions were answered and informed consent                        was obtained. Patient identification and proposed procedure were verified by                        the physician, the nurse, the anesthesiologist and the anesthetist in the                        pre-procedure area in the procedure room in the endoscopy suite. Mental                        Status Examination: alert but confused. Prophylactic Antibiotics: The patient                        does not require prophylactic antibiotics. Prior Anticoagulants: The patient                        has taken no previous anticoagulant or antiplatelet agents. ASA Grade                        Assessment: IV - A patient with severe systemic disease that is a constant                        threat to life. After reviewing the risks and benefits, the patient was                        deemed in satisfactory condition to undergo the procedure. The anesthesia                        plan was to use monitored anesthesia care (MAC). Immediately prior to                        administration of medications, the patient was re-assessed for adequacy to                        receive sedatives. The heart rate, respiratory rate, oxygen saturations,                        blood pressure, adequacy of pulmonary ventilation, and response to care were                        monitored throughout the procedure. The physical status of the patient was                        re-assessed after the procedure.                       After obtaining informed consent, the endoscope was passed under direct                        vision. Throughout the procedure, the patient's blood pressure, pulse, and                        oxygen saturations were monitored continuously. The Endoscope was introduced                        through the mouth, and advanced to the second part of duodenum. The upper GI                        endoscopy was accomplished without difficulty. The patient tolerated the                        procedure well.                                                                                                        Findings:       LA Grade A (one or more mucosal breaks less than 5 mm, not extending between tops of 2        mucosal folds) esophagitis with no bleeding was found at the gastroesophageal junction.       The exam of the esophagus was otherwise normal.       A few dispersed, diminutive non-bleeding erosions were found in the gastric antrum. There        were no stigmata of recent bleeding.       The exam of the stomach was otherwise normal.       The examined duodenum was normal.                                                                                                        Impression:          - LA Grade A esophagitis.                       - Non-bleeding erosive gastropathy.                       - Otherwise normal endoscopy with no recent or active bleeding  Recommendation:      - IV PPI Daily                       - Proceed to Ileoscopy (images from Ileoscopy are on this study)    Enteroscopy - 9/17:  Procedure:           After obtaining informed consent, the endoscope was passed under direct                        vision. Throughout the procedure, the patient's blood pressure, pulse, and                        oxygen saturations were monitored continuously. The Endoscope was introduced                        through the ileostomy and advanced 50cm distal to entry. The small bowel                        enteroscopy was accomplished without difficulty. The patient tolerated the                        procedure well. After obtaining informed consent, the endoscope was passed                        under direct vision. Throughout the procedure, the patient's blood pressure,                        pulse, and oxygen saturations were monitored continuously.                                                                                                        Findings:       The distal ileum contained scattered erosions/ulcers with clean bases. They ranged in size        from 5 to 8 mm. No bleeding was present. No stigmata of recent bleeding were seen.       The remainder of the exam in the distal ileum was normal. The endoscope was advanced                                                                                                        Impression:          - A few ulcers in the distal ileum.                       - No specimens collected.                       (please see endoscopy report for images)

## 2021-09-20 NOTE — PROGRESS NOTE ADULT - SUBJECTIVE AND OBJECTIVE BOX
Interval Events:   - No acute events  - Brown stools in ostomy    Allergies:  No Known Allergies      Hospital Medications:  acetaminophen   Tablet .. 650 milliGRAM(s) Oral every 6 hours PRN  Biotene Dry Mouth Oral Rinse 5 milliLiter(s) Swish and Spit two times a day  chlorhexidine 2% Cloths 1 Application(s) Topical <User Schedule>  cholecalciferol 1000 Unit(s) Oral daily  Dakins Solution - 1/4 Strength 1 Application(s) Topical every 12 hours  dextrose 5% + lactated ringers. 1000 milliLiter(s) IV Continuous <Continuous>  folic acid 1 milliGRAM(s) Oral daily  lidocaine   4% Patch 1 Patch Transdermal daily  magnesium sulfate  IVPB 2 Gram(s) IV Intermittent once  metoprolol tartrate 25 milliGRAM(s) Oral every 8 hours  multivitamin 1 Tablet(s) Oral daily  pantoprazole    Tablet 40 milliGRAM(s) Oral every 12 hours  potassium phosphate / sodium phosphate Powder (PHOS-NaK) 1 Packet(s) Oral once  sucralfate 1 Gram(s) Oral every 12 hours      PMHX/PSHX:  No pertinent past medical history    No pertinent past medical history    Crohn disease    Paroxysmal atrial fibrillation    CAD (coronary artery disease)    GI bleed    No significant past surgical history        Family history:      ROS:   General:  No wt loss, fevers, chills, night sweats, fatigue,   Eyes:  Good vision, no reported pain  ENT:  No sore throat, pain, runny nose, dysphagia  CV:  No pain, palpitations, hypo/hypertension  Pulm:  No dyspnea, cough, tachypnea, wheezing  GI:  As per HPI  :  No pain, bleeding, incontinence, nocturia  Muscle:  No pain, weakness  Neuro:  No weakness, tingling, memory problems  Psych:  No fatigue, insomnia, mood problems, depression  Endocrine:  No polyuria, polydipsia, cold/heat intolerance  Heme:  No petechiae, ecchymosis, easy bruisability  Skin:  No rash, tattoos, scars, edema      PHYSICAL EXAM:   Vital Signs:  Vital Signs Last 24 Hrs  T(C): 36.8 (20 Sep 2021 05:15), Max: 37.1 (20 Sep 2021 01:20)  T(F): 98.3 (20 Sep 2021 05:15), Max: 98.7 (20 Sep 2021 01:20)  HR: 77 (20 Sep 2021 05:15) (65 - 85)  BP: 139/62 (20 Sep 2021 05:15) (112/64 - 155/65)  BP(mean): 90 (19 Sep 2021 20:00) (82 - 90)  RR: 18 (20 Sep 2021 05:15) (18 - 26)  SpO2: 98% (20 Sep 2021 05:15) (97% - 100%)  Daily     Daily       09-19-21 @ 07:01  -  09-20-21 @ 07:00  --------------------------------------------------------  IN: 1400 mL / OUT: 1950 mL / NET: -550 mL        GENERAL:  Appears stated age, pale, chronically ill appearing  HEENT:  NC/AT,  conjunctivae clear, sclera -anicteric  CHEST:  Poor effort  HEART:  Regular rhythm, S1, S2   ABDOMEN:  Soft, non-tender, non-distended, ileostomy in place with brown output  EXTREMITIES:  no cyanosis, clubbing or edema  SKIN:  +pale  NEURO:  Alert, oriented    LABS:                        7.6    13.59 )-----------( 266      ( 20 Sep 2021 07:18 )             24.2     Mean Cell Volume: 90.3 fl (09-20-21 @ 07:18)    09-20    137  |  103  |  6<L>  ----------------------------<  102<H>  3.4<L>   |  26  |  0.55    Ca    7.6<L>      20 Sep 2021 07:15  Phos  2.5     09-20  Mg     1.6     09-20    TPro  4.8<L>  /  Alb  1.8<L>  /  TBili  0.4  /  DBili  x   /  AST  7<L>  /  ALT  9<L>  /  AlkPhos  71  09-20    LIVER FUNCTIONS - ( 20 Sep 2021 07:15 )  Alb: 1.8 g/dL / Pro: 4.8 g/dL / ALK PHOS: 71 U/L / ALT: 9 U/L / AST: 7 U/L / GGT: x                                   7.6    13.59 )-----------( 266      ( 20 Sep 2021 07:18 )             24.2                         7.9    11.61 )-----------( 237      ( 19 Sep 2021 00:12 )             25.4                         8.6    13.26 )-----------( 269      ( 18 Sep 2021 16:59 )             27.3                         6.7    10.80 )-----------( 242      ( 18 Sep 2021 13:23 )             21.9                         7.5    9.83  )-----------( 243      ( 18 Sep 2021 02:02 )             24.2       Imaging:

## 2021-09-20 NOTE — PROGRESS NOTE ADULT - ASSESSMENT
81M with recently dx Crohn's disease c/b perianal abscess and left posterior distal anal intersphincteric fistula and presents diarrhea/BRBPR  2/2 to CD flare c/b c diff colitis. Initially treat for C diff, s/p FMT. Taken emergently to OR on 8/20 for acute deterioration for laparoscopic total colectomy with end ileostomy, POD31. Failed speech and swallow 9/10. Now POD3 upper GI endoscopy which showed no with no recent or active bleeding (9/17).    PLAN:  - C/w Protonix BID  - Diet: NPO  - FU repeat duplex. Possible IVC filter placement today  - Would recommend carafate   - Ongoing Palliative and GOC discussions with family.   - Dispo planning for rehab, patient should receive 500cc bolus every 2 days until his PO intake is adequate  - Wound care: daily dressing changes with midline incision packed   - NO TORADOL/NSAIDS for him    Red Surgery  [id number]

## 2021-09-20 NOTE — PROVIDER CONTACT NOTE (OTHER) - DATE AND TIME:
06-Sep-2021 22:00
14-Aug-2021 00:04
20-Sep-2021 14:30
24-Jul-2021 00:38
30-Aug-2021 01:30
06-Sep-2021 01:30
06-Sep-2021 17:00
21-Jul-2021 22:40
24-Jul-2021 23:59
06-Aug-2021 15:40
13-Aug-2021 22:40
14-Sep-2021 23:00
18-Aug-2021 11:37
29-Aug-2021 12:38
06-Sep-2021 05:45
07-Aug-2021 08:12
14-Aug-2021 22:52
17-Aug-2021 05:50
02-Aug-2021 00:54
07-Sep-2021 00:00
07-Sep-2021 18:05
28-Jul-2021 04:55
30-Jul-2021 04:30
16-Jul-2021 13:14
28-Aug-2021 04:30
05-Aug-2021 20:05
15-Sep-2021 23:00

## 2021-09-20 NOTE — PROVIDER CONTACT NOTE (OTHER) - NAME OF MD/NP/PA/DO NOTIFIED:
GUY Blank
Lola, NP
NP Silver Russell
GUY Ndiaye
LUKAS Azul
LUKAS Piper/ LUKAS Davidson
MD Ana
sachi otoole
Donna Betts. NP
Kat Neri NP
LUKAS Chand
Lola, NP
Philippe GAYTAN
GUY Ndiaye
LUKAS Boogie
GUY Ndiaye
LUKAS Cifuentes
Suhail Hernandez MD
LUKAS Boogie
MD Ana
MD Nathan
Philippe GAYTAN, David CROSS, Terra CROSS
Carlos GAYTAN
Janessa Arguello, NP
LUKAS Minaya
NP Zeta Nikhil Roe
GUY Carbone
Yvonne Sheppard NP

## 2021-09-20 NOTE — PROVIDER CONTACT NOTE (OTHER) - REASON
light bloody film in ostomy
Ileostomy output has become bright red blood with clots
pt has a temp of 100.9 this am. pt also NPO for fecal transplant
pt have temp 101.6F.
pt needs ultrasound-guided PIV. pt w/ insufficient IV access at this time
1) pt ostomy output lacey colored; urine via condom cath 125 ccs (7am-3pm)
100.6 Oral Temp
Elevated temperature
Patient daughter refusing to have stat labs drawn at this time
Provider at bedside, daughter refuse 1 U PRBC blood transfusion
Pt having large bright red blood clots and higher output in ileostomy bag
high output from ileostomy
lab results sodium 126, Potasium 3.2 ph 1.7
pt is refusing EKG at this time. A&OX4. Daughter at bedside. Pt requesting nystatin swish and swallow for mouth sores
Patient and Daughter(Tiffanie) refusing IV and oral antibiotics until G.I reconsults with patient
pt febrile; temp of 102.4 orally
pt. with high bloody/brown illeostomy output, melena
, c/o nausea
Patient refuses turn and positioning
patient confused, refusing to take Klonopin first mouth wash.
pt states he has a lot of pain on buttocks area and rectum d/t numerous bms
Mucus drainage from abdomen and rectum
No longer answering questions
Pt had bloody liquid stool with clots
Tachycardia Irregular rhythm noted at apical pulse
dark savannah blood from ostomy
pt temp 100.8F oral
pt. hypotensive

## 2021-09-20 NOTE — PROGRESS NOTE ADULT - SUBJECTIVE AND OBJECTIVE BOX
TEAM Surgery Progress Note  Patient is a 81y old  Male who presents with a chief complaint of diarrhea with blood in it , abd pain and weakness (19 Sep 2021 19:50)      INTERVAL EVENTS: No acute events overnight.  SUBJECTIVE: Patient seen and examined at bedside with surgical team, patient without complaints. Ostomy with fecal output, no blood. Midline incision with puss; redressed.    OBJECTIVE:    Vital Signs Last 24 Hrs  T(C): 36.8 (20 Sep 2021 05:15), Max: 37.1 (20 Sep 2021 01:20)  T(F): 98.3 (20 Sep 2021 05:15), Max: 98.7 (20 Sep 2021 01:20)  HR: 77 (20 Sep 2021 05:15) (65 - 85)  BP: 139/62 (20 Sep 2021 05:15) (106/56 - 155/65)  BP(mean): 90 (19 Sep 2021 20:00) (78 - 104)  RR: 18 (20 Sep 2021 05:15) (18 - 34)  SpO2: 98% (20 Sep 2021 05:15) (97% - 100%)I&O's Detail    19 Sep 2021 07:01  -  20 Sep 2021 07:00  --------------------------------------------------------  IN:    dextrose 5% + lactated ringers: 1150 mL    IV PiggyBack: 250 mL  Total IN: 1400 mL    OUT:    Bulb (mL): 5 mL    Bulb (mL): 35 mL    Ileostomy (mL): 260 mL    Incontinent per Condom Catheter (mL): 1650 mL  Total OUT: 1950 mL    Total NET: -550 mL      MEDICATIONS  (STANDING):  Biotene Dry Mouth Oral Rinse 5 milliLiter(s) Swish and Spit two times a day  chlorhexidine 2% Cloths 1 Application(s) Topical <User Schedule>  cholecalciferol 1000 Unit(s) Oral daily  Dakins Solution - 1/4 Strength 1 Application(s) Topical every 12 hours  dextrose 5% + lactated ringers. 1000 milliLiter(s) (50 mL/Hr) IV Continuous <Continuous>  folic acid 1 milliGRAM(s) Oral daily  lidocaine   4% Patch 1 Patch Transdermal daily  metoprolol tartrate 25 milliGRAM(s) Oral every 8 hours  multivitamin 1 Tablet(s) Oral daily  pantoprazole    Tablet 40 milliGRAM(s) Oral every 12 hours  sucralfate 1 Gram(s) Oral every 12 hours    MEDICATIONS  (PRN):  acetaminophen   Tablet .. 650 milliGRAM(s) Oral every 6 hours PRN Mild Pain (1 - 3)      PHYSICAL EXAM:  General: NAD, resting comfortably in bed  Neuro: A/O x 3, no focal deficits  Pulmonary: Nonlabored breathing, no respiratory distress  Abdominal: soft, ATTP. ND. Ostomy with fecal output; no blood.  Extremities: warm, well perfused    LABS:                        7.6    13.59 )-----------( 266      ( 20 Sep 2021 07:18 )             24.2     09-20    137  |  103  |  6<L>  ----------------------------<  102<H>  3.4<L>   |  26  |  0.55    Ca    7.6<L>      20 Sep 2021 07:15  Phos  2.6     09-19  Mg     2.0     09-19    TPro  4.8<L>  /  Alb  1.8<L>  /  TBili  0.4  /  DBili  x   /  AST  7<L>  /  ALT  9<L>  /  AlkPhos  71  09-20      LIVER FUNCTIONS - ( 20 Sep 2021 07:15 )  Alb: 1.8 g/dL / Pro: 4.8 g/dL / ALK PHOS: 71 U/L / ALT: 9 U/L / AST: 7 U/L / GGT: x                 IMAGING:

## 2021-09-20 NOTE — PROVIDER CONTACT NOTE (OTHER) - ACTION/TREATMENT ORDERED:
Per GUY Ndiaye, expected finding with patient with crohn's/colitis. Monitor patient for bowel movements, will draw cbc in AM. Continue to monitor.
Zofran
As per NP Azizy, EKG ordered, will assess pt at bedside
MD or PA to come place ultrasound-guided IV
labs ordered
restraint ordered, seroqul ordered for delirium
GUY Roe notified.
IV Tylenol and maalox ordered
NP notified  STAT labs obtained as ordered and PO Tylenol administered as ordered  Will continue to monitor
Recheck CBC 1 hr after blood transfusion and continue to monitor ileostomy
as per NP supplement patient as per oders and hold continuos ns @ 75ml/hr until potassium phosphate and calcium gluconate competed.
cbc pending & IVF infusing
AS per Np tylenol given
CBC q6 PA Mannie at bedside to assess output.
Will continue to monitor.
PA aware, advised to continue to keep educating patient. Will get patient OOB to chair as ordered and continue to educate patient on importance of t/p.
as per NP hold medications maintain aspiration precautions, needs anticipated for, positioned for comfort.
1 unit PRBC, 500cc LR bolus and LUKAS Davidson at bedside to assess pt.
LUKAS Gibbons ordered BC x 2, chest xray, UA and tylenol 650mg po x 1 dose. order in place and carried out.
No concerns at this time
No intervention at this time, continue to monitor
GUY Davila made aware, awaiting orders for tylenol and continue to monitor.
GUY Davila made aware. awaiting orders. will continue to monitor.
NP aware. Per Np give pt PO Tylenol reassess tem after 1 hr. Stat UA ordered and blood culture ordered w/morning labs. Order carried out. Will continue to monitor.
Pt assessed at bedside by SICU team and ileostomy examined. Labs reviewed and 1u PRBC ordered at this time. Will continue to monitor CBC and pt's hemodynamic status.
NP at bedside, risks/benefits explained, will recheck blood levels at a later time as per daughter request. Continue to monitor.
same as above

## 2021-09-20 NOTE — PROGRESS NOTE ADULT - ATTENDING COMMENTS
Patient seen and examined, agree with above. No further signs of bleeding, H/H stable. Continue PPI once daily for prophylaxis, no additional GI workup planned. Diet as tolerated.

## 2021-09-20 NOTE — PROGRESS NOTE ADULT - ATTENDING COMMENTS
Pt doing well. Having some soreness in his legs but otherwise no issues.  Hungry and is asking for some vanilla pudding    abd soft, non-distended, non-tender to palpation  expected mucous drainage from lower midline incision  ostomy healthy and viable w/ some ruddish output    - resume regular diet  - f/u duplex  - needs PT  - work on rehab placement    Ca Malone MD  Attending Physician

## 2021-09-20 NOTE — PROGRESS NOTE ADULT - ASSESSMENT
1. Acute blood loss anemia - No further bleeding. s/p EGD/ileoscopy 9/17 showing few ulcers in distal ileum, LA grade A esophagitis, few non-bleeding erosions in gastric antrum w/o stigmata of recent bleeding.   2. Severe Crohn's disease with predominantly colonic disease - complicated by C diff and failed steroid rescue and previous infliximab, now s/p total colectomy with ileostomy  3. Altered mental status - suspect delirium given prolonged hospitalization    Recommendations:  - monitor ileostomy output  - Diet as tolerated  - c/w IV PPI 40 mg BID  - trending daily CBC    Thank you for involving us in the care of this patient. Please reach out if any further questions.    Avni Reyes, PGY-5  GI Fellow    Available on [employer] Teams  Pager [phone] ([facility]) or [phone] ([facility])  After 5PM/Weekends, please contact the on-call GI fellow: [phone]  Available through [employer] Teams

## 2021-09-21 NOTE — PROGRESS NOTE ADULT - SUBJECTIVE AND OBJECTIVE BOX
Patient is a 81y old  Male who presents with a chief complaint of diarrhea with blood in it , abd pain and weakness (21 Sep 2021 01:57)      INTERVAL HPI/OVERNIGHT EVENTS: doing fair , feels very weak , oral intake and supplement    T(C): 36.6 (09-21-21 @ 16:43), Max: 37.3 (09-20-21 @ 21:11)  HR: 81 (09-21-21 @ 16:43) (72 - 87)  BP: 119/63 (09-21-21 @ 16:43) (94/51 - 158/68)  RR: 18 (09-21-21 @ 16:43) (18 - 18)  SpO2: 98% (09-21-21 @ 16:43) (95% - 98%)  Wt(kg): --  I&O's Summary    20 Sep 2021 07:01  -  21 Sep 2021 07:00  --------------------------------------------------------  IN: 1975 mL / OUT: 1525 mL / NET: 450 mL    21 Sep 2021 07:01  -  21 Sep 2021 19:28  --------------------------------------------------------  IN: 60 mL / OUT: 640 mL / NET: -580 mL        PAST MEDICAL & SURGICAL HISTORY:  Crohn disease    Paroxysmal atrial fibrillation    CAD (coronary artery disease)  not on ASA or plavix, no stents as per daughter    GI bleed    No significant past surgical history        SOCIAL HISTORY  Alcohol:  Tobacco:  Illicit substance use:    FAMILY HISTORY:    REVIEW OF SYSTEMS:  CONSTITUTIONAL: No fever, weight loss, or fatigue  EYES: No eye pain, visual disturbances, or discharge  ENMT:  No difficulty hearing, tinnitus, vertigo; No sinus or throat pain  NECK: No pain or stiffness  RESPIRATORY: No cough, wheezing, chills or hemoptysis; No shortness of breath  CARDIOVASCULAR: No chest pain, palpitations, dizziness, or leg swelling  GASTROINTESTINAL: No abdominal or epigastric pain. No nausea, vomiting, or hematemesis; No diarrhea or constipation. No melena or hematochezia.  GENITOURINARY: No dysuria, frequency, hematuria, or incontinence  NEUROLOGICAL: No headaches, memory loss, loss of strength, numbness, or tremors  SKIN: No itching, burning, rashes, or lesions   LYMPH NODES: No enlarged glands  ENDOCRINE: No heat or cold intolerance; No hair loss  MUSCULOSKELETAL: No joint pain or swelling; No muscle, back, or extremity pain  PSYCHIATRIC: No depression, anxiety, mood swings, or difficulty sleeping  HEME/LYMPH: No easy bruising, or bleeding gums  ALLERY AND IMMUNOLOGIC: No hives or eczema    RADIOLOGY & ADDITIONAL TESTS:    Imaging Personally Reviewed:  [ ] YES  [ ] NO    Consultant(s) Notes Reviewed:  [ ] YES  [ ] NO    PHYSICAL EXAM:  GENERAL: NAD, well-groomed, well-developed  HEAD:  Atraumatic, Normocephalic  EYES: EOMI, PERRLA, conjunctiva and sclera clear  ENMT: No tonsillar erythema, exudates, or enlargement; Moist mucous membranes, Good dentition, No lesions  NECK: Supple, No JVD, Normal thyroid  NERVOUS SYSTEM:  Alert & Oriented X3, Good concentration; Motor Strength 5/5 B/L upper and lower extremities; DTRs 2+ intact and symmetric  CHEST/LUNG: Clear to percussion bilaterally; No rales, rhonchi, wheezing, or rubs  HEART: Regular rate and rhythm; No murmurs, rubs, or gallops  ABDOMEN: Soft, Nontender, Nondistended; Bowel sounds present  EXTREMITIES:  2+ Peripheral Pulses, No clubbing, cyanosis, or edema  LYMPH: No lymphadenopathy noted  SKIN: No rashes or lesions    LABS:                        8.0    15.68 )-----------( 264      ( 20 Sep 2021 16:27 )             25.9     09-20    137  |  103  |  6<L>  ----------------------------<  102<H>  3.4<L>   |  26  |  0.55    Ca    7.6<L>      20 Sep 2021 07:15  Phos  2.5     09-20  Mg     1.6     09-20    TPro  4.8<L>  /  Alb  1.8<L>  /  TBili  0.4  /  DBili  x   /  AST  7<L>  /  ALT  9<L>  /  AlkPhos  71  09-20        CAPILLARY BLOOD GLUCOSE                MEDICATIONS  (STANDING):  Biotene Dry Mouth Oral Rinse 5 milliLiter(s) Swish and Spit two times a day  chlorhexidine 2% Cloths 1 Application(s) Topical <User Schedule>  cholecalciferol 1000 Unit(s) Oral daily  Dakins Solution - 1/4 Strength 1 Application(s) Topical every 12 hours  folic acid 1 milliGRAM(s) Oral daily  lidocaine   4% Patch 1 Patch Transdermal daily  metoprolol tartrate 25 milliGRAM(s) Oral every 8 hours  multivitamin 1 Tablet(s) Oral daily  pantoprazole    Tablet 40 milliGRAM(s) Oral every 12 hours  sucralfate 1 Gram(s) Oral every 12 hours    MEDICATIONS  (PRN):  acetaminophen   Tablet .. 650 milliGRAM(s) Oral every 6 hours PRN Mild Pain (1 - 3)      Care Discussed with Consultants/Other Providers [ ] YES  [ ] NO

## 2021-09-21 NOTE — PROGRESS NOTE ADULT - SUBJECTIVE AND OBJECTIVE BOX
TEAM Surgery Progress Note  Patient is a 81y old  Male who presents with a chief complaint of diarrhea with blood in it , abd pain and weakness (19 Sep 2021 19:50)      INTERVAL EVENTS: No acute events overnight.  SUBJECTIVE: Patient seen and examined at bedside with surgical team, patient without complaints. Ostomy with fecal output, no blood. Midline incision with puss; redressed.    OBJECTIVE:    Vital Signs Last 24 Hrs  T(C): 36.8 (20 Sep 2021 05:15), Max: 37.1 (20 Sep 2021 01:20)  T(F): 98.3 (20 Sep 2021 05:15), Max: 98.7 (20 Sep 2021 01:20)  HR: 77 (20 Sep 2021 05:15) (65 - 85)  BP: 139/62 (20 Sep 2021 05:15) (106/56 - 155/65)  BP(mean): 90 (19 Sep 2021 20:00) (78 - 104)  RR: 18 (20 Sep 2021 05:15) (18 - 34)  SpO2: 98% (20 Sep 2021 05:15) (97% - 100%)I&O's Detail    19 Sep 2021 07:01  -  20 Sep 2021 07:00  --------------------------------------------------------  IN:    dextrose 5% + lactated ringers: 1150 mL    IV PiggyBack: 250 mL  Total IN: 1400 mL    OUT:    Bulb (mL): 5 mL    Bulb (mL): 35 mL    Ileostomy (mL): 260 mL    Incontinent per Condom Catheter (mL): 1650 mL  Total OUT: 1950 mL    Total NET: -550 mL      MEDICATIONS  (STANDING):  Biotene Dry Mouth Oral Rinse 5 milliLiter(s) Swish and Spit two times a day  chlorhexidine 2% Cloths 1 Application(s) Topical <User Schedule>  cholecalciferol 1000 Unit(s) Oral daily  Dakins Solution - 1/4 Strength 1 Application(s) Topical every 12 hours  dextrose 5% + lactated ringers. 1000 milliLiter(s) (50 mL/Hr) IV Continuous <Continuous>  folic acid 1 milliGRAM(s) Oral daily  lidocaine   4% Patch 1 Patch Transdermal daily  metoprolol tartrate 25 milliGRAM(s) Oral every 8 hours  multivitamin 1 Tablet(s) Oral daily  pantoprazole    Tablet 40 milliGRAM(s) Oral every 12 hours  sucralfate 1 Gram(s) Oral every 12 hours    MEDICATIONS  (PRN):  acetaminophen   Tablet .. 650 milliGRAM(s) Oral every 6 hours PRN Mild Pain (1 - 3)      PHYSICAL EXAM:  General: NAD, resting comfortably in bed  Neuro: A/O x 3, no focal deficits  Pulmonary: Nonlabored breathing, no respiratory distress  Abdominal: soft, ATTP. ND. Ostomy with fecal output; no blood.  Extremities: warm, well perfused    LABS:                        7.6    13.59 )-----------( 266      ( 20 Sep 2021 07:18 )             24.2     09-20    137  |  103  |  6<L>  ----------------------------<  102<H>  3.4<L>   |  26  |  0.55    Ca    7.6<L>      20 Sep 2021 07:15  Phos  2.6     09-19  Mg     2.0     09-19    TPro  4.8<L>  /  Alb  1.8<L>  /  TBili  0.4  /  DBili  x   /  AST  7<L>  /  ALT  9<L>  /  AlkPhos  71  09-20      LIVER FUNCTIONS - ( 20 Sep 2021 07:15 )  Alb: 1.8 g/dL / Pro: 4.8 g/dL / ALK PHOS: 71 U/L / ALT: 9 U/L / AST: 7 U/L / GGT: x                 IMAGING:     TEAM Surgery Progress Note  Patient is a 81y old  Male who presents with a chief complaint of diarrhea with blood in it , abd pain and weakness (19 Sep 2021 19:50)      SUBJECTIVE: Pt seen and examined at bedside. There were no acute overnight events. The patient has no complaints this morning. His pain is controlled. He denies CP, SOB, fevers, chills, N/V/D.        Vital Signs Last 24 Hrs  T(C): 36.8 (20 Sep 2021 05:15), Max: 37.1 (20 Sep 2021 01:20)  T(F): 98.3 (20 Sep 2021 05:15), Max: 98.7 (20 Sep 2021 01:20)  HR: 77 (20 Sep 2021 05:15) (65 - 85)  BP: 139/62 (20 Sep 2021 05:15) (106/56 - 155/65)  BP(mean): 90 (19 Sep 2021 20:00) (78 - 104)  RR: 18 (20 Sep 2021 05:15) (18 - 34)  SpO2: 98% (20 Sep 2021 05:15) (97% - 100%)I&O's Detail    19 Sep 2021 07:01  -  20 Sep 2021 07:00  --------------------------------------------------------  IN:    dextrose 5% + lactated ringers: 1150 mL    IV PiggyBack: 250 mL  Total IN: 1400 mL    OUT:    Bulb (mL): 5 mL    Bulb (mL): 35 mL    Ileostomy (mL): 260 mL    Incontinent per Condom Catheter (mL): 1650 mL  Total OUT: 1950 mL    Total NET: -550 mL      MEDICATIONS  (STANDING):  Biotene Dry Mouth Oral Rinse 5 milliLiter(s) Swish and Spit two times a day  chlorhexidine 2% Cloths 1 Application(s) Topical <User Schedule>  cholecalciferol 1000 Unit(s) Oral daily  Dakins Solution - 1/4 Strength 1 Application(s) Topical every 12 hours  dextrose 5% + lactated ringers. 1000 milliLiter(s) (50 mL/Hr) IV Continuous <Continuous>  folic acid 1 milliGRAM(s) Oral daily  lidocaine   4% Patch 1 Patch Transdermal daily  metoprolol tartrate 25 milliGRAM(s) Oral every 8 hours  multivitamin 1 Tablet(s) Oral daily  pantoprazole    Tablet 40 milliGRAM(s) Oral every 12 hours  sucralfate 1 Gram(s) Oral every 12 hours    MEDICATIONS  (PRN):  acetaminophen   Tablet .. 650 milliGRAM(s) Oral every 6 hours PRN Mild Pain (1 - 3)      PHYSICAL EXAM:  General: NAD, resting comfortably in bed  Neuro: A/O x 3, no focal deficits  Pulmonary: Nonlabored breathing, no respiratory distress  Abdominal: soft, ATTP. non-distended, no guarding, no rebound tenderness, Ostomy with fecal output  Extremities: warm, well perfused    LABS:                        7.6    13.59 )-----------( 266      ( 20 Sep 2021 07:18 )             24.2     09-20    137  |  103  |  6<L>  ----------------------------<  102<H>  3.4<L>   |  26  |  0.55    Ca    7.6<L>      20 Sep 2021 07:15  Phos  2.6     09-19  Mg     2.0     09-19    TPro  4.8<L>  /  Alb  1.8<L>  /  TBili  0.4  /  DBili  x   /  AST  7<L>  /  ALT  9<L>  /  AlkPhos  71  09-20      LIVER FUNCTIONS - ( 20 Sep 2021 07:15 )  Alb: 1.8 g/dL / Pro: 4.8 g/dL / ALK PHOS: 71 U/L / ALT: 9 U/L / AST: 7 U/L / GGT: x                 IMAGING:

## 2021-09-21 NOTE — CHART NOTE - NSCHARTNOTEFT_GEN_A_CORE
Nutrition Follow Up Note  Patient seen for: verbal consult, daughter requesting to speak with RD     Chart reviewed, events noted. This is a "81M with recently dx Crohn's disease c/b perianal abscess and left posterior distal anal intersphincteric fistula and presents diarrhea/BRBPR  2/2 to CD flare c/b c diff colitis. Initially treat for C diff, s/p FMT. Taken emergently to OR on  for acute deterioration for laparoscopic total colectomy with end ileostomy.  Failed speech and swallow 9/10. Now s/p upper GI endoscopy which showed no with no recent or active bleeding ()"    Source: [x] Patient       [x] EMR        [] RN        [x] Family at bedside-daughter        [] Other:    -If unable to interview patient: [] Trach/Vent/BiPAP  [] Disoriented/confused/inappropriate to interview    Diet Order:   Diet, Dysphagia 2 Mechanical Soft-Honey Consistency Fluid:   Supplement Feeding Modality:  Oral  Ensure Enlive Servings Per Day:  3       Frequency:  Three Times a day  Ensure Pudding Cans or Servings Per Day:  2       Frequency:  Daily (21)    - Is current order appropriate/adequate? [x] Yes  []  No:     - PO intake :   [] >75%  Adequate    [] 50-75%  Fair       [x] <50%  Poor    - Nutrition-related concerns: Pt previously on Dysphagia 2 diet with HC thick liquids  from -9/15, made NPO from 9/15 for GIB, diet resumed on . Pt seen with daughter bedside, pt resting. Daughter reports pt with poor appetite, consuming a few bites at a time. Endorses some menu fatigue, reviewed additional food options available within dietary restrictions. Assisted with meal selection for dinner tonight. Daughter encouraged to assist patient with meal selections at able to optimize preference to meals. Provided diet office number to call. Daughter reports pt is sipping on Ensure drinks, encouraged continue consumption of Ensure Enlive and Ensure pudding supplements to optimize nutritional intake. Obtained food preferences, will honor as able.     GI:  Last BM _ileostomy output: 125ml__.   Bowel Regimen? [] Yes   [x] No      Weights:   Daily Weight in k.4 (-), Weight in k.2 (), Weight in k.7 (), Weight in k.7 (15)    Nutritionally Pertinent MEDICATIONS  (STANDING):  cholecalciferol  folic acid  metoprolol tartrate  multivitamin  pantoprazole    Tablet  sucralfate    Pertinent Labs:   A1C with Estimated Average Glucose Result: 5.2 % (- @ 09:06)    Skin per nursing documentation: stage 2 spine, suspected DTI to sacral spine   Edema: +2 generalized, +3 left/right hand, + 3 left/right arm     Estimated Needs:   [x] no change since previous assessment  [] recalculated:     Previous Nutrition Diagnosis: 1) Severe Malnutrition 2) Increased Nutrient Needs  Nutrition Diagnosis is: [x] ongoing  [] resolved [] not applicable being addressed with nutritional supplements    New Nutrition Diagnosis: [x] Not applicable    Nutrition Care Plan:  [x] In Progress  [] Achieved  [] Not applicable    Nutrition Interventions:     Education Provided:       [x] Yes:  [] No: prioritize protein, intake of nutrition supplements.        Recommendations:         [x] Continue current diet, pt on pleasure feeds per GOC. Defer diet texture/consistency to team/family      [x] Continue Ensure Enlive TID + Ensure Pudding BID      [x] Continue current micronutrient supplementation, consider addition of ascorbic acid daily if no contraindications.      [x] Obtain/honor food preferences as able     Monitoring and Evaluation:   Continue to monitor nutritional intake, tolerance to diet prescription, weights, labs, skin integrity      RD remains available upon request and will follow up per protocol  Minerva Sanchez RD, CDN, Pager # [phone]

## 2021-09-21 NOTE — CHART NOTE - NSCHARTNOTEFT_GEN_A_CORE
Will repeat Duplex in 1 week time. If pt is discharged from hospital, pt should complete duplex as an outpatient and follow up with Dr. Brooks.    Vascular Surgery  [id number]

## 2021-09-21 NOTE — PROGRESS NOTE ADULT - ASSESSMENT
81M with recently dx Crohn's disease c/b perianal abscess and left posterior distal anal intersphincteric fistula and presents diarrhea/BRBPR  2/2 to CD flare c/b c diff colitis. Initially treat for C diff, s/p FMT. Taken emergently to OR on 8/20 for acute deterioration for laparoscopic total colectomy with end ileostomy, POD32. Failed speech and swallow 9/10. Now POD3 upper GI endoscopy which showed no with no recent or active bleeding (9/17).    PLAN:  - C/w Protonix BID  - Diet: NPO  - FU repeat duplex. Possible IVC filter placement today  - Would recommend carafate   - Ongoing Palliative and GOC discussions with family.   - Dispo planning for rehab, patient should receive 500cc bolus every 2 days until his PO intake is adequate  - Wound care: daily dressing changes with midline incision packed   - NO TORADOL/NSAIDS for him    Red Surgery  [id number]  81M with recently dx Crohn's disease c/b perianal abscess and left posterior distal anal intersphincteric fistula and presents diarrhea/BRBPR  2/2 to CD flare c/b c diff colitis. Initially treat for C diff, s/p FMT. Taken emergently to OR on 8/20 for acute deterioration for laparoscopic total colectomy with end ileostomy.  Failed speech and swallow 9/10. Now s/p upper GI endoscopy which showed no with no recent or active bleeding (9/17).    PLAN:  - C/w Protonix BID  - IVL today  - Diet: NPO  - Appreciate Vascular recs- no need for IVC filter placement, will f/u duplex next week   - Ongoing Palliative and GOC discussions with family.   - Dispo planning for rehab, patient should receive 500cc bolus every 2 days until his PO intake is adequate  - Wound care: daily dressing changes with midline incision packed   - NO TORADOL/NSAIDS for him    Red Surgery  [id number]

## 2021-09-22 NOTE — PROGRESS NOTE ADULT - ATTENDING COMMENTS
No acute events overnight. Remains HDS    abd soft, non-distended, non-tender to palpation  drains to bulb suction w/ scant output in bulb currently  dressing to lower midline incision  healthy stoma w/ green stoma output    - continue diet  - q2-3day labs  - continue PT  - f/u about GILDARDO placement    Ca Malone MD  Attending Physician

## 2021-09-22 NOTE — PROGRESS NOTE ADULT - ASSESSMENT
81M with recently dx Crohn's disease c/b perianal abscess and left posterior distal anal intersphincteric fistula and presents diarrhea/BRBPR  2/2 to CD flare c/b c diff colitis. Initially treat for C diff, s/p FMT. Taken emergently to OR on 8/20 for acute deterioration for laparoscopic total colectomy with end ileostomy.  Failed speech and swallow 9/10. Now s/p upper GI endoscopy which showed no with no recent or active bleeding (9/17).    PLAN:  - C/w Protonix BID  - IVL today  - Diet: NPO  - Appreciate Vascular recs- no need for IVC filter placement, will f/u duplex next week   - Ongoing Palliative and GOC discussions with family.   - Dispo planning for rehab, patient should receive 500cc bolus every 2 days until his PO intake is adequate  - Wound care: daily dressing changes with midline incision packed   - NO TORADOL/NSAIDS for him    Red Surgery  [id number]  81M with recently dx Crohn's disease c/b perianal abscess and left posterior distal anal intersphincteric fistula and presents diarrhea/BRBPR  2/2 to CD flare c/b c diff colitis. Initially treat for C diff, s/p FMT. Taken emergently to OR on 8/20 for acute deterioration for laparoscopic total colectomy with end ileostomy.  Failed speech and swallow 9/10. Now s/p upper GI endoscopy which showed no with no recent or active bleeding (9/17).    PLAN:  - C/w Protonix BID  - Diet: dysphagia 2 diet mechanical soft  - Appreciate Vascular recs- no need for IVC filter placement, will f/u duplex next week   - Ongoing Palliative and GOC discussions with family.   - Dispo planning for rehab, Keep PICC- patient should receive 500cc bolus every other day until his PO intake is adequate  - Wound care: daily dressing changes with midline incision packed   - NO TORADOL/NSAIDS for him    Red Surgery  [id number]

## 2021-09-22 NOTE — PROGRESS NOTE ADULT - ATTENDING SUPERVISION STATEMENT
ACP
Fellow
Resident
Resident
ACP
Fellow
Resident
Fellow
Resident
Fellow
Resident
Fellow
Resident
Fellow
Fellow
Resident
Student
Fellow
Fellow
Resident

## 2021-09-22 NOTE — PROGRESS NOTE ADULT - SUBJECTIVE AND OBJECTIVE BOX
TE[facility] Progress Note  Patient is a 81y old  Male who presents with a chief complaint of diarrhea with blood in it , abd pain and weakness (19 Sep 2021 19:50)      SUBJECTIVE: Pt seen and examined at bedside. There were no acute overnight events. The patient has no complaints this morning. His pain is controlled. He denies CP, SOB, fevers, chills, N/V/D.        Vital Signs Last 24 Hrs  T(C): 36.8 (20 Sep 2021 05:15), Max: 37.1 (20 Sep 2021 01:20)  T(F): 98.3 (20 Sep 2021 05:15), Max: 98.7 (20 Sep 2021 01:20)  HR: 77 (20 Sep 2021 05:15) (65 - 85)  BP: 139/62 (20 Sep 2021 05:15) (106/56 - 155/65)  BP(mean): 90 (19 Sep 2021 20:00) (78 - 104)  RR: 18 (20 Sep 2021 05:15) (18 - 34)  SpO2: 98% (20 Sep 2021 05:15) (97% - 100%)I&O's Detail    19 Sep 2021 07:01  -  20 Sep 2021 07:00  --------------------------------------------------------  IN:    dextrose 5% + lactated ringers: 1150 mL    IV PiggyBack: 250 mL  Total IN: 1400 mL    OUT:    Bulb (mL): 5 mL    Bulb (mL): 35 mL    Ileostomy (mL): 260 mL    Incontinent per Condom Catheter (mL): 1650 mL  Total OUT: 1950 mL    Total NET: -550 mL      MEDICATIONS  (STANDING):  Biotene Dry Mouth Oral Rinse 5 milliLiter(s) Swish and Spit two times a day  chlorhexidine 2% Cloths 1 Application(s) Topical <User Schedule>  cholecalciferol 1000 Unit(s) Oral daily  Dakins Solution - 1/4 Strength 1 Application(s) Topical every 12 hours  dextrose 5% + lactated ringers. 1000 milliLiter(s) (50 mL/Hr) IV Continuous <Continuous>  folic acid 1 milliGRAM(s) Oral daily  lidocaine   4% Patch 1 Patch Transdermal daily  metoprolol tartrate 25 milliGRAM(s) Oral every 8 hours  multivitamin 1 Tablet(s) Oral daily  pantoprazole    Tablet 40 milliGRAM(s) Oral every 12 hours  sucralfate 1 Gram(s) Oral every 12 hours    MEDICATIONS  (PRN):  acetaminophen   Tablet .. 650 milliGRAM(s) Oral every 6 hours PRN Mild Pain (1 - 3)      PHYSICAL EXAM:  General: NAD, resting comfortably in bed  Neuro: A/O x 3, no focal deficits  Pulmonary: Nonlabored breathing, no respiratory distress  Abdominal: soft, ATTP. non-distended, no guarding, no rebound tenderness, Ostomy with fecal output  Extremities: warm, well perfused    LABS:                        7.6    13.59 )-----------( 266      ( 20 Sep 2021 07:18 )             24.2     09-20    137  |  103  |  6<L>  ----------------------------<  102<H>  3.4<L>   |  26  |  0.55    Ca    7.6<L>      20 Sep 2021 07:15  Phos  2.6     09-19  Mg     2.0     09-19    TPro  4.8<L>  /  Alb  1.8<L>  /  TBili  0.4  /  DBili  x   /  AST  7<L>  /  ALT  9<L>  /  AlkPhos  71  09-20      LIVER FUNCTIONS - ( 20 Sep 2021 07:15 )  Alb: 1.8 g/dL / Pro: 4.8 g/dL / ALK PHOS: 71 U/L / ALT: 9 U/L / AST: 7 U/L / GGT: x                 IMAGING:     TE[facility] Progress Note  Patient is a 81y old  Male who presents with a chief complaint of diarrhea with blood in it , abd pain and weakness (19 Sep 2021 19:50)      SUBJECTIVE: Pt seen and examined at bedside, resting comfortably. Dressing was changed. Voided 750 over 24 hrs.        Vital Signs Last 24 Hrs  T(C): 36.8 (20 Sep 2021 05:15), Max: 37.1 (20 Sep 2021 01:20)  T(F): 98.3 (20 Sep 2021 05:15), Max: 98.7 (20 Sep 2021 01:20)  HR: 77 (20 Sep 2021 05:15) (65 - 85)  BP: 139/62 (20 Sep 2021 05:15) (106/56 - 155/65)  BP(mean): 90 (19 Sep 2021 20:00) (78 - 104)  RR: 18 (20 Sep 2021 05:15) (18 - 34)  SpO2: 98% (20 Sep 2021 05:15) (97% - 100%)I&O's Detail    19 Sep 2021 07:01  -  20 Sep 2021 07:00  --------------------------------------------------------  IN:    dextrose 5% + lactated ringers: 1150 mL    IV PiggyBack: 250 mL  Total IN: 1400 mL    OUT:    Bulb (mL): 5 mL    Bulb (mL): 35 mL    Ileostomy (mL): 260 mL    Incontinent per Condom Catheter (mL): 1650 mL  Total OUT: 1950 mL    Total NET: -550 mL      MEDICATIONS  (STANDING):  Biotene Dry Mouth Oral Rinse 5 milliLiter(s) Swish and Spit two times a day  chlorhexidine 2% Cloths 1 Application(s) Topical <User Schedule>  cholecalciferol 1000 Unit(s) Oral daily  Dakins Solution - 1/4 Strength 1 Application(s) Topical every 12 hours  dextrose 5% + lactated ringers. 1000 milliLiter(s) (50 mL/Hr) IV Continuous <Continuous>  folic acid 1 milliGRAM(s) Oral daily  lidocaine   4% Patch 1 Patch Transdermal daily  metoprolol tartrate 25 milliGRAM(s) Oral every 8 hours  multivitamin 1 Tablet(s) Oral daily  pantoprazole    Tablet 40 milliGRAM(s) Oral every 12 hours  sucralfate 1 Gram(s) Oral every 12 hours    MEDICATIONS  (PRN):  acetaminophen   Tablet .. 650 milliGRAM(s) Oral every 6 hours PRN Mild Pain (1 - 3)      PHYSICAL EXAM:  General: NAD, resting comfortably in bed  Neuro: A/O x 3, no focal deficits  Pulmonary: Nonlabored breathing, no respiratory distress  Abdominal: soft, ATTP. non-distended, no guarding, no rebound tenderness, Ostomy with fecal output  Extremities: warm, well perfused    LABS:                        7.6    13.59 )-----------( 266      ( 20 Sep 2021 07:18 )             24.2     09-20    137  |  103  |  6<L>  ----------------------------<  102<H>  3.4<L>   |  26  |  0.55    Ca    7.6<L>      20 Sep 2021 07:15  Phos  2.6     09-19  Mg     2.0     09-19    TPro  4.8<L>  /  Alb  1.8<L>  /  TBili  0.4  /  DBili  x   /  AST  7<L>  /  ALT  9<L>  /  AlkPhos  71  09-20      LIVER FUNCTIONS - ( 20 Sep 2021 07:15 )  Alb: 1.8 g/dL / Pro: 4.8 g/dL / ALK PHOS: 71 U/L / ALT: 9 U/L / AST: 7 U/L / GGT: x                 IMAGING:

## 2021-09-22 NOTE — PROGRESS NOTE ADULT - SUBJECTIVE AND OBJECTIVE BOX
Patient is a 81y old  Male who presents with a chief complaint of diarrhea with blood in it , abd pain and weakness (22 Sep 2021 01:49)      INTERVAL HPI/OVERNIGHT EVENTS:  T(C): 37.1 (09-22-21 @ 21:06), Max: 37.2 (09-22-21 @ 05:15)  HR: 85 (09-22-21 @ 21:06) (75 - 93)  BP: 117/65 (09-22-21 @ 21:06) (103/59 - 149/65)  RR: 18 (09-22-21 @ 21:06) (18 - 18)  SpO2: 99% (09-22-21 @ 21:06) (94% - 99%)  Wt(kg): --  I&O's Summary    21 Sep 2021 07:01  -  22 Sep 2021 07:00  --------------------------------------------------------  IN: 60 mL / OUT: 1370 mL / NET: -1310 mL    22 Sep 2021 07:01  -  22 Sep 2021 22:25  --------------------------------------------------------  IN: 150 mL / OUT: 845 mL / NET: -695 mL        PAST MEDICAL & SURGICAL HISTORY:  Crohn disease    Paroxysmal atrial fibrillation    CAD (coronary artery disease)  not on ASA or plavix, no stents as per daughter    GI bleed    No significant past surgical history        SOCIAL HISTORY  Alcohol:  Tobacco:  Illicit substance use:    FAMILY HISTORY:    REVIEW OF SYSTEMS:  CONSTITUTIONAL: No fever, weight loss, or fatigue  EYES: No eye pain, visual disturbances, or discharge  ENMT:  No difficulty hearing, tinnitus, vertigo; No sinus or throat pain  NECK: No pain or stiffness  RESPIRATORY: No cough, wheezing, chills or hemoptysis; No shortness of breath  CARDIOVASCULAR: No chest pain, palpitations, dizziness, or leg swelling  GASTROINTESTINAL: No abdominal or epigastric pain. No nausea, vomiting, or hematemesis; No diarrhea or constipation. No melena or hematochezia.  GENITOURINARY: No dysuria, frequency, hematuria, or incontinence  NEUROLOGICAL: No headaches, memory loss, loss of strength, numbness, or tremors  SKIN: No itching, burning, rashes, or lesions   LYMPH NODES: No enlarged glands  ENDOCRINE: No heat or cold intolerance; No hair loss  MUSCULOSKELETAL: No joint pain or swelling; No muscle, back, or extremity pain  PSYCHIATRIC: No depression, anxiety, mood swings, or difficulty sleeping  HEME/LYMPH: No easy bruising, or bleeding gums  ALLERY AND IMMUNOLOGIC: No hives or eczema    RADIOLOGY & ADDITIONAL TESTS:    Imaging Personally Reviewed:  [ ] YES  [ ] NO    Consultant(s) Notes Reviewed:  [ ] YES  [ ] NO    PHYSICAL EXAM:  GENERAL: NAD, well-groomed, well-developed  HEAD:  Atraumatic, Normocephalic  EYES: EOMI, PERRLA, conjunctiva and sclera clear  ENMT: No tonsillar erythema, exudates, or enlargement; Moist mucous membranes, Good dentition, No lesions  NECK: Supple, No JVD, Normal thyroid  NERVOUS SYSTEM:  Alert & Oriented X3, Good concentration; Motor Strength 5/5 B/L upper and lower extremities; DTRs 2+ intact and symmetric  CHEST/LUNG: Clear to percussion bilaterally; No rales, rhonchi, wheezing, or rubs  HEART: Regular rate and rhythm; No murmurs, rubs, or gallops  ABDOMEN: Soft, Nontender, Nondistended; Bowel sounds present  EXTREMITIES:  2+ Peripheral Pulses, No clubbing, cyanosis, or edema  LYMPH: No lymphadenopathy noted  SKIN: No rashes or lesions    LABS:              CAPILLARY BLOOD GLUCOSE                MEDICATIONS  (STANDING):  Biotene Dry Mouth Oral Rinse 5 milliLiter(s) Swish and Spit two times a day  chlorhexidine 2% Cloths 1 Application(s) Topical <User Schedule>  cholecalciferol 1000 Unit(s) Oral daily  Dakins Solution - 1/4 Strength 1 Application(s) Topical every 12 hours  folic acid 1 milliGRAM(s) Oral daily  lidocaine   4% Patch 1 Patch Transdermal daily  metoprolol tartrate 25 milliGRAM(s) Oral every 8 hours  multivitamin 1 Tablet(s) Oral daily  pantoprazole    Tablet 40 milliGRAM(s) Oral every 12 hours  sucralfate 1 Gram(s) Oral every 12 hours    MEDICATIONS  (PRN):  acetaminophen   Tablet .. 650 milliGRAM(s) Oral every 6 hours PRN Mild Pain (1 - 3)      Care Discussed with Consultants/Other Providers [ ] YES  [ ] NO

## 2021-09-22 NOTE — PROGRESS NOTE ADULT - ASSESSMENT
A/P  82yo M with Crohn's disease, now s/p total abdominal colectomy with end ileostomy on 8/20.     GI bleed   stable H/h now     # DVT below knee b/L   -seen by vascular   hold off on AC 2/2 multiple episodes of GI bleed and transfusion  no need for IVC filter   repeat venous doppler in 1 week in-patient or out pt. to see any extension of DVT above knee     # CAD / PAF/ HTN  lopressor 25 mg q 8  rate controlled   not candidate for AC     # Sever colitis 2/2 chron's and c diff   - s/p total abdominal colectomy with end ileostomy,  protonix bid     # Sever malnutrition   started on puree diet / supplement     plan for rehab   Code Status: DNR/DNI, MOLST in chart

## 2021-09-23 NOTE — PROGRESS NOTE ADULT - ASSESSMENT
81M with recently dx Crohn's disease c/b perianal abscess and left posterior distal anal intersphincteric fistula and presents diarrhea/BRBPR  2/2 to CD flare c/b c diff colitis. Initially treat for C diff, s/p FMT. Taken emergently to OR on 8/20 for acute deterioration for laparoscopic total colectomy with end ileostomy.  Failed speech and swallow 9/10. Now s/p upper GI endoscopy which showed no with no recent or active bleeding (9/17).    PLAN:  - C/w Protonix BID  - Diet: dysphagia 2 diet mechanical soft  - Appreciate Vascular recs- no need for IVC filter placement, will f/u duplex next week   - Ongoing Palliative and GOC discussions with family.   - Dispo planning for rehab, Keep PICC- patient should receive 500cc bolus every other day until his PO intake is adequate  - Wound care: daily dressing changes with midline incision packed   - NO TORADOL/NSAIDS for him    [facility]  [id number]  81M with recently dx Crohn's disease c/b perianal abscess and left posterior distal anal intersphincteric fistula and presents diarrhea/BRBPR  2/2 to CD flare c/b c diff colitis. Initially treat for C diff, s/p FMT. Taken emergently to OR on 8/20 for acute deterioration for laparoscopic total colectomy with end ileostomy.  Failed speech and swallow 9/10. Now s/p upper GI endoscopy which showed no with no recent or active bleeding (9/17).    PLAN:  - C/w Protonix BID  - Diet: dysphagia 2 diet mechanical soft  - Appreciate Vascular recs- no need for IVC filter placement, will f/u duplex next week   - Ongoing Palliative and GOC discussions with family.   - Dispo planning for rehab, Keep PICC- patient should receive 500cc bolus every other day until his PO intake is adequate  - Wound care: dressing changed by wound team (Aquacel)   - NO TORADOL/NSAIDS for him    [facility]  [id number]

## 2021-09-23 NOTE — PROGRESS NOTE ADULT - SUBJECTIVE AND OBJECTIVE BOX
Patient is a 81y old  Male who presents with a chief complaint of diarrhea with blood in it , abd pain and weakness (23 Sep 2021 16:32)      INTERVAL HPI/OVERNIGHT EVENTS: doing fair , plan for d/c to rehab   T(C): 37.2 (09-23-21 @ 14:20), Max: 37.4 (09-23-21 @ 10:10)  HR: 88 (09-23-21 @ 14:20) (85 - 95)  BP: 97/60 (09-23-21 @ 14:20) (97/60 - 151/68)  RR: 18 (09-23-21 @ 14:20) (18 - 18)  SpO2: 95% (09-23-21 @ 14:20) (95% - 99%)  Wt(kg): --  I&O's Summary    22 Sep 2021 07:01  -  23 Sep 2021 07:00  --------------------------------------------------------  IN: 150 mL / OUT: 1463 mL / NET: -1313 mL    23 Sep 2021 07:01  -  23 Sep 2021 18:00  --------------------------------------------------------  IN: 250 mL / OUT: 500 mL / NET: -250 mL        PAST MEDICAL & SURGICAL HISTORY:  Crohn disease    Paroxysmal atrial fibrillation    CAD (coronary artery disease)  not on ASA or plavix, no stents as per daughter    GI bleed    No significant past surgical history        SOCIAL HISTORY  Alcohol:  Tobacco:  Illicit substance use:    FAMILY HISTORY:    REVIEW OF SYSTEMS:  CONSTITUTIONAL: No fever, weight loss, or fatigue  EYES: No eye pain, visual disturbances, or discharge  ENMT:  No difficulty hearing, tinnitus, vertigo; No sinus or throat pain  NECK: No pain or stiffness  RESPIRATORY: No cough, wheezing, chills or hemoptysis; No shortness of breath  CARDIOVASCULAR: No chest pain, palpitations, dizziness, or leg swelling  GASTROINTESTINAL: No abdominal or epigastric pain. No nausea, vomiting, or hematemesis; No diarrhea or constipation. No melena or hematochezia.  GENITOURINARY: No dysuria, frequency, hematuria, or incontinence  NEUROLOGICAL: No headaches, memory loss, loss of strength, numbness, or tremors  SKIN: No itching, burning, rashes, or lesions   LYMPH NODES: No enlarged glands  ENDOCRINE: No heat or cold intolerance; No hair loss  MUSCULOSKELETAL: No joint pain or swelling; No muscle, back, or extremity pain  PSYCHIATRIC: No depression, anxiety, mood swings, or difficulty sleeping  HEME/LYMPH: No easy bruising, or bleeding gums  ALLERY AND IMMUNOLOGIC: No hives or eczema    RADIOLOGY & ADDITIONAL TESTS:    Imaging Personally Reviewed:  [ ] YES  [ ] NO    Consultant(s) Notes Reviewed:  [ ] YES  [ ] NO    PHYSICAL EXAM:  GENERAL: NAD, well-groomed, well-developed  HEAD:  Atraumatic, Normocephalic  EYES: EOMI, PERRLA, conjunctiva and sclera clear  ENMT: No tonsillar erythema, exudates, or enlargement; Moist mucous membranes, Good dentition, No lesions  NECK: Supple, No JVD, Normal thyroid  NERVOUS SYSTEM:  Alert & Oriented X3, Good concentration; Motor Strength 5/5 B/L upper and lower extremities; DTRs 2+ intact and symmetric  CHEST/LUNG: Clear to percussion bilaterally; No rales, rhonchi, wheezing, or rubs  HEART: Regular rate and rhythm; No murmurs, rubs, or gallops  ABDOMEN: Soft, Nontender, Nondistended; Bowel sounds present  EXTREMITIES:  2+ Peripheral Pulses, No clubbing, cyanosis, or edema  LYMPH: No lymphadenopathy noted  SKIN: No rashes or lesions    LABS:              CAPILLARY BLOOD GLUCOSE                MEDICATIONS  (STANDING):  Biotene Dry Mouth Oral Rinse 5 milliLiter(s) Swish and Spit two times a day  chlorhexidine 2% Cloths 1 Application(s) Topical <User Schedule>  cholecalciferol 1000 Unit(s) Oral daily  Dakins Solution - 1/4 Strength 1 Application(s) Topical every 12 hours  folic acid 1 milliGRAM(s) Oral daily  lidocaine   4% Patch 1 Patch Transdermal daily  metoprolol tartrate 25 milliGRAM(s) Oral every 8 hours  multivitamin 1 Tablet(s) Oral daily  pantoprazole    Tablet 40 milliGRAM(s) Oral every 12 hours  sucralfate 1 Gram(s) Oral every 12 hours    MEDICATIONS  (PRN):  acetaminophen   Tablet .. 650 milliGRAM(s) Oral every 6 hours PRN Mild Pain (1 - 3)      Care Discussed with Consultants/Other Providers [ ] YES  [ ] NO

## 2021-09-23 NOTE — PROGRESS NOTE ADULT - NUTRITIONAL ASSESSMENT
This patient has been assessed with a concern for Malnutrition and has been determined to have a diagnosis/diagnoses of Severe protein-calorie malnutrition.    This patient is being managed with:   Diet Dysphagia 2 Mechanical Soft-Honey Consistency Fluid-  Supplement Feeding Modality:  Oral  Ensure Enlive Servings Per Day:  3       Frequency:  Three Times a day  Ensure Pudding Cans or Servings Per Day:  2       Frequency:  Daily  Entered: Sep 17 2021  2:08PM    
This patient has been assessed with a concern for Malnutrition and has been determined to have a diagnosis/diagnoses of Severe protein-calorie malnutrition.    This patient is being managed with:   Diet Dysphagia 2 Mechanical Soft-Honey Consistency Fluid-  Supplement Feeding Modality:  Oral  Ensure Enlive Servings Per Day:  3       Frequency:  Three Times a day  Entered: Sep 12 2021  9:25PM    
This patient has been assessed with a concern for Malnutrition and has been determined to have a diagnosis/diagnoses of Severe protein-calorie malnutrition.    This patient is being managed with:   Diet NPO after Midnight-     NPO Start Date: 07-Sep-2021   NPO Start Time: 23:59  Except Medications  Entered: Sep  7 2021  7:50PM    Diet NPO with Tube Feed-  Tube Feeding Modality: Nasogastric  Pivot 1.5 Blayne (PIVOT1.5RTH)  Total Volume for 24 Hours (mL): 1080  Continuous  Starting Tube Feed Rate {mL per Hour}: 20     Every 12 hours  Until Goal Tube Feed Rate (mL per Hour): 45  Tube Feed Duration (in Hours): 24  Tube Feed Start Time: 10:30  Entered: Sep  6 2021  9:46AM    
This patient has been assessed with a concern for Malnutrition and has been determined to have a diagnosis/diagnoses of Severe protein-calorie malnutrition.    This patient is being managed with:   Diet NPO with Tube Feed-  Tube Feeding Modality: Nasogastric  Glucerna 1.2 Blayne (GLUCERNARTH)  Total Volume for 24 Hours (mL): 1320  Continuous  Starting Tube Feed Rate {mL per Hour}: 10  Increase Tube Feed Rate by (mL): 10     Every 4 hours  Until Goal Tube Feed Rate (mL per Hour): 55  Tube Feed Duration (in Hours): 24  Tube Feed Start Time: 00:45  Entered: Aug 28 2021 12:20AM    Parenteral Nutrition - Adult-  Entered: Aug 27 2021 12:33PM    fat emulsion (Fish Oil and Plant Based) 20% Infusion-[Known as SMOFLIPID 20% Infusion]  60 Gram(s) in IV Solution 300 milliLiter(s) infuse at 25 mL/Hr  Dose Rate: 25 mL/Hr Infuse Over: 12 Hours  Administration Instructions: Use 1.2 micron in-line filter  Entered: Aug 27 2021 12:33PM    
This patient has been assessed with a concern for Malnutrition and has been determined to have a diagnosis/diagnoses of Severe protein-calorie malnutrition.    This patient is being managed with:   Diet NPO with Tube Feed-  Tube Feeding Modality: Nasogastric  Pivot 1.5 Blayne (PIVOT1.5RTH)  Total Volume for 24 Hours (mL): 1080  Continuous  Starting Tube Feed Rate {mL per Hour}: 45  Until Goal Tube Feed Rate (mL per Hour): 45  Tube Feed Duration (in Hours): 24  Tube Feed Start Time: 19:30  Entered: Sep  8 2021  6:58PM    
This patient has been assessed with a concern for Malnutrition and has been determined to have a diagnosis/diagnoses of Severe protein-calorie malnutrition.    This patient is being managed with:   Diet NPO with Tube Feed-  Tube Feeding Modality: Nasogastric  Pivot 1.5 Blayne (PIVOT1.5RTH)  Total Volume for 24 Hours (mL): 1080  Continuous  Starting Tube Feed Rate {mL per Hour}: 45  Until Goal Tube Feed Rate (mL per Hour): 45  Tube Feed Duration (in Hours): 24  Tube Feed Start Time: 19:30  Entered: Sep  8 2021  6:58PM    
This patient has been assessed with a concern for Malnutrition and has been determined to have a diagnosis/diagnoses of Severe protein-calorie malnutrition.    This patient is being managed with:   Diet NPO-  Entered: Aug 23 2021 10:22PM    Parenteral Nutrition - Adult-  Entered: Aug 23 2021  5:00PM    fat emulsion (Fish Oil and Plant Based) 20% Infusion-[Known as SMOFLIPID 20% Infusion]  60 Gram(s) in IV Solution 300 milliLiter(s) infuse at 25 mL/Hr  Dose Rate: 25 mL/Hr Infuse Over: 12 Hours  Administration Instructions: Use 1.2 micron in-line filter  Entered: Aug 23 2021 10:45AM    
This patient has been assessed with a concern for Malnutrition and has been determined to have a diagnosis/diagnoses of Severe protein-calorie malnutrition.    This patient is being managed with:   Diet NPO-  Except Medications  Entered: Sep 15 2021  9:57AM    
This patient has been assessed with a concern for Malnutrition and has been determined to have a diagnosis/diagnoses of Severe protein-calorie malnutrition.    This patient is being managed with:   Diet Regular-  Fiber/Residue Restricted (LOWFIBER)  Supplement Feeding Modality:  Oral  Ensure Clear Cans or Servings Per Day:  3       Frequency:  Daily  Entered: Jul 15 2021  4:59PM    Diet Regular-  Fiber/Residue Restricted (LOWFIBER)  Entered: Jul 15 2021 12:54PM    The following pending diet order is being considered for treatment of Severe protein-calorie malnutrition:null
This patient has been assessed with a concern for Malnutrition and has been determined to have a diagnosis/diagnoses of Severe protein-calorie malnutrition.    This patient is being managed with:   Parenteral Nutrition - Adult-  Entered: Aug 21 2021  5:00PM    fat emulsion (Fish Oil and Plant Based) 20% Infusion-[Known as SMOFLIPID 20% Infusion]  60 Gram(s) in IV Solution 300 milliLiter(s) infuse at 25 mL/Hr  Dose Rate: 25 mL/Hr Infuse Over: 12 Hours  Administration Instructions: Use 1.2 micron in-line filter  Entered: Aug 21 2021 11:21AM    Diet NPO-  Entered: Aug 21 2021  6:43AM    Parenteral Nutrition - Adult-  Entered: Aug 20 2021  5:00PM    
This patient has been assessed with a concern for Malnutrition and has been determined to have a diagnosis/diagnoses of Severe protein-calorie malnutrition.    This patient is being managed with:   Parenteral Nutrition - Adult-  Entered: Aug 25 2021  5:00PM    Diet NPO-  With Ice Chips/Sips of Water  Entered: Aug 25 2021  3:23PM    fat emulsion (Fish Oil and Plant Based) 20% Infusion-[Known as SMOFLIPID 20% Infusion]  60 Gram(s) in IV Solution 300 milliLiter(s) infuse at 25 mL/Hr  Dose Rate: 25 mL/Hr Infuse Over: 12 Hours  Administration Instructions: Use 1.2 micron in-line filter  Entered: Aug 25 2021 11:44AM    
This patient has been assessed with a concern for Malnutrition and has been determined to have a diagnosis/diagnoses of Severe protein-calorie malnutrition.    This patient is being managed with:   Parenteral Nutrition - Adult-  Entered: Aug 26 2021  5:00PM    fat emulsion (Fish Oil and Plant Based) 20% Infusion-[Known as SMOFLIPID 20% Infusion]  60 Gram(s) in IV Solution 300 milliLiter(s) infuse at 25 mL/Hr  Dose Rate: 25 mL/Hr Infuse Over: 12 Hours  Administration Instructions: Use 1.2 micron in-line filter  Entered: Aug 26 2021 11:26AM    Diet NPO-  With Ice Chips/Sips of Water  Entered: Aug 25 2021  3:23PM    
This patient has been assessed with a concern for Malnutrition and has been determined to have a diagnosis/diagnoses of Severe protein-calorie malnutrition.    This patient is being managed with:   Parenteral Nutrition - Adult-  Entered: Sep  3 2021 12:33PM    Diet NPO with Tube Feed-  Tube Feeding Modality: Nasogastric  Pivot 1.5 Blayne (PIVOT1.5RTH)  Total Volume for 24 Hours (mL): 960  Continuous  Starting Tube Feed Rate {mL per Hour}: 20     Every 12 hours  Until Goal Tube Feed Rate (mL per Hour): 40  Tube Feed Duration (in Hours): 24  Tube Feed Start Time: 10:30  Entered: Sep  1 2021 12:45PM    
This patient has been assessed with a concern for Malnutrition and has been determined to have a diagnosis/diagnoses of Severe protein-calorie malnutrition.    This patient is being managed with:   fat emulsion (Fish Oil and Plant Based) 20% Infusion-[Known as SMOFLIPID 20% Infusion]  60 Gram(s) in IV Solution 300 milliLiter(s) infuse at 25 mL/Hr  Dose Rate: 25 mL/Hr Infuse Over: 12 Hours  Administration Instructions: Use 1.2 micron in-line filter  Entered: Aug 18 2021  5:00PM    Diet Low Fiber-  David(7 Gm Arginine/7 Gm Glut/1.2 Gm HMB     Qty per Day:  2  Liquid Protein Supplement     Qty per Day:  1  Supplement Feeding Modality:  Oral  Ensure Enlive Cans or Servings Per Day:  2       Frequency:  Daily  Probiotic Yogurt/Smoothie Cans or Servings Per Day:  1       Frequency:  Daily  Entered: Aug 12 2021 10:59AM    
This patient has been assessed with a concern for Malnutrition and has been determined to have a diagnosis/diagnoses of Severe protein-calorie malnutrition.    This patient is being managed with:   Diet Dysphagia 2 Mechanical Soft-Honey Consistency Fluid-  Supplement Feeding Modality:  Oral  Ensure Enlive Servings Per Day:  3       Frequency:  Three Times a day  Ensure Pudding Cans or Servings Per Day:  2       Frequency:  Daily  Entered: Sep 17 2021  2:08PM    
This patient has been assessed with a concern for Malnutrition and has been determined to have a diagnosis/diagnoses of Severe protein-calorie malnutrition.    This patient is being managed with:   Diet Dysphagia 2 Mechanical Soft-Honey Consistency Fluid-  Supplement Feeding Modality:  Oral  Ensure Enlive Servings Per Day:  3       Frequency:  Three Times a day  Ensure Pudding Cans or Servings Per Day:  2       Frequency:  Daily  Entered: Sep 20 2021  8:37AM    
This patient has been assessed with a concern for Malnutrition and has been determined to have a diagnosis/diagnoses of Severe protein-calorie malnutrition.    This patient is being managed with:   Diet Dysphagia 2 Mechanical Soft-Honey Consistency Fluid-  Supplement Feeding Modality:  Oral  Ensure Enlive Servings Per Day:  3       Frequency:  Three Times a day  Entered: Sep 12 2021  9:25PM    
This patient has been assessed with a concern for Malnutrition and has been determined to have a diagnosis/diagnoses of Severe protein-calorie malnutrition.    This patient is being managed with:   Diet Low Fiber-  Banatrol TF     Qty per Day:  1 pkt po q8h  Liquid Protein Supplement     Qty per Day:  3  Supplement Feeding Modality:  Oral  Ensure Enlive Cans or Servings Per Day:  2       Frequency:  Daily  Probiotic Yogurt/Smoothie Cans or Servings Per Day:  2       Frequency:  Daily  Entered: Aug  9 2021  6:20PM    
This patient has been assessed with a concern for Malnutrition and has been determined to have a diagnosis/diagnoses of Severe protein-calorie malnutrition.    This patient is being managed with:   Diet Low Fiber-  Banatrol TF     Qty per Day:  1 pkt po q8h  Liquid Protein Supplement     Qty per Day:  3  Supplement Feeding Modality:  Oral  Ensure Enlive Cans or Servings Per Day:  2       Frequency:  Daily  Probiotic Yogurt/Smoothie Cans or Servings Per Day:  2       Frequency:  Daily  Entered: Jul 30 2021  6:25PM    
This patient has been assessed with a concern for Malnutrition and has been determined to have a diagnosis/diagnoses of Severe protein-calorie malnutrition.    This patient is being managed with:   Diet Low Fiber-  Banatrol TF     Qty per Day:  1 pkt po q8h  Liquid Protein Supplement     Qty per Day:  3  Supplement Feeding Modality:  Oral  Ensure Enlive Cans or Servings Per Day:  2       Frequency:  Daily  Probiotic Yogurt/Smoothie Cans or Servings Per Day:  2       Frequency:  Daily  Entered: Jul 30 2021  6:25PM    
This patient has been assessed with a concern for Malnutrition and has been determined to have a diagnosis/diagnoses of Severe protein-calorie malnutrition.    This patient is being managed with:   Diet Low Fiber-  David(7 Gm Arginine/7 Gm Glut/1.2 Gm HMB     Qty per Day:  2  Liquid Protein Supplement     Qty per Day:  1  Supplement Feeding Modality:  Oral  Ensure Enlive Cans or Servings Per Day:  2       Frequency:  Daily  Probiotic Yogurt/Smoothie Cans or Servings Per Day:  1       Frequency:  Daily  Entered: Aug 12 2021 10:59AM    
This patient has been assessed with a concern for Malnutrition and has been determined to have a diagnosis/diagnoses of Severe protein-calorie malnutrition.    This patient is being managed with:   Diet NPO with Tube Feed-  Tube Feeding Modality: Nasogastric  Pivot 1.5 Blayne (PIVOT1.5RTH)  Total Volume for 24 Hours (mL): 1080  Continuous  Starting Tube Feed Rate {mL per Hour}: 45  Until Goal Tube Feed Rate (mL per Hour): 45  Tube Feed Duration (in Hours): 24  Tube Feed Start Time: 19:30  Entered: Sep  8 2021  6:58PM    
This patient has been assessed with a concern for Malnutrition and has been determined to have a diagnosis/diagnoses of Severe protein-calorie malnutrition.    This patient is being managed with:   Diet NPO-  Except Medications  Entered: Sep 15 2021  9:57AM    
This patient has been assessed with a concern for Malnutrition and has been determined to have a diagnosis/diagnoses of Severe protein-calorie malnutrition.    This patient is being managed with:   Diet NPO-  Except Medications  Entered: Sep 15 2021  9:57AM    
This patient has been assessed with a concern for Malnutrition and has been determined to have a diagnosis/diagnoses of Severe protein-calorie malnutrition.    This patient is being managed with:   Diet NPO-  Except Medications  Entered: Sep 20 2021  8:34AM    
This patient has been assessed with a concern for Malnutrition and has been determined to have a diagnosis/diagnoses of Severe protein-calorie malnutrition.    This patient is being managed with:   Diet NPO-  Except Medications  With Ice Chips/Sips of Water  Entered: Sep 11 2021 10:27AM    
This patient has been assessed with a concern for Malnutrition and has been determined to have a diagnosis/diagnoses of Severe protein-calorie malnutrition.    This patient is being managed with:   Diet Regular-  Fiber/Residue Restricted (LOWFIBER)  Supplement Feeding Modality:  Oral  Ensure Clear Cans or Servings Per Day:  3       Frequency:  Daily  Entered: Jul 15 2021  4:59PM    Diet Regular-  Fiber/Residue Restricted (LOWFIBER)  Entered: Jul 15 2021 12:54PM    The following pending diet order is being considered for treatment of Severe protein-calorie malnutrition:null
This patient has been assessed with a concern for Malnutrition and has been determined to have a diagnosis/diagnoses of Severe protein-calorie malnutrition.    This patient is being managed with:   Parenteral Nutrition - Adult-  Entered: Aug 16 2021  5:00PM    Diet Low Fiber-  David(7 Gm Arginine/7 Gm Glut/1.2 Gm HMB     Qty per Day:  2  Liquid Protein Supplement     Qty per Day:  1  Supplement Feeding Modality:  Oral  Ensure Enlive Cans or Servings Per Day:  2       Frequency:  Daily  Probiotic Yogurt/Smoothie Cans or Servings Per Day:  1       Frequency:  Daily  Entered: Aug 12 2021 10:59AM    
This patient has been assessed with a concern for Malnutrition and has been determined to have a diagnosis/diagnoses of Severe protein-calorie malnutrition.    This patient is being managed with:   fat emulsion (Fish Oil and Plant Based) 20% Infusion-[Known as SMOFLIPID 20% Infusion]  30 Gram(s) in IV Solution 150 milliLiter(s) infuse at 12.5 mL/Hr  Dose Rate: 12.5 mL/Hr Infuse Over: 12 Hours  Administration Instructions: Use 1.2 micron in-line filter  Entered: Aug 17 2021  5:00PM    Parenteral Nutrition - Adult-  Entered: Aug 16 2021  5:00PM    Diet Low Fiber-  David(7 Gm Arginine/7 Gm Glut/1.2 Gm HMB     Qty per Day:  2  Liquid Protein Supplement     Qty per Day:  1  Supplement Feeding Modality:  Oral  Ensure Enlive Cans or Servings Per Day:  2       Frequency:  Daily  Probiotic Yogurt/Smoothie Cans or Servings Per Day:  1       Frequency:  Daily  Entered: Aug 12 2021 10:59AM    
This patient has been assessed with a concern for Malnutrition and has been determined to have a diagnosis/diagnoses of Severe protein-calorie malnutrition.    This patient is being managed with:   Diet Clear Liquid-  Supplement Feeding Modality:  Oral  Ensure Clear Cans or Servings Per Day:  2       Frequency:  Daily  Entered: Aug  8 2021 12:44PM    Diet NPO after Midnight-     NPO Start Date: 08-Aug-2021   NPO Start Time: 23:59  Except Medications  Entered: Aug  8 2021 12:44PM    
This patient has been assessed with a concern for Malnutrition and has been determined to have a diagnosis/diagnoses of Severe protein-calorie malnutrition.    This patient is being managed with:   Diet Dysphagia 2 Mechanical Soft-Honey Consistency Fluid-  Supplement Feeding Modality:  Oral  Ensure Enlive Servings Per Day:  3       Frequency:  Three Times a day  Ensure Pudding Cans or Servings Per Day:  2       Frequency:  Daily  Entered: Sep 17 2021  2:08PM    
This patient has been assessed with a concern for Malnutrition and has been determined to have a diagnosis/diagnoses of Severe protein-calorie malnutrition.    This patient is being managed with:   Diet Dysphagia 2 Mechanical Soft-Honey Consistency Fluid-  Supplement Feeding Modality:  Oral  Ensure Enlive Servings Per Day:  3       Frequency:  Three Times a day  Ensure Pudding Cans or Servings Per Day:  2       Frequency:  Daily  Entered: Sep 17 2021  2:08PM    
This patient has been assessed with a concern for Malnutrition and has been determined to have a diagnosis/diagnoses of Severe protein-calorie malnutrition.    This patient is being managed with:   Diet Dysphagia 2 Mechanical Soft-Honey Consistency Fluid-  Supplement Feeding Modality:  Oral  Ensure Enlive Servings Per Day:  3       Frequency:  Three Times a day  Ensure Pudding Cans or Servings Per Day:  2       Frequency:  Daily  Entered: Sep 20 2021  8:37AM    
This patient has been assessed with a concern for Malnutrition and has been determined to have a diagnosis/diagnoses of Severe protein-calorie malnutrition.    This patient is being managed with:   Diet Dysphagia 2 Mechanical Soft-Honey Consistency Fluid-  Supplement Feeding Modality:  Oral  Ensure Enlive Servings Per Day:  3       Frequency:  Three Times a day  Entered: Sep 12 2021  9:25PM    
This patient has been assessed with a concern for Malnutrition and has been determined to have a diagnosis/diagnoses of Severe protein-calorie malnutrition.    This patient is being managed with:   Diet Dysphagia 2 Mechanical Soft-Honey Consistency Fluid-  Supplement Feeding Modality:  Oral  Ensure Enlive Servings Per Day:  3       Frequency:  Three Times a day  Entered: Sep 12 2021  9:25PM    
This patient has been assessed with a concern for Malnutrition and has been determined to have a diagnosis/diagnoses of Severe protein-calorie malnutrition.    This patient is being managed with:   Diet Low Fiber-  Banatrol TF     Qty per Day:  1 pkt po q8h  Liquid Protein Supplement     Qty per Day:  3  Supplement Feeding Modality:  Oral  Ensure Enlive Cans or Servings Per Day:  2       Frequency:  Daily  Probiotic Yogurt/Smoothie Cans or Servings Per Day:  2       Frequency:  Daily  Entered: Aug  9 2021  6:20PM    
This patient has been assessed with a concern for Malnutrition and has been determined to have a diagnosis/diagnoses of Severe protein-calorie malnutrition.    This patient is being managed with:   Diet Low Fiber-  Banatrol TF     Qty per Day:  1 pkt po q8h  Liquid Protein Supplement     Qty per Day:  3  Supplement Feeding Modality:  Oral  Ensure Enlive Cans or Servings Per Day:  2       Frequency:  Daily  Probiotic Yogurt/Smoothie Cans or Servings Per Day:  2       Frequency:  Daily  Entered: Aug  9 2021  6:20PM    
This patient has been assessed with a concern for Malnutrition and has been determined to have a diagnosis/diagnoses of Severe protein-calorie malnutrition.    This patient is being managed with:   Diet Low Fiber-  David(7 Gm Arginine/7 Gm Glut/1.2 Gm HMB     Qty per Day:  2  Liquid Protein Supplement     Qty per Day:  1  Supplement Feeding Modality:  Oral  Ensure Enlive Cans or Servings Per Day:  2       Frequency:  Daily  Probiotic Yogurt/Smoothie Cans or Servings Per Day:  1       Frequency:  Daily  Entered: Aug 12 2021 10:59AM    
This patient has been assessed with a concern for Malnutrition and has been determined to have a diagnosis/diagnoses of Severe protein-calorie malnutrition.    This patient is being managed with:   Diet Low Fiber-  Liquid Protein Supplement     Qty per Day:  3  Supplement Feeding Modality:  Oral  Probiotic Yogurt/Smoothie Cans or Servings Per Day:  2       Frequency:  Daily  Entered: Jul 20 2021  3:34PM    
This patient has been assessed with a concern for Malnutrition and has been determined to have a diagnosis/diagnoses of Severe protein-calorie malnutrition.    This patient is being managed with:   Diet Low Fiber-  Supplement Feeding Modality:  Oral  Ensure Enlive Cans or Servings Per Day:  3       Frequency:  Daily  Entered: Jul 16 2021  3:09PM    
This patient has been assessed with a concern for Malnutrition and has been determined to have a diagnosis/diagnoses of Severe protein-calorie malnutrition.    This patient is being managed with:   Diet NPO after Midnight-     NPO Start Date: 07-Sep-2021   NPO Start Time: 23:59  Except Medications  Entered: Sep  7 2021  7:50PM    Diet NPO with Tube Feed-  Tube Feeding Modality: Nasogastric  Pivot 1.5 Blayne (PIVOT1.5RTH)  Total Volume for 24 Hours (mL): 1080  Continuous  Starting Tube Feed Rate {mL per Hour}: 20     Every 12 hours  Until Goal Tube Feed Rate (mL per Hour): 45  Tube Feed Duration (in Hours): 24  Tube Feed Start Time: 10:30  Entered: Sep  6 2021  9:46AM    
This patient has been assessed with a concern for Malnutrition and has been determined to have a diagnosis/diagnoses of Severe protein-calorie malnutrition.    This patient is being managed with:   Diet NPO with Tube Feed-  Tube Feeding Modality: Nasogastric  Pivot 1.5 Blayne (PIVOT1.5RTH)  Total Volume for 24 Hours (mL): 1080  Continuous  Starting Tube Feed Rate {mL per Hour}: 45  Until Goal Tube Feed Rate (mL per Hour): 45  Tube Feed Duration (in Hours): 24  Tube Feed Start Time: 19:30  Entered: Sep  8 2021  6:58PM    
This patient has been assessed with a concern for Malnutrition and has been determined to have a diagnosis/diagnoses of Severe protein-calorie malnutrition.    This patient is being managed with:   Diet NPO-  Except Medications  Entered: Sep 15 2021  9:57AM    
This patient has been assessed with a concern for Malnutrition and has been determined to have a diagnosis/diagnoses of Severe protein-calorie malnutrition.    This patient is being managed with:   Diet Regular-  Fiber/Residue Restricted (LOWFIBER)  Supplement Feeding Modality:  Oral  Ensure Clear Cans or Servings Per Day:  3       Frequency:  Daily  Entered: Jul 15 2021  4:59PM    Diet Regular-  Fiber/Residue Restricted (LOWFIBER)  Entered: Jul 15 2021 12:54PM    The following pending diet order is being considered for treatment of Severe protein-calorie malnutrition:null
This patient has been assessed with a concern for Malnutrition and has been determined to have a diagnosis/diagnoses of Severe protein-calorie malnutrition.    This patient is being managed with:   Diet Regular-  Fiber/Residue Restricted (LOWFIBER)  Supplement Feeding Modality:  Oral  Ensure Clear Cans or Servings Per Day:  3       Frequency:  Daily  Entered: Jul 15 2021  4:59PM    Diet Regular-  Fiber/Residue Restricted (LOWFIBER)  Entered: Jul 15 2021 12:54PM    The following pending diet order is being considered for treatment of Severe protein-calorie malnutrition:null
This patient has been assessed with a concern for Malnutrition and has been determined to have a diagnosis/diagnoses of Severe protein-calorie malnutrition.    This patient is being managed with:   Parenteral Nutrition - Adult-  Entered: Aug 21 2021  5:00PM    fat emulsion (Fish Oil and Plant Based) 20% Infusion-[Known as SMOFLIPID 20% Infusion]  60 Gram(s) in IV Solution 300 milliLiter(s) infuse at 25 mL/Hr  Dose Rate: 25 mL/Hr Infuse Over: 12 Hours  Administration Instructions: Use 1.2 micron in-line filter  Entered: Aug 21 2021 11:21AM    Diet NPO-  Entered: Aug 21 2021  6:43AM    Parenteral Nutrition - Adult-  Entered: Aug 20 2021  5:00PM    
This patient has been assessed with a concern for Malnutrition and has been determined to have a diagnosis/diagnoses of Severe protein-calorie malnutrition.    This patient is being managed with:   Parenteral Nutrition - Adult-  Entered: Aug 22 2021  5:00PM    fat emulsion (Fish Oil and Plant Based) 20% Infusion-[Known as SMOFLIPID 20% Infusion]  60 Gram(s) in IV Solution 300 milliLiter(s) infuse at 25 mL/Hr  Dose Rate: 25 mL/Hr Infuse Over: 12 Hours  Administration Instructions: Use 1.2 micron in-line filter  Entered: Aug 22 2021 11:06AM    Parenteral Nutrition - Adult-  Entered: Aug 21 2021  5:00PM    Diet NPO-  Entered: Aug 21 2021  6:43AM    
This patient has been assessed with a concern for Malnutrition and has been determined to have a diagnosis/diagnoses of Severe protein-calorie malnutrition.    This patient is being managed with:   Parenteral Nutrition - Adult-  Entered: Aug 24 2021  5:00PM    fat emulsion (Fish Oil and Plant Based) 20% Infusion-[Known as SMOFLIPID 20% Infusion]  60 Gram(s) in IV Solution 300 milliLiter(s) infuse at 25 mL/Hr  Dose Rate: 25 mL/Hr Infuse Over: 12 Hours  Administration Instructions: Use 1.2 micron in-line filter  Entered: Aug 24 2021 11:46AM    Diet NPO-  Entered: Aug 23 2021 10:22PM    Parenteral Nutrition - Adult-  Entered: Aug 23 2021  5:00PM    
This patient has been assessed with a concern for Malnutrition and has been determined to have a diagnosis/diagnoses of Severe protein-calorie malnutrition.    This patient is being managed with:   Parenteral Nutrition - Adult-  Entered: Aug 25 2021  5:00PM    Diet NPO-  With Ice Chips/Sips of Water  Entered: Aug 25 2021  3:23PM    fat emulsion (Fish Oil and Plant Based) 20% Infusion-[Known as SMOFLIPID 20% Infusion]  60 Gram(s) in IV Solution 300 milliLiter(s) infuse at 25 mL/Hr  Dose Rate: 25 mL/Hr Infuse Over: 12 Hours  Administration Instructions: Use 1.2 micron in-line filter  Entered: Aug 25 2021 11:44AM    
This patient has been assessed with a concern for Malnutrition and has been determined to have a diagnosis/diagnoses of Severe protein-calorie malnutrition.    This patient is being managed with:   Parenteral Nutrition - Adult-  Entered: Aug 30 2021 12:33PM    fat emulsion (Fish Oil and Plant Based) 20% Infusion-[Known as SMOFLIPID 20% Infusion]  60 Gram(s) in IV Solution 300 milliLiter(s) infuse at 25 mL/Hr  Dose Rate: 25 mL/Hr Infuse Over: 12 Hours  Administration Instructions: Use 1.2 micron in-line filter  Entered: Aug 30 2021 11:44AM    Diet NPO-  Except Medications  Entered: Aug 30 2021  4:39AM    
This patient has been assessed with a concern for Malnutrition and has been determined to have a diagnosis/diagnoses of Severe protein-calorie malnutrition.    This patient is being managed with:   Parenteral Nutrition - Adult-  Entered: Sep  2 2021 12:33PM    fat emulsion (Fish Oil and Plant Based) 20% Infusion-[Known as SMOFLIPID 20% Infusion]  60 Gram(s) in IV Solution 300 milliLiter(s) infuse at 25 mL/Hr  Dose Rate: 25 mL/Hr Infuse Over: 12 Hours  Administration Instructions: Use 1.2 micron in-line filter  Entered: Sep  2 2021 11:36AM    Diet NPO with Tube Feed-  Tube Feeding Modality: Nasogastric  Pivot 1.5 Blayne (PIVOT1.5RTH)  Total Volume for 24 Hours (mL): 960  Continuous  Starting Tube Feed Rate {mL per Hour}: 20     Every 12 hours  Until Goal Tube Feed Rate (mL per Hour): 40  Tube Feed Duration (in Hours): 24  Tube Feed Start Time: 10:30  Entered: Sep  1 2021 12:45PM    
This patient has been assessed with a concern for Malnutrition and has been determined to have a diagnosis/diagnoses of Severe protein-calorie malnutrition.    This patient is being managed with:   Parenteral Nutrition - Adult-  Entered: Sep  4 2021 12:33PM    fat emulsion (Fish Oil and Plant Based) 20% Infusion-[Known as SMOFLIPID 20% Infusion]  60 Gram(s) in IV Solution 300 milliLiter(s) infuse at 25 mL/Hr  Dose Rate: 25 mL/Hr Infuse Over: 12 Hours  Administration Instructions: Use 1.2 micron in-line filter  Entered: Sep  4 2021  9:49AM    Parenteral Nutrition - Adult-  Entered: Sep  3 2021 12:33PM    Diet NPO with Tube Feed-  Tube Feeding Modality: Nasogastric  Pivot 1.5 Blayne (PIVOT1.5RTH)  Total Volume for 24 Hours (mL): 960  Continuous  Starting Tube Feed Rate {mL per Hour}: 20     Every 12 hours  Until Goal Tube Feed Rate (mL per Hour): 40  Tube Feed Duration (in Hours): 24  Tube Feed Start Time: 10:30  Entered: Sep  1 2021 12:45PM    
This patient has been assessed with a concern for Malnutrition and has been determined to have a diagnosis/diagnoses of Severe protein-calorie malnutrition.    This patient is being managed with:   Diet Dysphagia 2 Mechanical Soft-Honey Consistency Fluid-  Supplement Feeding Modality:  Oral  Ensure Enlive Servings Per Day:  3       Frequency:  Three Times a day  Ensure Pudding Cans or Servings Per Day:  2       Frequency:  Daily  Entered: Sep 20 2021  8:37AM    
This patient has been assessed with a concern for Malnutrition and has been determined to have a diagnosis/diagnoses of Severe protein-calorie malnutrition.    This patient is being managed with:   Diet Dysphagia 2 Mechanical Soft-Honey Consistency Fluid-  Supplement Feeding Modality:  Oral  Ensure Enlive Servings Per Day:  3       Frequency:  Three Times a day  Ensure Pudding Cans or Servings Per Day:  2       Frequency:  Daily  Entered: Sep 20 2021  8:37AM    
This patient has been assessed with a concern for Malnutrition and has been determined to have a diagnosis/diagnoses of Severe protein-calorie malnutrition.    This patient is being managed with:   Diet Low Fiber-  Banatrol TF     Qty per Day:  1 pkt po q8h  Liquid Protein Supplement     Qty per Day:  3  Supplement Feeding Modality:  Oral  Ensure Enlive Cans or Servings Per Day:  2       Frequency:  Daily  Probiotic Yogurt/Smoothie Cans or Servings Per Day:  2       Frequency:  Daily  Entered: Jul 30 2021  6:25PM    
This patient has been assessed with a concern for Malnutrition and has been determined to have a diagnosis/diagnoses of Severe protein-calorie malnutrition.    This patient is being managed with:   Diet Low Fiber-  Banatrol TF     Qty per Day:  1 pkt po q8h  Liquid Protein Supplement     Qty per Day:  3  Supplement Feeding Modality:  Oral  Ensure Enlive Cans or Servings Per Day:  2       Frequency:  Daily  Probiotic Yogurt/Smoothie Cans or Servings Per Day:  2       Frequency:  Daily  Entered: Jul 30 2021  6:25PM    
This patient has been assessed with a concern for Malnutrition and has been determined to have a diagnosis/diagnoses of Severe protein-calorie malnutrition.    This patient is being managed with:   Diet Low Fiber-  Banatrol TF     Qty per Day:  1 pkt po q8h  Liquid Protein Supplement     Qty per Day:  3  Supplement Feeding Modality:  Oral  Probiotic Yogurt/Smoothie Cans or Servings Per Day:  2       Frequency:  Daily  Entered: Jul 27 2021 10:00AM    
This patient has been assessed with a concern for Malnutrition and has been determined to have a diagnosis/diagnoses of Severe protein-calorie malnutrition.    This patient is being managed with:   Diet Low Fiber-  David(7 Gm Arginine/7 Gm Glut/1.2 Gm HMB     Qty per Day:  2  Liquid Protein Supplement     Qty per Day:  1  Supplement Feeding Modality:  Oral  Ensure Enlive Cans or Servings Per Day:  2       Frequency:  Daily  Probiotic Yogurt/Smoothie Cans or Servings Per Day:  1       Frequency:  Daily  Entered: Aug 12 2021 10:59AM    
This patient has been assessed with a concern for Malnutrition and has been determined to have a diagnosis/diagnoses of Severe protein-calorie malnutrition.    This patient is being managed with:   Diet Low Fiber-  Liquid Protein Supplement     Qty per Day:  3  Supplement Feeding Modality:  Oral  Probiotic Yogurt/Smoothie Cans or Servings Per Day:  2       Frequency:  Daily  Entered: Jul 20 2021  3:34PM    
This patient has been assessed with a concern for Malnutrition and has been determined to have a diagnosis/diagnoses of Severe protein-calorie malnutrition.    This patient is being managed with:   Diet NPO after Midnight-     NPO Start Date: 07-Sep-2021   NPO Start Time: 23:59  Except Medications  Entered: Sep  7 2021  7:50PM    Diet NPO with Tube Feed-  Tube Feeding Modality: Nasogastric  Pivot 1.5 Blayne (PIVOT1.5RTH)  Total Volume for 24 Hours (mL): 1080  Continuous  Starting Tube Feed Rate {mL per Hour}: 20     Every 12 hours  Until Goal Tube Feed Rate (mL per Hour): 45  Tube Feed Duration (in Hours): 24  Tube Feed Start Time: 10:30  Entered: Sep  6 2021  9:46AM    
This patient has been assessed with a concern for Malnutrition and has been determined to have a diagnosis/diagnoses of Severe protein-calorie malnutrition.    This patient is being managed with:   Diet NPO after Midnight-     NPO Start Date: 18-Jul-2021   NPO Start Time: 23:59  Except Medications  Entered: Jul 18 2021  7:18AM    Diet Low Fiber-  Supplement Feeding Modality:  Oral  Ensure Enlive Cans or Servings Per Day:  3       Frequency:  Daily  Entered: Jul 16 2021  3:09PM    
This patient has been assessed with a concern for Malnutrition and has been determined to have a diagnosis/diagnoses of Severe protein-calorie malnutrition.    This patient is being managed with:   Diet NPO after Midnight-     NPO Start Date: 27-Jul-2021   NPO Start Time: 23:59  Entered: Jul 27 2021  5:32PM    Diet Low Fiber-  Banatrol TF     Qty per Day:  1 pkt po q8h  Liquid Protein Supplement     Qty per Day:  3  Supplement Feeding Modality:  Oral  Probiotic Yogurt/Smoothie Cans or Servings Per Day:  2       Frequency:  Daily  Entered: Jul 27 2021 10:00AM    
This patient has been assessed with a concern for Malnutrition and has been determined to have a diagnosis/diagnoses of Severe protein-calorie malnutrition.    This patient is being managed with:   Diet NPO with Tube Feed-  Tube Feeding Modality: Nasogastric  Pivot 1.5 Blayne (PIVOT1.5RTH)  Total Volume for 24 Hours (mL): 1080  Continuous  Starting Tube Feed Rate {mL per Hour}: 45  Until Goal Tube Feed Rate (mL per Hour): 45  Tube Feed Duration (in Hours): 24  Tube Feed Start Time: 19:30  Entered: Sep  8 2021  6:58PM    
This patient has been assessed with a concern for Malnutrition and has been determined to have a diagnosis/diagnoses of Severe protein-calorie malnutrition.    This patient is being managed with:   Diet NPO-  Entered: Aug 23 2021 10:22PM    Parenteral Nutrition - Adult-  Entered: Aug 23 2021  5:00PM    fat emulsion (Fish Oil and Plant Based) 20% Infusion-[Known as SMOFLIPID 20% Infusion]  60 Gram(s) in IV Solution 300 milliLiter(s) infuse at 25 mL/Hr  Dose Rate: 25 mL/Hr Infuse Over: 12 Hours  Administration Instructions: Use 1.2 micron in-line filter  Entered: Aug 23 2021 10:45AM    
This patient has been assessed with a concern for Malnutrition and has been determined to have a diagnosis/diagnoses of Severe protein-calorie malnutrition.    This patient is being managed with:   Diet NPO-  Except Medications  Entered: Sep 15 2021  9:57AM    
This patient has been assessed with a concern for Malnutrition and has been determined to have a diagnosis/diagnoses of Severe protein-calorie malnutrition.    This patient is being managed with:   Diet NPO-  Except Medications  With Ice Chips/Sips of Water  Entered: Sep 11 2021 10:27AM    
This patient has been assessed with a concern for Malnutrition and has been determined to have a diagnosis/diagnoses of Severe protein-calorie malnutrition.    This patient is being managed with:   Parenteral Nutrition - Adult-  Entered: Aug 22 2021  5:00PM    fat emulsion (Fish Oil and Plant Based) 20% Infusion-[Known as SMOFLIPID 20% Infusion]  60 Gram(s) in IV Solution 300 milliLiter(s) infuse at 25 mL/Hr  Dose Rate: 25 mL/Hr Infuse Over: 12 Hours  Administration Instructions: Use 1.2 micron in-line filter  Entered: Aug 22 2021 11:06AM    Diet NPO-  Entered: Aug 21 2021  6:43AM    
This patient has been assessed with a concern for Malnutrition and has been determined to have a diagnosis/diagnoses of Severe protein-calorie malnutrition.    This patient is being managed with:   Parenteral Nutrition - Adult-  Entered: Aug 31 2021 12:33PM    fat emulsion (Fish Oil and Plant Based) 20% Infusion-[Known as SMOFLIPID 20% Infusion]  60 Gram(s) in IV Solution 300 milliLiter(s) infuse at 25 mL/Hr  Dose Rate: 25 mL/Hr Infuse Over: 12 Hours  Administration Instructions: Use 1.2 micron in-line filter  Entered: Aug 31 2021 11:17AM    Diet NPO with Tube Feed-  Tube Feeding Modality: Nasogastric  Glucerna 1.2 Blayne (GLUCERNARTH)  Total Volume for 24 Hours (mL): 480  Continuous  Starting Tube Feed Rate {mL per Hour}: 20     Every 4 hours  Until Goal Tube Feed Rate (mL per Hour): 20  Tube Feed Duration (in Hours): 24  Tube Feed Start Time: 10:30  Entered: Aug 31 2021 10:30AM    
This patient has been assessed with a concern for Malnutrition and has been determined to have a diagnosis/diagnoses of Severe protein-calorie malnutrition.    This patient is being managed with:   Parenteral Nutrition - Adult-  Entered: Sep  3 2021 12:33PM    fat emulsion (Fish Oil and Plant Based) 20% Infusion-[Known as SMOFLIPID 20% Infusion]  60 Gram(s) in IV Solution 300 milliLiter(s) infuse at 25 mL/Hr  Dose Rate: 25 mL/Hr Infuse Over: 12 Hours  Administration Instructions: Use 1.2 micron in-line filter  Entered: Sep  3 2021  8:44AM    Diet NPO with Tube Feed-  Tube Feeding Modality: Nasogastric  Pivot 1.5 Blayne (PIVOT1.5RTH)  Total Volume for 24 Hours (mL): 960  Continuous  Starting Tube Feed Rate {mL per Hour}: 20     Every 12 hours  Until Goal Tube Feed Rate (mL per Hour): 40  Tube Feed Duration (in Hours): 24  Tube Feed Start Time: 10:30  Entered: Sep  1 2021 12:45PM    
This patient has been assessed with a concern for Malnutrition and has been determined to have a diagnosis/diagnoses of Severe protein-calorie malnutrition.    This patient is being managed with:   Parenteral Nutrition - Adult-  Entered: Sep  4 2021 12:33PM    fat emulsion (Fish Oil and Plant Based) 20% Infusion-[Known as SMOFLIPID 20% Infusion]  60 Gram(s) in IV Solution 300 milliLiter(s) infuse at 25 mL/Hr  Dose Rate: 25 mL/Hr Infuse Over: 12 Hours  Administration Instructions: Use 1.2 micron in-line filter  Entered: Sep  4 2021  9:49AM    Diet NPO with Tube Feed-  Tube Feeding Modality: Nasogastric  Pivot 1.5 Blayne (PIVOT1.5RTH)  Total Volume for 24 Hours (mL): 960  Continuous  Starting Tube Feed Rate {mL per Hour}: 20     Every 12 hours  Until Goal Tube Feed Rate (mL per Hour): 40  Tube Feed Duration (in Hours): 24  Tube Feed Start Time: 10:30  Entered: Sep  1 2021 12:45PM    
This patient has been assessed with a concern for Malnutrition and has been determined to have a diagnosis/diagnoses of Severe protein-calorie malnutrition.    This patient is being managed with:   Parenteral Nutrition - Adult-  Entered: Sep  4 2021 12:33PM    fat emulsion (Fish Oil and Plant Based) 20% Infusion-[Known as SMOFLIPID 20% Infusion]  60 Gram(s) in IV Solution 300 milliLiter(s) infuse at 25 mL/Hr  Dose Rate: 25 mL/Hr Infuse Over: 12 Hours  Administration Instructions: Use 1.2 micron in-line filter  Entered: Sep  4 2021  9:49AM    Diet NPO with Tube Feed-  Tube Feeding Modality: Nasogastric  Pivot 1.5 Blayne (PIVOT1.5RTH)  Total Volume for 24 Hours (mL): 960  Continuous  Starting Tube Feed Rate {mL per Hour}: 20     Every 12 hours  Until Goal Tube Feed Rate (mL per Hour): 40  Tube Feed Duration (in Hours): 24  Tube Feed Start Time: 10:30  Entered: Sep  1 2021 12:45PM    
Overnight patient became unresponsive to verbal/painful stimuli, unclear cause.   Had 7 beats NSVT  Patient's family declined intubation, offered for airway protection, and patient made DNR/DNI.  Remains unresponsive on exam.  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  He was started on zosyn
This patient has been assessed with a concern for Malnutrition and has been determined to have a diagnosis/diagnoses of Severe protein-calorie malnutrition.    This patient is being managed with:   Diet Dysphagia 2 Mechanical Soft-Honey Consistency Fluid-  Supplement Feeding Modality:  Oral  Ensure Enlive Servings Per Day:  3       Frequency:  Three Times a day  Ensure Pudding Cans or Servings Per Day:  2       Frequency:  Daily  Entered: Sep 20 2021  8:37AM    
This patient has been assessed with a concern for Malnutrition and has been determined to have a diagnosis/diagnoses of Severe protein-calorie malnutrition.    This patient is being managed with:   Diet Dysphagia 2 Mechanical Soft-Honey Consistency Fluid-  Supplement Feeding Modality:  Oral  Ensure Enlive Servings Per Day:  3       Frequency:  Three Times a day  Entered: Sep 12 2021  9:25PM    
This patient has been assessed with a concern for Malnutrition and has been determined to have a diagnosis/diagnoses of Severe protein-calorie malnutrition.    This patient is being managed with:   Diet Full Liquid-  Entered: Sep 12 2021 10:29AM    
This patient has been assessed with a concern for Malnutrition and has been determined to have a diagnosis/diagnoses of Severe protein-calorie malnutrition.    This patient is being managed with:   Diet Low Fiber-  Banatrol TF     Qty per Day:  1 pkt po q8h  Liquid Protein Supplement     Qty per Day:  3  Supplement Feeding Modality:  Oral  Ensure Enlive Cans or Servings Per Day:  2       Frequency:  Daily  Probiotic Yogurt/Smoothie Cans or Servings Per Day:  2       Frequency:  Daily  Entered: Jul 30 2021  6:25PM    
This patient has been assessed with a concern for Malnutrition and has been determined to have a diagnosis/diagnoses of Severe protein-calorie malnutrition.    This patient is being managed with:   Diet Low Fiber-  Banatrol TF     Qty per Day:  1 pkt po q8h  Liquid Protein Supplement     Qty per Day:  3  Supplement Feeding Modality:  Oral  Probiotic Yogurt/Smoothie Cans or Servings Per Day:  2       Frequency:  Daily  Entered: Jul 27 2021 10:00AM    
This patient has been assessed with a concern for Malnutrition and has been determined to have a diagnosis/diagnoses of Severe protein-calorie malnutrition.    This patient is being managed with:   Diet Low Fiber-  David(7 Gm Arginine/7 Gm Glut/1.2 Gm HMB     Qty per Day:  2  Liquid Protein Supplement     Qty per Day:  1  Supplement Feeding Modality:  Oral  Ensure Enlive Cans or Servings Per Day:  2       Frequency:  Daily  Probiotic Yogurt/Smoothie Cans or Servings Per Day:  1       Frequency:  Daily  Entered: Aug 12 2021 10:59AM    
This patient has been assessed with a concern for Malnutrition and has been determined to have a diagnosis/diagnoses of Severe protein-calorie malnutrition.    This patient is being managed with:   Diet NPO with Tube Feed-  Tube Feeding Modality: Nasogastric  Pivot 1.5 Blayne (PIVOT1.5RTH)  Total Volume for 24 Hours (mL): 1080  Continuous  Starting Tube Feed Rate {mL per Hour}: 20     Every 12 hours  Until Goal Tube Feed Rate (mL per Hour): 45  Tube Feed Duration (in Hours): 24  Tube Feed Start Time: 10:30  Entered: Sep  6 2021  9:46AM    
This patient has been assessed with a concern for Malnutrition and has been determined to have a diagnosis/diagnoses of Severe protein-calorie malnutrition.    This patient is being managed with:   Diet NPO with Tube Feed-  Tube Feeding Modality: Nasogastric  Pivot 1.5 Blayne (PIVOT1.5RTH)  Total Volume for 24 Hours (mL): 1080  Continuous  Starting Tube Feed Rate {mL per Hour}: 20     Every 12 hours  Until Goal Tube Feed Rate (mL per Hour): 45  Tube Feed Duration (in Hours): 24  Tube Feed Start Time: 10:30  Entered: Sep  6 2021  9:46AM    
This patient has been assessed with a concern for Malnutrition and has been determined to have a diagnosis/diagnoses of Severe protein-calorie malnutrition.    This patient is being managed with:   Diet NPO with Tube Feed-  Tube Feeding Modality: Nasogastric  Pivot 1.5 Blayne (PIVOT1.5RTH)  Total Volume for 24 Hours (mL): 1080  Continuous  Starting Tube Feed Rate {mL per Hour}: 45  Until Goal Tube Feed Rate (mL per Hour): 45  Tube Feed Duration (in Hours): 24  Tube Feed Start Time: 19:30  Entered: Sep  8 2021  6:58PM    
This patient has been assessed with a concern for Malnutrition and has been determined to have a diagnosis/diagnoses of Severe protein-calorie malnutrition.    This patient is being managed with:   Diet NPO with Tube Feed-  Tube Feeding Modality: Nasogastric  Pivot 1.5 Blayne (PIVOT1.5RTH)  Total Volume for 24 Hours (mL): 1080  Continuous  Starting Tube Feed Rate {mL per Hour}: 45  Until Goal Tube Feed Rate (mL per Hour): 45  Tube Feed Duration (in Hours): 24  Tube Feed Start Time: 19:30  Entered: Sep  8 2021  6:58PM    
This patient has been assessed with a concern for Malnutrition and has been determined to have a diagnosis/diagnoses of Severe protein-calorie malnutrition.    This patient is being managed with:   Diet NPO with Tube Feed-  Tube Feeding Modality: Nasogastric  Pivot 1.5 Blayne (PIVOT1.5RTH)  Total Volume for 24 Hours (mL): 960  Continuous  Starting Tube Feed Rate {mL per Hour}: 20     Every 12 hours  Until Goal Tube Feed Rate (mL per Hour): 40  Tube Feed Duration (in Hours): 24  Tube Feed Start Time: 10:30  Entered: Sep  1 2021 12:45PM    Parenteral Nutrition - Adult-  Entered: Sep  1 2021 12:33PM    fat emulsion (Fish Oil and Plant Based) 20% Infusion-[Known as SMOFLIPID 20% Infusion]  60 Gram(s) in IV Solution 300 milliLiter(s) infuse at 25 mL/Hr  Dose Rate: 25 mL/Hr Infuse Over: 12 Hours  Administration Instructions: Use 1.2 micron in-line filter  Entered: Sep  1 2021 11:33AM    
This patient has been assessed with a concern for Malnutrition and has been determined to have a diagnosis/diagnoses of Severe protein-calorie malnutrition.    This patient is being managed with:   Diet NPO-  Except Medications  Entered: Sep 20 2021  8:34AM    
This patient has been assessed with a concern for Malnutrition and has been determined to have a diagnosis/diagnoses of Severe protein-calorie malnutrition.    This patient is being managed with:   Diet NPO-  Except Medications  With Ice Chips/Sips of Water  Entered: Sep 11 2021 10:27AM    
This patient has been assessed with a concern for Malnutrition and has been determined to have a diagnosis/diagnoses of Severe protein-calorie malnutrition.    This patient is being managed with:   Parenteral Nutrition - Adult-  Entered: Aug 19 2021  5:00PM    fat emulsion (Fish Oil and Plant Based) 20% Infusion-[Known as SMOFLIPID 20% Infusion]  60 Gram(s) in IV Solution 300 milliLiter(s) infuse at 25 mL/Hr  Dose Rate: 25 mL/Hr Infuse Over: 12 Hours  Administration Instructions: Use 1.2 micron in-line filter  Entered: Aug 19 2021  5:00PM    Diet Low Fiber-  David(7 Gm Arginine/7 Gm Glut/1.2 Gm HMB     Qty per Day:  2  Liquid Protein Supplement     Qty per Day:  1  Supplement Feeding Modality:  Oral  Ensure Enlive Cans or Servings Per Day:  2       Frequency:  Daily  Probiotic Yogurt/Smoothie Cans or Servings Per Day:  1       Frequency:  Daily  Entered: Aug 12 2021 10:59AM    
This patient has been assessed with a concern for Malnutrition and has been determined to have a diagnosis/diagnoses of Severe protein-calorie malnutrition.    This patient is being managed with:   Parenteral Nutrition - Adult-  Entered: Aug 20 2021  5:00PM    fat emulsion (Fish Oil and Plant Based) 20% Infusion-[Known as SMOFLIPID 20% Infusion]  60 Gram(s) in IV Solution 300 milliLiter(s) infuse at 25 mL/Hr  Dose Rate: 25 mL/Hr Infuse Over: 12 Hours  Administration Instructions: Use 1.2 micron in-line filter  Entered: Aug 20 2021 11:16AM    
This patient has been assessed with a concern for Malnutrition and has been determined to have a diagnosis/diagnoses of Severe protein-calorie malnutrition.    This patient is being managed with:   Parenteral Nutrition - Adult-  Entered: Aug 21 2021  5:00PM    fat emulsion (Fish Oil and Plant Based) 20% Infusion-[Known as SMOFLIPID 20% Infusion]  60 Gram(s) in IV Solution 300 milliLiter(s) infuse at 25 mL/Hr  Dose Rate: 25 mL/Hr Infuse Over: 12 Hours  Administration Instructions: Use 1.2 micron in-line filter  Entered: Aug 21 2021 11:21AM    Diet NPO-  Entered: Aug 21 2021  6:43AM    Parenteral Nutrition - Adult-  Entered: Aug 20 2021  5:00PM    
This patient has been assessed with a concern for Malnutrition and has been determined to have a diagnosis/diagnoses of Severe protein-calorie malnutrition.    This patient is being managed with:   Parenteral Nutrition - Adult-  Entered: Aug 24 2021  5:00PM    fat emulsion (Fish Oil and Plant Based) 20% Infusion-[Known as SMOFLIPID 20% Infusion]  60 Gram(s) in IV Solution 300 milliLiter(s) infuse at 25 mL/Hr  Dose Rate: 25 mL/Hr Infuse Over: 12 Hours  Administration Instructions: Use 1.2 micron in-line filter  Entered: Aug 24 2021 11:46AM    Diet NPO-  Entered: Aug 23 2021 10:22PM    
This patient has been assessed with a concern for Malnutrition and has been determined to have a diagnosis/diagnoses of Severe protein-calorie malnutrition.    This patient is being managed with:   Parenteral Nutrition - Adult-  Entered: Aug 25 2021  5:00PM    Diet NPO-  With Ice Chips/Sips of Water  Entered: Aug 25 2021  3:23PM    fat emulsion (Fish Oil and Plant Based) 20% Infusion-[Known as SMOFLIPID 20% Infusion]  60 Gram(s) in IV Solution 300 milliLiter(s) infuse at 25 mL/Hr  Dose Rate: 25 mL/Hr Infuse Over: 12 Hours  Administration Instructions: Use 1.2 micron in-line filter  Entered: Aug 25 2021 11:44AM    
This patient has been assessed with a concern for Malnutrition and has been determined to have a diagnosis/diagnoses of Severe protein-calorie malnutrition.    This patient is being managed with:   Parenteral Nutrition - Adult-  Entered: Aug 29 2021 12:33PM    fat emulsion (Fish Oil and Plant Based) 20% Infusion-[Known as SMOFLIPID 20% Infusion]  60 Gram(s) in IV Solution 300 milliLiter(s) infuse at 25 mL/Hr  Dose Rate: 25 mL/Hr Infuse Over: 12 Hours  Administration Instructions: Use 1.2 micron in-line filter  Entered: Aug 29 2021  8:22AM    Diet NPO with Tube Feed-  Tube Feeding Modality: Nasogastric  Glucerna 1.2 Blayne (GLUCERNARTH)  Total Volume for 24 Hours (mL): 1320  Continuous  Starting Tube Feed Rate {mL per Hour}: 30  Increase Tube Feed Rate by (mL): 10     Every 4 hours  Until Goal Tube Feed Rate (mL per Hour): 55  Tube Feed Duration (in Hours): 24  Tube Feed Start Time: 19:45  Entered: Aug 28 2021  7:21PM    
This patient has been assessed with a concern for Malnutrition and has been determined to have a diagnosis/diagnoses of Severe protein-calorie malnutrition.    This patient is being managed with:   Parenteral Nutrition - Adult-  Entered: Aug 31 2021 12:33PM    fat emulsion (Fish Oil and Plant Based) 20% Infusion-[Known as SMOFLIPID 20% Infusion]  60 Gram(s) in IV Solution 300 milliLiter(s) infuse at 25 mL/Hr  Dose Rate: 25 mL/Hr Infuse Over: 12 Hours  Administration Instructions: Use 1.2 micron in-line filter  Entered: Aug 31 2021 11:17AM    Diet NPO with Tube Feed-  Tube Feeding Modality: Nasogastric  Glucerna 1.2 Blayne (GLUCERNARTH)  Total Volume for 24 Hours (mL): 480  Continuous  Starting Tube Feed Rate {mL per Hour}: 20     Every 4 hours  Until Goal Tube Feed Rate (mL per Hour): 20  Tube Feed Duration (in Hours): 24  Tube Feed Start Time: 10:30  Entered: Aug 31 2021 10:30AM    
This patient has been assessed with a concern for Malnutrition and has been determined to have a diagnosis/diagnoses of Severe protein-calorie malnutrition.    This patient is being managed with:   Parenteral Nutrition - Adult-  Entered: Sep  5 2021 11:28AM    fat emulsion (Fish Oil and Plant Based) 20% Infusion-[Known as SMOFLIPID 20% Infusion]  30 Gram(s) in IV Solution 150 milliLiter(s) infuse at 12.5 mL/Hr  Dose Rate: 12.5 mL/Hr Infuse Over: 12 Hours  Administration Instructions: Use 1.2 micron in-line filter  Entered: Sep  5 2021 11:28AM    Diet NPO with Tube Feed-  Tube Feeding Modality: Nasogastric  Pivot 1.5 Blayne (PIVOT1.5RTH)  Total Volume for 24 Hours (mL): 960  Continuous  Starting Tube Feed Rate {mL per Hour}: 20     Every 12 hours  Until Goal Tube Feed Rate (mL per Hour): 40  Tube Feed Duration (in Hours): 24  Tube Feed Start Time: 10:30  Entered: Sep  1 2021 12:45PM    
This patient has been assessed with a concern for Malnutrition and has been determined to have a diagnosis/diagnoses of Severe protein-calorie malnutrition.    This patient is being managed with:   Parenteral Nutrition - Adult-  Entered: Sep  5 2021 11:28AM    fat emulsion (Fish Oil and Plant Based) 20% Infusion-[Known as SMOFLIPID 20% Infusion]  30 Gram(s) in IV Solution 150 milliLiter(s) infuse at 12.5 mL/Hr  Dose Rate: 12.5 mL/Hr Infuse Over: 12 Hours  Administration Instructions: Use 1.2 micron in-line filter  Entered: Sep  5 2021 11:28AM    Parenteral Nutrition - Adult-  Entered: Sep  4 2021 12:33PM    Diet NPO with Tube Feed-  Tube Feeding Modality: Nasogastric  Pivot 1.5 Blayne (PIVOT1.5RTH)  Total Volume for 24 Hours (mL): 960  Continuous  Starting Tube Feed Rate {mL per Hour}: 20     Every 12 hours  Until Goal Tube Feed Rate (mL per Hour): 40  Tube Feed Duration (in Hours): 24  Tube Feed Start Time: 10:30  Entered: Sep  1 2021 12:45PM    
This patient has been assessed with a concern for Malnutrition and has been determined to have a diagnosis/diagnoses of Severe protein-calorie malnutrition.    This patient is being managed with:   Diet Low Fiber-  Supplement Feeding Modality:  Oral  Ensure Enlive Cans or Servings Per Day:  3       Frequency:  Daily  Entered: Jul 16 2021  3:09PM    
This patient has been assessed with a concern for Malnutrition and has been determined to have a diagnosis/diagnoses of Severe protein-calorie malnutrition.    This patient is being managed with:   Diet Low Fiber-  Supplement Feeding Modality:  Oral  Ensure Enlive Cans or Servings Per Day:  3       Frequency:  Daily  Entered: Jul 16 2021  3:09PM    
This patient has been assessed with a concern for Malnutrition and has been determined to have a diagnosis/diagnoses of Severe protein-calorie malnutrition.    This patient is being managed with:   Diet Low Fiber-  Liquid Protein Supplement     Qty per Day:  3  Supplement Feeding Modality:  Oral  Probiotic Yogurt/Smoothie Cans or Servings Per Day:  2       Frequency:  Daily  Entered: Jul 20 2021  3:34PM    
This patient has been assessed with a concern for Malnutrition and has been determined to have a diagnosis/diagnoses of Severe protein-calorie malnutrition.    This patient is being managed with:   Diet Regular-  Fiber/Residue Restricted (LOWFIBER)  Supplement Feeding Modality:  Oral  Ensure Clear Cans or Servings Per Day:  3       Frequency:  Daily  Entered: Jul 15 2021  4:59PM    Diet Regular-  Fiber/Residue Restricted (LOWFIBER)  Entered: Jul 15 2021 12:54PM    The following pending diet order is being considered for treatment of Severe protein-calorie malnutrition:null
This patient has been assessed with a concern for Malnutrition and has been determined to have a diagnosis/diagnoses of Severe protein-calorie malnutrition.    This patient is being managed with:   Diet Low Fiber-  Banatrol TF     Qty per Day:  1 pkt po q8h  Liquid Protein Supplement     Qty per Day:  3  Supplement Feeding Modality:  Oral  Ensure Enlive Cans or Servings Per Day:  2       Frequency:  Daily  Probiotic Yogurt/Smoothie Cans or Servings Per Day:  2       Frequency:  Daily  Entered: Jul 30 2021  6:25PM    
This patient has been assessed with a concern for Malnutrition and has been determined to have a diagnosis/diagnoses of Severe protein-calorie malnutrition.    This patient is being managed with:   Diet Low Fiber-  Liquid Protein Supplement     Qty per Day:  3  Supplement Feeding Modality:  Oral  Probiotic Yogurt/Smoothie Cans or Servings Per Day:  2       Frequency:  Daily  Entered: Jul 20 2021  3:34PM    
This patient has been assessed with a concern for Malnutrition and has been determined to have a diagnosis/diagnoses of Severe protein-calorie malnutrition.    This patient is being managed with:   Diet Low Fiber-  Liquid Protein Supplement     Qty per Day:  3  Supplement Feeding Modality:  Oral  Probiotic Yogurt/Smoothie Cans or Servings Per Day:  2       Frequency:  Daily  Entered: Jul 20 2021  3:34PM

## 2021-09-23 NOTE — PROGRESS NOTE ADULT - SUBJECTIVE AND OBJECTIVE BOX
[facility]-- WOUND TEAM -- FOLLOW UP NOTE  --------------------------------------------------------------------------------    24 hour events/subjective:    tolerating diet  pt for dc to rehab today    Diet:  Diet, Dysphagia 2 Mechanical Soft-Honey Consistency Fluid:   Supplement Feeding Modality:  Oral  Ensure Enlive Servings Per Day:  3       Frequency:  Three Times a day  Ensure Pudding Cans or Servings Per Day:  2       Frequency:  Daily (09-20-21 @ 13:03)      ROS: General/ SKIN see HPI  all other systems negative      ALLERGIES & MEDICATIONS  --------------------------------------------------------------------------------  No Known Allergies      STANDING INPATIENT MEDICATIONS  Biotene Dry Mouth Oral Rinse 5 milliLiter(s) Swish and Spit two times a day  chlorhexidine 2% Cloths 1 Application(s) Topical <User Schedule>  cholecalciferol 1000 Unit(s) Oral daily  Dakins Solution - 1/4 Strength 1 Application(s) Topical every 12 hours  folic acid 1 milliGRAM(s) Oral daily  lidocaine   4% Patch 1 Patch Transdermal daily  metoprolol tartrate 25 milliGRAM(s) Oral every 8 hours  multivitamin 1 Tablet(s) Oral daily  pantoprazole    Tablet 40 milliGRAM(s) Oral every 12 hours  sucralfate 1 Gram(s) Oral every 12 hours      PRN INPATIENT MEDICATION  acetaminophen   Tablet .. 650 milliGRAM(s) Oral every 6 hours PRN        VITALS/PHYSICAL EXAM  --------------------------------------------------------------------------------  T(C): 37.2 (09-23-21 @ 14:20), Max: 37.4 (09-23-21 @ 10:10)  HR: 88 (09-23-21 @ 14:20) (85 - 95)  BP: 97/60 (09-23-21 @ 14:20) (97/60 - 151/68)  RR: 18 (09-23-21 @ 14:20) (18 - 18)  SpO2: 95% (09-23-21 @ 14:20) (95% - 99%)  Wt(kg): --        09-22-21 @ 07:01  -  09-23-21 @ 07:00  --------------------------------------------------------  IN: 150 mL / OUT: 1463 mL / NET: -1313 mL    09-23-21 @ 07:01  -  09-23-21 @ 16:32  --------------------------------------------------------  IN: 250 mL / OUT: 500 mL / NET: -250 mL    Physical Exam:  General: guarded but stable, confused, thin, frail   GI: soft NT/ ND (+)stoma pink / functioning (+)NGT  Psych: appropriate  Neurology: verbally limited, not following commands  Musculoskeletal: passive ROM  Vascular: BLE edema equal, no ischemia, equally warm  Skin:  pale, moist, w/ good turgor  sacral area skin  10cmx 6cm x 1.5cm increased granular tissue w/ less necrotic/ nonviable tissue      (+)serosangenous drainage,  & ttp  No odor, increased warmth, induration, fluctuance   thoracic spine DTI  pressure injury w/ partial thickness/ denuded skin measuring 3.5cm x 2.5cm x0cm   the spine is kyphotic with protuberant bony prominences   periwound skin is intact , there is no drainage.   No odor, increased warmth, induration, fluctuance, tenderness        LABS/ CULTURES/ RADIOLOGY:               7.6    13.59 )-----------( 266      ( 20 Sep 2021 07:18 )             24.2     09-20    137  |  103  |  6<L>  ----------------------------<  102<H>  3.4<L>   |  26  |  0.55    Ca    7.6<L>      20 Sep 2021 07:15  Phos  2.6     09-19  Mg     2.0     09-19    TPro  4.8<L>  /  Alb  1.8<L>  /  TBili  0.4  /  DBili  x   /  AST  7<L>  /  ALT  9<L>  /  AlkPhos  71  09-20      Prealbumin, Serum: 22 mg/dL (09-03-21 @ 05:19)  Prealbumin, Serum: 15 mg/dL (08-30-21 @ 03:32)  Prealbumin, Serum: 8 mg/dL (08-15-21 @ 10:17)      A1C with Estimated Average Glucose Result: 5.2 % (08-17-21 @ 09:06)

## 2021-09-23 NOTE — PROGRESS NOTE ADULT - ASSESSMENT
A/P 80yo M with Crohn's disease, now s/p total abdominal colectomy with end ileostomy on 8/20    Sacral/bilateral buttocks with unstageable pressure injury  Suspected DTI w/stage 2 thoracic spine pressure injury  incontinence of urine    Recommend:  1.)  sacral/inner buttocks injury - dakins bid        Thoracic spine- allevyn foam  2.)  Rosina w/ pericare BID and prn soiling external male urinary catheter  3.) Maintain on an alternating air with low air loss surface  4.) Turn and reposition Q 2 hours  5.) Nutrition optimization w/dysphagia diet and supplements in pt w/ severe protein          calorie malnutrition and increased nutritional needs          also MVI, & folic acid          improved prealbumin noted  6.) Offload heels/feet with complete care air fluidized boots/ensure soles of feet do not rest on foot board of the bed.  Care as per Surgery, will follow w/ you  Upon discharge f/u as outpatient at [facility] [address] [facility] [phone]  D/w team & seen w/ RN  Jessica Silveira PA-C, CWS [id number]  I spent 25minutes face to face w/ this pt of which more than 50% of the time was spent counseling & coordinating care of this pt.

## 2021-09-23 NOTE — PROGRESS NOTE ADULT - PROVIDER SPECIALTY LIST ADULT
Cardiology
Gastroenterology
Infectious Disease
Internal Medicine
Nutrition Support
SICU
SICU
Surgery
Wound Care
Cardiology
Gastroenterology
Infectious Disease
Internal Medicine
Nutrition Support
SICU
Surgery
Wound Care
Cardiology
Colorectal Surgery
Gastroenterology
Infectious Disease
Internal Medicine
Nutrition Support
SICU
Surgery
Cardiology
Colorectal Surgery
Colorectal Surgery
Gastroenterology
Infectious Disease
Internal Medicine
Nutrition Support
SICU
Surgery
Gastroenterology
Internal Medicine
SICU
Surgery
Internal Medicine

## 2021-09-23 NOTE — PROGRESS NOTE ADULT - REASON FOR ADMISSION
diarrhea with blood in it , abd pain and weakness

## 2021-09-23 NOTE — PROGRESS NOTE ADULT - SUBJECTIVE AND OBJECTIVE BOX
TEAM Surgery Progress Note  Patient is a 81y old  Male who presents with a chief complaint of diarrhea with blood in it , abd pain and weakness (19 Sep 2021 19:50)      SUBJECTIVE:         Vital Signs Last 24 Hrs  T(C): 36.8 (20 Sep 2021 05:15), Max: 37.1 (20 Sep 2021 01:20)  T(F): 98.3 (20 Sep 2021 05:15), Max: 98.7 (20 Sep 2021 01:20)  HR: 77 (20 Sep 2021 05:15) (65 - 85)  BP: 139/62 (20 Sep 2021 05:15) (106/56 - 155/65)  BP(mean): 90 (19 Sep 2021 20:00) (78 - 104)  RR: 18 (20 Sep 2021 05:15) (18 - 34)  SpO2: 98% (20 Sep 2021 05:15) (97% - 100%)I&O's Detail    19 Sep 2021 07:01  -  20 Sep 2021 07:00  --------------------------------------------------------  IN:    dextrose 5% + lactated ringers: 1150 mL    IV PiggyBack: 250 mL  Total IN: 1400 mL    OUT:    Bulb (mL): 5 mL    Bulb (mL): 35 mL    Ileostomy (mL): 260 mL    Incontinent per Condom Catheter (mL): 1650 mL  Total OUT: 1950 mL    Total NET: -550 mL      MEDICATIONS  (STANDING):  Biotene Dry Mouth Oral Rinse 5 milliLiter(s) Swish and Spit two times a day  chlorhexidine 2% Cloths 1 Application(s) Topical <User Schedule>  cholecalciferol 1000 Unit(s) Oral daily  Dakins Solution - 1/4 Strength 1 Application(s) Topical every 12 hours  dextrose 5% + lactated ringers. 1000 milliLiter(s) (50 mL/Hr) IV Continuous <Continuous>  folic acid 1 milliGRAM(s) Oral daily  lidocaine   4% Patch 1 Patch Transdermal daily  metoprolol tartrate 25 milliGRAM(s) Oral every 8 hours  multivitamin 1 Tablet(s) Oral daily  pantoprazole    Tablet 40 milliGRAM(s) Oral every 12 hours  sucralfate 1 Gram(s) Oral every 12 hours    MEDICATIONS  (PRN):  acetaminophen   Tablet .. 650 milliGRAM(s) Oral every 6 hours PRN Mild Pain (1 - 3)      PHYSICAL EXAM:  General: NAD, resting comfortably in bed  Neuro: A/O x 3, no focal deficits  Pulmonary: Nonlabored breathing, no respiratory distress  Abdominal: soft, ATTP. non-distended, no guarding, no rebound tenderness, Ostomy with fecal output  Extremities: warm, well perfused    LABS:                        7.6    13.59 )-----------( 266      ( 20 Sep 2021 07:18 )             24.2     09-20    137  |  103  |  6<L>  ----------------------------<  102<H>  3.4<L>   |  26  |  0.55    Ca    7.6<L>      20 Sep 2021 07:15  Phos  2.6     09-19  Mg     2.0     09-19    TPro  4.8<L>  /  Alb  1.8<L>  /  TBili  0.4  /  DBili  x   /  AST  7<L>  /  ALT  9<L>  /  AlkPhos  71  09-20      LIVER FUNCTIONS - ( 20 Sep 2021 07:15 )  Alb: 1.8 g/dL / Pro: 4.8 g/dL / ALK PHOS: 71 U/L / ALT: 9 U/L / AST: 7 U/L / GGT: x                 IMAGING:     TEAM Surgery Progress Note  Patient is a 81y old  Male who presents with a chief complaint of diarrhea with blood in it , abd pain and weakness (19 Sep 2021 19:50)      SUBJECTIVE: Pain seen at bedside this morning without any new complaints. Discussed plan to DC home.      Vital Signs Last 24 Hrs  T(C): 36.8 (20 Sep 2021 05:15), Max: 37.1 (20 Sep 2021 01:20)  T(F): 98.3 (20 Sep 2021 05:15), Max: 98.7 (20 Sep 2021 01:20)  HR: 77 (20 Sep 2021 05:15) (65 - 85)  BP: 139/62 (20 Sep 2021 05:15) (106/56 - 155/65)  BP(mean): 90 (19 Sep 2021 20:00) (78 - 104)  RR: 18 (20 Sep 2021 05:15) (18 - 34)  SpO2: 98% (20 Sep 2021 05:15) (97% - 100%)I&O's Detail    19 Sep 2021 07:01  -  20 Sep 2021 07:00  --------------------------------------------------------  IN:    dextrose 5% + lactated ringers: 1150 mL    IV PiggyBack: 250 mL  Total IN: 1400 mL    OUT:    Bulb (mL): 5 mL    Bulb (mL): 35 mL    Ileostomy (mL): 260 mL    Incontinent per Condom Catheter (mL): 1650 mL  Total OUT: 1950 mL    Total NET: -550 mL      MEDICATIONS  (STANDING):  Biotene Dry Mouth Oral Rinse 5 milliLiter(s) Swish and Spit two times a day  chlorhexidine 2% Cloths 1 Application(s) Topical <User Schedule>  cholecalciferol 1000 Unit(s) Oral daily  Dakins Solution - 1/4 Strength 1 Application(s) Topical every 12 hours  dextrose 5% + lactated ringers. 1000 milliLiter(s) (50 mL/Hr) IV Continuous <Continuous>  folic acid 1 milliGRAM(s) Oral daily  lidocaine   4% Patch 1 Patch Transdermal daily  metoprolol tartrate 25 milliGRAM(s) Oral every 8 hours  multivitamin 1 Tablet(s) Oral daily  pantoprazole    Tablet 40 milliGRAM(s) Oral every 12 hours  sucralfate 1 Gram(s) Oral every 12 hours    MEDICATIONS  (PRN):  acetaminophen   Tablet .. 650 milliGRAM(s) Oral every 6 hours PRN Mild Pain (1 - 3)      PHYSICAL EXAM:  General: NAD, resting comfortably in bed  Neuro: A/O x 3, no focal deficits  Pulmonary: Nonlabored breathing, no respiratory distress  Abdominal: soft, ATTP. non-distended, no guarding, no rebound tenderness, Ostomy with fecal output  Extremities: warm, well perfused    LABS:                        7.6    13.59 )-----------( 266      ( 20 Sep 2021 07:18 )             24.2     09-20    137  |  103  |  6<L>  ----------------------------<  102<H>  3.4<L>   |  26  |  0.55    Ca    7.6<L>      20 Sep 2021 07:15  Phos  2.6     09-19  Mg     2.0     09-19    TPro  4.8<L>  /  Alb  1.8<L>  /  TBili  0.4  /  DBili  x   /  AST  7<L>  /  ALT  9<L>  /  AlkPhos  71  09-20      LIVER FUNCTIONS - ( 20 Sep 2021 07:15 )  Alb: 1.8 g/dL / Pro: 4.8 g/dL / ALK PHOS: 71 U/L / ALT: 9 U/L / AST: 7 U/L / GGT: x                 IMAGING:

## 2021-09-23 NOTE — PROGRESS NOTE ADULT - ASSESSMENT
A/P  80yo M with Crohn's disease, now s/p total abdominal colectomy with end ileostomy on 8/20.     GI bleed   stable H/h now     # DVT below knee b/L   -seen by vascular   hold off on AC 2/2 multiple episodes of GI bleed and transfusion  no need for IVC filter   repeat venous doppler in 1 week in-patient or out pt. to see any extension of DVT above knee     # CAD / PAF/ HTN  lopressor 25 mg q 8  rate controlled   not candidate for AC     # Sever colitis 2/2 chron's and c diff   - s/p total abdominal colectomy with end ileostomy,  protonix bid     # Sever malnutrition   started on puree diet / supplement     plan for rehab   Code Status: DNR/DNI, MOLST in chart

## 2021-09-23 NOTE — DISCHARGE NOTE NURSING/CASE MANAGEMENT/SOCIAL WORK - PATIENT PORTAL LINK FT
You can access the FollowMyHealth Patient Portal offered by [facility] by registering at the following website: [email or web address]. By joining [employer]’s FollowMyHealth portal, you will also be able to view your health information using other applications (apps) compatible with our system.

## 2021-09-27 PROBLEM — K92.2 GASTROINTESTINAL HEMORRHAGE, UNSPECIFIED: Chronic | Status: ACTIVE | Noted: 2021-01-01

## 2021-09-27 PROBLEM — K50.90 CROHN'S DISEASE, UNSPECIFIED, WITHOUT COMPLICATIONS: Chronic | Status: ACTIVE | Noted: 2021-01-01

## 2021-09-27 PROBLEM — I48.0 PAROXYSMAL ATRIAL FIBRILLATION: Chronic | Status: ACTIVE | Noted: 2021-01-01

## 2021-09-27 PROBLEM — I25.10 ATHEROSCLEROTIC HEART DISEASE OF NATIVE CORONARY ARTERY WITHOUT ANGINA PECTORIS: Chronic | Status: ACTIVE | Noted: 2021-01-01

## 2021-10-12 NOTE — ASSESSMENT
[FreeTextEntry1] : 81y.o. M w/ severe Crohn's colitis causing significant debilitation requiring emergent subtotal colectomy w/ end ileostomy on 8/20/21 w/ a prolonged post-op course presenting now from rehab for a post-op visit.

## 2021-10-12 NOTE — PHYSICAL EXAM
[Exam Deferred] : exam was deferred [Normal Rate and Rhythm] : normal rate and rhythm [Alert] : alert [Oriented to Person] : oriented to person [Oriented to Place] : oriented to place [Oriented to Time] : oriented to time [JVD] : no jugular venous distention  [de-identified] : soft, non-distended, non-tender to palpation, continued purulent drainage from lower portion of midline incision as expected, other incisions well healed, drains w/ serous output in the bulb, ostomy appliacne in place w/ normal brown thick output in the bag [de-identified] : awake, alert, in NAD [de-identified] : normocephalic, atraumatic, EOMI, nl conjunctiva, wearing glasses [de-identified] : b/l chest rise, EWOB on RA [de-identified] : deferred [de-identified] : sitting in wheelchair, unable to get out of it on his own [de-identified] : stage I ulcer noted on mid-back [de-identified] : flat affect

## 2021-10-12 NOTE — HISTORY OF PRESENT ILLNESS
[FreeTextEntry1] : Mr. Shemar Bustamante is a 81y.o. M diagnosed w/ Crohn's colitis in January w/ complicated course now presenting for his post-op visit. Pt failed medical therapy and had progress clinical decline for about 8-9months and was admitted to the hospital for several weeks when surgery was consulted. His course up till then was notable for C diff colitis as well as worsening Crohn's colitis w/ no improvement despite maximal medical therapy including steroids. Attempted to nutritionally optimise him with TPN before proceeding w/ his colectomy but he had acute worsening in his abdominal exam and mental status thus he was taken urgently on 8/20/21 for a laparoscopic total abdominal colectomy and end ileostomy. His post-op course was complicated by recurrent GI bleed requiring transfusions and when he finally agreed w/ to an EGD after his 3rd/4th episode no obvious source of bleeding could be found. He was finally discharged to [facility] about 1month post-op and presents today for his follow-up. The pt had a hard time with the rehab transport all the way to my [place] office. He has gotten a little bit stronger at rehab but still needs assistance w/ ambulating and getting up. The son mentions that the pt has pressure ulcers on his sacrum and back. Having ostomy function which to their knowledge has not been bloody.

## 2021-11-18 ENCOUNTER — APPOINTMENT (OUTPATIENT)
Dept: SURGERY | Facility: CLINIC | Age: 81
End: 2021-11-18

## 2022-02-23 NOTE — ED PROVIDER NOTE - NS ED ROS FT
Problem: At Risk for Falls  Goal: # Verbalizes understanding of fall risk/precautions  Description: Document education using the patient education activity  Outcome: Outcome Met, Continue evaluating goal progress toward completion     Problem: At Risk for Falls  Goal: # Patient does not fall  Outcome: Outcome Met, Complete Goal     Problem: Impaired Physical Mobility  Goal: # Bed mobility, ambulation, and ADLs are maintained or returned to baseline during hospitalization  Outcome: Outcome Not Met, Continue to Monitor      Attending/MD Morris.

## 2022-02-28 NOTE — ED PROVIDER NOTE - PHYSICAL EXAMINATION
Unknown if ever smoked Attending/MD Morris.   VITALS: reviewed  GEN: No apparent distress, A & O x 4  HEAD/EYES: NC/AT, PERRL, EOMI, anicteric sclerae, no conjunctival pallor  ENT: mucus membranes moist, trachea midline, no JVD, neck is supple  RESP: lungs CTA with equal breath sounds bilaterally, chest wall nontender and atraumatic  CV: heart with reg rhythm S1, S2, no murmur; distal pulses intact and symmetric bilaterally  ABDOMEN: normoactive bowel sounds, soft, nondistended, nontender  MSK: extremities atraumatic and nontender, no edema, no asymmetry.   SKIN: warm, dry, no rash, no bruising, no cyanosis.  NEURO: alert, mentating appropriately, no facial asymmetry.  PSYCH: Affect appropriate

## 2022-07-26 NOTE — PROGRESS NOTE ADULT - PROBLEM SELECTOR PROBLEM 2
Diarrhea
Detail Level: Detailed
Diarrhea

## 2023-03-14 NOTE — PROGRESS NOTE ADULT - SUBJECTIVE AND OBJECTIVE BOX
Patient is a 81y old  Male who presents with a chief complaint of diarrhea with blood in it , abd pain and weakness (08 Aug 2021 21:21)      INTERVAL HPI/OVERNIGHT EVENTS:  T(C): 36.5 (08-09-21 @ 23:46), Max: 37.1 (08-09-21 @ 05:56)  HR: 75 (08-09-21 @ 23:46) (71 - 97)  BP: 135/68 (08-09-21 @ 23:46) (103/65 - 150/77)  RR: 18 (08-09-21 @ 23:46) (17 - 23)  SpO2: 95% (08-09-21 @ 23:46) (94% - 98%)  Wt(kg): --  I&O's Summary    08 Aug 2021 07:01  -  09 Aug 2021 07:00  --------------------------------------------------------  IN: 1967 mL / OUT: 200 mL / NET: 1767 mL    09 Aug 2021 07:01  -  09 Aug 2021 23:54  --------------------------------------------------------  IN: 930 mL / OUT: 1100 mL / NET: -170 mL        PAST MEDICAL & SURGICAL HISTORY:  No pertinent past medical history    No significant past surgical history        SOCIAL HISTORY  Alcohol:  Tobacco:  Illicit substance use:    FAMILY HISTORY:    REVIEW OF SYSTEMS:  CONSTITUTIONAL: No fever, weight loss, or fatigue  EYES: No eye pain, visual disturbances, or discharge  ENMT:  No difficulty hearing, tinnitus, vertigo; No sinus or throat pain  NECK: No pain or stiffness  RESPIRATORY: No cough, wheezing, chills or hemoptysis; No shortness of breath  CARDIOVASCULAR: No chest pain, palpitations, dizziness, or leg swelling  GASTROINTESTINAL: No abdominal or epigastric pain. No nausea, vomiting, or hematemesis; No diarrhea or constipation. No melena or hematochezia.  GENITOURINARY: No dysuria, frequency, hematuria, or incontinence  NEUROLOGICAL: No headaches, memory loss, loss of strength, numbness, or tremors  SKIN: No itching, burning, rashes, or lesions   LYMPH NODES: No enlarged glands  ENDOCRINE: No heat or cold intolerance; No hair loss  MUSCULOSKELETAL: No joint pain or swelling; No muscle, back, or extremity pain  PSYCHIATRIC: No depression, anxiety, mood swings, or difficulty sleeping  HEME/LYMPH: No easy bruising, or bleeding gums  ALLERY AND IMMUNOLOGIC: No hives or eczema    RADIOLOGY & ADDITIONAL TESTS:    Imaging Personally Reviewed:  [ ] YES  [ ] NO    Consultant(s) Notes Reviewed:  [ ] YES  [ ] NO    PHYSICAL EXAM:  GENERAL: NAD, well-groomed, well-developed  HEAD:  Atraumatic, Normocephalic  EYES: EOMI, PERRLA, conjunctiva and sclera clear  ENMT: No tonsillar erythema, exudates, or enlargement; Moist mucous membranes, Good dentition, No lesions  NECK: Supple, No JVD, Normal thyroid  NERVOUS SYSTEM:  Alert & Oriented X3, Good concentration; Motor Strength 5/5 B/L upper and lower extremities; DTRs 2+ intact and symmetric  CHEST/LUNG: Clear to percussion bilaterally; No rales, rhonchi, wheezing, or rubs  HEART: Regular rate and rhythm; No murmurs, rubs, or gallops  ABDOMEN: Soft, Nontender, Nondistended; Bowel sounds present  EXTREMITIES:  2+ Peripheral Pulses, No clubbing, cyanosis, or edema  LYMPH: No lymphadenopathy noted  SKIN: No rashes or lesions    LABS:                        8.0    10.36 )-----------( 413      ( 09 Aug 2021 09:11 )             26.8     08-09    134<L>  |  103  |  8   ----------------------------<  86  3.6   |  23  |  0.55    Ca    7.3<L>      09 Aug 2021 09:11          CAPILLARY BLOOD GLUCOSE                MEDICATIONS  (STANDING):  clonazePAM  Tablet 0.25 milliGRAM(s) Oral at bedtime  fidaxomicin 200 milliGRAM(s) Oral <User Schedule>  FIRST- Mouthwash  BLM 10 milliLiter(s) Swish and Spit every 8 hours  folic acid 1 milliGRAM(s) Oral daily  methylPREDNISolone sodium succinate Injectable 20 milliGRAM(s) IV Push every 8 hours  multivitamin 1 Tablet(s) Oral daily  sodium chloride 0.9% with potassium chloride 20 mEq/L 1000 milliLiter(s) (60 mL/Hr) IV Continuous <Continuous>  sotalol 120 milliGRAM(s) Oral daily    MEDICATIONS  (PRN):  acetaminophen   Tablet .. 650 milliGRAM(s) Oral every 6 hours PRN Temp greater or equal to 38.5C (101.3F), Mild Pain (1 - 3), Moderate Pain (4 - 6)  aluminum hydroxide/magnesium hydroxide/simethicone Suspension 30 milliLiter(s) Oral every 4 hours PRN Dyspepsia  guaifenesin/dextromethorphan Oral Liquid 10 milliLiter(s) Oral every 8 hours PRN Cough  lidocaine 2% Gel 1 Application(s) Topical four times a day PRN pain due to skin excoriation  ondansetron Injectable 4 milliGRAM(s) IV Push every 6 hours PRN Nausea and/or Vomiting      Care Discussed with Consultants/Other Providers [ ] YES  [ ] NO Please send copy of preop office note and EKG to surgeon    Thanks Patient is a 81y old  Male who presents with a chief complaint of diarrhea with blood in it , abd pain and weakness (08 Aug 2021 21:21)      INTERVAL HPI/OVERNIGHT EVENTS: s/p flex sig today   T(C): 36.5 (08-09-21 @ 23:46), Max: 37.1 (08-09-21 @ 05:56)  HR: 75 (08-09-21 @ 23:46) (71 - 97)  BP: 135/68 (08-09-21 @ 23:46) (103/65 - 150/77)  RR: 18 (08-09-21 @ 23:46) (17 - 23)  SpO2: 95% (08-09-21 @ 23:46) (94% - 98%)  Wt(kg): --  I&O's Summary    08 Aug 2021 07:01  -  09 Aug 2021 07:00  --------------------------------------------------------  IN: 1967 mL / OUT: 200 mL / NET: 1767 mL    09 Aug 2021 07:01  -  09 Aug 2021 23:54  --------------------------------------------------------  IN: 930 mL / OUT: 1100 mL / NET: -170 mL        PAST MEDICAL & SURGICAL HISTORY:  No pertinent past medical history    No significant past surgical history        SOCIAL HISTORY  Alcohol:  Tobacco:  Illicit substance use:    FAMILY HISTORY:    REVIEW OF SYSTEMS:  CONSTITUTIONAL: No fever, weight loss, or fatigue  EYES: No eye pain, visual disturbances, or discharge  ENMT:  No difficulty hearing, tinnitus, vertigo; No sinus or throat pain  NECK: No pain or stiffness  RESPIRATORY: No cough, wheezing, chills or hemoptysis; No shortness of breath  CARDIOVASCULAR: No chest pain, palpitations, dizziness, or leg swelling  GASTROINTESTINAL: No abdominal or epigastric pain. No nausea, vomiting, or hematemesis; No diarrhea or constipation. No melena or hematochezia.  GENITOURINARY: No dysuria, frequency, hematuria, or incontinence  NEUROLOGICAL: No headaches, memory loss, loss of strength, numbness, or tremors  SKIN: No itching, burning, rashes, or lesions   LYMPH NODES: No enlarged glands  ENDOCRINE: No heat or cold intolerance; No hair loss  MUSCULOSKELETAL: No joint pain or swelling; No muscle, back, or extremity pain  PSYCHIATRIC: No depression, anxiety, mood swings, or difficulty sleeping  HEME/LYMPH: No easy bruising, or bleeding gums  ALLERY AND IMMUNOLOGIC: No hives or eczema    RADIOLOGY & ADDITIONAL TESTS:    Imaging Personally Reviewed:  [ ] YES  [ ] NO    Consultant(s) Notes Reviewed:  [ ] YES  [ ] NO    PHYSICAL EXAM:  GENERAL: NAD, well-groomed, well-developed  HEAD:  Atraumatic, Normocephalic  EYES: EOMI, PERRLA, conjunctiva and sclera clear  ENMT: No tonsillar erythema, exudates, or enlargement; Moist mucous membranes, Good dentition, No lesions  NECK: Supple, No JVD, Normal thyroid  NERVOUS SYSTEM:  Alert & Oriented X3, Good concentration; Motor Strength 5/5 B/L upper and lower extremities; DTRs 2+ intact and symmetric  CHEST/LUNG: Clear to percussion bilaterally; No rales, rhonchi, wheezing, or rubs  HEART: Regular rate and rhythm; No murmurs, rubs, or gallops  ABDOMEN: Soft, Nontender, Nondistended; Bowel sounds present  EXTREMITIES:  2+ Peripheral Pulses, No clubbing, cyanosis, or edema  LYMPH: No lymphadenopathy noted  SKIN: No rashes or lesions    LABS:                        8.0    10.36 )-----------( 413      ( 09 Aug 2021 09:11 )             26.8     08-09    134<L>  |  103  |  8   ----------------------------<  86  3.6   |  23  |  0.55    Ca    7.3<L>      09 Aug 2021 09:11          CAPILLARY BLOOD GLUCOSE                MEDICATIONS  (STANDING):  clonazePAM  Tablet 0.25 milliGRAM(s) Oral at bedtime  fidaxomicin 200 milliGRAM(s) Oral <User Schedule>  FIRST- Mouthwash  BLM 10 milliLiter(s) Swish and Spit every 8 hours  folic acid 1 milliGRAM(s) Oral daily  methylPREDNISolone sodium succinate Injectable 20 milliGRAM(s) IV Push every 8 hours  multivitamin 1 Tablet(s) Oral daily  sodium chloride 0.9% with potassium chloride 20 mEq/L 1000 milliLiter(s) (60 mL/Hr) IV Continuous <Continuous>  sotalol 120 milliGRAM(s) Oral daily    MEDICATIONS  (PRN):  acetaminophen   Tablet .. 650 milliGRAM(s) Oral every 6 hours PRN Temp greater or equal to 38.5C (101.3F), Mild Pain (1 - 3), Moderate Pain (4 - 6)  aluminum hydroxide/magnesium hydroxide/simethicone Suspension 30 milliLiter(s) Oral every 4 hours PRN Dyspepsia  guaifenesin/dextromethorphan Oral Liquid 10 milliLiter(s) Oral every 8 hours PRN Cough  lidocaine 2% Gel 1 Application(s) Topical four times a day PRN pain due to skin excoriation  ondansetron Injectable 4 milliGRAM(s) IV Push every 6 hours PRN Nausea and/or Vomiting      Care Discussed with Consultants/Other Providers [ ] YES  [ ] NO

## 2023-05-02 NOTE — ED ADULT NURSE NOTE - PAIN RATING/NUMBER SCALE (0-10): ACTIVITY
Left message would like to move up appointment to May 2nd do to opening in [facility] schedule. Phone number left for patient to call and schedule.  
[phone]

## 2023-05-09 NOTE — SWALLOW BEDSIDE ASSESSMENT ADULT - ASR SWALLOW ASPIRATION MONITOR
[FreeTextEntry1] : ANITRA GARDNER is status post ascending aortic aneurysm with placement of a 32 Hemashield Platinum graft on 4/11/2023. He returns today due to complains of "chest burning with coughing" and "feeling sad since my surgery." He reports burning in his "deep chest" similar to when he had his inguinal hernia repair.  He reports the gabapentin at bedtime has not alleviated his burning but has made him sleepy. \par \par Plan:\par 1) Start Toradol PRN for pain \par 2) Blood work today check renal function \par 3) Return on as needed basis 
Monitor for s/s aspiration/laryngeal penetration. If noted:  D/C p.o. intake, provide non-oral nutrition/hydration/meds, and contact this service @ [phone]/change of breathing pattern/cough/gurgly voice/fever/pneumonia/throat clearing/upper respiratory infection

## 2023-05-13 NOTE — PROGRESS NOTE ADULT - ATTENDING COMMENTS
5/13/2023  Mela SANDOVAL DO, saw and evaluated the patient  I have discussed the patient with the resident/non-physician practitioner and agree with the resident's/non-physician practitioner's findings, Plan of Care, and MDM as documented in the resident's/non-physician practitioner's note, except where noted  All available labs and Radiology studies were reviewed  I was present for key portions of any procedure(s) performed by the resident/non-physician practitioner and I was immediately available to provide assistance  At this point I agree with the current assessment done in the Emergency Department  I have conducted an independent evaluation of this patient a history and physical is as follows:    ED Course  ED Course as of 05/16/23 0748   Sat May 13, 2023   1619 Nitrite, UA(!): Positive     Presenting from [facility] for concerns for renal failure  Presented to [facility] for R sided abdominal pain, dizziness, chills, fever, nausea without vomiting  Denies any other symptoms  States this has been ongoing for a few days  ROS: Otherwise negative unless stated above    Physical Exam  Vitals and nursing note reviewed  Constitutional:       General: She is not in acute distress  Appearance: She is well-developed  She is not ill-appearing, toxic-appearing or diaphoretic  HENT:      Head: Normocephalic and atraumatic  Right Ear: External ear normal       Left Ear: External ear normal       Nose: Nose normal    Eyes:      General: No scleral icterus  Right eye: No discharge  Left eye: No discharge  Conjunctiva/sclera: Conjunctivae normal       Pupils: Pupils are equal, round, and reactive to light  Neck:      Vascular: No JVD  Trachea: No tracheal deviation  Cardiovascular:      Rate and Rhythm: Normal rate and regular rhythm  Heart sounds: Normal heart sounds  No murmur heard  No friction rub  No gallop     Pulmonary:      Effort: Pulmonary effort is normal  No respiratory distress  Breath sounds: Normal breath sounds  No stridor  No wheezing or rales  Abdominal:      General: Bowel sounds are normal  There is no distension  Palpations: Abdomen is soft  There is no mass  Tenderness: There is no abdominal tenderness  There is no guarding  Musculoskeletal:         General: No tenderness or deformity  Normal range of motion  Cervical back: Normal range of motion and neck supple  Skin:     General: Skin is warm and dry  Coloration: Skin is not pale  Findings: No erythema or rash  Neurological:      Mental Status: She is alert and oriented to person, place, and time  Cranial Nerves: No cranial nerve deficit  Sensory: No sensory deficit  Motor: No abnormal muscle tone  Psychiatric:         Behavior: Behavior normal          Thought Content: Thought content normal          Judgment: Judgment normal        Assessment:  Patient sent in for abnormal blood work noted urgent care with significant DONITA  We will check blood work, urinalysis, CT scan  Patient declining pain or nausea medication at this time  Plan:   Patient with documented history of prior kidney stone will likely require admission for significant DONITA      Critical Care Time  Procedures Pt seen and examined with SICU team, agree with above.    1. Crohn's disease refractory to maximal medical therapy, s/p TAC with end ileostomy, has been on TPN with severe protein-calorie malnutrition and frailty:  - Continue TPN for now until pt fully tolerating TF  - Ileostomy functioning, ostomy RN teaching  - Pt had bloody ileostomy output yesterday, had CTA which was negative for acute hemorrhage, received PRBC and plasma for acute posthemorrhagic anemia, currently still having some dark output but hct stable - restart TF today  - NGT lavage was inconclusive (only tube feeds back, no bile), so uncertain whether pt had duodenal bleeding or a LGIB - will continue PPI BID for one week then return to daily (home med)  - If next hct stable, will restart chemical VTE prophylaxis    - Per ID, zosyn until 8/30  - Has PICC for TPN (8/20)  - Steroids weaned off    2. Paroxysmal afib, CAD  - Home meds: sotalol  - Pt has had GIB before, not on AC or AP because of bleeding  - Switched from IV to PO metoprolol via chantel yesterday, but had RVR which did not respond to IV metoprolol - SBP was soft, so amio was given - now better rate control, will continue IV metoprolol for now until have observed stable BP  - No systemic AC in light of this recurrent GIB  - Holding AP agents also - Cards recommends starting ASA, will discuss with Sx team    3. Hypophonia with VC bowing and dysphagia:  - Appreciate ENT and Sp/Sw eval, discussed with Sp/Sw therapist. Both services note that pt's condition may improve over the next few weeks - if pt is able to leave the hospital in less time, may consider PEG?    4. Anasarca with weeping through very thin, frail skin of upper extremities (not asymmetric):  - Will give some gentle diuresis today given transfusions yesterday    5. Insomnia:  - Pt takes klonopin 0.5mg nightly at home, has been off since surgery per pt's daughter. Pt did not sleep overnight and was upset about this, as was his daughter, who asked for klonopin to be restarted. Risks such as AMS explained, especially in light of pt's frailty, advanced age, and hypoactivity. Pt is not receiving any narcotics. Pt and his daughter very insistent that this home medication be restarted, and given that this is a chronic problem and lack of sleep can also lead to issues such as delirium, will restart klonopin tonight. Questions answered.

## 2023-06-07 NOTE — DIETITIAN INITIAL EVALUATION ADULT. - ORAL INTAKE PTA/DIET HISTORY
No
Pt visited with daughter at bedside. Pt lethargic, majority of information obtained by Pt's daughter. Daughter reports Pt unable to tolerate PO intake x8 months PTA secondary to diarrhea; endorses Pt consuming <50% baseline intake. States that prior Pt "was a very healthy 80 year old man, he would golf, exercise... everything was fine." Daughter states over the weekend she provides Pt with bland, tolerable foods (eggs, farina, salmon, tuna, rice, potatoes) + 1 Boost supplement... (cont. below)

## 2023-06-19 NOTE — ED PROVIDER NOTE - CROS ED GI ALL NEG
Counseling and Referral of Other Preventative  (Italic type indicates deductible and co-insurance are waived)    Patient Name: Ludwin Gee  Today's Date: 6/19/2023    Health Maintenance       Date Due Completion Date    COVID-19 Vaccine (1) Never done ---    Shingles Vaccine (1 of 2) Never done ---    Pneumococcal Vaccines (Age 65+) (3 - PPSV23 if available, else PCV20) 06/27/2018 3/28/2016    Influenza Vaccine (Season Ended) 09/01/2023 3/28/2016 (Declined)    Override on 3/28/2016: Declined    Hemoglobin A1c 11/23/2023 5/23/2023    PROSTATE-SPECIFIC ANTIGEN 03/07/2024 3/7/2023    Eye Exam 05/12/2024 5/12/2023    Foot Exam 05/19/2024 5/19/2023    Override on 6/18/2015: Done    Diabetes Urine Screening 05/23/2024 5/23/2023    Lipid Panel 05/23/2024 5/23/2023    Low Dose Statin 06/12/2024 6/12/2023    TETANUS VACCINE 05/03/2028 5/3/2018    Colorectal Cancer Screening 05/19/2028 5/19/2023    Override on 5/23/2012: Declined    Override on 8/16/2010: Declined        No orders of the defined types were placed in this encounter.      The following information is provided to all patients.  This information is to help you find resources for any of the problems found today that may be affecting your health:                Living healthy guide: www.[facility].[place].gov      Understanding Diabetes: www.diabetes.org      Eating healthy: www.cdc.gov/healthyweight      CDC home safety checklist: www.cdc.gov/steadi/patient.html      Agency on Aging: www.goea.[place].[facility]      Alcoholics anonymous (AA): www.aa.org      Physical Activity: www.rich.nih.gov/if6jyed      Tobacco use: www.quitwithusla.org      - - -

## 2024-01-01 NOTE — PROGRESS NOTE ADULT - SUBJECTIVE AND OBJECTIVE BOX
transport/NICU admission [facility] NUTRITION SUPPORT-- FOLLOW UP NOTE  --------------------------------------------------------------------------------    24 hour events/subjective: pt seen and examined. tpn running at 94cc/hr. lethargic in bed. c/o nausea, abd pain and diarrhea. per rn pt w 2 bloody bms. pt/dtr refused blood yesterday. 2nd surgical opinion noted. steroids changed to q12. FS trend noted, received 3u ssi coverage.  umang rn, pharmacy and nm re: scanning of tpn orders.    Diet:  Diet, Low Fiber:   David(7 Gm Arginine/7 Gm Glut/1.2 Gm HMB     Qty per Day:  2  Liquid Protein Supplement     Qty per Day:  1  Supplement Feeding Modality:  Oral  Ensure Enlive Cans or Servings Per Day:  2       Frequency:  Daily  Probiotic Yogurt/Smoothie Cans or Servings Per Day:  1       Frequency:  Daily (08-12-21 @ 10:59)      PAST HISTORY  --------------------------------------------------------------------------------  No significant changes to PMH, PSH, FHx, SHx, unless otherwise noted    ALLERGIES & MEDICATIONS  --------------------------------------------------------------------------------  Allergies    No Known Allergies    Intolerances      Standing Inpatient Medications  ascorbic acid 500 milliGRAM(s) Oral daily  chlorhexidine 2% Cloths 1 Application(s) Topical daily  clonazePAM  Tablet 0.25 milliGRAM(s) Oral at bedtime  dextrose 40% Gel 15 Gram(s) Oral once  dextrose 5%. 1000 milliLiter(s) IV Continuous <Continuous>  dextrose 5%. 1000 milliLiter(s) IV Continuous <Continuous>  dextrose 50% Injectable 25 Gram(s) IV Push once  dextrose 50% Injectable 12.5 Gram(s) IV Push once  dextrose 50% Injectable 25 Gram(s) IV Push once  fidaxomicin 200 milliGRAM(s) Oral <User Schedule>  FIRST- Mouthwash  BLM 10 milliLiter(s) Swish and Spit every 8 hours  folic acid 1 milliGRAM(s) Oral daily  glucagon  Injectable 1 milliGRAM(s) IntraMuscular once  insulin lispro (ADMELOG) corrective regimen sliding scale   SubCutaneous every 6 hours  methylPREDNISolone sodium succinate Injectable 20 milliGRAM(s) IV Push every 12 hours  multivitamin 1 Tablet(s) Oral daily  pantoprazole Infusion 8 mG/Hr IV Continuous <Continuous>  Parenteral Nutrition - Adult 1 Each TPN Continuous <Continuous>  sotalol 120 milliGRAM(s) Oral daily  thiamine Injectable 100 milliGRAM(s) IV Push daily    PRN Inpatient Medications  acetaminophen   Tablet .. 650 milliGRAM(s) Oral every 6 hours PRN  aluminum hydroxide/magnesium hydroxide/simethicone Suspension 30 milliLiter(s) Oral every 4 hours PRN  guaifenesin/dextromethorphan Oral Liquid 10 milliLiter(s) Oral every 8 hours PRN  lidocaine 2% Gel 1 Application(s) Topical four times a day PRN  LORazepam   Injectable 0.5 milliGRAM(s) IV Push once PRN  ondansetron Injectable 4 milliGRAM(s) IV Push every 6 hours PRN        REVIEW OF SYSTEMS  --------------------------------------------------------------------------------    see hpi, unable to obtain in entirety         VITALS/PHYSICAL EXAM  --------------------------------------------------------------------------------  T(C): 36.8 (08-19-21 @ 07:14), Max: 36.8 (08-18-21 @ 09:53)  HR: 77 (08-19-21 @ 07:14) (63 - 77)  BP: 158/74 (08-19-21 @ 07:14) (126/74 - 158/74)  RR: 18 (08-19-21 @ 07:14) (17 - 18)  SpO2: 93% (08-19-21 @ 07:14) (93% - 95%)  Wt(kg): --      08-18-21 @ 07:01  -  08-19-21 @ 07:00  --------------------------------------------------------  IN: 710 mL / OUT: 0 mL / NET: 710 mL      I&O's Detail    18 Aug 2021 07:01  -  19 Aug 2021 07:00  --------------------------------------------------------  IN:    Fat Emulsion (Fish Oil &amp; Plant Based) 20% Infusion: 50 mL    Fat Emulsion (Fish Oil &amp; Plant Based) 20% Infusion: 100 mL    Oral Fluid: 560 mL  Total IN: 710 mL    OUT:  Total OUT: 0 mL    Total NET: 710 mL        Physical Exam:  Gen: lying in bed, pale, frail, cachectic   HEENT: ncat, trachea midline  Chest: respirations even and non labored  Abd: soft, ttp, nondistended  LE:  no edema  Neuro: lethargic but arousable, responsive    LABS/STUDIES  --------------------------------------------------------------------------------              7.0    10.19 >-----------<  236      [08-18-21 @ 16:12]              23.2     136  |  104  |  19  ----------------------------<  168      [08-18-21 @ 06:45]  3.8   |  23  |  0.46        Ca     7.6     [08-18-21 @ 06:45]      iCa    1.07     [08-18 @ 06:47]      Mg     2.2     [08-18-21 @ 06:45]      Phos  3.1     [08-18-21 @ 06:45]    TPro  5.0  /  Alb  1.8  /  TBili  0.3  /  DBili  x   /  AST  34  /  ALT  220  /  AlkPhos  271  [08-18-21 @ 06:45]          Ca ionized  Creatinine Trend:  POC glucoseCAPILLARY BLOOD GLUCOSE      POCT Blood Glucose.: 121 mg/dL (19 Aug 2021 07:21)  POCT Blood Glucose.: 156 mg/dL (18 Aug 2021 23:48)  POCT Blood Glucose.: 224 mg/dL (18 Aug 2021 18:34)  POCT Blood Glucose.: 200 mg/dL (18 Aug 2021 12:46)    PrealbuminPrealbumin, Serum: 8 mg/dL (08-15-21 @ 10:17)    Triglycerides     [facility] NUTRITION SUPPORT-- FOLLOW UP NOTE  --------------------------------------------------------------------------------    24 hour events/subjective: pt seen and examined. tpn running at 94cc/hr, sp 300ml of lipids as ordered per night rn. lethargic in bed. c/o nausea, abd pain and diarrhea. per rn pt w 2 bloody bms. pt/dtr refused blood yesterday. 2nd surgical opinion noted. steroids changed to q12. FS trend noted, received 3u ssi coverage.  umang rn, pharmacy and nm re: scanning of tpn orders.    Diet:  Diet, Low Fiber:   David(7 Gm Arginine/7 Gm Glut/1.2 Gm HMB     Qty per Day:  2  Liquid Protein Supplement     Qty per Day:  1  Supplement Feeding Modality:  Oral  Ensure Enlive Cans or Servings Per Day:  2       Frequency:  Daily  Probiotic Yogurt/Smoothie Cans or Servings Per Day:  1       Frequency:  Daily (08-12-21 @ 10:59)      PAST HISTORY  --------------------------------------------------------------------------------  No significant changes to PMH, PSH, FHx, SHx, unless otherwise noted    ALLERGIES & MEDICATIONS  --------------------------------------------------------------------------------  Allergies    No Known Allergies    Intolerances      Standing Inpatient Medications  ascorbic acid 500 milliGRAM(s) Oral daily  chlorhexidine 2% Cloths 1 Application(s) Topical daily  clonazePAM  Tablet 0.25 milliGRAM(s) Oral at bedtime  dextrose 40% Gel 15 Gram(s) Oral once  dextrose 5%. 1000 milliLiter(s) IV Continuous <Continuous>  dextrose 5%. 1000 milliLiter(s) IV Continuous <Continuous>  dextrose 50% Injectable 25 Gram(s) IV Push once  dextrose 50% Injectable 12.5 Gram(s) IV Push once  dextrose 50% Injectable 25 Gram(s) IV Push once  fidaxomicin 200 milliGRAM(s) Oral <User Schedule>  FIRST- Mouthwash  BLM 10 milliLiter(s) Swish and Spit every 8 hours  folic acid 1 milliGRAM(s) Oral daily  glucagon  Injectable 1 milliGRAM(s) IntraMuscular once  insulin lispro (ADMELOG) corrective regimen sliding scale   SubCutaneous every 6 hours  methylPREDNISolone sodium succinate Injectable 20 milliGRAM(s) IV Push every 12 hours  multivitamin 1 Tablet(s) Oral daily  pantoprazole Infusion 8 mG/Hr IV Continuous <Continuous>  Parenteral Nutrition - Adult 1 Each TPN Continuous <Continuous>  sotalol 120 milliGRAM(s) Oral daily  thiamine Injectable 100 milliGRAM(s) IV Push daily    PRN Inpatient Medications  acetaminophen   Tablet .. 650 milliGRAM(s) Oral every 6 hours PRN  aluminum hydroxide/magnesium hydroxide/simethicone Suspension 30 milliLiter(s) Oral every 4 hours PRN  guaifenesin/dextromethorphan Oral Liquid 10 milliLiter(s) Oral every 8 hours PRN  lidocaine 2% Gel 1 Application(s) Topical four times a day PRN  LORazepam   Injectable 0.5 milliGRAM(s) IV Push once PRN  ondansetron Injectable 4 milliGRAM(s) IV Push every 6 hours PRN        REVIEW OF SYSTEMS  --------------------------------------------------------------------------------    see hpi, unable to obtain in entirety         VITALS/PHYSICAL EXAM  --------------------------------------------------------------------------------  T(C): 36.8 (08-19-21 @ 07:14), Max: 36.8 (08-18-21 @ 09:53)  HR: 77 (08-19-21 @ 07:14) (63 - 77)  BP: 158/74 (08-19-21 @ 07:14) (126/74 - 158/74)  RR: 18 (08-19-21 @ 07:14) (17 - 18)  SpO2: 93% (08-19-21 @ 07:14) (93% - 95%)  Wt(kg): --      08-18-21 @ 07:01  -  08-19-21 @ 07:00  --------------------------------------------------------  IN: 710 mL / OUT: 0 mL / NET: 710 mL      I&O's Detail    18 Aug 2021 07:01  -  19 Aug 2021 07:00  --------------------------------------------------------  IN:    Fat Emulsion (Fish Oil &amp; Plant Based) 20% Infusion: 50 mL    Fat Emulsion (Fish Oil &amp; Plant Based) 20% Infusion: 100 mL    Oral Fluid: 560 mL  Total IN: 710 mL    OUT:  Total OUT: 0 mL    Total NET: 710 mL        Physical Exam:  Gen: lying in bed, pale, frail, cachectic   HEENT: ncat, trachea midline  Chest: respirations even and non labored  Abd: soft, ttp, nondistended  LE:  no edema  Neuro: lethargic but arousable, responsive    LABS/STUDIES  --------------------------------------------------------------------------------              7.0    10.19 >-----------<  236      [08-18-21 @ 16:12]              23.2     136  |  104  |  19  ----------------------------<  168      [08-18-21 @ 06:45]  3.8   |  23  |  0.46        Ca     7.6     [08-18-21 @ 06:45]      iCa    1.07     [08-18 @ 06:47]      Mg     2.2     [08-18-21 @ 06:45]      Phos  3.1     [08-18-21 @ 06:45]    TPro  5.0  /  Alb  1.8  /  TBili  0.3  /  DBili  x   /  AST  34  /  ALT  220  /  AlkPhos  271  [08-18-21 @ 06:45]          Ca ionized  Creatinine Trend:  POC glucoseCAPILLARY BLOOD GLUCOSE      POCT Blood Glucose.: 121 mg/dL (19 Aug 2021 07:21)  POCT Blood Glucose.: 156 mg/dL (18 Aug 2021 23:48)  POCT Blood Glucose.: 224 mg/dL (18 Aug 2021 18:34)  POCT Blood Glucose.: 200 mg/dL (18 Aug 2021 12:46)    PrealbuminPrealbumin, Serum: 8 mg/dL (08-15-21 @ 10:17)    Triglycerides

## 2024-01-06 NOTE — PROGRESS NOTE ADULT - ASSESSMENT
A/P  80yo M with Crohn's disease, now s/p total abdominal colectomy with end ileostomy on 8/20.     GI bleed   stable H/h now     # DVT below knee b/L   -seen by vascular   hold off on AC 2/2 multiple episodes of GI bleed and transfusion  no need for IVC filter   repeat venous doppler in 1 week in-patient or out pt. to see any extension of DVT above knee     # CAD / PAF/ HTN  lopressor 25 mg q 8  rate controlled   not candidate for AC     # Sever colitis 2/2 chron's and c diff   - s/p total abdominal colectomy with end ileostomy,  protonix bid     # Sever malnutrition   started on puree diet / supplement     plan for rehab   Code Status: DNR/DNI, MOLST in chart   no

## 2024-09-20 NOTE — PHYSICAL THERAPY INITIAL EVALUATION ADULT - SIT-TO-STAND BALANCE
Spinal Block    Performed by: Evans Nicole MD  Authorized by: Evans Nicole MD    Patient Location:  OR  Indication: primary anesthetic    Pre anesthesia checklist Patient Identified (2 criteria), Timeout Performed, Resuscitation equipment available, IV Bolus, Allergies confirmed, Monitors applied, Coagulation Status, Block site marked (if applicable), Sedation given (if needed), Block Plan Confirmed, Drugs/Solutions Labeled, IV Access functioning, Necessary Block Equipment Present, Consent Verified and Aseptic technique   Patient Position:  Sitting  Prep:  Chlorhexidine gluconate w/ alcohol  Max Sterile Barrier Technique:  Hand washing, cap/mask, sterile gloves and sterile drape  Monitoring:  Blood pressure and continuous pulse ox  Oxygen Supplement:  Room Air  Approach:  Midline  Location:  L3-4  Injection Technique:  Single-shot  Needle Type:  Quincke  Needle Gauge:  22 G    Assessment:   Number of Attempts: 2   Events: CSF from needle   Procedure Assessment: patient tolerated procedure well with no complications and patient sedated but conversant throughout procedure   First attempt at L4-5 with no CSF. Second attempt at L3-4,CSF obtained before and after injection of preservative free 0.5% marcaine.       good minus

## 2025-02-19 NOTE — PHYSICAL THERAPY INITIAL EVALUATION ADULT - PASSIVE RANGE OF MOTION EXAMINATION, REHAB EVAL
Left UE Passive ROM was WNL (within normal limits)/Right UE Passive ROM was WNL (within normal limits)/Left LE Passive ROM was WNL (within normal limits)/Right LE Passive ROM was WNL (within normal limits) ,DirectAddress_Unknown

## 2025-07-07 NOTE — PROGRESS NOTE ADULT - ASSESSMENT
80yo M with Crohn's disease, now s/p total abdominal colectomy with end ileostomy on 8/20.     PLAN:  Neurologic: post-op pain control, new-onset hypophonia without focal neuro deficits, obtundation  - Monitor mental status  - Pain control: PO Tylenol 650mg q6hr PRN, Oxy 2.5mg q6hr PRN  - Vitamin D3 daily    Respiratory: Acute respiratory insufficiency resolving, patient on RA  - Incentive spirometry 10x/hr while in bed, OOBTC    Cardiovascular: History of AFib currently rate controlled with IV metoprolol and amiodarone, now in NSR  - Monitor vital signs, currently hemodynamically stable  - Metoprolol 37.5mg BID for a-fib    Gastrointestinal/Nutrition: s/p total abdominal colectomy with end ileostomy, failed S/S, bloody ostomy output  - TF Pivot 1.5 @ goal of 40cc/hr  - C/w TPN  - Protonix 40mg daily  - Monitor ostomy output    Renal/Genitourinary: no active issues   - Monitor and replete electrolytes as needed  - Monitor UOP, voiding spontaneously  - IVF: TPN @ 83cc/hr  - Assess need for diuresis daily; last diuresed with 20mg IV lasix x3 doses on 09/02    Hematologic: last transfusion 2u pRBC on 8/28  - Trend H/H and transfuse Hgb < 7  - Lovenox 40mg daily for DVT ppx    Infectious Disease: C. diff colitis vs Crohn's flair s/p subtotal colectomy   - Monitor WBC, temp    Endocrine: no active issues   - Monitor BMP  - Steroid taper completed 8/26  - ISS    Lines & Drains:   - LLQ & LUQ drain 8/20  - RUE PICC placed 8/16    Disposition: Will remain in SICU  Code Status: DNR/DNI, MOLST in chart No